# Patient Record
Sex: MALE | Race: WHITE | NOT HISPANIC OR LATINO | Employment: OTHER | URBAN - METROPOLITAN AREA
[De-identification: names, ages, dates, MRNs, and addresses within clinical notes are randomized per-mention and may not be internally consistent; named-entity substitution may affect disease eponyms.]

---

## 2017-02-07 ENCOUNTER — TELEPHONE (OUTPATIENT)
Dept: ELECTROPHYSIOLOGY | Facility: CLINIC | Age: 72
End: 2017-02-07

## 2017-02-07 DIAGNOSIS — I48.0 PAROXYSMAL ATRIAL FIBRILLATION: Primary | ICD-10-CM

## 2017-02-07 NOTE — TELEPHONE ENCOUNTER
Spoke with Dr. Snog regarding his paroxysmal recurrence.  I recommended repeat PVI, he is in agreement.  RFA.  CTA of chest to delineate PV anatomy.  Resume Eliquis now, hold evening prior to procedure.  PEPE 1-3 days prior.  Hold

## 2017-02-08 ENCOUNTER — LAB VISIT (OUTPATIENT)
Dept: LAB | Facility: HOSPITAL | Age: 72
End: 2017-02-08
Attending: INTERNAL MEDICINE
Payer: MEDICARE

## 2017-02-08 DIAGNOSIS — E03.4 HYPOTHYROIDISM DUE TO ACQUIRED ATROPHY OF THYROID: ICD-10-CM

## 2017-02-08 LAB
T4 FREE SERPL-MCNC: 0.9 NG/DL
TSH SERPL DL<=0.005 MIU/L-ACNC: 3.22 UIU/ML

## 2017-02-08 PROCEDURE — 84443 ASSAY THYROID STIM HORMONE: CPT

## 2017-02-08 PROCEDURE — 84439 ASSAY OF FREE THYROXINE: CPT

## 2017-02-08 PROCEDURE — 36415 COLL VENOUS BLD VENIPUNCTURE: CPT | Mod: PO

## 2017-02-09 ENCOUNTER — TELEPHONE (OUTPATIENT)
Dept: ELECTROPHYSIOLOGY | Facility: CLINIC | Age: 72
End: 2017-02-09

## 2017-02-10 ENCOUNTER — TELEPHONE (OUTPATIENT)
Dept: ELECTROPHYSIOLOGY | Facility: CLINIC | Age: 72
End: 2017-02-10

## 2017-02-10 DIAGNOSIS — I48.0 PAROXYSMAL ATRIAL FIBRILLATION: Primary | ICD-10-CM

## 2017-02-15 ENCOUNTER — OFFICE VISIT (OUTPATIENT)
Dept: INTERNAL MEDICINE | Facility: CLINIC | Age: 72
End: 2017-02-15
Payer: MEDICARE

## 2017-02-15 VITALS
HEART RATE: 48 BPM | HEIGHT: 74 IN | BODY MASS INDEX: 29.73 KG/M2 | DIASTOLIC BLOOD PRESSURE: 84 MMHG | WEIGHT: 231.69 LBS | SYSTOLIC BLOOD PRESSURE: 152 MMHG

## 2017-02-15 DIAGNOSIS — I10 ESSENTIAL HYPERTENSION: ICD-10-CM

## 2017-02-15 DIAGNOSIS — E03.4 HYPOTHYROIDISM DUE TO ACQUIRED ATROPHY OF THYROID: ICD-10-CM

## 2017-02-15 DIAGNOSIS — I48.0 PAROXYSMAL ATRIAL FIBRILLATION: ICD-10-CM

## 2017-02-15 DIAGNOSIS — I77.1 TORTUOUS AORTA: Primary | ICD-10-CM

## 2017-02-15 PROCEDURE — 3077F SYST BP >= 140 MM HG: CPT | Mod: S$GLB,,, | Performed by: INTERNAL MEDICINE

## 2017-02-15 PROCEDURE — 3079F DIAST BP 80-89 MM HG: CPT | Mod: S$GLB,,, | Performed by: INTERNAL MEDICINE

## 2017-02-15 PROCEDURE — 1157F ADVNC CARE PLAN IN RCRD: CPT | Mod: S$GLB,,, | Performed by: INTERNAL MEDICINE

## 2017-02-15 PROCEDURE — 1125F AMNT PAIN NOTED PAIN PRSNT: CPT | Mod: S$GLB,,, | Performed by: INTERNAL MEDICINE

## 2017-02-15 PROCEDURE — 1160F RVW MEDS BY RX/DR IN RCRD: CPT | Mod: S$GLB,,, | Performed by: INTERNAL MEDICINE

## 2017-02-15 PROCEDURE — 99499 UNLISTED E&M SERVICE: CPT | Mod: S$GLB,,, | Performed by: INTERNAL MEDICINE

## 2017-02-15 PROCEDURE — 99213 OFFICE O/P EST LOW 20 MIN: CPT | Mod: S$GLB,,, | Performed by: INTERNAL MEDICINE

## 2017-02-15 PROCEDURE — 1159F MED LIST DOCD IN RCRD: CPT | Mod: S$GLB,,, | Performed by: INTERNAL MEDICINE

## 2017-02-15 PROCEDURE — 99999 PR PBB SHADOW E&M-EST. PATIENT-LVL III: CPT | Mod: PBBFAC,,, | Performed by: INTERNAL MEDICINE

## 2017-02-16 NOTE — PROGRESS NOTES
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    Chief Complaint: Follow-up    HPI the patient is a 71-year-old male who comes in for follow-up of his hypertension and hypothyroidism.  He has noticed slight improvement in his level of fatigue since beginning Synthroid.  His weight has remained relatively stable.  The patient has noticed episodes of atrial fibrillation.  He will now be having a repeat ablation performed to prevent further attacks.  The patient has been off of his lisinopril for about one week at this point.    Review of Systems   Constitutional: Negative.  Negative for activity change and unexpected weight change.   Respiratory: Negative for chest tightness and shortness of breath.    Cardiovascular: Positive for palpitations. Negative for chest pain.       Objective:      Physical Exam   Constitutional: He appears well-developed and well-nourished. No distress.   Cardiovascular: Normal rate and regular rhythm.  Exam reveals no gallop and no friction rub.    Murmur heard.  1/6 systolic murmur   Pulmonary/Chest: Effort normal and breath sounds normal. No respiratory distress. He has no wheezes. He has no rales.   Skin: He is not diaphoretic.   Vitals reviewed.      Assessment:       1. Tortuous aorta    2. Paroxysmal atrial fibrillation    3. Hypothyroidism due to acquired atrophy of thyroid    4. Essential hypertension        Plan:       1.  Resume lisinopril 20 mg by mouth daily  2.  Return to clinic in 4 months for follow-up of hypertension

## 2017-03-06 ENCOUNTER — PATIENT MESSAGE (OUTPATIENT)
Dept: PSYCHIATRY | Facility: CLINIC | Age: 72
End: 2017-03-06

## 2017-03-27 ENCOUNTER — PATIENT MESSAGE (OUTPATIENT)
Dept: ELECTROPHYSIOLOGY | Facility: CLINIC | Age: 72
End: 2017-03-27

## 2017-03-27 DIAGNOSIS — I48.91 ATRIAL FIBRILLATION, UNSPECIFIED TYPE: Primary | ICD-10-CM

## 2017-03-27 RX ORDER — ATENOLOL 50 MG/1
50 TABLET ORAL DAILY
Qty: 90 TABLET | Refills: 3 | Status: SHIPPED | OUTPATIENT
Start: 2017-03-27 | End: 2017-10-16 | Stop reason: RX

## 2017-03-28 ENCOUNTER — TELEPHONE (OUTPATIENT)
Dept: ELECTROPHYSIOLOGY | Facility: CLINIC | Age: 72
End: 2017-03-28

## 2017-03-28 ENCOUNTER — PATIENT MESSAGE (OUTPATIENT)
Dept: ELECTROPHYSIOLOGY | Facility: CLINIC | Age: 72
End: 2017-03-28

## 2017-03-28 RX ORDER — ESCITALOPRAM OXALATE 10 MG/1
10 TABLET ORAL DAILY
Qty: 90 TABLET | Refills: 3 | Status: SHIPPED | OUTPATIENT
Start: 2017-03-28 | End: 2017-04-18 | Stop reason: SDUPTHER

## 2017-03-29 ENCOUNTER — ANESTHESIA EVENT (OUTPATIENT)
Dept: MEDSURG UNIT | Facility: HOSPITAL | Age: 72
End: 2017-03-29
Payer: MEDICARE

## 2017-04-03 ENCOUNTER — TELEPHONE (OUTPATIENT)
Dept: CARDIOLOGY | Facility: CLINIC | Age: 72
End: 2017-04-03

## 2017-04-03 NOTE — TELEPHONE ENCOUNTER
Patient called about PEPE scheduled on 4/26/17. Pre-procedural questions asked.  Denies swallowing issues and esophageal issues. States had a gastric bypass. Denies previous sedation/anesthesia problems. States does not snore or have sleep apnea.  Denies recent trauma/surgery/radiation therapy to head/neck/airway. States is able to move neck without difficulty. PEPE described to patient. Instructed NPO past midnight and to have a designated  to drive patient home after the procedures due to sedation being given. Instructed to report to  sscu at 0800 am.   Verbalizes understanding. Questions answered.

## 2017-04-04 ENCOUNTER — PATIENT MESSAGE (OUTPATIENT)
Dept: ELECTROPHYSIOLOGY | Facility: CLINIC | Age: 72
End: 2017-04-04

## 2017-04-04 NOTE — PROGRESS NOTES
CARDIOVERSION EDUCATION CHECK LIST       4/5/17 @ 9 AM  REPORT TO CARDIOLOGY WAITING ROOM ON 3RD FLOOR OF HOSPITAL (DO NOT REPORT TO CLINIC)  Directions for Reporting to Cardiology Waiting Area in the Hospital  If you park in the Parking Garage:  Take elevators to the 2nd floor  Walk up ramp and turn right by Gold Elevators  Take elevator to the 3rd floor  Upon exiting the elevator, turn away from the clinic areas  Walk long flannery around to front of hospital to area with windows overlooking [address]  Check in at Reception Desk  OR  If family is dropping you off:  Have them drop you off at the front of the Hospital  (Near the ER, where all the flags are hung).  Take the E elevators to the 3rd floor.  Check in at the Reception Desk in the waiting room.    DO NOT EAT OR DRINK ANYTHING AFTER: 12 MN ON THE NIGHT BEFORE YOUR PROCEDURE    MEDICATIONS:  YOU MAY TAKE YOUR USUAL MORNING MEDICATIONS WITH A SIP OF WATER    YOU WILL BE GOING HOME THE SAME DAY AS YOUR PROCEDURE  YOU WILL NEED SOMEONE TO DRIVE YOU HOME AFTER YOUR PROCEDURE     YOUR PAIN DURING YOUR PROCEDURE WILL BE MANAGED BY THE ANESTHESIA TEAM      THE ABOVE INSTRUCTIONS WERE GIVEN TO THE PATIENT VERBALLY AND THEY VERBALIZED UNDERSTANDING. THEY DO NOT REQUIRE ANY SPECIAL NEEDS AND DO NOT HAVE ANY LEARNING BARRIERS.     Any need to reschedule or cancel procedures, or any questions regarding your procedure should be addressed directly with the Arrhythmia Department Nurses at the following phone number: [phone]

## 2017-04-11 ENCOUNTER — HOSPITAL ENCOUNTER (OUTPATIENT)
Dept: RADIOLOGY | Facility: HOSPITAL | Age: 72
Discharge: HOME | End: 2017-04-11
Attending: INTERNAL MEDICINE
Payer: MEDICARE

## 2017-04-11 DIAGNOSIS — I48.0 PAROXYSMAL ATRIAL FIBRILLATION: ICD-10-CM

## 2017-04-11 LAB
CREAT SERPL-MCNC: 1.6 MG/DL (ref 0.5–1.4)
SAMPLE: ABNORMAL

## 2017-04-11 PROCEDURE — 25500020 PHARM REV CODE 255: Performed by: INTERNAL MEDICINE

## 2017-04-11 PROCEDURE — 75572 CT HRT W/3D IMAGE: CPT | Mod: 26,,, | Performed by: RADIOLOGY

## 2017-04-11 PROCEDURE — 75572 CT HRT W/3D IMAGE: CPT | Mod: TC

## 2017-04-11 RX ADMIN — IOHEXOL 100 ML: 350 INJECTION, SOLUTION INTRAVENOUS at 09:04

## 2017-04-18 ENCOUNTER — TELEPHONE (OUTPATIENT)
Dept: CARDIOLOGY | Facility: CLINIC | Age: 72
End: 2017-04-18

## 2017-04-18 RX ORDER — ESCITALOPRAM OXALATE 10 MG/1
10 TABLET ORAL DAILY
Qty: 90 TABLET | Refills: 1 | Status: SHIPPED | OUTPATIENT
Start: 2017-04-18 | End: 2017-10-10 | Stop reason: SDUPTHER

## 2017-04-25 ENCOUNTER — HOSPITAL ENCOUNTER (OUTPATIENT)
Facility: HOSPITAL | Age: 72
Discharge: HOME | End: 2017-04-25
Attending: INTERNAL MEDICINE | Admitting: INTERNAL MEDICINE
Payer: MEDICARE

## 2017-04-25 VITALS
DIASTOLIC BLOOD PRESSURE: 59 MMHG | SYSTOLIC BLOOD PRESSURE: 108 MMHG | BODY MASS INDEX: 28.23 KG/M2 | RESPIRATION RATE: 18 BRPM | HEART RATE: 54 BPM | OXYGEN SATURATION: 95 % | HEIGHT: 74 IN | WEIGHT: 220 LBS | TEMPERATURE: 98 F

## 2017-04-25 DIAGNOSIS — I48.91 ATRIAL FIBRILLATION: Primary | ICD-10-CM

## 2017-04-25 LAB
ANION GAP SERPL CALC-SCNC: 6 MMOL/L
AORTIC VALVE REGURGITATION: NORMAL
BUN SERPL-MCNC: 27 MG/DL
CALCIUM SERPL-MCNC: 8.6 MG/DL
CHLORIDE SERPL-SCNC: 118 MMOL/L
CO2 SERPL-SCNC: 18 MMOL/L
CREAT SERPL-MCNC: 2.2 MG/DL
DIASTOLIC DYSFUNCTION: NO
ERYTHROCYTE [DISTWIDTH] IN BLOOD BY AUTOMATED COUNT: 13.8 %
EST. GFR  (AFRICAN AMERICAN): 33.8 ML/MIN/1.73 M^2
EST. GFR  (NON AFRICAN AMERICAN): 29.3 ML/MIN/1.73 M^2
GLUCOSE SERPL-MCNC: 98 MG/DL
HCT VFR BLD AUTO: 38.5 %
HGB BLD-MCNC: 13 G/DL
INR PPP: 1
MCH RBC QN AUTO: 29.7 PG
MCHC RBC AUTO-ENTMCNC: 33.8 %
MCV RBC AUTO: 88 FL
MITRAL VALVE MOBILITY: NORMAL
MITRAL VALVE REGURGITATION: NORMAL
PLATELET # BLD AUTO: 354 K/UL
PMV BLD AUTO: 10.6 FL
POTASSIUM SERPL-SCNC: 5.3 MMOL/L
PROTHROMBIN TIME: 10.9 SEC
RBC # BLD AUTO: 4.37 M/UL
RETIRED EF AND QEF - SEE NOTES: 60 (ref 55–65)
SODIUM SERPL-SCNC: 142 MMOL/L
TRICUSPID VALVE REGURGITATION: NORMAL
WBC # BLD AUTO: 8.47 K/UL

## 2017-04-25 PROCEDURE — 80048 BASIC METABOLIC PNL TOTAL CA: CPT

## 2017-04-25 PROCEDURE — 93320 DOPPLER ECHO COMPLETE: CPT | Mod: 26,,, | Performed by: INTERNAL MEDICINE

## 2017-04-25 PROCEDURE — 93010 ELECTROCARDIOGRAM REPORT: CPT | Mod: ,,, | Performed by: INTERNAL MEDICINE

## 2017-04-25 PROCEDURE — 99152 MOD SED SAME PHYS/QHP 5/>YRS: CPT | Mod: ,,, | Performed by: INTERNAL MEDICINE

## 2017-04-25 PROCEDURE — 85027 COMPLETE CBC AUTOMATED: CPT

## 2017-04-25 PROCEDURE — 25000003 PHARM REV CODE 250: Performed by: INTERNAL MEDICINE

## 2017-04-25 PROCEDURE — 93005 ELECTROCARDIOGRAM TRACING: CPT | Mod: 59

## 2017-04-25 PROCEDURE — 99152 MOD SED SAME PHYS/QHP 5/>YRS: CPT

## 2017-04-25 PROCEDURE — 85610 PROTHROMBIN TIME: CPT

## 2017-04-25 PROCEDURE — 93312 ECHO TRANSESOPHAGEAL: CPT | Mod: 26,,, | Performed by: INTERNAL MEDICINE

## 2017-04-25 RX ORDER — SODIUM CHLORIDE 9 MG/ML
INJECTION, SOLUTION INTRAVENOUS ONCE
Status: COMPLETED | OUTPATIENT
Start: 2017-04-25 | End: 2017-04-25

## 2017-04-25 RX ADMIN — SODIUM CHLORIDE: 0.9 INJECTION, SOLUTION INTRAVENOUS at 08:04

## 2017-04-25 NOTE — PLAN OF CARE
Problem: Patient Care Overview  Goal: Plan of Care Review  Outcome: Ongoing (interventions implemented as appropriate)  Report received from molly Mejias s/p PEPE. Vss. Call light in reach. Patient AAOx3. Will monitor.

## 2017-04-25 NOTE — DISCHARGE SUMMARY
Cardiology Discharge Summary      Admit Date: 4/25/2017    Discharge Date:  4/25/2017    Attending Physician:  Dr Hale    Discharge Physician:   Dr. Stepan Villalobos    Principal Diagnoses:   Atrial fibrillation    Indication for Admission:   PEPE    Discharged Condition:   Good    Hospital Course:   Mr Song presented for PEPE to evaluate for LA/ALMA thrombus prior to upcoming PVI ablation. Procedure was performed with moderate sedation. There was no evidence of LA/ALMA thrombus. He has normal biventricular function.    Outpatient Plan:  Follow up with Dr Beatty for PVI ablation on 4/27/17    Notable Studies:  PEPE 4/25/17    Disposition:   Home or Self Care    Discharge Medications:      Medication List      ASK your doctor about these medications          acyclovir 800 MG Tab   Commonly known as:  ZOVIRAX       apixaban 5 mg Tab   Commonly known as:  ELIQUIS   Take 1 tablet (5 mg total) by mouth 2 (two) times daily.       atenolol 50 MG tablet   Commonly known as:  TENORMIN   Take 1 tablet (50 mg total) by mouth once daily.       buPROPion 300 MG 24 hr tablet   Commonly known as:  WELLBUTRIN XL   Take 1 tablet (300 mg total) by mouth once daily.       CALCIUM ORAL       dutasteride 0.5 mg capsule   Commonly known as:  AVODART   Take 1 capsule (0.5 mg total) by mouth once daily.       escitalopram oxalate 10 MG tablet   Commonly known as:  LEXAPRO   Take 1 tablet (10 mg total) by mouth once daily.       FISH OIL CONCENTRATE ORAL       furosemide 20 MG tablet   Commonly known as:  LASIX       ibuprofen 600 MG tablet   Commonly known as:  ADVIL,MOTRIN       levothyroxine 25 MCG tablet   Commonly known as:  SYNTHROID   Take 1 tablet (25 mcg total) by mouth once daily.       lisinopril 20 MG tablet   Commonly known as:  PRINIVIL,ZESTRIL   Take 1 tablet (20 mg total) by mouth once daily.       MULTIVITAMIN ORAL       omeprazole 40 MG capsule   Commonly known as:  PRILOSEC   Take 1 capsule (40 mg total) by mouth every  morning.             Follow Up:  Dr Beatty for PVI ablation on 4/27/17  Take medications (including eliquis), as instructed by Dr Beatty

## 2017-04-25 NOTE — H&P
TRANSESOPHAGEAL ECHOCARDIOGRAPHY PRE-PROCEDURE NOTE    04/25/2017    HPI:   This is a 71 y.o. male with HTN, hypothyroidism, paroxysmal atrial fibrillation, s/p gastric bypass surgery, who presents for PEPE prior to upcoming PVI ablation. He remains on eliquis for anticoagulation. He had a previous PEPE using moderate sedation on 7/2014 that revealed LVEF 60%, trivial AI and PI, and mild MR/TR.    Dysphagia or odynophagia: no  Liver disease, esophageal disease, or known varices: no  Snoring or sleep apnea: no  Prior neck surgery or radiation: no  Able to move neck in all directions: yes  History of anesthetic difficulties: no  Family history of anesthetic difficulties: no  Last oral intake: >8h    Past Medical History:   Diagnosis Date    Anxiety     Atrial fibrillation     Cataract     Colon polyp     benign    Depression     Elevated PSA     Erectile dysfunction     Gastric ulcer with hemorrhage     Hep B w/o coma 1977    History of bleeding peptic ulcer     History of colonoscopy     normal in 2010.  It was recommended he return in 2015    History of prostatitis     Hypertension     PAF (paroxysmal atrial fibrillation)     S/P arthroscopic surgery of right knee     3 times    Therapy        Past Surgical History:   Procedure Laterality Date    ABDOMINAL HERNIA REPAIR      CATARACT EXTRACTION  11/25/13    left eye    CHOLECYSTECTOMY      COLONOSCOPY N/A 11/25/2015    Procedure: COLONOSCOPY;  Surgeon: Toby Hernandez MD;  Location: [facility];  Service: Endoscopy;  Laterality: N/A;    ELBOW SURGERY      Tennis elbow repair    GASTRIC BYPASS      KNEE ARTHROSCOPY      RT    LASIK  2001    both eyes (Dr. Rabago)    left humerus fx      times 2    ORIF HUMERUS FRACTURE      LT    RADIOFREQUENCY ABLATION      ROTATOR CUFF REPAIR      right       Family History   Problem Relation Age of Onset    COPD Father     Diabetes Father     Aortic stenosis Mother     Heart disease Mother       aortic stenosis    Heart attack Brother     No Known Problems Son     No Known Problems Daughter     No Known Problems Daughter     No Known Problems Daughter        Social History     Social History    Marital status:      Spouse name: N/A    Number of children: N/A    Years of education: N/A     Occupational History     [employer] [employer] ([facility]     Social History Main Topics    Smoking status: Never Smoker    Smokeless tobacco: Never Used      Comment: 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 He is fairly active about his home. However his knee problems prevent vigorous physical activity.    Alcohol use No    Drug use: No    Sexual activity: Yes     Partners: Female     Other Topics Concern    Not on file     Social History Narrative       No current facility-administered medications for this encounter.        Review of patient's allergies indicates:   Allergen Reactions    No known drug allergies          PHYSICAL EXAM:  VS:  Reviewed.    Gen: alert, oriented  HEENT: uvula midline  CV: regular rate and rhythm  Pulm: CTA  Neuro: nonfocal    DIAGNOSTICS:  Telemetry/ECG: afib    Labs: 17   Hb.5   Plts: 295   INR: 1.0   LFTs: n/a   K+: 5.0    Mallampati Class: II  ASA Score: II    ASSESSMENT:  Mr Song is a 71 y.o. male with HTN, hypothyroidism, paroxysmal atrial fibrillation, s/p gastric bypass surgery, who presents for PEPE prior to upcoming PVI ablation.    PLAN:  1. PEPE for evaluation of LA/ALMA thrombus prior to PVI.  2. The risk, benefits, and alternatives to the procedure have been discussed with the patient in detail.The patient wishes to proceed.    -The risks, benefits & alternatives of the procedure were explained to the patient.   -The risks of transesophageal echo include but are not limited to: Dental trauma, esophageal trauma/perforation, bleeding, laryngospasm/brochospasm, aspiration, sore throat/hoarseness, & dislodgement of the  endotracheal tube/nasogastric tube (where applicable).    -The risks of moderate sedation include hypotension, respiratory depression, arrhythmias, bronchospasm, & death.    -Informed consent was obtained & the patient is agreeable to proceed with the procedure.

## 2017-04-25 NOTE — IP AVS SNAPSHOT
[facility]  [address]  [facility] [address]  Phone: [phone]           Patient Discharge Instructions   Our goal is to set you up for success. This packet includes information on your condition, medications, and your home care.  It will help you care for yourself to prevent having to return to the hospital.     Please ask your nurse if you have any questions.      There are many details to remember when preparing to leave the hospital. Here is what you will need to do:    1. Take your medicine. If you are prescribed medications, review your Medication List on the following pages. You may have new medications to  at the pharmacy and others that you'll need to stop taking. Review the instructions for how and when to take your medications. Talk with your doctor or nurses if you are unsure of what to do.     2. Go to your follow-up appointments. Specific follow-up information is listed in the following pages. Your may be contacted by a nurse or clinical provider about future appointments. Be sure we have all of the phone numbers to reach you. Please contact your provider's office if you are unable to make an appointment.     3. Watch for warning signs. Your doctor or nurse will give you detailed warning signs to watch for and when to call for assistance. These instructions may also include educational information about your condition. If you experience any of warning signs to your health, call your doctor.           [facility]  Unless otherwise directed by your provider, please   contact [facility], our nurse care line   that is available for 24/7 assistance.     [phone] (toll-free)     Registered nurses in the [facility]   provide: appointment scheduling, clinical advisement, health education, and other advisory services.                  ** Verify the list of medication(s) below is accurate and up to date. Carry this with you in case of  emergency. If your medications have changed, please notify your healthcare provider.             Medication List      ASK your doctor about these medications        Additional Info                      acyclovir 800 MG Tab   Commonly known as:  ZOVIRAX   Refills:  1    Instructions:  TK ONE T PO FIVE TIMES D     Begin Date    AM    Noon    PM    Bedtime       apixaban 5 mg Tab   Commonly known as:  ELIQUIS   Quantity:  180 tablet   Refills:  3   Dose:  5 mg    Instructions:  Take 1 tablet (5 mg total) by mouth 2 (two) times daily.     Begin Date    AM    Noon    PM    Bedtime       atenolol 50 MG tablet   Commonly known as:  TENORMIN   Quantity:  90 tablet   Refills:  3   Dose:  50 mg    Instructions:  Take 1 tablet (50 mg total) by mouth once daily.     Begin Date    AM    Noon    PM    Bedtime       buPROPion 300 MG 24 hr tablet   Commonly known as:  WELLBUTRIN XL   Quantity:  90 tablet   Refills:  3   Dose:  300 mg    Instructions:  Take 1 tablet (300 mg total) by mouth once daily.     Begin Date    AM    Noon    PM    Bedtime       CALCIUM ORAL   Refills:  0    Instructions:  Take by mouth Daily.     Begin Date    AM    Noon    PM    Bedtime       dutasteride 0.5 mg capsule   Commonly known as:  AVODART   Quantity:  90 capsule   Refills:  3   Dose:  0.5 mg    Instructions:  Take 1 capsule (0.5 mg total) by mouth once daily.     Begin Date    AM    Noon    PM    Bedtime       escitalopram oxalate 10 MG tablet   Commonly known as:  LEXAPRO   Quantity:  90 tablet   Refills:  1   Dose:  10 mg    Instructions:  Take 1 tablet (10 mg total) by mouth once daily.     Begin Date    AM    Noon    PM    Bedtime       FISH OIL CONCENTRATE ORAL   Refills:  0    Instructions:  Take by mouth Daily.     Begin Date    AM    Noon    PM    Bedtime       furosemide 20 MG tablet   Commonly known as:  LASIX   Refills:  3      Begin Date    AM    Noon    PM    Bedtime       ibuprofen 600 MG tablet   Commonly known as:  ADVIL,MOTRIN    Refills:  0      Begin Date    AM    Noon    PM    Bedtime       levothyroxine 25 MCG tablet   Commonly known as:  SYNTHROID   Quantity:  30 tablet   Refills:  11   Dose:  25 mcg    Instructions:  Take 1 tablet (25 mcg total) by mouth once daily.     Begin Date    AM    Noon    PM    Bedtime       lisinopril 20 MG tablet   Commonly known as:  PRINIVIL,ZESTRIL   Quantity:  90 tablet   Refills:  3   Dose:  20 mg    Instructions:  Take 1 tablet (20 mg total) by mouth once daily.     Begin Date    AM    Noon    PM    Bedtime       MULTIVITAMIN ORAL   Refills:  0    Instructions:  Take by mouth Daily.     Begin Date    AM    Noon    PM    Bedtime       omeprazole 40 MG capsule   Commonly known as:  PRILOSEC   Quantity:  90 capsule   Refills:  3   Dose:  40 mg    Instructions:  Take 1 capsule (40 mg total) by mouth every morning.     Begin Date    AM    Noon    PM    Bedtime                  Please bring to all follow up appointments:    1. A copy of your discharge instructions.  2. All medicines you are currently taking in their original bottles.  3. Identification and insurance card.    Please arrive 15 minutes ahead of scheduled appointment time.    Please call 24 hours in advance if you must reschedule your appointment and/or time.        Your Scheduled Appointments     Apr 26, 2017  8:00 AM CDT   SSCU with SOTO CARRERO   [facility] ([facility])    [address] [facility] [address]   [phone]            Apr 26, 2017 10:00 AM CDT   Hospital Appointment with INPATIENTPEPE - Echo/Stress Lab ([facility] )    [address]  [place] [address]   [phone]            Apr 27, 2017  6:00 AM CDT   SSCU with SOTO CARRERO   [facility] ([facility])    [address] [facility] [address]   [phone]            Jun 28, 2017  8:40 AM CDT   Established Patient Visit with Billy EATON  "Arun Colvin MD   [facility] - Internal Medicine ([facility])    [address]  [place] [address] [phone]              Your Future Surgeries/Procedures     Apr 26, 2017   Surgery with Dosc Diagnostic Provider   [facility] ([facility])    [address] [facility] [address] [phone]            Apr 27, 2017   Surgery with Wyatt Beatty MD   [facility] ([facility])    [address] [facility] [address] [phone]                  Admission Information     Date & Time Provider Department CSN    4/25/2017  8:03 AM Wyatt Beatty MD [facility] [id number]      Care Providers     Provider Role Specialty Primary office phone    Wyatt Beatty MD Attending Provider Electrophysiology [phone]      Your Vitals Were     BP Pulse Temp Resp Height Weight    108/59 (BP Location: Left arm, Patient Position: Lying, BP Method: Automatic) 54 97.9 °F (36.6 °C) (Oral) 18 6' 2" (1.88 m) 99.8 kg (220 lb)    SpO2 BMI             95% 28.25 kg/m2         Recent Lab Values        4/19/2011 6/23/2015                        9:10 AM  9:18 AM          A1C 5.7 6.1                     Allergies as of 4/25/2017        Reactions    No Known Drug Allergies       Advance Directives     An advance directive is a document which, in the event you are no longer able to make decisions for yourself, tells your healthcare team what kind of treatment you do or do not want to receive, or who you would like to make those decisions for you.  If you do not currently have an advance directive, [facility] encourages you to create one.  For more information call:  [phone] ([phone]),  or log on to [email or web address].        Language Assistance Services     ATTENTION: Language assistance services are available, free of charge. Please call " [phone].      ATENCIÓN: Si habla español, tiene a dyson disposición servicios gratuitos de asistencia lingüística. Llame al [phone].     CHÚ Ý: N?u b?n nói Ti?ng Vi?t, có các d?ch v? h? tr? ngôn ng? mi?n phí dành cho b?n. G?i s? [phone].        [employer] [facility] complies with applicable Federal civil rights laws and does not discriminate on the basis of race, color, national origin, age, disability, or sex.

## 2017-04-25 NOTE — PROGRESS NOTES
Patient discharged per MD orders. Instructions given on medications, wound care, activity, signs of infection, when to call MD, and follow up appointments. Pt verbalized understanding. PIV removed. Patient and family refused transport, ambulated off unit.

## 2017-04-26 ENCOUNTER — TELEPHONE (OUTPATIENT)
Dept: ELECTROPHYSIOLOGY | Facility: CLINIC | Age: 72
End: 2017-04-26

## 2017-04-26 NOTE — TELEPHONE ENCOUNTER
Reviewed pre op labs with Dr. Beatty for EP lab procedure in AM. Cr 2.2. Called pt with instructions to hold lisinopril now, and lasix AM of procedure. Pt verbalized understanding. States he was dehydrated recently but feeling better now. Will recheck BMP on admit.

## 2017-04-27 ENCOUNTER — HOSPITAL ENCOUNTER (OUTPATIENT)
Facility: HOSPITAL | Age: 72
Discharge: HOME | End: 2017-04-28
Attending: INTERNAL MEDICINE | Admitting: INTERNAL MEDICINE
Payer: MEDICARE

## 2017-04-27 ENCOUNTER — SURGERY (OUTPATIENT)
Age: 72
End: 2017-04-27

## 2017-04-27 ENCOUNTER — ANESTHESIA (OUTPATIENT)
Dept: MEDSURG UNIT | Facility: HOSPITAL | Age: 72
End: 2017-04-27
Payer: MEDICARE

## 2017-04-27 DIAGNOSIS — I48.91 ATRIAL FIBRILLATION: ICD-10-CM

## 2017-04-27 DIAGNOSIS — I48.91 ATRIAL FIBRILLATION, UNSPECIFIED TYPE: ICD-10-CM

## 2017-04-27 DIAGNOSIS — I48.0 PAROXYSMAL ATRIAL FIBRILLATION: ICD-10-CM

## 2017-04-27 LAB
ABO + RH BLD: NORMAL
ANION GAP SERPL CALC-SCNC: 6 MMOL/L
BLD GP AB SCN CELLS X3 SERPL QL: NORMAL
BUN SERPL-MCNC: 19 MG/DL
CALCIUM SERPL-MCNC: 8.7 MG/DL
CHLORIDE SERPL-SCNC: 118 MMOL/L
CO2 SERPL-SCNC: 16 MMOL/L
CREAT SERPL-MCNC: 2.2 MG/DL
EST. GFR  (AFRICAN AMERICAN): 33.8 ML/MIN/1.73 M^2
EST. GFR  (NON AFRICAN AMERICAN): 29.3 ML/MIN/1.73 M^2
GLUCOSE SERPL-MCNC: 97 MG/DL
POC ACTIVATED CLOTTING TIME K: 178 SEC (ref 74–137)
POC ACTIVATED CLOTTING TIME K: 214 SEC (ref 74–137)
POTASSIUM SERPL-SCNC: 5.2 MMOL/L
SAMPLE: ABNORMAL
SAMPLE: ABNORMAL
SODIUM SERPL-SCNC: 140 MMOL/L

## 2017-04-27 PROCEDURE — 27100025 HC TUBING, SET FLUID WARMER: Performed by: NURSE ANESTHETIST, CERTIFIED REGISTERED

## 2017-04-27 PROCEDURE — 93010 ELECTROCARDIOGRAM REPORT: CPT | Mod: ,,, | Performed by: INTERNAL MEDICINE

## 2017-04-27 PROCEDURE — 86901 BLOOD TYPING SEROLOGIC RH(D): CPT

## 2017-04-27 PROCEDURE — 37000008 HC ANESTHESIA 1ST 15 MINUTES: Performed by: INTERNAL MEDICINE

## 2017-04-27 PROCEDURE — 63600175 PHARM REV CODE 636 W HCPCS

## 2017-04-27 PROCEDURE — 25000003 PHARM REV CODE 250: Performed by: INTERNAL MEDICINE

## 2017-04-27 PROCEDURE — C1751 CATH, INF, PER/CENT/MIDLINE: HCPCS | Performed by: NURSE ANESTHETIST, CERTIFIED REGISTERED

## 2017-04-27 PROCEDURE — 27200677 HC TRANSDUCER MONITOR KIT SINGLE: Performed by: NURSE ANESTHETIST, CERTIFIED REGISTERED

## 2017-04-27 PROCEDURE — 86900 BLOOD TYPING SEROLOGIC ABO: CPT

## 2017-04-27 PROCEDURE — 36620 INSERTION CATHETER ARTERY: CPT | Mod: 59,,, | Performed by: ANESTHESIOLOGY

## 2017-04-27 PROCEDURE — 93005 ELECTROCARDIOGRAM TRACING: CPT | Mod: 59

## 2017-04-27 PROCEDURE — D9220A PRA ANESTHESIA: Mod: CRNA,,, | Performed by: NURSE ANESTHETIST, CERTIFIED REGISTERED

## 2017-04-27 PROCEDURE — 93010 ELECTROCARDIOGRAM REPORT: CPT | Mod: 76,,, | Performed by: INTERNAL MEDICINE

## 2017-04-27 PROCEDURE — 37000009 HC ANESTHESIA EA ADD 15 MINS: Performed by: INTERNAL MEDICINE

## 2017-04-27 PROCEDURE — 63600175 PHARM REV CODE 636 W HCPCS: Performed by: ANESTHESIOLOGY

## 2017-04-27 PROCEDURE — 63600175 PHARM REV CODE 636 W HCPCS: Performed by: NURSE ANESTHETIST, CERTIFIED REGISTERED

## 2017-04-27 PROCEDURE — D9220A PRA ANESTHESIA: Mod: ANES,,, | Performed by: ANESTHESIOLOGY

## 2017-04-27 PROCEDURE — 27201037 HC PRESSURE MONITORING SET UP

## 2017-04-27 PROCEDURE — 80048 BASIC METABOLIC PNL TOTAL CA: CPT

## 2017-04-27 PROCEDURE — 25000003 PHARM REV CODE 250: Performed by: NURSE ANESTHETIST, CERTIFIED REGISTERED

## 2017-04-27 PROCEDURE — 25000003 PHARM REV CODE 250: Performed by: ANESTHESIOLOGY

## 2017-04-27 RX ORDER — FENTANYL CITRATE 50 UG/ML
INJECTION, SOLUTION INTRAMUSCULAR; INTRAVENOUS
Status: DISCONTINUED | OUTPATIENT
Start: 2017-04-27 | End: 2017-04-27

## 2017-04-27 RX ORDER — ONDANSETRON 2 MG/ML
INJECTION INTRAMUSCULAR; INTRAVENOUS
Status: DISCONTINUED | OUTPATIENT
Start: 2017-04-27 | End: 2017-04-27

## 2017-04-27 RX ORDER — PROPOFOL 10 MG/ML
VIAL (ML) INTRAVENOUS
Status: DISCONTINUED | OUTPATIENT
Start: 2017-04-27 | End: 2017-04-27

## 2017-04-27 RX ORDER — SODIUM CHLORIDE 0.9 % (FLUSH) 0.9 %
3 SYRINGE (ML) INJECTION
Status: DISCONTINUED | OUTPATIENT
Start: 2017-04-27 | End: 2017-04-28 | Stop reason: HOSPADM

## 2017-04-27 RX ORDER — SODIUM CHLORIDE 9 MG/ML
INJECTION, SOLUTION INTRAVENOUS CONTINUOUS
Status: DISCONTINUED | OUTPATIENT
Start: 2017-04-27 | End: 2017-04-28 | Stop reason: HOSPADM

## 2017-04-27 RX ORDER — ESCITALOPRAM OXALATE 10 MG/1
10 TABLET ORAL DAILY
Status: DISCONTINUED | OUTPATIENT
Start: 2017-04-28 | End: 2017-04-28 | Stop reason: HOSPADM

## 2017-04-27 RX ORDER — LIDOCAINE HCL/PF 100 MG/5ML
SYRINGE (ML) INTRAVENOUS
Status: DISCONTINUED | OUTPATIENT
Start: 2017-04-27 | End: 2017-04-27

## 2017-04-27 RX ORDER — SODIUM CHLORIDE 0.9 % (FLUSH) 0.9 %
3 SYRINGE (ML) INJECTION EVERY 8 HOURS
Status: DISCONTINUED | OUTPATIENT
Start: 2017-04-27 | End: 2017-04-28 | Stop reason: HOSPADM

## 2017-04-27 RX ORDER — DUTASTERIDE 0.5 MG/1
0.5 CAPSULE, LIQUID FILLED ORAL DAILY
Status: DISCONTINUED | OUTPATIENT
Start: 2017-04-28 | End: 2017-04-28 | Stop reason: HOSPADM

## 2017-04-27 RX ORDER — PROTAMINE SULFATE 10 MG/ML
20 INJECTION, SOLUTION INTRAVENOUS ONCE
Status: COMPLETED | OUTPATIENT
Start: 2017-04-27 | End: 2017-04-27

## 2017-04-27 RX ORDER — DOPAMINE HYDROCHLORIDE 160 MG/100ML
INJECTION, SOLUTION INTRAVENOUS CONTINUOUS PRN
Status: DISCONTINUED | OUTPATIENT
Start: 2017-04-27 | End: 2017-04-27

## 2017-04-27 RX ORDER — BUPROPION HYDROCHLORIDE 150 MG/1
300 TABLET ORAL DAILY
Status: DISCONTINUED | OUTPATIENT
Start: 2017-04-27 | End: 2017-04-28 | Stop reason: HOSPADM

## 2017-04-27 RX ORDER — PROTAMINE SULFATE 10 MG/ML
INJECTION, SOLUTION INTRAVENOUS
Status: DISCONTINUED | OUTPATIENT
Start: 2017-04-27 | End: 2017-04-27

## 2017-04-27 RX ORDER — OXYCODONE AND ACETAMINOPHEN 10; 325 MG/1; MG/1
1 TABLET ORAL ONCE
Status: COMPLETED | OUTPATIENT
Start: 2017-04-27 | End: 2017-04-27

## 2017-04-27 RX ORDER — PANTOPRAZOLE SODIUM 40 MG/1
40 TABLET, DELAYED RELEASE ORAL DAILY
Status: DISCONTINUED | OUTPATIENT
Start: 2017-04-27 | End: 2017-04-28 | Stop reason: HOSPADM

## 2017-04-27 RX ORDER — ROCURONIUM BROMIDE 10 MG/ML
INJECTION, SOLUTION INTRAVENOUS
Status: DISCONTINUED | OUTPATIENT
Start: 2017-04-27 | End: 2017-04-27

## 2017-04-27 RX ORDER — MIDAZOLAM HYDROCHLORIDE 1 MG/ML
INJECTION, SOLUTION INTRAMUSCULAR; INTRAVENOUS
Status: DISCONTINUED | OUTPATIENT
Start: 2017-04-27 | End: 2017-04-27

## 2017-04-27 RX ORDER — HEPARIN SODIUM 1000 [USP'U]/ML
INJECTION, SOLUTION INTRAVENOUS; SUBCUTANEOUS
Status: DISCONTINUED | OUTPATIENT
Start: 2017-04-27 | End: 2017-04-27

## 2017-04-27 RX ORDER — SODIUM CHLORIDE 9 MG/ML
INJECTION, SOLUTION INTRAVENOUS CONTINUOUS PRN
Status: DISCONTINUED | OUTPATIENT
Start: 2017-04-27 | End: 2017-04-27

## 2017-04-27 RX ORDER — HEPARIN SODIUM 10000 [USP'U]/100ML
INJECTION, SOLUTION INTRAVENOUS CONTINUOUS PRN
Status: DISCONTINUED | OUTPATIENT
Start: 2017-04-27 | End: 2017-04-27

## 2017-04-27 RX ORDER — SUCCINYLCHOLINE CHLORIDE 20 MG/ML
INJECTION INTRAMUSCULAR; INTRAVENOUS
Status: DISCONTINUED | OUTPATIENT
Start: 2017-04-27 | End: 2017-04-27

## 2017-04-27 RX ORDER — LEVOTHYROXINE SODIUM 25 UG/1
25 TABLET ORAL DAILY
Status: DISCONTINUED | OUTPATIENT
Start: 2017-04-27 | End: 2017-04-28 | Stop reason: HOSPADM

## 2017-04-27 RX ORDER — FENTANYL CITRATE 50 UG/ML
25 INJECTION, SOLUTION INTRAMUSCULAR; INTRAVENOUS EVERY 5 MIN PRN
Status: COMPLETED | OUTPATIENT
Start: 2017-04-27 | End: 2017-04-27

## 2017-04-27 RX ORDER — OXYCODONE AND ACETAMINOPHEN 5; 325 MG/1; MG/1
1 TABLET ORAL ONCE
Status: COMPLETED | OUTPATIENT
Start: 2017-04-27 | End: 2017-04-27

## 2017-04-27 RX ORDER — ONDANSETRON 2 MG/ML
4 INJECTION INTRAMUSCULAR; INTRAVENOUS DAILY PRN
Status: DISCONTINUED | OUTPATIENT
Start: 2017-04-27 | End: 2017-04-28 | Stop reason: HOSPADM

## 2017-04-27 RX ADMIN — FENTANYL CITRATE 50 MCG: 50 INJECTION, SOLUTION INTRAMUSCULAR; INTRAVENOUS at 08:04

## 2017-04-27 RX ADMIN — CALCIUM CHLORIDE 500 MG: 100 INJECTION, SOLUTION INTRAVENOUS at 09:04

## 2017-04-27 RX ADMIN — APIXABAN 5 MG: 5 TABLET, FILM COATED ORAL at 07:04

## 2017-04-27 RX ADMIN — PROTAMINE SULFATE 10 MG: 10 INJECTION, SOLUTION INTRAVENOUS at 11:04

## 2017-04-27 RX ADMIN — OXYCODONE AND ACETAMINOPHEN 1 TABLET: 10; 325 TABLET ORAL at 02:04

## 2017-04-27 RX ADMIN — PROPOFOL 30 MG: 10 INJECTION, EMULSION INTRAVENOUS at 09:04

## 2017-04-27 RX ADMIN — SUCCINYLCHOLINE CHLORIDE 120 MG: 20 INJECTION, SOLUTION INTRAMUSCULAR; INTRAVENOUS at 08:04

## 2017-04-27 RX ADMIN — ONDANSETRON 4 MG: 2 INJECTION INTRAMUSCULAR; INTRAVENOUS at 11:04

## 2017-04-27 RX ADMIN — SODIUM CHLORIDE, SODIUM GLUCONATE, SODIUM ACETATE, POTASSIUM CHLORIDE, MAGNESIUM CHLORIDE, SODIUM PHOSPHATE, DIBASIC, AND POTASSIUM PHOSPHATE: .53; .5; .37; .037; .03; .012; .00082 INJECTION, SOLUTION INTRAVENOUS at 07:04

## 2017-04-27 RX ADMIN — EPHEDRINE SULFATE 10 MG: 50 INJECTION, SOLUTION INTRAMUSCULAR; INTRAVENOUS; SUBCUTANEOUS at 08:04

## 2017-04-27 RX ADMIN — HEPARIN SODIUM 3000 UNITS: 1000 INJECTION INTRAVENOUS; SUBCUTANEOUS at 10:04

## 2017-04-27 RX ADMIN — ROCURONIUM BROMIDE 5 MG: 10 INJECTION, SOLUTION INTRAVENOUS at 08:04

## 2017-04-27 RX ADMIN — HEPARIN SODIUM 10000 UNITS: 1000 INJECTION INTRAVENOUS; SUBCUTANEOUS at 08:04

## 2017-04-27 RX ADMIN — MIDAZOLAM HYDROCHLORIDE 2 MG: 1 INJECTION INTRAMUSCULAR; INTRAVENOUS at 07:04

## 2017-04-27 RX ADMIN — FENTANYL CITRATE 25 MCG: 50 INJECTION INTRAMUSCULAR; INTRAVENOUS at 12:04

## 2017-04-27 RX ADMIN — MIDAZOLAM HYDROCHLORIDE 1 MG: 1 INJECTION INTRAMUSCULAR; INTRAVENOUS at 08:04

## 2017-04-27 RX ADMIN — FENTANYL CITRATE 25 MCG: 50 INJECTION INTRAMUSCULAR; INTRAVENOUS at 01:04

## 2017-04-27 RX ADMIN — EPHEDRINE SULFATE 10 MG: 50 INJECTION, SOLUTION INTRAMUSCULAR; INTRAVENOUS; SUBCUTANEOUS at 09:04

## 2017-04-27 RX ADMIN — HEPARIN SODIUM 2000 UNITS: 1000 INJECTION INTRAVENOUS; SUBCUTANEOUS at 11:04

## 2017-04-27 RX ADMIN — FENTANYL CITRATE 50 MCG: 50 INJECTION, SOLUTION INTRAMUSCULAR; INTRAVENOUS at 09:04

## 2017-04-27 RX ADMIN — PROTAMINE SULFATE 20 MG: 10 INJECTION, SOLUTION INTRAVENOUS at 12:04

## 2017-04-27 RX ADMIN — Medication 3 ML: at 10:04

## 2017-04-27 RX ADMIN — SODIUM CHLORIDE: 0.9 INJECTION, SOLUTION INTRAVENOUS at 07:04

## 2017-04-27 RX ADMIN — DOPAMINE HYDROCHLORIDE IN DEXTROSE 5 MCG/KG/MIN: 1.6 INJECTION, SOLUTION INTRAVENOUS at 09:04

## 2017-04-27 RX ADMIN — EPHEDRINE SULFATE 15 MG: 50 INJECTION, SOLUTION INTRAMUSCULAR; INTRAVENOUS; SUBCUTANEOUS at 09:04

## 2017-04-27 RX ADMIN — PROPOFOL 50 MG: 10 INJECTION, EMULSION INTRAVENOUS at 08:04

## 2017-04-27 RX ADMIN — PROPOFOL 200 MG: 10 INJECTION, EMULSION INTRAVENOUS at 08:04

## 2017-04-27 RX ADMIN — PROTAMINE SULFATE 5 MG: 10 INJECTION, SOLUTION INTRAVENOUS at 11:04

## 2017-04-27 RX ADMIN — LIDOCAINE HYDROCHLORIDE 100 MG: 20 INJECTION, SOLUTION INTRAVENOUS at 08:04

## 2017-04-27 RX ADMIN — HEPARIN SODIUM 7000 UNITS: 1000 INJECTION INTRAVENOUS; SUBCUTANEOUS at 09:04

## 2017-04-27 RX ADMIN — HEPARIN SODIUM AND DEXTROSE 5000 UNITS/HR: 10000; 5 INJECTION INTRAVENOUS at 08:04

## 2017-04-27 RX ADMIN — HEPARIN SODIUM 8000 UNITS: 1000 INJECTION INTRAVENOUS; SUBCUTANEOUS at 09:04

## 2017-04-27 RX ADMIN — OXYCODONE HYDROCHLORIDE AND ACETAMINOPHEN 1 TABLET: 5; 325 TABLET ORAL at 10:04

## 2017-04-27 NOTE — IP AVS SNAPSHOT
[facility]  [address]  [facility] [address]  Phone: [phone]           Patient Discharge Instructions   Our goal is to set you up for success. This packet includes information on your condition, medications, and your home care.  It will help you care for yourself to prevent having to return to the hospital.     Please ask your nurse if you have any questions.      There are many details to remember when preparing to leave the hospital. Here is what you will need to do:    1. Take your medicine. If you are prescribed medications, review your Medication List on the following pages. You may have new medications to  at the pharmacy and others that you'll need to stop taking. Review the instructions for how and when to take your medications. Talk with your doctor or nurses if you are unsure of what to do.     2. Go to your follow-up appointments. Specific follow-up information is listed in the following pages. Your may be contacted by a nurse or clinical provider about future appointments. Be sure we have all of the phone numbers to reach you. Please contact your provider's office if you are unable to make an appointment.     3. Watch for warning signs. Your doctor or nurse will give you detailed warning signs to watch for and when to call for assistance. These instructions may also include educational information about your condition. If you experience any of warning signs to your health, call your doctor.           [facility]  Unless otherwise directed by your provider, please   contact [facility], our nurse care line   that is available for 24/7 assistance.     [phone] (toll-free)     Registered nurses in the [facility]   provide: appointment scheduling, clinical advisement, health education, and other advisory services.                  ** Verify the list of medication(s) below is accurate and up to date. Carry this with you in case of  emergency. If your medications have changed, please notify your healthcare provider.             Medication List      START taking these medications        Additional Info                      propafenone 425 MG Cp12   Commonly known as:  RYTHMOL SR   Quantity:  180 capsule   Refills:  3   Dose:  425 mg    Instructions:  Take 1 capsule (425 mg total) by mouth every 12 (twelve) hours.     Begin Date    AM    Noon    PM    Bedtime         CONTINUE taking these medications        Additional Info                      acyclovir 800 MG Tab   Commonly known as:  ZOVIRAX   Refills:  1    Instructions:  TK ONE T PO FIVE TIMES D     Begin Date    AM    Noon    PM    Bedtime       apixaban 5 mg Tab   Commonly known as:  ELIQUIS   Quantity:  180 tablet   Refills:  3   Dose:  5 mg    Last time this was given:  5 mg on 4/28/2017  8:21 AM   Instructions:  Take 1 tablet (5 mg total) by mouth 2 (two) times daily.     Begin Date    AM    Noon    PM    Bedtime       atenolol 50 MG tablet   Commonly known as:  TENORMIN   Quantity:  90 tablet   Refills:  3   Dose:  50 mg    Instructions:  Take 1 tablet (50 mg total) by mouth once daily.     Begin Date    AM    Noon    PM    Bedtime       buPROPion 300 MG 24 hr tablet   Commonly known as:  WELLBUTRIN XL   Quantity:  90 tablet   Refills:  3   Dose:  300 mg    Last time this was given:  300 mg on 4/28/2017  8:23 AM   Instructions:  Take 1 tablet (300 mg total) by mouth once daily.     Begin Date    AM    Noon    PM    Bedtime       CALCIUM ORAL   Refills:  0    Instructions:  Take by mouth Daily.     Begin Date    AM    Noon    PM    Bedtime       dutasteride 0.5 mg capsule   Commonly known as:  AVODART   Quantity:  90 capsule   Refills:  3   Dose:  0.5 mg    Last time this was given:  0.5 mg on 4/28/2017  8:25 AM   Instructions:  Take 1 capsule (0.5 mg total) by mouth once daily.     Begin Date    AM    Noon    PM    Bedtime       escitalopram oxalate 10 MG tablet   Commonly known as:   LEXAPRO   Quantity:  90 tablet   Refills:  1   Dose:  10 mg    Last time this was given:  10 mg on 4/28/2017  8:21 AM   Instructions:  Take 1 tablet (10 mg total) by mouth once daily.     Begin Date    AM    Noon    PM    Bedtime       FISH OIL CONCENTRATE ORAL   Refills:  0    Instructions:  Take by mouth Daily.     Begin Date    AM    Noon    PM    Bedtime       levothyroxine 25 MCG tablet   Commonly known as:  SYNTHROID   Quantity:  30 tablet   Refills:  11   Dose:  25 mcg    Last time this was given:  25 mcg on 4/28/2017  5:31 AM   Instructions:  Take 1 tablet (25 mcg total) by mouth once daily.     Begin Date    AM    Noon    PM    Bedtime       MULTIVITAMIN ORAL   Refills:  0    Instructions:  Take by mouth Daily.     Begin Date    AM    Noon    PM    Bedtime       omeprazole 40 MG capsule   Commonly known as:  PRILOSEC   Quantity:  90 capsule   Refills:  3   Dose:  40 mg    Instructions:  Take 1 capsule (40 mg total) by mouth every morning.     Begin Date    AM    Noon    PM    Bedtime         STOP taking these medications     furosemide 20 MG tablet   Commonly known as:  LASIX       ibuprofen 600 MG tablet   Commonly known as:  ADVIL,MOTRIN       lisinopril 20 MG tablet   Commonly known as:  PRINIVIL,ZESTRIL            Where to Get Your Medications      These medications were sent to [facility] [address] [place] [address] & [address] [address]    Hours:  24-hours Phone:  [phone]     propafenone 425 MG Cp12                  Please bring to all follow up appointments:    1. A copy of your discharge instructions.  2. All medicines you are currently taking in their original bottles.  3. Identification and insurance card.    Please arrive 15 minutes ahead of scheduled appointment time.    Please call 24 hours in advance if you must reschedule your appointment and/or time.        Your Scheduled Appointments     Jun 28, 2017  8:40 AM  CDT   Established Patient Visit with MD Shawn Llanes Jr. Roderick - Internal Medicine ([facility])    [address]  [facility] [address] [phone]              Follow-up Information     Follow up with Wyatt Beatty MD In 4 weeks.    Specialties:  Electrophysiology, Cardiology    Why:  with EKG before    Contact information:    [address]  [facility] [id number] [phone]          Discharge Instructions     Future Orders    Diet general     Questions:    Total calories:      Fat restriction, if any:      Protein restriction, if any:      Na restriction, if any:      Fluid restriction:      Additional restrictions:          Discharge Instructions       1. Do not strain or lift anything greater than 5 lb for 1 week.   2. Do not drive or operate any dangerous machinery for 24 hours.   3. Keep the dressing on, clean, and dry for 24 hours.   4. After 24 hours, the dressing may be removed and a shower is allowed.   5. Clean the area with mild soap and water and then cover it with a bandage.   6. Once the skin has healed, bathing in a tub or swimming is allowed.   7. Inspect the groin site daily and report to the physician any swelling at the site that   cannot be controlled with manual pressure for 10 minutes, unusual pain at the   access site or affected extremity, unusual swelling at the access site, or signs or   symptoms of infection such as redness, pain, or fever.   Call 911 if you have:   Bleeding from the puncture site that you cannot stop by doing the following:   Relax and lie down right away. Keep your leg flat and apply firm pressure to the site using your fingers and a gauze pad. Keep the pressure on for 20 minutes. Continue this until the bleeding stops. This may take awhile. When bleeding stops, cover the site with a sterile bandage and keep your leg still as much as possible.      Admission Information     Date & Time Provider  "Department CSN    4/27/2017  6:11 AM Wyatt Beatty MD [facility] [id number]      Care Providers     Provider Role Specialty Primary office phone    Wyatt Beatty MD Attending Provider Electrophysiology [phone]    Wyatt Beatty MD Surgeon  Electrophysiology [phone]      Your Vitals Were     BP Pulse Temp Resp Height Weight    132/77 (BP Location: Right arm, Patient Position: Lying, BP Method: Automatic) 58 98.2 °F (36.8 °C) (Oral) 16 6' 2" (1.88 m) 99.8 kg (220 lb)    SpO2 BMI             95% 28.25 kg/m2         Recent Lab Values        4/19/2011 6/23/2015                        9:10 AM  9:18 AM          A1C 5.7 6.1                     Allergies as of 4/28/2017        Reactions    No Known Drug Allergies       Advance Directives     An advance directive is a document which, in the event you are no longer able to make decisions for yourself, tells your healthcare team what kind of treatment you do or do not want to receive, or who you would like to make those decisions for you.  If you do not currently have an advance directive, [facility] encourages you to create one.  For more information call:  [phone]), [phone] ([phone]),  or log on to [email or web address].        Language Assistance Services     ATTENTION: Language assistance services are available, free of charge. Please call [phone].      ATENCIÓN: Si habla español, tiene a dyson disposición servicios gratuitos de asistencia lingüística. Llame al [phone].     CHÚ Ý: N?u b?n nói Ti?ng Vi?t, có các d?ch v? h? tr? ngôn ng? mi?n phí dành cho b?n. G?i s? [phone].        [employer] [facility] complies with applicable Federal civil rights laws and does not discriminate on the basis of race, color, national origin, age, disability, or sex.        "

## 2017-04-27 NOTE — H&P
[facility]  EP H&P    Attending Physician: Wyatt Beatty MD  Reason for Consult: RFA PVI    HPI:   Mr. Song is a 71 y.o. man with AF s/p multiple rounds of antiarrhythmics and PVI in 2014 presents for repeat PVI for recurrent AF.  Of note no history of CKD however he reports several weeks of upper respiratory infection which has been treated with antibiotic, also some diarrhea and dehydration.      Last PO last night, last dose of eliquis yesterday morning, compliant with medications but has held furosomide and lisinopril for two days 2/2 CORBIN.  CT recently completed to define LA PV anatomy.    Review of Systems   Review of Systems   Constitution: Positive for weakness and malaise/fatigue.   HENT: Positive for congestion.    Eyes: Negative.    Cardiovascular: Positive for palpitations.   Respiratory: Positive for cough.    Endocrine: Negative.    Hematologic/Lymphatic: Negative.    Skin: Negative.    Musculoskeletal: Negative.    Gastrointestinal: Negative.    Genitourinary: Negative.          PMH:     Past Medical History:   Diagnosis Date    Anxiety     Atrial fibrillation     Cataract     Colon polyp     benign    Depression     Elevated PSA     Erectile dysfunction     Gastric ulcer with hemorrhage     Hep B w/o coma 1977    History of bleeding peptic ulcer     History of colonoscopy     normal in 2010.  It was recommended he return in 2015    History of prostatitis     Hypertension     PAF (paroxysmal atrial fibrillation)     S/P arthroscopic surgery of right knee     3 times    Therapy      Past Surgical History:   Procedure Laterality Date    ABDOMINAL HERNIA REPAIR      CATARACT EXTRACTION  11/25/13    left eye    CHOLECYSTECTOMY      COLONOSCOPY N/A 11/25/2015    Procedure: COLONOSCOPY;  Surgeon: Toby Hernandez MD;  Location[facility];  Service: Endoscopy;  Laterality: N/A;    ELBOW SURGERY      Tennis elbow repair    GASTRIC BYPASS      KNEE ARTHROSCOPY      RT     LASIK  2001    both eyes (Dr. Rabago)    left humerus fx      times 2    ORIF HUMERUS FRACTURE      LT    RADIOFREQUENCY ABLATION      ROTATOR CUFF REPAIR      right        Allergies:     Review of patient's allergies indicates:   Allergen Reactions    No known drug allergies         Medications:     No current facility-administered medications on file prior to encounter.      Current Outpatient Prescriptions on File Prior to Encounter   Medication Sig Dispense Refill    acyclovir (ZOVIRAX) 800 MG Tab TK ONE T PO FIVE TIMES D  1    apixaban (ELIQUIS) 5 mg Tab Take 1 tablet (5 mg total) by mouth 2 (two) times daily. 180 tablet 3    atenolol (TENORMIN) 50 MG tablet Take 1 tablet (50 mg total) by mouth once daily. 90 tablet 3    buPROPion (WELLBUTRIN XL) 300 MG 24 hr tablet Take 1 tablet (300 mg total) by mouth once daily. 90 tablet 3    CALCIUM ORAL Take by mouth Daily.      dutasteride (AVODART) 0.5 mg capsule Take 1 capsule (0.5 mg total) by mouth once daily. 90 capsule 3    furosemide (LASIX) 20 MG tablet   3    ibuprofen (ADVIL,MOTRIN) 600 MG tablet       levothyroxine (SYNTHROID) 25 MCG tablet Take 1 tablet (25 mcg total) by mouth once daily. 30 tablet 11    lisinopril (PRINIVIL,ZESTRIL) 20 MG tablet Take 1 tablet (20 mg total) by mouth once daily. 90 tablet 3    MULTIVITAMIN ORAL Take by mouth Daily.      OMEGA-3 FATTY ACIDS (FISH OIL CONCENTRATE ORAL) Take by mouth Daily.      omeprazole (PRILOSEC) 40 MG capsule Take 1 capsule (40 mg total) by mouth every morning. 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 He is fairly active about his home. However his knee problems prevent vigorous physical activity.    Alcohol use No        Family History:     Family History   Problem Relation Age of Onset    COPD Father      Diabetes Father     Aortic stenosis Mother     Heart disease Mother      aortic stenosis    Heart attack Brother     No Known Problems Son     No Known Problems Daughter     No Known Problems Daughter     No Known Problems Daughter         Physical Exam:     Vitals:     I/O's:  No intake or output data in the 24 hours ending 04/27/17 0642     Physical Exam   Constitutional: He is oriented to person, place, and time. He appears well-developed and well-nourished. No distress.   HENT:   Head: Normocephalic and atraumatic.   Mouth/Throat: No oropharyngeal exudate.   Eyes: EOM are normal. No scleral icterus.   Neck: Normal range of motion. Neck supple. No JVD present. No tracheal deviation present. No thyromegaly present.   Cardiovascular: Normal rate and regular rhythm.  Exam reveals no gallop and no friction rub.    No murmur heard.  Pulmonary/Chest: Effort normal and breath sounds normal. No respiratory distress. He has no wheezes. He has no rales. He exhibits no tenderness.   Abdominal: Soft. He exhibits no distension. There is no tenderness. There is no rebound and no guarding.   Musculoskeletal: Normal range of motion. He exhibits no edema.   Neurological: He is alert and oriented to person, place, and time. No cranial nerve deficit.   Skin: Skin is warm and dry. He is not diaphoretic. No erythema.   Psychiatric: He has a normal mood and affect.         Labs:     No results found for this or any previous visit (from the past 336 hour(s)).    Recent Results (from the past 336 hour(s))   Basic metabolic panel    Collection Time: 04/25/17  8:13 AM   Result Value Ref Range    Sodium 142 136 - 145 mmol/L    Potassium 5.3 (H) 3.5 - 5.1 mmol/L    Chloride 118 (H) 95 - 110 mmol/L    CO2 18 (L) 23 - 29 mmol/L    BUN, Bld 27 (H) 8 - 23 mg/dL    Creatinine 2.2 (H) 0.5 - 1.4 mg/dL    Calcium 8.6 (L) 8.7 - 10.5 mg/dL    Anion Gap 6 (L) 8 - 16 mmol/L       Lab Results   Component Value Date    CHOL 157 06/23/2015    HDL  50 06/23/2015    LDLCALC 80.8 06/23/2015    TRIG 131 06/23/2015       No results for input(s): TROPONINI, CPKMB, CPK, MB in the last 168 hours.    Lab Results   Component Value Date    HGBA1C 6.1 06/23/2015       CrCl cannot be calculated (Unknown ideal weight.).    Imaging:    Echo:  4/25/17: PEPE:  General:  This is a technically adequate study.      Left Atrium:  The left atrium is enlarged.  There is no visualized thrombus.  Spontaneous echo contrast was not present.      Left Atrial Appendage:  ALMA is bilobed.  Spontaneous echo contrast was not present.  There is no visualized thrombus.  ALMA emptying velocity is 0.5m/sec.      Right Atrium:  The right atrium is enlarged    Right Ventricle:  The right ventricle is normal in size. Global right ventricular systolic function appears normal.     Left Ventricle:  The left ventricle is normal in size.      Left ventricular systolic function appears normal. Visually estimated ejection fraction is 60-65%.     Diastolic indices: Diastolic function is normal.     Aortic Valve:  The aortic valve is normal in structure with normal leaflet mobility. The aortic valve is tri-leaflet in structure. Additionally, there is trivial aortic regurgitation.     Mitral Valve:  The mitral valve is normal in structure with normal leaflet mobility. There is trivial to mild mitral regurgitation.     Tricuspid Valve:  The tricuspid valve is normal in structure with normal leaflet mobility. There is trivial tricuspid regurgitation.     Pulmonic Valve:  The pulmonic valve is normal in structure with normal leaflet mobility.     Mass:  No mass visualized.     Vegetation:  No vegetation visualized.     Aorta:  The aortic root is normal in size, measuring 3.6 cm at sinotubular junction.  The aortic root at Sinus of Valsalva is 3.5 cm.  The proximal ascending aorta measures 3.9 cm across.  No atheromatous disease was detected in the aorta.    Pericardium: Visualized pericardium is normal. There is  no evidence of pericardial effusion.    Due to history of gastric bypass surgery, no transgastric images were obtained.  CONCLUSIONS     1 - Left atrial appendage is bilobed with no visualized thrombus.     2 - Normal left ventricular systolic function (EF 60-65%).     3 - Biatrial enlargement.     4 - Normal left ventricular diastolic function.     5 - Trivial aortic regurgitation.     6 - Trivial to mild mitral regurgitation.     7 - Trivial tricuspid regurgitation.     EKG:   Sinus with PACs      Assessment & Recommendations:     Mr. Song is a 71 y.o. man with AF s/p multiple rounds of antiarrhythmics and PVI in 2014 presents for repeat PVI for recurrent AF.    #Paroxysmal atrial fibrillation - risk and benefits of procedure discussed with patient including esophageal damage, bronchial damage, phrenic nerve damage, stroke, pericardial effusion, bleeding, hematoma, and death.  He understood these risks and wishes to proceed.      Thank you for this consult.     Signed:  Keyshawn Hsieh M.D.  Cardiology Fellow  Pager: [phone]  4/27/2017 6:42 AM    Attending Addendum:

## 2017-04-27 NOTE — ANESTHESIA POSTPROCEDURE EVALUATION
"Anesthesia Post Evaluation    Patient: Dominick Song MD    Procedure(s) Performed: Procedure(s) (LRB):  ABLATION (N/A)    Final Anesthesia Type: general  Patient location during evaluation: PACU  Patient participation: Yes- Able to Participate  Level of consciousness: awake and alert  Post-procedure vital signs: reviewed and stable  Pain management: adequate  Airway patency: patent  PONV status at discharge: No PONV  Anesthetic complications: no      Cardiovascular status: blood pressure returned to baseline  Respiratory status: unassisted  Hydration status: euvolemic  Follow-up not needed.        Visit Vitals    /76 (BP Location: Right arm, Patient Position: Lying, BP Method: Automatic)    Pulse 63    Temp 36.2 °C (97.2 °F) (Axillary)    Resp 16    Ht 6' 2" (1.88 m)    Wt 99.8 kg (220 lb)    SpO2 95%    BMI 28.25 kg/m2       Pain/Pari Score: Pain Assessment Performed: Yes (4/27/2017  1:45 PM)  Presence of Pain: complains of pain/discomfort (4/27/2017  1:45 PM)  Pain Rating Prior to Med Admin: 4 (4/27/2017  1:45 PM)  Pari Score: 9 (4/27/2017  1:45 PM)      "

## 2017-04-27 NOTE — PROGRESS NOTES
Pt is AAOx4 and denies pain.  VSS.  Family at bedside.  Admit questions completed.  See full assessment for details.  Will continue to monitor.

## 2017-04-27 NOTE — ANESTHESIA PREPROCEDURE EVALUATION
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 Elevated BUN/creatinine with recent lab work. Will recheck prior to procedure.     Pre-operative evaluation for Procedure(s) (LRB):  ABLATION (N/A)         Patient Active Problem List   Diagnosis    BPH (benign prostatic hyperplasia)    Elevated PSA    Palpitations    S/P gastric bypass    Peptic ulcer disease    Heart abnormality    HTN (hypertension)    Nuclear sclerosis    Atrial fibrillation    Melena    Anemia due to chronic blood loss    Gastric ulcer    Erectile dysfunction    Posterior vitreous detachment    Metatarsalgia    Keratoma    Foot pain, right    Onychomycosis due to dermatophyte    ED (erectile dysfunction)    Hx of colonic polyps    Bradycardia    Mild single current episode of major depressive disorder    Tortuous aorta    Dyspnea    Hypothyroidism due to acquired atrophy of thyroid    Essential hypertension       Review of patient's allergies indicates:   Allergen Reactions    No known drug allergies        No current facility-administered medications on file prior to encounter.      Current Outpatient Prescriptions on File Prior to Encounter   Medication Sig Dispense Refill    apixaban (ELIQUIS) 5 mg Tab Take 1 tablet (5 mg total) by mouth 2 (two) times daily. 180 tablet 3    atenolol (TENORMIN) 50 MG tablet Take 1 tablet (50 mg total) by mouth once daily. 90 tablet 3    buPROPion (WELLBUTRIN XL) 300 MG 24 hr tablet Take 1 tablet (300 mg total) by mouth once daily. 90 tablet 3    CALCIUM ORAL Take by mouth Daily.      dutasteride (AVODART) 0.5 mg capsule Take 1 capsule (0.5 mg total) by mouth once daily. 90 capsule 3    ibuprofen (ADVIL,MOTRIN) 600 MG tablet       levothyroxine (SYNTHROID) 25 MCG tablet Take 1 tablet (25 mcg total)  by mouth once daily. 30 tablet 11    lisinopril (PRINIVIL,ZESTRIL) 20 MG tablet Take 1 tablet (20 mg total) by mouth once daily. 90 tablet 3    MULTIVITAMIN ORAL Take by mouth Daily.      OMEGA-3 FATTY ACIDS (FISH OIL CONCENTRATE ORAL) Take by mouth Daily.      omeprazole (PRILOSEC) 40 MG capsule Take 1 capsule (40 mg total) by mouth every morning. 90 capsule 3    acyclovir (ZOVIRAX) 800 MG Tab TK ONE T PO FIVE TIMES D  1    furosemide (LASIX) 20 MG tablet   3       Past Surgical History:   Procedure Laterality Date    ABDOMINAL HERNIA REPAIR      CARDIAC SURGERY      CATARACT EXTRACTION  11/25/13    left eye    CHOLECYSTECTOMY      COLONOSCOPY N/A 11/25/2015    Procedure: COLONOSCOPY;  Surgeon: Toby Hernandez MD;  Location: [facility];  Service: Endoscopy;  Laterality: N/A;    ELBOW SURGERY      Tennis elbow repair    EYE SURGERY      FRACTURE SURGERY      GASTRIC BYPASS      KNEE ARTHROSCOPY      RT    LASIK  2001    both eyes (Dr. 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 He is fairly active about his home. However his knee problems prevent vigorous physical activity.    Alcohol use No    Drug use: No    Sexual activity: Yes     Partners: Female     Other Topics Concern    Not on file     Social History Narrative         Vital Signs Range (Last 24H):  Temp:  [37.2 °C (99 °F)]   Pulse:  [43]   Resp:  [18]   BP: (128-130)/(66-72)   SpO2:  [98 %]       CBC:   Recent Labs      04/25/17   0813   WBC  8.47   RBC  4.37*    HGB  13.0*   HCT  38.5*   PLT  354*   MCV  88   MCH  29.7   MCHC  33.8       CMP:   Recent Labs      17   0813   NA  142   K  5.3*   CL  118*   CO2  18*   BUN  27*   CREATININE  2.2*   GLU  98   CALCIUM  8.6*       INR  Recent Labs      17   0813   INR  1.0           Diagnostic Studies:      EKD Echo:        Anesthesia Evaluation    I have reviewed the Patient Summary Reports.    I have reviewed the Nursing Notes.   I have reviewed the Medications.     Review of Systems  Anesthesia Hx:  No problems with previous Anesthesia    Hematology/Oncology:  Hematology Normal   Oncology Normal     EENT/Dental:EENT/Dental Normal   Cardiovascular:   Hypertension Dysrhythmias atrial fibrillation ECG has been reviewed.    Pulmonary:   Shortness of breath    Renal/:  Renal/ Normal     Hepatic/GI:   PUD, Liver Disease, Hepatitis, B    Musculoskeletal:  Musculoskeletal Normal    Neurological:  Neurology Normal    Endocrine:   Hypothyroidism    Dermatological:  Skin Normal    Psych:   Psychiatric History          Physical Exam  General:  Well nourished    Airway/Jaw/Neck:  Airway Findings: Mouth Opening: Normal Tongue: Normal  General Airway Assessment: Adult  Mallampati: I  TM Distance: Normal, at least 6 cm  Jaw/Neck Findings:  Neck ROM: Normal ROM     Eyes/Ears/Nose:  Eyes/Ears/Nose Findings:    Dental:  Dental Findings: In tact    Chest/Lungs:  Chest/Lungs Findings: Clear to auscultation, Normal Respiratory Rate     Heart/Vascular:  Heart Findings: Rate: Bradycardia  Rhythm: Regular Rhythm  Sounds: Normal  Heart Murmur  Vascular Findings:        Mental Status:  Mental Status Findings:  Cooperative, Alert and Oriented         Anesthesia Plan  Type of Anesthesia, risks & benefits discussed:  Anesthesia Type:  general  Patient's Preference: General  Intra-op Monitoring Plan: standard ASA monitors and arterial line  Intra-op Monitoring Plan Comments:   Post Op Pain Control Plan:   Post Op Pain Control Plan  Comments: Per primary team  Induction:   IV  Beta Blocker:  Patient is on a Beta-Blocker and has received one dose within the past 24 hours (No further documentation required).       Informed Consent: Patient understands risks and agrees with Anesthesia plan.  Questions answered. Anesthesia consent signed with patient.  ASA Score: 3     Day of Surgery Review of History & Physical:    H&P update referred to the surgeon.     Anesthesia Plan Notes: Discussed plan for general endotracheal anesthesia, pt understands and agrees with plan        Ready For Surgery From Anesthesia Perspective.

## 2017-04-27 NOTE — NURSING TRANSFER
Nursing Transfer Note      4/27/2017     Transfer To: 315    Transfer via stretcher    Transfer with cardiac monitoring    Transported by rn    Medicines sent: silvadene    Chart send with patient: Yes    Notified: wife    Patient reassessed at: see epic (date, time)

## 2017-04-27 NOTE — PROGRESS NOTES
Patient admitted to recovery see [facility] for complete assessment pacu bcg's maintained safety measures verified patient instructed on pain scale and patient verbalized understanding. Also nikole act on patient and it is 214 paged dr. Hsieh awaiting call back and ekg being done by ekg tech and will be placed in patient's chart.

## 2017-04-27 NOTE — TRANSFER OF CARE
"Anesthesia Transfer of Care Note    Patient: Dominick Song MD    Procedure(s) Performed: Procedure(s) (LRB):  ABLATION (N/A)    Patient location: PACU    Anesthesia Type: general    Transport from OR: Transported from OR on 6-10 L/min O2 by face mask with adequate spontaneous ventilation    Post pain: adequate analgesia    Post assessment: no apparent anesthetic complications and tolerated procedure well    Post vital signs: stable    Level of consciousness: awake    Nausea/Vomiting: no nausea/vomiting    Complications: none          Last vitals:   Visit Vitals    /72 (BP Location: Left arm, Patient Position: Lying, BP Method: Automatic)    Pulse (!) 43    Temp 37.2 °C (99 °F) (Oral)    Resp 18    Ht 6' 2" (1.88 m)    Wt 99.8 kg (220 lb)    SpO2 98%    BMI 28.25 kg/m2     "

## 2017-04-27 NOTE — ANESTHESIA RELEASE NOTE
"Anesthesia Release from PACU Note    Patient: Dominick Song MD    Procedure(s) Performed: Procedure(s) (LRB):  ABLATION (N/A)    Anesthesia type: general    Post pain: Adequate analgesia    Post assessment: no apparent anesthetic complications    Last Vitals:   Visit Vitals    /76 (BP Location: Right arm, Patient Position: Lying, BP Method: Automatic)    Pulse 63    Temp 36.2 °C (97.2 °F) (Axillary)    Resp 16    Ht 6' 2" (1.88 m)    Wt 99.8 kg (220 lb)    SpO2 95%    BMI 28.25 kg/m2       Post vital signs: stable    Level of consciousness: awake    Nausea/Vomiting: no nausea/no vomiting    Complications: none    Airway Patency: patent    Respiratory: unassisted    Cardiovascular: stable and blood pressure at baseline    Hydration: euvolemic  "

## 2017-04-27 NOTE — PLAN OF CARE
Problem: Patient Care Overview  Goal: Plan of Care Review  Outcome: Ongoing (interventions implemented as appropriate)  Received report from LASHAY Flores. Patient s/p RFA, AAOx3. VSS, no c/o pain or discomfort at this time, resp even and unlabored. Gauze/tegaderm dressing to B groin is CDI. No active bleeding. No hematoma noted. Post procedure protocol reviewed with patient and patient's family. Understanding verbalized. Family members at bedside. Nurse call bell within reach. Will continue to monitor per post procedure protocol.

## 2017-04-28 VITALS
OXYGEN SATURATION: 95 % | BODY MASS INDEX: 28.23 KG/M2 | HEART RATE: 58 BPM | WEIGHT: 220 LBS | DIASTOLIC BLOOD PRESSURE: 77 MMHG | TEMPERATURE: 98 F | SYSTOLIC BLOOD PRESSURE: 132 MMHG | HEIGHT: 74 IN | RESPIRATION RATE: 16 BRPM

## 2017-04-28 PROCEDURE — 25000003 PHARM REV CODE 250: Performed by: ANESTHESIOLOGY

## 2017-04-28 PROCEDURE — 25000003 PHARM REV CODE 250: Performed by: INTERNAL MEDICINE

## 2017-04-28 RX ORDER — PROPAFENONE HYDROCHLORIDE 425 MG/1
425 CAPSULE, EXTENDED RELEASE ORAL EVERY 12 HOURS
Qty: 180 CAPSULE | Refills: 3 | Status: SHIPPED | OUTPATIENT
Start: 2017-04-28 | End: 2018-01-15 | Stop reason: SDUPTHER

## 2017-04-28 RX ADMIN — BUPROPION HYDROCHLORIDE 300 MG: 150 TABLET, FILM COATED, EXTENDED RELEASE ORAL at 08:04

## 2017-04-28 RX ADMIN — PANTOPRAZOLE SODIUM 40 MG: 40 TABLET, DELAYED RELEASE ORAL at 08:04

## 2017-04-28 RX ADMIN — DUTASTERIDE 0.5 MG: 0.5 CAPSULE, LIQUID FILLED ORAL at 08:04

## 2017-04-28 RX ADMIN — APIXABAN 5 MG: 5 TABLET, FILM COATED ORAL at 08:04

## 2017-04-28 RX ADMIN — ESCITALOPRAM OXALATE 10 MG: 10 TABLET ORAL at 08:04

## 2017-04-28 RX ADMIN — Medication 3 ML: at 05:04

## 2017-04-28 RX ADMIN — LEVOTHYROXINE SODIUM 25 MCG: 25 TABLET ORAL at 05:04

## 2017-04-28 NOTE — DISCHARGE SUMMARY
[facility]  Short Stay  Discharge Summary    Admit Date: 4/27/2017    Discharge Date and Time:  04/28/2017 7:31 AM      Discharge Attending Physician: Wyatt Beatty MD     Hospital Course Dr. Song is a 71 y.o. man with AF s/p multiple rounds of antiarrhythmics and PVI in 2014 presents for repeat PVI for recurrent AF. He was taken for PVI - found to have isolated pulmonary veins, left atrium was voltage mapped and posterior wall reinforced.  He did have elevated creatinine before the procedure which was shown to be stable at the time of procedure.  He reports several weeks of an upper respiratory infection as well as diarrhea, then on further discussion this morning he reports taking lots of ibuprofen for headache, was not taking lasix that often.  For these reasons we asked him to hold lisinopril on discharge, as well as avoid ibuprofen and follow closely with PCP.      Final Diagnoses:    Principal Problem: Atrial fibrillation   Secondary Diagnoses:   Active Hospital Problems    Diagnosis  POA    Atrial fibrillation [I48.91]  Yes      Resolved Hospital Problems    Diagnosis Date Resolved POA   No resolved problems to display.       Discharged Condition: good    Disposition: Home or Self Care    Follow up/Patient Instructions:    Medications:  Reconciled Home Medications:   Current Discharge Medication List      START taking these medications    Details   propafenone (RYTHMOL SR) 425 MG Cp12 Take 1 capsule (425 mg total) by mouth every 12 (twelve) hours.  Qty: 180 capsule, Refills: 3         CONTINUE these medications which have NOT CHANGED    Details   apixaban (ELIQUIS) 5 mg Tab Take 1 tablet (5 mg total) by mouth 2 (two) times daily.  Qty: 180 tablet, Refills: 3      atenolol (TENORMIN) 50 MG tablet Take 1 tablet (50 mg total) by mouth once daily.  Qty: 90 tablet, Refills: 3    Associated Diagnoses: Atrial fibrillation, unspecified type      buPROPion (WELLBUTRIN XL) 300 MG 24 hr tablet  Take 1 tablet (300 mg total) by mouth once daily.  Qty: 90 tablet, Refills: 3      CALCIUM ORAL Take by mouth Daily.      dutasteride (AVODART) 0.5 mg capsule Take 1 capsule (0.5 mg total) by mouth once daily.  Qty: 90 capsule, Refills: 3      escitalopram oxalate (LEXAPRO) 10 MG tablet Take 1 tablet (10 mg total) by mouth once daily.  Qty: 90 tablet, Refills: 1      levothyroxine (SYNTHROID) 25 MCG tablet Take 1 tablet (25 mcg total) by mouth once daily.  Qty: 30 tablet, Refills: 11      MULTIVITAMIN ORAL Take by mouth Daily.      OMEGA-3 FATTY ACIDS (FISH OIL CONCENTRATE ORAL) Take by mouth Daily.      omeprazole (PRILOSEC) 40 MG capsule Take 1 capsule (40 mg total) by mouth every morning.  Qty: 90 capsule, Refills: 3      acyclovir (ZOVIRAX) 800 MG Tab TK ONE T PO FIVE TIMES D  Refills: 1         STOP taking these medications       ibuprofen (ADVIL,MOTRIN) 600 MG tablet Comments:   Reason for Stopping:         lisinopril (PRINIVIL,ZESTRIL) 20 MG tablet Comments:   Reason for Stopping:         furosemide (LASIX) 20 MG tablet Comments:   Reason for Stopping:               Discharge Procedure Orders  Diet general       Follow-up Information     Follow up with Wyatt Beatty MD In 4 weeks.    Specialties:  Electrophysiology, Cardiology    Why:  with EKG before    Contact information:    [address]  [facility] [address]  [phone]

## 2017-04-28 NOTE — DISCHARGE INSTRUCTIONS
1. Do not strain or lift anything greater than 5 lb for 1 week.   2. Do not drive or operate any dangerous machinery for 24 hours.   3. Keep the dressing on, clean, and dry for 24 hours.   4. After 24 hours, the dressing may be removed and a shower is allowed.   5. Clean the area with mild soap and water and then cover it with a bandage.   6. Once the skin has healed, bathing in a tub or swimming is allowed.   7. Inspect the groin site daily and report to the physician any swelling at the site that   cannot be controlled with manual pressure for 10 minutes, unusual pain at the   access site or affected extremity, unusual swelling at the access site, or signs or   symptoms of infection such as redness, pain, or fever.   Call 911 if you have:   Bleeding from the puncture site that you cannot stop by doing the following:   Relax and lie down right away. Keep your leg flat and apply firm pressure to the site using your fingers and a gauze pad. Keep the pressure on for 20 minutes. Continue this until the bleeding stops. This may take awhile. When bleeding stops, cover the site with a sterile bandage and keep your leg still as much as possible.

## 2017-04-28 NOTE — PROGRESS NOTES
Pt is AAOx3 and in no apparent distress.  Provided a copy of discharge instructions.  Teaching performed.  Pt verbalized understanding and denied any questions.  Provided pt with prescriptions.  PIV d/c catheter tip intact.  2x2 applied and no active bleeding noted.  Pt refused wheelchair and is going to walk to front of hospital to meet daughter for transport home.

## 2017-05-08 DIAGNOSIS — N40.0 BENIGN PROSTATIC HYPERPLASIA, PRESENCE OF LOWER URINARY TRACT SYMPTOMS UNSPECIFIED, UNSPECIFIED MORPHOLOGY: Primary | ICD-10-CM

## 2017-05-08 RX ORDER — LISINOPRIL 20 MG/1
20 TABLET ORAL DAILY
Qty: 90 TABLET | Refills: 3 | Status: SHIPPED | OUTPATIENT
Start: 2017-05-08 | End: 2018-01-15 | Stop reason: SDUPTHER

## 2017-05-08 NOTE — TELEPHONE ENCOUNTER
----- Message from Katiana Watson sent at 5/8/2017  9:12 AM CDT -----  Contact: pt [phone]  Prescription Refill Request  Name of medication and dose   Finasteride 90 days supply    Pharmacy   [facility] [address] [place] [address] AT [address]   [phone] (Phone)  [phone] (Fax)      Are you completely out of your medication Yes    Additional Information:  Pt states he usually get a 90 days supply of medication.

## 2017-05-09 RX ORDER — DUTASTERIDE 0.5 MG/1
0.5 CAPSULE, LIQUID FILLED ORAL DAILY
Qty: 90 CAPSULE | Refills: 3 | Status: SHIPPED | OUTPATIENT
Start: 2017-05-09 | End: 2017-10-26 | Stop reason: SDUPTHER

## 2017-05-12 DIAGNOSIS — I48.91 ATRIAL FIBRILLATION, UNSPECIFIED TYPE: Primary | ICD-10-CM

## 2017-05-24 ENCOUNTER — TELEPHONE (OUTPATIENT)
Dept: ORTHOPEDICS | Facility: CLINIC | Age: 72
End: 2017-05-24

## 2017-05-24 NOTE — TELEPHONE ENCOUNTER
----- Message from Julisa Abebe sent at 5/23/2017  4:55 PM CDT -----  Contact: self  Patient ask if can be seen on 5/30 at 9a.

## 2017-05-30 ENCOUNTER — LAB VISIT (OUTPATIENT)
Dept: LAB | Facility: HOSPITAL | Age: 72
End: 2017-05-30
Attending: INTERNAL MEDICINE
Payer: MEDICARE

## 2017-05-30 DIAGNOSIS — I48.91 ATRIAL FIBRILLATION, UNSPECIFIED TYPE: ICD-10-CM

## 2017-05-30 LAB
ANION GAP SERPL CALC-SCNC: 7 MMOL/L
BUN SERPL-MCNC: 26 MG/DL
CALCIUM SERPL-MCNC: 8.5 MG/DL
CHLORIDE SERPL-SCNC: 109 MMOL/L
CO2 SERPL-SCNC: 24 MMOL/L
CREAT SERPL-MCNC: 1.6 MG/DL
EST. GFR  (AFRICAN AMERICAN): 49.7 ML/MIN/1.73 M^2
EST. GFR  (NON AFRICAN AMERICAN): 43 ML/MIN/1.73 M^2
GLUCOSE SERPL-MCNC: 104 MG/DL
POTASSIUM SERPL-SCNC: 5.1 MMOL/L
SODIUM SERPL-SCNC: 140 MMOL/L

## 2017-05-30 PROCEDURE — 36415 COLL VENOUS BLD VENIPUNCTURE: CPT | Mod: PO

## 2017-05-30 PROCEDURE — 80048 BASIC METABOLIC PNL TOTAL CA: CPT

## 2017-05-31 ENCOUNTER — OFFICE VISIT (OUTPATIENT)
Dept: ELECTROPHYSIOLOGY | Facility: CLINIC | Age: 72
End: 2017-05-31
Payer: MEDICARE

## 2017-05-31 VITALS
WEIGHT: 223.13 LBS | HEIGHT: 74 IN | DIASTOLIC BLOOD PRESSURE: 88 MMHG | HEART RATE: 59 BPM | BODY MASS INDEX: 28.64 KG/M2 | SYSTOLIC BLOOD PRESSURE: 126 MMHG

## 2017-05-31 DIAGNOSIS — I10 ESSENTIAL HYPERTENSION: ICD-10-CM

## 2017-05-31 DIAGNOSIS — I48.91 ATRIAL FIBRILLATION, UNSPECIFIED TYPE: ICD-10-CM

## 2017-05-31 DIAGNOSIS — I48.19 PERSISTENT ATRIAL FIBRILLATION: Primary | ICD-10-CM

## 2017-05-31 PROCEDURE — 1157F ADVNC CARE PLAN IN RCRD: CPT | Mod: S$GLB,,, | Performed by: INTERNAL MEDICINE

## 2017-05-31 PROCEDURE — 99999 PR PBB SHADOW E&M-EST. PATIENT-LVL III: CPT | Mod: PBBFAC,,, | Performed by: INTERNAL MEDICINE

## 2017-05-31 PROCEDURE — 1126F AMNT PAIN NOTED NONE PRSNT: CPT | Mod: S$GLB,,, | Performed by: INTERNAL MEDICINE

## 2017-05-31 PROCEDURE — 1159F MED LIST DOCD IN RCRD: CPT | Mod: S$GLB,,, | Performed by: INTERNAL MEDICINE

## 2017-05-31 PROCEDURE — 99214 OFFICE O/P EST MOD 30 MIN: CPT | Mod: S$GLB,,, | Performed by: INTERNAL MEDICINE

## 2017-05-31 PROCEDURE — 99499 UNLISTED E&M SERVICE: CPT | Mod: S$GLB,,, | Performed by: INTERNAL MEDICINE

## 2017-05-31 PROCEDURE — 93000 ELECTROCARDIOGRAM COMPLETE: CPT | Mod: S$GLB,,, | Performed by: INTERNAL MEDICINE

## 2017-05-31 RX ORDER — OMEGA-3-ACID ETHYL ESTERS 1 G/1
2 CAPSULE, LIQUID FILLED ORAL
COMMUNITY
End: 2017-06-14 | Stop reason: SDUPTHER

## 2017-05-31 NOTE — PROGRESS NOTES
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  First diagnosed with atrial fibrillation 2/12 (noted palpitations). Placed on beta blocker, coumadin. Underwent PEPE/DCCV. Had recurrence, Propafenone  mg twice daily added.  Had recurrence 12/24/13, underwent DCCV, Propafenone increased to 425 mg twice daily.  PVI ([place]) 7/28/14.   Had done well, was off Propafenone. Developed recurrence, underwent DCCV multiple times.  Repeat PVI 4/27/17 All 4 veins remained isolated.  Antralized left sided lesion set posteriorly, and performed SVC isolation.  Feeling well.  Notes occasional skipped beat.  Recently returned from trip to [place], was very active, dehydrated.        Review of Systems   Constitution: Negative. Negative for weakness and malaise/fatigue.   Eyes: Negative for blurred vision and double vision.   Cardiovascular: Negative for chest pain, dyspnea on exertion, irregular heartbeat, leg swelling, near-syncope, orthopnea, palpitations, paroxysmal nocturnal dyspnea and syncope.   Respiratory: Positive for cough. Negative for shortness of breath.    Neurological: Negative for dizziness and light-headedness.   All other systems reviewed and are negative.       Objective:    Physical Exam   Constitutional: He is oriented to person, place, and time. He appears well-developed and well-nourished.   Eyes: Conjunctivae are normal. No scleral icterus.   Neck: No JVD present. No tracheal deviation present.   Cardiovascular: Normal rate, regular rhythm and normal heart sounds.  PMI is not displaced.    Pulmonary/Chest: Effort normal and breath sounds normal. No respiratory distress.   Abdominal: Soft. There is no hepatosplenomegaly. There is no tenderness.   Musculoskeletal: He exhibits no edema (lower extremity) or tenderness.   Neurological: He is alert and oriented to person,  place, and time.   Skin: Skin is warm and dry. No rash noted.   Psychiatric: He has a normal mood and affect. His behavior is normal.         Assessment:       1. Persistent atrial fibrillation    2. Essential hypertension         Plan:       Doing well.  Given the fact that the veins remained isolated at the redo, I would continue Propafenone indefinitely.  Refer for evaluation of sleep apnea.  F/u in 6 months.

## 2017-06-01 ENCOUNTER — OFFICE VISIT (OUTPATIENT)
Dept: ORTHOPEDICS | Facility: CLINIC | Age: 72
End: 2017-06-01
Payer: MEDICARE

## 2017-06-01 ENCOUNTER — HOSPITAL ENCOUNTER (OUTPATIENT)
Dept: RADIOLOGY | Facility: HOSPITAL | Age: 72
Discharge: HOME | End: 2017-06-01
Attending: NURSE PRACTITIONER
Payer: MEDICARE

## 2017-06-01 VITALS — WEIGHT: 223 LBS | HEIGHT: 74 IN | BODY MASS INDEX: 28.62 KG/M2

## 2017-06-01 DIAGNOSIS — M25.561 ACUTE PAIN OF BOTH KNEES: ICD-10-CM

## 2017-06-01 DIAGNOSIS — M25.562 ACUTE PAIN OF BOTH KNEES: ICD-10-CM

## 2017-06-01 DIAGNOSIS — M17.0 PRIMARY OSTEOARTHRITIS OF BOTH KNEES: ICD-10-CM

## 2017-06-01 DIAGNOSIS — M25.562 ACUTE PAIN OF BOTH KNEES: Primary | ICD-10-CM

## 2017-06-01 DIAGNOSIS — M25.561 ACUTE PAIN OF BOTH KNEES: Primary | ICD-10-CM

## 2017-06-01 PROCEDURE — 20610 DRAIN/INJ JOINT/BURSA W/O US: CPT | Mod: 50,S$GLB,, | Performed by: NURSE PRACTITIONER

## 2017-06-01 PROCEDURE — 1159F MED LIST DOCD IN RCRD: CPT | Mod: S$GLB,,, | Performed by: NURSE PRACTITIONER

## 2017-06-01 PROCEDURE — 73562 X-RAY EXAM OF KNEE 3: CPT | Mod: TC,50

## 2017-06-01 PROCEDURE — 99999 PR PBB SHADOW E&M-EST. PATIENT-LVL III: CPT | Mod: PBBFAC,,, | Performed by: NURSE PRACTITIONER

## 2017-06-01 PROCEDURE — 99213 OFFICE O/P EST LOW 20 MIN: CPT | Mod: 25,S$GLB,, | Performed by: NURSE PRACTITIONER

## 2017-06-01 PROCEDURE — 73562 X-RAY EXAM OF KNEE 3: CPT | Mod: 26,50,, | Performed by: RADIOLOGY

## 2017-06-01 PROCEDURE — 1157F ADVNC CARE PLAN IN RCRD: CPT | Mod: S$GLB,,, | Performed by: NURSE PRACTITIONER

## 2017-06-01 RX ORDER — TRIAMCINOLONE ACETONIDE 40 MG/ML
80 INJECTION, SUSPENSION INTRA-ARTICULAR; INTRAMUSCULAR
Status: COMPLETED | OUTPATIENT
Start: 2017-06-01 | End: 2017-06-01

## 2017-06-01 RX ADMIN — TRIAMCINOLONE ACETONIDE 80 MG: 40 INJECTION, SUSPENSION INTRA-ARTICULAR; INTRAMUSCULAR at 01:06

## 2017-06-01 NOTE — PROGRESS NOTES
CC: Pain of the Right Knee      HPI: Pt with bilateral knee pain for the past several weeks. The pain is worse in the right knee. The pain is aching and global and worse with activity. He recently took a trip to [place] and noticed increased pain medially after walking for long periods. He has had cortisone injections in the past with improvement and comes in today for repeat injections if possible. He's not ready for knee replacement yet. He is ambulating without assistive device. There is not a limp.    ROS  General: denies fever and chills  Resp: no c/o sob  CVS: no c/o cp  MSK: c/o bilateral knee pain right>left worse with activity medial and aching    PE  General: AAOx3, pleasant and cooperative  Resp: respirations even and unlabored  MSK: bilateral knee exam  0 degrees extension  120 degrees flexion  No warmth or erythema   - effusion    Xray:  Ordered and reviewed by me: Right knee: Severe joint space narrowing of the medial and lateral compartments with associated subluxation.  Mild spurring of the patella.  No evidence of fracture or dislocation.  No soft tissue abnormality.    Left knee: Mild joint space narrowing of the medial and lateral compartments.  Mild subluxation noted.  No evidence of acute fracture or dislocation.  No soft tissue abnormality.    Assessment:  Bilateral knee djd    Plan:  Cortisone injection to bilateral knees today  RICE  nsaids prn  F/u as needed    Knee Injection Procedure Note    Pre-operative Diagnosis: bilateral knee degenerative arthritis    Post-operative Diagnosis: same    Indications: bilateral knee pain    Anesthesia: none    Procedure Details     Verbal consent was obtained for the procedure. The injection site was identified and the skin was prepared with alcohol. The bilateral knee was injected from an anterolateral approach with 1 ml of Kenalog and 5 ml Lidocaine under sterile technique using a 22 gauge needle. The needle was removed and the area cleansed and  dressed.    Complications:  None; patient tolerated the procedure well.    he was advised to rest the knee today, using ice and elevation as needed for comfort and swelling. he did receive immediate relief of the knee pain. he was told this would be short lived and is secondary to the lidocaine. he may have an increase in discomfort tonight followed by steady improvement over the next several days. It may take 1-3 weeks following the injection to get the full benefit of the medication.

## 2017-06-02 ENCOUNTER — TELEPHONE (OUTPATIENT)
Dept: ORTHOPEDICS | Facility: CLINIC | Age: 72
End: 2017-06-02

## 2017-06-02 DIAGNOSIS — I48.91 ATRIAL FIBRILLATION, UNSPECIFIED TYPE: Primary | ICD-10-CM

## 2017-06-02 NOTE — TELEPHONE ENCOUNTER
----- Message from Ricardo Stoddard sent at 6/2/2017  1:51 PM CDT -----  Contact: self       ----- Message -----  From: Julisa Abebe  Sent: 6/2/2017   1:37 PM  To: [facility] Staff    Patient ask for a call in regards to having a MRI,therapy and knee replacement

## 2017-06-07 ENCOUNTER — TELEPHONE (OUTPATIENT)
Dept: ORTHOPEDICS | Facility: CLINIC | Age: 72
End: 2017-06-07

## 2017-06-07 DIAGNOSIS — M17.11 PRIMARY OSTEOARTHRITIS OF RIGHT KNEE: Primary | ICD-10-CM

## 2017-06-07 NOTE — TELEPHONE ENCOUNTER
----- Message from Cristin Bearden LPN sent at 6/7/2017 11:07 AM CDT -----  Contact: self/home      ----- Message -----  From: Abbey Alberto  Sent: 6/7/2017  11:02 AM  To: [facility] Staff    Dr. Song would like to speak with Jaz. This is all the info that was provided.

## 2017-06-07 NOTE — TELEPHONE ENCOUNTER
Returned call to pt. He has not heard from the physical therapist. The orders were not placed. Orders placed today for [facility].

## 2017-06-13 ENCOUNTER — CLINICAL SUPPORT (OUTPATIENT)
Dept: REHABILITATION | Facility: HOSPITAL | Age: 72
End: 2017-06-13
Attending: NURSE PRACTITIONER
Payer: MEDICARE

## 2017-06-13 DIAGNOSIS — R26.9 GAIT ABNORMALITY: ICD-10-CM

## 2017-06-13 PROCEDURE — 97162 PT EVAL MOD COMPLEX 30 MIN: CPT | Mod: PN | Performed by: PHYSICAL THERAPIST

## 2017-06-13 PROCEDURE — G8979 MOBILITY GOAL STATUS: HCPCS | Mod: CK,PN | Performed by: PHYSICAL THERAPIST

## 2017-06-13 PROCEDURE — G8978 MOBILITY CURRENT STATUS: HCPCS | Mod: CK,PN | Performed by: PHYSICAL THERAPIST

## 2017-06-13 NOTE — PLAN OF CARE
OUTPATIENT PHYSICAL THERAPY  PHYSICAL THERAPY EVALUATION    Name: Dominick Song MD  Clinic Number: [id number]    Diagnosis:   Encounter Diagnosis   Name Primary?    Gait abnormality      Physician: Jaz Sibley NP  Treatment Orders: PT Eval and Treat  PT treatment diagnosis: impaired functional mobility with R knee pain   Past Medical History:   Diagnosis Date    Anticoagulant long-term use     Anxiety     Arthritis     Atrial fibrillation     Cataract     Colon polyp     benign    Depression     Elevated PSA     Erectile dysfunction     Gastric ulcer with hemorrhage     Hep B w/o coma 1977    History of bleeding peptic ulcer     History of colonoscopy     normal in 2010.  It was recommended he return in 2015    History of prostatitis     Hypertension     PAF (paroxysmal atrial fibrillation)     S/P arthroscopic surgery of right knee     3 times    Therapy     Thyroid disease      Current Outpatient Prescriptions   Medication Sig    acyclovir (ZOVIRAX) 800 MG Tab TK ONE T PO FIVE TIMES D    apixaban (ELIQUIS) 5 mg Tab Take 1 tablet (5 mg total) by mouth 2 (two) times daily.    atenolol (TENORMIN) 50 MG tablet Take 1 tablet (50 mg total) by mouth once daily.    buPROPion (WELLBUTRIN XL) 300 MG 24 hr tablet Take 1 tablet (300 mg total) by mouth once daily.    CALCIUM ORAL Take by mouth Daily.    dutasteride (AVODART) 0.5 mg capsule Take 1 capsule (0.5 mg total) by mouth once daily.    escitalopram oxalate (LEXAPRO) 10 MG tablet Take 1 tablet (10 mg total) by mouth once daily.    levothyroxine (SYNTHROID) 25 MCG tablet Take 1 tablet (25 mcg total) by mouth once daily.    lisinopril (PRINIVIL,ZESTRIL) 20 MG tablet Take 1 tablet (20 mg total) by mouth once daily.    MULTIVITAMIN ORAL Take by mouth Daily.    omega-3 acid ethyl esters (LOVAZA) 1 gram capsule Take 2 g by mouth.    OMEGA-3 FATTY ACIDS (FISH OIL CONCENTRATE ORAL) Take by mouth Daily.    omeprazole (PRILOSEC) 40 MG capsule  Take 1 capsule (40 mg total) by mouth every morning.    propafenone (RYTHMOL SR) 425 MG Cp12 Take 1 capsule (425 mg total) by mouth every 12 (twelve) hours.     No current facility-administered medications for this visit.      Review of patient's allergies indicates:   Allergen Reactions    No known drug allergies        History   Precautions: Standard.   Prior Therapy: None noted.   Nutrition:  Normal  History of Present Illness: Pt reported right knee pain as well as pain in R peroneal muscles. Pt reported having decreased strength in BLE. Pt reported a history of multiple R knee surgeries starting in high school. Pt reported difficulty walking long distances and ascending/descending stairs. Pt has previous left shoulder surgery and reported currently having a broken rib. Pt reported having decreased strength in back musculature.  Chief complaint: R knee pain; decreased strength in BLE  Imaging:  Right knee: Severe joint space narrowing of the medial and lateral compartments with associated subluxation.  Mild spurring of the patella.  No evidence of fracture or dislocation.  No soft tissue abnormality.  Left knee: Mild joint space narrowing of the medial and lateral compartments.  Mild subluxation noted.  No evidence of acute fracture or dislocation.  No soft tissue abnormality.  Place of Residence (Steps/Adaptations): Lives with wife; has stairs, but bedroom/bathroom on first floor   Functional Deficits Leading to Referral/Nature of Injury: Right knee pain, decreased functional mobility, gait abnormality, BLE weakness   Previous functional status includes: Independent.   Exercise routine prior to onset : None noted.   DME owned: None noted.   Work:  Retired, anesthesiologist                      Subjective   Pts goals:  To increase strength, increase ambulation speed, decrease pain   Pain:  3/10 currently   Onset of pain /Mechanism of Onset:  With walking, ascending/descending stairs   Chief complaint:  Left  knee pain  Radicular symptoms:  None noted.   Aggravating factors:   Walking, ascending/descending stairs  Easing factors: Injection      Objective     Mental status :alert  Posture Alignment :forward head, slouched posture   Sensation: Light Touch: Intact           ROM:  UPPER EXTREMITY--AROM/PROM  (R) UE: WFLs  (L) UE: WFLs         Knee Right  Left  Pain/Dysfunction with movement    AROM MMT AROM MMT    Flexion 120 deg 4/5 121 deg 4+/5 NP   Extension Lacking 3 deg 4/5 0 deg 4+/5 NP     Hip flexion: Right- 3/5; Left- 4/5  Hip abduction: Right- 4/5, Left- 4/5  Ankle DF/PF: 5/5 bilaterally     Joint Mobility: (hypomobile, normal, hypermobile)  Patella: R hypomobile     Girth: mid patella  R: 43.2 cm   L: 43.5 cm    GAIT: Dominick ambulates 75 feet with no assistive device with modified independently with increased time.      GAIT DEVIATIONS: Dominick displays decreased step length, decreased manav.     Transfers:   Sit to stand: Modified Independent with increased time required.     OTHER TESTS:     Evaluation    Timed Up and Go 10.97 sec No use of AD   5 sit to stand 12.85 sec No use of UE     Pt/family was provided educational information, including: role of PT, goals for PT, scheduling - pt verbalized understanding. Discussed insurance limitations with pt.     Exercises were reviewed and pt was able to demonstrate them prior to the end of the session. Pt received a written copy of exercises to perform at home.  Pt has no cultural, educational or language barriers to learning provided.    Treatment today also included:  HEP included: seated hamstring stretch, clam shells with red TB, SLR, quad sets      Assessment   This is a 71 y.o. male referred to outpatient physical therapy and presents with a medical diagnosis of   Encounter Diagnosis   Name Primary?    Gait abnormality     and demonstrates limitations as described in the problem list. Pt will benefit from physical therapy services in order to maximize pain  free and/or functional use of bilateral LE. The following goals were discussed with the patient and patient is in agreement with them as to be addressed in the treatment plan.     Problem List: pain, decreased ROM, decreased flexibility, decreased strength, decreased balance and stability, decreased motor control and decreased ability to fully participate in recreational/sports related activities.    Short Term Goals:  3 weeks  1. Pt will present with increased  BLE MMT by one half grade for increased ease with functional ADLs.  2. Pt will perform 5 sit to  </=10 seconds to improve functional mobility  3. Pt will report decreased pain to </= 1/10 for ambulatory purposes.   4. Pt will initiate HEP to increase strength and decrease pain to improve functional mobility.     Long Term Goals: 6 weeks  1.Pt will present with increased  BLE MMT to >/= 4+/5  for increased ease with functional ADLs.  2. Pt will perform squat with minimal difficulty to improve functional mobility.   3. Pt will perform TUG in </= 8.5 seconds for ambulatory purposes.   4. Pt will be independent with HEP and self management of symptoms.     History  Co-morbidities and personal factors that may impact the plan of care Examination  Body Structures and Functions, activity limitations and participation restrictions that may impact the plan of care    Clinical Presentation   Co-morbidities:   advanced age and difficulty sleeping        Personal Factors:   no deficits Body Regions:   back  lower extremities  trunk    Body Systems:   ROM  strength  gross coordinated movement  balance  gait  transfers  motor control        Participation Restrictions:   - community ambulation   - household management      Activity limitations:   Learning and applying knowledge  no deficits    General Tasks and Commands  no deficits    Communication  no deficits    Mobility  lifting and carrying objects  walking    Self care  no deficits    Domestic Life  no  deficits    Interactions/Relationships  no deficits    Life Areas  no deficits    Community and Social Life  no deficits         evolving clinical presentation with changing clinical characteristics                      moderate   moderate  high Decision Making/ Complexity Score:  moderate     CMS Impairment/Limitation/Restriction for FOTO Knee Survey  Status Limitation G-Code CMS Severity Modifier  Intake 51% 49% Current Status CK - At least 40 percent but less than 60 percent  Predicted 59% 41% Goal Status+ CK - At least 40 percent but less than 60 percent    Plan     Certification Period:  6/13/17 to 7/25/17    Outpatient physical therapy 2 times week for 6 weeks to include: Electrical Stimulation IFC/TENS, Gait Training, Group Therapy, Manual Therapy, Moist Heat/ Ice, Neuromuscular Re-ed, Patient Education, Therapeutic Activites and Therapeutic Exercise Cont PT for  6 weeks. Pt may be seen by PTA as part of the rehabilitation team.     Wilfredo Gayle, PT, DPT      I certify the need for these services furnished under this plan of treatment and while under my care.  ____________________________________ Physician/Referring Practitioner   Date of Signature

## 2017-06-14 ENCOUNTER — OFFICE VISIT (OUTPATIENT)
Dept: PODIATRY | Facility: CLINIC | Age: 72
End: 2017-06-14
Payer: MEDICARE

## 2017-06-14 VITALS — BODY MASS INDEX: 28.6 KG/M2 | WEIGHT: 222.88 LBS | HEIGHT: 74 IN

## 2017-06-14 DIAGNOSIS — B35.1 ONYCHOMYCOSIS DUE TO DERMATOPHYTE: Primary | ICD-10-CM

## 2017-06-14 PROCEDURE — 1157F ADVNC CARE PLAN IN RCRD: CPT | Mod: S$GLB,,,

## 2017-06-14 PROCEDURE — 17999 UNLISTD PX SKN MUC MEMB SUBQ: CPT | Mod: CSM,,,

## 2017-06-14 PROCEDURE — 1126F AMNT PAIN NOTED NONE PRSNT: CPT | Mod: S$GLB,,,

## 2017-06-14 PROCEDURE — 99213 OFFICE O/P EST LOW 20 MIN: CPT | Mod: S$GLB,,,

## 2017-06-14 PROCEDURE — 1159F MED LIST DOCD IN RCRD: CPT | Mod: S$GLB,,,

## 2017-06-14 PROCEDURE — 99999 PR PBB SHADOW E&M-EST. PATIENT-LVL III: CPT | Mod: PBBFAC,,,

## 2017-06-14 NOTE — PROGRESS NOTES
Subjective:       Patient ID: Dominick KENNEDY MD Duncan is a 71 y.o. male.    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  He had two treatments in the past by Dr. West which worked several years ago but they have come back.  .  Review of Systems  ROS:  Constitution: Negative for chills, fever, weakness and malaise/fatigue.   HEENT: Negative for headaches.   Cardiovascular: Negative for chest pain and claudication.   Respiratory: Negative for cough and shortness of breath.   Musculoskeletal: Positive for foot pain.  Negative for muscle cramps and muscle weakness.   Gastrointestinal: Negative for nausea and vomiting.   Neurological: Negative for numbness and paresthesias.   Dermatological: Negative for wound.        Objective:      Physical Exam  Constitutional:  Patient is oriented to person, place, and time. Vital signs are normal.  Appears well-developed and well-nourished.     Vascular:  Dorsalis pedis pulses are 2+ on the right side, and 2+ on the left side.   Posterior tibial pulses are 2+ on the right side, and 2+ on the left side.   + digital hair growth, capillary fill time to all toes <3 seconds, no swelling    Skin/Dermatological:  Skin is warm and intact.  No cyanosis or clubbing.  No rashes noted.  No open wounds.b/l great and 2nd toenails dystrophic 80% and thick yellow colored    Musculoskeletal:      Cavys foot type, tight heel chords, hammertoes rigid       Neurological:  No deficits to sharp/dull, light touch or vibratory sensation.   Muscle strength to tibialis anterior, extensor hallucis longus, extensor digitorum longus, peroneal muscles, flexor hallucis/digotorum longus, posterior tibial and gastrosoleal complex is 5/5, normal tone without assymmetry   Patellar reflexes are 2+ on  the right side and 2+ on the left side.  Achilles reflexes are 2+ on the right side and 2+ on the left side.        Assessment:       1. Onychomycosis due to dermatophyte        Plan:       Onychomycosis due to dermatophyte        Onychomycosis: Discussion of the etiology of the problem. Keep feet clean and dry.  Patient declined topicals.    A timeout was preformed. Informed consent was obtained and witnessed.   The patient was placed in the procedure room.   The b/l great, right 2nd toenails were lasered with  shots per digit.   The settings were at 15 J / cm, 0.3 ms, 3 hz with a spot size 5 ml.   The patient tollerated the procedure well.   Return to clinic P3 months Call with concerns or questions.

## 2017-06-20 ENCOUNTER — CLINICAL SUPPORT (OUTPATIENT)
Dept: REHABILITATION | Facility: HOSPITAL | Age: 72
End: 2017-06-20
Attending: NURSE PRACTITIONER
Payer: MEDICARE

## 2017-06-20 DIAGNOSIS — R26.9 GAIT ABNORMALITY: ICD-10-CM

## 2017-06-20 PROCEDURE — 97110 THERAPEUTIC EXERCISES: CPT | Mod: PN | Performed by: PHYSICAL THERAPIST

## 2017-06-20 NOTE — PROGRESS NOTES
"Name: Dominick MARCIA Song MD  Clinic Number: [id number]  Date of Treatment: 06/20/2017   Diagnosis:   Encounter Diagnosis   Name Primary?    Gait abnormality    PT treatment diagnosis: impaired functional mobility with R knee pain     Visit# 1    Time in: 1000  Time Out: 1100  Total Treatment Time: 55  Group Time: 0      Subjective:    Dominick reports improvement of symptoms.  Patient reports their pain to be 0/10 on a 0-10 scale with 0 being no pain and 10 being the worst pain imaginable.    Objective    Dominick received therapeutic exercises to develop strength, endurance, ROM, flexibility, posture and core stabilization for 55 minutes including: (30 minutes one on one)    -bike x 10 minutes, Level 3.0  -standing: gastroc stretch 3/30"  -seated: right knee quad sets 3/10  -supine: SLR 3/10 (R)  -S/L: clam GTB 3/10 each  -supine: TA/bridge with cueing on breathing 3/10      -shuttle: bilateral 4B 3/10, single: (R) x 50, (L) x 50    -TKE/step up on 6" stair 2/10 (R)    Written Home Exercises Provided: Continuation  Pt demo good understanding of the education provided. Dominick demonstrated good return demonstration of activities.     Assessment:   -cueing on posture  -tactile input for TA  -No increase in s/s    Pt will continue to benefit from skilled PT intervention. Medical Necessity is demonstrated by:  Continued inability to participate in vocational pursuits and Weakness.    Patient is making good progress towards established goals.  Plan:  Continue with established Plan of Care towards PT goals.   "

## 2017-06-22 ENCOUNTER — CLINICAL SUPPORT (OUTPATIENT)
Dept: REHABILITATION | Facility: HOSPITAL | Age: 72
End: 2017-06-22
Attending: NURSE PRACTITIONER
Payer: MEDICARE

## 2017-06-22 DIAGNOSIS — R26.9 GAIT ABNORMALITY: ICD-10-CM

## 2017-06-22 PROCEDURE — 97110 THERAPEUTIC EXERCISES: CPT | Mod: PN | Performed by: PHYSICAL THERAPIST

## 2017-06-22 NOTE — PROGRESS NOTES
"Name: Dominick Song MD  Clinic Number: [id number]  Date of Treatment: 06/22/2017   Diagnosis:   Encounter Diagnosis   Name Primary?    Gait abnormality    PT treatment diagnosis: impaired functional mobility with R knee pain     Visit# 2    Time in: 1000  Time Out: 1100  Total Treatment Time: 55  Group Time: 0      Subjective:    Dominick reports having muscle soreness in both legs after previous treatment session.  Patient reports their pain to be 0/10 on a 0-10 scale with 0 being no pain and 10 being the worst pain imaginable.    Objective    Dominick received therapeutic exercises to develop strength, endurance, ROM, flexibility, posture and core stabilization for 55 minutes including: (30 minutes one on one)    -bike x 10 minutes, Level 3.0  -standing: gastroc stretch 3/30"  -seated: right knee quad sets 3/10  -supine: SLR 3/10 (R)  -S/L: clam GTB 4/10 each  -supine: TA/bridge with cueing on breathing 3/10  -shuttle: bilateral 4B 3/10, single: (R) x 30, (L) x 30    -sit to stand without UE use from 28.5 inch table 2/10     -TKE/step up on 6" stair 2/10 (R) (previous)    Written Home Exercises Provided: Continuation  Pt demo good understanding of the education provided. Dominick demonstrated good return demonstration of activities.     Assessment:   -verbal cueing for breath control with bridge   -tactile input for TA  - education on sit-to-stand technique  -No increase in s/s    Pt will continue to benefit from skilled PT intervention. Medical Necessity is demonstrated by:  Continued inability to participate in vocational pursuits and Weakness.    Patient is making good progress towards established goals.    Plan:  I certify that I was present in the room directing the student in service delivery and guiding them using my skilled judgment. As the co-signing therapist I have reviewed the students documentation and am responsible for the treatment, assessment, and plan.    Continue with established Plan of Care towards " PT goals.

## 2017-06-23 ENCOUNTER — OFFICE VISIT (OUTPATIENT)
Dept: SLEEP MEDICINE | Facility: CLINIC | Age: 72
End: 2017-06-23
Payer: MEDICARE

## 2017-06-23 VITALS
SYSTOLIC BLOOD PRESSURE: 100 MMHG | WEIGHT: 219.81 LBS | BODY MASS INDEX: 28.21 KG/M2 | DIASTOLIC BLOOD PRESSURE: 70 MMHG | HEIGHT: 74 IN | OXYGEN SATURATION: 95 % | HEART RATE: 68 BPM

## 2017-06-23 DIAGNOSIS — G47.30 SLEEP APNEA, UNSPECIFIED: Primary | ICD-10-CM

## 2017-06-23 PROCEDURE — 1126F AMNT PAIN NOTED NONE PRSNT: CPT | Mod: S$GLB,,, | Performed by: NURSE PRACTITIONER

## 2017-06-23 PROCEDURE — 1159F MED LIST DOCD IN RCRD: CPT | Mod: S$GLB,,, | Performed by: NURSE PRACTITIONER

## 2017-06-23 PROCEDURE — 1157F ADVNC CARE PLAN IN RCRD: CPT | Mod: S$GLB,,, | Performed by: NURSE PRACTITIONER

## 2017-06-23 PROCEDURE — 99204 OFFICE O/P NEW MOD 45 MIN: CPT | Mod: S$GLB,,, | Performed by: NURSE PRACTITIONER

## 2017-06-23 PROCEDURE — 99499 UNLISTED E&M SERVICE: CPT | Mod: S$GLB,,, | Performed by: NURSE PRACTITIONER

## 2017-06-23 PROCEDURE — 99999 PR PBB SHADOW E&M-EST. PATIENT-LVL IV: CPT | Mod: PBBFAC,,, | Performed by: NURSE PRACTITIONER

## 2017-06-23 NOTE — PATIENT INSTRUCTIONS
Karen or Miguel will contact you to schedule your sleep study. Their number is [phone] (ext 2). The [facility] is located on 7th floor of the [facility].    We will call you when the sleep study results are ready - if you have not heard from us by 2 weeks from the date of the study, please call [phone] (ext 1).    You are advised to abstain from driving should you feel sleepy or drowsy.

## 2017-06-23 NOTE — PROGRESS NOTES
"Dominick Song  was seen as a new patient, self-referred,  for the evaluation of obstructive sleep apnea. Recommended by Dr. 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       Patient complaints include: snoring ("reported by others") and excessive daytime sleepiness. Nocturia x3. + dry mouth in AM. Morning headaches.     Hx of bruxism, stress-induced. Was severe, required extensive dental work. Now resolved.     Pt with known diagnosis of atrial fibrillation. Diagnosed 2012. Has had 6 cardioversions then ablation x2. Now managed on propafenone (indefinitely per Dr. Beatty) and Eliquis.    Concerned regarding enlarged right and left atrium on echo 04/25/2017. Never had sleep study prior.      Denies symptoms of restless legs or kicking during sleep.    Occupation: Retired anesthesiologist     Sar[place] Sleepiness Scale score during initial sleep evaluation was 5.    SLEEP ROUTINE:      Bed partner:  wife  Time to bed:  11 pm  Sleep onset latency:  < 1 minute        Disruptions or awakenings:   3   Wakeup time:     8 am  Perceived sleep quality:  3/5             PAST MEDICAL HISTORY:    Active Ambulatory Problems     Diagnosis Date Noted    BPH (benign prostatic hyperplasia) 04/09/2013    Elevated PSA 04/09/2013    Palpitations 06/25/2013    S/P gastric bypass 06/25/2013    Peptic ulcer disease 08/07/2013    Heart abnormality 09/23/2013    HTN (hypertension) 09/26/2013    Nuclear sclerosis 11/04/2013    Atrial fibrillation 01/23/2014    Melena 03/06/2014    Anemia due to chronic blood loss 03/24/2014    Gastric ulcer 05/01/2014    Erectile dysfunction 05/12/2014    Posterior vitreous detachment 06/23/2014    Metatarsalgia 02/25/2015    Keratoma 02/25/2015    Foot pain, right 06/23/2015    Onychomycosis due to dermatophyte 06/23/2015    " ED (erectile dysfunction) 10/27/2015    Hx of colonic polyps 11/25/2015    Bradycardia 02/25/2016    Mild single current episode of major depressive disorder 11/28/2016    Tortuous aorta 11/28/2016    Dyspnea     Hypothyroidism due to acquired atrophy of thyroid 02/15/2017    Essential hypertension 02/15/2017    Gait abnormality 06/13/2017     Resolved Ambulatory Problems     Diagnosis Date Noted    *Atrial fibrillation 02/03/2012    Pyogenic arthritis, forearm 04/09/2013    Fracture, humerus closed 05/08/2013    Atrial fibrillation 06/25/2013    Long term (current) use of anticoagulants 07/02/2013    Post-operative state 11/25/2013     Past Medical History:   Diagnosis Date    Anticoagulant long-term use     Anxiety     Arthritis     Atrial fibrillation     Cataract     Colon polyp     Depression     Elevated PSA     Erectile dysfunction     Gastric ulcer with hemorrhage     Hep B w/o coma 1977    History of bleeding peptic ulcer     History of colonoscopy     History of prostatitis     Hypertension     PAF (paroxysmal atrial fibrillation)     S/P arthroscopic surgery of right knee     Therapy     Thyroid disease                 PAST SURGICAL HISTORY:    Past Surgical History:   Procedure Laterality Date    ABDOMINAL HERNIA REPAIR      CARDIAC SURGERY      CATARACT EXTRACTION  11/25/13    left eye    CHOLECYSTECTOMY      COLONOSCOPY N/A 11/25/2015    Procedure: COLONOSCOPY;  Surgeon: Toby Hernandez MD;  Location: [facility] END[facility];  Service: Endoscopy;  Laterality: N/A;    ELBOW SURGERY      Tennis elbow repair    EYE SURGERY      FRACTURE SURGERY      GASTRIC BYPASS      KNEE ARTHROSCOPY      RT    LASIK  2001    both eyes (Dr. Rabago)    left humerus fx      times 2    ORIF HUMERUS FRACTURE      LT    RADIOFREQUENCY ABLATION      ROTATOR CUFF REPAIR      right         FAMILY HISTORY:                Family History   Problem Relation Age of Onset    COPD Father      Diabetes Father     Aortic stenosis Mother     Heart disease Mother      aortic stenosis    Heart attack Brother     No Known Problems Son     No Known Problems Daughter     No Known Problems Daughter     No Known Problems Daughter        SOCIAL HISTORY:          Tobacco:   History   Smoking Status    Never Smoker   Smokeless Tobacco    Never Used     Comment: [employer] anesthesiologist        Alcohol use:    History   Alcohol Use No                 ALLERGIES:    Review of patient's allergies indicates:   Allergen Reactions    No known drug allergies        CURRENT MEDICATIONS:    Current Outpatient Prescriptions   Medication Sig Dispense Refill    acyclovir (ZOVIRAX) 800 MG Tab TK ONE T PO FIVE TIMES D  1    apixaban (ELIQUIS) 5 mg Tab Take 1 tablet (5 mg total) by mouth 2 (two) times daily. 180 tablet 3    atenolol (TENORMIN) 50 MG tablet Take 1 tablet (50 mg total) by mouth once daily. 90 tablet 3    buPROPion (WELLBUTRIN XL) 300 MG 24 hr tablet Take 1 tablet (300 mg total) by mouth once daily. 90 tablet 3    CALCIUM ORAL Take by mouth Daily.      dutasteride (AVODART) 0.5 mg capsule Take 1 capsule (0.5 mg total) by mouth once daily. 90 capsule 3    escitalopram oxalate (LEXAPRO) 10 MG tablet Take 1 tablet (10 mg total) by mouth once daily. 90 tablet 1    levothyroxine (SYNTHROID) 25 MCG tablet Take 1 tablet (25 mcg total) by mouth once daily. 30 tablet 11    lisinopril (PRINIVIL,ZESTRIL) 20 MG tablet Take 1 tablet (20 mg total) by mouth once daily. 90 tablet 3    MULTIVITAMIN ORAL Take by mouth Daily.      OMEGA-3 FATTY ACIDS (FISH OIL CONCENTRATE ORAL) Take by mouth Daily.      omeprazole (PRILOSEC) 40 MG capsule Take 1 capsule (40 mg total) by mouth every morning. 90 capsule 3    propafenone (RYTHMOL SR) 425 MG Cp12 Take 1 capsule (425 mg total) by mouth every 12 (twelve) hours. 180 capsule 3     No current facility-administered medications for this visit.                   REVIEW  "OF SYSTEMS:     Sleep related symptoms as per HPI.  CONST:Denies weight gain    HEENT: Denies sinus congestion  PULM: Denies dyspnea  CARD:  Denies palpitations   GI:  Denies acid reflux  : Denies polyuria  NEURO: Denies headaches  PSYCH: Denies mood disturbance  HEME: Denies anemia    Otherwise, a balance of systems reviewed is negative.          PHYSICAL EXAM:  Vitals:    06/23/17 1114   BP: 100/70   Pulse: 68   SpO2: 95%   Weight: 99.7 kg (219 lb 12.8 oz)   Height: 6' 2" (1.88 m)   PainSc: 0-No pain     Body mass index is 28.22 kg/m².     GENERAL: Overweight development, well groomed  HEENT:  Conjunctivae are non-erythematous; Pupils equal, round, and reactive to light; Nose is symmetrical; Nasal mucosa is pink and moist; Septum is midline; Inferior turbinates are normal; Nasal airflow is normal; Posterior pharynx is pink; Modified Mallampati: IV; Posterior palate is normal; Tonsils +1; Uvula is normal and pink;Tongue is normal; Dentition is fair, +dental attrition; No TMJ tenderness; Jaw opening and protrusion without click and without discomfort.  NECK: Supple. Neck circumference is 14 inches. No thyromegaly. No palpable nodes.    SKIN: On face and neck: No abrasions, no rashes, no lesions.  No subcutaneous nodules are palpable.  RESPIRATORY: Chest is clear to auscultation.  Normal chest expansion and non-labored breathing at rest.  CARDIOVASCULAR: Normal S1, S2.  No murmurs, gallops or rubs. No carotid bruits bilaterally.  EXTREMITIES: No edema. No clubbing. No cyanosis. Station normal. Gait normal.        NEURO/PSYCH: Oriented to time, place and person. Normal attention span and concentration. Affect is full. Mood is normal.                      04/25/2017 Transesophageal echo Biatrium enlargement; EF 60 - 65%                            ASSESSMENT:    Sleep apnea, unspecified. The patient symptomatically has snoring and excessive daytime sleepiness with findings of crowded oral airway and elevated body mas " index. Medical co-morbidities: depression, HTN, atrial fibrillation, peptic ulcer disease, overweight s/p gastric bypass.  This warrants further investigation for possible obstructive sleep apnea.      Hx of bruxism, resolved, subjectively stress-induced     PLAN:    - Diagnostic: Polysomnogram in-lab only hx of recurrent atrial fibrillation with bi-atrium enlargement seen on recent echocardiogram. The nature of this procedure and its indication was discussed with the patient. Patient will be contacted after sleep study is done.  Call with results.     - Education: During our discussion today, we talked about the etiology of obstructive sleep apnea as well as the potential ramifications of untreated sleep apnea, which could include daytime sleepiness, hypertension, heart disease and/or stroke. We discussed potential treatment options, which could include weight loss, body positioning, continuous positive airway pressure (CPAP), or referral for surgical consideration.     - Precautions: The patient was advised to abstain from driving should they feel sleepy  or drowsy.     Thank you for allowing me the opportunity to participate in the care of your patient.

## 2017-06-24 RX ORDER — BUPROPION HYDROCHLORIDE 300 MG/1
TABLET ORAL
Qty: 90 TABLET | Refills: 0 | Status: SHIPPED | OUTPATIENT
Start: 2017-06-24 | End: 2017-10-16 | Stop reason: SDUPTHER

## 2017-06-27 ENCOUNTER — CLINICAL SUPPORT (OUTPATIENT)
Dept: REHABILITATION | Facility: HOSPITAL | Age: 72
End: 2017-06-27
Attending: NURSE PRACTITIONER
Payer: MEDICARE

## 2017-06-27 DIAGNOSIS — R26.9 GAIT ABNORMALITY: ICD-10-CM

## 2017-06-27 PROCEDURE — 97110 THERAPEUTIC EXERCISES: CPT | Mod: PN | Performed by: PHYSICAL THERAPIST

## 2017-06-27 NOTE — PROGRESS NOTES
Name: Dominick Song MD  Clinic Number: [id number]  Date of Treatment: 2017   Diagnosis:   Encounter Diagnosis   Name Primary?    Gait abnormality    PT treatment diagnosis: impaired functional mobility with R knee pain     Visit# 4    Time in: 930  Time Out: 1030  Total Treatment Time: 55  Group Time: 0    Subjective:    Dominick reports improvement of symptoms with less soreness. Patient reports their pain to be 0/10 on a 0-10 scale with 0 being no pain and 10 being the worst pain imaginable.    Objective    Dominick received therapeutic exercises to develop strength, endurance, ROM, flexibility, posture and core stabilization for 50 minutes includin min one on one     -bike x 10 minutes, Level 3.0 (active warm-up)  -standing: gastroc stretch 2/1.5'  -seated: right knee quad sets with towel underneath knee with 5 second hold 3/10  -supine: SLR 3/10 (R)  -S/L: clam Blue TB 30 reps each   -supine: TA/ double leg bridge with controlled breathing 3/10  - Standing: step-ups onto 6 inch stair 210 on each leg  - Seated: hamstring curls on resistance machine 40 lbs 10  - Seated: knee extension on resistance machine 40 lbs 10   -shuttle: bilateral 4B 3/10, single 3B: (R) x 30, (L) x 30    -sit to stand without UE use from 22.5 inch table 2/10     Written Home Exercises Provided: Continuation  Pt demo good understanding of the education provided. Dominick demonstrated good return demonstration of activities.     Assessment:   -Patient independent with breath control.  - Patient with improvement in quad strength as shown by improvement in sit-to-stand from lower surface and increased reps.   -No increase in s/s.    Pt will continue to benefit from skilled PT intervention. Medical Necessity is demonstrated by:  Continued inability to participate in vocational pursuits and Weakness.    Patient is making good progress towards established goals.    Short Term Goals:  3 weeks  1. Pt will present with increased  BLE MMT  by one half grade for increased ease with functional ADLs.  2. Pt will perform 5 sit to  </=10 seconds to improve functional mobility  3. Pt will report decreased pain to </= 1/10 for ambulatory purposes.   4. Pt will initiate HEP to increase strength and decrease pain to improve functional mobility.      Long Term Goals: 6 weeks  1.Pt will present with increased  BLE MMT to >/= 4+/5  for increased ease with functional ADLs.  2. Pt will perform squat with minimal difficulty to improve functional mobility.   3. Pt will perform TUG in </= 8.5 seconds for ambulatory purposes.   4. Pt will be independent with HEP and self management of symptoms.     Plan:  I certify that I was present in the room directing the student in service delivery and guiding them using my skilled judgment. As the co-signing therapist I have reviewed the students documentation and am responsible for the treatment, assessment, and plan.    Continue with established Plan of Care towards PT goals.

## 2017-06-28 ENCOUNTER — LAB VISIT (OUTPATIENT)
Dept: LAB | Facility: HOSPITAL | Age: 72
End: 2017-06-28
Attending: INTERNAL MEDICINE
Payer: MEDICARE

## 2017-06-28 ENCOUNTER — PATIENT MESSAGE (OUTPATIENT)
Dept: INTERNAL MEDICINE | Facility: CLINIC | Age: 72
End: 2017-06-28

## 2017-06-28 ENCOUNTER — OFFICE VISIT (OUTPATIENT)
Dept: INTERNAL MEDICINE | Facility: CLINIC | Age: 72
End: 2017-06-28
Payer: MEDICARE

## 2017-06-28 VITALS
DIASTOLIC BLOOD PRESSURE: 80 MMHG | HEART RATE: 55 BPM | WEIGHT: 218.5 LBS | BODY MASS INDEX: 28.04 KG/M2 | HEIGHT: 74 IN | SYSTOLIC BLOOD PRESSURE: 125 MMHG

## 2017-06-28 DIAGNOSIS — I15.9 SECONDARY HYPERTENSION: Primary | ICD-10-CM

## 2017-06-28 DIAGNOSIS — I10 ESSENTIAL HYPERTENSION: ICD-10-CM

## 2017-06-28 DIAGNOSIS — I10 ESSENTIAL HYPERTENSION: Primary | ICD-10-CM

## 2017-06-28 LAB
ANION GAP SERPL CALC-SCNC: 6 MMOL/L
BUN SERPL-MCNC: 33 MG/DL
CALCIUM SERPL-MCNC: 8.8 MG/DL
CHLORIDE SERPL-SCNC: 110 MMOL/L
CO2 SERPL-SCNC: 24 MMOL/L
CREAT SERPL-MCNC: 2.1 MG/DL
EST. GFR  (AFRICAN AMERICAN): 36 ML/MIN/1.73 M^2
EST. GFR  (NON AFRICAN AMERICAN): 31 ML/MIN/1.73 M^2
GLUCOSE SERPL-MCNC: 98 MG/DL
POTASSIUM SERPL-SCNC: 6.1 MMOL/L
SODIUM SERPL-SCNC: 140 MMOL/L

## 2017-06-28 PROCEDURE — 99213 OFFICE O/P EST LOW 20 MIN: CPT | Mod: S$GLB,,, | Performed by: INTERNAL MEDICINE

## 2017-06-28 PROCEDURE — 99999 PR PBB SHADOW E&M-EST. PATIENT-LVL III: CPT | Mod: PBBFAC,,, | Performed by: INTERNAL MEDICINE

## 2017-06-28 PROCEDURE — 80048 BASIC METABOLIC PNL TOTAL CA: CPT

## 2017-06-28 PROCEDURE — 1126F AMNT PAIN NOTED NONE PRSNT: CPT | Mod: S$GLB,,, | Performed by: INTERNAL MEDICINE

## 2017-06-28 PROCEDURE — 36415 COLL VENOUS BLD VENIPUNCTURE: CPT

## 2017-06-28 PROCEDURE — 1157F ADVNC CARE PLAN IN RCRD: CPT | Mod: S$GLB,,, | Performed by: INTERNAL MEDICINE

## 2017-06-28 PROCEDURE — 99499 UNLISTED E&M SERVICE: CPT | Mod: S$GLB,,, | Performed by: INTERNAL MEDICINE

## 2017-06-28 PROCEDURE — 1159F MED LIST DOCD IN RCRD: CPT | Mod: S$GLB,,, | Performed by: INTERNAL MEDICINE

## 2017-06-28 RX ORDER — OXYCODONE AND ACETAMINOPHEN 5; 325 MG/1; MG/1
TABLET ORAL
COMMUNITY
Start: 2017-06-20 | End: 2017-10-16

## 2017-06-28 RX ORDER — OMEPRAZOLE 40 MG/1
40 CAPSULE, DELAYED RELEASE ORAL EVERY MORNING
Qty: 90 CAPSULE | Refills: 3 | Status: SHIPPED | OUTPATIENT
Start: 2017-06-28 | End: 2017-12-20

## 2017-06-28 NOTE — PROGRESS NOTES
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    Chief Complaint: Follow-up    HPI the patient is a 71-year-old male comes in for follow-up of his hypertension.  He reports blood pressures at home in the range of 117/75.  He is tolerating the increased dose of lisinopril well.  The patient is noticing continued difficulty with right knee instability.  He has been undergoing physical therapy to strengthen his quadriceps on the right side.  He is considering a total knee replacement in January.  He had been taking a fair amount of Advil which may have accounted for his previous elevation of his BUN and creatinine.  He has not discontinued Advil completely and is not on any anti-inflammatory medications  Review of Systems   Constitutional: Negative.  Negative for activity change and unexpected weight change.   Respiratory: Negative for chest tightness and shortness of breath.    Cardiovascular: Negative for chest pain and palpitations.   Musculoskeletal: Positive for arthralgias.       Objective:      Physical Exam   Constitutional: He appears well-developed and well-nourished. No distress.   Cardiovascular: Normal rate, regular rhythm and normal heart sounds.  Exam reveals no gallop and no friction rub.    No murmur heard.  Pulmonary/Chest: Effort normal and breath sounds normal. No respiratory distress. He has no wheezes. He has no rales.   Skin: He is not diaphoretic.   Vitals reviewed.      Assessment:       1. Essential hypertension       2.  Renal insufficiency  3.  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  Plan:       1.  Repeat BMP to follow up renal function  2.  Return to clinic in 6 months

## 2017-06-29 ENCOUNTER — HOSPITAL ENCOUNTER (OUTPATIENT)
Dept: RADIOLOGY | Facility: HOSPITAL | Age: 72
Discharge: HOME | End: 2017-06-29
Attending: INTERNAL MEDICINE
Payer: MEDICARE

## 2017-06-29 DIAGNOSIS — I10 ESSENTIAL HYPERTENSION: ICD-10-CM

## 2017-06-29 PROCEDURE — 76770 US EXAM ABDO BACK WALL COMP: CPT | Mod: 26,,, | Performed by: RADIOLOGY

## 2017-06-29 PROCEDURE — 76770 US EXAM ABDO BACK WALL COMP: CPT | Mod: TC,PO

## 2017-06-30 ENCOUNTER — LAB VISIT (OUTPATIENT)
Dept: LAB | Facility: HOSPITAL | Age: 72
End: 2017-06-30
Attending: INTERNAL MEDICINE
Payer: MEDICARE

## 2017-06-30 DIAGNOSIS — I10 ESSENTIAL HYPERTENSION: ICD-10-CM

## 2017-06-30 LAB
ANION GAP SERPL CALC-SCNC: 5 MMOL/L
BUN SERPL-MCNC: 23 MG/DL
CALCIUM SERPL-MCNC: 8.8 MG/DL
CHLORIDE SERPL-SCNC: 111 MMOL/L
CO2 SERPL-SCNC: 21 MMOL/L
CREAT SERPL-MCNC: 1.7 MG/DL
EST. GFR  (AFRICAN AMERICAN): 46.2 ML/MIN/1.73 M^2
EST. GFR  (NON AFRICAN AMERICAN): 40 ML/MIN/1.73 M^2
GLUCOSE SERPL-MCNC: 128 MG/DL
POTASSIUM SERPL-SCNC: 5.2 MMOL/L
SODIUM SERPL-SCNC: 137 MMOL/L

## 2017-06-30 PROCEDURE — 80048 BASIC METABOLIC PNL TOTAL CA: CPT

## 2017-06-30 PROCEDURE — 36415 COLL VENOUS BLD VENIPUNCTURE: CPT | Mod: PO

## 2017-07-03 ENCOUNTER — CLINICAL SUPPORT (OUTPATIENT)
Dept: REHABILITATION | Facility: HOSPITAL | Age: 72
End: 2017-07-03
Attending: NURSE PRACTITIONER
Payer: MEDICARE

## 2017-07-03 DIAGNOSIS — R26.9 GAIT ABNORMALITY: ICD-10-CM

## 2017-07-03 PROCEDURE — 97110 THERAPEUTIC EXERCISES: CPT | Mod: PN | Performed by: PHYSICAL THERAPIST

## 2017-07-03 NOTE — PROGRESS NOTES
Name: Dominick Song MD  Clinic Number: [id number]  Date of Treatment: 2017   Diagnosis:   Encounter Diagnosis   Name Primary?    Gait abnormality    PT treatment diagnosis: impaired functional mobility with R knee pain     Visit# 5    Time in: 1000  Time Out: 1100  Total Treatment Time: 50  Group Time: 0    Subjective:    Dominick reports no new s/s. Patient reports their pain to be 0/10 on a 0-10 scale with 0 being no pain and 10 being the worst pain imaginable.    Objective    Dominick received therapeutic exercises to develop strength, endurance, ROM, flexibility, posture and core stabilization for 50 minutes includin min one on one     -bike x 10 minutes, Level 3.0 (active warm-up)  -EFX: 5 minutes fwd/bwd each (NP)  -standing: gastroc stretch 2/1.5'  -seated: right knee quad sets with towel underneath knee with 5 second hold 3/10  -supine: SLR with hip abduction 3/10 (R)  -S/L: clam Blue TB 30 reps each   -supine: TA/ double leg bridge with controlled breathing 3/10  - Standing: step-ups onto 6 inch stair 10 on each leg  - Seated: hamstring curls on resistance machine 40 lbs 10  - Seated: knee extension on resistance machine 40 lbs 10   -shuttle: bilateral 4B 3/10, single 3B: (R) x 30, (L) x 30    -sit to stand without UE use from 22.5 inch table 10 (previous)    Written Home Exercises Provided: Continuation  Pt demo good understanding of the education provided. Dominick demonstrated good return demonstration of activities.     Assessment:   -Patient tolerated EFX up to 10 minutes for endurance/strength purposes.  - Patient with improvement in quad strength continues...  -No increase in s/s.    Pt will continue to benefit from skilled PT intervention. Medical Necessity is demonstrated by:  Continued inability to participate in vocational pursuits and Weakness.    Patient is making good progress towards established goals.    Short Term Goals:  3 weeks  1. Pt will present with increased  BLE MMT by  one half grade for increased ease with functional ADLs.  2. Pt will perform 5 sit to  </=10 seconds to improve functional mobility  3. Pt will report decreased pain to </= 1/10 for ambulatory purposes.   4. Pt will initiate HEP to increase strength and decrease pain to improve functional mobility.      Long Term Goals: 6 weeks  1.Pt will present with increased  BLE MMT to >/= 4+/5  for increased ease with functional ADLs.  2. Pt will perform squat with minimal difficulty to improve functional mobility.   3. Pt will perform TUG in </= 8.5 seconds for ambulatory purposes.   4. Pt will be independent with HEP and self management of symptoms.     Plan:  I certify that I was present in the room directing the student in service delivery and guiding them using my skilled judgment. As the co-signing therapist I have reviewed the students documentation and am responsible for the treatment, assessment, and plan.    Continue with established Plan of Care towards PT goals.

## 2017-07-04 ENCOUNTER — PATIENT MESSAGE (OUTPATIENT)
Dept: INTERNAL MEDICINE | Facility: CLINIC | Age: 72
End: 2017-07-04

## 2017-07-05 ENCOUNTER — PATIENT MESSAGE (OUTPATIENT)
Dept: ELECTROPHYSIOLOGY | Facility: CLINIC | Age: 72
End: 2017-07-05

## 2017-07-05 NOTE — TELEPHONE ENCOUNTER
Please call patient. BMP shows K is improved but still mildly elevated. Kidney function also slightly improved off Lisinopril. He should stay off for now and discuss further management of HTN with Dr. koehler on Monday when he returns.

## 2017-07-10 ENCOUNTER — TELEPHONE (OUTPATIENT)
Dept: SLEEP MEDICINE | Facility: OTHER | Age: 72
End: 2017-07-10

## 2017-07-10 ENCOUNTER — PATIENT MESSAGE (OUTPATIENT)
Dept: INTERNAL MEDICINE | Facility: CLINIC | Age: 72
End: 2017-07-10

## 2017-07-10 ENCOUNTER — LAB VISIT (OUTPATIENT)
Dept: LAB | Facility: HOSPITAL | Age: 72
End: 2017-07-10
Attending: INTERNAL MEDICINE
Payer: MEDICARE

## 2017-07-10 ENCOUNTER — OFFICE VISIT (OUTPATIENT)
Dept: NEPHROLOGY | Facility: CLINIC | Age: 72
End: 2017-07-10
Payer: MEDICARE

## 2017-07-10 VITALS
BODY MASS INDEX: 28.58 KG/M2 | HEART RATE: 77 BPM | SYSTOLIC BLOOD PRESSURE: 118 MMHG | HEIGHT: 74 IN | WEIGHT: 222.69 LBS | DIASTOLIC BLOOD PRESSURE: 62 MMHG

## 2017-07-10 DIAGNOSIS — R35.0 URINARY FREQUENCY: ICD-10-CM

## 2017-07-10 DIAGNOSIS — N17.9 ACUTE RENAL FAILURE, UNSPECIFIED ACUTE RENAL FAILURE TYPE: ICD-10-CM

## 2017-07-10 DIAGNOSIS — N18.30 CKD (CHRONIC KIDNEY DISEASE) STAGE 3, GFR 30-59 ML/MIN: Primary | ICD-10-CM

## 2017-07-10 DIAGNOSIS — N17.9 ACUTE RENAL FAILURE, UNSPECIFIED ACUTE RENAL FAILURE TYPE: Primary | ICD-10-CM

## 2017-07-10 LAB
BACTERIA #/AREA URNS HPF: NORMAL /HPF
BILIRUB UR QL STRIP: NEGATIVE
CLARITY UR: CLEAR
COLOR UR: YELLOW
GLUCOSE UR QL STRIP: NEGATIVE
HGB UR QL STRIP: NEGATIVE
HYALINE CASTS #/AREA URNS LPF: 0 /LPF
KETONES UR QL STRIP: NEGATIVE
LEUKOCYTE ESTERASE UR QL STRIP: NEGATIVE
MICROSCOPIC COMMENT: NORMAL
NITRITE UR QL STRIP: NEGATIVE
PH UR STRIP: 6 [PH] (ref 5–8)
PROT UR QL STRIP: ABNORMAL
RBC #/AREA URNS HPF: 1 /HPF (ref 0–4)
SP GR UR STRIP: >=1.03 (ref 1–1.03)
SQUAMOUS #/AREA URNS HPF: 1 /HPF
URN SPEC COLLECT METH UR: ABNORMAL
WBC #/AREA URNS HPF: 2 /HPF (ref 0–5)

## 2017-07-10 PROCEDURE — 87205 SMEAR GRAM STAIN: CPT

## 2017-07-10 PROCEDURE — 1126F AMNT PAIN NOTED NONE PRSNT: CPT | Mod: S$GLB,,, | Performed by: INTERNAL MEDICINE

## 2017-07-10 PROCEDURE — 99204 OFFICE O/P NEW MOD 45 MIN: CPT | Mod: S$GLB,,, | Performed by: INTERNAL MEDICINE

## 2017-07-10 PROCEDURE — 1157F ADVNC CARE PLAN IN RCRD: CPT | Mod: S$GLB,,, | Performed by: INTERNAL MEDICINE

## 2017-07-10 PROCEDURE — 81000 URINALYSIS NONAUTO W/SCOPE: CPT | Mod: PO

## 2017-07-10 PROCEDURE — 1159F MED LIST DOCD IN RCRD: CPT | Mod: S$GLB,,, | Performed by: INTERNAL MEDICINE

## 2017-07-10 PROCEDURE — 99999 PR PBB SHADOW E&M-EST. PATIENT-LVL III: CPT | Mod: PBBFAC,,, | Performed by: INTERNAL MEDICINE

## 2017-07-10 NOTE — PROGRESS NOTES
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  He reports that he has been told that he had an elevated creatinine in the past.  However after he got dental implants earlier this year he begun to have additional problems with diarrhea, weight loss.  In 2004, his baseline was a creatinine of 1.0-1.2.  He does have frequency, urgency and 3-4 episodes of nocturia each night.  This causes him to drink little at night.  He also routinely gets hoarse in the afternoons as he limits his fluid intake.  He has been on a PPI for years but is not sure why and he was taking advil regularly up until earlier this year.  There have been no recent illnesses, hospitalizations or procedures.  He recently was taken off of his lisinopril when he had a potassium of 6.1.      Review of Systems   Constitutional: Negative for appetite change, chills and fever.   HENT: Positive for sore throat (hoarseness in evenings).    Eyes: Negative for visual disturbance.   Respiratory: Negative for cough and shortness of breath.    Cardiovascular: Negative for chest pain and leg swelling.   Gastrointestinal: Negative for diarrhea, nausea and vomiting.   Genitourinary: Positive for frequency (3-4 episodes of nocturia) and urgency. Negative for difficulty urinating, dysuria and hematuria.   Musculoskeletal: Positive for arthralgias (knee). Negative for myalgias.   Skin: Negative for rash.   Neurological: Negative for headaches.   Psychiatric/Behavioral: Negative for sleep disturbance.         Past Medical History:   Diagnosis Date    Anticoagulant long-term use     Anxiety     Arthritis     Atrial fibrillation     Cataract     CKD (chronic kidney disease) stage 3, GFR 30-59 ml/min 7/10/2017    Colon polyp     benign    Depression     Elevated PSA      Erectile dysfunction     Gastric ulcer with hemorrhage     Hep B w/o coma 1977    History of bleeding peptic ulcer     History of colonoscopy     normal in 2010.  It was recommended he return in 2015    History of prostatitis     Hypertension     PAF (paroxysmal atrial fibrillation)     S/P arthroscopic surgery of right knee     3 times    Therapy     Thyroid disease      Past Surgical History:   Procedure Laterality Date    ABDOMINAL HERNIA REPAIR      CARDIAC SURGERY      CATARACT EXTRACTION  11/25/13    left eye    CHOLECYSTECTOMY      COLONOSCOPY N/A 11/25/2015    Procedure: COLONOSCOPY;  Surgeon: Toby Hernandez MD;  [facility];  Service: Endoscopy;  Laterality: N/A;    ELBOW SURGERY      Tennis elbow repair    EYE SURGERY      FRACTURE SURGERY      GASTRIC BYPASS      KNEE ARTHROSCOPY      RT    LASIK  2001    both eyes (Dr. 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    atenolol (TENORMIN) 50 MG tablet Take 1 tablet (50 mg total) by mouth once daily.    buPROPion (WELLBUTRIN XL) 300 MG 24 hr tablet TAKE 1 TABLET(300 MG) BY MOUTH EVERY DAY    CALCIUM ORAL  "Take by mouth Daily.    dutasteride (AVODART) 0.5 mg capsule Take 1 capsule (0.5 mg total) by mouth once daily.    escitalopram oxalate (LEXAPRO) 10 MG tablet Take 1 tablet (10 mg total) by mouth once daily.    levothyroxine (SYNTHROID) 25 MCG tablet Take 1 tablet (25 mcg total) by mouth once daily.    MULTIVITAMIN ORAL Take by mouth Daily.    OMEGA-3 FATTY ACIDS (FISH OIL CONCENTRATE ORAL) Take by mouth Daily.    omeprazole (PRILOSEC) 40 MG capsule Take 1 capsule (40 mg total) by mouth every morning.    propafenone (RYTHMOL SR) 425 MG Cp12 Take 1 capsule (425 mg total) by mouth every 12 (twelve) hours.    lisinopril (PRINIVIL,ZESTRIL) 20 MG tablet Take 1 tablet (20 mg total) by mouth once daily.    oxycodone-acetaminophen (PERCOCET) 5-325 mg per tablet      No current facility-administered medications for this visit.        /62 (BP Location: Left arm)   Pulse 77   Ht 6' 2" (1.88 m)   Wt 101 kg (222 lb 10.6 oz)   BMI 28.59 kg/m²     Objective:      Physical Exam   Constitutional: He is oriented to person, place, and time. He appears well-developed and well-nourished. No distress.   HENT:   Head: Normocephalic and atraumatic.   Eyes: Conjunctivae are normal.   Neck: Neck supple. No JVD present.   Cardiovascular: Normal rate, regular rhythm and normal heart sounds.  Exam reveals no gallop and no friction rub.    No murmur heard.  Pulmonary/Chest: Effort normal and breath sounds normal. No respiratory distress. He has no wheezes. He has no rales.   Abdominal: Soft. Bowel sounds are normal. He exhibits no distension. There is no tenderness.   Musculoskeletal: He exhibits no edema.   Neurological: He is alert and oriented to person, place, and time.   Skin: Skin is warm and dry. No rash noted.   Psychiatric: He has a normal mood and affect.   Vitals reviewed.      Assessment:       1. Acute renal failure, unspecified acute renal failure type    2. Urinary frequency        Plan:   Return to clinic in 3 " months.  Labs for next visit include .  Baseline creatinine is 1.0-1.3 since 2004.  Stop PPI.  Stay off of the lisinopril.  Blood pressure is controlled on the current regimen.  I will refer him back to Dr. Shelton to discuss with him his urinary symptoms which do seem to be affecting his quality of life.  UA and urine for eosinophils today.  Renal panel in late July.  We discussed watching his potassium intake for now.  The most likely scenario is that he has AIN from his PPI.  He could also have a contribution from his urinary symptoms that he has as well.  He has been off of the NSAIDs and I do not suspect that the lisinopril is causing him a problem.

## 2017-07-10 NOTE — LETTER
July 10, 2017      Billy Dias Jr., MD  [address]  [facility] [address]           [facility] [address] [facility] [address] [place] [address]  Phone: [phone]          Patient: Dominick Song MD   MR Number: [id number]   YOB: 1945   Date of Visit: 7/10/2017       Dear Dr. Billy Dias Jr.:    Thank you for referring Dominick Song to me for evaluation. Attached you will find relevant portions of my assessment and plan of care.    If you have questions, please do not hesitate to call me. I look forward to following Dominick Song along with you.    Sincerely,    Jeevna Pond MD    Enclosure  CC:  No Recipients    If you would like to receive this communication electronically, please contact [email or web address] or [phone] to request more information on [facility] [employer] access.    For providers and/or their staff who would like to refer a patient to [facility], please contact us through our one-stop-shop provider referral line, [facility], at [phone].    If you feel you have received this communication in error or would no longer like to receive these types of communications, please e-mail [email or web address]

## 2017-07-11 ENCOUNTER — TELEPHONE (OUTPATIENT)
Dept: NEPHROLOGY | Facility: CLINIC | Age: 72
End: 2017-07-11

## 2017-07-11 DIAGNOSIS — R35.0 URINARY FREQUENCY: ICD-10-CM

## 2017-07-11 DIAGNOSIS — N17.9 ACUTE RENAL FAILURE, UNSPECIFIED ACUTE RENAL FAILURE TYPE: Primary | ICD-10-CM

## 2017-07-11 LAB — EOSINOPHIL URNS QL WRIGHT STN: NORMAL

## 2017-07-11 NOTE — TELEPHONE ENCOUNTER
Spoke to mode in the lab and they still had the urine sample and I put the order in for the lab to be able to get the orders. She said they could still do the test.

## 2017-07-12 ENCOUNTER — CLINICAL SUPPORT (OUTPATIENT)
Dept: REHABILITATION | Facility: HOSPITAL | Age: 72
End: 2017-07-12
Attending: NURSE PRACTITIONER
Payer: MEDICARE

## 2017-07-12 DIAGNOSIS — R26.9 GAIT ABNORMALITY: ICD-10-CM

## 2017-07-12 PROCEDURE — 97110 THERAPEUTIC EXERCISES: CPT | Mod: PN | Performed by: PHYSICAL THERAPIST

## 2017-07-12 NOTE — PROGRESS NOTES
"Name: Dominick Song MD  Clinic Number: [id number]  Date of Treatment: 2017   Diagnosis:   Encounter Diagnosis   Name Primary?    Gait abnormality    PT treatment diagnosis: impaired functional mobility with R knee pain     Visit# 6    Time in: 0900  Time Out: 1000  Total Treatment Time: 55  Group Time: 0    Subjective:    Dominick reports improvement in s/s. Patient reports their pain to be 0/10 on a 0-10 scale with 0 being no pain and 10 being the worst pain imaginable.    Objective    Dominick received therapeutic exercises to develop strength, endurance, ROM, flexibility, posture and core stabilization for 50 minutes includin min one on one     -bike x 10 minutes, Level 3.0 (active warm-up)  -standing: gastroc stretch 3/30"  -seated: right knee quad sets with towel underneath knee with 5 second hold 3/10  -supine: SLR with hip abduction 3/10 (R)  -S/L: clam Blue TB 30 reps each   -supine: TA/ double leg bridge with controlled breathing 3/10  - seated: hip flexion marches with 4# weight on L, 3# on R 2/10  - Standing: step-ups onto 6 inch stair 10 on each leg  - Seated: hamstring curls on resistance machine 40 lbs 2/10  - Seated: knee extension on resistance machine 40 lbs 2/10   -shuttle: bilateral 4B 3/10, single 3B: (R) x 30, (L) x 30  - parallel bars: walking marches (4 x 30 ft)  - parallel bars: step down from 6 inch step (eccentric control) 1/10 on  Each     -sit to stand without UE use from 21 inch table 10 (slight left lean)    Written Home Exercises Provided: Continuation  Pt demo good understanding of the education provided. Dominick demonstrated good return demonstration of activities.     Assessment:   - improvement in BLE strength continues   - slight left lean compensation with sit to stands  - no new s/s    Pt will continue to benefit from skilled PT intervention. Medical Necessity is demonstrated by:  Continued inability to participate in vocational pursuits and Weakness.    Patient is " making good progress towards established goals.    Short Term Goals:  3 weeks  1. Pt will present with increased  BLE MMT by one half grade for increased ease with functional ADLs.  2. Pt will perform 5 sit to  </=10 seconds to improve functional mobility  3. Pt will report decreased pain to </= 1/10 for ambulatory purposes.   4. Pt will initiate HEP to increase strength and decrease pain to improve functional mobility.      Long Term Goals: 6 weeks  1.Pt will present with increased  BLE MMT to >/= 4+/5  for increased ease with functional ADLs.  2. Pt will perform squat with minimal difficulty to improve functional mobility.   3. Pt will perform TUG in </= 8.5 seconds for ambulatory purposes.   4. Pt will be independent with HEP and self management of symptoms.     Plan:  I certify that I was present in the room directing the student in service delivery and guiding them using my skilled judgment. As the co-signing therapist I have reviewed the students documentation and am responsible for the treatment, assessment, and plan.    Continue with established Plan of Care towards PT goals.

## 2017-07-14 ENCOUNTER — CLINICAL SUPPORT (OUTPATIENT)
Dept: REHABILITATION | Facility: HOSPITAL | Age: 72
End: 2017-07-14
Attending: NURSE PRACTITIONER
Payer: MEDICARE

## 2017-07-14 DIAGNOSIS — R26.9 GAIT ABNORMALITY: ICD-10-CM

## 2017-07-14 PROCEDURE — 97110 THERAPEUTIC EXERCISES: CPT | Mod: PN | Performed by: PHYSICAL THERAPIST

## 2017-07-14 NOTE — PROGRESS NOTES
"Name: Dominick Song MD  Clinic Number: [id number]  Date of Treatment: 07/14/2017   Diagnosis:   Encounter Diagnosis   Name Primary?    Gait abnormality    PT treatment diagnosis: impaired functional mobility with R knee pain     Visit# 7    Time in: 1100  Time Out: 1200  Total Treatment Time: 55  Group Time: 0    Subjective:    Dominick reports improvement in strength/balance. Patient reports their pain coming into therapy to be 0/10 on a 0-10 scale with 0 being no pain and 10 being the worst pain imaginable.    Objective    Dominick received therapeutic exercises to develop strength, endurance, ROM, flexibility, posture and core stabilization for 55 minutes including:    -EFX  5 min fwd/bwd  (active warm-up)   -standing: gastroc stretch 3/30"  -supine: right knee quad sets with towel underneath knee with 5 second hold 3/10  -supine: SLR with hip abduction with 1# ankle weight  3/10 (R)  -S/L: clam Blue TB 30 reps each   -supine: TA/ double leg bridge with controlled breathing 3/10  - seated: hip flexion marches with 4# weight on L, 3# on R 2/10 (previous)  - Standing: step-ups onto 8 inch step 1/10 on each leg  - Seated: hamstring curls on resistance machine 40 lbs 3/10  - Seated: knee extension on resistance machine 40 lbs 3/10   -shuttle: bilateral 4B 3/10, single 3.5 B: (R) x 20, (L) x 30 (pain noted 1 inch distal to fibular head)  - parallel bars: walking marches (4 x 30 ft) (previous)  - parallel bars: lateral walks with blue TB 3x 25 ft each way     -sit to stand without UE use from 21 inch table 2/10 (previous)    Written Home Exercises Provided: Continuation  Pt demo good understanding of the education provided. Dominick demonstrated good return demonstration of activities.     Assessment:   - verbal cues for breath control with exercises   - pt with pain 1 inch distal to fibular head with shuttle exercises on right LE  - BLE strength improving     Pt will continue to benefit from skilled PT intervention. Medical " Necessity is demonstrated by:  Continued inability to participate in vocational pursuits and Weakness.    Patient is making good progress towards established goals.    Short Term Goals:  3 weeks  1. Pt will present with increased  BLE MMT by one half grade for increased ease with functional ADLs.  2. Pt will perform 5 sit to  </=10 seconds to improve functional mobility  3. Pt will report decreased pain to </= 1/10 for ambulatory purposes.   4. Pt will initiate HEP to increase strength and decrease pain to improve functional mobility.      Long Term Goals: 6 weeks  1.Pt will present with increased  BLE MMT to >/= 4+/5  for increased ease with functional ADLs.  2. Pt will perform squat with minimal difficulty to improve functional mobility.   3. Pt will perform TUG in </= 8.5 seconds for ambulatory purposes.   4. Pt will be independent with HEP and self management of symptoms.     Plan:  I certify that I was present in the room directing the student in service delivery and guiding them using my skilled judgment. As the co-signing therapist I have reviewed the students documentation and am responsible for the treatment, assessment, and plan.    Continue with established Plan of Care towards PT goals.

## 2017-07-18 ENCOUNTER — HOSPITAL ENCOUNTER (OUTPATIENT)
Dept: SLEEP MEDICINE | Facility: OTHER | Age: 72
Discharge: HOME | End: 2017-07-18
Attending: NURSE PRACTITIONER
Payer: MEDICARE

## 2017-07-18 ENCOUNTER — CLINICAL SUPPORT (OUTPATIENT)
Dept: REHABILITATION | Facility: HOSPITAL | Age: 72
End: 2017-07-18
Attending: NURSE PRACTITIONER
Payer: MEDICARE

## 2017-07-18 DIAGNOSIS — G47.30 SLEEP APNEA, UNSPECIFIED: ICD-10-CM

## 2017-07-18 DIAGNOSIS — R26.9 GAIT ABNORMALITY: ICD-10-CM

## 2017-07-18 DIAGNOSIS — G47.31 COMPLEX SLEEP APNEA SYNDROME: ICD-10-CM

## 2017-07-18 PROCEDURE — 95811 POLYSOM 6/>YRS CPAP 4/> PARM: CPT

## 2017-07-18 PROCEDURE — 95811 POLYSOM 6/>YRS CPAP 4/> PARM: CPT | Mod: 26,,, | Performed by: PSYCHIATRY & NEUROLOGY

## 2017-07-18 PROCEDURE — 97110 THERAPEUTIC EXERCISES: CPT | Mod: PN | Performed by: PHYSICAL THERAPIST

## 2017-07-18 NOTE — PROGRESS NOTES
"Name: Dominick Song MD  Clinic Number: [id number]  Date of Treatment: 07/18/2017   Diagnosis:   Encounter Diagnosis   Name Primary?    Gait abnormality    PT treatment diagnosis: impaired functional mobility with R knee pain     Visit# 8    Time in: 0900  Time Out: 1000  Total Treatment Time: 55  Group Time: 0    Subjective:    Dominick reported no new s/s. Patient reports their pain coming into therapy to be 0/10 on a 0-10 scale with 0 being no pain and 10 being the worst pain imaginable.    Objective    Dominick received therapeutic exercises to develop strength, endurance, ROM, flexibility, posture and core stabilization for 55 minutes including:(30 minutes one on one)    -EFX  5 min fwd/bwd each (active warm-up)   -standing: gastroc stretch 3/30"  -supine: right knee quad sets with towel underneath knee with 5 second hold 3/10  -supine: SLR with hip abduction with 1# ankle weight  3/10 (R)  -S/L: clam Blue TB 30 reps each   -supine: TA/ double leg bridge with controlled breathing 3/10  - seated: hip flexion marches with 4# weight on L, 3# on R 2/10 (previous)  - Standing: step-ups onto 8 inch step 1/10 on each leg    - Seated: hamstring curls on resistance machine 40 lbs 3/10  - Seated: knee extension on resistance machine 40 lbs 3/10     -shuttle: bilateral 4B 3/10, single 3.5 B: (R) x 20, (L) x 30 (pain noted 1 inch distal to fibular head)    - parallel bars: walking marches (4 x 30 ft) (previous)  - parallel bars: lateral walks with blue TB 3x 25 ft each way(previous)     -sit to stand without UE use from 21 inch table 2/10 using 1# bilateral dowel stick    Written Home Exercises Provided: Continuation  Pt demo good understanding of the education provided. Dominick demonstrated good return demonstration of activities.     Assessment:   - Patient tolerated sit to stands with bilateral 1# dowel stick above 90 degrees with emphasis on APT.  - pt with pain 1 inch distal to fibular head with shuttle exercises on right " LE continues intermittent.  - BLE strength improving     Pt will continue to benefit from skilled PT intervention. Medical Necessity is demonstrated by:  Continued inability to participate in vocational pursuits and Weakness.    Patient is making good progress towards established goals.    Short Term Goals:  3 weeks  1. Pt will present with increased  BLE MMT by one half grade for increased ease with functional ADLs.  2. Pt will perform 5 sit to  </=10 seconds to improve functional mobility  3. Pt will report decreased pain to </= 1/10 for ambulatory purposes.   4. Pt will initiate HEP to increase strength and decrease pain to improve functional mobility.      Long Term Goals: 6 weeks  1.Pt will present with increased  BLE MMT to >/= 4+/5  for increased ease with functional ADLs.  2. Pt will perform squat with minimal difficulty to improve functional mobility.   3. Pt will perform TUG in </= 8.5 seconds for ambulatory purposes.   4. Pt will be independent with HEP and self management of symptoms.     Plan:  I certify that I was present in the room directing the student in service delivery and guiding them using my skilled judgment. As the co-signing therapist I have reviewed the students documentation and am responsible for the treatment, assessment, and plan.    Continue with established Plan of Care towards PT goals.

## 2017-07-19 NOTE — PROGRESS NOTES
A Split night study was preformed on Dominick Song. The procedure was explained to the patient and questions were answered prior to the start of the study. The patietn did meet split night criteria.    EKG- Appeared to have shown NSR    Snoring- was noted to be moderate to loud    Cpap mask- F&P Eson nasal mdium with chin strap and F&P Simplus full face medium. The patient preferred the nasal mask, but needs the full face mask due to excessive mouth leaks.    Cpap ranged- from [id number], A Bipap trial was done on 16/12 for 20 mins during the night    Optimal pressure- believed to be [id number] as supine REM was noted    The patient stated at the start of the night that while he will try cpap in the lab, he may not use it at home. The patient's response to cpap at the end of the night was that it wasn't fun at all and that he doesn't know if he could use it at home.

## 2017-07-20 ENCOUNTER — CLINICAL SUPPORT (OUTPATIENT)
Dept: REHABILITATION | Facility: HOSPITAL | Age: 72
End: 2017-07-20
Attending: NURSE PRACTITIONER
Payer: MEDICARE

## 2017-07-20 DIAGNOSIS — R26.9 GAIT ABNORMALITY: ICD-10-CM

## 2017-07-20 PROCEDURE — 97110 THERAPEUTIC EXERCISES: CPT | Mod: PN | Performed by: PHYSICAL THERAPIST

## 2017-07-20 NOTE — PROGRESS NOTES
"Name: Dominick Song MD  Clinic Number: [id number]  Date of Treatment: 07/20/2017   Diagnosis:   Encounter Diagnosis   Name Primary?    Gait abnormality    PT treatment diagnosis: impaired functional mobility with R knee pain     Visit# 9    Time in: 1000  Time Out: 1100  Total Treatment Time: 55  Group Time: 0    Subjective:    Dominick reports no new signs or symptoms. Patient reports their pain coming into therapy to be 0/10 on a 0-10 scale with 0 being no pain and 10 being the worst pain imaginable.    Objective    Dominick received therapeutic exercises to develop strength, endurance, ROM, flexibility, posture and core stabilization for 55 minutes including:    -EFX  5 min fwd/bwd each (active warm-up)   -standing: gastroc stretch 3/30"  -supine: right knee quad sets with towel underneath knee with 5 second hold 3/10  -supine: SLR with hip abduction with 1# ankle weight  3/10 (R)  -S/L: clam Blue TB x 30 reps each   -supine: TA/ double leg bridge with controlled breathing 3/10 (previous)  - seated: hip flexion marches with 4# weight on L, 3# on R 2/10 (previous)  - Standing: step-ups onto 6 inch step 1/15 on each leg    - Seated: hamstring curls on resistance machine 40 lbs 3/10  - Seated: knee extension on resistance machine 40 lbs 3/10     -shuttle: bilateral 4B 3/10, single 3B: (R) x 30 reps, 3.5B (L) x 30 reps     - parallel bars: lateral walks with blue TB 3x 25 ft each way(previous)     -sit to stand without UE use from standard chair (16 inch)  1/5    Written Home Exercises Provided: Continuation  Pt demo good understanding of the education provided. Dominick demonstrated good return demonstration of activities.     Assessment:   -no new s/s   - pt with moderate difficulty performing sit-to-stand from 16 inch chair   - BLE strength continues to improve     Pt will continue to benefit from skilled PT intervention. Medical Necessity is demonstrated by:  Continued inability to participate in vocational pursuits " and Weakness.    Patient is making good progress towards established goals.    Short Term Goals:  3 weeks  1. Pt will present with increased  BLE MMT by one half grade for increased ease with functional ADLs.  2. Pt will perform 5 sit to  </=10 seconds to improve functional mobility  3. Pt will report decreased pain to </= 1/10 for ambulatory purposes.   4. Pt will initiate HEP to increase strength and decrease pain to improve functional mobility.      Long Term Goals: 6 weeks  1.Pt will present with increased  BLE MMT to >/= 4+/5  for increased ease with functional ADLs.  2. Pt will perform squat with minimal difficulty to improve functional mobility.   3. Pt will perform TUG in </= 8.5 seconds for ambulatory purposes.   4. Pt will be independent with HEP and self management of symptoms.     Plan:  I certify that I was present in the room directing the student in service delivery and guiding them using my skilled judgment. As the co-signing therapist I have reviewed the students documentation and am responsible for the treatment, assessment, and plan.    Continue with established Plan of Care towards PT goals.

## 2017-07-25 ENCOUNTER — CLINICAL SUPPORT (OUTPATIENT)
Dept: REHABILITATION | Facility: HOSPITAL | Age: 72
End: 2017-07-25
Attending: NURSE PRACTITIONER
Payer: MEDICARE

## 2017-07-25 DIAGNOSIS — R26.9 GAIT ABNORMALITY: ICD-10-CM

## 2017-07-25 PROCEDURE — 97110 THERAPEUTIC EXERCISES: CPT | Mod: PN | Performed by: PHYSICAL THERAPIST

## 2017-07-25 NOTE — PROGRESS NOTES
"Name: Dominick Song MD  Clinic Number: [id number]  Date of Treatment: 07/25/2017   Diagnosis:   Encounter Diagnosis   Name Primary?    Gait abnormality    PT treatment diagnosis: impaired functional mobility with R knee pain     Visit# 10    Time in: 1100  Time Out: 1200  Total Treatment Time: 55  Group Time: 0    Subjective:    Dominick reports no new s/s.  Patient reports their pain coming into therapy to be 0/10 on a 0-10 scale with 0 being no pain and 10 being the worst pain imaginable.    Objective    Dominick received therapeutic exercises to develop strength, endurance, ROM, flexibility, posture and core stabilization for 55 minutes including: (30 minutes one on one)    -EFX  5 min fwd/bwd each (active warm-up)   -standing: gastroc stretch 3/30"  - seated: hamstring stretch 3/30" each  -supine: right knee quad sets with towel underneath knee with 5 second hold 3/10  -supine: SLR with hip abduction with 1# ankle weight  3/10 (R)  -S/L: clam Blue TB x 30 reps each   -supine: TA/ double leg bridge with controlled breathing 3/10   - seated: hip flexion marches with 4# weight on L, 3# on R 2/10 (previous)  - Standing: step-ups onto 6 inch step 1/15 on right    - Seated: hamstring curls on resistance machine 40 lbs 3/10  - Seated: knee extension on resistance machine 40 lbs 3/10     -shuttle: bilateral 4B 3/10, single 3B: (R) x 30 reps, 3.5B (L) x 30 reps     - parallel bars: lateral walks with blue TB 3x 25 ft each way(previous)     -sit to stand without UE use from standard chair (16 inch)  1/5 (previous)    Written Home Exercises Provided: Continuation  Pt demo good understanding of the education provided. Dominick demonstrated good return demonstration of activities.     Assessment:   -no new s/s   - less muscle fatigue noted with resistance machine exercises (hamstring curls/knee extension)  - patient unable to do lateral step downs on right side due to fibular head pain     Pt will continue to benefit from skilled " PT intervention. Medical Necessity is demonstrated by:  Continued inability to participate in vocational pursuits and Weakness.    Patient is making good progress towards established goals.    Short Term Goals:  3 weeks  1. Pt will present with increased  BLE MMT by one half grade for increased ease with functional ADLs.  2. Pt will perform 5 sit to  </=10 seconds to improve functional mobility  3. Pt will report decreased pain to </= 1/10 for ambulatory purposes.   4. Pt will initiate HEP to increase strength and decrease pain to improve functional mobility.      Long Term Goals: 6 weeks  1.Pt will present with increased  BLE MMT to >/= 4+/5  for increased ease with functional ADLs.  2. Pt will perform squat with minimal difficulty to improve functional mobility.   3. Pt will perform TUG in </= 8.5 seconds for ambulatory purposes.   4. Pt will be independent with HEP and self management of symptoms.     Plan:  I certify that I was present in the room directing the student in service delivery and guiding them using my skilled judgment. As the co-signing therapist I have reviewed the students documentation and am responsible for the treatment, assessment, and plan.    Continue with established Plan of Care towards PT goals.

## 2017-07-27 ENCOUNTER — LAB VISIT (OUTPATIENT)
Dept: LAB | Facility: HOSPITAL | Age: 72
End: 2017-07-27
Attending: INTERNAL MEDICINE
Payer: MEDICARE

## 2017-07-27 DIAGNOSIS — N17.9 ACUTE RENAL FAILURE, UNSPECIFIED ACUTE RENAL FAILURE TYPE: ICD-10-CM

## 2017-07-27 LAB
ALBUMIN SERPL BCP-MCNC: 3.2 G/DL
ANION GAP SERPL CALC-SCNC: 8 MMOL/L
BUN SERPL-MCNC: 22 MG/DL
CALCIUM SERPL-MCNC: 8.5 MG/DL
CHLORIDE SERPL-SCNC: 111 MMOL/L
CO2 SERPL-SCNC: 20 MMOL/L
CREAT SERPL-MCNC: 1.5 MG/DL
EST. GFR  (AFRICAN AMERICAN): 53.8 ML/MIN/1.73 M^2
EST. GFR  (NON AFRICAN AMERICAN): 46.5 ML/MIN/1.73 M^2
GLUCOSE SERPL-MCNC: 97 MG/DL
PHOSPHATE SERPL-MCNC: 2.8 MG/DL
POTASSIUM SERPL-SCNC: 4.6 MMOL/L
SODIUM SERPL-SCNC: 139 MMOL/L

## 2017-07-27 PROCEDURE — 80069 RENAL FUNCTION PANEL: CPT

## 2017-07-27 PROCEDURE — 36415 COLL VENOUS BLD VENIPUNCTURE: CPT | Mod: PO

## 2017-07-28 ENCOUNTER — TELEPHONE (OUTPATIENT)
Dept: SLEEP MEDICINE | Facility: CLINIC | Age: 72
End: 2017-07-28

## 2017-07-28 DIAGNOSIS — G47.31 COMPLEX SLEEP APNEA SYNDROME: Primary | ICD-10-CM

## 2017-07-28 NOTE — TELEPHONE ENCOUNTER
Discussed in detail 07/18/2017 in-lab sleep study results. Pt would like to proceed with insurance approval for dedicated ASV titration study while he consults with wife how to move forward. Pt likely to proceed with PAP therapy as recommended, but prefers to loop wife in first.     Will also send copy of sleep study via email with Sleep Clinic contact info per pt request, so that pt may call with definitive decision next week.     Will call pt with dedicated ASV titration results and order ASV machine then. Pt understands that as per her ins requirement, he must be seen in clinic 31 - 90 days after set up of his ASV machine.     Of note, 04/27/2017 EF 60-65%

## 2017-08-09 ENCOUNTER — TELEPHONE (OUTPATIENT)
Dept: SLEEP MEDICINE | Facility: OTHER | Age: 72
End: 2017-08-09

## 2017-08-15 ENCOUNTER — TELEPHONE (OUTPATIENT)
Dept: SLEEP MEDICINE | Facility: OTHER | Age: 72
End: 2017-08-15

## 2017-08-21 ENCOUNTER — HOSPITAL ENCOUNTER (OUTPATIENT)
Dept: SLEEP MEDICINE | Facility: OTHER | Age: 72
Discharge: HOME | End: 2017-08-21
Attending: NURSE PRACTITIONER
Payer: MEDICARE

## 2017-08-21 DIAGNOSIS — G47.31 COMPLEX SLEEP APNEA SYNDROME: ICD-10-CM

## 2017-08-21 PROCEDURE — 95811 POLYSOM 6/>YRS CPAP 4/> PARM: CPT

## 2017-08-21 PROCEDURE — 95811 POLYSOM 6/>YRS CPAP 4/> PARM: CPT | Mod: 26,,, | Performed by: PSYCHIATRY & NEUROLOGY

## 2017-08-22 NOTE — PROGRESS NOTES
End of The night summary    Type of Study Performed on (BEATRICE) CPAP titration    Patient education/cpap information prior to Study/Setup                                    EKG:  Appears to be-    NSR w pvc              Low Spo2  87%                                 Any Difficulties recording: NONE    Optimal pressure# ASV 4/10/0/15/25 auto    MASK: Simplus MED    Pt reaction to CPAP: pt said was okay, sometimes pressure felt tomuch it was jumping or leaking, pt got comfortable with pressure throughout the night.    Tech summary comments:    pt titrated on ASV , pt tolerated pressures well throughout the night, some legs &  arousals observed, pt slept well most of the night no reports of discomfort, optimal pressures observed all sleep positions

## 2017-09-05 ENCOUNTER — TELEPHONE (OUTPATIENT)
Dept: SLEEP MEDICINE | Facility: CLINIC | Age: 72
End: 2017-09-05

## 2017-09-05 DIAGNOSIS — G47.31 COMPLEX SLEEP APNEA SYNDROME: Primary | ICD-10-CM

## 2017-09-05 NOTE — TELEPHONE ENCOUNTER
Discussed both 07/18/2017 split night study and 08/21/2017 ASV titration study results.     ASV machine ordered. RTC 31 - 90 days after ASV .

## 2017-09-07 ENCOUNTER — PATIENT MESSAGE (OUTPATIENT)
Dept: INTERNAL MEDICINE | Facility: CLINIC | Age: 72
End: 2017-09-07

## 2017-09-25 ENCOUNTER — OFFICE VISIT (OUTPATIENT)
Dept: PODIATRY | Facility: CLINIC | Age: 72
End: 2017-09-25
Payer: MEDICARE

## 2017-09-25 VITALS — HEIGHT: 74 IN | BODY MASS INDEX: 28.49 KG/M2 | WEIGHT: 222 LBS

## 2017-09-25 DIAGNOSIS — B35.1 ONYCHOMYCOSIS DUE TO DERMATOPHYTE: Primary | ICD-10-CM

## 2017-09-25 DIAGNOSIS — L84 CORN OR CALLUS: ICD-10-CM

## 2017-09-25 PROCEDURE — 99213 OFFICE O/P EST LOW 20 MIN: CPT | Mod: S$GLB,,,

## 2017-09-25 PROCEDURE — 3008F BODY MASS INDEX DOCD: CPT | Mod: S$GLB,,,

## 2017-09-25 PROCEDURE — 1125F AMNT PAIN NOTED PAIN PRSNT: CPT | Mod: S$GLB,,,

## 2017-09-25 PROCEDURE — 1157F ADVNC CARE PLAN IN RCRD: CPT | Mod: S$GLB,,,

## 2017-09-25 PROCEDURE — 1159F MED LIST DOCD IN RCRD: CPT | Mod: S$GLB,,,

## 2017-09-25 PROCEDURE — 99999 PR PBB SHADOW E&M-EST. PATIENT-LVL III: CPT | Mod: PBBFAC,,,

## 2017-09-25 RX ORDER — ASPIRIN 325 MG
325 TABLET, DELAYED RELEASE (ENTERIC COATED) ORAL
COMMUNITY
End: 2018-05-16 | Stop reason: CLARIF

## 2017-09-25 NOTE — PROGRESS NOTES
Subjective:       Patient ID: Dominick KENNEDY MD Duncan is a 71 y.o. male.    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  He had two treatments in the past by Dr. West which worked several years ago but they have come back. He has a treatment 3 months by me and calluses pared and nails are better but stil severely deformed great toenails, does not want topical or oral therapy. .  Review of Systems  ROS:  Constitution: Negative for chills, fever, weakness and malaise/fatigue.   HEENT: Negative for headaches.   Cardiovascular: Negative for chest pain and claudication.   Respiratory: Negative for cough and shortness of breath.   Musculoskeletal: Positive for foot pain.  Negative for muscle cramps and muscle weakness.   Gastrointestinal: Negative for nausea and vomiting.   Neurological: Negative for numbness and paresthesias.   Dermatological: Negative for wound.        Objective:      Physical Exam  Constitutional:  Patient is oriented to person, place, and time. Vital signs are normal.  Appears well-developed and well-nourished.     Vascular:  Dorsalis pedis pulses are 2+ on the right side, and 2+ on the left side.   Posterior tibial pulses are 2+ on the right side, and 2+ on the left side.   + digital hair growth, capillary fill time to all toes <3 seconds, no swelling    Skin/Dermatological:  Skin is warm and intact.  No cyanosis or clubbing.  No rashes noted.  No open wounds. b/l great severe dystrophy but b/l 2nd toenails dystrophic 80% resolved  Callus right sub 4 MTH    Musculoskeletal:      Cavus foot type, tight heel chords, hammertoes rigid       Neurological:  No deficits to sharp/dull, light touch or vibratory sensation.   Muscle strength to tibialis anterior, extensor hallucis longus, extensor digitorum  longus, peroneal muscles, flexor hallucis/digotorum longus, posterior tibial and gastrosoleal complex is 5/5, normal tone without assymmetry   Patellar reflexes are 2+ on the right side and 2+ on the left side.  Achilles reflexes are 2+ on the right side and 2+ on the left side.        Assessment:       1. Onychomycosis due to dermatophyte    2. Corn or callus        Plan:       Onychomycosis due to dermatophyte    Corn or callus        Onychomycosis: Discussion of the etiology of the problem. Keep feet clean and dry.  Patient declined topicals.    A timeout was preformed. Informed consent was obtained and witnessed.   The patient was placed in the procedure room.   The b/l great, right 2nd toenails were lasered with  shots per digit.   The settings were at 15 J / cm, 0.3 ms, 3 hz with a spot size 5 ml.   The patient tollerated the procedure well.   Return to clinic P3 months Call with concerns or questions.     Calluses pared to tolerance.    Discussed Spenco orthotics.

## 2017-09-27 ENCOUNTER — PATIENT MESSAGE (OUTPATIENT)
Dept: INTERNAL MEDICINE | Facility: CLINIC | Age: 72
End: 2017-09-27

## 2017-10-06 ENCOUNTER — TELEPHONE (OUTPATIENT)
Dept: ORTHOPEDICS | Facility: CLINIC | Age: 72
End: 2017-10-06

## 2017-10-06 NOTE — TELEPHONE ENCOUNTER
----- Message from Lux Nicolas sent at 10/6/2017  2:12 PM CDT -----  Contact: Self/ Home: [phone]  Pt called in requesting a morning time appt for next week either Wednesday, Thursday, or Friday for bilat knee pain injections. I was able to schedule pt for 10/18, but pt would like to be seen next week. Please call pt back at [phone].

## 2017-10-06 NOTE — TELEPHONE ENCOUNTER
Spoke to pt and he was advised to keep his 10/18 appt, as [facility] has nothing sooner. Pt verbalized understanding.

## 2017-10-10 ENCOUNTER — PATIENT MESSAGE (OUTPATIENT)
Dept: PSYCHIATRY | Facility: CLINIC | Age: 72
End: 2017-10-10

## 2017-10-10 RX ORDER — ESCITALOPRAM OXALATE 10 MG/1
TABLET ORAL
Qty: 90 TABLET | Refills: 0 | Status: CANCELLED | OUTPATIENT
Start: 2017-10-10

## 2017-10-10 RX ORDER — ESCITALOPRAM OXALATE 10 MG/1
10 TABLET ORAL DAILY
Qty: 90 TABLET | Refills: 1 | Status: SHIPPED | OUTPATIENT
Start: 2017-10-10 | End: 2017-12-20 | Stop reason: SDUPTHER

## 2017-10-11 ENCOUNTER — LAB VISIT (OUTPATIENT)
Dept: LAB | Facility: HOSPITAL | Age: 72
End: 2017-10-11
Attending: INTERNAL MEDICINE
Payer: MEDICARE

## 2017-10-11 DIAGNOSIS — N17.9 ACUTE RENAL FAILURE, UNSPECIFIED ACUTE RENAL FAILURE TYPE: ICD-10-CM

## 2017-10-11 LAB
BILIRUB UR QL STRIP: NEGATIVE
CLARITY UR: CLEAR
COLOR UR: YELLOW
GLUCOSE UR QL STRIP: NEGATIVE
HGB UR QL STRIP: NEGATIVE
KETONES UR QL STRIP: NEGATIVE
LEUKOCYTE ESTERASE UR QL STRIP: NEGATIVE
NITRITE UR QL STRIP: NEGATIVE
PH UR STRIP: 6 [PH] (ref 5–8)
PROT UR QL STRIP: NEGATIVE
SP GR UR STRIP: 1.02 (ref 1–1.03)
URN SPEC COLLECT METH UR: NORMAL

## 2017-10-11 PROCEDURE — 81003 URINALYSIS AUTO W/O SCOPE: CPT | Mod: PO

## 2017-10-16 ENCOUNTER — PATIENT MESSAGE (OUTPATIENT)
Dept: PSYCHIATRY | Facility: CLINIC | Age: 72
End: 2017-10-16

## 2017-10-16 ENCOUNTER — OFFICE VISIT (OUTPATIENT)
Dept: NEPHROLOGY | Facility: CLINIC | Age: 72
End: 2017-10-16
Payer: MEDICARE

## 2017-10-16 VITALS
HEIGHT: 74 IN | DIASTOLIC BLOOD PRESSURE: 70 MMHG | SYSTOLIC BLOOD PRESSURE: 130 MMHG | BODY MASS INDEX: 29.48 KG/M2 | WEIGHT: 229.75 LBS

## 2017-10-16 DIAGNOSIS — R35.0 URINARY FREQUENCY: ICD-10-CM

## 2017-10-16 DIAGNOSIS — N17.9 ACUTE RENAL FAILURE, UNSPECIFIED ACUTE RENAL FAILURE TYPE: Primary | ICD-10-CM

## 2017-10-16 PROCEDURE — 99214 OFFICE O/P EST MOD 30 MIN: CPT | Mod: S$GLB,,, | Performed by: INTERNAL MEDICINE

## 2017-10-16 PROCEDURE — 99999 PR PBB SHADOW E&M-EST. PATIENT-LVL II: CPT | Mod: PBBFAC,,, | Performed by: INTERNAL MEDICINE

## 2017-10-16 RX ORDER — CLARITHROMYCIN 500 MG/1
TABLET, FILM COATED ORAL
Refills: 0 | COMMUNITY
Start: 2017-08-14 | End: 2017-10-16

## 2017-10-16 RX ORDER — CEFTRIAXONE 1 G/1
INJECTION, POWDER, FOR SOLUTION INTRAMUSCULAR; INTRAVENOUS
Refills: 1 | COMMUNITY
Start: 2017-08-15 | End: 2017-10-16

## 2017-10-16 RX ORDER — BUPROPION HYDROCHLORIDE 300 MG/1
TABLET ORAL
Qty: 90 TABLET | Refills: 0 | Status: SHIPPED | OUTPATIENT
Start: 2017-10-16 | End: 2017-11-29 | Stop reason: DRUGHIGH

## 2017-10-16 RX ORDER — LIDOCAINE HYDROCHLORIDE 20 MG/ML
INJECTION, SOLUTION INFILTRATION; PERINEURAL
Refills: 0 | COMMUNITY
Start: 2017-08-13 | End: 2017-10-16

## 2017-10-16 RX ORDER — AMOXICILLIN 875 MG/1
TABLET, FILM COATED ORAL
Refills: 0 | COMMUNITY
Start: 2017-08-28 | End: 2017-10-16

## 2017-10-16 RX ORDER — METOPROLOL SUCCINATE 50 MG/1
50 TABLET, EXTENDED RELEASE ORAL DAILY
Qty: 90 TABLET | Refills: 3 | Status: SHIPPED | OUTPATIENT
Start: 2017-10-16 | End: 2018-04-16

## 2017-10-16 RX ORDER — OXYCODONE AND ACETAMINOPHEN 10; 325 MG/1; MG/1
TABLET ORAL
Refills: 0 | COMMUNITY
Start: 2017-08-28 | End: 2017-10-16

## 2017-10-16 RX ORDER — CLINDAMYCIN HYDROCHLORIDE 300 MG/1
CAPSULE ORAL
Refills: 0 | COMMUNITY
Start: 2017-08-10 | End: 2017-10-16

## 2017-10-16 NOTE — PROGRESS NOTES
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    He has no uremic or urinary symptoms and is in his usual state of health.  There have been no recent illnesses, hospitalizations or procedures.  He has been out of atenolol due to a nationwide shortage.  His blood pressure at [facility] has been 130s/70s.      Review of Systems   Constitutional: Negative for appetite change, chills and fever.   HENT: Positive for sore throat (hoarseness in evenings).    Eyes: Negative for visual disturbance.   Respiratory: Negative for cough and shortness of breath.    Cardiovascular: Negative for chest pain and leg swelling.   Gastrointestinal: Negative for diarrhea, nausea and vomiting.   Genitourinary: Positive for frequency (3-4 episodes of nocturia) and urgency. Negative for difficulty urinating, dysuria and hematuria.   Musculoskeletal: Positive for arthralgias (knee). Negative for myalgias.   Skin: Negative for rash.   Neurological: Negative for headaches.   Psychiatric/Behavioral: Negative for sleep disturbance.       The past medical, family and social histories were reviewed for this encounter.     /70   Ht 6' 2" (1.88 m)   Wt 104.2 kg (229 lb 11.5 oz)   BMI 29.49 kg/m²     Objective:      Physical Exam   Constitutional: He is oriented to person, place, and time. He appears well-developed and well-nourished. No distress.   HENT:   Head: Normocephalic and atraumatic.   Eyes: Conjunctivae are normal.   Neck: Neck supple. No JVD present.   Cardiovascular: Normal rate, regular rhythm and normal heart sounds.  Exam reveals no gallop and no friction rub.    No murmur heard.  Pulmonary/Chest: Effort normal and breath sounds normal. No respiratory distress. He has no wheezes. He has no rales.   Abdominal: Soft. Bowel sounds are normal. He exhibits no distension. There is no tenderness.   Musculoskeletal: He exhibits no edema. "   Neurological: He is alert and oriented to person, place, and time.   Skin: Skin is warm and dry. No rash noted.   Psychiatric: He has a normal mood and affect.   Vitals reviewed.      Assessment:       1. Acute renal failure, unspecified acute renal failure type    2. Urinary frequency        Plan:   Return to clinic in 6 months.  Labs for next visit include rp.  Renal panel in 3 months.  Baseline creatinine is 1.1-1.3 since 2004.  His urine sediment is negative and his ultrasound is remarkable only for somewhat small symmetric kidneys.  Blood pressure is controlled on the current regimen.  Continue current medications as prescribed and reviewed.   I have substituted metoprolol 50 mg for atenolol due to a shortage of atenolol.

## 2017-10-23 ENCOUNTER — OFFICE VISIT (OUTPATIENT)
Dept: PODIATRY | Facility: CLINIC | Age: 72
End: 2017-10-23
Payer: MEDICARE

## 2017-10-23 VITALS — HEIGHT: 74 IN | WEIGHT: 227.5 LBS | BODY MASS INDEX: 29.2 KG/M2

## 2017-10-23 DIAGNOSIS — M25.80 SESAMOIDITIS: Primary | ICD-10-CM

## 2017-10-23 DIAGNOSIS — M77.42 METATARSALGIA, LEFT FOOT: ICD-10-CM

## 2017-10-23 PROCEDURE — 99499 UNLISTED E&M SERVICE: CPT | Mod: S$GLB,,, | Performed by: PODIATRIST

## 2017-10-23 PROCEDURE — 29540 STRAPPING ANKLE &/FOOT: CPT | Mod: LT,S$GLB,, | Performed by: PODIATRIST

## 2017-10-23 PROCEDURE — 99214 OFFICE O/P EST MOD 30 MIN: CPT | Mod: 25,S$GLB,, | Performed by: PODIATRIST

## 2017-10-23 PROCEDURE — 99999 PR PBB SHADOW E&M-EST. PATIENT-LVL II: CPT | Mod: PBBFAC,,, | Performed by: PODIATRIST

## 2017-10-23 RX ORDER — METHYLPREDNISOLONE 4 MG/1
TABLET ORAL
Qty: 1 PACKAGE | Refills: 0 | Status: SHIPPED | OUTPATIENT
Start: 2017-10-23 | End: 2017-11-13

## 2017-10-23 NOTE — PROGRESS NOTES
Subjective:      Patient ID: Dominick KENNEDY MD Duncan is a 72 y.o. male.    Chief Complaint: Foot Pain (left foot ) and Other Misc (PCP Dr. Dias)    Dominick is a 72 y.o. male who presents to the podiatry clinic  with complaint of  left foot pain sub first metatarsal head. Onset of the symptoms was several days ago. Precipitating event: increased activity and going up and down a ladder a lot. Current symptoms include: ability to bear weight, but with some pain. Aggravating factors: any weight bearing. Symptoms have gradually improved. Much better today. Patient has had prior foot problems with metatarsalgia to the lesser metatarsals, calluses, and treatment for nail fungus. Evaluation to date: none. Treatment to date: rest and wearing better supportive shoes. Patients rates pain 4/10 on pain scale.    Review of Systems   Constitution: Negative for chills and fever.   Cardiovascular: Negative for claudication and leg swelling.   Respiratory: Negative for shortness of breath.    Skin: Positive for nail changes. Negative for itching and rash.   Musculoskeletal: Positive for arthritis and joint swelling. Negative for muscle cramps, muscle weakness and myalgias.   Gastrointestinal: Negative for nausea and vomiting.   Neurological: Negative for focal weakness, loss of balance, numbness and paresthesias.           Objective:      Physical Exam   Constitutional: He is oriented to person, place, and time. He appears well-developed and well-nourished. No distress.   Cardiovascular:   Pulses:       Dorsalis pedis pulses are 2+ on the right side, and 2+ on the left side.        Posterior tibial pulses are 2+ on the right side, and 2+ on the left side.   < 3 sec capillary refill time to toes 1-5 bilateral. Toes and feet are warm to touch proximally with normal distal cooling b/l. There is some hair growth on the feet and toes b/l. There is no edema b/l. No spider veins or varicosities present b/l.      Musculoskeletal:   Left  first MTPJ pain with ROM, pain with palpation to the plantar first metatarsal head and sesamoids.     Equinus noted b/l ankles with < 10 deg DF noted. MMT 5/5 in DF/PF/Inv/Ev resistance with no reproduction of pain in any direction. Passive range of motion of ankle and pedal joints is painless b/l.     Neurological: He is alert and oriented to person, place, and time. He has normal strength. He displays no atrophy and no tremor. No sensory deficit. He exhibits normal muscle tone.   Negative tinel sign bilateral.   Skin: Skin is warm, dry and intact. No abrasion, no bruising, no burn, no ecchymosis, no laceration, no lesion, no petechiae and no rash noted. He is not diaphoretic. No cyanosis or erythema. No pallor. Nails show no clubbing.   Skin temperature, texture and turgor within normal limits.    Nails 1-5 bilateral are discolored slightly         Psychiatric: He has a normal mood and affect. His behavior is normal.             Assessment:       Encounter Diagnoses   Name Primary?    Sesamoiditis Yes    Metatarsalgia, left foot          Plan:       Dominick was seen today for foot pain and other misc.    Diagnoses and all orders for this visit:    Sesamoiditis    Metatarsalgia, left foot    Other orders  -     methylPREDNISolone (MEDROL DOSEPACK) 4 mg tablet; use as directed      I counseled the patient on his conditions, their implications and medical management.    I applied a plantar rest strapping to the patient's left foot to offload symptomatic area, support the arch, and relieve pain.    Recommended over the counter arch supports and wear them in shoes whenever possible.  Athletic shoes intended for walking or running are usually best.    If the pain persists recommend x-ray, possible uric acid, ESR and CRP rule out gout.      Return at previously made appointment in January to follow up nail treatment. Sooner MAHOGANY Linares DPM

## 2017-10-26 ENCOUNTER — OFFICE VISIT (OUTPATIENT)
Dept: UROLOGY | Facility: CLINIC | Age: 72
End: 2017-10-26
Payer: MEDICARE

## 2017-10-26 VITALS
DIASTOLIC BLOOD PRESSURE: 78 MMHG | HEART RATE: 76 BPM | WEIGHT: 224.88 LBS | SYSTOLIC BLOOD PRESSURE: 161 MMHG | HEIGHT: 74 IN | RESPIRATION RATE: 16 BRPM | BODY MASS INDEX: 28.86 KG/M2

## 2017-10-26 DIAGNOSIS — N13.8 BENIGN PROSTATIC HYPERPLASIA WITH URINARY OBSTRUCTION: Primary | ICD-10-CM

## 2017-10-26 DIAGNOSIS — R97.20 ELEVATED PSA: ICD-10-CM

## 2017-10-26 DIAGNOSIS — N40.1 BENIGN PROSTATIC HYPERPLASIA WITH URINARY OBSTRUCTION: Primary | ICD-10-CM

## 2017-10-26 PROCEDURE — 99999 PR PBB SHADOW E&M-EST. PATIENT-LVL III: CPT | Mod: PBBFAC,,, | Performed by: UROLOGY

## 2017-10-26 PROCEDURE — 99499 UNLISTED E&M SERVICE: CPT | Mod: S$GLB,,, | Performed by: UROLOGY

## 2017-10-26 PROCEDURE — 99214 OFFICE O/P EST MOD 30 MIN: CPT | Mod: S$GLB,,, | Performed by: UROLOGY

## 2017-10-26 RX ORDER — DUTASTERIDE 0.5 MG/1
0.5 CAPSULE, LIQUID FILLED ORAL DAILY
Qty: 90 CAPSULE | Refills: 3 | Status: SHIPPED | OUTPATIENT
Start: 2017-10-26 | End: 2018-05-22 | Stop reason: SDUPTHER

## 2017-10-26 RX ORDER — TRIAMCINOLONE ACETONIDE 40 MG/ML
INJECTION, SUSPENSION INTRA-ARTICULAR; INTRAMUSCULAR
COMMUNITY
Start: 2017-10-16 | End: 2021-03-10 | Stop reason: ALTCHOICE

## 2017-10-26 NOTE — PROGRESS NOTES
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 Using Avodart.  Viagra did not help. Does not like PEP. Went on [place] river cruise [employer] to [facility].  PSA 4.8 corrected, stable over time. Enjoying [facility], staying busy around the house. Just confirmed at [facility], going to [facility] in January.  Stable voiding pattern. Seeing Salty for CKD.      Lab Results   Component Value Date    PSA 2.62 03/21/2013    PSA 4.41 (H) 02/06/2012    PSA 4.82 (H) 10/31/2011    PSA 4.0 08/31/2011    PSA 4.5 (H) 04/20/2011    PSA 5.8 (H) 06/08/2010    PSA 3.9 12/15/2009    PSA 4.6 (H) 06/08/2009    PSA 3.8 05/28/2008    PSA 4.7 (H) 09/04/2007    PSADIAG 2.4 10/11/2017    PSADIAG 1.8 10/20/2016    PSADIAG 2.9 10/12/2015    PSATOTAL 7.5 (H) 09/01/2004    PSAFREE 1.20 09/01/2004    PSAFREEPCT 16.00 09/01/2004      Past Medical History:   Diagnosis Date    Anticoagulant long-term use     Anxiety     Arthritis     Atrial fibrillation     Cataract     CKD (chronic kidney disease) stage 3, GFR 30-59 ml/min 7/10/2017    Followed by Dr. Jeevan Pond    Colon polyp     benign    Depression     Elevated PSA     Erectile dysfunction     Gastric ulcer with hemorrhage     Hep B w/o coma 1977    History of bleeding peptic ulcer     History of colonoscopy     normal in 2010.  It was recommended he return in 2015    History of prostatitis     Hypertension     PAF (paroxysmal atrial fibrillation)     S/P arthroscopic surgery of right knee     3 times    Therapy     Thyroid disease      Family History   Problem Relation Age of Onset    COPD Father     Diabetes Father     Aortic stenosis Mother     Heart disease Mother      aortic stenosis    Heart attack Brother     No Known Problems Son     No Known Problems Daughter     No Known Problems Daughter     No Known Problems Daughter      Social History     Social History    Marital  status:      Spouse name: N/A    Number of children: N/A    Years of education: N/A     Occupational History     [employer] (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     Alcohol use No    Drug use: No    Sexual activity: Yes     Partners: Female     Other Topics Concern    Not on file     Social History Narrative    No narrative on file     Past Surgical History:   Procedure Laterality Date    ABDOMINAL HERNIA REPAIR      CARDIAC SURGERY      CATARACT EXTRACTION  11/25/13    left eye    CHOLECYSTECTOMY      COLONOSCOPY N/A 11/25/2015    Procedure: COLONOSCOPY;  Surgeon: Toby Hernandez MD;  Location[facility];  Service: Endoscopy;  Laterality: N/A;    ELBOW SURGERY      Tennis elbow repair    EYE SURGERY      FRACTURE SURGERY      GASTRIC BYPASS      KNEE ARTHROSCOPY      RT    LASIK  2001    both eyes (Dr. Rabago)    left humerus fx      times 2    ORIF HUMERUS FRACTURE      LT    RADIOFREQUENCY ABLATION      ROTATOR CUFF REPAIR      right     Patient Active Problem List   Diagnosis    BPH (benign prostatic hyperplasia)    Elevated PSA    S/P gastric bypass    Peptic ulcer disease    Heart abnormality    Nuclear sclerosis    Atrial fibrillation    Anemia due to chronic blood loss    Gastric ulcer    Posterior vitreous detachment    Metatarsalgia    Keratoma    Foot pain, right    Onychomycosis due to dermatophyte    ED (erectile dysfunction)    Hx of colonic polyps    Bradycardia    Mild single current episode of major depressive disorder    Tortuous aorta    Dyspnea    Hypothyroidism due to acquired atrophy of thyroid    Essential hypertension    Gait abnormality    CKD (chronic kidney disease) stage 3, GFR 30-59 ml/min    Complex sleep apnea syndrome     Review of Systems   Constitutional: Negative for appetite change, chills,  "fatigue, fever and unexpected weight change.   HENT: Negative for nosebleeds.    Respiratory: Negative for shortness of breath and wheezing.    Cardiovascular: Negative for chest pain, palpitations and leg swelling.   Gastrointestinal: Negative for abdominal distention, abdominal pain, constipation, diarrhea, nausea and vomiting.   Genitourinary: Negative for dysuria and hematuria.   Musculoskeletal: Negative for arthralgias and back pain.   Skin: Negative for pallor.   Neurological: Negative for dizziness, seizures and syncope.   Hematological: Negative for adenopathy.   Psychiatric/Behavioral: Negative for dysphoric mood.         Objective:      There were no vitals taken for this visit.  Estimated body mass index is 29.21 kg/m² as calculated from the following:    Height as of 10/23/17: 6' 2" (1.88 m).    Weight as of 10/23/17: 103.2 kg (227 lb 8.2 oz).  Physical Exam   Constitutional: He is oriented to person, place, and time. He appears well-developed and well-nourished. No distress.   HENT:   Head: Atraumatic.   Neck: No tracheal deviation present.   Cardiovascular: Normal rate.    Pulmonary/Chest: Effort normal. No respiratory distress. He has no wheezes.   Abdominal: Soft. Bowel sounds are normal. He exhibits no distension and no mass. There is no tenderness. There is no rebound and no guarding.   Genitourinary: Rectum normal. Rectal exam shows no external hemorrhoid, no internal hemorrhoid, no mass and no tenderness. Prostate is enlarged.   Genitourinary Comments: 50+ ccs   Neurological: He is alert and oriented to person, place, and time.   Skin: Skin is warm and dry. He is not diaphoretic.     Psychiatric: He has a normal mood and affect. His behavior is normal. Judgment and thought content normal.         Assessment and Plan:           Problem List Items Addressed This Visit     BPH (benign prostatic hyperplasia) - Primary    Elevated PSA          Follow up:     1 year with PSA.  Presley Shelton        "

## 2017-11-16 ENCOUNTER — OFFICE VISIT (OUTPATIENT)
Dept: UROLOGY | Facility: CLINIC | Age: 72
End: 2017-11-16
Payer: MEDICARE

## 2017-11-16 ENCOUNTER — OFFICE VISIT (OUTPATIENT)
Dept: ORTHOPEDICS | Facility: CLINIC | Age: 72
End: 2017-11-16
Payer: MEDICARE

## 2017-11-16 VITALS
HEART RATE: 70 BPM | BODY MASS INDEX: 28.88 KG/M2 | WEIGHT: 225 LBS | DIASTOLIC BLOOD PRESSURE: 97 MMHG | HEIGHT: 74 IN | SYSTOLIC BLOOD PRESSURE: 183 MMHG

## 2017-11-16 DIAGNOSIS — N13.8 BPH WITH URINARY OBSTRUCTION: Primary | ICD-10-CM

## 2017-11-16 DIAGNOSIS — R33.9 URINARY RETENTION: ICD-10-CM

## 2017-11-16 DIAGNOSIS — M17.11 PRIMARY OSTEOARTHRITIS OF RIGHT KNEE: Primary | ICD-10-CM

## 2017-11-16 DIAGNOSIS — N52.01 ERECTILE DYSFUNCTION DUE TO ARTERIAL INSUFFICIENCY: ICD-10-CM

## 2017-11-16 DIAGNOSIS — N40.1 BPH WITH URINARY OBSTRUCTION: Primary | ICD-10-CM

## 2017-11-16 PROCEDURE — 99214 OFFICE O/P EST MOD 30 MIN: CPT | Mod: S$GLB,,, | Performed by: UROLOGY

## 2017-11-16 PROCEDURE — 99999 PR PBB SHADOW E&M-EST. PATIENT-LVL III: CPT | Mod: PBBFAC,,, | Performed by: UROLOGY

## 2017-11-16 PROCEDURE — 99499 UNLISTED E&M SERVICE: CPT | Mod: S$GLB,,, | Performed by: NURSE PRACTITIONER

## 2017-11-16 PROCEDURE — 20610 DRAIN/INJ JOINT/BURSA W/O US: CPT | Mod: RT,S$GLB,, | Performed by: NURSE PRACTITIONER

## 2017-11-16 PROCEDURE — 99999 PR PBB SHADOW E&M-EST. PATIENT-LVL III: CPT | Mod: PBBFAC,,, | Performed by: NURSE PRACTITIONER

## 2017-11-16 RX ORDER — PAPAVERINE HYDROCHLORIDE 30 MG/ML
INJECTION INTRAMUSCULAR; INTRAVENOUS
Qty: 10 ML | Refills: 11 | Status: SHIPPED | OUTPATIENT
Start: 2017-11-16 | End: 2018-02-08 | Stop reason: CLARIF

## 2017-11-16 RX ADMIN — TRIAMCINOLONE ACETONIDE 40 MG: 40 INJECTION, SUSPENSION INTRA-ARTICULAR; INTRAMUSCULAR at 03:11

## 2017-11-16 NOTE — PROGRESS NOTES
CHIEF COMPLAINT:    Mr. Song is a 72 y.o. male presenting for a consultation at the request of Dr. Shelton. Patient presents with LUTS.    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  He has a decreased FOS as well.  + urgency.  He's on avodart.  His main complaint is the nocturia.  He'd like to consider TURP.  He cannot tolerate flomax due to dizziness.    He does have sleep apnea and is on a c pap.  This hasn't helped the nocturia.  It does sound like he has a component of nocturnal polyuria as he voids 250 cc each time he voids at night.  He also has mild medical renal disease.    He also c/o ED.  He's tried and failed oral meds.  He's been on ICI in the past and would like to try it again.  He's not interested in an IPP.   This has been present for > 1 year.  T is normal.    REVIEW OF SYSTEMS:    Dominick Song MD denies any history of headache, blurred vision, fever, nausea, vomiting, chills, abdominal pain, bleeding per rectum, cough, SOB, recent loss of consciousness, recent mental status changes, seizures, dizziness, or upper or lower extremity weakness.    TERESA  1. 1  2. 0  3. 0  4. 0  5. 0      PATIENT HISTORY:    Past Medical History:   Diagnosis Date    Anticoagulant long-term use     Anxiety     Arthritis     Atrial fibrillation     Cataract     CKD (chronic kidney disease) stage 3, GFR 30-59 ml/min 7/10/2017    Followed by Dr. Jeevan Pond    Colon polyp     benign    Depression     Elevated PSA     Erectile dysfunction     Gastric ulcer with hemorrhage     Hep B w/o coma 1977    History of bleeding peptic ulcer     History of colonoscopy     normal in 2010.  It was recommended he return in 2015    History of prostatitis     Hypertension     PAF (paroxysmal atrial fibrillation)     S/P arthroscopic surgery of right knee     3 times    Thyroid disease        Past Surgical History:   Procedure Laterality Date    ABDOMINAL HERNIA REPAIR       CARDIAC SURGERY      CATARACT EXTRACTION  11/25/13    left eye    CHOLECYSTECTOMY      COLONOSCOPY N/A 11/25/2015    Procedure: COLONOSCOPY;  Surgeon: Toby Hernandez MD;  Location: [facility];  Service: Endoscopy;  Laterality: N/A;    ELBOW SURGERY      Tennis elbow repair    EYE SURGERY      FRACTURE SURGERY      GASTRIC BYPASS      KNEE ARTHROSCOPY      RT    LASIK  2001    both eyes (Dr. Rabago)    left humerus fx      times 2    ORIF HUMERUS FRACTURE      LT    RADIOFREQUENCY ABLATION      ROTATOR CUFF REPAIR      right       Family History   Problem Relation Age of Onset    COPD Father     Diabetes Father     Aortic stenosis Mother     Heart disease Mother      aortic stenosis    Heart attack Brother     No Known Problems Son     No Known Problems Daughter     No Known Problems Daughter     No Known Problems Daughter        Social History     Social History    Marital status:      Spouse name: N/A    Number of children: N/A    Years of education: N/A     Occupational History     [employer] ([employer] [facility]     Social History Main Topics    Smoking status: Never Smoker    Smokeless tobacco: Never Used      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     Alcohol use No    Drug use: No    Sexual activity: Yes     Partners: Female     Other Topics Concern    Not on file     Social History Narrative    No narrative on file       Allergies:  No known drug allergies    Medications:    Current Outpatient Prescriptions:     acyclovir (ZOVIRAX) 800 MG Tab, TK ONE T PO FIVE TIMES D only for fever blisters, Disp: , Rfl: 1    apixaban (ELIQUIS) 5 mg Tab, Take 1 tablet (5 mg total) by mouth 2 (two) times daily., Disp: 180 tablet, Rfl: 3    aspirin (ECOTRIN) 325 MG EC tablet, Take 325 mg by mouth as needed for Pain., Disp: , Rfl:     buPROPion (WELLBUTRIN XL) 300 MG 24 hr tablet, TAKE 1 TABLET BY MOUTH EVERY DAY.(PLEASE SCHEDULE AN APPOINTMENT WITH DOCTOR), Disp: 90 tablet,  Rfl: 0    CALCIUM ORAL, Take by mouth Daily., Disp: , Rfl:     dutasteride (AVODART) 0.5 mg capsule, Take 1 capsule (0.5 mg total) by mouth once daily., Disp: 90 capsule, Rfl: 3    escitalopram oxalate (LEXAPRO) 10 MG tablet, Take 1 tablet (10 mg total) by mouth once daily., Disp: 90 tablet, Rfl: 1    FLUZONE HIGH-DOSE 2017-18, PF, 180 mcg/0.5 mL vaccine, , Disp: , Rfl:     KENALOG 40 mg/mL injection, , Disp: , Rfl:     levothyroxine (SYNTHROID) 25 MCG tablet, Take 1 tablet (25 mcg total) by mouth once daily., Disp: 30 tablet, Rfl: 11    lisinopril (PRINIVIL,ZESTRIL) 20 MG tablet, Take 1 tablet (20 mg total) by mouth once daily. (Patient taking differently: Take 20 mg by mouth once daily. ON HOLD), Disp: 90 tablet, Rfl: 3    metoprolol succinate (TOPROL-XL) 50 MG 24 hr tablet, Take 1 tablet (50 mg total) by mouth once daily., Disp: 90 tablet, Rfl: 3    MULTIVITAMIN ORAL, Take by mouth Daily., Disp: , Rfl:     OMEGA-3 FATTY ACIDS (FISH OIL CONCENTRATE ORAL), Take by mouth Daily., Disp: , Rfl:     omeprazole (PRILOSEC) 40 MG capsule, Take 1 capsule (40 mg total) by mouth every morning. (Patient taking differently: Take 40 mg by mouth every morning. ON HOLD), Disp: 90 capsule, Rfl: 3    propafenone (RYTHMOL SR) 425 MG Cp12, Take 1 capsule (425 mg total) by mouth every 12 (twelve) hours., Disp: 180 capsule, Rfl: 3    papaverine 30 mg/mL injection, Add Phentolamine 1 mg/cc  Add PGE1 10 mcg/cc  SIG: Use as directed, Disp: 10 mL, Rfl: 11    PHYSICAL EXAMINATION:    The patient generally appears in good health, is appropriately interactive, and is in no apparent distress.     Eyes: anicteric sclerae, moist conjunctivae; no lid-lag; PERRLA     HENT: Atraumatic; oropharynx clear with moist mucous membranes and no mucosal ulcerations;normal hard and soft palate.  No evidence of lymphadenopathy.    Neck: Trachea midline.  No thyromegaly.    Musculoskeletal: No abnormal gait.    Skin: No lesions.    Mental:  Cooperative with normal affect.  Is oriented to time, place, and person.    Neuro: Grossly intact.    Chest: Normal inspiratory effort.   No accessory muscles.  No audible wheezes.  Respirations symmetric on inspiration and expiration.    Heart: Regular rhythm.      Abdomen:  Soft, non-tender. No masses or organomegaly. Bladder is not palpable. No evidence of flank discomfort. No evidence of inguinal hernia.    Genitourinary: Just done by Dr. Shelton.  Deferred.    Extremities: No clubbing, cyanosis, or edema      LABS:      Lab Results   Component Value Date    PSA 2.62 03/21/2013    PSA 4.41 (H) 02/06/2012    PSA 4.82 (H) 10/31/2011    PSADIAG 2.4 10/11/2017    PSADIAG 1.8 10/20/2016    PSADIAG 2.9 10/12/2015    PSATOTAL 7.5 (H) 09/01/2004    PSAFREE 1.20 09/01/2004    PSAFREEPCT 16.00 09/01/2004       IMPRESSION:    Encounter Diagnoses   Name Primary?    BPH with urinary obstruction Yes    Erectile dysfunction due to arterial insufficiency     Urinary retention          PLAN:    1. Discussed the causes for nocturia.  Discussed that a TURP may not be of benefit.  Will do suds/cysto to help [employer].  2. Discussed options for his ED.  He'd like to try ICI again. Side effects discussed.  A new Rx was given      Copy to:

## 2017-11-16 NOTE — LETTER
November 16, 2017      Presley Shelton MD  [address]  [place] [address]           [facility]  [address]  [place] [address]  Phone: [phone]          Patient: Dominick Song MD   MR Number: [id number]   YOB: 1945   Date of Visit: 11/16/2017       Dear Dr. Presley Shelton:    Thank you for referring Dominick Song to me for evaluation. Attached you will find relevant portions of my assessment and plan of care.    If you have questions, please do not hesitate to call me. I look forward to following Dominick Song along with you.    Sincerely,    Yakov Gaston MD    Enclosure  CC:  No Recipients    If you would like to receive this communication electronically, please contact [email or web address] or [phone] to request more information on [employer] Link access.    For providers and/or their staff who would like to refer a patient to [facility], please contact us through our one-stop-shop provider referral line, [facility], at [phone].    If you feel you have received this communication in error or would no longer like to receive these types of communications, please e-mail [email or web address]

## 2017-11-19 RX ORDER — TRIAMCINOLONE ACETONIDE 40 MG/ML
40 INJECTION, SUSPENSION INTRA-ARTICULAR; INTRAMUSCULAR
Status: COMPLETED | OUTPATIENT
Start: 2017-11-16 | End: 2017-11-16

## 2017-11-19 NOTE — PROGRESS NOTES
Pt returns for a right knee cortisone injection. He has an upcoming trip planned to [place] and will return in January for a cortisone injection immediately prior to his departure.     Knee Injection Procedure Note    Pre-operative Diagnosis: right knee degenerative arthritis    Post-operative Diagnosis: same    Indications: right knee pain    Anesthesia: none    Procedure Details     Verbal consent was obtained for the procedure. The injection site was identified and the skin was prepared with alcohol. The right knee was injected from an anterolateral approach with 1 ml of Kenalog and 5 ml Lidocaine under sterile technique using a 22 gauge needle. The needle was removed and the area cleansed and dressed.    Complications:  None; patient tolerated the procedure well.    he was advised to rest the knee today, using ice and elevation as needed for comfort and swelling. he did receive immediate relief of the knee pain. he was told this would be short lived and is secondary to the lidocaine. he may have an increase in discomfort tonight followed by steady improvement over the next several days. It may take 1-3 weeks following the injection to get the full benefit of the medication.

## 2017-11-21 ENCOUNTER — OFFICE VISIT (OUTPATIENT)
Dept: SLEEP MEDICINE | Facility: CLINIC | Age: 72
End: 2017-11-21
Payer: MEDICARE

## 2017-11-21 VITALS
BODY MASS INDEX: 28.88 KG/M2 | SYSTOLIC BLOOD PRESSURE: 166 MMHG | HEART RATE: 58 BPM | OXYGEN SATURATION: 97 % | WEIGHT: 225 LBS | HEIGHT: 74 IN | DIASTOLIC BLOOD PRESSURE: 92 MMHG

## 2017-11-21 DIAGNOSIS — G47.31 COMPLEX SLEEP APNEA SYNDROME: Primary | ICD-10-CM

## 2017-11-21 PROCEDURE — 99213 OFFICE O/P EST LOW 20 MIN: CPT | Mod: S$GLB,,, | Performed by: NURSE PRACTITIONER

## 2017-11-21 PROCEDURE — 99499 UNLISTED E&M SERVICE: CPT | Mod: S$GLB,,, | Performed by: NURSE PRACTITIONER

## 2017-11-21 PROCEDURE — 99999 PR PBB SHADOW E&M-EST. PATIENT-LVL IV: CPT | Mod: PBBFAC,,, | Performed by: NURSE PRACTITIONER

## 2017-11-21 NOTE — PROGRESS NOTES
CPAP set up Date:17  Overall CPAP use :nightly  Is CPAP helping? Still getting adjusted to asv  Mask Style, Comfort, fit, chin strap: hector view size small ffm  Pressure Tolerance: ok  Humidification: patient  is having mouth drying  CPAP Device interrogation last 30 days:   Machine type: dream station  Machine condition: good, new  Pressure setting and range: asv   Max 25  epap 10  epap   6  Ps 15  avg ps 2.5  Total usage: 308.2 hours  Average daily usage 30 days: 4.3  Days > 4 Hours: 20 days  Predicted AHI: 1.4  Large leak 96 %  90% tile pressure: 7.0  Periodic breathin.0%  Monometer reading

## 2017-11-21 NOTE — PROGRESS NOTES
"Dominick Song  was seen in follow-up for complex sleep apnea management and ASV machine check after set up.   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       Patient complaints include: snoring ("reported by others") and excessive daytime sleepiness. Nocturia x3. + dry mouth in AM. Morning headaches.     Hx of bruxism, stress-induced. Was severe, required extensive dental work. Now resolved.     Pt with known diagnosis of atrial fibrillation. Diagnosed 2012. Has had 6 cardioversions then ablation x2. Now managed on propafenone (indefinitely per Dr. Beatty) and Eliquis.    Concerned regarding enlarged right and left atrium on echo 04/25/2017. Never had sleep study prior.      Denies symptoms of restless legs or kicking during sleep.    Occupation: Retired anesthesiologist     [place] Sleepiness Scale score during initial sleep evaluation was 5.    SLEEP ROUTINE:      Bed partner:  wife  Time to bed:  11 pm  Sleep onset latency:  < 1 minute        Disruptions or awakenings:   3   Wakeup time:     8 am  Perceived sleep quality:  3/5         INTERVAL HISTORY:    11/21/2017 SAMUEL Maher NP: Pt returns after set up of ASV machine on 09/19/2017. Reports that still getting used to machine. Snoring and daytime sleepiness resolved. Continues with nocturia which he is seeing urology for. Denies pressure intolerance. Denies nasal drying. Has mild oral drying with AmaraView FFM but tolerable, improves when humidity increased.  ESS 4.     Travelling to [facility] for almost 2 weeks in January and does not want to take PAP machine. Discussed temporary use of Provent Patches but not interested.     ASV Titration:   ASV Default Settings  Compliance Summary Days with Device Usage: 28 days Percentage of Days >=4 Hours: 90.0% Average Usage (Days Used): 6 hrs. 30 mins. 59 secs. Average " Usage (All Days): 6 hrs. 4 mins. 55 secs.   Apnea Indices Average AHI: 1.4 Average OA Index: 0.0 Average CA Index: 0.1   Large Leak Average Time in Large Leak: 7 mins. 36 secs. Average % of Night in Large Leak: 1.9%  Periodic Breathing Average % of Night in PB: 0.1%       Baseline Sleep Study: 07/18/2017 Split night study 222 lb. The overall AHI was 39.2 with an oxygen celina of 83.0%. Prior diagnosis of obstructive sleep apnea. With application of PAP, central apneas emerged and were not controlled at any setting, consistent with complex sleep apnea. An effective pressure was not determined with standard PAP modality.     ASV Titration Study: 08/21/2017 222 lb. Adequate control of sleep disordered breathing was achieved with ASV at default settings: max pressure 25, EPAP min 6 (given residual obstructive events in supine sleep), EPAP max 10, max PS 15, min PS 0          PAST MEDICAL HISTORY:    Active Ambulatory Problems     Diagnosis Date Noted    BPH with urinary obstruction 04/09/2013    Elevated PSA 04/09/2013    Peptic ulcer disease 08/07/2013    Heart abnormality 09/23/2013    Nuclear sclerosis 11/04/2013    Atrial fibrillation 01/23/2014    Anemia due to chronic blood loss 03/24/2014    Gastric ulcer 05/01/2014    Posterior vitreous detachment 06/23/2014    Metatarsalgia 02/25/2015    Keratoma 02/25/2015    Foot pain, right 06/23/2015    Onychomycosis due to dermatophyte 06/23/2015    ED (erectile dysfunction) 10/27/2015    Bradycardia 02/25/2016    Mild single current episode of major depressive disorder 11/28/2016    Tortuous aorta 11/28/2016    Dyspnea     Hypothyroidism due to acquired atrophy of thyroid 02/15/2017    Essential hypertension 02/15/2017    Gait abnormality 06/13/2017    CKD (chronic kidney disease) stage 3, GFR 30-59 ml/min 07/10/2017    Complex sleep apnea syndrome     Erectile dysfunction due to arterial insufficiency 11/16/2017     Resolved Ambulatory Problems      Diagnosis Date Noted    *Atrial fibrillation 02/03/2012    Pyogenic arthritis, forearm 04/09/2013    Fracture, humerus closed 05/08/2013    Atrial fibrillation 06/25/2013    Palpitations 06/25/2013    S/P gastric bypass 06/25/2013    Long term (current) use of anticoagulants 07/02/2013    HTN (hypertension) 09/26/2013    Post-operative state 11/25/2013    Melena 03/06/2014    Erectile dysfunction 05/12/2014    Hx of colonic polyps 11/25/2015     Past Medical History:   Diagnosis Date    Anticoagulant long-term use     Anxiety     Arthritis     Atrial fibrillation     Cataract     CKD (chronic kidney disease) stage 3, GFR 30-59 ml/min 7/10/2017    Colon polyp     Depression     Elevated PSA     Erectile dysfunction     Gastric ulcer with hemorrhage     Hep B w/o coma 1977    History of bleeding peptic ulcer     History of colonoscopy     History of prostatitis     Hypertension     PAF (paroxysmal atrial fibrillation)     S/P arthroscopic surgery of right knee     Thyroid disease                 PAST SURGICAL HISTORY:    Past Surgical History:   Procedure Laterality Date    ABDOMINAL HERNIA REPAIR      CARDIAC SURGERY      CATARACT EXTRACTION  11/25/13    left eye    CHOLECYSTECTOMY      COLONOSCOPY N/A 11/25/2015    Procedure: COLONOSCOPY;  Surgeon: Toby Hernandez MD;  Location: [facility];  Service: Endoscopy;  Laterality: N/A;    ELBOW SURGERY      Tennis elbow repair    EYE SURGERY      FRACTURE SURGERY      GASTRIC BYPASS      KNEE ARTHROSCOPY      RT    LASIK  2001    both eyes (Dr. Rabago)    left humerus fx      times 2    ORIF HUMERUS FRACTURE      LT    RADIOFREQUENCY ABLATION      ROTATOR CUFF REPAIR      right         FAMILY HISTORY:                Family History   Problem Relation Age of Onset    COPD Father     Diabetes Father     Aortic stenosis Mother     Heart disease Mother      aortic stenosis    Heart attack Brother     No Known Problems Son      No Known Problems Daughter     No Known Problems Daughter     No Known Problems Daughter        SOCIAL HISTORY:          Tobacco:   History   Smoking Status    Never Smoker   Smokeless Tobacco    Never Used     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        Alcohol use:    History   Alcohol Use No                 ALLERGIES:    Review of patient's allergies indicates:   Allergen Reactions    No known drug allergies        CURRENT MEDICATIONS:    Current Outpatient Prescriptions   Medication Sig Dispense Refill    acyclovir (ZOVIRAX) 800 MG Tab TK ONE T PO FIVE TIMES D only for fever blisters  1    apixaban (ELIQUIS) 5 mg Tab Take 1 tablet (5 mg total) by mouth 2 (two) times daily. 180 tablet 3    aspirin (ECOTRIN) 325 MG EC tablet Take 325 mg by mouth as needed for Pain.      buPROPion (WELLBUTRIN XL) 300 MG 24 hr tablet TAKE 1 TABLET BY MOUTH EVERY DAY.(PLEASE SCHEDULE AN APPOINTMENT WITH DOCTOR) 90 tablet 0    CALCIUM ORAL Take by mouth Daily.      dutasteride (AVODART) 0.5 mg capsule Take 1 capsule (0.5 mg total) by mouth once daily. 90 capsule 3    escitalopram oxalate (LEXAPRO) 10 MG tablet Take 1 tablet (10 mg total) by mouth once daily. 90 tablet 1    FLUZONE HIGH-DOSE 2017-18, PF, 180 mcg/0.5 mL vaccine       KENALOG 40 mg/mL injection       levothyroxine (SYNTHROID) 25 MCG tablet Take 1 tablet (25 mcg total) by mouth once daily. 30 tablet 11    lisinopril (PRINIVIL,ZESTRIL) 20 MG tablet Take 1 tablet (20 mg total) by mouth once daily. (Patient taking differently: Take 20 mg by mouth once daily. ON HOLD) 90 tablet 3    metoprolol succinate (TOPROL-XL) 50 MG 24 hr tablet Take 1 tablet (50 mg total) by mouth once daily. 90 tablet 3    MULTIVITAMIN ORAL Take by mouth Daily.      OMEGA-3 FATTY ACIDS (FISH OIL CONCENTRATE ORAL) Take by mouth Daily.      omeprazole (PRILOSEC) 40 MG capsule Take 1 capsule (40 mg total) by mouth every morning. (Patient taking differently: Take 40 mg by mouth  "every morning. ON HOLD) 90 capsule 3    papaverine 30 mg/mL injection Add Phentolamine 1 mg/cc   Add PGE1 10 mcg/cc   SIG: Use as directed 10 mL 11    propafenone (RYTHMOL SR) 425 MG Cp12 Take 1 capsule (425 mg total) by mouth every 12 (twelve) hours. 180 capsule 3     No current facility-administered medications for this visit.                   REVIEW OF SYSTEMS:     Sleep related symptoms as per HPI.  CONST:Denies weight gain    HEENT: Denies sinus congestion  PULM: Denies dyspnea  CARD:  Denies palpitations   GI:  Denies acid reflux  : Denies polyuria  NEURO: Denies headaches  PSYCH: Denies mood disturbance  HEME: Denies anemia    Otherwise, a balance of systems reviewed is negative.          PHYSICAL EXAM:  Vitals:    11/21/17 0938   BP: (!) 166/92   Pulse: (!) 58   SpO2: 97%   Weight: 102.1 kg (225 lb)   Height: 6' 2" (1.88 m)   PainSc:   3   PainLoc: Knee     Body mass index is 28.89 kg/m².     GENERAL: Overweight development, well groomed  HEENT:  Conjunctivae are non-erythematous; Pupils equal, round, and reactive to light; Nose is symmetrical; Nasal mucosa is pink and moist; Septum is midline; Inferior turbinates are normal; Nasal airflow is normal; Posterior pharynx is pink; Modified Mallampati: IV; Posterior palate is normal; Tonsils +1; Uvula is normal and pink;Tongue is normal; Dentition is fair, +dental attrition; No TMJ tenderness; Jaw opening and protrusion without click and without discomfort.  NECK: Supple. Neck circumference is 14 inches. No thyromegaly. No palpable nodes.    SKIN: On face and neck: No abrasions, no rashes, no lesions.  No subcutaneous nodules are palpable.  RESPIRATORY: Chest is clear to auscultation.  Normal chest expansion and non-labored breathing at rest.  CARDIOVASCULAR: Normal S1, S2.  No murmurs, gallops or rubs. No carotid bruits bilaterally.  EXTREMITIES: No edema. No clubbing. No cyanosis. Station normal. Gait normal.        NEURO/PSYCH: Oriented to time, place and " person. Normal attention span and concentration. Affect is full. Mood is normal.                      04/25/2017 Transesophageal echo Biatrium enlargement; EF 60 - 65%                            ASSESSMENT:    Complex sleep apnea, baseline AHI 39.2. The patient symptomatically has snoring and excessive daytime sleepiness now resolved with ASV use. The patient is adherent on CPAP and experiencing symptomatic benefit. Medical co-morbidities: depression, HTN, atrial fibrillation, peptic ulcer disease, overweight s/p gastric bypass.  This warrants treatment for complex sleep apnea.      Hx of bruxism, resolved, subjectively stress-induced     PLAN:    -Treatment: continue ASV at default settings. RTC 6 months, then annually thereafter.     - Education: During our discussion today, we talked about the etiology of obstructive sleep apnea as well as the potential ramifications of untreated sleep apnea, which could include daytime sleepiness, hypertension, heart disease and/or stroke. We discussed potential treatment options, which could include weight loss, body positioning, continuous positive airway pressure (CPAP), or referral for surgical consideration.     - Precautions: The patient was advised to abstain from driving should they feel sleepy  or drowsy.     Thank you for allowing me the opportunity to participate in the care of your patient.

## 2017-11-29 ENCOUNTER — OFFICE VISIT (OUTPATIENT)
Dept: PSYCHIATRY | Facility: CLINIC | Age: 72
End: 2017-11-29
Payer: MEDICARE

## 2017-11-29 DIAGNOSIS — F11.21 OPIOID USE DISORDER, SEVERE, IN SUSTAINED REMISSION, DEPENDENCE: Primary | ICD-10-CM

## 2017-11-29 DIAGNOSIS — F10.21 ALCOHOL USE DISORDER, MODERATE, IN SUSTAINED REMISSION, DEPENDENCE: ICD-10-CM

## 2017-11-29 DIAGNOSIS — F32.A DEPRESSIVE DISORDER: ICD-10-CM

## 2017-11-29 DIAGNOSIS — F41.9 ANXIETY: ICD-10-CM

## 2017-11-29 PROCEDURE — 99213 OFFICE O/P EST LOW 20 MIN: CPT | Mod: S$GLB,,, | Performed by: PSYCHIATRY & NEUROLOGY

## 2017-11-29 PROCEDURE — 90833 PSYTX W PT W E/M 30 MIN: CPT | Mod: S$GLB,,, | Performed by: PSYCHIATRY & NEUROLOGY

## 2017-11-29 PROCEDURE — 99999 PR PBB SHADOW E&M-EST. PATIENT-LVL I: CPT | Mod: PBBFAC,,, | Performed by: PSYCHIATRY & NEUROLOGY

## 2017-11-29 PROCEDURE — 99499 UNLISTED E&M SERVICE: CPT | Mod: S$GLB,,, | Performed by: PSYCHIATRY & NEUROLOGY

## 2017-11-29 NOTE — PROGRESS NOTES
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  Met with patient.      Interval History and Content of Current Session:  Interim Events/Subjective Report/Content of Current Session: see below     Past hx :  Pt discussed specific anxieties that were begun in childhood . They have worsened over the years in a tolerance fashion . THEY  HAVE NOW COMPLICATED RELATIONSHIPS WITH WIFE .  He was seen by Hemanth Lovelace who placed him on wellbutrin xl 300 mg in early 2016 ago with benefit . The anxiety in question has hurt his own self esteem . "    Pt has since attended residential treatment at [facility] ( 12-1-15 -- 1-30-16) . Chas Hernández PhD ( [facility] sexual addiction therapist ) started the [facility] program before developing his own program in [place] .He is s/p  [facility] Discharge  with Dr Angelica Garay  psychiatrist recommendations . He  attended the [facility] program in March 2016 in [facility].      He also voluntarily  follows up with [facility] for 3 days every 3 months  ( days of hope)  .That seems to satisfy CME per pt .  He attended [facility] and addressed professional boundary violations .     He has now converted to Catholicism as is wife and family . He attended intense Bible study course with Loraine.  They will visit [facility] next year with 200 people in Jan 2018 .     He attended the [employer] convention in [place] . There he met an addicition psychiatrist  who  works in [place] but episodically at [facility] . They will speak or meet when he goes to [facility].     Past [facility] Tx recs ; outside of pt care or otherwise would need more focused care on professionalism .That was only  known to him one month post  discharge at [facility].     He is in [facility] while licensed  with which he is enrolled and monitored . " Fatoumata Valentin PhD is his former  , now Hector Ovalles.      He is mandated to see a  treating professional monthly accomplished by  Brian whom he sees twice monthly now and weekly group therapy there . He and wife Loraine are doing well . She is working part time .      Opiate sobriety > 12 years ( 1999)   Last drink November , 2015 .    He is s/p gastric bypass . He is very pleased with lost weight at [facility] with dietician assistnt. No real exercise but plans to go to [facility] more.     He enjoys his full prison as he fully occupies his time .   He has a letter that seemingly would allow him to work once boundaries were established  by  Brian , Dr Regi Hernandez who is pleased withhis recovery progress. . I have a copy .  He is very active with [employer] and has Billy PATE as his sponsor.     He/ Loraine  will celebrate holidays with each group of kids .     Coping with a Fib and  ablation ; new dx of sleep apnea with CPAP     MEDS:   Wellbutrin xl 300 mg q other day ; Lexapro 10 mg q day     Compliance: yes    Side effects: None    Psychiatric Review Of Systems - Is patient experiencing or having changes in:  Mood : episodic dysphoria   sleep: no  appetite: no  weight: no, 225 per pt( nov 2017)   energy/anergy: fatigued some  Days   interest/pleasure/anhedonia: no  somatic symptoms: no  libido: no  anxiety/panic: no  guilty/hopelessness: no  concentration: no  S.I.B.s/risky behavior: no  Irritability: no  Racing thoughts: no  Impulsive behaviors: no  Paranoia: no  AVH: no    Psychotherapy:  · Target symptoms: compulsive  behavior   · Why chosen therapy is appropriate versus another modality: relevant to diagnosis, patient responds to this modality, evidence based practice  · Outcome monitoring methods: self-report, observation  · Therapeutic intervention type: supportive psychotherapy  · Topics discussed/themes: relationships difficulties, work stress  · The patient's response to the  intervention is accepting. The patient's progress toward treatment goals is excellent.   · Duration of intervention: 30   minutes.    Review of Systems   · Prob Pert. 1 sys, Ext. psych +2 add., Comp. 10-14 sys  · PSYCHIATRIC: Pertinant items are noted in the narrative.  · CONSTITUTIONAL: No weight gain or loss.   · MUSCULOSKELETAL: No pain or stiffness of the joints.  · NEUROLOGIC: No weakness, sensory changes, seizures, confusion, memory loss, tremor or other abnormal movements.  · ENDOCRINE: No polydipsia or polyuria.  · INTEGUMENTARY: No rashes or lacerations.  · EYES: No exophthalmos, jaundice or blindness.  · ENT: No dizziness, tinnitus or hearing loss.  · RESPIRATORY: No shortness of breath.  · CARDIOVASCULAR: No tachycardia or chest pain.  · GASTROINTESTINAL: No nausea, vomiting, pain, constipation or diarrhea.  · GENITOURINARY: No frequency, dysuria or sexual dysfunction.  · HEMATOLOGIC/LYMPHATIC: No excessive bleeding, prolonged or excessive bleeding after dental extraction/injury.  · ALLERGIC/IMMUNOLOGIC: No allergic response to materials, foods or animals at this time.    Past Medical, Family and Social History: The patient's past medical, family and social history have been reviewed and updated as appropriate within the electronic medical record - see encounter notes.    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     Alivia , dtr at [employer] nursing school for CRNA later .     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   There were no vitals filed for this visit.     General:  unremarkable, age appropriate, casually dressed, neatly groomed     Musculoskeletal  Muscle Strength/Tone:  no spasicity, no rigidity   Gait & Station:  non-ataxic     Psychiatric  Speech:  no latency; no press   Mood & Affect:  euthymic  congruent and appropriate   Thought Process:  normal and logical   Associations:  intact   Thought Content:  normal, no suicidality, no homicidality, delusions, or paranoia   Insight:  has awareness of illness   Judgement: behavior is adequate to circumstances   Orientation:  grossly intact   Memory: intact for content of interview   Language: grossly intact   Attention Span & Concentration:  able to focus   Fund of Knowledge:  intact and appropriate to age and level of education     Assessment and Diagnosis   Status/Progress: Based on the examination today, the patient's problem(s) is/are adequately but not ideally controlled.  New problems have been presented today.   Lack of compliance are  not complicating management of the primary condition.  There are no active rule-out diagnoses for this patient at this time.     General Impression: doing fairly well but not ideal ; fatigued and mildly  dysphoric at time         ICD-10-CM ICD-9-CM   1. Opioid use disorder, severe, in sustained remission, dependence F11.21 304.03   2. Anxiety F41.9 300.00   3. Alcohol use disorder, moderate, in sustained remission, dependence F10.21 303.93   4. Depressive disorder F32.9 311       Intervention/Counseling/Treatment Plan   · Medication Management: Increase wellbutrin Xl 450   To q day . Continue Lexapro 10 mg q day . The risks and benefits of medication were discussed with the patient.  · Counseling provided with patient as follows: importance of compliance with chosen treatment  options was emphasized, risks and benefits of treatment options, including medications, were discussed with the patient  · Coordinated care with  His insurer to approve [facility] as approved facility       Return to Clinic: 6 months

## 2017-11-30 RX ORDER — BUPROPION HYDROCHLORIDE 150 MG/1
450 TABLET ORAL DAILY
Qty: 270 TABLET | Refills: 3 | Status: SHIPPED | OUTPATIENT
Start: 2017-11-30 | End: 2018-11-27 | Stop reason: SDUPTHER

## 2017-11-30 RX ORDER — ESCITALOPRAM OXALATE 10 MG/1
10 TABLET ORAL DAILY
Qty: 90 TABLET | Refills: 3 | Status: SHIPPED | OUTPATIENT
Start: 2017-11-30 | End: 2018-11-27 | Stop reason: SDUPTHER

## 2017-12-04 ENCOUNTER — OFFICE VISIT (OUTPATIENT)
Dept: CARDIOLOGY | Facility: CLINIC | Age: 72
End: 2017-12-04
Payer: MEDICARE

## 2017-12-04 ENCOUNTER — PATIENT MESSAGE (OUTPATIENT)
Dept: INTERNAL MEDICINE | Facility: CLINIC | Age: 72
End: 2017-12-04

## 2017-12-04 VITALS
DIASTOLIC BLOOD PRESSURE: 79 MMHG | BODY MASS INDEX: 28.75 KG/M2 | WEIGHT: 224 LBS | HEART RATE: 48 BPM | SYSTOLIC BLOOD PRESSURE: 140 MMHG | HEIGHT: 74 IN

## 2017-12-04 DIAGNOSIS — G47.31 COMPLEX SLEEP APNEA SYNDROME: ICD-10-CM

## 2017-12-04 DIAGNOSIS — R00.1 BRADYCARDIA: ICD-10-CM

## 2017-12-04 DIAGNOSIS — I10 ESSENTIAL HYPERTENSION: ICD-10-CM

## 2017-12-04 DIAGNOSIS — K27.9 PEPTIC ULCER DISEASE: ICD-10-CM

## 2017-12-04 DIAGNOSIS — I48.19 PERSISTENT ATRIAL FIBRILLATION: Primary | ICD-10-CM

## 2017-12-04 DIAGNOSIS — I48.91 ATRIAL FIBRILLATION, UNSPECIFIED TYPE: ICD-10-CM

## 2017-12-04 DIAGNOSIS — E03.4 HYPOTHYROIDISM DUE TO ACQUIRED ATROPHY OF THYROID: ICD-10-CM

## 2017-12-04 PROCEDURE — 93000 ELECTROCARDIOGRAM COMPLETE: CPT | Mod: S$GLB,,, | Performed by: INTERNAL MEDICINE

## 2017-12-04 PROCEDURE — 99499 UNLISTED E&M SERVICE: CPT | Mod: S$GLB,,, | Performed by: INTERNAL MEDICINE

## 2017-12-04 PROCEDURE — 99999 PR PBB SHADOW E&M-EST. PATIENT-LVL III: CPT | Mod: PBBFAC,,, | Performed by: INTERNAL MEDICINE

## 2017-12-04 PROCEDURE — 99214 OFFICE O/P EST MOD 30 MIN: CPT | Mod: S$GLB,,, | Performed by: INTERNAL MEDICINE

## 2017-12-04 NOTE — PROGRESS NOTES
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  First diagnosed with atrial fibrillation 2/12 (noted palpitations). Placed on beta blocker, coumadin. Underwent PEPE/DCCV. Had recurrence, Propafenone  mg twice daily added.  Had recurrence 12/24/13, underwent DCCV, Propafenone increased to 425 mg twice daily.  PVI ([place]) 7/28/14.   Had done well, was off Propafenone. Developed recurrence, underwent DCCV multiple times.  Repeat PVI 4/27/17 All 4 veins remained isolated.  Antralized left sided lesion set posteriorly, and performed SVC isolation.  Has been compliant with CPAP.  Has had 4-5 episodes since PVI, lasting up to a couple of hours for the most part.  Had one episode lasting a couple of days.  Lisinopril discontinued secondary to CKD.  Monitors bp at home.  SBP generally 120's to 130's.      Review of Systems   Constitution: Negative. Negative for weakness and malaise/fatigue.   Cardiovascular: Positive for palpitations. Negative for chest pain, dyspnea on exertion, irregular heartbeat, leg swelling, near-syncope, orthopnea, paroxysmal nocturnal dyspnea and syncope.   Respiratory: Negative for cough and shortness of breath.    Neurological: Negative for dizziness and light-headedness.   All other systems reviewed and are negative.       Objective:    Physical Exam   Constitutional: He is oriented to person, place, and time. He appears well-developed and well-nourished.   Eyes: Conjunctivae are normal. No scleral icterus.   Neck: No JVD present. No tracheal deviation present.   Cardiovascular: Normal rate, regular rhythm and normal heart sounds.  PMI is not displaced.    Pulmonary/Chest: Effort normal and breath sounds normal. No respiratory distress.   Abdominal: Soft. There is no hepatosplenomegaly. There is no tenderness.   Musculoskeletal: He exhibits no edema  (lower extremity) or tenderness.   Neurological: He is alert and oriented to person, place, and time.   Skin: Skin is warm and dry. No rash noted.   Psychiatric: He has a normal mood and affect. His behavior is normal.         Assessment:       1. Persistent atrial fibrillation    2. Bradycardia    3. Essential hypertension    4. Hypothyroidism due to acquired atrophy of thyroid    5. Peptic ulcer disease    6. Complex sleep apnea syndrome         Plan:       Doing reasonably well at this time.  Continue current therapy.  F/u in 6 months.

## 2017-12-05 RX ORDER — LEVOTHYROXINE SODIUM 25 UG/1
25 TABLET ORAL DAILY
Qty: 30 TABLET | Refills: 0 | Status: SHIPPED | OUTPATIENT
Start: 2017-12-05 | End: 2017-12-10 | Stop reason: SDUPTHER

## 2017-12-11 RX ORDER — LEVOTHYROXINE SODIUM 25 UG/1
TABLET ORAL
Qty: 90 TABLET | Refills: 0 | Status: SHIPPED | OUTPATIENT
Start: 2017-12-11 | End: 2017-12-11 | Stop reason: SDUPTHER

## 2017-12-11 RX ORDER — LEVOTHYROXINE SODIUM 25 UG/1
TABLET ORAL
Qty: 90 TABLET | Refills: 0 | Status: SHIPPED | OUTPATIENT
Start: 2017-12-11 | End: 2018-04-16 | Stop reason: SDUPTHER

## 2017-12-15 ENCOUNTER — PROCEDURE VISIT (OUTPATIENT)
Dept: UROLOGY | Facility: CLINIC | Age: 72
End: 2017-12-15
Payer: MEDICARE

## 2017-12-15 VITALS — DIASTOLIC BLOOD PRESSURE: 82 MMHG | HEART RATE: 62 BPM | SYSTOLIC BLOOD PRESSURE: 133 MMHG

## 2017-12-15 DIAGNOSIS — R33.9 URINARY RETENTION: ICD-10-CM

## 2017-12-15 PROCEDURE — 51728 CYSTOMETROGRAM W/VP: CPT | Mod: 26,,, | Performed by: UROLOGY

## 2017-12-15 PROCEDURE — 51797 INTRAABDOMINAL PRESSURE TEST: CPT | Mod: 26,,, | Performed by: UROLOGY

## 2017-12-15 PROCEDURE — 51741 ELECTRO-UROFLOWMETRY FIRST: CPT | Mod: 26,51,, | Performed by: UROLOGY

## 2017-12-15 PROCEDURE — 52000 CYSTOURETHROSCOPY: CPT | Mod: 59,,, | Performed by: UROLOGY

## 2017-12-15 PROCEDURE — 51784 ANAL/URINARY MUSCLE STUDY: CPT | Mod: 26,51,, | Performed by: UROLOGY

## 2017-12-15 RX ORDER — OXYBUTYNIN CHLORIDE 10 MG/1
10 TABLET, EXTENDED RELEASE ORAL DAILY
Qty: 30 TABLET | Refills: 11 | Status: SHIPPED | OUTPATIENT
Start: 2017-12-15 | End: 2018-04-23

## 2017-12-15 RX ORDER — SULFAMETHOXAZOLE AND TRIMETHOPRIM 800; 160 MG/1; MG/1
1 TABLET ORAL 2 TIMES DAILY
Qty: 14 TABLET | Refills: 0 | Status: SHIPPED | OUTPATIENT
Start: 2017-12-15 | End: 2017-12-22

## 2017-12-15 NOTE — PATIENT INSTRUCTIONS
SIMPLE URODYNAMIC STUDY (SUDS) & CYSTOSCOPY  UROLOGY CLINIC DISCHARGE INSTRUCTIONS    You have had a procedure that will require time to properly heal. Follow the instructions you have been given on how to care for yourself once you are home. Below is additional information to help in your recovery.    ACTIVITY  · There are no restrictions in activity. Start doing again the things you did before the procedure.  · You may experience a slight burning sensation. You may notice a small amount of blood in your urine. This will clear up within a day. Call the clinic if this continues beyond 48 hours.    DIET  · Continue your normal diet. You may eat the same foods you ate before your procedure.  · Drink plenty of fluids during the first 24-48 hours following your procedure.    MEDICATIONS  · Resume all other previous medications from your prescribing physician.  · Continue any pre=procedure antibiotics until they are all gone.    SIGNS AND SYMPTOMS TO REPORT TO THE DOCTOR  · Chills or fever greater than 101° F within 24 hours of procedure.  · Changes in urination, such as increased bleeding, foul smell, cloudy urine, or painful urination.  · Call your doctor with any questions or concerns.    For any emergency situation, call 101 immediately or go to your nearest emergency room.    [facility]  [phone]                                                                                                                                                                                             If any problems after hours or weekends, you may call [phone] and ask for the urology resident on call.

## 2017-12-15 NOTE — PROCEDURES
Procedures   Surgeon: Alek    Assistant: None    Procedure performed: 1. Simple Urodynamic Study (Complex uroflometry, complex CMG, Intraabdominal voiding pressure study)                                          2. cystoscopy  Blood loss: None    Specimen: None    Procedure in detail:     Urodynamic Report    Indication:    Patient was taken to the Urodynamic Suite with a comfortably full bladder and asked to perform a free uroflow.  Next, the patient was prepped and the urinary residual was drained with a 14 Fr catheter.  A 7 Fr dual lumen catheter was placed to measure intravesical pressures.  A 10 Fr balloon manometer was placed into the rectum for abdominal pressure measurements.  Patch EMG electrodes were placed on the perineum.  The patient was connected to the urodynamics machine, using a multichannel technique.  The bladder was filled with sterile water at room temperature at a rate of 30 ml/min.   Filling is performed with the patient in the seated position.  The patient was then asked to void for a pressure flow study.    The following are the results of the study:  1.  Uroflow        Q max: 41.6 cc/s       Voided volume: 140 cc       Pattern of the curve: bell shaped    2.  PVR: 10 cc    3.  CMG       Sensation:         First Desire: 95 cc         Normal Desire: 189 cc         Strong Desire: 299 cc         Urgency: 344 cc       Capacity: 345 cc       Abnormal Contractions: none       Compliance: normal    4.  Abdominal Leak Point Pressure:       Valsalva: n/a       Cough: n/a    5.  EMG: normal    6.  Voiding phase       Q max: 24.8 cc/s       P det at Q max: 57 cm H2O       Pattern of the curve: prolonged bell shaped        Voided volume: 279 cc       PVR: 150 cc    7.  Analysis: sensory urgency    8.  Impression:       After performing the SUDS, the flexible cystoscope was assembled and passed into the bladder using standard sterile technique.  There was no trabeculation.  The prostatic length was  approximately  6 cm.

## 2017-12-20 ENCOUNTER — OFFICE VISIT (OUTPATIENT)
Dept: PODIATRY | Facility: CLINIC | Age: 72
End: 2017-12-20
Payer: MEDICARE

## 2017-12-20 VITALS — WEIGHT: 225 LBS | HEIGHT: 73 IN | BODY MASS INDEX: 29.82 KG/M2

## 2017-12-20 DIAGNOSIS — L97.922 INFECTED TRAUMATIC LEG ULCER, LEFT, WITH FAT LAYER EXPOSED: Primary | ICD-10-CM

## 2017-12-20 DIAGNOSIS — S90.811A ABRASION OF RIGHT FOOT, INITIAL ENCOUNTER: ICD-10-CM

## 2017-12-20 DIAGNOSIS — L08.9 INFECTED TRAUMATIC LEG ULCER, LEFT, WITH FAT LAYER EXPOSED: Primary | ICD-10-CM

## 2017-12-20 PROCEDURE — 87075 CULTR BACTERIA EXCEPT BLOOD: CPT

## 2017-12-20 PROCEDURE — 87077 CULTURE AEROBIC IDENTIFY: CPT

## 2017-12-20 PROCEDURE — 99499 UNLISTED E&M SERVICE: CPT | Mod: S$GLB,,, | Performed by: PODIATRIST

## 2017-12-20 PROCEDURE — 87116 MYCOBACTERIA CULTURE: CPT

## 2017-12-20 PROCEDURE — 87070 CULTURE OTHR SPECIMN AEROBIC: CPT

## 2017-12-20 PROCEDURE — 87186 SC STD MICRODIL/AGAR DIL: CPT

## 2017-12-20 PROCEDURE — 99999 PR PBB SHADOW E&M-EST. PATIENT-LVL III: CPT | Mod: PBBFAC,,, | Performed by: PODIATRIST

## 2017-12-20 PROCEDURE — 99213 OFFICE O/P EST LOW 20 MIN: CPT | Mod: 25,S$GLB,, | Performed by: PODIATRIST

## 2017-12-21 PROCEDURE — 11042 DBRDMT SUBQ TIS 1ST 20SQCM/<: CPT | Mod: LT,S$GLB,, | Performed by: PODIATRIST

## 2017-12-21 NOTE — PROCEDURES
"Wound Debridement  Date/Time: 12/21/2017 3:52 PM  Performed by: SHAHAB JOE  Authorized by: SHAHAB JOE     Time out: Immediately prior to procedure a "time out" was called to verify the correct patient, procedure, equipment, support staff and site/side marked as required.    Consent Done?:  Yes (Verbal)  Local anesthesia used?: No      Wound Details:    Location:  Left leg    Type of Debridement:  Excisional       Length (cm):  0.4       Area (sq cm):  0.12       Width (cm):  0.3       Percent Debrided (%):  100       Depth (cm):  0.3       Total Area Debrided (sq cm):  0.12    Depth of debridement:  Subcutaneous tissue    Devitalized tissue debrided:  Sough, Fibrin and Necrotic/Eschar    Instruments:  Blade and Curette    Bleeding:  Minimal  Hemostasis Achieved: Yes    Method Used:  Pressure  Patient tolerance:  Patient tolerated the procedure well with no immediate complications      "

## 2017-12-21 NOTE — PROGRESS NOTES
Subjective:      Patient ID: Dominick KENNEDY MD Duncan is a 72 y.o. male.    Chief Complaint: Foot Problem (feels like something is in the bottom of the right foot) and Other Misc (PCP:  Dr Dias  6/28/17)    Dominick is a 72 y.o. male who presents to the podiatry clinic  with complaint of  left foot pain sub first metatarsal head. Onset of the symptoms was several days ago. Precipitating event: increased activity and going up and down a ladder a lot. Current symptoms include: ability to bear weight, but with some pain. Aggravating factors: any weight bearing. Symptoms have gradually improved. Much better today. Patient has had prior foot problems with metatarsalgia to the lesser metatarsals, calluses, and treatment for nail fungus. Evaluation to date: none. Treatment to date: rest and wearing better supportive shoes. Patients rates pain 4/10 on pain scale.    12/20/17: Patient returns for follow up. Left foot pain from previous encounter is much better, now his concern is with a new wound to the left leg. He was working in the yard a few days ago and got a bamboo shoot that went into the leg. There was some erythema and drainage noted, he took some bactrim which helped. He relates to wearing a band-aid over the wound with triple antibiotic.     Also has some pain to the plantar right foot, thinks he may have stepped on something Sharp pain noted with weight bearing, better with rest. No drainage noted from the area.    Review of Systems   Constitution: Negative for chills and fever.   Cardiovascular: Negative for claudication and leg swelling.   Respiratory: Negative for shortness of breath.    Skin: Positive for nail changes. Negative for itching and rash.   Musculoskeletal: Positive for arthritis and joint swelling. Negative for muscle cramps, muscle weakness and myalgias.   Gastrointestinal: Negative for nausea and vomiting.   Neurological: Negative for focal weakness, loss of balance, numbness and paresthesias.            Objective:      Physical Exam   Constitutional: He is oriented to person, place, and time. He appears well-developed and well-nourished. No distress.   Cardiovascular:   Pulses:       Dorsalis pedis pulses are 2+ on the right side, and 2+ on the left side.        Posterior tibial pulses are 2+ on the right side, and 2+ on the left side.   < 3 sec capillary refill time to toes 1-5 bilateral. Toes and feet are warm to touch proximally with normal distal cooling b/l. There is some hair growth on the feet and toes b/l. There is no edema b/l. No spider veins or varicosities present b/l.      Musculoskeletal:   Equinus noted b/l ankles with < 10 deg DF noted. MMT 5/5 in DF/PF/Inv/Ev resistance with no reproduction of pain in any direction. Passive range of motion of ankle and pedal joints is painless b/l.     Neurological: He is alert and oriented to person, place, and time. He has normal strength. He displays no atrophy and no tremor. No sensory deficit. He exhibits normal muscle tone.   Negative tinel sign bilateral.   Skin: Skin is warm and dry. Lesion noted. No abrasion, no bruising, no burn, no ecchymosis, no laceration, no petechiae and no rash noted. He is not diaphoretic. No cyanosis or erythema. No pallor. Nails show no clubbing.   Skin temperature, texture and turgor within normal limits.    Nails 1-5 bilateral are discolored slightly    Right plantar fourth met head area there is  A partial thickness tear in the skin, no erythema or drainage. There is slight surrounding hyperkeratotic skin.     Ulcer Location: Left anterior lateral shin  Measurements: Pre: 0.2x0.3x0.1, post: 0.4x0.3x0.3  Periwound: Intact  Drainage: serosanguinous.  Pus: None.  Malodor: None.  Base:  20% granular. 80% fibrin.  Signs of infection: Erythema, slight serous drainage.           Psychiatric: He has a normal mood and affect. His behavior is normal.             Assessment:       Encounter Diagnosis   Name Primary?     Infected traumatic leg ulcer, left, with fat layer exposed Yes         Plan:       Dominick was seen today for foot problem and other misc.    Diagnoses and all orders for this visit:    Infected traumatic leg ulcer, left, with fat layer exposed  -     Aerobic culture  -     Culture, Anaerobic  -     AFB CULTURE & SMEAR      I counseled the patient on his conditions, their implications and medical management.    Wound debrided to left leg see attached procedure note, cultures taken    Dressed with edmond and foam, covered with coban with slight compression.    Finish bactrim prescription    Right foot the area of the abrasion was cleared of hyperkeratotic skin, there was no foreign body noted and the wound was superficial. This was covered with a band-aid.    Return in 2 weeks for follow up left leg ulcer, will change dressings at home with application of mupirocin daily. If there is not significant improvement next time will consider Unna boot to control swelling.     Roverto Linares DPM

## 2017-12-23 LAB — BACTERIA SPEC AEROBE CULT: NORMAL

## 2017-12-26 LAB — BACTERIA SPEC ANAEROBE CULT: NORMAL

## 2018-01-16 RX ORDER — PROPAFENONE HYDROCHLORIDE 425 MG/1
425 CAPSULE, EXTENDED RELEASE ORAL EVERY 12 HOURS
Qty: 180 CAPSULE | Refills: 3 | Status: SHIPPED | OUTPATIENT
Start: 2018-01-16 | End: 2019-01-04 | Stop reason: SDUPTHER

## 2018-01-16 RX ORDER — LISINOPRIL 20 MG/1
20 TABLET ORAL DAILY
Qty: 90 TABLET | Refills: 3 | Status: SHIPPED | OUTPATIENT
Start: 2018-01-16 | End: 2019-02-25 | Stop reason: SDUPTHER

## 2018-01-23 ENCOUNTER — PES CALL (OUTPATIENT)
Dept: ADMINISTRATIVE | Facility: CLINIC | Age: 73
End: 2018-01-23

## 2018-01-25 ENCOUNTER — PATIENT MESSAGE (OUTPATIENT)
Dept: NEPHROLOGY | Facility: CLINIC | Age: 73
End: 2018-01-25

## 2018-02-08 ENCOUNTER — TELEPHONE (OUTPATIENT)
Dept: CARDIOLOGY | Facility: CLINIC | Age: 73
End: 2018-02-08

## 2018-02-08 DIAGNOSIS — I48.19 PERSISTENT ATRIAL FIBRILLATION: Primary | ICD-10-CM

## 2018-02-08 NOTE — TELEPHONE ENCOUNTER
Spoke to patient, informed him of DCCV that has been schedule as per Dr. Zayas. Educated patient on date and time 02- at 1:00 pm. Check in at [facility] one hour before procedure, 12 noon. NPO after midnight. Patient verbalized understanding.

## 2018-02-15 ENCOUNTER — PATIENT MESSAGE (OUTPATIENT)
Dept: INTERNAL MEDICINE | Facility: CLINIC | Age: 73
End: 2018-02-15

## 2018-02-15 ENCOUNTER — OFFICE VISIT (OUTPATIENT)
Dept: PODIATRY | Facility: CLINIC | Age: 73
End: 2018-02-15
Payer: MEDICARE

## 2018-02-15 VITALS — BODY MASS INDEX: 26.56 KG/M2 | HEIGHT: 74 IN | WEIGHT: 207 LBS

## 2018-02-15 DIAGNOSIS — M77.41 METATARSALGIA OF RIGHT FOOT: Primary | ICD-10-CM

## 2018-02-15 DIAGNOSIS — M21.6X1 ACQUIRED EQUINUS DEFORMITY OF BOTH FEET: ICD-10-CM

## 2018-02-15 DIAGNOSIS — B35.1 ONYCHOMYCOSIS DUE TO DERMATOPHYTE: ICD-10-CM

## 2018-02-15 DIAGNOSIS — M21.6X2 ACQUIRED EQUINUS DEFORMITY OF BOTH FEET: ICD-10-CM

## 2018-02-15 PROCEDURE — 1126F AMNT PAIN NOTED NONE PRSNT: CPT | Mod: S$GLB,,, | Performed by: PODIATRIST

## 2018-02-15 PROCEDURE — 99213 OFFICE O/P EST LOW 20 MIN: CPT | Mod: S$GLB,,, | Performed by: PODIATRIST

## 2018-02-15 PROCEDURE — 99999 PR PBB SHADOW E&M-EST. PATIENT-LVL III: CPT | Mod: PBBFAC,,, | Performed by: PODIATRIST

## 2018-02-15 PROCEDURE — 1159F MED LIST DOCD IN RCRD: CPT | Mod: S$GLB,,, | Performed by: PODIATRIST

## 2018-02-15 PROCEDURE — 3008F BODY MASS INDEX DOCD: CPT | Mod: S$GLB,,, | Performed by: PODIATRIST

## 2018-02-19 NOTE — PROGRESS NOTES
Subjective:      Patient ID: Dominick KENNEDY MD Duncan is a 72 y.o. male.    Chief Complaint: Foot Pain (patient c/o foot pain right foot ) and Other Misc (PCP Dr. Dias 06/28/2017)    Dominick is a 72 y.o. male who presents to the podiatry clinic  with complaint of  left foot pain sub first metatarsal head. Onset of the symptoms was several days ago. Precipitating event: increased activity and going up and down a ladder a lot. Current symptoms include: ability to bear weight, but with some pain. Aggravating factors: any weight bearing. Symptoms have gradually improved. Much better today. Patient has had prior foot problems with metatarsalgia to the lesser metatarsals, calluses, and treatment for nail fungus. Evaluation to date: none. Treatment to date: rest and wearing better supportive shoes. Patients rates pain 4/10 on pain scale.    12/20/17: Patient returns for follow up. Left foot pain from previous encounter is much better, now his concern is with a new wound to the left leg. He was working in the yard a few days ago and got a bamboo shoot that went into the leg. There was some erythema and drainage noted, he took some bactrim which helped. He relates to wearing a band-aid over the wound with triple antibiotic.     Also has some pain to the plantar right foot, thinks he may have stepped on something Sharp pain noted with weight bearing, better with rest. No drainage noted from the area.    2/15/18: Patient returns for follow up with continued pain right plantar metatarsal heads, mostly when walking barefoot, better in supportive shoes. Relates the wound to the left leg has healed well. No other pedal complaints at this time. He is still having issues with the onychomycosis and would like to discuss another treatment with the laser.     Review of Systems   Constitution: Negative for chills and fever.   Cardiovascular: Negative for claudication and leg swelling.   Respiratory: Negative for shortness of breath.     Skin: Positive for nail changes. Negative for itching and rash.   Musculoskeletal: Positive for arthritis and joint swelling. Negative for muscle cramps, muscle weakness and myalgias.   Gastrointestinal: Negative for nausea and vomiting.   Neurological: Negative for focal weakness, loss of balance, numbness and paresthesias.           Objective:      Physical Exam   Constitutional: He is oriented to person, place, and time. He appears well-developed and well-nourished. No distress.   Cardiovascular:   Pulses:       Dorsalis pedis pulses are 2+ on the right side, and 2+ on the left side.        Posterior tibial pulses are 2+ on the right side, and 2+ on the left side.   < 3 sec capillary refill time to toes 1-5 bilateral. Toes and feet are warm to touch proximally with normal distal cooling b/l. There is some hair growth on the feet and toes b/l. There is no edema b/l. No spider veins or varicosities present b/l.      Musculoskeletal:   Right foot pain to palpation at the plantar fourth metatarsal head, there is an associated hyperkeratotic lesion, also some discomfort to first/second metatarsal heads. No pain with ROM of the MTPJ's    Equinus noted b/l ankles with < 10 deg DF noted. MMT 5/5 in DF/PF/Inv/Ev resistance with no reproduction of pain in any direction. Passive range of motion of ankle and pedal joints is painless b/l.     Neurological: He is alert and oriented to person, place, and time. He has normal strength. He displays no atrophy and no tremor. No sensory deficit. He exhibits normal muscle tone.   Negative tinel sign bilateral.   Skin: Skin is warm and dry. No abrasion, no bruising, no burn, no ecchymosis, no laceration, no lesion, no petechiae and no rash noted. He is not diaphoretic. No cyanosis or erythema. No pallor. Nails show no clubbing.   Skin temperature, texture and turgor within normal limits.    Nails 1-5 bilateral are discolored and slightly thickened, improved from prior to  treatments.    No open ulcerations noted bilateral         Psychiatric: He has a normal mood and affect. His behavior is normal.             Assessment:       Encounter Diagnoses   Name Primary?    Metatarsalgia of right foot Yes    Acquired equinus deformity of both feet     Onychomycosis due to dermatophyte          Plan:       Dominick was seen today for foot pain and other misc.    Diagnoses and all orders for this visit:    Metatarsalgia of right foot    Acquired equinus deformity of both feet    Onychomycosis due to dermatophyte    Other orders  -     efinaconazole 10 % Prakash; Apply 1 application topically once daily.      I counseled the patient on his conditions, their implications and medical management.    Will return in 1 week for one more laser nail treatment, can also begin jublia application daily    Discussed that the pain to the right foot is likely to some fat pad atrophy and capsulitis, he will wear supportive shoes at all times to offload the area, recommended supportive slippers around the house. No walking barefoot or in flats until pain resolves.    Roverto Linares DPM

## 2018-02-21 ENCOUNTER — TELEPHONE (OUTPATIENT)
Dept: FAMILY MEDICINE | Facility: CLINIC | Age: 73
End: 2018-02-21

## 2018-02-21 NOTE — TELEPHONE ENCOUNTER
Phoned pt in regards to appt request for Dr Gaines as a new pt. Instructed pt that Dr Gaines was not taking new pt right now. Pt states he was an [facility] doctor and needs to establish care and was recommended by Dr Dias. Please review and advise. Thank you. CLC

## 2018-02-21 NOTE — TELEPHONE ENCOUNTER
Phoned pt in regards to request for appt to establish care with Dr Gaines. Dr Song states he is an [facility] doctor and Dr Moran referred Dr Gaines to him. Please review and advise. Thank you. CLC

## 2018-02-21 NOTE — TELEPHONE ENCOUNTER
----- Message from Breann WILL Hull sent at 2/21/2018 11:09 AM CST -----  Contact: PT Portal Request  Appointment Request From: Dominick Song    With Provider: Other - (see comments)    Would Accept With:Only the person I've selected    Preferred Date Range: From 6/1/2018 To 6/29/2018    Preferred Times: Any    Reason for visit: Request an Appt    Comments:  Dr. Maxi Gaines: new primary care physician      -[facility] isn't allowing me to see his schedule.

## 2018-02-21 NOTE — TELEPHONE ENCOUNTER
Please disregard the previous message from Irma Saunders. The message was sent to Dr Dias in error and the correct message was sent to Dr Maxi Gaines. Thank you. CLC

## 2018-02-22 LAB
ACID FAST MOD KINY STN SPEC: NORMAL
MYCOBACTERIUM SPEC QL CULT: NORMAL

## 2018-02-28 DIAGNOSIS — M25.511 PAIN, JOINT, SHOULDER REGION, RIGHT: ICD-10-CM

## 2018-02-28 DIAGNOSIS — S42.221A: Primary | ICD-10-CM

## 2018-03-16 ENCOUNTER — TELEPHONE (OUTPATIENT)
Dept: ORTHOPEDICS | Facility: CLINIC | Age: 73
End: 2018-03-16

## 2018-03-16 NOTE — TELEPHONE ENCOUNTER
Spoke with pt, scheduled an appt for 3/19/18 @ 675. Pt was compliant and verbalized understanding. Pt was pleased.

## 2018-03-19 ENCOUNTER — OFFICE VISIT (OUTPATIENT)
Dept: ORTHOPEDICS | Facility: CLINIC | Age: 73
End: 2018-03-19
Payer: MEDICARE

## 2018-03-19 ENCOUNTER — OFFICE VISIT (OUTPATIENT)
Dept: PODIATRY | Facility: CLINIC | Age: 73
End: 2018-03-19
Payer: MEDICARE

## 2018-03-19 ENCOUNTER — OFFICE VISIT (OUTPATIENT)
Dept: PSYCHIATRY | Facility: CLINIC | Age: 73
End: 2018-03-19
Payer: MEDICARE

## 2018-03-19 ENCOUNTER — HOSPITAL ENCOUNTER (OUTPATIENT)
Dept: RADIOLOGY | Facility: HOSPITAL | Age: 73
Discharge: HOME | End: 2018-03-19
Attending: ORTHOPAEDIC SURGERY
Payer: MEDICARE

## 2018-03-19 VITALS — BODY MASS INDEX: 26.56 KG/M2 | WEIGHT: 207 LBS | HEIGHT: 74 IN

## 2018-03-19 VITALS — WEIGHT: 215.63 LBS | BODY MASS INDEX: 27.67 KG/M2 | HEIGHT: 74 IN

## 2018-03-19 DIAGNOSIS — B35.1 ONYCHOMYCOSIS DUE TO DERMATOPHYTE: Primary | ICD-10-CM

## 2018-03-19 DIAGNOSIS — M25.561 RIGHT KNEE PAIN, UNSPECIFIED CHRONICITY: Primary | ICD-10-CM

## 2018-03-19 DIAGNOSIS — M25.561 RIGHT KNEE PAIN, UNSPECIFIED CHRONICITY: ICD-10-CM

## 2018-03-19 DIAGNOSIS — F11.21 OPIOID USE DISORDER, SEVERE, IN SUSTAINED REMISSION, DEPENDENCE: ICD-10-CM

## 2018-03-19 DIAGNOSIS — F10.21 ALCOHOL USE DISORDER, MODERATE, IN SUSTAINED REMISSION, DEPENDENCE: ICD-10-CM

## 2018-03-19 DIAGNOSIS — M17.11 PRIMARY OSTEOARTHRITIS OF RIGHT KNEE: Primary | ICD-10-CM

## 2018-03-19 DIAGNOSIS — F41.9 ANXIETY: Primary | ICD-10-CM

## 2018-03-19 PROCEDURE — 99213 OFFICE O/P EST LOW 20 MIN: CPT | Mod: S$GLB,,, | Performed by: ORTHOPAEDIC SURGERY

## 2018-03-19 PROCEDURE — 73560 X-RAY EXAM OF KNEE 1 OR 2: CPT | Mod: 26,59,LT, | Performed by: RADIOLOGY

## 2018-03-19 PROCEDURE — 73560 X-RAY EXAM OF KNEE 1 OR 2: CPT | Mod: TC,LT,59

## 2018-03-19 PROCEDURE — 99499 UNLISTED E&M SERVICE: CPT | Mod: S$GLB,,, | Performed by: PSYCHIATRY & NEUROLOGY

## 2018-03-19 PROCEDURE — 99999 PR PBB SHADOW E&M-EST. PATIENT-LVL I: CPT | Mod: PBBFAC,,, | Performed by: PSYCHIATRY & NEUROLOGY

## 2018-03-19 PROCEDURE — 73562 X-RAY EXAM OF KNEE 3: CPT | Mod: 26,RT,, | Performed by: RADIOLOGY

## 2018-03-19 PROCEDURE — 99213 OFFICE O/P EST LOW 20 MIN: CPT | Mod: S$GLB,,, | Performed by: PSYCHIATRY & NEUROLOGY

## 2018-03-19 PROCEDURE — 99999 PR PBB SHADOW E&M-EST. PATIENT-LVL III: CPT | Mod: PBBFAC,,, | Performed by: PODIATRIST

## 2018-03-19 PROCEDURE — 99212 OFFICE O/P EST SF 10 MIN: CPT | Mod: S$GLB,,, | Performed by: PODIATRIST

## 2018-03-19 PROCEDURE — 90833 PSYTX W PT W E/M 30 MIN: CPT | Mod: S$GLB,,, | Performed by: PSYCHIATRY & NEUROLOGY

## 2018-03-19 PROCEDURE — 99999 PR PBB SHADOW E&M-EST. PATIENT-LVL II: CPT | Mod: PBBFAC,,, | Performed by: ORTHOPAEDIC SURGERY

## 2018-03-19 NOTE — PROGRESS NOTES
Subjective:      Patient ID: Dominick Song MD is a 72 y.o. male.    Chief Complaint: Pain of the Right Knee    HPI  Dominick Song MD is a 72 year old male here with a several year history of right knee pain. It has been getting worse recently and he has fallen a couple of times.  The patient is a  Retired anesthesioligist. There was not a history of trauma.  The pain is moderate The pain is located in the lateral aspect of the knee. There is not radiation. No stiffness   There is not catching or locking.   The pain is described as achy. The patient has had prior surgery. Several arthroscopies. It is aggravated by standing and walking.  It is alleviated by rest. There is not numbness or tingling of the lower extremity.  There is not back pain.  He  has tried medications and injections. They have helped.  He does have difficulty getting in or out of a car, getting dressed, or going up or down stairs.  The patient does not use an assistive device.      Past Medical History:   Diagnosis Date    Anticoagulant long-term use     Anxiety     Arthritis     Atrial fibrillation     Cataract     CKD (chronic kidney disease) stage 3, GFR 30-59 ml/min 7/10/2017    Followed by Dr. Jeevan Pond    Colon polyp     benign    Depression     Elevated PSA     Erectile dysfunction     Gastric ulcer with hemorrhage     Hep B w/o coma 1977    History of bleeding peptic ulcer     History of colonoscopy     normal in 2010.  It was recommended he return in 2015    History of prostatitis     Hypertension     PAF (paroxysmal atrial fibrillation)     S/P arthroscopic surgery of right knee     3 times    Thyroid disease      Past Surgical History:   Procedure Laterality Date    ABDOMINAL HERNIA REPAIR      CARDIAC SURGERY      CATARACT EXTRACTION  11/25/13    left eye    CHOLECYSTECTOMY      COLONOSCOPY N/A 11/25/2015    Procedure: COLONOSCOPY;  Surgeon: Toby Hernandez MD;  Location: [facility];  Service:  Endoscopy;  Laterality: N/A;    ELBOW SURGERY      Tennis elbow repair    EYE SURGERY      FRACTURE SURGERY      GASTRIC BYPASS      KNEE ARTHROSCOPY      RT    LASIK  2001    both eyes (Dr. Rabago)    left humerus fx      times 2    ORIF HUMERUS FRACTURE      LT    RADIOFREQUENCY ABLATION      ROTATOR CUFF REPAIR      right     Family History   Problem Relation Age of Onset    COPD Father     Diabetes Father     Aortic stenosis Mother     Heart disease Mother      aortic stenosis    Heart attack Brother     No Known Problems Son     No Known Problems Daughter     No Known Problems Daughter     No Known Problems Daughter      Social History     Social History    Marital status:      Spouse name: N/A    Number of children: N/A    Years of education: N/A     Occupational History     [facility] (Clinics)     Social History Main Topics    Smoking status: Never Smoker    Smokeless tobacco: Never Used      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 180 tablet 3    aspirin (ECOTRIN) 325 MG EC tablet Take 325 mg by mouth as needed for Pain.      buPROPion (WELLBUTRIN XL) 150 MG TB24 tablet Take 3 tablets (450 mg total) by mouth once daily. 270 tablet 3    CALCIUM ORAL Take by mouth Daily.      dutasteride (AVODART) 0.5 mg capsule Take 1 capsule (0.5 mg total) by mouth once daily. 90 capsule 3    efinaconazole 10 % Prakash Apply 1 application topically once daily. 8 mL 6    escitalopram oxalate (LEXAPRO) 10 MG tablet Take 1 tablet (10 mg  total) by mouth once daily. 90 tablet 3    FLUZONE HIGH-DOSE 2017-18, PF, 180 mcg/0.5 mL vaccine       KENALOG 40 mg/mL injection       levothyroxine (SYNTHROID) 25 MCG tablet TAKE 1 TABLET(25 MCG) BY MOUTH EVERY DAY 90 tablet 0    lisinopril (PRINIVIL,ZESTRIL) 20 MG tablet Take 1 tablet (20 mg total) by mouth once daily. 90 tablet 3    metoprolol succinate (TOPROL-XL) 50 MG 24 hr tablet Take 1 tablet (50 mg total) by mouth once daily. 90 tablet 3    MULTIVITAMIN ORAL Take by mouth Daily.      OMEGA-3 FATTY ACIDS (FISH OIL CONCENTRATE ORAL) Take by mouth Daily.      oxybutynin (DITROPAN-XL) 10 MG 24 hr tablet Take 1 tablet (10 mg total) by mouth once daily. 30 tablet 11    propafenone (RYTHMOL SR) 425 MG Cp12 Take 1 capsule (425 mg total) by mouth every 12 (twelve) hours. 180 capsule 3     No current facility-administered medications on file prior to visit.      Review of patient's allergies indicates:   Allergen Reactions    No known drug allergies        Review of Systems   Constitution: Negative for chills, fever and night sweats.   HENT: Negative for hearing loss.    Eyes: Negative for blurred vision and double vision.   Cardiovascular: Negative for chest pain, claudication and leg swelling.   Respiratory: Negative for shortness of breath.    Endocrine: Negative for polydipsia, polyphagia and polyuria.   Hematologic/Lymphatic: Negative for adenopathy and bleeding problem. Does not bruise/bleed easily.   Skin: Negative for poor wound healing.   Musculoskeletal: Positive for joint pain.   Gastrointestinal: Negative for diarrhea and heartburn.   Genitourinary: Negative for bladder incontinence.   Neurological: Negative for focal weakness, headaches, numbness, paresthesias and sensory change.   Psychiatric/Behavioral: The patient is not nervous/anxious.    Allergic/Immunologic: Negative for persistent infections.         Objective:      Body mass index is 27.68 kg/m².  Vitals:    03/19/18 0935   Weight:  "97.8 kg (215 lb 9.8 oz)   Height: 6' 2" (1.88 m)         General    Constitutional: He is oriented to person, place, and time. He appears well-developed and well-nourished.   HENT:   Head: Normocephalic and atraumatic.   Eyes: EOM are normal.   Cardiovascular: Normal rate.    Pulmonary/Chest: Effort normal.   Neurological: He is alert and oriented to person, place, and time.   Psychiatric: He has a normal mood and affect. His behavior is normal.     General Musculoskeletal Exam   Gait: normal       Right Knee Exam     Inspection   Erythema: absent  Scars: absent  Swelling: present  Effusion: effusion  Deformity: deformity  Bruising: absent    Tenderness   The patient is tender to palpation of the lateral joint line.    Crepitus   The patient has crepitus of the patella.    Range of Motion   Extension: 0   Flexion: 110     Tests   Ligament Examination   Lachman: abnormal - grade I  MCL - Valgus: normal (0 to 2mm)  LCL - Varus: normal  Patella   Passive Patellar Tilt: neutral    Other   Sensation: normal    Left Knee Exam     Inspection   Erythema: absent  Scars: absent  Swelling: absent  Effusion: absent  Deformity: deformity  Bruising: absent    Tenderness   The patient is experiencing no tenderness.         Range of Motion   Extension: 0   Flexion: 130     Tests   Stability Lachman: normal (-1 to 2mm)   MCL - Valgus: normal (0 to 2mm)  LCL - Varus: normal (0 to 2mm)  Patella   Passive Patellar Tilt: neutral    Other   Sensation: normal    Muscle Strength   Right Lower Extremity   Hip Abduction: 5/5   Quadriceps:  5/5   Hamstrin/5   Left Lower Extremity   Hip Abduction: 5/5   Quadriceps:  5/5   Hamstrin/5     Reflexes     Left Side  Quadriceps:  2+    Right Side   Quadriceps:  2+    Vascular Exam     Right Pulses  Dorsalis Pedis:      2+          Left Pulses  Dorsalis Pedis:      2+          Edema  Right Lower Leg: absent  Left Lower Leg: absent      Radiographs taken today and reviewed by me demonstrate " moderate arthritic change of the right KNEE(s).There  is not bone destruction.  There is not a fracture. The lateral compartment is most involved.  There is a valgus deformity.  The changes are tricompartmental.            Assessment:       Encounter Diagnosis   Name Primary?    Primary osteoarthritis of right knee Yes          Plan:       Dominick was seen today for pain.    Diagnoses and all orders for this visit:    Primary osteoarthritis of right knee        He is considering TKA in May.  Discussed options/plan today.  He will return in 6 weeks to formalize plans.

## 2018-03-19 NOTE — PROGRESS NOTES
Subjective:      Patient ID: Dominick KENNEDY MD Duncan is a 72 y.o. male.    Chief Complaint: Laser Treatment (2nd tx - Joon great and right 2nd) and Other Misc (PCP:  Dr Gaines - not yet seen)    Dominick is a 72 y.o. male who presents to the podiatry clinic  with complaint of  left foot pain sub first metatarsal head. Onset of the symptoms was several days ago. Precipitating event: increased activity and going up and down a ladder a lot. Current symptoms include: ability to bear weight, but with some pain. Aggravating factors: any weight bearing. Symptoms have gradually improved. Much better today. Patient has had prior foot problems with metatarsalgia to the lesser metatarsals, calluses, and treatment for nail fungus. Evaluation to date: none. Treatment to date: rest and wearing better supportive shoes. Patients rates pain 4/10 on pain scale.    12/20/17: Patient returns for follow up. Left foot pain from previous encounter is much better, now his concern is with a new wound to the left leg. He was working in the yard a few days ago and got a bamboo shoot that went into the leg. There was some erythema and drainage noted, he took some bactrim which helped. He relates to wearing a band-aid over the wound with triple antibiotic.     Also has some pain to the plantar right foot, thinks he may have stepped on something Sharp pain noted with weight bearing, better with rest. No drainage noted from the area.    2/15/18: Patient returns for follow up with continued pain right plantar metatarsal heads, mostly when walking barefoot, better in supportive shoes. Relates the wound to the left leg has healed well. No other pedal complaints at this time. He is still having issues with the onychomycosis and would like to discuss another treatment with the laser.     3/19/18: Patient returns for second nail fungus follow up/treatment with laser. Relates he feels they are looking better but there is still some discoloration and  subungual debris bilateral hallux and right second nail.    Review of Systems   Constitution: Negative for chills and fever.   Cardiovascular: Negative for claudication and leg swelling.   Respiratory: Negative for shortness of breath.    Skin: Positive for nail changes. Negative for itching and rash.   Musculoskeletal: Positive for arthritis and joint swelling. Negative for muscle cramps, muscle weakness and myalgias.   Gastrointestinal: Negative for nausea and vomiting.   Neurological: Negative for focal weakness, loss of balance, numbness and paresthesias.           Objective:      Physical Exam   Constitutional: He is oriented to person, place, and time. He appears well-developed and well-nourished. No distress.   Cardiovascular:   Pulses:       Dorsalis pedis pulses are 2+ on the right side, and 2+ on the left side.        Posterior tibial pulses are 2+ on the right side, and 2+ on the left side.   < 3 sec capillary refill time to toes 1-5 bilateral. Toes and feet are warm to touch proximally with normal distal cooling b/l. There is some hair growth on the feet and toes b/l. There is no edema b/l. No spider veins or varicosities present b/l.      Musculoskeletal:   Right foot pain to palpation at the plantar fourth metatarsal head, there is an associated hyperkeratotic lesion, also some discomfort to first/second metatarsal heads. No pain with ROM of the MTPJ's    Equinus noted b/l ankles with < 10 deg DF noted. MMT 5/5 in DF/PF/Inv/Ev resistance with no reproduction of pain in any direction. Passive range of motion of ankle and pedal joints is painless b/l.     Neurological: He is alert and oriented to person, place, and time. He has normal strength. He displays no atrophy and no tremor. No sensory deficit. He exhibits normal muscle tone.   Negative tinel sign bilateral.   Skin: Skin is warm and dry. No abrasion, no bruising, no burn, no ecchymosis, no laceration, no lesion, no petechiae and no rash noted. He  is not diaphoretic. No cyanosis or erythema. No pallor. Nails show no clubbing.   Skin temperature, texture and turgor within normal limits.    Bilateral hallux nails and right second nail discolored with slight thickening, left hallux nail has subungual debris. Improved since first treatment.    No open ulcerations noted bilateral         Psychiatric: He has a normal mood and affect. His behavior is normal.             Assessment:       Encounter Diagnosis   Name Primary?    Onychomycosis due to dermatophyte Yes         Plan:       Dominick was seen today for laser treatment and other misc.    Diagnoses and all orders for this visit:    Onychomycosis due to dermatophyte      I counseled the patient on his conditions, their implications and medical management.     A timeout was performed. Verbal consent was obtained and witnessed by medical assistant.       The patient was placed in the procedure room.    The affected digits x 3 bilateral hallux and right second nails were lasered with 100 shots to the hallux and the second nail with 80.    The settings were at 15 J / cm, 0.3 ms, 3 hz with a spot size 5 ml.       The patient tollerated the procedure well.       Instructed to call with concerns or questions.     Will return in 3 months for nail treatment follow up possible 3rd and last laser treatment.    Roverto Linares DPM

## 2018-03-19 NOTE — PROGRESS NOTES
"Outpatient Psychiatry Follow-Up Visit (MD/NP)    3/19/2018     Late seen nov 2017   Clinical Status of Patient:  Outpatient (Ambulatory) last seen march 2016     Chief Complaint:  Dominick Song MD is a 72 y.o. male who presents today for follow-up of anxiety.  Met with patient.      Interval History and Content of Current Session:  Interim Events/Subjective Report/Content of Current Session: see below     Past hx :  Pt discussed specific anxieties that were begun in childhood . They have worsened over the years in a tolerance fashion . THEY  HAVE NOW COMPLICATED RELATIONSHIPS WITH WIFE .  He was seen by Hemanth Lovelace who placed him on wellbutrin xl 300 mg in early 2016 ago with benefit . The anxiety in question has hurt his own self esteem . "    Pt has since attended residential treatment at [facility] ( 12-1-15 -- 1-30-16) . Chas Hernández PhD ( [facility] sexual addiction therapist ) started the [facility] program before developing his own program in [place] .He is s/p  [facility] Discharge  with Dr Angelica Garay  psychiatrist recommendations . He  attended the [facility] program in March 2016 in [facility].       He also voluntarily  follows up with [facility] for 3 days every 3 months  ( days of hope)  .That seems to satisfy CME per pt .  He attended [facility] and addressed professional boundary violations .  His Jan visit  Was cancelled bc of weather /ice.     He has now converted to Catholicism as is wife and family . He attended intense Bible study course with Loraine.       He attended the [employer] convention in [place] . There he met an addicition psychiatrist  who  works in [place] but episodically at [facility] . They will speak or meet when he goes to [facility].     Past [facility] Tx recs ; for direct  pt care or otherwise would need more focused care on professionalism .That was only  known to him one month post  discharge at [facility]. He has 2.5 yrs left on contract .     He is in [facility] while licensed  with which he " is enrolled and monitored . Fatoumata Valentin PhD is his former  , now Hector Ovalles.      He is mandated to see a  treating professional monthly accomplished by Mr Alfonso Torres whom he sees twice monthly now and weekly group therapy there . He and wife Loraine are doing well . She is working part time .  Mr Torres is rec'inng  That his able ot return to practice . This would need approval of Dr RIO Hernandez .    Opiate sobriety > 17  years ( 1999)   Last drink November , 2015 .    He is s/p gastric bypass . He is very pleased with lost weight at [facility] with dietician assistnt. No real exercise but plans to go to [employer] more.     He enjoys his full shelter as he fully occupies his time .   He has a letter that seemingly would allow him to work once boundaries were established  by  Brian , Dr Regi Hernandez who is pleased withhis recovery progress. . I have a copy .  He is very active with [employer] and has Billy PATE as his sponsor.     He/ Loraine  will celebrate holidays with each group of kids .     Coping with a Fib and  ablation ; new dx of sleep apnea with CPAP       On 1-2-18 he slipped and broke right shoulder, nonoperative . He was rx'd norco q hs  under wife's control.  He received a nerve block  And 3 q balls for local anesthesia . Under ortho care on [facility] 3-2-18 he tripped on terrazo floor bc of feet not being able to lift with gait . He then took more Norco under wife 's monitoring and has now weaned off.     He and wife plan to travel to [facility] in May 2018 . After trip , he will have total r knee replaced by Dr Dallas  from past ootball and past ladder fall .     MEDS:   Wellbutrin xl 300 mg q other day ; Lexapro 10 mg q day     Compliance: yes    Side effects: None    Psychiatric Review Of Systems - Is patient experiencing or having changes in:  Mood : episodic dysphoria   sleep: no  appetite: no  weight: no, 225 per pt( nov 2017)   energy/anergy: fatigued some  Days    interest/pleasure/anhedonia: no  somatic symptoms: no  libido: no  anxiety/panic: no  guilty/hopelessness: no  concentration: no  S.I.B.s/risky behavior: no  Irritability: no  Racing thoughts: no  Impulsive behaviors: no  Paranoia: no  AVH: no    Psychotherapy:  · Target symptoms: compulsive  behavior   · Why chosen therapy is appropriate versus another modality: relevant to diagnosis, patient responds to this modality, evidence based practice discussed potential [facility] with letting license    · Outcome monitoring methods: self-report, observation  · Therapeutic intervention type: supportive psychotherapy  · Topics discussed/themes: relationships difficulties, work stress  · The patient's response to the intervention is accepting. The patient's progress toward treatment goals is excellent.   · Duration of intervention: 30   minutes.    Review of Systems   · Prob Pert. 1 sys, Ext. psych +2 add., Comp. 10-14 sys  · PSYCHIATRIC: Pertinant items are noted in the narrative.  · CONSTITUTIONAL: No weight gain or loss.   · MUSCULOSKELETAL: No pain or stiffness of the joints.  · NEUROLOGIC: No weakness, sensory changes, seizures, confusion, memory loss, tremor or other abnormal movements.  · ENDOCRINE: No polydipsia or polyuria.  · INTEGUMENTARY: No rashes or lacerations.  · EYES: No exophthalmos, jaundice or blindness.  · ENT: No dizziness, tinnitus or hearing loss.  · RESPIRATORY: No shortness of breath.  · CARDIOVASCULAR: No tachycardia or chest pain.  · GASTROINTESTINAL: No nausea, vomiting, pain, constipation or diarrhea.  · GENITOURINARY: No frequency, dysuria or sexual dysfunction.  · HEMATOLOGIC/LYMPHATIC: No excessive bleeding, prolonged or excessive bleeding after dental extraction/injury.  · ALLERGIC/IMMUNOLOGIC: No allergic response to materials, foods or animals at this time.    Past Medical, Family and Social History: The patient's past medical, family and social history have been reviewed and  updated as appropriate within the electronic medical record - see encounter notes.    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   There were no vitals filed for this visit.     General:  unremarkable, age appropriate, casually dressed, neatly groomed     Musculoskeletal  Muscle Strength/Tone:  no spasicity, no rigidity   Gait & Station:  non-ataxic     Psychiatric  Speech:  no latency; no press   Mood & Affect:  euthymic  congruent and appropriate   Thought Process:  normal and logical   Associations:  intact   Thought Content:  normal, no suicidality, no homicidality, delusions, or paranoia   Insight:  has awareness of illness   Judgement: behavior is adequate to circumstances   Orientation:  grossly intact   Memory: intact for content of interview   Language: grossly intact   Attention Span & Concentration:  able to focus   Fund of Knowledge:  intact and appropriate to age and level of education     Assessment and Diagnosis   Status/Progress: Based on the examination today, the patient's problem(s) is/are adequately but not ideally controlled.  New problems have been presented today.   Lack of compliance are  not complicating management of the primary condition.   There are no active rule-out diagnoses for this patient at this time.     General Impression: doing fairly well but not ideal ; fatigued and mildly  dysphoric at time         ICD-10-CM ICD-9-CM   1. Anxiety F41.9 300.00   2. Opioid use disorder, severe, in sustained remission, dependence F11.21 304.03   3. Alcohol use disorder, moderate, in sustained remission, dependence F10.21 303.93       Intervention/Counseling/Treatment Plan   · Medication Management: Increase wellbutrin Xl 450   To q day . Continue Lexapro 10 mg q day . The risks and benefits of medication were discussed with the patient.  · Counseling provided with patient as follows: importance of compliance with chosen treatment options was emphasized, risks and benefits of treatment options, including medications, were discussed with the patient  · Coordinated care with  His insurer to approve first [facility] as approved facility       Return to Clinic: 6 months

## 2018-03-26 ENCOUNTER — OFFICE VISIT (OUTPATIENT)
Dept: PODIATRY | Facility: CLINIC | Age: 73
End: 2018-03-26
Payer: MEDICARE

## 2018-03-26 ENCOUNTER — HOSPITAL ENCOUNTER (OUTPATIENT)
Dept: RADIOLOGY | Facility: HOSPITAL | Age: 73
Discharge: HOME | End: 2018-03-26
Attending: PODIATRIST
Payer: MEDICARE

## 2018-03-26 ENCOUNTER — OFFICE VISIT (OUTPATIENT)
Dept: CARDIOLOGY | Facility: CLINIC | Age: 73
End: 2018-03-26
Payer: MEDICARE

## 2018-03-26 VITALS
BODY MASS INDEX: 27.1 KG/M2 | HEIGHT: 74 IN | SYSTOLIC BLOOD PRESSURE: 146 MMHG | DIASTOLIC BLOOD PRESSURE: 89 MMHG | HEART RATE: 59 BPM | WEIGHT: 211.19 LBS

## 2018-03-26 VITALS — HEIGHT: 74 IN | BODY MASS INDEX: 26.56 KG/M2 | WEIGHT: 207 LBS

## 2018-03-26 DIAGNOSIS — I48.19 PERSISTENT ATRIAL FIBRILLATION: Primary | ICD-10-CM

## 2018-03-26 DIAGNOSIS — I48.91 ATRIAL FIBRILLATION, UNSPECIFIED TYPE: ICD-10-CM

## 2018-03-26 DIAGNOSIS — I10 ESSENTIAL HYPERTENSION: ICD-10-CM

## 2018-03-26 DIAGNOSIS — N18.30 CKD (CHRONIC KIDNEY DISEASE) STAGE 3, GFR 30-59 ML/MIN: ICD-10-CM

## 2018-03-26 DIAGNOSIS — G47.31 COMPLEX SLEEP APNEA SYNDROME: ICD-10-CM

## 2018-03-26 DIAGNOSIS — M19.072 PRIMARY OSTEOARTHRITIS OF LEFT FOOT: ICD-10-CM

## 2018-03-26 DIAGNOSIS — M77.42 METATARSALGIA OF LEFT FOOT: ICD-10-CM

## 2018-03-26 DIAGNOSIS — M77.42 METATARSALGIA OF LEFT FOOT: Primary | ICD-10-CM

## 2018-03-26 DIAGNOSIS — R00.1 BRADYCARDIA: ICD-10-CM

## 2018-03-26 DIAGNOSIS — I77.1 TORTUOUS AORTA: ICD-10-CM

## 2018-03-26 DIAGNOSIS — E03.4 HYPOTHYROIDISM DUE TO ACQUIRED ATROPHY OF THYROID: ICD-10-CM

## 2018-03-26 PROCEDURE — 99215 OFFICE O/P EST HI 40 MIN: CPT | Mod: S$GLB,,, | Performed by: INTERNAL MEDICINE

## 2018-03-26 PROCEDURE — 3077F SYST BP >= 140 MM HG: CPT | Mod: CPTII,S$GLB,, | Performed by: PODIATRIST

## 2018-03-26 PROCEDURE — 99999 PR PBB SHADOW E&M-EST. PATIENT-LVL III: CPT | Mod: PBBFAC,,, | Performed by: PODIATRIST

## 2018-03-26 PROCEDURE — 73630 X-RAY EXAM OF FOOT: CPT | Mod: 26,LT,, | Performed by: RADIOLOGY

## 2018-03-26 PROCEDURE — 20600 DRAIN/INJ JOINT/BURSA W/O US: CPT | Mod: LT,S$GLB,, | Performed by: PODIATRIST

## 2018-03-26 PROCEDURE — 93000 ELECTROCARDIOGRAM COMPLETE: CPT | Mod: S$GLB,,, | Performed by: INTERNAL MEDICINE

## 2018-03-26 PROCEDURE — 99213 OFFICE O/P EST LOW 20 MIN: CPT | Mod: 25,S$GLB,, | Performed by: PODIATRIST

## 2018-03-26 PROCEDURE — 99499 UNLISTED E&M SERVICE: CPT | Mod: S$GLB,,, | Performed by: INTERNAL MEDICINE

## 2018-03-26 PROCEDURE — 99999 PR PBB SHADOW E&M-EST. PATIENT-LVL III: CPT | Mod: PBBFAC,,, | Performed by: INTERNAL MEDICINE

## 2018-03-26 PROCEDURE — 3079F DIAST BP 80-89 MM HG: CPT | Mod: CPTII,S$GLB,, | Performed by: INTERNAL MEDICINE

## 2018-03-26 PROCEDURE — 3078F DIAST BP <80 MM HG: CPT | Mod: CPTII,S$GLB,, | Performed by: PODIATRIST

## 2018-03-26 PROCEDURE — 73630 X-RAY EXAM OF FOOT: CPT | Mod: TC,PO,LT

## 2018-03-26 PROCEDURE — 3077F SYST BP >= 140 MM HG: CPT | Mod: CPTII,S$GLB,, | Performed by: INTERNAL MEDICINE

## 2018-03-26 RX ORDER — METHYLPREDNISOLONE ACETATE 40 MG/ML
20 INJECTION, SUSPENSION INTRA-ARTICULAR; INTRALESIONAL; INTRAMUSCULAR; SOFT TISSUE
Status: COMPLETED | OUTPATIENT
Start: 2018-03-26 | End: 2018-03-26

## 2018-03-26 RX ORDER — DEXAMETHASONE SODIUM PHOSPHATE 4 MG/ML
2 INJECTION, SOLUTION INTRA-ARTICULAR; INTRALESIONAL; INTRAMUSCULAR; INTRAVENOUS; SOFT TISSUE
Status: COMPLETED | OUTPATIENT
Start: 2018-03-26 | End: 2018-03-26

## 2018-03-26 RX ADMIN — DEXAMETHASONE SODIUM PHOSPHATE 2 MG: 4 INJECTION, SOLUTION INTRA-ARTICULAR; INTRALESIONAL; INTRAMUSCULAR; INTRAVENOUS; SOFT TISSUE at 09:03

## 2018-03-26 RX ADMIN — METHYLPREDNISOLONE ACETATE 20 MG: 40 INJECTION, SUSPENSION INTRA-ARTICULAR; INTRALESIONAL; INTRAMUSCULAR; SOFT TISSUE at 09:03

## 2018-03-26 NOTE — PROGRESS NOTES
Subjective:    Patient ID:  Dominick Song MD is a 72 y.o. male who presents for follow-up of Atrial Fibrillation      HPI 73 yo male with h/o atrial fibrillation, GI bleed, Htn, Sleep Apnea.  First diagnosed with atrial fibrillation 2/12 (noted palpitations). Placed on beta blocker, coumadin. Underwent PEPE/DCCV. Had recurrence, Propafenone  mg twice daily added.  Had recurrence 12/24/13, underwent DCCV, Propafenone increased to 425 mg twice daily.  PVI ([place]) 7/28/14.   Had done well, was off Propafenone. Developed recurrence, underwent DCCV multiple times.  Repeat PVI 4/27/17 All 4 veins remained isolated.  Antralized left sided lesion set posteriorly, and performed SVC isolation.  Has been compliant with CPAP.  Has had paroxysmal AF since PVI, lasting up to a couple of hours for the most part.  Had one episode lasting a couple of days.  DCCV 2/9/18.  ECG reveals sinus bradycardia at 59 bpm.  Has been doing well since DCCV.      Review of Systems   Constitution: Negative. Negative for weakness and malaise/fatigue.   Cardiovascular: Positive for palpitations. Negative for chest pain, dyspnea on exertion, irregular heartbeat, leg swelling, near-syncope, orthopnea, paroxysmal nocturnal dyspnea and syncope.   Respiratory: Negative for cough.    Neurological: Negative for dizziness and light-headedness.        Objective:    Physical Exam   Constitutional: He is oriented to person, place, and time. He appears well-developed and well-nourished.   Eyes: Conjunctivae are normal. No scleral icterus.   Neck: No JVD present. No tracheal deviation present.   Cardiovascular: Normal rate, regular rhythm and normal heart sounds.  PMI is not displaced.    Pulmonary/Chest: Effort normal and breath sounds normal. No respiratory distress.   Abdominal: Soft. There is no hepatosplenomegaly. There is no tenderness.   Musculoskeletal: He exhibits no edema (lower extremity) or tenderness.   Neurological: He is alert and  oriented to person, place, and time.   Skin: Skin is warm and dry. No rash noted.   Psychiatric: He has a normal mood and affect. His behavior is normal.         Assessment:       1. Persistent atrial fibrillation    2. Bradycardia    3. Essential hypertension    4. Tortuous aorta    5. CKD (chronic kidney disease) stage 3, GFR 30-59 ml/min    6. Hypothyroidism due to acquired atrophy of thyroid    7. Complex sleep apnea syndrome         Plan:             Continue current therapy.  If has increasing recurrence, options to consider would be amiodarone, tikosyn, or surgical approach.  F/u in 6 months.

## 2018-03-29 ENCOUNTER — PATIENT MESSAGE (OUTPATIENT)
Dept: PODIATRY | Facility: CLINIC | Age: 73
End: 2018-03-29

## 2018-03-29 NOTE — PROGRESS NOTES
Subjective:      Patient ID: Dominick KENNEDY MD Duncan is a 72 y.o. male.    Chief Complaint: Foot Pain (left foot - worse since Saturday) and Other Misc (PCP:  Dr Gaines (not yet seen))    Dominick is a 72 y.o. male who presents to the podiatry clinic  with complaint of  left foot pain sub first metatarsal head. Onset of the symptoms was several days ago. Precipitating event: increased activity and going up and down a ladder a lot. Current symptoms include: ability to bear weight, but with some pain. Aggravating factors: any weight bearing. Symptoms have gradually improved. Much better today. Patient has had prior foot problems with metatarsalgia to the lesser metatarsals, calluses, and treatment for nail fungus. Evaluation to date: none. Treatment to date: rest and wearing better supportive shoes. Patients rates pain 4/10 on pain scale.    12/20/17: Patient returns for follow up. Left foot pain from previous encounter is much better, now his concern is with a new wound to the left leg. He was working in the yard a few days ago and got a bamboo shoot that went into the leg. There was some erythema and drainage noted, he took some bactrim which helped. He relates to wearing a band-aid over the wound with triple antibiotic.     Also has some pain to the plantar right foot, thinks he may have stepped on something Sharp pain noted with weight bearing, better with rest. No drainage noted from the area.    2/15/18: Patient returns for follow up with continued pain right plantar metatarsal heads, mostly when walking barefoot, better in supportive shoes. Relates the wound to the left leg has healed well. No other pedal complaints at this time. He is still having issues with the onychomycosis and would like to discuss another treatment with the laser.     3/19/18: Patient returns for second nail fungus follow up/treatment with laser. Relates he feels they are looking better but there is still some discoloration and subungual  debris bilateral hallux and right second nail.    3/26/18: Patient returns to clinic with left foot pain along the lateral foot. He was on his feet a lot over the weekend and started having pain along the lateral foot, worse with weight bearing better with rest. No self treatment. Pain is 6/10.    Review of Systems   Constitution: Negative for chills and fever.   Cardiovascular: Negative for claudication and leg swelling.   Respiratory: Negative for shortness of breath.    Skin: Positive for nail changes. Negative for itching and rash.   Musculoskeletal: Positive for arthritis and joint swelling. Negative for muscle cramps, muscle weakness and myalgias.   Gastrointestinal: Negative for nausea and vomiting.   Neurological: Negative for focal weakness, loss of balance, numbness and paresthesias.           Objective:      Physical Exam   Constitutional: He is oriented to person, place, and time. He appears well-developed and well-nourished. No distress.   Cardiovascular:   Pulses:       Dorsalis pedis pulses are 2+ on the right side, and 2+ on the left side.        Posterior tibial pulses are 2+ on the right side, and 2+ on the left side.   < 3 sec capillary refill time to toes 1-5 bilateral. Toes and feet are warm to touch proximally with normal distal cooling b/l. There is some hair growth on the feet and toes b/l. There is no edema b/l. No spider veins or varicosities present b/l.      Musculoskeletal:   Left foot pain with palpation to the base of the dorsal aspect of the fifth and fourth metatarsals and the intermetatarsal space/ metatarsocuneiform joints.     Equinus noted b/l ankles with < 10 deg DF noted. MMT 5/5 in DF/PF/Inv/Ev resistance with no reproduction of pain in any direction. Passive range of motion of ankle and pedal joints is painless b/l.     Neurological: He is alert and oriented to person, place, and time. He has normal strength. He displays no atrophy and no tremor. No sensory deficit. He  exhibits normal muscle tone.   Negative tinel sign bilateral.   Skin: Skin is warm and dry. No abrasion, no bruising, no burn, no ecchymosis, no laceration, no lesion, no petechiae and no rash noted. He is not diaphoretic. No cyanosis or erythema. No pallor. Nails show no clubbing.   Skin temperature, texture and turgor within normal limits.    Bilateral hallux nails and right second nail discolored with slight thickening, left hallux nail has subungual debris. Improved since first treatment.    No open ulcerations noted bilateral         Psychiatric: He has a normal mood and affect. His behavior is normal.             Assessment:       Encounter Diagnoses   Name Primary?    Metatarsalgia of left foot Yes    Primary osteoarthritis of left foot          Plan:       Dominick was seen today for foot pain and other misc.    Diagnoses and all orders for this visit:    Metatarsalgia of left foot  -     X-Ray Foot Complete Left; Future    Primary osteoarthritis of left foot    Other orders  -     dexamethasone injection 2 mg; Inject 0.5 mLs (2 mg total) into the muscle one time.  -     methylPREDNISolone acetate injection 20 mg; Inject 0.5 mLs (20 mg total) into the muscle one time.      I counseled the patient on his conditions, their implications and medical management.     A timeout was performed. Verbal consent was obtained and witnessed by medical assistant.       Skin prepped with alcohol and skin anesthesia achieved with ethyl chloride. Injected 2 mg dexamethasone and 20 mg depo medrol with 0.5 mL 1% plain lidocaine into the 4/5 tarsometatarsal joint areas. Patient reported relief with the injection. Patient tolerated the procedure and anesthesia well without any complications.      X-ray to rule out stress fracture.    Dispensed, fitted and gait trained with orthopedic boot left foot, remain in boot until pain dissipates, transition back into a supported shoe slowly.      Instructed to call with concerns or  questions.     Will return in 1 month for follow up left foot pain    Roverto Linares DPM

## 2018-04-09 ENCOUNTER — LAB VISIT (OUTPATIENT)
Dept: LAB | Facility: HOSPITAL | Age: 73
End: 2018-04-09
Attending: INTERNAL MEDICINE
Payer: MEDICARE

## 2018-04-09 ENCOUNTER — TELEPHONE (OUTPATIENT)
Dept: NEPHROLOGY | Facility: CLINIC | Age: 73
End: 2018-04-09

## 2018-04-09 DIAGNOSIS — D50.0 ANEMIA DUE TO CHRONIC BLOOD LOSS: ICD-10-CM

## 2018-04-09 DIAGNOSIS — N17.9 ACUTE RENAL FAILURE, UNSPECIFIED ACUTE RENAL FAILURE TYPE: ICD-10-CM

## 2018-04-09 DIAGNOSIS — N18.30 CKD (CHRONIC KIDNEY DISEASE), STAGE III: Primary | ICD-10-CM

## 2018-04-09 DIAGNOSIS — N18.30 CKD (CHRONIC KIDNEY DISEASE), STAGE III: ICD-10-CM

## 2018-04-09 LAB
ALBUMIN SERPL BCP-MCNC: 3.1 G/DL
ANION GAP SERPL CALC-SCNC: 3 MMOL/L
BASOPHILS # BLD AUTO: 0.06 K/UL
BASOPHILS NFR BLD: 0.5 %
BUN SERPL-MCNC: 12 MG/DL
CALCIUM SERPL-MCNC: 8.4 MG/DL
CHLORIDE SERPL-SCNC: 110 MMOL/L
CO2 SERPL-SCNC: 26 MMOL/L
CREAT SERPL-MCNC: 1.3 MG/DL
DIFFERENTIAL METHOD: ABNORMAL
EOSINOPHIL # BLD AUTO: 0.5 K/UL
EOSINOPHIL NFR BLD: 4.3 %
ERYTHROCYTE [DISTWIDTH] IN BLOOD BY AUTOMATED COUNT: 16.6 %
EST. GFR  (AFRICAN AMERICAN): >60 ML/MIN/1.73 M^2
EST. GFR  (NON AFRICAN AMERICAN): 54.9 ML/MIN/1.73 M^2
GLUCOSE SERPL-MCNC: 107 MG/DL
HCT VFR BLD AUTO: 36.2 %
HGB BLD-MCNC: 11.2 G/DL
IMM GRANULOCYTES # BLD AUTO: 0.11 K/UL
IMM GRANULOCYTES NFR BLD AUTO: 1 %
LYMPHOCYTES # BLD AUTO: 1.7 K/UL
LYMPHOCYTES NFR BLD: 15.7 %
MCH RBC QN AUTO: 26.8 PG
MCHC RBC AUTO-ENTMCNC: 30.9 G/DL
MCV RBC AUTO: 87 FL
MONOCYTES # BLD AUTO: 1 K/UL
MONOCYTES NFR BLD: 9 %
NEUTROPHILS # BLD AUTO: 7.7 K/UL
NEUTROPHILS NFR BLD: 69.5 %
NRBC BLD-RTO: 0 /100 WBC
PHOSPHATE SERPL-MCNC: 2.2 MG/DL
PLATELET # BLD AUTO: 327 K/UL
PMV BLD AUTO: 11.7 FL
POTASSIUM SERPL-SCNC: 4.5 MMOL/L
RBC # BLD AUTO: 4.18 M/UL
SODIUM SERPL-SCNC: 139 MMOL/L
WBC # BLD AUTO: 11.02 K/UL

## 2018-04-09 PROCEDURE — 85025 COMPLETE CBC W/AUTO DIFF WBC: CPT

## 2018-04-09 PROCEDURE — 36415 COLL VENOUS BLD VENIPUNCTURE: CPT | Mod: PO

## 2018-04-09 PROCEDURE — 80069 RENAL FUNCTION PANEL: CPT

## 2018-04-09 RX ORDER — LEVOTHYROXINE SODIUM 25 UG/1
TABLET ORAL
Qty: 90 TABLET | Refills: 0 | Status: SHIPPED | OUTPATIENT
Start: 2018-04-09 | End: 2018-07-03 | Stop reason: SDUPTHER

## 2018-04-09 NOTE — TELEPHONE ENCOUNTER
Simeon Zarate 10:24 AM      gm, I have pt mrn [id number] at  just had labs drawn, requesting CBC be added to panel due to anemia in past     Arabella Painting 10:24 AM      one moment please.      Simeon Zarate 10:24 AM      states  nikole the tube but told him to contact nurse to have it added      ok     Arabella Painting 10:24 AM      got it.      Simeon Zarate 10:24 AM      thanks so much

## 2018-04-16 ENCOUNTER — OFFICE VISIT (OUTPATIENT)
Dept: NEPHROLOGY | Facility: CLINIC | Age: 73
End: 2018-04-16
Payer: MEDICARE

## 2018-04-16 VITALS
WEIGHT: 207.25 LBS | HEIGHT: 74 IN | OXYGEN SATURATION: 97 % | SYSTOLIC BLOOD PRESSURE: 114 MMHG | HEART RATE: 64 BPM | BODY MASS INDEX: 26.6 KG/M2 | DIASTOLIC BLOOD PRESSURE: 68 MMHG

## 2018-04-16 DIAGNOSIS — N18.30 CKD (CHRONIC KIDNEY DISEASE), STAGE III: Primary | ICD-10-CM

## 2018-04-16 DIAGNOSIS — D50.0 ANEMIA DUE TO CHRONIC BLOOD LOSS: ICD-10-CM

## 2018-04-16 PROCEDURE — 99214 OFFICE O/P EST MOD 30 MIN: CPT | Mod: S$GLB,,, | Performed by: INTERNAL MEDICINE

## 2018-04-16 PROCEDURE — 99999 PR PBB SHADOW E&M-EST. PATIENT-LVL III: CPT | Mod: PBBFAC,,, | Performed by: INTERNAL MEDICINE

## 2018-04-16 PROCEDURE — 3074F SYST BP LT 130 MM HG: CPT | Mod: CPTII,S$GLB,, | Performed by: INTERNAL MEDICINE

## 2018-04-16 PROCEDURE — 99499 UNLISTED E&M SERVICE: CPT | Mod: S$PBB,,, | Performed by: INTERNAL MEDICINE

## 2018-04-16 PROCEDURE — 3078F DIAST BP <80 MM HG: CPT | Mod: CPTII,S$GLB,, | Performed by: INTERNAL MEDICINE

## 2018-04-16 RX ORDER — ATENOLOL 50 MG/1
50 TABLET ORAL DAILY
Qty: 30 TABLET | Refills: 11 | Status: SHIPPED | OUTPATIENT
Start: 2018-04-16 | End: 2018-07-30

## 2018-04-16 RX ORDER — BETAMETHASONE SODIUM PHOSPHATE AND BETAMETHASONE ACETATE 3; 3 MG/ML; MG/ML
INJECTION, SUSPENSION INTRA-ARTICULAR; INTRALESIONAL; INTRAMUSCULAR; SOFT TISSUE
COMMUNITY
Start: 2018-04-09 | End: 2021-03-10

## 2018-04-16 RX ORDER — CYCLOBENZAPRINE HCL 10 MG
TABLET ORAL
Refills: 0 | COMMUNITY
Start: 2018-03-28 | End: 2018-06-13 | Stop reason: SDUPTHER

## 2018-04-16 NOTE — PROGRESS NOTES
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    He suffered a fall three months ago and injured his right shoulder.  There have been no recent illnesses, hospitalizations or procedures.   He has not been to [facility] due to his right arm.  He is scheduled to get his TKR next month.  He has lost 22 pounds since his last visit.      Review of Systems   Constitutional: Positive for fatigue. Negative for appetite change, chills and fever.   HENT: Positive for sore throat (hoarseness in evenings).    Eyes: Negative for visual disturbance.   Respiratory: Negative for cough and shortness of breath.    Cardiovascular: Negative for chest pain and leg swelling.   Gastrointestinal: Negative for diarrhea, nausea and vomiting.   Genitourinary: Positive for frequency (3-4 episodes of nocturia) and urgency. Negative for difficulty urinating, dysuria and hematuria.   Musculoskeletal: Positive for arthralgias (knee and right shoulder). Negative for myalgias.   Skin: Negative for rash.   Neurological: Positive for dizziness and light-headedness. Negative for headaches.   Psychiatric/Behavioral: Negative for sleep disturbance.       The past medical, family and social histories were reviewed for this encounter.     /68 (BP Location: Left arm, Patient Position: Sitting)   Pulse 64   Ht 6' 2" (1.88 m)   Wt 94 kg (207 lb 3.7 oz)   SpO2 97%   BMI 26.61 kg/m²     Objective:      Physical Exam   Constitutional: He is oriented to person, place, and time. He appears well-developed and well-nourished. No distress.   HENT:   Head: Normocephalic and atraumatic.   Eyes: Conjunctivae are normal.   Neck: Neck supple. No JVD present.   Cardiovascular: Normal rate, regular rhythm and normal heart sounds.  Exam reveals no gallop and no friction rub.    No murmur heard.  Pulmonary/Chest: Effort normal. No respiratory distress. He has no wheezes. " He has no rales.   Few end-expiratory dry crackles   Abdominal: Soft. Bowel sounds are normal. He exhibits no distension. There is no tenderness.   Musculoskeletal: He exhibits no edema.   Neurological: He is alert and oriented to person, place, and time.   Skin: Skin is warm and dry. No rash noted.   Psychiatric: He has a normal mood and affect.   Vitals reviewed.      Assessment:       1. CKD (chronic kidney disease), stage III    2. Anemia due to chronic blood loss        Plan:   Return to clinic in 6 months.  Labs for next visit include rp.  Baseline creatinine is 1.1-1.3 since 2004.  His urine sediment is negative and his ultrasound is remarkable only for somewhat small symmetric kidneys.  Blood pressure is controlled on the current regimen.  Continue current medications as prescribed and reviewed.   Consider dropping lisinopril to 10 mg daily.

## 2018-04-23 ENCOUNTER — TELEPHONE (OUTPATIENT)
Dept: ORTHOPEDICS | Facility: CLINIC | Age: 73
End: 2018-04-23

## 2018-04-23 ENCOUNTER — OFFICE VISIT (OUTPATIENT)
Dept: FAMILY MEDICINE | Facility: CLINIC | Age: 73
End: 2018-04-23
Payer: MEDICARE

## 2018-04-23 VITALS
WEIGHT: 206.81 LBS | BODY MASS INDEX: 26.54 KG/M2 | HEART RATE: 76 BPM | HEIGHT: 74 IN | DIASTOLIC BLOOD PRESSURE: 80 MMHG | SYSTOLIC BLOOD PRESSURE: 114 MMHG

## 2018-04-23 DIAGNOSIS — R26.9 GAIT ABNORMALITY: ICD-10-CM

## 2018-04-23 DIAGNOSIS — E53.8 B12 DEFICIENCY: ICD-10-CM

## 2018-04-23 DIAGNOSIS — I48.20 CHRONIC ATRIAL FIBRILLATION: ICD-10-CM

## 2018-04-23 DIAGNOSIS — I10 HYPERTENSION, UNSPECIFIED TYPE: ICD-10-CM

## 2018-04-23 DIAGNOSIS — E61.1 IRON DEFICIENCY: ICD-10-CM

## 2018-04-23 DIAGNOSIS — E03.9 HYPOTHYROIDISM, UNSPECIFIED TYPE: ICD-10-CM

## 2018-04-23 DIAGNOSIS — Z00.00 PREVENTATIVE HEALTH CARE: Primary | ICD-10-CM

## 2018-04-23 DIAGNOSIS — M17.11 PRIMARY OSTEOARTHRITIS OF RIGHT KNEE: ICD-10-CM

## 2018-04-23 PROCEDURE — G0009 ADMIN PNEUMOCOCCAL VACCINE: HCPCS | Mod: S$GLB,,, | Performed by: FAMILY MEDICINE

## 2018-04-23 PROCEDURE — 90732 PPSV23 VACC 2 YRS+ SUBQ/IM: CPT | Mod: S$GLB,,, | Performed by: FAMILY MEDICINE

## 2018-04-23 PROCEDURE — 3079F DIAST BP 80-89 MM HG: CPT | Mod: CPTII,S$GLB,, | Performed by: FAMILY MEDICINE

## 2018-04-23 PROCEDURE — 99999 PR PBB SHADOW E&M-EST. PATIENT-LVL IV: CPT | Mod: PBBFAC,,, | Performed by: FAMILY MEDICINE

## 2018-04-23 PROCEDURE — 3074F SYST BP LT 130 MM HG: CPT | Mod: CPTII,S$GLB,, | Performed by: FAMILY MEDICINE

## 2018-04-23 PROCEDURE — 99397 PER PM REEVAL EST PAT 65+ YR: CPT | Mod: S$GLB,,, | Performed by: FAMILY MEDICINE

## 2018-04-23 PROCEDURE — 99499 UNLISTED E&M SERVICE: CPT | Mod: S$PBB,,, | Performed by: FAMILY MEDICINE

## 2018-04-23 RX ORDER — FERROUS SULFATE 325(65) MG
325 TABLET ORAL 2 TIMES DAILY
Qty: 60 TABLET | Refills: 11 | Status: SHIPPED | OUTPATIENT
Start: 2018-04-23 | End: 2019-09-16

## 2018-04-23 RX ORDER — LANOLIN ALCOHOL/MO/W.PET/CERES
1000 CREAM (GRAM) TOPICAL DAILY
Qty: 30 TABLET | Refills: 11 | Status: SHIPPED | OUTPATIENT
Start: 2018-04-23 | End: 2020-02-17

## 2018-04-23 NOTE — PROGRESS NOTES
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    Chief Complaint: Annual Exam    Here to establish care and for a general exam.    Afib - s/p 2 ablation; sees Dr. Beatty; gets episodes twice a year requiring cardioversion. He has cardioversion earlier this year. Taking Eliquis 5mg BID, atenolol 50mg daily, Rhythmol BID.  HTN - lisinopril 20mg daily; c/o some intermittent postural hpotension  CKD - following with Dr. Pond  S/p right humerus fracture in 1/2018 after a fall; doing some PT at [facility] presently.  Knee DJD - planning for right knee TKA in May.  Hypothyroidism - tolerating levothyroxine 25mcg;    C/o some general unsteadiness with gait; He would to consider therapy for this as well.  C/o SOB when walking up stairs. This does resolve with rest. Nuclear stress test in 2015 normal. Cardiac CT score was 0 in the past.    C/o general lethargy.    Lost 23 pounds in the last 6 months    Bruises easily    Enjoys bridge              Past Medical History:   Diagnosis Date    Anticoagulant long-term use     Anxiety     Arthritis     Atrial fibrillation     BPH (benign prostatic hyperplasia)     Cataract     CKD (chronic kidney disease) stage 3, GFR 30-59 ml/min 7/10/2017    Followed by Dr. Jeevan Pond    Colon polyp     benign    Depression     Elevated PSA     Erectile dysfunction     Gastric ulcer with hemorrhage     Hep B w/o coma 1977    History of bleeding peptic ulcer     History of prostatitis     Hypertension     Hypothyroidism     PAF (paroxysmal atrial fibrillation)     Sleep apnea     on CPAP       Past Surgical History:   Procedure Laterality Date    ABDOMINAL HERNIA REPAIR      CARDIAC SURGERY      CATARACT EXTRACTION  11/25/2013    bilateral    CHOLECYSTECTOMY      COLONOSCOPY N/A 11/25/2015    Procedure: COLONOSCOPY;  Surgeon: Toby Hernandez MD;  [facility];  Service: Endoscopy;  Laterality: N/A;    EYE SURGERY      GASTRIC BYPASS   1992    KNEE ARTHROSCOPY      RT    LASIK  2001    both eyes (Dr. Rabago)    ORIF HUMERUS FRACTURE      LT    RADIOFREQUENCY ABLATION      ROTATOR CUFF REPAIR      right       Review of patient's allergies indicates:   Allergen Reactions    No known drug allergies        Social History     Social History    Marital status:      Spouse name: N/A    Number of children: 7    Years of education: N/A     Occupational History    retired anesthesiology [employer]    [employer] grad      previous football player     Social History Main Topics    Smoking status: Never Smoker    Smokeless tobacco: Never Used      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 180 tablet 3    aspirin (ECOTRIN) 325 MG EC tablet Take 325 mg by mouth as needed for Pain.      atenolol (TENORMIN) 50 MG tablet Take 1 tablet (50 mg total) by mouth once daily. 30 tablet 11    betamethasone acetate-betamethasone sodium phosphate (CELESTONE) 6 mg/mL injection as needed.       buPROPion (WELLBUTRIN XL) 150 MG TB24 tablet Take 3 tablets (450 mg total) by mouth once daily. 270 tablet 3    CALCIUM ORAL Take by mouth Daily.      cyclobenzaprine (FLEXERIL) 10 MG tablet TK 1 T PO Q 8 H PRF MUSCLE SPASM  0    dutasteride (AVODART) 0.5 mg capsule Take 1 capsule (0.5 mg total) by mouth once daily. 90 capsule 3    efinaconazole 10 % Prakash Apply 1 application topically once daily. 8 mL 6    escitalopram oxalate (LEXAPRO) 10 MG tablet Take 1 tablet (10 mg total) by mouth once daily. 90  "tablet 3    KENALOG 40 mg/mL injection as needed.       levothyroxine (SYNTHROID) 25 MCG tablet TAKE 1 TABLET(25 MCG) BY MOUTH EVERY DAY 90 tablet 0    lisinopril (PRINIVIL,ZESTRIL) 20 MG tablet Take 1 tablet (20 mg total) by mouth once daily. 90 tablet 3    MULTIVITAMIN ORAL Take by mouth Daily.      OMEGA-3 FATTY ACIDS (FISH OIL CONCENTRATE ORAL) Take by mouth Daily.      propafenone (RYTHMOL SR) 425 MG Cp12 Take 1 capsule (425 mg total) by mouth every 12 (twelve) hours. 180 capsule 3     No current facility-administered medications on file prior to visit.        Family History   Problem Relation Age of Onset    COPD Father     Diabetes Father     Aortic stenosis Mother     Heart disease Mother      aortic stenosis    Heart attack Brother     No Known Problems Son     No Known Problems Daughter     No Known Problems Daughter     No Known Problems Daughter        Review of Systems   Constitutional: Positive for fatigue. Negative for appetite change, chills, fever and unexpected weight change.   HENT: Negative for sore throat and trouble swallowing.    Eyes: Negative for pain and visual disturbance.   Respiratory: Negative for cough, chest tightness, shortness of breath and wheezing.    Cardiovascular: Negative for chest pain, palpitations and leg swelling.   Gastrointestinal: Negative for abdominal pain, blood in stool, constipation, diarrhea and nausea.   Genitourinary: Negative for difficulty urinating, dysuria and hematuria.   Musculoskeletal: Negative for arthralgias, gait problem and neck pain.   Skin: Negative for rash and wound.   Neurological: Negative for dizziness, weakness, light-headedness and headaches.   Hematological: Negative for adenopathy.   Psychiatric/Behavioral: Negative for dysphoric mood.       Objective:      /80   Pulse 76   Ht 6' 2" (1.88 m)   Wt 93.8 kg (206 lb 12.7 oz)   BMI 26.55 kg/m²   Physical Exam   Constitutional: He is oriented to person, place, and time. " He appears well-developed and well-nourished. He is active. No distress.   HENT:   Head: Normocephalic and atraumatic.   Right Ear: External ear normal.   Left Ear: External ear normal.   Mouth/Throat: Uvula is midline, oropharynx is clear and moist and mucous membranes are normal. No oropharyngeal exudate.   Eyes: Conjunctivae, EOM and lids are normal. Pupils are equal, round, and reactive to light.   Neck: Normal range of motion, full passive range of motion without pain and phonation normal. Neck supple. No thyroid mass and no thyromegaly present.   Cardiovascular: Normal rate, regular rhythm, normal heart sounds and intact distal pulses.  Exam reveals no gallop and no friction rub.    No murmur heard.  Pulmonary/Chest: Effort normal and breath sounds normal. No respiratory distress. He has no wheezes. He has no rales.   Musculoskeletal: Normal range of motion.   Lymphadenopathy:     He has no cervical adenopathy.   Neurological: He is alert and oriented to person, place, and time. No cranial nerve deficit. Coordination normal.   Skin: Skin is warm and dry.   Psychiatric: He has a normal mood and affect. His speech is normal and behavior is normal. Judgment and thought content normal.         Results for orders placed or performed in visit on 04/09/18   Renal function panel   Result Value Ref Range    Glucose 107 70 - 110 mg/dL    Sodium 139 136 - 145 mmol/L    Potassium 4.5 3.5 - 5.1 mmol/L    Chloride 110 95 - 110 mmol/L    CO2 26 23 - 29 mmol/L    BUN, Bld 12 8 - 23 mg/dL    Calcium 8.4 (L) 8.7 - 10.5 mg/dL    Creatinine 1.3 0.5 - 1.4 mg/dL    Albumin 3.1 (L) 3.5 - 5.2 g/dL    Phosphorus 2.2 (L) 2.7 - 4.5 mg/dL    eGFR if African American >60.0 >60 mL/min/1.73 m^2    eGFR if non African American 54.9 (A) >60 mL/min/1.73 m^2    Anion Gap 3 (L) 8 - 16 mmol/L   CBC auto differential   Result Value Ref Range    WBC 11.02 3.90 - 12.70 K/uL    RBC 4.18 (L) 4.60 - 6.20 M/uL    Hemoglobin 11.2 (L) 14.0 - 18.0 g/dL     Hematocrit 36.2 (L) 40.0 - 54.0 %    MCV 87 82 - 98 fL    MCH 26.8 (L) 27.0 - 31.0 pg    MCHC 30.9 (L) 32.0 - 36.0 g/dL    RDW 16.6 (H) 11.5 - 14.5 %    Platelets 327 150 - 350 K/uL    MPV 11.7 9.2 - 12.9 fL    Immature Granulocytes 1.0 (H) 0.0 - 0.5 %    Gran # (ANC) 7.7 1.8 - 7.7 K/uL    Immature Grans (Abs) 0.11 (H) 0.00 - 0.04 K/uL    Lymph # 1.7 1.0 - 4.8 K/uL    Mono # 1.0 0.3 - 1.0 K/uL    Eos # 0.5 0.0 - 0.5 K/uL    Baso # 0.06 0.00 - 0.20 K/uL    nRBC 0 0 /100 WBC    Gran% 69.5 38.0 - 73.0 %    Lymph% 15.7 (L) 18.0 - 48.0 %    Mono% 9.0 4.0 - 15.0 %    Eosinophil% 4.3 0.0 - 8.0 %    Basophil% 0.5 0.0 - 1.9 %    Differential Method Automated        Assessment:       1. Preventative health care    2. Primary osteoarthritis of right knee    3. Gait abnormality    4. B12 deficiency    5. Iron deficiency    6. Hypothyroidism, unspecified type    7. Hypertension, unspecified type    8. Chronic atrial fibrillation        Plan:       Preventative health care  -     (In Office Administered) Pneumococcal Polysaccharide Vaccine (23 Valent) (SQ/IM)    Primary osteoarthritis of right knee  -     Ambulatory Referral to Physical/Occupational Therapy    Gait abnormality  -     Ambulatory Referral to Physical/Occupational Therapy    B12 deficiency  -     cyanocobalamin (VITAMIN B-12) 1000 MCG tablet; Take 1 tablet (1,000 mcg total) by mouth once daily.  Dispense: 30 tablet; Refill: 11  -     Vitamin B12; Future; Expected date: 07/22/2018    Iron deficiency  -     ferrous sulfate 325 mg (65 mg iron) Tab tablet; Take 1 tablet (325 mg total) by mouth 2 (two) times daily.  Dispense: 60 tablet; Refill: 11  -     CBC auto differential; Future; Expected date: 07/22/2018  -     Iron and TIBC; Future; Expected date: 07/22/2018    Hypothyroidism, unspecified type  -     TSH; Future; Expected date: 07/22/2018    Hypertension, unspecified type  -     Comprehensive metabolic panel; Future; Expected date: 07/22/2018    Chronic atrial  fibrillation        Suspect anemia is likely combination of B12 and iron deficiency from previous gastric bypass  Begin oral B12 and iron  Add PT for legs  Continue other present meds  Stop Oxybutynin  Increase D to 2,000 to 5,000 IU daily.  Counseled on regular exercise, maintenance of a healthy weight, balanced diet rich in fruits/vegetables and lean protein, and avoidance of unhealthy habits like smoking and excessive alcohol intake.  F/u 3 months with labs

## 2018-04-23 NOTE — TELEPHONE ENCOUNTER
Pt returned call. Rescheduled sx date from 5/15 to 5/29 due to Dr. Dallas being out on 5/15/18. Pt will see Dr. Dallas on 5/2 to discuss and confirm. Pt pleased and verbalized understanding.

## 2018-05-02 ENCOUNTER — OFFICE VISIT (OUTPATIENT)
Dept: ORTHOPEDICS | Facility: CLINIC | Age: 73
End: 2018-05-02
Payer: MEDICARE

## 2018-05-02 ENCOUNTER — TELEPHONE (OUTPATIENT)
Dept: FAMILY MEDICINE | Facility: CLINIC | Age: 73
End: 2018-05-02

## 2018-05-02 VITALS — WEIGHT: 218.38 LBS | HEIGHT: 74 IN | BODY MASS INDEX: 28.03 KG/M2

## 2018-05-02 DIAGNOSIS — M17.11 PRIMARY OSTEOARTHRITIS OF RIGHT KNEE: Primary | ICD-10-CM

## 2018-05-02 PROCEDURE — 99213 OFFICE O/P EST LOW 20 MIN: CPT | Mod: S$GLB,,, | Performed by: ORTHOPAEDIC SURGERY

## 2018-05-02 PROCEDURE — 99999 PR PBB SHADOW E&M-EST. PATIENT-LVL II: CPT | Mod: PBBFAC,,, | Performed by: ORTHOPAEDIC SURGERY

## 2018-05-02 NOTE — PROGRESS NOTES
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    Chief Complaint: Pain of the Right Knee    HPI  Dominick Song MD has right knee pain.  The pain is unchanged. The pain is located in the global aspect of the knee.  There  is not radiation.  There is associated stiffness.   There is not catching and locking. The pain is described as achy. The pain is aggravated by activity.  It is alleviated by rest on occasion.  He is in PT.  Here to finalize plans for surgery.    His history, medications and problem list were reviewed.    Review of Systems   Constitution: Negative for chills, fever and night sweats.   HENT: Negative for hearing loss.    Eyes: Negative for blurred vision and double vision.   Cardiovascular: Negative for chest pain, claudication and leg swelling.   Respiratory: Negative for shortness of breath.    Endocrine: Negative for polydipsia, polyphagia and polyuria.   Hematologic/Lymphatic: Negative for adenopathy and bleeding problem. Does not bruise/bleed easily.   Skin: Negative for poor wound healing.   Gastrointestinal: Negative for diarrhea and heartburn.   Genitourinary: Negative for bladder incontinence.   Neurological: Negative for focal weakness, headaches, numbness, paresthesias and sensory change.   Psychiatric/Behavioral: The patient is not nervous/anxious.    Allergic/Immunologic: Negative for persistent infections.         Objective:      Body mass index is 28.04 kg/m².  Vitals:    05/02/18 0931   Weight: 99 kg (218 lb 5.9 oz)   Height: 6' 2" (1.88 m)           General    Constitutional: He is oriented to person, place, and time. He appears well-developed and well-nourished.   HENT:   Head: Normocephalic and atraumatic.   Eyes: EOM are normal.   Cardiovascular: Normal rate.    Pulmonary/Chest: Effort normal.   Neurological: He is alert and oriented to person, place, and time.   Psychiatric: He has a normal mood and affect. His behavior is normal.     General " Musculoskeletal Exam   Gait: antalgic       Right Knee Exam     Inspection   Erythema: absent  Scars: absent  Swelling: absent  Effusion: effusion  Deformity: deformity  Bruising: absent    Tenderness   The patient is tender to palpation of the medial joint line and lateral joint line.    Crepitus   The patient has crepitus of the patella.    Range of Motion   Extension: 0   Flexion: 120     Tests   Ligament Examination Lachman: normal (-1 to 2mm)   MCL - Valgus: normal (0 to 2mm)  LCL - Varus: normal  Patella   Passive Patellar Tilt: neutral    Other   Sensation: normal    Left Knee Exam     Inspection   Erythema: absent  Scars: absent  Swelling: absent  Effusion: absent  Deformity: deformity  Bruising: absent    Tenderness   The patient is experiencing no tenderness.         Range of Motion   Extension: 0   Flexion: 130     Tests   Stability Lachman: normal (-1 to 2mm)   MCL - Valgus: normal (0 to 2mm)  LCL - Varus: normal (0 to 2mm)  Patella   Passive Patellar Tilt: neutral    Other   Sensation: normal    Muscle Strength   Right Lower Extremity   Hip Abduction: 5/5   Quadriceps:  5/5   Hamstrin/5   Left Lower Extremity   Hip Abduction: 5/5   Quadriceps:  5/5   Hamstrin/5     Reflexes     Left Side  Quadriceps:  2+    Right Side   Quadriceps:  2+    Vascular Exam     Right Pulses  Dorsalis Pedis:      2+          Left Pulses  Dorsalis Pedis:      2+          Edema  Right Lower Leg: absent  Left Lower Leg: absent              Assessment:       Encounter Diagnosis   Name Primary?    Primary osteoarthritis of right knee Yes          Plan:       Dominick was seen today for pain.    Diagnoses and all orders for this visit:    Primary osteoarthritis of right knee        Treatment options were discussed. The surgical process of right knee replacement was discussed in detail with the patient including a detailed discussion of the procedure itself (including visual model, x-ray review, and literature review). The  typical perioperative and post-operative course was discussed and perioperative risks were discussed to the patient's satisfaction.  Risks and complications discussed included but were not limited to the risks of anesthetic complications, infection, bleeding, wound healing complications, stiffness, aseptic loosening, instability, limb length inequality, neurologic dysfunction including numbness and weakness, additional surgery,  DVT, pulmonary embolism, perioperative medical risks (cardiac, pulmonary, renal, neurologic), and death and the patient elects to proceed.  The patient should get medically cleared and attend the joint seminar.    Warfarin with ASA bridge.  Will convert to Eliquis at 10-14 days.    In pre hab now

## 2018-05-02 NOTE — TELEPHONE ENCOUNTER
----- Message from Lisa Guerrero sent at 5/2/2018 10:20 AM CDT -----  Contact: self [phone]  He is calling to request a clearance for knee surgery.  Please call him.  Thank you!

## 2018-05-02 NOTE — TELEPHONE ENCOUNTER
Patient was just seen and appears medically stable to undergo knee surgery.  He recently had labs and EKG.

## 2018-05-02 NOTE — TELEPHONE ENCOUNTER
Can patient be cleared for knee surgery via chart?  LOV 4/23/18  Surgery scheduled 5/29/18 (Dr. Dallas [facility]).

## 2018-05-03 ENCOUNTER — PATIENT MESSAGE (OUTPATIENT)
Dept: FAMILY MEDICINE | Facility: CLINIC | Age: 73
End: 2018-05-03

## 2018-05-03 DIAGNOSIS — M17.11 PRIMARY OSTEOARTHRITIS OF RIGHT KNEE: Primary | ICD-10-CM

## 2018-05-04 ENCOUNTER — ANESTHESIA EVENT (OUTPATIENT)
Dept: SURGERY | Facility: HOSPITAL | Age: 73
DRG: 470 | End: 2018-05-04
Payer: MEDICARE

## 2018-05-04 DIAGNOSIS — M79.606 PAIN OF LOWER EXTREMITY, UNSPECIFIED LATERALITY: Primary | ICD-10-CM

## 2018-05-04 NOTE — ANESTHESIA PREPROCEDURE EVALUATION
Anesthesia Assessment: Preoperative EQUATION    Planned Procedure: Procedure(s) (LRB):  REPLACEMENT-KNEE-TOTAL-axel (Right)  Requested Anesthesia Type:Regional  Surgeon: Denzel Dallas MD  Service: Orthopedics  Known or anticipated Date of Surgery:5/29/2018    Plan:    Testing:  BMP and PT/INR   Pre-anesthesia  visit       Visit focus: Phone ROS-discussed case with Dr. 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    Anesthesia Evaluation    I have reviewed the Patient Summary Reports.    I have reviewed the Nursing Notes.   I have reviewed the Medications.     Review of Systems  Anesthesia Hx:  No problems with previous Anesthesia    Social:  Non-Smoker, No Alcohol Use    Hematology/Oncology:     Oncology Normal    -- Anemia: Blood Loss Anemia  Chronic    Cardiovascular:   Hypertension Housework,yard work, cooks, grocery shopping.  Does report sob after climbing a flight of stairs-patient states due to the pain  Disorder of Cardiac Rhythm, Atrial Fibrillation, Paroxysmal Atrial Fibrillation (s/p multiple DCCV-last time 2/2018. Last EKG SB. Recent evaluation 3/2018 with Dr. Beatty)    Pulmonary:   Denies Shortness of breath. Sleep Apnea, CPAP    Renal/:  Kidney Function/Disease, Chronic Kidney Disease (CKD) , CKD Stage III (GFR 30-59) Recent evaluation with Dr. Pond 4/2018. Baseline creatinine 1.1-1.3    Hepatic/GI:   PUD, Liver Disease, Hepatitis, B    Musculoskeletal:  Musculoskeletal General/Symptoms: Functional capacity is ambulatory without assistance.  Joint Disease:  Arthritis, Osteoarthritis Fall 1/2018 s/p broken right arm. Treatment with arm sling. Patient reports he is doing PT and has good ROM   Neurological:   Denies CVA. Denies Seizures.  Osteoarthritis     Endocrine:   Hypothyroidism    Dermatological:  Skin Normal        Physical Exam  General:  Well nourished    Airway/Jaw/Neck:  Airway Findings: Mouth Opening: Normal Tongue: Normal  General Airway Assessment: Adult  Mallampati: I  TM Distance: Normal, at least 6 cm  Jaw/Neck Findings:  Neck ROM: Normal ROM     Eyes/Ears/Nose:  Eyes/Ears/Nose Findings:    Dental:  Dental Findings: In tact    Chest/Lungs:  Chest/Lungs Findings: Clear to auscultation, Normal Respiratory Rate     Heart/Vascular:  Heart Findings: Rate: Bradycardia  Rhythm: Regular Rhythm  Sounds: Normal  Heart Murmur  Vascular Findings:  Vascular Exam Findings: HARD STICK        Mental Status:  Mental Status Findings:  Cooperative, Alert and Oriented           Labs pending 5/16    Discharge disposition: home with wife Amy [phone]    Apixaban last dose -pending approval by Dr. Beatty    Medical evaluation by Dr. Gaines completed    Thao Miller RN 05/04/2018-Phone ROS            Anesthesia Plan  Type of Anesthesia, risks & benefits discussed:  Anesthesia Type:  general, regional, spinal, epidural, CSE  Patient's Preference:   Intra-op Monitoring Plan: standard ASA monitors  Intra-op Monitoring Plan Comments:   Post Op Pain Control Plan:   Post Op Pain Control Plan Comments:   Induction:   IV  Beta Blocker:  Patient is on a Beta-Blocker and has received one dose within the past 24 hours (No further documentation required).       Informed Consent: Patient understands risks and agrees with Anesthesia plan.  Questions answered. Anesthesia consent signed with patient.  ASA Score: 2     Day of Surgery Review of History & Physical:    H&P update referred to the surgeon.         Ready For Surgery From Anesthesia Perspective.

## 2018-05-04 NOTE — PRE ADMISSION SCREENING
Anesthesia Assessment: Preoperative EQUATION    Planned Procedure: Procedure(s) (LRB):  REPLACEMENT-KNEE-TOTAL-axel (Right)  Requested Anesthesia Type:Regional  Surgeon: Denzel Dallas MD  Service: Orthopedics  Known or anticipated Date of Surgery:5/29/2018    Plan:    Testing:  BMP and PT/INR   Pre-anesthesia  visit       Visit focus: Phone ROS-discussed case with Dr. Dover     Consultation:Patient's PCP for a statement of optimization      Thao Miller RN 05/04/2018

## 2018-05-11 ENCOUNTER — PATIENT MESSAGE (OUTPATIENT)
Dept: SPORTS MEDICINE | Facility: CLINIC | Age: 73
End: 2018-05-11

## 2018-05-16 ENCOUNTER — OFFICE VISIT (OUTPATIENT)
Dept: ORTHOPEDICS | Facility: CLINIC | Age: 73
End: 2018-05-16
Payer: MEDICARE

## 2018-05-16 ENCOUNTER — HOSPITAL ENCOUNTER (OUTPATIENT)
Dept: RADIOLOGY | Facility: HOSPITAL | Age: 73
Discharge: HOME | End: 2018-05-16
Attending: NURSE PRACTITIONER
Payer: MEDICARE

## 2018-05-16 VITALS — WEIGHT: 210.75 LBS | HEIGHT: 74 IN | BODY MASS INDEX: 27.05 KG/M2

## 2018-05-16 DIAGNOSIS — Z01.818 PREOP EXAMINATION: ICD-10-CM

## 2018-05-16 DIAGNOSIS — M25.561 RIGHT KNEE PAIN, UNSPECIFIED CHRONICITY: Primary | ICD-10-CM

## 2018-05-16 DIAGNOSIS — M25.561 RIGHT KNEE PAIN, UNSPECIFIED CHRONICITY: ICD-10-CM

## 2018-05-16 DIAGNOSIS — M17.11 PRIMARY OSTEOARTHRITIS OF RIGHT KNEE: ICD-10-CM

## 2018-05-16 PROCEDURE — 99999 PR PBB SHADOW E&M-EST. PATIENT-LVL IV: CPT | Mod: PBBFAC,,, | Performed by: NURSE PRACTITIONER

## 2018-05-16 PROCEDURE — 73560 X-RAY EXAM OF KNEE 1 OR 2: CPT | Mod: 26,RT,, | Performed by: RADIOLOGY

## 2018-05-16 PROCEDURE — 99499 UNLISTED E&M SERVICE: CPT | Mod: S$GLB,,, | Performed by: NURSE PRACTITIONER

## 2018-05-16 PROCEDURE — 73560 X-RAY EXAM OF KNEE 1 OR 2: CPT | Mod: TC,RT

## 2018-05-17 ENCOUNTER — TELEPHONE (OUTPATIENT)
Dept: SPORTS MEDICINE | Facility: CLINIC | Age: 73
End: 2018-05-17

## 2018-05-17 ENCOUNTER — TELEPHONE (OUTPATIENT)
Dept: ELECTROPHYSIOLOGY | Facility: CLINIC | Age: 73
End: 2018-05-17

## 2018-05-17 RX ORDER — AMOXICILLIN 250 MG
1 CAPSULE ORAL 2 TIMES DAILY
Status: CANCELLED | OUTPATIENT
Start: 2018-05-17

## 2018-05-17 RX ORDER — OXYCODONE HYDROCHLORIDE 5 MG/1
10 TABLET ORAL
Status: CANCELLED | OUTPATIENT
Start: 2018-05-17

## 2018-05-17 RX ORDER — PROMETHAZINE HYDROCHLORIDE 25 MG/1
12.5 TABLET ORAL EVERY 6 HOURS PRN
Status: CANCELLED | OUTPATIENT
Start: 2018-05-17

## 2018-05-17 RX ORDER — FAMOTIDINE 20 MG/1
20 TABLET, FILM COATED ORAL 2 TIMES DAILY
Status: CANCELLED | OUTPATIENT
Start: 2018-05-17

## 2018-05-17 RX ORDER — MORPHINE SULFATE 10 MG/ML
2 INJECTION, SOLUTION INTRAMUSCULAR; INTRAVENOUS
Status: CANCELLED | OUTPATIENT
Start: 2018-05-17

## 2018-05-17 RX ORDER — CELECOXIB 100 MG/1
400 CAPSULE ORAL
Status: CANCELLED | OUTPATIENT
Start: 2018-05-17

## 2018-05-17 RX ORDER — MUPIROCIN 20 MG/G
1 OINTMENT TOPICAL
Status: CANCELLED | OUTPATIENT
Start: 2018-05-17

## 2018-05-17 RX ORDER — ASPIRIN 325 MG
325 TABLET, DELAYED RELEASE (ENTERIC COATED) ORAL 2 TIMES DAILY
Status: CANCELLED | OUTPATIENT
Start: 2018-05-17

## 2018-05-17 RX ORDER — MIDAZOLAM HYDROCHLORIDE 5 MG/ML
1 INJECTION INTRAMUSCULAR; INTRAVENOUS EVERY 5 MIN PRN
Status: CANCELLED | OUTPATIENT
Start: 2018-05-17

## 2018-05-17 RX ORDER — PREGABALIN 25 MG/1
75 CAPSULE ORAL
Status: CANCELLED | OUTPATIENT
Start: 2018-05-17

## 2018-05-17 RX ORDER — FENTANYL CITRATE 50 UG/ML
25 INJECTION, SOLUTION INTRAMUSCULAR; INTRAVENOUS EVERY 5 MIN PRN
Status: CANCELLED | OUTPATIENT
Start: 2018-05-17

## 2018-05-17 RX ORDER — BISACODYL 10 MG
10 SUPPOSITORY, RECTAL RECTAL EVERY 12 HOURS PRN
Status: CANCELLED | OUTPATIENT
Start: 2018-05-17

## 2018-05-17 RX ORDER — POLYETHYLENE GLYCOL 3350 17 G/17G
17 POWDER, FOR SOLUTION ORAL DAILY
Status: CANCELLED | OUTPATIENT
Start: 2018-05-18

## 2018-05-17 RX ORDER — ACETAMINOPHEN 500 MG
1000 TABLET ORAL EVERY 6 HOURS
Status: CANCELLED | OUTPATIENT
Start: 2018-05-17 | End: 2018-05-19

## 2018-05-17 RX ORDER — OXYCODONE HYDROCHLORIDE 5 MG/1
5 TABLET ORAL
Status: CANCELLED | OUTPATIENT
Start: 2018-05-17

## 2018-05-17 RX ORDER — RAMELTEON 8 MG/1
8 TABLET ORAL NIGHTLY PRN
Status: CANCELLED | OUTPATIENT
Start: 2018-05-17

## 2018-05-17 RX ORDER — SODIUM CHLORIDE 9 MG/ML
INJECTION, SOLUTION INTRAVENOUS CONTINUOUS
Status: CANCELLED | OUTPATIENT
Start: 2018-05-17 | End: 2018-05-18

## 2018-05-17 RX ORDER — SODIUM CHLORIDE 9 MG/ML
INJECTION, SOLUTION INTRAVENOUS
Status: CANCELLED | OUTPATIENT
Start: 2018-05-17

## 2018-05-17 RX ORDER — PREGABALIN 25 MG/1
75 CAPSULE ORAL NIGHTLY
Status: CANCELLED | OUTPATIENT
Start: 2018-05-17

## 2018-05-17 RX ORDER — ACETAMINOPHEN 10 MG/ML
1000 INJECTION, SOLUTION INTRAVENOUS ONCE
Status: CANCELLED | OUTPATIENT
Start: 2018-05-17 | End: 2018-05-17

## 2018-05-17 RX ORDER — CELECOXIB 100 MG/1
200 CAPSULE ORAL DAILY
Status: CANCELLED | OUTPATIENT
Start: 2018-05-18

## 2018-05-17 RX ORDER — ONDANSETRON 2 MG/ML
4 INJECTION INTRAMUSCULAR; INTRAVENOUS EVERY 8 HOURS PRN
Status: CANCELLED | OUTPATIENT
Start: 2018-05-17

## 2018-05-17 RX ORDER — OXYCODONE HYDROCHLORIDE 5 MG/1
15 TABLET ORAL
Status: CANCELLED | OUTPATIENT
Start: 2018-05-17

## 2018-05-17 RX ORDER — LIDOCAINE HYDROCHLORIDE 10 MG/ML
1 INJECTION, SOLUTION EPIDURAL; INFILTRATION; INTRACAUDAL; PERINEURAL
Status: CANCELLED | OUTPATIENT
Start: 2018-05-17

## 2018-05-17 RX ORDER — SODIUM CHLORIDE 0.9 % (FLUSH) 0.9 %
3 SYRINGE (ML) INJECTION EVERY 8 HOURS PRN
Status: CANCELLED | OUTPATIENT
Start: 2018-05-17

## 2018-05-17 RX ORDER — NALOXONE HCL 0.4 MG/ML
0.02 VIAL (ML) INJECTION
Status: CANCELLED | OUTPATIENT
Start: 2018-05-17

## 2018-05-17 RX ORDER — ROPIVACAINE HYDROCHLORIDE 2 MG/ML
8 INJECTION, SOLUTION EPIDURAL; INFILTRATION; PERINEURAL CONTINUOUS
Status: CANCELLED | OUTPATIENT
Start: 2018-05-17

## 2018-05-17 NOTE — PROGRESS NOTES
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 Dallas on 5/29/18.  he was last seen and treated in the clinic on 5/2/2018. he will be medically optimized by the pre op center. There has been no significant change in medical status since last visit. No fever, chills, malaise, or unexplained weight change.      Allergies, Medications, past medical and surgical history reviewed.    Focused examination performed.    Patient declined to see Dr. Dallas today in clinic. All questions answered. Patient encouraged to call with questions. Contact information given.     Pre, marcela, and post operative procedures and expectations discussed. Questions were answered. Dominick Song MD has been educated and is ready to proceed with surgery. Approximately 30 minutes was spent discussing surgical outcomes, plans, procedures pre, marcela, and post operative expections and care.  Surgical consent signed.    Dominick Song MD will contact us if there are any questions, concerns, or changes in medical status prior to surgery.

## 2018-05-17 NOTE — TELEPHONE ENCOUNTER
----- Message from Wyatt Beatty MD sent at 5/17/2018  5:32 AM CDT -----  3 days  ----- Message -----  From: Melissa Tijerina, RN  Sent: 5/16/2018   9:33 AM  To: Wyatt Beatty MD     Please see below. Can patient hold Eliquis 4 days prior to TKR (history of CKD III)?  ----- Message -----  From: Kaz Samaniego MA  Sent: 5/16/2018   7:47 AM  To: Melissa Tijerina RN        ----- Message -----  From: Thao Miller RN  Sent: 5/15/2018   8:55 PM  To: Wyatt Beatty MD, Jaci Schneider Staff    Patient is scheduled for a TKR under spinal anesthesia with Dr. Dallas 5/29.  Anesthesia is requesting management of Eliquis.  Based on his age, the recommendation per anesthesia's guideline is to hold the Eliquis 4 days before surgery(last day to take would be 5/24).    Thao Miller RN  [facility]  [id number]

## 2018-05-17 NOTE — H&P
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 Pain is worse with activity and weight bearing.  Patient has experienced interference of activities of daily living due to decreased range of motion and an increase in joint pain and swelling.  Patient has failed non-operative treatment including NSAIDs, corticosteroid injections, viscosupplement injections, and activity modification.  Dominick Song MD currently ambulates independently.     Past Medical History:   Diagnosis Date    Anticoagulant long-term use     Anxiety     Arthritis     Atrial fibrillation     BPH (benign prostatic hyperplasia)     Cataract     CKD (chronic kidney disease) stage 3, GFR 30-59 ml/min 7/10/2017    Followed by Dr. Jeevan Pond    Colon polyp     benign    Depression     Elevated PSA     Erectile dysfunction     Gastric ulcer with hemorrhage     Hep B w/o coma 1977    History of bleeding peptic ulcer     History of prostatitis     Hypertension     Hypothyroidism     PAF (paroxysmal atrial fibrillation)     Sleep apnea     on CPAP       Past Surgical History:   Procedure Laterality Date    ABDOMINAL HERNIA REPAIR      CARDIAC SURGERY      CATARACT EXTRACTION  11/25/2013    bilateral    CHOLECYSTECTOMY      COLONOSCOPY N/A 11/25/2015    Procedure: COLONOSCOPY;  Surgeon: Toby Hernandez MD;  Location: [facility];  Service: Endoscopy;  Laterality: N/A;    EYE SURGERY      GASTRIC BYPASS  1992    KNEE ARTHROSCOPY      RT    LASIK  2001    both eyes (Dr. Rabago)    ORIF HUMERUS FRACTURE      LT    RADIOFREQUENCY ABLATION      ROTATOR CUFF REPAIR      right       Family History   Problem Relation Age of Onset    COPD Father     Diabetes Father     Aortic stenosis Mother     Heart disease Mother         aortic stenosis    Heart attack Brother     No Known Problems Son     No Known Problems Daughter     No Known Problems Daughter     No Known  Problems Daughter        Review of patient's allergies indicates:   Allergen Reactions    No known drug allergies          Current Outpatient Prescriptions:     acyclovir (ZOVIRAX) 800 MG Tab, TK ONE T PO FIVE TIMES D only for fever blisters, Disp: , Rfl: 1    apixaban (ELIQUIS) 5 mg Tab, Take 1 tablet (5 mg total) by mouth 2 (two) times daily., Disp: 180 tablet, Rfl: 3    atenolol (TENORMIN) 50 MG tablet, Take 1 tablet (50 mg total) by mouth once daily., Disp: 30 tablet, Rfl: 11    betamethasone acetate-betamethasone sodium phosphate (CELESTONE) 6 mg/mL injection, as needed. , Disp: , Rfl:     buPROPion (WELLBUTRIN XL) 150 MG TB24 tablet, Take 3 tablets (450 mg total) by mouth once daily., Disp: 270 tablet, Rfl: 3    CALCIUM ORAL, Take by mouth Daily., Disp: , Rfl:     cyanocobalamin (VITAMIN B-12) 1000 MCG tablet, Take 1 tablet (1,000 mcg total) by mouth once daily. (Patient taking differently: Take 1,000 mcg by mouth every evening. ), Disp: 30 tablet, Rfl: 11    cyclobenzaprine (FLEXERIL) 10 MG tablet, TK 1 T PO Q 8 H PRF MUSCLE SPASM, Disp: , Rfl: 0    dutasteride (AVODART) 0.5 mg capsule, Take 1 capsule (0.5 mg total) by mouth once daily., Disp: 90 capsule, Rfl: 3    efinaconazole 10 % Prakash, Apply 1 application topically once daily., Disp: 8 mL, Rfl: 6    escitalopram oxalate (LEXAPRO) 10 MG tablet, Take 1 tablet (10 mg total) by mouth once daily., Disp: 90 tablet, Rfl: 3    ferrous sulfate 325 mg (65 mg iron) Tab tablet, Take 1 tablet (325 mg total) by mouth 2 (two) times daily., Disp: 60 tablet, Rfl: 11    KENALOG 40 mg/mL injection, as needed. , Disp: , Rfl:     levothyroxine (SYNTHROID) 25 MCG tablet, TAKE 1 TABLET(25 MCG) BY MOUTH EVERY DAY, Disp: 90 tablet, Rfl: 0    lisinopril (PRINIVIL,ZESTRIL) 20 MG tablet, Take 1 tablet (20 mg total) by mouth once daily., Disp: 90 tablet, Rfl: 3    MULTIVITAMIN ORAL, Take by mouth Daily., Disp: , Rfl:     OMEGA-3 FATTY ACIDS (FISH OIL CONCENTRATE  "ORAL), Take by mouth Daily., Disp: , Rfl:     propafenone (RYTHMOL SR) 425 MG Cp12, Take 1 capsule (425 mg total) by mouth every 12 (twelve) hours., Disp: 180 capsule, Rfl: 3    Review of Systems:  Constitutional: no fever or chills  Eyes: no visual changes  ENT: no nasal congestion or sore throat  Respiratory: no cough or shortness of breath  Cardiovascular: no chest pain or palpitations  Gastrointestinal: no nausea or vomiting, tolerating diet  Genitourinary: no hematuria or dysuria  Integument/Breast: no rash or pruritis  Hematologic/Lymphatic: no easy bruising or lymphadenopathy  Musculoskeletal: positive for c/o right knee pain worse with activity  Neurological: no seizures or tremors  Behavioral/Psych: no auditory or visual hallucinations  Endocrine: no heat or cold intolerance    PE:  Ht 6' 2" (1.88 m)   Wt 95.6 kg (210 lb 12.2 oz)   BMI 27.06 kg/m²   General: Pleasant, cooperative, NAD   Gait: antalgic  HEENT: NCAT, sclera nonicteric   Lungs: Respirations clear bilaterally; equal and unlabored.   CV: S1S2; 2+ bilateral upper and lower extremity pulses.   Skin: Intact throughout with no rashes, erythema, or lesions  Extremities: No LE edema,  no erythema or warmth of the skin in either lower extremity.    Right knee exam:  Knee Range of Motion:normal, pain with passive range of motion  Effusion:none  Condition of skin:intact  Location of tenderness:Medial joint line   Strength:normal  Stability: stable to testing    Hip Examination:normal    Radiographs: Radiographs reveal DJD and significant joint space narrowing.  No fracture or bone destruction identified      Knee Alignment: normal    Diagnosis: osteoarthritis Right knee    Plan: Right total knee arthroplasty    Due to the serious nature of total joint infection and the high prevalence of community acquired MRSA, vancomycin will be used perioperatively.            "

## 2018-05-19 DIAGNOSIS — N40.0 BENIGN PROSTATIC HYPERPLASIA: ICD-10-CM

## 2018-05-21 ENCOUNTER — TELEPHONE (OUTPATIENT)
Dept: UROLOGY | Facility: CLINIC | Age: 73
End: 2018-05-21

## 2018-05-21 DIAGNOSIS — N40.1 BPH WITH URINARY OBSTRUCTION: Primary | ICD-10-CM

## 2018-05-21 DIAGNOSIS — N13.8 BPH WITH URINARY OBSTRUCTION: Primary | ICD-10-CM

## 2018-05-21 RX ORDER — DUTASTERIDE 0.5 MG/1
CAPSULE, LIQUID FILLED ORAL
Qty: 90 CAPSULE | Refills: 3 | Status: SHIPPED | OUTPATIENT
Start: 2018-05-21 | End: 2018-11-01 | Stop reason: SDUPTHER

## 2018-05-22 RX ORDER — DUTASTERIDE 0.5 MG/1
0.5 CAPSULE, LIQUID FILLED ORAL DAILY
Qty: 90 CAPSULE | Refills: 3 | Status: SHIPPED | OUTPATIENT
Start: 2018-05-22 | End: 2018-07-30

## 2018-05-25 ENCOUNTER — TELEPHONE (OUTPATIENT)
Dept: ORTHOPEDICS | Facility: CLINIC | Age: 73
End: 2018-05-25

## 2018-05-25 NOTE — TELEPHONE ENCOUNTER
----- Message from Denzel Dallas MD sent at 5/25/2018  1:28 PM CDT -----  Please call him and remind him to stop his Eliquis after today so we can proceed with neuraxial. Thanks.

## 2018-05-28 ENCOUNTER — TELEPHONE (OUTPATIENT)
Dept: ORTHOPEDICS | Facility: CLINIC | Age: 73
End: 2018-05-28

## 2018-05-28 NOTE — TELEPHONE ENCOUNTER
Spoke to pt, informed arrival time for surgery tomorrow is 0630 am, 2nd floor surgery center, no food or drink after midnight. Pt verbalized understanding.

## 2018-05-29 ENCOUNTER — ANESTHESIA (OUTPATIENT)
Dept: SURGERY | Facility: HOSPITAL | Age: 73
DRG: 470 | End: 2018-05-29
Payer: MEDICARE

## 2018-05-29 ENCOUNTER — HOSPITAL ENCOUNTER (INPATIENT)
Facility: HOSPITAL | Age: 73
LOS: 1 days | Discharge: HOME HEALTH | DRG: 470 | End: 2018-05-30
Attending: ORTHOPAEDIC SURGERY | Admitting: ORTHOPAEDIC SURGERY
Payer: MEDICARE

## 2018-05-29 ENCOUNTER — SURGERY (OUTPATIENT)
Age: 73
End: 2018-05-29

## 2018-05-29 DIAGNOSIS — M17.11 PRIMARY OSTEOARTHRITIS OF RIGHT KNEE: ICD-10-CM

## 2018-05-29 DIAGNOSIS — Z01.818 PREOP EXAMINATION: ICD-10-CM

## 2018-05-29 LAB
ANION GAP SERPL CALC-SCNC: 6 MMOL/L
BUN SERPL-MCNC: 18 MG/DL
CALCIUM SERPL-MCNC: 7.7 MG/DL
CHLORIDE SERPL-SCNC: 114 MMOL/L
CO2 SERPL-SCNC: 21 MMOL/L
CREAT SERPL-MCNC: 1.1 MG/DL
EST. GFR  (AFRICAN AMERICAN): >60 ML/MIN/1.73 M^2
EST. GFR  (NON AFRICAN AMERICAN): >60 ML/MIN/1.73 M^2
GLUCOSE SERPL-MCNC: 109 MG/DL
INR PPP: 0.9
POTASSIUM SERPL-SCNC: 4.5 MMOL/L
PROTHROMBIN TIME: 10.1 SEC
SODIUM SERPL-SCNC: 141 MMOL/L

## 2018-05-29 PROCEDURE — 25000003 PHARM REV CODE 250: Performed by: NURSE ANESTHETIST, CERTIFIED REGISTERED

## 2018-05-29 PROCEDURE — 64448 NJX AA&/STRD FEM NRV NFS IMG: CPT | Mod: 59,RT,, | Performed by: ANESTHESIOLOGY

## 2018-05-29 PROCEDURE — 25000003 PHARM REV CODE 250: Performed by: NURSE PRACTITIONER

## 2018-05-29 PROCEDURE — 63600175 PHARM REV CODE 636 W HCPCS: Performed by: NURSE PRACTITIONER

## 2018-05-29 PROCEDURE — 37000008 HC ANESTHESIA 1ST 15 MINUTES: Performed by: ORTHOPAEDIC SURGERY

## 2018-05-29 PROCEDURE — 88305 TISSUE EXAM BY PATHOLOGIST: CPT | Mod: 26,,, | Performed by: PATHOLOGY

## 2018-05-29 PROCEDURE — 88311 DECALCIFY TISSUE: CPT | Mod: 26,,, | Performed by: PATHOLOGY

## 2018-05-29 PROCEDURE — 71000033 HC RECOVERY, INTIAL HOUR: Performed by: ORTHOPAEDIC SURGERY

## 2018-05-29 PROCEDURE — 27000221 HC OXYGEN, UP TO 24 HOURS

## 2018-05-29 PROCEDURE — 80048 BASIC METABOLIC PNL TOTAL CA: CPT

## 2018-05-29 PROCEDURE — 97161 PT EVAL LOW COMPLEX 20 MIN: CPT

## 2018-05-29 PROCEDURE — D9220A PRA ANESTHESIA: Mod: CRNA,,, | Performed by: NURSE ANESTHETIST, CERTIFIED REGISTERED

## 2018-05-29 PROCEDURE — 11000001 HC ACUTE MED/SURG PRIVATE ROOM

## 2018-05-29 PROCEDURE — 36000711: Performed by: ORTHOPAEDIC SURGERY

## 2018-05-29 PROCEDURE — 97535 SELF CARE MNGMENT TRAINING: CPT

## 2018-05-29 PROCEDURE — 36415 COLL VENOUS BLD VENIPUNCTURE: CPT

## 2018-05-29 PROCEDURE — 36000710: Performed by: ORTHOPAEDIC SURGERY

## 2018-05-29 PROCEDURE — 27100025 HC TUBING, SET FLUID WARMER: Performed by: NURSE ANESTHETIST, CERTIFIED REGISTERED

## 2018-05-29 PROCEDURE — 25000003 PHARM REV CODE 250: Performed by: ANESTHESIOLOGY

## 2018-05-29 PROCEDURE — 63600175 PHARM REV CODE 636 W HCPCS: Performed by: NURSE ANESTHETIST, CERTIFIED REGISTERED

## 2018-05-29 PROCEDURE — 25000003 PHARM REV CODE 250: Performed by: ORTHOPAEDIC SURGERY

## 2018-05-29 PROCEDURE — 27800517 HC TRAY,EPIDURAL-CONTINUOUS: Performed by: ANESTHESIOLOGY

## 2018-05-29 PROCEDURE — 0SRC0J9 REPLACEMENT OF RIGHT KNEE JOINT WITH SYNTHETIC SUBSTITUTE, CEMENTED, OPEN APPROACH: ICD-10-PCS | Performed by: ORTHOPAEDIC SURGERY

## 2018-05-29 PROCEDURE — C1713 ANCHOR/SCREW BN/BN,TIS/BN: HCPCS | Performed by: ORTHOPAEDIC SURGERY

## 2018-05-29 PROCEDURE — 63600175 PHARM REV CODE 636 W HCPCS: Performed by: ANESTHESIOLOGY

## 2018-05-29 PROCEDURE — C1776 JOINT DEVICE (IMPLANTABLE): HCPCS | Performed by: ORTHOPAEDIC SURGERY

## 2018-05-29 PROCEDURE — 76942 ECHO GUIDE FOR BIOPSY: CPT | Mod: 26,,, | Performed by: ANESTHESIOLOGY

## 2018-05-29 PROCEDURE — S0028 INJECTION, FAMOTIDINE, 20 MG: HCPCS | Performed by: NURSE ANESTHETIST, CERTIFIED REGISTERED

## 2018-05-29 PROCEDURE — D9220A PRA ANESTHESIA: Mod: ANES,,, | Performed by: ANESTHESIOLOGY

## 2018-05-29 PROCEDURE — 27447 TOTAL KNEE ARTHROPLASTY: CPT | Mod: RT,,, | Performed by: ORTHOPAEDIC SURGERY

## 2018-05-29 PROCEDURE — 94761 N-INVAS EAR/PLS OXIMETRY MLT: CPT

## 2018-05-29 PROCEDURE — 94799 UNLISTED PULMONARY SVC/PX: CPT

## 2018-05-29 PROCEDURE — 88305 TISSUE EXAM BY PATHOLOGIST: CPT | Performed by: PATHOLOGY

## 2018-05-29 PROCEDURE — 97530 THERAPEUTIC ACTIVITIES: CPT

## 2018-05-29 PROCEDURE — 27201423 OPTIME MED/SURG SUP & DEVICES STERILE SUPPLY: Performed by: ORTHOPAEDIC SURGERY

## 2018-05-29 PROCEDURE — 71000039 HC RECOVERY, EACH ADD'L HOUR: Performed by: ORTHOPAEDIC SURGERY

## 2018-05-29 PROCEDURE — 64450 NJX AA&/STRD OTHER PN/BRANCH: CPT | Performed by: ANESTHESIOLOGY

## 2018-05-29 PROCEDURE — 37000009 HC ANESTHESIA EA ADD 15 MINS: Performed by: ORTHOPAEDIC SURGERY

## 2018-05-29 PROCEDURE — 85610 PROTHROMBIN TIME: CPT

## 2018-05-29 PROCEDURE — 27200750 HC INSULATED NEEDLE/ STIMUPLEX: Performed by: ANESTHESIOLOGY

## 2018-05-29 PROCEDURE — 97165 OT EVAL LOW COMPLEX 30 MIN: CPT

## 2018-05-29 DEVICE — CEMENT BONE SIMPLE P INDIVID: Type: IMPLANTABLE DEVICE | Site: KNEE | Status: FUNCTIONAL

## 2018-05-29 DEVICE — COMP FEM POST STAB CEM SZ 5 RT: Type: IMPLANTABLE DEVICE | Site: KNEE | Status: FUNCTIONAL

## 2018-05-29 DEVICE — INSERT TIBIAL POST SZ 6 11MM
Type: IMPLANTABLE DEVICE | Site: KNEE | Status: NON-FUNCTIONAL
Removed: 2023-12-09

## 2018-05-29 DEVICE — PATELLA TRIATHLON 31X9 SYMTRC: Type: IMPLANTABLE DEVICE | Site: KNEE | Status: FUNCTIONAL

## 2018-05-29 DEVICE — BASEPLATE TIB CEM PRIM SZ 6: Type: IMPLANTABLE DEVICE | Site: KNEE | Status: FUNCTIONAL

## 2018-05-29 RX ORDER — LISINOPRIL 20 MG/1
20 TABLET ORAL DAILY
Status: DISCONTINUED | OUTPATIENT
Start: 2018-05-30 | End: 2018-05-30 | Stop reason: HOSPADM

## 2018-05-29 RX ORDER — MIDAZOLAM HYDROCHLORIDE 1 MG/ML
1 INJECTION INTRAMUSCULAR; INTRAVENOUS EVERY 5 MIN PRN
Status: DISCONTINUED | OUTPATIENT
Start: 2018-05-29 | End: 2018-05-29 | Stop reason: HOSPADM

## 2018-05-29 RX ORDER — ACETAMINOPHEN 500 MG
1000 TABLET ORAL EVERY 6 HOURS
Status: DISCONTINUED | OUTPATIENT
Start: 2018-05-29 | End: 2018-05-30 | Stop reason: HOSPADM

## 2018-05-29 RX ORDER — LISINOPRIL 10 MG/1
20 TABLET ORAL ONCE
Status: COMPLETED | OUTPATIENT
Start: 2018-05-29 | End: 2018-05-29

## 2018-05-29 RX ORDER — PROPOFOL 10 MG/ML
VIAL (ML) INTRAVENOUS CONTINUOUS PRN
Status: DISCONTINUED | OUTPATIENT
Start: 2018-05-29 | End: 2018-05-29

## 2018-05-29 RX ORDER — OXYCODONE HYDROCHLORIDE 5 MG/1
10 TABLET ORAL
Status: DISCONTINUED | OUTPATIENT
Start: 2018-05-29 | End: 2018-05-30 | Stop reason: HOSPADM

## 2018-05-29 RX ORDER — ASPIRIN 81 MG/1
81 TABLET ORAL 2 TIMES DAILY
Status: DISCONTINUED | OUTPATIENT
Start: 2018-05-29 | End: 2018-05-30 | Stop reason: HOSPADM

## 2018-05-29 RX ORDER — LABETALOL HYDROCHLORIDE 5 MG/ML
INJECTION, SOLUTION INTRAVENOUS
Status: DISPENSED
Start: 2018-05-29 | End: 2018-05-30

## 2018-05-29 RX ORDER — LABETALOL HYDROCHLORIDE 5 MG/ML
10 INJECTION, SOLUTION INTRAVENOUS ONCE
Status: DISCONTINUED | OUTPATIENT
Start: 2018-05-29 | End: 2018-05-29 | Stop reason: HOSPADM

## 2018-05-29 RX ORDER — ROPIVACAINE HYDROCHLORIDE 2 MG/ML
8 INJECTION, SOLUTION EPIDURAL; INFILTRATION; PERINEURAL CONTINUOUS
Status: DISCONTINUED | OUTPATIENT
Start: 2018-05-29 | End: 2018-05-30 | Stop reason: HOSPADM

## 2018-05-29 RX ORDER — POLYETHYLENE GLYCOL 3350 17 G/17G
17 POWDER, FOR SOLUTION ORAL DAILY
Status: DISCONTINUED | OUTPATIENT
Start: 2018-05-30 | End: 2018-05-30 | Stop reason: HOSPADM

## 2018-05-29 RX ORDER — BUPROPION HYDROCHLORIDE 150 MG/1
450 TABLET ORAL DAILY
Status: DISCONTINUED | OUTPATIENT
Start: 2018-05-30 | End: 2018-05-30 | Stop reason: HOSPADM

## 2018-05-29 RX ORDER — SODIUM CHLORIDE 9 MG/ML
INJECTION, SOLUTION INTRAVENOUS
Status: COMPLETED | OUTPATIENT
Start: 2018-05-29 | End: 2018-05-29

## 2018-05-29 RX ORDER — ACETAMINOPHEN 10 MG/ML
1000 INJECTION, SOLUTION INTRAVENOUS ONCE
Status: COMPLETED | OUTPATIENT
Start: 2018-05-29 | End: 2018-05-29

## 2018-05-29 RX ORDER — SODIUM CHLORIDE 9 MG/ML
INJECTION, SOLUTION INTRAVENOUS CONTINUOUS PRN
Status: DISCONTINUED | OUTPATIENT
Start: 2018-05-29 | End: 2018-05-29

## 2018-05-29 RX ORDER — LEVOTHYROXINE SODIUM 25 UG/1
25 TABLET ORAL
Status: DISCONTINUED | OUTPATIENT
Start: 2018-05-30 | End: 2018-05-30 | Stop reason: HOSPADM

## 2018-05-29 RX ORDER — LIDOCAINE HCL/PF 100 MG/5ML
SYRINGE (ML) INTRAVENOUS
Status: DISCONTINUED | OUTPATIENT
Start: 2018-05-29 | End: 2018-05-29

## 2018-05-29 RX ORDER — WARFARIN 2.5 MG/1
2.5 TABLET ORAL DAILY
Qty: 90 TABLET | Refills: 1 | Status: SHIPPED | OUTPATIENT
Start: 2018-05-29 | End: 2018-05-30

## 2018-05-29 RX ORDER — PROMETHAZINE HYDROCHLORIDE 12.5 MG/1
12.5 TABLET ORAL EVERY 6 HOURS PRN
Status: DISCONTINUED | OUTPATIENT
Start: 2018-05-29 | End: 2018-05-29 | Stop reason: HOSPADM

## 2018-05-29 RX ORDER — CEFAZOLIN SODIUM 1 G/3ML
2 INJECTION, POWDER, FOR SOLUTION INTRAMUSCULAR; INTRAVENOUS
Status: COMPLETED | OUTPATIENT
Start: 2018-05-29 | End: 2018-05-30

## 2018-05-29 RX ORDER — LIDOCAINE HYDROCHLORIDE 10 MG/ML
1 INJECTION, SOLUTION EPIDURAL; INFILTRATION; INTRACAUDAL; PERINEURAL
Status: DISCONTINUED | OUTPATIENT
Start: 2018-05-29 | End: 2018-05-29 | Stop reason: HOSPADM

## 2018-05-29 RX ORDER — PREGABALIN 75 MG/1
75 CAPSULE ORAL NIGHTLY
Status: DISCONTINUED | OUTPATIENT
Start: 2018-05-29 | End: 2018-05-30 | Stop reason: HOSPADM

## 2018-05-29 RX ORDER — BISACODYL 10 MG
10 SUPPOSITORY, RECTAL RECTAL EVERY 12 HOURS PRN
Status: DISCONTINUED | OUTPATIENT
Start: 2018-05-29 | End: 2018-05-30 | Stop reason: HOSPADM

## 2018-05-29 RX ORDER — AMOXICILLIN 250 MG
1 CAPSULE ORAL 2 TIMES DAILY
Status: DISCONTINUED | OUTPATIENT
Start: 2018-05-29 | End: 2018-05-30 | Stop reason: HOSPADM

## 2018-05-29 RX ORDER — MIDAZOLAM HYDROCHLORIDE 1 MG/ML
INJECTION, SOLUTION INTRAMUSCULAR; INTRAVENOUS
Status: DISCONTINUED | OUTPATIENT
Start: 2018-05-29 | End: 2018-05-29

## 2018-05-29 RX ORDER — CELECOXIB 200 MG/1
400 CAPSULE ORAL
Status: DISCONTINUED | OUTPATIENT
Start: 2018-05-29 | End: 2018-05-29

## 2018-05-29 RX ORDER — ONDANSETRON 8 MG/1
8 TABLET, ORALLY DISINTEGRATING ORAL EVERY 8 HOURS PRN
Qty: 12 TABLET | Refills: 2 | Status: SHIPPED | OUTPATIENT
Start: 2018-05-29 | End: 2018-05-30

## 2018-05-29 RX ORDER — RAMELTEON 8 MG/1
8 TABLET ORAL NIGHTLY PRN
Status: DISCONTINUED | OUTPATIENT
Start: 2018-05-29 | End: 2018-05-30 | Stop reason: HOSPADM

## 2018-05-29 RX ORDER — ASPIRIN 81 MG/1
81 TABLET ORAL 2 TIMES DAILY
Qty: 20 TABLET | Refills: 0 | Status: SHIPPED | OUTPATIENT
Start: 2018-05-29 | End: 2018-07-30

## 2018-05-29 RX ORDER — OXYCODONE HYDROCHLORIDE 5 MG/1
5 TABLET ORAL
Status: DISCONTINUED | OUTPATIENT
Start: 2018-05-29 | End: 2018-05-30 | Stop reason: HOSPADM

## 2018-05-29 RX ORDER — CALCIUM CARBONATE 200(500)MG
TABLET,CHEWABLE ORAL
Status: DISPENSED
Start: 2018-05-29 | End: 2018-05-30

## 2018-05-29 RX ORDER — DUTASTERIDE 0.5 MG/1
0.5 CAPSULE, LIQUID FILLED ORAL DAILY
Status: DISCONTINUED | OUTPATIENT
Start: 2018-05-30 | End: 2018-05-30 | Stop reason: HOSPADM

## 2018-05-29 RX ORDER — MUPIROCIN 20 MG/G
1 OINTMENT TOPICAL
Status: COMPLETED | OUTPATIENT
Start: 2018-05-29 | End: 2018-05-29

## 2018-05-29 RX ORDER — NALOXONE HCL 0.4 MG/ML
0.02 VIAL (ML) INJECTION
Status: DISCONTINUED | OUTPATIENT
Start: 2018-05-29 | End: 2018-05-29

## 2018-05-29 RX ORDER — FENTANYL CITRATE 50 UG/ML
25 INJECTION, SOLUTION INTRAMUSCULAR; INTRAVENOUS EVERY 5 MIN PRN
Status: COMPLETED | OUTPATIENT
Start: 2018-05-29 | End: 2018-05-29

## 2018-05-29 RX ORDER — PROPAFENONE HYDROCHLORIDE 425 MG/1
425 CAPSULE, EXTENDED RELEASE ORAL EVERY 12 HOURS
Status: DISCONTINUED | OUTPATIENT
Start: 2018-05-29 | End: 2018-05-30 | Stop reason: HOSPADM

## 2018-05-29 RX ORDER — ATENOLOL 50 MG/1
50 TABLET ORAL DAILY
Status: DISCONTINUED | OUTPATIENT
Start: 2018-05-30 | End: 2018-05-30 | Stop reason: HOSPADM

## 2018-05-29 RX ORDER — MORPHINE SULFATE 2 MG/ML
2 INJECTION, SOLUTION INTRAMUSCULAR; INTRAVENOUS
Status: DISCONTINUED | OUTPATIENT
Start: 2018-05-29 | End: 2018-05-30 | Stop reason: HOSPADM

## 2018-05-29 RX ORDER — KETAMINE HCL IN 0.9 % NACL 50 MG/5 ML
SYRINGE (ML) INTRAVENOUS
Status: DISCONTINUED | OUTPATIENT
Start: 2018-05-29 | End: 2018-05-29

## 2018-05-29 RX ORDER — SODIUM CHLORIDE 0.9 % (FLUSH) 0.9 %
3 SYRINGE (ML) INJECTION EVERY 8 HOURS PRN
Status: DISCONTINUED | OUTPATIENT
Start: 2018-05-29 | End: 2018-05-30 | Stop reason: HOSPADM

## 2018-05-29 RX ORDER — FAMOTIDINE 10 MG/ML
INJECTION INTRAVENOUS
Status: DISCONTINUED | OUTPATIENT
Start: 2018-05-29 | End: 2018-05-29

## 2018-05-29 RX ORDER — FAMOTIDINE 20 MG/1
20 TABLET, FILM COATED ORAL 2 TIMES DAILY
Status: DISCONTINUED | OUTPATIENT
Start: 2018-05-29 | End: 2018-05-30 | Stop reason: HOSPADM

## 2018-05-29 RX ORDER — GLYCOPYRROLATE 0.2 MG/ML
INJECTION INTRAMUSCULAR; INTRAVENOUS
Status: DISCONTINUED | OUTPATIENT
Start: 2018-05-29 | End: 2018-05-29

## 2018-05-29 RX ORDER — CEFAZOLIN SODIUM 1 G/3ML
2 INJECTION, POWDER, FOR SOLUTION INTRAMUSCULAR; INTRAVENOUS
Status: COMPLETED | OUTPATIENT
Start: 2018-05-29 | End: 2018-05-29

## 2018-05-29 RX ORDER — DEXAMETHASONE SODIUM PHOSPHATE 4 MG/ML
INJECTION, SOLUTION INTRA-ARTICULAR; INTRALESIONAL; INTRAMUSCULAR; INTRAVENOUS; SOFT TISSUE
Status: DISCONTINUED | OUTPATIENT
Start: 2018-05-29 | End: 2018-05-29

## 2018-05-29 RX ORDER — ONDANSETRON 4 MG/1
TABLET, ORALLY DISINTEGRATING ORAL
Status: DISCONTINUED | OUTPATIENT
Start: 2018-05-29 | End: 2018-05-29

## 2018-05-29 RX ORDER — SODIUM CHLORIDE 9 MG/ML
INJECTION, SOLUTION INTRAVENOUS CONTINUOUS
Status: ACTIVE | OUTPATIENT
Start: 2018-05-29 | End: 2018-05-30

## 2018-05-29 RX ORDER — OXYCODONE AND ACETAMINOPHEN 5; 325 MG/1; MG/1
1 TABLET ORAL EVERY 4 HOURS PRN
Qty: 50 TABLET | Refills: 0 | Status: SHIPPED | OUTPATIENT
Start: 2018-05-29 | End: 2018-05-30 | Stop reason: HOSPADM

## 2018-05-29 RX ORDER — PROMETHAZINE HYDROCHLORIDE 12.5 MG/1
12.5 TABLET ORAL EVERY 8 HOURS PRN
Qty: 30 TABLET | Refills: 3 | Status: SHIPPED | OUTPATIENT
Start: 2018-05-29 | End: 2018-05-30

## 2018-05-29 RX ORDER — VANCOMYCIN HYDROCHLORIDE
1500
Status: COMPLETED | OUTPATIENT
Start: 2018-05-29 | End: 2018-05-29

## 2018-05-29 RX ORDER — METOPROLOL TARTRATE 1 MG/ML
INJECTION, SOLUTION INTRAVENOUS
Status: DISPENSED
Start: 2018-05-29 | End: 2018-05-30

## 2018-05-29 RX ORDER — HYDRALAZINE HYDROCHLORIDE 20 MG/ML
5 INJECTION INTRAMUSCULAR; INTRAVENOUS ONCE
Status: COMPLETED | OUTPATIENT
Start: 2018-05-29 | End: 2018-05-29

## 2018-05-29 RX ORDER — PREGABALIN 75 MG/1
75 CAPSULE ORAL
Status: COMPLETED | OUTPATIENT
Start: 2018-05-29 | End: 2018-05-29

## 2018-05-29 RX ORDER — CALCIUM CARBONATE 200(500)MG
500 TABLET,CHEWABLE ORAL ONCE
Status: COMPLETED | OUTPATIENT
Start: 2018-05-29 | End: 2018-05-29

## 2018-05-29 RX ORDER — ESCITALOPRAM OXALATE 10 MG/1
10 TABLET ORAL DAILY
Status: DISCONTINUED | OUTPATIENT
Start: 2018-05-30 | End: 2018-05-30 | Stop reason: HOSPADM

## 2018-05-29 RX ORDER — NALOXONE HCL 0.4 MG/ML
0.02 VIAL (ML) INJECTION
Status: DISCONTINUED | OUTPATIENT
Start: 2018-05-29 | End: 2018-05-30 | Stop reason: HOSPADM

## 2018-05-29 RX ADMIN — SODIUM CHLORIDE, SODIUM GLUCONATE, SODIUM ACETATE, POTASSIUM CHLORIDE, MAGNESIUM CHLORIDE, SODIUM PHOSPHATE, DIBASIC, AND POTASSIUM PHOSPHATE: .53; .5; .37; .037; .03; .012; .00082 INJECTION, SOLUTION INTRAVENOUS at 09:05

## 2018-05-29 RX ADMIN — SODIUM CHLORIDE: 0.9 INJECTION, SOLUTION INTRAVENOUS at 11:05

## 2018-05-29 RX ADMIN — ASPIRIN 81 MG: 81 TABLET, COATED ORAL at 06:05

## 2018-05-29 RX ADMIN — OXYCODONE HYDROCHLORIDE 10 MG: 5 TABLET ORAL at 06:05

## 2018-05-29 RX ADMIN — CALCIUM CARBONATE (ANTACID) CHEW TAB 500 MG 500 MG: 500 CHEW TAB at 12:05

## 2018-05-29 RX ADMIN — OXYCODONE HYDROCHLORIDE 10 MG: 5 TABLET ORAL at 02:05

## 2018-05-29 RX ADMIN — FENTANYL CITRATE 25 MCG: 50 INJECTION, SOLUTION INTRAMUSCULAR; INTRAVENOUS at 01:05

## 2018-05-29 RX ADMIN — ACETAMINOPHEN 1000 MG: 500 TABLET, FILM COATED ORAL at 06:05

## 2018-05-29 RX ADMIN — ROPIVACAINE HYDROCHLORIDE 8 ML/HR: 2 INJECTION, SOLUTION EPIDURAL; INFILTRATION at 11:05

## 2018-05-29 RX ADMIN — FENTANYL CITRATE 25 MCG: 50 INJECTION, SOLUTION INTRAMUSCULAR; INTRAVENOUS at 03:05

## 2018-05-29 RX ADMIN — OXYCODONE HYDROCHLORIDE 10 MG: 5 TABLET ORAL at 11:05

## 2018-05-29 RX ADMIN — Medication 30 MG: at 09:05

## 2018-05-29 RX ADMIN — VANCOMYCIN HYDROCHLORIDE 1000 MG: 10 INJECTION, POWDER, LYOPHILIZED, FOR SOLUTION INTRAVENOUS at 07:05

## 2018-05-29 RX ADMIN — VANCOMYCIN HYDROCHLORIDE 1500 MG: 10 INJECTION, POWDER, LYOPHILIZED, FOR SOLUTION INTRAVENOUS at 07:05

## 2018-05-29 RX ADMIN — CEFAZOLIN 2 G: 330 INJECTION, POWDER, FOR SOLUTION INTRAMUSCULAR; INTRAVENOUS at 09:05

## 2018-05-29 RX ADMIN — PROPOFOL 50 MCG/KG/MIN: 10 INJECTION, EMULSION INTRAVENOUS at 09:05

## 2018-05-29 RX ADMIN — ACETAMINOPHEN 1000 MG: 10 INJECTION, SOLUTION INTRAVENOUS at 11:05

## 2018-05-29 RX ADMIN — SODIUM CHLORIDE: 0.9 INJECTION, SOLUTION INTRAVENOUS at 07:05

## 2018-05-29 RX ADMIN — HYDRALAZINE HYDROCHLORIDE 5 MG: 20 INJECTION INTRAMUSCULAR; INTRAVENOUS at 02:05

## 2018-05-29 RX ADMIN — GLYCOPYRROLATE 0.1 MCG: 0.2 INJECTION, SOLUTION INTRAMUSCULAR; INTRAVENOUS at 09:05

## 2018-05-29 RX ADMIN — MUPIROCIN 1 G: 20 OINTMENT TOPICAL at 06:05

## 2018-05-29 RX ADMIN — MORPHINE SULFATE 2 MG: 2 INJECTION, SOLUTION INTRAMUSCULAR; INTRAVENOUS at 10:05

## 2018-05-29 RX ADMIN — MIDAZOLAM HYDROCHLORIDE 2 MG: 1 INJECTION, SOLUTION INTRAMUSCULAR; INTRAVENOUS at 07:05

## 2018-05-29 RX ADMIN — PREGABALIN 75 MG: 75 CAPSULE ORAL at 06:05

## 2018-05-29 RX ADMIN — TRANEXAMIC ACID 3000 MG: 100 INJECTION, SOLUTION INTRAVENOUS at 09:05

## 2018-05-29 RX ADMIN — PREGABALIN 75 MG: 75 CAPSULE ORAL at 08:05

## 2018-05-29 RX ADMIN — WARFARIN SODIUM 7.5 MG: 2.5 TABLET ORAL at 10:05

## 2018-05-29 RX ADMIN — HYDRALAZINE HYDROCHLORIDE 5 MG: 20 INJECTION INTRAMUSCULAR; INTRAVENOUS at 03:05

## 2018-05-29 RX ADMIN — DEXAMETHASONE SODIUM PHOSPHATE 8 MG: 4 INJECTION, SOLUTION INTRAMUSCULAR; INTRAVENOUS at 09:05

## 2018-05-29 RX ADMIN — PROPOFOL: 10 INJECTION, EMULSION INTRAVENOUS at 10:05

## 2018-05-29 RX ADMIN — ROPIVACAINE HYDROCHLORIDE 8 ML/HR: 2 INJECTION, SOLUTION EPIDURAL; INFILTRATION at 08:05

## 2018-05-29 RX ADMIN — ONDANSETRON HYDROCHLORIDE 8 MG: 4 TABLET, ORALLY DISINTEGRATING ORAL at 08:05

## 2018-05-29 RX ADMIN — SODIUM CHLORIDE: 0.9 INJECTION, SOLUTION INTRAVENOUS at 08:05

## 2018-05-29 RX ADMIN — LISINOPRIL 20 MG: 10 TABLET ORAL at 01:05

## 2018-05-29 RX ADMIN — MIDAZOLAM HYDROCHLORIDE 2 MG: 1 INJECTION, SOLUTION INTRAMUSCULAR; INTRAVENOUS at 08:05

## 2018-05-29 RX ADMIN — FAMOTIDINE 20 MG: 20 TABLET ORAL at 11:05

## 2018-05-29 RX ADMIN — SODIUM CHLORIDE, SODIUM GLUCONATE, SODIUM ACETATE, POTASSIUM CHLORIDE, MAGNESIUM CHLORIDE, SODIUM PHOSPHATE, DIBASIC, AND POTASSIUM PHOSPHATE: .53; .5; .37; .037; .03; .012; .00082 INJECTION, SOLUTION INTRAVENOUS at 10:05

## 2018-05-29 RX ADMIN — FAMOTIDINE 20 MG: 10 INJECTION, SOLUTION INTRAVENOUS at 09:05

## 2018-05-29 RX ADMIN — CEFAZOLIN 2 G: 330 INJECTION, POWDER, FOR SOLUTION INTRAMUSCULAR; INTRAVENOUS at 06:05

## 2018-05-29 RX ADMIN — OXYCODONE HYDROCHLORIDE 10 MG: 5 TABLET ORAL at 09:05

## 2018-05-29 RX ADMIN — FAMOTIDINE 20 MG: 20 TABLET ORAL at 08:05

## 2018-05-29 RX ADMIN — LIDOCAINE HYDROCHLORIDE 50 MG: 20 INJECTION, SOLUTION INTRAVENOUS at 09:05

## 2018-05-29 NOTE — PT/OT/SLP EVAL
Occupational Therapy   Evaluation    Name: Dominick Song MD  MRN: [id number]  Admitting Diagnosis:  Primary osteoarthritis of right knee Day of Surgery    Recommendations:     Discharge recommendations: Home w/ HH     Barriers to discharge: None    Equipment recommendations: rolling walker, bedside commode and shower chair    History:     Occupational Profile:  Living Environment: Pt lives with his wife and daughter in 2SH w/ no MARIANN. Pt uses a walk-in shower. Pt reports bed/bath are on first floor.  Previous level of function: PTA, pt reports being independent w/ ADLs and functional mobility.  Equipment Owned: cane   Assistance Upon Discharge: Pt reports he will have assistance from his family upon d/c home from hospital.     Past Medical History:   Diagnosis Date    Anticoagulant long-term use     Anxiety     Arthritis     Atrial fibrillation     BPH (benign prostatic hyperplasia)     Cataract     CKD (chronic kidney disease) stage 3, GFR 30-59 ml/min 7/10/2017    Followed by Dr. Jeevan Pond    Colon polyp     benign    Depression     Elevated PSA     Erectile dysfunction     Gastric ulcer with hemorrhage     Hep B w/o coma 1977    History of bleeding peptic ulcer     History of prostatitis     Hypertension     Hypothyroidism     PAF (paroxysmal atrial fibrillation)     Sleep apnea     on CPAP       Past Surgical History:   Procedure Laterality Date    ABDOMINAL HERNIA REPAIR      CARDIAC SURGERY      CATARACT EXTRACTION  11/25/2013    bilateral    CHOLECYSTECTOMY      COLONOSCOPY N/A 11/25/2015    Procedure: COLONOSCOPY;  Surgeon: Toby Hernandez MD;  Location: [facility];  Service: Endoscopy;  Laterality: N/A;    EYE SURGERY      GASTRIC BYPASS  1992    KNEE ARTHROSCOPY      RT    LASIK  2001    both eyes (Dr. Rabago)    ORIF HUMERUS FRACTURE      LT    RADIOFREQUENCY ABLATION      ROTATOR CUFF REPAIR      right       Subjective     Communicated with: RN prior to session.    Pt  agreeable to Evaluation.    Pain/Comfort:  Pain level: 3/10 in R knee    Objective:     Pt found supine in bed with blood pressure cuff, galvan catheter, telemetry, PNC, PCEA, pulse ox, Peripheral IV and FCD.    Orthopedic Precautions: RLE weight bearing as tolerated    Occupational Performance:    Bed Mobility:    Supine to sit: Min A  Sit to supine: CGA    Transfers:    Sit<>Stand: CGA, SW    Ambulation:    Pt ambulated 3 steps forward, backward, and side stepped to HOB w/ CGA and SW - no signs of LOB or SOB noted.     Activities of Daily Living:  UE dressing: Maximum Assistance to kristian gown around back  LE dressing: Total Assistance to kristian socks  Pt educated on LE dressing techniques and need for AD with LE dressing      Patient left supine in bed with all lines intact and RN notified.    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  He presents with decreased function of R LE impeding his ability to perform ADLs and functional mobility and would benefit from OT services to maximize functional (I) and safety.    Performance deficits affecting function are weakness, impaired endurance, impaired self care skills, impaired functional mobilty, gait instability, impaired balance, decreased lower extremity function, decreased safety awareness, pain, impaired skin, decreased ROM, edema, orthopedic precautions.    Rehab Prognosis:  Good    Plan:     Patient to be seen QD to address the above listed problems via self-care/home management, therapeutic activities, therapeutic exercises    Plan of Care Reviewed with: patient    This Plan of care has been discussed with the patient who was involved in its development and understands and is in agreement with the identified goals and treatment plan    GOALS:    Occupational Therapy Goals        Problem: Occupational Therapy Goal    Goal  Priority Disciplines Outcome Interventions   Occupational Therapy Goal     OT, PT/OT Ongoing (interventions implemented as appropriate)    Description:  Goals to be met by: 7 days    Patient will increase functional independence with ADLs by performing:    UE Dressing with Supervision.  LE Dressing with Supervision with AD as needed.  Grooming while standing with Supervision.  Toileting from bedside commode with Supervision for hygiene and clothing management.   Stand pivot transfers with Supervision.  Toilet transfer to bedside commode with Supervision.                         Time Tracking:     OT Received On: 5/29/2018  OT Start Time: 1255  OT Stop Time: 1315   OT Total Time: 20 minutes     Billable Minutes:  Evaluation 12 minutes  Self Care/Home Management 8 minutes    Esther Gustafson OT  5/29/2018

## 2018-05-29 NOTE — BRIEF OP NOTE
Seen earlier. Comfortable  Vitals Stable  Dressing Dry/Intact  Bilateral lower extremities: Palpable DP, good capillary refill  Motor sensation intact  xrays taken today demonstrate a well fixed and positioned TKA.  Labs reviewed  S/P TKA  Continue current treatment.

## 2018-05-29 NOTE — ANESTHESIA RELEASE NOTE
"Anesthesia Release from PACU Note    Patient: Dominick Song MD    Procedure(s) Performed: Procedure(s) (LRB):  REPLACEMENT-KNEE-TOTAL-axel (Right)    Anesthesia type: general    Post pain: Adequate analgesia    Post assessment: no apparent anesthetic complications, tolerated procedure well and no evidence of recall    Last Vitals:   Visit Vitals  /61   Pulse 77   Temp 36.9 °C (98.4 °F) (Temporal)   Resp 12   Ht 6' 2" (1.88 m)   Wt 95.3 kg (210 lb)   SpO2 (!) 93%   BMI 26.96 kg/m²       Post vital signs: stable    Level of consciousness: awake, alert  and oriented    Nausea/Vomiting: no nausea/no vomiting    Complications: none    Airway Patency: patent    Respiratory: unassisted    Cardiovascular: stable and blood pressure at baseline    Hydration: euvolemic  "

## 2018-05-29 NOTE — ANESTHESIA PROCEDURE NOTES
Adductor Canal Catheter    Patient location during procedure: pre-op   Block not for primary anesthetic.  Reason for block: at surgeon's request and post-op pain management   Post-op Pain Location: Right knee Pain  Start time: 5/29/2018 7:38 AM  Timeout: 5/29/2018 7:35 AM   End time: 5/29/2018 7:50 AM  Staffing  Anesthesiologist: HAMZAH ALEX  Performed: anesthesiologist   Preanesthetic Checklist  Completed: patient identified, site marked, surgical consent, pre-op evaluation, timeout performed, IV checked, risks and benefits discussed and monitors and equipment checked  Peripheral Block  Patient position: supine  Prep: ChloraPrep and site prepped and draped  Patient monitoring: heart rate, cardiac monitor, continuous pulse ox, continuous capnometry and frequent blood pressure checks  Block type: adductor canal  Laterality: right  Injection technique: continuous  Needle  Needle type: Tuohy   Needle gauge: 17 G  Needle length: 3.5 in  Needle localization: anatomical landmarks and ultrasound guidance  Catheter type: spring wound  Catheter size: 19 G  Test dose: lidocaine 1.5% with Epi 1-to-200,000 and negative   -ultrasound image captured on disc.  Assessment  Injection assessment: negative aspiration, negative parasthesia and local visualized surrounding nerve  Paresthesia pain: none  Heart rate change: no  Slow fractionated injection: yes  Medications:  Bolus administered: 20 mL of 0.25 ropivacaine  Epinephrine added: 3.75 mcg/mL (1/300,000)  Additional Notes  VSS.  [facility] RN monitoring vitals throughout procedure.  Patient tolerated procedure well.

## 2018-05-29 NOTE — PLAN OF CARE
Problem: Patient Care Overview  Goal: Plan of Care Review  Outcome: Ongoing (interventions implemented as appropriate)  Plan of care reviewed and updated. Pt AA+O. Pt's pain is managed with the medication ordered at this time. Pt's VS are as charted.  No falls this shift. Pt is oriented to room and call system. Will continue to [facility].

## 2018-05-29 NOTE — CARE UPDATE
"Post op check.    Has some pain in right knee after PT  No nausea/vomiting.    BP (!) 137/57   Pulse 79   Temp 98.4 °F (36.9 °C) (Temporal)   Resp 20   Ht 6' 2" (1.88 m)   Wt 95.3 kg (210 lb)   SpO2 96%   BMI 26.96 kg/m²   RLE:  Dressings c/d/i.  Intact ankle dorsiflexion, intact great toe extension    xrays of right TKA - components in good position    A/P:  POD-0 s/p right total knee arthroplasty    VTE prophylaxis - foot compression devices, aspirin 81mg bid, coumadin (goal INR 1.8-2.2).  Stay on coumadin for 10 days then resume gloria Underwood postop    WBAT    Home likely tomorrow    Rosendo Reynolds MD  Orthopedic Surgery PGY-5  [phone] pager    "

## 2018-05-29 NOTE — ANESTHESIA PROCEDURE NOTES
Spinal    Diagnosis: right knee pain  Patient location during procedure: OR  Start time: 5/29/2018 9:04 AM  Timeout: 5/29/2018 9:03 AM  End time: 5/29/2018 9:07 AM  Staffing  Anesthesiologist: BRENDA JAMES  Performed: anesthesiologist   Preanesthetic Checklist  Completed: patient identified, site marked, surgical consent, pre-op evaluation, timeout performed, IV checked, risks and benefits discussed and monitors and equipment checked  Spinal Block  Patient position: sitting  Prep: ChloraPrep  Patient monitoring: continuous pulse ox, cardiac monitor and heart rate  Approach: midline  Location: L3-4  Injection technique: single shot  CSF Fluid: clear free-flowing CSF  Needle  Needle type: pencil-tip   Needle gauge: 22 G  Needle length: 3.5 in  Additional Documentation: incremental injection, negative aspiration for heme and no paresthesia on injection  Needle localization: anatomical landmarks  Assessment  Sensory level: T6   Dermatomal levels determined by alcohol wipe  Ease of block: easy  Patient's tolerance of the procedure: comfortable throughout block and no complaints  Medications:  Bolus administered: 2.3 mL of 2.0 mepivacaine

## 2018-05-29 NOTE — H&P (VIEW-ONLY)
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 Pain is worse with activity and weight bearing.  Patient has experienced interference of activities of daily living due to decreased range of motion and an increase in joint pain and swelling.  Patient has failed non-operative treatment including NSAIDs, corticosteroid injections, viscosupplement injections, and activity modification.  Dominick Song MD currently ambulates independently.     Past Medical History:   Diagnosis Date    Anticoagulant long-term use     Anxiety     Arthritis     Atrial fibrillation     BPH (benign prostatic hyperplasia)     Cataract     CKD (chronic kidney disease) stage 3, GFR 30-59 ml/min 7/10/2017    Followed by Dr. Jeevan Pond    Colon polyp     benign    Depression     Elevated PSA     Erectile dysfunction     Gastric ulcer with hemorrhage     Hep B w/o coma 1977    History of bleeding peptic ulcer     History of prostatitis     Hypertension     Hypothyroidism     PAF (paroxysmal atrial fibrillation)     Sleep apnea     on CPAP       Past Surgical History:   Procedure Laterality Date    ABDOMINAL HERNIA REPAIR      CARDIAC SURGERY      CATARACT EXTRACTION  11/25/2013    bilateral    CHOLECYSTECTOMY      COLONOSCOPY N/A 11/25/2015    Procedure: COLONOSCOPY;  Surgeon: Toby Hernandez MD;  Location: [facility];  Service: Endoscopy;  Laterality: N/A;    EYE SURGERY      GASTRIC BYPASS  1992    KNEE ARTHROSCOPY      RT    LASIK  2001    both eyes (Dr. Rabago)    ORIF HUMERUS FRACTURE      LT    RADIOFREQUENCY ABLATION      ROTATOR CUFF REPAIR      right       Family History   Problem Relation Age of Onset    COPD Father     Diabetes Father     Aortic stenosis Mother     Heart disease Mother         aortic stenosis    Heart attack Brother     No Known Problems Son     No Known Problems Daughter     No Known Problems Daughter     No Known  Problems Daughter        Review of patient's allergies indicates:   Allergen Reactions    No known drug allergies          Current Outpatient Prescriptions:     acyclovir (ZOVIRAX) 800 MG Tab, TK ONE T PO FIVE TIMES D only for fever blisters, Disp: , Rfl: 1    apixaban (ELIQUIS) 5 mg Tab, Take 1 tablet (5 mg total) by mouth 2 (two) times daily., Disp: 180 tablet, Rfl: 3    atenolol (TENORMIN) 50 MG tablet, Take 1 tablet (50 mg total) by mouth once daily., Disp: 30 tablet, Rfl: 11    betamethasone acetate-betamethasone sodium phosphate (CELESTONE) 6 mg/mL injection, as needed. , Disp: , Rfl:     buPROPion (WELLBUTRIN XL) 150 MG TB24 tablet, Take 3 tablets (450 mg total) by mouth once daily., Disp: 270 tablet, Rfl: 3    CALCIUM ORAL, Take by mouth Daily., Disp: , Rfl:     cyanocobalamin (VITAMIN B-12) 1000 MCG tablet, Take 1 tablet (1,000 mcg total) by mouth once daily. (Patient taking differently: Take 1,000 mcg by mouth every evening. ), Disp: 30 tablet, Rfl: 11    cyclobenzaprine (FLEXERIL) 10 MG tablet, TK 1 T PO Q 8 H PRF MUSCLE SPASM, Disp: , Rfl: 0    dutasteride (AVODART) 0.5 mg capsule, Take 1 capsule (0.5 mg total) by mouth once daily., Disp: 90 capsule, Rfl: 3    efinaconazole 10 % Prakash, Apply 1 application topically once daily., Disp: 8 mL, Rfl: 6    escitalopram oxalate (LEXAPRO) 10 MG tablet, Take 1 tablet (10 mg total) by mouth once daily., Disp: 90 tablet, Rfl: 3    ferrous sulfate 325 mg (65 mg iron) Tab tablet, Take 1 tablet (325 mg total) by mouth 2 (two) times daily., Disp: 60 tablet, Rfl: 11    KENALOG 40 mg/mL injection, as needed. , Disp: , Rfl:     levothyroxine (SYNTHROID) 25 MCG tablet, TAKE 1 TABLET(25 MCG) BY MOUTH EVERY DAY, Disp: 90 tablet, Rfl: 0    lisinopril (PRINIVIL,ZESTRIL) 20 MG tablet, Take 1 tablet (20 mg total) by mouth once daily., Disp: 90 tablet, Rfl: 3    MULTIVITAMIN ORAL, Take by mouth Daily., Disp: , Rfl:     OMEGA-3 FATTY ACIDS (FISH OIL CONCENTRATE  "ORAL), Take by mouth Daily., Disp: , Rfl:     propafenone (RYTHMOL SR) 425 MG Cp12, Take 1 capsule (425 mg total) by mouth every 12 (twelve) hours., Disp: 180 capsule, Rfl: 3    Review of Systems:  Constitutional: no fever or chills  Eyes: no visual changes  ENT: no nasal congestion or sore throat  Respiratory: no cough or shortness of breath  Cardiovascular: no chest pain or palpitations  Gastrointestinal: no nausea or vomiting, tolerating diet  Genitourinary: no hematuria or dysuria  Integument/Breast: no rash or pruritis  Hematologic/Lymphatic: no easy bruising or lymphadenopathy  Musculoskeletal: positive for c/o right knee pain worse with activity  Neurological: no seizures or tremors  Behavioral/Psych: no auditory or visual hallucinations  Endocrine: no heat or cold intolerance    PE:  Ht 6' 2" (1.88 m)   Wt 95.6 kg (210 lb 12.2 oz)   BMI 27.06 kg/m²   General: Pleasant, cooperative, NAD   Gait: antalgic  HEENT: NCAT, sclera nonicteric   Lungs: Respirations clear bilaterally; equal and unlabored.   CV: S1S2; 2+ bilateral upper and lower extremity pulses.   Skin: Intact throughout with no rashes, erythema, or lesions  Extremities: No LE edema,  no erythema or warmth of the skin in either lower extremity.    Right knee exam:  Knee Range of Motion:normal, pain with passive range of motion  Effusion:none  Condition of skin:intact  Location of tenderness:Medial joint line   Strength:normal  Stability: stable to testing    Hip Examination:normal    Radiographs: Radiographs reveal DJD and significant joint space narrowing.  No fracture or bone destruction identified      Knee Alignment: normal    Diagnosis: osteoarthritis Right knee    Plan: Right total knee arthroplasty    Due to the serious nature of total joint infection and the high prevalence of community acquired MRSA, vancomycin will be used perioperatively.            "

## 2018-05-29 NOTE — PLAN OF CARE
[facility]    HOME HEALTH ORDERS  FACE TO FACE ENCOUNTER      Patient Name: Dominick Song MD  YOB: 1945    PCP: Maxi Gaines MD   PCP Address: [address] / [place] [address]  PCP Phone Number: [phone]  PCP Fax: [phone]    Encounter Date: 05/29/2018    Admit to Home Health    Diagnoses:  Active Hospital Problems    Diagnosis  POA    *Primary osteoarthritis of right knee [M17.11]  Yes      Resolved Hospital Problems    Diagnosis Date Resolved POA   No resolved problems to display.       Future Appointments  Date Time Provider Department Center   6/12/2018 3:15 PM Jaz Sibley NP [facility] ORTHO [place] Count includes the [facility]   6/25/2018 11:20 AM Roverto Linares DPM [facility] POD [place]   7/3/2018 1:15 PM Yuli Hawkins MD [facility] DERM [place]   7/23/2018 10:15 AM LAB, [facility] LAB [place]   7/30/2018 9:20 AM Maxi Gaines MD [facility] FAM MED [place]   8/1/2018 9:40 AM Maxwell Maher NP Located within [facility] SLEEP [facility]   10/8/2018 10:15 AM LAB, [facility] LAB [place]   10/15/2018 10:00 AM Jeevan Pond MD [facility] NEPHRO [place]   10/30/2018 9:45 AM Presley Shelton MD [facility] UROLOG [place] Count includes the [facility]     Follow-up Information     Jaz Sibley NP On 6/12/2018.    Specialty:  Orthopedic Surgery  Contact information:  [address]  [facility] [address]  [phone]                     I have seen and examined this patient face to face today. My clinical findings that support the need for the home health skilled services and home bound status are the following:  Weakness/numbness causing balance and gait disturbance due to Joint Replacement making it taxing to leave home.    Allergies:  Review of patient's allergies indicates:   Allergen Reactions    No known drug allergies        Diet: regular diet    Activities: activity as tolerated    Nursing:   SN to complete comprehensive assessment including routine vital signs. Instruct on disease process and  s/s of complications to report to MD. Follow specific home health arthoplasty protocol. Review/verify medication list sent home with the patient at time of discharge  and instruct patient/caregiver as needed.    Notify MD if SBP > 160 or < 90; DBP > 90 or < 50; HR > 120 or < 50; Temp > 101    LABS:  Coumadin management - SN to obtain PT/INR via venipuncture or fingerstick on admit to home health unless on weekend, then Q Monday/Thursday. Fax results to [facility] [phone].    Coumadin goal INR is 1.8-2.2.  After 10 days, discontinue coumadin and resume previous apixaban (Eliquis)) regimen.    Patient is to take aspirin 81mg twice daily for 10 days.        Home Medical Equipment:  Walker, 3-1 bedside commode, transfer tub bench    CONSULTS:    Physical Therapy to evaluate and treat. Evaluate for home safety and equipment needs; Establish/upgrade home exercise program. Perform / instruct on therapeutic exercises, gait training, transfer training, and Range of Motion.    WOUND CARE ORDERS  Do not remove surgical dressing for 2 weeks post-op. This will be done only by MD at initial post-op visit. If dressing is completely saturated, replace with identical dressing - silver-impregnated hydrocolloid dressing.     Do not get dressings wet. Do not shower.     If dressing continues to be saturated or there are signs of infection, please call Ortho Clinic [phone] for further instructions and to make appt to be seen.     ANTICOAGULATION:      Medications: Review discharge medications with patient and family and provide education.      Current Discharge Medication List      START taking these medications    Details   aspirin (ECOTRIN) 81 MG EC tablet Take 1 tablet (81 mg total) by mouth 2 (two) times daily. Take aspirin 81 mg twice daily to prevent blood clots after joint replacement.  After 10 days, resume apixaban (eliquis) and discontinue aspirin  Qty: 20 tablet, Refills: 0      ondansetron (ZOFRAN-ODT) 8 MG  TbDL Take 1 tablet (8 mg total) by mouth every 8 (eight) hours as needed.  Qty: 12 tablet, Refills: 2      oxyCODONE-acetaminophen (PERCOCET) 5-325 mg per tablet Take 1 tablet by mouth every 4 (four) hours as needed (Post operative pain).  Qty: 50 tablet, Refills: 0      promethazine (PHENERGAN) 12.5 MG Tab Take 1 tablet (12.5 mg total) by mouth every 8 (eight) hours as needed (nausea).  Qty: 30 tablet, Refills: 3      warfarin (COUMADIN) 2.5 MG tablet Take 1 tablet (2.5 mg total) by mouth Daily. Take 2 tablets (5mg) on day of discharge.  Take as directed by coumadin clinic thereafter.    Coumadin is to be taken for 10 days with goal INR 1.8-2.2.  After 10 days, discontinue coumadin and resume previously precribed apixaban.  Qty: 90 tablet, Refills: 1         CONTINUE these medications which have NOT CHANGED    Details   atenolol (TENORMIN) 50 MG tablet Take 1 tablet (50 mg total) by mouth once daily.  Qty: 30 tablet, Refills: 11      buPROPion (WELLBUTRIN XL) 150 MG TB24 tablet Take 3 tablets (450 mg total) by mouth once daily.  Qty: 270 tablet, Refills: 3      CALCIUM ORAL Take by mouth Daily.      cyanocobalamin (VITAMIN B-12) 1000 MCG tablet Take 1 tablet (1,000 mcg total) by mouth once daily.  Qty: 30 tablet, Refills: 11    Associated Diagnoses: B12 deficiency      cyclobenzaprine (FLEXERIL) 10 MG tablet TK 1 T PO Q 8 H PRF MUSCLE SPASM  Refills: 0      !! dutasteride (AVODART) 0.5 mg capsule TAKE 1 CAPSULE(0.5 MG) BY MOUTH ONCE DAILY  Qty: 90 capsule, Refills: 3    Associated Diagnoses: Benign prostatic hyperplasia      !! dutasteride (AVODART) 0.5 mg capsule Take 1 capsule (0.5 mg total) by mouth once daily.  Qty: 90 capsule, Refills: 3      efinaconazole 10 % Prakash Apply 1 application topically once daily.  Qty: 8 mL, Refills: 6      escitalopram oxalate (LEXAPRO) 10 MG tablet Take 1 tablet (10 mg total) by mouth once daily.  Qty: 90 tablet, Refills: 3      ferrous sulfate 325 mg (65 mg iron) Tab tablet Take  1 tablet (325 mg total) by mouth 2 (two) times daily.  Qty: 60 tablet, Refills: 11    Associated Diagnoses: Iron deficiency      levothyroxine (SYNTHROID) 25 MCG tablet TAKE 1 TABLET(25 MCG) BY MOUTH EVERY DAY  Qty: 90 tablet, Refills: 0      lisinopril (PRINIVIL,ZESTRIL) 20 MG tablet Take 1 tablet (20 mg total) by mouth once daily.  Qty: 90 tablet, Refills: 3      MULTIVITAMIN ORAL Take by mouth Daily.      OMEGA-3 FATTY ACIDS (FISH OIL CONCENTRATE ORAL) Take by mouth Daily.      propafenone (RYTHMOL SR) 425 MG Cp12 Take 1 capsule (425 mg total) by mouth every 12 (twelve) hours.  Qty: 180 capsule, Refills: 3      acyclovir (ZOVIRAX) 800 MG Tab TK ONE T PO FIVE TIMES D only for fever blisters  Refills: 1      betamethasone acetate-betamethasone sodium phosphate (CELESTONE) 6 mg/mL injection as needed.       KENALOG 40 mg/mL injection as needed.        !! - Potential duplicate medications found. Please discuss with provider.      STOP taking these medications       apixaban (ELIQUIS) 5 mg Tab Comments:   Reason for Stopping:               I certify that this patient is confined to his home and needs intermittent skilled nursing care and physical therapy.

## 2018-05-29 NOTE — ANESTHESIA PROCEDURE NOTES
IPACK Single Injection Block    Patient location during procedure: pre-op   Block not for primary anesthetic.  Reason for block: at surgeon's request and post-op pain management   Post-op Pain Location: Right Knee Pain  Start time: 5/29/2018 7:38 AM  Timeout: 5/29/2018 7:35 AM   End time: 5/29/2018 7:50 AM  Staffing  Anesthesiologist: HAMZAH ALEX  Performed: anesthesiologist   Preanesthetic Checklist  Completed: patient identified, site marked, surgical consent, pre-op evaluation, timeout performed, IV checked, risks and benefits discussed and monitors and equipment checked  Peripheral Block  Patient position: supine  Prep: ChloraPrep  Patient monitoring: heart rate, cardiac monitor, continuous pulse ox, continuous capnometry and frequent blood pressure checks  Block type: I PACK  Laterality: right  Injection technique: single shot  Needle  Needle type: Stimuplex   Needle gauge: 21 G  Needle length: 4 in  Needle localization: anatomical landmarks and ultrasound guidance   -ultrasound image captured on disc.  Assessment  Injection assessment: negative aspiration, negative parasthesia and local visualized surrounding nerve  Paresthesia pain: none  Heart rate change: no  Slow fractionated injection: yes  Medications:  Bolus administered: 20 mL of 0.25 ropivacaine  Epinephrine added: 3.75 mcg/mL (1/300,000)  Additional Notes  VSS.  DOS[facility] RN monitoring vitals throughout procedure.  Patient tolerated procedure well.

## 2018-05-29 NOTE — TRANSFER OF CARE
"Anesthesia Transfer of Care Note    Patient: Dominick Song MD    Procedure(s) Performed: Procedure(s) (LRB):  REPLACEMENT-KNEE-TOTAL-axel (Right)    Patient location: PACU    Anesthesia Type: regional and general    Transport from OR: Transported from OR on 6-10 L/min O2 by face mask with adequate spontaneous ventilation    Post pain: adequate analgesia    Post assessment: no apparent anesthetic complications and tolerated procedure well    Post vital signs: stable    Level of consciousness: sedated and responds to stimulation    Nausea/Vomiting: no nausea/vomiting    Complications: none    Transfer of care protocol was followed      Last vitals:   Visit Vitals  BP (!) 106/55 (BP Location: Right arm, Patient Position: Lying)   Pulse 72   Temp 36.7 °C (98.1 °F)   Resp 18   Ht 6' 2" (1.88 m)   Wt 95.3 kg (210 lb)   SpO2 99%   BMI 26.96 kg/m²     "

## 2018-05-29 NOTE — PLAN OF CARE
VSS. Patient c/o pain with movement. No N&V. Teds/SCD's in place throughout duration in PACU. Transferred to the next Phase of Care.

## 2018-05-29 NOTE — ANESTHESIA POSTPROCEDURE EVALUATION
"Anesthesia Post Evaluation    Patient: Dominick Song MD    Procedure(s) Performed: Procedure(s) (LRB):  REPLACEMENT-KNEE-TOTAL-axel (Right)    Final Anesthesia Type: spinal  Patient location during evaluation: PACU  Patient participation: Yes- Able to Participate  Level of consciousness: awake and alert and oriented  Post-procedure vital signs: reviewed and stable  Pain management: adequate  Airway patency: patent  PONV status at discharge: No PONV  Anesthetic complications: no      Cardiovascular status: stable  Respiratory status: unassisted  Hydration status: euvolemic  Follow-up not needed.        Visit Vitals  BP (!) 164/91   Pulse 60   Temp 36.9 °C (98.4 °F) (Temporal)   Resp (!) 23   Ht 6' 2" (1.88 m)   Wt 95.3 kg (210 lb)   SpO2 96%   BMI 26.96 kg/m²       Pain/Pari Score: Pain Assessment Performed: Yes (5/29/2018 12:30 PM)  Presence of Pain: non-verbal indicators absent (5/29/2018 12:30 PM)  Pain Rating Prior to Med Admin: 8 (5/29/2018  1:18 PM)  Pari Score: 10 (5/29/2018 12:30 PM)      "

## 2018-05-29 NOTE — INTERVAL H&P NOTE
Dominick Song MD was interviewed, examined and the H and P reviewed.  There has been no interval change in his History and Physical.

## 2018-05-29 NOTE — PT/OT/SLP EVAL
Physical Therapy Evaluation    Patient Name:  Dominick Song MD   MRN:  [id number]    Recommendations:     Discharge recommendations: Home Health PT  Barriers to discharge: None  Equipment recommendations: rolling walker and bedside commode    Assessment:     Dominick Song MD is a 72 y.o. male admitted with a medical diagnosis of R TKA.  He presents with the below impairments/functional limitations.  Pt tolerated evaluation well today and is motivated to do well with recent joint replacement.  Pt is a good candidate for skilled PT services to address the below deficits and to increase functional independence.      Rehab Prognosis: good; patient would benefit from acute skilled PT services to address these deficits and reach maximum level of function.      Rehab Identified impairments/functional limitations:   weakness, impaired endurance, impaired sensation, impaired self care skills, impaired functional mobilty, gait instability, impaired balance, decreased coordination, decreased lower extremity function, decreased safety awareness, pain, decreased ROM, impaired skin, edema, orthopedic precautions    Recent Surgery: Procedure(s) (LRB):  REPLACEMENT-KNEE-TOTAL-axel (Right) Day of Surgery    Plan:     During this hospitalization, patient to be seen BID to address the above listed problems via gait training, therapeutic activity, therapeutic exercise, and neuromuscular re-education   Plan of Care Reviewed with: patient    Subjective     Communicated with RN prior to session.  Patient found supine upon PT entry, agreeable to evaluation.      Chief Complaint: none  Patient comments/goals: to return home safely    Pain/Comfort:  Pain level prior: 3/10 in R knee  Pain level post: 3/10 in R knee    Patients cultural, spiritual, Mormonism conflicts given the current situation: none stated    Living Environment:  Pt lives with his wife and daughter in 2SH w/ no MARIANN. Pt uses a walk-in shower. Pt reports bed/bath are on  first floor.  Previous level of function: PTA, pt reports being independent w/ ADLs and functional mobility.  Equipment Owned: cane   Assistance Upon Discharge: Pt reports he will have assistance from his family upon d/c home from hospital.     Objective:     Patient found supine in bed with blood pressure cuff, telemetry, perineural catheter, peripheral IV, pulse ox, and FCD.     General Precautions: Standard, fall  Orthopedic Precautions:  RLE weight bearing as tolerated  Braces: none     Physical Exam:  Postural examination/scapula alignment: WFL    Skin integrity: Visible skin intact  Edema: Mild RLE    Sensation:   Intact    Lower Extremity Range of Motion:  Right Lower Extremity: WFL except knee 2/2 recent surgery  Left Lower Extremity: WFL     Lower Extremity Strength:  Right Lower Extremity: WFL except knee 2/2 recent surgery  Left Lower Extremity: WFL     Functional Mobility:    Bed Mobility:    Supine to sit: Min A   Sit to supine: Min A     Transfers:    Sit<>Stand: CGA with SW    Ambulation:    Pt ambulated 3 steps forward and backward and 1-2 sidesteps with SW and CGA.  Pt educated on 3 point gait pattern.  Pt able to verbalize and demonstrate understanding.       AM-PAC 6 CLICK MOBILITY  Total Score: 18     Therapeutic Activities and Exercises:  · Pt educated on:   · Role of PT, safety with functional mobility.   · DME and discharge needs  · Importance of OOB activity  · Weightbearing precautions  · Gait sequencing    Patient left supine in bed with all lines intact and RN notified.    GOALS:    Physical Therapy Goals        Problem: Physical Therapy Goal    Goal Priority Disciplines Outcome Goal Variances Interventions   Physical Therapy Goal     PT/OT, PT Ongoing (interventions implemented as appropriate)     Description:  Goals to be met by: 18     Patient will increase functional independence with mobility by performin. Supine to sit with Supervision  2. Sit to supine with  Supervision  3. Sit to stand transfer with Stand-by Assistance  4. Gait  x 150 feet with Stand-by Assistance using Rolling Walker.   5. Lower extremity exercise program x 30 reps per handout, with independence                      History:     Past Medical History:   Diagnosis Date    Anticoagulant long-term use     Anxiety     Arthritis     Atrial fibrillation     BPH (benign prostatic hyperplasia)     Cataract     CKD (chronic kidney disease) stage 3, GFR 30-59 ml/min 7/10/2017    Followed by Dr. Jeevan Pond    Colon polyp     benign    Depression     Elevated PSA     Erectile dysfunction     Gastric ulcer with hemorrhage     Hep B w/o coma 1977    History of bleeding peptic ulcer     History of prostatitis     Hypertension     Hypothyroidism     PAF (paroxysmal atrial fibrillation)     Sleep apnea     on CPAP       Past Surgical History:   Procedure Laterality Date    ABDOMINAL HERNIA REPAIR      CARDIAC SURGERY      CATARACT EXTRACTION  11/25/2013    bilateral    CHOLECYSTECTOMY      COLONOSCOPY N/A 11/25/2015    Procedure: COLONOSCOPY;  Surgeon: Toby Hernandez MD;  Location: [facility];  Service: Endoscopy;  Laterality: N/A;    EYE SURGERY      GASTRIC BYPASS  1992    KNEE ARTHROSCOPY      RT    LASIK  2001    both eyes (Dr. Rabaog)    ORIF HUMERUS FRACTURE      LT    RADIOFREQUENCY ABLATION      ROTATOR CUFF REPAIR      right       Time Tracking:     PT Received On: 05/29/18  PT Start Time: 1255     PT Stop Time: 1315  PT Total Time (min): 20 min     Billable Minutes: Evaluation 12; Therapeutic Activity 8      Isai Vaughn, PT, DPT  5/29/2018   [phone]

## 2018-05-29 NOTE — OP NOTE
DATE OF PROCEDURE: 5/29/18  PREOPERATIVE DIAGNOSIS: Arthritis, Right knee.   POSTOPERATIVE DIAGNOSIS: Arthritis, Right knee.   PROCEDURES PERFORMED: Right total knee arthroplasty.   SURGEON: Denzel Dallas M.D.   ASSISTANT: MARV Mojica MD  ANESTHESIA: Regional.   COMPLICATIONS: None.   COUNTS: Correct.   DISPOSITION: Recovery Room, stable.   SPECIMENS: Bone and cartilage.   FINDINGS: Tricompartmental degenerative change.   FLUIDS: 2000 mL.   BLOOD LOSS: Less than 50 mL.   IMPLANTS: Pro Triathlon, size 5 Right posterior stabilized   cemented femoral component with a 6 cemented tibial tray, a 31 mm 3-peg   patella and a size 6, 11 mm posterior stabilized polyethylene insert.   INDICATIONS FOR PROCEDURE: Dominick Song MD is a 72-year-old male who has   severe arthritis in the Right knee . He is having continued pain in the   Right knee and did not respond to nonoperative measures, decided to undergo   Right knee replacement. He is aware of reasonable treatment options as   well as risks and benefits. {He did not respond to NSAIDS or injections. He received a course or pre-operative physical therapy.  PROCEDURE IN DETAIL: After appropriate consent was obtained, the patient   Was brought in the Operating Room, anesthesia was administered. He received   antibiotic prophylaxis.  Cast   padding and tourniquet was applied to the proximal Right thigh. Right  lower extremity was prepped and draped in usual sterile fashion. Limb was   elevated, tourniquet was inflated. The knee was flexed. An incision was   made from the tibial tubercle just proximal to the superior pole of the   patella. It was taken down through the skin and retinacular. A medial   parapatellar arthrotomy was performed followed by a standard medial   release. Patellar fat pad was partially excised. ACL was resected.   Femoral punch was used to make the guide hole for the femoral drill. The   distal cutting guide was set at 6 degrees valgus right. A  standard femoral distal femoral cut was made.We were pleased with the alignment and quality of the cut.  The PCL retractor was used to bring   the tibia into the field. Meniscal remnants were resected. The   extramedullary tibial guide was pinned in place using the medial side, as most   defective, 2 mm bone was taken off of this. We checked the alignment in   both planes both before and after making the cut. We were satisfied with the   alignment of the cut and the amount of bone removed.The femur was then  sized, found to be a size 5. A size 5 cutting guide was pinned in 3   degrees of external rotation. This was based off the posterior condyle,   checked the transepicondylar axis. Anterior, posterior and chamfer cuts   were made. The box guide was pinned in position. Femoral box cut was   made. Bone fragments were removed. Lamina spreaders were   then used to open up posteriorly. The PCL was resected and some soft   tissue debris was removed.  An 11 mm spacer block gave excellent flexion-extension gap balance.   I was also satisfied with the medial and lateral stability. At this   point, the femoral trial was placed. The tibia was sized, found to be a   Size 6. A size 6 tibial trial was pinned in appropriate alignment and   rotation. This was based on the medial one third of the tibial tubercle.  A stem extension hole was drilled. A keel hole was punched and a   trial reduction was performed with a size 6, 11 mm insert. He  achieved full   extension. There was no recurvatum. He easily had 130 degrees of flexion.   The femoral component ranged symmetrically in the tibial tray. There was   no lift off. There was no instability of full extension, 30 degrees of   flexion, mid flexion and full flexion. Patella was measured and found to   be 24 mm thick, 9 mm of bone removed. The 3-peg holes were drilled 31 mm   3-peg trial was placed. The knee was brought through range of motion. The   patella tracked extremely well.  Therefore, at this point, we were   satisfied with knee range of motion and stability, component position,   sizing and alignment as well as patella tracking. All trial components   were removed. Bone was prepared for cementing by pulsatile lavage and   drying. Components were cemented into place, femur followed by tibia.   Meticulous care was taken to remove excess cement. Tibial insert was   firmly seated in the tibial tray. The knee was inspected. There was no   loose body, foreign body or soft tissue interposition. Knee was then   reduced, brought into extension. Patella button was cemented in place.   Excess cement was removed. The wound was then copiously irrigated with   antibiotic-impregnated solution. Once the cement was dried, tranexamic   acid was applied. The arthrotomy was closed with #1 Vicryl. Once the   arthrotomy was closed, the knee was brought through range of motion, stable   as previously described. Patella tracked very well. Retinacular was   closed with 0 Vicryl, subcutaneous layer with 2-0 Vicryl, skin with   monocryl and dermabond. Sterile dressing was applied. Tourniquet was let down.  He was   transferred to a stretcher, brought to Recovery Room in stable condition,   tolerated the procedure well, no known complications.

## 2018-05-29 NOTE — NURSING TRANSFER
Nursing Transfer Note      5/29/2018     Transfer To: 506    Transfer via stretcher    Transfer with cardiac monitoring, continuous pulse ox, & end tidal Co2    Transported by PCT    Medicines sent: MIVF & PNC infusing    Chart send with patient: Yes    Notified: spouse    Patient reassessed at: 5/29/18 @ 6928

## 2018-05-30 VITALS
TEMPERATURE: 97 F | HEIGHT: 74 IN | BODY MASS INDEX: 26.95 KG/M2 | DIASTOLIC BLOOD PRESSURE: 51 MMHG | OXYGEN SATURATION: 95 % | WEIGHT: 210 LBS | HEART RATE: 65 BPM | RESPIRATION RATE: 18 BRPM | SYSTOLIC BLOOD PRESSURE: 87 MMHG

## 2018-05-30 LAB
INR PPP: 1
PROTHROMBIN TIME: 10.1 SEC

## 2018-05-30 PROCEDURE — 99231 SBSQ HOSP IP/OBS SF/LOW 25: CPT | Mod: ,,, | Performed by: ANESTHESIOLOGY

## 2018-05-30 PROCEDURE — 36415 COLL VENOUS BLD VENIPUNCTURE: CPT

## 2018-05-30 PROCEDURE — 63600175 PHARM REV CODE 636 W HCPCS: Performed by: ANESTHESIOLOGY

## 2018-05-30 PROCEDURE — 97116 GAIT TRAINING THERAPY: CPT

## 2018-05-30 PROCEDURE — 25000003 PHARM REV CODE 250: Performed by: NURSE PRACTITIONER

## 2018-05-30 PROCEDURE — 25000003 PHARM REV CODE 250: Performed by: ANESTHESIOLOGY

## 2018-05-30 PROCEDURE — 63600175 PHARM REV CODE 636 W HCPCS: Performed by: NURSE PRACTITIONER

## 2018-05-30 PROCEDURE — 97110 THERAPEUTIC EXERCISES: CPT

## 2018-05-30 PROCEDURE — 97530 THERAPEUTIC ACTIVITIES: CPT

## 2018-05-30 PROCEDURE — 85610 PROTHROMBIN TIME: CPT

## 2018-05-30 PROCEDURE — 97535 SELF CARE MNGMENT TRAINING: CPT

## 2018-05-30 RX ORDER — WARFARIN SODIUM 5 MG/1
5 TABLET ORAL DAILY
Status: DISCONTINUED | OUTPATIENT
Start: 2018-05-30 | End: 2018-05-30 | Stop reason: HOSPADM

## 2018-05-30 RX ORDER — ONDANSETRON 8 MG/1
8 TABLET, ORALLY DISINTEGRATING ORAL EVERY 8 HOURS PRN
Qty: 12 TABLET | Refills: 2 | Status: SHIPPED | OUTPATIENT
Start: 2018-05-30 | End: 2018-06-25 | Stop reason: ALTCHOICE

## 2018-05-30 RX ORDER — PROMETHAZINE HYDROCHLORIDE 12.5 MG/1
12.5 TABLET ORAL EVERY 8 HOURS PRN
Qty: 30 TABLET | Refills: 3 | Status: SHIPPED | OUTPATIENT
Start: 2018-05-30 | End: 2018-10-03

## 2018-05-30 RX ORDER — LEVOTHYROXINE SODIUM 25 UG/1
TABLET ORAL
Qty: 90 TABLET | Refills: 0 | OUTPATIENT
Start: 2018-05-30

## 2018-05-30 RX ORDER — OXYCODONE HYDROCHLORIDE 15 MG/1
15 TABLET ORAL EVERY 6 HOURS PRN
Qty: 50 TABLET | Refills: 0 | Status: SHIPPED | OUTPATIENT
Start: 2018-05-30 | End: 2018-06-12 | Stop reason: SDUPTHER

## 2018-05-30 RX ORDER — WARFARIN 2.5 MG/1
2.5 TABLET ORAL DAILY
Qty: 90 TABLET | Refills: 1 | Status: SHIPPED | OUTPATIENT
Start: 2018-05-30 | End: 2018-07-30

## 2018-05-30 RX ORDER — CYCLOBENZAPRINE HYDROCHLORIDE 7.5 MG/1
7.5 TABLET, FILM COATED ORAL 3 TIMES DAILY PRN
Qty: 45 TABLET | Refills: 1 | Status: SHIPPED | OUTPATIENT
Start: 2018-05-30 | End: 2018-06-09

## 2018-05-30 RX ADMIN — Medication: at 12:05

## 2018-05-30 RX ADMIN — ACETAMINOPHEN 1000 MG: 500 TABLET, FILM COATED ORAL at 05:05

## 2018-05-30 RX ADMIN — ESCITALOPRAM OXALATE 10 MG: 10 TABLET ORAL at 09:05

## 2018-05-30 RX ADMIN — OXYCODONE HYDROCHLORIDE 10 MG: 5 TABLET ORAL at 12:05

## 2018-05-30 RX ADMIN — BUPROPION HYDROCHLORIDE 450 MG: 150 TABLET, EXTENDED RELEASE ORAL at 09:05

## 2018-05-30 RX ADMIN — ASPIRIN 81 MG: 81 TABLET, COATED ORAL at 09:05

## 2018-05-30 RX ADMIN — FAMOTIDINE 20 MG: 20 TABLET ORAL at 09:05

## 2018-05-30 RX ADMIN — DUTASTERIDE 0.5 MG: 0.5 CAPSULE, LIQUID FILLED ORAL at 09:05

## 2018-05-30 RX ADMIN — ACETAMINOPHEN 1000 MG: 500 TABLET, FILM COATED ORAL at 12:05

## 2018-05-30 RX ADMIN — ROPIVACAINE HYDROCHLORIDE 8 ML/HR: 2 INJECTION, SOLUTION EPIDURAL; INFILTRATION at 07:05

## 2018-05-30 RX ADMIN — ATENOLOL 50 MG: 50 TABLET ORAL at 09:05

## 2018-05-30 RX ADMIN — LISINOPRIL 20 MG: 20 TABLET ORAL at 09:05

## 2018-05-30 RX ADMIN — LEVOTHYROXINE SODIUM 25 MCG: 25 TABLET ORAL at 05:05

## 2018-05-30 RX ADMIN — OXYCODONE HYDROCHLORIDE 10 MG: 5 TABLET ORAL at 07:05

## 2018-05-30 RX ADMIN — OXYCODONE HYDROCHLORIDE 10 MG: 5 TABLET ORAL at 01:05

## 2018-05-30 RX ADMIN — CEFAZOLIN 2 G: 330 INJECTION, POWDER, FOR SOLUTION INTRAMUSCULAR; INTRAVENOUS at 12:05

## 2018-05-30 RX ADMIN — OXYCODONE HYDROCHLORIDE 10 MG: 5 TABLET ORAL at 10:05

## 2018-05-30 RX ADMIN — OXYCODONE HYDROCHLORIDE 10 MG: 5 TABLET ORAL at 04:05

## 2018-05-30 RX ADMIN — PROPAFENONE HYDROCHLORIDE 425 MG: 425 CAPSULE, EXTENDED RELEASE ORAL at 09:05

## 2018-05-30 RX ADMIN — MORPHINE SULFATE 2 MG: 2 INJECTION, SOLUTION INTRAMUSCULAR; INTRAVENOUS at 08:05

## 2018-05-30 NOTE — HOSPITAL COURSE
5/29/18 underwent right TKA without complication  5/30/18 worked with PT.  Discharged home in good condition

## 2018-05-30 NOTE — ASSESSMENT & PLAN NOTE
POD-1 s/p right total knee arthroplasty.  Doing well    Pain control:  Per anesthesia  Diet: regular  WBAT  PT/OT  DVT prophylaxis - coumadin.  Aspirin 81 bid.  Restart eliquis in 10 days and d/c coumadin/aspirin then  Antibiotics: ancef post op complete  Dispo: home today if does well with therapy

## 2018-05-30 NOTE — PLAN OF CARE
POD 1 s/p right TKA. PT/OT recommended HH and DME. Plans to discharge patient home today with [facility] and DME. Patient verbalized understanding. All questions and concerns addressed. SW and CM will continue to follow for any additional needs. Discharge and follow-up instructions to be completed by the bedside nurse.    Future Appointments  Date Time Provider Department Center   6/12/2018 3:15 PM Jaz Sibley NP [facility] ORTHO [place] [facility]   6/25/2018 11:20 AM Roverto Linares DPM [facility] POD [place]   7/3/2018 1:15 PM Yuli Hawkins MD [facility] DERM [place]   7/23/2018 10:15 AM LAB, [facility] LAB [place]   7/30/2018 9:20 AM Maxi Gaines MD [facility] FAM MED [place]   8/1/2018 9:40 AM Maxwell Maher NP [facility] SLEEP [facility]   10/8/2018 10:15 AM LAB, [facility] LAB [place]   10/15/2018 10:00 AM Jeevan Pond MD [facility] NEPHRO [place]   10/30/2018 9:45 AM Presley Shelton MD [facility] UROLOG [place] [facility]        05/30/18 1503   Final Note   Assessment Type Final Discharge Note   Discharge Disposition Home-Health  ([facility])   Hospital Follow Up  Appt(s) scheduled? Yes   Discharge plans and expectations educations in teach back method with documentation complete? Yes

## 2018-05-30 NOTE — SUBJECTIVE & OBJECTIVE
"Principal Problem:Primary osteoarthritis of right knee    Principal Orthopedic Problem: same    Interval History: pain with knee motion.      toelrating diet    No chest pain, dyspnea, nausea/vomiting, dysuria, headaches, syncope.      Review of patient's allergies indicates:   Allergen Reactions    No known drug allergies        Current Facility-Administered Medications   Medication    0.9%  NaCl infusion    acetaminophen tablet 1,000 mg    aspirin EC tablet 81 mg    atenolol tablet 50 mg    bisacodyl suppository 10 mg    buPROPion TB24 tablet 450 mg    dutasteride capsule 0.5 mg    escitalopram oxalate tablet 10 mg    famotidine tablet 20 mg    levothyroxine tablet 25 mcg    lisinopril tablet 20 mg    morphine injection 2 mg    morphine injection 2 mg    naloxone 0.4 mg/mL injection 0.02 mg    oxyCODONE immediate release tablet 10 mg    oxyCODONE immediate release tablet 5 mg    polyethylene glycol packet 17 g    pregabalin capsule 75 mg    propafenone 12 hr capsule 425 mg    ramelteon tablet 8 mg    ropivacaine (PF) 2 mg/ml (0.2%) infusion    ropivacaine 0.2% ON-Q C-BLOC 400 ML (SELECT A FLOW)    senna-docusate 8.6-50 mg per tablet 1 tablet    sodium chloride 0.9% flush 3 mL    warfarin (COUMADIN) tablet 5 mg     Objective:     Vital Signs (Most Recent):  Temp: 97 °F (36.1 °C) (05/30/18 0432)  Pulse: (!) 57 (05/30/18 0700)  Resp: 18 (05/30/18 0432)  BP: 139/71 (05/30/18 0432)  SpO2: (!) 93 % (05/30/18 0700) Vital Signs (24h Range):  Temp:  [97 °F (36.1 °C)-98.4 °F (36.9 °C)] 97 °F (36.1 °C)  Pulse:  [57-86] 57  Resp:  [10-23] 18  SpO2:  [92 %-100 %] 93 %  BP: (106-201)/() 139/71     Weight: 95.3 kg (210 lb)  Height: 6' 2" (188 cm)  Body mass index is 26.96 kg/m².      Intake/Output Summary (Last 24 hours) at 05/30/18 0744  Last data filed at 05/30/18 0707   Gross per 24 hour   Intake           3610.7 ml   Output             2025 ml   Net           1585.7 ml       Ortho/SPM " Exam  Gen:  No acute distress  Head:  Normocephalic.  Atraumatic.  Neuro:  Intact  CV:  Peripherally well-perfused.  Pulses 2+ bilaterally.  Lungs:  Normal respiratory effort.  Abdomen:  Soft, non-tender, non-distended  Extremities:  No cyanosis, clubbing, or edema.  RLE:  Dressing c/d/i.  Intact ankle dorsiflexion and EHL.  2+ DP pulse  Extensor mechanism intact with assistance    Significant Labs:   INR   Date Value Ref Range Status   05/30/2018 1.0 0.8 - 1.2 Final     Comment:     Coumadin Therapy:  2.0 - 3.0 for INR for all indicators except mechanical heart valves  and antiphospholipid syndromes which should use 2.5 - 3.5.     07/10/2014 5.1  Final         Significant Imaging:

## 2018-05-30 NOTE — PLAN OF CARE
9:09 AM  SW received notification that pt will need home health. Met with pt at bedside who stated his preference is [facility]. Faxed orders to [facility]. Will f/u as needed.    Nguyen Medellin LMSW   [facility]  Ext [phone]

## 2018-05-30 NOTE — PLAN OF CARE
Problem: Physical Therapy Goal  Goal: Physical Therapy Goal  Goals to be met by: 18     Patient will increase functional independence with mobility by performin. Supine to sit with Supervision  2. Sit to supine with Supervision  3. Sit to stand transfer with Stand-by Assistance  4. Gait  x 150 feet with Stand-by Assistance using Rolling Walker.   5. Lower extremity exercise program x 30 reps per handout, with independence     Outcome: Outcome(s) achieved Date Met: 18  No further needs.

## 2018-05-30 NOTE — NURSING
Pt in bed resting with family at bedside. Pt denies c/o pain or n/v. D/c orders and prescriptions given to pt. Pt states understanding of orders. D/c PIV and tolerated well by pt. Pt waiting for transport to vehicle via w/c.

## 2018-05-30 NOTE — ADDENDUM NOTE
Addendum  created 05/30/18 1318 by Antonio Roth MD    Charge Capture section accepted, Cosign clinical note with attestation

## 2018-05-30 NOTE — PLAN OF CARE
POD 1 s/p right TKA. PT/OT ordered to eval and treat. PT/OT recommended HH and DME. DME ordered for bedside delivery. Patient requested [facility]. Patient currently lives with his spouse. Patient has good family support at home. CM completed discharge assessment and planning with patient. Patient verbalized understanding. All questions and concerns addressed. SW and CM will continue to follow for any additional needs. Plan A to discharge home with home health as soon as medically stable. Plan B to discharge home with family support and plans for outpatient rehab.    PCP: Maxi Gaines MD    Pharmacy:   [facility] [id number] [id number] AT [facility] [address] [address]  Phone: [phone] Fax: [phone]    Payor: [employer] MEDICARE / Plan: [employer] MEDICARE HMO / Product Type: Capitation /      05/30/18 1000   Discharge Assessment   Assessment Type Discharge Planning Assessment   Confirmed/corrected address and phone number on facesheet? Yes   Assessment information obtained from? Patient;Medical Record   Expected Length of Stay (days) 2   Communicated expected length of stay with patient/caregiver yes   Prior to hospitilization cognitive status: Alert/Oriented   Prior to hospitalization functional status: Independent   Current cognitive status: Alert/Oriented   Current Functional Status: Assistive Equipment   Lives With spouse   Able to Return to Prior Arrangements yes   Is patient able to care for self after discharge? Yes   Who are your caregiver(s) and their phone number(s)? Amy Song (Spouse) [phone], [phone]    Patient's perception of discharge disposition home health   Readmission Within The Last 30 Days no previous admission in last 30 days   Patient currently being followed by outpatient case management? No   Patient currently receives home health services? No   Patient currently receives any other outside agency  services? No   Equipment Currently Used at Home none   Do you have any problems affording any of your prescribed medications? No   Is the patient taking medications as prescribed? yes   Does the patient have transportation home? Yes   Transportation Available family or friend will provide   Does the patient receive services at the [facility]? No   Discharge Plan A Home Health;Home with family   Discharge Plan B Home with family;Other  (outpatient rehab)   Patient/Family In Agreement With Plan yes   Readmission Questionnaire   Have you felt down, depressed, or hopeless? 0

## 2018-05-30 NOTE — ANESTHESIA POST-OP PAIN MANAGEMENT
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., male,     Interval history  Did well overnight; BP issues while in PACU but eventually controlled and now SBPs 120-140s (poorly controlled outpatient; patient reportedly stopped taking lisinopril)  Post op labs stable  Pain 6/10 requiring Oxy 10s intermittently; requests US analysis of PNC position or pausing as patient feels it "isnt helping that much"  Seen by PT yesterday; home with HH likely today    Surgery:  Procedure(s) (LRB):  REPLACEMENT-KNEE-TOTAL-axel (Right)    Post Op Day #: 1    Catheter type: Perineural Adductor Canal    Infusion type: Ropivacaine 0.2%  8 ml/hr basal    Problem List:    Active Hospital Problems    Diagnosis  POA    *Primary osteoarthritis of right knee [M17.11]  Yes      Resolved Hospital Problems    Diagnosis Date Resolved POA   No resolved problems to display.       Subjective:       General appearance of alert, oriented, no complaints   Pain with rest: 6    Numbers   Pain with movement: 8    Numbers   Side Effects    1. Pruritis No    2. Nausea No    3. Motor Blockade No, 0=Ability to raise lower extremities off bed    4. Sedation No, 1=awake and alert    Schedule Medications:    acetaminophen  1,000 mg Oral Q6H    aspirin  81 mg Oral BID    atenolol  50 mg Oral Daily    buPROPion  450 mg Oral Daily    dutasteride  0.5 mg Oral Daily    escitalopram oxalate  10 mg Oral Daily    famotidine  20 mg Oral BID    levothyroxine  25 mcg Oral Before breakfast    lisinopril  20 mg Oral Daily    polyethylene glycol  17 g Oral Daily    pregabalin  75 mg Oral QHS    propafenone  425 mg Oral Q12H    senna-docusate 8.6-50 mg  1 tablet Oral BID        Continuous Infusions:   sodium chloride 0.9% 150 mL/hr at 05/29/18 1118    ropivacaine (PF) 2 mg/ml (0.2%) 8 mL/hr (05/29/18 2005)    ropivacaine          PRN Medications:  bisacodyl, morphine, morphine, naloxone, oxyCODONE, " oxyCODONE, ramelteon, ropivacaine, sodium chloride 0.9%       Antibiotics:  Antibiotics     None             Objective:     Catheter site clean, dry, intact          Vital Signs (Most Recent):  Temp: 36.1 °C (97 °F) (05/30/18 0432)  Pulse: (!) 57 (05/30/18 0700)  Resp: 18 (05/30/18 0432)  BP: 139/71 (05/30/18 0432)  SpO2: (!) 93 % (05/30/18 0700) Vital Signs Range (Last 24H):  Temp:  [36.1 °C (97 °F)-36.9 °C (98.4 °F)]   Pulse:  [54-86]   Resp:  [10-23]   BP: (106-201)/()   SpO2:  [92 %-100 %]          I & O (Last 24H):  Intake/Output Summary (Last 24 hours) at 05/30/18 0729  Last data filed at 05/30/18 0707   Gross per 24 hour   Intake           3610.7 ml   Output             2025 ml   Net           1585.7 ml       Physical Exam:    GA: Alert, comfortable, no acute distress.   Pulmonary: Clear to auscultation A/P/L. No wheezing, crackles, or rhonchi.  Cardiac: RRR S1 & S2 w/o rubs/murmurs/gallops.   Abdominal:Bowel sounds present. No tenderness to palpation or distension. No appreciable hepatosplenomegaly.   Skin: No jaundice, rashes, or visible lesions.         Laboratory:  CBC: No results for input(s): WBC, RBC, HGB, HCT, PLT, MCV, MCH, MCHC in the last 72 hours.    BMP: Recent Labs      05/29/18   1120   NA  141   K  4.5   CO2  21*   CL  114*   BUN  18   CREATININE  1.1   GLU  109   CALCIUM  7.7*       Recent Labs      05/29/18 2007   INR  0.9         Anticoagulant dose: ASA 81 BID    Assessment:         Pain control adequate    Plan:     1) Pain:    Adductor canal perineural catheter in place and infusing. Good level. Multimodal pain regimen with acetaminophen, Lyrica, and prn oxycodone given  Will continue to monitor. Plan to D/C perineural catheter or home with On-Q if patient discharged today.   2) Home meds resumed   3) Post Op Labs stable    4) FEN/GI: Tolerating liquids, advance diet as tolerated. + flatus. - BM.   5) Dispo: Pt working well with PT/OT. Case management and SW for placement. Plan  for D/C possibly today if patient   works well with PT and meets goals.          Evaluator Brittney Flores MD

## 2018-05-30 NOTE — PLAN OF CARE
Problem: Patient Care Overview  Goal: Plan of Care Review  Outcome: Ongoing (interventions implemented as appropriate)  Pt awake, alert and oriented x4. Vitals stable. IVF's and PNC infusing without diff. Prn pain med. Schedule reviewed with pt and updated on white board. q2h rounding protocol followed. No fall or trauma this shift.

## 2018-05-30 NOTE — PROGRESS NOTES
Pt and family present, consent to converting adductor PNC to On Q.  Site CDI.  Educated regarding continued pain management, fall risk, signs of complications, continued monitoring, as well as discontinuing catheter on 6/1.  Two numbers obtained for APS to follow up.  Understanding verbalized.

## 2018-05-30 NOTE — PT/OT/SLP PROGRESS
Occupational Therapy   Treatment    Name: Dominick Song MD  MRN: [id number]  Admitting Diagnosis:  Primary osteoarthritis of right knee  1 Day Post-Op    Recommendations:     Discharge Recommendations: home health OT  Discharge Equipment Recommendations:  bedside commode, walker, rolling  Barriers to discharge:  None    Subjective     Communicated with: RN prior to session.  Pain/Comfort:  · Pain Rating 1: 0/10    Patients cultural, spiritual, Worship conflicts given the current situation: None    Objective:     Patient found with: cryotherapy, perineural catheter    General Precautions: Standard, fall   Orthopedic Precautions:RLE weight bearing as tolerated   Braces: N/A     Occupational Performance:    Bed Mobility:    · Patient completed Supine to Sit with stand by assistance     Functional Mobility/Transfers:  · Patient completed Sit <> Stand Transfer with stand by assistance  with  rolling walker   · Patient completed Bed <> Chair Transfer using Stand Pivot technique with stand by assistance with rolling walker  · Patient completed Toilet Transfer Stand Pivot technique with stand by assistance with  rolling walker  · Functional Mobility: Pt was able to walk to bathroom c SBA and RW.    Activities of Daily Living:  · Grooming: modified independence to brush teeth while standing at sink.  · UB Dressing: modified independence to don shirt.  · LB Dressing: modified independence to don underwear, shorts, and shoes.    Patient left up in chair with all lines intact, call button in reach and RN notified    [facility] 6 Click:  AMPA Total Score: 24    Treatment & Education:  Educated pt on bathing and car T/F's.  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  He was able to perform supine/sit, sit/stand, and walk to bathroom c mod I.  Able to perform UB/LB dressing and grooming c mod I.  Pt is progressing well.  Performance deficits  affecting function are impaired self care skills, impaired functional mobilty, orthopedic precautions.      Rehab Prognosis:  Good; patient would benefit from acute skilled OT services to address these deficits and reach maximum level of function.       Plan:     Patient to be seen daily to address the above listed problems via self-care/home management, therapeutic activities, therapeutic exercises  · Plan of Care Expires:    · Plan of Care Reviewed with: patient    This Plan of care has been discussed with the patient who was involved in its development and understands and is in agreement with the identified goals and treatment plan    GOALS:    Occupational Therapy Goals        Problem: Occupational Therapy Goal    Goal Priority Disciplines Outcome Interventions   Occupational Therapy Goal     OT, PT/OT Ongoing (interventions implemented as appropriate)    Description:  Goals to be met by: 7 days    Patient will increase functional independence with ADLs by performing:    UE Dressing with Supervision.  LE Dressing with Supervision with AD as needed.  Grooming while standing with Supervision.  Toileting from bedside commode with Supervision for hygiene and clothing management.   Stand pivot transfers with Supervision.  Toilet transfer to bedside commode with Supervision.                         Time Tracking:     OT Date of Treatment: 05/30/18  OT Start Time: 0826  OT Stop Time: 0915  OT Total Time (min): 49 min    Billable Minutes:Self Care/Home Management 49    MARINA Amezquita  5/30/2018

## 2018-05-30 NOTE — PT/OT/SLP PROGRESS
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  Pt tolerated treatment well.  Pt is able to perform all functional mobility without incident and is safe to discharge home from a mobility standpoint.  Pt will continue to benefit from skilled PT services to address the below deficits and to increase functional independence.      Rehab Prognosis:  good; patient would benefit from acute skilled PT services to address these deficits and reach maximum level of function.      Recent Surgery: Procedure(s) (LRB):  REPLACEMENT-KNEE-TOTAL-axel (Right) 1 Day Post-Op    Plan:     · D/C acute PT   Plan of Care Reviewed with: patient    Subjective     Communicated with RN prior to session.  Patient found seated in chair upon PT entry to room, agreeable to treatment.      Chief Complaint: none stated    Pain/Comfort:  · Pain Rating 1: 5/10  · Location - Side 1: Right  · Location - Orientation 1: generalized  · Location 1: knee  · Pain Addressed 1: Reposition, Distraction, Cessation of Activity  · Pain Rating Post-Intervention 1: 5/10    Patients cultural, spiritual, Jew conflicts given the current situation: none noted    Objective:     Patient found with: perineural catheter, cryotherapy, peripheral IV     General Precautions: Standard, fall   Orthopedic Precautions:RLE weight bearing as tolerated   Braces: none    Functional Mobility:    Bed Mobility:    · Supine to Sit: (S)  · Sit to Supine: (S)    Transfers:  · Sit to Stand: SBA with RW    Gait:  Pt ambulated 180 feet x 2 trials with RW and SBA.  No LOB or SOB noted.  Good gait quality noted.  Able to perform  reciprocal gait pattern.      AM-PAC 6 CLICK MOBILITY  Turning over in bed (including adjusting bedclothes, sheets and blankets)?: 4  Sitting down on and standing up from a chair with arms (e.g., wheelchair, bedside commode, etc.): 3  Moving from lying on back to sitting on the side of the bed?: 4  Moving to and from a bed to a chair (including a wheelchair)?: 3  Need to walk in hospital room?: 3  Climbing 3-5 steps with a railing?: 3  Total Score: 20     Therapeutic Activities and Exercises:    Supine therex per handout (Knee protocol), 30x each  · Ankle pumps  · Quad sets  · Glute squeezes  · SAQ  · Heel slides  · Supine hip abduction  · SLR  · LAQ      Pt educated on:  · Car transfers  · DME Management   · Common signs and symptoms associated with TKA  · Importance of protecting surgical incision during functional tasks to reduce the risk of bleeding/infection  · HEP      Knee flexion: 96 degrees   Knee extension: -2 degrees     Patient left up in chair with all lines intact, call button in reach and RN notified..    GOALS:    Physical Therapy Goals     Not on file          Multidisciplinary Problems (Resolved)        Problem: Physical Therapy Goal    Goal Priority Disciplines Outcome Goal Variances Interventions   Physical Therapy Goal   (Resolved)     PT/OT, PT Outcome(s) achieved     Description:  Goals to be met by: 18     Patient will increase functional independence with mobility by performin. Supine to sit with Supervision  2. Sit to supine with Supervision  3. Sit to stand transfer with Stand-by Assistance  4. Gait  x 150 feet with Stand-by Assistance using Rolling Walker.   5. Lower extremity exercise program x 30 reps per handout, with independence                      Time Tracking:     PT Received On: 18  PT Start Time: 1027     PT Stop Time: 1121  PT Total Time (min): 54 min     Billable Minutes: Gait Training 15, Therapeutic Activity 15 and Therapeutic Exercise 24    Isai Vaughn  PT, DPT  5/30/2018   [phone]

## 2018-05-30 NOTE — PROGRESS NOTES
[facility]  Orthopedics  Progress Note    Patient Name: Dominick Song MD  MRN: [id number]  Admission Date: 5/29/2018  Hospital Length of Stay: 1 days  Attending Provider: Denzel Dallas MD  Primary Care Provider: Maxi Gaines MD  Follow-up For: Procedure(s) (LRB):  REPLACEMENT-KNEE-TOTAL-axel (Right)    Post-Operative Day: 1 Day Post-Op  Subjective:     Principal Problem:Primary osteoarthritis of right knee    Principal Orthopedic Problem: same    Interval History: pain with knee motion.      toelrating diet    No chest pain, dyspnea, nausea/vomiting, dysuria, headaches, syncope.      Review of patient's allergies indicates:   Allergen Reactions    No known drug allergies        Current Facility-Administered Medications   Medication    0.9%  NaCl infusion    acetaminophen tablet 1,000 mg    aspirin EC tablet 81 mg    atenolol tablet 50 mg    bisacodyl suppository 10 mg    buPROPion TB24 tablet 450 mg    dutasteride capsule 0.5 mg    escitalopram oxalate tablet 10 mg    famotidine tablet 20 mg    levothyroxine tablet 25 mcg    lisinopril tablet 20 mg    morphine injection 2 mg    morphine injection 2 mg    naloxone 0.4 mg/mL injection 0.02 mg    oxyCODONE immediate release tablet 10 mg    oxyCODONE immediate release tablet 5 mg    polyethylene glycol packet 17 g    pregabalin capsule 75 mg    propafenone 12 hr capsule 425 mg    ramelteon tablet 8 mg    ropivacaine (PF) 2 mg/ml (0.2%) infusion    ropivacaine 0.2% ON-Q C-BLOC 400 ML (SELECT A FLOW)    senna-docusate 8.6-50 mg per tablet 1 tablet    sodium chloride 0.9% flush 3 mL    warfarin (COUMADIN) tablet 5 mg     Objective:     Vital Signs (Most Recent):  Temp: 97 °F (36.1 °C) (05/30/18 0432)  Pulse: (!) 57 (05/30/18 0700)  Resp: 18 (05/30/18 0432)  BP: 139/71 (05/30/18 0432)  SpO2: (!) 93 % (05/30/18 0700) Vital Signs (24h Range):  Temp:  [97 °F (36.1 °C)-98.4 °F (36.9 °C)] 97 °F (36.1 °C)  Pulse:   "[57-86] 57  Resp:  [10-23] 18  SpO2:  [92 %-100 %] 93 %  BP: (106-201)/() 139/71     Weight: 95.3 kg (210 lb)  Height: 6' 2" (188 cm)  Body mass index is 26.96 kg/m².      Intake/Output Summary (Last 24 hours) at 05/30/18 0744  Last data filed at 05/30/18 0707   Gross per 24 hour   Intake           3610.7 ml   Output             2025 ml   Net           1585.7 ml       Ortho/SPM Exam  Gen:  No acute distress  Head:  Normocephalic.  Atraumatic.  Neuro:  Intact  CV:  Peripherally well-perfused.  Pulses 2+ bilaterally.  Lungs:  Normal respiratory effort.  Abdomen:  Soft, non-tender, non-distended  Extremities:  No cyanosis, clubbing, or edema.  RLE:  Dressing c/d/i.  Intact ankle dorsiflexion and EHL.  2+ DP pulse  Extensor mechanism intact with assistance    Significant Labs:   INR   Date Value Ref Range Status   05/30/2018 1.0 0.8 - 1.2 Final     Comment:     Coumadin Therapy:  2.0 - 3.0 for INR for all indicators except mechanical heart valves  and antiphospholipid syndromes which should use 2.5 - 3.5.     07/10/2014 5.1  Final         Significant Imaging:     Assessment/Plan:     * Primary osteoarthritis of right knee    POD-1 s/p right total knee arthroplasty.  Doing well    Pain control:  Per anesthesia  Diet: regular  WBAT  PT/OT  DVT prophylaxis - coumadin.  Aspirin 81 bid.  Restart eliquis in 10 days and d/c coumadin/aspirin then  Antibiotics: ancef post op complete  Dispo: home today if does well with therapy                    Rosendo Reynolds MD  Orthopedics  [facility]  "

## 2018-05-30 NOTE — DISCHARGE SUMMARY
"[facility]  Orthopedics  Discharge Summary      Patient Name: Dominick Song MD  MRN: [id number]  Admission Date: 5/29/2018  Hospital Length of Stay: 1 days  Discharge Date and Time:  05/30/2018 2:10 PM  Attending Physician: Denzel Dallas MD   Discharging Provider: Rosendo Reynolds MD  Primary Care Provider: Maxi Gaines MD    HPI:   72 year old male with severe right knee osteoarthritis.  Admitted for right TKA    Procedure(s) (LRB):  REPLACEMENT-KNEE-TOTAL-axel (Right)      Hospital Course:  5/29/18 underwent right TKA without complication  5/30/18 worked with PT.  Discharged home in good condition    Consults         Status Ordering Provider     Inpatient consult to Pain Management  Once     Provider:  NURSE, PAIN MANAGEMENT    Acknowledged JZA SIBLEY     Inpatient consult to Respiratory Care  Once     Provider:  (Not yet assigned)    JAZ Mcconnell     Inpatient consult to Social Work  Once     Provider:  (Not yet assigned)    JAZ Mcconnell          Significant Diagnostic Studies:     Pending Diagnostic Studies:     None        Final Active Diagnoses:    Diagnosis Date Noted POA    PRINCIPAL PROBLEM:  Primary osteoarthritis of right knee [M17.11] 05/29/2018 Yes      Problems Resolved During this Admission:    Diagnosis Date Noted Date Resolved POA      Discharged Condition: good    Disposition: Home or Self Care    Follow Up:  Follow-up Information     Jaz Sibley NP On 6/12/2018.    Specialty:  Orthopedic Surgery  Contact information:  [address] [place] [id number]  [phone]                 Patient Instructions:     COMMODE FOR HOME USE   Order Specific Question Answer Comments   Type: Standard    Height: 6' 2" (1.88 m)    Weight: 95.3 kg (210 lb)    Does patient have medical equipment at home? none    Length of need (1-99 months): 99    Vendor: [facility]    Expected Date of Delivery: 5/30/2018      WALKER FOR HOME " "USE   Order Specific Question Answer Comments   Type of Walker: Marcus (4'4"-5'7")    With wheels? Yes    Height: 6' 2" (1.88 m)    Weight: 95.3 kg (210 lb)    Length of need (1-99 months): 12    Does patient have medical equipment at home? none    Please check all that apply: Patient is unable to safely ambulate without equipment.    Vendor: Other (use comments)    Expected Date of Delivery: 5/30/2018      TRANSFER TUB BENCH FOR HOME USE   Order Specific Question Answer Comments   Type of Transfer Tub Bench: Unpadded    Height: 6' 2" (1.88 m)    Weight: 95.3 kg (210 lb)    Does patient have medical equipment at home? none    Length of need (1-99 months): 99    Vendor: Other (use comments)    Expected Date of Delivery: 5/30/2018      WALKER FOR HOME USE   Order Specific Question Answer Comments   Type of Walker: Adult (5'4"-6'6")    With wheels? Yes    Height: 6' 2" (1.88 m)    Weight: 95.3 kg (210 lb)    Length of need (1-99 months): 99    Does patient have medical equipment at home? none    Please check all that apply: Patient is unable to safely ambulate without equipment.    Please check all that apply: Walker will be used for gait training.    Vendor: [facility]    Expected Date of Delivery: 5/30/2018      COMMODE FOR HOME USE   Order Specific Question Answer Comments   Type: Standard    Height: 6' 2" (1.88 m)    Weight: 95.3 kg (210 lb)    Does patient have medical equipment at home? none    Length of need (1-99 months): 99    Vendor: Other (use comments)    Expected Date of Delivery: 5/30/2018      Ambulatory Referral to Anticoagulation Monitoring   Referral Priority: Routine Referral Type: Consultation   Referral Reason: Specialty Services Required    Requested Specialty: Cardiology    Number of Visits Requested: 1      Ambulatory Referral to Physical/Occupational Therapy   Referral Priority: Routine Referral Type: Physical Medicine   Referral Reason: Specialty Services Required    Requested Specialty: " Physical Therapy    Number of Visits Requested: 1      Activity as tolerated     Activity as tolerated     Call MD for:  difficulty breathing, headache or visual disturbances     Call MD for:  extreme fatigue     Call MD for:  hives     Call MD for:  persistent dizziness or light-headedness     Call MD for:  persistent nausea and vomiting     Call MD for:  redness, tenderness, or signs of infection (pain, swelling, redness, odor or green/yellow discharge around incision site)     Call MD for:  severe uncontrolled pain     Call MD for:  temperature >100.4     Keep surgical extremity elevated     Leave dressing on - Keep it clean, dry, and intact until clinic visit   Order Comments: Do not remove surgical dressing for 2 weeks post-op. This will be done only by MD at initial post-op visit. If dressing is completely saturated, replace with identical dressing - silver-impregnated hydrocolloid dressing.     Do not get dressings wet. Do not shower.     If dressing continues to be saturated or there are signs of infection, please call [facility] [phone] for further instructions and to make appt to be seen.     Lifting restrictions   Order Comments: No strenuous exercise or lifting of > 10 lbs     No driving, operating heavy equipment or signing legal documents while taking pain medication     Sponge bath only until clinic visit     Weight bearing as tolerated     Call MD for:  temperature >100.4     Call MD for:  persistent nausea and vomiting or diarrhea     Call MD for:  severe uncontrolled pain     Call MD for:  redness, tenderness, or signs of infection (pain, swelling, redness, odor or green/yellow discharge around incision site)     Call MD for:  difficulty breathing or increased cough     Leave dressing on - Keep it clean, dry, and intact until clinic visit   Order Comments: No showers or baths.  Keep dressing clean and dry.  Home health with change dressing only if completely saturated.     Activity as  tolerated       Medications:  Reconciled Home Medications:      Medication List      START taking these medications    aspirin 81 MG EC tablet  Commonly known as:  ECOTRIN  Take 1 tablet (81 mg total) by mouth 2 (two) times daily. Take aspirin 81 mg twice daily to prevent blood clots after joint replacement.  After 10 days, resume apixaban (eliquis) and discontinue aspirin     ondansetron 8 MG Tbdl  Commonly known as:  ZOFRAN-ODT  Take 1 tablet (8 mg total) by mouth every 8 (eight) hours as needed.     oxyCODONE 15 MG Tab  Commonly known as:  ROXICODONE  Take 1 tablet (15 mg total) by mouth every 6 (six) hours as needed for Pain (post op pain).     promethazine 12.5 MG Tab  Commonly known as:  PHENERGAN  Take 1 tablet (12.5 mg total) by mouth every 8 (eight) hours as needed (nausea).     warfarin 2.5 MG tablet  Commonly known as:  COUMADIN  Take 1 tablet (2.5 mg total) by mouth Daily. Take 2 tablets (5mg) on day of discharge.  Take as directed by coumadin clinic thereafter.  Coumadin is to be taken for 10 days with goal INR 1.8-2.2.  After 10 days, discontinue coumadin and resume previously precribed apixaban.        CHANGE how you take these medications    cyanocobalamin 1000 MCG tablet  Commonly known as:  VITAMIN B-12  Take 1 tablet (1,000 mcg total) by mouth once daily.  What changed:  when to take this     * cyclobenzaprine 10 MG tablet  Commonly known as:  FLEXERIL  TK 1 T PO Q 8 H PRF MUSCLE SPASM  What changed:  Another medication with the same name was added. Make sure you understand how and when to take each.     * cyclobenzaprine 7.5 MG Tab  Commonly known as:  FEXMID  Take 1 tablet (7.5 mg total) by mouth 3 (three) times daily as needed (muscle spasms).  What changed:  You were already taking a medication with the same name, and this prescription was added. Make sure you understand how and when to take each.        * This list has 2 medication(s) that are the same as other medications prescribed for you.  Read the directions carefully, and ask your doctor or other care provider to review them with you.            CONTINUE taking these medications    acyclovir 800 MG Tab  Commonly known as:  ZOVIRAX  TK ONE T PO FIVE TIMES D only for fever blisters     atenolol 50 MG tablet  Commonly known as:  TENORMIN  Take 1 tablet (50 mg total) by mouth once daily.     betamethasone acetate-betamethasone sodium phosphate 6 mg/mL injection  Commonly known as:  CELESTONE  as needed.     buPROPion 150 MG TB24 tablet  Commonly known as:  WELLBUTRIN XL  Take 3 tablets (450 mg total) by mouth once daily.     CALCIUM ORAL  Take by mouth Daily.     * dutasteride 0.5 mg capsule  Commonly known as:  AVODART  TAKE 1 CAPSULE(0.5 MG) BY MOUTH ONCE DAILY     * dutasteride 0.5 mg capsule  Commonly known as:  AVODART  Take 1 capsule (0.5 mg total) by mouth once daily.     efinaconazole 10 % Aleksandra  Apply 1 application topically once daily.     escitalopram oxalate 10 MG tablet  Commonly known as:  LEXAPRO  Take 1 tablet (10 mg total) by mouth once daily.     ferrous sulfate 325 mg (65 mg iron) Tab tablet  Take 1 tablet (325 mg total) by mouth 2 (two) times daily.     FISH OIL CONCENTRATE ORAL  Take by mouth Daily.     KENALOG 40 mg/mL injection  Generic drug:  triamcinolone acetonide  as needed.     levothyroxine 25 MCG tablet  Commonly known as:  SYNTHROID  TAKE 1 TABLET(25 MCG) BY MOUTH EVERY DAY     lisinopril 20 MG tablet  Commonly known as:  PRINIVIL,ZESTRIL  Take 1 tablet (20 mg total) by mouth once daily.     MULTIVITAMIN ORAL  Take by mouth Daily.     propafenone 425 MG Cp12  Commonly known as:  RYTHMOL SR  Take 1 capsule (425 mg total) by mouth every 12 (twelve) hours.        * This list has 2 medication(s) that are the same as other medications prescribed for you. Read the directions carefully, and ask your doctor or other care provider to review them with you.            STOP taking these medications    apixaban 5 mg Tab  Commonly known  as:  JEANNA Reynolds MD  Orthopedics  [facility]

## 2018-05-31 ENCOUNTER — PATIENT MESSAGE (OUTPATIENT)
Dept: ORTHOPEDICS | Facility: CLINIC | Age: 73
End: 2018-05-31

## 2018-05-31 ENCOUNTER — ANTI-COAG VISIT (OUTPATIENT)
Dept: CARDIOLOGY | Facility: CLINIC | Age: 73
End: 2018-05-31

## 2018-05-31 DIAGNOSIS — M17.11 PRIMARY OSTEOARTHRITIS OF RIGHT KNEE: ICD-10-CM

## 2018-05-31 NOTE — PROGRESS NOTES
Called patient to check in on OnQ infusion. No answer x2. Left message with patient that we would call again tomorrow to ensure successful catheter removal. Instructed patient to call anesthesia team with any questions or concerns.    Brittney Flores MD  05/31/2018  9:35 AM

## 2018-05-31 NOTE — PROGRESS NOTES
Patient s/p Right total knee arthroplasty  by  Dr. Dallas on 5/29/18. 7.5mg 5/29. PMH HTN, AF. Per medcard: Take 1 tablet (2.5 mg total) by mouth Daily. Take 2 tablets (5mg) on day of discharge.  Take as directed by coumadin clinic thereafter.  Coumadin is to be taken for 10 days with goal INR 1.8-2.2.  After 10 days, discontinue coumadin and resume previously precribed apixaban.  Patient confirms this. Will lower dose due to fast INR movement. INR 6/4.

## 2018-06-01 NOTE — ADDENDUM NOTE
Addendum  created 06/01/18 1337 by Rubén Gandara MD    Anesthesia Event edited, Sign clinical note

## 2018-06-01 NOTE — PROGRESS NOTES
Called and spoke with Dr. Song who stated that he removed his catheter, blue tip intact, without any issues. All concerns and questions answered.     GABRIEL Gandara  APS

## 2018-06-04 ENCOUNTER — ANTI-COAG VISIT (OUTPATIENT)
Dept: CARDIOLOGY | Facility: CLINIC | Age: 73
End: 2018-06-04

## 2018-06-04 DIAGNOSIS — M17.11 PRIMARY OSTEOARTHRITIS OF RIGHT KNEE: ICD-10-CM

## 2018-06-07 ENCOUNTER — ANTI-COAG VISIT (OUTPATIENT)
Dept: CARDIOLOGY | Facility: CLINIC | Age: 73
End: 2018-06-07

## 2018-06-07 DIAGNOSIS — M17.11 PRIMARY OSTEOARTHRITIS OF RIGHT KNEE: ICD-10-CM

## 2018-06-07 LAB — INR PPP: 3.4

## 2018-06-07 NOTE — PROGRESS NOTES
SInce INR is supratherapeutic, patient can stop coumadin today and be discharged from monitoring program

## 2018-06-08 ENCOUNTER — TELEPHONE (OUTPATIENT)
Dept: ORTHOPEDICS | Facility: CLINIC | Age: 73
End: 2018-06-08

## 2018-06-08 NOTE — TELEPHONE ENCOUNTER
Pt notified of Dr. Dallas orders. Pt verbalized understanding.        ----- Message from Denzel Dallas MD sent at 6/8/2018 12:07 PM CDT -----  Please tell pt not to  restart Eliquis until tomorrow at the earliest.

## 2018-06-11 ENCOUNTER — ANTI-COAG VISIT (OUTPATIENT)
Dept: CARDIOLOGY | Facility: CLINIC | Age: 73
End: 2018-06-11

## 2018-06-11 DIAGNOSIS — M17.11 PRIMARY OSTEOARTHRITIS OF RIGHT KNEE: ICD-10-CM

## 2018-06-12 ENCOUNTER — OFFICE VISIT (OUTPATIENT)
Dept: ORTHOPEDICS | Facility: CLINIC | Age: 73
End: 2018-06-12
Payer: MEDICARE

## 2018-06-12 ENCOUNTER — PATIENT MESSAGE (OUTPATIENT)
Dept: ORTHOPEDICS | Facility: CLINIC | Age: 73
End: 2018-06-12

## 2018-06-12 VITALS — SYSTOLIC BLOOD PRESSURE: 154 MMHG | TEMPERATURE: 97 F | HEART RATE: 76 BPM | DIASTOLIC BLOOD PRESSURE: 95 MMHG

## 2018-06-12 DIAGNOSIS — G89.18 POST-OP PAIN: Primary | ICD-10-CM

## 2018-06-12 DIAGNOSIS — Z96.651 S/P TKR (TOTAL KNEE REPLACEMENT) USING CEMENT, RIGHT: ICD-10-CM

## 2018-06-12 PROCEDURE — 99024 POSTOP FOLLOW-UP VISIT: CPT | Mod: S$GLB,,, | Performed by: NURSE PRACTITIONER

## 2018-06-12 PROCEDURE — 99999 PR PBB SHADOW E&M-EST. PATIENT-LVL IV: CPT | Mod: PBBFAC,,, | Performed by: NURSE PRACTITIONER

## 2018-06-12 RX ORDER — OXYCODONE HYDROCHLORIDE 15 MG/1
15 TABLET ORAL EVERY 6 HOURS PRN
Qty: 50 TABLET | Refills: 0 | Status: SHIPPED | OUTPATIENT
Start: 2018-06-12 | End: 2018-06-25 | Stop reason: SDUPTHER

## 2018-06-12 NOTE — PROGRESS NOTES
Dominick Song MD presents for initial post-operative visit following a right total knee arthroplasty performed by Dr. Dallas on 5/29/2018. Tolerating pain medication well.      Exam:   Blood pressure (!) 154/95, pulse 76, temperature 97.1 °F (36.2 °C).   Ambulating well without assistive device.  Incision is clean and dry without drainage or erythema. ROM:0-110 (passive) 90 (active)    Initial post-operative radiographs reviewed today revealing a well fixed and aligned prosthesis.    A/P:  2 weeks s/p right total knee replacement  - The patient was advised to keep the incision clean and dry for the next 24 hours after which he may wash the area with antibacterial soap in the shower. Will not submerge until the incision is completely healed.   - Outpatient PT: orders sent to [facility] the [facility] resumed yesterday  - Pain medication refilled  - Follow up in 4 weeks with me. Pt will call clinic with problems/concerns.

## 2018-06-13 DIAGNOSIS — M62.838 MUSCLE SPASMS OF BOTH LOWER EXTREMITIES: Primary | ICD-10-CM

## 2018-06-13 RX ORDER — CYCLOBENZAPRINE HCL 10 MG
TABLET ORAL
Qty: 40 TABLET | Refills: 1 | Status: SHIPPED | OUTPATIENT
Start: 2018-06-13 | End: 2018-08-25 | Stop reason: SDUPTHER

## 2018-06-19 ENCOUNTER — TELEPHONE (OUTPATIENT)
Dept: ADMINISTRATIVE | Facility: CLINIC | Age: 73
End: 2018-06-19

## 2018-06-19 NOTE — TELEPHONE ENCOUNTER
Home Health SOC 05/31/2018 to 07/29/2018 [facility]

## 2018-06-24 ENCOUNTER — PATIENT MESSAGE (OUTPATIENT)
Dept: ORTHOPEDICS | Facility: CLINIC | Age: 73
End: 2018-06-24

## 2018-06-25 ENCOUNTER — OFFICE VISIT (OUTPATIENT)
Dept: PODIATRY | Facility: CLINIC | Age: 73
End: 2018-06-25
Payer: MEDICARE

## 2018-06-25 VITALS — HEIGHT: 74 IN | WEIGHT: 210.13 LBS | BODY MASS INDEX: 26.97 KG/M2

## 2018-06-25 DIAGNOSIS — B35.1 ONYCHOMYCOSIS DUE TO DERMATOPHYTE: Primary | ICD-10-CM

## 2018-06-25 DIAGNOSIS — G89.18 POST-OP PAIN: ICD-10-CM

## 2018-06-25 PROCEDURE — 99999 PR PBB SHADOW E&M-EST. PATIENT-LVL II: CPT | Mod: PBBFAC,,, | Performed by: PODIATRIST

## 2018-06-25 PROCEDURE — 99024 POSTOP FOLLOW-UP VISIT: CPT | Mod: S$GLB,,, | Performed by: PODIATRIST

## 2018-06-25 RX ORDER — OXYCODONE HYDROCHLORIDE 15 MG/1
15 TABLET ORAL EVERY 6 HOURS PRN
Qty: 50 TABLET | Refills: 0 | Status: SHIPPED | OUTPATIENT
Start: 2018-06-25 | End: 2018-07-09 | Stop reason: SDUPTHER

## 2018-06-27 NOTE — PROGRESS NOTES
Subjective:      Patient ID: Dominick KENNEDY MD Duncan is a 72 y.o. male.    Chief Complaint: Nail Problem (post laser-possible 3rd treatment, bilateral great toe and 2nd right toe, PCP--04/23/18)    Dominick is a 72 y.o. male who presents to the podiatry clinic  with complaint of  left foot pain sub first metatarsal head. Onset of the symptoms was several days ago. Precipitating event: increased activity and going up and down a ladder a lot. Current symptoms include: ability to bear weight, but with some pain. Aggravating factors: any weight bearing. Symptoms have gradually improved. Much better today. Patient has had prior foot problems with metatarsalgia to the lesser metatarsals, calluses, and treatment for nail fungus. Evaluation to date: none. Treatment to date: rest and wearing better supportive shoes. Patients rates pain 4/10 on pain scale.    12/20/17: Patient returns for follow up. Left foot pain from previous encounter is much better, now his concern is with a new wound to the left leg. He was working in the yard a few days ago and got a bamboo shoot that went into the leg. There was some erythema and drainage noted, he took some bactrim which helped. He relates to wearing a band-aid over the wound with triple antibiotic.     Also has some pain to the plantar right foot, thinks he may have stepped on something Sharp pain noted with weight bearing, better with rest. No drainage noted from the area.    2/15/18: Patient returns for follow up with continued pain right plantar metatarsal heads, mostly when walking barefoot, better in supportive shoes. Relates the wound to the left leg has healed well. No other pedal complaints at this time. He is still having issues with the onychomycosis and would like to discuss another treatment with the laser.     3/19/18: Patient returns for second nail fungus follow up/treatment with laser. Relates he feels they are looking better but there is still some  discoloration and subungual debris bilateral hallux and right second nail.    6/25/18: Patient returns for third and last fungal nail treatment with laser, no new pedal complaints.    Review of Systems   Constitution: Negative for chills and fever.   Cardiovascular: Negative for claudication and leg swelling.   Respiratory: Negative for shortness of breath.    Skin: Positive for nail changes. Negative for itching and rash.   Musculoskeletal: Positive for arthritis and joint swelling. Negative for muscle cramps, muscle weakness and myalgias.   Gastrointestinal: Negative for nausea and vomiting.   Neurological: Negative for focal weakness, loss of balance, numbness and paresthesias.           Objective:      Physical Exam   Constitutional: He is oriented to person, place, and time. He appears well-developed and well-nourished. No distress.   Cardiovascular:   Pulses:       Dorsalis pedis pulses are 2+ on the right side, and 2+ on the left side.        Posterior tibial pulses are 2+ on the right side, and 2+ on the left side.   < 3 sec capillary refill time to toes 1-5 bilateral. Toes and feet are warm to touch proximally with normal distal cooling b/l. There is some hair growth on the feet and toes b/l. There is no edema b/l. No spider veins or varicosities present b/l.      Musculoskeletal:   Right foot pain to palpation at the plantar fourth metatarsal head, there is an associated hyperkeratotic lesion, also some discomfort to first/second metatarsal heads. No pain with ROM of the MTPJ's    Equinus noted b/l ankles with < 10 deg DF noted. MMT 5/5 in DF/PF/Inv/Ev resistance with no reproduction of pain in any direction. Passive range of motion of ankle and pedal joints is painless b/l.     Neurological: He is alert and oriented to person, place, and time. He has normal strength. He displays no atrophy and no tremor. No sensory deficit. He exhibits normal muscle tone.   Negative tinel sign bilateral.   Skin: Skin is  warm and dry. No abrasion, no bruising, no burn, no ecchymosis, no laceration, no lesion, no petechiae and no rash noted. He is not diaphoretic. No cyanosis or erythema. No pallor. Nails show no clubbing.   Skin temperature, texture and turgor within normal limits.    Bilateral hallux nails and right second nail discolored with slight thickening, left hallux nail has subungual debris. Minimal improvement.    No open ulcerations noted bilateral         Psychiatric: He has a normal mood and affect. His behavior is normal.             Assessment:       Encounter Diagnosis   Name Primary?    Onychomycosis due to dermatophyte Yes         Plan:       Dominick was seen today for nail problem.    Diagnoses and all orders for this visit:    Onychomycosis due to dermatophyte    Other orders  -     efinaconazole 10 % Prakash; Apply 1 application topically once daily.      I counseled the patient on his conditions, their implications and medical management.     A timeout was performed. Verbal consent was obtained and witnessed by medical assistant.       The patient was placed in the procedure room.    The affected digits x 3 bilateral hallux and right second nails were lasered with 100 shots to the hallux and the second nail with 80.    The settings were at 15 J / cm, 0.3 ms, 3 hz with a spot size 5 ml.       The patient tollerated the procedure well.      Use Jublia as well, prescription given     Instructed to call with concerns or questions.     Will return PRCECE Linares DPM

## 2018-07-03 ENCOUNTER — PATIENT MESSAGE (OUTPATIENT)
Dept: FAMILY MEDICINE | Facility: CLINIC | Age: 73
End: 2018-07-03

## 2018-07-03 RX ORDER — LEVOTHYROXINE SODIUM 25 UG/1
TABLET ORAL
Qty: 90 TABLET | Refills: 0 | Status: SHIPPED | OUTPATIENT
Start: 2018-07-03 | End: 2018-09-26 | Stop reason: SDUPTHER

## 2018-07-08 ENCOUNTER — PATIENT MESSAGE (OUTPATIENT)
Dept: FAMILY MEDICINE | Facility: CLINIC | Age: 73
End: 2018-07-08

## 2018-07-09 DIAGNOSIS — G89.18 POST-OP PAIN: ICD-10-CM

## 2018-07-09 RX ORDER — OXYCODONE HYDROCHLORIDE 15 MG/1
15 TABLET ORAL EVERY 6 HOURS PRN
Qty: 50 TABLET | Refills: 0 | Status: SHIPPED | OUTPATIENT
Start: 2018-07-09 | End: 2018-08-02 | Stop reason: SDUPTHER

## 2018-07-10 ENCOUNTER — PATIENT MESSAGE (OUTPATIENT)
Dept: ORTHOPEDICS | Facility: CLINIC | Age: 73
End: 2018-07-10

## 2018-07-10 ENCOUNTER — HOSPITAL ENCOUNTER (OUTPATIENT)
Dept: RADIOLOGY | Facility: HOSPITAL | Age: 73
Discharge: HOME | End: 2018-07-10
Attending: NURSE PRACTITIONER
Payer: MEDICARE

## 2018-07-10 ENCOUNTER — OFFICE VISIT (OUTPATIENT)
Dept: ORTHOPEDICS | Facility: CLINIC | Age: 73
End: 2018-07-10
Payer: MEDICARE

## 2018-07-10 VITALS — HEIGHT: 74 IN | BODY MASS INDEX: 27.5 KG/M2 | WEIGHT: 214.31 LBS

## 2018-07-10 DIAGNOSIS — Z98.890 POST-OPERATIVE STATE: ICD-10-CM

## 2018-07-10 DIAGNOSIS — Z98.890 POST-OPERATIVE STATE: Primary | ICD-10-CM

## 2018-07-10 PROCEDURE — 73560 X-RAY EXAM OF KNEE 1 OR 2: CPT | Mod: 59,TC,LT

## 2018-07-10 PROCEDURE — 73562 X-RAY EXAM OF KNEE 3: CPT | Mod: TC,RT

## 2018-07-10 PROCEDURE — 73562 X-RAY EXAM OF KNEE 3: CPT | Mod: 26,RT,, | Performed by: RADIOLOGY

## 2018-07-10 PROCEDURE — 99024 POSTOP FOLLOW-UP VISIT: CPT | Mod: S$GLB,,, | Performed by: NURSE PRACTITIONER

## 2018-07-10 PROCEDURE — 99999 PR PBB SHADOW E&M-EST. PATIENT-LVL IV: CPT | Mod: PBBFAC,,, | Performed by: NURSE PRACTITIONER

## 2018-07-10 PROCEDURE — 73560 X-RAY EXAM OF KNEE 1 OR 2: CPT | Mod: 26,XS,LT, | Performed by: RADIOLOGY

## 2018-07-10 RX ORDER — CELECOXIB 200 MG/1
200 CAPSULE ORAL 2 TIMES DAILY
Qty: 60 CAPSULE | Refills: 1 | Status: SHIPPED | OUTPATIENT
Start: 2018-07-10 | End: 2018-07-10 | Stop reason: SDUPTHER

## 2018-07-11 RX ORDER — CELECOXIB 200 MG/1
CAPSULE ORAL
Qty: 180 CAPSULE | Refills: 1 | Status: SHIPPED | OUTPATIENT
Start: 2018-07-11 | End: 2018-12-29 | Stop reason: SDUPTHER

## 2018-07-11 NOTE — PROGRESS NOTES
"Dominick Song MD presents for 6 weeks post-operative visit following a right total knee arthroplasty performed by Dr. Dallas on 5/29/2018. Tolerating pain medication well.      Exam:   Height 6' 2" (1.88 m), weight 97.2 kg (214 lb 4.6 oz).   Ambulating well without assistive device, but he is having pain to the left lateral knee over the LCL insertion site. He has tried using a TENS unit, but that hasn't helped. There is a small scab at the site due to numbness over the area.  Incision is completely healed. ROM:0-110    Initial post-operative radiographs reviewed today revealing a well fixed and aligned prosthesis.    A/P:  6 weeks s/p right total knee replacement  - Outpatient PT: ongoing  - Pain medication refilled last night  - will try celebrex for the left lateral knee pain which is likely related to LCL strain  - Follow up in 6 weeks with Dr. Dallas. Pt will call clinic with problems/concerns.     "

## 2018-07-12 ENCOUNTER — PATIENT MESSAGE (OUTPATIENT)
Dept: ORTHOPEDICS | Facility: CLINIC | Age: 73
End: 2018-07-12

## 2018-07-19 ENCOUNTER — PATIENT MESSAGE (OUTPATIENT)
Dept: ORTHOPEDICS | Facility: CLINIC | Age: 73
End: 2018-07-19

## 2018-07-23 ENCOUNTER — LAB VISIT (OUTPATIENT)
Dept: LAB | Facility: HOSPITAL | Age: 73
End: 2018-07-23
Attending: FAMILY MEDICINE
Payer: MEDICARE

## 2018-07-23 DIAGNOSIS — I10 HYPERTENSION, UNSPECIFIED TYPE: ICD-10-CM

## 2018-07-23 DIAGNOSIS — E61.1 IRON DEFICIENCY: ICD-10-CM

## 2018-07-23 DIAGNOSIS — E03.9 HYPOTHYROIDISM, UNSPECIFIED TYPE: ICD-10-CM

## 2018-07-23 DIAGNOSIS — E53.8 B12 DEFICIENCY: ICD-10-CM

## 2018-07-23 LAB
ALBUMIN SERPL BCP-MCNC: 2.8 G/DL
ALP SERPL-CCNC: 112 U/L
ALT SERPL W/O P-5'-P-CCNC: 14 U/L
ANION GAP SERPL CALC-SCNC: 6 MMOL/L
AST SERPL-CCNC: 19 U/L
BASOPHILS # BLD AUTO: 0.15 K/UL
BASOPHILS NFR BLD: 1.6 %
BILIRUB SERPL-MCNC: 0.7 MG/DL
BUN SERPL-MCNC: 28 MG/DL
CALCIUM SERPL-MCNC: 8.6 MG/DL
CHLORIDE SERPL-SCNC: 111 MMOL/L
CO2 SERPL-SCNC: 22 MMOL/L
CREAT SERPL-MCNC: 1.4 MG/DL
DIFFERENTIAL METHOD: ABNORMAL
EOSINOPHIL # BLD AUTO: 0.9 K/UL
EOSINOPHIL NFR BLD: 9.4 %
ERYTHROCYTE [DISTWIDTH] IN BLOOD BY AUTOMATED COUNT: 15.4 %
EST. GFR  (AFRICAN AMERICAN): 58 ML/MIN/1.73 M^2
EST. GFR  (NON AFRICAN AMERICAN): 50.2 ML/MIN/1.73 M^2
GLUCOSE SERPL-MCNC: 103 MG/DL
HCT VFR BLD AUTO: 40.2 %
HGB BLD-MCNC: 12.4 G/DL
IMM GRANULOCYTES # BLD AUTO: 0.04 K/UL
IMM GRANULOCYTES NFR BLD AUTO: 0.4 %
IRON SERPL-MCNC: 141 UG/DL
LYMPHOCYTES # BLD AUTO: 2.1 K/UL
LYMPHOCYTES NFR BLD: 21.4 %
MCH RBC QN AUTO: 27.6 PG
MCHC RBC AUTO-ENTMCNC: 30.8 G/DL
MCV RBC AUTO: 90 FL
MONOCYTES # BLD AUTO: 0.7 K/UL
MONOCYTES NFR BLD: 7.5 %
NEUTROPHILS # BLD AUTO: 5.7 K/UL
NEUTROPHILS NFR BLD: 59.7 %
NRBC BLD-RTO: 0 /100 WBC
PLATELET # BLD AUTO: 351 K/UL
PMV BLD AUTO: 12.4 FL
POTASSIUM SERPL-SCNC: 4.6 MMOL/L
PROT SERPL-MCNC: 5.8 G/DL
RBC # BLD AUTO: 4.49 M/UL
SATURATED IRON: 40 %
SODIUM SERPL-SCNC: 139 MMOL/L
TOTAL IRON BINDING CAPACITY: 355 UG/DL
TRANSFERRIN SERPL-MCNC: 240 MG/DL
TSH SERPL DL<=0.005 MIU/L-ACNC: 3.03 UIU/ML
VIT B12 SERPL-MCNC: 1014 PG/ML
WBC # BLD AUTO: 9.56 K/UL

## 2018-07-23 PROCEDURE — 80053 COMPREHEN METABOLIC PANEL: CPT

## 2018-07-23 PROCEDURE — 36415 COLL VENOUS BLD VENIPUNCTURE: CPT | Mod: PO

## 2018-07-23 PROCEDURE — 85025 COMPLETE CBC W/AUTO DIFF WBC: CPT

## 2018-07-23 PROCEDURE — 82607 VITAMIN B-12: CPT

## 2018-07-23 PROCEDURE — 84443 ASSAY THYROID STIM HORMONE: CPT

## 2018-07-23 PROCEDURE — 83540 ASSAY OF IRON: CPT

## 2018-07-30 ENCOUNTER — OFFICE VISIT (OUTPATIENT)
Dept: FAMILY MEDICINE | Facility: CLINIC | Age: 73
End: 2018-07-30
Payer: MEDICARE

## 2018-07-30 VITALS
WEIGHT: 201.5 LBS | TEMPERATURE: 99 F | HEART RATE: 64 BPM | SYSTOLIC BLOOD PRESSURE: 128 MMHG | DIASTOLIC BLOOD PRESSURE: 82 MMHG | RESPIRATION RATE: 18 BRPM | HEIGHT: 74 IN | BODY MASS INDEX: 25.86 KG/M2 | OXYGEN SATURATION: 95 %

## 2018-07-30 DIAGNOSIS — I10 HYPERTENSION, UNSPECIFIED TYPE: ICD-10-CM

## 2018-07-30 DIAGNOSIS — E03.9 HYPOTHYROIDISM, UNSPECIFIED TYPE: ICD-10-CM

## 2018-07-30 DIAGNOSIS — I48.20 CHRONIC ATRIAL FIBRILLATION: ICD-10-CM

## 2018-07-30 DIAGNOSIS — E53.8 B12 DEFICIENCY: Primary | ICD-10-CM

## 2018-07-30 DIAGNOSIS — E61.1 IRON DEFICIENCY: ICD-10-CM

## 2018-07-30 PROCEDURE — 3074F SYST BP LT 130 MM HG: CPT | Mod: CPTII,S$GLB,, | Performed by: FAMILY MEDICINE

## 2018-07-30 PROCEDURE — 3079F DIAST BP 80-89 MM HG: CPT | Mod: CPTII,S$GLB,, | Performed by: FAMILY MEDICINE

## 2018-07-30 PROCEDURE — 99214 OFFICE O/P EST MOD 30 MIN: CPT | Mod: S$GLB,,, | Performed by: FAMILY MEDICINE

## 2018-07-30 PROCEDURE — 99999 PR PBB SHADOW E&M-EST. PATIENT-LVL IV: CPT | Mod: PBBFAC,,, | Performed by: FAMILY MEDICINE

## 2018-07-30 RX ORDER — METOPROLOL SUCCINATE 50 MG/1
TABLET, EXTENDED RELEASE ORAL
Refills: 3 | COMMUNITY
Start: 2018-05-19 | End: 2019-01-02 | Stop reason: SDUPTHER

## 2018-07-30 RX ORDER — APIXABAN 5 MG/1
5 TABLET, FILM COATED ORAL 2 TIMES DAILY
Refills: 3 | COMMUNITY
Start: 2018-07-02 | End: 2019-04-15 | Stop reason: SDUPTHER

## 2018-07-30 NOTE — PROGRESS NOTES
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    Chief Complaint: Follow-up (3 month)    Here to f/u on chronic health issues    Afib - s/p 2 ablation; sees Dr. Beatty; gets episodes twice a year requiring cardioversion. Taking Eliquis 5mg BID, metoprolol 50mg daily, Rhythmol BID.  HTN - lisinopril 20mg daily;   CKD - following with Dr. Pond  Knee DJD - s/p right knee TKA in May. Going to PT. Using oxycodone 1/2 tab QHS  Hypothyroidism - tolerating levothyroxine 25mcg;  S/ gastric bypass - taking ferrous sulfate and B12 for the last 3 months; lost 5 pounds in last 90 days and 20 pounds in the last year.                  Past Medical History:   Diagnosis Date    Anticoagulant long-term use     Anxiety     Arthritis     Atrial fibrillation     BPH (benign prostatic hyperplasia)     Cataract     CKD (chronic kidney disease) stage 3, GFR 30-59 ml/min 7/10/2017    Followed by Dr. Jeevan Pond    Colon polyp     benign    Depression     Elevated PSA     Erectile dysfunction     Gastric ulcer with hemorrhage     Hep B w/o coma 1977    History of bleeding peptic ulcer     History of prostatitis     Hypertension     Hypothyroidism     PAF (paroxysmal atrial fibrillation)     Sleep apnea     on CPAP       Past Surgical History:   Procedure Laterality Date    ABDOMINAL HERNIA REPAIR      CARDIAC SURGERY      CATARACT EXTRACTION  11/25/2013    bilateral    CHOLECYSTECTOMY      COLONOSCOPY N/A 11/25/2015    Procedure: COLONOSCOPY;  Surgeon: Toby Hernandez MD;  Location: [facility];  Service: Endoscopy;  Laterality: N/A;    EYE SURGERY      GASTRIC BYPASS  1992    KNEE ARTHROSCOPY      RT    LASIK  2001    both eyes (Dr. Rabago)    ORIF HUMERUS FRACTURE      LT    RADIOFREQUENCY ABLATION      ROTATOR CUFF REPAIR      right    TOTAL KNEE ARTHROPLASTY Right 5/29/2018    Procedure: REPLACEMENT-KNEE-TOTAL-axel;  Surgeon: Denzel Dallas MD;  Location: [address]  [facility];  Service: Orthopedics;  Laterality: Right;  [place]       Review of patient's allergies indicates:   Allergen Reactions    No known drug allergies        Social History     Social History    Marital status:      Spouse name: N/A    Number of children: 7    Years of education: N/A     Occupational History    retired anesthesiology [employer]    [employer] grad      previous football player     Social History Main Topics    Smoking status: Never Smoker    Smokeless tobacco: Never Used      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       buPROPion (WELLBUTRIN XL) 150 MG TB24 tablet Take 3 tablets (450 mg total) by mouth once daily. 270 tablet 3    CALCIUM ORAL Take by mouth Daily.      celecoxib (CELEBREX) 200 MG capsule TAKE 1 CAPSULE(200 MG) BY MOUTH TWICE DAILY 180 capsule 1    cyanocobalamin (VITAMIN B-12) 1000 MCG tablet Take 1 tablet (1,000 mcg total) by mouth once daily. (Patient taking differently: Take 1,000 mcg by mouth every evening. ) 30 tablet 11    cyclobenzaprine (FLEXERIL) 10 MG tablet TK 1 T PO Q 8 H PRF MUSCLE SPASM 40 tablet 1    dutasteride (AVODART) 0.5 mg capsule TAKE 1 CAPSULE(0.5 MG) BY MOUTH ONCE DAILY 90 capsule 3    efinaconazole 10 % Prakash Apply 1 application topically once daily. 8 mL 6    escitalopram oxalate (LEXAPRO) 10 MG tablet Take 1 tablet (10 mg total) by mouth once daily. 90 tablet 3    ferrous sulfate 325 mg (65 mg iron) Tab tablet Take 1 tablet (325 mg total) by mouth 2 (two) times daily.  60 tablet 11    KENALOG 40 mg/mL injection as needed.       levothyroxine (SYNTHROID) 25 MCG tablet TAKE 1 TABLET(25 MCG) BY MOUTH EVERY DAY 90 tablet 0    lisinopril (PRINIVIL,ZESTRIL) 20 MG tablet Take 1 tablet (20 mg total) by mouth once daily. 90 tablet 3    MULTIVITAMIN ORAL Take by mouth Daily.      OMEGA-3 FATTY ACIDS (FISH OIL CONCENTRATE ORAL) Take by mouth Daily.      oxyCODONE (ROXICODONE) 15 MG Tab Take 1 tablet (15 mg total) by mouth every 6 (six) hours as needed for Pain (post op pain). 50 tablet 0    promethazine (PHENERGAN) 12.5 MG Tab Take 1 tablet (12.5 mg total) by mouth every 8 (eight) hours as needed (nausea). 30 tablet 3    propafenone (RYTHMOL SR) 425 MG Cp12 Take 1 capsule (425 mg total) by mouth every 12 (twelve) hours. 180 capsule 3    [DISCONTINUED] aspirin (ECOTRIN) 81 MG EC tablet Take 1 tablet (81 mg total) by mouth 2 (two) times daily. Take aspirin 81 mg twice daily to prevent blood clots after joint replacement.  After 10 days, resume apixaban (eliquis) and discontinue aspirin 20 tablet 0    [DISCONTINUED] atenolol (TENORMIN) 50 MG tablet Take 1 tablet (50 mg total) by mouth once daily. 30 tablet 11    [DISCONTINUED] dutasteride (AVODART) 0.5 mg capsule Take 1 capsule (0.5 mg total) by mouth once daily. 90 capsule 3    [DISCONTINUED] warfarin (COUMADIN) 2.5 MG tablet Take 1 tablet (2.5 mg total) by mouth Daily. Take 2 tablets (5mg) on day of discharge.  Take as directed by coumadin clinic thereafter. 90 tablet 1     No current facility-administered medications on file prior to visit.        Family History   Problem Relation Age of Onset    COPD Father     Diabetes Father     Aortic stenosis Mother     Heart disease Mother         aortic stenosis    Heart attack Brother     No Known Problems Son     No Known Problems Daughter     No Known Problems Daughter     No Known Problems Daughter        Review of Systems   Constitutional: Positive for fatigue. Negative for  "appetite change, chills, fever and unexpected weight change.   HENT: Negative for sore throat and trouble swallowing.    Eyes: Negative for pain and visual disturbance.   Respiratory: Negative for cough, chest tightness, shortness of breath and wheezing.    Cardiovascular: Negative for chest pain, palpitations and leg swelling.   Gastrointestinal: Negative for abdominal pain, blood in stool, constipation, diarrhea and nausea.   Genitourinary: Negative for difficulty urinating, dysuria and hematuria.   Musculoskeletal: Positive for arthralgias. Negative for gait problem and neck pain.   Skin: Negative for rash and wound.   Neurological: Negative for dizziness, weakness, light-headedness and headaches.   Hematological: Negative for adenopathy.   Psychiatric/Behavioral: Negative for dysphoric mood.       Objective:      /82 (BP Location: Left arm, Patient Position: Sitting)   Pulse 64   Temp 98.5 °F (36.9 °C)   Resp 18   Ht 6' 2" (1.88 m)   Wt 91.4 kg (201 lb 8 oz)   SpO2 95%   BMI 25.87 kg/m²   Physical Exam   Constitutional: He is oriented to person, place, and time. He appears well-developed and well-nourished. He is active. No distress.   HENT:   Head: Normocephalic and atraumatic.   Right Ear: External ear normal.   Left Ear: External ear normal.   Mouth/Throat: Uvula is midline, oropharynx is clear and moist and mucous membranes are normal. No oropharyngeal exudate.   Eyes: Conjunctivae, EOM and lids are normal. Pupils are equal, round, and reactive to light.   Neck: Normal range of motion, full passive range of motion without pain and phonation normal. Neck supple. No thyroid mass and no thyromegaly present.   Cardiovascular: Normal rate, regular rhythm, normal heart sounds and intact distal pulses.  Exam reveals no gallop and no friction rub.    No murmur heard.  Pulmonary/Chest: Effort normal and breath sounds normal. No respiratory distress. He has no wheezes. He has no rales.   Musculoskeletal: " Normal range of motion.   Lymphadenopathy:     He has no cervical adenopathy.   Neurological: He is alert and oriented to person, place, and time. No cranial nerve deficit. Coordination normal.   Skin: Skin is warm and dry.   Psychiatric: He has a normal mood and affect. His speech is normal and behavior is normal. Judgment and thought content normal.         Results for orders placed or performed in visit on 07/23/18   CBC auto differential   Result Value Ref Range    WBC 9.56 3.90 - 12.70 K/uL    RBC 4.49 (L) 4.60 - 6.20 M/uL    Hemoglobin 12.4 (L) 14.0 - 18.0 g/dL    Hematocrit 40.2 40.0 - 54.0 %    MCV 90 82 - 98 fL    MCH 27.6 27.0 - 31.0 pg    MCHC 30.8 (L) 32.0 - 36.0 g/dL    RDW 15.4 (H) 11.5 - 14.5 %    Platelets 351 (H) 150 - 350 K/uL    MPV 12.4 9.2 - 12.9 fL    Immature Granulocytes 0.4 0.0 - 0.5 %    Gran # (ANC) 5.7 1.8 - 7.7 K/uL    Immature Grans (Abs) 0.04 0.00 - 0.04 K/uL    Lymph # 2.1 1.0 - 4.8 K/uL    Mono # 0.7 0.3 - 1.0 K/uL    Eos # 0.9 (H) 0.0 - 0.5 K/uL    Baso # 0.15 0.00 - 0.20 K/uL    nRBC 0 0 /100 WBC    Gran% 59.7 38.0 - 73.0 %    Lymph% 21.4 18.0 - 48.0 %    Mono% 7.5 4.0 - 15.0 %    Eosinophil% 9.4 (H) 0.0 - 8.0 %    Basophil% 1.6 0.0 - 1.9 %    Differential Method Automated    TSH   Result Value Ref Range    TSH 3.031 0.400 - 4.000 uIU/mL   Comprehensive metabolic panel   Result Value Ref Range    Sodium 139 136 - 145 mmol/L    Potassium 4.6 3.5 - 5.1 mmol/L    Chloride 111 (H) 95 - 110 mmol/L    CO2 22 (L) 23 - 29 mmol/L    Glucose 103 70 - 110 mg/dL    BUN, Bld 28 (H) 8 - 23 mg/dL    Creatinine 1.4 0.5 - 1.4 mg/dL    Calcium 8.6 (L) 8.7 - 10.5 mg/dL    Total Protein 5.8 (L) 6.0 - 8.4 g/dL    Albumin 2.8 (L) 3.5 - 5.2 g/dL    Total Bilirubin 0.7 0.1 - 1.0 mg/dL    Alkaline Phosphatase 112 55 - 135 U/L    AST 19 10 - 40 U/L    ALT 14 10 - 44 U/L    Anion Gap 6 (L) 8 - 16 mmol/L    eGFR if African American 58.0 (A) >60 mL/min/1.73 m^2    eGFR if non African American 50.2 (A) >60  mL/min/1.73 m^2   Iron and TIBC   Result Value Ref Range    Iron 141 45 - 160 ug/dL    Transferrin 240 200 - 375 mg/dL    TIBC 355 250 - 450 ug/dL    Saturated Iron 40 20 - 50 %   Vitamin B12   Result Value Ref Range    Vitamin B-12 1014 (H) 210 - 950 pg/mL       Assessment:       1. B12 deficiency    2. Iron deficiency    3. Hypothyroidism, unspecified type    4. Hypertension, unspecified type    5. Chronic atrial fibrillation        Plan:       B12 deficiency    Iron deficiency    Hypothyroidism, unspecified type    Hypertension, unspecified type    Chronic atrial fibrillation        Continue oral B12 and iron  Continue other present meds  conitnue D 5,000 IU daily.  Counseled on regular exercise, maintenance of a healthy weight, balanced diet rich in fruits/vegetables and lean protein, and avoidance of unhealthy habits like smoking and excessive alcohol intake.  F/u annually or PRN

## 2018-07-31 DIAGNOSIS — G89.18 POST-OP PAIN: Primary | ICD-10-CM

## 2018-07-31 DIAGNOSIS — Z96.659 STATUS POST KNEE REPLACEMENT, UNSPECIFIED LATERALITY: ICD-10-CM

## 2018-07-31 RX ORDER — HYDROCODONE BITARTRATE AND ACETAMINOPHEN 10; 325 MG/1; MG/1
1 TABLET ORAL EVERY 6 HOURS PRN
Qty: 60 TABLET | Refills: 0 | Status: SHIPPED | OUTPATIENT
Start: 2018-07-31 | End: 2018-08-10

## 2018-07-31 NOTE — PROGRESS NOTES
Pt requests refill of pain medication. Changed from roxicodone to hydrocodone and sent to [facility] as requested.

## 2018-08-02 DIAGNOSIS — G89.18 POST-OP PAIN: ICD-10-CM

## 2018-08-02 RX ORDER — OXYCODONE HYDROCHLORIDE 15 MG/1
15 TABLET ORAL EVERY 6 HOURS PRN
Qty: 30 TABLET | Refills: 0 | Status: SHIPPED | OUTPATIENT
Start: 2018-08-02 | End: 2018-10-03

## 2018-08-20 ENCOUNTER — OFFICE VISIT (OUTPATIENT)
Dept: ORTHOPEDICS | Facility: CLINIC | Age: 73
End: 2018-08-20
Payer: MEDICARE

## 2018-08-20 ENCOUNTER — TELEPHONE (OUTPATIENT)
Dept: PAIN MEDICINE | Facility: CLINIC | Age: 73
End: 2018-08-20

## 2018-08-20 VITALS — WEIGHT: 199.88 LBS | HEIGHT: 74 IN | BODY MASS INDEX: 25.65 KG/M2

## 2018-08-20 DIAGNOSIS — Z96.659 STATUS POST KNEE REPLACEMENT, UNSPECIFIED LATERALITY: Primary | ICD-10-CM

## 2018-08-20 PROCEDURE — 99024 POSTOP FOLLOW-UP VISIT: CPT | Mod: S$GLB,,, | Performed by: ORTHOPAEDIC SURGERY

## 2018-08-20 PROCEDURE — 99999 PR PBB SHADOW E&M-EST. PATIENT-LVL III: CPT | Mod: PBBFAC,,, | Performed by: ORTHOPAEDIC SURGERY

## 2018-08-20 NOTE — PROGRESS NOTES
Dominick Song MD is in for 3 month follow up for a  Left TKA.  He is doing fairly well.  Pain in the knee at night.  He has resumed activities of daily living. He has been quite  Exam demonstrates  A well developed male in no distress.  Alert and oriented.  Mood and affect are appropriate.    Knee incision is well healed.  ROM is 0-125.  The patella tracks well and there is no instability. The extremity is neurovascularly intact.    Xrays demonstrate a well fixed and positioned prosthesis.      Imp: Doing fairly well.  I believe an RFA will help with the residual night pain.    F/u in 6-8 weeks

## 2018-08-20 NOTE — TELEPHONE ENCOUNTER
The patient has an appointment with me in October, please moved up somewhere in the next couple weeks thanks.

## 2018-08-25 DIAGNOSIS — M62.838 MUSCLE SPASMS OF BOTH LOWER EXTREMITIES: ICD-10-CM

## 2018-08-25 RX ORDER — CYCLOBENZAPRINE HCL 10 MG
TABLET ORAL
Qty: 40 TABLET | Refills: 0 | Status: SHIPPED | OUTPATIENT
Start: 2018-08-25 | End: 2018-10-05 | Stop reason: SDUPTHER

## 2018-08-27 ENCOUNTER — OFFICE VISIT (OUTPATIENT)
Dept: PAIN MEDICINE | Facility: CLINIC | Age: 73
End: 2018-08-27
Payer: MEDICARE

## 2018-08-27 VITALS
SYSTOLIC BLOOD PRESSURE: 134 MMHG | WEIGHT: 210.44 LBS | BODY MASS INDEX: 27.01 KG/M2 | RESPIRATION RATE: 20 BRPM | TEMPERATURE: 98 F | HEIGHT: 74 IN | DIASTOLIC BLOOD PRESSURE: 72 MMHG | OXYGEN SATURATION: 98 % | HEART RATE: 76 BPM

## 2018-08-27 DIAGNOSIS — M25.561 RIGHT KNEE PAIN, UNSPECIFIED CHRONICITY: Primary | ICD-10-CM

## 2018-08-27 PROCEDURE — 99204 OFFICE O/P NEW MOD 45 MIN: CPT | Mod: S$GLB,,, | Performed by: ANESTHESIOLOGY

## 2018-08-27 PROCEDURE — 99999 PR PBB SHADOW E&M-EST. PATIENT-LVL IV: CPT | Mod: PBBFAC,,, | Performed by: ANESTHESIOLOGY

## 2018-08-27 PROCEDURE — 3078F DIAST BP <80 MM HG: CPT | Mod: CPTII,S$GLB,, | Performed by: ANESTHESIOLOGY

## 2018-08-27 PROCEDURE — 3075F SYST BP GE 130 - 139MM HG: CPT | Mod: CPTII,S$GLB,, | Performed by: ANESTHESIOLOGY

## 2018-08-27 RX ORDER — GABAPENTIN 300 MG/1
300 CAPSULE ORAL NIGHTLY
Qty: 30 CAPSULE | Refills: 1 | Status: SHIPPED | OUTPATIENT
Start: 2018-08-27 | End: 2018-10-24 | Stop reason: SDUPTHER

## 2018-08-27 NOTE — PROGRESS NOTES
This note was completed with dictation software and grammatical errors may exist.    CC:  Right knee pain    HPI:  The patient is a 72-year-old man with a history of gastric ulcer, chronic renal insufficiency, presents in referral from Dr. Dallas for continued right knee pain, consideration of geniculate RFA.  He is 3 months out from right TKA, states that prior to surgery he did not have pain, it was more of an issue of instability.  He has done well postoperatively in terms of use of his right knee but has had some postoperative pain that came up which is somewhat unusual.  His pain has been located in 2 areas, the 1st at the lateral infrapatellar region and the other spot at the medial suprapatellar region.  He does fine with walking and throughout the day with no pain whatsoever, he lays down at night and it begins hurting and causes him difficulty with sleeping.  He states that it feels like restless legs and he has difficult time getting comfortable.  He describes it as throbbing, grabbing, sharp.  In the last week his lateral infrapatellar pain resolved.  He has been taking pain medication nightly, had been given a prescription for 15 mg oxycodone and has been breaking this in 1/4s and this helps him go to sleep without pain.      ROS:  He reports joint stiffness, difficulty sleeping.  Balance of review of systems is negative.    Past Medical History:   Diagnosis Date    Anticoagulant long-term use     Anxiety     Arthritis     Atrial fibrillation     BPH (benign prostatic hyperplasia)     Cataract     CKD (chronic kidney disease) stage 3, GFR 30-59 ml/min 7/10/2017    Followed by Dr. Jeevan Pond    Colon polyp     benign    Depression     Elevated PSA     Erectile dysfunction     Gastric ulcer with hemorrhage     Hep B w/o coma 1977    History of bleeding peptic ulcer     History of prostatitis     Hypertension     Hypothyroidism     PAF (paroxysmal atrial fibrillation)     Sleep apnea   "   on CPAP       Past Surgical History:   Procedure Laterality Date    ABDOMINAL HERNIA REPAIR      CARDIAC SURGERY      CATARACT EXTRACTION  11/25/2013    bilateral    CHOLECYSTECTOMY      EYE SURGERY      GASTRIC BYPASS  1992    KNEE ARTHROSCOPY      RT    LASIK  2001    both eyes (Dr. Rabago)    ORIF HUMERUS FRACTURE      LT    RADIOFREQUENCY ABLATION      ROTATOR CUFF REPAIR      right       Social History     Socioeconomic History    Marital status:      Spouse name: None    Number of children: 7    Years of education: None    Highest education level: None   Social Needs    Financial resource strain: None    Food insecurity - worry: None    Food insecurity - inability: None    Transportation needs - medical: None    Transportation needs - non-medical: None   Occupational History    Occupation: retired anesthesiology     Employer: [employer]    Occupation: [employer] grad     Comment: previous football player   Tobacco Use    Smoking status: Never Smoker    Smokeless tobacco: Never Used    Tobacco 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    Substance and Sexual Activity    Alcohol use: No    Drug use: No    Sexual activity: Yes     Partners: Female   Other Topics Concern    Patient feels they ought to cut down on drinking/drug use Not Asked    Patient annoyed by others criticizing their drinking/drug use Not Asked    Patient has felt bad or guilty about drinking/drug use Not Asked    Patient has had a drink/used drugs as an eye opener in the AM Not Asked   Social History Narrative    None         Medications/Allergies: See med card    Vitals:    08/27/18 1129   BP: 134/72   Pulse: 76   Resp: 20   Temp: 98 °F (36.7 °C)   TempSrc: Oral   SpO2: 98%   Weight: 95.5 kg (210 lb 6.9 oz)   Height: 6' 2" (1.88 m)   PainSc: 0-No pain         Physical exam:    Gen: A and O x3, pleasant, well-groomed  Skin: No rashes or obvious lesions  HEENT: PERRLA, no obvious " deformities on ears or in canals. Trachea midline.  CVS: Regular rate and rhythm, normal palpable pulses.  Resp:No increased work of breathing, symmetrical chest rise.  Abdomen: Soft, NT/ND.  Musculoskeletal:  Mild waddling gait  Right knee, no swelling or erythema, nontender to palpation.  Surgical incision well healed    Neuro:  Lower extremities: 5/5 strength bilaterally  Reflexes: Patellar 0+, Achilles 0+ bilaterally.  Sensory:  Intact and symmetrical to light touch and pinprick in L2-S1 dermatomes bilaterally.    Lumbar spine:  Lumbar spine: ROM is full with flexion extension and oblique extension with no increased pain.    Chas's test causes no increased pain on either side.    Supine straight leg raise is negative bilaterally.    Internal and external rotation of the hip causes no increased pain on either side.  Myofascial exam: No tenderness to palpation across lumbar paraspinous muscles.    Imagin/10/18 Xray right knee: There is a right TKA in place good alignment no complication.    Assessment:  The patient is a 72-year-old man with a history of gastric ulcer, chronic renal insufficiency, presents in referral from Dr. Dallas for continued right knee pain, consideration of geniculate RFA.     1. Right knee pain, unspecified chronicity         Plan:  1.  His pain sounds to be neuropathic in nature only occurring at night, non mechanical.  Since it does seem to be improving even just in the last week, we will hold off on any intervention but I will have him start taking gabapentin 300 mg nightly instead of the oxycodone as I would rather have him discontinue opioids.  He agrees with this.  I have asked him to contact me if having any side effects with this, if he is not having improvement over the next month or if it worsens at a time, we can always consider the geniculate nerve RFA.    Thank you for referring this interesting patient, and I look forward to continuing to collaborate in his  care.

## 2018-08-27 NOTE — LETTER
August 27, 2018      Denzel Dallas MD  [address]  [facility] [address]           [place] [facility]  [address] [facility] [address]  Phone: [phone]  Fax: [phone]          Patient: Dominick Song MD   MR Number: [id number]   YOB: 1945   Date of Visit: 8/27/2018       Dear Dr. Denzel Dallas:    Thank you for referring Dominick Song to me for evaluation. Attached you will find relevant portions of my assessment and plan of care.    If you have questions, please do not hesitate to call me. I look forward to following Dominick Song along with you.    Sincerely,    Ja Gilbert MD    Enclosure  CC:  No Recipients    If you would like to receive this communication electronically, please contact [email or web address] or [phone] to request more information on [employer] access.    For providers and/or their staff who would like to refer a patient to [facility], please contact us through our one-stop-shop provider referral line, [facility], at [phone].    If you feel you have received this communication in error or would no longer like to receive these types of communications, please e-mail [email or web address]

## 2018-09-08 ENCOUNTER — PATIENT MESSAGE (OUTPATIENT)
Dept: FAMILY MEDICINE | Facility: CLINIC | Age: 73
End: 2018-09-08

## 2018-09-26 RX ORDER — LEVOTHYROXINE SODIUM 25 UG/1
TABLET ORAL
Qty: 90 TABLET | Refills: 3 | Status: SHIPPED | OUTPATIENT
Start: 2018-09-26 | End: 2019-09-07 | Stop reason: SDUPTHER

## 2018-10-02 ENCOUNTER — PATIENT MESSAGE (OUTPATIENT)
Dept: PODIATRY | Facility: CLINIC | Age: 73
End: 2018-10-02

## 2018-10-03 ENCOUNTER — IMMUNIZATION (OUTPATIENT)
Dept: FAMILY MEDICINE | Facility: CLINIC | Age: 73
End: 2018-10-03
Payer: MEDICARE

## 2018-10-03 ENCOUNTER — OFFICE VISIT (OUTPATIENT)
Dept: PODIATRY | Facility: CLINIC | Age: 73
End: 2018-10-03
Payer: MEDICARE

## 2018-10-03 VITALS
HEART RATE: 68 BPM | HEIGHT: 74 IN | DIASTOLIC BLOOD PRESSURE: 86 MMHG | WEIGHT: 210.56 LBS | BODY MASS INDEX: 27.02 KG/M2 | SYSTOLIC BLOOD PRESSURE: 158 MMHG

## 2018-10-03 DIAGNOSIS — L03.031 CELLULITIS OF GREAT TOE, RIGHT: Primary | ICD-10-CM

## 2018-10-03 DIAGNOSIS — L97.511 SKIN ULCER OF RIGHT GREAT TOE, LIMITED TO BREAKDOWN OF SKIN: ICD-10-CM

## 2018-10-03 PROCEDURE — 99213 OFFICE O/P EST LOW 20 MIN: CPT | Mod: PBBFAC,25,PN | Performed by: PODIATRIST

## 2018-10-03 PROCEDURE — 99214 OFFICE O/P EST MOD 30 MIN: CPT | Mod: S$PBB,,, | Performed by: PODIATRIST

## 2018-10-03 PROCEDURE — 99499 UNLISTED E&M SERVICE: CPT | Mod: HCNC,S$GLB,, | Performed by: PODIATRIST

## 2018-10-03 PROCEDURE — 99999 PR PBB SHADOW E&M-EST. PATIENT-LVL III: CPT | Mod: PBBFAC,,, | Performed by: PODIATRIST

## 2018-10-03 PROCEDURE — 3077F SYST BP >= 140 MM HG: CPT | Mod: CPTII,,, | Performed by: PODIATRIST

## 2018-10-03 PROCEDURE — 3079F DIAST BP 80-89 MM HG: CPT | Mod: CPTII,,, | Performed by: PODIATRIST

## 2018-10-03 PROCEDURE — 90662 IIV NO PRSV INCREASED AG IM: CPT | Mod: PBBFAC,PO

## 2018-10-03 PROCEDURE — 1101F PT FALLS ASSESS-DOCD LE1/YR: CPT | Mod: CPTII,,, | Performed by: PODIATRIST

## 2018-10-03 RX ORDER — CLINDAMYCIN HYDROCHLORIDE 300 MG/1
300 CAPSULE ORAL 3 TIMES DAILY
Qty: 21 CAPSULE | Refills: 0 | Status: SHIPPED | OUTPATIENT
Start: 2018-10-03 | End: 2018-10-10

## 2018-10-03 NOTE — PROGRESS NOTES
"Subjective:      Patient ID: Dominick KENNEDY MD Duncan is a 73 y.o. male.    Chief Complaint: Foot Problem (right great - redness) and Other Misc (PCP:  Dr Gaines 7/30/18)    Dominick is a 73 y.o. male who presents to the podiatry clinic  with complaint of a sore on the right great toe, this sore started when he subbed his toe. There is drainage and redness to the area. He reports "cleaning it up" yesterday and there has been some improvement since then. No other acute pedal complaints. No f/c/n/v reported    Review of Systems   Constitution: Negative for chills and fever.   Cardiovascular: Negative for claudication and leg swelling.   Respiratory: Negative for shortness of breath.    Skin: Positive for nail changes. Negative for itching and rash.   Musculoskeletal: Positive for arthritis and joint swelling. Negative for muscle cramps, muscle weakness and myalgias.   Gastrointestinal: Negative for nausea and vomiting.   Neurological: Negative for focal weakness, loss of balance, numbness and paresthesias.           Objective:      Physical Exam   Constitutional: He is oriented to person, place, and time. He appears well-developed and well-nourished. No distress.   Cardiovascular:   Pulses:       Dorsalis pedis pulses are 2+ on the right side, and 2+ on the left side.        Posterior tibial pulses are 2+ on the right side, and 2+ on the left side.   < 3 sec capillary refill time to toes 1-5 bilateral. Toes and feet are warm to touch proximally with normal distal cooling b/l. There is some hair growth on the feet and toes b/l. There is no edema b/l. No spider veins or varicosities present b/l.      Musculoskeletal:   Right foot pain to palpation at the plantar fourth metatarsal head, there is an associated hyperkeratotic lesion, also some discomfort to first/second metatarsal heads. No pain with ROM of the MTPJ's    Equinus noted b/l ankles with < 10 deg DF noted. MMT 5/5 in DF/PF/Inv/Ev resistance with no reproduction of " pain in any direction. Passive range of motion of ankle and pedal joints is painless b/l.     Neurological: He is alert and oriented to person, place, and time. He has normal strength. He displays no atrophy and no tremor. No sensory deficit. He exhibits normal muscle tone.   Negative tinel sign bilateral.   Skin: Skin is warm and dry. No abrasion, no bruising, no burn, no ecchymosis, no laceration, no lesion, no petechiae and no rash noted. He is not diaphoretic. There is erythema. No cyanosis. No pallor. Nails show no clubbing.   Skin temperature, texture and turgor within normal limits.    Bilateral hallux nails and right second nail discolored with slight thickening, left hallux nail has subungual debris. Minimal improvement.    Right great toe there is an open ulceration to the medial nail bed and surrounding tissue, after part of the nail was debrided back with nippers an underlying partial thickness ulceration was present with serous drainage and surrounding erythema.         Psychiatric: He has a normal mood and affect. His behavior is normal.             Assessment:       Encounter Diagnoses   Name Primary?    Cellulitis of great toe, right Yes    Skin ulcer of right great toe, limited to breakdown of skin          Plan:       Dominick was seen today for foot problem and other misc.    Diagnoses and all orders for this visit:    Cellulitis of great toe, right    Skin ulcer of right great toe, limited to breakdown of skin    Other orders  -     clindamycin (CLEOCIN) 300 MG capsule; Take 1 capsule (300 mg total) by mouth 3 (three) times daily. for 7 days      I counseled the patient on his conditions, their implications and medical management.     Cleansed the ulcer with saline and gauze, removed the lytic portion of the medial hallux nail over the nail bed. Covered with triple antibiotic and a band aid, he will do the same daily.    Started on clindamycin for 1 week    Return in 2 weeks to ensure full  closure of wound and resolution of infection    Roverto Linares, DPM

## 2018-10-05 ENCOUNTER — PATIENT MESSAGE (OUTPATIENT)
Dept: ORTHOPEDICS | Facility: CLINIC | Age: 73
End: 2018-10-05

## 2018-10-05 DIAGNOSIS — M62.838 MUSCLE SPASMS OF BOTH LOWER EXTREMITIES: ICD-10-CM

## 2018-10-05 DIAGNOSIS — M25.569 KNEE PAIN, UNSPECIFIED CHRONICITY, UNSPECIFIED LATERALITY: Primary | ICD-10-CM

## 2018-10-05 RX ORDER — HYDROCODONE BITARTRATE AND ACETAMINOPHEN 5; 325 MG/1; MG/1
1 TABLET ORAL EVERY 6 HOURS PRN
Qty: 30 TABLET | Refills: 0 | Status: SHIPPED | OUTPATIENT
Start: 2018-10-05 | End: 2018-10-15

## 2018-10-05 RX ORDER — CYCLOBENZAPRINE HCL 10 MG
TABLET ORAL
Qty: 40 TABLET | Refills: 0 | Status: SHIPPED | OUTPATIENT
Start: 2018-10-05 | End: 2018-10-25 | Stop reason: SDUPTHER

## 2018-10-05 NOTE — PROGRESS NOTES
Norco and flexeril refilled as requested. Will discuss with Dr. Dallas as patient is more than 3 months post op.

## 2018-10-05 NOTE — PROGRESS NOTES
Pt still having right knee pain following right knee replacement. Dr. Dallas would like him to see Dr. Gilbert again for further evaluation. Appointment made for 11/30.

## 2018-10-07 ENCOUNTER — PATIENT MESSAGE (OUTPATIENT)
Dept: ORTHOPEDICS | Facility: CLINIC | Age: 73
End: 2018-10-07

## 2018-10-08 ENCOUNTER — LAB VISIT (OUTPATIENT)
Dept: LAB | Facility: HOSPITAL | Age: 73
End: 2018-10-08
Attending: INTERNAL MEDICINE
Payer: MEDICARE

## 2018-10-08 DIAGNOSIS — N18.30 CKD (CHRONIC KIDNEY DISEASE), STAGE III: ICD-10-CM

## 2018-10-08 DIAGNOSIS — D50.0 ANEMIA DUE TO CHRONIC BLOOD LOSS: ICD-10-CM

## 2018-10-08 DIAGNOSIS — N13.8 BENIGN PROSTATIC HYPERPLASIA WITH URINARY OBSTRUCTION: ICD-10-CM

## 2018-10-08 DIAGNOSIS — N40.1 BENIGN PROSTATIC HYPERPLASIA WITH URINARY OBSTRUCTION: ICD-10-CM

## 2018-10-08 DIAGNOSIS — R97.20 ELEVATED PSA: ICD-10-CM

## 2018-10-08 LAB
ALBUMIN SERPL BCP-MCNC: 3.4 G/DL
ANION GAP SERPL CALC-SCNC: 5 MMOL/L
BUN SERPL-MCNC: 22 MG/DL
CALCIUM SERPL-MCNC: 9 MG/DL
CHLORIDE SERPL-SCNC: 113 MMOL/L
CO2 SERPL-SCNC: 22 MMOL/L
COMPLEXED PSA SERPL-MCNC: 2.9 NG/ML
CREAT SERPL-MCNC: 1.3 MG/DL
EST. GFR  (AFRICAN AMERICAN): >60 ML/MIN/1.73 M^2
EST. GFR  (NON AFRICAN AMERICAN): 54.5 ML/MIN/1.73 M^2
GLUCOSE SERPL-MCNC: 113 MG/DL
PHOSPHATE SERPL-MCNC: 2.9 MG/DL
POTASSIUM SERPL-SCNC: 5.3 MMOL/L
SODIUM SERPL-SCNC: 140 MMOL/L

## 2018-10-08 PROCEDURE — 36415 COLL VENOUS BLD VENIPUNCTURE: CPT | Mod: PO

## 2018-10-08 PROCEDURE — 80069 RENAL FUNCTION PANEL: CPT

## 2018-10-08 PROCEDURE — 84153 ASSAY OF PSA TOTAL: CPT

## 2018-10-09 ENCOUNTER — PATIENT MESSAGE (OUTPATIENT)
Dept: ORTHOPEDICS | Facility: CLINIC | Age: 73
End: 2018-10-09

## 2018-10-09 ENCOUNTER — TELEPHONE (OUTPATIENT)
Dept: PAIN MEDICINE | Facility: CLINIC | Age: 73
End: 2018-10-09

## 2018-10-09 DIAGNOSIS — M25.561 ACUTE PAIN OF RIGHT KNEE: Primary | ICD-10-CM

## 2018-10-09 DIAGNOSIS — Z96.651 STATUS POST RIGHT KNEE REPLACEMENT: Primary | ICD-10-CM

## 2018-10-09 NOTE — TELEPHONE ENCOUNTER
Called patient to schedule consult with Pain Management referred by Jaz Sibley, patient stated he already sees Dr. Gilbert for his   Pain management.

## 2018-10-09 NOTE — PROGRESS NOTES
Pt had right knee replacement 5/29 by Dr. Dallas. He has continued pain and Dr. Dallas would like him to have an RFA.

## 2018-10-15 ENCOUNTER — OFFICE VISIT (OUTPATIENT)
Dept: NEPHROLOGY | Facility: CLINIC | Age: 73
End: 2018-10-15
Payer: MEDICARE

## 2018-10-15 VITALS
SYSTOLIC BLOOD PRESSURE: 142 MMHG | HEART RATE: 78 BPM | OXYGEN SATURATION: 97 % | BODY MASS INDEX: 26.85 KG/M2 | WEIGHT: 209.19 LBS | DIASTOLIC BLOOD PRESSURE: 82 MMHG | HEIGHT: 74 IN

## 2018-10-15 DIAGNOSIS — N18.30 CKD (CHRONIC KIDNEY DISEASE), STAGE III: Primary | ICD-10-CM

## 2018-10-15 DIAGNOSIS — D50.0 ANEMIA DUE TO CHRONIC BLOOD LOSS: ICD-10-CM

## 2018-10-15 PROCEDURE — 99213 OFFICE O/P EST LOW 20 MIN: CPT | Mod: PBBFAC,PO | Performed by: INTERNAL MEDICINE

## 2018-10-15 PROCEDURE — 99214 OFFICE O/P EST MOD 30 MIN: CPT | Mod: S$PBB,,, | Performed by: INTERNAL MEDICINE

## 2018-10-15 PROCEDURE — 99999 PR PBB SHADOW E&M-EST. PATIENT-LVL III: CPT | Mod: PBBFAC,,, | Performed by: INTERNAL MEDICINE

## 2018-10-15 PROCEDURE — 3079F DIAST BP 80-89 MM HG: CPT | Mod: CPTII,,, | Performed by: INTERNAL MEDICINE

## 2018-10-15 PROCEDURE — 1101F PT FALLS ASSESS-DOCD LE1/YR: CPT | Mod: CPTII,,, | Performed by: INTERNAL MEDICINE

## 2018-10-15 PROCEDURE — 3077F SYST BP >= 140 MM HG: CPT | Mod: CPTII,,, | Performed by: INTERNAL MEDICINE

## 2018-10-15 NOTE — PROGRESS NOTES
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    There have been no recent illnesses.  He has no uremic or urinary symptoms and is in his usual state of health.   He had his TKR and is recovering.       Review of Systems   Constitutional: Positive for fatigue. Negative for appetite change, chills and fever.   HENT: Positive for sore throat (hoarseness in evenings).    Eyes: Negative for visual disturbance.   Respiratory: Negative for cough and shortness of breath.    Cardiovascular: Negative for chest pain and leg swelling.   Gastrointestinal: Negative for diarrhea, nausea and vomiting.   Genitourinary: Positive for frequency (3-4 episodes of nocturia). Negative for difficulty urinating, dysuria, hematuria and urgency.   Musculoskeletal: Positive for arthralgias (knee and right shoulder). Negative for myalgias.   Skin: Negative for rash.   Neurological: Positive for headaches. Negative for dizziness and light-headedness.   Psychiatric/Behavioral: Negative for sleep disturbance.       The past medical, family and social histories were reviewed for this encounter.     BP (!) 142/82 (BP Location: Left arm, Patient Position: Sitting)   Pulse 78   Ht 6' 2" (1.88 m)   Wt 94.9 kg (209 lb 3.5 oz)   SpO2 97%   BMI 26.86 kg/m²     Objective:      Physical Exam   Constitutional: He is oriented to person, place, and time. He appears well-developed and well-nourished. No distress.   HENT:   Head: Normocephalic and atraumatic.   Eyes: Conjunctivae are normal.   Neck: Neck supple. No JVD present.   Cardiovascular: Normal rate, regular rhythm and normal heart sounds. Exam reveals no gallop and no friction rub.   No murmur heard.  Pulmonary/Chest: Effort normal. No respiratory distress. He has no wheezes. He has no rales.   Few end-expiratory dry crackles   Abdominal: Soft. Bowel sounds are normal. He exhibits no distension. There " is no tenderness.   Musculoskeletal: He exhibits no edema.   Neurological: He is alert and oriented to person, place, and time.   Skin: Skin is warm and dry. No rash noted.   Psychiatric: He has a normal mood and affect.   Vitals reviewed.      Assessment:       1. CKD (chronic kidney disease), stage III    2. Anemia due to chronic blood loss        Plan:   Return to clinic in 6 months.  Labs for next visit include rp.  Baseline creatinine is 1.1-1.3 since 2004.  His urine sediment is negative and his ultrasound is remarkable only for somewhat small symmetric kidneys.  Blood pressure is controlled on the current regimen.  Continue current medications as prescribed and reviewed.   We discussed his potassium.

## 2018-10-22 ENCOUNTER — OFFICE VISIT (OUTPATIENT)
Dept: CARDIOLOGY | Facility: CLINIC | Age: 73
End: 2018-10-22
Payer: MEDICARE

## 2018-10-22 VITALS
HEIGHT: 74 IN | SYSTOLIC BLOOD PRESSURE: 149 MMHG | BODY MASS INDEX: 26.97 KG/M2 | HEART RATE: 62 BPM | WEIGHT: 210.13 LBS | DIASTOLIC BLOOD PRESSURE: 90 MMHG

## 2018-10-22 DIAGNOSIS — I48.91 ATRIAL FIBRILLATION, UNSPECIFIED TYPE: Primary | ICD-10-CM

## 2018-10-22 DIAGNOSIS — G47.31 COMPLEX SLEEP APNEA SYNDROME: ICD-10-CM

## 2018-10-22 DIAGNOSIS — I48.91 ATRIAL FIBRILLATION, UNSPECIFIED TYPE: ICD-10-CM

## 2018-10-22 DIAGNOSIS — R00.1 BRADYCARDIA: ICD-10-CM

## 2018-10-22 DIAGNOSIS — I10 ESSENTIAL HYPERTENSION: ICD-10-CM

## 2018-10-22 DIAGNOSIS — Z87.11 HISTORY OF BLEEDING PEPTIC ULCER: ICD-10-CM

## 2018-10-22 DIAGNOSIS — I48.19 PERSISTENT ATRIAL FIBRILLATION: Primary | ICD-10-CM

## 2018-10-22 DIAGNOSIS — E03.4 HYPOTHYROIDISM DUE TO ACQUIRED ATROPHY OF THYROID: ICD-10-CM

## 2018-10-22 PROCEDURE — 99999 PR PBB SHADOW E&M-EST. PATIENT-LVL III: CPT | Mod: PBBFAC,,, | Performed by: INTERNAL MEDICINE

## 2018-10-22 PROCEDURE — 1101F PT FALLS ASSESS-DOCD LE1/YR: CPT | Mod: CPTII,,, | Performed by: INTERNAL MEDICINE

## 2018-10-22 PROCEDURE — 93005 ELECTROCARDIOGRAM TRACING: CPT | Mod: PBBFAC,HCNC,PO | Performed by: INTERNAL MEDICINE

## 2018-10-22 PROCEDURE — 93010 ELECTROCARDIOGRAM REPORT: CPT | Mod: S$PBB,HCNC,, | Performed by: INTERNAL MEDICINE

## 2018-10-22 PROCEDURE — 3080F DIAST BP >= 90 MM HG: CPT | Mod: CPTII,,, | Performed by: INTERNAL MEDICINE

## 2018-10-22 PROCEDURE — 99213 OFFICE O/P EST LOW 20 MIN: CPT | Mod: PBBFAC,PO | Performed by: INTERNAL MEDICINE

## 2018-10-22 PROCEDURE — 3077F SYST BP >= 140 MM HG: CPT | Mod: CPTII,,, | Performed by: INTERNAL MEDICINE

## 2018-10-22 PROCEDURE — 99214 OFFICE O/P EST MOD 30 MIN: CPT | Mod: S$PBB,,, | Performed by: INTERNAL MEDICINE

## 2018-10-22 NOTE — PROGRESS NOTES
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  First diagnosed with atrial fibrillation 2/12 (noted palpitations). Placed on beta blocker, coumadin. Underwent PEPE/DCCV. Had recurrence, Propafenone  mg twice daily added.  Had recurrence 12/24/13, underwent DCCV, Propafenone increased to 425 mg twice daily.  PVI ([place]) 7/28/14.   Had done well, was off Propafenone. Developed recurrence, underwent DCCV multiple times.  Repeat PVI 4/27/17 All 4 veins remained isolated.  Antralized left sided lesion set posteriorly, and performed SVC isolation.  Has been compliant with CPAP.  Had persistent recurrence >> DCCV 2/9/18.  Has had 2 episodes of AF since then (april and may).  Otherwise has been well  ECG reveals nsr MS interval 174 msec narrow QRS.        Review of Systems   Constitution: Negative. Negative for weakness and malaise/fatigue.   Cardiovascular: Negative for chest pain, dyspnea on exertion, irregular heartbeat, leg swelling, near-syncope, orthopnea, palpitations, paroxysmal nocturnal dyspnea and syncope.   Respiratory: Negative for cough and shortness of breath.    Neurological: Negative for dizziness and light-headedness.   All other systems reviewed and are negative.       Objective:    Physical Exam   Constitutional: He is oriented to person, place, and time. He appears well-developed and well-nourished.   Eyes: Conjunctivae are normal. No scleral icterus.   Neck: No JVD present. No tracheal deviation present.   Cardiovascular: Normal rate, regular rhythm and normal heart sounds. PMI is not displaced.   Pulmonary/Chest: Effort normal and breath sounds normal. No respiratory distress.   Abdominal: Soft. There is no hepatosplenomegaly. There is no tenderness.   Musculoskeletal: He exhibits no edema (lower extremity) or tenderness.    Neurological: He is alert and oriented to person, place, and time.   Skin: Skin is warm and dry. No rash noted.   Psychiatric: He has a normal mood and affect. His behavior is normal.         Assessment:       1. Persistent atrial fibrillation    2. Bradycardia    3. Essential hypertension    4. Hypothyroidism due to acquired atrophy of thyroid    5. History of bleeding peptic ulcer    6. Complex sleep apnea syndrome         Plan:

## 2018-10-24 RX ORDER — GABAPENTIN 300 MG/1
CAPSULE ORAL
Qty: 30 CAPSULE | Refills: 3 | Status: SHIPPED | OUTPATIENT
Start: 2018-10-24 | End: 2019-01-02

## 2018-10-25 DIAGNOSIS — M62.838 MUSCLE SPASMS OF BOTH LOWER EXTREMITIES: ICD-10-CM

## 2018-10-25 RX ORDER — CYCLOBENZAPRINE HCL 10 MG
TABLET ORAL
Qty: 40 TABLET | Refills: 0 | Status: SHIPPED | OUTPATIENT
Start: 2018-10-25 | End: 2018-12-31 | Stop reason: SDUPTHER

## 2018-10-26 ENCOUNTER — PATIENT MESSAGE (OUTPATIENT)
Dept: ORTHOPEDICS | Facility: CLINIC | Age: 73
End: 2018-10-26

## 2018-11-01 ENCOUNTER — OFFICE VISIT (OUTPATIENT)
Dept: UROLOGY | Facility: CLINIC | Age: 73
End: 2018-11-01
Payer: MEDICARE

## 2018-11-01 VITALS
BODY MASS INDEX: 26.45 KG/M2 | HEART RATE: 69 BPM | SYSTOLIC BLOOD PRESSURE: 167 MMHG | DIASTOLIC BLOOD PRESSURE: 81 MMHG | WEIGHT: 206 LBS

## 2018-11-01 DIAGNOSIS — N40.0 BENIGN PROSTATIC HYPERPLASIA: ICD-10-CM

## 2018-11-01 DIAGNOSIS — N40.1 BPH WITH URINARY OBSTRUCTION: Primary | ICD-10-CM

## 2018-11-01 DIAGNOSIS — N13.8 BPH WITH URINARY OBSTRUCTION: Primary | ICD-10-CM

## 2018-11-01 DIAGNOSIS — N52.1 ERECTILE DYSFUNCTION DUE TO DISEASES CLASSIFIED ELSEWHERE: ICD-10-CM

## 2018-11-01 PROCEDURE — 99213 OFFICE O/P EST LOW 20 MIN: CPT | Mod: PBBFAC,HCNC | Performed by: UROLOGY

## 2018-11-01 PROCEDURE — 3077F SYST BP >= 140 MM HG: CPT | Mod: CPTII,HCNC,S$GLB, | Performed by: UROLOGY

## 2018-11-01 PROCEDURE — 1101F PT FALLS ASSESS-DOCD LE1/YR: CPT | Mod: CPTII,HCNC,S$GLB, | Performed by: UROLOGY

## 2018-11-01 PROCEDURE — 3079F DIAST BP 80-89 MM HG: CPT | Mod: CPTII,HCNC,S$GLB, | Performed by: UROLOGY

## 2018-11-01 PROCEDURE — 99999 PR PBB SHADOW E&M-EST. PATIENT-LVL III: CPT | Mod: PBBFAC,HCNC,, | Performed by: UROLOGY

## 2018-11-01 PROCEDURE — 99214 OFFICE O/P EST MOD 30 MIN: CPT | Mod: HCNC,S$GLB,, | Performed by: UROLOGY

## 2018-11-01 RX ORDER — DUTASTERIDE 0.5 MG/1
CAPSULE, LIQUID FILLED ORAL
Qty: 90 CAPSULE | Refills: 3 | Status: SHIPPED | OUTPATIENT
Start: 2018-11-01 | End: 2019-12-05 | Stop reason: SDUPTHER

## 2018-11-01 NOTE — PROGRESS NOTES
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 Using Avodart.  Viagra did not help. Does not like PEP.   PSA 4.8 corrected, stable over time. Enjoying halfway, staying busy around the house. Just confirmed at [facility], going to [facility] in January.  Stable voiding pattern. Seeing Salty for CKD.  PSA corrected 5.8.      Lab Results   Component Value Date    PSA 2.62 03/21/2013    PSA 4.41 (H) 02/06/2012    PSA 4.82 (H) 10/31/2011    PSA 4.0 08/31/2011    PSA 4.5 (H) 04/20/2011    PSA 5.8 (H) 06/08/2010    PSA 3.9 12/15/2009    PSA 4.6 (H) 06/08/2009    PSA 3.8 05/28/2008    PSA 4.7 (H) 09/04/2007    PSADIAG 2.9 10/08/2018    PSADIAG 2.4 10/11/2017    PSADIAG 1.8 10/20/2016    PSADIAG 2.9 10/12/2015    PSATOTAL 4.0 03/26/2018    PSATOTAL 7.5 (H) 09/01/2004    PSAFREE 0.76 03/26/2018    PSAFREE 1.20 09/01/2004    PSAFREEPCT 19.00 03/26/2018    PSAFREEPCT 16.00 09/01/2004      Past Medical History:   Diagnosis Date    Anticoagulant long-term use     Anxiety     Arthritis     Atrial fibrillation     BPH (benign prostatic hyperplasia)     Cataract     CKD (chronic kidney disease) stage 3, GFR 30-59 ml/min 7/10/2017    Followed by Dr. Jeevan Pond    Colon polyp     benign    Depression     Elevated PSA     Erectile dysfunction     Gastric ulcer with hemorrhage     Hep B w/o coma 1977    History of bleeding peptic ulcer     History of prostatitis     Hypertension     Hypothyroidism     PAF (paroxysmal atrial fibrillation)     Sleep apnea     on CPAP     Family History   Problem Relation Age of Onset    COPD Father     Diabetes Father     Aortic stenosis Mother     Heart disease Mother         aortic stenosis    Heart attack Brother     No Known Problems Son     No Known Problems Daughter     No Known Problems Daughter     No Known Problems Daughter      Social History     Socioeconomic History     Marital status:      Spouse name: Not on file    Number of children: 7    Years of education: Not on file    Highest education level: Not on file   Social Needs    Financial resource strain: Not on file    Food insecurity - worry: Not on file    Food insecurity - inability: Not on file    Transportation needs - medical: Not on file    Transportation needs - non-medical: Not on file   Occupational History    Occupation: retired anesthesiology     Employer: [employer] ([employer])    Occupation: [employer] grad     Comment: previous football player   Tobacco Use    Smoking status: Never Smoker    Smokeless tobacco: Never Used    Tobacco 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    Substance and Sexual Activity    Alcohol use: No    Drug use: No    Sexual activity: Yes     Partners: Female   Other Topics Concern    Patient feels they ought to cut down on drinking/drug use Not Asked    Patient annoyed by others criticizing their drinking/drug use Not Asked    Patient has felt bad or guilty about drinking/drug use Not Asked    Patient has had a drink/used drugs as an eye opener in the AM Not Asked   Social History Narrative    Not on file     Past Surgical History:   Procedure Laterality Date    ABDOMINAL HERNIA REPAIR      ABLATION N/A 4/27/2017    Performed by [facility] at [facility] CATH LAB    ABLATION Additional Codes: [id number] N/A 7/28/2014    Performed by [facility] at [facility] CATH LAB    CARDIAC SURGERY      Cardioversion N/A 2/9/2018    Performed by [facility]zel [facility]as [facility] at [facility] GAYLA    CARDIOVERSION N/A 11/1/2016    Performed by [facility] at [facility] CATH LAB    CARDIOVERSION N/A 12/24/2013    Performed by [facility] at [facility] CATH LAB    CARDIOVERSION N/A 8/14/2013    Performed by Gayla Surgeon at [facility] GAYLA    CARDIOVERSION N/A 8/14/2013    Performed by [facility] at [facility] CATH LAB    CARDIOVERSION N/A 7/9/2013    Performed by [facility] at [facility] CATH  LAB    CATARACT EXTRACTION  11/25/2013    bilateral    CHOLECYSTECTOMY      COLONOSCOPY N/A 11/25/2015    Procedure: COLONOSCOPY;  Surgeon: Toby Hernandez MD;  Location: [facility];  Service: Endoscopy;  Laterality: N/A;    COLONOSCOPY N/A 11/25/2015    [facility] at [facility]    EGD (ESOPHAGOGASTRODUODENOSCOPY) N/A 5/1/2014    Per[facility] by [facility] at [facility] ENDO    EGD (ESOPHAGOGASTRODUODENOSCOPY) N/A 3/6/2014    [facility] by [facility] at [facility]    EGD (ESOPHAGOGASTRODUODENOSCOPY) N/A 3/9/2012    [facility] at [facility]    EYE SURGERY      GASTRIC BYPASS  1992    INSERTION, IOL PROSTHESIS Left 11/25/2013    [facility] at [facility] OR    INSERTION, IOL PROSTHESIS Right 11/4/2013    [facility] at [facility] OR    KNEE ARTHROSCOPY      RT    LASIK  2001    both eyes (Dr. Rabago)    ORIF HUMERUS FRACTURE      LT    PHACOEMULSIFICATION, CATARACT Left 11/25/2013    [facility] at [facility] OR    PHACOEMULSIFICATION, CATARACT Right 11/4/2013    Performed by In[facility] at [facility]    RADIOFREQUENCY ABLATION      REPLACEMENT-KNEE-TOTAL-axel Right 5/29/2018    [facility]    ROTATOR CUFF REPAIR      right    TOTAL KNEE ARTHROPLASTY Right 5/29/2018    Procedure: REPLACEMENT-KNEE-TOTAL-axel;  Surgeon: Denzel Dallas MD;  [facility];  Service: Orthopedics;  Laterality: Right;  [place]    TRANSESOPHAGEAL ECHOCARDIOGRAM (PEPE) N/A 7/9/2013    Performed by [facility] at [facility] CATH LAB     Patient Active Problem List   Diagnosis    BPH with urinary obstruction    Elevated PSA    Heart abnormality    Nuclear sclerosis    Atrial fibrillation    Anemia due to chronic blood loss    Posterior vitreous detachment    Metatarsalgia    Keratoma    Foot pain, right    Onychomycosis due to dermatophyte    ED  "(erectile dysfunction)    Bradycardia    Mild single current episode of major depressive disorder    Tortuous aorta    Dyspnea    Hypothyroidism due to acquired atrophy of thyroid    Essential hypertension    Gait abnormality    CKD (chronic kidney disease) stage 3, GFR 30-59 ml/min    Complex sleep apnea syndrome    Erectile dysfunction due to arterial insufficiency    Primary osteoarthritis of right knee    History of bleeding peptic ulcer     Review of Systems   Constitutional: Negative for appetite change, chills, fatigue, fever and unexpected weight change.   HENT: Negative for nosebleeds.    Respiratory: Negative for shortness of breath and wheezing.    Cardiovascular: Negative for chest pain, palpitations and leg swelling.   Gastrointestinal: Negative for abdominal distention, abdominal pain, constipation, diarrhea, nausea and vomiting.   Genitourinary: Positive for nocturia. Negative for dysuria and hematuria.   Musculoskeletal: Negative for arthralgias and back pain.   Skin: Negative for pallor.   Neurological: Negative for dizziness, seizures and syncope.   Hematological: Negative for adenopathy.   Psychiatric/Behavioral: Negative for dysphoric mood.         Objective:      BP (!) 167/81 (BP Location: Left arm, Patient Position: Sitting, BP Method: X-Large (Automatic))   Pulse 69   Wt 93.4 kg (206 lb) Comment: pt reported  BMI 26.45 kg/m²   Estimated body mass index is 26.45 kg/m² as calculated from the following:    Height as of 10/22/18: 6' 2" (1.88 m).    Weight as of this encounter: 93.4 kg (206 lb).  Physical Exam   Constitutional: He is oriented to person, place, and time. He appears well-developed and well-nourished. No distress.   HENT:   Head: Atraumatic.   Neck: No tracheal deviation present.   Cardiovascular: Normal rate.    Pulmonary/Chest: Effort normal. No respiratory distress. He has no wheezes.   Abdominal: Soft. Bowel sounds are normal. He exhibits no distension and no mass. " There is no tenderness. There is no rebound and no guarding.   Genitourinary: Rectum normal. Rectal exam shows no external hemorrhoid, no internal hemorrhoid, no mass and no tenderness. Prostate is enlarged.   Genitourinary Comments: 35-40 ccs   Neurological: He is alert and oriented to person, place, and time.   Skin: Skin is warm and dry. He is not diaphoretic.     Psychiatric: He has a normal mood and affect. His behavior is normal. Judgment and thought content normal.         Assessment and Plan:           Problem List Items Addressed This Visit     BPH with urinary obstruction - Primary    ED (erectile dysfunction)          Follow up:   Refill avodart  1 year with PSA.    Presley Shelton

## 2018-11-26 RX ORDER — METOPROLOL SUCCINATE 50 MG/1
TABLET, EXTENDED RELEASE ORAL
Qty: 90 TABLET | Refills: 1 | Status: SHIPPED | OUTPATIENT
Start: 2018-11-26 | End: 2019-05-27 | Stop reason: SDUPTHER

## 2018-11-27 ENCOUNTER — PATIENT MESSAGE (OUTPATIENT)
Dept: FAMILY MEDICINE | Facility: CLINIC | Age: 73
End: 2018-11-27

## 2018-11-28 RX ORDER — BUPROPION HYDROCHLORIDE 150 MG/1
450 TABLET ORAL DAILY
Qty: 270 TABLET | Refills: 0 | Status: SHIPPED | OUTPATIENT
Start: 2018-11-28 | End: 2019-02-24 | Stop reason: SDUPTHER

## 2018-11-28 RX ORDER — ESCITALOPRAM OXALATE 10 MG/1
10 TABLET ORAL DAILY
Qty: 90 TABLET | Refills: 0 | Status: SHIPPED | OUTPATIENT
Start: 2018-11-28 | End: 2019-02-26 | Stop reason: SDUPTHER

## 2018-12-06 DIAGNOSIS — M25.561 RIGHT KNEE PAIN, UNSPECIFIED CHRONICITY: Primary | ICD-10-CM

## 2018-12-10 ENCOUNTER — HOSPITAL ENCOUNTER (OUTPATIENT)
Dept: RADIOLOGY | Facility: HOSPITAL | Age: 73
Discharge: HOME | End: 2018-12-10
Attending: ORTHOPAEDIC SURGERY
Payer: MEDICARE

## 2018-12-10 ENCOUNTER — OFFICE VISIT (OUTPATIENT)
Dept: ORTHOPEDICS | Facility: CLINIC | Age: 73
End: 2018-12-10
Payer: MEDICARE

## 2018-12-10 VITALS — BODY MASS INDEX: 26.86 KG/M2 | HEIGHT: 74 IN | WEIGHT: 209.31 LBS

## 2018-12-10 DIAGNOSIS — M25.561 RIGHT KNEE PAIN, UNSPECIFIED CHRONICITY: ICD-10-CM

## 2018-12-10 DIAGNOSIS — Z96.651 STATUS POST RIGHT KNEE REPLACEMENT: Primary | ICD-10-CM

## 2018-12-10 PROCEDURE — 1101F PT FALLS ASSESS-DOCD LE1/YR: CPT | Mod: CPTII,HCNC,S$GLB, | Performed by: ORTHOPAEDIC SURGERY

## 2018-12-10 PROCEDURE — 73562 X-RAY EXAM OF KNEE 3: CPT | Mod: 26,HCNC,RT, | Performed by: RADIOLOGY

## 2018-12-10 PROCEDURE — 73560 X-RAY EXAM OF KNEE 1 OR 2: CPT | Mod: 26,HCNC,XS,LT | Performed by: RADIOLOGY

## 2018-12-10 PROCEDURE — 73560 X-RAY EXAM OF KNEE 1 OR 2: CPT | Mod: TC,HCNC,LT

## 2018-12-10 PROCEDURE — 99213 OFFICE O/P EST LOW 20 MIN: CPT | Mod: HCNC,S$GLB,, | Performed by: ORTHOPAEDIC SURGERY

## 2018-12-10 PROCEDURE — 99999 PR PBB SHADOW E&M-EST. PATIENT-LVL III: CPT | Mod: PBBFAC,HCNC,, | Performed by: ORTHOPAEDIC SURGERY

## 2018-12-10 NOTE — PROGRESS NOTES
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    Chief Complaint: Pain of the Right Knee    HPI  Dominick Song MD has right knee pain.  The pain is a sharp nerve type pain in the medial aspect worse at [facility]. There  is not radiation. There is not associated stiffness.   There is not catching and locking. The pain is described as sharp. The pain is aggravated by nothing in particular.  It is alleviated by nothing in particular.  There is not associated back pain.  His history, medications and problem list were reviewed. He has an RFA scheduled    Review of Systems   Constitution: Negative for chills, fever and night sweats.   HENT: Negative for hearing loss.    Eyes: Negative for blurred vision and double vision.   Cardiovascular: Negative for chest pain, claudication and leg swelling.   Respiratory: Negative for shortness of breath.    Endocrine: Negative for polydipsia, polyphagia and polyuria.   Hematologic/Lymphatic: Negative for adenopathy and bleeding problem. Does not bruise/bleed easily.   Skin: Negative for poor wound healing.   Musculoskeletal: Positive for joint pain.   Gastrointestinal: Negative for diarrhea and heartburn.   Genitourinary: Negative for bladder incontinence.   Neurological: Negative for focal weakness, headaches, numbness, paresthesias and sensory change.   Psychiatric/Behavioral: The patient is not nervous/anxious.    Allergic/Immunologic: Negative for persistent infections.         Objective:      Body mass index is 26.88 kg/m².  Vitals:    12/10/18 0914   Weight: 95 kg (209 lb 5.2 oz)   Height: 6' 2" (1.88 m)           General    Constitutional: He is oriented to person, place, and time. He appears well-developed and well-nourished.   HENT:   Head: Normocephalic and atraumatic.   Eyes: EOM are normal.   Cardiovascular: Normal rate.    Pulmonary/Chest: Effort normal.   Neurological: He is alert and oriented to person, place, and time.   Psychiatric: He has a " normal mood and affect. His behavior is normal.     General Musculoskeletal Exam   Gait: normal       Right Knee Exam     Inspection   Erythema: absent  Scars: absent  Swelling: absent  Effusion: absent  Deformity: absent  Bruising: absent    Tenderness   The patient is tender to palpation of the medial retinaculum.    Range of Motion   Extension: 0   Flexion: 130     Tests   Ligament Examination Lachman: normal (-1 to 2mm)   MCL - Valgus: normal (0 to 2mm)  LCL - Varus: normal  Patella   Passive Patellar Tilt: neutral    Other   Sensation: normal    Left Knee Exam     Inspection   Erythema: absent  Scars: absent  Swelling: absent  Effusion: absent  Deformity: absent  Bruising: absent    Tenderness   The patient is experiencing no tenderness.     Range of Motion   Extension: 0   Flexion: 130     Tests   Stability Lachman: normal (-1 to 2mm)   MCL - Valgus: normal (0 to 2mm)  LCL - Varus: normal (0 to 2mm)  Patella   Passive Patellar Tilt: neutral    Other   Sensation: normal    Muscle Strength   Right Lower Extremity   Hip Abduction: 5/5   Quadriceps:  5/5   Hamstrin/5   Left Lower Extremity   Hip Abduction: 5/5   Quadriceps:  5/5   Hamstrin/5     Reflexes     Left Side  Quadriceps:  2+    Right Side   Quadriceps:  2+    Vascular Exam     Right Pulses  Dorsalis Pedis:      2+          Left Pulses  Dorsalis Pedis:      2+          Edema  Right Lower Leg: absent  Left Lower Leg: absent              Assessment:       Encounter Diagnosis   Name Primary?    Status post right knee replacement Yes          Plan:       Dominick was seen today for pain.    Diagnoses and all orders for this visit:    Status post right knee replacement      He will get the RFA and return inf needed.    F/U 5 months with srays and PROMS.  Sooner if needed.

## 2018-12-29 ENCOUNTER — PATIENT MESSAGE (OUTPATIENT)
Dept: FAMILY MEDICINE | Facility: CLINIC | Age: 73
End: 2018-12-29

## 2018-12-29 DIAGNOSIS — Z98.890 POST-OPERATIVE STATE: ICD-10-CM

## 2018-12-29 DIAGNOSIS — K14.8 TONGUE LESION: Primary | ICD-10-CM

## 2018-12-30 ENCOUNTER — PATIENT MESSAGE (OUTPATIENT)
Dept: FAMILY MEDICINE | Facility: CLINIC | Age: 73
End: 2018-12-30

## 2018-12-31 ENCOUNTER — TELEPHONE (OUTPATIENT)
Dept: FAMILY MEDICINE | Facility: CLINIC | Age: 73
End: 2018-12-31

## 2018-12-31 ENCOUNTER — PATIENT MESSAGE (OUTPATIENT)
Dept: FAMILY MEDICINE | Facility: CLINIC | Age: 73
End: 2018-12-31

## 2018-12-31 DIAGNOSIS — Z98.890 POST-OPERATIVE STATE: ICD-10-CM

## 2018-12-31 DIAGNOSIS — M62.838 MUSCLE SPASMS OF BOTH LOWER EXTREMITIES: ICD-10-CM

## 2018-12-31 RX ORDER — PROPAFENONE HYDROCHLORIDE 425 MG/1
425 CAPSULE, EXTENDED RELEASE ORAL EVERY 12 HOURS
Qty: 180 CAPSULE | Refills: 3 | Status: CANCELLED | OUTPATIENT
Start: 2018-12-31 | End: 2019-12-31

## 2018-12-31 RX ORDER — CELECOXIB 200 MG/1
CAPSULE ORAL
Qty: 180 CAPSULE | Refills: 1 | Status: CANCELLED | OUTPATIENT
Start: 2018-12-31

## 2018-12-31 RX ORDER — CYCLOBENZAPRINE HCL 10 MG
TABLET ORAL
Qty: 40 TABLET | Refills: 0 | Status: SHIPPED | OUTPATIENT
Start: 2018-12-31 | End: 2019-01-02 | Stop reason: SDUPTHER

## 2018-12-31 NOTE — TELEPHONE ENCOUNTER
Appt sched w/ ENT on the 30 th . Message sent to ENT to be seen sooner as [place] leaves [place] go out of [place] country next [place] .--lp

## 2018-12-31 NOTE — TELEPHONE ENCOUNTER
DR Gaines is referring this pt for evaluation of tongue lesion. Pt is leaving the country on the 8 th and would like to be seen before then . Pt is scheduled 1-10-18 . Can he be squeezed in sooner. --lp

## 2019-01-01 RX ORDER — CELECOXIB 200 MG/1
CAPSULE ORAL
Qty: 180 CAPSULE | Refills: 0 | Status: SHIPPED | OUTPATIENT
Start: 2019-01-01 | End: 2019-03-26 | Stop reason: SDUPTHER

## 2019-01-02 ENCOUNTER — OFFICE VISIT (OUTPATIENT)
Dept: FAMILY MEDICINE | Facility: CLINIC | Age: 74
End: 2019-01-02
Payer: MEDICARE

## 2019-01-02 ENCOUNTER — LAB VISIT (OUTPATIENT)
Dept: LAB | Facility: HOSPITAL | Age: 74
End: 2019-01-02
Attending: FAMILY MEDICINE
Payer: MEDICARE

## 2019-01-02 VITALS
HEART RATE: 91 BPM | OXYGEN SATURATION: 96 % | TEMPERATURE: 98 F | WEIGHT: 203.5 LBS | BODY MASS INDEX: 26.12 KG/M2 | HEIGHT: 74 IN | DIASTOLIC BLOOD PRESSURE: 78 MMHG | SYSTOLIC BLOOD PRESSURE: 126 MMHG

## 2019-01-02 DIAGNOSIS — M62.838 MUSCLE SPASMS OF BOTH LOWER EXTREMITIES: ICD-10-CM

## 2019-01-02 DIAGNOSIS — E03.4 HYPOTHYROIDISM DUE TO ACQUIRED ATROPHY OF THYROID: ICD-10-CM

## 2019-01-02 DIAGNOSIS — E53.8 B12 DEFICIENCY: ICD-10-CM

## 2019-01-02 DIAGNOSIS — Z98.890 POST-OPERATIVE STATE: ICD-10-CM

## 2019-01-02 DIAGNOSIS — K91.1 DUMPING SYNDROME: ICD-10-CM

## 2019-01-02 DIAGNOSIS — I10 ESSENTIAL HYPERTENSION: ICD-10-CM

## 2019-01-02 DIAGNOSIS — R53.83 FATIGUE, UNSPECIFIED TYPE: ICD-10-CM

## 2019-01-02 DIAGNOSIS — E55.9 VITAMIN D DEFICIENCY: ICD-10-CM

## 2019-01-02 DIAGNOSIS — K14.0 ULCERATION (TRAUMATIC) OF TONGUE: Primary | ICD-10-CM

## 2019-01-02 LAB
25(OH)D3+25(OH)D2 SERPL-MCNC: 31 NG/ML
ALBUMIN SERPL BCP-MCNC: 2.9 G/DL
ALP SERPL-CCNC: 69 U/L
ALT SERPL W/O P-5'-P-CCNC: 16 U/L
ANION GAP SERPL CALC-SCNC: 7 MMOL/L
AST SERPL-CCNC: 16 U/L
BASOPHILS # BLD AUTO: 0.07 K/UL
BASOPHILS NFR BLD: 1.2 %
BILIRUB SERPL-MCNC: 0.8 MG/DL
BUN SERPL-MCNC: 23 MG/DL
CALCIUM SERPL-MCNC: 8.9 MG/DL
CHLORIDE SERPL-SCNC: 112 MMOL/L
CO2 SERPL-SCNC: 20 MMOL/L
CREAT SERPL-MCNC: 1.6 MG/DL
DIFFERENTIAL METHOD: ABNORMAL
EOSINOPHIL # BLD AUTO: 0.1 K/UL
EOSINOPHIL NFR BLD: 1.2 %
ERYTHROCYTE [DISTWIDTH] IN BLOOD BY AUTOMATED COUNT: 14.1 %
EST. GFR  (AFRICAN AMERICAN): 49 ML/MIN/1.73 M^2
EST. GFR  (NON AFRICAN AMERICAN): 42.4 ML/MIN/1.73 M^2
GLUCOSE SERPL-MCNC: 128 MG/DL
HCT VFR BLD AUTO: 44.2 %
HGB BLD-MCNC: 13.7 G/DL
IMM GRANULOCYTES # BLD AUTO: 0.02 K/UL
IMM GRANULOCYTES NFR BLD AUTO: 0.4 %
LYMPHOCYTES # BLD AUTO: 0.9 K/UL
LYMPHOCYTES NFR BLD: 15.5 %
MCH RBC QN AUTO: 29.9 PG
MCHC RBC AUTO-ENTMCNC: 31 G/DL
MCV RBC AUTO: 97 FL
MONOCYTES # BLD AUTO: 1 K/UL
MONOCYTES NFR BLD: 17.1 %
NEUTROPHILS # BLD AUTO: 3.7 K/UL
NEUTROPHILS NFR BLD: 64.6 %
NRBC BLD-RTO: 0 /100 WBC
PLATELET # BLD AUTO: 256 K/UL
PMV BLD AUTO: 12.5 FL
POTASSIUM SERPL-SCNC: 4.6 MMOL/L
PROT SERPL-MCNC: 6.3 G/DL
RBC # BLD AUTO: 4.58 M/UL
SODIUM SERPL-SCNC: 139 MMOL/L
T4 FREE SERPL-MCNC: 0.95 NG/DL
TSH SERPL DL<=0.005 MIU/L-ACNC: 5.93 UIU/ML
VIT B12 SERPL-MCNC: >2000 PG/ML
WBC # BLD AUTO: 5.67 K/UL

## 2019-01-02 PROCEDURE — 3074F SYST BP LT 130 MM HG: CPT | Mod: CPTII,HCNC,S$GLB, | Performed by: FAMILY MEDICINE

## 2019-01-02 PROCEDURE — 1101F PT FALLS ASSESS-DOCD LE1/YR: CPT | Mod: CPTII,HCNC,S$GLB, | Performed by: FAMILY MEDICINE

## 2019-01-02 PROCEDURE — 3078F PR MOST RECENT DIASTOLIC BLOOD PRESSURE < 80 MM HG: ICD-10-PCS | Mod: CPTII,HCNC,S$GLB, | Performed by: FAMILY MEDICINE

## 2019-01-02 PROCEDURE — 99214 OFFICE O/P EST MOD 30 MIN: CPT | Mod: HCNC,S$GLB,, | Performed by: FAMILY MEDICINE

## 2019-01-02 PROCEDURE — 99999 PR PBB SHADOW E&M-EST. PATIENT-LVL IV: CPT | Mod: PBBFAC,HCNC,, | Performed by: FAMILY MEDICINE

## 2019-01-02 PROCEDURE — 3074F PR MOST RECENT SYSTOLIC BLOOD PRESSURE < 130 MM HG: ICD-10-PCS | Mod: CPTII,HCNC,S$GLB, | Performed by: FAMILY MEDICINE

## 2019-01-02 PROCEDURE — 80053 COMPREHEN METABOLIC PANEL: CPT | Mod: HCNC

## 2019-01-02 PROCEDURE — 99499 UNLISTED E&M SERVICE: CPT | Mod: HCNC,S$GLB,, | Performed by: FAMILY MEDICINE

## 2019-01-02 PROCEDURE — 99499 RISK ADDL DX/OHS AUDIT: ICD-10-PCS | Mod: HCNC,S$GLB,, | Performed by: FAMILY MEDICINE

## 2019-01-02 PROCEDURE — 36415 COLL VENOUS BLD VENIPUNCTURE: CPT | Mod: HCNC,PO

## 2019-01-02 PROCEDURE — 99214 PR OFFICE/OUTPT VISIT, EST, LEVL IV, 30-39 MIN: ICD-10-PCS | Mod: HCNC,S$GLB,, | Performed by: FAMILY MEDICINE

## 2019-01-02 PROCEDURE — 3078F DIAST BP <80 MM HG: CPT | Mod: CPTII,HCNC,S$GLB, | Performed by: FAMILY MEDICINE

## 2019-01-02 PROCEDURE — 1101F PR PT FALLS ASSESS DOC 0-1 FALLS W/OUT INJ PAST YR: ICD-10-PCS | Mod: CPTII,HCNC,S$GLB, | Performed by: FAMILY MEDICINE

## 2019-01-02 PROCEDURE — 99999 PR PBB SHADOW E&M-EST. PATIENT-LVL IV: ICD-10-PCS | Mod: PBBFAC,HCNC,, | Performed by: FAMILY MEDICINE

## 2019-01-02 PROCEDURE — 82306 VITAMIN D 25 HYDROXY: CPT | Mod: HCNC

## 2019-01-02 PROCEDURE — 85025 COMPLETE CBC W/AUTO DIFF WBC: CPT | Mod: HCNC

## 2019-01-02 PROCEDURE — 84439 ASSAY OF FREE THYROXINE: CPT | Mod: HCNC

## 2019-01-02 PROCEDURE — 84443 ASSAY THYROID STIM HORMONE: CPT | Mod: HCNC

## 2019-01-02 PROCEDURE — 82607 VITAMIN B-12: CPT | Mod: HCNC

## 2019-01-02 RX ORDER — CYCLOBENZAPRINE HCL 10 MG
TABLET ORAL
Qty: 40 TABLET | Refills: 5 | Status: SHIPPED | OUTPATIENT
Start: 2019-01-02 | End: 2019-05-07 | Stop reason: SDUPTHER

## 2019-01-02 RX ORDER — CHOLESTYRAMINE 4 G/9G
4 POWDER, FOR SUSPENSION ORAL DAILY
Qty: 30 PACKET | Refills: 3 | Status: SHIPPED | OUTPATIENT
Start: 2019-01-02 | End: 2020-08-17

## 2019-01-02 NOTE — PROGRESS NOTES
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    Chief Complaint: Fatigue (2 weeks)    C/o fatigue and lack of endurance for the last 3 weeks.  Napping more.  He does have interupted sleep with 3-4 episodes of nocturia nightly.    Wife noticing increased paleness  Lost 6 pounds in the last year.    Getting about 6 loose BMs daily. This has been more often since his gastric bypass surgeryrevision 3 years ago.  He has known dumping syndrome.    He is concerned since he will be leaving for [place] soon with a [employer] group.    C/o 2 month h/o ulceration on the left lateral tongue.  He tried cautery at home with silver nitrate without success.    Depression - mood stable on Wellbutrin and Lexapro, but is feeling more down lately          Past Medical History:   Diagnosis Date    Anticoagulant long-term use     Anxiety     Arthritis     Atrial fibrillation     BPH (benign prostatic hyperplasia)     Cataract     CKD (chronic kidney disease) stage 3, GFR 30-59 ml/min 7/10/2017    Followed by Dr. Jeevan Pond    Colon polyp     benign    Depression     Elevated PSA     Erectile dysfunction     Gastric ulcer with hemorrhage     Hep B w/o coma 1977    History of bleeding peptic ulcer     History of prostatitis     Hypertension     Hypothyroidism     PAF (paroxysmal atrial fibrillation)     Sleep apnea     on CPAP       Past Surgical History:   Procedure Laterality Date    ABDOMINAL HERNIA REPAIR      ABLATION N/A 4/27/2017    Performed by [facility] at [facility] CATH LAB    ABLATION Additional Codes: [id number] N/A 7/28/2014    Performed by [facility]att Beatty MD at [facility] CATH LAB    CARDIAC SURGERY      Cardioversion N/A 2/9/2018    [facility] at [facility]    CARDIOVERSION N/A 11/1/2016    Performed by [facility]att [facility] MD at [facility] CATH LAB    CARDIOVERSION N/A 12/24/2013    Performed by Wyatt Beatty MD at [facility] CATH LAB    CARDIOVERSION N/A 8/14/2013    Performed by  Gayla Surgeon at [facility] GAYLA    CARDIOVERSION N/A 8/14/2013    Performed by [facility]att [facility] at [facility] CATH LAB    CARDIOVERSION N/A 7/9/2013    Performed by [facility]att [facility] MD at [facility] CATH LAB    CATARACT EXTRACTION  11/25/2013    bilateral    CHOLECYSTECTOMY      COLONOSCOPY N/A 11/25/2015    Performed by [facility] at [facility] ENDO    EGD (ESOPHAGOGASTRODUODENOSCOPY) N/A 5/1/2014    Performed by Toby Hernandez MD at [facility] ENDO    EGD (ESOPHAGOGASTRODUODENOSCOPY) N/A 3/6/2014    Performed by Toby Hernandez MD at [facility] ENDO    EGD (ESOPHAGOGASTRODUODENOSCOPY) N/A 3/9/2012    Performed by Toby Hernandez MD at [facility] ENDO    EYE SURGERY      GASTRIC BYPASS  1992    INSERTION, IOL PROSTHESIS Left 11/25/2013    [facility] at [facility] OR    INSERTION, IOL PROSTHESIS Right 11/4/2013    [facility] by [facility] at [facility] OR    KNEE ARTHROSCOPY      RT    LASIK  2001    both eyes (Dr. Rabago)    ORIF HUMERUS FRACTURE      LT    PHACOEMULSIFICATION, CATARACT Left 11/25/2013    Performed by [facility] at [facility] OR    PHACOEMULSIFICATION, CATARACT Right 11/4/2013    Performed by Inessa Ho MD at [facility]    RADIOFREQUENCY ABLATION      REPLACEMENT-KNEE-TOTAL-axel Right 5/29/2018    Performed by Denzel Dallas MD at [facility] OR 2ND FLR    ROTATOR CUFF REPAIR      right    TRANSESOPHAGEAL ECHOCARDIOGRAM (PEPE) N/A 7/9/2013    Performed by Wyatt Beatty MD at [facility] CATH LAB       Review of patient's allergies indicates:   Allergen Reactions    No known drug allergies        Social History     Socioeconomic History    Marital status:      Spouse name: Not on file    Number of children: 7    Years of education: Not on file    Highest education level: Not on file   Social Needs    Financial resource strain: Not on file    Food insecurity - worry: Not on file    Food insecurity - inability: Not on file    Transportation needs - medical: Not on file     Transportation needs - non-medical: Not on file   Occupational History    Occupation: retired anesthesiology     Employer: [employer]    Occupation: [employer] grad     Comment: previous football player   Tobacco Use    Smoking status: Never Smoker    Smokeless tobacco: Never Used    Tobacco 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    Substance and Sexual Activity    Alcohol use: No    Drug use: No    Sexual activity: Yes     Partners: Female   Other Topics Concern    Patient feels they ought to cut down on drinking/drug use Not Asked    Patient annoyed by others criticizing their drinking/drug use Not Asked    Patient has felt bad or guilty about drinking/drug use Not Asked    Patient has had a drink/used drugs as an eye opener in the AM Not Asked   Social History Narrative    Not on file       Current Outpatient Medications on File Prior to Visit   Medication Sig Dispense Refill    acyclovir (ZOVIRAX) 800 MG Tab TK ONE T PO FIVE TIMES D only for fever blisters  1    betamethasone acetate-betamethasone sodium phosphate (CELESTONE) 6 mg/mL injection as needed.       buPROPion (WELLBUTRIN XL) 150 MG TB24 tablet Take 3 tablets (450 mg total) by mouth once daily. 270 tablet 0    CALCIUM ORAL Take by mouth Daily.      celecoxib (CELEBREX) 200 MG capsule TAKE 1 CAPSULE(200 MG) BY MOUTH TWICE DAILY 180 capsule 0    cyanocobalamin (VITAMIN B-12) 1000 MCG tablet Take 1 tablet (1,000 mcg total) by mouth once daily. (Patient taking differently: Take 1,000 mcg by mouth every evening. ) 30 tablet 11    dutasteride (AVODART) 0.5 mg capsule TAKE 1 CAPSULE(0.5 MG) BY MOUTH ONCE DAILY 90 capsule 3    efinaconazole 10 % Prakash Apply 1 application topically once daily. 8 mL 6    ELIQUIS 5 mg Tab 5 mg 2 (two) times daily.   3    escitalopram oxalate (LEXAPRO) 10 MG tablet Take 1 tablet (10 mg total) by mouth once daily. 90 tablet 0    ferrous sulfate 325 mg (65 mg iron) Tab tablet Take 1 tablet  (325 mg total) by mouth 2 (two) times daily. 60 tablet 11    KENALOG 40 mg/mL injection as needed.       levothyroxine (SYNTHROID) 25 MCG tablet TAKE 1 TABLET(25 MCG) BY MOUTH EVERY DAY 90 tablet 3    lisinopril (PRINIVIL,ZESTRIL) 20 MG tablet Take 1 tablet (20 mg total) by mouth once daily. 90 tablet 3    metoprolol succinate (TOPROL-XL) 50 MG 24 hr tablet TAKE 1 TABLET(50 MG) BY MOUTH EVERY DAY 90 tablet 1    MULTIVITAMIN ORAL Take by mouth Daily.      OMEGA-3 FATTY ACIDS (FISH OIL CONCENTRATE ORAL) Take by mouth Daily.      propafenone (RYTHMOL SR) 425 MG Cp12 Take 1 capsule (425 mg total) by mouth every 12 (twelve) hours. 180 capsule 3    [DISCONTINUED] cyclobenzaprine (FLEXERIL) 10 MG tablet TAKE 1 TABLET BY MOUTH EVERY 8 HOURS AS NEEDED FOR MUSCLE SPASM 40 tablet 0    [DISCONTINUED] gabapentin (NEURONTIN) 300 MG capsule TAKE 1 CAPSULE(300 MG) BY MOUTH EVERY EVENING 30 capsule 3    [DISCONTINUED] metoprolol succinate (TOPROL-XL) 50 MG 24 hr tablet   3     No current facility-administered medications on file prior to visit.        Family History   Problem Relation Age of Onset    COPD Father     Diabetes Father     Aortic stenosis Mother     Heart disease Mother         aortic stenosis    Heart attack Brother     No Known Problems Son     No Known Problems Daughter     No Known Problems Daughter     No Known Problems Daughter        Review of Systems   Constitutional: Positive for fatigue. Negative for appetite change, chills, fever and unexpected weight change.   HENT: Negative for sore throat and trouble swallowing.    Eyes: Negative for pain and visual disturbance.   Respiratory: Negative for cough, chest tightness, shortness of breath and wheezing.    Cardiovascular: Negative for chest pain, palpitations and leg swelling.   Gastrointestinal: Positive for diarrhea. Negative for abdominal pain, blood in stool, constipation and nausea.   Genitourinary: Negative for difficulty urinating,  "dysuria and hematuria.   Musculoskeletal: Negative for arthralgias, gait problem and neck pain.   Skin: Negative for rash and wound.   Neurological: Negative for dizziness, weakness, light-headedness and headaches.   Hematological: Negative for adenopathy.   Psychiatric/Behavioral: Positive for dysphoric mood.       Objective:      /78 (BP Location: Left arm, Patient Position: Sitting, BP Method: Medium (Manual))   Pulse 91   Temp 98.2 °F (36.8 °C) (Oral)   Ht 6' 2" (1.88 m)   Wt 92.3 kg (203 lb 7.8 oz)   SpO2 96%   BMI 26.13 kg/m²   Physical Exam   Constitutional: He is oriented to person, place, and time. He appears well-developed and well-nourished. He is active. No distress.   HENT:   Head: Normocephalic and atraumatic.   Right Ear: External ear normal.   Left Ear: External ear normal.   Mouth/Throat: Uvula is midline, oropharynx is clear and moist and mucous membranes are normal. No oropharyngeal exudate.       Eyes: Conjunctivae, EOM and lids are normal. Pupils are equal, round, and reactive to light.   Neck: Normal range of motion, full passive range of motion without pain and phonation normal. Neck supple. No thyroid mass and no thyromegaly present.   Cardiovascular: Normal rate, regular rhythm, normal heart sounds and intact distal pulses. Exam reveals no gallop and no friction rub.   No murmur heard.  Pulmonary/Chest: Effort normal and breath sounds normal. No respiratory distress. He has no wheezes. He has no rales.   Abdominal: Soft. Bowel sounds are normal. He exhibits no mass. There is no tenderness.   Musculoskeletal: Normal range of motion.   Lymphadenopathy:     He has no cervical adenopathy.   Neurological: He is alert and oriented to person, place, and time. No cranial nerve deficit. Coordination normal.   Skin: Skin is warm and dry.   Psychiatric: He has a normal mood and affect. His speech is normal and behavior is normal. Judgment and thought content normal.       Assessment:     "   1. Ulceration (traumatic) of tongue    2. Fatigue, unspecified type    3. Dumping syndrome    4. Essential hypertension    5. Hypothyroidism due to acquired atrophy of thyroid    6. Post-operative state    7. Muscle spasms of both lower extremities    8. Vitamin D deficiency    9. B12 deficiency        Plan:       Ulceration (traumatic) of tongue    Fatigue, unspecified type  -     Comprehensive metabolic panel; Future; Expected date: 01/02/2019  -     CBC auto differential; Future; Expected date: 01/02/2019    Dumping syndrome  -     cholestyramine (QUESTRAN) 4 gram packet; Take 1 packet (4 g total) by mouth once daily.  Dispense: 30 packet; Refill: 3    Essential hypertension    Hypothyroidism due to acquired atrophy of thyroid  -     TSH; Future; Expected date: 01/02/2019    Post-operative state    Muscle spasms of both lower extremities  -     cyclobenzaprine (FLEXERIL) 10 MG tablet; TAKE 1 TABLET BY MOUTH EVERY 8 HOURS AS NEEDED FOR MUSCLE SPASM  Dispense: 40 tablet; Refill: 5    Vitamin D deficiency  -     Vitamin D; Future; Expected date: 01/02/2019    B12 deficiency  -     Vitamin B12; Future; Expected date: 01/02/2019        Labs today  Suspect majority of fatigue is related to dumping syndrome and possible impact on mood  Trial of Questran  Continue other present meds  F/u with psychiatry  ENT appointment to eval tongue ulceration  Counseled on regular exercise, maintenance of a healthy weight, balanced diet rich in fruits/vegetables and lean protein, and avoidance of unhealthy habits like smoking and excessive alcohol intake.

## 2019-01-04 ENCOUNTER — PATIENT MESSAGE (OUTPATIENT)
Dept: FAMILY MEDICINE | Facility: CLINIC | Age: 74
End: 2019-01-04

## 2019-01-04 ENCOUNTER — OFFICE VISIT (OUTPATIENT)
Dept: OTOLARYNGOLOGY | Facility: CLINIC | Age: 74
End: 2019-01-04
Payer: MEDICARE

## 2019-01-04 VITALS — HEIGHT: 74 IN | BODY MASS INDEX: 26.37 KG/M2 | WEIGHT: 205.5 LBS

## 2019-01-04 DIAGNOSIS — K14.8 TONGUE LESION: Primary | ICD-10-CM

## 2019-01-04 PROCEDURE — 41100 PR BIOPSY TONGUE,ANTER 2/3: ICD-10-PCS | Mod: HCNC,S$GLB,, | Performed by: OTOLARYNGOLOGY

## 2019-01-04 PROCEDURE — 99999 PR PBB SHADOW E&M-EST. PATIENT-LVL III: ICD-10-PCS | Mod: PBBFAC,HCNC,, | Performed by: OTOLARYNGOLOGY

## 2019-01-04 PROCEDURE — 1101F PR PT FALLS ASSESS DOC 0-1 FALLS W/OUT INJ PAST YR: ICD-10-PCS | Mod: CPTII,HCNC,S$GLB, | Performed by: OTOLARYNGOLOGY

## 2019-01-04 PROCEDURE — 88305 TISSUE EXAM BY PATHOLOGIST: CPT | Mod: HCNC | Performed by: PATHOLOGY

## 2019-01-04 PROCEDURE — 99999 PR PBB SHADOW E&M-EST. PATIENT-LVL III: CPT | Mod: PBBFAC,HCNC,, | Performed by: OTOLARYNGOLOGY

## 2019-01-04 PROCEDURE — 41100 BIOPSY OF TONGUE: CPT | Mod: HCNC,S$GLB,, | Performed by: OTOLARYNGOLOGY

## 2019-01-04 PROCEDURE — 88305 TISSUE EXAM BY PATHOLOGIST: CPT | Mod: 26,HCNC,, | Performed by: PATHOLOGY

## 2019-01-04 PROCEDURE — 1101F PT FALLS ASSESS-DOCD LE1/YR: CPT | Mod: CPTII,HCNC,S$GLB, | Performed by: OTOLARYNGOLOGY

## 2019-01-04 PROCEDURE — 88305 TISSUE SPECIMEN TO PATHOLOGY, ENT: ICD-10-PCS | Mod: 26,HCNC,, | Performed by: PATHOLOGY

## 2019-01-04 PROCEDURE — 99203 OFFICE O/P NEW LOW 30 MIN: CPT | Mod: 25,HCNC,S$GLB, | Performed by: OTOLARYNGOLOGY

## 2019-01-04 PROCEDURE — 99203 PR OFFICE/OUTPT VISIT, NEW, LEVL III, 30-44 MIN: ICD-10-PCS | Mod: 25,HCNC,S$GLB, | Performed by: OTOLARYNGOLOGY

## 2019-01-04 RX ORDER — PROPAFENONE HYDROCHLORIDE 425 MG/1
425 CAPSULE, EXTENDED RELEASE ORAL EVERY 12 HOURS
Qty: 180 CAPSULE | Refills: 0 | Status: SHIPPED | OUTPATIENT
Start: 2019-01-04 | End: 2019-01-04 | Stop reason: SDUPTHER

## 2019-01-04 RX ORDER — OXYCODONE AND ACETAMINOPHEN 5; 325 MG/1; MG/1
1 TABLET ORAL EVERY 4 HOURS PRN
Qty: 8 TABLET | Refills: 0 | Status: SHIPPED | OUTPATIENT
Start: 2019-01-04 | End: 2020-02-17

## 2019-01-04 NOTE — LETTER
January 4, 2019      Maxi Gaines MD  [address] [facility] [address] [place] [address]           [facility]  [address] [facility] [address] [place] [address]  Phone: [phone]  Fax: [phone]          Patient: Dominick MARCIA MD Duncan   MR Number: [id number]   YOB: 1945   Date of Visit: 1/4/2019       Dear Dr. Maxi Gaines:    Thank you for referring Dominick Song to me for evaluation. Attached you will find relevant portions of my assessment and plan of care.    If you have questions, please do not hesitate to call me. I look forward to following Dominick Song along with you.    Sincerely,    Ruben Pena MD    Enclosure  CC:  No Recipients    If you would like to receive this communication electronically, please contact [email or web address] or [phone] to request more information on [employer] Link access.    For providers and/or their staff who would like to refer a patient to [facility], please contact us through our one-stop-shop provider referral line, [facility], at [phone].    If you feel you have received this communication in error or would no longer like to receive these types of communications, please e-mail [email or web address]

## 2019-01-06 RX ORDER — PROPAFENONE HYDROCHLORIDE 425 MG/1
425 CAPSULE, EXTENDED RELEASE ORAL EVERY 12 HOURS
Qty: 180 CAPSULE | Refills: 0 | Status: SHIPPED | OUTPATIENT
Start: 2019-01-06 | End: 2019-02-25 | Stop reason: SDUPTHER

## 2019-01-17 ENCOUNTER — TELEPHONE (OUTPATIENT)
Dept: OTOLARYNGOLOGY | Facility: CLINIC | Age: 74
End: 2019-01-17

## 2019-01-17 NOTE — TELEPHONE ENCOUNTER
----- Message from Ruben Pena MD sent at 1/15/2019  1:32 PM CST -----  Called patient with result, .  No answer.  Benign.  Left a voicemail

## 2019-01-23 ENCOUNTER — PATIENT MESSAGE (OUTPATIENT)
Dept: FAMILY MEDICINE | Facility: CLINIC | Age: 74
End: 2019-01-23

## 2019-01-24 ENCOUNTER — PATIENT MESSAGE (OUTPATIENT)
Dept: FAMILY MEDICINE | Facility: CLINIC | Age: 74
End: 2019-01-24

## 2019-01-24 DIAGNOSIS — R06.09 DOE (DYSPNEA ON EXERTION): Primary | ICD-10-CM

## 2019-01-28 ENCOUNTER — PATIENT MESSAGE (OUTPATIENT)
Dept: FAMILY MEDICINE | Facility: CLINIC | Age: 74
End: 2019-01-28

## 2019-01-28 RX ORDER — CODEINE PHOSPHATE AND GUAIFENESIN 10; 100 MG/5ML; MG/5ML
5 SOLUTION ORAL 3 TIMES DAILY PRN
Qty: 236 ML | Refills: 0 | Status: SHIPPED | OUTPATIENT
Start: 2019-01-28 | End: 2019-02-07

## 2019-01-28 RX ORDER — AZITHROMYCIN 250 MG/1
TABLET, FILM COATED ORAL
Qty: 6 TABLET | Refills: 0 | Status: SHIPPED | OUTPATIENT
Start: 2019-01-28 | End: 2019-02-02

## 2019-02-04 ENCOUNTER — CLINICAL SUPPORT (OUTPATIENT)
Dept: CARDIOLOGY | Facility: CLINIC | Age: 74
End: 2019-02-04
Attending: FAMILY MEDICINE
Payer: MEDICARE

## 2019-02-04 VITALS — HEART RATE: 58 BPM | BODY MASS INDEX: 26.37 KG/M2 | HEIGHT: 74 IN | WEIGHT: 205.5 LBS

## 2019-02-04 DIAGNOSIS — R06.09 DOE (DYSPNEA ON EXERTION): ICD-10-CM

## 2019-02-04 PROCEDURE — 93351 ECHOCARDIOGRAM STRESS TEST (CUPID ONLY): ICD-10-PCS | Mod: HCNC,S$GLB,, | Performed by: INTERNAL MEDICINE

## 2019-02-04 PROCEDURE — 99999 PR PBB SHADOW E&M-EST. PATIENT-LVL I: ICD-10-PCS | Mod: PBBFAC,HCNC,,

## 2019-02-04 PROCEDURE — 93351 STRESS TTE COMPLETE: CPT | Mod: HCNC,S$GLB,, | Performed by: INTERNAL MEDICINE

## 2019-02-04 PROCEDURE — 99999 PR PBB SHADOW E&M-EST. PATIENT-LVL I: CPT | Mod: PBBFAC,HCNC,,

## 2019-02-05 LAB
ASCENDING AORTA: 3.12 CM
BSA FOR ECHO PROCEDURE: 2.21 M2
CV ECHO LV RWT: 0.5 CM
CV STRESS BASE HR: 56 BPM
DIASTOLIC BLOOD PRESSURE: 88 MMHG
DOP CALC LVOT AREA: 4.48 CM2
DOP CALC LVOT DIAMETER: 2.39 CM
DOP CALC LVOT PEAK VEL: 1.14 M/S
DOP CALC LVOT STROKE VOLUME: 129.95 CM3
DOP CALCLVOT PEAK VEL VTI: 28.98 CM
E WAVE DECELERATION TIME: 242.22 MSEC
E/A RATIO: 1.72
E/E' RATIO: 10.12
ECHO LV POSTERIOR WALL: 1.07 CM (ref 0.6–1.1)
FRACTIONAL SHORTENING: 28 % (ref 28–44)
INTERVENTRICULAR SEPTUM: 1.08 CM (ref 0.6–1.1)
IVRT: 0.1 MSEC
LA MAJOR: 6.28 CM
LA MINOR: 6.94 CM
LA WIDTH: 4.75 CM
LEFT ATRIUM SIZE: 4.85 CM
LEFT ATRIUM VOLUME INDEX: 58.7 ML/M2
LEFT ATRIUM VOLUME: 129.11 CM3
LEFT INTERNAL DIMENSION IN SYSTOLE: 3.06 CM (ref 2.1–4)
LEFT VENTRICLE DIASTOLIC VOLUME INDEX: 36.62 ML/M2
LEFT VENTRICLE DIASTOLIC VOLUME: 80.5 ML
LEFT VENTRICLE MASS INDEX: 70.2 G/M2
LEFT VENTRICLE SYSTOLIC VOLUME INDEX: 16.7 ML/M2
LEFT VENTRICLE SYSTOLIC VOLUME: 36.81 ML
LEFT VENTRICULAR INTERNAL DIMENSION IN DIASTOLE: 4.24 CM (ref 3.5–6)
LEFT VENTRICULAR MASS: 154.26 G
LV LATERAL E/E' RATIO: 8.6
LV SEPTAL E/E' RATIO: 12.29
MV PEAK A VEL: 0.5 M/S
MV PEAK E VEL: 0.86 M/S
OHS CV CPX 1 MINUTE RECOVERY HEART RATE: 91 BPM
OHS CV CPX 85 PERCENT MAX PREDICTED HEART RATE MALE: 126
OHS CV CPX ESTIMATED METS: 10
OHS CV CPX MAX PREDICTED HEART RATE: 148
OHS CV CPX PATIENT IS FEMALE: 0
OHS CV CPX PATIENT IS MALE: 1
OHS CV CPX PEAK DIASTOLIC BLOOD PRESSURE: 71 MMHG
OHS CV CPX PEAK HEAR RATE: 110 BPM
OHS CV CPX PEAK RATE PRESSURE PRODUCT: NORMAL
OHS CV CPX PEAK SYSTOLIC BLOOD PRESSURE: 170 MMHG
OHS CV CPX PERCENT MAX PREDICTED HEART RATE ACHIEVED: 74
OHS CV CPX PERCENT TARGET HEART RATE ACHIEVED: 87.3
OHS CV CPX RATE PRESSURE PRODUCT PRESENTING: 8792
OHS CV CPX TARGET HEART RATE: 126
PISA TR MAX VEL: 2.02 M/S
PULM VEIN S/D RATIO: 0.78
PV PEAK D VEL: 0.54 M/S
PV PEAK S VEL: 0.42 M/S
RA MAJOR: 5.58 CM
RA WIDTH: 3.22 CM
SINUS: 3.43 CM
STJ: 3.05 CM
STRESS ECHO POST EXERCISE DUR MIN: 5 MIN
STRESS ECHO POST EXERCISE DUR SEC: 42
SYSTOLIC BLOOD PRESSURE: 157 MMHG
TDI LATERAL: 0.1
TDI SEPTAL: 0.07
TDI: 0.09
TR MAX PG: 16.32 MMHG

## 2019-02-07 ENCOUNTER — OFFICE VISIT (OUTPATIENT)
Dept: PSYCHIATRY | Facility: CLINIC | Age: 74
End: 2019-02-07
Payer: MEDICARE

## 2019-02-07 DIAGNOSIS — F32.A DEPRESSIVE DISORDER: Primary | ICD-10-CM

## 2019-02-07 DIAGNOSIS — F10.21 ALCOHOL USE DISORDER, MODERATE, IN SUSTAINED REMISSION, DEPENDENCE: ICD-10-CM

## 2019-02-07 DIAGNOSIS — F11.21 OPIOID USE DISORDER, SEVERE, IN SUSTAINED REMISSION, DEPENDENCE: ICD-10-CM

## 2019-02-07 PROCEDURE — 90833 PR PSYCHOTHERAPY W/PATIENT W/E&M, 30 MIN (ADD ON): ICD-10-PCS | Mod: ,,, | Performed by: PSYCHIATRY & NEUROLOGY

## 2019-02-07 PROCEDURE — 90833 PSYTX W PT W E/M 30 MIN: CPT | Mod: ,,, | Performed by: PSYCHIATRY & NEUROLOGY

## 2019-02-07 PROCEDURE — 99499 RISK ADDL DX/OHS AUDIT: ICD-10-PCS | Mod: HCNC,S$GLB,, | Performed by: PSYCHIATRY & NEUROLOGY

## 2019-02-07 PROCEDURE — 99499 UNLISTED E&M SERVICE: CPT | Mod: HCNC,S$GLB,, | Performed by: PSYCHIATRY & NEUROLOGY

## 2019-02-07 PROCEDURE — 99214 OFFICE O/P EST MOD 30 MIN: CPT | Mod: ,,, | Performed by: PSYCHIATRY & NEUROLOGY

## 2019-02-07 PROCEDURE — 99214 PR OFFICE/OUTPT VISIT, EST, LEVL IV, 30-39 MIN: ICD-10-PCS | Mod: ,,, | Performed by: PSYCHIATRY & NEUROLOGY

## 2019-02-07 NOTE — PROGRESS NOTES
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  Met with patient.      Interval History and Content of Current Session:  Interim Events/Subjective Report/Content of Current Session: see below     Past hx :  Pt discussed specific anxieties that were begun in childhood . They have worsened over the years in a tolerance fashion . THEY  HAVE NOW COMPLICATED RELATIONSHIPS WITH WIFE .  He was seen by Hemanth Lovelace who placed him on wellbutrin xl 300 mg in early 2016 ago with benefit . The anxiety in question has hurt his own self esteem . "    Pt has since attended residential treatment at [facility] ( 12-1-15 -- 1-30-16) . Chas Hernández PhD ( [facility] sexual addiction therapist ) started the [facility] program before developing his own program in [place] .He is s/p  [facility] Discharge  with Dr Angelica Garay  psychiatrist recommendations . He  attended the [facility] program in March 2016 in [facility].       He also no longer voluntarily  follows up with [facility] for 3 days every 3 months  ( days of hope)  .      He remains  converted to Catholicism as is wife and family . He attended intense Bible study course with Loraine.       Past [facility] Tx recs ; for direct  pt care would need more focused care on professionalism .That was only  known to him one month post  discharge at [facility].  He later felt this could be satisfied by  PEP . He has 2.5 yrs left on contract .     He is in [facility] while licensed  with which he is enrolled and monitored . Fatoumata Valentin PhD is his former  , now Hector Ovalles.      He is mandated to see a  treating professional monthly accomplished by Mr Alfonso Torres whom he sees twice monthly now and weekly group therapy there . He and wife Loraine are doing well . She is working part " time .  Mr Torres is rec'ing  that his able ot return to practice . This would need approval of Dr Orosco ( has Dr Hernandez job) . Alfonso Torres wrote letter as to if he wanted to go back to work , what he would need to do .     Opiate sobriety > 17  years ( 1999)   Last drink November , 2015 .    He is s/p gastric bypass . No real exercise but plans to go to [employer] more.     He enjoys his full [facility] as he fully occupies his time .   He has a letter that seemingly would allow him to work once boundaries were established  by Mr Torres , Dr Regi Hernandez who is pleased withhis recovery progress. . I have a copy .  He is very active with [employer] and has Billy PATE as his sponsor.     He/ Loraine  will celebrate holidays with each group of kids .     Coping with a Fib and  ablation ; new dx of sleep apnea with CPAP     On 1-2-18 he slipped and broke right shoulder, nonoperative . He was rx'd norco q hs  under wife's control.  He received a nerve block  and 3 q balls for local anesthesia . Under ortho care on [facility] 3-2-18 he tripped on terrazo floor bc of feet not being able to lift with gait . He then took more Norco under wife 's monitoring and has now weaned off.     He and wife  Travelled to [facility] in Jan 2019  . S/p  total r knee replaced by Dr Dallas  from past football and past ladder fall .   Loraine remains partially employed .     MEDS:   Wellbutrin xl 450  mg q other day ; Lexapro 10 mg q day     Compliance: yes    Side effects: None    Psychiatric Review Of Systems - Is patient experiencing or having changes in:  Mood : episodic dysphoria   sleep: no  appetite: no  weight: no, 225 per pt( nov 2017)   energy/anergy: better   interest/pleasure/anhedonia: no  somatic symptoms: no  libido: no  anxiety/panic: no  guilty/hopelessness: no  concentration: no  S.I.B.s/risky behavior: no  Irritability: no  Racing thoughts: no  Impulsive behaviors: no  Paranoia: no  AVH: no    Psychotherapy:  · Target  symptoms: compulsive  behavior   · Why chosen therapy is appropriate versus another modality: relevant to diagnosis, patient responds to this modality, evidence based practice discussed potential correction with letting license    · Outcome monitoring methods: self-report, observation  · Therapeutic intervention type: supportive psychotherapy  · Topics discussed/themes: relationships difficulties, work stress  · The patient's response to the intervention is accepting. The patient's progress toward treatment goals is excellent.   · Duration of intervention: 30   minutes.    Review of Systems   · Prob Pert. 1 sys, Ext. psych +2 add., Comp. 10-14 sys  · PSYCHIATRIC: Pertinant items are noted in the narrative.  · CONSTITUTIONAL: No weight gain or loss.   · MUSCULOSKELETAL: No pain or stiffness of the joints.  · NEUROLOGIC: No weakness, sensory changes, seizures, confusion, memory loss, tremor or other abnormal movements.  · ENDOCRINE: No polydipsia or polyuria.  · INTEGUMENTARY: No rashes or lacerations.  · EYES: No exophthalmos, jaundice or blindness.  · ENT: No dizziness, tinnitus or hearing loss.  · RESPIRATORY: No shortness of breath.  · CARDIOVASCULAR: No tachycardia or chest pain.  · GASTROINTESTINAL: No nausea, vomiting, pain, constipation or diarrhea.  · GENITOURINARY: No frequency, dysuria or sexual dysfunction.  · HEMATOLOGIC/LYMPHATIC: No excessive bleeding, prolonged or excessive bleeding after dental extraction/injury.  · ALLERGIC/IMMUNOLOGIC: No allergic response to materials, foods or animals at this time.    Past Medical, Family and Social History: The patient's past medical, family and social history have been reviewed and updated as appropriate within the electronic medical record - see encounter notes.    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     eleazar Bunch at [employer] nursing  school for CRNA later .     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   There were no vitals filed for this visit.     General:  unremarkable, age appropriate, casually dressed, neatly groomed     Musculoskeletal  Muscle Strength/Tone:  no spasicity, no rigidity   Gait & Station:  non-ataxic     Psychiatric  Speech:  no latency; no press   Mood & Affect:  euthymic  congruent and appropriate   Thought Process:  normal and logical   Associations:  intact   Thought Content:  normal, no suicidality, no homicidality, delusions, or paranoia   Insight:  has awareness of illness   Judgement: behavior is adequate to circumstances   Orientation:  grossly intact   Memory: intact for content of interview   Language: grossly intact   Attention Span & Concentration:  able to focus   Fund of Knowledge:  intact and appropriate to age and level of education     Assessment and Diagnosis   Status/Progress: Based on the examination today, the patient's problem(s) is/are well controlled.  New problems have not been presented today.   Co-morbidities are  not complicating management of the primary condition.  There are no active rule-out diagnoses for this patient at this time.     General Impression: doing well , happy with detention         ICD-10-CM ICD-9-CM   1. Depressive disorder F32.9 311   2. Opioid use disorder, severe, in sustained remission, dependence F11.21 304.03   3. Alcohol use  disorder, moderate, in sustained remission, dependence F10.21 303.93       Intervention/Counseling/Treatment Plan   · Medication Management: decrease  Wellbutrin xl to 300 mg ; decrease lexapro to half tab q day   ·  The risks and benefits of medication were discussed with the patient.  · Counseling provided with patient as follows: importance of compliance with chosen treatment options was emphasized, risks and benefits of treatment options, including medications, were discussed with the patient       Return to Clinic: 6 months

## 2019-02-11 ENCOUNTER — PATIENT MESSAGE (OUTPATIENT)
Dept: FAMILY MEDICINE | Facility: CLINIC | Age: 74
End: 2019-02-11

## 2019-02-18 ENCOUNTER — PATIENT MESSAGE (OUTPATIENT)
Dept: PSYCHIATRY | Facility: CLINIC | Age: 74
End: 2019-02-18

## 2019-02-25 RX ORDER — ESCITALOPRAM OXALATE 10 MG/1
TABLET ORAL
Qty: 90 TABLET | Refills: 0 | Status: SHIPPED | OUTPATIENT
Start: 2019-02-25 | End: 2020-04-01

## 2019-02-25 RX ORDER — PROPAFENONE HYDROCHLORIDE 425 MG/1
425 CAPSULE, EXTENDED RELEASE ORAL EVERY 12 HOURS
Qty: 180 CAPSULE | Refills: 0 | Status: SHIPPED | OUTPATIENT
Start: 2019-02-25 | End: 2019-03-26 | Stop reason: SDUPTHER

## 2019-02-25 RX ORDER — BUPROPION HYDROCHLORIDE 150 MG/1
TABLET ORAL
Qty: 270 TABLET | Refills: 0 | Status: SHIPPED | OUTPATIENT
Start: 2019-02-25 | End: 2019-02-26 | Stop reason: SDUPTHER

## 2019-02-25 RX ORDER — LISINOPRIL 20 MG/1
20 TABLET ORAL DAILY
Qty: 90 TABLET | Refills: 3 | Status: SHIPPED | OUTPATIENT
Start: 2019-02-25 | End: 2019-04-17 | Stop reason: ALTCHOICE

## 2019-03-26 DIAGNOSIS — Z98.890 POST-OPERATIVE STATE: ICD-10-CM

## 2019-03-26 RX ORDER — CELECOXIB 200 MG/1
CAPSULE ORAL
Qty: 180 CAPSULE | Refills: 0 | Status: SHIPPED | OUTPATIENT
Start: 2019-03-26 | End: 2019-06-30 | Stop reason: SDUPTHER

## 2019-03-26 RX ORDER — PROPAFENONE HYDROCHLORIDE 425 MG/1
425 CAPSULE, EXTENDED RELEASE ORAL EVERY 12 HOURS
Qty: 180 CAPSULE | Refills: 0 | Status: SHIPPED | OUTPATIENT
Start: 2019-03-26 | End: 2019-07-25 | Stop reason: SDUPTHER

## 2019-04-07 ENCOUNTER — PATIENT MESSAGE (OUTPATIENT)
Dept: NEPHROLOGY | Facility: CLINIC | Age: 74
End: 2019-04-07

## 2019-04-10 ENCOUNTER — PATIENT MESSAGE (OUTPATIENT)
Dept: NEPHROLOGY | Facility: CLINIC | Age: 74
End: 2019-04-10

## 2019-04-10 ENCOUNTER — LAB VISIT (OUTPATIENT)
Dept: LAB | Facility: HOSPITAL | Age: 74
End: 2019-04-10
Attending: INTERNAL MEDICINE
Payer: MEDICARE

## 2019-04-10 DIAGNOSIS — N18.30 CKD (CHRONIC KIDNEY DISEASE), STAGE III: ICD-10-CM

## 2019-04-10 DIAGNOSIS — D50.0 ANEMIA DUE TO CHRONIC BLOOD LOSS: ICD-10-CM

## 2019-04-10 LAB
ALBUMIN SERPL BCP-MCNC: 3.5 G/DL (ref 3.5–5.2)
ANION GAP SERPL CALC-SCNC: 7 MMOL/L (ref 8–16)
BASOPHILS # BLD AUTO: 0.12 K/UL (ref 0–0.2)
BASOPHILS NFR BLD: 1.3 % (ref 0–1.9)
BUN SERPL-MCNC: 22 MG/DL (ref 8–23)
CALCIUM SERPL-MCNC: 8.7 MG/DL (ref 8.7–10.5)
CHLORIDE SERPL-SCNC: 111 MMOL/L (ref 95–110)
CO2 SERPL-SCNC: 20 MMOL/L (ref 23–29)
CREAT SERPL-MCNC: 1.4 MG/DL (ref 0.5–1.4)
DIFFERENTIAL METHOD: ABNORMAL
EOSINOPHIL # BLD AUTO: 0.7 K/UL (ref 0–0.5)
EOSINOPHIL NFR BLD: 7.3 % (ref 0–8)
ERYTHROCYTE [DISTWIDTH] IN BLOOD BY AUTOMATED COUNT: 14.9 % (ref 11.5–14.5)
EST. GFR  (AFRICAN AMERICAN): 57.6 ML/MIN/1.73 M^2
EST. GFR  (NON AFRICAN AMERICAN): 49.8 ML/MIN/1.73 M^2
GLUCOSE SERPL-MCNC: 119 MG/DL (ref 70–110)
HCT VFR BLD AUTO: 41.6 % (ref 40–54)
HGB BLD-MCNC: 13.3 G/DL (ref 14–18)
IMM GRANULOCYTES # BLD AUTO: 0.04 K/UL (ref 0–0.04)
IMM GRANULOCYTES NFR BLD AUTO: 0.4 % (ref 0–0.5)
LYMPHOCYTES # BLD AUTO: 1.9 K/UL (ref 1–4.8)
LYMPHOCYTES NFR BLD: 21.3 % (ref 18–48)
MCH RBC QN AUTO: 30 PG (ref 27–31)
MCHC RBC AUTO-ENTMCNC: 32 G/DL (ref 32–36)
MCV RBC AUTO: 94 FL (ref 82–98)
MONOCYTES # BLD AUTO: 0.6 K/UL (ref 0.3–1)
MONOCYTES NFR BLD: 7 % (ref 4–15)
NEUTROPHILS # BLD AUTO: 5.7 K/UL (ref 1.8–7.7)
NEUTROPHILS NFR BLD: 62.7 % (ref 38–73)
NRBC BLD-RTO: 0 /100 WBC
PHOSPHATE SERPL-MCNC: 3.3 MG/DL (ref 2.7–4.5)
PLATELET # BLD AUTO: 280 K/UL (ref 150–350)
PMV BLD AUTO: 11.9 FL (ref 9.2–12.9)
POTASSIUM SERPL-SCNC: 3.9 MMOL/L (ref 3.5–5.1)
RBC # BLD AUTO: 4.44 M/UL (ref 4.6–6.2)
SODIUM SERPL-SCNC: 138 MMOL/L (ref 136–145)
WBC # BLD AUTO: 9.04 K/UL (ref 3.9–12.7)

## 2019-04-10 PROCEDURE — 85025 COMPLETE CBC W/AUTO DIFF WBC: CPT | Mod: HCNC

## 2019-04-10 PROCEDURE — 80069 RENAL FUNCTION PANEL: CPT | Mod: HCNC

## 2019-04-10 PROCEDURE — 36415 COLL VENOUS BLD VENIPUNCTURE: CPT | Mod: HCNC,PO

## 2019-04-15 RX ORDER — APIXABAN 5 MG/1
5 TABLET, FILM COATED ORAL 2 TIMES DAILY
Qty: 60 TABLET | Refills: 11 | Status: SHIPPED | OUTPATIENT
Start: 2019-04-15 | End: 2020-04-27 | Stop reason: SDUPTHER

## 2019-04-17 ENCOUNTER — OFFICE VISIT (OUTPATIENT)
Dept: NEPHROLOGY | Facility: CLINIC | Age: 74
End: 2019-04-17
Payer: MEDICARE

## 2019-04-17 VITALS
SYSTOLIC BLOOD PRESSURE: 152 MMHG | HEART RATE: 56 BPM | OXYGEN SATURATION: 99 % | BODY MASS INDEX: 26.71 KG/M2 | WEIGHT: 208.13 LBS | DIASTOLIC BLOOD PRESSURE: 80 MMHG | HEIGHT: 74 IN

## 2019-04-17 DIAGNOSIS — N18.30 CKD (CHRONIC KIDNEY DISEASE), STAGE III: Primary | ICD-10-CM

## 2019-04-17 DIAGNOSIS — R35.0 URINARY FREQUENCY: ICD-10-CM

## 2019-04-17 PROCEDURE — 3079F PR MOST RECENT DIASTOLIC BLOOD PRESSURE 80-89 MM HG: ICD-10-PCS | Mod: HCNC,CPTII,S$GLB, | Performed by: INTERNAL MEDICINE

## 2019-04-17 PROCEDURE — 3077F SYST BP >= 140 MM HG: CPT | Mod: HCNC,CPTII,S$GLB, | Performed by: INTERNAL MEDICINE

## 2019-04-17 PROCEDURE — 3077F PR MOST RECENT SYSTOLIC BLOOD PRESSURE >= 140 MM HG: ICD-10-PCS | Mod: HCNC,CPTII,S$GLB, | Performed by: INTERNAL MEDICINE

## 2019-04-17 PROCEDURE — 3079F DIAST BP 80-89 MM HG: CPT | Mod: HCNC,CPTII,S$GLB, | Performed by: INTERNAL MEDICINE

## 2019-04-17 PROCEDURE — 1101F PT FALLS ASSESS-DOCD LE1/YR: CPT | Mod: HCNC,CPTII,S$GLB, | Performed by: INTERNAL MEDICINE

## 2019-04-17 PROCEDURE — 99999 PR PBB SHADOW E&M-EST. PATIENT-LVL III: CPT | Mod: PBBFAC,HCNC,, | Performed by: INTERNAL MEDICINE

## 2019-04-17 PROCEDURE — 99214 PR OFFICE/OUTPT VISIT, EST, LEVL IV, 30-39 MIN: ICD-10-PCS | Mod: HCNC,S$GLB,, | Performed by: INTERNAL MEDICINE

## 2019-04-17 PROCEDURE — 1101F PR PT FALLS ASSESS DOC 0-1 FALLS W/OUT INJ PAST YR: ICD-10-PCS | Mod: HCNC,CPTII,S$GLB, | Performed by: INTERNAL MEDICINE

## 2019-04-17 PROCEDURE — 99999 PR PBB SHADOW E&M-EST. PATIENT-LVL III: ICD-10-PCS | Mod: PBBFAC,HCNC,, | Performed by: INTERNAL MEDICINE

## 2019-04-17 PROCEDURE — 99214 OFFICE O/P EST MOD 30 MIN: CPT | Mod: HCNC,S$GLB,, | Performed by: INTERNAL MEDICINE

## 2019-04-17 RX ORDER — TELMISARTAN 20 MG/1
20 TABLET ORAL DAILY
Qty: 90 TABLET | Refills: 3 | Status: SHIPPED | OUTPATIENT
Start: 2019-04-17 | End: 2019-09-16 | Stop reason: DRUGHIGH

## 2019-04-17 NOTE — PROGRESS NOTES
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    There have been no recent illnesses.  He has no uremic or urinary symptoms and is in his usual state of health.   He went to [place] in January.      Review of Systems   Constitutional: Positive for fatigue. Negative for appetite change, chills and fever.   HENT: Positive for sore throat (hoarseness in evenings).    Eyes: Negative for visual disturbance.   Respiratory: Negative for cough and shortness of breath.    Cardiovascular: Negative for chest pain and leg swelling.   Gastrointestinal: Negative for diarrhea, nausea and vomiting.   Genitourinary: Positive for frequency (3-4 episodes of nocturia). Negative for difficulty urinating, dysuria, hematuria and urgency.   Musculoskeletal: Positive for arthralgias (knee and right shoulder). Negative for myalgias.   Skin: Negative for rash.   Neurological: Positive for headaches. Negative for dizziness and light-headedness.   Psychiatric/Behavioral: Negative for sleep disturbance.       The past medical, family and social histories were reviewed for this encounter.     BP (!) 152/80 (BP Location: Right arm, Patient Position: Sitting)   Pulse (!) 56   Ht 6' 2" (1.88 m)   Wt 94.4 kg (208 lb 1.8 oz)   SpO2 99%   BMI 26.72 kg/m²     Objective:      Physical Exam   Constitutional: He is oriented to person, place, and time. He appears well-developed and well-nourished. No distress.   HENT:   Head: Normocephalic and atraumatic.   Eyes: Conjunctivae are normal.   Neck: Neck supple. No JVD present.   Cardiovascular: Normal rate, regular rhythm and normal heart sounds. Exam reveals no gallop and no friction rub.   No murmur heard.  Pulmonary/Chest: Effort normal. No respiratory distress. He has no wheezes. He has no rales.   Abdominal: Soft. Bowel sounds are normal. He exhibits no distension. There is no tenderness. "   Musculoskeletal: He exhibits edema (1+ BLE).   Neurological: He is alert and oriented to person, place, and time.   Skin: Skin is warm and dry. No rash noted.   Psychiatric: He has a normal mood and affect.   Vitals reviewed.      Assessment:       1. CKD (chronic kidney disease), stage III    2. Urinary frequency        Plan:   Return to clinic in 6 months.  Labs for next visit include rp.  Baseline creatinine is 1.1-1.3 since 2004.  His urine sediment is negative.  Ultrasound shows R 9.3 cm, L 8.9 cm.  Blood pressure is controlled on the current regimen.  Continue current medications as prescribed and reviewed.   Stop lisinopril.  Telmisartan 20 mg daily.  We discussed side effects.

## 2019-04-19 ENCOUNTER — PATIENT MESSAGE (OUTPATIENT)
Dept: NEPHROLOGY | Facility: CLINIC | Age: 74
End: 2019-04-19

## 2019-04-30 ENCOUNTER — PATIENT MESSAGE (OUTPATIENT)
Dept: FAMILY MEDICINE | Facility: CLINIC | Age: 74
End: 2019-04-30

## 2019-04-30 ENCOUNTER — PATIENT MESSAGE (OUTPATIENT)
Dept: NEPHROLOGY | Facility: CLINIC | Age: 74
End: 2019-04-30

## 2019-04-30 DIAGNOSIS — E03.4 HYPOTHYROIDISM DUE TO ACQUIRED ATROPHY OF THYROID: Primary | ICD-10-CM

## 2019-05-02 ENCOUNTER — TELEPHONE (OUTPATIENT)
Dept: SLEEP MEDICINE | Facility: CLINIC | Age: 74
End: 2019-05-02

## 2019-05-02 NOTE — TELEPHONE ENCOUNTER
Thyroid test (TSH) was borderline last time with normal T4, so I did not recommend increasing his dose at that time. If he is still feeling fatigued, I would like him to come in for ordered TSH before I recommend changing his levothyroxine dose.

## 2019-05-06 ENCOUNTER — PATIENT MESSAGE (OUTPATIENT)
Dept: FAMILY MEDICINE | Facility: CLINIC | Age: 74
End: 2019-05-06

## 2019-05-06 ENCOUNTER — PATIENT MESSAGE (OUTPATIENT)
Dept: ORTHOPEDICS | Facility: CLINIC | Age: 74
End: 2019-05-06

## 2019-05-06 DIAGNOSIS — H53.9 VISUAL DISTURBANCE: Primary | ICD-10-CM

## 2019-05-07 DIAGNOSIS — M62.838 MUSCLE SPASMS OF BOTH LOWER EXTREMITIES: ICD-10-CM

## 2019-05-07 RX ORDER — CYCLOBENZAPRINE HCL 10 MG
TABLET ORAL
Qty: 40 TABLET | Refills: 5 | Status: SHIPPED | OUTPATIENT
Start: 2019-05-07 | End: 2020-01-02

## 2019-05-09 ENCOUNTER — TELEPHONE (OUTPATIENT)
Dept: ELECTROPHYSIOLOGY | Facility: CLINIC | Age: 74
End: 2019-05-09

## 2019-05-14 ENCOUNTER — OFFICE VISIT (OUTPATIENT)
Dept: OPTOMETRY | Facility: CLINIC | Age: 74
End: 2019-05-14
Payer: MEDICARE

## 2019-05-14 DIAGNOSIS — Z13.5 GLAUCOMA SCREENING: ICD-10-CM

## 2019-05-14 DIAGNOSIS — Z96.1 BILATERAL PSEUDOPHAKIA: ICD-10-CM

## 2019-05-14 DIAGNOSIS — H43.813 POSTERIOR VITREOUS DETACHMENT, BILATERAL: Primary | ICD-10-CM

## 2019-05-14 PROCEDURE — 99999 PR PBB SHADOW E&M-EST. PATIENT-LVL II: ICD-10-PCS | Mod: PBBFAC,HCNC,, | Performed by: OPTOMETRIST

## 2019-05-14 PROCEDURE — 99999 PR PBB SHADOW E&M-EST. PATIENT-LVL II: CPT | Mod: PBBFAC,HCNC,, | Performed by: OPTOMETRIST

## 2019-05-14 PROCEDURE — 92004 COMPRE OPH EXAM NEW PT 1/>: CPT | Mod: HCNC,S$GLB,, | Performed by: OPTOMETRIST

## 2019-05-14 PROCEDURE — 92004 PR EYE EXAM, NEW PATIENT,COMPREHESV: ICD-10-PCS | Mod: HCNC,S$GLB,, | Performed by: OPTOMETRIST

## 2019-05-14 NOTE — PROGRESS NOTES
HPI     Routine eye exam--dle-2014 pvd Katerin    Pt denies any blurred vision. Wears otc reading glasses. Denies any eye   pain. No flashes, some floaters.    Last edited by Shanti Flood on 5/14/2019 10:42 AM. (History)        ROS     Positive for: Eyes    Negative for: Constitutional, Gastrointestinal, Neurological, Skin,   Genitourinary, Musculoskeletal, HENT, Endocrine, Cardiovascular,   Respiratory, Psychiatric, Allergic/Imm, Heme/Lymph    Last edited by FADUMO Nava, OD on 5/14/2019 10:53 AM. (History)        Assessment /Plan     For exam results, see Encounter Report.    Posterior vitreous detachment, bilateral    Glaucoma screening    Bilateral pseudophakia      1. RD precautions given  2. Not suspect  3. Stable OU    Good macular health for age  Rx for distance only ---NC refraction    Discussed and educated patient on current findings /plan.  RTC 1 year, prn if any changes / issues

## 2019-05-14 NOTE — LETTER
May 14, 2019      Maxi Gaines MD  [address] [facility] [address] [place] [address]           [facility]  [address] [facility] [address] [place] [address]  Phone: [phone]  Fax: [phone]          Patient: Dominick MARCIA MD Duncan   MR Number: [id number]   YOB: 1945   Date of Visit: 5/14/2019       Dear Dr. Maxi Gaines:    Thank you for referring Dominick Song to me for evaluation. Attached you will find relevant portions of my assessment and plan of care.    If you have questions, please do not hesitate to call me. I look forward to following Dominick Song along with you.    Sincerely,    FADUMO Nava, OD    Enclosure  CC:  No Recipients    If you would like to receive this communication electronically, please contact [email or web address] or [phone] to request more information on [employer] Link access.    For providers and/or their staff who would like to refer a patient to [facility], please contact us through our one-stop-shop provider referral line, [facility], at [phone].    If you feel you have received this communication in error or would no longer like to receive these types of communications, please e-mail [email or web address]

## 2019-05-17 ENCOUNTER — PES CALL (OUTPATIENT)
Dept: ADMINISTRATIVE | Facility: CLINIC | Age: 74
End: 2019-05-17

## 2019-05-20 ENCOUNTER — OFFICE VISIT (OUTPATIENT)
Dept: CARDIOLOGY | Facility: CLINIC | Age: 74
End: 2019-05-20
Payer: MEDICARE

## 2019-05-20 VITALS
WEIGHT: 210 LBS | DIASTOLIC BLOOD PRESSURE: 84 MMHG | SYSTOLIC BLOOD PRESSURE: 141 MMHG | BODY MASS INDEX: 26.95 KG/M2 | HEIGHT: 74 IN | HEART RATE: 59 BPM

## 2019-05-20 DIAGNOSIS — E03.4 HYPOTHYROIDISM DUE TO ACQUIRED ATROPHY OF THYROID: ICD-10-CM

## 2019-05-20 DIAGNOSIS — R00.1 BRADYCARDIA: ICD-10-CM

## 2019-05-20 DIAGNOSIS — I10 ESSENTIAL HYPERTENSION: ICD-10-CM

## 2019-05-20 DIAGNOSIS — I48.19 PERSISTENT ATRIAL FIBRILLATION: Primary | ICD-10-CM

## 2019-05-20 DIAGNOSIS — G47.31 COMPLEX SLEEP APNEA SYNDROME: ICD-10-CM

## 2019-05-20 DIAGNOSIS — Z87.11 HISTORY OF BLEEDING PEPTIC ULCER: ICD-10-CM

## 2019-05-20 DIAGNOSIS — N18.30 CKD (CHRONIC KIDNEY DISEASE) STAGE 3, GFR 30-59 ML/MIN: ICD-10-CM

## 2019-05-20 PROCEDURE — 93005 ELECTROCARDIOGRAM TRACING: CPT | Mod: HCNC,S$GLB,, | Performed by: INTERNAL MEDICINE

## 2019-05-20 PROCEDURE — 1101F PR PT FALLS ASSESS DOC 0-1 FALLS W/OUT INJ PAST YR: ICD-10-PCS | Mod: HCNC,CPTII,S$GLB, | Performed by: INTERNAL MEDICINE

## 2019-05-20 PROCEDURE — 99214 OFFICE O/P EST MOD 30 MIN: CPT | Mod: HCNC,S$GLB,, | Performed by: INTERNAL MEDICINE

## 2019-05-20 PROCEDURE — 93005 EKG 12-LEAD: ICD-10-PCS | Mod: HCNC,S$GLB,, | Performed by: INTERNAL MEDICINE

## 2019-05-20 PROCEDURE — 93010 ELECTROCARDIOGRAM REPORT: CPT | Mod: HCNC,S$GLB,, | Performed by: INTERNAL MEDICINE

## 2019-05-20 PROCEDURE — 99999 PR PBB SHADOW E&M-EST. PATIENT-LVL III: ICD-10-PCS | Mod: PBBFAC,HCNC,, | Performed by: INTERNAL MEDICINE

## 2019-05-20 PROCEDURE — 3079F PR MOST RECENT DIASTOLIC BLOOD PRESSURE 80-89 MM HG: ICD-10-PCS | Mod: HCNC,CPTII,S$GLB, | Performed by: INTERNAL MEDICINE

## 2019-05-20 PROCEDURE — 3077F PR MOST RECENT SYSTOLIC BLOOD PRESSURE >= 140 MM HG: ICD-10-PCS | Mod: HCNC,CPTII,S$GLB, | Performed by: INTERNAL MEDICINE

## 2019-05-20 PROCEDURE — 3077F SYST BP >= 140 MM HG: CPT | Mod: HCNC,CPTII,S$GLB, | Performed by: INTERNAL MEDICINE

## 2019-05-20 PROCEDURE — 1101F PT FALLS ASSESS-DOCD LE1/YR: CPT | Mod: HCNC,CPTII,S$GLB, | Performed by: INTERNAL MEDICINE

## 2019-05-20 PROCEDURE — 99214 PR OFFICE/OUTPT VISIT, EST, LEVL IV, 30-39 MIN: ICD-10-PCS | Mod: HCNC,S$GLB,, | Performed by: INTERNAL MEDICINE

## 2019-05-20 PROCEDURE — 99999 PR PBB SHADOW E&M-EST. PATIENT-LVL III: CPT | Mod: PBBFAC,HCNC,, | Performed by: INTERNAL MEDICINE

## 2019-05-20 PROCEDURE — 99499 UNLISTED E&M SERVICE: CPT | Mod: HCNC,S$GLB,, | Performed by: INTERNAL MEDICINE

## 2019-05-20 PROCEDURE — 99499 RISK ADDL DX/OHS AUDIT: ICD-10-PCS | Mod: HCNC,S$GLB,, | Performed by: INTERNAL MEDICINE

## 2019-05-20 PROCEDURE — 3079F DIAST BP 80-89 MM HG: CPT | Mod: HCNC,CPTII,S$GLB, | Performed by: INTERNAL MEDICINE

## 2019-05-20 PROCEDURE — 93010 EKG 12-LEAD: ICD-10-PCS | Mod: HCNC,S$GLB,, | Performed by: INTERNAL MEDICINE

## 2019-05-20 NOTE — PROGRESS NOTES
Subjective:    Patient ID:  Dominick Song MD is a 73 y.o. male who presents for follow-up of Atrial Fibrillation      HPI 74 yo male with h/o atrial fibrillation, GI bleed, Htn, Sleep Apnea.  First diagnosed with atrial fibrillation 2/12 (noted palpitations). Placed on beta blocker, coumadin. Underwent PEPE/DCCV. Had recurrence, Propafenone  mg twice daily added.  Had recurrence 12/24/13, underwent DCCV, Propafenone increased to 425 mg twice daily.  PVI ([place]) 7/28/14.   Had done well, was off Propafenone. Developed recurrence, underwent DCCV multiple times.  Repeat PVI 4/27/17 All 4 veins remained isolated.  Antralized left sided lesion set posteriorly, and performed SVC isolation.  Has been compliant with CPAP.  Had persistent recurrence >> DCCV 2/9/18.  He has had paroxysmal AF, with 4 episodes since 11/18.    Exercise echo 2/19 normal biventricular structure and function BISI 57 negative for ischemia.  Has had challenges with CPAP.    Review of Systems   Constitution: Negative. Negative for malaise/fatigue.   Cardiovascular: Positive for palpitations. Negative for chest pain, dyspnea on exertion, irregular heartbeat, leg swelling, near-syncope, orthopnea, paroxysmal nocturnal dyspnea and syncope.   Respiratory: Negative for cough and shortness of breath.    Neurological: Negative for dizziness, light-headedness and weakness.   All other systems reviewed and are negative.       Objective:    Physical Exam   Constitutional: He is oriented to person, place, and time. He appears well-developed and well-nourished.   Eyes: Conjunctivae are normal. No scleral icterus.   Neck: No JVD present. No tracheal deviation present.   Cardiovascular: Normal rate, regular rhythm and normal heart sounds. PMI is not displaced.   Pulmonary/Chest: Effort normal and breath sounds normal. No respiratory distress.   Abdominal: Soft. There is no hepatosplenomegaly. There is no tenderness.   Musculoskeletal: He exhibits no  edema (lower extremity) or tenderness.   Neurological: He is alert and oriented to person, place, and time.   Skin: Skin is warm and dry. No rash noted.   Psychiatric: He has a normal mood and affect. His behavior is normal.         Assessment:       1. Persistent atrial fibrillation    2. Bradycardia    3. Essential hypertension    4. CKD (chronic kidney disease) stage 3, GFR 30-59 ml/min    5. Hypothyroidism due to acquired atrophy of thyroid    6. History of bleeding peptic ulcer    7. Complex sleep apnea syndrome         Plan:             Remains stable overall.  If has increasing episodes, consider posterior wall isolation (he does not want to consider a surgical approach).  24 hr holter  Encouraged utilization of CPAP.  F/u in 6 months.

## 2019-05-22 ENCOUNTER — PATIENT MESSAGE (OUTPATIENT)
Dept: FAMILY MEDICINE | Facility: CLINIC | Age: 74
End: 2019-05-22

## 2019-05-27 DIAGNOSIS — N18.30 CKD (CHRONIC KIDNEY DISEASE) STAGE 3, GFR 30-59 ML/MIN: Primary | ICD-10-CM

## 2019-05-27 DIAGNOSIS — R35.0 URINARY FREQUENCY: ICD-10-CM

## 2019-05-27 RX ORDER — METOPROLOL SUCCINATE 50 MG/1
50 TABLET, EXTENDED RELEASE ORAL DAILY
Qty: 90 TABLET | Refills: 1 | Status: SHIPPED | OUTPATIENT
Start: 2019-05-27 | End: 2019-07-30 | Stop reason: SDUPTHER

## 2019-06-06 ENCOUNTER — CLINICAL SUPPORT (OUTPATIENT)
Dept: CARDIOLOGY | Facility: CLINIC | Age: 74
End: 2019-06-06
Attending: INTERNAL MEDICINE
Payer: MEDICARE

## 2019-06-06 ENCOUNTER — TELEPHONE (OUTPATIENT)
Dept: CARDIOLOGY | Facility: CLINIC | Age: 74
End: 2019-06-06

## 2019-06-06 DIAGNOSIS — I48.91 A-FIB: ICD-10-CM

## 2019-06-06 DIAGNOSIS — I48.19 PERSISTENT ATRIAL FIBRILLATION: Primary | ICD-10-CM

## 2019-06-06 PROCEDURE — 93224 HOLTER MONITOR - 24 HOUR (CUPID ONLY): ICD-10-PCS | Mod: HCNC,S$GLB,, | Performed by: INTERNAL MEDICINE

## 2019-06-06 PROCEDURE — 93224 XTRNL ECG REC UP TO 48 HRS: CPT | Mod: HCNC,S$GLB,, | Performed by: INTERNAL MEDICINE

## 2019-06-07 ENCOUNTER — TELEPHONE (OUTPATIENT)
Dept: CARDIOLOGY | Facility: CLINIC | Age: 74
End: 2019-06-07

## 2019-06-07 NOTE — TELEPHONE ENCOUNTER
----- Message from Clay Lazaro sent at 6/7/2019 10:26 AM CDT -----  Contact: Dr. Duncan Song returning a call from Flakita in the office.     [phone]

## 2019-06-18 ENCOUNTER — PATIENT MESSAGE (OUTPATIENT)
Dept: FAMILY MEDICINE | Facility: CLINIC | Age: 74
End: 2019-06-18

## 2019-06-18 LAB
OHS CV EVENT MONITOR DAY: 0
OHS CV HOLTER LENGTH DECIMAL HOURS: 24
OHS CV HOLTER LENGTH HOURS: 24
OHS CV HOLTER LENGTH MINUTES: 0

## 2019-06-24 NOTE — ADDENDUM NOTE
Addendum  created 05/30/18 1301 by Izabel Matamoros RN    Sign clinical note, Visit Navigator SmartForm Flowsheet section accepted       Patient presents with: Well Child      HPI:   Earle Figueroa is a 7year old female who presents for a well child physical exam.     Concerns: seasonal allergy- not testing. Using flonase pediatric and cetirizine- otc with fall and spring.  Parents with Medications:  Fluticasone Propionate (FLONASE) 50 MCG/ACT Nasal Suspension by Each Nare route daily. Kids Flonase Disp:  Rfl:    Cetirizine HCl 1 MG/ML Oral Syrup Take by mouth daily. Disp:  Rfl:       No past medical history on file.    No past surgical hi no wheeze, rhonchi, or rales, no retractions. ABDOMEN: is soft, NT/ND with no organomegaly  No rebound or guarding, NABS. EXTREMIEIS: are symmetric with no cyanosis, clubbing, or edema. SKIN: is unremarkable without rashes.     BACK: has normal curv

## 2019-06-29 ENCOUNTER — PATIENT MESSAGE (OUTPATIENT)
Dept: ORTHOPEDICS | Facility: CLINIC | Age: 74
End: 2019-06-29

## 2019-06-30 DIAGNOSIS — Z98.890 POST-OPERATIVE STATE: ICD-10-CM

## 2019-06-30 RX ORDER — CELECOXIB 200 MG/1
CAPSULE ORAL
Qty: 180 CAPSULE | Refills: 0 | Status: SHIPPED | OUTPATIENT
Start: 2019-06-30 | End: 2019-09-25 | Stop reason: SDUPTHER

## 2019-07-01 ENCOUNTER — PATIENT MESSAGE (OUTPATIENT)
Dept: ORTHOPEDICS | Facility: CLINIC | Age: 74
End: 2019-07-01

## 2019-07-05 ENCOUNTER — TELEPHONE (OUTPATIENT)
Dept: UROLOGY | Facility: CLINIC | Age: 74
End: 2019-07-05

## 2019-07-10 ENCOUNTER — PATIENT MESSAGE (OUTPATIENT)
Dept: ORTHOPEDICS | Facility: CLINIC | Age: 74
End: 2019-07-10

## 2019-07-22 ENCOUNTER — PATIENT MESSAGE (OUTPATIENT)
Dept: ORTHOPEDICS | Facility: CLINIC | Age: 74
End: 2019-07-22

## 2019-07-22 ENCOUNTER — OFFICE VISIT (OUTPATIENT)
Dept: ORTHOPEDICS | Facility: CLINIC | Age: 74
End: 2019-07-22
Payer: MEDICARE

## 2019-07-22 DIAGNOSIS — L02.91 ABSCESS: Primary | ICD-10-CM

## 2019-07-22 DIAGNOSIS — M25.569 KNEE PAIN, UNSPECIFIED CHRONICITY, UNSPECIFIED LATERALITY: Primary | ICD-10-CM

## 2019-07-22 PROCEDURE — 99999 PR PBB SHADOW E&M-EST. PATIENT-LVL II: ICD-10-PCS | Mod: PBBFAC,HCNC,, | Performed by: NURSE PRACTITIONER

## 2019-07-22 PROCEDURE — 99213 PR OFFICE/OUTPT VISIT, EST, LEVL III, 20-29 MIN: ICD-10-PCS | Mod: HCNC,S$GLB,, | Performed by: NURSE PRACTITIONER

## 2019-07-22 PROCEDURE — 87070 CULTURE OTHR SPECIMN AEROBIC: CPT | Mod: HCNC

## 2019-07-22 PROCEDURE — 99213 OFFICE O/P EST LOW 20 MIN: CPT | Mod: HCNC,S$GLB,, | Performed by: NURSE PRACTITIONER

## 2019-07-22 PROCEDURE — 1101F PR PT FALLS ASSESS DOC 0-1 FALLS W/OUT INJ PAST YR: ICD-10-PCS | Mod: HCNC,CPTII,S$GLB, | Performed by: NURSE PRACTITIONER

## 2019-07-22 PROCEDURE — 99999 PR PBB SHADOW E&M-EST. PATIENT-LVL II: CPT | Mod: PBBFAC,HCNC,, | Performed by: NURSE PRACTITIONER

## 2019-07-22 PROCEDURE — 1101F PT FALLS ASSESS-DOCD LE1/YR: CPT | Mod: HCNC,CPTII,S$GLB, | Performed by: NURSE PRACTITIONER

## 2019-07-22 RX ORDER — SULFAMETHOXAZOLE AND TRIMETHOPRIM 800; 160 MG/1; MG/1
1 TABLET ORAL 2 TIMES DAILY
Qty: 20 TABLET | Refills: 0 | Status: SHIPPED | OUTPATIENT
Start: 2019-07-22 | End: 2019-09-16

## 2019-07-22 NOTE — TELEPHONE ENCOUNTER
Good morning Dr Dallas and staff.  Can you please work in Mr Song today for evaluation?  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  He is on my schedule to be seen tomorrow.  I am in [facility] today and he declined an appointment here today.  I most likely would refer him back to you anyway if it were a case of an infection.  Thank you,   Fe Guevara NP

## 2019-07-22 NOTE — PROGRESS NOTES
"CC: right knee "lump"    HPI: Pt with c/o a "lump" that appeared 2 weeks ago following genicular nerve block for  for medial knee pain. There was no erythema initially, but the "lump" hasn't changed size and over this past weekend erythema appeared around the site and it became very painful to the touch. The erythema does not extend to the center of the knee and there is no pain in the knee joint, only to touch. He had a crp and esr before coming to this appt and they were both negative for infection. He reports good relief of the previous pain following the genicular nerve block. He is ambulating without assistive device. There is not a limp.    ROS  General: denies fever and chills  Resp: no c/o sob  CVS: no c/o cp  MSK: c/o right area to the medial knee with erythema and pain over a fluctuant area that appeared 2 weeks ago without extension to the knee joint    PE  General: AAOx3, pleasant and cooperative  Resp: respirations even and unlabored  MSK: right knee exam  0 degrees extension  120 degrees flexion  No warmth or erythema over the knee joint  There is erythema and fluctuance to the outside of the knee that does not extend to the joint  - effusion    Assessment:  Abscess of outside area of the medial knee without extension to the joint    Plan:  CRP and ESR are negative which would indicate that the fluctuance and erythema are not related to the knee joint/prosthesis.   Aspiration of the fluctuant area reveals 4 cc of sanguinous fluid which was sent to the lab for culture  Bactrim bid for 10 days only. Last creatinine was 1.4, so will not exceed 10 days of treatment. If better after 7 days then ok to stop. Will stop the antibiotic if final culture is negative.  F/u results with patient    Abscess aspiration procedure note:    Pre-operative Diagnosis: right knee soft tissue abscess    Post-operative Diagnosis: same    Indications: right knee fluctuant area    Anesthesia: none    Procedure Details     Verbal " consent was obtained for the procedure. An 18 gauge needle was inserted into the center of the fluctuant area to the medial knee, outside of the joint and 4 ml of grossly bloody fluid was removed from the joint and sent to the lab for analysis. The needle was removed and the area cleansed and dressed.    Complications:  None; patient tolerated the procedure well.

## 2019-07-22 NOTE — TELEPHONE ENCOUNTER
Called pt. Offered appt with MsNANCY Zamora in [place]. Pt declined. He was upset that there were no appointments with a provider in [place] today. Advised pt that Dr. Flores is out of the office, Dr. Montes is in Surgery and the other providers that are in clinic are the hand specialist and the foot specialist. Pt will keep appt for tomorrow with Ms. NANCY Guevara. Thanks, Marilee

## 2019-07-25 DIAGNOSIS — I48.91 ATRIAL FIBRILLATION, UNSPECIFIED TYPE: Primary | ICD-10-CM

## 2019-07-25 LAB — BACTERIA SPEC AEROBE CULT: NO GROWTH

## 2019-07-25 RX ORDER — PROPAFENONE HYDROCHLORIDE 425 MG/1
425 CAPSULE, EXTENDED RELEASE ORAL EVERY 12 HOURS
Qty: 180 CAPSULE | Refills: 0 | Status: SHIPPED | OUTPATIENT
Start: 2019-07-25 | End: 2019-10-14 | Stop reason: SDUPTHER

## 2019-07-30 DIAGNOSIS — R35.0 URINARY FREQUENCY: ICD-10-CM

## 2019-07-30 DIAGNOSIS — N18.30 CKD (CHRONIC KIDNEY DISEASE) STAGE 3, GFR 30-59 ML/MIN: ICD-10-CM

## 2019-07-30 RX ORDER — METOPROLOL SUCCINATE 50 MG/1
50 TABLET, EXTENDED RELEASE ORAL DAILY
Qty: 90 TABLET | Refills: 1 | Status: SHIPPED | OUTPATIENT
Start: 2019-07-30 | End: 2019-10-14 | Stop reason: SDUPTHER

## 2019-07-30 NOTE — TELEPHONE ENCOUNTER
The pt is requesting a refill of his Metoprolol Succinate 50 mg 1 tablet by mouth daily, # 90  Sent to [facility] in [place].

## 2019-08-07 DIAGNOSIS — I48.19 PERSISTENT ATRIAL FIBRILLATION: Primary | ICD-10-CM

## 2019-08-07 DIAGNOSIS — I48.91 A-FIB: ICD-10-CM

## 2019-08-08 ENCOUNTER — TELEPHONE (OUTPATIENT)
Dept: CARDIOLOGY | Facility: CLINIC | Age: 74
End: 2019-08-08

## 2019-08-08 NOTE — TELEPHONE ENCOUNTER
----- Message from Fe Enriquez RN sent at 8/8/2019  8:24 AM CDT -----  Regarding: patient not on schedule   This patient is not on the surgery schedule for tomorrow for cardioversion, case shows up on snapboard. Is this case a cancel?  Please advise .    Fe Enriquez RN   Pre- admit testing [facility]

## 2019-09-07 DIAGNOSIS — E03.4 HYPOTHYROIDISM DUE TO ACQUIRED ATROPHY OF THYROID: Primary | ICD-10-CM

## 2019-09-09 RX ORDER — LEVOTHYROXINE SODIUM 25 UG/1
TABLET ORAL
Qty: 90 TABLET | Refills: 0 | Status: SHIPPED | OUTPATIENT
Start: 2019-09-09 | End: 2019-09-10 | Stop reason: SDUPTHER

## 2019-09-09 NOTE — PROGRESS NOTES
Refill Authorization Note     is requesting a refill authorization.    Brief assessment and rationale for refill: APPROVE: prr  Name and strength of medication: LEVOTHYROXINE 0.025MG (25MCG) TAB       Medication Therapy Plan: TSH elevated, T4 WNL: approve 3 more    Medication reconciliation completed: No              How patient will take medication: t1t qd          Comments:   Last Thyroid (12 months or within 3 months of dosage adjustment)  Lab Results   Component Value Date    TSH 5.932 (H) 01/02/2019    TSH 3.031 07/23/2018    TSH 3.219 02/08/2017    Lab Results   Component Value Date    FREET4 0.95 01/02/2019    FREET4 0.90 02/08/2017    FREET4 0.84 11/28/2016      APPOINTMENTS (past 12m or future 3m authorizing provider)   Date Provider   LAST VISIT  1/2/2019 Maxi Gaines MD   NEXT VISIT  Visit date not found Maxi Gaines MD

## 2019-09-10 DIAGNOSIS — E03.4 HYPOTHYROIDISM DUE TO ACQUIRED ATROPHY OF THYROID: ICD-10-CM

## 2019-09-10 RX ORDER — LEVOTHYROXINE SODIUM 25 UG/1
25 TABLET ORAL
Qty: 90 TABLET | Refills: 3 | Status: SHIPPED | OUTPATIENT
Start: 2019-09-10 | End: 2019-12-06

## 2019-09-15 NOTE — PROGRESS NOTES
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    Chief Complaint: F/u blood pressure    Here today to follow up on Blood pressure   Recently elevated BP on last MD appt - he has been seen by Nephrology and cardiology    Episodes of afib June and august   He was to have cardioversions in June and august but were cancelled due to him being out of afib. He has had ablation in the past   He was recently taken off lisinopril and changed to micardis 20 - he has not been checking his BP at home very much but he can if discussed     Hypertension   Pertinent negatives include no chest pain or shortness of breath.     Vitals:    09/16/19 1304   BP: (!) 176/96   Pulse: (!) 58   Temp: 97.8 °F (36.6 °C)     Review of Systems   Constitutional: Negative for activity change, appetite change, chills, diaphoresis, fatigue and fever.   HENT: Negative.  Negative for congestion, drooling, postnasal drip, rhinorrhea, sinus pressure, sore throat, tinnitus and trouble swallowing.    Eyes: Negative.    Respiratory: Negative.  Negative for apnea, cough, choking, chest tightness and shortness of breath.    Cardiovascular: Negative.  Negative for chest pain.   Gastrointestinal: Negative.  Negative for abdominal pain, diarrhea and nausea.   Endocrine: Negative.    Genitourinary: Negative.  Negative for decreased urine volume, difficulty urinating, dysuria, flank pain, genital sores, hematuria, penile pain, scrotal swelling, testicular pain and urgency.   Musculoskeletal: Negative.  Negative for arthralgias, back pain, gait problem and joint swelling.   Skin: Negative.  Negative for color change, pallor and rash.   Allergic/Immunologic: Negative.    Neurological: Negative.  Negative for numbness.   Hematological: Negative.  Negative for adenopathy.   Psychiatric/Behavioral: Negative.  Negative for behavioral problems, confusion, hallucinations and sleep disturbance. The patient is not hyperactive.        Past Medical  History:   Diagnosis Date    Anticoagulant long-term use     Anxiety     Arthritis     Atrial fibrillation     BPH (benign prostatic hyperplasia)     Cataract     CKD (chronic kidney disease) stage 3, GFR 30-59 ml/min 7/10/2017    Followed by Dr. Jeevan Pond    Colon polyp     benign    Depression     Elevated PSA     Erectile dysfunction     Gastric ulcer with hemorrhage     Hep B w/o coma 1977    History of bleeding peptic ulcer     History of prostatitis     Hypertension     PAF (paroxysmal atrial fibrillation)     Sleep apnea     on CPAP       Objective:      Physical Exam   Constitutional: He is oriented to person, place, and time. He appears well-developed and well-nourished.   HENT:   Head: Normocephalic and atraumatic.   Right Ear: Hearing, tympanic membrane, external ear and ear canal normal.   Left Ear: Hearing, tympanic membrane, external ear and ear canal normal.   Nose: Nose normal. No mucosal edema or rhinorrhea. Right sinus exhibits no maxillary sinus tenderness and no frontal sinus tenderness. Left sinus exhibits no maxillary sinus tenderness and no frontal sinus tenderness.   Mouth/Throat: Uvula is midline, oropharynx is clear and moist and mucous membranes are normal.   Eyes: Pupils are equal, round, and reactive to light. Conjunctivae and EOM are normal.   Neck: Normal range of motion. Neck supple.   Cardiovascular: Normal rate, regular rhythm, normal heart sounds and intact distal pulses. Exam reveals no friction rub.   No murmur heard.  +1-2 edema to lower legs    Pulmonary/Chest: Effort normal and breath sounds normal. No stridor. No respiratory distress. He has no wheezes.   Abdominal: Soft. Bowel sounds are normal.   Musculoskeletal: He exhibits edema.   Neurological: He is alert and oriented to person, place, and time. No cranial nerve deficit.   Skin: Skin is warm and dry.   Psychiatric: He has a normal mood and affect. His behavior is normal. Judgment and thought content  normal.   Nursing note and vitals reviewed.      Assessment:       1. Hypertension, unspecified type    2. Mild single current episode of major depressive disorder    3. Tortuous aorta    4. Hypothyroidism due to acquired atrophy of thyroid    5. Atrial fibrillation, transient    6. Elevated PSA    7. Localized edema        Plan:       Hypertension, unspecified type  -     telmisartan (MICARDIS) 40 MG Tab; Take 1 tablet (40 mg total) by mouth once daily.  Dispense: 90 tablet; Refill: 3    Mild single current episode of major depressive disorder  Stable on meds     Tortuous aorta    Hypothyroidism due to acquired atrophy of thyroid  Stable on meds   Followed with PCP     Atrial fibrillation, transient  Followed by Dr Rouse-  On anti coagulant and rhytmol      Elevated PSA  avodart - seeing urology   Localized edema  Related to PVD - compression           He will monitor BP at home and send report   He sees Nephr in 2 months to recheck BP there   FU if not improved

## 2019-09-16 ENCOUNTER — OFFICE VISIT (OUTPATIENT)
Dept: FAMILY MEDICINE | Facility: CLINIC | Age: 74
End: 2019-09-16
Payer: MEDICARE

## 2019-09-16 VITALS
HEART RATE: 58 BPM | HEIGHT: 74 IN | SYSTOLIC BLOOD PRESSURE: 176 MMHG | OXYGEN SATURATION: 96 % | TEMPERATURE: 98 F | DIASTOLIC BLOOD PRESSURE: 96 MMHG | WEIGHT: 220.69 LBS | BODY MASS INDEX: 28.32 KG/M2

## 2019-09-16 DIAGNOSIS — R97.20 ELEVATED PSA: ICD-10-CM

## 2019-09-16 DIAGNOSIS — R60.0 LOCALIZED EDEMA: ICD-10-CM

## 2019-09-16 DIAGNOSIS — I48.91 ATRIAL FIBRILLATION, TRANSIENT: ICD-10-CM

## 2019-09-16 DIAGNOSIS — E03.4 HYPOTHYROIDISM DUE TO ACQUIRED ATROPHY OF THYROID: ICD-10-CM

## 2019-09-16 DIAGNOSIS — I77.1 TORTUOUS AORTA: ICD-10-CM

## 2019-09-16 DIAGNOSIS — I10 HYPERTENSION, UNSPECIFIED TYPE: Primary | ICD-10-CM

## 2019-09-16 DIAGNOSIS — F32.0 MILD SINGLE CURRENT EPISODE OF MAJOR DEPRESSIVE DISORDER: ICD-10-CM

## 2019-09-16 PROCEDURE — 1101F PR PT FALLS ASSESS DOC 0-1 FALLS W/OUT INJ PAST YR: ICD-10-PCS | Mod: HCNC,CPTII,S$GLB, | Performed by: NURSE PRACTITIONER

## 2019-09-16 PROCEDURE — 99499 RISK ADDL DX/OHS AUDIT: ICD-10-PCS | Mod: HCNC,S$GLB,, | Performed by: NURSE PRACTITIONER

## 2019-09-16 PROCEDURE — 3077F PR MOST RECENT SYSTOLIC BLOOD PRESSURE >= 140 MM HG: ICD-10-PCS | Mod: HCNC,CPTII,S$GLB, | Performed by: NURSE PRACTITIONER

## 2019-09-16 PROCEDURE — 99499 UNLISTED E&M SERVICE: CPT | Mod: HCNC,S$GLB,, | Performed by: NURSE PRACTITIONER

## 2019-09-16 PROCEDURE — 99214 OFFICE O/P EST MOD 30 MIN: CPT | Mod: HCNC,S$GLB,, | Performed by: NURSE PRACTITIONER

## 2019-09-16 PROCEDURE — 99999 PR PBB SHADOW E&M-EST. PATIENT-LVL IV: ICD-10-PCS | Mod: PBBFAC,HCNC,, | Performed by: NURSE PRACTITIONER

## 2019-09-16 PROCEDURE — 3080F PR MOST RECENT DIASTOLIC BLOOD PRESSURE >= 90 MM HG: ICD-10-PCS | Mod: HCNC,CPTII,S$GLB, | Performed by: NURSE PRACTITIONER

## 2019-09-16 PROCEDURE — 3077F SYST BP >= 140 MM HG: CPT | Mod: HCNC,CPTII,S$GLB, | Performed by: NURSE PRACTITIONER

## 2019-09-16 PROCEDURE — 99214 PR OFFICE/OUTPT VISIT, EST, LEVL IV, 30-39 MIN: ICD-10-PCS | Mod: HCNC,S$GLB,, | Performed by: NURSE PRACTITIONER

## 2019-09-16 PROCEDURE — 1101F PT FALLS ASSESS-DOCD LE1/YR: CPT | Mod: HCNC,CPTII,S$GLB, | Performed by: NURSE PRACTITIONER

## 2019-09-16 PROCEDURE — 99999 PR PBB SHADOW E&M-EST. PATIENT-LVL IV: CPT | Mod: PBBFAC,HCNC,, | Performed by: NURSE PRACTITIONER

## 2019-09-16 PROCEDURE — 3080F DIAST BP >= 90 MM HG: CPT | Mod: HCNC,CPTII,S$GLB, | Performed by: NURSE PRACTITIONER

## 2019-09-16 RX ORDER — TELMISARTAN 40 MG/1
40 TABLET ORAL DAILY
Qty: 90 TABLET | Refills: 3 | Status: SHIPPED | OUTPATIENT
Start: 2019-09-16 | End: 2020-09-08 | Stop reason: SDUPTHER

## 2019-09-25 DIAGNOSIS — Z98.890 POST-OPERATIVE STATE: ICD-10-CM

## 2019-09-26 RX ORDER — CELECOXIB 200 MG/1
CAPSULE ORAL
Qty: 180 CAPSULE | Refills: 0 | Status: SHIPPED | OUTPATIENT
Start: 2019-09-26 | End: 2020-01-02 | Stop reason: SDUPTHER

## 2019-09-30 ENCOUNTER — PATIENT MESSAGE (OUTPATIENT)
Dept: FAMILY MEDICINE | Facility: CLINIC | Age: 74
End: 2019-09-30

## 2019-10-01 ENCOUNTER — TELEPHONE (OUTPATIENT)
Dept: CARDIOLOGY | Facility: CLINIC | Age: 74
End: 2019-10-01

## 2019-10-01 NOTE — TELEPHONE ENCOUNTER
Returned pt's call.  Informed that he had already been scheduled with Dr. Beatty for a 6 month f/u in December.  Pt verbalized understanding.

## 2019-10-01 NOTE — TELEPHONE ENCOUNTER
----- Message from Deisy Watt sent at 10/1/2019  7:44 AM CDT -----  Contact: Self  Pt made contact via [facility] as follows:    Appointment Request From: Dominick Song MD    With Provider: Wyatt Beatty MD [[facility] Arrhythmia]    Preferred Date Range: 10/1/2019 - 12/30/2019    Preferred Times: Any time    Reason for visit: Existing Patient    Comments:  atrial fib    Thank you.

## 2019-10-05 ENCOUNTER — PATIENT MESSAGE (OUTPATIENT)
Dept: FAMILY MEDICINE | Facility: CLINIC | Age: 74
End: 2019-10-05

## 2019-10-07 ENCOUNTER — HOSPITAL ENCOUNTER (OUTPATIENT)
Dept: RADIOLOGY | Facility: HOSPITAL | Age: 74
Discharge: HOME | End: 2019-10-07
Attending: NURSE PRACTITIONER
Payer: MEDICARE

## 2019-10-07 ENCOUNTER — OFFICE VISIT (OUTPATIENT)
Dept: FAMILY MEDICINE | Facility: CLINIC | Age: 74
End: 2019-10-07
Payer: MEDICARE

## 2019-10-07 ENCOUNTER — PATIENT MESSAGE (OUTPATIENT)
Dept: FAMILY MEDICINE | Facility: CLINIC | Age: 74
End: 2019-10-07

## 2019-10-07 VITALS
DIASTOLIC BLOOD PRESSURE: 88 MMHG | SYSTOLIC BLOOD PRESSURE: 138 MMHG | BODY MASS INDEX: 28.18 KG/M2 | OXYGEN SATURATION: 98 % | TEMPERATURE: 98 F | HEIGHT: 74 IN | WEIGHT: 219.56 LBS | HEART RATE: 50 BPM
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
## 2019-10-07 DIAGNOSIS — M25.551 PAIN OF RIGHT HIP JOINT: ICD-10-CM

## 2019-10-07 DIAGNOSIS — M25.551 PAIN OF RIGHT HIP JOINT: Primary | ICD-10-CM

## 2019-10-07 DIAGNOSIS — S70.01XA CONTUSION OF RIGHT HIP, INITIAL ENCOUNTER: ICD-10-CM

## 2019-10-07 DIAGNOSIS — R26.2 IMPAIRED AMBULATION: ICD-10-CM

## 2019-10-07 PROCEDURE — 90662 IIV NO PRSV INCREASED AG IM: CPT | Mod: HCNC,S$GLB,, | Performed by: NURSE PRACTITIONER

## 2019-10-07 PROCEDURE — 1101F PR PT FALLS ASSESS DOC 0-1 FALLS W/OUT INJ PAST YR: ICD-10-PCS | Mod: HCNC,CPTII,S$GLB, | Performed by: NURSE PRACTITIONER

## 2019-10-07 PROCEDURE — 3075F PR MOST RECENT SYSTOLIC BLOOD PRESS GE 130-139MM HG: ICD-10-PCS | Mod: HCNC,CPTII,S$GLB, | Performed by: NURSE PRACTITIONER

## 2019-10-07 PROCEDURE — 3079F PR MOST RECENT DIASTOLIC BLOOD PRESSURE 80-89 MM HG: ICD-10-PCS | Mod: HCNC,CPTII,S$GLB, | Performed by: NURSE PRACTITIONER

## 2019-10-07 PROCEDURE — 99214 OFFICE O/P EST MOD 30 MIN: CPT | Mod: 25,HCNC,S$GLB, | Performed by: NURSE PRACTITIONER

## 2019-10-07 PROCEDURE — 73502 X-RAY EXAM HIP UNI 2-3 VIEWS: CPT | Mod: 26,HCNC,RT, | Performed by: RADIOLOGY

## 2019-10-07 PROCEDURE — G0008 ADMIN INFLUENZA VIRUS VAC: HCPCS | Mod: HCNC,S$GLB,, | Performed by: NURSE PRACTITIONER

## 2019-10-07 PROCEDURE — 99214 PR OFFICE/OUTPT VISIT, EST, LEVL IV, 30-39 MIN: ICD-10-PCS | Mod: 25,HCNC,S$GLB, | Performed by: NURSE PRACTITIONER

## 2019-10-07 PROCEDURE — 73502 X-RAY EXAM HIP UNI 2-3 VIEWS: CPT | Mod: TC,HCNC,FY,PO,RT

## 2019-10-07 PROCEDURE — 1101F PT FALLS ASSESS-DOCD LE1/YR: CPT | Mod: HCNC,CPTII,S$GLB, | Performed by: NURSE PRACTITIONER

## 2019-10-07 PROCEDURE — 3075F SYST BP GE 130 - 139MM HG: CPT | Mod: HCNC,CPTII,S$GLB, | Performed by: NURSE PRACTITIONER

## 2019-10-07 PROCEDURE — 3079F DIAST BP 80-89 MM HG: CPT | Mod: HCNC,CPTII,S$GLB, | Performed by: NURSE PRACTITIONER

## 2019-10-07 PROCEDURE — 73552 X-RAY EXAM OF FEMUR 2/>: CPT | Mod: 26,HCNC,RT, | Performed by: RADIOLOGY

## 2019-10-07 PROCEDURE — 73502 XR HIP 2 VIEW RIGHT: ICD-10-PCS | Mod: 26,HCNC,RT, | Performed by: RADIOLOGY

## 2019-10-07 PROCEDURE — 99999 PR PBB SHADOW E&M-EST. PATIENT-LVL V: ICD-10-PCS | Mod: PBBFAC,HCNC,, | Performed by: NURSE PRACTITIONER

## 2019-10-07 PROCEDURE — 73552 X-RAY EXAM OF FEMUR 2/>: CPT | Mod: TC,HCNC,FY,PO,RT

## 2019-10-07 PROCEDURE — 99999 PR PBB SHADOW E&M-EST. PATIENT-LVL V: CPT | Mod: PBBFAC,HCNC,, | Performed by: NURSE PRACTITIONER

## 2019-10-07 PROCEDURE — 73552 XR FEMUR 2 VIEW RIGHT: ICD-10-PCS | Mod: 26,HCNC,RT, | Performed by: RADIOLOGY

## 2019-10-07 PROCEDURE — G0008 FLU VACCINE - HIGH DOSE (65+) PRESERVATIVE FREE IM: ICD-10-PCS | Mod: HCNC,S$GLB,, | Performed by: NURSE PRACTITIONER

## 2019-10-07 PROCEDURE — 90662 FLU VACCINE - HIGH DOSE (65+) PRESERVATIVE FREE IM: ICD-10-PCS | Mod: HCNC,S$GLB,, | Performed by: NURSE PRACTITIONER

## 2019-10-07 RX ORDER — FERROUS SULFATE 325(65) MG
TABLET ORAL
COMMUNITY
End: 2022-10-25

## 2019-10-07 RX ORDER — OXYCODONE HYDROCHLORIDE 5 MG/1
5 TABLET ORAL EVERY 8 HOURS PRN
Qty: 10 TABLET | Refills: 0 | Status: SHIPPED | OUTPATIENT
Start: 2019-10-07 | End: 2019-10-11 | Stop reason: SDUPTHER

## 2019-10-07 RX ORDER — SOY PROTEIN
POWDER (GRAM) ORAL
Status: ON HOLD | COMMUNITY
End: 2020-07-19 | Stop reason: HOSPADM

## 2019-10-07 RX ORDER — CALCIUM CARBONATE 500(1250)
TABLET ORAL
COMMUNITY
End: 2020-12-19

## 2019-10-07 NOTE — PROGRESS NOTES
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    Chief Complaint: Hip Pain (fell about 5 days ago)    Fall   The accident occurred 5 to 7 days ago. The fall occurred while walking. He landed on concrete. The point of impact was the right hip. The pain is present in the right hip and right knee. The symptoms are aggravated by movement, extension and flexion. Pertinent negatives include no abdominal pain, bowel incontinence, fever, headaches, hearing loss, hematuria, loss of consciousness, nausea, numbness, tingling, visual change or vomiting. He has tried rest and ice for the symptoms.     Vitals:    10/07/19 1244   BP: 138/88   Pulse: (!) 50   Temp: 98.1 °F (36.7 °C)     Review of Systems   Constitutional: Negative for activity change, appetite change, chills and fever.   HENT: Negative.  Negative for congestion, drooling, postnasal drip, rhinorrhea, sinus pressure, sore throat, tinnitus and trouble swallowing.    Eyes: Negative.    Respiratory: Negative.  Negative for apnea, cough, choking, chest tightness and shortness of breath.    Cardiovascular: Negative.  Negative for chest pain.   Gastrointestinal: Negative.  Negative for abdominal pain, bowel incontinence, diarrhea, nausea and vomiting.   Endocrine: Negative.    Genitourinary: Negative.  Negative for decreased urine volume, difficulty urinating, dysuria, flank pain, genital sores, hematuria, penile pain, scrotal swelling, testicular pain and urgency.   Musculoskeletal: Negative.  Negative for arthralgias, back pain, gait problem and joint swelling.   Skin: Negative.  Negative for color change, pallor and rash.   Allergic/Immunologic: Negative.    Neurological: Negative.  Negative for tingling, loss of consciousness, numbness and headaches.   Hematological: Negative.  Negative for adenopathy.   Psychiatric/Behavioral: Negative.  Negative for behavioral problems, confusion, hallucinations and sleep disturbance. The patient is not  hyperactive.        Past Medical History:   Diagnosis Date    Anticoagulant long-term use     Anxiety     Arthritis     Atrial fibrillation     BPH (benign prostatic hyperplasia)     Cataract     CKD (chronic kidney disease) stage 3, GFR 30-59 ml/min 7/10/2017    Followed by Dr. Jeevan Pond    Colon polyp     benign    Depression     Elevated PSA     Erectile dysfunction     Gastric ulcer with hemorrhage     Hep B w/o coma 1977    History of bleeding peptic ulcer     History of prostatitis     Hypertension     PAF (paroxysmal atrial fibrillation)     Sleep apnea     on CPAP       Objective:      Physical Exam   Constitutional: He is oriented to person, place, and time. He appears well-developed and well-nourished.   HENT:   Head: Normocephalic and atraumatic.   Right Ear: Hearing, tympanic membrane, external ear and ear canal normal.   Left Ear: Hearing, tympanic membrane, external ear and ear canal normal.   Nose: Nose normal. No mucosal edema or rhinorrhea. Right sinus exhibits no maxillary sinus tenderness and no frontal sinus tenderness. Left sinus exhibits no maxillary sinus tenderness and no frontal sinus tenderness.   Mouth/Throat: Uvula is midline, oropharynx is clear and moist and mucous membranes are normal.   Eyes: Pupils are equal, round, and reactive to light. Conjunctivae and EOM are normal.   Neck: Normal range of motion. Neck supple.   Cardiovascular: Normal rate, regular rhythm, normal heart sounds and intact distal pulses. Exam reveals no friction rub.   No murmur heard.  Pulmonary/Chest: Effort normal and breath sounds normal. No stridor. No respiratory distress. He has no wheezes.   Abdominal: Soft. Bowel sounds are normal.   Musculoskeletal: He exhibits no edema.        Right hip: He exhibits decreased range of motion and decreased strength.        Legs:  Pain with abduction and external rotation  No radiating  pain    Neurological: He is alert and oriented to person, place,  and time. No cranial nerve deficit.   Skin: Skin is warm and dry.   Psychiatric: He has a normal mood and affect. His behavior is normal. Judgment and thought content normal.   Nursing note and vitals reviewed.      Assessment:       1. Pain of right hip joint    2. Fall, initial encounter    3. Contusion of right hip, initial encounter    4. Impaired ambulation        Plan:       Pain of right hip joint  -     Cancel: X-Ray Hip 1 View Right; Future; Expected date: 10/07/2019  -     X-Ray Femur 2 View Right; Future; Expected date: 10/07/2019  -     X-Ray Hip 2 or 3 views Right; Future; Expected date: 10/07/2019  -     oxyCODONE (ROXICODONE) 5 MG immediate release tablet; Take 1 tablet (5 mg total) by mouth every 8 (eight) hours as needed for Pain.  Dispense: 10 tablet; Refill: 0    Fall, initial encounter  -     Cancel: X-Ray Hip 1 View Right; Future; Expected date: 10/07/2019  -     X-Ray Femur 2 View Right; Future; Expected date: 10/07/2019  -     X-Ray Hip 2 or 3 views Right; Future; Expected date: 10/07/2019  -     oxyCODONE (ROXICODONE) 5 MG immediate release tablet; Take 1 tablet (5 mg total) by mouth every 8 (eight) hours as needed for Pain.  Dispense: 10 tablet; Refill: 0    Contusion of right hip, initial encounter  -     oxyCODONE (ROXICODONE) 5 MG immediate release tablet; Take 1 tablet (5 mg total) by mouth every 8 (eight) hours as needed for Pain.  Dispense: 10 tablet; Refill: 0    Impaired ambulation  -     oxyCODONE (ROXICODONE) 5 MG immediate release tablet; Take 1 tablet (5 mg total) by mouth every 8 (eight) hours as needed for Pain.  Dispense: 10 tablet; Refill: 0  For acute pain 2/2 to hip pain     Other orders  -     Influenza - High Dose (65+) (PF) (IM)            Fu if not better     Reviewed xray with patient

## 2019-10-11 ENCOUNTER — PATIENT MESSAGE (OUTPATIENT)
Dept: FAMILY MEDICINE | Facility: CLINIC | Age: 74
End: 2019-10-11

## 2019-10-11 DIAGNOSIS — S70.01XA CONTUSION OF RIGHT HIP, INITIAL ENCOUNTER: ICD-10-CM

## 2019-10-11 DIAGNOSIS — M25.551 PAIN OF RIGHT HIP JOINT: ICD-10-CM

## 2019-10-11 DIAGNOSIS — M25.551 PAIN OF RIGHT HIP JOINT: Primary | ICD-10-CM

## 2019-10-11 DIAGNOSIS — R26.2 IMPAIRED AMBULATION: ICD-10-CM
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
## 2019-10-11 RX ORDER — OXYCODONE AND ACETAMINOPHEN 5; 325 MG/1; MG/1
1 TABLET ORAL EVERY 8 HOURS PRN
Qty: 10 TABLET | Refills: 0 | OUTPATIENT
Start: 2019-10-11

## 2019-10-11 RX ORDER — OXYCODONE HYDROCHLORIDE 5 MG/1
5 TABLET ORAL EVERY 8 HOURS PRN
Qty: 10 TABLET | Refills: 0 | Status: SHIPPED | OUTPATIENT
Start: 2019-10-11 | End: 2020-02-17

## 2019-10-11 NOTE — TELEPHONE ENCOUNTER
Dr Gaines will you please refill this - I gave him 1 prescription for acute injury of 10 tablets - no fractures but would like a refill   If you would refill

## 2019-10-11 NOTE — TELEPHONE ENCOUNTER
LOV PCP 1/2/2019    LOV Np 10/7    Patient requesting refill of Percocet.     Please see [email or web address] message and advise?

## 2019-10-14 DIAGNOSIS — I48.91 ATRIAL FIBRILLATION, UNSPECIFIED TYPE: ICD-10-CM

## 2019-10-14 DIAGNOSIS — N18.30 CKD (CHRONIC KIDNEY DISEASE) STAGE 3, GFR 30-59 ML/MIN: ICD-10-CM

## 2019-10-14 DIAGNOSIS — R35.0 URINARY FREQUENCY: ICD-10-CM

## 2019-10-14 RX ORDER — METOPROLOL SUCCINATE 50 MG/1
50 TABLET, EXTENDED RELEASE ORAL DAILY
Qty: 90 TABLET | Refills: 1 | Status: SHIPPED | OUTPATIENT
Start: 2019-10-14 | End: 2019-11-21 | Stop reason: SDUPTHER

## 2019-10-14 RX ORDER — PROPAFENONE HYDROCHLORIDE 425 MG/1
425 CAPSULE, EXTENDED RELEASE ORAL EVERY 12 HOURS
Qty: 180 CAPSULE | Refills: 3 | Status: SHIPPED | OUTPATIENT
Start: 2019-10-14 | End: 2019-10-14 | Stop reason: SDUPTHER

## 2019-10-15 RX ORDER — PROPAFENONE HYDROCHLORIDE 425 MG/1
425 CAPSULE, EXTENDED RELEASE ORAL EVERY 12 HOURS
Qty: 180 CAPSULE | Refills: 3 | Status: SHIPPED | OUTPATIENT
Start: 2019-10-15 | End: 2020-10-21 | Stop reason: SDUPTHER

## 2019-10-16 ENCOUNTER — PATIENT MESSAGE (OUTPATIENT)
Dept: FAMILY MEDICINE | Facility: CLINIC | Age: 74
End: 2019-10-16

## 2019-10-28 ENCOUNTER — PATIENT MESSAGE (OUTPATIENT)
Dept: FAMILY MEDICINE | Facility: CLINIC | Age: 74
End: 2019-10-28

## 2019-10-29 ENCOUNTER — TELEPHONE (OUTPATIENT)
Dept: FAMILY MEDICINE | Facility: CLINIC | Age: 74
End: 2019-10-29

## 2019-10-29 ENCOUNTER — PATIENT MESSAGE (OUTPATIENT)
Dept: FAMILY MEDICINE | Facility: CLINIC | Age: 74
End: 2019-10-29

## 2019-10-29 NOTE — TELEPHONE ENCOUNTER
Hard to tell from the pictures.  See if he would be willing to come in and see me tomorrow for this.

## 2019-10-29 NOTE — TELEPHONE ENCOUNTER
Pt called and stated that he wants to speak with you directly. Pt was informed that he would need an appointment for [facility], he declined stated that he needed to speak with the NP and only her concerning his issue. Pt requesting callback as soon as possible, would not give any additional info

## 2019-10-30 ENCOUNTER — LAB VISIT (OUTPATIENT)
Dept: LAB | Facility: HOSPITAL | Age: 74
End: 2019-10-30
Attending: UROLOGY
Payer: MEDICARE

## 2019-10-30 ENCOUNTER — OFFICE VISIT (OUTPATIENT)
Dept: FAMILY MEDICINE | Facility: CLINIC | Age: 74
End: 2019-10-30
Payer: MEDICARE

## 2019-10-30 VITALS
DIASTOLIC BLOOD PRESSURE: 80 MMHG | RESPIRATION RATE: 18 BRPM | OXYGEN SATURATION: 98 % | HEART RATE: 61 BPM | TEMPERATURE: 98 F | WEIGHT: 215.63 LBS | HEIGHT: 74 IN | BODY MASS INDEX: 27.67 KG/M2 | SYSTOLIC BLOOD PRESSURE: 142 MMHG

## 2019-10-30 DIAGNOSIS — M85.80 OSTEOPENIA, UNSPECIFIED LOCATION: ICD-10-CM

## 2019-10-30 DIAGNOSIS — M85.9 DISORDER OF BONE DENSITY AND STRUCTURE, UNSPECIFIED: ICD-10-CM

## 2019-10-30 DIAGNOSIS — Z87.81 HISTORY OF HUMERUS FRACTURE: ICD-10-CM

## 2019-10-30 DIAGNOSIS — N13.8 BPH WITH URINARY OBSTRUCTION: ICD-10-CM

## 2019-10-30 DIAGNOSIS — N40.1 BPH WITH URINARY OBSTRUCTION: ICD-10-CM

## 2019-10-30 DIAGNOSIS — R35.0 URINARY FREQUENCY: ICD-10-CM

## 2019-10-30 DIAGNOSIS — M89.9 DISORDER OF BONE, UNSPECIFIED: ICD-10-CM

## 2019-10-30 DIAGNOSIS — L03.116 CELLULITIS OF LEFT LOWER EXTREMITY: Primary | ICD-10-CM

## 2019-10-30 DIAGNOSIS — N18.30 CKD (CHRONIC KIDNEY DISEASE), STAGE III: ICD-10-CM

## 2019-10-30 LAB
ALBUMIN SERPL BCP-MCNC: 3.3 G/DL (ref 3.5–5.2)
ANION GAP SERPL CALC-SCNC: 7 MMOL/L (ref 8–16)
BUN SERPL-MCNC: 12 MG/DL (ref 8–23)
CALCIUM SERPL-MCNC: 8.8 MG/DL (ref 8.7–10.5)
CHLORIDE SERPL-SCNC: 112 MMOL/L (ref 95–110)
CO2 SERPL-SCNC: 20 MMOL/L (ref 23–29)
COMPLEXED PSA SERPL-MCNC: 3.6 NG/ML (ref 0–4)
CREAT SERPL-MCNC: 1.4 MG/DL (ref 0.5–1.4)
EST. GFR  (AFRICAN AMERICAN): 57.2 ML/MIN/1.73 M^2
EST. GFR  (NON AFRICAN AMERICAN): 49.5 ML/MIN/1.73 M^2
GLUCOSE SERPL-MCNC: 80 MG/DL (ref 70–110)
PHOSPHATE SERPL-MCNC: 2.9 MG/DL (ref 2.7–4.5)
POTASSIUM SERPL-SCNC: 4.9 MMOL/L (ref 3.5–5.1)
SODIUM SERPL-SCNC: 139 MMOL/L (ref 136–145)

## 2019-10-30 PROCEDURE — 36415 COLL VENOUS BLD VENIPUNCTURE: CPT | Mod: HCNC,PO

## 2019-10-30 PROCEDURE — 3079F DIAST BP 80-89 MM HG: CPT | Mod: HCNC,CPTII,S$GLB, | Performed by: FAMILY MEDICINE

## 2019-10-30 PROCEDURE — 3079F PR MOST RECENT DIASTOLIC BLOOD PRESSURE 80-89 MM HG: ICD-10-PCS | Mod: HCNC,CPTII,S$GLB, | Performed by: FAMILY MEDICINE

## 2019-10-30 PROCEDURE — 99213 PR OFFICE/OUTPT VISIT, EST, LEVL III, 20-29 MIN: ICD-10-PCS | Mod: HCNC,S$GLB,, | Performed by: FAMILY MEDICINE

## 2019-10-30 PROCEDURE — 99999 PR PBB SHADOW E&M-EST. PATIENT-LVL V: ICD-10-PCS | Mod: PBBFAC,HCNC,, | Performed by: FAMILY MEDICINE

## 2019-10-30 PROCEDURE — 1101F PR PT FALLS ASSESS DOC 0-1 FALLS W/OUT INJ PAST YR: ICD-10-PCS | Mod: HCNC,CPTII,S$GLB, | Performed by: FAMILY MEDICINE

## 2019-10-30 PROCEDURE — 99999 PR PBB SHADOW E&M-EST. PATIENT-LVL V: CPT | Mod: PBBFAC,HCNC,, | Performed by: FAMILY MEDICINE

## 2019-10-30 PROCEDURE — 1101F PT FALLS ASSESS-DOCD LE1/YR: CPT | Mod: HCNC,CPTII,S$GLB, | Performed by: FAMILY MEDICINE

## 2019-10-30 PROCEDURE — 3077F PR MOST RECENT SYSTOLIC BLOOD PRESSURE >= 140 MM HG: ICD-10-PCS | Mod: HCNC,CPTII,S$GLB, | Performed by: FAMILY MEDICINE

## 2019-10-30 PROCEDURE — 80069 RENAL FUNCTION PANEL: CPT | Mod: HCNC

## 2019-10-30 PROCEDURE — 99213 OFFICE O/P EST LOW 20 MIN: CPT | Mod: HCNC,S$GLB,, | Performed by: FAMILY MEDICINE

## 2019-10-30 PROCEDURE — 3077F SYST BP >= 140 MM HG: CPT | Mod: HCNC,CPTII,S$GLB, | Performed by: FAMILY MEDICINE

## 2019-10-30 PROCEDURE — 84153 ASSAY OF PSA TOTAL: CPT | Mod: HCNC

## 2019-10-30 RX ORDER — SULFAMETHOXAZOLE AND TRIMETHOPRIM 800; 160 MG/1; MG/1
1 TABLET ORAL 2 TIMES DAILY
Qty: 14 TABLET | Refills: 0 | Status: SHIPPED | OUTPATIENT
Start: 2019-10-30 | End: 2019-11-06

## 2019-10-30 NOTE — PROGRESS NOTES
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    Chief Complaint: Rash (possible celluitis, left leg, noticed saturday)    5 days ago was clearing bamboo.  He developed some chills the next night, followed by some swelling in the left foot with some redness.  He started Bactrim BID at that time and has taken this for 4 days.  He reports leg swelling and redness significantly better. No further chills or fever.    He had cellulitis in the past which looked similarly.      Past Medical History:   Diagnosis Date    Anticoagulant long-term use     Anxiety     Arthritis     Atrial fibrillation     BPH (benign prostatic hyperplasia)     Cataract     CKD (chronic kidney disease) stage 3, GFR 30-59 ml/min 7/10/2017    Followed by Dr. Jeevan Pond    Colon polyp     benign    Depression     Elevated PSA     Erectile dysfunction     Gastric ulcer with hemorrhage     Hep B w/o coma 1977    History of bleeding peptic ulcer     History of prostatitis     Hypertension     PAF (paroxysmal atrial fibrillation)     Sleep apnea     on CPAP       Past Surgical History:   Procedure Laterality Date    ABDOMINAL HERNIA REPAIR      CARDIAC SURGERY      CATARACT EXTRACTION  11/25/2013    bilateral    CHOLECYSTECTOMY      COLONOSCOPY N/A 11/25/2015    Procedure: COLONOSCOPY;  Surgeon: Toby Hernandez MD;  [facility];  Service: Endoscopy;  Laterality: N/A;    EYE SURGERY      GASTRIC BYPASS  1992    KNEE ARTHROSCOPY      RT    LASIK  2001    both eyes (Dr. Rabago)    ORIF HUMERUS FRACTURE      LT    ORIF HUMERUS FRACTURE Right     non surgical repair    RADIOFREQUENCY ABLATION      ROTATOR CUFF REPAIR      right    TOTAL KNEE ARTHROPLASTY Right 5/29/2018    Procedure: REPLACEMENT-KNEE-TOTAL-axel;  Surgeon: Denzel Dallas MD;  Location: [address];  Service: Orthopedics;  Laterality: Right;  [place]       Review of patient's allergies indicates:   Allergen  Reactions    No known drug allergies        Social History     Socioeconomic History    Marital status:      Spouse name: Not on file    Number of children: 7    Years of education: Not on file    Highest education level: Not on file   Occupational History    Occupation: retired anesthesiology     Employer: [employer]    Occupation: [employer] grad     Comment: previous football player   Social Needs    Financial resource strain: Not on file    Food insecurity:     Worry: Not on file     Inability: Not on file    Transportation needs:     Medical: Not on file     Non-medical: Not on file   Tobacco Use    Smoking status: Never Smoker    Smokeless tobacco: Never Used    Tobacco 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    Substance and Sexual Activity    Alcohol use: No    Drug use: No    Sexual activity: Yes     Partners: Female   Lifestyle    Physical activity:     Days per week: Not on file     Minutes per session: Not on file    Stress: Not on file   Relationships    Social connections:     Talks on phone: Not on file     Gets together: Not on file     Attends Jain service: Not on file     Active member of club or organization: Not on file     Attends meetings of clubs or organizations: Not on file     Relationship status: Not on file   Other Topics Concern    Patient feels they ought to cut down on drinking/drug use Not Asked    Patient annoyed by others criticizing their drinking/drug use Not Asked    Patient has felt bad or guilty about drinking/drug use Not Asked    Patient has had a drink/used drugs as an eye opener in the AM Not Asked   Social History Narrative    Not on file       Current Outpatient Medications on File Prior to Visit   Medication Sig Dispense Refill    acyclovir (ZOVIRAX) 800 MG Tab TK ONE T PO FIVE TIMES D only for fever blisters  1    betamethasone acetate-betamethasone sodium phosphate (CELESTONE) 6 mg/mL injection as needed.        buPROPion (WELLBUTRIN XL) 300 MG 24 hr tablet Take 1 tablet (300 mg total) by mouth once daily. 90 tablet 3    calcium carbonate (OS-KISHAN) 500 mg calcium (1,250 mg) tablet       CALCIUM ORAL Take by mouth Daily.      celecoxib (CELEBREX) 200 MG capsule TAKE 1 CAPSULE(200 MG) BY MOUTH TWICE DAILY 180 capsule 0    cholestyramine (QUESTRAN) 4 gram packet Take 1 packet (4 g total) by mouth once daily. 30 packet 3    cyclobenzaprine (FLEXERIL) 10 MG tablet TAKE 1 TABLET BY MOUTH EVERY 8 HOURS AS NEEDED FOR MUSCLE SPASM 40 tablet 5    dutasteride (AVODART) 0.5 mg capsule TAKE 1 CAPSULE(0.5 MG) BY MOUTH ONCE DAILY 90 capsule 3    ELIQUIS 5 mg Tab Take 1 tablet (5 mg total) by mouth 2 (two) times daily. 60 tablet 11    escitalopram oxalate (LEXAPRO) 10 MG tablet TAKE 1 TABLET(10 MG) BY MOUTH EVERY DAY 90 tablet 0    KENALOG 40 mg/mL injection as needed.       levothyroxine (SYNTHROID) 25 MCG tablet Take 1 tablet (25 mcg total) by mouth before breakfast. 90 tablet 3    metoprolol succinate (TOPROL-XL) 50 MG 24 hr tablet Take 1 tablet (50 mg total) by mouth once daily. 90 tablet 1    MULTIVITAMIN ORAL Take by mouth Daily.      omega 3-dha-epa-fish oil (OMEGA-3) 350 mg-235 mg- 90 mg-597 mg CpDR       oxyCODONE (ROXICODONE) 5 MG immediate release tablet Take 1 tablet (5 mg total) by mouth every 8 (eight) hours as needed for Pain. 10 tablet 0    propafenone (RYTHMOL SR) 425 MG Cp12 Take 1 capsule (425 mg total) by mouth every 12 (twelve) hours. 180 capsule 3    telmisartan (MICARDIS) 40 MG Tab Take 1 tablet (40 mg total) by mouth once daily. 90 tablet 3    cyanocobalamin (VITAMIN B-12) 1000 MCG tablet Take 1 tablet (1,000 mcg total) by mouth once daily. (Patient not taking: Reported on 10/30/2019) 30 tablet 11    cyanocobalamin/folic acid (VITAMIN B12-FOLIC ACID) 500-400 mcg Tab Take by mouth.      efinaconazole (JUBLIA) 10 % Prakash Apply topically.      ferrous sulfate (FEOSOL) 325 mg (65 mg iron) Tab tablet  "Take by mouth.      OMEGA-3 FATTY ACIDS (FISH OIL CONCENTRATE ORAL) Take by mouth Daily.      oxyCODONE-acetaminophen (PERCOCET) 5-325 mg per tablet Take 1 tablet by mouth every 4 (four) hours as needed for Pain. (Patient not taking: Reported on 10/30/2019) 8 tablet 0     No current facility-administered medications on file prior to visit.        Family History   Problem Relation Age of Onset    COPD Father     Diabetes Father     Aortic stenosis Mother     Heart disease Mother         aortic stenosis    Heart attack Brother     No Known Problems Son     No Known Problems Daughter     No Known Problems Daughter     No Known Problems Daughter        Review of Systems   Constitutional: Negative for appetite change, chills, fever and unexpected weight change.   HENT: Negative for sore throat and trouble swallowing.    Eyes: Negative for pain and visual disturbance.   Respiratory: Negative for cough, chest tightness, shortness of breath and wheezing.    Cardiovascular: Positive for leg swelling. Negative for chest pain and palpitations.   Gastrointestinal: Negative for abdominal pain, blood in stool, constipation, diarrhea and nausea.   Genitourinary: Negative for difficulty urinating, dysuria and hematuria.   Musculoskeletal: Negative for arthralgias, gait problem and neck pain.   Skin: Positive for rash. Negative for wound.   Neurological: Negative for dizziness, weakness, light-headedness and headaches.   Hematological: Negative for adenopathy.   Psychiatric/Behavioral: Negative for dysphoric mood.       Objective:      BP (!) 142/80 (BP Location: Left arm, Patient Position: Sitting)   Pulse 61   Temp 97.7 °F (36.5 °C)   Resp 18   Ht 6' 2" (1.88 m)   Wt 97.8 kg (215 lb 9.8 oz)   SpO2 98%   BMI 27.68 kg/m²   Physical Exam   Constitutional: He is oriented to person, place, and time. He appears well-developed and well-nourished. He is active. No distress.   HENT:   Head: Normocephalic and atraumatic. "   Right Ear: External ear normal.   Left Ear: External ear normal.   Mouth/Throat: Uvula is midline, oropharynx is clear and moist and mucous membranes are normal. No oropharyngeal exudate.   Eyes: Pupils are equal, round, and reactive to light. Conjunctivae, EOM and lids are normal.   Neck: Normal range of motion, full passive range of motion without pain and phonation normal. Neck supple. No thyroid mass and no thyromegaly present.   Cardiovascular: Normal rate, regular rhythm, normal heart sounds and intact distal pulses. Exam reveals no gallop and no friction rub.   No murmur heard.  Pulmonary/Chest: Effort normal and breath sounds normal. No respiratory distress. He has no wheezes. He has no rales.   Musculoskeletal: Normal range of motion.        Legs:  Lymphadenopathy:     He has no cervical adenopathy.   Neurological: He is alert and oriented to person, place, and time. No cranial nerve deficit. Coordination normal.   Skin: Skin is warm and dry.   Psychiatric: He has a normal mood and affect. His speech is normal and behavior is normal. Judgment and thought content normal.       Assessment:       1. Cellulitis of left lower extremity    2. Osteopenia, unspecified location    3. History of humerus fracture    4. Disorder of bone density and structure, unspecified     5. Disorder of bone, unspecified         Plan:       Cellulitis of left lower extremity  -     sulfamethoxazole-trimethoprim 800-160mg (BACTRIM DS) 800-160 mg Tab; Take 1 tablet by mouth 2 (two) times daily. for 7 days  Dispense: 14 tablet; Refill: 0    Osteopenia, unspecified location    History of humerus fracture    Disorder of bone density and structure, unspecified   -     DXA Bone Density Spine And Hip; Future; Expected date: 10/30/2019    Disorder of bone, unspecified   -     DXA Bone Density Spine And Hip; Future; Expected date: 10/30/2019      Bactrim to complete 10 day course  Leg elevation PRN  Will get BMD due to osteopenia seen on  recent xrays  Continue calcium + D  F/u PRN with any worsening redness, fever, swelling  Counseled on regular exercise, maintenance of a healthy weight, balanced diet rich in fruits/vegetables and lean protein, and avoidance of unhealthy habits like smoking and excessive alcohol intake.

## 2019-11-04 ENCOUNTER — HOSPITAL ENCOUNTER (OUTPATIENT)
Dept: RADIOLOGY | Facility: HOSPITAL | Age: 74
Discharge: HOME | End: 2019-11-04
Attending: FAMILY MEDICINE
Payer: MEDICARE

## 2019-11-04 DIAGNOSIS — M85.9 DISORDER OF BONE DENSITY AND STRUCTURE, UNSPECIFIED: ICD-10-CM

## 2019-11-04 DIAGNOSIS — M89.9 DISORDER OF BONE, UNSPECIFIED: ICD-10-CM

## 2019-11-04 PROCEDURE — 77080 DXA BONE DENSITY AXIAL: CPT | Mod: TC,HCNC,PO

## 2019-11-04 PROCEDURE — 77080 DEXA BONE DENSITY SPINE HIP: ICD-10-PCS | Mod: 26,HCNC,, | Performed by: RADIOLOGY

## 2019-11-04 PROCEDURE — 77080 DXA BONE DENSITY AXIAL: CPT | Mod: 26,HCNC,, | Performed by: RADIOLOGY

## 2019-11-10 ENCOUNTER — TELEPHONE (OUTPATIENT)
Dept: FAMILY MEDICINE | Facility: CLINIC | Age: 74
End: 2019-11-10

## 2019-11-10 DIAGNOSIS — M81.0 OSTEOPOROSIS, UNSPECIFIED OSTEOPOROSIS TYPE, UNSPECIFIED PATHOLOGICAL FRACTURE PRESENCE: Primary | ICD-10-CM

## 2019-11-11 NOTE — TELEPHONE ENCOUNTER
Bone density did confirm osteoporosis, and treatment is warranted.  Arrange appointment with endocrinology for further evaluation and treatment advice.

## 2019-11-12 NOTE — PROGRESS NOTES
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 He self medicated with pain medication. After a few days, he called his PCP Dr. Gaines and was sent to get an xray of the left hip which showed osteopenia. Dexa was then ordered which showed osteoporosis. In the past he played football at [employer] and remembers injuring his R knee. A few years ago, he re-injured the same knee after falling off a ladder while trimming bushes. During this time, he was in a leg immobilizer and slipped on a wet floor and broke his L arm in 3 place which required surgical repaired. During rehab, the plate broke and further surgery was required. He also fractured his R arm in the past which did not require surgery. Notes both parents had osteorporosis. No history of hypercalcemia or renal stones. Had gastric bypass over 20 years ago. Takes calcium carbonate 1200 mg daily and a daily multivitamin with D3 1600 IU since the surgery. No recent issues with malabsorption.  Does not do weight bearing exercises. But often does a lot of shrubbery/bamboo clean up around his yard. Had a GIB several years ago. No issues with bleeding since then. Is on Eliquis for afib. Does not have GERD sx's and is does not take PPIs. Is intolerant to milk.    A few years ago, he noticed decreased strength and libido. Was placed on testo. Only took testo for a few months. Can not recall why testo was discontinued. Did have elevated PSAs in the past. Notes a history of chronic prostatitis. Had several prostate biospies and follows up with urology. Has significant BPH sx's with nocturia 3-4 times a night. Was told he might need to have prostate surgery to reduce the size. Energy level "could be better." Has interest in sex but is limited by ED. Does not have morning erections. Over the last 2-3 years he notes loss of strength " and muscle mass. Also feels like his height is decreasing. Was 6''2' in the past.     Due to complaint of fatigue, PCP checked TSH, and he was found to have hypothyroidism. Has been on levothyroxine 25 mcg since then. Denies problems with swallowing or voice changes. Does not take Levothyroxine on an empty stomach. No symptoms of overactive thyroid.       Review of Systems   Constitution: Negative for decreased appetite, diaphoresis, malaise/fatigue, night sweats and weight loss.   HENT: Positive for hearing loss. Negative for hoarse voice, odynophagia and sore throat.         Wearing hearing aids   Eyes: Negative for blurred vision and double vision.   Cardiovascular: Positive for dyspnea on exertion, leg swelling and palpitations. Negative for chest pain, irregular heartbeat, near-syncope and syncope.   Respiratory: Positive for snoring.         Dx with BR[place]- does not use CPAP   Endocrine: Negative for cold intolerance and heat intolerance.   Hematologic/Lymphatic: Negative for adenopathy and bleeding problem. Bruises/bleeds easily.   Skin: Negative for dry skin, nail changes and unusual hair distribution.   Musculoskeletal: Positive for falls. Negative for muscle cramps and myalgias.        Due to weakness in knees. Sometimes looses strength and falls.   Gastrointestinal: Negative for abdominal pain, diarrhea, dysphagia, heartburn, nausea and vomiting.   Genitourinary: Positive for decreased libido, frequency, hesitancy, incomplete emptying, nocturia and urgency.   Neurological: Positive for dizziness and light-headedness. Negative for difficulty with concentration, headaches, numbness and tremors.        Only when in afib   Psychiatric/Behavioral: Negative for depression. The patient does not have insomnia and is not nervous/anxious.           Social History     Socioeconomic History    Marital status:      Spouse name: Not on file    Number of children: 5   Occupational History    Occupation: retired  anesthesiology     Employer: [employer]   Tobacco Use    Smoking status: Never Smoker    Smokeless tobacco: Never Used       Substance and Sexual Activity    Alcohol use: No    Drug use: No      Family History   Problem Relation Age of Onset    COPD Father     Diabetes Father     Osteoporosis Father     Aortic stenosis Mother     Heart disease Mother         aortic stenosis    Osteoporosis Mother     Heart attack Brother     No Known Problems Son     No Known Problems Daughter     No Known Problems Daughter     No Known Problems Daughter       Past Surgical History:   Procedure Laterality Date    ABDOMINAL HERNIA REPAIR      CATARACT EXTRACTION  11/25/2013    bilateral    CHOLECYSTECTOMY      COLONOSCOPY N/A 11/25/2015    Procedure: COLONOSCOPY;  Surgeon: Toby Hernandez MD;  [facility];  Service: Endoscopy;  Laterality: N/A;    EYE SURGERY      GASTRIC BYPASS  1992    KNEE ARTHROSCOPY      RT    LASIK  2001    both eyes (Dr. aRbago)    ORIF HUMERUS FRACTURE      LT    ORIF HUMERUS FRACTURE Right     non surgical repair    RADIOFREQUENCY ABLATION      x2    Right ankle tendon repair      ROTATOR CUFF REPAIR      right    TOTAL KNEE ARTHROPLASTY Right 5/29/2018    Procedure: REPLACEMENT-KNEE-TOTAL-pro;  Surgeon: Denzel Dallas MD;  Location: [address];  Service: Orthopedics;  Laterality: Right;  Pro        Current Outpatient Medications:     betamethasone acetate-betamethasone sodium phosphate (CELESTONE) 6 mg/mL injection, as needed. , Disp: , Rfl:     buPROPion (WELLBUTRIN XL) 300 MG 24 hr tablet, Take 1 tablet (300 mg total) by mouth once daily., Disp: 90 tablet, Rfl: 3    calcium carbonate (OS-KISHAN) 500 mg calcium (1,250 mg) tablet, , Disp: , Rfl:     CALCIUM ORAL, Take by mouth Daily., Disp: , Rfl:     celecoxib (CELEBREX) 200 MG capsule, TAKE 1 CAPSULE(200 MG) BY MOUTH TWICE DAILY, Disp: 180 capsule, Rfl: 0    cyclobenzaprine  (FLEXERIL) 10 MG tablet, TAKE 1 TABLET BY MOUTH EVERY 8 HOURS AS NEEDED FOR MUSCLE SPASM, Disp: 40 tablet, Rfl: 5    dutasteride (AVODART) 0.5 mg capsule, TAKE 1 CAPSULE(0.5 MG) BY MOUTH ONCE DAILY, Disp: 90 capsule, Rfl: 3    ELIQUIS 5 mg Tab, Take 1 tablet (5 mg total) by mouth 2 (two) times daily., Disp: 60 tablet, Rfl: 11    escitalopram oxalate (LEXAPRO) 10 MG tablet, TAKE 1 TABLET(10 MG) BY MOUTH EVERY DAY, Disp: 90 tablet, Rfl: 0    KENALOG 40 mg/mL injection, as needed. , Disp: , Rfl:     levothyroxine (SYNTHROID) 25 MCG tablet, Take 1 tablet (25 mcg total) by mouth before breakfast., Disp: 90 tablet, Rfl: 3    metoprolol succinate (TOPROL-XL) 50 MG 24 hr tablet, Take 1 tablet (50 mg total) by mouth once daily., Disp: 90 tablet, Rfl: 1    MULTIVITAMIN ORAL, Take by mouth Daily., Disp: , Rfl:     omega 3-dha-epa-fish oil (OMEGA-3) 350 mg-235 mg- 90 mg-597 mg CpDR, , Disp: , Rfl:     OMEGA-3 FATTY ACIDS (FISH OIL CONCENTRATE ORAL), Take by mouth Daily., Disp: , Rfl:     propafenone (RYTHMOL SR) 425 MG Cp12, Take 1 capsule (425 mg total) by mouth every 12 (twelve) hours., Disp: 180 capsule, Rfl: 3    telmisartan (MICARDIS) 40 MG Tab, Take 1 tablet (40 mg total) by mouth once daily., Disp: 90 tablet, Rfl: 3    acyclovir (ZOVIRAX) 800 MG Tab, TK ONE T PO FIVE TIMES D only for fever blisters, Disp: , Rfl: 1    alendronate (FOSAMAX) 70 MG tablet, Take 1 tablet (70 mg total) by mouth every 7 days. Take with a full glass of water & remain upright for at least 30 minutes, Disp: 12 tablet, Rfl: 3    cholestyramine (QUESTRAN) 4 gram packet, Take 1 packet (4 g total) by mouth once daily. (Patient not taking: Reported on 11/13/2019), Disp: 30 packet, Rfl: 3    cyanocobalamin (VITAMIN B-12) 1000 MCG tablet, Take 1 tablet (1,000 mcg total) by mouth once daily. (Patient not taking: Reported on 10/30/2019), Disp: 30 tablet, Rfl: 11    cyanocobalamin/folic acid (VITAMIN B12-FOLIC ACID) 500-400 mcg Tab, Take by  mouth., Disp: , Rfl:     efinaconazole (JUBLIA) 10 % Prakash, Apply topically., Disp: , Rfl:     ferrous sulfate (FEOSOL) 325 mg (65 mg iron) Tab tablet, Take by mouth., Disp: , Rfl:     oxyCODONE (ROXICODONE) 5 MG immediate release tablet, Take 1 tablet (5 mg total) by mouth every 8 (eight) hours as needed for Pain. (Patient not taking: Reported on 11/13/2019), Disp: 10 tablet, Rfl: 0    oxyCODONE-acetaminophen (PERCOCET) 5-325 mg per tablet, Take 1 tablet by mouth every 4 (four) hours as needed for Pain. (Patient not taking: Reported on 10/30/2019), Disp: 8 tablet, Rfl: 0     Review of patient's allergies indicates:   Allergen Reactions    No known drug allergies          Objective:     Vitals:    11/13/19 0805   BP: 124/84   Pulse: 60   Resp: 18        Physical Exam   Constitutional: He is oriented to person, place, and time. He appears well-developed and well-nourished. No distress.   Very pleasant, WM, NAD, sitting in the chair   HENT:   Head: Normocephalic and atraumatic.   Eyes: Conjunctivae are normal. No scleral icterus.   Neck: Neck supple. No thyromegaly present.   No palpable thyroid nodules. No buffalo hump. No acanthosis.   Cardiovascular: Normal rate and intact distal pulses. Exam reveals no gallop and no friction rub.   Murmur heard.  Normal rhythm with occasional premature beats   Pulmonary/Chest: Effort normal and breath sounds normal. No respiratory distress.   Abdominal: Soft. Bowel sounds are normal. There is no tenderness.   Musculoskeletal: He exhibits no edema.   +1 LE edema > on L than R   Lymphadenopathy:     He has no cervical adenopathy.   Neurological: He is alert and oriented to person, place, and time. He displays normal reflexes.   Skin: Skin is warm and dry. No rash noted. He is not diaphoretic.   Hyperpigmentation and old healed scar over L lower anterior shin.   Psychiatric: He has a normal mood and affect. Thought content normal.       BMP  Lab Results   Component Value Date      10/30/2019    K 4.9 10/30/2019     (H) 10/30/2019    CO2 20 (L) 10/30/2019    BUN 12 10/30/2019    CREATININE 1.4 10/30/2019    CALCIUM 8.8 10/30/2019    ANIONGAP 7 (L) 10/30/2019    ESTGFRAFRICA 57.2 (A) 10/30/2019    EGFRNONAA 49.5 (A) 10/30/2019      Lab Results   Component Value Date     10/30/2019    K 4.9 10/30/2019     (H) 10/30/2019    CO2 20 (L) 10/30/2019    BUN 12 10/30/2019    CREATININE 1.4 10/30/2019    GLU 80 10/30/2019    HGBA1C 6.1 06/23/2015    MG 2.7 (H) 08/03/2015    AST 16 01/02/2019    ALT 16 01/02/2019    ALBUMIN 3.3 (L) 10/30/2019    PROT 6.3 01/02/2019    BILITOT 0.8 01/02/2019    WBC 9.04 04/10/2019    HGB 13.3 (L) 04/10/2019    HCT 41.6 04/10/2019    HCT 39 07/28/2014    MCV 94 04/10/2019     04/10/2019    INR 3.4 06/07/2018    INR 3.4 06/07/2018    PSA 2.62 03/21/2013    TSH 5.932 (H) 01/02/2019    CHOL 157 06/23/2015    HDL 50 06/23/2015    LDLCALC 80.8 06/23/2015    TRIG 131 06/23/2015         Lab Results   Component Value Date    TSH 5.932 (H) 01/02/2019    FREET4 0.95 01/02/2019        Thyroid Labs Latest Ref Rng & Units 1/2/2019 4/10/2019 10/30/2019   TSH 0.400 - 4.000 uIU/mL 5.932(H) - -   Free T4 0.71 - 1.51 ng/dL 0.95 - -   Sodium 136 - 145 mmol/L 139 138 139   Potassium 3.5 - 5.1 mmol/L 4.6 3.9 4.9   Chloride 95 - 110 mmol/L 112(H) 111(H) 112(H)   Carbon Dioxide 23 - 29 mmol/L 20(L) 20(L) 20(L)   Glucose 70 - 110 mg/dL 128(H) 119(H) 80   Blood Urea Nitrogen 8 - 23 mg/dL 23 22 12   Creatinine 0.5 - 1.4 mg/dL 1.6(H) 1.4 1.4   Calcium 8.7 - 10.5 mg/dL 8.9 8.7 8.8   Total Protein 6.0 - 8.4 g/dL 6.3 - -   Albumin 3.5 - 5.2 g/dL 2.9(L) 3.5 3.3(L)   Total Bilirubin 0.1 - 1.0 mg/dL 0.8 - -   AST 10 - 40 U/L 16 - -   ALT 10 - 44 U/L 16 - -   Anion Gap 8 - 16 mmol/L 7(L) 7(L) 7(L)   eGFR (African American) >60 mL/min/1.73 m:2 49.0(A) 57.6(A) 57.2(A)   eGFR (Non-African American) >60 mL/min/1.73 m:2 42.4(A) 49.8(A) 49.5(A)   WBC 3.90 - 12.70 K/uL 5.67 9.04 -    RBC 4.60 - 6.20 M/uL 4.58(L) 4.44(L) -   Hemoglobin 14.0 - 18.0 g/dL 13.7(L) 13.3(L) -   Hematocrit 40.0 - 54.0 % 44.2 41.6 -   MCV 82 - 98 fL 97 94 -   MCH 27.0 - 31.0 pg 29.9 30.0 -   MCHC 32.0 - 36.0 g/dL 31.0(L) 32.0 -   RDW 11.5 - 14.5 % 14.1 14.9(H) -   Platelets 150 - 350 K/uL 256 280 -   MPV 9.2 - 12.9 fL 12.5 11.9 -   Gran # 1.8 - 7.7 K/uL 3.7 5.7 -   Lymph # 1.0 - 4.8 K/uL 0.9(L) 1.9 -   Mono # 0.3 - 1.0 K/uL 1.0 0.6 -   Eos # 0.0 - 0.5 K/uL 0.1 0.7(H) -   Baso # 0.00 - 0.20 K/uL 0.07 0.12 -   Gran % 38.0 - 73.0 % 64.6 62.7 -   Lymph % 18.0 - 48.0 % 15.5(L) 21.3 -   Mono% 4.0 - 15.0 % 17.1(H) 7.0 -   Eos % 0.0 - 8.0 % 1.2 7.3 -   Baso % 0.0 - 1.9 % 1.2 1.3 -   Prothrombin Time 9.0 - 12.5 sec - - -   INR - - - -   aPTT 21.0 - 32.0 sec - - -          Lab Results   Component Value Date    WBC 9.04 04/10/2019    RBC 4.44 (L) 04/10/2019    HGB 13.3 (L) 04/10/2019    HCT 41.6 04/10/2019    MCV 94 04/10/2019    MCH 30.0 04/10/2019    MCHC 32.0 04/10/2019    RDW 14.9 (H) 04/10/2019     04/10/2019    MPV 11.9 04/10/2019    GRAN 5.7 04/10/2019    GRAN 62.7 04/10/2019    LYMPH 1.9 04/10/2019    LYMPH 21.3 04/10/2019    MONO 0.6 04/10/2019    MONO 7.0 04/10/2019    EOS 0.7 (H) 04/10/2019    BASO 0.12 04/10/2019    EOSINOPHIL 7.3 04/10/2019    BASOPHIL 1.3 04/10/2019         EXAMINATION:  DEXA BONE DENSITY SPINE HIP    CLINICAL HISTORY:  osteoporosis screening; Disorder of bone, unspecified    TECHNIQUE:  DXA scanning was performed over the left hip and lumbar spine.  Review of the images confirms satisfactory positioning and technique.    COMPARISON:  None    FINDINGS:  The L1 to L4 vertebral bone mineral density is equal to 0.932 g/cm squared with a T score of -1.4.    The left femoral neck bone mineral density is equal to 0.542 g/cm squared with a T score of -2.9.    There is a 18% risk of a major osteoporotic fracture and a 7.4% risk of hip fracture in the next 10 years (FRAX).      Impression        Osteoporosis  Hypothyroidism         Assessment:       1. Osteoporosis, chronic, uncontrolled  - Dexa with L femoral neck BMD 0.542, T-score of -2.9.  - FRAX with 18% risk of a major osteoporotic fracture and a 7.4% risk of hip fracture  - Will need to do work up to determine underlying etiology. However has ? History of hypogonadism and definite FH of idiopathic osteoporosis.  - Will plan to recheck CMP. check PTH, 24 hour urine calcium, 8 am testosterone, LH, FSH, and baseline CTX.  - egfr 49.5 stable and >35. Ca wnls.  - Counseled pt on weight bearing exercises.  - Discussed risk/benefits of PO and IV bisphosphonate therapy including but limited to esophagitis/ulcers, osteonecrosis, bone pain, and atypical femur fractures. He would prefer to try PO first. Counseled pt to take medication, sit up for 30-60 minutes then take other meds or food. Start Fosamax 70 mg weekly.  - Continue calcium carbonate 1200 mg daily and vitamin D3 1600 mg daily.    2. Hypothyroidism, chronic, stable  TSH previously  > 4.  Clinically euthyroid  Recheck TSH     Plan:             Follow up:  Lab test before next visit. Need to do at 8 am however.  RTC: 6 months with CTX, CMP

## 2019-11-13 ENCOUNTER — OFFICE VISIT (OUTPATIENT)
Dept: NEPHROLOGY | Facility: CLINIC | Age: 74
End: 2019-11-13
Payer: MEDICARE

## 2019-11-13 ENCOUNTER — OFFICE VISIT (OUTPATIENT)
Dept: ENDOCRINOLOGY | Facility: CLINIC | Age: 74
End: 2019-11-13
Payer: MEDICARE

## 2019-11-13 VITALS
SYSTOLIC BLOOD PRESSURE: 124 MMHG | DIASTOLIC BLOOD PRESSURE: 84 MMHG | RESPIRATION RATE: 18 BRPM | HEIGHT: 70 IN | WEIGHT: 216.06 LBS | BODY MASS INDEX: 30.93 KG/M2 | HEART RATE: 60 BPM

## 2019-11-13 VITALS
DIASTOLIC BLOOD PRESSURE: 84 MMHG | SYSTOLIC BLOOD PRESSURE: 124 MMHG | OXYGEN SATURATION: 99 % | WEIGHT: 216.06 LBS | HEIGHT: 70 IN | HEART RATE: 58 BPM | BODY MASS INDEX: 30.93 KG/M2

## 2019-11-13 DIAGNOSIS — N18.30 CKD (CHRONIC KIDNEY DISEASE), STAGE III: Primary | ICD-10-CM

## 2019-11-13 DIAGNOSIS — M81.0 OSTEOPOROSIS WITHOUT PATHOLOGICAL FRACTURE: Primary | ICD-10-CM

## 2019-11-13 DIAGNOSIS — E03.9 HYPOTHYROIDISM, UNSPECIFIED TYPE: ICD-10-CM

## 2019-11-13 PROCEDURE — 3079F PR MOST RECENT DIASTOLIC BLOOD PRESSURE 80-89 MM HG: ICD-10-PCS | Mod: HCNC,CPTII,S$GLB, | Performed by: INTERNAL MEDICINE

## 2019-11-13 PROCEDURE — 1100F PR PT FALLS ASSESS DOC 2+ FALLS/FALL W/INJURY/YR: ICD-10-PCS | Mod: HCNC,CPTII,S$GLB, | Performed by: INTERNAL MEDICINE

## 2019-11-13 PROCEDURE — 3074F SYST BP LT 130 MM HG: CPT | Mod: HCNC,CPTII,S$GLB, | Performed by: INTERNAL MEDICINE

## 2019-11-13 PROCEDURE — 99999 PR PBB SHADOW E&M-EST. PATIENT-LVL V: CPT | Mod: PBBFAC,HCNC,, | Performed by: INTERNAL MEDICINE

## 2019-11-13 PROCEDURE — 3288F FALL RISK ASSESSMENT DOCD: CPT | Mod: HCNC,CPTII,S$GLB, | Performed by: INTERNAL MEDICINE

## 2019-11-13 PROCEDURE — 3288F PR FALLS RISK ASSESSMENT DOCUMENTED: ICD-10-PCS | Mod: HCNC,CPTII,S$GLB, | Performed by: INTERNAL MEDICINE

## 2019-11-13 PROCEDURE — 99999 PR PBB SHADOW E&M-EST. PATIENT-LVL III: ICD-10-PCS | Mod: PBBFAC,HCNC,, | Performed by: INTERNAL MEDICINE

## 2019-11-13 PROCEDURE — 3079F DIAST BP 80-89 MM HG: CPT | Mod: HCNC,CPTII,S$GLB, | Performed by: INTERNAL MEDICINE

## 2019-11-13 PROCEDURE — 99999 PR PBB SHADOW E&M-EST. PATIENT-LVL V: ICD-10-PCS | Mod: PBBFAC,HCNC,, | Performed by: INTERNAL MEDICINE

## 2019-11-13 PROCEDURE — 99499 RISK ADDL DX/OHS AUDIT: ICD-10-PCS | Mod: HCNC,S$GLB,, | Performed by: INTERNAL MEDICINE

## 2019-11-13 PROCEDURE — 99499 UNLISTED E&M SERVICE: CPT | Mod: HCNC,S$GLB,, | Performed by: INTERNAL MEDICINE

## 2019-11-13 PROCEDURE — 1100F PTFALLS ASSESS-DOCD GE2>/YR: CPT | Mod: HCNC,CPTII,S$GLB, | Performed by: INTERNAL MEDICINE

## 2019-11-13 PROCEDURE — 99204 PR OFFICE/OUTPT VISIT, NEW, LEVL IV, 45-59 MIN: ICD-10-PCS | Mod: HCNC,S$GLB,, | Performed by: INTERNAL MEDICINE

## 2019-11-13 PROCEDURE — 99214 PR OFFICE/OUTPT VISIT, EST, LEVL IV, 30-39 MIN: ICD-10-PCS | Mod: HCNC,S$GLB,, | Performed by: INTERNAL MEDICINE

## 2019-11-13 PROCEDURE — 99204 OFFICE O/P NEW MOD 45 MIN: CPT | Mod: HCNC,S$GLB,, | Performed by: INTERNAL MEDICINE

## 2019-11-13 PROCEDURE — 3074F PR MOST RECENT SYSTOLIC BLOOD PRESSURE < 130 MM HG: ICD-10-PCS | Mod: HCNC,CPTII,S$GLB, | Performed by: INTERNAL MEDICINE

## 2019-11-13 PROCEDURE — 99214 OFFICE O/P EST MOD 30 MIN: CPT | Mod: HCNC,S$GLB,, | Performed by: INTERNAL MEDICINE

## 2019-11-13 PROCEDURE — 99999 PR PBB SHADOW E&M-EST. PATIENT-LVL III: CPT | Mod: PBBFAC,HCNC,, | Performed by: INTERNAL MEDICINE

## 2019-11-13 RX ORDER — ALENDRONATE SODIUM 70 MG/1
70 TABLET ORAL
Qty: 12 TABLET | Refills: 3 | Status: ON HOLD | OUTPATIENT
Start: 2019-11-13 | End: 2020-07-19 | Stop reason: SDUPTHER

## 2019-11-13 NOTE — LETTER
November 13, 2019      Maxi Gaines MD  [address] [facility] [address] [place] [address]           [facility] Endocrin[facility]  [address] [place] [address]  Phone: [phone]  Fax: [phone]          Patient: Dominick MARCIA MD Duncan   MR Number: [id number]   YOB: 1945   Date of Visit: 11/13/2019       Dear Dr. Maxi Gaines:    Thank you for referring Dominick oSng to me for evaluation. Attached you will find relevant portions of my assessment and plan of care.    If you have questions, please do not hesitate to call me. I look forward to following Dominick Song along with you.    Sincerely,    Juliette L. Sandifer Kum-Nji, MD    Enclosure  CC:  No Recipients    If you would like to receive this communication electronically, please contact [email or web address] or [phone] to request more information on [employer] Link access.    For providers and/or their staff who would like to refer a patient to [facility], please contact us through our one-stop-shop provider referral line, [facility], at [phone].    If you feel you have received this communication in error or would no longer like to receive these types of communications, please e-mail [email or web address]

## 2019-11-13 NOTE — PROGRESS NOTES
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    There have been no recent illnesses.  He has no uremic or urinary symptoms and is in his usual state of health.   He was diagnosed with osteopenia recently.      Review of Systems   Constitutional: Positive for fatigue. Negative for appetite change, chills and fever.   HENT: Positive for sore throat (hoarseness in evenings).    Eyes: Negative for visual disturbance.   Respiratory: Negative for cough and shortness of breath.    Cardiovascular: Negative for chest pain and leg swelling.   Gastrointestinal: Negative for diarrhea, nausea and vomiting.   Genitourinary: Positive for frequency (3-4 episodes of nocturia) and urgency (at times). Negative for difficulty urinating, dysuria and hematuria.   Musculoskeletal: Positive for arthralgias (knee and right shoulder). Negative for myalgias.   Skin: Negative for rash.   Neurological: Negative for dizziness, light-headedness and headaches.   Psychiatric/Behavioral: Negative for sleep disturbance.       The past medical, family and social histories were reviewed for this encounter.     /84   Pulse (!) 58   Ht 5' 10" (1.778 m)   Wt 98 kg (216 lb 0.8 oz)   SpO2 99%   BMI 31.00 kg/m²     Objective:      Physical Exam   Constitutional: He is oriented to person, place, and time. He appears well-developed and well-nourished. No distress.   HENT:   Head: Normocephalic and atraumatic.   Eyes: Conjunctivae are normal.   Neck: Neck supple. No JVD present.   Cardiovascular: Normal rate, regular rhythm and normal heart sounds. Exam reveals no gallop and no friction rub.   No murmur heard.  Pulmonary/Chest: Effort normal. No respiratory distress. He has no wheezes. He has no rales.   Abdominal: Soft. Bowel sounds are normal. He exhibits no distension. There is no tenderness.   Musculoskeletal: He exhibits edema (trace BLE). "   Neurological: He is alert and oriented to person, place, and time.   Skin: Skin is warm and dry. No rash noted.   Psychiatric: He has a normal mood and affect.   Vitals reviewed.      Assessment:       1. CKD (chronic kidney disease), stage III        Plan:   Return to clinic in 6 months.  Labs for next visit include rp, pth.  Baseline creatinine is 1.1-1.3 since 2004.  His urine sediment is negative.  Ultrasound shows R 9.3 cm, L 8.9 cm.  Blood pressure is controlled on the current regimen.  Continue current medications as prescribed and reviewed.

## 2019-11-15 ENCOUNTER — LAB VISIT (OUTPATIENT)
Dept: LAB | Facility: HOSPITAL | Age: 74
End: 2019-11-15
Attending: INTERNAL MEDICINE
Payer: MEDICARE

## 2019-11-15 DIAGNOSIS — E03.9 HYPOTHYROIDISM, UNSPECIFIED TYPE: ICD-10-CM

## 2019-11-15 DIAGNOSIS — M81.0 OSTEOPOROSIS WITHOUT PATHOLOGICAL FRACTURE: ICD-10-CM

## 2019-11-15 LAB
ALBUMIN SERPL BCP-MCNC: 3.4 G/DL (ref 3.5–5.2)
ALP SERPL-CCNC: 108 U/L (ref 55–135)
ALT SERPL W/O P-5'-P-CCNC: 15 U/L (ref 10–44)
ANION GAP SERPL CALC-SCNC: 6 MMOL/L (ref 8–16)
AST SERPL-CCNC: 23 U/L (ref 10–40)
BILIRUB SERPL-MCNC: 0.9 MG/DL (ref 0.1–1)
BUN SERPL-MCNC: 19 MG/DL (ref 8–23)
CALCIUM 24H UR-MRATE: 3 MG/HR (ref 4–12)
CALCIUM SERPL-MCNC: 8.4 MG/DL (ref 8.7–10.5)
CALCIUM UR-MCNC: 2.2 MG/DL (ref 0–15)
CALCIUM URINE (MG/SPEC): 64 MG/SPEC
CHLORIDE SERPL-SCNC: 113 MMOL/L (ref 95–110)
CO2 SERPL-SCNC: 21 MMOL/L (ref 23–29)
CREAT 24H UR-MRATE: 48.3 MG/HR (ref 40–75)
CREAT SERPL-MCNC: 1.5 MG/DL (ref 0.5–1.4)
CREAT UR-MCNC: 40 MG/DL (ref 23–375)
CREATININE, URINE (MG/SPEC): 1160 MG/SPEC
EST. GFR  (AFRICAN AMERICAN): 52.6 ML/MIN/1.73 M^2
EST. GFR  (NON AFRICAN AMERICAN): 45.5 ML/MIN/1.73 M^2
GLUCOSE SERPL-MCNC: 95 MG/DL (ref 70–110)
PHOSPHATE SERPL-MCNC: 2.8 MG/DL (ref 2.7–4.5)
POTASSIUM SERPL-SCNC: 4.3 MMOL/L (ref 3.5–5.1)
PROT SERPL-MCNC: 6 G/DL (ref 6–8.4)
PTH-INTACT SERPL-MCNC: 96 PG/ML (ref 9–77)
SODIUM SERPL-SCNC: 140 MMOL/L (ref 136–145)
T4 FREE SERPL-MCNC: 0.82 NG/DL (ref 0.71–1.51)
TESTOST SERPL-MCNC: 650 NG/DL (ref 304–1227)
TSH SERPL DL<=0.005 MIU/L-ACNC: 4.71 UIU/ML (ref 0.4–4)
URINE COLLECTION DURATION: 24 HR
URINE COLLECTION DURATION: 24 HR
URINE VOLUME: 2900 ML
URINE VOLUME: 2900 ML

## 2019-11-15 PROCEDURE — 84443 ASSAY THYROID STIM HORMONE: CPT | Mod: HCNC

## 2019-11-15 PROCEDURE — 80053 COMPREHEN METABOLIC PANEL: CPT | Mod: HCNC

## 2019-11-15 PROCEDURE — 84439 ASSAY OF FREE THYROXINE: CPT | Mod: HCNC

## 2019-11-15 PROCEDURE — 36415 COLL VENOUS BLD VENIPUNCTURE: CPT | Mod: HCNC,PO

## 2019-11-15 PROCEDURE — 84100 ASSAY OF PHOSPHORUS: CPT | Mod: HCNC

## 2019-11-15 PROCEDURE — 82523 COLLAGEN CROSSLINKS: CPT | Mod: HCNC

## 2019-11-15 PROCEDURE — 84403 ASSAY OF TOTAL TESTOSTERONE: CPT | Mod: HCNC

## 2019-11-15 PROCEDURE — 82340 ASSAY OF CALCIUM IN URINE: CPT | Mod: HCNC

## 2019-11-15 PROCEDURE — 82570 ASSAY OF URINE CREATININE: CPT | Mod: HCNC

## 2019-11-15 PROCEDURE — 83970 ASSAY OF PARATHORMONE: CPT | Mod: HCNC

## 2019-11-18 LAB — COLLAGEN CTX SERPL-MCNC: 921 PG/ML

## 2019-11-21 ENCOUNTER — PATIENT MESSAGE (OUTPATIENT)
Dept: FAMILY MEDICINE | Facility: CLINIC | Age: 74
End: 2019-11-21

## 2019-11-21 ENCOUNTER — PATIENT MESSAGE (OUTPATIENT)
Dept: ENDOCRINOLOGY | Facility: CLINIC | Age: 74
End: 2019-11-21

## 2019-11-21 DIAGNOSIS — N18.30 CKD (CHRONIC KIDNEY DISEASE) STAGE 3, GFR 30-59 ML/MIN: ICD-10-CM

## 2019-11-21 DIAGNOSIS — R35.0 URINARY FREQUENCY: ICD-10-CM

## 2019-11-21 RX ORDER — METOPROLOL SUCCINATE 50 MG/1
50 TABLET, EXTENDED RELEASE ORAL DAILY
Qty: 90 TABLET | Refills: 1 | Status: SHIPPED | OUTPATIENT
Start: 2019-11-21 | End: 2020-01-02 | Stop reason: SDUPTHER

## 2019-11-21 NOTE — TELEPHONE ENCOUNTER
Please see the following assessment. This refill request still requires some action on your part. Metoprolol succinate 50 mg not previously prescribed by you. Pended 12 month supply. Defer to you. Thank you.

## 2019-11-21 NOTE — TELEPHONE ENCOUNTER
Refill Authorization Note     is requesting a refill authorization.    Brief assessment and rationale for refill: DEFER: not previously prescribed by you   Name and strength of medication: metoprolol succinate (TOPROL-XL) 50 MG 24 hr tablet       Medication Therapy Plan: med previously prescribed by Salty; defer to you     Medication reconciliation completed: No              How patient will take medication: t1t po qd           Comments:   Requested Prescriptions   Pending Prescriptions Disp Refills    metoprolol succinate (TOPROL-XL) 50 MG 24 hr tablet 90 tablet 3     Sig: Take 1 tablet (50 mg total) by mouth once daily.       Cardiovascular:  Beta Blockers Passed - 11/21/2019  8:22 AM        Passed - Patient is at least 18 years old        Passed - Last BP in normal range within 360 days     BP Readings from Last 3 Encounters:   11/13/19 124/84   11/13/19 124/84   10/30/19 (!) 142/80              Passed - Last Heart Rate in normal range within 360 days     Pulse Readings from Last 3 Encounters:   11/13/19 (!) 58   11/13/19 60   10/30/19 61              Passed - Office visit in past 12 months or future 90 days     Recent Outpatient Visits            1 week ago Osteoporosis without pathological fracture    [facility] - Endocrinology Juliette L. Sandifer Kum-Nji, MD    1 week ago CKD (chronic kidney disease), stage III    [facility] Nephrology Jeevan Pond MD    3 weeks ago Cellulitis of left lower extremity    [facility] Medicine Maxi Gaines MD    1 month ago Pain of right hip joint    [facility] Melanie Larson NP    2 months ago Hypertension, unspecified type    [facility] Melanie Larson NP          Future Appointments              In 2 weeks MD Shawn Izaguirre - Urology Shawn Nevarez    In 3 weeks MD Doroteo Viera - Arrhythmia, [place]    In 5 months LAB, [place] [facility]     In 5 months Juliette L. Sandifer Kum-Nji, MD [place] - Endocrinology, [place]    In 5 months MD Doroteo Shaw - Nephrology, [place]

## 2019-11-22 RX ORDER — METOPROLOL SUCCINATE 50 MG/1
50 TABLET, EXTENDED RELEASE ORAL DAILY
Qty: 90 TABLET | Refills: 3 | Status: SHIPPED | OUTPATIENT
Start: 2019-11-22 | End: 2019-12-18 | Stop reason: SDUPTHER

## 2019-12-05 ENCOUNTER — TELEPHONE (OUTPATIENT)
Dept: ENDOCRINOLOGY | Facility: CLINIC | Age: 74
End: 2019-12-05

## 2019-12-05 ENCOUNTER — OFFICE VISIT (OUTPATIENT)
Dept: UROLOGY | Facility: CLINIC | Age: 74
End: 2019-12-05
Payer: MEDICARE

## 2019-12-05 VITALS
HEIGHT: 70 IN | SYSTOLIC BLOOD PRESSURE: 135 MMHG | HEART RATE: 62 BPM | DIASTOLIC BLOOD PRESSURE: 70 MMHG | BODY MASS INDEX: 30.92 KG/M2 | RESPIRATION RATE: 15 BRPM | WEIGHT: 216 LBS

## 2019-12-05 DIAGNOSIS — N40.1 BPH WITH URINARY OBSTRUCTION: Primary | ICD-10-CM

## 2019-12-05 DIAGNOSIS — N40.0 BENIGN PROSTATIC HYPERPLASIA: ICD-10-CM

## 2019-12-05 DIAGNOSIS — E03.9 ACQUIRED HYPOTHYROIDISM: Primary | ICD-10-CM

## 2019-12-05 DIAGNOSIS — N13.8 BPH WITH URINARY OBSTRUCTION: Primary | ICD-10-CM

## 2019-12-05 PROCEDURE — 1126F AMNT PAIN NOTED NONE PRSNT: CPT | Mod: HCNC,S$GLB,, | Performed by: UROLOGY

## 2019-12-05 PROCEDURE — 1159F PR MEDICATION LIST DOCUMENTED IN MEDICAL RECORD: ICD-10-PCS | Mod: HCNC,S$GLB,, | Performed by: UROLOGY

## 2019-12-05 PROCEDURE — 99999 PR PBB SHADOW E&M-EST. PATIENT-LVL III: CPT | Mod: PBBFAC,HCNC,, | Performed by: UROLOGY

## 2019-12-05 PROCEDURE — 3078F PR MOST RECENT DIASTOLIC BLOOD PRESSURE < 80 MM HG: ICD-10-PCS | Mod: HCNC,CPTII,S$GLB, | Performed by: UROLOGY

## 2019-12-05 PROCEDURE — 99999 PR PBB SHADOW E&M-EST. PATIENT-LVL III: ICD-10-PCS | Mod: PBBFAC,HCNC,, | Performed by: UROLOGY

## 2019-12-05 PROCEDURE — 99214 OFFICE O/P EST MOD 30 MIN: CPT | Mod: HCNC,S$GLB,, | Performed by: UROLOGY

## 2019-12-05 PROCEDURE — 99214 PR OFFICE/OUTPT VISIT, EST, LEVL IV, 30-39 MIN: ICD-10-PCS | Mod: HCNC,S$GLB,, | Performed by: UROLOGY

## 2019-12-05 PROCEDURE — 3078F DIAST BP <80 MM HG: CPT | Mod: HCNC,CPTII,S$GLB, | Performed by: UROLOGY

## 2019-12-05 PROCEDURE — 3075F PR MOST RECENT SYSTOLIC BLOOD PRESS GE 130-139MM HG: ICD-10-PCS | Mod: HCNC,CPTII,S$GLB, | Performed by: UROLOGY

## 2019-12-05 PROCEDURE — 1101F PR PT FALLS ASSESS DOC 0-1 FALLS W/OUT INJ PAST YR: ICD-10-PCS | Mod: HCNC,CPTII,S$GLB, | Performed by: UROLOGY

## 2019-12-05 PROCEDURE — 1159F MED LIST DOCD IN RCRD: CPT | Mod: HCNC,S$GLB,, | Performed by: UROLOGY

## 2019-12-05 PROCEDURE — 3075F SYST BP GE 130 - 139MM HG: CPT | Mod: HCNC,CPTII,S$GLB, | Performed by: UROLOGY

## 2019-12-05 PROCEDURE — 1101F PT FALLS ASSESS-DOCD LE1/YR: CPT | Mod: HCNC,CPTII,S$GLB, | Performed by: UROLOGY

## 2019-12-05 PROCEDURE — 1126F PR PAIN SEVERITY QUANTIFIED, NO PAIN PRESENT: ICD-10-PCS | Mod: HCNC,S$GLB,, | Performed by: UROLOGY

## 2019-12-05 RX ORDER — DUTASTERIDE 0.5 MG/1
CAPSULE, LIQUID FILLED ORAL
Qty: 90 CAPSULE | Refills: 3 | Status: SHIPPED | OUTPATIENT
Start: 2019-12-05 | End: 2020-01-13

## 2019-12-06 RX ORDER — LEVOTHYROXINE SODIUM 50 UG/1
50 TABLET ORAL DAILY
Qty: 30 TABLET | Refills: 11 | Status: SHIPPED | OUTPATIENT
Start: 2019-12-06 | End: 2019-12-18 | Stop reason: SDUPTHER

## 2019-12-07 NOTE — TELEPHONE ENCOUNTER
Called pt to discuss lab work. Spoke to him and his wife.  TSH slightly above nl at 4.7 on levothyroxine 25 mcg. FT4 0.71 Increase to 50 mcg daily. Pt has known afib. Discussed monitoring for increased palpations with higher dose.    Testo wnls    PTH 96, ? 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 Nursing please make pt an appointment

## 2019-12-16 ENCOUNTER — PATIENT MESSAGE (OUTPATIENT)
Dept: FAMILY MEDICINE | Facility: CLINIC | Age: 74
End: 2019-12-16

## 2019-12-17 ENCOUNTER — PATIENT MESSAGE (OUTPATIENT)
Dept: FAMILY MEDICINE | Facility: CLINIC | Age: 74
End: 2019-12-17

## 2019-12-17 ENCOUNTER — PATIENT MESSAGE (OUTPATIENT)
Dept: CARDIOLOGY | Facility: CLINIC | Age: 74
End: 2019-12-17

## 2019-12-18 ENCOUNTER — OFFICE VISIT (OUTPATIENT)
Dept: FAMILY MEDICINE | Facility: CLINIC | Age: 74
End: 2019-12-18
Payer: MEDICARE

## 2019-12-18 ENCOUNTER — TELEPHONE (OUTPATIENT)
Dept: ELECTROPHYSIOLOGY | Facility: CLINIC | Age: 74
End: 2019-12-18

## 2019-12-18 VITALS
HEIGHT: 70 IN | WEIGHT: 215.38 LBS | TEMPERATURE: 98 F | DIASTOLIC BLOOD PRESSURE: 84 MMHG | SYSTOLIC BLOOD PRESSURE: 150 MMHG | HEART RATE: 42 BPM | BODY MASS INDEX: 30.83 KG/M2 | OXYGEN SATURATION: 98 %

## 2019-12-18 DIAGNOSIS — G45.9 TIA (TRANSIENT ISCHEMIC ATTACK): Primary | ICD-10-CM

## 2019-12-18 DIAGNOSIS — I48.91 ATRIAL FIBRILLATION, TRANSIENT: ICD-10-CM

## 2019-12-18 DIAGNOSIS — I10 ESSENTIAL HYPERTENSION: ICD-10-CM

## 2019-12-18 PROCEDURE — 99999 PR PBB SHADOW E&M-EST. PATIENT-LVL III: CPT | Mod: PBBFAC,HCNC,, | Performed by: FAMILY MEDICINE

## 2019-12-18 PROCEDURE — 99214 PR OFFICE/OUTPT VISIT, EST, LEVL IV, 30-39 MIN: ICD-10-PCS | Mod: HCNC,S$GLB,, | Performed by: FAMILY MEDICINE

## 2019-12-18 PROCEDURE — 99999 PR PBB SHADOW E&M-EST. PATIENT-LVL III: ICD-10-PCS | Mod: PBBFAC,HCNC,, | Performed by: FAMILY MEDICINE

## 2019-12-18 PROCEDURE — 99214 OFFICE O/P EST MOD 30 MIN: CPT | Mod: HCNC,S$GLB,, | Performed by: FAMILY MEDICINE

## 2019-12-18 RX ORDER — MULTIVITAMIN
600 TABLET ORAL
COMMUNITY
End: 2020-12-19

## 2019-12-18 RX ORDER — ATORVASTATIN CALCIUM 10 MG/1
10 TABLET, FILM COATED ORAL
COMMUNITY
Start: 2019-12-15 | End: 2020-01-02 | Stop reason: SDUPTHER

## 2019-12-18 RX ORDER — NAPROXEN SODIUM 220 MG/1
81 TABLET, FILM COATED ORAL
COMMUNITY
Start: 2019-12-15 | End: 2021-03-10

## 2019-12-18 RX ORDER — LEVOTHYROXINE SODIUM 50 UG/1
0.05 TABLET ORAL
COMMUNITY
End: 2020-02-06

## 2019-12-18 NOTE — PROGRESS NOTES
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    Chief Complaint: Hospital Follow Up (TIA)    Here for hospital follow-up.    He suddenly developed right facial drooping and slurred speech and was transported to [facility] ER. Symptoms resolved over 60 minutes. CT head ruled out cerebral hemmorage.    tPA was considered until they realized it was contraindicated due to Eliquis.  He also had lab work and an echocardiogram. Stayed overnight and was released.  He has no residual neurological symptoms.  He also had carotid US and echocardiogram.  Neurologist did see him.  They advised addition of baby aspirin and a statin    He does admit that he was taking 1/2 dose eliquis on his own a few days before trying to stretch the med until the end of the year.  He also admits that he went into afib for a few days before the afib as well.              Past Medical History:   Diagnosis Date    Anticoagulant long-term use     Anxiety     Arthritis     Atrial fibrillation     BPH (benign prostatic hyperplasia)     Cataract     CKD (chronic kidney disease) stage 3, GFR 30-59 ml/min 7/10/2017    Followed by Dr. Jeevan Pond    Colon polyp     benign    Depression     Elevated PSA     Erectile dysfunction     Gastric ulcer with hemorrhage     Hep B w/o coma 1977    History of bleeding peptic ulcer     History of prostatitis     Hypertension     PAF (paroxysmal atrial fibrillation)     Sleep apnea     on CPAP       Past Surgical History:   Procedure Laterality Date    ABDOMINAL HERNIA REPAIR      CATARACT EXTRACTION  11/25/2013    bilateral    CHOLECYSTECTOMY      COLONOSCOPY N/A 11/25/2015    Procedure: COLONOSCOPY;  Surgeon: Toby Hernandez MD;  Location: [facility];  Service: Endoscopy;  Laterality: N/A;    EYE SURGERY      GASTRIC BYPASS  1992    KNEE ARTHROSCOPY      RT    LASIK  2001    both eyes (Dr. Rabago)    ORIF HUMERUS FRACTURE      LT    ORIF HUMERUS FRACTURE  Right     non surgical repair    RADIOFREQUENCY ABLATION      x2    Right ankle tendon repair      ROTATOR CUFF REPAIR      right    TOTAL KNEE ARTHROPLASTY Right 5/29/2018    Procedure: REPLACEMENT-KNEE-TOTAL-pro;  Surgeon: Denzel Dallas MD;  [facility] OR [address] [place] [address];  Service: Orthopedics;  Laterality: Right;  Pro       Review of patient's allergies indicates:   Allergen Reactions    No known drug allergies        Social History     Socioeconomic History    Marital status:      Spouse name: Not on file    Number of children: 5    Years of education: Not on file    Highest education level: Not on file   Occupational History    Occupation: retired anesthesiology     Employer: [employer]    Occupation: [employer] grad     Comment: previous football player   Social Needs    Financial resource strain: Not on file    Food insecurity:     Worry: Not on file     Inability: Not on file    Transportation needs:     Medical: Not on file     Non-medical: Not on file   Tobacco Use    Smoking status: Never Smoker    Smokeless tobacco: Never Used    Tobacco 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    Substance and Sexual Activity    Alcohol use: No    Drug use: No    Sexual activity: Yes     Partners: Female   Lifestyle    Physical activity:     Days per week: Not on file     Minutes per session: Not on file    Stress: Not on file   Relationships    Social connections:     Talks on phone: Not on file     Gets together: Not on file     Attends [employer] service: Not on file     Active member of club or organization: Not on file     Attends meetings of clubs or organizations: Not on file     Relationship status: Not on file   Other Topics Concern    Patient feels they ought to cut down on drinking/drug use Not Asked    Patient annoyed by others criticizing their drinking/drug use Not Asked    Patient has felt bad or guilty about drinking/drug use Not Asked    Patient  has had a drink/used drugs as an eye opener in the AM Not Asked   Social History Narrative    Not on file       Current Outpatient Medications on File Prior to Visit   Medication Sig Dispense Refill    acyclovir (ZOVIRAX) 800 MG Tab TK ONE T PO FIVE TIMES D only for fever blisters  1    alendronate (FOSAMAX) 70 MG tablet Take 1 tablet (70 mg total) by mouth every 7 days. Take with a full glass of water & remain upright for at least 30 minutes 12 tablet 3    aspirin 81 MG Chew 81 mg.      atorvastatin (LIPITOR) 10 MG tablet 10 mg.      betamethasone acetate-betamethasone sodium phosphate (CELESTONE) 6 mg/mL injection as needed.       buPROPion (WELLBUTRIN XL) 300 MG 24 hr tablet Take 1 tablet (300 mg total) by mouth once daily. 90 tablet 3    calcium carbonate (OS-KISHAN) 500 mg calcium (1,250 mg) tablet       CALCIUM ORAL Take by mouth Daily.      celecoxib (CELEBREX) 200 MG capsule TAKE 1 CAPSULE(200 MG) BY MOUTH TWICE DAILY 180 capsule 0    cholestyramine (QUESTRAN) 4 gram packet Take 1 packet (4 g total) by mouth once daily. 30 packet 3    cyanocobalamin (VITAMIN B-12) 1000 MCG tablet Take 1 tablet (1,000 mcg total) by mouth once daily. 30 tablet 11    cyanocobalamin/folic acid (VITAMIN B12-FOLIC ACID) 500-400 mcg Tab Take by mouth.      cyclobenzaprine (FLEXERIL) 10 MG tablet TAKE 1 TABLET BY MOUTH EVERY 8 HOURS AS NEEDED FOR MUSCLE SPASM 40 tablet 5    dutasteride (AVODART) 0.5 mg capsule TAKE 1 CAPSULE(0.5 MG) BY MOUTH ONCE DAILY 90 capsule 3    efinaconazole (JUBLIA) 10 % Prakash Apply topically.      ELIQUIS 5 mg Tab Take 1 tablet (5 mg total) by mouth 2 (two) times daily. 60 tablet 11    escitalopram oxalate (LEXAPRO) 10 MG tablet TAKE 1 TABLET(10 MG) BY MOUTH EVERY DAY 90 tablet 0    ferrous sulfate (FEOSOL) 325 mg (65 mg iron) Tab tablet Take by mouth.      KENALOG 40 mg/mL injection as needed.       levothyroxine (SYNTHROID) 50 MCG tablet 0.05 mg.      metoprolol succinate (TOPROL-XL) 50  MG 24 hr tablet Take 1 tablet (50 mg total) by mouth once daily. 90 tablet 1    MULTIVITAMIN ORAL Take by mouth Daily.      omega 3-dha-epa-fish oil (OMEGA-3) 350 mg-235 mg- 90 mg-597 mg CpDR       OMEGA-3 FATTY ACIDS (FISH OIL CONCENTRATE ORAL) Take by mouth Daily.      oxyCODONE (ROXICODONE) 5 MG immediate release tablet Take 1 tablet (5 mg total) by mouth every 8 (eight) hours as needed for Pain. 10 tablet 0    oxyCODONE-acetaminophen (PERCOCET) 5-325 mg per tablet Take 1 tablet by mouth every 4 (four) hours as needed for Pain. 8 tablet 0    propafenone (RYTHMOL SR) 425 MG Cp12 Take 1 capsule (425 mg total) by mouth every 12 (twelve) hours. 180 capsule 3    telmisartan (MICARDIS) 40 MG Tab Take 1 tablet (40 mg total) by mouth once daily. 90 tablet 3    calcium-vitamin D 600 mg(1,500mg) -400 unit Tab 600 mg.       No current facility-administered medications on file prior to visit.        Family History   Problem Relation Age of Onset    COPD Father     Diabetes Father     Osteoporosis Father     Aortic stenosis Mother     Heart disease Mother         aortic stenosis    Osteoporosis Mother     Heart attack Brother     No Known Problems Son     No Known Problems Daughter     No Known Problems Daughter     No Known Problems Daughter        Review of Systems   Constitutional: Negative for appetite change, chills, fever and unexpected weight change.   HENT: Negative for sore throat and trouble swallowing.    Eyes: Negative for pain and visual disturbance.   Respiratory: Negative for cough, chest tightness, shortness of breath and wheezing.    Cardiovascular: Negative for chest pain, palpitations and leg swelling.   Gastrointestinal: Negative for abdominal pain, blood in stool, constipation, diarrhea and nausea.   Genitourinary: Negative for difficulty urinating, dysuria and hematuria.   Musculoskeletal: Negative for arthralgias, gait problem and neck pain.   Skin: Negative for rash and wound.  "  Neurological: Negative for dizziness, weakness, light-headedness and headaches.   Hematological: Negative for adenopathy.   Psychiatric/Behavioral: Negative for dysphoric mood.       Objective:      BP (!) 150/84 (BP Location: Left arm, Patient Position: Sitting)   Pulse (!) 42   Temp 97.6 °F (36.4 °C) (Oral)   Ht 5' 10" (1.778 m)   Wt 97.7 kg (215 lb 6.2 oz)   SpO2 98%   BMI 30.91 kg/m²   Physical Exam   Constitutional: He is oriented to person, place, and time. He appears well-developed and well-nourished. He is active. No distress.   HENT:   Head: Normocephalic and atraumatic.   Right Ear: External ear normal.   Left Ear: External ear normal.   Mouth/Throat: Uvula is midline, oropharynx is clear and moist and mucous membranes are normal. No oropharyngeal exudate.   Eyes: Pupils are equal, round, and reactive to light. Conjunctivae, EOM and lids are normal.   Neck: Normal range of motion, full passive range of motion without pain and phonation normal. Neck supple. No thyroid mass and no thyromegaly present.   Cardiovascular: Normal rate, regular rhythm, normal heart sounds and intact distal pulses. Exam reveals no gallop and no friction rub.   No murmur heard.  Pulmonary/Chest: Effort normal and breath sounds normal. No respiratory distress. He has no wheezes. He has no rales.   Musculoskeletal: Normal range of motion.   Lymphadenopathy:     He has no cervical adenopathy.   Neurological: He is alert and oriented to person, place, and time. No cranial nerve deficit. Coordination normal.   Skin: Skin is warm and dry.   Psychiatric: He has a normal mood and affect. His speech is normal and behavior is normal. Judgment and thought content normal.       hospital records reviewed     Assessment:       1. TIA (transient ischemic attack)    2. Atrial fibrillation, transient    3. Essential hypertension        Plan:       TIA (transient ischemic attack)  -     Comprehensive metabolic panel; Future; Expected date: " 12/18/2019  -     Lipid panel; Future; Expected date: 12/18/2019  -     CBC auto differential; Future; Expected date: 12/18/2019    Atrial fibrillation, transient    Essential hypertension        Suspect likely embolic TIA related to pause in Eliquis in combination with afib event  Continue Eliquis 5mg BID with ASA  Agree with Lipitor 10mg  Continue other present meds  F/u with cardiology as planned  Counseled on regular exercise, maintenance of a healthy weight, balanced diet rich in fruits/vegetables and lean protein, and avoidance of unhealthy habits like smoking and excessive alcohol intake.  F/u 2 months with labs

## 2019-12-18 NOTE — TELEPHONE ENCOUNTER
Noted----- Message from Maxi Gaines MD sent at 12/18/2019  1:55 PM CST -----  Patient will be taking Lipitor 10mg in addition to adding ASA 81mg for stroke risk reduction.

## 2019-12-26 ENCOUNTER — TELEPHONE (OUTPATIENT)
Dept: ELECTROPHYSIOLOGY | Facility: CLINIC | Age: 74
End: 2019-12-26

## 2019-12-26 NOTE — TELEPHONE ENCOUNTER
Patient has an appointment with Dr Beatty in [place] in March. He would like to move the patient up to January (in [place]) with him. He said it would be ok to overbook. Can you please call and get him rescheduled?  Thanks!!!

## 2019-12-30 NOTE — TELEPHONE ENCOUNTER
Spoke with Dr. Song and r/s March appt to 02/10/20 @ 10:20am.  Pt was offered appts in January.  Pt  declined due to planned vacation.

## 2020-01-02 ENCOUNTER — PATIENT MESSAGE (OUTPATIENT)
Dept: ORTHOPEDICS | Facility: CLINIC | Age: 75
End: 2020-01-02

## 2020-01-02 ENCOUNTER — PATIENT MESSAGE (OUTPATIENT)
Dept: CARDIOLOGY | Facility: CLINIC | Age: 75
End: 2020-01-02

## 2020-01-02 DIAGNOSIS — M62.838 MUSCLE SPASMS OF BOTH LOWER EXTREMITIES: ICD-10-CM

## 2020-01-02 DIAGNOSIS — R35.0 URINARY FREQUENCY: ICD-10-CM

## 2020-01-02 DIAGNOSIS — N18.30 CKD (CHRONIC KIDNEY DISEASE) STAGE 3, GFR 30-59 ML/MIN: ICD-10-CM

## 2020-01-02 DIAGNOSIS — Z98.890 POST-OPERATIVE STATE: ICD-10-CM

## 2020-01-02 RX ORDER — METOPROLOL SUCCINATE 50 MG/1
50 TABLET, EXTENDED RELEASE ORAL DAILY
Qty: 90 TABLET | Refills: 1 | Status: SHIPPED | OUTPATIENT
Start: 2020-01-02 | End: 2020-02-10

## 2020-01-02 RX ORDER — CELECOXIB 200 MG/1
200 CAPSULE ORAL 2 TIMES DAILY
Qty: 180 CAPSULE | Refills: 0 | Status: SHIPPED | OUTPATIENT
Start: 2020-01-02 | End: 2020-04-05 | Stop reason: SDUPTHER

## 2020-01-02 RX ORDER — CYCLOBENZAPRINE HCL 10 MG
TABLET ORAL
Qty: 40 TABLET | Refills: 5 | Status: SHIPPED | OUTPATIENT
Start: 2020-01-02 | End: 2020-07-30 | Stop reason: SDUPTHER

## 2020-01-02 RX ORDER — ATORVASTATIN CALCIUM 10 MG/1
10 TABLET, FILM COATED ORAL DAILY
Qty: 90 TABLET | Refills: 3 | Status: SHIPPED | OUTPATIENT
Start: 2020-01-02 | End: 2020-01-06 | Stop reason: SDUPTHER

## 2020-01-02 RX ORDER — CYCLOBENZAPRINE HCL 10 MG
TABLET ORAL
Qty: 40 TABLET | Refills: 5 | Status: SHIPPED | OUTPATIENT
Start: 2020-01-02 | End: 2020-01-02

## 2020-01-05 ENCOUNTER — PATIENT MESSAGE (OUTPATIENT)
Dept: FAMILY MEDICINE | Facility: CLINIC | Age: 75
End: 2020-01-05

## 2020-01-06 RX ORDER — ATORVASTATIN CALCIUM 10 MG/1
10 TABLET, FILM COATED ORAL DAILY
Qty: 90 TABLET | Refills: 3 | Status: SHIPPED | OUTPATIENT
Start: 2020-01-06 | End: 2021-03-10 | Stop reason: ALTCHOICE

## 2020-01-13 DIAGNOSIS — N40.0 BENIGN PROSTATIC HYPERPLASIA: ICD-10-CM

## 2020-01-13 RX ORDER — DUTASTERIDE 0.5 MG/1
CAPSULE, LIQUID FILLED ORAL
Qty: 90 CAPSULE | Refills: 3 | Status: SHIPPED | OUTPATIENT
Start: 2020-01-13 | End: 2020-12-16 | Stop reason: SDUPTHER

## 2020-01-31 ENCOUNTER — PATIENT MESSAGE (OUTPATIENT)
Dept: FAMILY MEDICINE | Facility: CLINIC | Age: 75
End: 2020-01-31

## 2020-02-01 ENCOUNTER — PATIENT MESSAGE (OUTPATIENT)
Dept: FAMILY MEDICINE | Facility: CLINIC | Age: 75
End: 2020-02-01

## 2020-02-03 ENCOUNTER — LAB VISIT (OUTPATIENT)
Dept: LAB | Facility: HOSPITAL | Age: 75
End: 2020-02-03
Attending: INTERNAL MEDICINE
Payer: MEDICARE

## 2020-02-03 DIAGNOSIS — E03.9 ACQUIRED HYPOTHYROIDISM: ICD-10-CM

## 2020-02-03 DIAGNOSIS — G45.9 TIA (TRANSIENT ISCHEMIC ATTACK): ICD-10-CM

## 2020-02-03 LAB
ALBUMIN SERPL BCP-MCNC: 3.2 G/DL (ref 3.5–5.2)
ALP SERPL-CCNC: 73 U/L (ref 55–135)
ALT SERPL W/O P-5'-P-CCNC: 32 U/L (ref 10–44)
ANION GAP SERPL CALC-SCNC: 5 MMOL/L (ref 8–16)
AST SERPL-CCNC: 34 U/L (ref 10–40)
BASOPHILS # BLD AUTO: 0.12 K/UL (ref 0–0.2)
BASOPHILS NFR BLD: 1.5 % (ref 0–1.9)
BILIRUB SERPL-MCNC: 0.9 MG/DL (ref 0.1–1)
BUN SERPL-MCNC: 20 MG/DL (ref 8–23)
CALCIUM SERPL-MCNC: 8.2 MG/DL (ref 8.7–10.5)
CHLORIDE SERPL-SCNC: 114 MMOL/L (ref 95–110)
CHOLEST SERPL-MCNC: 93 MG/DL (ref 120–199)
CHOLEST/HDLC SERPL: 2.1 {RATIO} (ref 2–5)
CO2 SERPL-SCNC: 23 MMOL/L (ref 23–29)
CREAT SERPL-MCNC: 1.2 MG/DL (ref 0.5–1.4)
DIFFERENTIAL METHOD: ABNORMAL
EOSINOPHIL # BLD AUTO: 0.9 K/UL (ref 0–0.5)
EOSINOPHIL NFR BLD: 11.5 % (ref 0–8)
ERYTHROCYTE [DISTWIDTH] IN BLOOD BY AUTOMATED COUNT: 14.5 % (ref 11.5–14.5)
EST. GFR  (AFRICAN AMERICAN): >60 ML/MIN/1.73 M^2
EST. GFR  (NON AFRICAN AMERICAN): 59.6 ML/MIN/1.73 M^2
GLUCOSE SERPL-MCNC: 100 MG/DL (ref 70–110)
HCT VFR BLD AUTO: 40.5 % (ref 40–54)
HDLC SERPL-MCNC: 45 MG/DL (ref 40–75)
HDLC SERPL: 48.4 % (ref 20–50)
HGB BLD-MCNC: 12.1 G/DL (ref 14–18)
IMM GRANULOCYTES # BLD AUTO: 0.03 K/UL (ref 0–0.04)
IMM GRANULOCYTES NFR BLD AUTO: 0.4 % (ref 0–0.5)
LDLC SERPL CALC-MCNC: 38.2 MG/DL (ref 63–159)
LYMPHOCYTES # BLD AUTO: 1.8 K/UL (ref 1–4.8)
LYMPHOCYTES NFR BLD: 21.9 % (ref 18–48)
MCH RBC QN AUTO: 28.5 PG (ref 27–31)
MCHC RBC AUTO-ENTMCNC: 29.9 G/DL (ref 32–36)
MCV RBC AUTO: 95 FL (ref 82–98)
MONOCYTES # BLD AUTO: 0.7 K/UL (ref 0.3–1)
MONOCYTES NFR BLD: 8.1 % (ref 4–15)
NEUTROPHILS # BLD AUTO: 4.6 K/UL (ref 1.8–7.7)
NEUTROPHILS NFR BLD: 56.6 % (ref 38–73)
NONHDLC SERPL-MCNC: 48 MG/DL
NRBC BLD-RTO: 0 /100 WBC
PLATELET # BLD AUTO: 257 K/UL (ref 150–350)
PMV BLD AUTO: 12.2 FL (ref 9.2–12.9)
POTASSIUM SERPL-SCNC: 4.4 MMOL/L (ref 3.5–5.1)
PROT SERPL-MCNC: 6 G/DL (ref 6–8.4)
RBC # BLD AUTO: 4.25 M/UL (ref 4.6–6.2)
SODIUM SERPL-SCNC: 142 MMOL/L (ref 136–145)
TRIGL SERPL-MCNC: 49 MG/DL (ref 30–150)
TSH SERPL DL<=0.005 MIU/L-ACNC: 3.19 UIU/ML (ref 0.4–4)
WBC # BLD AUTO: 8.07 K/UL (ref 3.9–12.7)

## 2020-02-03 PROCEDURE — 85025 COMPLETE CBC W/AUTO DIFF WBC: CPT | Mod: HCNC

## 2020-02-03 PROCEDURE — 84443 ASSAY THYROID STIM HORMONE: CPT | Mod: HCNC

## 2020-02-03 PROCEDURE — 36415 COLL VENOUS BLD VENIPUNCTURE: CPT | Mod: HCNC,PO

## 2020-02-03 PROCEDURE — 80061 LIPID PANEL: CPT | Mod: HCNC

## 2020-02-03 PROCEDURE — 80053 COMPREHEN METABOLIC PANEL: CPT | Mod: HCNC

## 2020-02-06 ENCOUNTER — PATIENT MESSAGE (OUTPATIENT)
Dept: ENDOCRINOLOGY | Facility: CLINIC | Age: 75
End: 2020-02-06

## 2020-02-06 ENCOUNTER — TELEPHONE (OUTPATIENT)
Dept: ENDOCRINOLOGY | Facility: CLINIC | Age: 75
End: 2020-02-06

## 2020-02-06 DIAGNOSIS — E03.9 ACQUIRED HYPOTHYROIDISM: Primary | ICD-10-CM

## 2020-02-06 DIAGNOSIS — E03.9 HYPOTHYROIDISM, UNSPECIFIED TYPE: ICD-10-CM

## 2020-02-06 RX ORDER — LEVOTHYROXINE SODIUM 50 UG/1
50 TABLET ORAL
Qty: 30 TABLET | Refills: 11 | Status: SHIPPED | OUTPATIENT
Start: 2020-02-06 | End: 2020-08-17

## 2020-02-09 NOTE — PROGRESS NOTES
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 )      HPI 73 yo male with h/o atrial fibrillation, GI bleed, Htn, Sleep Apnea.    Background:  First diagnosed with atrial fibrillation 2/12 (noted palpitations). Placed on beta blocker, coumadin. Underwent PEPE/DCCV. Had recurrence, Propafenone  mg twice daily added.  Had recurrence 12/24/13, underwent DCCV, Propafenone increased to 425 mg twice daily.  PVI ([place]) 7/28/14.   Had done well, was off Propafenone. Developed recurrence, underwent DCCV multiple times.  Repeat PVI 4/27/17 All 4 veins remained isolated.  Antralized left sided lesion set posteriorly, and performed SVC isolation.  Has been compliant with CPAP.  Had persistent recurrence >> DCCV 2/9/18.  He has had paroxysmal AF, with 4 episodes since 11/18.    Exercise echo 2/19 normal biventricular structure and function BISI 57 negative for ischemia.    Update:  Has continued to have paroxysmal events, self-terminating.  Last episode was last month.  Generally, the episodes are lasting 3 days.  Since last visit, he was admitted with a TIA.  This was due to non-compliance with Eliquis.  Has gained 10 lbs.  BP slightly elevated.      Review of Systems   Constitution: Negative. Negative for malaise/fatigue.   Cardiovascular: Positive for palpitations. Negative for chest pain, dyspnea on exertion, irregular heartbeat, leg swelling, near-syncope, orthopnea, paroxysmal nocturnal dyspnea and syncope.   Respiratory: Negative for cough and shortness of breath.    Neurological: Negative for dizziness, light-headedness and weakness.   All other systems reviewed and are negative.       Objective:    Physical Exam   Constitutional: He is oriented to person, place, and time. He appears well-developed and well-nourished.   Eyes: Conjunctivae are normal. No scleral icterus.   Neck: No JVD present.  No tracheal deviation present.   Cardiovascular: Normal rate, regular rhythm and normal heart sounds. PMI is not displaced.   Pulmonary/Chest: Effort normal and breath sounds normal. No respiratory distress.   Abdominal: Soft. There is no hepatosplenomegaly. There is no tenderness.   Musculoskeletal: He exhibits no edema (lower extremity) or tenderness.   Neurological: He is alert and oriented to person, place, and time.   Skin: Skin is warm and dry. No rash noted.   Psychiatric: He has a normal mood and affect. His behavior is normal.         Assessment:       1. Paroxysmal atrial fibrillation    2. Bradycardia    3. Essential hypertension    4. CKD (chronic kidney disease) stage 3, GFR 30-59 ml/min    5. Hypothyroidism due to acquired atrophy of thyroid    6. History of bleeding peptic ulcer    7. Complex sleep apnea syndrome         Plan:           Paroxysmal AF, with increasing recurrence.  Options are repeat RFA vs surgical approach.  Discussed both options at length.  He prefers ablation, and I agree.  RFA of cavo-tricuspid isthmus and posterior wall isolation at that time.  PEPE day prior.  Hold Eliquis morning of procedure.  Hold Propafenone 3 days prior.    Also:  Discontinue Toprol.  Add Coreg 12.5 mg BID.  Counseled on weight reduction.

## 2020-02-10 ENCOUNTER — OFFICE VISIT (OUTPATIENT)
Dept: CARDIOLOGY | Facility: CLINIC | Age: 75
End: 2020-02-10
Payer: MEDICARE

## 2020-02-10 VITALS
WEIGHT: 216.69 LBS | HEART RATE: 60 BPM | HEIGHT: 74 IN | SYSTOLIC BLOOD PRESSURE: 150 MMHG | DIASTOLIC BLOOD PRESSURE: 86 MMHG | BODY MASS INDEX: 27.81 KG/M2

## 2020-02-10 DIAGNOSIS — R00.1 BRADYCARDIA: ICD-10-CM

## 2020-02-10 DIAGNOSIS — I48.0 PAROXYSMAL ATRIAL FIBRILLATION: Primary | ICD-10-CM

## 2020-02-10 DIAGNOSIS — G47.31 COMPLEX SLEEP APNEA SYNDROME: ICD-10-CM

## 2020-02-10 DIAGNOSIS — Z87.11 HISTORY OF BLEEDING PEPTIC ULCER: ICD-10-CM

## 2020-02-10 DIAGNOSIS — I48.91 ATRIAL FIBRILLATION, UNSPECIFIED TYPE: Primary | ICD-10-CM

## 2020-02-10 DIAGNOSIS — I10 ESSENTIAL HYPERTENSION: ICD-10-CM

## 2020-02-10 DIAGNOSIS — I48.91 ATRIAL FIBRILLATION, UNSPECIFIED TYPE: ICD-10-CM

## 2020-02-10 DIAGNOSIS — E03.4 HYPOTHYROIDISM DUE TO ACQUIRED ATROPHY OF THYROID: ICD-10-CM

## 2020-02-10 DIAGNOSIS — N18.30 CKD (CHRONIC KIDNEY DISEASE) STAGE 3, GFR 30-59 ML/MIN: ICD-10-CM

## 2020-02-10 PROCEDURE — 1159F PR MEDICATION LIST DOCUMENTED IN MEDICAL RECORD: ICD-10-PCS | Mod: HCNC,S$GLB,, | Performed by: INTERNAL MEDICINE

## 2020-02-10 PROCEDURE — 93005 RHYTHM STRIP: ICD-10-PCS | Mod: HCNC,S$GLB,, | Performed by: INTERNAL MEDICINE

## 2020-02-10 PROCEDURE — 99214 OFFICE O/P EST MOD 30 MIN: CPT | Mod: HCNC,S$GLB,, | Performed by: INTERNAL MEDICINE

## 2020-02-10 PROCEDURE — 3077F SYST BP >= 140 MM HG: CPT | Mod: HCNC,CPTII,S$GLB, | Performed by: INTERNAL MEDICINE

## 2020-02-10 PROCEDURE — 99999 PR PBB SHADOW E&M-EST. PATIENT-LVL III: CPT | Mod: PBBFAC,HCNC,, | Performed by: INTERNAL MEDICINE

## 2020-02-10 PROCEDURE — 99499 UNLISTED E&M SERVICE: CPT | Mod: HCNC,S$GLB,, | Performed by: INTERNAL MEDICINE

## 2020-02-10 PROCEDURE — 1100F PTFALLS ASSESS-DOCD GE2>/YR: CPT | Mod: HCNC,CPTII,S$GLB, | Performed by: INTERNAL MEDICINE

## 2020-02-10 PROCEDURE — 93005 ELECTROCARDIOGRAM TRACING: CPT | Mod: HCNC,S$GLB,, | Performed by: INTERNAL MEDICINE

## 2020-02-10 PROCEDURE — 93010 RHYTHM STRIP: ICD-10-PCS | Mod: HCNC,S$GLB,, | Performed by: INTERNAL MEDICINE

## 2020-02-10 PROCEDURE — 3079F PR MOST RECENT DIASTOLIC BLOOD PRESSURE 80-89 MM HG: ICD-10-PCS | Mod: HCNC,CPTII,S$GLB, | Performed by: INTERNAL MEDICINE

## 2020-02-10 PROCEDURE — 1126F AMNT PAIN NOTED NONE PRSNT: CPT | Mod: HCNC,S$GLB,, | Performed by: INTERNAL MEDICINE

## 2020-02-10 PROCEDURE — 3288F PR FALLS RISK ASSESSMENT DOCUMENTED: ICD-10-PCS | Mod: HCNC,CPTII,S$GLB, | Performed by: INTERNAL MEDICINE

## 2020-02-10 PROCEDURE — 3079F DIAST BP 80-89 MM HG: CPT | Mod: HCNC,CPTII,S$GLB, | Performed by: INTERNAL MEDICINE

## 2020-02-10 PROCEDURE — 99999 PR PBB SHADOW E&M-EST. PATIENT-LVL III: ICD-10-PCS | Mod: PBBFAC,HCNC,, | Performed by: INTERNAL MEDICINE

## 2020-02-10 PROCEDURE — 99214 PR OFFICE/OUTPT VISIT, EST, LEVL IV, 30-39 MIN: ICD-10-PCS | Mod: HCNC,S$GLB,, | Performed by: INTERNAL MEDICINE

## 2020-02-10 PROCEDURE — 1126F PR PAIN SEVERITY QUANTIFIED, NO PAIN PRESENT: ICD-10-PCS | Mod: HCNC,S$GLB,, | Performed by: INTERNAL MEDICINE

## 2020-02-10 PROCEDURE — 1159F MED LIST DOCD IN RCRD: CPT | Mod: HCNC,S$GLB,, | Performed by: INTERNAL MEDICINE

## 2020-02-10 PROCEDURE — 3288F FALL RISK ASSESSMENT DOCD: CPT | Mod: HCNC,CPTII,S$GLB, | Performed by: INTERNAL MEDICINE

## 2020-02-10 PROCEDURE — 99499 RISK ADDL DX/OHS AUDIT: ICD-10-PCS | Mod: HCNC,S$GLB,, | Performed by: INTERNAL MEDICINE

## 2020-02-10 PROCEDURE — 93010 ELECTROCARDIOGRAM REPORT: CPT | Mod: HCNC,S$GLB,, | Performed by: INTERNAL MEDICINE

## 2020-02-10 PROCEDURE — 3077F PR MOST RECENT SYSTOLIC BLOOD PRESSURE >= 140 MM HG: ICD-10-PCS | Mod: HCNC,CPTII,S$GLB, | Performed by: INTERNAL MEDICINE

## 2020-02-10 PROCEDURE — 1100F PR PT FALLS ASSESS DOC 2+ FALLS/FALL W/INJURY/YR: ICD-10-PCS | Mod: HCNC,CPTII,S$GLB, | Performed by: INTERNAL MEDICINE

## 2020-02-10 RX ORDER — CARVEDILOL 12.5 MG/1
12.5 TABLET ORAL 2 TIMES DAILY WITH MEALS
Qty: 180 TABLET | Refills: 3 | Status: SHIPPED | OUTPATIENT
Start: 2020-02-10 | End: 2021-04-12 | Stop reason: SDUPTHER

## 2020-02-17 ENCOUNTER — OFFICE VISIT (OUTPATIENT)
Dept: FAMILY MEDICINE | Facility: CLINIC | Age: 75
End: 2020-02-17
Payer: MEDICARE

## 2020-02-17 VITALS
OXYGEN SATURATION: 99 % | WEIGHT: 216.69 LBS | HEART RATE: 60 BPM | BODY MASS INDEX: 27.81 KG/M2 | DIASTOLIC BLOOD PRESSURE: 76 MMHG | HEIGHT: 74 IN | TEMPERATURE: 98 F | SYSTOLIC BLOOD PRESSURE: 122 MMHG

## 2020-02-17 DIAGNOSIS — G45.9 TIA (TRANSIENT ISCHEMIC ATTACK): ICD-10-CM

## 2020-02-17 DIAGNOSIS — I10 ESSENTIAL HYPERTENSION: Primary | ICD-10-CM

## 2020-02-17 DIAGNOSIS — I48.91 ATRIAL FIBRILLATION, TRANSIENT: ICD-10-CM

## 2020-02-17 DIAGNOSIS — E03.4 HYPOTHYROIDISM DUE TO ACQUIRED ATROPHY OF THYROID: ICD-10-CM

## 2020-02-17 PROCEDURE — 1101F PT FALLS ASSESS-DOCD LE1/YR: CPT | Mod: HCNC,CPTII,S$GLB, | Performed by: FAMILY MEDICINE

## 2020-02-17 PROCEDURE — 99214 PR OFFICE/OUTPT VISIT, EST, LEVL IV, 30-39 MIN: ICD-10-PCS | Mod: HCNC,S$GLB,, | Performed by: FAMILY MEDICINE

## 2020-02-17 PROCEDURE — 1101F PR PT FALLS ASSESS DOC 0-1 FALLS W/OUT INJ PAST YR: ICD-10-PCS | Mod: HCNC,CPTII,S$GLB, | Performed by: FAMILY MEDICINE

## 2020-02-17 PROCEDURE — 1126F PR PAIN SEVERITY QUANTIFIED, NO PAIN PRESENT: ICD-10-PCS | Mod: HCNC,S$GLB,, | Performed by: FAMILY MEDICINE

## 2020-02-17 PROCEDURE — 99214 OFFICE O/P EST MOD 30 MIN: CPT | Mod: HCNC,S$GLB,, | Performed by: FAMILY MEDICINE

## 2020-02-17 PROCEDURE — 99999 PR PBB SHADOW E&M-EST. PATIENT-LVL V: ICD-10-PCS | Mod: PBBFAC,HCNC,, | Performed by: FAMILY MEDICINE

## 2020-02-17 PROCEDURE — 1159F PR MEDICATION LIST DOCUMENTED IN MEDICAL RECORD: ICD-10-PCS | Mod: HCNC,S$GLB,, | Performed by: FAMILY MEDICINE

## 2020-02-17 PROCEDURE — 3078F DIAST BP <80 MM HG: CPT | Mod: HCNC,CPTII,S$GLB, | Performed by: FAMILY MEDICINE

## 2020-02-17 PROCEDURE — 99999 PR PBB SHADOW E&M-EST. PATIENT-LVL V: CPT | Mod: PBBFAC,HCNC,, | Performed by: FAMILY MEDICINE

## 2020-02-17 PROCEDURE — 3074F PR MOST RECENT SYSTOLIC BLOOD PRESSURE < 130 MM HG: ICD-10-PCS | Mod: HCNC,CPTII,S$GLB, | Performed by: FAMILY MEDICINE

## 2020-02-17 PROCEDURE — 1126F AMNT PAIN NOTED NONE PRSNT: CPT | Mod: HCNC,S$GLB,, | Performed by: FAMILY MEDICINE

## 2020-02-17 PROCEDURE — 3074F SYST BP LT 130 MM HG: CPT | Mod: HCNC,CPTII,S$GLB, | Performed by: FAMILY MEDICINE

## 2020-02-17 PROCEDURE — 1159F MED LIST DOCD IN RCRD: CPT | Mod: HCNC,S$GLB,, | Performed by: FAMILY MEDICINE

## 2020-02-17 PROCEDURE — 3078F PR MOST RECENT DIASTOLIC BLOOD PRESSURE < 80 MM HG: ICD-10-PCS | Mod: HCNC,CPTII,S$GLB, | Performed by: FAMILY MEDICINE

## 2020-02-17 NOTE — PROGRESS NOTES
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    Chief Complaint: Follow-up (2 month)    Here for 2 month follow-up.    H/o TIA - taking aspirin and Lipitor for the last 2 months  Afib - s/p 2 ablation; sees Dr. Beatty; gets episodes twice a year requiring cardioversion. Taking Eliquis 5mg BID, metoprolol 50mg daily, Rhythmol BID.  HTN - telmisartan 40mg   CKD - following with Dr. Pond  Knee DJD - s/p right knee TKA in May. Going to PT. Using oxycodone 1/2 tab QHS  Hypothyroidism - tolerating levothyroxine 25mcg;  S/ gastric bypass - taking ferrous sulfate and B12 for the last 3 months; lost 5 pounds in last 90 days and 20 pounds in the last year.            Follow-up   Pertinent negatives include no abdominal pain, arthralgias, chest pain, chills, coughing, fever, headaches, nausea, neck pain, rash, sore throat or weakness.       Past Medical History:   Diagnosis Date    Anticoagulant long-term use     Anxiety     Arthritis     Atrial fibrillation     BPH (benign prostatic hyperplasia)     Cataract     CKD (chronic kidney disease) stage 3, GFR 30-59 ml/min 7/10/2017    Followed by Dr. Jeevan Pond    Colon polyp     benign    Depression     Elevated PSA     Erectile dysfunction     Gastric ulcer with hemorrhage     Hep B w/o coma 1977    History of bleeding peptic ulcer     History of prostatitis     Hypertension     PAF (paroxysmal atrial fibrillation)     Sleep apnea     on CPAP       Past Surgical History:   Procedure Laterality Date    ABDOMINAL HERNIA REPAIR      CATARACT EXTRACTION  11/25/2013    bilateral    CHOLECYSTECTOMY      COLONOSCOPY N/A 11/25/2015    Procedure: COLONOSCOPY;  Surgeon: Toby Hernandez MD;  Location: [facility];  Service: Endoscopy;  Laterality: N/A;    EYE SURGERY      GASTRIC BYPASS  1992    KNEE ARTHROSCOPY      RT    LASIK  2001    both eyes (Dr. Rabago)    ORIF HUMERUS FRACTURE      LT    ORIF HUMERUS FRACTURE Right     non  surgical repair    RADIOFREQUENCY ABLATION      x2    Right ankle tendon repair      ROTATOR CUFF REPAIR      right    TOTAL KNEE ARTHROPLASTY Right 5/29/2018    Procedure: REPLACEMENT-KNEE-TOTAL-pro;  Surgeon: Denzel Dallas MD;  Location: [facility] OR [address] [place] [address];  Service: Orthopedics;  Laterality: Right;  Pro       Review of patient's allergies indicates:   Allergen Reactions    No known drug allergies        Social History     Socioeconomic History    Marital status:      Spouse name: Not on file    Number of children: 5    Years of education: Not on file    Highest education level: Not on file   Occupational History    Occupation: retired anesthesiology     Employer: [employer]    Occupation: [employer] grad     Comment: previous football player   Social Needs    Financial resource strain: Not on file    Food insecurity:     Worry: Not on file     Inability: Not on file    Transportation needs:     Medical: Not on file     Non-medical: Not on file   Tobacco Use    Smoking status: Never Smoker    Smokeless tobacco: Never Used    Tobacco 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    Substance and Sexual Activity    Alcohol use: No    Drug use: No    Sexual activity: Yes     Partners: Female   Lifestyle    Physical activity:     Days per week: Not on file     Minutes per session: Not on file    Stress: Not on file   Relationships    Social connections:     Talks on phone: Not on file     Gets together: Not on file     Attends Mormonism service: Not on file     Active member of club or organization: Not on file     Attends meetings of clubs or organizations: Not on file     Relationship status: Not on file   Other Topics Concern    Patient feels they ought to cut down on drinking/drug use Not Asked    Patient annoyed by others criticizing their drinking/drug use Not Asked    Patient has felt bad or guilty about drinking/drug use Not Asked    Patient has had a  drink/used drugs as an eye opener in the AM Not Asked   Social History Narrative    Not on file       Current Outpatient Medications on File Prior to Visit   Medication Sig Dispense Refill    acyclovir (ZOVIRAX) 800 MG Tab TK ONE T PO FIVE TIMES D only for fever blisters  1    alendronate (FOSAMAX) 70 MG tablet Take 1 tablet (70 mg total) by mouth every 7 days. Take with a full glass of water & remain upright for at least 30 minutes 12 tablet 3    aspirin 81 MG Chew 81 mg.      atorvastatin (LIPITOR) 10 MG tablet Take 1 tablet (10 mg total) by mouth once daily. 90 tablet 3    betamethasone acetate-betamethasone sodium phosphate (CELESTONE) 6 mg/mL injection as needed.       buPROPion (WELLBUTRIN XL) 300 MG 24 hr tablet Take 1 tablet (300 mg total) by mouth once daily. 90 tablet 3    calcium carbonate (OS-KISHAN) 500 mg calcium (1,250 mg) tablet       CALCIUM ORAL Take by mouth Daily.      calcium-vitamin D 600 mg(1,500mg) -400 unit Tab 600 mg.      carvediloL (COREG) 12.5 MG tablet Take 1 tablet (12.5 mg total) by mouth 2 (two) times daily with meals. 180 tablet 3    celecoxib (CELEBREX) 200 MG capsule Take 1 capsule (200 mg total) by mouth 2 (two) times daily. 180 capsule 0    cyanocobalamin/folic acid (VITAMIN B12-FOLIC ACID) 500-400 mcg Tab Take by mouth.      cyclobenzaprine (FLEXERIL) 10 MG tablet TAKE 1 TABLET BY MOUTH EVERY 8 HOURS AS NEEDED FOR MUSCLE SPASM 40 tablet 5    dutasteride (AVODART) 0.5 mg capsule TAKE 1 CAPSULE(0.5 MG) BY MOUTH EVERY DAY 90 capsule 3    efinaconazole (JUBLIA) 10 % Prakash Apply topically.      ELIQUIS 5 mg Tab Take 1 tablet (5 mg total) by mouth 2 (two) times daily. 60 tablet 11    escitalopram oxalate (LEXAPRO) 10 MG tablet TAKE 1 TABLET(10 MG) BY MOUTH EVERY DAY 90 tablet 0    ferrous sulfate (FEOSOL) 325 mg (65 mg iron) Tab tablet Take by mouth.      KENALOG 40 mg/mL injection as needed.       levothyroxine (SYNTHROID) 50 MCG tablet Take 1 tablet (50 mcg total)  by mouth before breakfast. 30 tablet 11    MULTIVITAMIN ORAL Take by mouth Daily.      omega 3-dha-epa-fish oil (OMEGA-3) 350 mg-235 mg- 90 mg-597 mg CpDR       OMEGA-3 FATTY ACIDS (FISH OIL CONCENTRATE ORAL) Take by mouth Daily.      propafenone (RYTHMOL SR) 425 MG Cp12 Take 1 capsule (425 mg total) by mouth every 12 (twelve) hours. 180 capsule 3    telmisartan (MICARDIS) 40 MG Tab Take 1 tablet (40 mg total) by mouth once daily. 90 tablet 3    cholestyramine (QUESTRAN) 4 gram packet Take 1 packet (4 g total) by mouth once daily. 30 packet 3     No current facility-administered medications on file prior to visit.        Family History   Problem Relation Age of Onset    COPD Father     Diabetes Father     Osteoporosis Father     Aortic stenosis Mother     Heart disease Mother         aortic stenosis    Osteoporosis Mother     Heart attack Brother     No Known Problems Son     No Known Problems Daughter     No Known Problems Daughter     No Known Problems Daughter        Review of Systems   Constitutional: Negative for appetite change, chills, fever and unexpected weight change.   HENT: Negative for sore throat and trouble swallowing.    Eyes: Negative for pain and visual disturbance.   Respiratory: Negative for cough, chest tightness, shortness of breath and wheezing.    Cardiovascular: Negative for chest pain, palpitations and leg swelling.   Gastrointestinal: Negative for abdominal pain, blood in stool, constipation, diarrhea and nausea.   Genitourinary: Negative for difficulty urinating, dysuria and hematuria.   Musculoskeletal: Negative for arthralgias, gait problem and neck pain.   Skin: Negative for rash and wound.   Neurological: Negative for dizziness, weakness, light-headedness and headaches.   Hematological: Negative for adenopathy.   Psychiatric/Behavioral: Negative for dysphoric mood.       Objective:      /76 (BP Location: Right arm, Patient Position: Sitting)   Pulse 60   Temp  "98.2 °F (36.8 °C) (Oral)   Ht 6' 2" (1.88 m)   Wt 98.3 kg (216 lb 11.4 oz)   SpO2 99%   BMI 27.82 kg/m²   Physical Exam   Constitutional: He is oriented to person, place, and time. He appears well-developed and well-nourished. He is active. No distress.   HENT:   Head: Normocephalic and atraumatic.   Right Ear: External ear normal.   Left Ear: External ear normal.   Mouth/Throat: Uvula is midline, oropharynx is clear and moist and mucous membranes are normal. No oropharyngeal exudate.   Eyes: Pupils are equal, round, and reactive to light. Conjunctivae, EOM and lids are normal.   Neck: Normal range of motion, full passive range of motion without pain and phonation normal. Neck supple. No thyroid mass and no thyromegaly present.   Cardiovascular: Normal rate, regular rhythm, normal heart sounds and intact distal pulses. Exam reveals no gallop and no friction rub.   No murmur heard.  Pulmonary/Chest: Effort normal and breath sounds normal. No respiratory distress. He has no wheezes. He has no rales.   Musculoskeletal: Normal range of motion.   Lymphadenopathy:     He has no cervical adenopathy.   Neurological: He is alert and oriented to person, place, and time. No cranial nerve deficit. Coordination normal.   Skin: Skin is warm and dry.   Psychiatric: He has a normal mood and affect. His speech is normal and behavior is normal. Judgment and thought content normal.       Results for orders placed or performed in visit on 02/03/20   TSH   Result Value Ref Range    TSH 3.186 0.400 - 4.000 uIU/mL   Comprehensive metabolic panel   Result Value Ref Range    Sodium 142 136 - 145 mmol/L    Potassium 4.4 3.5 - 5.1 mmol/L    Chloride 114 (H) 95 - 110 mmol/L    CO2 23 23 - 29 mmol/L    Glucose 100 70 - 110 mg/dL    BUN, Bld 20 8 - 23 mg/dL    Creatinine 1.2 0.5 - 1.4 mg/dL    Calcium 8.2 (L) 8.7 - 10.5 mg/dL    Total Protein 6.0 6.0 - 8.4 g/dL    Albumin 3.2 (L) 3.5 - 5.2 g/dL    Total Bilirubin 0.9 0.1 - 1.0 mg/dL    " Alkaline Phosphatase 73 55 - 135 U/L    AST 34 10 - 40 U/L    ALT 32 10 - 44 U/L    Anion Gap 5 (L) 8 - 16 mmol/L    eGFR if African American >60.0 >60 mL/min/1.73 m^2    eGFR if non  59.6 (A) >60 mL/min/1.73 m^2   Lipid panel   Result Value Ref Range    Cholesterol 93 (L) 120 - 199 mg/dL    Triglycerides 49 30 - 150 mg/dL    HDL 45 40 - 75 mg/dL    LDL Cholesterol 38.2 (L) 63.0 - 159.0 mg/dL    Hdl/Cholesterol Ratio 48.4 20.0 - 50.0 %    Total Cholesterol/HDL Ratio 2.1 2.0 - 5.0    Non-HDL Cholesterol 48 mg/dL   CBC auto differential   Result Value Ref Range    WBC 8.07 3.90 - 12.70 K/uL    RBC 4.25 (L) 4.60 - 6.20 M/uL    Hemoglobin 12.1 (L) 14.0 - 18.0 g/dL    Hematocrit 40.5 40.0 - 54.0 %    Mean Corpuscular Volume 95 82 - 98 fL    Mean Corpuscular Hemoglobin 28.5 27.0 - 31.0 pg    Mean Corpuscular Hemoglobin Conc 29.9 (L) 32.0 - 36.0 g/dL    RDW 14.5 11.5 - 14.5 %    Platelets 257 150 - 350 K/uL    MPV 12.2 9.2 - 12.9 fL    Immature Granulocytes 0.4 0.0 - 0.5 %    Gran # (ANC) 4.6 1.8 - 7.7 K/uL    Immature Grans (Abs) 0.03 0.00 - 0.04 K/uL    Lymph # 1.8 1.0 - 4.8 K/uL    Mono # 0.7 0.3 - 1.0 K/uL    Eos # 0.9 (H) 0.0 - 0.5 K/uL    Baso # 0.12 0.00 - 0.20 K/uL    nRBC 0 0 /100 WBC    Gran% 56.6 38.0 - 73.0 %    Lymph% 21.9 18.0 - 48.0 %    Mono% 8.1 4.0 - 15.0 %    Eosinophil% 11.5 (H) 0.0 - 8.0 %    Basophil% 1.5 0.0 - 1.9 %    Differential Method Automated      *Note: Due to a large number of results and/or encounters for the requested time period, some results have not been displayed. A complete set of results can be found in Results Review.          Assessment:       1. Essential hypertension    2. TIA (transient ischemic attack)    3. Atrial fibrillation, transient    4. Hypothyroidism due to acquired atrophy of thyroid        Plan:       Essential hypertension  -     CBC auto differential; Future; Expected date: 08/15/2020  -     Lipid panel; Future; Expected date: 08/15/2020  -      Comprehensive metabolic panel; Future; Expected date: 08/15/2020    TIA (transient ischemic attack)    Atrial fibrillation, transient    Hypothyroidism due to acquired atrophy of thyroid  -     TSH; Future; Expected date: 08/15/2020          Continue Eliquis 5mg BID with ASA  Continue telmisartan and begin carvedilol as advised by cardiology  Continue Lipitor 10mg  Continue other present meds  F/u with cardiology as planned  Counseled on regular exercise, maintenance of a healthy weight, balanced diet rich in fruits/vegetables and lean protein, and avoidance of unhealthy habits like smoking and excessive alcohol intake.  F/u 6 months with labs

## 2020-02-18 ENCOUNTER — TELEPHONE (OUTPATIENT)
Dept: ELECTROPHYSIOLOGY | Facility: CLINIC | Age: 75
End: 2020-02-18

## 2020-02-18 DIAGNOSIS — I48.0 PAROXYSMAL ATRIAL FIBRILLATION: Primary | ICD-10-CM

## 2020-02-18 DIAGNOSIS — I48.3 TYPICAL ATRIAL FLUTTER: ICD-10-CM

## 2020-02-19 DIAGNOSIS — I48.0 PAROXYSMAL ATRIAL FIBRILLATION: Primary | ICD-10-CM

## 2020-02-19 DIAGNOSIS — I48.3 TYPICAL ATRIAL FLUTTER: ICD-10-CM

## 2020-03-04 ENCOUNTER — PATIENT MESSAGE (OUTPATIENT)
Dept: ELECTROPHYSIOLOGY | Facility: CLINIC | Age: 75
End: 2020-03-04

## 2020-03-05 ENCOUNTER — TELEPHONE (OUTPATIENT)
Dept: PODIATRY | Facility: CLINIC | Age: 75
End: 2020-03-05

## 2020-03-05 ENCOUNTER — PATIENT MESSAGE (OUTPATIENT)
Dept: PODIATRY | Facility: CLINIC | Age: 75
End: 2020-03-05

## 2020-03-05 NOTE — TELEPHONE ENCOUNTER
Spoke with patient.  Informed him that I am waiting for a response from Dr Linares and will let him know as soon as I can about seeing his daughter.

## 2020-03-05 NOTE — TELEPHONE ENCOUNTER
----- Message from Audie Hess sent at 3/5/2020 12:42 PM CST -----  Type: Needs Medical Advice    Who Called:  Patient    Best Call Back Number: [phone]  Additional Information:  Caller states that he would like a callback regarding a referral.  Caller wouldn't provide any more information.

## 2020-03-22 ENCOUNTER — PATIENT MESSAGE (OUTPATIENT)
Dept: FAMILY MEDICINE | Facility: CLINIC | Age: 75
End: 2020-03-22

## 2020-03-23 ENCOUNTER — TELEPHONE (OUTPATIENT)
Dept: ELECTROPHYSIOLOGY | Facility: CLINIC | Age: 75
End: 2020-03-23

## 2020-03-23 NOTE — TELEPHONE ENCOUNTER
"Left message for patient advising of [facility] to not perform any non-emergent procedures for the next 30 days due to Covid 19 concerns. Will need to cancel his PVI planned for 4/7/2020. Will call to schedule as soon as we get the "all clear". Advised to remain on Eliquis and Propafenone. Call back # left.   "

## 2020-03-31 DIAGNOSIS — Z98.890 POST-OPERATIVE STATE: ICD-10-CM

## 2020-04-01 ENCOUNTER — PATIENT MESSAGE (OUTPATIENT)
Dept: PSYCHIATRY | Facility: CLINIC | Age: 75
End: 2020-04-01

## 2020-04-05 DIAGNOSIS — Z98.890 POST-OPERATIVE STATE: ICD-10-CM

## 2020-04-05 RX ORDER — CELECOXIB 200 MG/1
CAPSULE ORAL
Qty: 180 CAPSULE | Refills: 0 | OUTPATIENT
Start: 2020-04-05

## 2020-04-05 RX ORDER — CELECOXIB 200 MG/1
200 CAPSULE ORAL 2 TIMES DAILY
Qty: 180 CAPSULE | Refills: 0 | Status: SHIPPED | OUTPATIENT
Start: 2020-04-05 | End: 2020-07-12

## 2020-04-28 RX ORDER — APIXABAN 5 MG/1
5 TABLET, FILM COATED ORAL 2 TIMES DAILY
Qty: 60 TABLET | Refills: 11 | Status: SHIPPED | OUTPATIENT
Start: 2020-04-28 | End: 2021-05-24

## 2020-04-29 ENCOUNTER — PATIENT MESSAGE (OUTPATIENT)
Dept: PSYCHIATRY | Facility: CLINIC | Age: 75
End: 2020-04-29

## 2020-04-30 ENCOUNTER — PATIENT MESSAGE (OUTPATIENT)
Dept: FAMILY MEDICINE | Facility: CLINIC | Age: 75
End: 2020-04-30

## 2020-04-30 RX ORDER — BUPROPION HYDROCHLORIDE 150 MG/1
450 TABLET ORAL DAILY
Qty: 90 TABLET | Refills: 1 | Status: SHIPPED | OUTPATIENT
Start: 2020-04-30 | End: 2020-07-01 | Stop reason: SDUPTHER

## 2020-04-30 NOTE — TELEPHONE ENCOUNTER
Please see [employer] message. Patient requesting refill for bupropion 150mg three times daily. I do not see that in his med list to be pended. Please advise.

## 2020-04-30 NOTE — TELEPHONE ENCOUNTER
Will forward this to Dr. Davidsno. The dose he is requesting is different than what is in his med list.

## 2020-05-04 ENCOUNTER — LAB VISIT (OUTPATIENT)
Dept: LAB | Facility: HOSPITAL | Age: 75
End: 2020-05-04
Attending: INTERNAL MEDICINE
Payer: MEDICARE

## 2020-05-04 DIAGNOSIS — N18.30 CKD (CHRONIC KIDNEY DISEASE), STAGE III: ICD-10-CM

## 2020-05-04 LAB
ALBUMIN SERPL BCP-MCNC: 3.4 G/DL (ref 3.5–5.2)
ANION GAP SERPL CALC-SCNC: 7 MMOL/L (ref 8–16)
BUN SERPL-MCNC: 25 MG/DL (ref 8–23)
CALCIUM SERPL-MCNC: 8.4 MG/DL (ref 8.7–10.5)
CHLORIDE SERPL-SCNC: 109 MMOL/L (ref 95–110)
CO2 SERPL-SCNC: 21 MMOL/L (ref 23–29)
CREAT SERPL-MCNC: 1.6 MG/DL (ref 0.5–1.4)
EST. GFR  (AFRICAN AMERICAN): 48.7 ML/MIN/1.73 M^2
EST. GFR  (NON AFRICAN AMERICAN): 42.1 ML/MIN/1.73 M^2
GLUCOSE SERPL-MCNC: 124 MG/DL (ref 70–110)
PHOSPHATE SERPL-MCNC: 4.4 MG/DL (ref 2.7–4.5)
POTASSIUM SERPL-SCNC: 5.4 MMOL/L (ref 3.5–5.1)
SODIUM SERPL-SCNC: 137 MMOL/L (ref 136–145)

## 2020-05-04 PROCEDURE — 80069 RENAL FUNCTION PANEL: CPT | Mod: HCNC

## 2020-05-04 PROCEDURE — 36415 COLL VENOUS BLD VENIPUNCTURE: CPT | Mod: HCNC,PO

## 2020-05-05 ENCOUNTER — PATIENT MESSAGE (OUTPATIENT)
Dept: ADMINISTRATIVE | Facility: HOSPITAL | Age: 75
End: 2020-05-05

## 2020-05-06 ENCOUNTER — PATIENT MESSAGE (OUTPATIENT)
Dept: PSYCHIATRY | Facility: CLINIC | Age: 75
End: 2020-05-06

## 2020-05-25 ENCOUNTER — PATIENT MESSAGE (OUTPATIENT)
Dept: ELECTROPHYSIOLOGY | Facility: CLINIC | Age: 75
End: 2020-05-25

## 2020-05-25 DIAGNOSIS — I48.0 PAROXYSMAL ATRIAL FIBRILLATION: Primary | ICD-10-CM

## 2020-05-25 DIAGNOSIS — Z01.818 PRE-OP EVALUATION: ICD-10-CM

## 2020-06-08 ENCOUNTER — LAB VISIT (OUTPATIENT)
Dept: LAB | Facility: HOSPITAL | Age: 75
End: 2020-06-08
Attending: INTERNAL MEDICINE
Payer: MEDICARE

## 2020-06-08 DIAGNOSIS — I48.0 PAROXYSMAL ATRIAL FIBRILLATION: ICD-10-CM

## 2020-06-08 DIAGNOSIS — Z01.818 PRE-OP EVALUATION: ICD-10-CM

## 2020-06-08 LAB
ANION GAP SERPL CALC-SCNC: 5 MMOL/L (ref 8–16)
APTT BLDCRRT: 29.3 SEC (ref 21–32)
BASOPHILS # BLD AUTO: 0.14 K/UL (ref 0–0.2)
BASOPHILS NFR BLD: 1.9 % (ref 0–1.9)
BUN SERPL-MCNC: 16 MG/DL (ref 8–23)
CALCIUM SERPL-MCNC: 8.1 MG/DL (ref 8.7–10.5)
CHLORIDE SERPL-SCNC: 111 MMOL/L (ref 95–110)
CO2 SERPL-SCNC: 21 MMOL/L (ref 23–29)
CREAT SERPL-MCNC: 1.4 MG/DL (ref 0.5–1.4)
DIFFERENTIAL METHOD: ABNORMAL
EOSINOPHIL # BLD AUTO: 1.1 K/UL (ref 0–0.5)
EOSINOPHIL NFR BLD: 15.2 % (ref 0–8)
ERYTHROCYTE [DISTWIDTH] IN BLOOD BY AUTOMATED COUNT: 15.9 % (ref 11.5–14.5)
EST. GFR  (AFRICAN AMERICAN): 57.2 ML/MIN/1.73 M^2
EST. GFR  (NON AFRICAN AMERICAN): 49.5 ML/MIN/1.73 M^2
GLUCOSE SERPL-MCNC: 124 MG/DL (ref 70–110)
HCT VFR BLD AUTO: 40 % (ref 40–54)
HGB BLD-MCNC: 12 G/DL (ref 14–18)
IMM GRANULOCYTES # BLD AUTO: 0.01 K/UL (ref 0–0.04)
IMM GRANULOCYTES NFR BLD AUTO: 0.1 % (ref 0–0.5)
INR PPP: 1 (ref 0.8–1.2)
LYMPHOCYTES # BLD AUTO: 1.6 K/UL (ref 1–4.8)
LYMPHOCYTES NFR BLD: 21.5 % (ref 18–48)
MCH RBC QN AUTO: 27.6 PG (ref 27–31)
MCHC RBC AUTO-ENTMCNC: 30 G/DL (ref 32–36)
MCV RBC AUTO: 92 FL (ref 82–98)
MONOCYTES # BLD AUTO: 0.6 K/UL (ref 0.3–1)
MONOCYTES NFR BLD: 7.5 % (ref 4–15)
NEUTROPHILS # BLD AUTO: 4 K/UL (ref 1.8–7.7)
NEUTROPHILS NFR BLD: 53.8 % (ref 38–73)
NRBC BLD-RTO: 0 /100 WBC
PLATELET # BLD AUTO: 269 K/UL (ref 150–350)
PMV BLD AUTO: 12.1 FL (ref 9.2–12.9)
POTASSIUM SERPL-SCNC: 5.9 MMOL/L (ref 3.5–5.1)
PROTHROMBIN TIME: 10.9 SEC (ref 9–12.5)
RBC # BLD AUTO: 4.34 M/UL (ref 4.6–6.2)
SODIUM SERPL-SCNC: 137 MMOL/L (ref 136–145)
WBC # BLD AUTO: 7.44 K/UL (ref 3.9–12.7)

## 2020-06-08 PROCEDURE — 85730 THROMBOPLASTIN TIME PARTIAL: CPT | Mod: HCNC,PO

## 2020-06-08 PROCEDURE — 36415 COLL VENOUS BLD VENIPUNCTURE: CPT | Mod: HCNC,PO

## 2020-06-08 PROCEDURE — 85025 COMPLETE CBC W/AUTO DIFF WBC: CPT | Mod: HCNC

## 2020-06-08 PROCEDURE — 85610 PROTHROMBIN TIME: CPT | Mod: HCNC,PO

## 2020-06-08 PROCEDURE — 80048 BASIC METABOLIC PNL TOTAL CA: CPT | Mod: HCNC

## 2020-06-12 ENCOUNTER — TELEPHONE (OUTPATIENT)
Dept: ELECTROPHYSIOLOGY | Facility: CLINIC | Age: 75
End: 2020-06-12

## 2020-06-12 ENCOUNTER — ANESTHESIA EVENT (OUTPATIENT)
Dept: MEDSURG UNIT | Facility: HOSPITAL | Age: 75
End: 2020-06-12
Payer: MEDICARE

## 2020-06-12 NOTE — ANESTHESIA PREPROCEDURE EVALUATION
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    Pre-operative evaluation for Procedure(s) (LRB):  Ablation atrial fibrillation (N/A)  Ablation, Atrial Flutter, Typical (N/A)  ECHOCARDIOGRAM,TRANSESOPHAGEAL (N/A)        Patient Active Problem List   Diagnosis    BPH with urinary obstruction    Elevated PSA    Heart abnormality    Nuclear sclerosis    Atrial fibrillation    Anemia due to chronic blood loss    Posterior vitreous detachment    Metatarsalgia    Keratoma    Foot pain, right    Onychomycosis due to dermatophyte    ED (erectile dysfunction)    Bradycardia    Mild single current episode of major depressive disorder    Tortuous aorta    Dyspnea    Hypothyroidism due to acquired atrophy of thyroid    Essential hypertension    Gait abnormality    CKD (chronic kidney disease) stage 3, GFR 30-59 ml/min    Complex sleep apnea syndrome    Erectile dysfunction due to arterial insufficiency    Primary osteoarthritis of right knee    History of bleeding peptic ulcer       Review of patient's allergies indicates:   Allergen Reactions    No known drug allergies        No current facility-administered medications on file prior to encounter.      Current Outpatient Medications on File Prior to Encounter   Medication Sig Dispense Refill    acyclovir (ZOVIRAX) 800 MG Tab TK ONE T PO FIVE TIMES D only for fever blisters  1    alendronate (FOSAMAX) 70 MG tablet Take 1 tablet (70 mg total) by mouth every 7 days. Take with a full glass of water & remain upright for at least 30 minutes 12 tablet 3    aspirin 81 MG Chew 81 mg.      atorvastatin (LIPITOR) 10 MG tablet Take 1 tablet (10 mg total) by mouth once daily. 90 tablet 3    betamethasone acetate-betamethasone sodium phosphate (CELESTONE) 6 mg/mL injection as needed.        calcium carbonate (OS-KISHAN) 500 mg calcium (1,250 mg) tablet       CALCIUM ORAL Take by mouth Daily.      calcium-vitamin D 600 mg(1,500mg) -400 unit Tab 600 mg.      carvediloL (COREG) 12.5 MG tablet Take 1 tablet (12.5 mg total) by mouth 2 (two) times daily with meals. 180 tablet 3    cholestyramine (QUESTRAN) 4 gram packet Take 1 packet (4 g total) by mouth once daily. 30 packet 3    cyanocobalamin/folic acid (VITAMIN B12-FOLIC ACID) 500-400 mcg Tab Take by mouth.      cyclobenzaprine (FLEXERIL) 10 MG tablet TAKE 1 TABLET BY MOUTH EVERY 8 HOURS AS NEEDED FOR MUSCLE SPASM 40 tablet 5    dutasteride (AVODART) 0.5 mg capsule TAKE 1 CAPSULE(0.5 MG) BY MOUTH EVERY DAY 90 capsule 3    efinaconazole (JUBLIA) 10 % Prakash Apply topically.      ferrous sulfate (FEOSOL) 325 mg (65 mg iron) Tab tablet Take by mouth.      KENALOG 40 mg/mL injection as needed.       levothyroxine (SYNTHROID) 50 MCG tablet Take 1 tablet (50 mcg total) by mouth before breakfast. 30 tablet 11    MULTIVITAMIN ORAL Take by mouth Daily.      omega 3-dha-epa-fish oil (OMEGA-3) 350 mg-235 mg- 90 mg-597 mg CpDR       OMEGA-3 FATTY ACIDS (FISH OIL CONCENTRATE ORAL) Take by mouth Daily.      propafenone (RYTHMOL SR) 425 MG Cp12 Take 1 capsule (425 mg total) by mouth every 12 (twelve) hours. 180 capsule 3    telmisartan (MICARDIS) 40 MG Tab Take 1 tablet (40 mg total) by mouth once daily. 90 tablet 3       Past Surgical History:   Procedure Laterality Date    ABDOMINAL HERNIA REPAIR      CATARACT EXTRACTION  11/25/2013    bilateral    CHOLECYSTECTOMY      COLONOSCOPY N/A 11/25/2015    Procedure: COLONOSCOPY;  Surgeon: Toby Hernandez MD;  Location: [facility];  Service: Endoscopy;  Laterality: N/A;    EYE SURGERY      GASTRIC BYPASS  1992    KNEE ARTHROSCOPY      RT    LASIK  2001    both eyes (Dr. Rabago)    ORIF HUMERUS FRACTURE      LT    ORIF HUMERUS FRACTURE Right     non surgical repair    RADIOFREQUENCY ABLATION       x2    Right ankle tendon repair      ROTATOR CUFF REPAIR      right    TOTAL KNEE ARTHROPLASTY Right 5/29/2018    Procedure: REPLACEMENT-KNEE-TOTAL-axel;  Surgeon: Denzel Dallas MD;  Location: [address];  Service: Orthopedics;  Laterality: Right;  [place]       Social History     Socioeconomic History    Marital status:      Spouse name: Not on file    Number of children: 5    Years of education: Not on file    Highest education level: Not on file   Occupational History    Occupation: retired anesthesiology     Employer: [employer]    Occupation: [employer] grad     Comment: previous football player   Social Needs    Financial resource strain: Not on file    Food insecurity:     Worry: Not on file     Inability: Not on file    Transportation needs:     Medical: Not on file     Non-medical: Not on file   Tobacco Use    Smoking status: Never Smoker    Smokeless tobacco: Never Used    Tobacco 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    Substance and Sexual Activity    Alcohol use: No    Drug use: No    Sexual activity: Yes     Partners: Female   Lifestyle    Physical activity:     Days per week: Not on file     Minutes per session: Not on file    Stress: Not on file   Relationships    Social connections:     Talks on phone: Not on file     Gets together: Not on file     Attends Pentecostal service: Not on file     Active member of club or organization: Not on file     Attends meetings of clubs or organizations: Not on file     Relationship status: Not on file   Other Topics Concern    Patient feels they ought to cut down on drinking/drug use Not Asked    Patient annoyed by others criticizing their drinking/drug use Not Asked    Patient has felt bad or guilty about drinking/drug use Not Asked    Patient has had a drink/used drugs as an eye opener in the AM Not Asked   Social History Narrative    Not on file         CBC: No results for input(s): WBC, RBC, HGB,  HCT, PLT, MCV, MCH, MCHC in the last 72 hours.    CMP: No results for input(s): NA, K, CL, CO2, BUN, CREATININE, GLU, MG, PHOS, CALCIUM, ALBUMIN, PROT, ALKPHOS, ALT, AST, BILITOT in the last 72 hours.    INR  No results for input(s): PT, INR, PROTIME, APTT in the last 72 hours.            2D Echo:  No results found. However, due to the size of the patient record, not all encounters were searched. Please check Results Review for a complete set of results.      Anesthesia Evaluation    I have reviewed the Patient Summary Reports.    I have reviewed the Nursing Notes.    I have reviewed the Medications.     Review of Systems  Anesthesia Hx:  No problems with previous Anesthesia    Hematology/Oncology:  Hematology Normal   Oncology Normal     EENT/Dental:EENT/Dental Normal   Cardiovascular:   Hypertension Dysrhythmias atrial fibrillation    Pulmonary:   Denies Shortness of breath. Sleep Apnea    Renal/:   Chronic Renal Disease, CRI    Hepatic/GI:   PUD, Liver Disease, Hepatitis, B    Musculoskeletal:   Arthritis     Neurological:   CVA    Endocrine:  Endocrine Normal    Dermatological:  Skin Normal    Psych:   Psychiatric History          Physical Exam  General:  Well nourished    Airway/Jaw/Neck:  Airway Findings: Mouth Opening: Normal Tongue: Normal  General Airway Assessment: Adult  Mallampati: II  Jaw/Neck Findings:  Neck ROM: Normal ROM     Eyes/Ears/Nose:  Eyes/Ears/Nose Findings:    Dental:  Dental Findings: In tact   Chest/Lungs:  Chest/Lungs Findings: Clear to auscultation, Normal Respiratory Rate     Heart/Vascular:  Heart Findings: Rate: Normal  Rhythm: Regular Rhythm  Sounds: Normal  Heart Murmur  Vascular Findings:        Mental Status:  Mental Status Findings:  Cooperative, Alert and Oriented         Anesthesia Plan  Type of Anesthesia, risks & benefits discussed:  Anesthesia Type:  general  Patient's Preference: General  Intra-op Monitoring Plan: standard ASA monitors and arterial line  Intra-op  Monitoring Plan Comments:   Post Op Pain Control Plan: multimodal analgesia, IV/PO Opioids PRN and per primary service following discharge from PACU  Post Op Pain Control Plan Comments:   Induction:   IV  Beta Blocker:  Patient is not currently on a Beta-Blocker (No further documentation required).       Informed Consent: Patient understands risks and agrees with Anesthesia plan.  Questions answered. Anesthesia consent signed with patient.  ASA Score: 3     Day of Surgery Review of History & Physical:    H&P update referred to the provider.     Anesthesia Plan Notes: Discussed plan for general endotracheal anesthesia, pt understands and agrees with plan        Ready For Surgery From Anesthesia Perspective.

## 2020-06-12 NOTE — TELEPHONE ENCOUNTER
Spoke to patient    CONFIRMED procedure arrival time of 5:30am on 6/15 for PVI    Reiterated instructions including:  -Directions to check in desk  -NPO after midnight night prior to procedure  -High importance of HOLDING Propafenone for 3 days prior to procedure. Confirms that his last dose was 6/11/2020   -Confirmed compliance of Eliquis, will only hold am dose on day of procedure.  -Pre-procedure LABS reviewed. K+=5.9. Patient on Micardis and will hold for two days. 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 Will repeat K+ on admit.    -Covid test to be done tomorrow (6/13)  -Confirmed no recent fever, bleeding, infection or skin rash in the past 30 days     Pt verbalizes understanding of above and appreciates call.

## 2020-06-13 ENCOUNTER — LAB VISIT (OUTPATIENT)
Dept: FAMILY MEDICINE | Facility: CLINIC | Age: 75
End: 2020-06-13
Payer: MEDICARE

## 2020-06-13 DIAGNOSIS — I48.0 PAROXYSMAL ATRIAL FIBRILLATION: ICD-10-CM

## 2020-06-13 DIAGNOSIS — Z01.818 PRE-OP EVALUATION: ICD-10-CM

## 2020-06-13 PROCEDURE — U0003 INFECTIOUS AGENT DETECTION BY NUCLEIC ACID (DNA OR RNA); SEVERE ACUTE RESPIRATORY SYNDROME CORONAVIRUS 2 (SARS-COV-2) (CORONAVIRUS DISEASE [COVID-19]), AMPLIFIED PROBE TECHNIQUE, MAKING USE OF HIGH THROUGHPUT TECHNOLOGIES AS DESCRIBED BY CMS-2020-01-R: HCPCS | Mod: HCNC

## 2020-06-15 ENCOUNTER — HOSPITAL ENCOUNTER (OUTPATIENT)
Dept: CARDIOLOGY | Facility: HOSPITAL | Age: 75
Discharge: HOME | End: 2020-06-15
Attending: INTERNAL MEDICINE
Payer: MEDICARE

## 2020-06-15 ENCOUNTER — ANESTHESIA (OUTPATIENT)
Dept: MEDSURG UNIT | Facility: HOSPITAL | Age: 75
End: 2020-06-15
Payer: MEDICARE

## 2020-06-15 ENCOUNTER — HOSPITAL ENCOUNTER (OUTPATIENT)
Facility: HOSPITAL | Age: 75
Discharge: HOME | End: 2020-06-15
Attending: INTERNAL MEDICINE | Admitting: INTERNAL MEDICINE
Payer: MEDICARE

## 2020-06-15 VITALS
DIASTOLIC BLOOD PRESSURE: 67 MMHG | TEMPERATURE: 99 F | BODY MASS INDEX: 27.34 KG/M2 | SYSTOLIC BLOOD PRESSURE: 127 MMHG | RESPIRATION RATE: 20 BRPM | HEART RATE: 59 BPM | OXYGEN SATURATION: 97 % | HEIGHT: 74 IN | WEIGHT: 213 LBS

## 2020-06-15 VITALS
WEIGHT: 213 LBS | BODY MASS INDEX: 27.34 KG/M2 | DIASTOLIC BLOOD PRESSURE: 73 MMHG | SYSTOLIC BLOOD PRESSURE: 132 MMHG | HEIGHT: 74 IN

## 2020-06-15 DIAGNOSIS — I48.91 A-FIB: ICD-10-CM

## 2020-06-15 DIAGNOSIS — I48.0 PAROXYSMAL ATRIAL FIBRILLATION: ICD-10-CM

## 2020-06-15 DIAGNOSIS — I48.91 ATRIAL FIBRILLATION: ICD-10-CM

## 2020-06-15 DIAGNOSIS — I48.3 TYPICAL ATRIAL FLUTTER: ICD-10-CM

## 2020-06-15 LAB
ABO + RH BLD: NORMAL
BLD GP AB SCN CELLS X3 SERPL QL: NORMAL
BSA FOR ECHO PROCEDURE: 2.25 M2
POC ACTIVATED CLOTTING TIME K: 274 SEC (ref 74–137)
POC ACTIVATED CLOTTING TIME K: 279 SEC (ref 74–137)
POC ACTIVATED CLOTTING TIME K: 290 SEC (ref 74–137)
POC ACTIVATED CLOTTING TIME K: 334 SEC (ref 74–137)
POC ACTIVATED CLOTTING TIME K: 351 SEC (ref 74–137)
POTASSIUM SERPL-SCNC: 4.9 MMOL/L (ref 3.5–5.1)
PROX AORTA: 3.5 CM
SAMPLE: ABNORMAL
SARS-COV-2 RDRP RESP QL NAA+PROBE: NEGATIVE
SARS-COV-2 RNA RESP QL NAA+PROBE: NOT DETECTED
SINUS: 3.6 CM
STJ: 3.2 CM

## 2020-06-15 PROCEDURE — 93662 INTRACARDIAC ECG (ICE): CPT | Mod: HCNC | Performed by: INTERNAL MEDICINE

## 2020-06-15 PROCEDURE — C2630 CATH, EP, COOL-TIP: HCPCS | Mod: HCNC | Performed by: INTERNAL MEDICINE

## 2020-06-15 PROCEDURE — D9220A PRA ANESTHESIA: ICD-10-PCS | Mod: HCNC,ANES,, | Performed by: ANESTHESIOLOGY

## 2020-06-15 PROCEDURE — 25000003 PHARM REV CODE 250: Mod: HCNC | Performed by: STUDENT IN AN ORGANIZED HEALTH CARE EDUCATION/TRAINING PROGRAM

## 2020-06-15 PROCEDURE — 93320 DOPPLER ECHO COMPLETE: CPT | Mod: 26,HCNC,, | Performed by: INTERNAL MEDICINE

## 2020-06-15 PROCEDURE — 93613 INTRACARDIAC EPHYS 3D MAPG: CPT | Mod: HCNC | Performed by: INTERNAL MEDICINE

## 2020-06-15 PROCEDURE — 93325 DOPPLER ECHO COLOR FLOW MAPG: CPT | Mod: HCNC

## 2020-06-15 PROCEDURE — 63600175 PHARM REV CODE 636 W HCPCS: Mod: HCNC | Performed by: NURSE ANESTHETIST, CERTIFIED REGISTERED

## 2020-06-15 PROCEDURE — 93005 ELECTROCARDIOGRAM TRACING: CPT | Mod: HCNC,59

## 2020-06-15 PROCEDURE — 93662 PR INTRACARD ECHO, THER/DX INTERVENT: ICD-10-PCS | Mod: 26,HCNC,, | Performed by: INTERNAL MEDICINE

## 2020-06-15 PROCEDURE — 93656 COMPRE EP EVAL ABLTJ ATR FIB: CPT | Mod: HCNC | Performed by: INTERNAL MEDICINE

## 2020-06-15 PROCEDURE — D9220A PRA ANESTHESIA: ICD-10-PCS | Mod: HCNC,CRNA,, | Performed by: NURSE ANESTHETIST, CERTIFIED REGISTERED

## 2020-06-15 PROCEDURE — 37000008 HC ANESTHESIA 1ST 15 MINUTES: Mod: HCNC | Performed by: INTERNAL MEDICINE

## 2020-06-15 PROCEDURE — 93656 COMPRE EP EVAL ABLTJ ATR FIB: CPT | Mod: HCNC,,, | Performed by: INTERNAL MEDICINE

## 2020-06-15 PROCEDURE — 27201037 HC PRESSURE MONITORING SET UP: Mod: HCNC

## 2020-06-15 PROCEDURE — 63600175 PHARM REV CODE 636 W HCPCS: Mod: HCNC | Performed by: INTERNAL MEDICINE

## 2020-06-15 PROCEDURE — 93655 ICAR CATH ABLTJ DSCRT ARRHYT: CPT | Mod: HCNC,,, | Performed by: INTERNAL MEDICINE

## 2020-06-15 PROCEDURE — 25000003 PHARM REV CODE 250: Mod: HCNC | Performed by: NURSE ANESTHETIST, CERTIFIED REGISTERED

## 2020-06-15 PROCEDURE — 25000003 PHARM REV CODE 250: Mod: HCNC | Performed by: INTERNAL MEDICINE

## 2020-06-15 PROCEDURE — 37000009 HC ANESTHESIA EA ADD 15 MINS: Mod: HCNC | Performed by: INTERNAL MEDICINE

## 2020-06-15 PROCEDURE — 93325 DOPPLER ECHO COLOR FLOW MAPG: CPT | Mod: 26,HCNC,, | Performed by: INTERNAL MEDICINE

## 2020-06-15 PROCEDURE — 93010 EKG 12-LEAD: ICD-10-PCS | Mod: HCNC,,, | Performed by: INTERNAL MEDICINE

## 2020-06-15 PROCEDURE — C1894 INTRO/SHEATH, NON-LASER: HCPCS | Mod: HCNC | Performed by: INTERNAL MEDICINE

## 2020-06-15 PROCEDURE — 93010 ELECTROCARDIOGRAM REPORT: CPT | Mod: HCNC,76,, | Performed by: INTERNAL MEDICINE

## 2020-06-15 PROCEDURE — 93320 TRANSESOPHAGEAL ECHO (TEE) (CUPID ONLY): ICD-10-PCS | Mod: 26,HCNC,, | Performed by: INTERNAL MEDICINE

## 2020-06-15 PROCEDURE — 93656 PR ELECTROPHYS EVAL, COMPREHEN, W/ABLATION OF ATRIAL FIBR: ICD-10-PCS | Mod: HCNC,,, | Performed by: INTERNAL MEDICINE

## 2020-06-15 PROCEDURE — 93325 TRANSESOPHAGEAL ECHO (TEE) (CUPID ONLY): ICD-10-PCS | Mod: 26,HCNC,, | Performed by: INTERNAL MEDICINE

## 2020-06-15 PROCEDURE — 93657 TX L/R ATRIAL FIB ADDL: CPT | Mod: ,,, | Performed by: INTERNAL MEDICINE

## 2020-06-15 PROCEDURE — 93657 TX L/R ATRIAL FIB ADDL: CPT | Mod: HCNC | Performed by: INTERNAL MEDICINE

## 2020-06-15 PROCEDURE — 93655 PR ABLATE ARRHYTHMIA ADD ON: ICD-10-PCS | Mod: HCNC,,, | Performed by: INTERNAL MEDICINE

## 2020-06-15 PROCEDURE — 93662 INTRACARDIAC ECG (ICE): CPT | Mod: 26,HCNC,, | Performed by: INTERNAL MEDICINE

## 2020-06-15 PROCEDURE — 93613 INTRACARDIAC EPHYS 3D MAPG: CPT | Mod: HCNC,,, | Performed by: INTERNAL MEDICINE

## 2020-06-15 PROCEDURE — 93613 PR INTRACARD ELECTROPHYS 3-DIMENS MAPPING: ICD-10-PCS | Mod: HCNC,,, | Performed by: INTERNAL MEDICINE

## 2020-06-15 PROCEDURE — C1766 INTRO/SHEATH,STRBLE,NON-PEEL: HCPCS | Mod: HCNC | Performed by: INTERNAL MEDICINE

## 2020-06-15 PROCEDURE — 93655 ICAR CATH ABLTJ DSCRT ARRHYT: CPT | Mod: HCNC | Performed by: INTERNAL MEDICINE

## 2020-06-15 PROCEDURE — D9220A PRA ANESTHESIA: Mod: HCNC,CRNA,, | Performed by: NURSE ANESTHETIST, CERTIFIED REGISTERED

## 2020-06-15 PROCEDURE — 27201423 OPTIME MED/SURG SUP & DEVICES STERILE SUPPLY: Mod: HCNC | Performed by: INTERNAL MEDICINE

## 2020-06-15 PROCEDURE — C1730 CATH, EP, 19 OR FEW ELECT: HCPCS | Mod: HCNC | Performed by: INTERNAL MEDICINE

## 2020-06-15 PROCEDURE — U0002 COVID-19 LAB TEST NON-CDC: HCPCS | Mod: HCNC

## 2020-06-15 PROCEDURE — 93010 ELECTROCARDIOGRAM REPORT: CPT | Mod: HCNC,,, | Performed by: INTERNAL MEDICINE

## 2020-06-15 PROCEDURE — 93005 ELECTROCARDIOGRAM TRACING: CPT | Mod: HCNC

## 2020-06-15 PROCEDURE — 93312 ECHO TRANSESOPHAGEAL: CPT | Mod: 26,HCNC,, | Performed by: INTERNAL MEDICINE

## 2020-06-15 PROCEDURE — 86901 BLOOD TYPING SEROLOGIC RH(D): CPT | Mod: HCNC

## 2020-06-15 PROCEDURE — C1753 CATH, INTRAVAS ULTRASOUND: HCPCS | Mod: HCNC | Performed by: INTERNAL MEDICINE

## 2020-06-15 PROCEDURE — 84132 ASSAY OF SERUM POTASSIUM: CPT | Mod: HCNC

## 2020-06-15 PROCEDURE — D9220A PRA ANESTHESIA: Mod: HCNC,ANES,, | Performed by: ANESTHESIOLOGY

## 2020-06-15 PROCEDURE — 93312 TRANSESOPHAGEAL ECHO (TEE) (CUPID ONLY): ICD-10-PCS | Mod: 26,HCNC,, | Performed by: INTERNAL MEDICINE

## 2020-06-15 PROCEDURE — 93657 PR TX L/R ATRIAL FIB ADDL: ICD-10-PCS | Mod: ,,, | Performed by: INTERNAL MEDICINE

## 2020-06-15 RX ORDER — TELMISARTAN 40 MG/1
40 TABLET ORAL DAILY
Status: DISCONTINUED | OUTPATIENT
Start: 2020-06-16 | End: 2020-06-15 | Stop reason: HOSPADM

## 2020-06-15 RX ORDER — ROCURONIUM BROMIDE 10 MG/ML
INJECTION, SOLUTION INTRAVENOUS
Status: DISCONTINUED | OUTPATIENT
Start: 2020-06-15 | End: 2020-06-15

## 2020-06-15 RX ORDER — ONDANSETRON 2 MG/ML
4 INJECTION INTRAMUSCULAR; INTRAVENOUS DAILY PRN
Status: DISCONTINUED | OUTPATIENT
Start: 2020-06-15 | End: 2020-06-15 | Stop reason: HOSPADM

## 2020-06-15 RX ORDER — FENTANYL CITRATE 50 UG/ML
25 INJECTION, SOLUTION INTRAMUSCULAR; INTRAVENOUS EVERY 5 MIN PRN
Status: DISCONTINUED | OUTPATIENT
Start: 2020-06-15 | End: 2020-06-15 | Stop reason: HOSPADM

## 2020-06-15 RX ORDER — CELECOXIB 200 MG/1
200 CAPSULE ORAL 2 TIMES DAILY
Status: DISCONTINUED | OUTPATIENT
Start: 2020-06-15 | End: 2020-06-15 | Stop reason: HOSPADM

## 2020-06-15 RX ORDER — AMOXICILLIN 500 MG/1
500 TABLET, FILM COATED ORAL EVERY 12 HOURS
COMMUNITY
End: 2021-03-10

## 2020-06-15 RX ORDER — ESCITALOPRAM OXALATE 10 MG/1
10 TABLET ORAL DAILY
Status: DISCONTINUED | OUTPATIENT
Start: 2020-06-16 | End: 2020-06-15 | Stop reason: HOSPADM

## 2020-06-15 RX ORDER — OXYCODONE AND ACETAMINOPHEN 10; 325 MG/1; MG/1
1 TABLET ORAL EVERY 4 HOURS PRN
Status: ON HOLD | COMMUNITY
End: 2020-07-19 | Stop reason: HOSPADM

## 2020-06-15 RX ORDER — BUPROPION HYDROCHLORIDE 150 MG/1
450 TABLET ORAL DAILY
Status: DISCONTINUED | OUTPATIENT
Start: 2020-06-16 | End: 2020-06-15 | Stop reason: HOSPADM

## 2020-06-15 RX ORDER — OXYCODONE AND ACETAMINOPHEN 10; 325 MG/1; MG/1
1 TABLET ORAL EVERY 6 HOURS PRN
Status: DISCONTINUED | OUTPATIENT
Start: 2020-06-15 | End: 2020-06-15 | Stop reason: HOSPADM

## 2020-06-15 RX ORDER — FUROSEMIDE 10 MG/ML
20 INJECTION INTRAMUSCULAR; INTRAVENOUS ONCE
Status: DISCONTINUED | OUTPATIENT
Start: 2020-06-15 | End: 2020-06-15 | Stop reason: HOSPADM

## 2020-06-15 RX ORDER — ATORVASTATIN CALCIUM 10 MG/1
10 TABLET, FILM COATED ORAL NIGHTLY
Status: DISCONTINUED | OUTPATIENT
Start: 2020-06-15 | End: 2020-06-15 | Stop reason: HOSPADM

## 2020-06-15 RX ORDER — PROPOFOL 10 MG/ML
VIAL (ML) INTRAVENOUS
Status: DISCONTINUED | OUTPATIENT
Start: 2020-06-15 | End: 2020-06-15

## 2020-06-15 RX ORDER — ONDANSETRON 2 MG/ML
INJECTION INTRAMUSCULAR; INTRAVENOUS
Status: DISCONTINUED | OUTPATIENT
Start: 2020-06-15 | End: 2020-06-15

## 2020-06-15 RX ORDER — ACETAMINOPHEN 325 MG/1
650 TABLET ORAL EVERY 4 HOURS PRN
Status: DISCONTINUED | OUTPATIENT
Start: 2020-06-15 | End: 2020-06-15 | Stop reason: HOSPADM

## 2020-06-15 RX ORDER — EPHEDRINE SULFATE 50 MG/ML
INJECTION, SOLUTION INTRAVENOUS
Status: DISCONTINUED | OUTPATIENT
Start: 2020-06-15 | End: 2020-06-15

## 2020-06-15 RX ORDER — CARVEDILOL 6.25 MG/1
12.5 TABLET ORAL 2 TIMES DAILY WITH MEALS
Status: DISCONTINUED | OUTPATIENT
Start: 2020-06-16 | End: 2020-06-15 | Stop reason: HOSPADM

## 2020-06-15 RX ORDER — PANTOPRAZOLE SODIUM 40 MG/1
40 TABLET, DELAYED RELEASE ORAL DAILY
Status: DISCONTINUED | OUTPATIENT
Start: 2020-06-15 | End: 2020-06-15 | Stop reason: HOSPADM

## 2020-06-15 RX ORDER — PHENYLEPHRINE HYDROCHLORIDE 10 MG/ML
INJECTION INTRAVENOUS
Status: DISCONTINUED | OUTPATIENT
Start: 2020-06-15 | End: 2020-06-15

## 2020-06-15 RX ORDER — LIDOCAINE HCL/PF 100 MG/5ML
SYRINGE (ML) INTRAVENOUS
Status: DISCONTINUED | OUTPATIENT
Start: 2020-06-15 | End: 2020-06-15

## 2020-06-15 RX ORDER — LEVOTHYROXINE SODIUM 50 UG/1
50 TABLET ORAL
Status: DISCONTINUED | OUTPATIENT
Start: 2020-06-16 | End: 2020-06-15 | Stop reason: HOSPADM

## 2020-06-15 RX ORDER — PANTOPRAZOLE SODIUM 40 MG/1
40 TABLET, DELAYED RELEASE ORAL DAILY
Qty: 30 TABLET | Refills: 0 | Status: SHIPPED | OUTPATIENT
Start: 2020-06-15 | End: 2021-03-10

## 2020-06-15 RX ORDER — HEPARIN SODIUM 200 [USP'U]/100ML
INJECTION, SOLUTION INTRAVENOUS
Status: DISCONTINUED | OUTPATIENT
Start: 2020-06-15 | End: 2020-06-15

## 2020-06-15 RX ORDER — FUROSEMIDE 20 MG/1
20 TABLET ORAL DAILY PRN
Qty: 5 TABLET | Refills: 0 | Status: SHIPPED | OUTPATIENT
Start: 2020-06-15 | End: 2021-09-08

## 2020-06-15 RX ORDER — DOPAMINE HYDROCHLORIDE 160 MG/100ML
INJECTION, SOLUTION INTRAVENOUS CONTINUOUS PRN
Status: DISCONTINUED | OUTPATIENT
Start: 2020-06-15 | End: 2020-06-15

## 2020-06-15 RX ORDER — SODIUM CHLORIDE 9 MG/ML
INJECTION, SOLUTION INTRAVENOUS CONTINUOUS PRN
Status: DISCONTINUED | OUTPATIENT
Start: 2020-06-15 | End: 2020-06-15

## 2020-06-15 RX ORDER — FENTANYL CITRATE 50 UG/ML
INJECTION, SOLUTION INTRAMUSCULAR; INTRAVENOUS
Status: DISCONTINUED | OUTPATIENT
Start: 2020-06-15 | End: 2020-06-15

## 2020-06-15 RX ORDER — NAPROXEN SODIUM 220 MG/1
81 TABLET, FILM COATED ORAL DAILY
Status: DISCONTINUED | OUTPATIENT
Start: 2020-06-16 | End: 2020-06-15 | Stop reason: HOSPADM

## 2020-06-15 RX ORDER — HEPARIN SODIUM 1000 [USP'U]/ML
INJECTION, SOLUTION INTRAVENOUS; SUBCUTANEOUS
Status: DISCONTINUED | OUTPATIENT
Start: 2020-06-15 | End: 2020-06-15

## 2020-06-15 RX ORDER — PROTAMINE SULFATE 10 MG/ML
INJECTION, SOLUTION INTRAVENOUS
Status: DISCONTINUED | OUTPATIENT
Start: 2020-06-15 | End: 2020-06-15

## 2020-06-15 RX ORDER — HEPARIN SODIUM 10000 [USP'U]/100ML
INJECTION, SOLUTION INTRAVENOUS CONTINUOUS PRN
Status: DISCONTINUED | OUTPATIENT
Start: 2020-06-15 | End: 2020-06-15

## 2020-06-15 RX ORDER — MIDAZOLAM HYDROCHLORIDE 1 MG/ML
INJECTION INTRAMUSCULAR; INTRAVENOUS
Status: DISCONTINUED | OUTPATIENT
Start: 2020-06-15 | End: 2020-06-15

## 2020-06-15 RX ORDER — LIDOCAINE HYDROCHLORIDE 20 MG/ML
INJECTION, SOLUTION EPIDURAL; INFILTRATION; INTRACAUDAL; PERINEURAL
Status: DISCONTINUED | OUTPATIENT
Start: 2020-06-15 | End: 2020-06-15

## 2020-06-15 RX ORDER — CYCLOBENZAPRINE HCL 10 MG
10 TABLET ORAL EVERY 8 HOURS PRN
Status: DISCONTINUED | OUTPATIENT
Start: 2020-06-15 | End: 2020-06-15 | Stop reason: HOSPADM

## 2020-06-15 RX ORDER — OXYCODONE HYDROCHLORIDE 5 MG/1
10 TABLET ORAL EVERY 8 HOURS PRN
Status: DISCONTINUED | OUTPATIENT
Start: 2020-06-15 | End: 2020-06-15

## 2020-06-15 RX ADMIN — HEPARIN SODIUM AND DEXTROSE 5000 UNITS/HR: 10000; 5 INJECTION INTRAVENOUS at 09:06

## 2020-06-15 RX ADMIN — PROPOFOL 40 MG: 10 INJECTION, EMULSION INTRAVENOUS at 10:06

## 2020-06-15 RX ADMIN — SODIUM CHLORIDE: 0.9 INJECTION, SOLUTION INTRAVENOUS at 08:06

## 2020-06-15 RX ADMIN — EPHEDRINE SULFATE 5 MG: 50 INJECTION, SOLUTION INTRAMUSCULAR; INTRAVENOUS; SUBCUTANEOUS at 09:06

## 2020-06-15 RX ADMIN — PROTAMINE SULFATE 90 MG: 10 INJECTION, SOLUTION INTRAVENOUS at 11:06

## 2020-06-15 RX ADMIN — HEPARIN SODIUM 7000 UNITS: 1000 INJECTION INTRAVENOUS; SUBCUTANEOUS at 11:06

## 2020-06-15 RX ADMIN — OXYCODONE HYDROCHLORIDE AND ACETAMINOPHEN 1 TABLET: 10; 325 TABLET ORAL at 01:06

## 2020-06-15 RX ADMIN — HEPARIN SODIUM 5000 UNITS: 1000 INJECTION INTRAVENOUS; SUBCUTANEOUS at 10:06

## 2020-06-15 RX ADMIN — EPHEDRINE SULFATE 5 MG: 50 INJECTION, SOLUTION INTRAMUSCULAR; INTRAVENOUS; SUBCUTANEOUS at 10:06

## 2020-06-15 RX ADMIN — PROPOFOL 120 MG: 10 INJECTION, EMULSION INTRAVENOUS at 08:06

## 2020-06-15 RX ADMIN — EPHEDRINE SULFATE 5 MG: 50 INJECTION, SOLUTION INTRAMUSCULAR; INTRAVENOUS; SUBCUTANEOUS at 08:06

## 2020-06-15 RX ADMIN — PROPOFOL 20 MG: 10 INJECTION, EMULSION INTRAVENOUS at 11:06

## 2020-06-15 RX ADMIN — FENTANYL CITRATE 25 MCG: 50 INJECTION, SOLUTION INTRAMUSCULAR; INTRAVENOUS at 09:06

## 2020-06-15 RX ADMIN — ROCURONIUM BROMIDE 40 MG: 10 INJECTION, SOLUTION INTRAVENOUS at 08:06

## 2020-06-15 RX ADMIN — PROPOFOL 50 MG: 10 INJECTION, EMULSION INTRAVENOUS at 09:06

## 2020-06-15 RX ADMIN — EPHEDRINE SULFATE 10 MG: 50 INJECTION, SOLUTION INTRAMUSCULAR; INTRAVENOUS; SUBCUTANEOUS at 11:06

## 2020-06-15 RX ADMIN — PHENYLEPHRINE HYDROCHLORIDE 200 MCG: 10 INJECTION INTRAVENOUS at 11:06

## 2020-06-15 RX ADMIN — MIDAZOLAM HYDROCHLORIDE 2 MG: 1 INJECTION, SOLUTION INTRAMUSCULAR; INTRAVENOUS at 08:06

## 2020-06-15 RX ADMIN — PHENYLEPHRINE HYDROCHLORIDE 100 MCG: 10 INJECTION INTRAVENOUS at 11:06

## 2020-06-15 RX ADMIN — EPHEDRINE SULFATE 5 MG: 50 INJECTION, SOLUTION INTRAMUSCULAR; INTRAVENOUS; SUBCUTANEOUS at 11:06

## 2020-06-15 RX ADMIN — SUGAMMADEX 200 MG: 100 INJECTION, SOLUTION INTRAVENOUS at 11:06

## 2020-06-15 RX ADMIN — FENTANYL CITRATE 50 MCG: 50 INJECTION, SOLUTION INTRAMUSCULAR; INTRAVENOUS at 12:06

## 2020-06-15 RX ADMIN — DOPAMINE HYDROCHLORIDE 3 MCG/KG/MIN: 160 INJECTION, SOLUTION INTRAVENOUS at 09:06

## 2020-06-15 RX ADMIN — ROCURONIUM BROMIDE 5 MG: 10 INJECTION, SOLUTION INTRAVENOUS at 09:06

## 2020-06-15 RX ADMIN — ONDANSETRON 4 MG: 2 INJECTION INTRAMUSCULAR; INTRAVENOUS at 11:06

## 2020-06-15 RX ADMIN — PROPOFOL 30 MG: 10 INJECTION, EMULSION INTRAVENOUS at 08:06

## 2020-06-15 RX ADMIN — HEPARIN SODIUM 12000 UNITS: 1000 INJECTION INTRAVENOUS; SUBCUTANEOUS at 09:06

## 2020-06-15 RX ADMIN — HEPARIN SODIUM 3000 UNITS: 1000 INJECTION INTRAVENOUS; SUBCUTANEOUS at 09:06

## 2020-06-15 RX ADMIN — FENTANYL CITRATE 50 MCG: 50 INJECTION, SOLUTION INTRAMUSCULAR; INTRAVENOUS at 10:06

## 2020-06-15 RX ADMIN — LIDOCAINE HYDROCHLORIDE 100 MG: 20 INJECTION, SOLUTION INTRAVENOUS at 08:06

## 2020-06-15 RX ADMIN — FENTANYL CITRATE 100 MCG: 50 INJECTION, SOLUTION INTRAMUSCULAR; INTRAVENOUS at 08:06

## 2020-06-15 NOTE — ANESTHESIA POSTPROCEDURE EVALUATION
Anesthesia Post Evaluation    Patient: Dominick Song MD    Procedure(s) Performed: Procedure(s) (LRB):  Ablation atrial fibrillation (N/A)  Ablation, Atrial Flutter, Typical (N/A)  ECHOCARDIOGRAM,TRANSESOPHAGEAL (N/A)  Cardioversion or Defibrillation    Final Anesthesia Type: general    Patient location during evaluation: PACU  Patient participation: Yes- Able to Participate  Level of consciousness: awake and alert  Post-procedure vital signs: reviewed and stable  Pain management: adequate  Airway patency: patent    PONV status at discharge: No PONV  Anesthetic complications: no      Cardiovascular status: blood pressure returned to baseline  Respiratory status: unassisted  Hydration status: euvolemic  Follow-up not needed.          Vitals Value Taken Time   /61 06/15/20 1330   Temp 37 °C (98.6 °F) 06/15/20 1300   Pulse 60 06/15/20 1330   Resp 19 06/15/20 1321   SpO2 98 % 06/15/20 1323   Vitals shown include unvalidated device data.      No case tracking events are documented in the log.      Pain/Pari Score: Pain Rating Prior to Med Admin: 6 (6/15/2020  1:04 PM)  Pain Rating Post Med Admin: 5 (6/15/2020  1:15 PM)  Pari Score: 9 (6/15/2020  1:15 PM)

## 2020-06-15 NOTE — PLAN OF CARE
Received report from Aaliyah COLLINS Pacu RN. Patient s/p PVI-redo, AAOx4. VSS, no c/o pain or discomfort at this time, resp even and unlabored. sutures to GABE Groins is CDI. No active bleeding. No hematoma noted. Post procedure protocol reviewed with patient and patient's family. Understanding verbalized. Family members at bedside. Nurse call bell within reach. Will continue to monitor per post procedure protocol.

## 2020-06-15 NOTE — NURSING TRANSFER
Nursing Transfer Note      6/15/2020     Transfer To: ep pacu 3 to sscu 02    Transfer via stretcher    Transfer with cardiac monitoring, tele box on sscu 02  Transported by krystyna guzmán pacu rn    Medicines sent: silvadene    Chart send with patient: Yes    Notified: spouse    Patient reassessed at: 6/15/2020 1315. Next due 1345    Upon arrival to floor: cardiac monitor applied, patient oriented to room, call bell in reach and bed in lowest position

## 2020-06-15 NOTE — SUBJECTIVE & OBJECTIVE
Past Medical History:   Diagnosis Date    Anticoagulant long-term use     Anxiety     Arthritis     Atrial fibrillation     BPH (benign prostatic hyperplasia)     Cataract     CKD (chronic kidney disease) stage 3, GFR 30-59 ml/min 7/10/2017    Followed by Dr. Jeevan Pond    Colon polyp     benign    Depression     Elevated PSA     Erectile dysfunction     Gastric ulcer with hemorrhage     Hep B w/o coma 1977    History of bleeding peptic ulcer     History of prostatitis     Hypertension     PAF (paroxysmal atrial fibrillation)     Sleep apnea     on CPAP       Past Surgical History:   Procedure Laterality Date    ABDOMINAL HERNIA REPAIR      CATARACT EXTRACTION  11/25/2013    bilateral    CHOLECYSTECTOMY      COLONOSCOPY N/A 11/25/2015    Procedure: COLONOSCOPY;  Surgeon: Toby Hernandez MD;  Location: [facility];  Service: Endoscopy;  Laterality: N/A;    EYE SURGERY      GASTRIC BYPASS  1992    KNEE ARTHROSCOPY      RT    LASIK  2001    both eyes (Dr. Rabago)    ORIF HUMERUS FRACTURE      LT    ORIF HUMERUS FRACTURE Right     non surgical repair    RADIOFREQUENCY ABLATION      x2    Right ankle tendon repair      ROTATOR CUFF REPAIR      right    TOTAL KNEE ARTHROPLASTY Right 5/29/2018    Procedure: REPLACEMENT-KNEE-TOTAL-pro;  Surgeon: Denzel Dallas MD;  Location: [address];  Service: Orthopedics;  Laterality: Right;  Pro       Review of patient's allergies indicates:   Allergen Reactions    No known drug allergies        No current facility-administered medications on file prior to encounter.      Current Outpatient Medications on File Prior to Encounter   Medication Sig    alendronate (FOSAMAX) 70 MG tablet Take 1 tablet (70 mg total) by mouth every 7 days. Take with a full glass of water & remain upright for at least 30 minutes    aspirin 81 MG Chew 81 mg.    atorvastatin (LIPITOR) 10 MG tablet Take 1 tablet (10 mg total) by mouth once daily.     acyclovir (ZOVIRAX) 800 MG Tab TK ONE T PO FIVE TIMES D only for fever blisters    betamethasone acetate-betamethasone sodium phosphate (CELESTONE) 6 mg/mL injection as needed.     calcium carbonate (OS-KISHAN) 500 mg calcium (1,250 mg) tablet     CALCIUM ORAL Take by mouth Daily.    calcium-vitamin D 600 mg(1,500mg) -400 unit Tab 600 mg.    carvediloL (COREG) 12.5 MG tablet Take 1 tablet (12.5 mg total) by mouth 2 (two) times daily with meals.    cholestyramine (QUESTRAN) 4 gram packet Take 1 packet (4 g total) by mouth once daily.    cyanocobalamin/folic acid (VITAMIN B12-FOLIC ACID) 500-400 mcg Tab Take by mouth.    cyclobenzaprine (FLEXERIL) 10 MG tablet TAKE 1 TABLET BY MOUTH EVERY 8 HOURS AS NEEDED FOR MUSCLE SPASM    dutasteride (AVODART) 0.5 mg capsule TAKE 1 CAPSULE(0.5 MG) BY MOUTH EVERY DAY    efinaconazole (JUBLIA) 10 % Prakash Apply topically.    ferrous sulfate (FEOSOL) 325 mg (65 mg iron) Tab tablet Take by mouth.    KENALOG 40 mg/mL injection as needed.     levothyroxine (SYNTHROID) 50 MCG tablet Take 1 tablet (50 mcg total) by mouth before breakfast.    MULTIVITAMIN ORAL Take by mouth Daily.    omega 3-dha-epa-fish oil (OMEGA-3) 350 mg-235 mg- 90 mg-597 mg CpDR     OMEGA-3 FATTY ACIDS (FISH OIL CONCENTRATE ORAL) Take by mouth Daily.    propafenone (RYTHMOL SR) 425 MG Cp12 Take 1 capsule (425 mg total) by mouth every 12 (twelve) hours.    telmisartan (MICARDIS) 40 MG Tab Take 1 tablet (40 mg total) by mouth once daily.     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   HENT: Negative for congestion and ear discharge.    Eyes: Negative for blurred vision and discharge.   Cardiovascular: Negative for chest pain, dyspnea on exertion, irregular heartbeat, leg swelling and paroxysmal nocturnal dyspnea.   Respiratory: Negative for cough, hemoptysis and shortness of breath.    Gastrointestinal: Negative for abdominal pain.     Objective:     Vital Signs (Most Recent):  Temp: 96.7 °F (35.9 °C) (06/15/20 0649)  Pulse: (!) 54 (06/15/20 0649)  Resp: 16 (06/15/20 0649)  BP: 132/73 (06/15/20 0649)  SpO2: 97 % (06/15/20 0649) Vital Signs (24h Range):  Temp:  [96.7 °F (35.9 °C)] 96.7 °F (35.9 °C)  Pulse:  [54] 54  Resp:  [16] 16  SpO2:  [97 %] 97 %  BP: (132)/(73) 132/73        Body mass index is 27.82 kg/m².    SpO2: 97 %       No intake or output data in the 24 hours ending 06/15/20 0657    Lines/Drains/Airways     Epidural Line                 Perineural Analgesia/Anesthesia Assessment (using dermatomes) Epidural 05/29/18 0738 747 days          Peripheral Intravenous Line                 Peripheral IV - Single Lumen 06/15/20 0653 20 G Left Forearm less than 1 day         Peripheral IV - Single Lumen 06/15/20 0654 18 G Right Antecubital less than 1 day                Physical Exam   Constitutional: He is oriented to person, place, and time. He appears well-developed and well-nourished. No distress.   Eyes: Pupils are equal, round, and reactive to light. Conjunctivae are normal.   Neck: No tracheal deviation present. No thyromegaly present.   Cardiovascular: Regular rhythm, normal heart sounds and intact distal pulses. Bradycardia present. Exam reveals no gallop and no friction rub.   No murmur heard.  Pulses:       Radial pulses are 2+ on the right side and 2+ on the left side.        Femoral pulses are 2+ on the right side and 2+ on the left  side.  Pulmonary/Chest: Effort normal and breath sounds normal. No respiratory distress. He has no wheezes. He has no rales.   Abdominal: Soft. Bowel sounds are normal. He exhibits no distension. There is no abdominal tenderness.   Musculoskeletal:         General: No deformity or edema.   Neurological: He is alert and oriented to person, place, and time. No cranial nerve deficit. Coordination normal.   Skin: Skin is warm and dry. He is not diaphoretic.   Psychiatric: He has a normal mood and affect. His behavior is normal.       Significant Labs: BMP: No results for input(s): GLU, NA, K, CL, CO2, BUN, CREATININE, CALCIUM, MG in the last 48 hours.    Significant Imaging: Echocardiogram: Transthoracic echo (TTE) complete (Cupid Only): No results found. However, due to the size of the patient record, not all encounters were searched. Please check Results Review for a complete set of results.

## 2020-06-15 NOTE — PLAN OF CARE
"Vss. sats 97% on room air. Pt's wife updated over phone by ep pacu rn. Verbalizes understanding. sr on cm.  Tele box on sscu 02. 12 lead ekg done per md order. Pt states "I had a broken tooth on my right lower side., awaiting dental appointment to fix."  Complaints of "pain".  Prn po pain meds given per md order. Pt states "I have been taking percocet prescribed by the dentist until I can get it fixed, wanted to get my heart fixed first."  Dr lee updated pt in ep pacu.  Pt aaox4 follows commands. Sutures tatyana groins x1 each groin, placed at 1200, removal time 1600, bedrest complete at 1800. No hematoma or drainage noted. Palpable pulses noted.  Condom cath in place. No urine noted or bladder discomfort. Pt states "pain to tooth tolerable". See flowsheet for full assessment.   "

## 2020-06-15 NOTE — ANESTHESIA PROCEDURE NOTES
Intubation  Performed by: Vero Block CRNA  Authorized by: Darci Munson MD     Intubation:     Induction:  Intravenous    Intubated:  Postinduction    Mask Ventilation:  Easy mask    Attempts:  1    Attempted By:  CRNA    Method of Intubation:  Direct    Blade:  Conrad 2    Laryngeal View Grade: Grade I - full view of chords      Difficult Airway Encountered?: No      Complications:  None    Airway Device:  Oral endotracheal tube    Airway Device Size:  7.5    Style/Cuff Inflation:  Cuffed    Inflation Amount (mL):  5    Tube secured:  22    Secured at:  The lips    Placement Verified By:  Capnometry    Complicating Factors:  Large prominent central incisors    Findings Post-Intubation:  BS equal bilateral and atraumatic/condition of teeth unchanged

## 2020-06-15 NOTE — CONSULTS
[facility]  Cardiology  Consult Note    Patient Name: Dominick KENNEDY MD Duncan  MRN: [id number]  Admission Date: 6/15/2020  Hospital Length of Stay: 0 days  Code Status: Prior   Attending Provider: Wyatt Beatty MD   Consulting Provider: Reyes Villanueva MD  Primary Care Physician: Maxi Gaines MD  Principal Problem:<principal problem not specified>    Patient information was obtained from patient and ER records.     Consults  Subjective:     Chief Complaint:  AF     HPI:   73 yo male with h/o atrial fibrillation, GI bleed, Htn, Sleep Apnea. PVI ([place]) 7/28/14.   Had done well, was off Propafenone. Developed recurrence, underwent DCCV multiple times.  Repeat PVI 4/27/17 All 4 veins remained isolated.  Antralized left sided lesion set posteriorly, and performed SVC isolation. Presents for re-do PVI.    TTE 2/19  · Normal left ventricular systolic function. The estimated ejection fraction is 60%  · Normal LV diastolic function.  · Severe left atrial enlargement.  · Normal right ventricular systolic function.  · The stress echo portion of this study is negative for myocardial ischemia.  · Suboptimal study due to target heart rate not achieved and off axis views       PEPE 2/18  Denzel Zayas MD  IMPRESSION   Successful cardioversion with no complications.     EGD 2014  The examined esophagus was normal.        Evidence of a gastric bypass was found. Widely patent fistula into        stomach, with residual/retained food debris. A gastric pouch was        found. Inflamed gastro-jejunal anastomosis with residual erosion        (no bleeding). Biopsies taken, as well as Jammie test..        The examined jejunum was normal.   Impression: - Normal esophagus.        - Gastric bypass. Inflammed anastamosis with erosion. Biopsied.     Past Medical History:   Diagnosis Date    Anticoagulant long-term use     Anxiety     Arthritis     Atrial fibrillation     BPH (benign prostatic  hyperplasia)     Cataract     CKD (chronic kidney disease) stage 3, GFR 30-59 ml/min 7/10/2017    Followed by Dr. Jeevan Pond    Colon polyp     benign    Depression     Elevated PSA     Erectile dysfunction     Gastric ulcer with hemorrhage     Hep B w/o coma 1977    History of bleeding peptic ulcer     History of prostatitis     Hypertension     PAF (paroxysmal atrial fibrillation)     Sleep apnea     on CPAP       Past Surgical History:   Procedure Laterality Date    ABDOMINAL HERNIA REPAIR      CATARACT EXTRACTION  11/25/2013    bilateral    CHOLECYSTECTOMY      COLONOSCOPY N/A 11/25/2015    Procedure: COLONOSCOPY;  Surgeon: Toby Hernandez MD;  Location[facility];  Service: Endoscopy;  Laterality: N/A;    EYE SURGERY      GASTRIC BYPASS  1992    KNEE ARTHROSCOPY      RT    LASIK  2001    both eyes (Dr. Rabago)    ORIF HUMERUS FRACTURE      LT    ORIF HUMERUS FRACTURE Right     non surgical repair    RADIOFREQUENCY ABLATION      x2    Right ankle tendon repair      ROTATOR CUFF REPAIR      right    TOTAL KNEE ARTHROPLASTY Right 5/29/2018    Procedure: REPLACEMENT-KNEE-TOTAL-pro;  Surgeon: Denzel Dallas MD;  Location: [address];  Service: Orthopedics;  Laterality: Right;  Pro       Review of patient's allergies indicates:   Allergen Reactions    No known drug allergies        No current facility-administered medications on file prior to encounter.      Current Outpatient Medications on File Prior to Encounter   Medication Sig    alendronate (FOSAMAX) 70 MG tablet Take 1 tablet (70 mg total) by mouth every 7 days. Take with a full glass of water & remain upright for at least 30 minutes    aspirin 81 MG Chew 81 mg.    atorvastatin (LIPITOR) 10 MG tablet Take 1 tablet (10 mg total) by mouth once daily.    acyclovir (ZOVIRAX) 800 MG Tab TK ONE T PO FIVE TIMES D only for fever blisters    betamethasone acetate-betamethasone sodium phosphate (CELESTONE) 6 mg/mL  injection as needed.     calcium carbonate (OS-KISHAN) 500 mg calcium (1,250 mg) tablet     CALCIUM ORAL Take by mouth Daily.    calcium-vitamin D 600 mg(1,500mg) -400 unit Tab 600 mg.    carvediloL (COREG) 12.5 MG tablet Take 1 tablet (12.5 mg total) by mouth 2 (two) times daily with meals.    cholestyramine (QUESTRAN) 4 gram packet Take 1 packet (4 g total) by mouth once daily.    cyanocobalamin/folic acid (VITAMIN B12-FOLIC ACID) 500-400 mcg Tab Take by mouth.    cyclobenzaprine (FLEXERIL) 10 MG tablet TAKE 1 TABLET BY MOUTH EVERY 8 HOURS AS NEEDED FOR MUSCLE SPASM    dutasteride (AVODART) 0.5 mg capsule TAKE 1 CAPSULE(0.5 MG) BY MOUTH EVERY DAY    efinaconazole (JUBLIA) 10 % Prakash Apply topically.    ferrous sulfate (FEOSOL) 325 mg (65 mg iron) Tab tablet Take by mouth.    KENALOG 40 mg/mL injection as needed.     levothyroxine (SYNTHROID) 50 MCG tablet Take 1 tablet (50 mcg total) by mouth before breakfast.    MULTIVITAMIN ORAL Take by mouth Daily.    omega 3-dha-epa-fish oil (OMEGA-3) 350 mg-235 mg- 90 mg-597 mg CpDR     OMEGA-3 FATTY ACIDS (FISH OIL CONCENTRATE ORAL) Take by mouth Daily.    propafenone (RYTHMOL SR) 425 MG Cp12 Take 1 capsule (425 mg total) by mouth every 12 (twelve) hours.    telmisartan (MICARDIS) 40 MG Tab Take 1 tablet (40 mg total) by mouth once daily.     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   HENT: Negative for congestion and  ear discharge.    Eyes: Negative for blurred vision and discharge.   Cardiovascular: Negative for chest pain, dyspnea on exertion, irregular heartbeat, leg swelling and paroxysmal nocturnal dyspnea.   Respiratory: Negative for cough, hemoptysis and shortness of breath.    Gastrointestinal: Negative for abdominal pain.     Objective:     Vital Signs (Most Recent):  Temp: 96.7 °F (35.9 °C) (06/15/20 0649)  Pulse: (!) 54 (06/15/20 0649)  Resp: 16 (06/15/20 0649)  BP: 132/73 (06/15/20 0649)  SpO2: 97 % (06/15/20 0649) Vital Signs (24h Range):  Temp:  [96.7 °F (35.9 °C)] 96.7 °F (35.9 °C)  Pulse:  [54] 54  Resp:  [16] 16  SpO2:  [97 %] 97 %  BP: (132)/(73) 132/73        Body mass index is 27.82 kg/m².    SpO2: 97 %       No intake or output data in the 24 hours ending 06/15/20 0657    Lines/Drains/Airways     Epidural Line                 Perineural Analgesia/Anesthesia Assessment (using dermatomes) Epidural 05/29/18 0738 747 days          Peripheral Intravenous Line                 Peripheral IV - Single Lumen 06/15/20 0653 20 G Left Forearm less than 1 day         Peripheral IV - Single Lumen 06/15/20 0654 18 G Right Antecubital less than 1 day                Physical Exam   Constitutional: He is oriented to person, place, and time. He appears well-developed and well-nourished. No distress.   Eyes: Pupils are equal, round, and reactive to light. Conjunctivae are normal.   Neck: No tracheal deviation present. No thyromegaly present.   Cardiovascular: Regular rhythm, normal heart sounds and intact distal pulses. Bradycardia present. Exam reveals no gallop and no friction rub.   No murmur heard.  Pulses:       Radial pulses are 2+ on the right side and 2+ on the left side.        Femoral pulses are 2+ on the right side and 2+ on the left side.  Pulmonary/Chest: Effort normal and breath sounds normal. No respiratory distress. He has no wheezes. He has no rales.   Abdominal: Soft. Bowel sounds are normal. He exhibits no  distension. There is no abdominal tenderness.   Musculoskeletal:         General: No deformity or edema.   Neurological: He is alert and oriented to person, place, and time. No cranial nerve deficit. Coordination normal.   Skin: Skin is warm and dry. He is not diaphoretic.   Psychiatric: He has a normal mood and affect. His behavior is normal.       Significant Labs: BMP: No results for input(s): GLU, NA, K, CL, CO2, BUN, CREATININE, CALCIUM, MG in the last 48 hours.    Significant Imaging: Echocardiogram: Transthoracic echo (TTE) complete (Cupid Only): No results found. However, due to the size of the patient record, not all encounters were searched. Please check Results Review for a complete set of results.    Assessment and Plan:     Atrial fibrillation  1. PEPE for evaluation of ALMA for PVI  -No absolute contraindications of esophageal stricture, tumor, perforation, laceration,or diverticulum and/or active GI bleed  -The risks, benefits & alternatives of the procedure were explained to the patient.   -The risks of transesophageal echo include but are not limited to:  Dental trauma, esophageal trauma/perforation, bleeding, laryngospasm/brochospasm, aspiration, sore throat/hoarseness, & dislodgement of the endotracheal tube/nasogastric tube (where applicable).    -The risks of moderate sedation include hypotension, respiratory depression, arrhythmias, bronchospasm, & death.    -Informed consent was obtained. The patient is agreeable to proceed with the procedure and all questions and concerns addressed.    Case discussed with an attending in echocardiography lab.     Further recommendations per attending addendum        VTE Risk Mitigation (From admission, onward)    None          Thank you for your consult. I will follow-up with patient. Please contact us if you have any additional questions.    Reyes Villanueva MD  Cardiology   [facility]

## 2020-06-15 NOTE — H&P
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 He has no fever or chills. No chest pain or pressure. No orthopnea or PND. No shortness of breath. Last apixaban dose was yesterday.     From Dr Beatty's clinic note 2/10/20:  "Background:  First diagnosed with atrial fibrillation 2/12 (noted palpitations). Placed on beta blocker, coumadin. Underwent PEPE/DCCV. Had recurrence, Propafenone  mg twice daily added.  Had recurrence 12/24/13, underwent DCCV, Propafenone increased to 425 mg twice daily.  PVI (Cryoballoon) 7/28/14.   Had done well, was off Propafenone. Developed recurrence, underwent DCCV multiple times.  Repeat PVI 4/27/17 All 4 veins remained isolated.  Antralized left sided lesion set posteriorly, and performed SVC isolation.  Has been compliant with CPAP.  Had persistent recurrence >> DCCV 2/9/18.  He has had paroxysmal AF, with 4 episodes since 11/18.    Exercise echo 2/19 normal biventricular structure and function BISI 57 negative for ischemia.  Update:  Has continued to have paroxysmal events, self-terminating.  Last episode was last month.  Generally, the episodes are lasting 3 days.  Since last visit, he was admitted with a TIA.  This was due to non-compliance with Eliquis.  Has gained 10 lbs.  BP slightly elevated."  Past Medical History:   Diagnosis Date    Anticoagulant long-term use     Anxiety     Arthritis     Atrial fibrillation     BPH (benign prostatic hyperplasia)     Cataract     CKD (chronic kidney disease) stage 3, GFR 30-59 ml/min 7/10/2017    Followed by Dr. Jeevan Pond    Colon polyp     benign    Depression     Elevated PSA     Erectile dysfunction     Gastric ulcer with hemorrhage     Hep B w/o coma 1977    History of bleeding peptic ulcer     History of prostatitis     Hypertension     PAF " (paroxysmal atrial fibrillation)     Sleep apnea     on CPAP        Social History     Socioeconomic History    Marital status:      Spouse name: Not on file    Number of children: 5    Years of education: Not on file    Highest education level: Not on file   Occupational History    Occupation: retired anesthesiology     Employer: [employer] ([employer])    Occupation: [employer] grad     Comment: previous football player   Social Needs    Financial resource strain: Not on file    Food insecurity     Worry: Not on file     Inability: Not on file    Transportation needs     Medical: Not on file     Non-medical: Not on file   Tobacco Use    Smoking status: Never Smoker    Smokeless tobacco: Never Used    Tobacco 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    Substance and Sexual Activity    Alcohol use: No    Drug use: No    Sexual activity: Yes     Partners: Female   Lifestyle    Physical activity     Days per week: Not on file     Minutes per session: Not on file    Stress: Not on file   Relationships    Social connections     Talks on phone: Not on file     Gets together: Not on file     Attends Orthodoxy service: Not on file     Active member of club or organization: Not on file     Attends meetings of clubs or organizations: Not on file     Relationship status: Not on file   Other Topics Concern    Patient feels they ought to cut down on drinking/drug use Not Asked    Patient annoyed by others criticizing their drinking/drug use Not Asked    Patient has felt bad or guilty about drinking/drug use Not Asked    Patient has had a drink/used drugs as an eye opener in the AM Not Asked   Social History Narrative    Not on file        Family History   Problem Relation Age of Onset    COPD Father     Diabetes Father     Osteoporosis Father     Aortic stenosis Mother     Heart disease Mother         aortic stenosis    Osteoporosis Mother     Heart attack Brother     No Known Problems  Son     No Known Problems Daughter     No Known Problems Daughter     No Known Problems Daughter         No current facility-administered medications for this encounter.         Constitutional: (-)fevers, (-)chills, (-)night sweats  HEENT: (-) headaches (-) lightheadedness (-) blurry vision  Cardiovascular: (-)chest pain (-)paroxysmal nocturnal dyspnea (-)orthopnea  Respiratory: (-)shortness of breath, (-)dyspnea on exertion (-)hemoptysis  Gastrointestinal: (-)abdominal pain (-)nausea (-)vomiting (-)tarry stools  Musculoskeletal: (-)arthralgias (-)limited range of motion  Neurologic: (-)parasthesias (-)mood disorder (-)anxiety  Endo: (-)polyuria (-)polydipsia (-)heat/cold intolerance  Skin: (-)rash     There were no vitals filed for this visit.  Physical Exam:   Gen: no acute distress, pleasant patient answering questions appropriately  HEENT: extra-ocular muscles intact, normocephalic-atraumatic  Neck: no jugular venous distension  CVS: regular rate and rhythm  CHEST: non labored respirations  ABD: Soft  EXT: trace edema. Femoral pulses 2+ bilaterally  NEURO: awake, alert, and oriented   Skin: No rash     Review of Labs:     Lab Results   Component Value Date    INR 1.0 06/08/2020    INR 3.4 06/07/2018    INR 3.4 06/07/2018     Lab Results   Component Value Date    WBC 7.44 06/08/2020    HGB 12.0 (L) 06/08/2020    HCT 40.0 06/08/2020    MCV 92 06/08/2020     06/08/2020       CMP  Sodium   Date Value Ref Range Status   06/08/2020 137 136 - 145 mmol/L Final     Potassium   Date Value Ref Range Status   06/08/2020 5.9 (H) 3.5 - 5.1 mmol/L Final     Comment:     *No Visible Hemolysis     Chloride   Date Value Ref Range Status   06/08/2020 111 (H) 95 - 110 mmol/L Final     CO2   Date Value Ref Range Status   06/08/2020 21 (L) 23 - 29 mmol/L Final     Glucose   Date Value Ref Range Status   06/08/2020 124 (H) 70 - 110 mg/dL Final     BUN, Bld   Date Value Ref Range Status   06/08/2020 16 8 - 23 mg/dL Final      Creatinine   Date Value Ref Range Status   06/08/2020 1.4 0.5 - 1.4 mg/dL Final     Calcium   Date Value Ref Range Status   06/08/2020 8.1 (L) 8.7 - 10.5 mg/dL Final     Total Protein   Date Value Ref Range Status   02/03/2020 6.0 6.0 - 8.4 g/dL Final     Albumin   Date Value Ref Range Status   05/04/2020 3.4 (L) 3.5 - 5.2 g/dL Final     Total Bilirubin   Date Value Ref Range Status   02/03/2020 0.9 0.1 - 1.0 mg/dL Final     Comment:     For infants and newborns, interpretation of results should be based  on gestational age, weight and in agreement with clinical  observations.  Premature Infant recommended reference ranges:  Up to 24 hours.............<8.0 mg/dL  Up to 48 hours............<12.0 mg/dL  3-5 days..................<15.0 mg/dL  6-29 days.................<15.0 mg/dL       Alkaline Phosphatase   Date Value Ref Range Status   02/03/2020 73 55 - 135 U/L Final     AST   Date Value Ref Range Status   02/03/2020 34 10 - 40 U/L Final     ALT   Date Value Ref Range Status   02/03/2020 32 10 - 44 U/L Final     Anion Gap   Date Value Ref Range Status   06/08/2020 5 (L) 8 - 16 mmol/L Final     eGFR if    Date Value Ref Range Status   06/08/2020 57.2 (A) >60 mL/min/1.73 m^2 Final     eGFR if non    Date Value Ref Range Status   06/08/2020 49.5 (A) >60 mL/min/1.73 m^2 Final     Comment:     Calculation used to obtain the estimated glomerular filtration  rate (eGFR) is the CKD-EPI equation.        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   We discussed the alternatives, benefits and risks of the procedure including pain, infection, bleeding, injury to veins and arteries including aneurysms and fistulas, injury to heart valves, puncture of the heart leading to pericardial effusion or tamponade requiring tube drainage,  atrial-esophageal fistula, heart attack, stroke and death.  Consent signed and placed in chart.

## 2020-06-15 NOTE — PLAN OF CARE
Patient arrived to room. EKG done and in chart. Rapid Covid screen sent. PIV placed, labs sent. Admit assessment completed. Plan of care discussed with patient. VSS. A&Ox4. Pt denies any complaints or pain at this time. Nurse call bell within reach. Will continue to monitor.

## 2020-06-15 NOTE — PLAN OF CARE
Patient discharged per MD orders. Instructions given on medications, wound care, activity, signs of infection, when to call MD, and follow up appointments. Pt verbalized understanding.  Patient AAOx4, VSS, no c/o pain or discomfort at this time. Telemetry and PIV removed. Patient left unit ambulatory with family.

## 2020-06-15 NOTE — PLAN OF CARE
Sutures removed per protocol at 1630. Pt ambulated to bathroom and voided at 1800. Pt denies any complaints or pain at this time. No bleeding or hematoma. Will continue to monitor.

## 2020-06-15 NOTE — ASSESSMENT & PLAN NOTE
1. PEPE for evaluation of ALMA for PVI  -No absolute contraindications of esophageal stricture, tumor, perforation, laceration,or diverticulum and/or active GI bleed  -The risks, benefits & alternatives of the procedure were explained to the patient.   -The risks of transesophageal echo include but are not limited to:  Dental trauma, esophageal trauma/perforation, bleeding, laryngospasm/brochospasm, aspiration, sore throat/hoarseness, & dislodgement of the endotracheal tube/nasogastric tube (where applicable).    -The risks of moderate sedation include hypotension, respiratory depression, arrhythmias, bronchospasm, & death.    -Informed consent was obtained. The patient is agreeable to proceed with the procedure and all questions and concerns addressed.    Case discussed with an attending in echocardiography lab.     Further recommendations per attending addendum

## 2020-06-15 NOTE — HPI
75 yo male with h/o atrial fibrillation, GI bleed, Htn, Sleep Apnea. PVI (Cryoballoon) 7/28/14.   Had done well, was off Propafenone. Developed recurrence, underwent DCCV multiple times.  Repeat PVI 4/27/17 All 4 veins remained isolated.  Antralized left sided lesion set posteriorly, and performed SVC isolation. Presents for re-do PVI.    TTE 2/19  · Normal left ventricular systolic function. The estimated ejection fraction is 60%  · Normal LV diastolic function.  · Severe left atrial enlargement.  · Normal right ventricular systolic function.  · The stress echo portion of this study is negative for myocardial ischemia.  · Suboptimal study due to target heart rate not achieved and off axis views       PEPE 2/18  Denzel Zayas MD  IMPRESSION   Successful cardioversion with no complications.     EGD 2014  The examined esophagus was normal.        Evidence of a gastric bypass was found. Widely patent fistula into        stomach, with residual/retained food debris. A gastric pouch was        found. Inflamed gastro-jejunal anastomosis with residual erosion        (no bleeding). Biopsies taken, as well as Jammie test..        The examined jejunum was normal.   Impression: - Normal esophagus.        - Gastric bypass. Inflammed anastamosis with erosion. Biopsied.

## 2020-06-15 NOTE — Clinical Note
Attempted to call pt's wife to update and confirm text messaging system, no answer at number provided

## 2020-06-15 NOTE — DISCHARGE SUMMARY
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   Procedure(s) (LRB):  Ablation atrial fibrillation (N/A)  Ablation, Atrial Flutter, Typical (N/A)  ECHOCARDIOGRAM,TRANSESOPHAGEAL (N/A)  Cardioversion or Defibrillation     Indwelling Lines/Drains at time of discharge:  Lines/Drains/Airways     Drain            Male External Urinary Catheter 06/15/20 0842 less than 1 day          Epidural Line                 Perineural Analgesia/Anesthesia Assessment (using dermatomes) Epidural 05/29/18 0738 748 days                Hospital Course:  Underwent posterior wall isolation, cti rfa. Confirmed enduring isolation of all pulmonary veins from prior ablations. Tolerated well. No immediate complications.     Significant Diagnostic Studies: Labs:   BMP:   Recent Labs   Lab 06/15/20  0607   K 4.9   , CMP   Recent Labs   Lab 06/15/20  0607   K 4.9   , CBC No results for input(s): WBC, HGB, HCT, PLT in the last 48 hours. and INR   Lab Results   Component Value Date    INR 1.0 06/08/2020    INR 3.4 06/07/2018    INR 3.4 06/07/2018       Pending Diagnostic Studies:     None          Final Active Diagnoses:    Diagnosis Date Noted POA    Atrial fibrillation [I48.91] 01/23/2014 Yes      Problems Resolved During this Admission:     No new Assessment & Plan notes have been filed under this hospital service since the last note was generated.  Service: Arrhythmia      Discharged Condition: good    Disposition: Home or Self Care    Follow  Up:  Follow-up Information     Wyatt Beatty MD In 8 weeks.    Specialties: Electrophysiology, Cardiology  Contact information:  [address]  [facility] [address] [phone]                 Patient Instructions:   No discharge procedures on file.  Medications:  Reconciled Home Medications:      Medication List      START taking these medications    furosemide 20 MG tablet  Commonly known as: LASIX  Take 1 tablet (20 mg total) by mouth daily as needed (shortness of breath, leg swelling).     pantoprazole 40 MG tablet  Commonly known as: PROTONIX  Take 1 tablet (40 mg total) by mouth once daily.        CHANGE how you take these medications    buPROPion 150 MG TB24 tablet  Commonly known as: WELLBUTRIN XL  Take 3 tablets (450 mg total) by mouth once daily.  What changed: how much to take     cyclobenzaprine 10 MG tablet  Commonly known as: FLEXERIL  TAKE 1 TABLET BY MOUTH EVERY 8 HOURS AS NEEDED FOR MUSCLE SPASM  What changed:   · how much to take  · how to take this  · when to take this  · additional instructions        CONTINUE taking these medications    acyclovir 800 MG Tab  Commonly known as: ZOVIRAX  TK ONE T PO FIVE TIMES D only for fever blisters     alendronate 70 MG tablet  Commonly known as: FOSAMAX  Take 1 tablet (70 mg total) by mouth every 7 days. Take with a full glass of water & remain upright for at least 30 minutes     amoxicillin 500 MG Tab  Commonly known as: AMOXIL  Take 500 mg by mouth every 12 (twelve) hours.     aspirin 81 MG Chew  81 mg.     atorvastatin 10 MG tablet  Commonly known as: LIPITOR  Take 1 tablet (10 mg total) by mouth once daily.     betamethasone acetate-betamethasone sodium phosphate 6 mg/mL injection  Commonly known as: CELESTONE  as needed.     calcium carbonate 500 mg calcium (1,250 mg) tablet  Commonly known as: OS-KISHAN     CALCIUM ORAL  Take by mouth Daily.     calcium-vitamin D 600 mg(1,500mg) -400 unit Tab  600 mg.     carvediloL 12.5 MG tablet  Commonly known  as: COREG  Take 1 tablet (12.5 mg total) by mouth 2 (two) times daily with meals.     celecoxib 200 MG capsule  Commonly known as: CeleBREX  Take 1 capsule (200 mg total) by mouth 2 (two) times daily.     cholestyramine 4 gram packet  Commonly known as: QUESTRAN  Take 1 packet (4 g total) by mouth once daily.     dutasteride 0.5 mg capsule  Commonly known as: AVODART  TAKE 1 CAPSULE(0.5 MG) BY MOUTH EVERY DAY     ELIQUIS 5 mg Tab  Generic drug: apixaban  Take 1 tablet (5 mg total) by mouth 2 (two) times daily.     escitalopram oxalate 10 MG tablet  Commonly known as: LEXAPRO  TAKE 1 TABLET(10 MG) BY MOUTH EVERY DAY     ferrous sulfate 325 mg (65 mg iron) Tab tablet  Commonly known as: FEOSOL  Take by mouth.     FISH OIL CONCENTRATE ORAL  Take by mouth Daily.     JUBLIA 10 % Aleksandra  Generic drug: efinaconazole  Apply topically.     KENALOG 40 mg/mL injection  Generic drug: triamcinolone acetonide  as needed.     levothyroxine 50 MCG tablet  Commonly known as: SYNTHROID  Take 1 tablet (50 mcg total) by mouth before breakfast.     MULTIVITAMIN ORAL  Take by mouth Daily.     OMEGA-3 350 mg-235 mg- 90 mg-597 mg Cpdr  Generic drug: omega 3-dha-epa-fish oil     oxyCODONE-acetaminophen  mg per tablet  Commonly known as: PERCOCET  Take 1 tablet by mouth every 4 (four) hours as needed for Pain.     propafenone 425 MG Cp12  Commonly known as: RYTHMOL SR  Take 1 capsule (425 mg total) by mouth every 12 (twelve) hours.     telmisartan 40 MG Tab  Commonly known as: MICARDIS  Take 1 tablet (40 mg total) by mouth once daily.     vitamin B12-folic acid 500-400 mcg Tab  Take by mouth.            Time spent on the discharge of patient: 30 minutes    Ricky Matamoros MD  Cardiac Electrophysiology  [facility] Cardiac [facility]

## 2020-06-15 NOTE — TRANSFER OF CARE
"Anesthesia Transfer of Care Note    Patient: Dominick Song MD    Procedure(s) Performed: Procedure(s) (LRB):  Ablation atrial fibrillation (N/A)  Ablation, Atrial Flutter, Typical (N/A)  ECHOCARDIOGRAM,TRANSESOPHAGEAL (N/A)  Cardioversion or Defibrillation    Patient location: PACU    Anesthesia Type: general    Transport from OR: Transported from OR on 6-10 L/min O2 by face mask with adequate spontaneous ventilation    Post pain: adequate analgesia    Post assessment: no apparent anesthetic complications and tolerated procedure well    Post vital signs: stable    Level of consciousness: awake and alert    Nausea/Vomiting: no nausea/vomiting    Complications: none    Transfer of care protocol was followed      Last vitals:   Visit Vitals  /73 (BP Location: Left arm, Patient Position: Lying)   Pulse (!) 54   Temp 35.9 °C (96.7 °F) (Oral)   Resp 16   Ht 6' 2" (1.88 m)   Wt 96.6 kg (213 lb)   SpO2 97%   BMI 27.35 kg/m²     "

## 2020-06-15 NOTE — BRIEF OP NOTE
: Wyatt Beatty MD    Post-operative Diagnosis: AF    Procedure Performed: Confirmed isolation of all PVs. CTI RFA. Posterior wall isolation.     Description of Procedure: The patient was brought to the EP lab in the fasting state. Prepped and draped in sterile fashion. Safety timeout was performed. Sedation administered by anesthesia staff. Ultrasound guided venous access of the bilateral femoral veins was performed. HALO, HIS, and CS catheters placed via left femoral vein access. Long sheaths via right femoral venous access. Heparin bolus and continuous infusion per protocol. RFA to the CTI. HALO exchanged for ICE. Two transseptal punctures performed using combination of fluoroscopic and ICE guidance. Map created. RFA to isolate posterior wall. Confirmed isolation of all PVs. ICE confirmed no significant pericardial effusion.     EBL: <10 mL    Specimens Removed: None  Complications: no immediate

## 2020-07-01 ENCOUNTER — TELEPHONE (OUTPATIENT)
Dept: ELECTROPHYSIOLOGY | Facility: CLINIC | Age: 75
End: 2020-07-01

## 2020-07-01 ENCOUNTER — PATIENT MESSAGE (OUTPATIENT)
Dept: FAMILY MEDICINE | Facility: CLINIC | Age: 75
End: 2020-07-01

## 2020-07-01 NOTE — PROGRESS NOTES
Refill Routing Note   Medication(s) are not appropriate for processing by [facility]:       - Medication not previously prescribed by PCP  - Medication is a new start (<3 months)        Medication Therapy Plan: Escitalopram last prescribed(4/1/20) by Andriy Davidson MD; Bupropion -new start(4/30/20) by Andriy Davidson MD; Both not previously prescribed by you; Defer to you  Medication reconciliation completed: No      Automatic Epic Generated Protocol Data:    Requested Prescriptions   Pending Prescriptions Disp Refills    buPROPion (WELLBUTRIN XL) 150 MG TB24 tablet 270 tablet 0     Sig: Take 3 tablets (450 mg total) by mouth once daily.       Psychiatry: Antidepressants - bupropion Passed - 7/1/2020 12:14 PM        Passed - Patient is at least 18 years old        Passed - Last BP in normal range within 360 days.     BP Readings from Last 3 Encounters:   06/15/20 132/73   06/15/20 127/67   02/17/20 122/76              Passed - Office visit in past 6 months or future 90 days.     Recent Outpatient Visits            4 months ago Essential hypertension    [facility] Medicine Maxi Gaines MD    4 months ago Paroxysmal atrial fibrillation    [place] - Arrhythmia Wyatt Beatty MD    6 months ago TIA (transient ischemic attack)    [facility] Maxi Gaines MD    6 months ago BPH with urinary obstruction    [facility] - Urology Roque Shelton MD    7 months ago Osteoporosis without pathological fracture    [place] - Endocrinology Juliette L. Sandifer Kum-Nji, MD          Future Appointments              In 2 weeks [facility] L. [facility]ji[facility]    In 1 month [facility] [place] [facility]    In 1 month [facility]    In 2 months [facility] Pond [facility] [place] [facility]                  escitalopram oxalate (LEXAPRO) 10 MG tablet 90  tablet 0     Sig: TAKE 1 TABLET(10 MG) BY MOUTH EVERY DAY       Psychiatry:  Antidepressants - SSRI Passed - 7/1/2020 12:14 PM        Passed - Patient is at least 18 years old        Passed - Office visit in past 6 months or future 90 days.     Recent Outpatient Visits            4 months ago Essential hypertension    [facility] Medicine Maxi Gaines MD    4 months ago Paroxysmal atrial fibrillation    [place]  Arrhythmia Wyatt Beatty MD    6 months ago TIA (transient ischemic attack)    [facility] Maxi Gaines MD    6 months ago BPH with urinary obstruction    [facility] - Urology Roque Shelton MD    7 months ago Osteoporosis without pathological fracture    [place] - Endocrinology Juliette L. Sandifer Kum-Nji, MD          Future Appointments              In 2 weeks Juliet[facility] L. Sandifer Kum[facility]Nji, MD [place] [facility]    In 1 month LAB, [place] [facility] [place]    In 1 month [facility] Gaines MD [place] [facility]    In 2 months Jeevan Pond MD [place] - Nephrology, [place]                      Appointments  past 12m or future 3m with PCP    Date Provider   Last Visit   2/17/2020 Maxi Gaines MD   Next Visit   8/17/2020 Maxi Gaines MD   ED visits in past 90 days: 0     Note composed:2:31 PM 07/01/2020

## 2020-07-02 RX ORDER — ESCITALOPRAM OXALATE 10 MG/1
TABLET ORAL
Qty: 90 TABLET | Refills: 3 | Status: SHIPPED | OUTPATIENT
Start: 2020-07-02 | End: 2021-11-03 | Stop reason: SDUPTHER

## 2020-07-02 RX ORDER — BUPROPION HYDROCHLORIDE 150 MG/1
450 TABLET ORAL DAILY
Qty: 270 TABLET | Refills: 3 | Status: SHIPPED | OUTPATIENT
Start: 2020-07-02 | End: 2020-07-06

## 2020-07-06 ENCOUNTER — PATIENT MESSAGE (OUTPATIENT)
Dept: FAMILY MEDICINE | Facility: CLINIC | Age: 75
End: 2020-07-06

## 2020-07-06 RX ORDER — BUPROPION HYDROCHLORIDE 300 MG/1
300 TABLET ORAL DAILY
Qty: 90 TABLET | Refills: 3 | Status: SHIPPED | OUTPATIENT
Start: 2020-07-06 | End: 2021-06-23 | Stop reason: SDUPTHER

## 2020-07-06 NOTE — TELEPHONE ENCOUNTER
Pt requesting a refill (Bupropion 300mg). Pt states that he doesn't need the 90mg. He didn't  the previous rx refill that was sent to pharmacy. Please advise.

## 2020-07-15 ENCOUNTER — TELEPHONE (OUTPATIENT)
Dept: NEUROLOGY | Facility: CLINIC | Age: 75
End: 2020-07-15

## 2020-07-16 ENCOUNTER — OFFICE VISIT (OUTPATIENT)
Dept: NEUROLOGY | Facility: CLINIC | Age: 75
End: 2020-07-16
Payer: MEDICARE

## 2020-07-16 VITALS
HEART RATE: 67 BPM | DIASTOLIC BLOOD PRESSURE: 69 MMHG | WEIGHT: 212.31 LBS | BODY MASS INDEX: 27.25 KG/M2 | HEIGHT: 74 IN | SYSTOLIC BLOOD PRESSURE: 105 MMHG

## 2020-07-16 DIAGNOSIS — I10 ESSENTIAL HYPERTENSION: ICD-10-CM

## 2020-07-16 DIAGNOSIS — I48.11 LONGSTANDING PERSISTENT ATRIAL FIBRILLATION: ICD-10-CM

## 2020-07-16 DIAGNOSIS — Z86.73 HISTORY OF TIA (TRANSIENT ISCHEMIC ATTACK): Primary | ICD-10-CM

## 2020-07-16 PROCEDURE — 99999 PR PBB SHADOW E&M-EST. PATIENT-LVL V: CPT | Mod: PBBFAC,HCNC,, | Performed by: PSYCHIATRY & NEUROLOGY

## 2020-07-16 PROCEDURE — 1101F PR PT FALLS ASSESS DOC 0-1 FALLS W/OUT INJ PAST YR: ICD-10-PCS | Mod: HCNC,CPTII,S$GLB, | Performed by: PSYCHIATRY & NEUROLOGY

## 2020-07-16 PROCEDURE — 3078F DIAST BP <80 MM HG: CPT | Mod: HCNC,CPTII,S$GLB, | Performed by: PSYCHIATRY & NEUROLOGY

## 2020-07-16 PROCEDURE — 3074F PR MOST RECENT SYSTOLIC BLOOD PRESSURE < 130 MM HG: ICD-10-PCS | Mod: HCNC,CPTII,S$GLB, | Performed by: PSYCHIATRY & NEUROLOGY

## 2020-07-16 PROCEDURE — 99204 OFFICE O/P NEW MOD 45 MIN: CPT | Mod: HCNC,S$GLB,, | Performed by: PSYCHIATRY & NEUROLOGY

## 2020-07-16 PROCEDURE — 1126F PR PAIN SEVERITY QUANTIFIED, NO PAIN PRESENT: ICD-10-PCS | Mod: HCNC,S$GLB,, | Performed by: PSYCHIATRY & NEUROLOGY

## 2020-07-16 PROCEDURE — 3078F PR MOST RECENT DIASTOLIC BLOOD PRESSURE < 80 MM HG: ICD-10-PCS | Mod: HCNC,CPTII,S$GLB, | Performed by: PSYCHIATRY & NEUROLOGY

## 2020-07-16 PROCEDURE — 99204 PR OFFICE/OUTPT VISIT, NEW, LEVL IV, 45-59 MIN: ICD-10-PCS | Mod: HCNC,S$GLB,, | Performed by: PSYCHIATRY & NEUROLOGY

## 2020-07-16 PROCEDURE — 3008F PR BODY MASS INDEX (BMI) DOCUMENTED: ICD-10-PCS | Mod: HCNC,CPTII,S$GLB, | Performed by: PSYCHIATRY & NEUROLOGY

## 2020-07-16 PROCEDURE — 1159F PR MEDICATION LIST DOCUMENTED IN MEDICAL RECORD: ICD-10-PCS | Mod: HCNC,S$GLB,, | Performed by: PSYCHIATRY & NEUROLOGY

## 2020-07-16 PROCEDURE — 1159F MED LIST DOCD IN RCRD: CPT | Mod: HCNC,S$GLB,, | Performed by: PSYCHIATRY & NEUROLOGY

## 2020-07-16 PROCEDURE — 1126F AMNT PAIN NOTED NONE PRSNT: CPT | Mod: HCNC,S$GLB,, | Performed by: PSYCHIATRY & NEUROLOGY

## 2020-07-16 PROCEDURE — 1101F PT FALLS ASSESS-DOCD LE1/YR: CPT | Mod: HCNC,CPTII,S$GLB, | Performed by: PSYCHIATRY & NEUROLOGY

## 2020-07-16 PROCEDURE — 3008F BODY MASS INDEX DOCD: CPT | Mod: HCNC,CPTII,S$GLB, | Performed by: PSYCHIATRY & NEUROLOGY

## 2020-07-16 PROCEDURE — 99999 PR PBB SHADOW E&M-EST. PATIENT-LVL V: ICD-10-PCS | Mod: PBBFAC,HCNC,, | Performed by: PSYCHIATRY & NEUROLOGY

## 2020-07-16 PROCEDURE — 3074F SYST BP LT 130 MM HG: CPT | Mod: HCNC,CPTII,S$GLB, | Performed by: PSYCHIATRY & NEUROLOGY

## 2020-07-16 NOTE — ASSESSMENT & PLAN NOTE
"Etiology: Small Vessel Occlusion Evident  ALMA Incomplete -- CE Major:  atrial fibrillation --   Incomplete -- Other Absent   event most consistent with TIA, limited workup at that time (no MRI d/t knee / shoulder hardware ?) and no CTA - he had only been taking his apixaban once per day, even though this was considered an "apixaban failure". Aspirin was added to his regimen for this reason.   20 event was quite atypical for TIA (amnesia, combativeness, no clear focal deficits) and though a TIA cannot be ruled out, this would be lower probability, however no other explanation for these transient neurological symptoms. CT demonstrates a small area of remote vs. Subacute infarct in R medial occipital lobe, so transient hippocampal ischemia may be at the origin, although again this is atypical  · Diagnostic Orders: MRI brain w/o contrast (should be OK with his orthopedic hardware) and CTA Head and Neck; it is ultimately unlikely to alter management given his need for life-long DOAC, unless significant carotid lesion found (low liklihood d/t normal recent US), however diffuse atherosclerosis or other lesions may tip the balance to continue statin or other antiplatelet lesions  · Secondary stroke prevention: Continue apixaban full dose;  would discontinue aspirin as this was added, per patient, for apixaban failure in December even though he admits for not taking full dose apixaban at that time; re: stroke prevention, the addition of an antiplatelet agent is not warranted at this time unless findings on ordered imaging studies suggest otherwise; contniue current plan until imaging obtained  · Continue current statin therapy , will likely discontinue as no indication for secondary stroke prevention in non-atherosclerotic mediated infarcts  · Blood pressure goal < 130/80 mmHg long term   · Stroke Risk Factors Addressed: Francine  · Stroke education administered    "

## 2020-07-16 NOTE — PROGRESS NOTES
"Vascular Neurology  Clinic Note    ___________________  ASSESSMENT & PLAN    Problem List Items Addressed This Visit        1 - High    History of TIA (transient ischemic attack) - Primary    Current Assessment & Plan     Etiology: Small Vessel Occlusion Evident  ALMA Incomplete -- CE Major:  atrial fibrillation --   Incomplete -- Other Absent   event most consistent with TIA, limited workup at that time (no MRI d/t knee / shoulder hardware ?) and no CTA - he had only been taking his apixaban once per day, even though this was considered an "apixaban failure". Aspirin was added to his regimen for this reason.   20 event was quite atypical for TIA (amnesia, combativeness, no clear focal deficits) and though a TIA cannot be ruled out, this would be lower probability, however no other explanation for these transient neurological symptoms. CT demonstrates a small area of remote vs. Subacute infarct in R medial occipital lobe, so transient hippocampal ischemia may be at the origin, although again this is atypical  · Diagnostic Orders: MRI brain w/o contrast (should be OK with his orthopedic hardware) and CTA Head and Neck; it is ultimately unlikely to alter management given his need for life-long DOAC, unless significant carotid lesion found (low liklihood d/t normal recent US), however diffuse atherosclerosis or other lesions may tip the balance to continue statin or other antiplatelet lesions  · Secondary stroke prevention: Continue apixaban full dose;  would discontinue aspirin as this was added, per patient, for apixaban failure in December even though he admits for not taking full dose apixaban at that time; re: stroke prevention, the addition of an antiplatelet agent is not warranted at this time unless findings on ordered imaging studies suggest otherwise; contniue current plan until imaging obtained  · Continue current statin therapy , will likely discontinue as no indication for secondary stroke " prevention in non-atherosclerotic mediated infarcts  · Blood pressure goal < 130/80 mmHg long term   · Stroke Risk Factors Addressed: A.ayana  · Stroke education administered           Relevant Orders    MRI Brain Without Contrast    CTA Head and Neck (xpd)       Unprioritized    Atrial fibrillation    Essential hypertension          Reason For Visit (Chief Complaint): prior admit to [place] for stroke    HPI: 74 y.o. right handed male with hx of symptomatic atrial fibrillation s/p 3 ablations, last ablation 6/15/20, recently seen a few days ago at [facility].    July 13, Stood up from dinner and felt dizzy and as if he was going to pass out. Wife was trying to get him into bed, and she noticed that he was slurring his speech and he seemed quite confused. He was unable to understand the commands she was trying to have him do. She called 911, and upon arrival they find him restless and mildly combative. He does not recall any of the house, EMS ride or the ED. The last thing he remembers is getting out of the chair initially. He says he lost about 8 hours of time, from about midnight until 8 am.    There were not any other focal deficits, no problems with vision or weakness and now back to baseline. Workup consisted of 2 head CTs and a carotid ultrasound.    Prior event was 12/14/20. They were at a party and he started slurring his speech and he had drooping of his right face and weakness on his right side. Wife drove him to [place] and were planning on tPA but he was on eliquis at that time so that didn't happen. The whole event lasted about 90 minutes and was also improving by the time he got to the ED, and was completely back to baseline. He had two head CTs. He was taking eliquis only once per day during that time and the thought was that the event was secondary to medication non-adherence.    Brain Imaging:  CT 7/13 - no clear evidence of infarct; one area in R occipital lobe but hard to determine based on  artifact  Vessel Imaging:  Carotid US   - Normal bilaterally  Cardiac Evaluation:  PEPE 6/15  · Normal appearing left atrial appendage. No thrombus is present in the appendage. Normal appendage velocities.  · No thrombus is present in the left atrium.  · Normal left ventricular systolic function. The estimated ejection fraction is 60%.  · Normal right ventricular systolic function.  Other:     Relevant Labwork:  Recent Labs   Lab 02/03/20  1048   LDL Cholesterol 38.2 L   HDL 45   Triglycerides 49   Cholesterol 93 L       I independently viewed the above imaging studies in addition to reviewing the report.  I reviewed the above labwork.    Review of Systems  Msk: negative for muscle pain  Skin: negative for pruritis  Neuro: negative for headache  All others negative    Past Medical History  Past Medical History:   Diagnosis Date    Anticoagulant long-term use     Anxiety     Arthritis     Atrial fibrillation     BPH (benign prostatic hyperplasia)     Cataract     CKD (chronic kidney disease) stage 3, GFR 30-59 ml/min 7/10/2017    Followed by Dr. Jeevan Pond    Colon polyp     benign    Depression     Elevated PSA     Erectile dysfunction     Gastric ulcer with hemorrhage     Hep B w/o coma 1977    History of bleeding peptic ulcer     History of prostatitis     Hypertension     PAF (paroxysmal atrial fibrillation)     Sleep apnea     on CPAP    Stroke     TIA December 2019    Thyroid disease      Family History  Relevant history: paternal grandmother , maternal grandmother  Social History  Never Smoker     Medication List with Changes/Refills   Current Medications    ACYCLOVIR (ZOVIRAX) 800 MG TAB    TK ONE T PO FIVE TIMES D only for fever blisters    ALENDRONATE (FOSAMAX) 70 MG TABLET    Take 1 tablet (70 mg total) by mouth every 7 days. Take with a full glass of water & remain upright for at least 30 minutes    AMOXICILLIN (AMOXIL) 500 MG TAB    Take 500 mg by mouth every 12 (twelve) hours.     ASPIRIN 81 MG CHEW    81 mg.    ATORVASTATIN (LIPITOR) 10 MG TABLET    Take 1 tablet (10 mg total) by mouth once daily.    BETAMETHASONE ACETATE-BETAMETHASONE SODIUM PHOSPHATE (CELESTONE) 6 MG/ML INJECTION    as needed.     BUPROPION (WELLBUTRIN XL) 300 MG 24 HR TABLET    Take 1 tablet (300 mg total) by mouth once daily.    CALCIUM CARBONATE (OS-KISHAN) 500 MG CALCIUM (1,250 MG) TABLET        CALCIUM ORAL    Take by mouth Daily.    CALCIUM-VITAMIN D 600 MG(1,500MG) -400 UNIT TAB    600 mg.    CARVEDILOL (COREG) 12.5 MG TABLET    Take 1 tablet (12.5 mg total) by mouth 2 (two) times daily with meals.    CELECOXIB (CELEBREX) 200 MG CAPSULE    TAKE 1 CAPSULE(200 MG) BY MOUTH TWICE DAILY    CHOLESTYRAMINE (QUESTRAN) 4 GRAM PACKET    Take 1 packet (4 g total) by mouth once daily.    CYANOCOBALAMIN/FOLIC ACID (VITAMIN B12-FOLIC ACID) 500-400 MCG TAB    Take by mouth.    CYCLOBENZAPRINE (FLEXERIL) 10 MG TABLET    TAKE 1 TABLET BY MOUTH EVERY 8 HOURS AS NEEDED FOR MUSCLE SPASM    DUTASTERIDE (AVODART) 0.5 MG CAPSULE    TAKE 1 CAPSULE(0.5 MG) BY MOUTH EVERY DAY    EFINACONAZOLE (JUBLIA) 10 % ROSINA    Apply topically.    ELIQUIS 5 MG TAB    Take 1 tablet (5 mg total) by mouth 2 (two) times daily.    ESCITALOPRAM OXALATE (LEXAPRO) 10 MG TABLET    TAKE 1 TABLET(10 MG) BY MOUTH EVERY DAY    FERROUS SULFATE (FEOSOL) 325 MG (65 MG IRON) TAB TABLET    Take by mouth.    FUROSEMIDE (LASIX) 20 MG TABLET    Take 1 tablet (20 mg total) by mouth daily as needed (shortness of breath, leg swelling).    KENALOG 40 MG/ML INJECTION    as needed.     LEVOTHYROXINE (SYNTHROID) 50 MCG TABLET    Take 1 tablet (50 mcg total) by mouth before breakfast.    MULTIVITAMIN ORAL    Take by mouth Daily.    OMEGA 3-DHA-EPA-FISH OIL (OMEGA-3) 350 MG-235 MG- 90 MG-597 MG CPDR        OMEGA-3 FATTY ACIDS (FISH OIL CONCENTRATE ORAL)    Take by mouth Daily.    OXYCODONE-ACETAMINOPHEN (PERCOCET)  MG PER TABLET    Take 1 tablet by mouth every 4 (four) hours as  "needed for Pain.    PANTOPRAZOLE (PROTONIX) 40 MG TABLET    Take 1 tablet (40 mg total) by mouth once daily.    PROPAFENONE (RYTHMOL SR) 425 MG CP12    Take 1 capsule (425 mg total) by mouth every 12 (twelve) hours.    TELMISARTAN (MICARDIS) 40 MG TAB    Take 1 tablet (40 mg total) by mouth once daily.       EXAM  Vital Signs:  Vitals - 1 value per visit 2019 2019 2/10/2020 2020 6/15/2020 6/15/2020 2020   SYSTOLIC 135 150 150 122 127 132 105   DIASTOLIC 70 84 86 76 67 73 69   PULSE 62 42 60 60 59 - 67   TEMPERATURE - 97.6 - 98.2 98.6 - -   RESPIRATIONS 15 - - - 20 - -   SPO2 - 98 - 99 97 - -   Weight (lb) 216 215.39 216.71 216.71 213 213 212.3   Weight (kg) 97.977 97.7 98.3 98.3 96.616 96.616 96.3   HEIGHT 5' 10" 5' 10" 6' 2" 6' 2" 6' 2" 6' 2" 6' 2"   BODY MASS INDEX 30.99 30.91 27.82 27.82 27.35 27.35 27.26   VISIT REPORT - - - - - - -   Pain Score  0 0 0 0 - - 0   Some recent data might be hidden       General: well appearing without discomfort   Mental Status:alert, oriented to person - place - age - month   Language: able to name, repeat, comprehend commands   Cranial Nerves: EOMI, PERRL, no facial asymmetry, tongue to midline, palate midline  Motor: 5/5 power in all extremities, normal tone  Gait & Stance: normal    NIH Stroke Scale:    Level of Consciousness: 0 - alert  LOC Questions: 0 - answers both correctly  LOC Commands: 0 - performs both correctly  Best Gaze: 0 - normal  Visual: 0 - no visual loss  Facial Palsy: 0 - normal  Motor Left Arm: 0 - no drift  Motor Right Arm: 0 - no drift  Motor Left Le - no drift  Motor Right Le - no drift  Limb Ataxia: 0 - absent  Sensory: 0 - normal  Best Language: 0 - no aphasia  Dysarthria: 0 - normal articulation  Extinction and Inattention: 0 - no neglect  NIH Stroke Scale Total: 0          MD Paige  Vascular Neurology  Office [phone]  Fax [phone]    "

## 2020-07-17 ENCOUNTER — HOSPITAL ENCOUNTER (INPATIENT)
Facility: HOSPITAL | Age: 75
LOS: 2 days | Discharge: HOME HEALTH | DRG: 481 | End: 2020-07-19
Attending: EMERGENCY MEDICINE | Admitting: ORTHOPAEDIC SURGERY
Payer: MEDICARE

## 2020-07-17 ENCOUNTER — ANESTHESIA EVENT (OUTPATIENT)
Dept: SURGERY | Facility: HOSPITAL | Age: 75
DRG: 481 | End: 2020-07-17
Payer: MEDICARE

## 2020-07-17 DIAGNOSIS — Z01.810 PREOP CARDIOVASCULAR EXAM: Primary | ICD-10-CM
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
## 2020-07-17 DIAGNOSIS — M81.0 OSTEOPOROSIS WITHOUT PATHOLOGICAL FRACTURE: ICD-10-CM

## 2020-07-17 DIAGNOSIS — S72.145A CLOSED NONDISPLACED INTERTROCHANTERIC FRACTURE OF LEFT FEMUR, INITIAL ENCOUNTER: ICD-10-CM

## 2020-07-17 DIAGNOSIS — Z01.818 PRE-OP EXAMINATION: ICD-10-CM

## 2020-07-17 DIAGNOSIS — S72.002A CLOSED FRACTURE OF LEFT HIP, INITIAL ENCOUNTER: ICD-10-CM

## 2020-07-17 PROBLEM — M25.552 ACUTE HIP PAIN, LEFT: Status: ACTIVE | Noted: 2020-07-17

## 2020-07-17 PROBLEM — S72.042A: Status: ACTIVE | Noted: 2020-07-17

## 2020-07-17 LAB
ABO + RH BLD: NORMAL
ALBUMIN SERPL BCP-MCNC: 3.5 G/DL (ref 3.5–5.2)
ALP SERPL-CCNC: 67 U/L (ref 55–135)
ALT SERPL W/O P-5'-P-CCNC: 30 U/L (ref 10–44)
ANION GAP SERPL CALC-SCNC: 5 MMOL/L (ref 8–16)
AST SERPL-CCNC: 28 U/L (ref 10–40)
BACTERIA #/AREA URNS AUTO: NORMAL /HPF
BASOPHILS # BLD AUTO: 0.1 K/UL (ref 0–0.2)
BASOPHILS NFR BLD: 0.8 % (ref 0–1.9)
BILIRUB SERPL-MCNC: 0.8 MG/DL (ref 0.1–1)
BILIRUB UR QL STRIP: NEGATIVE
BLD GP AB SCN CELLS X3 SERPL QL: NORMAL
BUN SERPL-MCNC: 26 MG/DL (ref 8–23)
CALCIUM SERPL-MCNC: 8.1 MG/DL (ref 8.7–10.5)
CHLORIDE SERPL-SCNC: 111 MMOL/L (ref 95–110)
CLARITY UR REFRACT.AUTO: CLEAR
CO2 SERPL-SCNC: 21 MMOL/L (ref 23–29)
COLOR UR AUTO: YELLOW
CREAT SERPL-MCNC: 1.8 MG/DL (ref 0.5–1.4)
DIFFERENTIAL METHOD: ABNORMAL
EOSINOPHIL # BLD AUTO: 0.6 K/UL (ref 0–0.5)
EOSINOPHIL NFR BLD: 4.9 % (ref 0–8)
ERYTHROCYTE [DISTWIDTH] IN BLOOD BY AUTOMATED COUNT: 14.7 % (ref 11.5–14.5)
EST. GFR  (AFRICAN AMERICAN): 42.2 ML/MIN/1.73 M^2
EST. GFR  (NON AFRICAN AMERICAN): 36.5 ML/MIN/1.73 M^2
ESTIMATED AVG GLUCOSE: 103 MG/DL (ref 68–131)
GLUCOSE SERPL-MCNC: 107 MG/DL (ref 70–110)
GLUCOSE UR QL STRIP: NEGATIVE
HBA1C MFR BLD HPLC: 5.2 % (ref 4–5.6)
HCT VFR BLD AUTO: 34.6 % (ref 40–54)
HGB BLD-MCNC: 11 G/DL (ref 14–18)
HGB UR QL STRIP: NEGATIVE
IMM GRANULOCYTES # BLD AUTO: 0.07 K/UL (ref 0–0.04)
IMM GRANULOCYTES NFR BLD AUTO: 0.5 % (ref 0–0.5)
INR PPP: 1.1 (ref 0.8–1.2)
KETONES UR QL STRIP: ABNORMAL
LEUKOCYTE ESTERASE UR QL STRIP: NEGATIVE
LYMPHOCYTES # BLD AUTO: 1.2 K/UL (ref 1–4.8)
LYMPHOCYTES NFR BLD: 9.2 % (ref 18–48)
MAGNESIUM SERPL-MCNC: 2 MG/DL (ref 1.6–2.6)
MCH RBC QN AUTO: 27.9 PG (ref 27–31)
MCHC RBC AUTO-ENTMCNC: 31.8 G/DL (ref 32–36)
MCV RBC AUTO: 88 FL (ref 82–98)
MICROSCOPIC COMMENT: NORMAL
MONOCYTES # BLD AUTO: 0.9 K/UL (ref 0.3–1)
MONOCYTES NFR BLD: 7 % (ref 4–15)
NEUTROPHILS # BLD AUTO: 9.9 K/UL (ref 1.8–7.7)
NEUTROPHILS NFR BLD: 77.6 % (ref 38–73)
NITRITE UR QL STRIP: NEGATIVE
NRBC BLD-RTO: 0 /100 WBC
PH UR STRIP: 5 [PH] (ref 5–8)
PHOSPHATE SERPL-MCNC: 3.5 MG/DL (ref 2.7–4.5)
PLATELET # BLD AUTO: 218 K/UL (ref 150–350)
PMV BLD AUTO: 11.3 FL (ref 9.2–12.9)
POTASSIUM SERPL-SCNC: 5 MMOL/L (ref 3.5–5.1)
PREALB SERPL-MCNC: 21 MG/DL (ref 20–43)
PROT SERPL-MCNC: 6.3 G/DL (ref 6–8.4)
PROT UR QL STRIP: NEGATIVE
PROTHROMBIN TIME: 10.8 SEC (ref 9–12.5)
RBC # BLD AUTO: 3.94 M/UL (ref 4.6–6.2)
SARS-COV-2 RDRP RESP QL NAA+PROBE: NEGATIVE
SODIUM SERPL-SCNC: 137 MMOL/L (ref 136–145)
SP GR UR STRIP: 1.01 (ref 1–1.03)
SQUAMOUS #/AREA URNS AUTO: 1 /HPF
TRANSFERRIN SERPL-MCNC: 309 MG/DL (ref 200–375)
URN SPEC COLLECT METH UR: ABNORMAL
WBC # BLD AUTO: 12.79 K/UL (ref 3.9–12.7)
WBC #/AREA URNS AUTO: 0 /HPF (ref 0–5)

## 2020-07-17 PROCEDURE — 84100 ASSAY OF PHOSPHORUS: CPT | Mod: HCNC

## 2020-07-17 PROCEDURE — 25000003 PHARM REV CODE 250: Mod: HCNC | Performed by: STUDENT IN AN ORGANIZED HEALTH CARE EDUCATION/TRAINING PROGRAM

## 2020-07-17 PROCEDURE — 85025 COMPLETE CBC W/AUTO DIFF WBC: CPT | Mod: HCNC

## 2020-07-17 PROCEDURE — 96374 THER/PROPH/DIAG INJ IV PUSH: CPT | Mod: HCNC

## 2020-07-17 PROCEDURE — 86850 RBC ANTIBODY SCREEN: CPT | Mod: HCNC

## 2020-07-17 PROCEDURE — 80053 COMPREHEN METABOLIC PANEL: CPT | Mod: HCNC

## 2020-07-17 PROCEDURE — 63600175 PHARM REV CODE 636 W HCPCS: Mod: HCNC | Performed by: STUDENT IN AN ORGANIZED HEALTH CARE EDUCATION/TRAINING PROGRAM

## 2020-07-17 PROCEDURE — 99285 EMERGENCY DEPT VISIT HI MDM: CPT | Mod: 25,HCNC

## 2020-07-17 PROCEDURE — 99285 EMERGENCY DEPT VISIT HI MDM: CPT | Mod: HCNC,,, | Performed by: EMERGENCY MEDICINE

## 2020-07-17 PROCEDURE — 93010 EKG 12-LEAD: ICD-10-PCS | Mod: HCNC,,, | Performed by: INTERNAL MEDICINE

## 2020-07-17 PROCEDURE — U0002 COVID-19 LAB TEST NON-CDC: HCPCS | Mod: HCNC

## 2020-07-17 PROCEDURE — 83735 ASSAY OF MAGNESIUM: CPT | Mod: HCNC

## 2020-07-17 PROCEDURE — 93010 ELECTROCARDIOGRAM REPORT: CPT | Mod: HCNC,,, | Performed by: INTERNAL MEDICINE

## 2020-07-17 PROCEDURE — 81001 URINALYSIS AUTO W/SCOPE: CPT | Mod: HCNC

## 2020-07-17 PROCEDURE — 96376 TX/PRO/DX INJ SAME DRUG ADON: CPT | Mod: HCNC

## 2020-07-17 PROCEDURE — 99285 PR EMERGENCY DEPT VISIT,LEVEL V: ICD-10-PCS | Mod: HCNC,,, | Performed by: EMERGENCY MEDICINE

## 2020-07-17 PROCEDURE — 84466 ASSAY OF TRANSFERRIN: CPT | Mod: HCNC

## 2020-07-17 PROCEDURE — 85610 PROTHROMBIN TIME: CPT | Mod: HCNC

## 2020-07-17 PROCEDURE — 93005 ELECTROCARDIOGRAM TRACING: CPT | Mod: HCNC

## 2020-07-17 PROCEDURE — 83036 HEMOGLOBIN GLYCOSYLATED A1C: CPT | Mod: HCNC

## 2020-07-17 PROCEDURE — 84134 ASSAY OF PREALBUMIN: CPT | Mod: HCNC

## 2020-07-17 PROCEDURE — 96375 TX/PRO/DX INJ NEW DRUG ADDON: CPT | Mod: HCNC

## 2020-07-17 PROCEDURE — 63600175 PHARM REV CODE 636 W HCPCS: Mod: HCNC | Performed by: EMERGENCY MEDICINE

## 2020-07-17 PROCEDURE — 11000001 HC ACUTE MED/SURG PRIVATE ROOM: Mod: HCNC

## 2020-07-17 RX ORDER — DUTASTERIDE 0.5 MG/1
0.5 CAPSULE, LIQUID FILLED ORAL DAILY
Status: DISCONTINUED | OUTPATIENT
Start: 2020-07-18 | End: 2020-07-19 | Stop reason: HOSPADM

## 2020-07-17 RX ORDER — FUROSEMIDE 20 MG/1
20 TABLET ORAL DAILY PRN
Status: DISCONTINUED | OUTPATIENT
Start: 2020-07-17 | End: 2020-07-19 | Stop reason: HOSPADM

## 2020-07-17 RX ORDER — BUPROPION HYDROCHLORIDE 150 MG/1
300 TABLET ORAL DAILY
Status: DISCONTINUED | OUTPATIENT
Start: 2020-07-18 | End: 2020-07-19 | Stop reason: HOSPADM

## 2020-07-17 RX ORDER — MORPHINE SULFATE 4 MG/ML
4 INJECTION, SOLUTION INTRAMUSCULAR; INTRAVENOUS
Status: COMPLETED | OUTPATIENT
Start: 2020-07-17 | End: 2020-07-17

## 2020-07-17 RX ORDER — LEVOTHYROXINE SODIUM 50 UG/1
50 TABLET ORAL
Status: DISCONTINUED | OUTPATIENT
Start: 2020-07-18 | End: 2020-07-19 | Stop reason: HOSPADM

## 2020-07-17 RX ORDER — LIDOCAINE HYDROCHLORIDE 10 MG/ML
1 INJECTION, SOLUTION EPIDURAL; INFILTRATION; INTRACAUDAL; PERINEURAL ONCE
Status: DISCONTINUED | OUTPATIENT
Start: 2020-07-17 | End: 2020-07-19 | Stop reason: HOSPADM

## 2020-07-17 RX ORDER — TELMISARTAN 20 MG/1
40 TABLET ORAL DAILY
Status: DISCONTINUED | OUTPATIENT
Start: 2020-07-18 | End: 2020-07-19 | Stop reason: HOSPADM

## 2020-07-17 RX ORDER — ONDANSETRON 2 MG/ML
4 INJECTION INTRAMUSCULAR; INTRAVENOUS
Status: COMPLETED | OUTPATIENT
Start: 2020-07-17 | End: 2020-07-17

## 2020-07-17 RX ORDER — CARVEDILOL 12.5 MG/1
12.5 TABLET ORAL 2 TIMES DAILY WITH MEALS
Status: DISCONTINUED | OUTPATIENT
Start: 2020-07-18 | End: 2020-07-19 | Stop reason: HOSPADM

## 2020-07-17 RX ORDER — ESCITALOPRAM OXALATE 10 MG/1
10 TABLET ORAL DAILY
Status: DISCONTINUED | OUTPATIENT
Start: 2020-07-18 | End: 2020-07-19 | Stop reason: HOSPADM

## 2020-07-17 RX ORDER — PANTOPRAZOLE SODIUM 40 MG/1
40 TABLET, DELAYED RELEASE ORAL DAILY
Status: DISCONTINUED | OUTPATIENT
Start: 2020-07-18 | End: 2020-07-19 | Stop reason: HOSPADM

## 2020-07-17 RX ORDER — ONDANSETRON 4 MG/1
4 TABLET, ORALLY DISINTEGRATING ORAL
Status: DISPENSED | OUTPATIENT
Start: 2020-07-17 | End: 2020-07-18

## 2020-07-17 RX ORDER — GABAPENTIN 300 MG/1
300 CAPSULE ORAL 3 TIMES DAILY
Status: DISCONTINUED | OUTPATIENT
Start: 2020-07-17 | End: 2020-07-18

## 2020-07-17 RX ORDER — SODIUM CHLORIDE 9 MG/ML
INJECTION, SOLUTION INTRAVENOUS CONTINUOUS
Status: DISCONTINUED | OUTPATIENT
Start: 2020-07-17 | End: 2020-07-19 | Stop reason: HOSPADM

## 2020-07-17 RX ORDER — SODIUM CHLORIDE 0.9 % (FLUSH) 0.9 %
10 SYRINGE (ML) INJECTION
Status: DISCONTINUED | OUTPATIENT
Start: 2020-07-17 | End: 2020-07-19 | Stop reason: HOSPADM

## 2020-07-17 RX ORDER — OXYCODONE HYDROCHLORIDE 5 MG/1
5 TABLET ORAL EVERY 4 HOURS PRN
Status: DISCONTINUED | OUTPATIENT
Start: 2020-07-17 | End: 2020-07-19 | Stop reason: HOSPADM

## 2020-07-17 RX ORDER — ATORVASTATIN CALCIUM 10 MG/1
10 TABLET, FILM COATED ORAL DAILY
Status: DISCONTINUED | OUTPATIENT
Start: 2020-07-18 | End: 2020-07-19 | Stop reason: HOSPADM

## 2020-07-17 RX ORDER — ACETAMINOPHEN 325 MG/1
650 TABLET ORAL EVERY 6 HOURS
Status: DISCONTINUED | OUTPATIENT
Start: 2020-07-17 | End: 2020-07-18

## 2020-07-17 RX ORDER — ONDANSETRON 8 MG/1
8 TABLET, ORALLY DISINTEGRATING ORAL EVERY 8 HOURS PRN
Status: DISCONTINUED | OUTPATIENT
Start: 2020-07-17 | End: 2020-07-19 | Stop reason: HOSPADM

## 2020-07-17 RX ORDER — ACETAMINOPHEN 325 MG/1
650 TABLET ORAL EVERY 8 HOURS PRN
Status: DISCONTINUED | OUTPATIENT
Start: 2020-07-17 | End: 2020-07-18

## 2020-07-17 RX ORDER — HYDROMORPHONE HYDROCHLORIDE 1 MG/ML
1 INJECTION, SOLUTION INTRAMUSCULAR; INTRAVENOUS; SUBCUTANEOUS EVERY 4 HOURS PRN
Status: DISCONTINUED | OUTPATIENT
Start: 2020-07-17 | End: 2020-07-18

## 2020-07-17 RX ORDER — TALC
6 POWDER (GRAM) TOPICAL NIGHTLY PRN
Status: DISCONTINUED | OUTPATIENT
Start: 2020-07-17 | End: 2020-07-18

## 2020-07-17 RX ORDER — PROPAFENONE HYDROCHLORIDE 425 MG/1
425 CAPSULE, EXTENDED RELEASE ORAL EVERY 12 HOURS
Status: DISCONTINUED | OUTPATIENT
Start: 2020-07-17 | End: 2020-07-17

## 2020-07-17 RX ADMIN — GABAPENTIN 300 MG: 300 CAPSULE ORAL at 09:07

## 2020-07-17 RX ADMIN — SODIUM CHLORIDE: 0.9 INJECTION, SOLUTION INTRAVENOUS at 10:07

## 2020-07-17 RX ADMIN — ONDANSETRON 4 MG: 2 INJECTION INTRAMUSCULAR; INTRAVENOUS at 06:07

## 2020-07-17 RX ADMIN — MORPHINE SULFATE 4 MG: 4 INJECTION INTRAVENOUS at 08:07

## 2020-07-17 RX ADMIN — MORPHINE SULFATE 4 MG: 4 INJECTION INTRAVENOUS at 07:07

## 2020-07-17 RX ADMIN — ACETAMINOPHEN 650 MG: 325 TABLET ORAL at 09:07

## 2020-07-17 RX ADMIN — MORPHINE SULFATE 4 MG: 4 INJECTION INTRAVENOUS at 06:07

## 2020-07-17 RX ADMIN — OXYCODONE HYDROCHLORIDE 5 MG: 5 TABLET ORAL at 10:07

## 2020-07-17 RX ADMIN — HYDROMORPHONE HYDROCHLORIDE 1 MG: 1 INJECTION, SOLUTION INTRAMUSCULAR; INTRAVENOUS; SUBCUTANEOUS at 09:07

## 2020-07-17 NOTE — ED NOTES
Patient identifiers verified and correct for Dominick Song MD.    LOC: The patient is awake, alert and aware of environment with an appropriate affect, the patient is oriented x 3 and speaking appropriately.  APPEARANCE: Patient resting comfortably and in no acute distress, patient is clean and well groomed, patient's clothing is properly fastened.  SKIN: The skin is warm and dry, color consistent with ethnicity, patient has normal skin turgor and moist mucus membranes, skin intact, no breakdown or bruising noted.  MUSCULOSKELETAL: Patient moving all extremities spontaneously, no obvious swelling or deformities noted. + left sided hip pain w/ decreased active ROM to affected extremity, + 2 pulses, sensation intact.   RESPIRATORY: Airway is open and patent, respirations are spontaneous, patient has a normal effort and rate, no accessory muscle use noted, bilateral breath sounds clear.  CARDIAC: Patient has a normal rate and regular rhythm, no periphreal edema noted, capillary refill < 3 seconds.  ABDOMEN: Soft and non tender to palpation, no distention noted.  NEUROLOGIC: PERRL, 3 mm bilaterally, eyes open spontaneously, behavior appropriate to situation, follows commands, facial expression symmetrical, bilateral hand grasp equal and even, purposeful motor response noted, normal sensation in all extremities when touched with a finger.    Fall risk band applied to patient.

## 2020-07-17 NOTE — ED PROVIDER NOTES
"Encounter Date: 7/17/2020       History     Chief Complaint   Patient presents with    Fall     +trip and fall " missed the last step and fell on my left hip". + left sided hip pains, denies numbness/tingling to extremity, + sensation intact. + 2 pusles. 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 Patient states he tripped while going down stairs with new carpeting. He reports landing on his left hip and elbow. Did not hit head. Denies LOC. No loss of sensation. Reports decreased strength and range of motion, especially abduction and adduction, of left hip secondary to pain. Recently seen by vascular neurology for suspected TIA, no evidence of residual infarct. No recent fevers, chills, or night sweats. He denies headaches, dizziness, vision changes, chest pain, palpitations, shortness of breath, abdominal pain, nausea, or vomiting.         Review of patient's allergies indicates:   Allergen Reactions    No known drug allergies      Past Medical History:   Diagnosis Date    Anticoagulant long-term use     Anxiety     Arthritis     Atrial fibrillation     BPH (benign prostatic hyperplasia)     Cataract     CKD (chronic kidney disease) stage 3, GFR 30-59 ml/min 7/10/2017    Followed by Dr. Jeevan Pond    Colon polyp     benign    Depression     Elevated PSA     Erectile dysfunction     Gastric ulcer with hemorrhage     Hep B w/o coma 1977    History of bleeding peptic ulcer     History of prostatitis     Hypertension     PAF (paroxysmal atrial fibrillation)     Sleep apnea     on CPAP    Stroke     TIA December 2019    Thyroid disease      Past Surgical History:   Procedure Laterality Date    ABDOMINAL HERNIA REPAIR      ABLATION OF ARRHYTHMOGENIC FOCUS FOR ATRIAL FIBRILLATION N/A 6/15/2020    Procedure: Ablation atrial fibrillation;  Surgeon: Wyatt Beatty MD;  " Location: [facility] EP LAB;  Service: Cardiology;  Laterality: N/A;  PAF, AFl, PEPE, PVI re-do, CTI, RFA, JUSTIN, Gen, SK, 3 Prep    CATARACT EXTRACTION  11/25/2013    bilateral    CHOLECYSTECTOMY      COLONOSCOPY N/A 11/25/2015    Procedure: COLONOSCOPY;  Surgeon: Toby Hernandez MD;  Location: [facility] ENDO;  Service: Endoscopy;  Laterality: N/A;    EYE SURGERY      GASTRIC BYPASS  1992    KNEE ARTHROSCOPY      RT    LASIK  2001    both eyes (Dr. Rabago)    ORIF HUMERUS FRACTURE      LT    ORIF HUMERUS FRACTURE Right     non surgical repair    RADIOFREQUENCY ABLATION      x2    Right ankle tendon repair      ROTATOR CUFF REPAIR      right    TOTAL KNEE ARTHROPLASTY Right 5/29/2018    Procedure: REPLACEMENT-KNEE-TOTAL-axel;  Surgeon: Denzel Dallas MD;  Location: [facility] OR [address] [place] [address];  Service: Orthopedics;  Laterality: Right;  [place]    TREATMENT OF CARDIAC ARRHYTHMIA  6/15/2020    Procedure: Cardioversion or Defibrillation;  Surgeon: Wyatt Beatty MD;  Location: 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   HENT: Negative for sinus pressure, sore throat and trouble swallowing.    Eyes: Negative for pain and visual disturbance.   Respiratory: Negative for cough and shortness of breath.    Cardiovascular: Negative for chest pain and palpitations.    Gastrointestinal: Negative for abdominal pain, diarrhea, nausea and vomiting.   Genitourinary: Negative for dysuria and flank pain.   Musculoskeletal: Negative for back pain and myalgias.   Skin: Negative for color change and pallor.   Neurological: Negative for dizziness, weakness, light-headedness and headaches.   Psychiatric/Behavioral: Negative for agitation and confusion.       Physical Exam     Initial Vitals [07/17/20 1735]   BP Pulse Resp Temp SpO2   (!) 150/73 68 14 98.4 °F (36.9 °C) 98 %      MAP       --         Physical Exam    Nursing note and vitals reviewed.  Constitutional: He appears well-developed and well-nourished. He is not diaphoretic. No distress.   HENT:   Head: Normocephalic and atraumatic.   Eyes: Conjunctivae and EOM are normal.   Neck: Normal range of motion. Neck supple.   Cardiovascular: Normal rate, regular rhythm, normal heart sounds and intact distal pulses.   Pulmonary/Chest: Breath sounds normal. No respiratory distress. He has no wheezes.   Abdominal: Soft. Bowel sounds are normal. He exhibits no distension. There is no abdominal tenderness.   Musculoskeletal: No edema.      Left elbow: He exhibits normal range of motion.      Right hip: Normal.      Left hip: He exhibits decreased range of motion, decreased strength and tenderness. He exhibits no swelling and no deformity.        Arms:       Comments: Left elbow with 2x3 cm abrasion. No deformity noted.   Neurological: He is alert and oriented to person, place, and time. He has normal strength. No cranial nerve deficit or sensory deficit.   Skin: Skin is warm and dry. No erythema.   Psychiatric: He has a normal mood and affect. Thought content normal.         ED Course   Procedures  Labs Reviewed   CBC W/ AUTO DIFFERENTIAL - Abnormal; Notable for the following components:       Result Value    WBC 12.79 (*)     RBC 3.94 (*)     Hemoglobin 11.0 (*)     Hematocrit 34.6 (*)     Mean Corpuscular Hemoglobin Conc 31.8 (*)     RDW  14.7 (*)     Gran # (ANC) 9.9 (*)     Immature Grans (Abs) 0.07 (*)     Eos # 0.6 (*)     Gran% 77.6 (*)     Lymph% 9.2 (*)     All other components within normal limits   COMPREHENSIVE METABOLIC PANEL - Abnormal; Notable for the following components:    Chloride 111 (*)     CO2 21 (*)     BUN, Bld 26 (*)     Creatinine 1.8 (*)     Calcium 8.1 (*)     Anion Gap 5 (*)     eGFR if  42.2 (*)     eGFR if non  36.5 (*)     All other components within normal limits   URINALYSIS, REFLEX TO URINE CULTURE - Abnormal; Notable for the following components:    Ketones, UA Trace (*)     All other components within normal limits    Narrative:     Specimen Source->Urine   PROTIME-INR   SARS-COV-2 RNA AMPLIFICATION, QUAL   HEMOGLOBIN A1C   MAGNESIUM   PHOSPHORUS   PREALBUMIN   PROTIME-INR   TRANSFERRIN   TRANSFERRIN    Narrative:     add on tests hemoglobin a1c,transferrin,phosphorus, magnesium and   prealbumin per dr leroy addison order# [id number], [id number],   [id number], [id number], [id number]  07/17/2020  21:03    HEMOGLOBIN A1C    Narrative:     add on tests hemoglobin a1c,transferrin,phosphorus, magnesium and   prealbumin per dr leroy addison order# [id number], [id number],   [id number], [id number], [id number]  07/17/2020  21:03    MAGNESIUM    Narrative:     add on tests hemoglobin a1c,transferrin,phosphorus, magnesium and   prealbumin per dr leroy addison order# [id number], [id number],   [id number], [id number], [id number]  07/17/2020  21:03    PREALBUMIN    Narrative:     add on tests hemoglobin a1c,transferrin,phosphorus, magnesium and   prealbumin per dr leroy addison order# [id number], [id number],   [id number], [id number], [id number]  07/17/2020  21:03    PHOSPHORUS    Narrative:     add on tests hemoglobin a1c,transferrin,phosphorus, magnesium and   prealbumin per dr leroy addison order# [id number], [id number],   [id number], [id number], [id number]  07/17/2020  21:03    URINALYSIS MICROSCOPIC     Narrative:     Specimen Source->Urine   TYPE & SCREEN     EKG Readings: (Independently Interpreted)   Initial Reading: No STEMI. Rhythm: Normal Sinus Rhythm. Heart Rate: 66. Conduction: 1st Degree AV Block. ST Segments: Normal ST Segments. Clinical Impression: Normal Sinus Rhythm       Imaging Results          X-Ray Chest AP Portable (Final result)  Result time 07/17/20 21:41:02    Final result by Waqas Arango MD (07/17/20 21:41:02)                 Impression:      Mild perihilar edema.    Decreased lung volumes with hypoventilatory change.      Electronically signed by: Waqas Arango MD  Date:    07/17/2020  Time:    21:41             Narrative:    EXAMINATION:  XR CHEST AP PORTABLE    TECHNIQUE:  Single frontal view of the chest was performed.    COMPARISON:  March 7, 2011.    FINDINGS:  Mild perihilar edema.  Decreased lung volumes with hypoventilatory change.    No consolidation, pleural effusion or pneumothorax.    Cardiomediastinal silhouette is unchanged.                                MRI Pelvis Without Contrast (Final result)  Result time 07/17/20 20:49:45    Final result by Addy Martinez MD (07/17/20 20:49:45)                 Impression:      Nondisplaced left intertrochanteric fracture as further detailed above.    Soft tissue edema around the fracture site without evidence of gluteal tendon rupture.    Probable small soft tissue hematoma along the lateral aspect of the greater trochanter.    This report was flagged in Epic as abnormal.    The critical information above was relayed directly by Bill Tolliver MD by telephone to Sabina Herring MD on 7/17/2020 at 20:41.    Electronically signed by resident: Bill Tolliver  Date:    07/17/2020  Time:    20:22    Electronically signed by: Addy Martinez MD  Date:    07/17/2020  Time:    20:49             Narrative:    EXAMINATION:  MRI PELVIS WITHOUT CONTRAST    CLINICAL HISTORY:  Pelvic trauma, nondiagnostic xray;    TECHNIQUE:  Multiplanar,  multisequence imaging of the pelvis and bilateral hips without the use of contrast.    COMPARISON:  Pelvic radiograph 07/17/2020.    FINDINGS:  Osseous structures: There is a nondisplaced left intertrochanteric fracture evidence by T1 hypointense fracture line and T2 hyperintense edema.  No acute fracture elsewhere.  The pubic symphysis is intact without acute fracture.  The sacral alae is unremarkable without evidence of edema.  The right femur demonstrates no evidence of acute fracture.    Hip and Sacroiliac joints: Small amount of intra-articular fluid within the left hip joint.    Soft tissues: There is significant soft tissue and muscular edema around the fracture site involving the left adductor muscles without evidence of gluteal tendon rupture.    Miscellaneous: Probable small hematoma along the left lateral aspect of the greater trochanter.                               X-Ray Pelvis Routine AP (Final result)  Result time 07/17/20 19:27:46    Final result by Addy Martinez MD (07/17/20 19:27:46)                 Impression:      No acute displaced fracture-dislocation identified.      Electronically signed by: Addy Martinez MD  Date:    07/17/2020  Time:    19:27             Narrative:    EXAMINATION:  XR PELVIS ROUTINE AP; XR FEMUR 2 VIEW LEFT    CLINICAL HISTORY:  Unspecified fall, initial encounter    TECHNIQUE:  AP view of the pelvis; AP and lateral views left femur.    COMPARISON:  Right femur and hip series 10/07/2019 and CT abdomen and pelvis 03/21/2014    FINDINGS:  Generalized osteopenia.  Overall alignment is within normal limits.  No displaced fracture, dislocation or destructive osseous process.  Mild degenerative change noted at the pubic symphysis, bilateral sacroiliac and femoroacetabular joints and left knee.  Pelvic phleboliths noted.  No subcutaneous emphysema or radiodense retained foreign body.                               X-Ray Femur AP/LAT Left (Final result)  Result time 07/17/20  19:27:46    Final result by Addy Martinez MD (07/17/20 19:27:46)                 Impression:      No acute displaced fracture-dislocation identified.      Electronically signed by: Addy Martinez MD  Date:    07/17/2020  Time:    19:27             Narrative:    EXAMINATION:  XR PELVIS ROUTINE AP; XR FEMUR 2 VIEW LEFT    CLINICAL HISTORY:  Unspecified fall, initial encounter    TECHNIQUE:  AP view of the pelvis; AP and lateral views left femur.    COMPARISON:  Right femur and hip series 10/07/2019 and CT abdomen and pelvis 03/21/2014    FINDINGS:  Generalized osteopenia.  Overall alignment is within normal limits.  No displaced fracture, dislocation or destructive osseous process.  Mild degenerative change noted at the pubic symphysis, bilateral sacroiliac and femoroacetabular joints and left knee.  Pelvic phleboliths noted.  No subcutaneous emphysema or radiodense retained foreign body.                              X-Rays:   Independently Interpreted Readings:   Other Readings:  Pelvis/Hip/Femur XR - no acute fracture or abnormality per my read      Medical Decision Making:   History:   Old Medical Records: I decided to obtain old medical records.  Old Records Summarized: records from previous admission(s) and records from clinic visits.  Initial Assessment:   Parker Song is a 74Y M who presents with acute onset of lateral right hip pain following a mechanical fall from standing while going down carpeted stairs in his home. On arrival he is alert, afebrile, and hemodynamically stable. He is ambulatory without assistance but in significant pain. No focal neurologic deficit.   Differential Diagnosis:   Hip fracture, pelvis fracture, hip displacement, intraabdominal pathology   Independently Interpreted Test(s):   I have ordered and independently interpreted X-rays - see prior notes.  I have ordered and independently interpreted EKG Reading(s) - see prior notes  Clinical Tests:   Lab Tests: Ordered and  Reviewed  Radiological Study: Ordered and Reviewed  Medical Tests: Ordered and Reviewed  ED Management:  Obtained labs and XR of right hip, femur, and knee. Patient given morphine for pain control. Physical exam not consistent with dislocation, leg in normal anatomic position. Abdominal exam benign and patient otherwise asymptomatic, so low suspicion for acute intraabdominal process.    XRs without evidence of acute fracture or dislocation, so per Orthopedic Surgery recommendations MRI obtained. MRI demonstrated non-displaced intertrochanteric fracture of the left femur. Will admit to Orthopedic Surgery for management.   Other:   I have discussed this case with another health care provider.       <> Summary of the Discussion: Orthopedic Surgery              Attending Attestation:   Physician Attestation Statement for Resident:  As the supervising MD   Physician Attestation Statement: I have personally seen and examined this patient.   I agree with the above history. -:   As the supervising MD I agree with the above PE.    As the supervising MD I agree with the above treatment, course, plan, and disposition.                    ED Course as of Jul 18 0011 Fri Jul 17, 2020   1800 EKG by my independent interpretation is sinus rhythm at a rate of 66, there is first-degree AV block, that is slightly worse in comparison to prior.  There are no obvious ST segment changes there is a mild interventricular conduction delay that is unchanged from prior.    [EB]      ED Course User Index  [EB] Sabina Herring MD                Clinical Impression:       ICD-10-CM ICD-9-CM   1. Preop cardiovascular exam  Z01.810 V72.81   2. Fall  W19.XXXA E888.9   3. Pre-op examination  Z01.818 V72.84   4. Closed nondisplaced intertrochanteric fracture of left femur, initial encounter  S72.145A 820.21   5. Closed fracture of left hip, initial encounter  S72.002A 820.8         Disposition:   Disposition: Admitted  Condition: Stable  Reason  for referral: nondisplaced hip fracture     ED Disposition Condition    Admit                           Daniel Bah MD  Resident  07/17/20 2149       Sabina Herring MD  07/18/20 0011

## 2020-07-18 ENCOUNTER — ANESTHESIA (OUTPATIENT)
Dept: SURGERY | Facility: HOSPITAL | Age: 75
DRG: 481 | End: 2020-07-18
Payer: MEDICARE

## 2020-07-18 LAB
ALBUMIN SERPL BCP-MCNC: 3 G/DL (ref 3.5–5.2)
ALP SERPL-CCNC: 91 U/L (ref 55–135)
ALT SERPL W/O P-5'-P-CCNC: 72 U/L (ref 10–44)
ANION GAP SERPL CALC-SCNC: 4 MMOL/L (ref 8–16)
AST SERPL-CCNC: 136 U/L (ref 10–40)
BASOPHILS # BLD AUTO: 0.05 K/UL (ref 0–0.2)
BASOPHILS NFR BLD: 0.5 % (ref 0–1.9)
BILIRUB SERPL-MCNC: 0.8 MG/DL (ref 0.1–1)
BUN SERPL-MCNC: 23 MG/DL (ref 8–23)
CALCIUM SERPL-MCNC: 7.7 MG/DL (ref 8.7–10.5)
CHLORIDE SERPL-SCNC: 112 MMOL/L (ref 95–110)
CO2 SERPL-SCNC: 22 MMOL/L (ref 23–29)
CREAT SERPL-MCNC: 1.8 MG/DL (ref 0.5–1.4)
DIFFERENTIAL METHOD: ABNORMAL
EOSINOPHIL # BLD AUTO: 0.5 K/UL (ref 0–0.5)
EOSINOPHIL NFR BLD: 4.2 % (ref 0–8)
ERYTHROCYTE [DISTWIDTH] IN BLOOD BY AUTOMATED COUNT: 14.6 % (ref 11.5–14.5)
EST. GFR  (AFRICAN AMERICAN): 42.2 ML/MIN/1.73 M^2
EST. GFR  (NON AFRICAN AMERICAN): 36.5 ML/MIN/1.73 M^2
GLUCOSE SERPL-MCNC: 145 MG/DL (ref 70–110)
HCT VFR BLD AUTO: 34.6 % (ref 40–54)
HGB BLD-MCNC: 10.6 G/DL (ref 14–18)
IMM GRANULOCYTES # BLD AUTO: 0.04 K/UL (ref 0–0.04)
IMM GRANULOCYTES NFR BLD AUTO: 0.4 % (ref 0–0.5)
LYMPHOCYTES # BLD AUTO: 0.8 K/UL (ref 1–4.8)
LYMPHOCYTES NFR BLD: 7.8 % (ref 18–48)
MAGNESIUM SERPL-MCNC: 2 MG/DL (ref 1.6–2.6)
MCH RBC QN AUTO: 27.7 PG (ref 27–31)
MCHC RBC AUTO-ENTMCNC: 30.6 G/DL (ref 32–36)
MCV RBC AUTO: 90 FL (ref 82–98)
MONOCYTES # BLD AUTO: 0.7 K/UL (ref 0.3–1)
MONOCYTES NFR BLD: 6.6 % (ref 4–15)
NEUTROPHILS # BLD AUTO: 8.6 K/UL (ref 1.8–7.7)
NEUTROPHILS NFR BLD: 80.5 % (ref 38–73)
NRBC BLD-RTO: 0 /100 WBC
PHOSPHATE SERPL-MCNC: 3.6 MG/DL (ref 2.7–4.5)
PLATELET # BLD AUTO: 193 K/UL (ref 150–350)
PMV BLD AUTO: 11.7 FL (ref 9.2–12.9)
POTASSIUM SERPL-SCNC: 5.1 MMOL/L (ref 3.5–5.1)
PROT SERPL-MCNC: 5.7 G/DL (ref 6–8.4)
RBC # BLD AUTO: 3.83 M/UL (ref 4.6–6.2)
SODIUM SERPL-SCNC: 138 MMOL/L (ref 136–145)
WBC # BLD AUTO: 10.69 K/UL (ref 3.9–12.7)

## 2020-07-18 PROCEDURE — 27201423 OPTIME MED/SURG SUP & DEVICES STERILE SUPPLY: Mod: HCNC | Performed by: ORTHOPAEDIC SURGERY

## 2020-07-18 PROCEDURE — 97530 THERAPEUTIC ACTIVITIES: CPT | Mod: HCNC

## 2020-07-18 PROCEDURE — 25000003 PHARM REV CODE 250: Mod: HCNC | Performed by: ANESTHESIOLOGY

## 2020-07-18 PROCEDURE — 63600175 PHARM REV CODE 636 W HCPCS: Mod: HCNC | Performed by: STUDENT IN AN ORGANIZED HEALTH CARE EDUCATION/TRAINING PROGRAM

## 2020-07-18 PROCEDURE — C1769 GUIDE WIRE: HCPCS | Mod: HCNC | Performed by: ORTHOPAEDIC SURGERY

## 2020-07-18 PROCEDURE — 36000711: Mod: HCNC | Performed by: ORTHOPAEDIC SURGERY

## 2020-07-18 PROCEDURE — 97162 PT EVAL MOD COMPLEX 30 MIN: CPT | Mod: HCNC

## 2020-07-18 PROCEDURE — 27245 TREAT THIGH FRACTURE: CPT | Mod: HCNC,LT,, | Performed by: ORTHOPAEDIC SURGERY

## 2020-07-18 PROCEDURE — C1713 ANCHOR/SCREW BN/BN,TIS/BN: HCPCS | Mod: HCNC | Performed by: ORTHOPAEDIC SURGERY

## 2020-07-18 PROCEDURE — 36000710: Mod: HCNC | Performed by: ORTHOPAEDIC SURGERY

## 2020-07-18 PROCEDURE — 11000001 HC ACUTE MED/SURG PRIVATE ROOM: Mod: HCNC

## 2020-07-18 PROCEDURE — D9220A PRA ANESTHESIA: ICD-10-PCS | Mod: HCNC,ANES,, | Performed by: ANESTHESIOLOGY

## 2020-07-18 PROCEDURE — 64999 SUPRAINGUINAL FASCIA ILIACA CATHETER: ICD-10-PCS | Mod: HCNC,,, | Performed by: ANESTHESIOLOGY

## 2020-07-18 PROCEDURE — 97116 GAIT TRAINING THERAPY: CPT | Mod: HCNC

## 2020-07-18 PROCEDURE — 63600175 PHARM REV CODE 636 W HCPCS: Mod: HCNC | Performed by: NURSE ANESTHETIST, CERTIFIED REGISTERED

## 2020-07-18 PROCEDURE — 63600175 PHARM REV CODE 636 W HCPCS: Mod: HCNC | Performed by: ANESTHESIOLOGY

## 2020-07-18 PROCEDURE — 25000003 PHARM REV CODE 250: Mod: HCNC | Performed by: STUDENT IN AN ORGANIZED HEALTH CARE EDUCATION/TRAINING PROGRAM

## 2020-07-18 PROCEDURE — D9220A PRA ANESTHESIA: Mod: HCNC,ANES,, | Performed by: ANESTHESIOLOGY

## 2020-07-18 PROCEDURE — 36415 COLL VENOUS BLD VENIPUNCTURE: CPT | Mod: HCNC

## 2020-07-18 PROCEDURE — 83735 ASSAY OF MAGNESIUM: CPT | Mod: HCNC

## 2020-07-18 PROCEDURE — 64999 UNLISTED PX NERVOUS SYSTEM: CPT | Mod: HCNC,,, | Performed by: ANESTHESIOLOGY

## 2020-07-18 PROCEDURE — 37000008 HC ANESTHESIA 1ST 15 MINUTES: Mod: HCNC | Performed by: ORTHOPAEDIC SURGERY

## 2020-07-18 PROCEDURE — D9220A PRA ANESTHESIA: Mod: HCNC,CRNA,, | Performed by: NURSE ANESTHETIST, CERTIFIED REGISTERED

## 2020-07-18 PROCEDURE — 85025 COMPLETE CBC W/AUTO DIFF WBC: CPT | Mod: HCNC

## 2020-07-18 PROCEDURE — 84100 ASSAY OF PHOSPHORUS: CPT | Mod: HCNC

## 2020-07-18 PROCEDURE — S0020 INJECTION, BUPIVICAINE HYDRO: HCPCS | Mod: HCNC | Performed by: ANESTHESIOLOGY

## 2020-07-18 PROCEDURE — 71000033 HC RECOVERY, INTIAL HOUR: Mod: HCNC | Performed by: ORTHOPAEDIC SURGERY

## 2020-07-18 PROCEDURE — 80053 COMPREHEN METABOLIC PANEL: CPT | Mod: HCNC

## 2020-07-18 PROCEDURE — 25000003 PHARM REV CODE 250: Mod: HCNC | Performed by: NURSE ANESTHETIST, CERTIFIED REGISTERED

## 2020-07-18 PROCEDURE — 76942 PR U/S GUIDANCE FOR NEEDLE GUIDANCE: ICD-10-PCS | Mod: 26,HCNC,, | Performed by: ANESTHESIOLOGY

## 2020-07-18 PROCEDURE — 71000015 HC POSTOP RECOV 1ST HR: Mod: HCNC | Performed by: ORTHOPAEDIC SURGERY

## 2020-07-18 PROCEDURE — 27245 PR OPEN FIX INTER/SUBTROCH FX,IMPLNT: ICD-10-PCS | Mod: HCNC,LT,, | Performed by: ORTHOPAEDIC SURGERY

## 2020-07-18 PROCEDURE — 37000009 HC ANESTHESIA EA ADD 15 MINS: Mod: HCNC | Performed by: ORTHOPAEDIC SURGERY

## 2020-07-18 PROCEDURE — C1751 CATH, INF, PER/CENT/MIDLINE: HCPCS | Mod: HCNC | Performed by: ANESTHESIOLOGY

## 2020-07-18 PROCEDURE — 97165 OT EVAL LOW COMPLEX 30 MIN: CPT | Mod: HCNC

## 2020-07-18 PROCEDURE — 76942 ECHO GUIDE FOR BIOPSY: CPT | Mod: 26,HCNC,, | Performed by: ANESTHESIOLOGY

## 2020-07-18 PROCEDURE — D9220A PRA ANESTHESIA: ICD-10-PCS | Mod: HCNC,CRNA,, | Performed by: NURSE ANESTHETIST, CERTIFIED REGISTERED

## 2020-07-18 DEVICE — SCREW TFN ADVANCE 95MM: Type: IMPLANTABLE DEVICE | Site: FEMUR | Status: FUNCTIONAL

## 2020-07-18 DEVICE — SCREW LOCKING STARDRV 5X56MM: Type: IMPLANTABLE DEVICE | Site: FEMUR | Status: FUNCTIONAL

## 2020-07-18 DEVICE — IMPLANTABLE DEVICE: Type: IMPLANTABLE DEVICE | Site: FEMUR | Status: FUNCTIONAL

## 2020-07-18 RX ORDER — LIDOCAINE HCL/PF 100 MG/5ML
SYRINGE (ML) INTRAVENOUS
Status: DISCONTINUED | OUTPATIENT
Start: 2020-07-18 | End: 2020-07-18

## 2020-07-18 RX ORDER — ACETAMINOPHEN 10 MG/ML
INJECTION, SOLUTION INTRAVENOUS
Status: DISCONTINUED | OUTPATIENT
Start: 2020-07-18 | End: 2020-07-18

## 2020-07-18 RX ORDER — MIDAZOLAM HYDROCHLORIDE 1 MG/ML
1 INJECTION INTRAMUSCULAR; INTRAVENOUS EVERY 5 MIN PRN
Status: DISCONTINUED | OUTPATIENT
Start: 2020-07-18 | End: 2020-07-18 | Stop reason: HOSPADM

## 2020-07-18 RX ORDER — CEFAZOLIN SODIUM 1 G/3ML
2 INJECTION, POWDER, FOR SOLUTION INTRAMUSCULAR; INTRAVENOUS
Status: COMPLETED | OUTPATIENT
Start: 2020-07-18 | End: 2020-07-19

## 2020-07-18 RX ORDER — METHOCARBAMOL 750 MG/1
750 TABLET, FILM COATED ORAL 3 TIMES DAILY
Status: DISCONTINUED | OUTPATIENT
Start: 2020-07-18 | End: 2020-07-19 | Stop reason: HOSPADM

## 2020-07-18 RX ORDER — MUPIROCIN 20 MG/G
1 OINTMENT TOPICAL
Status: DISCONTINUED | OUTPATIENT
Start: 2020-07-18 | End: 2020-07-18 | Stop reason: HOSPADM

## 2020-07-18 RX ORDER — TALC
6 POWDER (GRAM) TOPICAL NIGHTLY PRN
Status: DISCONTINUED | OUTPATIENT
Start: 2020-07-18 | End: 2020-07-18 | Stop reason: HOSPADM

## 2020-07-18 RX ORDER — MUPIROCIN 20 MG/G
OINTMENT TOPICAL
Status: DISCONTINUED | OUTPATIENT
Start: 2020-07-18 | End: 2020-07-18 | Stop reason: HOSPADM

## 2020-07-18 RX ORDER — CELECOXIB 200 MG/1
400 CAPSULE ORAL
Status: COMPLETED | OUTPATIENT
Start: 2020-07-18 | End: 2020-07-18

## 2020-07-18 RX ORDER — CEFAZOLIN SODIUM 1 G/3ML
2 INJECTION, POWDER, FOR SOLUTION INTRAMUSCULAR; INTRAVENOUS
Status: COMPLETED | OUTPATIENT
Start: 2020-07-18 | End: 2020-07-18

## 2020-07-18 RX ORDER — ROPIVACAINE HYDROCHLORIDE 2 MG/ML
2 INJECTION, SOLUTION EPIDURAL; INFILTRATION; PERINEURAL CONTINUOUS
Status: DISCONTINUED | OUTPATIENT
Start: 2020-07-18 | End: 2020-07-19 | Stop reason: HOSPADM

## 2020-07-18 RX ORDER — METHOCARBAMOL 500 MG/1
1000 TABLET, FILM COATED ORAL EVERY 6 HOURS PRN
Status: CANCELLED | OUTPATIENT
Start: 2020-07-18

## 2020-07-18 RX ORDER — EPHEDRINE SULFATE 50 MG/ML
INJECTION, SOLUTION INTRAVENOUS
Status: DISCONTINUED | OUTPATIENT
Start: 2020-07-18 | End: 2020-07-18

## 2020-07-18 RX ORDER — EPINEPHRINE 1 MG/ML
INJECTION, SOLUTION INTRACARDIAC; INTRAMUSCULAR; INTRAVENOUS; SUBCUTANEOUS
Status: DISPENSED
Start: 2020-07-18 | End: 2020-07-18

## 2020-07-18 RX ORDER — POLYETHYLENE GLYCOL 3350 17 G/17G
17 POWDER, FOR SOLUTION ORAL DAILY
Status: DISCONTINUED | OUTPATIENT
Start: 2020-07-18 | End: 2020-07-19 | Stop reason: HOSPADM

## 2020-07-18 RX ORDER — BUPIVACAINE HYDROCHLORIDE 2.5 MG/ML
INJECTION, SOLUTION INFILTRATION; PERINEURAL
Status: DISCONTINUED | OUTPATIENT
Start: 2020-07-18 | End: 2020-07-18

## 2020-07-18 RX ORDER — BISACODYL 10 MG
10 SUPPOSITORY, RECTAL RECTAL DAILY PRN
Status: DISCONTINUED | OUTPATIENT
Start: 2020-07-18 | End: 2020-07-19 | Stop reason: HOSPADM

## 2020-07-18 RX ORDER — ACETAMINOPHEN 500 MG
1000 TABLET ORAL EVERY 8 HOURS
Status: DISCONTINUED | OUTPATIENT
Start: 2020-07-18 | End: 2020-07-19 | Stop reason: HOSPADM

## 2020-07-18 RX ORDER — HYDROMORPHONE HYDROCHLORIDE 1 MG/ML
0.5 INJECTION, SOLUTION INTRAMUSCULAR; INTRAVENOUS; SUBCUTANEOUS EVERY 6 HOURS PRN
Status: DISCONTINUED | OUTPATIENT
Start: 2020-07-18 | End: 2020-07-19 | Stop reason: HOSPADM

## 2020-07-18 RX ORDER — DEXAMETHASONE SODIUM PHOSPHATE 4 MG/ML
INJECTION, SOLUTION INTRA-ARTICULAR; INTRALESIONAL; INTRAMUSCULAR; INTRAVENOUS; SOFT TISSUE
Status: DISCONTINUED | OUTPATIENT
Start: 2020-07-18 | End: 2020-07-18

## 2020-07-18 RX ORDER — CLINDAMYCIN PHOSPHATE 900 MG/50ML
INJECTION, SOLUTION INTRAVENOUS
Status: DISCONTINUED | OUTPATIENT
Start: 2020-07-18 | End: 2020-07-18

## 2020-07-18 RX ORDER — FENTANYL CITRATE 50 UG/ML
INJECTION, SOLUTION INTRAMUSCULAR; INTRAVENOUS
Status: DISCONTINUED | OUTPATIENT
Start: 2020-07-18 | End: 2020-07-18

## 2020-07-18 RX ORDER — SODIUM CHLORIDE 0.9 % (FLUSH) 0.9 %
10 SYRINGE (ML) INJECTION
Status: DISCONTINUED | OUTPATIENT
Start: 2020-07-18 | End: 2020-07-19 | Stop reason: HOSPADM

## 2020-07-18 RX ORDER — METHOCARBAMOL 500 MG/1
500 TABLET, FILM COATED ORAL 4 TIMES DAILY
Status: DISCONTINUED | OUTPATIENT
Start: 2020-07-18 | End: 2020-07-18

## 2020-07-18 RX ORDER — DEXMEDETOMIDINE HYDROCHLORIDE 100 UG/ML
INJECTION, SOLUTION INTRAVENOUS
Status: DISCONTINUED | OUTPATIENT
Start: 2020-07-18 | End: 2020-07-18

## 2020-07-18 RX ORDER — MIDAZOLAM HYDROCHLORIDE 1 MG/ML
INJECTION, SOLUTION INTRAMUSCULAR; INTRAVENOUS
Status: DISCONTINUED | OUTPATIENT
Start: 2020-07-18 | End: 2020-07-18

## 2020-07-18 RX ORDER — FENTANYL CITRATE 50 UG/ML
25 INJECTION, SOLUTION INTRAMUSCULAR; INTRAVENOUS EVERY 5 MIN PRN
Status: DISCONTINUED | OUTPATIENT
Start: 2020-07-18 | End: 2020-07-18 | Stop reason: HOSPADM

## 2020-07-18 RX ORDER — AMOXICILLIN 250 MG
1 CAPSULE ORAL 2 TIMES DAILY
Status: DISCONTINUED | OUTPATIENT
Start: 2020-07-18 | End: 2020-07-19 | Stop reason: HOSPADM

## 2020-07-18 RX ORDER — PREGABALIN 75 MG/1
75 CAPSULE ORAL
Status: COMPLETED | OUTPATIENT
Start: 2020-07-18 | End: 2020-07-18

## 2020-07-18 RX ORDER — ROCURONIUM BROMIDE 10 MG/ML
INJECTION, SOLUTION INTRAVENOUS
Status: DISCONTINUED | OUTPATIENT
Start: 2020-07-18 | End: 2020-07-18

## 2020-07-18 RX ORDER — SODIUM CHLORIDE 9 MG/ML
INJECTION, SOLUTION INTRAVENOUS
Status: DISCONTINUED | OUTPATIENT
Start: 2020-07-18 | End: 2020-07-18 | Stop reason: HOSPADM

## 2020-07-18 RX ORDER — PROPOFOL 10 MG/ML
VIAL (ML) INTRAVENOUS
Status: DISCONTINUED | OUTPATIENT
Start: 2020-07-18 | End: 2020-07-18

## 2020-07-18 RX ORDER — LIDOCAINE HYDROCHLORIDE 10 MG/ML
1 INJECTION, SOLUTION EPIDURAL; INFILTRATION; INTRACAUDAL; PERINEURAL
Status: DISCONTINUED | OUTPATIENT
Start: 2020-07-18 | End: 2020-07-18 | Stop reason: HOSPADM

## 2020-07-18 RX ORDER — ACETAMINOPHEN 500 MG
1000 TABLET ORAL EVERY 6 HOURS
Status: CANCELLED | OUTPATIENT
Start: 2020-07-18 | End: 2020-07-20

## 2020-07-18 RX ORDER — BUPIVACAINE HYDROCHLORIDE 2.5 MG/ML
INJECTION, SOLUTION EPIDURAL; INFILTRATION; INTRACAUDAL
Status: DISPENSED
Start: 2020-07-18 | End: 2020-07-18

## 2020-07-18 RX ORDER — PHENYLEPHRINE HYDROCHLORIDE 10 MG/ML
INJECTION INTRAVENOUS
Status: DISCONTINUED | OUTPATIENT
Start: 2020-07-18 | End: 2020-07-18

## 2020-07-18 RX ORDER — SODIUM CHLORIDE 0.9 % (FLUSH) 0.9 %
10 SYRINGE (ML) INJECTION
Status: DISCONTINUED | OUTPATIENT
Start: 2020-07-18 | End: 2020-07-18 | Stop reason: HOSPADM

## 2020-07-18 RX ORDER — ONDANSETRON 2 MG/ML
4 INJECTION INTRAMUSCULAR; INTRAVENOUS EVERY 12 HOURS PRN
Status: DISCONTINUED | OUTPATIENT
Start: 2020-07-18 | End: 2020-07-19 | Stop reason: HOSPADM

## 2020-07-18 RX ORDER — ONDANSETRON 2 MG/ML
INJECTION INTRAMUSCULAR; INTRAVENOUS
Status: DISCONTINUED | OUTPATIENT
Start: 2020-07-18 | End: 2020-07-18

## 2020-07-18 RX ORDER — SODIUM CHLORIDE 0.9 % (FLUSH) 0.9 %
3 SYRINGE (ML) INJECTION
Status: DISCONTINUED | OUTPATIENT
Start: 2020-07-18 | End: 2020-07-19 | Stop reason: HOSPADM

## 2020-07-18 RX ORDER — KETAMINE HCL IN 0.9 % NACL 50 MG/5 ML
SYRINGE (ML) INTRAVENOUS
Status: DISCONTINUED | OUTPATIENT
Start: 2020-07-18 | End: 2020-07-18

## 2020-07-18 RX ORDER — MUPIROCIN 20 MG/G
1 OINTMENT TOPICAL 2 TIMES DAILY
Status: DISCONTINUED | OUTPATIENT
Start: 2020-07-18 | End: 2020-07-19 | Stop reason: HOSPADM

## 2020-07-18 RX ADMIN — FENTANYL CITRATE 25 MCG: 50 INJECTION INTRAMUSCULAR; INTRAVENOUS at 11:07

## 2020-07-18 RX ADMIN — METHOCARBAMOL TABLETS 750 MG: 750 TABLET, COATED ORAL at 03:07

## 2020-07-18 RX ADMIN — SODIUM CHLORIDE, SODIUM GLUCONATE, SODIUM ACETATE, POTASSIUM CHLORIDE, MAGNESIUM CHLORIDE, SODIUM PHOSPHATE, DIBASIC, AND POTASSIUM PHOSPHATE: .53; .5; .37; .037; .03; .012; .00082 INJECTION, SOLUTION INTRAVENOUS at 09:07

## 2020-07-18 RX ADMIN — PHENYLEPHRINE HYDROCHLORIDE 100 MCG: 10 INJECTION INTRAVENOUS at 08:07

## 2020-07-18 RX ADMIN — CLINDAMYCIN PHOSPHATE 900 MG: 18 INJECTION, SOLUTION INTRAVENOUS at 09:07

## 2020-07-18 RX ADMIN — DEXMEDETOMIDINE HYDROCHLORIDE 8 MCG: 100 INJECTION, SOLUTION, CONCENTRATE INTRAVENOUS at 10:07

## 2020-07-18 RX ADMIN — FENTANYL CITRATE 50 MCG: 50 INJECTION, SOLUTION INTRAMUSCULAR; INTRAVENOUS at 08:07

## 2020-07-18 RX ADMIN — OXYCODONE HYDROCHLORIDE 5 MG: 5 TABLET ORAL at 05:07

## 2020-07-18 RX ADMIN — PREGABALIN 75 MG: 75 CAPSULE ORAL at 07:07

## 2020-07-18 RX ADMIN — MUPIROCIN: 20 OINTMENT TOPICAL at 07:07

## 2020-07-18 RX ADMIN — APIXABAN 5 MG: 5 TABLET, FILM COATED ORAL at 01:07

## 2020-07-18 RX ADMIN — OXYCODONE HYDROCHLORIDE 5 MG: 5 TABLET ORAL at 03:07

## 2020-07-18 RX ADMIN — ACETAMINOPHEN 1000 MG: 500 TABLET ORAL at 01:07

## 2020-07-18 RX ADMIN — SODIUM CHLORIDE, SODIUM GLUCONATE, SODIUM ACETATE, POTASSIUM CHLORIDE, MAGNESIUM CHLORIDE, SODIUM PHOSPHATE, DIBASIC, AND POTASSIUM PHOSPHATE: .53; .5; .37; .037; .03; .012; .00082 INJECTION, SOLUTION INTRAVENOUS at 08:07

## 2020-07-18 RX ADMIN — CARVEDILOL 12.5 MG: 12.5 TABLET, FILM COATED ORAL at 04:07

## 2020-07-18 RX ADMIN — PROPOFOL 150 MG: 10 INJECTION, EMULSION INTRAVENOUS at 08:07

## 2020-07-18 RX ADMIN — MUPIROCIN 1 G: 20 OINTMENT TOPICAL at 09:07

## 2020-07-18 RX ADMIN — SENNOSIDES AND DOCUSATE SODIUM 1 TABLET: 8.6; 5 TABLET ORAL at 09:07

## 2020-07-18 RX ADMIN — EPHEDRINE SULFATE 5 MG: 50 INJECTION INTRAVENOUS at 08:07

## 2020-07-18 RX ADMIN — SODIUM CHLORIDE: 0.9 INJECTION, SOLUTION INTRAVENOUS at 09:07

## 2020-07-18 RX ADMIN — POLYETHYLENE GLYCOL 3350 17 G: 17 POWDER, FOR SOLUTION ORAL at 11:07

## 2020-07-18 RX ADMIN — METHOCARBAMOL TABLETS 750 MG: 750 TABLET, COATED ORAL at 09:07

## 2020-07-18 RX ADMIN — ACETAMINOPHEN 650 MG: 325 TABLET ORAL at 05:07

## 2020-07-18 RX ADMIN — FENTANYL CITRATE 50 MCG: 50 INJECTION, SOLUTION INTRAMUSCULAR; INTRAVENOUS at 10:07

## 2020-07-18 RX ADMIN — DEXAMETHASONE SODIUM PHOSPHATE 8 MG: 4 INJECTION, SOLUTION INTRAMUSCULAR; INTRAVENOUS at 08:07

## 2020-07-18 RX ADMIN — HYDROMORPHONE HYDROCHLORIDE 1 MG: 1 INJECTION, SOLUTION INTRAMUSCULAR; INTRAVENOUS; SUBCUTANEOUS at 06:07

## 2020-07-18 RX ADMIN — OXYCODONE HYDROCHLORIDE 5 MG: 5 TABLET ORAL at 11:07

## 2020-07-18 RX ADMIN — DEXMEDETOMIDINE HYDROCHLORIDE 8 MCG: 100 INJECTION, SOLUTION, CONCENTRATE INTRAVENOUS at 09:07

## 2020-07-18 RX ADMIN — Medication 30 MG: at 09:07

## 2020-07-18 RX ADMIN — SENNOSIDES AND DOCUSATE SODIUM 1 TABLET: 8.6; 5 TABLET ORAL at 11:07

## 2020-07-18 RX ADMIN — ACETAMINOPHEN 1000 MG: 10 INJECTION, SOLUTION INTRAVENOUS at 09:07

## 2020-07-18 RX ADMIN — OXYCODONE HYDROCHLORIDE 5 MG: 5 TABLET ORAL at 07:07

## 2020-07-18 RX ADMIN — METHOCARBAMOL TABLETS 750 MG: 750 TABLET, COATED ORAL at 11:07

## 2020-07-18 RX ADMIN — ROCURONIUM BROMIDE 40 MG: 10 INJECTION, SOLUTION INTRAVENOUS at 08:07

## 2020-07-18 RX ADMIN — DEXMEDETOMIDINE HYDROCHLORIDE 4 MCG: 100 INJECTION, SOLUTION, CONCENTRATE INTRAVENOUS at 09:07

## 2020-07-18 RX ADMIN — PROPOFOL 30 MG: 10 INJECTION, EMULSION INTRAVENOUS at 10:07

## 2020-07-18 RX ADMIN — CEFAZOLIN 2 G: 330 INJECTION, POWDER, FOR SOLUTION INTRAMUSCULAR; INTRAVENOUS at 09:07

## 2020-07-18 RX ADMIN — LEVOTHYROXINE SODIUM 50 MCG: 50 TABLET ORAL at 05:07

## 2020-07-18 RX ADMIN — PROPAFENONE HYDROCHLORIDE 225 MG: 150 TABLET, FILM COATED ORAL at 10:07

## 2020-07-18 RX ADMIN — HYDROMORPHONE HYDROCHLORIDE 1 MG: 1 INJECTION, SOLUTION INTRAMUSCULAR; INTRAVENOUS; SUBCUTANEOUS at 03:07

## 2020-07-18 RX ADMIN — MIDAZOLAM HYDROCHLORIDE 2 MG: 1 INJECTION, SOLUTION INTRAMUSCULAR; INTRAVENOUS at 08:07

## 2020-07-18 RX ADMIN — LIDOCAINE HYDROCHLORIDE 75 MG: 20 INJECTION, SOLUTION INTRAVENOUS at 08:07

## 2020-07-18 RX ADMIN — DEXMEDETOMIDINE HYDROCHLORIDE 4 MCG: 100 INJECTION, SOLUTION, CONCENTRATE INTRAVENOUS at 10:07

## 2020-07-18 RX ADMIN — EPHEDRINE SULFATE 5 MG: 50 INJECTION INTRAVENOUS at 09:07

## 2020-07-18 RX ADMIN — APIXABAN 5 MG: 5 TABLET, FILM COATED ORAL at 09:07

## 2020-07-18 RX ADMIN — ROPIVACAINE HYDROCHLORIDE 2 ML/HR: 2 INJECTION, SOLUTION EPIDURAL; INFILTRATION at 10:07

## 2020-07-18 RX ADMIN — CEFAZOLIN 2 G: 1 INJECTION, POWDER, FOR SOLUTION INTRAMUSCULAR; INTRAVENOUS at 04:07

## 2020-07-18 RX ADMIN — CEFAZOLIN 2 G: 330 INJECTION, POWDER, FOR SOLUTION INTRAMUSCULAR; INTRAVENOUS at 08:07

## 2020-07-18 RX ADMIN — BUPIVACAINE HYDROCHLORIDE 30 ML: 2.5 INJECTION, SOLUTION INFILTRATION; PERINEURAL at 08:07

## 2020-07-18 RX ADMIN — ONDANSETRON 4 MG: 2 INJECTION, SOLUTION INTRAMUSCULAR; INTRAVENOUS at 09:07

## 2020-07-18 RX ADMIN — CELECOXIB 400 MG: 200 CAPSULE ORAL at 07:07

## 2020-07-18 RX ADMIN — ACETAMINOPHEN 1000 MG: 500 TABLET ORAL at 09:07

## 2020-07-18 NOTE — SUBJECTIVE & OBJECTIVE
Past Medical History:   Diagnosis Date    Anticoagulant long-term use     Anxiety     Arthritis     Atrial fibrillation     BPH (benign prostatic hyperplasia)     Cataract     CKD (chronic kidney disease) stage 3, GFR 30-59 ml/min 7/10/2017    Followed by Dr. Jeevan Pond    Colon polyp     benign    Depression     Elevated PSA     Erectile dysfunction     Gastric ulcer with hemorrhage     Hep B w/o coma 1977    History of bleeding peptic ulcer     History of prostatitis     Hypertension     PAF (paroxysmal atrial fibrillation)     Sleep apnea     on CPAP    Stroke     TIA December 2019    Thyroid disease        Past Surgical History:   Procedure Laterality Date    ABDOMINAL HERNIA REPAIR      ABLATION OF ARRHYTHMOGENIC FOCUS FOR ATRIAL FIBRILLATION N/A 6/15/2020    Procedure: Ablation atrial fibrillation;  Surgeon: Wyatt Beatty MD;  Location: [facility] EP LAB;  Service: Cardiology;  Laterality: N/A;  PAF, AFl, PEPE, PVI re-do, CTI, RFA, JUSTIN, Gen, SK, 3 Prep    CATARACT EXTRACTION  11/25/2013    bilateral    CHOLECYSTECTOMY      COLONOSCOPY N/A 11/25/2015    Procedure: COLONOSCOPY;  Surgeon: Toby Hernandez MD;  Location: [facility] ENDO;  Service: Endoscopy;  Laterality: N/A;    EYE SURGERY      GASTRIC BYPASS  1992    KNEE ARTHROSCOPY      RT    LASIK  2001    both eyes (Dr. Rabago)    ORIF HUMERUS FRACTURE      LT    ORIF HUMERUS FRACTURE Right     non surgical repair    RADIOFREQUENCY ABLATION      x2    Right ankle tendon repair      ROTATOR CUFF REPAIR      right    TOTAL KNEE ARTHROPLASTY Right 5/29/2018    Procedure: REPLACEMENT-KNEE-TOTAL-pro;  Surgeon: Denzel Dallas MD;  Location: [facility] OR [address] [place] [address];  Service: Orthopedics;  Laterality: Right;  Pro    TREATMENT OF CARDIAC ARRHYTHMIA  6/15/2020    Procedure: Cardioversion or Defibrillation;  Surgeon: Wyatt Beatty MD;  Location: [facility] EP LAB;  Service: Cardiology;;       Review of patient's allergies indicates:    Allergen Reactions    No known drug allergies        Current Facility-Administered Medications   Medication    ondansetron disintegrating tablet 4 mg     Current Outpatient Medications   Medication Sig    acyclovir (ZOVIRAX) 800 MG Tab TK ONE T PO FIVE TIMES D only for fever blisters    alendronate (FOSAMAX) 70 MG tablet Take 1 tablet (70 mg total) by mouth every 7 days. Take with a full glass of water & remain upright for at least 30 minutes    amoxicillin (AMOXIL) 500 MG Tab Take 500 mg by mouth every 12 (twelve) hours.    aspirin 81 MG Chew 81 mg.    atorvastatin (LIPITOR) 10 MG tablet Take 1 tablet (10 mg total) by mouth once daily.    betamethasone acetate-betamethasone sodium phosphate (CELESTONE) 6 mg/mL injection as needed.     buPROPion (WELLBUTRIN XL) 300 MG 24 hr tablet Take 1 tablet (300 mg total) by mouth once daily.    calcium carbonate (OS-KISHAN) 500 mg calcium (1,250 mg) tablet     CALCIUM ORAL Take by mouth Daily.    calcium-vitamin D 600 mg(1,500mg) -400 unit Tab 600 mg.    carvediloL (COREG) 12.5 MG tablet Take 1 tablet (12.5 mg total) by mouth 2 (two) times daily with meals.    celecoxib (CELEBREX) 200 MG capsule TAKE 1 CAPSULE(200 MG) BY MOUTH TWICE DAILY    cholestyramine (QUESTRAN) 4 gram packet Take 1 packet (4 g total) by mouth once daily.    cyanocobalamin/folic acid (VITAMIN B12-FOLIC ACID) 500-400 mcg Tab Take by mouth.    cyclobenzaprine (FLEXERIL) 10 MG tablet TAKE 1 TABLET BY MOUTH EVERY 8 HOURS AS NEEDED FOR MUSCLE SPASM (Patient taking differently: AS NEEDED FOR MUSCLE SPASM)    dutasteride (AVODART) 0.5 mg capsule TAKE 1 CAPSULE(0.5 MG) BY MOUTH EVERY DAY    efinaconazole (JUBLIA) 10 % Prakash Apply topically.    ELIQUIS 5 mg Tab Take 1 tablet (5 mg total) by mouth 2 (two) times daily.    escitalopram oxalate (LEXAPRO) 10 MG tablet TAKE 1 TABLET(10 MG) BY MOUTH EVERY DAY    ferrous sulfate (FEOSOL) 325 mg (65 mg iron) Tab tablet Take by mouth.    furosemide  (LASIX) 20 MG tablet Take 1 tablet (20 mg total) by mouth daily as needed (shortness of breath, leg swelling).    KENALOG 40 mg/mL injection as needed.     levothyroxine (SYNTHROID) 50 MCG tablet Take 1 tablet (50 mcg total) by mouth before breakfast.    MULTIVITAMIN ORAL Take by mouth Daily.    omega 3-dha-epa-fish oil (OMEGA-3) 350 mg-235 mg- 90 mg-597 mg CpDR     OMEGA-3 FATTY ACIDS (FISH OIL CONCENTRATE ORAL) Take by mouth Daily.    oxyCODONE-acetaminophen (PERCOCET)  mg per tablet Take 1 tablet by mouth every 4 (four) hours as needed for Pain.    pantoprazole (PROTONIX) 40 MG tablet Take 1 tablet (40 mg total) by mouth once daily.    propafenone (RYTHMOL SR) 425 MG Cp12 Take 1 capsule (425 mg total) by mouth every 12 (twelve) hours.    telmisartan (MICARDIS) 40 MG Tab Take 1 tablet (40 mg total) by mouth once daily.     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    Substance and Sexual Activity    Alcohol use: No    Drug use: No    Sexual activity: Yes     Partners: Female     ROS   Constitutional: negative for fevers  Eyes: negative visual changes  ENT: negative for hearing loss  Respiratory: negative for dyspnea  Cardiovascular: negative for chest pain  Gastrointestinal: negative for abdominal pain      Objective:     Vital Signs (Most Recent):  Temp: 98.4 °F (36.9 °C) (07/17/20 1900)  Pulse: 66 (07/17/20 1900)  Resp: 16 (07/17/20 1909)  BP: (!) 149/75 (07/17/20 1900)  SpO2: 96 % (07/17/20 1900) Vital Signs (24h Range):  Temp:  [98.4 °F (36.9 °C)] 98.4 °F (36.9 °C)  Pulse:  [66-68] 66  Resp:  [14-20] 16  SpO2:  [96 %-98 %] 96 %  BP: (138-150)/(65-75)  "149/75     Weight: 96.2 kg (212 lb)  Height: 6' 2" (188 cm)  Body mass index is 27.22 kg/m².    Ortho/SPM Exam     LLE  Shortened, skin intact with minimal swelling over lateral hip  Pain with Log roll of leg  No bony TTP throughout  Compartments soft  Painless ROM ankle   SILT Sa/Mark/DP/SP/T  Motor intact TA/SP/DP  2+ DP and PT     RLE:  Skin intact, no deformity  No TTP  Compartments soft  Full painless ROM throughout lower extremity  SILT Sa/Mark/DP/SP/T  Motor intact TA/SP/DP  2+ DP/PT     BUE:  Skin intact, no deformity noted  No open wounds/abrasions/crepitus  No bony TTP  FROM shoulder, elbow and wrist  SILT M/U/R  Motor intact AIN/PIN/M/U/R   Cap refill < 2s  2+ RP      Spine: No TTP along spine, no step offs palpated. No sacral decubitus ulcers      Significant Labs:   CBC:   Recent Labs   Lab 07/17/20  1759   WBC 12.79*   HGB 11.0*   HCT 34.6*        All pertinent labs within the past 24 hours have been reviewed.    Significant Imaging: Xray left hip: no fx noted  MRI left hip: Nondisplaced intertrochanteric femur fracture  "

## 2020-07-18 NOTE — PT/OT/SLP EVAL
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      Problem List: impaired endurance, impaired functional mobilty, pain  Rehab Prognosis: Good; patient would benefit from acute skilled PT services to address these deficits and reach maximum level of function.      Plan:     During this hospitalization, patient to be seen 4 x/week to address the identified rehab impairments via gait training, therapeutic activities, therapeutic exercises, neuromuscular re-education and progress towards the established goals.    · Plan of Care Expires:  08/18/20    Subjective   Communicated with RN prior to session.  Patient found HOB elevated upon PT entry to room, agreeable to evaluation. Dominick Song MD's wife present during session.    Chief Complaint: Fall (+trip and fall " missed the last step and fell on my left hip". + left sided hip pains, denies numbness/tingling to extremity, + sensation intact. + 2 pusles. Denies hitting head or LOC)    Patient/Family Comments/goals: to get better and return home   Pain/Comfort:  · Pain Rating 1: 4/10  · Location 1: (L LE)  · Pain Addressed 1: Distraction, Pre-medicate for activity, Reposition  · Pain Rating Post-Intervention 1: 4/10    Living Environment:  Pt resides with spouse and one daughter. Pt has 2 story home but bed/bath on first floor. Home has no steps to " "enter.  Pt has BSC and shower chair and old RW.  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     Objective:   Patient found with: peripheral IV, oxygen(Epi Line)     General Precautions: Standard, Cardiac fall   Orthopedic Precautions:LLE weight bearing as tolerated   Braces: N/A   Oxygen Device: Nasal Cannula   Vitals: /62   Pulse 70   Temp 98 °F (36.7 °C)   Resp 18   Ht 6' 2" (1.88 m)   Wt 96.2 kg (212 lb)   SpO2 (!) 94%   BMI 27.22 kg/m²     Exams:  · Cognition:   · Alert and Cooperative  · Ox4  · Command following: Follows multistep  commands  · Fluency: clear/fluent    · RLE ROM: WFL  · RLE Strength: WFL  · LLE ROM: WFL  · LLE Strength: WFL (pain)    Outcome Measures:  AM-PAC 6 CLICK MOBILITY  Turning over in bed (including adjusting bedclothes, sheets and blankets)?: 3  Sitting down on and standing up from a chair with arms (e.g., wheelchair, bedside commode, etc.): 3  Moving from lying on back to sitting on the side of the bed?: 3  Moving to and from a bed to a chair (including a wheelchair)?: 3  Need to walk in hospital room?: 3  Climbing 3-5 steps with a railing?: 2  Basic Mobility Total Score: 17     Functional Mobility:  Additional staff present: OT  Bed Mobility:  · Supine to Sit: stand by assistance; HOB elevated   · Increased time  · Scooting anteriorly to EOB to have both feet planted on floor: stand by assistance    Sitting Balance at Edge of Bed:   Assistance Level Required: Stand-by Assistance   Time: 10 minutes     Transfers:   · Sit <> Stand Transfer: stand by assistance with no assistive device x 2 trials from EOB  · Educated pt to kick surgical LE out when standing/sitting to decreased pain with transfer       Gait:   · Patient ambulated: 20ft   · Patient required: SBA progressing to CGA with fatigue   · Patient used: rolling walker  · Gait Pattern observed: reciprocal gait  · Gait Deviation(s): antalgic gait and increased time in stance phase on unaffected leg  · Impairments due to: " pain    Therapeutic Activities, Exercises, & Education:   Educated pt on PT role/POC  Educated pt on importance of OOB activity and daily ambulation   Educated pt on how to use RW  Pt verbalized understanding    T/f to chair to increase tolerance to OOB activity and to create optimal positioning for lung expansion     Patient left up in chair with all lines intact, call button in reach and RN notified.    GOALS:   Multidisciplinary Problems     Physical Therapy Goals        Problem: Physical Therapy Goal    Goal Priority Disciplines Outcome Goal Variances Interventions   Physical Therapy Goal     PT, PT/OT Ongoing, Progressing     Description: Goals to be met by:8/3/2020    Patient will increase functional independence with mobility by performin. Supine to sit with Supervision - not met  2. Sit to stand transfer with Supervision - not met   3. Gait  x 100 feet with Supervision using Rolling Walker - not met                     History:     Past Medical History:   Diagnosis Date    Anticoagulant long-term use     Anxiety     Arthritis     Atrial fibrillation     BPH (benign prostatic hyperplasia)     Cataract     CKD (chronic kidney disease) stage 3, GFR 30-59 ml/min 7/10/2017    Followed by Dr. Jeevan Pond    Colon polyp     benign    Depression     Elevated PSA     Erectile dysfunction     Gastric ulcer with hemorrhage     Hep B w/o coma     History of bleeding peptic ulcer     History of prostatitis     Hypertension     PAF (paroxysmal atrial fibrillation)     Sleep apnea     on CPAP    Stroke     TIA 2019    Thyroid disease        Past Surgical History:   Procedure Laterality Date    ABDOMINAL HERNIA REPAIR      ABLATION OF ARRHYTHMOGENIC FOCUS FOR ATRIAL FIBRILLATION N/A 6/15/2020    Procedure: Ablation atrial fibrillation;  Surgeon: Wyatt Beatty MD;  Location: [facility] EP LAB;  Service: Cardiology;  Laterality: N/A;  PAF, AFl, PEPE, PVI re-do, CTI, RFA, JUSTIN, Gen, SK, 3  Prep    CATARACT EXTRACTION  11/25/2013    bilateral    CHOLECYSTECTOMY      COLONOSCOPY N/A 11/25/2015    Procedure: COLONOSCOPY;  Surgeon: Toby Hernandez MD;  Location: [facility] ENDO;  Service: Endoscopy;  Laterality: N/A;    EYE SURGERY      GASTRIC BYPASS  1992    KNEE ARTHROSCOPY      RT    LASIK  2001    both eyes (Dr. Rabago)    ORIF HUMERUS FRACTURE      LT    ORIF HUMERUS FRACTURE Right     non surgical repair    RADIOFREQUENCY ABLATION      x2    Right ankle tendon repair      ROTATOR CUFF REPAIR      right    TOTAL KNEE ARTHROPLASTY Right 5/29/2018    Procedure: REPLACEMENT-KNEE-TOTAL-axel;  Surgeon: Denzel Dallas MD;  [address]  Service: Orthopedics;  Laterality: Right;  [place]    TREATMENT OF CARDIAC ARRHYTHMIA  6/15/2020    Procedure: Cardioversion or Defibrillation;  Surgeon: Wyatt Beatty MD;  Location: [facility] EP LAB;  Service: Cardiology;;       Time Tracking:     PT Received On: 07/18/20  PT Start Time: 1330     PT Stop Time: 1400  PT Total Time (min): 30 min     Billable Minutes: Evaluation 5 and Gait Training 23    Pamela Garibay, PT, DPT  7/18/2020  [phone]

## 2020-07-18 NOTE — PLAN OF CARE
Pt vital signs wnl.  Pt verbalizes pain is tolerable.  Pt verbalizes no nausea.  Left hip dressing intact.  Perineural infusing without problems.

## 2020-07-18 NOTE — ANESTHESIA PREPROCEDURE EVALUATION
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 male w/ a significant PMHx of a-fib s/p multiple ablations on eliquis, CKD III, HTN, [place], TIA admitted for left femur fracture after a fall off of one stair.     Patient now presents for the above procedure(s).      LDA: None documented.       Peripheral IV - Single Lumen 07/17/20 1746 20 G Left Hand (Active)   Site Assessment Clean;Dry;Intact;No redness;No swelling 07/17/20 1746   Line Status Blood return noted;Flushed 07/17/20 1746   Dressing Status Dry;Intact;Clean 07/17/20 1746   Dressing Intervention First dressing 07/17/20 1746   Number of days: 0       Prev airway: Easy mask; Conrad 2 grade I view.     Drips: None documented.   sodium chloride 0.9%         Patient Active Problem List   Diagnosis    BPH with urinary obstruction    Elevated PSA    Heart abnormality    Nuclear sclerosis    Atrial fibrillation    Anemia due to chronic blood loss    Posterior vitreous detachment    Metatarsalgia    Keratoma    Foot pain, right    Onychomycosis due to dermatophyte    ED (erectile dysfunction)    Bradycardia    Mild single current episode of major depressive disorder    Tortuous aorta    Dyspnea    Hypothyroidism due to acquired atrophy of thyroid    Essential hypertension    Gait abnormality    CKD (chronic kidney disease) stage 3, GFR 30-59 ml/min    Complex sleep apnea syndrome    Erectile dysfunction due to arterial insufficiency    Primary osteoarthritis of right knee    History of bleeding peptic ulcer    History of  TIA (transient ischemic attack)    Closed nondisplaced intertrochanteric fracture of left femur    Preop cardiovascular exam       Review of patient's allergies indicates:   Allergen Reactions    No known drug allergies        Current Inpatient Medications:   acetaminophen  650 mg Oral Q6H    [START ON 7/18/2020] atorvastatin  10 mg Oral Daily    [START ON 7/18/2020] buPROPion  300 mg Oral Daily    [START ON 7/18/2020] carvediloL  12.5 mg Oral BID WM    [START ON 7/18/2020] dutasteride  0.5 mg Oral Daily    [START ON 7/18/2020] escitalopram oxalate  10 mg Oral Daily    gabapentin  300 mg Oral TID    [START ON 7/18/2020] levothyroxine  50 mcg Oral Before breakfast    lidocaine (PF) 10 mg/ml (1%)  1 mL Other Once    ondansetron  4 mg Oral ED 1 Time    [START ON 7/18/2020] pantoprazole  40 mg Oral Daily    propafenone  225 mg Oral Q8H    [START ON 7/18/2020] telmisartan  40 mg Oral Daily       No current facility-administered medications on file prior to encounter.      Current Outpatient Medications on File Prior to Encounter   Medication Sig Dispense Refill    acyclovir (ZOVIRAX) 800 MG Tab TK ONE T PO FIVE TIMES D only for fever blisters  1    alendronate (FOSAMAX) 70 MG tablet Take 1 tablet (70 mg total) by mouth every 7 days. Take with a full glass of water & remain upright for at least 30 minutes 12 tablet 3    amoxicillin (AMOXIL) 500 MG Tab Take 500 mg by mouth every 12 (twelve) hours.      aspirin 81 MG Chew 81 mg.      atorvastatin (LIPITOR) 10 MG tablet Take 1 tablet (10 mg total) by mouth once daily. 90 tablet 3    betamethasone acetate-betamethasone sodium phosphate (CELESTONE) 6 mg/mL injection as needed.       buPROPion (WELLBUTRIN XL) 300 MG 24 hr tablet Take 1 tablet (300 mg total) by mouth once daily. 90 tablet 3    calcium carbonate (OS-KISHAN) 500 mg calcium (1,250 mg) tablet       CALCIUM ORAL Take by mouth Daily.      calcium-vitamin D 600 mg(1,500mg) -400 unit Tab 600 mg.       carvediloL (COREG) 12.5 MG tablet Take 1 tablet (12.5 mg total) by mouth 2 (two) times daily with meals. 180 tablet 3    celecoxib (CELEBREX) 200 MG capsule TAKE 1 CAPSULE(200 MG) BY MOUTH TWICE DAILY 180 capsule 0    cholestyramine (QUESTRAN) 4 gram packet Take 1 packet (4 g total) by mouth once daily. 30 packet 3    cyanocobalamin/folic acid (VITAMIN B12-FOLIC ACID) 500-400 mcg Tab Take by mouth.      cyclobenzaprine (FLEXERIL) 10 MG tablet TAKE 1 TABLET BY MOUTH EVERY 8 HOURS AS NEEDED FOR MUSCLE SPASM (Patient taking differently: AS NEEDED FOR MUSCLE SPASM) 40 tablet 5    dutasteride (AVODART) 0.5 mg capsule TAKE 1 CAPSULE(0.5 MG) BY MOUTH EVERY DAY 90 capsule 3    efinaconazole (JUBLIA) 10 % Prakash Apply topically.      ELIQUIS 5 mg Tab Take 1 tablet (5 mg total) by mouth 2 (two) times daily. 60 tablet 11    escitalopram oxalate (LEXAPRO) 10 MG tablet TAKE 1 TABLET(10 MG) BY MOUTH EVERY DAY 90 tablet 3    ferrous sulfate (FEOSOL) 325 mg (65 mg iron) Tab tablet Take by mouth.      furosemide (LASIX) 20 MG tablet Take 1 tablet (20 mg total) by mouth daily as needed (shortness of breath, leg swelling). 5 tablet 0    KENALOG 40 mg/mL injection as needed.       levothyroxine (SYNTHROID) 50 MCG tablet Take 1 tablet (50 mcg total) by mouth before breakfast. 30 tablet 11    MULTIVITAMIN ORAL Take by mouth Daily.      omega 3-dha-epa-fish oil (OMEGA-3) 350 mg-235 mg- 90 mg-597 mg CpDR       OMEGA-3 FATTY ACIDS (FISH OIL CONCENTRATE ORAL) Take by mouth Daily.      oxyCODONE-acetaminophen (PERCOCET)  mg per tablet Take 1 tablet by mouth every 4 (four) hours as needed for Pain.      pantoprazole (PROTONIX) 40 MG tablet Take 1 tablet (40 mg total) by mouth once daily. 30 tablet 0    propafenone (RYTHMOL SR) 425 MG Cp12 Take 1 capsule (425 mg total) by mouth every 12 (twelve) hours. 180 capsule 3    telmisartan (MICARDIS) 40 MG Tab Take 1 tablet (40 mg total) by mouth once daily. 90 tablet 3        Past Surgical History:   Procedure Laterality Date    ABDOMINAL HERNIA REPAIR      ABLATION OF ARRHYTHMOGENIC FOCUS FOR ATRIAL FIBRILLATION N/A 6/15/2020    Procedure: Ablation atrial fibrillation;  Surgeon: Wyatt Beatty MD;  Location: [facility] EP LAB;  Service: Cardiology;  Laterality: N/A;  PAF, AFl, PEPE, PVI re-do, CTI, RFA, JUSTIN, Gen, SK, 3 Prep    CATARACT EXTRACTION  11/25/2013    bilateral    CHOLECYSTECTOMY      COLONOSCOPY N/A 11/25/2015    Procedure: COLONOSCOPY;  Surgeon: Toby Hernandez MD;  Location: [facility] ENDO;  Service: Endoscopy;  Laterality: N/A;    EYE SURGERY      GASTRIC BYPASS  1992    KNEE ARTHROSCOPY      RT    LASIK  2001    both eyes (Dr. Rabago)    ORIF HUMERUS FRACTURE      LT    ORIF HUMERUS FRACTURE Right     non surgical repair    RADIOFREQUENCY ABLATION      x2    Right ankle tendon repair      ROTATOR CUFF REPAIR      right    TOTAL KNEE ARTHROPLASTY Right 5/29/2018    Procedure: REPLACEMENT-KNEE-TOTAL-axel;  Surgeon: Denzel Dallas MD;  Location: [address];  Service: Orthopedics;  Laterality: Right;  [place]    TREATMENT OF CARDIAC ARRHYTHMIA  6/15/2020    Procedure: Cardioversion or Defibrillation;  Surgeon: Wyatt Beatty MD;  Location: [facility] EP LAB;  Service: Cardiology;;       Social History     Socioeconomic History    Marital status:      Spouse name: Not on file    Number of children: 5    Years of education: Not on file    Highest education level: Not on file   Occupational History    Occupation: retired anesthesiology     Employer: [employer]    Occupation: [employer] grad     Comment: previous football player   Social Needs    Financial resource strain: Not on file    Food insecurity     Worry: Not on file     Inability: Not on file    Transportation needs     Medical: Not on file     Non-medical: Not on file   Tobacco Use    Smoking status: Never Smoker    Smokeless tobacco: Never Used    Tobacco comment:  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    Substance and Sexual Activity    Alcohol use: No    Drug use: No    Sexual activity: Yes     Partners: Female   Lifestyle    Physical activity     Days per week: Not on file     Minutes per session: Not on file    Stress: Not on file   Relationships    Social connections     Talks on phone: Not on file     Gets together: Not on file     Attends [employer] service: Not on file     Active member of club or organization: Not on file     Attends meetings of clubs or organizations: Not on file     Relationship status: Not on file   Other Topics Concern    Patient feels they ought to cut down on drinking/drug use Not Asked    Patient annoyed by others criticizing their drinking/drug use Not Asked    Patient has felt bad or guilty about drinking/drug use Not Asked    Patient has had a drink/used drugs as an eye opener in the AM Not Asked   Social History Narrative    Not on file       OBJECTIVE:     Vital Signs Range (Last 24H):  Temp:  [36.9 °C (98.4 °F)]   Pulse:  [66-72]   Resp:  [14-20]   BP: (138-153)/(65-81)   SpO2:  [96 %-100 %]       Significant Labs:  Lab Results   Component Value Date    WBC 12.79 (H) 07/17/2020    HGB 11.0 (L) 07/17/2020    HCT 34.6 (L) 07/17/2020     07/17/2020    CHOL 93 (L) 02/03/2020    TRIG 49 02/03/2020    HDL 45 02/03/2020    ALT 30 07/17/2020    AST 28 07/17/2020     07/17/2020    K 5.0 07/17/2020     (H) 07/17/2020    CREATININE 1.8 (H) 07/17/2020    BUN 26 (H) 07/17/2020    CO2 21 (L) 07/17/2020    TSH 3.186 02/03/2020    PSA 2.62 03/21/2013    INR 1.1 07/17/2020    GLUF 109 03/26/2018    HGBA1C 5.2 07/17/2020       Diagnostic Studies: No relevant studies.    EKG:   Results for orders placed or performed during the hospital encounter of 06/15/20   EKG 12-lead    Collection Time: 06/15/20 12:19 PM    Narrative    Test Reason : I48.91,    Vent. Rate : 073 BPM     Atrial Rate : 073 BPM     P-R Int : 194 ms          QRS Dur :  104 ms      QT Int : 418 ms       P-R-T Axes : 095 035 065 degrees     QTc Int : 460 ms    Normal sinus rhythm  Normal ECG  When compared with ECG of 15-JIMMY-2020 05:48,  No significant change was found  Confirmed by DEMARIO CHILD MD ([id number]) on 6/15/2020 5:57:14 PM    Referred By: ANDRES MCCLURE           Confirmed By:DEMARIO CHILD MD       2D ECHO:  TTE:  No results found. However, due to the size of the patient record, not all encounters were searched. Please check Results Review for a complete set of results.    PEPE:  Results for orders placed or performed during the hospital encounter of 06/15/20   Transesophageal echo (PEPE)   Result Value Ref Range    BSA 2.25 m2    Sinus 3.60 cm    STJ 3.20 cm    Proximal aorta 3.50 cm    Narrative    · Normal appearing left atrial appendage. No thrombus is present in the   appendage. Normal appendage velocities.  · No thrombus is present in the left atrium.  · Normal left ventricular systolic function. The estimated ejection   fraction is 60%.  · Normal right ventricular systolic function.          ASSESSMENT/PLAN:       Anesthesia Evaluation    I have reviewed the Patient Summary Reports.    I have reviewed the Nursing Notes. I have reviewed the NPO Status.   I have reviewed the Medications.     Review of Systems  Anesthesia Hx:  No problems with previous Anesthesia  History of prior surgery of interest to airway management or planning: gastric bypass. Previous anesthesia: General  Denies Personal Hx of Anesthesia complications.   Social:  Non-Smoker, No Alcohol Use    Hematology/Oncology:  Hematology Normal   Oncology Normal     EENT/Dental:EENT/Dental Normal   Cardiovascular:   Hypertension Denies MI.  Denies CAD.    Denies CABG/stent. Dysrhythmias atrial fibrillation  Denies Angina.                Pulmonary:   Shortness of breath Sleep Apnea    Renal/:   Chronic Renal Disease (CORBIN on CKD), CRI    Hepatic/GI:   Denies GERD. Hepatitis, B    Musculoskeletal:   Arthritis      Neurological:   TIA,    Endocrine:   Hypothyroidism    Psych:   Psychiatric History          Physical Exam  General:  Well nourished    Airway/Jaw/Neck:  Airway Findings: Mouth Opening: Normal Tongue: Normal  General Airway Assessment: Adult  Mallampati: II  TM Distance: Normal, at least 6 cm      Dental:  DENTAL FINDINGS: Normal   Chest/Lungs:  Chest/Lungs Clear    Heart/Vascular:  Heart Findings: Rate: Normal  Rhythm: Regular Rhythm  Sounds: Normal  Heart murmur: negative       Mental Status:  Mental Status Findings:  Cooperative, Alert and Oriented         Anesthesia Plan  Type of Anesthesia, risks & benefits discussed:  Anesthesia Type:  general  Patient's Preference: GA  Intra-op Monitoring Plan: standard ASA monitors  Intra-op Monitoring Plan Comments:   Post Op Pain Control Plan: per primary service following discharge from PACU, IV/PO Opioids PRN, multimodal analgesia and peripheral nerve block  Post Op Pain Control Plan Comments:   Induction:   IV  Beta Blocker:  Patient is on a Beta-Blocker and has received one dose within the past 24 hours (No further documentation required).       Informed Consent: Patient understands risks and agrees with Anesthesia plan.  Questions answered. Anesthesia consent signed with patient.  ASA Score: 3     Day of Surgery Review of History & Physical: I have interviewed and examined the patient. I have reviewed the patient's H&P dated:  There are no significant changes.  H&P update referred to the surgeon.         Ready For Surgery From Anesthesia Perspective.

## 2020-07-18 NOTE — TRANSFER OF CARE
"Anesthesia Transfer of Care Note    Patient: Dominick Song MD    Procedure(s) Performed: Procedure(s) (LRB):  INSERTION, INTRAMEDULLARY ERIC, FEMUR, left, Synthes, [place] table, Large C arm clock side, (Left)    Patient location: PACU    Anesthesia Type: general    Transport from OR: Transported from OR on 6-10 L/min O2 by face mask with adequate spontaneous ventilation    Post pain: adequate analgesia    Post assessment: no apparent anesthetic complications and tolerated procedure well    Post vital signs: stable    Level of consciousness: sedated and responds to stimulation    Nausea/Vomiting: no nausea/vomiting    Complications: none    Transfer of care protocol was followed      Last vitals:   Visit Vitals  BP (!) 95/53 (BP Location: Right arm, Patient Position: Lying)   Pulse 77   Temp 37.2 °C (99 °F) (Temporal)   Resp 12   Ht 6' 2" (1.88 m)   Wt 96.2 kg (212 lb)   SpO2 97%   BMI 27.22 kg/m²     "

## 2020-07-18 NOTE — ANESTHESIA PROCEDURE NOTES
Suprainguinal Fascia Iliaca Catheter    Patient location during procedure: OR   Block not for primary anesthetic.  Reason for block: at surgeon's request and post-op pain management   Post-op Pain Location: Left hip pain  Start time: 7/18/2020 8:20 AM  Timeout: 7/18/2020 8:17 AM   End time: 7/18/2020 8:25 AM    Staffing  Authorizing Provider: Fady Dover MD  Performing Provider: Fady Dover MD    Preanesthetic Checklist  Completed: patient identified, site marked, surgical consent, pre-op evaluation, timeout performed, IV checked, risks and benefits discussed and monitors and equipment checked  Peripheral Block  Patient position: supine  Prep: ChloraPrep and site prepped and draped  Patient monitoring: heart rate, cardiac monitor, continuous pulse ox, continuous capnometry and frequent blood pressure checks  Block type: fascia iliaca (Suprainguinal fascia iliaca)  Laterality: left  Injection technique: continuous  Needle  Needle type: Tuohy   Needle gauge: 17 G  Needle length: 3.5 in  Needle localization: anatomical landmarks and ultrasound guidance  Catheter type: spring wound  Catheter size: 19 G  Catheter at skin depth: 18 cm  Test dose: lidocaine 1.5% with Epi 1-to-200,000 and negative   -ultrasound image captured on disc.  Assessment  Injection assessment: negative aspiration, negative parasthesia and local visualized surrounding nerve  Paresthesia pain: none  Heart rate change: no  Slow fractionated injection: yes  Additional Notes  VSS.  Patient tolerated procedure well.    Bupivacaine 0.25% with Epi x 30 ml

## 2020-07-18 NOTE — PROGRESS NOTES
[facility]  Orthopedics  Progress Note    Patient Name: Dominick Song MD  MRN: [id number]  Admission Date: 7/17/2020  Hospital Length of Stay: 1 days  Attending Provider: Tony Rodriguez MD  Primary Care Provider: Maxi Gaines MD  Follow-up For: Procedure(s) (LRB):  INSERTION, INTRAMEDULLARY ERIC, FEMUR, left, Synthes, [place] table, Large C arm clock side, (Left)    Post-Operative Day: Day of Surgery  Subjective:     Principal Problem:Closed nondisplaced intertrochanteric fracture of left femur    Principal Orthopedic Problem: nondisplaced intertrochanteric fracture of left femur    Interval History: Patient seen and examined at bedside. No acute events overnight. Reports sleeping great between being transferred to floor and blood dram this am. Pain very well controlled. Has remained NPO since midnight. Currently NWB to LLE.     Review of patient's allergies indicates:   Allergen Reactions    No known drug allergies        Current Facility-Administered Medications   Medication    0.9%  NaCl infusion    acetaminophen tablet 650 mg    acetaminophen tablet 650 mg    atorvastatin tablet 10 mg    buPROPion TB24 tablet 300 mg    carvediloL tablet 12.5 mg    ceFAZolin injection 2 g    dutasteride capsule 0.5 mg    escitalopram oxalate tablet 10 mg    furosemide tablet 20 mg    HYDROmorphone injection 1 mg    levothyroxine tablet 50 mcg    lidocaine (PF) 10 mg/ml (1%) injection 10 mg    methocarbamoL tablet 500 mg    mupirocin 2 % ointment    ondansetron disintegrating tablet 8 mg    oxyCODONE immediate release tablet 5 mg    pantoprazole EC tablet 40 mg    propafenone tablet 225 mg    sodium chloride 0.9% flush 10 mL    sodium chloride 0.9% flush 10 mL    sodium chloride 0.9% flush 3 mL    telmisartan tablet 40 mg     Objective:     Vital Signs (Most Recent):  Temp: 98.1 °F (36.7 °C) (07/18/20 0723)  Pulse: 72 (07/18/20 0723)  Resp: 17 (07/18/20 0723)  BP: (!) 106/58  "(07/18/20 0723)  SpO2: 98 % (07/18/20 0723) Vital Signs (24h Range):  Temp:  [97.9 °F (36.6 °C)-98.4 °F (36.9 °C)] 98.1 °F (36.7 °C)  Pulse:  [66-77] 72  Resp:  [14-20] 17  SpO2:  [92 %-100 %] 98 %  BP: (106-153)/(58-92) 106/58     Weight: 96.2 kg (212 lb)  Height: 6' 2" (188 cm)  Body mass index is 27.22 kg/m².      Intake/Output Summary (Last 24 hours) at 7/18/2020 0724  Last data filed at 7/18/2020 0500  Gross per 24 hour   Intake 1115 ml   Output 850 ml   Net 265 ml       Ortho/SPM Exam     Vitals: Afebrile.  Vital signs stable.  General: No acute distress. Resting comfortably.  Cardio: Regular rate.  Chest: No increased work of breathing.    LLE  Minimally shortened, skin intact with min swelling over lateral hip  Pain with Log roll of leg  No bony TTP throughout  Compartments soft  Painless ROM ankle   SILT Sa/Mark/DP/SP/T  Motor intact TA/SP/DP  2+ DP and PT     RLE:  Skin intact, no deformity  No TTP  Compartments soft  Full painless ROM throughout lower extremity  SILT Sa/Mark/DP/SP/T  Motor intact TA/SP/DP  2+ DP/PT     BUE:  Skin intact, no deformity noted  No open wounds/abrasions/crepitus  No bony TTP  FROM shoulder, elbow and wrist  SILT M/U/R  Motor intact AIN/PIN/M/U/R   Cap refill < 2s  2+ RP      Spine: No TTP along spine, no step offs palpated. No sacral decubitus ulcers        Significant Labs:   CBC:   Recent Labs   Lab 07/17/20  1759 07/18/20  0228   WBC 12.79* 10.69   HGB 11.0* 10.6*   HCT 34.6* 34.6*    193     All pertinent labs within the past 24 hours have been reviewed.    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  Patient was explained in detail the severity of the injury that was suffered. Patient was explained the risks/benefits/and alternatives to operative management in detail including " infection, bleeding, pain, nerve and vascular damage, heterotopic ossification, leg length discrepancies, rotational deformities and they express full understanding.  Also, the patient was explained the high mortality, over 30 percent,  associated with these fractures. 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 Will have assistance from the anesthesia team for preoperative optimization. Will plan for OR tomorrow. No Guarantees were made, informed consent was obtained. All questions were answered to patient's and family's satisfaction.     -Admitted to ortho  -NPO since midnight  -Pain controlled   -Marked, booked, and consented for surgery  -PT/OT: Bed rest  -DVT PPx: Hold anticoagulation  -Abx: Preop abx ordered  -Labs: pending  -Galvan: has requested condom cath in place of in-dwelling galvan. Will discuss with staff.   -Iv: ordered for contralateral arm              CKD (chronic kidney disease) stage 3, GFR 30-59 ml/min  Monitor labs    Essential hypertension  Home meds    Hypothyroidism due to acquired atrophy of thyroid  Home meds      Atrial fibrillation  Home meds          Zeina Moser MD  Orthopedics  [facility]

## 2020-07-18 NOTE — OP NOTE
OPERATIVE NOTE    DOS:  07/18/2020    Preop Dx: Left intertrochanteric femur fracture    Postop Dx: Left intertrochanteric femur fracture    Procedure: Cephalomedullary nail fixation of left intertrochanteric femur fracture - 61277    Surgeon: Tony Rodriguez M.D.    Asst:  Maninder Moser M.D    Anesthesia: GETA    EBL:  150cc    IVF:  1500cc crystalloid    Implants: Synthes TFNa 440x12 with 95mm hip screw and single distal IL screw    Specimens: None    Findings: Stable pattern.  WBAT    Dispo:  To PACU extubated/stable      Indications for Procedure:    The patient is a 74-year-old male who sustained a ground level fall resulting in a left intertrochanteric femur fracture which was nondisplaced.  The risks, benefits and alternatives to surgery were discussed with the patient and family at length prior to going to the operating room.  Informed consent was obtained for fixation.  He takes Eliquis for AFib and has had 1 TIA in the past.  His last dose of Eliquis was yesterday morning.    Procedure in Detail:    The patient was identified in the preoperative holding area and the site was marked.  Regional analgesia was performed in the form of super inguinal fascia iliaca catheter.  Patient was wheeled into the operating room and general endotracheal anesthesia was induced on the patient's hospital bed.  Preoperative antibiotics were administered.  The patient was placed onto the [place] fracture table with all bony prominences well padded and both lower extremities in traction boots.  A time-out was undertaken to confirm patient, side, site, surgery, surgeon and the administration of preoperative antibiotics.  All agreed and we proceeded.    The left hip and lower extremity were prepped and draped in sterile fashion.  A starting incision was made proximal to the greater trochanter and the guidewire for the entry reamer was placed in the appropriate position.  Entry reamer was then used.  A guide neema was passed distally and  measurement undertaken.  The canal was then reamed with a 13.5 mm reamer through the isthmus.  A 12 x 440 mm was placed in direct distal lateral visualization to ensure good central position in the canal.    Attention was then turned proximally to the hip and the nail seated at its final position.  A guidewire for the hip screw was then placed in a center center position in the femoral head under fluoroscopic guidance.  This was then reamed and measured and a 95 mm hip screw was placed gaining good compression through the insertion handle.  Final position was confirmed under fluoroscopy and insertion handle was removed.    Attention was then turned distally and a single distal interlocking screw was placed without difficulty.  The wounds were then copiously irrigated with normal saline solution and the proximal wound closed with 0 Vicryl suture the fascia, and all wounds closed with 3-0 Vicryl suture in the subcutaneous tissue, followed by 3-0 Monocryl suture and Dermabond in the skin.  Sterile dressings were applied.    All instrument and sponge counts were reported correct at the end of the case.  There were no complications.  The patient was returned to the hospital bed, extubated, awakened and taken to the recovery room in stable condition.    Plan for the Patient:    Immediate physical and occupational therapy with WBAT.  I suspect he will be able to go directly home from the hospital.    Tony Rodriguez MD

## 2020-07-18 NOTE — ANESTHESIA POSTPROCEDURE EVALUATION
Anesthesia Post Evaluation    Patient: Dominick Song MD    Procedure(s) Performed: Procedure(s) (LRB):  INSERTION, INTRAMEDULLARY ERIC, FEMUR, left, Synthes, [place] table, Large C arm clock side, (Left)    Final Anesthesia Type: general    Patient location during evaluation: PACU  Patient participation: Yes- Able to Participate  Level of consciousness: awake and alert and oriented  Post-procedure vital signs: reviewed and stable  Pain management: adequate  Airway patency: patent    PONV status at discharge: No PONV  Anesthetic complications: no      Cardiovascular status: blood pressure returned to baseline and hemodynamically stable  Respiratory status: unassisted, spontaneous ventilation and room air  Hydration status: euvolemic  Follow-up not needed.          Vitals Value Taken Time   /62 07/18/20 1300   Temp 36.7 °C (98 °F) 07/18/20 1300   Pulse 63 07/18/20 1300   Resp 14 07/18/20 1300   SpO2 94 % 07/18/20 1300         Event Time   Out of Recovery 07/18/2020 11:32:00         Pain/Pari Score: Pain Rating Prior to Med Admin: 3 (7/18/2020  1:08 PM)  Pain Rating Post Med Admin: 3 (7/18/2020 11:56 AM)  Pari Score: 9 (7/18/2020 11:56 AM)

## 2020-07-18 NOTE — ANESTHESIA PROCEDURE NOTES
Intubation  Performed by: Alivia King CRNA  Authorized by: Carlton Rahman MD     Intubation:     Induction:  Intravenous    Intubated:  Postinduction    Mask Ventilation:  Easy with oral airway    Attempts:  1    Attempted By:  CRNA    Blade:  Conrad 2    Laryngeal View Grade: Grade I - full view of chords      Difficult Airway Encountered?: No      Complications:  None    Airway Device Size:  7.5    Style/Cuff Inflation:  Cuffed    Tube secured:  23    Secured at:  The lips    Placement Verified By:  Capnometry    Complicating Factors:  None    Findings Post-Intubation:  BS equal bilateral

## 2020-07-18 NOTE — PLAN OF CARE
Problem: Physical Therapy Goal  Goal: Physical Therapy Goal  Description: Goals to be met by:8/3/2020    Patient will increase functional independence with mobility by performin. Supine to sit with Supervision - not met  2. Sit to stand transfer with Supervision - not met   3. Gait  x 100 feet with Supervision using Rolling Walker - not met    Outcome: Ongoing, Progressing     Eval completed and goals appropriate

## 2020-07-18 NOTE — NURSING
Pt arrived to floor via bed. Pt is AAO*4. oriented pt to room.vitals stable, bed locked in lowest position. Call button in reach

## 2020-07-18 NOTE — PLAN OF CARE
Problem: Occupational Therapy Goal  Goal: Occupational Therapy Goal  Description: Goals to be met by: 5 days 7/25/2020     Patient will increase functional independence with ADLs by performing:    Pt to complete standing g/h skills with supervision,.  Pt to complete LE dressing with ABILIO    Pt to complete toileting with supervision.  Pt to complete t/f to commode with SBA      Outcome: Ongoing, Progressing

## 2020-07-18 NOTE — PT/OT/SLP EVAL
Occupational Therapy   Evaluation    Name: Dominick Song MD  MRN: [id number]  Admitting Diagnosis:  Closed nondisplaced intertrochanteric fracture of left femur Day of Surgery  Pt s/p fall at home and now s/p  Cephalomedullary nail fixation of left intertrochanteric femur fracture 7/18/2020    Pt of hx of 2 TIA with symptomatic A-fib s/p 3 ablations.     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  Pt with impaired functional mobility/ADL skills. OT anticipates pt will be ready for d/c home with family support when medically stable.     Rehab Prognosis: Good; patient would benefit from acute skilled OT services to address these deficits and reach maximum level of function.       Plan:     Patient to be seen daily to address the above listed problems via self-care/home management, therapeutic activities, therapeutic exercises  · Plan of Care Expires:    · Plan of Care Reviewed with: patient    Subjective     Pt agreeable to therapy.     Occupational Profile:  Pt resides with spouse and one daughter. Pt has 2 story home but bed/bath on first floor. Home has no steps to enter.  Pt has BSC and shower chair and old RW.  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     Pain/Comfort:  · Pain Rating 1: 4/10  · Location - Side 1: Left  · Location 1: leg  · Pain Addressed 1: Reposition, Distraction    Patients cultural, spiritual, Uatsdin conflicts given the current situation: no    Objective:     Communicated with: nsg prior to session.  Pt found supine in bed with 2 LPM oxygen in place and spouse present in room.     General Precautions: Standard, hearing impaired, fall     Occupational Performance:    Bed Mobility:    Supine>sit with SBA    Functional Mobility/Transfers:  · Pt completed sit>stand with SBA with RW  · Pt completed stand>sit with CGA with cues for left LE  placement to allow for improved ease with sitting transition.     Activities of Daily Living:  · Feeding: independent  · G/H: simulated with set-up seated      Cognitive/Visual Perceptual:  Pt awake, alert and following commands. Pt with pleasant mood/affect.     Physical Exam:  Pt is right hand dominant and demo WFL UE strength/ROM, coordination and sensation.     AMPAC 6 Click ADL:  AMPAC Total Score: 17    Treatment & Education:  Pt completed functional mobility with SBA with RW but needing CGA with increased fatigue. Minimal cues for safe  RW use.   Education provided re: safety with functional mobility/ADL skills.   Education:    Patient left up in chair with all lines intact, call button in reach, nsg notified and spouse\ present    GOALS:   Multidisciplinary Problems     Occupational Therapy Goals        Problem: Occupational Therapy Goal    Goal Priority Disciplines Outcome Interventions   Occupational Therapy Goal     OT, PT/OT Ongoing, Progressing    Description: Goals to be met by: 5 days 7/25/2020     Patient will increase functional independence with ADLs by performing:    Pt to complete standing g/h skills with supervision,.  Pt to complete LE dressing with ABILIO    Pt to complete toileting with supervision.  Pt to complete t/f to commode with SBA                     History:     Past Medical History:   Diagnosis Date    Anticoagulant long-term use     Anxiety     Arthritis     Atrial fibrillation     BPH (benign prostatic hyperplasia)     Cataract     CKD (chronic kidney disease) stage 3, GFR 30-59 ml/min 7/10/2017    Followed by Dr. Jeevan Pond    Colon polyp     benign    Depression     Elevated PSA     Erectile dysfunction     Gastric ulcer with hemorrhage     Hep B w/o coma 1977    History of bleeding peptic ulcer     History of prostatitis     Hypertension     PAF (paroxysmal atrial fibrillation)     Sleep apnea     on CPAP    Stroke     TIA December 2019    Thyroid disease         Past Surgical History:   Procedure Laterality Date    ABDOMINAL HERNIA REPAIR      ABLATION OF ARRHYTHMOGENIC FOCUS FOR ATRIAL FIBRILLATION N/A 6/15/2020    Procedure: Ablation atrial fibrillation;  Surgeon: Wyatt Beatty MD;  Location: [facility] EP LAB;  Service: Cardiology;  Laterality: N/A;  PAF, AFl, PEPE, PVI re-do, CTI, RFA, JUSTIN, Gen, SK, 3 Prep    CATARACT EXTRACTION  11/25/2013    bilateral    CHOLECYSTECTOMY      COLONOSCOPY N/A 11/25/2015    Procedure: COLONOSCOPY;  Surgeon: Toby Hernandez MD;  Location: [facility] ENDO;  Service: Endoscopy;  Laterality: N/A;    EYE SURGERY      GASTRIC BYPASS  1992    KNEE ARTHROSCOPY      RT    LASIK  2001    both eyes (Dr. Rabago)    ORIF HUMERUS FRACTURE      LT    ORIF HUMERUS FRACTURE Right     non surgical repair    RADIOFREQUENCY ABLATION      x2    Right ankle tendon repair      ROTATOR CUFF REPAIR      right    TOTAL KNEE ARTHROPLASTY Right 5/29/2018    Procedure: REPLACEMENT-KNEE-TOTAL-axel;  Surgeon: Denzel Dallas MD;  Location: [address];  Service: Orthopedics;  Laterality: Right;  [place]    TREATMENT OF CARDIAC ARRHYTHMIA  6/15/2020    Procedure: Cardioversion or Defibrillation;  Surgeon: Wyatt Beatty MD;  Location: [facility] EP LAB;  Service: Cardiology;;       Time Tracking:     OT Date of Treatment: 07/18/20  OT Start Time: 1330  OT Stop Time: 1400  OT Total Time (min): 30 min    Billable Minutes:Evaluation 15  Therapeutic Activity 15    MARINA Fontana  7/18/2020

## 2020-07-18 NOTE — PLAN OF CARE
Problem: Fall Injury Risk  Goal: Absence of Fall and Fall-Related Injury  Outcome: Ongoing, Progressing     Problem: Infection  Goal: Infection Symptom Resolution  Outcome: Ongoing, Progressing     Problem: Adult Inpatient Plan of Care  Goal: Plan of Care Review  Outcome: Ongoing, Progressing     Problem: Adult Inpatient Plan of Care  Goal: Optimal Comfort and Wellbeing  Outcome: Ongoing, Progressing     Problem: Skin Injury Risk Increased  Goal: Skin Health and Integrity  Outcome: Ongoing, Progressing    Pt arrive to floor via stretcher from ED.  Pt has minimal c/o pain until moved from stretcher to bed.  Able to assist with turning from side to side.  IV Left hand patent with NS infusing @ 100cc/hr.  Patient currently refuses placement of galvan.  Using urinal without difficulty.  Pulses, color and temp to Joon LE wnl.  Pt refuses afib medication and states takes it differently at home.  Discussed with Dr Atkinson and he gave the verbal ok for the pt to take his home medication that is being brought by the pts wife this am.  No current needs voiced at this time.  Oriented pt to room.  Call light within reach.  Bed locked and lowered for safety

## 2020-07-18 NOTE — NURSING TRANSFER
Nursing Transfer Note      7/18/2020     Transfer To: [id number]    Transfer via bed    Transfer with cardiac monitoring, 02, perineural    Transported by rn and pct    Medicines sent: o2    Chart send with patient: Yes    Notified: spouse    Patient reassessed at: 7/18/20

## 2020-07-18 NOTE — SUBJECTIVE & OBJECTIVE
"Principal Problem:Closed nondisplaced intertrochanteric fracture of left femur    Principal Orthopedic Problem: nondisplaced intertrochanteric fracture of left femur    Interval History: Patient seen and examined at bedside. No acute events overnight. Reports sleeping great between being transferred to floor and blood dram this am. Pain very well controlled. Has remained NPO since midnight. Currently NWB to LLE.     Review of patient's allergies indicates:   Allergen Reactions    No known drug allergies        Current Facility-Administered Medications   Medication    0.9%  NaCl infusion    acetaminophen tablet 650 mg    acetaminophen tablet 650 mg    atorvastatin tablet 10 mg    buPROPion TB24 tablet 300 mg    carvediloL tablet 12.5 mg    ceFAZolin injection 2 g    dutasteride capsule 0.5 mg    escitalopram oxalate tablet 10 mg    furosemide tablet 20 mg    HYDROmorphone injection 1 mg    levothyroxine tablet 50 mcg    lidocaine (PF) 10 mg/ml (1%) injection 10 mg    methocarbamoL tablet 500 mg    mupirocin 2 % ointment    ondansetron disintegrating tablet 8 mg    oxyCODONE immediate release tablet 5 mg    pantoprazole EC tablet 40 mg    propafenone tablet 225 mg    sodium chloride 0.9% flush 10 mL    sodium chloride 0.9% flush 10 mL    sodium chloride 0.9% flush 3 mL    telmisartan tablet 40 mg     Objective:     Vital Signs (Most Recent):  Temp: 98.1 °F (36.7 °C) (07/18/20 0723)  Pulse: 72 (07/18/20 0723)  Resp: 17 (07/18/20 0723)  BP: (!) 106/58 (07/18/20 0723)  SpO2: 98 % (07/18/20 0723) Vital Signs (24h Range):  Temp:  [97.9 °F (36.6 °C)-98.4 °F (36.9 °C)] 98.1 °F (36.7 °C)  Pulse:  [66-77] 72  Resp:  [14-20] 17  SpO2:  [92 %-100 %] 98 %  BP: (106-153)/(58-92) 106/58     Weight: 96.2 kg (212 lb)  Height: 6' 2" (188 cm)  Body mass index is 27.22 kg/m².      Intake/Output Summary (Last 24 hours) at 7/18/2020 0724  Last data filed at 7/18/2020 0500  Gross per 24 hour   Intake 1115 ml "   Output 850 ml   Net 265 ml       Ortho/SPM Exam     Vitals: Afebrile.  Vital signs stable.  General: No acute distress. Resting comfortably.  Cardio: Regular rate.  Chest: No increased work of breathing.    LLE  Minimally shortened, skin intact with min swelling over lateral hip  Pain with Log roll of leg  No bony TTP throughout  Compartments soft  Painless ROM ankle   SILT Sa/Mark/DP/SP/T  Motor intact TA/SP/DP  2+ DP and PT     RLE:  Skin intact, no deformity  No TTP  Compartments soft  Full painless ROM throughout lower extremity  SILT Sa/Mark/DP/SP/T  Motor intact TA/SP/DP  2+ DP/PT     BUE:  Skin intact, no deformity noted  No open wounds/abrasions/crepitus  No bony TTP  FROM shoulder, elbow and wrist  SILT M/U/R  Motor intact AIN/PIN/M/U/R   Cap refill < 2s  2+ RP      Spine: No TTP along spine, no step offs palpated. No sacral decubitus ulcers        Significant Labs:   CBC:   Recent Labs   Lab 07/17/20  1759 07/18/20  0228   WBC 12.79* 10.69   HGB 11.0* 10.6*   HCT 34.6* 34.6*    193     All pertinent labs within the past 24 hours have been reviewed.    Significant Imaging: No new imaging today

## 2020-07-18 NOTE — ASSESSMENT & PLAN NOTE
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  Patient was explained in detail the severity of the injury that was suffered. Patient was explained the risks/benefits/and alternatives to operative management in detail including infection, bleeding, pain, nerve and vascular damage, heterotopic ossification, leg length discrepancies, rotational deformities and they express full understanding.  Also, the patient was explained the high mortality, over 30 percent,  associated with these fractures. 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 Will have assistance from the anesthesia team for preoperative optimization. Will plan for OR tomorrow. No Guarantees were made, informed consent was obtained. All questions were answered to patient's and family's satisfaction.     -Admitted to ortho  -NPO midnight  -Pain control per primary  -Marked, booked, and consented for surgery  -PT/OT: Bed rest  -DVT PPx: Hold anticoagulation  -Abx: Preop abx ordered  -Labs: pending  -Galvan: In place, galvan care  -Iv: ordered for contralateral arm

## 2020-07-18 NOTE — H&P
"[facility]  Orthopedics  H&P    Patient Name: Dominick Song MD  MRN: [id number]  Admission Date: 7/17/2020  Primary Care Provider: Maxi Gaines MD      Subjective:     Principal Problem:Closed nondisplaced intertrochanteric fracture of left femur    Chief Complaint:   Chief Complaint   Patient presents with    Fall     +trip and fall " missed the last step and fell on my left hip". + left sided hip pains, denies numbness/tingling to extremity, + sensation intact. + 2 pusles. 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 He reports that they've been redoing the tyler in their house, upstairs and downstairs. As he was going down the steps, he missed the last stair and fell. He was gingerly able to ambualte after the fall but it was very painful. He usually can ambulate without assistnace. He had Right TKA by dr roach in 2015 and has no pain in that knee. He also had previous left humerus fracture treated by Dr Rodriguez. Of note,  on July 13, he stood up from dinner and felt dizzy and as if he was going to pass out. Wife was trying to get him into bed, and she noticed that he was slurring his speech and he seemed quite confused. He was unable to understand the commands she was trying to have him do.    Prior event was 12/14/20. They were at a party and he started slurring his speech and he had drooping of his right face and weakness on his right side. Wife drove him to [place] and were planning on tPA but he was on eliquis at that time so that didn't happen. He was seen by vascular neuro in June of 2020 and they recommended continued eliquis. Currently denies pain anywhere else.  Denies any head trauma. Patient lives at home with his wife who drove him to the hospital, makes his own medical decisions.  Last " dose of Eliquis was this morning.         Past Medical History:   Diagnosis Date    Anticoagulant long-term use     Anxiety     Arthritis     Atrial fibrillation     BPH (benign prostatic hyperplasia)     Cataract     CKD (chronic kidney disease) stage 3, GFR 30-59 ml/min 7/10/2017    Followed by Dr. Jeevan Pond    Colon polyp     benign    Depression     Elevated PSA     Erectile dysfunction     Gastric ulcer with hemorrhage     Hep B w/o coma 1977    History of bleeding peptic ulcer     History of prostatitis     Hypertension     PAF (paroxysmal atrial fibrillation)     Sleep apnea     on CPAP    Stroke     TIA December 2019    Thyroid disease        Past Surgical History:   Procedure Laterality Date    ABDOMINAL HERNIA REPAIR      ABLATION OF ARRHYTHMOGENIC FOCUS FOR ATRIAL FIBRILLATION N/A 6/15/2020    Procedure: Ablation atrial fibrillation;  Surgeon: Wyatt Beatty MD;  Location: [facility] EP LAB;  Service: Cardiology;  Laterality: N/A;  PAF, AFl, PEPE, PVI re-do, CTI, RFA, JUSTIN, Gen, SK, 3 Prep    CATARACT EXTRACTION  11/25/2013    bilateral    CHOLECYSTECTOMY      COLONOSCOPY N/A 11/25/2015    Procedure: COLONOSCOPY;  Surgeon: Toby Hernandez MD;  Location: [facility] ENDO;  Service: Endoscopy;  Laterality: N/A;    EYE SURGERY      GASTRIC BYPASS  1992    KNEE ARTHROSCOPY      RT    LASIK  2001    both eyes (Dr. Rabago)    ORIF HUMERUS FRACTURE      LT    ORIF HUMERUS FRACTURE Right     non surgical repair    RADIOFREQUENCY ABLATION      x2    Right ankle tendon repair      ROTATOR CUFF REPAIR      right    TOTAL KNEE ARTHROPLASTY Right 5/29/2018    Procedure: REPLACEMENT-KNEE-TOTAL-axel;  Surgeon: Denzel Dallas MD;  Location: [facility] OR [address] [place] [address];  Service: Orthopedics;  Laterality: Right;  Adger    TREATMENT OF CARDIAC ARRHYTHMIA  6/15/2020    Procedure: Cardioversion or Defibrillation;  Surgeon: Wyatt Beatty MD;  Location: [facility] EP LAB;  Service: Cardiology;;       Review  of patient's allergies indicates:   Allergen Reactions    No known drug allergies        Current Facility-Administered Medications   Medication    ondansetron disintegrating tablet 4 mg     Current Outpatient Medications   Medication Sig    acyclovir (ZOVIRAX) 800 MG Tab TK ONE T PO FIVE TIMES D only for fever blisters    alendronate (FOSAMAX) 70 MG tablet Take 1 tablet (70 mg total) by mouth every 7 days. Take with a full glass of water & remain upright for at least 30 minutes    amoxicillin (AMOXIL) 500 MG Tab Take 500 mg by mouth every 12 (twelve) hours.    aspirin 81 MG Chew 81 mg.    atorvastatin (LIPITOR) 10 MG tablet Take 1 tablet (10 mg total) by mouth once daily.    betamethasone acetate-betamethasone sodium phosphate (CELESTONE) 6 mg/mL injection as needed.     buPROPion (WELLBUTRIN XL) 300 MG 24 hr tablet Take 1 tablet (300 mg total) by mouth once daily.    calcium carbonate (OS-KISHAN) 500 mg calcium (1,250 mg) tablet     CALCIUM ORAL Take by mouth Daily.    calcium-vitamin D 600 mg(1,500mg) -400 unit Tab 600 mg.    carvediloL (COREG) 12.5 MG tablet Take 1 tablet (12.5 mg total) by mouth 2 (two) times daily with meals.    celecoxib (CELEBREX) 200 MG capsule TAKE 1 CAPSULE(200 MG) BY MOUTH TWICE DAILY    cholestyramine (QUESTRAN) 4 gram packet Take 1 packet (4 g total) by mouth once daily.    cyanocobalamin/folic acid (VITAMIN B12-FOLIC ACID) 500-400 mcg Tab Take by mouth.    cyclobenzaprine (FLEXERIL) 10 MG tablet TAKE 1 TABLET BY MOUTH EVERY 8 HOURS AS NEEDED FOR MUSCLE SPASM (Patient taking differently: AS NEEDED FOR MUSCLE SPASM)    dutasteride (AVODART) 0.5 mg capsule TAKE 1 CAPSULE(0.5 MG) BY MOUTH EVERY DAY    efinaconazole (JUBLIA) 10 % Prakash Apply topically.    ELIQUIS 5 mg Tab Take 1 tablet (5 mg total) by mouth 2 (two) times daily.    escitalopram oxalate (LEXAPRO) 10 MG tablet TAKE 1 TABLET(10 MG) BY MOUTH EVERY DAY    ferrous sulfate (FEOSOL) 325 mg (65 mg iron) Tab tablet  Take by mouth.    furosemide (LASIX) 20 MG tablet Take 1 tablet (20 mg total) by mouth daily as needed (shortness of breath, leg swelling).    KENALOG 40 mg/mL injection as needed.     levothyroxine (SYNTHROID) 50 MCG tablet Take 1 tablet (50 mcg total) by mouth before breakfast.    MULTIVITAMIN ORAL Take by mouth Daily.    omega 3-dha-epa-fish oil (OMEGA-3) 350 mg-235 mg- 90 mg-597 mg CpDR     OMEGA-3 FATTY ACIDS (FISH OIL CONCENTRATE ORAL) Take by mouth Daily.    oxyCODONE-acetaminophen (PERCOCET)  mg per tablet Take 1 tablet by mouth every 4 (four) hours as needed for Pain.    pantoprazole (PROTONIX) 40 MG tablet Take 1 tablet (40 mg total) by mouth once daily.    propafenone (RYTHMOL SR) 425 MG Cp12 Take 1 capsule (425 mg total) by mouth every 12 (twelve) hours.    telmisartan (MICARDIS) 40 MG Tab Take 1 tablet (40 mg total) by mouth once daily.     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    Substance and Sexual Activity    Alcohol use: No    Drug use: No    Sexual activity: Yes     Partners: Female     ROS   Constitutional: negative for fevers  Eyes: negative visual changes  ENT: negative for hearing loss  Respiratory: negative for dyspnea  Cardiovascular: negative for chest pain  Gastrointestinal: negative for abdominal pain      Objective:     Vital Signs (Most Recent):  Temp: 98.4 °F (36.9 °C) (07/17/20 1900)  Pulse: 66 (07/17/20 1900)  Resp: 16 (07/17/20 1909)  BP: (!) 149/75 (07/17/20 1900)  SpO2: 96 % (07/17/20 1900) Vital Signs (24h Range):  Temp:  [98.4 °F (36.9 °C)] 98.4 °F (36.9 °C)  Pulse:  [66-68] 66  Resp:  [14-20] 16  SpO2:  [96 %-98 %] 96  "%  BP: (138-150)/(65-75) 149/75     Weight: 96.2 kg (212 lb)  Height: 6' 2" (188 cm)  Body mass index is 27.22 kg/m².    Ortho/SPM Exam     LLE  skin intact with minimal swelling over lateral hip  Pain with Log roll of leg  No bony TTP throughout  Compartments soft  Painless ROM ankle   SILT Sa/Mark/DP/SP/T  Motor intact TA/SP/DP  2+ DP and PT     RLE:  Skin intact, no deformity  No TTP  Compartments soft  Full painless ROM throughout lower extremity  SILT Sa/Mark/DP/SP/T  Motor intact TA/SP/DP  2+ DP/PT     LUE:  Superficial abrasion noted  no deformity noted  No open wounds/abrasions/crepitus  No bony TTP  FROM shoulder, elbow and wrist  SILT M/U/R  Motor intact AIN/PIN/M/U/R   Cap refill < 2s  2+ RP    RUE:  Skin intact, no deformity noted  No open wounds/abrasions/crepitus  No bony TTP  FROM shoulder, elbow and wrist  SILT M/U/R  Motor intact AIN/PIN/M/U/R   Cap refill < 2s  2+ RP      Spine: No TTP along spine, no step offs palpated. No sacral decubitus ulcers      Significant Labs:   CBC:   Recent Labs   Lab 07/17/20  1759   WBC 12.79*   HGB 11.0*   HCT 34.6*        All pertinent labs within the past 24 hours have been reviewed.    Significant Imaging: Xray left hip: no fx noted  Xray left femur, Chest WNL except as above  MRI left hip: Nondisplaced intertrochanteric femur fracture      Assessment/Plan:     * Closed nondisplaced intertrochanteric fracture of left femur  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  Patient was explained in detail the severity of the injury that was suffered. Patient was explained the risks/benefits/and alternatives to operative management in detail including infection, bleeding, pain, nerve and vascular damage, heterotopic ossification, leg length discrepancies, rotational deformities and he expresses full understanding.  Also, the patient was explained the high mortality, over 30 percent,  associated with " these fractures. 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 He had a PEPE 6/15 that showed no thrombus and EF was 60 percent. Will have assistance from the anesthesia team for preoperative optimization. Will plan for OR tomorrow. No Guarantees were made, informed consent was obtained. All questions were answered to patient's and family's satisfaction.     -Admitted to ortho  -NPO midnight  -Pain control  -Marked, booked, and consented for surgery  -PT/OT: Bed rest  -DVT PPx: Hold anticoagulation  -Abx: Preop abx ordered  -Labs:  White blood cell 12, hemoglobin 11, hematocrit 34, creatinine 1.8, A1c pending, it UA pending  -Galvan: In place, galvan care  -Iv: ordered for contralateral arm  -Cardiac risk index score of 6 percent      CKD (chronic kidney disease) stage 3, GFR 30-59 ml/min  Monitor labs, cr 1.8    Essential hypertension  Home meds    Hypothyroidism due to acquired atrophy of thyroid  Home meds    Atrial fibrillation  Home meds for rate and rhythym, holding eliquis pre op        Yves Atkinson MD  Orthopedics  [facility]

## 2020-07-18 NOTE — HPI
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 He reports that they've been redoing the tyler in their house, upstairs and downstairs. As he was going down the steps, he missed the last stair and fell. He was gingerly able to ambualte after the fall but it was very painful. He usually can ambulate without assistnace. He had Right TKA by dr roach in 2015 and has no pain in that knee. He also had previous left humerus fracture treated by Dr Rodriguez. Of note,  On July 13, he stood up from dinner and felt dizzy and as if he was going to pass out. Wife was trying to get him into bed, and she noticed that he was slurring his speech and he seemed quite confused. He was unable to understand the commands she was trying to have him do.    Prior event was 12/14/20. They were at a party and he started slurring his speech and he had drooping of his right face and weakness on his right side. Wife drove him to [place] and were planning on tPA but he was on eliquis at that time so that didn't happen. The whole event lasted about 90 minutes and was also improving by the time he got to the ED, and was completely back to baseline. He had two head CTs. He was taking eliquis only once per day during that time and the thought was that the event was secondary to medication non-adherence.  Currently denies pain anywhere else.  Denies any head trauma. Patient lives at home with his wife who drove him to the hospital, makes his own medical decisions.  Last dose of Eliquis was this morning.

## 2020-07-19 VITALS
TEMPERATURE: 97 F | WEIGHT: 212 LBS | RESPIRATION RATE: 14 BRPM | OXYGEN SATURATION: 96 % | BODY MASS INDEX: 27.21 KG/M2 | HEIGHT: 74 IN | SYSTOLIC BLOOD PRESSURE: 122 MMHG | HEART RATE: 55 BPM | DIASTOLIC BLOOD PRESSURE: 78 MMHG

## 2020-07-19 PROCEDURE — 25000003 PHARM REV CODE 250: Mod: HCNC | Performed by: STUDENT IN AN ORGANIZED HEALTH CARE EDUCATION/TRAINING PROGRAM

## 2020-07-19 PROCEDURE — 99231 SBSQ HOSP IP/OBS SF/LOW 25: CPT | Mod: HCNC,,, | Performed by: ANESTHESIOLOGY

## 2020-07-19 PROCEDURE — 97530 THERAPEUTIC ACTIVITIES: CPT | Mod: HCNC

## 2020-07-19 PROCEDURE — 25000003 PHARM REV CODE 250: Mod: HCNC | Performed by: ANESTHESIOLOGY

## 2020-07-19 PROCEDURE — 63600175 PHARM REV CODE 636 W HCPCS: Mod: HCNC | Performed by: STUDENT IN AN ORGANIZED HEALTH CARE EDUCATION/TRAINING PROGRAM

## 2020-07-19 PROCEDURE — 97112 NEUROMUSCULAR REEDUCATION: CPT | Mod: HCNC,CQ

## 2020-07-19 PROCEDURE — 99231 PR SUBSEQUENT HOSPITAL CARE,LEVL I: ICD-10-PCS | Mod: HCNC,,, | Performed by: ANESTHESIOLOGY

## 2020-07-19 PROCEDURE — 97116 GAIT TRAINING THERAPY: CPT | Mod: HCNC,CQ

## 2020-07-19 RX ORDER — CELECOXIB 200 MG/1
200 CAPSULE ORAL DAILY
Qty: 14 CAPSULE | Refills: 0 | Status: SHIPPED | OUTPATIENT
Start: 2020-07-19 | End: 2023-05-02 | Stop reason: SDUPTHER

## 2020-07-19 RX ORDER — ALENDRONATE SODIUM 70 MG/1
70 TABLET ORAL
Qty: 12 TABLET | Refills: 3 | Status: SHIPPED | OUTPATIENT
Start: 2020-07-19 | End: 2020-12-19

## 2020-07-19 RX ORDER — METHOCARBAMOL 500 MG/1
500 TABLET, FILM COATED ORAL 4 TIMES DAILY
Qty: 40 TABLET | Refills: 0 | Status: SHIPPED | OUTPATIENT
Start: 2020-07-19 | End: 2020-07-29

## 2020-07-19 RX ORDER — GABAPENTIN 100 MG/1
100 CAPSULE ORAL 3 TIMES DAILY
Qty: 42 CAPSULE | Refills: 0 | Status: SHIPPED | OUTPATIENT
Start: 2020-07-19 | End: 2021-03-10

## 2020-07-19 RX ORDER — ONDANSETRON 8 MG/1
8 TABLET, ORALLY DISINTEGRATING ORAL EVERY 8 HOURS PRN
Qty: 30 TABLET | Refills: 0 | Status: SHIPPED | OUTPATIENT
Start: 2020-07-19 | End: 2021-03-10

## 2020-07-19 RX ORDER — OXYCODONE AND ACETAMINOPHEN 10; 325 MG/1; MG/1
1 TABLET ORAL EVERY 4 HOURS PRN
Qty: 54 TABLET | Refills: 0 | Status: SHIPPED | OUTPATIENT
Start: 2020-07-19 | End: 2020-07-30 | Stop reason: SDUPTHER

## 2020-07-19 RX ADMIN — APIXABAN 5 MG: 5 TABLET, FILM COATED ORAL at 09:07

## 2020-07-19 RX ADMIN — LEVOTHYROXINE SODIUM 50 MCG: 50 TABLET ORAL at 05:07

## 2020-07-19 RX ADMIN — TELMISARTAN 40 MG: 20 TABLET ORAL at 10:07

## 2020-07-19 RX ADMIN — PANTOPRAZOLE SODIUM 40 MG: 40 TABLET, DELAYED RELEASE ORAL at 10:07

## 2020-07-19 RX ADMIN — DUTASTERIDE 0.5 MG: 0.5 CAPSULE, LIQUID FILLED ORAL at 10:07

## 2020-07-19 RX ADMIN — ACETAMINOPHEN 1000 MG: 500 TABLET ORAL at 05:07

## 2020-07-19 RX ADMIN — CARVEDILOL 12.5 MG: 12.5 TABLET, FILM COATED ORAL at 10:07

## 2020-07-19 RX ADMIN — METHOCARBAMOL TABLETS 750 MG: 750 TABLET, COATED ORAL at 10:07

## 2020-07-19 RX ADMIN — ESCITALOPRAM OXALATE 10 MG: 10 TABLET ORAL at 10:07

## 2020-07-19 RX ADMIN — CEFAZOLIN 2 G: 1 INJECTION, POWDER, FOR SOLUTION INTRAMUSCULAR; INTRAVENOUS at 01:07

## 2020-07-19 RX ADMIN — OXYCODONE HYDROCHLORIDE 5 MG: 5 TABLET ORAL at 10:07

## 2020-07-19 RX ADMIN — OXYCODONE HYDROCHLORIDE 5 MG: 5 TABLET ORAL at 05:07

## 2020-07-19 RX ADMIN — BUPROPION HYDROCHLORIDE 300 MG: 150 TABLET, FILM COATED, EXTENDED RELEASE ORAL at 10:07

## 2020-07-19 RX ADMIN — MUPIROCIN 1 G: 20 OINTMENT TOPICAL at 10:07

## 2020-07-19 NOTE — DISCHARGE SUMMARY
[facility]  Orthopedics  Discharge Summary      Patient Name: Dominick Song MD  MRN: [id number]  Admission Date: 7/17/2020  Hospital Length of Stay: 2 days  Discharge Date and Time: No discharge date for patient encounter.  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 male  with history of Afib sp ablation, TIA December 2018 on eliquis last dose this am presents with left hip pain after fall from one stair this afternoon. He reports that they've been redoing the tyler in their house, upstairs and downstairs. As he was going down the steps, he missed the last stair and fell. He was gingerly able to ambualte after the fall but it was very painful. He usually can ambulate without assistnace. He had Right TKA by dr roach in 2015 and has no pain in that knee. He also had previous left humerus fracture treated by Dr Rodriguez. Of note,  on July 13, he stood up from dinner and felt dizzy and as if he was going to pass out. Wife was trying to get him into bed, and she noticed that he was slurring his speech and he seemed quite confused. He was unable to understand the commands she was trying to have him do.    Prior event was 12/14/20. They were at a party and he started slurring his speech and he had drooping of his right face and weakness on his right side. Wife drove him to [place] and were planning on tPA but he was on eliquis at that time so that didn't happen. He was seen by vascular neuro in June of 2020 and they recommended continued eliquis. Currently denies pain anywhere else.  Denies any head trauma. Patient lives at home with his wife who drove him to the hospital, makes his own medical decisions.  Last dose of Eliquis was this morning.             Procedure(s) (LRB):  INSERTION, INTRAMEDULLARY ERIC, FEMUR, left, Synthes, Chloride table, Large C arm clock side, (Left)       Hospital Course: the patient arrived to pre-op area for proper pre-operative management.  Upon completion of pre-operative preparation, the patient was taken back to the operative theatre.  A CMN was inserted left femur. The procedure was performed without complication and the patient was transported to the post anesthesia care unit in stable condition. After appropriate recovery from the anaesthetic agents used during the surgery the patient was transferred to the floor where they received pain medication and physical therapy. On POD0, the patient was cruising with therapy. The patient remained until POD2, was evaluated by PT again and deemed safe for DC, they were discharged home.          Consults (From admission, onward)        Status Ordering Provider     Inpatient consult to Orthopedic Surgery  Once     Provider:  (Not yet assigned)    Completed ANTHONY OLIVER     Inpatient consult to Social Work/Case Management  Once     Provider:  (Not yet assigned)    Acknowledged SOPHIE MONTESNIOS          Significant Diagnostic Studies: Labs:   CMP   Recent Labs   Lab 07/17/20 1759 07/18/20 0228    138   K 5.0 5.1   * 112*   CO2 21* 22*    145*   BUN 26* 23   CREATININE 1.8* 1.8*   CALCIUM 8.1* 7.7*   PROT 6.3 5.7*   ALBUMIN 3.5 3.0*   BILITOT 0.8 0.8   ALKPHOS 67 91   AST 28 136*   ALT 30 72*   ANIONGAP 5* 4*   ESTGFRAFRICA 42.2* 42.2*   EGFRNONAA 36.5* 36.5*    and CBC   Recent Labs   Lab 07/17/20 1759 07/18/20 0228   WBC 12.79* 10.69   HGB 11.0* 10.6*   HCT 34.6* 34.6*    193       Pending Diagnostic Studies:     None        Final Active Diagnoses:    Diagnosis Date Noted POA    PRINCIPAL PROBLEM:  Closed nondisplaced intertrochanteric fracture of left femur [S72.145A] 07/17/2020 Yes    Preop cardiovascular exam [Z01.810] 07/17/2020 Not Applicable    CKD (chronic kidney disease) stage 3, GFR 30-59 ml/min [N18.3] 07/10/2017 Yes    Hypothyroidism due to acquired atrophy of  "thyroid [E03.4] 02/15/2017 Yes    Essential hypertension [I10] 02/15/2017 Yes    Atrial fibrillation [I48.91] 01/23/2014 Yes      Problems Resolved During this Admission:      Discharged Condition: stable    Disposition: Home or Self Care    Follow Up:    Patient Instructions:      WALKER FOR HOME USE     Order Specific Question Answer Comments   Type of Walker: Adult (5'4"-6'6")    With wheels? Yes    Height: 6' 2" (1.88 m)    Weight: 96.2 kg (212 lb)    Length of need (1-99 months): 12    Does patient have medical equipment at home? none    Please check all that apply: Walker will be used for gait training.      Medications:  Reconciled Home Medications:      Medication List      START taking these medications    gabapentin 100 MG capsule  Commonly known as: NEURONTIN  Take 1 capsule (100 mg total) by mouth 3 (three) times daily. for 14 days     methocarbamoL 500 MG Tab  Commonly known as: ROBAXIN  Take 1 tablet (500 mg total) by mouth 4 (four) times daily. for 10 days     ondansetron 8 MG Tbdl  Commonly known as: ZOFRAN-ODT  Take 1 tablet (8 mg total) by mouth every 8 (eight) hours as needed.        CHANGE how you take these medications    alendronate 70 MG tablet  Commonly known as: FOSAMAX  Take 1 tablet (70 mg total) by mouth every 7 days. Take with a full glass of water & remain upright for at least 30 minutes  Hold fosfomax until 2 week fu  What changed: additional instructions     * celecoxib 200 MG capsule  Commonly known as: CeleBREX  TAKE 1 CAPSULE(200 MG) BY MOUTH TWICE DAILY  What changed: Another medication with the same name was added. Make sure you understand how and when to take each.     * celecoxib 200 MG capsule  Commonly known as: CeleBREX  Take 1 capsule (200 mg total) by mouth once daily. for 14 days  What changed: You were already taking a medication with the same name, and this prescription was added. Make sure you understand how and when to take each.     cyclobenzaprine 10 MG " tablet  Commonly known as: FLEXERIL  TAKE 1 TABLET BY MOUTH EVERY 8 HOURS AS NEEDED FOR MUSCLE SPASM  What changed:   · how much to take  · how to take this  · when to take this  · additional instructions         * This list has 2 medication(s) that are the same as other medications prescribed for you. Read the directions carefully, and ask your doctor or other care provider to review them with you.            CONTINUE taking these medications    acyclovir 800 MG Tab  Commonly known as: ZOVIRAX  TK ONE T PO FIVE TIMES D only for fever blisters     amoxicillin 500 MG Tab  Commonly known as: AMOXIL  Take 500 mg by mouth every 12 (twelve) hours.     aspirin 81 MG Chew  81 mg.     atorvastatin 10 MG tablet  Commonly known as: LIPITOR  Take 1 tablet (10 mg total) by mouth once daily.     betamethasone acetate-betamethasone sodium phosphate 6 mg/mL injection  Commonly known as: CELESTONE  as needed.     buPROPion 300 MG 24 hr tablet  Commonly known as: WELLBUTRIN XL  Take 1 tablet (300 mg total) by mouth once daily.     calcium carbonate 500 mg calcium (1,250 mg) tablet  Commonly known as: OS-KISHAN     CALCIUM ORAL  Take by mouth Daily.     calcium-vitamin D 600 mg(1,500mg) -400 unit Tab  600 mg.     carvediloL 12.5 MG tablet  Commonly known as: COREG  Take 1 tablet (12.5 mg total) by mouth 2 (two) times daily with meals.     cholestyramine 4 gram packet  Commonly known as: QUESTRAN  Take 1 packet (4 g total) by mouth once daily.     dutasteride 0.5 mg capsule  Commonly known as: AVODART  TAKE 1 CAPSULE(0.5 MG) BY MOUTH EVERY DAY     ELIQUIS 5 mg Tab  Generic drug: apixaban  Take 1 tablet (5 mg total) by mouth 2 (two) times daily.     escitalopram oxalate 10 MG tablet  Commonly known as: LEXAPRO  TAKE 1 TABLET(10 MG) BY MOUTH EVERY DAY     ferrous sulfate 325 mg (65 mg iron) Tab tablet  Commonly known as: FEOSOL  Take by mouth.     FISH OIL CONCENTRATE ORAL  Take by mouth Daily.     furosemide 20 MG tablet  Commonly known  as: LASIX  Take 1 tablet (20 mg total) by mouth daily as needed (shortness of breath, leg swelling).     JUBLIA 10 % Aleksandra  Generic drug: efinaconazole  Apply topically.     KENALOG 40 mg/mL injection  Generic drug: triamcinolone acetonide  as needed.     levothyroxine 50 MCG tablet  Commonly known as: SYNTHROID  Take 1 tablet (50 mcg total) by mouth before breakfast.     oxyCODONE-acetaminophen  mg per tablet  Commonly known as: PERCOCET  Take 1 tablet by mouth every 4 (four) hours as needed for Pain.     pantoprazole 40 MG tablet  Commonly known as: PROTONIX  Take 1 tablet (40 mg total) by mouth once daily.     propafenone 425 MG Cp12  Commonly known as: RYTHMOL SR  Take 1 capsule (425 mg total) by mouth every 12 (twelve) hours.     telmisartan 40 MG Tab  Commonly known as: MICARDIS  Take 1 tablet (40 mg total) by mouth once daily.        STOP taking these medications    MULTIVITAMIN ORAL     OMEGA-3 350 mg-235 mg- 90 mg-597 mg Cpdr  Generic drug: omega 3-dha-epa-fish oil     vitamin B12-folic acid 500-400 mcg Tab            Yves Atkinson MD  Orthopedics  [facility]

## 2020-07-19 NOTE — PT/OT/SLP PROGRESS
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  He presents with good effort this day and tolerates session well. Performance deficits affecting function are impaired self care skills, impaired functional mobilty, weakness, impaired endurance, decreased lower extremity function, impaired skin, orthopedic precautions, gait instability, impaired balance.     Rehab Prognosis:  Good; patient would benefit from acute skilled OT services to address these deficits and reach maximum level of function.       Plan:     Patient to be seen daily to address the above listed problems via self-care/home management, therapeutic activities, therapeutic exercises  · Plan of Care Expires: 08/18/20  · Plan of Care Reviewed with: patient    Subjective     Pain/Comfort:  · Pain Rating 1: 0/10    Objective:     Communicated with: RN prior to session.  Patient found standing in the hallway with the PCT with perineural catheter upon OT entry to room.    General Precautions: Standard, fall   Orthopedic Precautions:LLE weight bearing as tolerated   Braces: N/A     Occupational Performance:     Bed Mobility:    · Did not observe    Functional Mobility/Transfers:  · Patient completed Sit <> Stand Transfer with stand by assistance  with  rolling walker   · Functional Mobility: ~260' with SBA-CGA and RW, chair follow for safety. Patient ascended/descended 9 stairs with CGA-min A and utilizing bilateral  handrails    Activities of Daily Living:  · Denied need at this time - patient stated that he has no clothes to change into as he requested them to be cut off when he initially was brought to the hospital. Patient reported that his wife would assist with ADLs and that he was able to get on/off the toilet prior to OT arrival.    [facility] 6 Click ADL: 17    Treatment & Education:  Role of OT/POC  Call button for assistance  Discussed any questions/concerns upon d/c home within OT scope of practice with patient reporting none    Patient left up in chair with all lines intact, call button in reach and RN notifiedEducation:      GOALS:   Multidisciplinary Problems     Occupational Therapy Goals        Problem: Occupational Therapy Goal    Goal Priority Disciplines Outcome Interventions   Occupational Therapy Goal     OT, PT/OT Ongoing, Progressing    Description: Goals to be met by: 5 days 7/25/2020     Patient will increase functional independence with ADLs by performing:    Pt to complete standing g/h skills with supervision,.  Pt to complete LE dressing with ABILIO    Pt to complete toileting with supervision.  Pt to complete t/f to commode with SBA                     Time Tracking:     OT Date of Treatment: 07/19/20  OT Start Time: 1007  OT Stop Time: 1030  OT Total Time (min): 23 min    Billable Minutes:Therapeutic Activity 23 minutes    Erin Comer OT  7/19/2020

## 2020-07-19 NOTE — ANESTHESIA POST-OP PAIN MANAGEMENT
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       Subjective:     General appearance of alert, oriented, no complaints   Pain with rest: 1    Numbers   Pain with movement: 1    Numbers   Side Effects    1. Pruritis No    2. Nausea No    3. Motor Blockade No, 0=Ability to raise lower extremities off bed    4. Sedation No, 1=awake and alert    Objective:     Catheter site clean, dry, intact     Vitals   Vitals:    07/19/20 1047   BP:    Pulse:    Resp: 14   Temp:         Labs    Admission on 07/17/2020   Component Date Value Ref Range Status    WBC 07/17/2020 12.79* 3.90 - 12.70 K/uL Final    RBC 07/17/2020 3.94* 4.60 - 6.20 M/uL Final    Hemoglobin 07/17/2020 11.0* 14.0 - 18.0 g/dL Final    Hematocrit 07/17/2020 34.6* 40.0 - 54.0 % Final    Mean Corpuscular Volume 07/17/2020 88  82 - 98 fL Final    Mean Corpuscular Hemoglobin 07/17/2020 27.9  27.0 - 31.0 pg Final    Mean Corpuscular Hemoglobin Conc 07/17/2020 31.8* 32.0 - 36.0 g/dL Final    RDW 07/17/2020 14.7* 11.5 - 14.5 % Final    Platelets 07/17/2020 218  150 - 350 K/uL Final    MPV 07/17/2020 11.3  9.2 - 12.9 fL Final    Immature Granulocytes 07/17/2020 0.5  0.0 - 0.5 % Final    Gran # (ANC) 07/17/2020 9.9* 1.8 -  7.7 K/uL Final    Immature Grans (Abs) 07/17/2020 0.07* 0.00 - 0.04 K/uL Final    Lymph # 07/17/2020 1.2  1.0 - 4.8 K/uL Final    Mono # 07/17/2020 0.9  0.3 - 1.0 K/uL Final    Eos # 07/17/2020 0.6* 0.0 - 0.5 K/uL Final    Baso # 07/17/2020 0.10  0.00 - 0.20 K/uL Final    nRBC 07/17/2020 0  0 /100 WBC Final    Gran% 07/17/2020 77.6* 38.0 - 73.0 % Final    Lymph% 07/17/2020 9.2* 18.0 - 48.0 % Final    Mono% 07/17/2020 7.0  4.0 - 15.0 % Final    Eosinophil% 07/17/2020 4.9  0.0 - 8.0 % Final    Basophil% 07/17/2020 0.8  0.0 - 1.9 % Final    Differential Method 07/17/2020 Automated   Final    Sodium 07/17/2020 137  136 - 145 mmol/L Final    Potassium 07/17/2020 5.0  3.5 - 5.1 mmol/L Final    Chloride 07/17/2020 111* 95 - 110 mmol/L Final    CO2 07/17/2020 21* 23 - 29 mmol/L Final    Glucose 07/17/2020 107  70 - 110 mg/dL Final    BUN, Bld 07/17/2020 26* 8 - 23 mg/dL Final    Creatinine 07/17/2020 1.8* 0.5 - 1.4 mg/dL Final    Calcium 07/17/2020 8.1* 8.7 - 10.5 mg/dL Final    Total Protein 07/17/2020 6.3  6.0 - 8.4 g/dL Final    Albumin 07/17/2020 3.5  3.5 - 5.2 g/dL Final    Total Bilirubin 07/17/2020 0.8  0.1 - 1.0 mg/dL Final    Alkaline Phosphatase 07/17/2020 67  55 - 135 U/L Final    AST 07/17/2020 28  10 - 40 U/L Final    ALT 07/17/2020 30  10 - 44 U/L Final    Anion Gap 07/17/2020 5* 8 - 16 mmol/L Final    eGFR if  07/17/2020 42.2* >60 mL/min/1.73 m^2 Final    eGFR if non African American 07/17/2020 36.5* >60 mL/min/1.73 m^2 Final    Prothrombin Time 07/17/2020 10.8  9.0 - 12.5 sec Final    INR 07/17/2020 1.1  0.8 - 1.2 Final    Group & Rh 07/17/2020 A POS   Final    Indirect Dieter 07/17/2020 NEG   Final    Specimen UA 07/17/2020 Urine, Clean Catch   Final    Color, UA 07/17/2020 Yellow  Yellow, Straw, [place]enzie Final    Appearance, UA 07/17/2020 Clear  Clear Final    pH, UA 07/17/2020 5.0  5.0 - 8.0 Final    Specific Gravity, UA 07/17/2020 1.015  1.005 - 1.030  Final    Protein, UA 07/17/2020 Negative  Negative Final    Glucose, UA 07/17/2020 Negative  Negative Final    Ketones, UA 07/17/2020 Trace* Negative Final    Bilirubin (UA) 07/17/2020 Negative  Negative Final    Occult Blood UA 07/17/2020 Negative  Negative Final    Nitrite, UA 07/17/2020 Negative  Negative Final    Leukocytes, UA 07/17/2020 Negative  Negative Final    SARS-CoV-2 RNA, Amplification, Qual 07/17/2020 Negative  Negative Final    Transferrin 07/17/2020 309  200 - 375 mg/dL Final    Hemoglobin A1C 07/17/2020 5.2  4.0 - 5.6 % Final    Estimated Avg Glucose 07/17/2020 103  68 - 131 mg/dL Final    Magnesium 07/17/2020 2.0  1.6 - 2.6 mg/dL Final    Prealbumin 07/17/2020 21  20 - 43 mg/dL Final    Phosphorus 07/17/2020 3.5  2.7 - 4.5 mg/dL Final    WBC, UA 07/17/2020 0  0 - 5 /hpf Final    Bacteria 07/17/2020 Rare  None-Occ /hpf Final    Squam Epithel, UA 07/17/2020 1  /hpf Final    Microscopic Comment 07/17/2020 SEE COMMENT   Final    Sodium 07/18/2020 138  136 - 145 mmol/L Final    Potassium 07/18/2020 5.1  3.5 - 5.1 mmol/L Final    Chloride 07/18/2020 112* 95 - 110 mmol/L Final    CO2 07/18/2020 22* 23 - 29 mmol/L Final    Glucose 07/18/2020 145* 70 - 110 mg/dL Final    BUN, Bld 07/18/2020 23  8 - 23 mg/dL Final    Creatinine 07/18/2020 1.8* 0.5 - 1.4 mg/dL Final    Calcium 07/18/2020 7.7* 8.7 - 10.5 mg/dL Final    Total Protein 07/18/2020 5.7* 6.0 - 8.4 g/dL Final    Albumin 07/18/2020 3.0* 3.5 - 5.2 g/dL Final    Total Bilirubin 07/18/2020 0.8  0.1 - 1.0 mg/dL Final    Alkaline Phosphatase 07/18/2020 91  55 - 135 U/L Final    AST 07/18/2020 136* 10 - 40 U/L Final    ALT 07/18/2020 72* 10 - 44 U/L Final    Anion Gap 07/18/2020 4* 8 - 16 mmol/L Final    eGFR if  07/18/2020 42.2* >60 mL/min/1.73 m^2 Final    eGFR if non African American 07/18/2020 36.5* >60 mL/min/1.73 m^2 Final    Magnesium 07/18/2020 2.0  1.6 - 2.6 mg/dL Final    Phosphorus 07/18/2020  3.6  2.7 - 4.5 mg/dL Final    WBC 07/18/2020 10.69  3.90 - 12.70 K/uL Final    RBC 07/18/2020 3.83* 4.60 - 6.20 M/uL Final    Hemoglobin 07/18/2020 10.6* 14.0 - 18.0 g/dL Final    Hematocrit 07/18/2020 34.6* 40.0 - 54.0 % Final    Mean Corpuscular Volume 07/18/2020 90  82 - 98 fL Final    Mean Corpuscular Hemoglobin 07/18/2020 27.7  27.0 - 31.0 pg Final    Mean Corpuscular Hemoglobin Conc 07/18/2020 30.6* 32.0 - 36.0 g/dL Final    RDW 07/18/2020 14.6* 11.5 - 14.5 % Final    Platelets 07/18/2020 193  150 - 350 K/uL Final    MPV 07/18/2020 11.7  9.2 - 12.9 fL Final    Immature Granulocytes 07/18/2020 0.4  0.0 - 0.5 % Final    Gran # (ANC) 07/18/2020 8.6* 1.8 - 7.7 K/uL Final    Immature Grans (Abs) 07/18/2020 0.04  0.00 - 0.04 K/uL Final    Lymph # 07/18/2020 0.8* 1.0 - 4.8 K/uL Final    Mono # 07/18/2020 0.7  0.3 - 1.0 K/uL Final    Eos # 07/18/2020 0.5  0.0 - 0.5 K/uL Final    Baso # 07/18/2020 0.05  0.00 - 0.20 K/uL Final    nRBC 07/18/2020 0  0 /100 WBC Final    Gran% 07/18/2020 80.5* 38.0 - 73.0 % Final    Lymph% 07/18/2020 7.8* 18.0 - 48.0 % Final    Mono% 07/18/2020 6.6  4.0 - 15.0 % Final    Eosinophil% 07/18/2020 4.2  0.0 - 8.0 % Final    Basophil% 07/18/2020 0.5  0.0 - 1.9 % Final    Differential Method 07/18/2020 Automated   Final        Meds   Current Facility-Administered Medications   Medication Dose Route Frequency Provider Last Rate Last Dose    0.9%  NaCl infusion   Intravenous Continuous Yves Atkinson  mL/hr at 07/18/20 2109      acetaminophen tablet 1,000 mg  1,000 mg Oral Q8H Fady Dover MD   1,000 mg at 07/19/20 0518    apixaban tablet 5 mg  5 mg Oral BID Maninder Kenneth Moser MD   5 mg at 07/19/20 0900    atorvastatin tablet 10 mg  10 mg Oral Daily Yves Atkinson MD        bisacodyL suppository 10 mg  10 mg Rectal Daily PRN Yves Atkinson MD        buPROPion TB24 tablet 300 mg  300 mg Oral Daily Yves Atkinson MD   300 mg at  07/19/20 1048    carvediloL tablet 12.5 mg  12.5 mg Oral BID  Yves Atkinson MD   12.5 mg at 07/19/20 1048    dutasteride capsule 0.5 mg  0.5 mg Oral Daily Yves Atkinson MD   0.5 mg at 07/19/20 1048    escitalopram oxalate tablet 10 mg  10 mg Oral Daily Yves Atkinson MD   10 mg at 07/19/20 1048    furosemide tablet 20 mg  20 mg Oral Daily PRN Yves Atkinson MD        HYDROmorphone injection 0.5 mg  0.5 mg Intravenous Q6H PRN Fady Dover MD        levothyroxine tablet 50 mcg  50 mcg Oral Before breakfast Yves Atkinson MD   50 mcg at 07/19/20 0518    lidocaine (PF) 10 mg/ml (1%) injection 10 mg  1 mL Other Once Yves Atkinson MD        methocarbamoL tablet 750 mg  750 mg Oral TID Fady Dover MD   750 mg at 07/19/20 1047    mupirocin 2 % ointment 1 g  1 g Nasal BID Yves Atkinson MD   1 g at 07/19/20 1054    ondansetron disintegrating tablet 8 mg  8 mg Oral Q8H PRN Yves Atkinson MD        ondansetron injection 4 mg  4 mg Intravenous Q12H PRN Yves Atkinson MD        oxyCODONE immediate release tablet 5 mg  5 mg Oral Q4H PRN Yves Atkinson MD   5 mg at 07/19/20 1047    pantoprazole EC tablet 40 mg  40 mg Oral Daily Yves Atkinson MD   40 mg at 07/19/20 1048    polyethylene glycol packet 17 g  17 g Oral Daily Yves Atkinson MD   17 g at 07/18/20 1104    promethazine (PHENERGAN) 6.25 mg in dextrose 5 % 50 mL IVPB  6.25 mg Intravenous Q6H PRN Yves Atkinson MD        propafenone tablet 225 mg  225 mg Oral Q8H Yves Atkinson MD   225 mg at 07/18/20 2200    ropivacaine (PF) 2 mg/ml (0.2%) infusion  2 mL/hr Perineural Continuous Fady Dover MD 2 mL/hr at 07/18/20 1052 2 mL/hr at 07/18/20 1052    ropivacaine 0.2% ON-Q C-BLOC 400 ML (SELECT A FLOW)   Perineural Continuous Gordon Yuen MD        senna-docusate 8.6-50 mg per tablet 1 tablet  1 tablet Oral BID Yves Cheek  MD Demetrio   1 tablet at 07/18/20 2110    sodium chloride 0.9% flush 10 mL  10 mL Intravenous PRN Yves Atkinson MD        sodium chloride 0.9% flush 10 mL  10 mL Intravenous PRN Yves Atkinson MD        sodium chloride 0.9% flush 3 mL  3 mL Intravenous PRN Jb Busby MD        telmisartan tablet 40 mg  40 mg Oral Daily Yves Atkinson MD   40 mg at 07/19/20 1048       Assessment:     Pain control adequate. Pt reports that PNC controlled pain well.    Plan:     Patient doing well, continue present treatment.    -plan for d/c today with ON-Q ball.    -can continue multimodals of tylenol, methocarbamol, oxy 5mg.       Evaluator Gordon Yuen

## 2020-07-19 NOTE — ASSESSMENT & PLAN NOTE
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 Underwent left cephalomedullary nail on 7/18. He tolerated the procedure well. He has had no complications or issues in the post operative period. He was resting comfortably today. Has ambulated with PT using a rolling walker. He has a safe environment at home with assistance from his wife and daughter, both of whom are nurses. His co-morbidities were managed in-hospital with home meds and the plan is to continue home meds after discharge. He is interested in going home with an On-Q pain pump. Will discuss with anesthesia this am. He has been cleared by PT/OT to go home with home health PT. We will plan to discharge him today on a multimodal pain regimen consisting of gabapentin, celebrex, robaxin, and percocet. He is recommended to use the rolling walker with WBAT to the RLE until his mobility improves.      -Admitted to ortho  -Pain controlled; multimodal  -POD1 after IM nail L femur  -PT/OT: ambulated 20 ft with PT using rolling walker  -DVT PPx: continue home Elliquis  -Labs: no new labs today to review  -Pasquale: Pasquale out    Dispo: ok to discharge from ortho perspective after nerve block removed and On-Q pump placement

## 2020-07-19 NOTE — CARE UPDATE
Pt and family present, consent to converting adductor PNC to On Q.  Site CDI.  Educated regarding continued pain management, fall risk, signs of complications, continued monitoring, as well as discontinuing catheter after 48 hours.  Two numbers obtained for APS to follow up.  Understanding verbalized.    Gordon Yuen MD  [facility]

## 2020-07-19 NOTE — SUBJECTIVE & OBJECTIVE
"Principal Problem:Closed nondisplaced intertrochanteric fracture of left femur    Principal Orthopedic Problem: nondisplaced intertrochanteric fracture of left femur    Interval History: Patient seen and examined at bedside. No acute events overnight. Ambulating independently with little assistance using rolling walker. Pain very well controlled.     Review of patient's allergies indicates:   Allergen Reactions    No known drug allergies        Current Facility-Administered Medications   Medication    0.9%  NaCl infusion    acetaminophen tablet 1,000 mg    apixaban tablet 5 mg    atorvastatin tablet 10 mg    bisacodyL suppository 10 mg    buPROPion TB24 tablet 300 mg    carvediloL tablet 12.5 mg    dutasteride capsule 0.5 mg    escitalopram oxalate tablet 10 mg    furosemide tablet 20 mg    HYDROmorphone injection 0.5 mg    levothyroxine tablet 50 mcg    lidocaine (PF) 10 mg/ml (1%) injection 10 mg    methocarbamoL tablet 750 mg    mupirocin 2 % ointment 1 g    ondansetron disintegrating tablet 8 mg    ondansetron injection 4 mg    oxyCODONE immediate release tablet 5 mg    pantoprazole EC tablet 40 mg    polyethylene glycol packet 17 g    promethazine (PHENERGAN) 6.25 mg in dextrose 5 % 50 mL IVPB    propafenone tablet 225 mg    ropivacaine (PF) 2 mg/ml (0.2%) infusion    senna-docusate 8.6-50 mg per tablet 1 tablet    sodium chloride 0.9% flush 10 mL    sodium chloride 0.9% flush 10 mL    sodium chloride 0.9% flush 3 mL    telmisartan tablet 40 mg     Objective:     Vital Signs (Most Recent):  Temp: 96.2 °F (35.7 °C) (07/19/20 0546)  Pulse: (!) 57 (07/19/20 0546)  Resp: 18 (07/19/20 0546)  BP: 121/74 (07/19/20 0546)  SpO2: 97 % (07/19/20 0546) Vital Signs (24h Range):  Temp:  [96.2 °F (35.7 °C)-99 °F (37.2 °C)] 96.2 °F (35.7 °C)  Pulse:  [] 57  Resp:  [12-18] 18  SpO2:  [87 %-100 %] 97 %  BP: ()/(53-74) 121/74     Weight: 96.2 kg (212 lb)  Height: 6' 2" (188 cm)  Body mass " index is 27.22 kg/m².      Intake/Output Summary (Last 24 hours) at 7/19/2020 0743  Last data filed at 7/18/2020 1107  Gross per 24 hour   Intake 1600 ml   Output --   Net 1600 ml       Ortho/SPM Exam       Vitals: Afebrile.  Vital signs stable.  General: No acute distress. Resting comfortably.  Cardio: Regular rate.  Chest: No increased work of breathing.    LLE  Lateral hip incision: c/d/i   Min swelling over lateral hip  Minimal bony TTP throughout  Compartments soft  Painless ROM ankle   SILT Sa/Mark/DP/SP/T  Motor intact TA/SP/DP  2+ DP and PT     RLE:  Skin intact, no deformity  No TTP  Compartments soft  Full painless ROM throughout lower extremity  SILT Sa/Mark/DP/SP/T  Motor intact TA/SP/DP  2+ DP/PT     BUE:  Skin intact, no deformity noted  No open wounds/abrasions/crepitus  No bony TTP  FROM shoulder, elbow and wrist  SILT M/U/R  Motor intact AIN/PIN/M/U/R   Cap refill < 2s  2+ RP    Spine: No sacral decubitus ulcers        Significant Labs:   CBC:   Recent Labs   Lab 07/17/20  1759 07/18/20  0228   WBC 12.79* 10.69   HGB 11.0* 10.6*   HCT 34.6* 34.6*    193       Significant Imaging: No new imaging today

## 2020-07-19 NOTE — PLAN OF CARE
07/19/20 1223   Post-Acute Status   Post-Acute Authorization Home Health   Home Health Status Referrals Sent     SW sent referral to [facility]. SW spoke to [facility], walker will be delivered to the bedside.

## 2020-07-19 NOTE — PLAN OF CARE
Problem: Physical Therapy Goal  Goal: Physical Therapy Goal  Description: Goals to be met by:8/3/2020    Patient will increase functional independence with mobility by performin. Supine to sit with Supervision - not met  2. Sit to stand transfer with Supervision - not met   3. Gait  x 100 feet with Supervision using Rolling Walker - not met    Outcome: Ongoing, Progressing.  Patient progressing well towards his goals, as evidence of walking range and overall transfer ability.

## 2020-07-19 NOTE — PLAN OF CARE
[facility]    HOME HEALTH ORDERS  FACE TO FACE ENCOUNTER    Patient Name: Dominick Song MD  YOB: 1945    PCP: Maxi Gaines MD   PCP Address: [address] / [place] [address]  PCP Phone Number: [phone]  PCP Fax: [phone]    Encounter Date: 07/19/2020    Admit to Home Health    Diagnoses:  Active Hospital Problems    Diagnosis  POA    *Closed nondisplaced intertrochanteric fracture of left femur [S72.145A]  Yes    Preop cardiovascular exam [Z01.810]  Not Applicable    CKD (chronic kidney disease) stage 3, GFR 30-59 ml/min [N18.3]  Yes    Hypothyroidism due to acquired atrophy of thyroid [E03.4]  Yes    Essential hypertension [I10]  Yes    Atrial fibrillation [I48.91]  Yes      Resolved Hospital Problems   No resolved problems to display.       Future Appointments   Date Time Provider Department Center   7/20/2020  2:00 PM Juliette L. Sandifer Kum-Nji, MD [facility] ENDOCRN [place]   7/22/2020  1:15 PM [facility] CT1 LIMIT 500 LBS [facility] SCAN [place]   7/31/2020 11:15 AM [facility] MRI1 [facility] MRI [place]   8/10/2020  8:00 AM LAB, [facility] LAB [place]   8/17/2020 10:00 AM Maxi Gaines MD [facility] FAM MED [place]   8/31/2020  1:20 PM Wyatt Beatty MD [facility] ARRHYTH [place]   9/10/2020  9:00 AM Jeevan Pond MD [facility] NEPHRO [place]           I have seen and examined this patient face to face today. My clinical findings that support the need for the home health skilled services and home bound status are the following:  Weakness/numbness causing balance and gait disturbance due to Surgery making it taxing to leave home.    Allergies:  Review of patient's allergies indicates:   Allergen Reactions    No known drug allergies        Diet: regular diet    Activities: activity as tolerated    Nursing:   SN to complete comprehensive assessment including routine vital signs. Instruct on disease process and s/s of complications to report to MD. Follow specific  home health arthoplasty protocol. Review/verify medication list sent home with the patient at time of discharge  and instruct patient/caregiver as needed. If coumadin ordered, coumadin clinic to manage INR with INR draws 2x per week with a goal to maintain INR between 1.8 and 2.2. Frequency may be adjusted depending on start of care date.    Notify MD if SBP > 160 or < 90; DBP > 90 or < 50; HR > 120 or < 50; Temp > 101    Home Medical Equipment:  Walker, 3-1 bedside commode, transfer tub bench    CONSULTS:    Physical Therapy to evaluate and treat. Evaluate for home safety and equipment needs; Establish/upgrade home exercise program. Perform / instruct on therapeutic exercises, gait training, transfer training, and Range of Motion.    OTHER:  Occupational Therapy to evaluate and treat. Evaluate home environment for safety and equipment needs. Perform/Instruct on transfers, ADL training, ROM, and therapeutic exercises.   to evaluate for community resources/long-range planning.  Aide to provide assistance with personal care, ADLs, and vital signs.        WOUND CARE ORDERS  Keep dressing in place until 2 week postop clinic visit. Keep clean, dry, and intact      Medications: Review discharge medications with patient and family and provide education.      Current Discharge Medication List      CONTINUE these medications which have NOT CHANGED    Details   carvediloL (COREG) 12.5 MG tablet Take 1 tablet (12.5 mg total) by mouth 2 (two) times daily with meals.  Qty: 180 tablet, Refills: 3      acyclovir (ZOVIRAX) 800 MG Tab TK ONE T PO FIVE TIMES D only for fever blisters  Refills: 1      alendronate (FOSAMAX) 70 MG tablet Take 1 tablet (70 mg total) by mouth every 7 days. Take with a full glass of water & remain upright for at least 30 minutes  Qty: 12 tablet, Refills: 3    Associated Diagnoses: Osteoporosis without pathological fracture      amoxicillin (AMOXIL) 500 MG Tab Take 500 mg by mouth every 12  (twelve) hours.      aspirin 81 MG Chew 81 mg.      atorvastatin (LIPITOR) 10 MG tablet Take 1 tablet (10 mg total) by mouth once daily.  Qty: 90 tablet, Refills: 3      betamethasone acetate-betamethasone sodium phosphate (CELESTONE) 6 mg/mL injection as needed.       buPROPion (WELLBUTRIN XL) 300 MG 24 hr tablet Take 1 tablet (300 mg total) by mouth once daily.  Qty: 90 tablet, Refills: 3      calcium carbonate (OS-KISHAN) 500 mg calcium (1,250 mg) tablet       CALCIUM ORAL Take by mouth Daily.      calcium-vitamin D 600 mg(1,500mg) -400 unit Tab 600 mg.      celecoxib (CELEBREX) 200 MG capsule TAKE 1 CAPSULE(200 MG) BY MOUTH TWICE DAILY  Qty: 180 capsule, Refills: 0    Associated Diagnoses: Post-operative state      cholestyramine (QUESTRAN) 4 gram packet Take 1 packet (4 g total) by mouth once daily.  Qty: 30 packet, Refills: 3    Associated Diagnoses: Dumping syndrome      cyanocobalamin/folic acid (VITAMIN B12-FOLIC ACID) 500-400 mcg Tab Take by mouth.      cyclobenzaprine (FLEXERIL) 10 MG tablet TAKE 1 TABLET BY MOUTH EVERY 8 HOURS AS NEEDED FOR MUSCLE SPASM  Qty: 40 tablet, Refills: 5    Associated Diagnoses: Muscle spasms of both lower extremities      dutasteride (AVODART) 0.5 mg capsule TAKE 1 CAPSULE(0.5 MG) BY MOUTH EVERY DAY  Qty: 90 capsule, Refills: 3    Associated Diagnoses: Benign prostatic hyperplasia      efinaconazole (JUBLIA) 10 % Prakash Apply topically.      ELIQUIS 5 mg Tab Take 1 tablet (5 mg total) by mouth 2 (two) times daily.  Qty: 60 tablet, Refills: 11      escitalopram oxalate (LEXAPRO) 10 MG tablet TAKE 1 TABLET(10 MG) BY MOUTH EVERY DAY  Qty: 90 tablet, Refills: 3    Comments: Please inactivate all prior scripts with same name and strength including on holds.      ferrous sulfate (FEOSOL) 325 mg (65 mg iron) Tab tablet Take by mouth.      furosemide (LASIX) 20 MG tablet Take 1 tablet (20 mg total) by mouth daily as needed (shortness of breath, leg swelling).  Qty: 5 tablet, Refills: 0       KENALOG 40 mg/mL injection as needed.       levothyroxine (SYNTHROID) 50 MCG tablet Take 1 tablet (50 mcg total) by mouth before breakfast.  Qty: 30 tablet, Refills: 11    Associated Diagnoses: Hypothyroidism, unspecified type      MULTIVITAMIN ORAL Take by mouth Daily.      omega 3-dha-epa-fish oil (OMEGA-3) 350 mg-235 mg- 90 mg-597 mg CpDR       OMEGA-3 FATTY ACIDS (FISH OIL CONCENTRATE ORAL) Take by mouth Daily.      oxyCODONE-acetaminophen (PERCOCET)  mg per tablet Take 1 tablet by mouth every 4 (four) hours as needed for Pain.    Comments: Quantity prescribed more than 7 day supply? Press F2 and select one:[id number]        pantoprazole (PROTONIX) 40 MG tablet Take 1 tablet (40 mg total) by mouth once daily.  Qty: 30 tablet, Refills: 0      propafenone (RYTHMOL SR) 425 MG Cp12 Take 1 capsule (425 mg total) by mouth every 12 (twelve) hours.  Qty: 180 capsule, Refills: 3    Associated Diagnoses: Atrial fibrillation, unspecified type      telmisartan (MICARDIS) 40 MG Tab Take 1 tablet (40 mg total) by mouth once daily.  Qty: 90 tablet, Refills: 3    Associated Diagnoses: Hypertension, unspecified type             I certify that this patient is confined to his home and needs intermittent skilled nursing care, physical therapy and occupational therapy.

## 2020-07-19 NOTE — ADDENDUM NOTE
Addendum  created 07/19/20 1416 by Fady Dover MD    Charge Capture section accepted, Cosign clinical note with attestation

## 2020-07-19 NOTE — PT/OT/SLP PROGRESS
Physical Therapy Treatment    Patient Name:  Dominick Song MD   MRN:  [id number]    Recommendations:     Discharge Recommendations:  home with home health   Discharge Equipment Recommendations: walker, rolling   Barriers to discharge: None    Assessment:     Dominick Song MD is a 74 y.o. male admitted with a medical diagnosis of Closed nondisplaced intertrochanteric fracture of left femur.  He presents with the following impairments/functional limitations:  weakness, orthopedic precautions, impaired skin, decreased ROM . Patient showed excellent initiative and participation. Patient ambulates with min antalgic gait pattern, but no loss of balance or shortness of breath noted. Patient initaited gait training ambulating with 3 point gait pattern, but progressed to swing Through gait pattern.    Rehab Prognosis: Good; patient would benefit from acute skilled PT services to address these deficits and reach maximum level of function.    Recent Surgery: Procedure(s) (LRB):  INSERTION, INTRAMEDULLARY ERIC, FEMUR, left, Synthes, [place] table, Large C arm clock side, (Left) 1 Day Post-Op    Plan:     During this hospitalization, patient to be seen 4 x/week to address the identified rehab impairments via gait training, therapeutic activities, therapeutic exercises, neuromuscular re-education and progress toward the following goals:    · Plan of Care Expires:  08/18/20    Subjective     Chief Complaint: none stated  Patient/Family Comments/goals: to go home today.  Pain/Comfort:  · Pain Rating 1: 0/10  · Location - Side 1: Left  · Location - Orientation 1: generalized  · Location 1: hip  · Pain Addressed 1: Pre-medicate for activity  · Pain Rating Post-Intervention 1: 0/10      Objective:     Communicated with NSG prior to session.  Patient found up in chair with perineural catheter upon PT entry to room.     General Precautions: Standard, fall   Orthopedic Precautions:LLE weight bearing as tolerated   Braces: N/A      Functional Mobility:  · Transfers:     · Sit to Stand:  stand by assistance with rolling walker  · Gait: ~260 ft with RW and CGA to SBA, with chair follow and cues to correct posture.  · Stairs:  Pt ascended/descended 9 stair(s) with No Assistive Device with bilateral handrails with Contact Guard Assistance and Minimal Assistance.       AM-PAC 6 CLICK MOBILITY  Turning over in bed (including adjusting bedclothes, sheets and blankets)?: 4  Sitting down on and standing up from a chair with arms (e.g., wheelchair, bedside commode, etc.): 4  Moving from lying on back to sitting on the side of the bed?: 4  Moving to and from a bed to a chair (including a wheelchair)?: 3  Need to walk in hospital room?: 3  Climbing 3-5 steps with a railing?: 3  Basic Mobility Total Score: 21       Therapeutic Activities and Exercises:   Co-treatment performed with O.T. General education on posture, safety and HEP provided.    Patient left up in chair with all lines intact and call button in reach..    GOALS:   Multidisciplinary Problems     Physical Therapy Goals        Problem: Physical Therapy Goal    Goal Priority Disciplines Outcome Goal Variances Interventions   Physical Therapy Goal     PT, PT/OT Ongoing, Progressing     Description: Goals to be met by:8/3/2020    Patient will increase functional independence with mobility by performin. Supine to sit with Supervision - not met  2. Sit to stand transfer with Supervision - not met   3. Gait  x 100 feet with Supervision using Rolling Walker - not met                     Time Tracking:     PT Received On: 20  PT Start Time: 1004     PT Stop Time: 1032  PT Total Time (min): 28 min     Billable Minutes: Gait Training 18 and Neuromuscular Re-education 10    Treatment Type: Treatment  PT/PTA: PTA     PTA Visit Number: Harmeet Orozco, PTA  2020

## 2020-07-19 NOTE — PLAN OF CARE
07/19/20 1422   Post-Acute Status   Post-Acute Authorization Home Health   Home Health Status Set-up Complete     Patient has been accepted by [facility].

## 2020-07-19 NOTE — PLAN OF CARE
Problem: Occupational Therapy Goal  Goal: Occupational Therapy Goal  Description: Goals to be met by: 5 days 7/25/2020     Patient will increase functional independence with ADLs by performing:    Pt to complete standing g/h skills with supervision,.  Pt to complete LE dressing with ABILIO    Pt to complete toileting with supervision.  Pt to complete t/f to commode with SBA    Outcome: Ongoing, Progressing     Continue with POC.  Erin Comer OT  7/19/2020

## 2020-07-20 PROCEDURE — G0180 PR HOME HEALTH MD CERTIFICATION: ICD-10-PCS | Mod: ,,, | Performed by: ORTHOPAEDIC SURGERY

## 2020-07-20 PROCEDURE — G0180 MD CERTIFICATION HHA PATIENT: HCPCS | Mod: ,,, | Performed by: ORTHOPAEDIC SURGERY

## 2020-07-20 NOTE — ANESTHESIA POST-OP PAIN MANAGEMENT
OnQ Follow-up Note    7/20/2020     Called and spoke to patient about OnQ PNC. Pain well controlled. Denies tinnitus, metallic taste in mouth, weakness in extremity.  Denies erythema, warmth, tenderness, bleeding at site of catheter entry.  Dressing c/d/i. All questions answered. Encouraged patient to call the provided number if any issues arise. Will call again tomorrow.     Melva Peoples  07/20/2020  11:17 AM

## 2020-07-21 NOTE — ADDENDUM NOTE
Addendum  created 07/21/20 1443 by Izabel Matamoros RN    Clinical Note Signed, Intraprocedure Event edited

## 2020-07-21 NOTE — PROGRESS NOTES
07/21/2020    Left message on patient's voicemail to call if any concerns regarding discontinuing OnQ today.

## 2020-07-22 ENCOUNTER — HOSPITAL ENCOUNTER (OUTPATIENT)
Dept: RADIOLOGY | Facility: HOSPITAL | Age: 75
Discharge: HOME | End: 2020-07-22
Attending: PSYCHIATRY & NEUROLOGY
Payer: MEDICARE

## 2020-07-22 DIAGNOSIS — Z86.73 HISTORY OF TIA (TRANSIENT ISCHEMIC ATTACK): ICD-10-CM

## 2020-07-22 PROCEDURE — 70498 CT ANGIOGRAPHY NECK: CPT | Mod: TC,HCNC,PO

## 2020-07-22 PROCEDURE — 70496 CT ANGIOGRAPHY HEAD: CPT | Mod: 26,HCNC,, | Performed by: RADIOLOGY

## 2020-07-22 PROCEDURE — 25500020 PHARM REV CODE 255: Mod: HCNC,PO | Performed by: PSYCHIATRY & NEUROLOGY

## 2020-07-22 PROCEDURE — 70496 CT ANGIOGRAPHY HEAD: CPT | Mod: TC,HCNC,PO

## 2020-07-22 PROCEDURE — 70498 CT ANGIOGRAPHY NECK: CPT | Mod: 26,HCNC,, | Performed by: RADIOLOGY

## 2020-07-22 PROCEDURE — 70498 CTA HEAD AND NECK (XPD): ICD-10-PCS | Mod: 26,HCNC,, | Performed by: RADIOLOGY

## 2020-07-22 PROCEDURE — 70496 CTA HEAD AND NECK (XPD): ICD-10-PCS | Mod: 26,HCNC,, | Performed by: RADIOLOGY

## 2020-07-22 RX ADMIN — IOHEXOL 50 ML: 350 INJECTION, SOLUTION INTRAVENOUS at 01:07

## 2020-07-30 DIAGNOSIS — S72.009D CLOSED FRACTURE OF HIP WITH ROUTINE HEALING, UNSPECIFIED LATERALITY, SUBSEQUENT ENCOUNTER: Primary | ICD-10-CM

## 2020-07-30 DIAGNOSIS — M62.838 MUSCLE SPASMS OF BOTH LOWER EXTREMITIES: ICD-10-CM

## 2020-07-30 RX ORDER — CYCLOBENZAPRINE HCL 10 MG
10 TABLET ORAL EVERY 8 HOURS PRN
Qty: 30 TABLET | Refills: 0 | Status: SHIPPED | OUTPATIENT
Start: 2020-07-30 | End: 2020-12-06 | Stop reason: SDUPTHER

## 2020-07-30 RX ORDER — OXYCODONE AND ACETAMINOPHEN 10; 325 MG/1; MG/1
1 TABLET ORAL EVERY 4 HOURS PRN
Qty: 40 TABLET | Refills: 0 | Status: SHIPPED | OUTPATIENT
Start: 2020-07-30 | End: 2020-08-09 | Stop reason: SDUPTHER

## 2020-07-31 ENCOUNTER — EXTERNAL HOME HEALTH (OUTPATIENT)
Dept: HOME HEALTH SERVICES | Facility: HOSPITAL | Age: 75
End: 2020-07-31
Payer: MEDICARE

## 2020-07-31 ENCOUNTER — TELEPHONE (OUTPATIENT)
Dept: NEUROLOGY | Facility: CLINIC | Age: 75
End: 2020-07-31

## 2020-07-31 DIAGNOSIS — Z86.73 HISTORY OF TIA (TRANSIENT ISCHEMIC ATTACK): Primary | ICD-10-CM

## 2020-07-31 NOTE — TELEPHONE ENCOUNTER
Called and spoke to  regarding a CTA Scan. I stated that  would like for him to repeat a scan.    CT CTA August 7 at 11:30AM [facility]

## 2020-08-06 NOTE — PROGRESS NOTES
[facility]    [facility]    Name:  Elli Edwards   Age:  71 y.o.  Sex:  female  :  1948  MRN:  [id number]   Visit Number:  [id number]  Admission Date:  2020  Date Of Service:  20  Primary Care Physician:  Elli Trinh MD     LOS: 1 day :  Patient Care Team:  Elli Trinh MD as PCP - General:    Chief Complaint:      Follow-up right total hip arthroplasty    Subjective / Interval History:     Patient currently lying in the bed this morning.  She states she has had some pain in her right hip, has been taking Tylenol mostly.  Has felt some nausea today.  No vomiting.  Did tolerate some breakfast this morning.  Passing plenty of gas.  Urinating well.  No shortness of breath, abdominal pain, or other concerns.    Prior work-up:  The patient is a 71-year-old female who underwent elective right total hip replacement this morning with Dr. Peoples.  We were asked to see patient in consultation for perioperative medical management.  She does have a medical history of osteoarthritis, hypertension, GERD, hyperlipidemia, vitamin D deficiency.  She denies any recent medication changes.  Her daughter is a nurse and is at bedside.  She states she was on a statin, but this caused myalgias and was discontinued.  She otherwise takes the lisinopril in addition to vitamin D and Os-Jessee.  She is on Zoloft as well.  She admits to some mild discomfort in her right hip currently, but otherwise denies any acute complaints.  Denies any shortness of breath.  She does state she has a deviated septum, does have low oxygen at times at night when she is sleeping when she has been in the hospital before.  Denies any diagnosis of sleep apnea.    Review of Systems:     General ROS: Patient denies any fevers, chills or loss of consciousness.  Respiratory ROS: Denies cough or shortness of breath.  Cardiovascular ROS: Denies chest pain or palpitations. 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 Using Avodart.  Viagra did not help. Does not like PEP.   PSA 4.8 corrected, stable over time. Enjoying [facility], staying busy around the house. Just confirmed at [facility], going to [facility] in January.  Stable voiding pattern. Seeing Salty for CKD.  PSA corrected 5.8.      Lab Results   Component Value Date    PSA 2.62 03/21/2013    PSA 4.41 (H) 02/06/2012    PSA 4.82 (H) 10/31/2011    PSA 4.0 08/31/2011    PSA 4.5 (H) 04/20/2011    PSA 5.8 (H) 06/08/2010    PSA 3.9 12/15/2009    PSA 4.6 (H) 06/08/2009    PSA 3.8 05/28/2008    PSA 4.7 (H) 09/04/2007    PSADIAG 3.6 10/30/2019    PSADIAG 2.9 10/08/2018    PSADIAG 2.4 10/11/2017    PSADIAG 1.8 10/20/2016    PSADIAG 2.9 10/12/2015    PSATOTAL 4.0 03/26/2018    PSATOTAL 7.5 (H) 09/01/2004    PSAFREE 0.76 03/26/2018    PSAFREE 1.20 09/01/2004    PSAFREEPCT 19.00 03/26/2018    PSAFREEPCT 16.00 09/01/2004      Past Medical History:   Diagnosis Date    Anticoagulant long-term use     Anxiety     Arthritis     Atrial fibrillation     BPH (benign prostatic hyperplasia)     Cataract     CKD (chronic kidney disease) stage 3, GFR 30-59 ml/min 7/10/2017    Followed by Dr. Jeevan Pond    Colon polyp     benign    Depression     Elevated PSA     Erectile dysfunction     Gastric ulcer with hemorrhage     Hep B w/o coma 1977    History of bleeding peptic ulcer     History of prostatitis     Hypertension     PAF (paroxysmal atrial fibrillation)     Sleep apnea     on CPAP     Family History   Problem Relation Age of Onset    COPD Father     Diabetes Father     Osteoporosis Father     Aortic stenosis Mother     Heart disease Mother         aortic stenosis    Osteoporosis Mother     Heart attack Brother     No Known Problems Son     No Known Problems Daughter     No Known Problems Daughter     No Known Problems Daughter       Social History     Socioeconomic History    Marital status:      Spouse name: Not on file    Number of children: 5    Years of education: Not on file    Highest education level: Not on file   Occupational History    Occupation: retired anesthesiology     Employer: [employer] ([employer])    Occupation: [employer] grad     Comment: previous football player   Social Needs    Financial resource strain: Not on file    Food insecurity:     Worry: Not on file     Inability: Not on file    Transportation needs:     Medical: Not on file     Non-medical: Not on file   Tobacco Use    Smoking status: Never Smoker    Smokeless tobacco: Never Used    Tobacco 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    Substance and Sexual Activity    Alcohol use: No    Drug use: No    Sexual activity: Yes     Partners: Female   Lifestyle    Physical activity:     Days per week: Not on file     Minutes per session: Not on file    Stress: Not on file   Relationships    Social connections:     Talks on phone: Not on file     Gets together: Not on file     Attends Oriental orthodox service: Not on file     Active member of club or organization: Not on file     Attends meetings of clubs or organizations: Not on file     Relationship status: Not on file   Other Topics Concern    Patient feels they ought to cut down on drinking/drug use Not Asked    Patient annoyed by others criticizing their drinking/drug use Not Asked    Patient has felt bad or guilty about drinking/drug use Not Asked    Patient has had a drink/used drugs as an eye opener in the AM Not Asked   Social History Narrative    Not on file     Past Surgical History:   Procedure Laterality Date    ABDOMINAL HERNIA REPAIR      CATARACT EXTRACTION  11/25/2013    bilateral    CHOLECYSTECTOMY      COLONOSCOPY N/A 11/25/2015    Procedure: COLONOSCOPY;  Surgeon: Toby Hernandez MD;  Location: [facility];  Service: Endoscopy;  Laterality: N/A;    EYE SURGERY    pain.  Gastrointestinal ROS: Nausea  Neurological ROS: Denies any focal weakness. No loss of consciousness. Denies any numbness.  Dermatological ROS: Denies any redness or pruritis.    Vital Signs:    Temp:  [97.4 °F (36.3 °C)-99.8 °F (37.7 °C)] 97.4 °F (36.3 °C)  Heart Rate:  [84-95] 84  Resp:  [16-19] 16  BP: ()/(39-56) 121/54    Intake and output:    I/O last 3 completed shifts:  In: 3770 [P.O.:1080; I.V.:2690]  Out: 1700 [Urine:1700]  I/O this shift:  In: 240 [P.O.:240]  Out: 300 [Urine:300]    Physical Examination:    General Appearance:  Alert and cooperative, not in any acute distress.   Head:  Atraumatic and normocephalic, without obvious abnormality.   Eyes:          PERRLA, conjunctivae and sclerae normal, no Icterus. No pallor. Extraocular movements are within normal limits.   Neck: Supple, trachea midline, no thyromegaly.   Lungs:   Breath sounds heard bilaterally equally.  No crackles or wheezing. No Pleural rub or bronchial breathing.   Heart:  Normal S1 and S2, no murmur, no gallop, no rub. No JVD   Abdomen:   Normal bowel sounds, no masses, no organomegaly. Soft, non-tender, non-distended, no guarding, no rebound tenderness.   Extremities: Moves all extremities well, no edema, no cyanosis, no clubbing.  Right hip wound appears intact.  No palpable abscess or edema.   Skin: No bleeding, bruising or rash.   Neurologic: Awake, alert and oriented times 3. Moves all 4 extremities equally.     Laboratory results:    Results from last 7 days   Lab Units 08/06/20  1015 08/05/20  0547   SODIUM mmol/L 138 138   POTASSIUM mmol/L 3.3* 3.5   CHLORIDE mmol/L 97* 96*   CO2 mmol/L 33.6* 31.4*   BUN mg/dL 16 13   CREATININE mg/dL 0.85 0.65   CALCIUM mg/dL 9.2 8.9   GLUCOSE mg/dL 117* 140*     Results from last 7 days   Lab Units 08/06/20  1015 08/05/20  0547   WBC 10*3/mm3 14.08* 14.77*   HEMOGLOBIN g/dL 10.6* 12.0   HEMATOCRIT % 32.3* 36.5   PLATELETS 10*3/mm3 196 228                       I have reviewed the     GASTRIC BYPASS  1992    KNEE ARTHROSCOPY      RT    LASIK  2001    both eyes (Dr. Rabago)    ORIF HUMERUS FRACTURE      LT    ORIF HUMERUS FRACTURE Right     non surgical repair    RADIOFREQUENCY ABLATION      x2    Right ankle tendon repair      ROTATOR CUFF REPAIR      right    TOTAL KNEE ARTHROPLASTY Right 5/29/2018    Procedure: REPLACEMENT-KNEE-TOTAL-axel;  Surgeon: Denzel Dallas MD;  [facility] [address] [place] [address];  Service: Orthopedics;  Laterality: Right;  [place]     Patient Active Problem List   Diagnosis    BPH with urinary obstruction    Elevated PSA    Heart abnormality    Nuclear sclerosis    Atrial fibrillation    Anemia due to chronic blood loss    Posterior vitreous detachment    Metatarsalgia    Keratoma    Foot pain, right    Onychomycosis due to dermatophyte    ED (erectile dysfunction)    Bradycardia    Mild single current episode of major depressive disorder    Tortuous aorta    Dyspnea    Hypothyroidism due to acquired atrophy of thyroid    Essential hypertension    Gait abnormality    CKD (chronic kidney disease) stage 3, GFR 30-59 ml/min    Complex sleep apnea syndrome    Erectile dysfunction due to arterial insufficiency    Primary osteoarthritis of right knee    History of bleeding peptic ulcer     Review of Systems   Constitutional: Negative for appetite change, chills, fatigue, fever and unexpected weight change.   HENT: Negative for nosebleeds.    Respiratory: Negative for shortness of breath and wheezing.    Cardiovascular: Negative for chest pain, palpitations and leg swelling.   Gastrointestinal: Negative for abdominal distention, abdominal pain, constipation, diarrhea, nausea and vomiting.   Genitourinary: Positive for nocturia. Negative for dysuria and hematuria.   Musculoskeletal: Negative for arthralgias and back pain.   Skin: Negative for pallor.   Neurological: Negative for dizziness, seizures and syncope.   Hematological: Negative for  patient's laboratory results.    Radiology results:    Imaging Results (Last 24 Hours)     Procedure Component Value Units Date/Time    XR Abdomen 1 View [[id number]] Resulted:  08/06/20 1100     Updated:  08/06/20 1120          I have reviewed the patient's radiology reports.    Medication Review:     I have reviewed the patients active and prn medications.       Arthralgia of right hip    Primary osteoarthritis of right hip    Status post total hip replacement, right      Assessment:    1.  Primary right hip osteoarthritis, status post total hip replacement  2.  Essential hypertension  3.  Dyslipidemia  4.  GERD  5.  History of colon polyps  6.  Obesity  7.  Deviated septum    Plan:    Patient overall stable status post right total hip arthroplasty.  Did have a little nausea this morning, abdominal x-ray looks okay without any evidence of obstruction or ileus.  She is having normal flatus.  Blood pressure is within normal limits.  Labs reviewed and overall stable.    I do feel patient is stable for discharge and discussed this with orthopedics.  Would recommend patient be on antiemetics, bowel regimen, and to hold her chlorthalidone upon discharge.  Continue the lisinopril alone.-Follow-up with her PCP.    Please contact with any questions or concerns.    Kat Isaac DO  08/06/20  11:50    Dictated utilizing Dragon dictation.   "adenopathy.   Psychiatric/Behavioral: Negative for dysphoric mood.         Objective:      There were no vitals taken for this visit.  Estimated body mass index is 31 kg/m² as calculated from the following:    Height as of 11/13/19: 5' 10" (1.778 m).    Weight as of 11/13/19: 98 kg (216 lb 0.8 oz).  Physical Exam   Constitutional: He is oriented to person, place, and time. He appears well-developed and well-nourished. No distress.   HENT:   Head: Atraumatic.   Neck: No tracheal deviation present.   Cardiovascular: Normal rate.    Pulmonary/Chest: Effort normal. No respiratory distress. He has no wheezes.   Abdominal: Soft. Bowel sounds are normal. He exhibits no distension and no mass. There is no tenderness. There is no rebound and no guarding.   Genitourinary: Rectum normal. Rectal exam shows no external hemorrhoid, no internal hemorrhoid, no mass and no tenderness. Prostate is enlarged.   Neurological: He is alert and oriented to person, place, and time.   Skin: Skin is warm and dry. He is not diaphoretic.     Psychiatric: He has a normal mood and affect. His behavior is normal. Judgment and thought content normal.         Assessment and Plan:           Problem List Items Addressed This Visit     BPH with urinary obstruction - Primary          Follow up:     1 year with PSA.  Presley Shelton"

## 2020-08-07 ENCOUNTER — HOSPITAL ENCOUNTER (OUTPATIENT)
Dept: RADIOLOGY | Facility: HOSPITAL | Age: 75
Discharge: HOME | End: 2020-08-07
Attending: PSYCHIATRY & NEUROLOGY
Payer: MEDICARE

## 2020-08-07 DIAGNOSIS — Z86.73 HISTORY OF TIA (TRANSIENT ISCHEMIC ATTACK): ICD-10-CM

## 2020-08-07 LAB
CREAT SERPL-MCNC: 1.3 MG/DL (ref 0.5–1.4)
SAMPLE: NORMAL

## 2020-08-07 PROCEDURE — 25500020 PHARM REV CODE 255: Mod: HCNC | Performed by: PSYCHIATRY & NEUROLOGY

## 2020-08-07 PROCEDURE — 70498 CT ANGIOGRAPHY NECK: CPT | Mod: 26,HCNC,, | Performed by: RADIOLOGY

## 2020-08-07 PROCEDURE — 70498 CTA NECK: ICD-10-PCS | Mod: 26,HCNC,, | Performed by: RADIOLOGY

## 2020-08-07 PROCEDURE — 70498 CT ANGIOGRAPHY NECK: CPT | Mod: TC,HCNC

## 2020-08-07 RX ADMIN — IOHEXOL 100 ML: 350 INJECTION, SOLUTION INTRAVENOUS at 11:08

## 2020-08-10 ENCOUNTER — DOCUMENT SCAN (OUTPATIENT)
Dept: HOME HEALTH SERVICES | Facility: HOSPITAL | Age: 75
End: 2020-08-10
Payer: MEDICARE

## 2020-08-11 ENCOUNTER — OFFICE VISIT (OUTPATIENT)
Dept: ORTHOPEDICS | Facility: CLINIC | Age: 75
End: 2020-08-11
Payer: MEDICARE

## 2020-08-11 DIAGNOSIS — S72.009D CLOSED FRACTURE OF HIP WITH ROUTINE HEALING, UNSPECIFIED LATERALITY, SUBSEQUENT ENCOUNTER: Primary | ICD-10-CM

## 2020-08-11 DIAGNOSIS — S72.145D CLOSED NONDISPLACED INTERTROCHANTERIC FRACTURE OF LEFT FEMUR WITH ROUTINE HEALING, SUBSEQUENT ENCOUNTER: Primary | ICD-10-CM

## 2020-08-11 PROCEDURE — 99999 PR PBB SHADOW E&M-EST. PATIENT-LVL II: ICD-10-PCS | Mod: PBBFAC,HCNC,, | Performed by: ORTHOPAEDIC SURGERY

## 2020-08-11 PROCEDURE — 99024 PR POST-OP FOLLOW-UP VISIT: ICD-10-PCS | Mod: HCNC,S$GLB,, | Performed by: ORTHOPAEDIC SURGERY

## 2020-08-11 PROCEDURE — 99999 PR PBB SHADOW E&M-EST. PATIENT-LVL II: CPT | Mod: PBBFAC,HCNC,, | Performed by: ORTHOPAEDIC SURGERY

## 2020-08-11 PROCEDURE — 99024 POSTOP FOLLOW-UP VISIT: CPT | Mod: HCNC,S$GLB,, | Performed by: ORTHOPAEDIC SURGERY

## 2020-08-12 ENCOUNTER — HOSPITAL ENCOUNTER (OUTPATIENT)
Dept: RADIOLOGY | Facility: HOSPITAL | Age: 75
Discharge: HOME | End: 2020-08-12
Attending: PSYCHIATRY & NEUROLOGY
Payer: MEDICARE

## 2020-08-12 DIAGNOSIS — Z86.73 HISTORY OF TIA (TRANSIENT ISCHEMIC ATTACK): ICD-10-CM

## 2020-08-12 PROCEDURE — 70551 MRI BRAIN STEM W/O DYE: CPT | Mod: 26,HCNC,, | Performed by: RADIOLOGY

## 2020-08-12 PROCEDURE — 70551 MRI BRAIN STEM W/O DYE: CPT | Mod: TC,HCNC,PO

## 2020-08-12 PROCEDURE — 70551 MRI BRAIN WITHOUT CONTRAST: ICD-10-PCS | Mod: 26,HCNC,, | Performed by: RADIOLOGY

## 2020-08-17 ENCOUNTER — OFFICE VISIT (OUTPATIENT)
Dept: FAMILY MEDICINE | Facility: CLINIC | Age: 75
End: 2020-08-17
Payer: MEDICARE

## 2020-08-17 ENCOUNTER — IMMUNIZATION (OUTPATIENT)
Dept: PHARMACY | Facility: CLINIC | Age: 75
End: 2020-08-17
Payer: MEDICARE

## 2020-08-17 VITALS
WEIGHT: 210.56 LBS | HEART RATE: 58 BPM | HEIGHT: 74 IN | TEMPERATURE: 98 F | OXYGEN SATURATION: 98 % | SYSTOLIC BLOOD PRESSURE: 116 MMHG | DIASTOLIC BLOOD PRESSURE: 76 MMHG | BODY MASS INDEX: 27.02 KG/M2

## 2020-08-17 DIAGNOSIS — E03.4 HYPOTHYROIDISM DUE TO ACQUIRED ATROPHY OF THYROID: ICD-10-CM

## 2020-08-17 DIAGNOSIS — G45.9 TIA (TRANSIENT ISCHEMIC ATTACK): ICD-10-CM

## 2020-08-17 DIAGNOSIS — I48.0 PAROXYSMAL ATRIAL FIBRILLATION: Primary | ICD-10-CM

## 2020-08-17 DIAGNOSIS — S72.145D CLOSED NONDISPLACED INTERTROCHANTERIC FRACTURE OF LEFT FEMUR WITH ROUTINE HEALING, SUBSEQUENT ENCOUNTER: ICD-10-CM

## 2020-08-17 DIAGNOSIS — I10 ESSENTIAL HYPERTENSION: ICD-10-CM

## 2020-08-17 PROCEDURE — 1101F PR PT FALLS ASSESS DOC 0-1 FALLS W/OUT INJ PAST YR: ICD-10-PCS | Mod: HCNC,CPTII,S$GLB, | Performed by: FAMILY MEDICINE

## 2020-08-17 PROCEDURE — 3078F PR MOST RECENT DIASTOLIC BLOOD PRESSURE < 80 MM HG: ICD-10-PCS | Mod: HCNC,CPTII,S$GLB, | Performed by: FAMILY MEDICINE

## 2020-08-17 PROCEDURE — 1125F PR PAIN SEVERITY QUANTIFIED, PAIN PRESENT: ICD-10-PCS | Mod: HCNC,S$GLB,, | Performed by: FAMILY MEDICINE

## 2020-08-17 PROCEDURE — 3074F SYST BP LT 130 MM HG: CPT | Mod: HCNC,CPTII,S$GLB, | Performed by: FAMILY MEDICINE

## 2020-08-17 PROCEDURE — 99499 RISK ADDL DX/OHS AUDIT: ICD-10-PCS | Mod: HCNC,S$GLB,, | Performed by: FAMILY MEDICINE

## 2020-08-17 PROCEDURE — 3078F DIAST BP <80 MM HG: CPT | Mod: HCNC,CPTII,S$GLB, | Performed by: FAMILY MEDICINE

## 2020-08-17 PROCEDURE — 3008F PR BODY MASS INDEX (BMI) DOCUMENTED: ICD-10-PCS | Mod: HCNC,CPTII,S$GLB, | Performed by: FAMILY MEDICINE

## 2020-08-17 PROCEDURE — 99499 UNLISTED E&M SERVICE: CPT | Mod: HCNC,S$GLB,, | Performed by: FAMILY MEDICINE

## 2020-08-17 PROCEDURE — 1159F PR MEDICATION LIST DOCUMENTED IN MEDICAL RECORD: ICD-10-PCS | Mod: HCNC,S$GLB,, | Performed by: FAMILY MEDICINE

## 2020-08-17 PROCEDURE — 99999 PR PBB SHADOW E&M-EST. PATIENT-LVL V: ICD-10-PCS | Mod: PBBFAC,HCNC,, | Performed by: FAMILY MEDICINE

## 2020-08-17 PROCEDURE — 3074F PR MOST RECENT SYSTOLIC BLOOD PRESSURE < 130 MM HG: ICD-10-PCS | Mod: HCNC,CPTII,S$GLB, | Performed by: FAMILY MEDICINE

## 2020-08-17 PROCEDURE — 99214 OFFICE O/P EST MOD 30 MIN: CPT | Mod: HCNC,S$GLB,, | Performed by: FAMILY MEDICINE

## 2020-08-17 PROCEDURE — 3008F BODY MASS INDEX DOCD: CPT | Mod: HCNC,CPTII,S$GLB, | Performed by: FAMILY MEDICINE

## 2020-08-17 PROCEDURE — 99999 PR PBB SHADOW E&M-EST. PATIENT-LVL V: CPT | Mod: PBBFAC,HCNC,, | Performed by: FAMILY MEDICINE

## 2020-08-17 PROCEDURE — 99214 PR OFFICE/OUTPT VISIT, EST, LEVL IV, 30-39 MIN: ICD-10-PCS | Mod: HCNC,S$GLB,, | Performed by: FAMILY MEDICINE

## 2020-08-17 PROCEDURE — 1101F PT FALLS ASSESS-DOCD LE1/YR: CPT | Mod: HCNC,CPTII,S$GLB, | Performed by: FAMILY MEDICINE

## 2020-08-17 PROCEDURE — 1125F AMNT PAIN NOTED PAIN PRSNT: CPT | Mod: HCNC,S$GLB,, | Performed by: FAMILY MEDICINE

## 2020-08-17 PROCEDURE — 1159F MED LIST DOCD IN RCRD: CPT | Mod: HCNC,S$GLB,, | Performed by: FAMILY MEDICINE

## 2020-08-17 RX ORDER — LEVOTHYROXINE SODIUM 75 UG/1
75 TABLET ORAL
Qty: 90 TABLET | Refills: 3 | Status: SHIPPED | OUTPATIENT
Start: 2020-08-17 | End: 2021-08-06 | Stop reason: SDUPTHER

## 2020-08-17 NOTE — PROGRESS NOTES
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    Chief Complaint: 6 month f/u    Here for 6 month follow-up.    S/p ORIF left femur fracture - following with Dr. Rodriguez; using cane. Taking percocet 1-2 times daily. Holding fosmax  H/o TIA - taking just Eliquis; stopped lipitor after seeing neurlogist  Afib - s/p ablation; sees Dr. Beatty; gets episodes twice a year requiring cardioversion. Taking Eliquis 5mg BID, Coreg BID, Rhythmol BID.  HTN - telmisartan 40mg  And Coreg 12.5mg BID  CKD - following with Dr. Pond  Knee DJD - s/p right knee TKA in May. Going to PT.   Hypothyroidism - tolerating levothyroxine 50mcg;  S/ gastric bypass - taking ferrous sulfate and B12 for the last 3 months; lost 5 pounds in last 90 days and 20 pounds in the last year.  Depression - mood controlled on Wellbutrin and Lexapro            Follow-up  Pertinent negatives include no abdominal pain, arthralgias, chest pain, chills, coughing, fever, headaches, nausea, neck pain, rash, sore throat or weakness.       Past Medical History:   Diagnosis Date    Anticoagulant long-term use     Anxiety     Arthritis     Atrial fibrillation     BPH (benign prostatic hyperplasia)     Cataract     CKD (chronic kidney disease) stage 3, GFR 30-59 ml/min 7/10/2017    Followed by Dr. Jeevan Pond    Colon polyp     benign    Depression     Elevated PSA     Erectile dysfunction     Gastric ulcer with hemorrhage     Hep B w/o coma 1977    History of bleeding peptic ulcer     History of prostatitis     Hypertension     PAF (paroxysmal atrial fibrillation)     Sleep apnea     on CPAP    Stroke     TIA December 2019    Thyroid disease        Past Surgical History:   Procedure Laterality Date    ABDOMINAL HERNIA REPAIR      ABLATION OF ARRHYTHMOGENIC FOCUS FOR ATRIAL FIBRILLATION N/A 6/15/2020    Procedure: Ablation atrial fibrillation;  Surgeon: Wyatt Beatty MD;  Location: [facility] EP LAB;  Service: Cardiology;   Laterality: N/A;  PAF, AFl, PEPE, PVI re-do, CTI, RFA, JUSTIN, Gen, SK, 3 Prep    CATARACT EXTRACTION  11/25/2013    bilateral    CHOLECYSTECTOMY      COLONOSCOPY N/A 11/25/2015    Procedure: COLONOSCOPY;  Surgeon: Toby Hernandez MD;  Location: [facility] ENDO;  Service: Endoscopy;  Laterality: N/A;    EYE SURGERY      GASTRIC BYPASS  1992    INTRAMEDULLARY RODDING OF FEMUR Left 7/18/2020    Procedure: INSERTION, INTRAMEDULLARY ERIC, FEMUR, left, Synthes, Stanley table, Large C arm clock side,;  Surgeon: Tony Rodriguez MD;  Location: [facility] OR [address] [place] [address];  Service: Orthopedics;  Laterality: Left;    KNEE ARTHROSCOPY      RT    LASIK  2001    both eyes (Dr. Rabago)    ORIF HUMERUS FRACTURE      LT    ORIF HUMERUS FRACTURE Right     non surgical repair    RADIOFREQUENCY ABLATION      x2    Right ankle tendon repair      ROTATOR CUFF REPAIR      right    TOTAL KNEE ARTHROPLASTY Right 5/29/2018    Procedure: REPLACEMENT-KNEE-TOTAL-axel;  Surgeon: Denzel Dallas MD;  [facility];  Service: Orthopedics;  Laterality: Right;  [place]    TREATMENT OF CARDIAC ARRHYTHMIA  6/15/2020    Procedure: Cardioversion or Defibrillation;  Surgeon: Wyatt Beatty MD;  Location: [facility] EP LAB;  Service: Cardiology;;       Review of patient's allergies indicates:   Allergen Reactions    No known drug allergies        Social History     Socioeconomic History    Marital status:      Spouse name: Not on file    Number of children: 5    Years of education: Not on file    Highest education level: Not on file   Occupational History    Occupation: retired anesthesiology     Employer: [employer] ([employer]    Occupation: [employer] grad     Comment: previous football player   Social Needs    Financial resource strain: Not on file    Food insecurity     Worry: Not on file     Inability: Not on file    Transportation needs     Medical: Not on file     Non-medical: Not on file   Tobacco Use    Smoking  status: Never Smoker    Smokeless tobacco: Never Used    Tobacco 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    Substance and Sexual Activity    Alcohol use: No    Drug use: No    Sexual activity: Yes     Partners: Female   Lifestyle    Physical activity     Days per week: Not on file     Minutes per session: Not on file    Stress: Not on file   Relationships    Social connections     Talks on phone: Not on file     Gets together: Not on file     Attends Zoroastrianism service: Not on file     Active member of club or organization: Not on file     Attends meetings of clubs or organizations: Not on file     Relationship status: Not on file   Other Topics Concern    Patient feels they ought to cut down on drinking/drug use Not Asked    Patient annoyed by others criticizing their drinking/drug use Not Asked    Patient has felt bad or guilty about drinking/drug use Not Asked    Patient has had a drink/used drugs as an eye opener in the AM Not Asked   Social History Narrative    Not on file       Current Outpatient Medications on File Prior to Visit   Medication Sig Dispense Refill    acyclovir (ZOVIRAX) 800 MG Tab TK ONE T PO FIVE TIMES D only for fever blisters  1    alendronate (FOSAMAX) 70 MG tablet Take 1 tablet (70 mg total) by mouth every 7 days. Take with a full glass of water & remain upright for at least 30 minutes  Hold fosfomax until 2 week Followup 12 tablet 3    amoxicillin (AMOXIL) 500 MG Tab Take 500 mg by mouth every 12 (twelve) hours.      aspirin 81 MG Chew 81 mg.      atorvastatin (LIPITOR) 10 MG tablet Take 1 tablet (10 mg total) by mouth once daily. 90 tablet 3    betamethasone acetate-betamethasone sodium phosphate (CELESTONE) 6 mg/mL injection as needed.       buPROPion (WELLBUTRIN XL) 300 MG 24 hr tablet Take 1 tablet (300 mg total) by mouth once daily. 90 tablet 3    calcium carbonate (OS-KISHAN) 500 mg calcium (1,250 mg) tablet       CALCIUM ORAL Take by mouth Daily.       calcium-vitamin D 600 mg(1,500mg) -400 unit Tab 600 mg.      carvediloL (COREG) 12.5 MG tablet Take 1 tablet (12.5 mg total) by mouth 2 (two) times daily with meals. 180 tablet 3    celecoxib (CELEBREX) 200 MG capsule TAKE 1 CAPSULE(200 MG) BY MOUTH TWICE DAILY 180 capsule 0    cyclobenzaprine (FLEXERIL) 10 MG tablet Take 1 tablet (10 mg total) by mouth every 8 (eight) hours as needed. FOR MUSCLE SPASM 30 tablet 0    dutasteride (AVODART) 0.5 mg capsule TAKE 1 CAPSULE(0.5 MG) BY MOUTH EVERY DAY 90 capsule 3    efinaconazole (JUBLIA) 10 % Prakash Apply topically.      ELIQUIS 5 mg Tab Take 1 tablet (5 mg total) by mouth 2 (two) times daily. 60 tablet 11    escitalopram oxalate (LEXAPRO) 10 MG tablet TAKE 1 TABLET(10 MG) BY MOUTH EVERY DAY 90 tablet 3    ferrous sulfate (FEOSOL) 325 mg (65 mg iron) Tab tablet Take by mouth.      KENALOG 40 mg/mL injection as needed.       OMEGA-3 FATTY ACIDS (FISH OIL CONCENTRATE ORAL) Take by mouth Daily.      ondansetron (ZOFRAN-ODT) 8 MG TbDL Take 1 tablet (8 mg total) by mouth every 8 (eight) hours as needed. 30 tablet 0    oxyCODONE-acetaminophen (PERCOCET)  mg per tablet Take 1 tablet by mouth every 4 (four) hours as needed for Pain. 40 tablet 0    propafenone (RYTHMOL SR) 425 MG Cp12 Take 1 capsule (425 mg total) by mouth every 12 (twelve) hours. 180 capsule 3    telmisartan (MICARDIS) 40 MG Tab Take 1 tablet (40 mg total) by mouth once daily. 90 tablet 3    [DISCONTINUED] levothyroxine (SYNTHROID) 50 MCG tablet Take 1 tablet (50 mcg total) by mouth before breakfast. 30 tablet 11    furosemide (LASIX) 20 MG tablet Take 1 tablet (20 mg total) by mouth daily as needed (shortness of breath, leg swelling). 5 tablet 0    gabapentin (NEURONTIN) 100 MG capsule Take 1 capsule (100 mg total) by mouth 3 (three) times daily. for 14 days 42 capsule 0    pantoprazole (PROTONIX) 40 MG tablet Take 1 tablet (40 mg total) by mouth once daily. 30 tablet 0    [DISCONTINUED]  "cholestyramine (QUESTRAN) 4 gram packet Take 1 packet (4 g total) by mouth once daily. 30 packet 3     No current facility-administered medications on file prior to visit.        Family History   Problem Relation Age of Onset    COPD Father     Diabetes Father     Osteoporosis Father     Aortic stenosis Mother     Heart disease Mother         aortic stenosis    Osteoporosis Mother     Heart attack Brother     No Known Problems Son     No Known Problems Daughter     No Known Problems Daughter     No Known Problems Daughter        Review of Systems   Constitutional: Negative for appetite change, chills, fever and unexpected weight change.   HENT: Negative for sore throat and trouble swallowing.    Eyes: Negative for pain and visual disturbance.   Respiratory: Negative for cough, chest tightness, shortness of breath and wheezing.    Cardiovascular: Negative for chest pain, palpitations and leg swelling.   Gastrointestinal: Negative for abdominal pain, blood in stool, constipation, diarrhea and nausea.   Genitourinary: Negative for difficulty urinating, dysuria and hematuria.   Musculoskeletal: Negative for arthralgias, gait problem and neck pain.   Skin: Negative for rash and wound.   Neurological: Negative for dizziness, weakness, light-headedness and headaches.   Hematological: Negative for adenopathy.   Psychiatric/Behavioral: Negative for dysphoric mood.       Objective:      /76 (BP Location: Right arm, Patient Position: Sitting)   Pulse (!) 58   Temp 98.4 °F (36.9 °C) (Oral)   Ht 6' 2" (1.88 m)   Wt 95.5 kg (210 lb 8.6 oz)   SpO2 98%   BMI 27.03 kg/m²   Physical Exam  Constitutional:       General: He is not in acute distress.     Appearance: He is well-developed.   HENT:      Head: Normocephalic and atraumatic.      Right Ear: External ear normal.      Left Ear: External ear normal.      Mouth/Throat:      Pharynx: Uvula midline. No oropharyngeal exudate.   Eyes:      General: Lids are " normal.      Conjunctiva/sclera: Conjunctivae normal.      Pupils: Pupils are equal, round, and reactive to light.   Neck:      Musculoskeletal: Full passive range of motion without pain, normal range of motion and neck supple.      Thyroid: No thyroid mass or thyromegaly.      Trachea: Phonation normal.   Cardiovascular:      Rate and Rhythm: Normal rate and regular rhythm.      Heart sounds: Normal heart sounds. No murmur. No friction rub. No gallop.    Pulmonary:      Effort: Pulmonary effort is normal. No respiratory distress.      Breath sounds: Normal breath sounds. No wheezing or rales.   Musculoskeletal: Normal range of motion.   Lymphadenopathy:      Cervical: No cervical adenopathy.   Skin:     General: Skin is warm and dry.   Neurological:      Mental Status: He is alert and oriented to person, place, and time.      Cranial Nerves: No cranial nerve deficit.      Coordination: Coordination normal.   Psychiatric:         Speech: Speech normal.         Behavior: Behavior normal.         Thought Content: Thought content normal.         Judgment: Judgment normal.         Results for orders placed or performed in visit on 08/12/20   CBC auto differential   Result Value Ref Range    WBC 5.23 3.90 - 12.70 K/uL    RBC 3.62 (L) 4.60 - 6.20 M/uL    Hemoglobin 9.7 (L) 14.0 - 18.0 g/dL    Hematocrit 33.0 (L) 40.0 - 54.0 %    Mean Corpuscular Volume 91 82 - 98 fL    Mean Corpuscular Hemoglobin 26.8 (L) 27.0 - 31.0 pg    Mean Corpuscular Hemoglobin Conc 29.4 (L) 32.0 - 36.0 g/dL    RDW 15.8 (H) 11.5 - 14.5 %    Platelets 317 150 - 350 K/uL    MPV 11.9 9.2 - 12.9 fL    Immature Granulocytes 0.2 0.0 - 0.5 %    Gran # (ANC) 2.6 1.8 - 7.7 K/uL    Immature Grans (Abs) 0.01 0.00 - 0.04 K/uL    Lymph # 1.2 1.0 - 4.8 K/uL    Mono # 0.5 0.3 - 1.0 K/uL    Eos # 0.9 (H) 0.0 - 0.5 K/uL    Baso # 0.09 0.00 - 0.20 K/uL    nRBC 0 0 /100 WBC    Gran% 50.1 38.0 - 73.0 %    Lymph% 22.0 18.0 - 48.0 %    Mono% 8.8 4.0 - 15.0 %     Eosinophil% 17.2 (H) 0.0 - 8.0 %    Basophil% 1.7 0.0 - 1.9 %    Differential Method Automated    Lipid panel   Result Value Ref Range    Cholesterol 112 (L) 120 - 199 mg/dL    Triglycerides 54 30 - 150 mg/dL    HDL 42 40 - 75 mg/dL    LDL Cholesterol 59.2 (L) 63.0 - 159.0 mg/dL    Hdl/Cholesterol Ratio 37.5 20.0 - 50.0 %    Total Cholesterol/HDL Ratio 2.7 2.0 - 5.0    Non-HDL Cholesterol 70 mg/dL   TSH   Result Value Ref Range    TSH 2.513 0.400 - 4.000 uIU/mL   Comprehensive metabolic panel   Result Value Ref Range    Sodium 139 136 - 145 mmol/L    Potassium 4.4 3.5 - 5.1 mmol/L    Chloride 116 (H) 95 - 110 mmol/L    CO2 18 (L) 23 - 29 mmol/L    Glucose 88 70 - 110 mg/dL    BUN, Bld 19 8 - 23 mg/dL    Creatinine 1.3 0.5 - 1.4 mg/dL    Calcium 8.2 (L) 8.7 - 10.5 mg/dL    Total Protein 6.0 6.0 - 8.4 g/dL    Albumin 3.1 (L) 3.5 - 5.2 g/dL    Total Bilirubin 0.6 0.1 - 1.0 mg/dL    Alkaline Phosphatase 115 55 - 135 U/L    AST 16 10 - 40 U/L    ALT 11 10 - 44 U/L    Anion Gap 5 (L) 8 - 16 mmol/L    eGFR if African American >60.0 >60 mL/min/1.73 m^2    eGFR if non  54.1 (A) >60 mL/min/1.73 m^2     *Note: Due to a large number of results and/or encounters for the requested time period, some results have not been displayed. A complete set of results can be found in Results Review.          Assessment:       1. Paroxysmal atrial fibrillation    2. Essential hypertension    3. TIA (transient ischemic attack)    4. Hypothyroidism due to acquired atrophy of thyroid    5. Closed nondisplaced intertrochanteric fracture of left femur with routine healing, subsequent encounter        Plan:       Paroxysmal atrial fibrillation    Essential hypertension    TIA (transient ischemic attack)    Hypothyroidism due to acquired atrophy of thyroid  -     levothyroxine (SYNTHROID) 75 MCG tablet; Take 1 tablet (75 mcg total) by mouth before breakfast.  Dispense: 90 tablet; Refill: 3    Closed nondisplaced intertrochanteric  fracture of left femur with routine healing, subsequent encounter          Continue Eliquis 5mg BID with ASA  Continue telmisartan and carvedilol   Continue other present meds  F/u with cardiology as planned  Counseled on regular exercise, maintenance of a healthy weight, balanced diet rich in fruits/vegetables and lean protein, and avoidance of unhealthy habits like smoking and excessive alcohol intake.  F/u 6 months with labs

## 2020-08-17 NOTE — PROGRESS NOTES
HPI:  74-year-old male status post cephalomedullary nail fixation of left intertrochanteric femur fracture on 07/18/2020.  He has been doing quite well and is now ambulating with a cane.  Pain controlled with p.o. pain medication.    PE:  Mildly antalgic gait with a cane.  Incisions all well healing.  Neurovascularly intact.      A/P:  3 weeks status post cephalomedullary fixation of left intertrochanteric femur fracture.  He is doing quite well.  He will follow up in 3 weeks time with x-rays of the left femur.  Sooner if he has any issues.

## 2020-08-25 DIAGNOSIS — S72.25XD CLOSED NONDISPLACED SUBTROCHANTERIC FRACTURE OF LEFT FEMUR WITH ROUTINE HEALING, SUBSEQUENT ENCOUNTER: Primary | ICD-10-CM

## 2020-08-25 RX ORDER — HYDROCODONE BITARTRATE AND ACETAMINOPHEN 5; 325 MG/1; MG/1
1 TABLET ORAL EVERY 6 HOURS PRN
Qty: 28 TABLET | Refills: 0 | Status: SHIPPED | OUTPATIENT
Start: 2020-08-25 | End: 2020-09-04

## 2020-08-28 NOTE — PROGRESS NOTES
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     Background:  First diagnosed with atrial fibrillation 2/12 (noted palpitations). Placed on beta blocker, coumadin. Underwent PEPE/DCCV. Had recurrence, Propafenone  mg twice daily added.  Had recurrence 12/24/13, underwent DCCV, Propafenone increased to 425 mg twice daily.  PVI ([place]) 7/28/14.   Had done well, was off Propafenone. Developed recurrence, underwent DCCV multiple times.  Repeat PVI 4/27/17 All 4 veins remained isolated.  Antralized left sided lesion set posteriorly, and performed SVC isolation.  Has been compliant with CPAP.  Had persistent recurrence >> DCCV 2/9/18.  He has had paroxysmal AF, with 4 episodes since 11/18.    Exercise echo 2/19 normal biventricular structure and function BISI 57 negative for ischemia.     Continued to have paroxysmal events, self-terminating.  Last episode was last month.  Generally, the episodes are lasting 3 days.  Since last visit, he was admitted with a TIA.  This was thought to be due to non-compliance with Eliquis.    Update:  6/15/20 Repeat RFA. Posterior wall isolation + repeat RFA of cavo-tricuspid isthmus. PV's remained isolated. 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    7/13/20 presented with TIA like symptoms. Seen by Dr. Coats (Vascular Neurology). Thought to be atypical for neurologic etiology. CT demonstrates a small area of remote vs. Subacute infarct in R medial occipital lobe, so transient hippocampal ischemia may be at the origin, although again this is atypical    Feeling well/improving overall. No symptoms c/w atrial fibrillation. Neurologically improving. Primary complaint is recovery from hip surgery.  Anxious regarding CT findings. Has not followed up with Neurology.      Review of  Systems   Constitution: Negative. Negative for fever and malaise/fatigue.   HENT: Negative for congestion and sore throat.    Cardiovascular: Negative for chest pain, dyspnea on exertion, irregular heartbeat, leg swelling, near-syncope, orthopnea, palpitations, paroxysmal nocturnal dyspnea and syncope.   Respiratory: Negative for cough and shortness of breath.    Gastrointestinal: Negative for abdominal pain, constipation and diarrhea.   Neurological: Negative for dizziness, light-headedness and weakness.   Psychiatric/Behavioral: Negative for depression. The patient is not nervous/anxious.         Objective:    Physical Exam   Constitutional: He is oriented to person, place, and time. He appears well-developed and well-nourished.   Eyes: Conjunctivae are normal. No scleral icterus.   Neck: No JVD present. No tracheal deviation present.   Cardiovascular: Normal rate, regular rhythm and normal heart sounds. PMI is not displaced.   Pulmonary/Chest: Effort normal and breath sounds normal. No respiratory distress.   Abdominal: Soft. There is no hepatosplenomegaly. There is no abdominal tenderness.   Musculoskeletal:         General: No tenderness or edema (lower extremity).   Neurological: He is alert and oriented to person, place, and time.   Skin: Skin is warm and dry. No rash noted.   Psychiatric: He has a normal mood and affect. His behavior is normal.         Assessment:       1. Persistent atrial fibrillation    2. Bradycardia    3. Essential hypertension    4. CKD (chronic kidney disease) stage 3, GFR 30-59 ml/min    5. History of TIA (transient ischemic attack)    6. Cerebrovascular accident (CVA), unspecified mechanism    7. Hypothyroidism due to acquired atrophy of thyroid    8. Complex sleep apnea syndrome         Plan:           Remains in nsr s/p PVI + posterior wall isolation. Continue current medications.  Appreciate Neurology (Dr. Coats) input.  F/u in 6 months.

## 2020-08-31 ENCOUNTER — OFFICE VISIT (OUTPATIENT)
Dept: CARDIOLOGY | Facility: CLINIC | Age: 75
End: 2020-08-31
Payer: MEDICARE

## 2020-08-31 VITALS
BODY MASS INDEX: 27.19 KG/M2 | DIASTOLIC BLOOD PRESSURE: 71 MMHG | SYSTOLIC BLOOD PRESSURE: 124 MMHG | HEART RATE: 61 BPM | HEIGHT: 74 IN | WEIGHT: 211.88 LBS

## 2020-08-31 DIAGNOSIS — E03.4 HYPOTHYROIDISM DUE TO ACQUIRED ATROPHY OF THYROID: ICD-10-CM

## 2020-08-31 DIAGNOSIS — I48.19 PERSISTENT ATRIAL FIBRILLATION: Primary | ICD-10-CM

## 2020-08-31 DIAGNOSIS — I48.0 PAF (PAROXYSMAL ATRIAL FIBRILLATION): ICD-10-CM

## 2020-08-31 DIAGNOSIS — R00.1 BRADYCARDIA: ICD-10-CM

## 2020-08-31 DIAGNOSIS — G47.31 COMPLEX SLEEP APNEA SYNDROME: ICD-10-CM

## 2020-08-31 DIAGNOSIS — I48.0 PAF (PAROXYSMAL ATRIAL FIBRILLATION): Primary | ICD-10-CM

## 2020-08-31 DIAGNOSIS — I63.9 CEREBROVASCULAR ACCIDENT (CVA), UNSPECIFIED MECHANISM: ICD-10-CM

## 2020-08-31 DIAGNOSIS — N18.30 CKD (CHRONIC KIDNEY DISEASE) STAGE 3, GFR 30-59 ML/MIN: ICD-10-CM

## 2020-08-31 DIAGNOSIS — I10 ESSENTIAL HYPERTENSION: ICD-10-CM

## 2020-08-31 DIAGNOSIS — Z86.73 HISTORY OF TIA (TRANSIENT ISCHEMIC ATTACK): ICD-10-CM

## 2020-08-31 PROCEDURE — 1101F PT FALLS ASSESS-DOCD LE1/YR: CPT | Mod: HCNC,CPTII,S$GLB, | Performed by: INTERNAL MEDICINE

## 2020-08-31 PROCEDURE — 1126F AMNT PAIN NOTED NONE PRSNT: CPT | Mod: HCNC,S$GLB,, | Performed by: INTERNAL MEDICINE

## 2020-08-31 PROCEDURE — 93005 RHYTHM STRIP: ICD-10-PCS | Mod: HCNC,S$GLB,, | Performed by: INTERNAL MEDICINE

## 2020-08-31 PROCEDURE — 3078F DIAST BP <80 MM HG: CPT | Mod: HCNC,CPTII,S$GLB, | Performed by: INTERNAL MEDICINE

## 2020-08-31 PROCEDURE — 3008F BODY MASS INDEX DOCD: CPT | Mod: HCNC,CPTII,S$GLB, | Performed by: INTERNAL MEDICINE

## 2020-08-31 PROCEDURE — 3008F PR BODY MASS INDEX (BMI) DOCUMENTED: ICD-10-PCS | Mod: HCNC,CPTII,S$GLB, | Performed by: INTERNAL MEDICINE

## 2020-08-31 PROCEDURE — 99999 PR PBB SHADOW E&M-EST. PATIENT-LVL III: ICD-10-PCS | Mod: PBBFAC,HCNC,, | Performed by: INTERNAL MEDICINE

## 2020-08-31 PROCEDURE — 99214 PR OFFICE/OUTPT VISIT, EST, LEVL IV, 30-39 MIN: ICD-10-PCS | Mod: HCNC,S$GLB,, | Performed by: INTERNAL MEDICINE

## 2020-08-31 PROCEDURE — 1159F PR MEDICATION LIST DOCUMENTED IN MEDICAL RECORD: ICD-10-PCS | Mod: HCNC,S$GLB,, | Performed by: INTERNAL MEDICINE

## 2020-08-31 PROCEDURE — 93010 ELECTROCARDIOGRAM REPORT: CPT | Mod: HCNC,S$GLB,, | Performed by: INTERNAL MEDICINE

## 2020-08-31 PROCEDURE — 3078F PR MOST RECENT DIASTOLIC BLOOD PRESSURE < 80 MM HG: ICD-10-PCS | Mod: HCNC,CPTII,S$GLB, | Performed by: INTERNAL MEDICINE

## 2020-08-31 PROCEDURE — 1126F PR PAIN SEVERITY QUANTIFIED, NO PAIN PRESENT: ICD-10-PCS | Mod: HCNC,S$GLB,, | Performed by: INTERNAL MEDICINE

## 2020-08-31 PROCEDURE — 1159F MED LIST DOCD IN RCRD: CPT | Mod: HCNC,S$GLB,, | Performed by: INTERNAL MEDICINE

## 2020-08-31 PROCEDURE — 93010 RHYTHM STRIP: ICD-10-PCS | Mod: HCNC,S$GLB,, | Performed by: INTERNAL MEDICINE

## 2020-08-31 PROCEDURE — 99999 PR PBB SHADOW E&M-EST. PATIENT-LVL III: CPT | Mod: PBBFAC,HCNC,, | Performed by: INTERNAL MEDICINE

## 2020-08-31 PROCEDURE — 99214 OFFICE O/P EST MOD 30 MIN: CPT | Mod: HCNC,S$GLB,, | Performed by: INTERNAL MEDICINE

## 2020-08-31 PROCEDURE — 93005 ELECTROCARDIOGRAM TRACING: CPT | Mod: HCNC,S$GLB,, | Performed by: INTERNAL MEDICINE

## 2020-08-31 PROCEDURE — 3074F PR MOST RECENT SYSTOLIC BLOOD PRESSURE < 130 MM HG: ICD-10-PCS | Mod: HCNC,CPTII,S$GLB, | Performed by: INTERNAL MEDICINE

## 2020-08-31 PROCEDURE — 1101F PR PT FALLS ASSESS DOC 0-1 FALLS W/OUT INJ PAST YR: ICD-10-PCS | Mod: HCNC,CPTII,S$GLB, | Performed by: INTERNAL MEDICINE

## 2020-08-31 PROCEDURE — 3074F SYST BP LT 130 MM HG: CPT | Mod: HCNC,CPTII,S$GLB, | Performed by: INTERNAL MEDICINE

## 2020-09-02 ENCOUNTER — TELEPHONE (OUTPATIENT)
Dept: NEUROLOGY | Facility: HOSPITAL | Age: 75
End: 2020-09-02

## 2020-09-02 DIAGNOSIS — Z86.73 HISTORY OF TIA (TRANSIENT ISCHEMIC ATTACK): Primary | ICD-10-CM

## 2020-09-02 NOTE — TELEPHONE ENCOUNTER
Called to discuss the mild small vessel disease on his MRI. At this time there is no significant concern and they are within age appropriate norm and mild. No signs of cerebral amyloid angiopathy either.  Plan on MRI @ 1 year to monitor for progression.

## 2020-09-03 DIAGNOSIS — S72.145D CLOSED NONDISPLACED INTERTROCHANTERIC FRACTURE OF LEFT FEMUR WITH ROUTINE HEALING, SUBSEQUENT ENCOUNTER: Primary | ICD-10-CM

## 2020-09-03 RX ORDER — OXYCODONE AND ACETAMINOPHEN 5; 325 MG/1; MG/1
1 TABLET ORAL EVERY 12 HOURS PRN
Qty: 30 TABLET | Refills: 0 | Status: SHIPPED | OUTPATIENT
Start: 2020-09-03 | End: 2020-09-03 | Stop reason: SDUPTHER

## 2020-09-03 RX ORDER — OXYCODONE AND ACETAMINOPHEN 5; 325 MG/1; MG/1
1 TABLET ORAL EVERY 12 HOURS PRN
Qty: 30 TABLET | Refills: 0 | Status: SHIPPED | OUTPATIENT
Start: 2020-09-03 | End: 2020-09-17 | Stop reason: SDUPTHER

## 2020-09-05 DIAGNOSIS — I10 HYPERTENSION, UNSPECIFIED TYPE: ICD-10-CM

## 2020-09-05 RX ORDER — TELMISARTAN 40 MG/1
40 TABLET ORAL DAILY
Qty: 90 TABLET | Refills: 3 | Status: CANCELLED | OUTPATIENT
Start: 2020-09-05 | End: 2021-09-05

## 2020-09-07 NOTE — TELEPHONE ENCOUNTER
No new care gaps identified.  Powered by [employer] [place]. Reference number: [id number]. 9/07/2020 7:33:44 AM LEOT

## 2020-09-08 ENCOUNTER — OFFICE VISIT (OUTPATIENT)
Dept: ORTHOPEDICS | Facility: CLINIC | Age: 75
End: 2020-09-08
Payer: MEDICARE

## 2020-09-08 ENCOUNTER — HOSPITAL ENCOUNTER (OUTPATIENT)
Dept: RADIOLOGY | Facility: HOSPITAL | Age: 75
Discharge: HOME | End: 2020-09-08
Attending: ORTHOPAEDIC SURGERY
Payer: MEDICARE

## 2020-09-08 ENCOUNTER — PATIENT MESSAGE (OUTPATIENT)
Dept: FAMILY MEDICINE | Facility: CLINIC | Age: 75
End: 2020-09-08

## 2020-09-08 DIAGNOSIS — S76.012A STRAIN OF LEFT HIP ADDUCTOR MUSCLE, INITIAL ENCOUNTER: ICD-10-CM

## 2020-09-08 DIAGNOSIS — S72.009D CLOSED FRACTURE OF HIP WITH ROUTINE HEALING, UNSPECIFIED LATERALITY, SUBSEQUENT ENCOUNTER: ICD-10-CM

## 2020-09-08 DIAGNOSIS — S72.145D CLOSED NONDISPLACED INTERTROCHANTERIC FRACTURE OF LEFT FEMUR WITH ROUTINE HEALING, SUBSEQUENT ENCOUNTER: Primary | ICD-10-CM

## 2020-09-08 DIAGNOSIS — I10 HYPERTENSION, UNSPECIFIED TYPE: ICD-10-CM

## 2020-09-08 PROCEDURE — 99999 PR PBB SHADOW E&M-EST. PATIENT-LVL II: ICD-10-PCS | Mod: PBBFAC,HCNC,, | Performed by: ORTHOPAEDIC SURGERY

## 2020-09-08 PROCEDURE — 99999 PR PBB SHADOW E&M-EST. PATIENT-LVL II: CPT | Mod: PBBFAC,HCNC,, | Performed by: ORTHOPAEDIC SURGERY

## 2020-09-08 PROCEDURE — 99024 PR POST-OP FOLLOW-UP VISIT: ICD-10-PCS | Mod: HCNC,S$GLB,, | Performed by: ORTHOPAEDIC SURGERY

## 2020-09-08 PROCEDURE — 99024 POSTOP FOLLOW-UP VISIT: CPT | Mod: HCNC,S$GLB,, | Performed by: ORTHOPAEDIC SURGERY

## 2020-09-08 PROCEDURE — 73552 X-RAY EXAM OF FEMUR 2/>: CPT | Mod: TC,HCNC,LT

## 2020-09-08 PROCEDURE — 73552 XR FEMUR 2 VIEW LEFT: ICD-10-PCS | Mod: 26,HCNC,LT, | Performed by: RADIOLOGY

## 2020-09-08 PROCEDURE — 73552 X-RAY EXAM OF FEMUR 2/>: CPT | Mod: 26,HCNC,LT, | Performed by: RADIOLOGY

## 2020-09-08 RX ORDER — TELMISARTAN 40 MG/1
40 TABLET ORAL DAILY
Qty: 90 TABLET | Refills: 3 | OUTPATIENT
Start: 2020-09-08 | End: 2021-09-08

## 2020-09-08 RX ORDER — TELMISARTAN 40 MG/1
40 TABLET ORAL DAILY
Qty: 90 TABLET | Refills: 3 | Status: SHIPPED | OUTPATIENT
Start: 2020-09-08 | End: 2021-06-03

## 2020-09-08 NOTE — TELEPHONE ENCOUNTER
Refill Authorization Note    is requesting a refill authorization.    Brief assessment and rationale for refill: APPROVE: prr          Medication Therapy Plan: CDMR. Telmisartan commented on as continue LOV with PCP     Medication reconciliation completed: No                    Comments:      Orders Placed This Encounter    telmisartan (MICARDIS) 40 MG Tab      Requested Prescriptions   Signed Prescriptions Disp Refills    telmisartan (MICARDIS) 40 MG Tab 90 tablet 3     Sig: Take 1 tablet (40 mg total) by mouth once daily.       Cardiovascular:  Angiotensin Receptor Blockers Passed - 9/8/2020 12:56 PM        Passed - Patient is at least 18 years old        Passed - Last BP in normal range within 360 days.     BP Readings from Last 3 Encounters:   08/31/20 124/71   08/17/20 116/76   07/19/20 122/78              Passed - Office visit in past 12 months or future 90 days.     Recent Outpatient Visits            1 week ago Persistent atrial fibrillation    [place] - Arrhythmia Wyatt Beatty MD    3 weeks ago Paroxysmal atrial fibrillation    [facility] Maxi Gaines MD    4 weeks ago Closed nondisplaced intertrochanteric fracture of left femur with routine healing, subsequent encounter    [facility] - Orthopedics 5th Fl Tony Rodriguez MD    1 month ago History of TIA (transient ischemic attack)    [facility] - Neurology 7th Fl Stepan Coats MD    6 months ago Essential hypertension    [facility] Maxi Gaines MD          Future Appointments              In 1 month [id number] [id number]    In 1 month [facility] - Orthopedics 5th Fl, [facility]    In 5 months Maxi Gaines MD [facility]    In 10 months [facility] MRI1 [facility]                Passed - Cr is 1.3 or below and within 360 days     Creatinine   Date Value Ref Range Status    08/12/2020 1.3 0.5 - 1.4 mg/dL Final   07/18/2020 1.8 (H) 0.5 - 1.4 mg/dL Final   07/17/2020 1.8 (H) 0.5 - 1.4 mg/dL Final     POC Creatinine   Date Value Ref Range Status   08/07/2020 1.3 0.5 - 1.4 mg/dL Final   04/11/2017 1.6 (H) 0.5 - 1.4 mg/dL Final              Passed - K in normal range and within 360 days     POC Potassium   Date Value Ref Range Status   07/28/2014 4.1 3.5 - 5.1 mmol/L Final     Potassium   Date Value Ref Range Status   08/12/2020 4.4 3.5 - 5.1 mmol/L Final   07/18/2020 5.1 3.5 - 5.1 mmol/L Final   07/17/2020 5.0 3.5 - 5.1 mmol/L Final              Passed - eGFR within 360 days     eGFR if non    Date Value Ref Range Status   08/12/2020 54.1 (A) >60 mL/min/1.73 m^2 Final     Comment:     Calculation used to obtain the estimated glomerular filtration  rate (eGFR) is the CKD-EPI equation.      07/18/2020 36.5 (A) >60 mL/min/1.73 m^2 Final     Comment:     Calculation used to obtain the estimated glomerular filtration  rate (eGFR) is the CKD-EPI equation.      07/17/2020 36.5 (A) >60 mL/min/1.73 m^2 Final     Comment:     Calculation used to obtain the estimated glomerular filtration  rate (eGFR) is the CKD-EPI equation.        eGFR if    Date Value Ref Range Status   08/12/2020 >60.0 >60 mL/min/1.73 m^2 Final   07/18/2020 42.2 (A) >60 mL/min/1.73 m^2 Final   07/17/2020 42.2 (A) >60 mL/min/1.73 m^2 Final                  Appointments  past 12m or future 3m with PCP    Date Provider   Last Visit   8/17/2020 Maxi Gaines MD   Next Visit   2/17/2021 Maxi Gaines MD   ED visits in past 90 days: [unfilled]     Note composed:1:12 PM 09/08/2020

## 2020-09-08 NOTE — TELEPHONE ENCOUNTER
Quick DC. Duplicate Request   Refill Authorization Note   Dominick Song is requesting a refill authorization.    Brief assessment and rationale for refill: QUICK DC: duplicate             Medication reconciliation completed: No       Comments:   Pended Medication(s)       Requested Prescriptions     Refused Prescriptions Disp Refills    telmisartan (MICARDIS) 40 MG Tab 90 tablet 3     Sig: Take 1 tablet (40 mg total) by mouth once daily.     Refused By: ANTHONY LO     Reason for Refusal: Request already responded to by other means (e.g. phone or fax)        Duplicate Pended Encounter(s)/ Last Prescribed Details:    Pharmacy:  [id number] [place] [address] AT [place] [address] & [facility]   CHRISTIANO #:  [id number]   Pharmacy Comments: --          Fill quantity remaining: -- Fill quantity used: -- Next fill due: --       Outpatient Medication Detail     Disp Refills Start End AMINAH   telmisartan (MICARDIS) 40 MG Tab 90 tablet 3 9/8/2020 9/8/2021 No   Sig - Route: Take 1 tablet (40 mg total) by mouth once daily. - Oral   Sent to pharmacy as: telmisartan (MICARDIS) 40 MG Tab   Class: Normal   Order: [id number]   Date/Time Signed: 9/8/2020 13:12       E-Prescribing Status: Receipt confirmed by pharmacy (9/8/2020  1:20 PM CDT)   Ordering Encounter Report    Associated Reports   View Encounter                Note composed:1:28 PM 09/08/2020

## 2020-09-13 NOTE — PROGRESS NOTES
HPI:  74-year-old male status post cephalomedullary nail fixation of left intertrochanteric femur fracture on 07/18/2020.  He continues to improve and is still ambulating sometimes with a cane.  His pain is largely resolved in the lateral area of the hip but he does have some pain in the abductor region and he does feel little bit weak on the left side.  Inquires about starting outpatient physical therapy.    PE:  Mildly antalgic gait with a cane.  Incisions well healed.  Neurovascularly intact.  Mild quad atrophy.  Mild tenderness to palpation in the left buttock.    Rads:  Intact nail with no evidence of control collapse.  Fracture not visible.    A/P:  7 weeks status post cephalomedullary fixation of left intertrochanteric femur fracture.  He is doing quite well.  I wrote a prescription for physical therapy at [facility] physical therapy on the [place].  He will follow up in clinic in about 6 weeks time with x-rays of the left femur.

## 2020-09-29 ENCOUNTER — PATIENT MESSAGE (OUTPATIENT)
Dept: OTHER | Facility: OTHER | Age: 75
End: 2020-09-29

## 2020-10-20 ENCOUNTER — HOSPITAL ENCOUNTER (OUTPATIENT)
Dept: RADIOLOGY | Facility: HOSPITAL | Age: 75
Discharge: HOME | End: 2020-10-20
Attending: ORTHOPAEDIC SURGERY
Payer: MEDICARE

## 2020-10-20 ENCOUNTER — OFFICE VISIT (OUTPATIENT)
Dept: ORTHOPEDICS | Facility: CLINIC | Age: 75
End: 2020-10-20
Payer: MEDICARE

## 2020-10-20 ENCOUNTER — PATIENT MESSAGE (OUTPATIENT)
Dept: ADMINISTRATIVE | Facility: OTHER | Age: 75
End: 2020-10-20

## 2020-10-20 VITALS — BODY MASS INDEX: 27.21 KG/M2 | WEIGHT: 212 LBS | HEIGHT: 74 IN

## 2020-10-20 DIAGNOSIS — S72.145D CLOSED NONDISPLACED INTERTROCHANTERIC FRACTURE OF LEFT FEMUR WITH ROUTINE HEALING, SUBSEQUENT ENCOUNTER: ICD-10-CM

## 2020-10-20 DIAGNOSIS — M77.11 RIGHT LATERAL EPICONDYLITIS: ICD-10-CM

## 2020-10-20 DIAGNOSIS — S72.145D CLOSED NONDISPLACED INTERTROCHANTERIC FRACTURE OF LEFT FEMUR WITH ROUTINE HEALING, SUBSEQUENT ENCOUNTER: Primary | ICD-10-CM

## 2020-10-20 PROCEDURE — 99214 OFFICE O/P EST MOD 30 MIN: CPT | Mod: HCNC,S$GLB,, | Performed by: ORTHOPAEDIC SURGERY

## 2020-10-20 PROCEDURE — 99999 PR PBB SHADOW E&M-EST. PATIENT-LVL II: CPT | Mod: PBBFAC,HCNC,, | Performed by: ORTHOPAEDIC SURGERY

## 2020-10-20 PROCEDURE — 1101F PR PT FALLS ASSESS DOC 0-1 FALLS W/OUT INJ PAST YR: ICD-10-PCS | Mod: HCNC,CPTII,S$GLB, | Performed by: ORTHOPAEDIC SURGERY

## 2020-10-20 PROCEDURE — 73552 X-RAY EXAM OF FEMUR 2/>: CPT | Mod: 26,HCNC,LT, | Performed by: RADIOLOGY

## 2020-10-20 PROCEDURE — 1125F AMNT PAIN NOTED PAIN PRSNT: CPT | Mod: HCNC,S$GLB,, | Performed by: ORTHOPAEDIC SURGERY

## 2020-10-20 PROCEDURE — 99214 PR OFFICE/OUTPT VISIT, EST, LEVL IV, 30-39 MIN: ICD-10-PCS | Mod: HCNC,S$GLB,, | Performed by: ORTHOPAEDIC SURGERY

## 2020-10-20 PROCEDURE — 1159F PR MEDICATION LIST DOCUMENTED IN MEDICAL RECORD: ICD-10-PCS | Mod: HCNC,S$GLB,, | Performed by: ORTHOPAEDIC SURGERY

## 2020-10-20 PROCEDURE — 73552 XR FEMUR 2 VIEW LEFT: ICD-10-PCS | Mod: 26,HCNC,LT, | Performed by: RADIOLOGY

## 2020-10-20 PROCEDURE — 1101F PT FALLS ASSESS-DOCD LE1/YR: CPT | Mod: HCNC,CPTII,S$GLB, | Performed by: ORTHOPAEDIC SURGERY

## 2020-10-20 PROCEDURE — 99999 PR PBB SHADOW E&M-EST. PATIENT-LVL II: ICD-10-PCS | Mod: PBBFAC,HCNC,, | Performed by: ORTHOPAEDIC SURGERY

## 2020-10-20 PROCEDURE — 1159F MED LIST DOCD IN RCRD: CPT | Mod: HCNC,S$GLB,, | Performed by: ORTHOPAEDIC SURGERY

## 2020-10-20 PROCEDURE — 73552 X-RAY EXAM OF FEMUR 2/>: CPT | Mod: TC,HCNC,LT

## 2020-10-20 PROCEDURE — 3008F BODY MASS INDEX DOCD: CPT | Mod: HCNC,CPTII,S$GLB, | Performed by: ORTHOPAEDIC SURGERY

## 2020-10-20 PROCEDURE — 1125F PR PAIN SEVERITY QUANTIFIED, PAIN PRESENT: ICD-10-PCS | Mod: HCNC,S$GLB,, | Performed by: ORTHOPAEDIC SURGERY

## 2020-10-20 PROCEDURE — 3008F PR BODY MASS INDEX (BMI) DOCUMENTED: ICD-10-PCS | Mod: HCNC,CPTII,S$GLB, | Performed by: ORTHOPAEDIC SURGERY

## 2020-10-20 RX ORDER — INFLUENZA A VIRUS A/MICHIGAN/45/2015 X-275 (H1N1) ANTIGEN (FORMALDEHYDE INACTIVATED), INFLUENZA A VIRUS A/SINGAPORE/INFIMH-16-0019/2016 IVR-186 (H3N2) ANTIGEN (FORMALDEHYDE INACTIVATED), INFLUENZA B VIRUS B/PHUKET/3073/2013 ANTIGEN (FORMALDEHYDE INACTIVATED), AND INFLUENZA B VIRUS B/MARYLAND/15/2016 BX-69A ANTIGEN (FORMALDEHYDE INACTIVATED) 60; 60; 60; 60 UG/.7ML; UG/.7ML; UG/.7ML; UG/.7ML
INJECTION, SUSPENSION INTRAMUSCULAR
COMMUNITY
Start: 2020-09-10 | End: 2021-01-26

## 2020-10-20 NOTE — PROGRESS NOTES
HPI:  74-year-old male status post cephalomedullary nail fixation of left intertrochanteric femur fracture on 07/18/2020.  He has undergone 2 rounds of physical therapy at this point time.  He had some medial abductor strain at last visit which has subsided with another round of physical therapy.  He is doing well at this point in time and is off of narcotic pain medication and ambulating without assistive devices.  He does complain today of some mild right tennis elbow.  He has had surgery for the left side by Dr. Bjorn Perry in the past with good relief.  This has been going on for a month or 2 and is quite mild compared to the other side in the past.    ROS:  Patient denies constitutional symptoms, cardiac symptoms, respiratory symptoms, GI symptoms.  The remainder of the musculoskeletal ROS is included in the HPI.      PE:    AA&O x 4.  NAD  HEENT:  NCAT, sclera nonicteric  Lungs:  Respirations are equal and unlabored.  CV:  2+ bilateral upper and lower extremity pulses.  Skin:  Intact throughout.      MS:  Mildly antalgic gait with no cane.  Incisions well healed.  Neurovascularly intact.  No tenderness to palpation.  Right upper extremity with mild tenderness to palpation about the extensor origin.  Pain with forced flexion of the long finger.    Rads:  Intact nail with no evidence of control collapse.  Fracture not visible.    A/P:  13 weeks status post cephalomedullary fixation of left intertrochanteric femur fracture.  Healed left intertrochanteric femur fracture.  Continue weight-bearing as tolerated.  Activity as tolerated.  I have given him information to get a Ortega twist bar to work on the right lateral epicondylitis.  If this does not get better he will give me a call and we will either send him to the hand clinic or give him an injection in that area.

## 2020-10-21 ENCOUNTER — PATIENT MESSAGE (OUTPATIENT)
Dept: ENDOCRINOLOGY | Facility: CLINIC | Age: 75
End: 2020-10-21

## 2020-10-21 DIAGNOSIS — I48.91 ATRIAL FIBRILLATION, UNSPECIFIED TYPE: ICD-10-CM

## 2020-10-21 RX ORDER — PROPAFENONE HYDROCHLORIDE 425 MG/1
425 CAPSULE, EXTENDED RELEASE ORAL EVERY 12 HOURS
Qty: 180 CAPSULE | Refills: 3 | Status: SHIPPED | OUTPATIENT
Start: 2020-10-21 | End: 2021-09-28

## 2020-10-26 ENCOUNTER — IMMUNIZATION (OUTPATIENT)
Dept: PHARMACY | Facility: CLINIC | Age: 75
End: 2020-10-26
Payer: MEDICARE

## 2020-11-09 ENCOUNTER — TELEPHONE (OUTPATIENT)
Dept: GASTROENTEROLOGY | Facility: CLINIC | Age: 75
End: 2020-11-09

## 2020-11-09 DIAGNOSIS — Z01.812 PRE-PROCEDURE LAB EXAM: ICD-10-CM

## 2020-11-10 ENCOUNTER — LAB VISIT (OUTPATIENT)
Dept: FAMILY MEDICINE | Facility: CLINIC | Age: 75
End: 2020-11-10
Payer: MEDICARE

## 2020-11-10 DIAGNOSIS — Z01.812 PRE-PROCEDURE LAB EXAM: ICD-10-CM

## 2020-11-10 PROCEDURE — U0003 INFECTIOUS AGENT DETECTION BY NUCLEIC ACID (DNA OR RNA); SEVERE ACUTE RESPIRATORY SYNDROME CORONAVIRUS 2 (SARS-COV-2) (CORONAVIRUS DISEASE [COVID-19]), AMPLIFIED PROBE TECHNIQUE, MAKING USE OF HIGH THROUGHPUT TECHNOLOGIES AS DESCRIBED BY CMS-2020-01-R: HCPCS | Mod: HCNC

## 2020-11-11 LAB — SARS-COV-2 RNA RESP QL NAA+PROBE: NOT DETECTED

## 2020-11-13 ENCOUNTER — HOSPITAL ENCOUNTER (OUTPATIENT)
Facility: HOSPITAL | Age: 75
Discharge: HOME | End: 2020-11-13
Attending: INTERNAL MEDICINE | Admitting: INTERNAL MEDICINE
Payer: MEDICARE

## 2020-11-13 ENCOUNTER — ANESTHESIA (OUTPATIENT)
Dept: ENDOSCOPY | Facility: HOSPITAL | Age: 75
End: 2020-11-13
Payer: MEDICARE

## 2020-11-13 ENCOUNTER — ANESTHESIA EVENT (OUTPATIENT)
Dept: ENDOSCOPY | Facility: HOSPITAL | Age: 75
End: 2020-11-13
Payer: MEDICARE

## 2020-11-13 VITALS
SYSTOLIC BLOOD PRESSURE: 130 MMHG | RESPIRATION RATE: 16 BRPM | DIASTOLIC BLOOD PRESSURE: 70 MMHG | WEIGHT: 212 LBS | TEMPERATURE: 98 F | BODY MASS INDEX: 27.21 KG/M2 | HEIGHT: 74 IN | OXYGEN SATURATION: 98 % | HEART RATE: 48 BPM

## 2020-11-13 DIAGNOSIS — Z86.010 HX OF COLONIC POLYPS: ICD-10-CM

## 2020-11-13 PROBLEM — Z86.0100 HX OF COLONIC POLYPS: Status: ACTIVE | Noted: 2020-11-13

## 2020-11-13 PROCEDURE — G0105 COLORECTAL SCRN; HI RISK IND: HCPCS | Mod: HCNC,PO | Performed by: INTERNAL MEDICINE

## 2020-11-13 PROCEDURE — G0105 COLORECTAL SCRN; HI RISK IND: ICD-10-PCS | Mod: 53,HCNC,, | Performed by: INTERNAL MEDICINE

## 2020-11-13 PROCEDURE — 37000008 HC ANESTHESIA 1ST 15 MINUTES: Mod: HCNC,PO | Performed by: INTERNAL MEDICINE

## 2020-11-13 PROCEDURE — G0105 COLORECTAL SCRN; HI RISK IND: HCPCS | Mod: 53,HCNC,, | Performed by: INTERNAL MEDICINE

## 2020-11-13 PROCEDURE — D9220A PRA ANESTHESIA: Mod: PT,HCNC,CRNA, | Performed by: NURSE ANESTHETIST, CERTIFIED REGISTERED

## 2020-11-13 PROCEDURE — D9220A PRA ANESTHESIA: ICD-10-PCS | Mod: PT,HCNC,ANES, | Performed by: ANESTHESIOLOGY

## 2020-11-13 PROCEDURE — D9220A PRA ANESTHESIA: Mod: PT,HCNC,ANES, | Performed by: ANESTHESIOLOGY

## 2020-11-13 PROCEDURE — 63600175 PHARM REV CODE 636 W HCPCS: Mod: HCNC,PO | Performed by: NURSE ANESTHETIST, CERTIFIED REGISTERED

## 2020-11-13 PROCEDURE — D9220A PRA ANESTHESIA: ICD-10-PCS | Mod: PT,HCNC,CRNA, | Performed by: NURSE ANESTHETIST, CERTIFIED REGISTERED

## 2020-11-13 PROCEDURE — 37000009 HC ANESTHESIA EA ADD 15 MINS: Mod: HCNC,PO | Performed by: INTERNAL MEDICINE

## 2020-11-13 PROCEDURE — 25000003 PHARM REV CODE 250: Mod: HCNC,PO | Performed by: NURSE ANESTHETIST, CERTIFIED REGISTERED

## 2020-11-13 PROCEDURE — 63600175 PHARM REV CODE 636 W HCPCS: Mod: HCNC,PO | Performed by: INTERNAL MEDICINE

## 2020-11-13 RX ORDER — PROPOFOL 10 MG/ML
VIAL (ML) INTRAVENOUS
Status: DISCONTINUED | OUTPATIENT
Start: 2020-11-13 | End: 2020-11-13

## 2020-11-13 RX ORDER — LIDOCAINE HCL/PF 100 MG/5ML
SYRINGE (ML) INTRAVENOUS
Status: DISCONTINUED | OUTPATIENT
Start: 2020-11-13 | End: 2020-11-13

## 2020-11-13 RX ORDER — SODIUM CHLORIDE 0.9 % (FLUSH) 0.9 %
10 SYRINGE (ML) INJECTION
Status: DISCONTINUED | OUTPATIENT
Start: 2020-11-13 | End: 2020-11-13 | Stop reason: HOSPADM

## 2020-11-13 RX ORDER — SODIUM CHLORIDE, SODIUM LACTATE, POTASSIUM CHLORIDE, CALCIUM CHLORIDE 600; 310; 30; 20 MG/100ML; MG/100ML; MG/100ML; MG/100ML
INJECTION, SOLUTION INTRAVENOUS CONTINUOUS
Status: DISCONTINUED | OUTPATIENT
Start: 2020-11-13 | End: 2020-11-13 | Stop reason: HOSPADM

## 2020-11-13 RX ADMIN — PROPOFOL 30 MG: 10 INJECTION, EMULSION INTRAVENOUS at 10:11

## 2020-11-13 RX ADMIN — PROPOFOL 30 MG: 10 INJECTION, EMULSION INTRAVENOUS at 09:11

## 2020-11-13 RX ADMIN — LIDOCAINE HYDROCHLORIDE 100 MG: 20 INJECTION, SOLUTION INTRAVENOUS at 09:11

## 2020-11-13 RX ADMIN — SODIUM CHLORIDE, SODIUM LACTATE, POTASSIUM CHLORIDE, AND CALCIUM CHLORIDE: .6; .31; .03; .02 INJECTION, SOLUTION INTRAVENOUS at 09:11

## 2020-11-13 NOTE — ANESTHESIA POSTPROCEDURE EVALUATION
Anesthesia Post Evaluation    Patient: Dominick Song MD    Procedure(s) Performed: Procedure(s) (LRB):  COLONOSCOPY (N/A)    Final Anesthesia Type: general    Patient location during evaluation: PACU  Patient participation: Yes- Able to Participate  Level of consciousness: awake and alert and oriented  Post-procedure vital signs: reviewed and stable  Pain management: adequate  Airway patency: patent    PONV status at discharge: No PONV  Anesthetic complications: no      Cardiovascular status: blood pressure returned to baseline  Respiratory status: unassisted, spontaneous ventilation and room air  Hydration status: euvolemic  Follow-up not needed.          Vitals Value Taken Time   /68 11/13/20 1038   Temp 36.6 °C (97.8 °F) 11/13/20 1024   Pulse 45 11/13/20 1038   Resp 16 11/13/20 1038   SpO2 98 % 11/13/20 1038         No case tracking events are documented in the log.      Pain/Pari Score: Pari Score: 9 (11/13/2020 10:24 AM)

## 2020-11-13 NOTE — DISCHARGE SUMMARY
Discharge Note  Short Stay      SUMMARY     Admit Date: 11/13/2020    Attending Physician: Toby Hernandez MD     Discharge Physician: Toby Hernandez MD    Discharge Date: 11/13/2020 10:26 AM    Final Diagnosis: Colon cancer screening [Z12.11]    Disposition: HOME OR SELF CARE    Patient Instructions:   Current Discharge Medication List      CONTINUE these medications which have NOT CHANGED    Details   buPROPion (WELLBUTRIN XL) 300 MG 24 hr tablet Take 1 tablet (300 mg total) by mouth once daily.  Qty: 90 tablet, Refills: 3      calcium carbonate (OS-KISHAN) 500 mg calcium (1,250 mg) tablet       calcium-vitamin D 600 mg(1,500mg) -400 unit Tab 600 mg.      carvediloL (COREG) 12.5 MG tablet Take 1 tablet (12.5 mg total) by mouth 2 (two) times daily with meals.  Qty: 180 tablet, Refills: 3      celecoxib (CELEBREX) 200 MG capsule TAKE 1 CAPSULE(200 MG) BY MOUTH TWICE DAILY  Qty: 180 capsule, Refills: 0    Associated Diagnoses: Post-operative state      dutasteride (AVODART) 0.5 mg capsule TAKE 1 CAPSULE(0.5 MG) BY MOUTH EVERY DAY  Qty: 90 capsule, Refills: 3    Associated Diagnoses: Benign prostatic hyperplasia      efinaconazole (JUBLIA) 10 % Prakash Apply topically.      escitalopram oxalate (LEXAPRO) 10 MG tablet TAKE 1 TABLET(10 MG) BY MOUTH EVERY DAY  Qty: 90 tablet, Refills: 3    Comments: Please inactivate all prior scripts with same name and strength including on holds.      ferrous sulfate (FEOSOL) 325 mg (65 mg iron) Tab tablet Take by mouth.      levothyroxine (SYNTHROID) 75 MCG tablet Take 1 tablet (75 mcg total) by mouth before breakfast.  Qty: 90 tablet, Refills: 3    Associated Diagnoses: Hypothyroidism due to acquired atrophy of thyroid      OMEGA-3 FATTY ACIDS (FISH OIL CONCENTRATE ORAL) Take by mouth Daily.      propafenone (RYTHMOL SR) 425 MG Cp12 Take 1 capsule (425 mg total) by mouth every 12 (twelve) hours.  Qty: 180 capsule, Refills: 3    Associated Diagnoses: Atrial fibrillation,  unspecified type      telmisartan (MICARDIS) 40 MG Tab Take 1 tablet (40 mg total) by mouth once daily.  Qty: 90 tablet, Refills: 3    Associated Diagnoses: Hypertension, unspecified type      acyclovir (ZOVIRAX) 800 MG Tab TK ONE T PO FIVE TIMES D only for fever blisters  Refills: 1      alendronate (FOSAMAX) 70 MG tablet Take 1 tablet (70 mg total) by mouth every 7 days. Take with a full glass of water & remain upright for at least 30 minutes  Hold fosfomax until 2 week Followup  Qty: 12 tablet, Refills: 3    Associated Diagnoses: Osteoporosis without pathological fracture      amoxicillin (AMOXIL) 500 MG Tab Take 500 mg by mouth every 12 (twelve) hours.      aspirin 81 MG Chew 81 mg.      atorvastatin (LIPITOR) 10 MG tablet Take 1 tablet (10 mg total) by mouth once daily.  Qty: 90 tablet, Refills: 3      betamethasone acetate-betamethasone sodium phosphate (CELESTONE) 6 mg/mL injection as needed.       CALCIUM ORAL Take by mouth Daily.      cyclobenzaprine (FLEXERIL) 10 MG tablet Take 1 tablet (10 mg total) by mouth every 8 (eight) hours as needed. FOR MUSCLE SPASM  Qty: 30 tablet, Refills: 0    Associated Diagnoses: Muscle spasms of both lower extremities      ELIQUIS 5 mg Tab Take 1 tablet (5 mg total) by mouth 2 (two) times daily.  Qty: 60 tablet, Refills: 11      FLUZONE HIGHDOSE QUAD 20-21  mcg/0.7 mL Syrg ADM 0.7ML IM UTD      furosemide (LASIX) 20 MG tablet Take 1 tablet (20 mg total) by mouth daily as needed (shortness of breath, leg swelling).  Qty: 5 tablet, Refills: 0      gabapentin (NEURONTIN) 100 MG capsule Take 1 capsule (100 mg total) by mouth 3 (three) times daily. for 14 days  Qty: 42 capsule, Refills: 0      KENALOG 40 mg/mL injection as needed.       ondansetron (ZOFRAN-ODT) 8 MG TbDL Take 1 tablet (8 mg total) by mouth every 8 (eight) hours as needed.  Qty: 30 tablet, Refills: 0      oxyCODONE-acetaminophen (PERCOCET) 5-325 mg per tablet Take 1 tablet by mouth every 12 (twelve) hours as  needed for Pain.  Qty: 15 tablet, Refills: 0    Comments: Quantity prescribed more than 7 day supply? Yes, quantity medically necessary  Associated Diagnoses: Closed nondisplaced intertrochanteric fracture of left femur with routine healing, subsequent encounter      pantoprazole (PROTONIX) 40 MG tablet Take 1 tablet (40 mg total) by mouth once daily.  Qty: 30 tablet, Refills: 0             Discharge Procedure Orders (must include Diet, Follow-up, Activity)    Follow Up:  Follow up with PCP as previously scheduled  Resume routine diet.  Activity as tolerated.    No driving day of procedure.

## 2020-11-13 NOTE — TRANSFER OF CARE
"Anesthesia Transfer of Care Note    Patient: Dominick Song MD    Procedure(s) Performed: Procedure(s) (LRB):  COLONOSCOPY (N/A)    Patient location: PACU    Anesthesia Type: general    Transport from OR: Transported from OR on room air with adequate spontaneous ventilation    Post pain: adequate analgesia    Post assessment: no apparent anesthetic complications and tolerated procedure well    Post vital signs: stable    Level of consciousness: awake and alert    Nausea/Vomiting: no nausea/vomiting    Complications: none    Transfer of care protocol was followed      Last vitals:   Visit Vitals  /62 (BP Location: Right arm, Patient Position: Lying)   Pulse 69   Temp 36.3 °C (97.3 °F) (Skin)   Resp 16   Ht 6' 2" (1.88 m)   Wt 96.2 kg (212 lb)   SpO2 (!) 93%   BMI 27.22 kg/m²     "

## 2020-11-13 NOTE — [employer] PATIENT INSTRUCTIONS
Discharge Summary/Instructions after an Endoscopic Procedure  Patient Name: Dominick Song  Patient MRN: [id number]  Patient YOB: 1945  Friday, November 13, 2020  Toby Hernandez MD  RESTRICTIONS:  During your procedure today, you received medications for sedation.  These   medications may affect your judgment, balance and coordination.  Therefore,   for 24 hours, you have the following restrictions:   - DO NOT drive a car, operate machinery, make legal/financial decisions,   sign important papers or drink alcohol.    ACTIVITY:  Today: no heavy lifting, straining or running due to procedural   sedation/anesthesia.  The following day: return to full activity including work.  DIET:  Eat and drink normally unless instructed otherwise.     TREATMENT FOR COMMON SIDE EFFECTS:  - Mild abdominal pain, nausea, belching, bloating or excessive gas:  rest,   eat lightly and use a heating pad.  - Sore Throat: treat with throat lozenges and/or gargle with warm salt   water.  - Because air was used during the procedure, expelling large amounts of air   from your rectum or belching is normal.  - If a bowel prep was taken, you may not have a bowel movement for 1-3 days.    This is normal.  SYMPTOMS TO WATCH FOR AND REPORT TO YOUR PHYSICIAN:  1. Abdominal pain or bloating, other than gas cramps.  2. Chest pain.  3. Back pain.  4. Signs of infection such as: chills or fever occurring within 24 hours   after the procedure.  5. Rectal bleeding, which would show as bright red, maroon, or black stools.   (A tablespoon of blood from the rectum is not serious, especially if   hemorrhoids are present.)  6. Vomiting.  7. Weakness or dizziness.  GO DIRECTLY TO THE NEAREST EMERGENCY ROOM IF YOU HAVE ANY OF THE FOLLOWING:      Difficulty breathing              Chills and/or fever over 101 F   Persistent vomiting and/or vomiting blood   Severe abdominal pain   Severe chest pain   Black, tarry stools   Bleeding- more than one  tablespoon   Any other symptom or condition that you feel may need urgent attention  Your doctor recommends these additional instructions:  If any biopsies were taken, your doctors clinic will contact you in 1 to 2   weeks with any results.  Your physician has recommended an air contrast barium enema at appointment   to be scheduled.   Your physician has recommended a repeat colonoscopy in five years for   surveillance.   You are being discharged to home.  For questions, problems or results please call your physician - Toby Hernandez MD at Work:  [phone].  EMERGENCY PHONE NUMBER: [phone], LAB RESULTS: [phone]  IF A COMPLICATION OR EMERGENCY SITUATION ARISES AND YOU ARE UNABLE TO REACH   YOUR PHYSICIAN - GO DIRECTLY TO THE EMERGENCY ROOM.  ___________________________________________  Nurse Signature  ___________________________________________  Patient/Designated Responsible Party Signature  Toby Hernandez MD  11/13/2020 10:25:23 AM  This report has been verified and signed electronically.  [employer]

## 2020-11-13 NOTE — ANESTHESIA PREPROCEDURE EVALUATION
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    Anesthesia Evaluation    I have reviewed the Patient Summary Reports.    I have reviewed the Nursing Notes. I have reviewed the NPO Status.   I have reviewed the Medications.     Review of Systems  Cardiovascular:   Hypertension  6/2020  Normal appearing left atrial appendage. No thrombus is present in the appendage. Normal appendage velocities. No thrombus is present in the left atrium.  ·Normal left ventricular systolic function. The estimated ejection fraction is 60%.  Normal right ventricular systolic function.      Pulmonary:   Shortness of breath Sleep Apnea    Renal/:   Chronic Renal Disease    Hepatic/GI:   PUD, Liver Disease, Hepatitis, C    Musculoskeletal:   Arthritis     Neurological:   CVA    Psych:   Psychiatric History          Physical Exam  General:  Well nourished    Airway/Jaw/Neck:  Airway Findings: Mouth Opening: Normal Tongue: Normal  General Airway Assessment: Adult, Good  Mallampati: II  Improves to II with phonation.  TM Distance: 4-6 cm      Dental:  Dental Findings: In tact   Chest/Lungs:  Chest/Lungs Findings: Clear to auscultation, Normal Respiratory Rate     Heart/Vascular:  Heart Findings: Rate: Normal  Rhythm: Regular Rhythm  Sounds: Normal  Heart murmur: negative       Mental Status:  Mental Status Findings:  Cooperative, Alert and Oriented         Anesthesia Plan  Type of Anesthesia, risks & benefits discussed:  Anesthesia Type:  general  Patient's Preference:   Intra-op Monitoring Plan: standard ASA monitors  Intra-op Monitoring Plan Comments:   Post Op Pain Control Plan:   Post Op Pain Control Plan Comments:   Induction:   IV  Beta Blocker:  Patient is on a Beta-Blocker and has received one dose within the past 24 hours (No further documentation required).       Informed Consent: Patient understands risks and agrees  with Anesthesia plan.  Questions answered. Anesthesia consent signed with patient.  ASA Score: 3     Day of Surgery Review of History & Physical:    H&P update referred to the surgeon.         Ready For Surgery From Anesthesia Perspective.

## 2020-11-13 NOTE — H&P
History & Physical - Short Stay  Gastroenterology      SUBJECTIVE:     Procedure: Colonoscopy    Chief Complaint/Indication for Procedure: Previous Polyps    PTA Medications   Medication Sig    acyclovir (ZOVIRAX) 800 MG Tab TK ONE T PO FIVE TIMES D only for fever blisters    alendronate (FOSAMAX) 70 MG tablet Take 1 tablet (70 mg total) by mouth every 7 days. Take with a full glass of water & remain upright for at least 30 minutes  Hold fosfomax until 2 week Followup (Patient not taking: Reported on 10/20/2020)    amoxicillin (AMOXIL) 500 MG Tab Take 500 mg by mouth every 12 (twelve) hours.    aspirin 81 MG Chew 81 mg.    atorvastatin (LIPITOR) 10 MG tablet Take 1 tablet (10 mg total) by mouth once daily.    betamethasone acetate-betamethasone sodium phosphate (CELESTONE) 6 mg/mL injection as needed.     buPROPion (WELLBUTRIN XL) 300 MG 24 hr tablet Take 1 tablet (300 mg total) by mouth once daily.    calcium carbonate (OS-KISHAN) 500 mg calcium (1,250 mg) tablet     CALCIUM ORAL Take by mouth Daily.    calcium-vitamin D 600 mg(1,500mg) -400 unit Tab 600 mg.    carvediloL (COREG) 12.5 MG tablet Take 1 tablet (12.5 mg total) by mouth 2 (two) times daily with meals.    celecoxib (CELEBREX) 200 MG capsule TAKE 1 CAPSULE(200 MG) BY MOUTH TWICE DAILY    cyclobenzaprine (FLEXERIL) 10 MG tablet Take 1 tablet (10 mg total) by mouth every 8 (eight) hours as needed. FOR MUSCLE SPASM    dutasteride (AVODART) 0.5 mg capsule TAKE 1 CAPSULE(0.5 MG) BY MOUTH EVERY DAY    efinaconazole (JUBLIA) 10 % Prakash Apply topically.    ELIQUIS 5 mg Tab Take 1 tablet (5 mg total) by mouth 2 (two) times daily.    escitalopram oxalate (LEXAPRO) 10 MG tablet TAKE 1 TABLET(10 MG) BY MOUTH EVERY DAY    ferrous sulfate (FEOSOL) 325 mg (65 mg iron) Tab tablet Take by mouth.    FLUZONE HIGHDOSE QUAD 20-21  mcg/0.7 mL Syrg ADM 0.7ML IM UTD    furosemide (LASIX) 20 MG tablet Take 1 tablet (20 mg total) by mouth daily as needed  (shortness of breath, leg swelling).    gabapentin (NEURONTIN) 100 MG capsule Take 1 capsule (100 mg total) by mouth 3 (three) times daily. for 14 days    KENALOG 40 mg/mL injection as needed.     levothyroxine (SYNTHROID) 75 MCG tablet Take 1 tablet (75 mcg total) by mouth before breakfast.    OMEGA-3 FATTY ACIDS (FISH OIL CONCENTRATE ORAL) Take by mouth Daily.    ondansetron (ZOFRAN-ODT) 8 MG TbDL Take 1 tablet (8 mg total) by mouth every 8 (eight) hours as needed. (Patient not taking: Reported on 10/20/2020)    oxyCODONE-acetaminophen (PERCOCET) 5-325 mg per tablet Take 1 tablet by mouth every 12 (twelve) hours as needed for Pain.    pantoprazole (PROTONIX) 40 MG tablet Take 1 tablet (40 mg total) by mouth once daily.    propafenone (RYTHMOL SR) 425 MG Cp12 Take 1 capsule (425 mg total) by mouth every 12 (twelve) hours.    telmisartan (MICARDIS) 40 MG Tab Take 1 tablet (40 mg total) by mouth once daily.       Review of patient's allergies indicates:   Allergen Reactions    No known drug allergies         Past Medical History:   Diagnosis Date    Anticoagulant long-term use     Anxiety     Arthritis     Atrial fibrillation     BPH (benign prostatic hyperplasia)     Cataract     CKD (chronic kidney disease) stage 3, GFR 30-59 ml/min 7/10/2017    Followed by Dr. Jeevan Pond    Colon polyp     benign    Depression     Elevated PSA     Erectile dysfunction     Gastric ulcer with hemorrhage     Hep B w/o coma 1977    History of bleeding peptic ulcer     History of prostatitis     Hypertension     PAF (paroxysmal atrial fibrillation)     Sleep apnea     on CPAP    Stroke     TIA December 2019    Thyroid disease      Past Surgical History:   Procedure Laterality Date    ABDOMINAL HERNIA REPAIR      ABLATION OF ARRHYTHMOGENIC FOCUS FOR ATRIAL FIBRILLATION N/A 6/15/2020    Procedure: Ablation atrial fibrillation;  Surgeon: Wyatt Beatty MD;  [facility]: [facility];  Service: Cardiology;   Laterality: N/A;  PAF, AFl, PEPE, PVI re-do, CTI, RFA, JUSTIN, Gen, SK, 3 Prep    CATARACT EXTRACTION  11/25/2013    bilateral    CHOLECYSTECTOMY      COLONOSCOPY N/A 11/25/2015    Procedure: COLONOSCOPY;  Surgeon: Toby Hernandez MD;  Location: [facility] ENDO;  Service: Endoscopy;  Laterality: N/A;    EYE SURGERY      GASTRIC BYPASS  1992    INTRAMEDULLARY RODDING OF FEMUR Left 7/18/2020    Procedure: INSERTION, INTRAMEDULLARY ERIC, FEMUR, left, [employer]ynthes, [place] table, Large C arm clock side,;  Surgeon: Tony Rodriguez MD;  Location: [address];  Service: Orthopedics;  Laterality: Left;    KNEE ARTHROSCOPY      RT    LASIK  2001    both eyes (Dr. Rabago)    ORIF HUMERUS FRACTURE      LT    ORIF HUMERUS FRACTURE Right     non surgical repair    RADIOFREQUENCY ABLATION      x2    Right ankle tendon repair      ROTATOR CUFF REPAIR      right    TOTAL KNEE ARTHROPLASTY Right 5/29/2018    Procedure: REPLACEMENT-KNEE-TOTAL-axel;  Surgeon: Denzel Dallas MD;  [facility];  Service: Orthopedics;  Laterality: Right;  [place]    TREATMENT OF CARDIAC ARRHYTHMIA  6/15/2020    Procedure: Cardioversion or Defibrillation;  Surgeon: Wyatt Beatty MD;  Location: [facility] EP LAB;  Service: Cardiology;;     Family History   Problem Relation Age of Onset    COPD Father     Diabetes Father     Osteoporosis Father     Aortic stenosis Mother     Heart disease Mother         aortic stenosis    Osteoporosis Mother     Heart attack Brother     No Known Problems Son     No Known Problems Daughter     No Known Problems Daughter     No Known Problems Daughter      Social History     Tobacco Use    Smoking status: Never Smoker    Smokeless tobacco: Never Used    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                                 HEENT EXAM:  Nonicteric.  No adenopathy.  Oropharynx is clear.               NECK:  Supple.                                                               LUNGS:  Clear.                                                               CARDIAC:  Regular rate and rhythm.  S1, S2.  No murmur.                      ABDOMEN:  Soft, positive bowel sounds, nontender.  No hepatosplenomegaly or masses.  No rebound or guarding.                                             EXTREMITIES:  No edema.     MENTAL STATUS:  Normal, alert and oriented.      ASSESSMENT/PLAN:     Assessment: Previous Polyps    Plan: Colonoscopy    Anesthesia Plan: General    ASA Grade: ASA 2 - Patient with mild systemic disease with no functional limitations    MALLAMPATI SCORE:  I (soft palate, uvula, fauces, and tonsillar pillars visible)

## 2020-11-19 ENCOUNTER — OFFICE VISIT (OUTPATIENT)
Dept: FAMILY MEDICINE | Facility: CLINIC | Age: 75
End: 2020-11-19
Payer: MEDICARE

## 2020-11-19 ENCOUNTER — PATIENT MESSAGE (OUTPATIENT)
Dept: FAMILY MEDICINE | Facility: CLINIC | Age: 75
End: 2020-11-19

## 2020-11-19 ENCOUNTER — OFFICE VISIT (OUTPATIENT)
Dept: OTOLARYNGOLOGY | Facility: CLINIC | Age: 75
End: 2020-11-19
Payer: MEDICARE

## 2020-11-19 VITALS
BODY MASS INDEX: 27.22 KG/M2 | TEMPERATURE: 98 F | DIASTOLIC BLOOD PRESSURE: 78 MMHG | HEART RATE: 66 BPM | WEIGHT: 212 LBS | SYSTOLIC BLOOD PRESSURE: 142 MMHG | OXYGEN SATURATION: 96 %

## 2020-11-19 DIAGNOSIS — H92.22 EAR BLEEDING, LEFT: Primary | ICD-10-CM

## 2020-11-19 DIAGNOSIS — Z97.4 WEARS HEARING AID IN BOTH EARS: ICD-10-CM

## 2020-11-19 DIAGNOSIS — S00.412A ABRASION OF EAR CANAL, LEFT, INITIAL ENCOUNTER: Primary | ICD-10-CM

## 2020-11-19 DIAGNOSIS — H92.22 EAR BLEEDING, LEFT: ICD-10-CM

## 2020-11-19 PROCEDURE — 99213 PR OFFICE/OUTPT VISIT, EST, LEVL III, 20-29 MIN: ICD-10-PCS | Mod: 25,HCNC,S$GLB, | Performed by: NURSE PRACTITIONER

## 2020-11-19 PROCEDURE — 1157F PR ADVANCE CARE PLAN OR EQUIV PRESENT IN MEDICAL RECORD: ICD-10-PCS | Mod: HCNC,S$GLB,, | Performed by: NURSE PRACTITIONER

## 2020-11-19 PROCEDURE — 1159F MED LIST DOCD IN RCRD: CPT | Mod: HCNC,S$GLB,, | Performed by: NURSE PRACTITIONER

## 2020-11-19 PROCEDURE — 69210 PR REMOVAL IMPACTED CERUMEN REQUIRING INSTRUMENTATION, UNILATERAL: ICD-10-PCS | Mod: HCNC,S$GLB,, | Performed by: NURSE PRACTITIONER

## 2020-11-19 PROCEDURE — 99213 OFFICE O/P EST LOW 20 MIN: CPT | Mod: HCNC,S$GLB,, | Performed by: NURSE PRACTITIONER

## 2020-11-19 PROCEDURE — 3077F PR MOST RECENT SYSTOLIC BLOOD PRESSURE >= 140 MM HG: ICD-10-PCS | Mod: HCNC,CPTII,S$GLB, | Performed by: NURSE PRACTITIONER

## 2020-11-19 PROCEDURE — 99999 PR PBB SHADOW E&M-EST. PATIENT-LVL III: ICD-10-PCS | Mod: PBBFAC,HCNC,, | Performed by: NURSE PRACTITIONER

## 2020-11-19 PROCEDURE — 1126F AMNT PAIN NOTED NONE PRSNT: CPT | Mod: HCNC,S$GLB,, | Performed by: NURSE PRACTITIONER

## 2020-11-19 PROCEDURE — 3077F SYST BP >= 140 MM HG: CPT | Mod: HCNC,CPTII,S$GLB, | Performed by: NURSE PRACTITIONER

## 2020-11-19 PROCEDURE — 1126F PR PAIN SEVERITY QUANTIFIED, NO PAIN PRESENT: ICD-10-PCS | Mod: HCNC,S$GLB,, | Performed by: NURSE PRACTITIONER

## 2020-11-19 PROCEDURE — 1159F PR MEDICATION LIST DOCUMENTED IN MEDICAL RECORD: ICD-10-PCS | Mod: HCNC,S$GLB,, | Performed by: NURSE PRACTITIONER

## 2020-11-19 PROCEDURE — 3078F PR MOST RECENT DIASTOLIC BLOOD PRESSURE < 80 MM HG: ICD-10-PCS | Mod: HCNC,CPTII,S$GLB, | Performed by: NURSE PRACTITIONER

## 2020-11-19 PROCEDURE — 69210 REMOVE IMPACTED EAR WAX UNI: CPT | Mod: HCNC,S$GLB,, | Performed by: NURSE PRACTITIONER

## 2020-11-19 PROCEDURE — 3078F DIAST BP <80 MM HG: CPT | Mod: HCNC,CPTII,S$GLB, | Performed by: NURSE PRACTITIONER

## 2020-11-19 PROCEDURE — 99213 PR OFFICE/OUTPT VISIT, EST, LEVL III, 20-29 MIN: ICD-10-PCS | Mod: HCNC,S$GLB,, | Performed by: NURSE PRACTITIONER

## 2020-11-19 PROCEDURE — 3008F PR BODY MASS INDEX (BMI) DOCUMENTED: ICD-10-PCS | Mod: HCNC,CPTII,S$GLB, | Performed by: NURSE PRACTITIONER

## 2020-11-19 PROCEDURE — 99999 PR PBB SHADOW E&M-EST. PATIENT-LVL III: CPT | Mod: PBBFAC,HCNC,, | Performed by: NURSE PRACTITIONER

## 2020-11-19 PROCEDURE — 3008F BODY MASS INDEX DOCD: CPT | Mod: HCNC,CPTII,S$GLB, | Performed by: NURSE PRACTITIONER

## 2020-11-19 PROCEDURE — 3288F PR FALLS RISK ASSESSMENT DOCUMENTED: ICD-10-PCS | Mod: HCNC,CPTII,S$GLB, | Performed by: NURSE PRACTITIONER

## 2020-11-19 PROCEDURE — 99213 OFFICE O/P EST LOW 20 MIN: CPT | Mod: 25,HCNC,S$GLB, | Performed by: NURSE PRACTITIONER

## 2020-11-19 PROCEDURE — 1101F PT FALLS ASSESS-DOCD LE1/YR: CPT | Mod: HCNC,CPTII,S$GLB, | Performed by: NURSE PRACTITIONER

## 2020-11-19 PROCEDURE — 1157F ADVNC CARE PLAN IN RCRD: CPT | Mod: HCNC,S$GLB,, | Performed by: NURSE PRACTITIONER

## 2020-11-19 PROCEDURE — 3288F FALL RISK ASSESSMENT DOCD: CPT | Mod: HCNC,CPTII,S$GLB, | Performed by: NURSE PRACTITIONER

## 2020-11-19 PROCEDURE — 1101F PR PT FALLS ASSESS DOC 0-1 FALLS W/OUT INJ PAST YR: ICD-10-PCS | Mod: HCNC,CPTII,S$GLB, | Performed by: NURSE PRACTITIONER

## 2020-11-19 RX ORDER — CIPROFLOXACIN HYDROCHLORIDE 3 MG/ML
SOLUTION/ DROPS OPHTHALMIC
Qty: 10 ML | Refills: 0 | Status: SHIPPED | OUTPATIENT
Start: 2020-11-19 | End: 2021-09-08 | Stop reason: ALTCHOICE

## 2020-11-19 NOTE — LETTER
November 19, 2020      Yenni Vazquez NP  [address] [facility] [address] [place] [address]           [facility]  [address] [place] [address]  Phone: [phone]  Fax: [phone]          Patient: Dominick MARCIA MD Duncan   MR Number: [id number]   YOB: 1945   Date of Visit: 11/19/2020       Dear Yenni Vazquez:    Thank you for referring Dominick Song to me for evaluation. Attached you will find relevant portions of my assessment and plan of care.    If you have questions, please do not hesitate to call me. I look forward to following Dominick Song along with you.    Sincerely,    Alyx Puente NP    Enclosure  CC:  No Recipients    If you would like to receive this communication electronically, please contact [email or web address] or [phone] to request more information on ShareThis Link access.    For providers and/or their staff who would like to refer a patient to [facility], please contact us through our one-stop-shop provider referral line, [facility] , at [phone].    If you feel you have received this communication in error or would no longer like to receive these types of communications, please e-mail [email or web address]

## 2020-11-19 NOTE — PROGRESS NOTES
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    Chief Complaint: No chief complaint on file.    HPI   Patient got up in the middle of the night to use the restroom and felt something dripping from his left ear. Went back to bed and when he woke up again, there was blood on his pillow and left ear. He irrigated his left ear. He went to see his audiologist who used a video otoscope to show patient his ear canal. There was a blood clot on inferior canal wall. He takes Eliquis. 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 They are flying to [place] on Monday.    Review of Systems   Constitutional: Negative.    HENT: Positive for ear discharge.    Eyes: Negative.    Respiratory: Negative.    Cardiovascular: Negative.    Gastrointestinal: Negative.    Musculoskeletal: Negative.    Integumentary:  Negative.   Neurological: Negative.    Hematological: Negative.    Psychiatric/Behavioral: Negative.          Objective:      Physical Exam  Vitals signs and nursing note reviewed.   Constitutional:       General: He is not in acute distress.     Appearance: He is well-developed. He is not ill-appearing or diaphoretic.   HENT:      Head: Normocephalic and atraumatic.      Right Ear: Hearing, tympanic membrane, ear canal and external ear normal. No middle ear effusion. Tympanic membrane is not erythematous.      Left Ear: Hearing, tympanic membrane, ear canal and external ear normal.  No middle ear effusion. Tympanic membrane is not erythematous.      Nose: Nose normal.   Eyes:      General: Lids are normal. No scleral icterus.        Right eye: No discharge.         Left eye: No discharge.   Neck:      Musculoskeletal: Normal range of motion and neck supple.      Trachea: Trachea normal. No tracheal deviation.   Cardiovascular:      Rate and Rhythm: Normal rate.   Pulmonary:      Effort: Pulmonary effort is normal. No respiratory distress.      Breath sounds: No  stridor. No wheezing.   Musculoskeletal: Normal range of motion.   Skin:     General: Skin is warm and dry.      Coloration: Skin is not pale.   Neurological:      Mental Status: He is alert and oriented to person, place, and time.      Coordination: Coordination normal.      Gait: Gait normal.   Psychiatric:         Speech: Speech normal.         Behavior: Behavior normal. Behavior is cooperative.         Thought Content: Thought content normal.         Judgment: Judgment normal.       SEPARATE PROCEDURE IN OFFICE:   Procedure: Removal of impacted cerumen, LEFT   Pre Procedure Diagnosis: Cerumen Impaction   Post Procedure Diagnosis: Cerumen Impaction   Verbal informed consent in regards to risk of trauma to ear canal, ear drum or hearing, discomfort during procedure and/or inability to remove cerumen impaction in one session or unforeseen events or complications.   No anesthesia.     Procedure in detail:   Ear canal visualized bilateral with appropriate size ear speculum utilizing Operating Head Binocular Otomicroscope   Utilizing the following:  suction cannula was used. The impacted cerumen of the ear canals was removed atraumatically. The TM and EAC were then inspected and found to be clear of wax. See description of TMs/EACs in PE above.   Complications: No   Condition: Improved/Good     Assessment:     Left inferior canal wall superficial abrasion    Blood clot removed from left EAC  Plan:     Ciprofloxacin gtt AS BID X 5 days    Return to clinic as needed for any further ENT concerns

## 2020-11-19 NOTE — TELEPHONE ENCOUNTER
Spoke to pt scheduling an appt to get evaluated for ear problems. Pt scheduled today w/ Yenni Vazquez NP. Pt verbalized understanding.

## 2020-11-19 NOTE — PROGRESS NOTES
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    Chief Complaint: Ear Injury (pt stated bllod clot on the ear drum, did irrigate it )    Patient says that last night he felt something wet in his left ear. Noted some blood on his pillow. No pain, he has noticed a decrease in his hearing. He has not used any q tips in the ear. He did irrigate the ear with water prior to coming to this appointment, he did notice a small amount of blood tinged drainage.     Past Medical History:  No date: Anticoagulant long-term use  No date: Anxiety  No date: Arthritis  No date: Atrial fibrillation  No date: BPH (benign prostatic hyperplasia)  No date: Cataract  7/10/2017: CKD (chronic kidney disease) stage 3, GFR 30-59 ml/min      Comment:  Followed by Dr. Jeevan Pond  No date: Colon polyp      Comment:  benign  No date: Depression  No date: Elevated PSA  No date: Erectile dysfunction  No date: Gastric ulcer with hemorrhage  1977: Hep B w/o coma  No date: History of bleeding peptic ulcer  No date: History of prostatitis  No date: Hypertension  No date: PAF (paroxysmal atrial fibrillation)  No date: Sleep apnea      Comment:  on CPAP  No date: Stroke      Comment:  TIA December 2019  No date: Thyroid disease    Past Surgical History:  No date: ABDOMINAL HERNIA REPAIR  6/15/2020: ABLATION OF ARRHYTHMOGENIC FOCUS FOR ATRIAL FIBRILLATION;   N/A      Comment:  Procedure: Ablation atrial fibrillation;  Surgeon: Wyatt Beatty MD;  Location: [facility] EP LAB;  Service: Cardiology;               Laterality: N/A;  PAF, AFl, PEPE, PVI re-do, CTI, RFA,                JUSTIN, Gen, SK, 3 Prep  11/25/2013: CATARACT EXTRACTION      Comment:  bilateral  No date: CHOLECYSTECTOMY  11/25/2015: COLONOSCOPY; N/A      Comment:  Procedure: COLONOSCOPY;  Surgeon: Toby Hernandez MD;  Location: [facility] ENDO;  Service: Endoscopy;                 Laterality: N/A;  11/13/2020: COLONOSCOPY; N/A      Comment:  Procedure:  COLONOSCOPY;  Surgeon: Toby Hernandez MD;  Location: [facility] ENDO;  Service: Endoscopy;                 Laterality: N/A;  No date: EYE SURGERY  1992: GASTRIC BYPASS  7/18/2020: INTRAMEDULLARY RODDING OF FEMUR; Left      Comment:  Procedure: INSERTION, INTRAMEDULLARY ERIC, FEMUR, left,                Synthes, [place] table, Large C arm clock side,;  Surgeon:                Tnoy Rodriguez MD;  Location: [address];                 Service: Orthopedics;  Laterality: Left;  No date: KNEE ARTHROSCOPY      Comment:  RT  2001: LASIK      Comment:  both eyes (Dr. Rabago)  No date: ORIF HUMERUS FRACTURE      Comment:  LT  No date: ORIF HUMERUS FRACTURE; Right      Comment:  non surgical repair  No date: RADIOFREQUENCY ABLATION      Comment:  x2  No date: Right ankle tendon repair  No date: ROTATOR CUFF REPAIR      Comment:  right  5/29/2018: TOTAL KNEE ARTHROPLASTY; Right      Comment:  Procedure: REPLACEMENT-KNEE-TOTAL-axel;  Surgeon:                Denzel Dallas MD;  Location: [address];                 Service: Orthopedics;  Laterality: Right;  [place]  6/15/2020: TREATMENT OF CARDIAC ARRHYTHMIA      Comment:  Procedure: Cardioversion or Defibrillation;  Surgeon:                Wyatt Beatty MD;  Location: [facility] EP LAB;  Service:                Cardiology;;    Review of patient's family history indicates:  Problem: COPD      Relation: Father          Age of Onset: (Not Specified)  Problem: Diabetes      Relation: Father          Age of Onset: (Not Specified)  Problem: Osteoporosis      Relation: Father          Age of Onset: (Not Specified)  Problem: Aortic stenosis      Relation: Mother          Age of Onset: (Not Specified)  Problem: Heart disease      Relation: Mother          Age of Onset: (Not Specified)          Comment: aortic stenosis  Problem: Osteoporosis      Relation: Mother          Age of Onset: (Not Specified)  Problem: Heart attack      Relation: Brother          Age of  Onset: (Not Specified)  Problem: No Known Problems      Relation: Son          Age of Onset: (Not Specified)  Problem: No Known Problems      Relation: Daughter          Age of Onset: (Not Specified)  Problem: No Known Problems      Relation: Daughter          Age of Onset: (Not Specified)  Problem: No Known Problems      Relation: Daughter          Age of Onset: (Not Specified)      Social History    Socioeconomic History      Marital status:       Spouse name: Not on file      Number of children: 5      Years of education: Not on file      Highest education level: Not on file    Occupational History      Occupation: retired anesthesiology        Employer: [employer]      Occupation: [employer] grad        Comment: previous football player    Social Needs      Financial resource strain: Not on file      Food insecurity        Worry: Not on file        Inability: Not on file      Transportation needs        Medical: Not on file        Non-medical: Not on file    Tobacco Use      Smoking status: Never Smoker      Smokeless tobacco: Never Used      Tobacco 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     Substance and Sexual Activity      Alcohol use: No      Drug use: No      Sexual activity: Yes        Partners: Female    Lifestyle      Physical activity        Days per week: Not on file        Minutes per session: Not on file      Stress: Not on file    Relationships      Social connections        Talks on phone: Not on file        Gets together: Not on file        Attends Taoist service: Not on file        Active member of club or organization: Not on file        Attends meetings of clubs or organizations: Not on file        Relationship status: Not on file    Other Topics      Concerns:        Patient feels they ought to cut down on drinking/drug use: Not Asked        Patient annoyed by others criticizing their drinking/drug use: Not Asked        Patient has felt bad or guilty about  drinking/drug use: Not Asked        Patient has had a drink/used drugs as an eye opener in the AM: Not Asked    Social History Narrative      Not on file      Current Outpatient Medications:  acyclovir (ZOVIRAX) 800 MG Tab, TK ONE T PO FIVE TIMES D only for fever blisters, Disp: , Rfl: 1  alendronate (FOSAMAX) 70 MG tablet, Take 1 tablet (70 mg total) by mouth every 7 days. Take with a full glass of water & remain upright for at least 30 minutesHold fosfomax until 2 week Followup, Disp: 12 tablet, Rfl: 3  amoxicillin (AMOXIL) 500 MG Tab, Take 500 mg by mouth every 12 (twelve) hours., Disp: , Rfl:   aspirin 81 MG Chew, 81 mg., Disp: , Rfl:   atorvastatin (LIPITOR) 10 MG tablet, Take 1 tablet (10 mg total) by mouth once daily., Disp: 90 tablet, Rfl: 3  betamethasone acetate-betamethasone sodium phosphate (CELESTONE) 6 mg/mL injection, as needed. , Disp: , Rfl:   buPROPion (WELLBUTRIN XL) 300 MG 24 hr tablet, Take 1 tablet (300 mg total) by mouth once daily., Disp: 90 tablet, Rfl: 3  calcium carbonate (OS-KISHAN) 500 mg calcium (1,250 mg) tablet, , Disp: , Rfl:    CALCIUM ORAL, Take by mouth Daily., Disp: , Rfl:   calcium-vitamin D 600 mg(1,500mg) -400 unit Tab, 600 mg., Disp: , Rfl:   carvediloL (COREG) 12.5 MG tablet, Take 1 tablet (12.5 mg total) by mouth 2 (two) times daily with meals., Disp: 180 tablet, Rfl: 3  celecoxib (CELEBREX) 200 MG capsule, Take 1 capsule (200 mg total) by mouth 2 (two) times daily., Disp: 180 capsule, Rfl: 0  cyclobenzaprine (FLEXERIL) 10 MG tablet, Take 1 tablet (10 mg total) by mouth every 8 (eight) hours as needed. FOR MUSCLE SPASM, Disp: 30 tablet, Rfl: 0  dutasteride (AVODART) 0.5 mg capsule, TAKE 1 CAPSULE(0.5 MG) BY MOUTH EVERY DAY, Disp: 90 capsule, Rfl: 3  efinaconazole (JUBLIA) 10 % Prakash, Apply topically., Disp: , Rfl:   ELIQUIS 5 mg Tab, Take 1 tablet (5 mg total) by mouth 2 (two) times daily., Disp: 60 tablet, Rfl: 11  escitalopram oxalate (LEXAPRO) 10 MG tablet, TAKE 1 TABLET(10  MG) BY MOUTH EVERY DAY, Disp: 90 tablet, Rfl: 3  ferrous sulfate (FEOSOL) 325 mg (65 mg iron) Tab tablet, Take by mouth., Disp: , Rfl:   FLUZONE HIGHDOSE QUAD 20-21  mcg/0.7 mL Syrg, ADM 0.7ML IM UTD, Disp: , Rfl:   KENALOG 40 mg/mL injection, as needed. , Disp: , Rfl:   levothyroxine (SYNTHROID) 75 MCG tablet, Take 1 tablet (75 mcg total) by mouth before breakfast., Disp: 90 tablet, Rfl: 3  OMEGA-3 FATTY ACIDS (FISH OIL CONCENTRATE ORAL), Take by mouth Daily., Disp: , Rfl:   ondansetron (ZOFRAN-ODT) 8 MG TbDL, Take 1 tablet (8 mg total) by mouth every 8 (eight) hours as needed., Disp: 30 tablet, Rfl: 0  oxyCODONE-acetaminophen (PERCOCET) 5-325 mg per tablet, Take 1 tablet by mouth every 12 (twelve) hours as needed for Pain., Disp: 15 tablet, Rfl: 0  propafenone (RYTHMOL SR) 425 MG Cp12, Take 1 capsule (425 mg total) by mouth every 12 (twelve) hours., Disp: 180 capsule, Rfl: 3  telmisartan (MICARDIS) 40 MG Tab, Take 1 tablet (40 mg total) by mouth once daily., Disp: 90 tablet, Rfl: 3  furosemide (LASIX) 20 MG tablet, Take 1 tablet (20 mg total) by mouth daily as needed (shortness of breath, leg swelling)., Disp: 5 tablet, Rfl: 0  gabapentin (NEURONTIN) 100 MG capsule, Take 1 capsule (100 mg total) by mouth 3 (three) times daily. for 14 days, Disp: 42 capsule, Rfl: 0  pantoprazole (PROTONIX) 40 MG tablet, Take 1 tablet (40 mg total) by mouth once daily., Disp: 30 tablet, Rfl: 0    No current facility-administered medications for this visit.       Review of patient's allergies indicates:   -- No known drug allergies     Review of Systems   Constitutional: Negative.  Negative for fever.   HENT: Positive for ear discharge and hearing loss.    Respiratory: Negative.    Neurological: Negative.          Objective:      Physical Exam  HENT:      Head: Normocephalic.      Left Ear: Drainage present.      Ears:     Cardiovascular:      Rate and Rhythm: Normal rate.   Pulmonary:      Effort: Pulmonary effort is normal.    Psychiatric:         Mood and Affect: Mood normal.         Behavior: Behavior normal.         Assessment:       1. Ear bleeding, left        Plan:       1. Ear bleeding, left  ENT will see patient today while in clinic to evaluate  - Ambulatory referral/consult to ENT; Future

## 2020-11-20 NOTE — TELEPHONE ENCOUNTER
ABLATION EDUCATION CHECKLIST    4/11/2017 @ 9 AM - CTA   This test has been ordered for you @ [facility], 2nd floor Radiology (next to lab).   Please fast four hours prior to procedure.      4/21/17 @ 1030 AM - Lab Work   Pre-procedure labs have been ordered for you @ [facility]  Be sure to arrive at your scheduled time for this lab work!  You do not have to fast for this lab work!     4/26/17 @ 8 AM - Transesophageal Echocardiogram (PEPE) (arrival time)  Report to the Cardiology Waiting Area on the 3rd floor of the hospital at (Do not report to clinic)  Directions for Reporting to Cardiology Waiting Area in the Hospital  If you park in the Parking Garage:  Take elevators to the 2nd floor  Walk up ramp and turn right by Gold Elevators  Take elevator to the 3rd floor  Upon exiting the elevator, turn away from the clinic areas  Walk long flannery around to front of hospital to area with windows overlooking [facility]  Check in at Reception Desk  OR  If family is dropping you off:  Have them drop you off at the front of the Hospital  (Near the ER, where all the flags are hung).  Take the E elevators to the 3rd floor.  Check in at the Reception Desk in the waiting room.    Do not eat or drink anything after 12 mn the night before your procedure  You may take your usual morning medications with a sip of water  A nurse from the Echo Dept will call you to ask you some questions before your procedure.    You will need someone to drive you home after your test.     4/27/17 @ 6 AM - Ablation (arrival time)  Report to Cardiology Waiting Room on 3rd floor of the Hospital    (Do not report to clinic)  Directions for Reporting to Cardiology Waiting Area in the Hospital  If you park in the Parking Garage:  Take elevators to the 2nd floor  Walk up ramp and turn right by Gold Elevators  Take elevator to the 3rd floor  Upon exiting the elevator, turn away from the clinic areas  Walk long flannery around to front of  hospital to area with windows overlooking [facility]  Check in at Reception Desk  OR  If family is dropping you off:  Have them drop you off at the front of the Hospital  (Near the ER, where all the flags are hung).  Take the E elevators to the 3rd floor.  Check in at the Reception Desk in the waiting room.    Do not eat or drink anything after: 12 mn on the night before your procedure    Medications:   HOLD apixaban (Eliquis) on the evening prior AND the morning of your procedure. YOUR LAST DOSE: ON MORNING OF Wednesday, April 26, 2017.   HOLD your fluid pill the morning of your procedure - furosemide (Lasix).   You may take your OTHER usual morning medications with a sip of water    You will be spending the night after your procedure  You will need someone to drive you home the day after your procedure.    Your pain during your procedure will be managed by the anesthesia team.     THE ABOVE INSTRUCTIONS WERE GIVEN TO THE PATIENT VERBALLY AND THEY VERBALIZED UNDERSTANDING.  THEY DO NOT REQUIRE ANY SPECIAL NEEDS AND DO NOT HAVE ANY LEARNING BARRIERS.    Any need to reschedule or cancel procedures, or any questions regarding your procedures should be addressed directly with the Arrhythmia Department Nurses at the following phone number: [phone]         daily/2x/day

## 2020-11-25 ENCOUNTER — TELEPHONE (OUTPATIENT)
Dept: GASTROENTEROLOGY | Facility: CLINIC | Age: 75
End: 2020-11-25

## 2020-11-25 DIAGNOSIS — Q43.8 TORTUOUS COLON: Primary | ICD-10-CM

## 2020-12-02 ENCOUNTER — OFFICE VISIT (OUTPATIENT)
Dept: ENDOCRINOLOGY | Facility: CLINIC | Age: 75
End: 2020-12-02
Payer: MEDICARE

## 2020-12-02 VITALS
DIASTOLIC BLOOD PRESSURE: 80 MMHG | WEIGHT: 212 LBS | OXYGEN SATURATION: 100 % | SYSTOLIC BLOOD PRESSURE: 146 MMHG | BODY MASS INDEX: 27.21 KG/M2 | HEART RATE: 55 BPM | HEIGHT: 74 IN

## 2020-12-02 DIAGNOSIS — M89.9 DISORDER OF BONE, UNSPECIFIED: ICD-10-CM

## 2020-12-02 DIAGNOSIS — I48.0 PAROXYSMAL ATRIAL FIBRILLATION: ICD-10-CM

## 2020-12-02 DIAGNOSIS — M80.00XD AGE-RELATED OSTEOPOROSIS WITH CURRENT PATHOLOGICAL FRACTURE WITH ROUTINE HEALING, SUBSEQUENT ENCOUNTER: Primary | ICD-10-CM

## 2020-12-02 DIAGNOSIS — E03.4 HYPOTHYROIDISM DUE TO ACQUIRED ATROPHY OF THYROID: ICD-10-CM

## 2020-12-02 DIAGNOSIS — N18.31 STAGE 3A CHRONIC KIDNEY DISEASE: ICD-10-CM

## 2020-12-02 DIAGNOSIS — I10 ESSENTIAL HYPERTENSION: ICD-10-CM

## 2020-12-02 PROCEDURE — 1159F PR MEDICATION LIST DOCUMENTED IN MEDICAL RECORD: ICD-10-PCS | Mod: HCNC,S$GLB,, | Performed by: INTERNAL MEDICINE

## 2020-12-02 PROCEDURE — 1157F ADVNC CARE PLAN IN RCRD: CPT | Mod: HCNC,S$GLB,, | Performed by: INTERNAL MEDICINE

## 2020-12-02 PROCEDURE — 99499 UNLISTED E&M SERVICE: CPT | Mod: S$GLB,,, | Performed by: INTERNAL MEDICINE

## 2020-12-02 PROCEDURE — 99214 PR OFFICE/OUTPT VISIT, EST, LEVL IV, 30-39 MIN: ICD-10-PCS | Mod: HCNC,S$GLB,, | Performed by: INTERNAL MEDICINE

## 2020-12-02 PROCEDURE — 3079F PR MOST RECENT DIASTOLIC BLOOD PRESSURE 80-89 MM HG: ICD-10-PCS | Mod: HCNC,CPTII,S$GLB, | Performed by: INTERNAL MEDICINE

## 2020-12-02 PROCEDURE — 99999 PR PBB SHADOW E&M-EST. PATIENT-LVL V: CPT | Mod: PBBFAC,HCNC,, | Performed by: INTERNAL MEDICINE

## 2020-12-02 PROCEDURE — 1126F PR PAIN SEVERITY QUANTIFIED, NO PAIN PRESENT: ICD-10-PCS | Mod: HCNC,S$GLB,, | Performed by: INTERNAL MEDICINE

## 2020-12-02 PROCEDURE — 3288F PR FALLS RISK ASSESSMENT DOCUMENTED: ICD-10-PCS | Mod: HCNC,CPTII,S$GLB, | Performed by: INTERNAL MEDICINE

## 2020-12-02 PROCEDURE — 99214 OFFICE O/P EST MOD 30 MIN: CPT | Mod: HCNC,S$GLB,, | Performed by: INTERNAL MEDICINE

## 2020-12-02 PROCEDURE — 3288F FALL RISK ASSESSMENT DOCD: CPT | Mod: HCNC,CPTII,S$GLB, | Performed by: INTERNAL MEDICINE

## 2020-12-02 PROCEDURE — 99499 RISK ADDL DX/OHS AUDIT: ICD-10-PCS | Mod: S$GLB,,, | Performed by: INTERNAL MEDICINE

## 2020-12-02 PROCEDURE — 3077F PR MOST RECENT SYSTOLIC BLOOD PRESSURE >= 140 MM HG: ICD-10-PCS | Mod: HCNC,CPTII,S$GLB, | Performed by: INTERNAL MEDICINE

## 2020-12-02 PROCEDURE — 3077F SYST BP >= 140 MM HG: CPT | Mod: HCNC,CPTII,S$GLB, | Performed by: INTERNAL MEDICINE

## 2020-12-02 PROCEDURE — 3079F DIAST BP 80-89 MM HG: CPT | Mod: HCNC,CPTII,S$GLB, | Performed by: INTERNAL MEDICINE

## 2020-12-02 PROCEDURE — 3008F BODY MASS INDEX DOCD: CPT | Mod: HCNC,CPTII,S$GLB, | Performed by: INTERNAL MEDICINE

## 2020-12-02 PROCEDURE — 1100F PR PT FALLS ASSESS DOC 2+ FALLS/FALL W/INJURY/YR: ICD-10-PCS | Mod: HCNC,CPTII,S$GLB, | Performed by: INTERNAL MEDICINE

## 2020-12-02 PROCEDURE — 1100F PTFALLS ASSESS-DOCD GE2>/YR: CPT | Mod: HCNC,CPTII,S$GLB, | Performed by: INTERNAL MEDICINE

## 2020-12-02 PROCEDURE — 1159F MED LIST DOCD IN RCRD: CPT | Mod: HCNC,S$GLB,, | Performed by: INTERNAL MEDICINE

## 2020-12-02 PROCEDURE — 1157F PR ADVANCE CARE PLAN OR EQUIV PRESENT IN MEDICAL RECORD: ICD-10-PCS | Mod: HCNC,S$GLB,, | Performed by: INTERNAL MEDICINE

## 2020-12-02 PROCEDURE — 99999 PR PBB SHADOW E&M-EST. PATIENT-LVL V: ICD-10-PCS | Mod: PBBFAC,HCNC,, | Performed by: INTERNAL MEDICINE

## 2020-12-02 PROCEDURE — 1126F AMNT PAIN NOTED NONE PRSNT: CPT | Mod: HCNC,S$GLB,, | Performed by: INTERNAL MEDICINE

## 2020-12-02 PROCEDURE — 3008F PR BODY MASS INDEX (BMI) DOCUMENTED: ICD-10-PCS | Mod: HCNC,CPTII,S$GLB, | Performed by: INTERNAL MEDICINE

## 2020-12-02 RX ORDER — HYDROCHLOROTHIAZIDE 12.5 MG/1
12.5 TABLET ORAL DAILY
Qty: 90 TABLET | Refills: 3 | Status: SHIPPED | OUTPATIENT
Start: 2020-12-02 | End: 2021-07-12

## 2020-12-02 NOTE — ASSESSMENT & PLAN NOTE
BP not at goal. Continue current meds for now. Pt to notify PCP if BP consistently more than 140/90.

## 2020-12-02 NOTE — ASSESSMENT & PLAN NOTE
"Pt with h/o osteoporosis on fosamax since Dec 2019 now with L hip fx July 2020.    Pt notes compliance with therapy previously.  Would not necessarily consider that pt "failed fosamax."  Repeat bone turn over markers now. If elevated, can't intrepret but if they have trended down from baseline, that would suggest medication was being absorbed.    Check for other secondary causes of osteoporosis.  Prior work up with elevated BTM  Nl 8 am testosterone    Had HPT with increased urinary calcium and borderline low calcium. Suspect this exacerbated by past h/o gastric bypass.   Plan to start HCTZ to increase Ca absorption. Advised pt to keep hydrated. EGFR reduced and somewhat labile. Monitor.    Plan to start anabolic, forteo or tymlos after re-evaluating PTH levels.  Discussed potential adverse effects.  If not an anabolic, would favor Reclast if choosing bisphosphonate therapy again.     Continue calcium (9296-8405 mg daily, calcium from food sources preferred).  Check vitamin D (800-2000 IU).  Continue weight bearing/muscle strengthening exercises.   Discussed fall precautions.   Discussed black box warning: osteosarcoma in rats where exposure was 3-60X human exposure      "

## 2020-12-02 NOTE — PROGRESS NOTES
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    Chief Complaint:  Osteoporosis    Pt presents to follow up for hypothyroidism, osteoporosis,  and review of chronic medical conditions as listed in the visit diagnosis section of this encounter.      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 He self medicated with pain medication. After a few days, he called his PCP Dr. Gaines and was sent to get an xray of the left hip which showed osteopenia. Dexa was then ordered which showed osteoporosis. In the past he played football at [employer] and remembers injuring his R knee. A few years ago, he re-injured the same knee after falling off a ladder while trimming bushes. During this time, he was in a leg immobilizer and slipped on a wet floor and broke his L arm in 3 place which required surgical repaired. During rehab, the plate broke and further surgery was required. He also fractured his R arm in the past which did not require surgery. Notes both parents had osteorporosis. No history of hypercalcemia or renal stones. Had gastric bypass over 20 years ago. Takes calcium carbonate 1200 mg daily and a daily multivitamin with D3 1600 IU since the surgery. No recent issues with malabsorption.  Does not do weight bearing exercises. But often does a lot of shrubbery/bamboo clean up around his yard. Had a GIB several years ago. No issues with bleeding since then. Is on Eliquis for afib. Does not have GERD sx's and is does not take PPIs. Is intolerant to milk.    Since last visit, pt fell and broke left hip on July 18, 2020. Had carpet removed recently from stairs. He missed the last step and went falling down to the hardwood floor beneath and landed on hip. Had surgery with no complications. Was taken off fosamax while "in the healing phase." Did not resume Fosamax after this. Prior to this, had been compliant with " "therapy since Dec 2019.  No additional falls since July.  Prior work up for osteoporosis showed:    Hypothyroidism. Chronic, stable.  Due to complaint of fatigue, PCP checked TSH, and he was found to have hypothyroidism.  Denies problems with swallowing or voice changes. Does not take Levothyroxine on an empty stomach. No symptoms of overactive thyroid. Has been on levothyroxine 75 mcg since last visit. TSH 3 Sept 2020 wnls.     H/o testosterone deficiency: A few years ago, he noticed decreased strength and libido. Was placed on testo. Only took testo for a few months. Can not recall why testo was discontinued. Did have elevated PSAs in the past. Notes a history of chronic prostatitis. Had several prostate biospies and follows up with urology. Has significant BPH sx's with nocturia 3-4 times a night. Was told he might need to have prostate surgery to reduce the size. Energy level "could be better." Has interest in sex but is limited by ED. Does not have morning erections. Over the last 2-3 years he notes loss of strength and muscle mass. Also feels like his height is decreasing. Was 6''2' in the past. Testosterone level     11/4/2019 DXA scanning was performed over the left hip and lumbar spine.  Review of the images confirms satisfactory positioning and technique.     COMPARISON:  None     FINDINGS:  The L1 to L4 vertebral bone mineral density is equal to 0.932 g/cm squared with a T score of -1.4.     The left femoral neck bone mineral density is equal to 0.542 g/cm squared with a T score of -2.9.     There is a 18% risk of a major osteoporotic fracture and a 7.4% risk of hip fracture in the next 10 years (FRAX).     Impression:     Osteoporosis          Review of Systems   Constitution: Positive for fatigue. Negative for decreased appetite, diaphoresis, malaise/fatigue, night sweats and weight loss.   HENT: Positive for hearing loss. Negative for hoarse voice, odynophagia and sore throat.         Wearing hearing " aids   Eyes: Negative for blurred vision and double vision.   Cardiovascular: Positive for dyspnea on exertion and leg swelling. Negative for chest pain, irregular heartbeat, near-syncope, palpitations and syncope.   Respiratory: Positive for snoring. Negative for shortness of breath.         Dx with BRENTON- does not use CPAP   Endocrine: Negative for cold intolerance and heat intolerance.   Hematologic/Lymphatic: Negative for adenopathy and bleeding problem. Bruises/bleeds easily.   Skin: Negative for dry skin, nail changes and unusual hair distribution.   Musculoskeletal: Positive for falls. Negative for muscle cramps and myalgias.        Due to weakness in knees. Sometimes looses strength and falls.   Gastrointestinal: Negative for abdominal pain, diarrhea, dysphagia, heartburn, nausea and vomiting.   Genitourinary: Positive for decreased libido, frequency, hesitancy, incomplete emptying, nocturia and urgency.   Neurological: Negative for difficulty with concentration, dizziness, headaches, numbness and tremors.        Only when in afib   Psychiatric/Behavioral: Negative for depression. The patient does not have insomnia and is not nervous/anxious.           Social History     Socioeconomic History    Marital status:      Spouse name: Not on file    Number of children: 5   Occupational History    Occupation: retired anesthesiology     Employer: [employer]   Tobacco Use    Smoking status: Never Smoker    Smokeless tobacco: Never Used       Substance and Sexual Activity    Alcohol use: No    Drug use: No      Family History   Problem Relation Age of Onset    COPD Father     Diabetes Father     Osteoporosis Father     Aortic stenosis Mother     Heart disease Mother         aortic stenosis    Osteoporosis Mother     Heart attack Brother     No Known Problems Son     No Known Problems Daughter     No Known Problems Daughter     No Known Problems Daughter       Past Surgical  History:   Procedure Laterality Date    ABDOMINAL HERNIA REPAIR      ABLATION OF ARRHYTHMOGENIC FOCUS FOR ATRIAL FIBRILLATION N/A 6/15/2020    Procedure: Ablation atrial fibrillation;  Surgeon: Wyatt Beatty MD;  Location: [facility] EP LAB;  Service: Cardiology;  Laterality: N/A;  PAF, AFl, PEPE, PVI re-do, CTI, RFA, JUSTIN, Gen, SK, 3 Prep    CATARACT EXTRACTION  11/25/2013    bilateral    CHOLECYSTECTOMY      COLONOSCOPY N/A 11/25/2015    Procedure: COLONOSCOPY;  Surgeon: Toby Hernandez MD;  Location: [facility] ENDO;  Service: Endoscopy;  Laterality: N/A;    COLONOSCOPY N/A 11/13/2020    Procedure: COLONOSCOPY;  Surgeon: Toby Hernandez MD;  Location: [facility] ENDO;  Service: Endoscopy;  Laterality: N/A;    EYE SURGERY      GASTRIC BYPASS  1992    INTRAMEDULLARY RODDING OF FEMUR Left 7/18/2020    Procedure: INSERTION, INTRAMEDULLARY ERIC, FEMUR, left, Synthes, [place] table, Large C arm clock side,;  Surgeon: Tony Rodriguez MD;  [facility];  Service: Orthopedics;  Laterality: Left;    KNEE ARTHROSCOPY      RT    LASIK  2001    both eyes (Dr. Rabago)    ORIF HUMERUS FRACTURE      LT    ORIF HUMERUS FRACTURE Right     non surgical repair    RADIOFREQUENCY ABLATION      x2    Right ankle tendon repair      ROTATOR CUFF REPAIR      right    TOTAL KNEE ARTHROPLASTY Right 5/29/2018    Procedure: REPLACEMENT-KNEE-TOTAL-axel;  Surgeon: Denzel Dallas MD;  [facility];  Service: Orthopedics;  Laterality: Right;  [place]    TREATMENT OF CARDIAC ARRHYTHMIA  6/15/2020    Procedure: Cardioversion or Defibrillation;  Surgeon: Wyatt Beatty MD;  Location: [facility] EP LAB;  Service: Cardiology;;        Current Outpatient Medications:     buPROPion (WELLBUTRIN XL) 300 MG 24 hr tablet, Take 1 tablet (300 mg total) by mouth once daily., Disp: 90 tablet, Rfl: 3    calcium carbonate (OS-KISHAN) 500 mg calcium (1,250 mg) tablet, , Disp: , Rfl:     CALCIUM ORAL, Take by mouth Daily., Disp: , Rfl:      calcium-vitamin D 600 mg(1,500mg) -400 unit Tab, 600 mg., Disp: , Rfl:     carvediloL (COREG) 12.5 MG tablet, Take 1 tablet (12.5 mg total) by mouth 2 (two) times daily with meals., Disp: 180 tablet, Rfl: 3    celecoxib (CELEBREX) 200 MG capsule, Take 1 capsule (200 mg total) by mouth 2 (two) times daily., Disp: 180 capsule, Rfl: 0    cyclobenzaprine (FLEXERIL) 10 MG tablet, Take 1 tablet (10 mg total) by mouth every 8 (eight) hours as needed. FOR MUSCLE SPASM, Disp: 30 tablet, Rfl: 0    dutasteride (AVODART) 0.5 mg capsule, TAKE 1 CAPSULE(0.5 MG) BY MOUTH EVERY DAY, Disp: 90 capsule, Rfl: 3    ELIQUIS 5 mg Tab, Take 1 tablet (5 mg total) by mouth 2 (two) times daily., Disp: 60 tablet, Rfl: 11    escitalopram oxalate (LEXAPRO) 10 MG tablet, TAKE 1 TABLET(10 MG) BY MOUTH EVERY DAY, Disp: 90 tablet, Rfl: 3    ferrous sulfate (FEOSOL) 325 mg (65 mg iron) Tab tablet, Take by mouth., Disp: , Rfl:     levothyroxine (SYNTHROID) 75 MCG tablet, Take 1 tablet (75 mcg total) by mouth before breakfast., Disp: 90 tablet, Rfl: 3    OMEGA-3 FATTY ACIDS (FISH OIL CONCENTRATE ORAL), Take by mouth Daily., Disp: , Rfl:     propafenone (RYTHMOL SR) 425 MG Cp12, Take 1 capsule (425 mg total) by mouth every 12 (twelve) hours., Disp: 180 capsule, Rfl: 3    telmisartan (MICARDIS) 40 MG Tab, Take 1 tablet (40 mg total) by mouth once daily., Disp: 90 tablet, Rfl: 3    acyclovir (ZOVIRAX) 800 MG Tab, TK ONE T PO FIVE TIMES D only for fever blisters, Disp: , Rfl: 1    alendronate (FOSAMAX) 70 MG tablet, Take 1 tablet (70 mg total) by mouth every 7 days. Take with a full glass of water & remain upright for at least 30 minutes Hold fosfomax until 2 week Followup (Patient not taking: Reported on 12/2/2020), Disp: 12 tablet, Rfl: 3    amoxicillin (AMOXIL) 500 MG Tab, Take 500 mg by mouth every 12 (twelve) hours., Disp: , Rfl:     aspirin 81 MG Chew, 81 mg., Disp: , Rfl:     atorvastatin (LIPITOR) 10 MG tablet, Take 1 tablet  (10 mg total) by mouth once daily. (Patient not taking: Reported on 12/2/2020), Disp: 90 tablet, Rfl: 3    betamethasone acetate-betamethasone sodium phosphate (CELESTONE) 6 mg/mL injection, as needed. , Disp: , Rfl:     ciprofloxacin HCl (CILOXAN) 0.3 % ophthalmic solution, 5 drops to left EAR twice daily X 10 days, Disp: 10 mL, Rfl: 0    efinaconazole (JUBLIA) 10 % Prakash, Apply topically., Disp: , Rfl:     FLUZONE HIGHDOSE QUAD 20-21  mcg/0.7 mL Syrg, ADM 0.7ML IM UTD, Disp: , Rfl:     furosemide (LASIX) 20 MG tablet, Take 1 tablet (20 mg total) by mouth daily as needed (shortness of breath, leg swelling). (Patient not taking: Reported on 12/2/2020), Disp: 5 tablet, Rfl: 0    gabapentin (NEURONTIN) 100 MG capsule, Take 1 capsule (100 mg total) by mouth 3 (three) times daily. for 14 days, Disp: 42 capsule, Rfl: 0    hydroCHLOROthiazide (HYDRODIURIL) 12.5 MG Tab, Take 1 tablet (12.5 mg total) by mouth once daily., Disp: 90 tablet, Rfl: 3    KENALOG 40 mg/mL injection, as needed. , Disp: , Rfl:     ondansetron (ZOFRAN-ODT) 8 MG TbDL, Take 1 tablet (8 mg total) by mouth every 8 (eight) hours as needed. (Patient not taking: Reported on 12/2/2020), Disp: 30 tablet, Rfl: 0    oxyCODONE-acetaminophen (PERCOCET) 5-325 mg per tablet, Take 1 tablet by mouth every 12 (twelve) hours as needed for Pain., Disp: 15 tablet, Rfl: 0    pantoprazole (PROTONIX) 40 MG tablet, Take 1 tablet (40 mg total) by mouth once daily., Disp: 30 tablet, Rfl: 0     Review of patient's allergies indicates:   Allergen Reactions    No known drug allergies          Objective:     Vitals:    12/02/20 0928   BP: (!) 146/80   Pulse: (!) 55        Physical Exam  Vitals signs reviewed.   Constitutional:       General: He is not in acute distress.     Appearance: He is well-developed. He is not diaphoretic.      Comments: Very pleasant, WM, NAD, sitting in the chair   HENT:      Head: Normocephalic and atraumatic.   Eyes:      General: No  scleral icterus.     Conjunctiva/sclera: Conjunctivae normal.   Neck:      Thyroid: No thyromegaly.   Cardiovascular:      Heart sounds: No murmur. No friction rub. No gallop.    Pulmonary:      Effort: Pulmonary effort is normal. No respiratory distress.   Abdominal:      Tenderness: There is no abdominal tenderness.   Lymphadenopathy:      Cervical: No cervical adenopathy.   Skin:     General: Skin is warm and dry.      Findings: No rash.      Comments: Hyperpigmentation and old healed scar over L lower anterior shin.   Neurological:      Mental Status: He is alert and oriented to person, place, and time.      Cranial Nerves: No cranial nerve deficit.   Psychiatric:         Thought Content: Thought content normal.         BMP  Lab Results   Component Value Date     08/12/2020    K 4.4 08/12/2020     (H) 08/12/2020    CO2 18 (L) 08/12/2020    BUN 19 08/12/2020    CREATININE 1.3 08/12/2020    CALCIUM 8.2 (L) 08/12/2020    ANIONGAP 5 (L) 08/12/2020    ESTGFRAFRICA >60.0 08/12/2020    EGFRNONAA 54.1 (A) 08/12/2020      Lab Results   Component Value Date     08/12/2020    K 4.4 08/12/2020     (H) 08/12/2020    CO2 18 (L) 08/12/2020    BUN 19 08/12/2020    CREATININE 1.3 08/12/2020    GLU 88 08/12/2020    HGBA1C 5.2 07/17/2020    MG 2.0 07/18/2020    AST 16 08/12/2020    ALT 11 08/12/2020    ALBUMIN 3.1 (L) 08/12/2020    PROT 6.0 08/12/2020    BILITOT 0.6 08/12/2020    WBC 5.23 08/12/2020    HGB 9.7 (L) 08/12/2020    HCT 33.0 (L) 08/12/2020    HCT 39 07/28/2014    MCV 91 08/12/2020     08/12/2020    INR 1.1 07/17/2020    INR 3.4 06/07/2018    PSA 2.62 03/21/2013    TSH 2.513 08/12/2020    CHOL 112 (L) 08/12/2020    HDL 42 08/12/2020    LDLCALC 59.2 (L) 08/12/2020    TRIG 54 08/12/2020         Lab Results   Component Value Date    TSH 2.513 08/12/2020    FREET4 0.82 11/15/2019        Thyroid Labs Latest Ref Rng & Units 7/17/2020 7/18/2020 8/12/2020   TSH 0.400 - 4.000 uIU/mL - - 2.513    Free T4 0.71 - 1.51 ng/dL - - -   Sodium 136 - 145 mmol/L 137 138 139   Potassium 3.5 - 5.1 mmol/L 5.0 5.1 4.4   Chloride 95 - 110 mmol/L 111(H) 112(H) 116(H)   Carbon Dioxide 23 - 29 mmol/L 21(L) 22(L) 18(L)   Glucose 70 - 110 mg/dL 107 145(H) 88   Blood Urea Nitrogen 8 - 23 mg/dL 26(H) 23 19   Creatinine 0.5 - 1.4 mg/dL 1.8(H) 1.8(H) 1.3   Calcium 8.7 - 10.5 mg/dL 8.1(L) 7.7(L) 8.2(L)   Total Protein 6.0 - 8.4 g/dL 6.3 5.7(L) 6.0   Albumin 3.5 - 5.2 g/dL 3.5 3.0(L) 3.1(L)   Total Bilirubin 0.1 - 1.0 mg/dL 0.8 0.8 0.6   AST 10 - 40 U/L 28 136(H) 16   ALT 10 - 44 U/L 30 72(H) 11   Anion Gap 8 - 16 mmol/L 5(L) 4(L) 5(L)   eGFR (African American) >60 mL/min/1.73 m:2 42.2(A) 42.2(A) >60.0   eGFR (Non-African American) >60 mL/min/1.73 m:2 36.5(A) 36.5(A) 54.1(A)   WBC 3.90 - 12.70 K/uL 12.79(H) 10.69 5.23   RBC 4.60 - 6.20 M/uL 3.94(L) 3.83(L) 3.62(L)   Hemoglobin 14.0 - 18.0 g/dL 11.0(L) 10.6(L) 9.7(L)   Hematocrit 40.0 - 54.0 % 34.6(L) 34.6(L) 33.0(L)   MCV 82 - 98 fL 88 90 91   MCH 27.0 - 31.0 pg 27.9 27.7 26.8(L)   MCHC 32.0 - 36.0 g/dL 31.8(L) 30.6(L) 29.4(L)   RDW 11.5 - 14.5 % 14.7(H) 14.6(H) 15.8(H)   Platelets 150 - 350 K/uL 218 193 317   MPV 9.2 - 12.9 fL 11.3 11.7 11.9   Gran # 1.8 - 7.7 K/uL 9.9(H) 8.6(H) 2.6   Lymph # 1.0 - 4.8 K/uL 1.2 0.8(L) 1.2   Mono # 0.3 - 1.0 K/uL 0.9 0.7 0.5   Eos # 0.0 - 0.5 K/uL 0.6(H) 0.5 0.9(H)   Baso # 0.00 - 0.20 K/uL 0.10 0.05 0.09   Gran % 38.0 - 73.0 % 77.6(H) 80.5(H) 50.1   Lymph % 18.0 - 48.0 % 9.2(L) 7.8(L) 22.0   Mono% 4.0 - 15.0 % 7.0 6.6 8.8   Eos % 0.0 - 8.0 % 4.9 4.2 17.2(H)   Baso % 0.0 - 1.9 % 0.8 0.5 1.7   Prothrombin Time 9.0 - 12.5 sec 10.8 - -   INR 0.8 - 1.2 1.1 - -   aPTT 21.0 - 32.0 sec - - -          Lab Results   Component Value Date    WBC 5.23 08/12/2020    RBC 3.62 (L) 08/12/2020    HGB 9.7 (L) 08/12/2020    HCT 33.0 (L) 08/12/2020    MCV 91 08/12/2020    MCH 26.8 (L) 08/12/2020    MCHC 29.4 (L) 08/12/2020    RDW 15.8 (H) 08/12/2020      "08/12/2020    MPV 11.9 08/12/2020    GRAN 2.6 08/12/2020    GRAN 50.1 08/12/2020    LYMPH 1.2 08/12/2020    LYMPH 22.0 08/12/2020    MONO 0.5 08/12/2020    MONO 8.8 08/12/2020    EOS 0.9 (H) 08/12/2020    BASO 0.09 08/12/2020    EOSINOPHIL 17.2 (H) 08/12/2020    BASOPHIL 1.7 08/12/2020         EXAMINATION:  DEXA BONE DENSITY SPINE HIP    CLINICAL HISTORY:  osteoporosis screening; Disorder of bone, unspecified    TECHNIQUE:  DXA scanning was performed over the left hip and lumbar spine.  Review of the images confirms satisfactory positioning and technique.    COMPARISON:  None    FINDINGS:  The L1 to L4 vertebral bone mineral density is equal to 0.932 g/cm squared with a T score of -1.4.    The left femoral neck bone mineral density is equal to 0.542 g/cm squared with a T score of -2.9.    There is a 18% risk of a major osteoporotic fracture and a 7.4% risk of hip fracture in the next 10 years (FRAX).        ASSESMENT/PLAN:      Age-related osteoporosis with current pathological fracture with routine healing  Pt with h/o osteoporosis on fosamax since Dec 2019 now with L hip fx July 2020.    Pt notes compliance with therapy previously.  Would not necessarily consider that pt "failed fosamax."  Repeat bone turn over markers now. If elevated, can't intrepret but if they have trended down from baseline, that would suggest medication was being absorbed.    Check for other secondary causes of osteoporosis.  Prior work up with elevated BTM  Nl 8 am testosterone    Had HPT with increased urinary calcium and borderline low calcium. Suspect this exacerbated by past h/o gastric bypass.   Plan to start HCTZ to increase Ca absorption. Advised pt to keep hydrated. EGFR reduced and somewhat labile. Monitor.    Plan to start anabolic, forteo or tymlos after re-evaluating PTH levels.  Discussed potential adverse effects.  If not an anabolic, would favor Reclast if choosing bisphosphonate therapy again.     Continue calcium (1436-0818 " mg daily, calcium from food sources preferred).  Check vitamin D (800-2000 IU).  Continue weight bearing/muscle strengthening exercises.   Discussed fall precautions.   Discussed black box warning: osteosarcoma in rats where exposure was 3-60X human exposure        Hypothyroidism due to acquired atrophy of thyroid  Clinically pt euthyroid. Most recent TSH wnls. Continue Levothyroxine 75 mcg daily.    Essential hypertension  BP not at goal. Continue current meds for now. Pt to notify PCP if BP consistently more than 140/90.        Atrial fibrillation  On medical therapy. F/u with cards.    CKD (chronic kidney disease) stage 3, GFR 30-59 ml/min  Avoid nephrotoxics. Egfr has been labile in the past.    Salivary cortisol x2  24 hr urine cortisol, SPEP, UPEP, CTX, NTX, PTH, vitamin D,  renal panel 8 am and fasting now    RTC in 6 months

## 2020-12-04 ENCOUNTER — PATIENT MESSAGE (OUTPATIENT)
Dept: ENDOCRINOLOGY | Facility: CLINIC | Age: 75
End: 2020-12-04

## 2020-12-04 ENCOUNTER — PATIENT MESSAGE (OUTPATIENT)
Dept: GASTROENTEROLOGY | Facility: CLINIC | Age: 75
End: 2020-12-04

## 2020-12-04 NOTE — TELEPHONE ENCOUNTER
Pt should not take thyroid hormone with his calcium. Should take thyroid hormone first. If possible take calcium 2 hours later.

## 2020-12-07 ENCOUNTER — PATIENT MESSAGE (OUTPATIENT)
Dept: ENDOCRINOLOGY | Facility: CLINIC | Age: 75
End: 2020-12-07

## 2020-12-07 ENCOUNTER — LAB VISIT (OUTPATIENT)
Dept: LAB | Facility: HOSPITAL | Age: 75
End: 2020-12-07
Attending: INTERNAL MEDICINE
Payer: MEDICARE

## 2020-12-07 DIAGNOSIS — M80.00XD AGE-RELATED OSTEOPOROSIS WITH CURRENT PATHOLOGICAL FRACTURE WITH ROUTINE HEALING, SUBSEQUENT ENCOUNTER: ICD-10-CM

## 2020-12-07 PROCEDURE — 84166 PROTEIN E-PHORESIS/URINE/CSF: CPT | Mod: 26,HCNC,, | Performed by: PATHOLOGY

## 2020-12-07 PROCEDURE — 84166 PROTEIN E-PHORESIS/URINE/CSF: CPT | Mod: HCNC

## 2020-12-07 PROCEDURE — 84156 ASSAY OF PROTEIN URINE: CPT | Mod: HCNC

## 2020-12-07 PROCEDURE — 84166 PATHOLOGIST INTERPRETATION UPE: ICD-10-PCS | Mod: 26,HCNC,, | Performed by: PATHOLOGY

## 2020-12-08 ENCOUNTER — PATIENT MESSAGE (OUTPATIENT)
Dept: GASTROENTEROLOGY | Facility: CLINIC | Age: 75
End: 2020-12-08

## 2020-12-08 DIAGNOSIS — R10.32 LEFT LOWER QUADRANT PAIN: ICD-10-CM

## 2020-12-08 DIAGNOSIS — R10.32 LLQ PAIN: Primary | ICD-10-CM

## 2020-12-08 LAB
PROT 24H UR-MRATE: NORMAL MG/SPEC (ref 0–100)
PROT UR-MCNC: <7 MG/DL (ref 0–15)
URINE COLLECTION DURATION: 24 HR
URINE VOLUME: 2675 ML

## 2020-12-10 LAB
PATHOLOGIST INTERPRETATION UPE: NORMAL
PROT PATTERN UR ELPH-IMP: NORMAL

## 2020-12-11 ENCOUNTER — PATIENT MESSAGE (OUTPATIENT)
Dept: OTHER | Facility: OTHER | Age: 75
End: 2020-12-11

## 2020-12-15 ENCOUNTER — HOSPITAL ENCOUNTER (OUTPATIENT)
Dept: RADIOLOGY | Facility: HOSPITAL | Age: 75
Discharge: HOME | End: 2020-12-15
Attending: INTERNAL MEDICINE
Payer: MEDICARE

## 2020-12-15 DIAGNOSIS — R10.32 LEFT LOWER QUADRANT PAIN: ICD-10-CM

## 2020-12-15 PROCEDURE — 74177 CT ABD & PELVIS W/CONTRAST: CPT | Mod: TC,HCNC,PO

## 2020-12-15 PROCEDURE — A9698 NON-RAD CONTRAST MATERIALNOC: HCPCS | Mod: HCNC,PO | Performed by: INTERNAL MEDICINE

## 2020-12-15 PROCEDURE — 74177 CT ABD & PELVIS W/CONTRAST: CPT | Mod: 26,HCNC,, | Performed by: RADIOLOGY

## 2020-12-15 PROCEDURE — 25500020 PHARM REV CODE 255: Mod: HCNC,PO | Performed by: INTERNAL MEDICINE

## 2020-12-15 PROCEDURE — 74177 CT ABDOMEN PELVIS WITH CONTRAST: ICD-10-PCS | Mod: 26,HCNC,, | Performed by: RADIOLOGY

## 2020-12-15 RX ADMIN — IOHEXOL 1000 ML: 12 SOLUTION ORAL at 11:12

## 2020-12-15 RX ADMIN — IOHEXOL 100 ML: 350 INJECTION, SOLUTION INTRAVENOUS at 11:12

## 2020-12-16 DIAGNOSIS — N40.0 BENIGN PROSTATIC HYPERPLASIA: ICD-10-CM

## 2020-12-16 RX ORDER — DUTASTERIDE 0.5 MG/1
CAPSULE, LIQUID FILLED ORAL
Qty: 90 CAPSULE | Refills: 3 | Status: SHIPPED | OUTPATIENT
Start: 2020-12-16 | End: 2021-11-26 | Stop reason: SDUPTHER

## 2020-12-16 RX ORDER — DUTASTERIDE 0.5 MG/1
CAPSULE, LIQUID FILLED ORAL
Qty: 90 CAPSULE | Refills: 3 | Status: CANCELLED | OUTPATIENT
Start: 2020-12-16

## 2020-12-19 ENCOUNTER — TELEPHONE (OUTPATIENT)
Dept: ENDOCRINOLOGY | Facility: CLINIC | Age: 75
End: 2020-12-19

## 2020-12-19 DIAGNOSIS — M80.00XD AGE-RELATED OSTEOPOROSIS WITH CURRENT PATHOLOGICAL FRACTURE WITH ROUTINE HEALING, SUBSEQUENT ENCOUNTER: Primary | ICD-10-CM

## 2020-12-19 RX ORDER — TERIPARATIDE 250 UG/ML
20 INJECTION, SOLUTION SUBCUTANEOUS DAILY
Qty: 2.4 ML | Refills: 11 | Status: SHIPPED | OUTPATIENT
Start: 2020-12-19 | End: 2021-12-27 | Stop reason: SDUPTHER

## 2020-12-19 RX ORDER — IBUPROFEN 200 MG
1 CAPSULE ORAL 2 TIMES DAILY
Qty: 180 TABLET | Refills: 3 | COMMUNITY
Start: 2020-12-19 | End: 2021-12-29 | Stop reason: SDUPTHER

## 2020-12-19 NOTE — TELEPHONE ENCOUNTER
Tell pt, labs reviewed:    One MN salivary cortisol abnl.     Ca 8.6, corrected 8.9 nl. Change calcium supplements. Stop what you are doing and start calcium citrate 1 tab BID    Vitamin d 26, not at goal. Stop what you are doing and start 5,000 IU daily (OTC)    Schedule 24 hr urine cortisol test WITH a repeat 24 hr urine calcium 1 month after starting new calcium supplements.    After reviewing the data, he wanted me to decide the next step in therapy. We previously reviewed potential therapies and potential adverse effects.    I have determined to try Forteo followed by prolia. Pt with mild increase in PTH but will monitor this.  Potential serious adverse effects again: osteosarcoma (bone cancer) and increased calcium    ____________________________    Prior to hip fx July 2020, patient was on Fosamax for about 7 months. (? Absorption in this pt. Does have a hx of gastric bypass. likely should have started with reclast, CTX nl though)    At this point my 1st choice of therapy here would be Evenity followed by prolia. ((Given bone mineral density of 2.9 at the left femur on BMD Nov 2019, recent fracture of left hip July 2020 with minimal trauma, elevated NTX)). However, pt with h/o a CVA.    Given this, next option would be Forteo followed by prolia. Pt with mild increase in PTH but will monitor this.    Other work up nl: spep, upep,     NTX increased (specimen collected at 12:44 pm though)    CTX wnls    pth 143, mildly elevated. Previously wnls. Will monitor this for now. Vit D below goal, calcium LLN (on HCTZ), Has hx of gastric bypass.     Nov 2019: urine ca/cr 0.009, low but pt was ? On HCTZ already. (vol 2.9L)

## 2020-12-21 ENCOUNTER — SPECIALTY PHARMACY (OUTPATIENT)
Dept: PHARMACY | Facility: CLINIC | Age: 75
End: 2020-12-21

## 2020-12-22 ENCOUNTER — TELEPHONE (OUTPATIENT)
Dept: GASTROENTEROLOGY | Facility: CLINIC | Age: 75
End: 2020-12-22

## 2020-12-22 NOTE — TELEPHONE ENCOUNTER
----- Message from Toby Hernandez MD sent at 12/22/2020  1:40 PM CST -----  Tell pt CT unremarkable except enlarged prostate. Recommend f/u visit and PSA with Urology (Dr Shelton), which is past due.

## 2020-12-23 NOTE — TELEPHONE ENCOUNTER
Pt verbalized understanding  Will make Ca and Vit d supplement changes and then do 24 hr urine in 1 month    Pt will contact [facility] about retrieving Forteo, and will await instructions on Prolia injections

## 2020-12-28 ENCOUNTER — PATIENT MESSAGE (OUTPATIENT)
Dept: NEUROLOGY | Facility: CLINIC | Age: 75
End: 2020-12-28

## 2021-01-10 ENCOUNTER — IMMUNIZATION (OUTPATIENT)
Dept: INTERNAL MEDICINE | Facility: CLINIC | Age: 76
End: 2021-01-10
Payer: MEDICARE

## 2021-01-10 DIAGNOSIS — Z23 NEED FOR VACCINATION: ICD-10-CM

## 2021-01-10 PROCEDURE — 91300 COVID-19, MRNA, LNP-S, PF, 30 MCG/0.3 ML DOSE VACCINE: CPT | Mod: HCNC,PBBFAC | Performed by: FAMILY MEDICINE

## 2021-01-19 ENCOUNTER — PATIENT MESSAGE (OUTPATIENT)
Dept: ORTHOPEDICS | Facility: CLINIC | Age: 76
End: 2021-01-19

## 2021-01-19 DIAGNOSIS — M62.838 MUSCLE SPASMS OF BOTH LOWER EXTREMITIES: ICD-10-CM

## 2021-01-19 DIAGNOSIS — Z98.890 POST-OPERATIVE STATE: Primary | ICD-10-CM

## 2021-01-20 ENCOUNTER — PATIENT MESSAGE (OUTPATIENT)
Dept: ORTHOPEDICS | Facility: CLINIC | Age: 76
End: 2021-01-20

## 2021-01-20 RX ORDER — CELECOXIB 200 MG/1
200 CAPSULE ORAL 2 TIMES DAILY
Qty: 180 CAPSULE | Refills: 0 | Status: SHIPPED | OUTPATIENT
Start: 2021-01-20 | End: 2021-05-31 | Stop reason: SDUPTHER

## 2021-01-20 RX ORDER — CYCLOBENZAPRINE HCL 10 MG
10 TABLET ORAL EVERY 8 HOURS PRN
Qty: 30 TABLET | Refills: 0 | Status: SHIPPED | OUTPATIENT
Start: 2021-01-20 | End: 2021-02-12 | Stop reason: SDUPTHER

## 2021-01-26 ENCOUNTER — LAB VISIT (OUTPATIENT)
Dept: LAB | Facility: HOSPITAL | Age: 76
End: 2021-01-26
Attending: UROLOGY
Payer: MEDICARE

## 2021-01-26 ENCOUNTER — OFFICE VISIT (OUTPATIENT)
Dept: UROLOGY | Facility: CLINIC | Age: 76
End: 2021-01-26
Payer: MEDICARE

## 2021-01-26 VITALS
RESPIRATION RATE: 15 BRPM | DIASTOLIC BLOOD PRESSURE: 64 MMHG | HEART RATE: 61 BPM | WEIGHT: 218.25 LBS | HEIGHT: 74 IN | SYSTOLIC BLOOD PRESSURE: 126 MMHG | BODY MASS INDEX: 28.01 KG/M2

## 2021-01-26 DIAGNOSIS — N40.1 BPH WITH URINARY OBSTRUCTION: ICD-10-CM

## 2021-01-26 DIAGNOSIS — N40.1 BPH WITH URINARY OBSTRUCTION: Primary | ICD-10-CM

## 2021-01-26 DIAGNOSIS — N13.8 BPH WITH URINARY OBSTRUCTION: Primary | ICD-10-CM

## 2021-01-26 DIAGNOSIS — N40.1 BENIGN PROSTATIC HYPERPLASIA WITH URINARY OBSTRUCTION: ICD-10-CM

## 2021-01-26 DIAGNOSIS — R97.20 ELEVATED PSA: ICD-10-CM

## 2021-01-26 DIAGNOSIS — N52.1 ERECTILE DYSFUNCTION DUE TO DISEASES CLASSIFIED ELSEWHERE: ICD-10-CM

## 2021-01-26 DIAGNOSIS — N13.8 BPH WITH URINARY OBSTRUCTION: ICD-10-CM

## 2021-01-26 DIAGNOSIS — N13.8 BENIGN PROSTATIC HYPERPLASIA WITH URINARY OBSTRUCTION: ICD-10-CM

## 2021-01-26 LAB
COMPLEXED PSA SERPL-MCNC: 1.5 NG/ML (ref 0–4)
TESTOST SERPL-MCNC: 563 NG/DL (ref 304–1227)

## 2021-01-26 PROCEDURE — 1157F PR ADVANCE CARE PLAN OR EQUIV PRESENT IN MEDICAL RECORD: ICD-10-PCS | Mod: S$GLB,,, | Performed by: UROLOGY

## 2021-01-26 PROCEDURE — 3008F PR BODY MASS INDEX (BMI) DOCUMENTED: ICD-10-PCS | Mod: CPTII,S$GLB,, | Performed by: UROLOGY

## 2021-01-26 PROCEDURE — 99213 PR OFFICE/OUTPT VISIT, EST, LEVL III, 20-29 MIN: ICD-10-PCS | Mod: S$GLB,,, | Performed by: UROLOGY

## 2021-01-26 PROCEDURE — 99999 PR PBB SHADOW E&M-EST. PATIENT-LVL IV: ICD-10-PCS | Mod: PBBFAC,,, | Performed by: UROLOGY

## 2021-01-26 PROCEDURE — 99999 PR PBB SHADOW E&M-EST. PATIENT-LVL IV: CPT | Mod: PBBFAC,,, | Performed by: UROLOGY

## 2021-01-26 PROCEDURE — 36415 COLL VENOUS BLD VENIPUNCTURE: CPT

## 2021-01-26 PROCEDURE — 3288F FALL RISK ASSESSMENT DOCD: CPT | Mod: CPTII,S$GLB,, | Performed by: UROLOGY

## 2021-01-26 PROCEDURE — 1159F MED LIST DOCD IN RCRD: CPT | Mod: S$GLB,,, | Performed by: UROLOGY

## 2021-01-26 PROCEDURE — 1126F PR PAIN SEVERITY QUANTIFIED, NO PAIN PRESENT: ICD-10-PCS | Mod: S$GLB,,, | Performed by: UROLOGY

## 2021-01-26 PROCEDURE — 84153 ASSAY OF PSA TOTAL: CPT

## 2021-01-26 PROCEDURE — 3078F PR MOST RECENT DIASTOLIC BLOOD PRESSURE < 80 MM HG: ICD-10-PCS | Mod: CPTII,S$GLB,, | Performed by: UROLOGY

## 2021-01-26 PROCEDURE — 3074F PR MOST RECENT SYSTOLIC BLOOD PRESSURE < 130 MM HG: ICD-10-PCS | Mod: CPTII,S$GLB,, | Performed by: UROLOGY

## 2021-01-26 PROCEDURE — 1159F PR MEDICATION LIST DOCUMENTED IN MEDICAL RECORD: ICD-10-PCS | Mod: S$GLB,,, | Performed by: UROLOGY

## 2021-01-26 PROCEDURE — 3008F BODY MASS INDEX DOCD: CPT | Mod: CPTII,S$GLB,, | Performed by: UROLOGY

## 2021-01-26 PROCEDURE — 1100F PR PT FALLS ASSESS DOC 2+ FALLS/FALL W/INJURY/YR: ICD-10-PCS | Mod: CPTII,S$GLB,, | Performed by: UROLOGY

## 2021-01-26 PROCEDURE — 3074F SYST BP LT 130 MM HG: CPT | Mod: CPTII,S$GLB,, | Performed by: UROLOGY

## 2021-01-26 PROCEDURE — 1100F PTFALLS ASSESS-DOCD GE2>/YR: CPT | Mod: CPTII,S$GLB,, | Performed by: UROLOGY

## 2021-01-26 PROCEDURE — 99213 OFFICE O/P EST LOW 20 MIN: CPT | Mod: S$GLB,,, | Performed by: UROLOGY

## 2021-01-26 PROCEDURE — 3288F PR FALLS RISK ASSESSMENT DOCUMENTED: ICD-10-PCS | Mod: CPTII,S$GLB,, | Performed by: UROLOGY

## 2021-01-26 PROCEDURE — 1157F ADVNC CARE PLAN IN RCRD: CPT | Mod: S$GLB,,, | Performed by: UROLOGY

## 2021-01-26 PROCEDURE — 3078F DIAST BP <80 MM HG: CPT | Mod: CPTII,S$GLB,, | Performed by: UROLOGY

## 2021-01-26 PROCEDURE — 84403 ASSAY OF TOTAL TESTOSTERONE: CPT

## 2021-01-26 PROCEDURE — 1126F AMNT PAIN NOTED NONE PRSNT: CPT | Mod: S$GLB,,, | Performed by: UROLOGY

## 2021-01-26 RX ORDER — ALFUZOSIN HYDROCHLORIDE 10 MG/1
10 TABLET, EXTENDED RELEASE ORAL DAILY
Qty: 30 TABLET | Refills: 11 | Status: SHIPPED | OUTPATIENT
Start: 2021-01-26 | End: 2021-11-04 | Stop reason: SDUPTHER

## 2021-01-31 ENCOUNTER — IMMUNIZATION (OUTPATIENT)
Dept: INTERNAL MEDICINE | Facility: CLINIC | Age: 76
End: 2021-01-31
Payer: MEDICARE

## 2021-01-31 DIAGNOSIS — Z23 NEED FOR VACCINATION: Primary | ICD-10-CM

## 2021-01-31 PROCEDURE — 91300 COVID-19, MRNA, LNP-S, PF, 30 MCG/0.3 ML DOSE VACCINE: CPT | Mod: HCNC,PBBFAC

## 2021-01-31 PROCEDURE — 0002A COVID-19, MRNA, LNP-S, PF, 30 MCG/0.3 ML DOSE VACCINE: CPT | Mod: HCNC,PBBFAC

## 2021-02-03 ENCOUNTER — TELEPHONE (OUTPATIENT)
Dept: FAMILY MEDICINE | Facility: CLINIC | Age: 76
End: 2021-02-03

## 2021-02-08 NOTE — TELEPHONE ENCOUNTER
Phoned patient to offer F/A for Forteo thru a [employer] Sean.No answer, lvm for patient to call [facility].-gr

## 2021-02-09 ENCOUNTER — SPECIALTY PHARMACY (OUTPATIENT)
Dept: PHARMACY | Facility: CLINIC | Age: 76
End: 2021-02-09

## 2021-02-11 ENCOUNTER — PATIENT MESSAGE (OUTPATIENT)
Dept: PHARMACY | Facility: CLINIC | Age: 76
End: 2021-02-11

## 2021-02-17 ENCOUNTER — PATIENT MESSAGE (OUTPATIENT)
Dept: PHARMACY | Facility: CLINIC | Age: 76
End: 2021-02-17

## 2021-02-22 ENCOUNTER — PATIENT MESSAGE (OUTPATIENT)
Dept: NEUROLOGY | Facility: CLINIC | Age: 76
End: 2021-02-22

## 2021-02-22 ENCOUNTER — OFFICE VISIT (OUTPATIENT)
Dept: CARDIOLOGY | Facility: CLINIC | Age: 76
End: 2021-02-22
Payer: MEDICARE

## 2021-02-22 VITALS
HEIGHT: 74 IN | HEART RATE: 50 BPM | WEIGHT: 224.19 LBS | BODY MASS INDEX: 28.77 KG/M2 | DIASTOLIC BLOOD PRESSURE: 85 MMHG | SYSTOLIC BLOOD PRESSURE: 132 MMHG

## 2021-02-22 DIAGNOSIS — N18.31 STAGE 3A CHRONIC KIDNEY DISEASE: ICD-10-CM

## 2021-02-22 DIAGNOSIS — I48.19 PERSISTENT ATRIAL FIBRILLATION: ICD-10-CM

## 2021-02-22 DIAGNOSIS — I48.19 PERSISTENT ATRIAL FIBRILLATION: Primary | ICD-10-CM

## 2021-02-22 DIAGNOSIS — G47.31 COMPLEX SLEEP APNEA SYNDROME: ICD-10-CM

## 2021-02-22 DIAGNOSIS — I10 ESSENTIAL HYPERTENSION: ICD-10-CM

## 2021-02-22 DIAGNOSIS — E03.4 HYPOTHYROIDISM DUE TO ACQUIRED ATROPHY OF THYROID: ICD-10-CM

## 2021-02-22 DIAGNOSIS — Z86.73 HISTORY OF TIA (TRANSIENT ISCHEMIC ATTACK): ICD-10-CM

## 2021-02-22 DIAGNOSIS — I48.0 PAROXYSMAL ATRIAL FIBRILLATION: Primary | ICD-10-CM

## 2021-02-22 DIAGNOSIS — I63.9 CEREBROVASCULAR ACCIDENT (CVA), UNSPECIFIED MECHANISM: ICD-10-CM

## 2021-02-22 PROCEDURE — 93005 ELECTROCARDIOGRAM TRACING: CPT | Mod: S$GLB,,, | Performed by: INTERNAL MEDICINE

## 2021-02-22 PROCEDURE — 93005 RHYTHM STRIP: ICD-10-PCS | Mod: S$GLB,,, | Performed by: INTERNAL MEDICINE

## 2021-02-22 PROCEDURE — 1101F PT FALLS ASSESS-DOCD LE1/YR: CPT | Mod: CPTII,S$GLB,, | Performed by: INTERNAL MEDICINE

## 2021-02-22 PROCEDURE — 93010 RHYTHM STRIP: ICD-10-PCS | Mod: S$GLB,,, | Performed by: INTERNAL MEDICINE

## 2021-02-22 PROCEDURE — 93010 ELECTROCARDIOGRAM REPORT: CPT | Mod: S$GLB,,, | Performed by: INTERNAL MEDICINE

## 2021-02-22 PROCEDURE — 3079F PR MOST RECENT DIASTOLIC BLOOD PRESSURE 80-89 MM HG: ICD-10-PCS | Mod: CPTII,S$GLB,, | Performed by: INTERNAL MEDICINE

## 2021-02-22 PROCEDURE — 3008F PR BODY MASS INDEX (BMI) DOCUMENTED: ICD-10-PCS | Mod: CPTII,S$GLB,, | Performed by: INTERNAL MEDICINE

## 2021-02-22 PROCEDURE — 1157F ADVNC CARE PLAN IN RCRD: CPT | Mod: S$GLB,,, | Performed by: INTERNAL MEDICINE

## 2021-02-22 PROCEDURE — 1126F AMNT PAIN NOTED NONE PRSNT: CPT | Mod: S$GLB,,, | Performed by: INTERNAL MEDICINE

## 2021-02-22 PROCEDURE — 3075F SYST BP GE 130 - 139MM HG: CPT | Mod: CPTII,S$GLB,, | Performed by: INTERNAL MEDICINE

## 2021-02-22 PROCEDURE — 99499 RISK ADDL DX/OHS AUDIT: ICD-10-PCS | Mod: HCNC,S$GLB,, | Performed by: INTERNAL MEDICINE

## 2021-02-22 PROCEDURE — 99999 PR PBB SHADOW E&M-EST. PATIENT-LVL III: CPT | Mod: PBBFAC,,, | Performed by: INTERNAL MEDICINE

## 2021-02-22 PROCEDURE — 1159F PR MEDICATION LIST DOCUMENTED IN MEDICAL RECORD: ICD-10-PCS | Mod: S$GLB,,, | Performed by: INTERNAL MEDICINE

## 2021-02-22 PROCEDURE — 3288F PR FALLS RISK ASSESSMENT DOCUMENTED: ICD-10-PCS | Mod: CPTII,S$GLB,, | Performed by: INTERNAL MEDICINE

## 2021-02-22 PROCEDURE — 99499 UNLISTED E&M SERVICE: CPT | Mod: HCNC,S$GLB,, | Performed by: INTERNAL MEDICINE

## 2021-02-22 PROCEDURE — 99999 PR PBB SHADOW E&M-EST. PATIENT-LVL III: ICD-10-PCS | Mod: PBBFAC,,, | Performed by: INTERNAL MEDICINE

## 2021-02-22 PROCEDURE — 99215 PR OFFICE/OUTPT VISIT, EST, LEVL V, 40-54 MIN: ICD-10-PCS | Mod: S$GLB,,, | Performed by: INTERNAL MEDICINE

## 2021-02-22 PROCEDURE — 3288F FALL RISK ASSESSMENT DOCD: CPT | Mod: CPTII,S$GLB,, | Performed by: INTERNAL MEDICINE

## 2021-02-22 PROCEDURE — 1126F PR PAIN SEVERITY QUANTIFIED, NO PAIN PRESENT: ICD-10-PCS | Mod: S$GLB,,, | Performed by: INTERNAL MEDICINE

## 2021-02-22 PROCEDURE — 3075F PR MOST RECENT SYSTOLIC BLOOD PRESS GE 130-139MM HG: ICD-10-PCS | Mod: CPTII,S$GLB,, | Performed by: INTERNAL MEDICINE

## 2021-02-22 PROCEDURE — 3079F DIAST BP 80-89 MM HG: CPT | Mod: CPTII,S$GLB,, | Performed by: INTERNAL MEDICINE

## 2021-02-22 PROCEDURE — 3008F BODY MASS INDEX DOCD: CPT | Mod: CPTII,S$GLB,, | Performed by: INTERNAL MEDICINE

## 2021-02-22 PROCEDURE — 1157F PR ADVANCE CARE PLAN OR EQUIV PRESENT IN MEDICAL RECORD: ICD-10-PCS | Mod: S$GLB,,, | Performed by: INTERNAL MEDICINE

## 2021-02-22 PROCEDURE — 1159F MED LIST DOCD IN RCRD: CPT | Mod: S$GLB,,, | Performed by: INTERNAL MEDICINE

## 2021-02-22 PROCEDURE — 99215 OFFICE O/P EST HI 40 MIN: CPT | Mod: S$GLB,,, | Performed by: INTERNAL MEDICINE

## 2021-02-22 PROCEDURE — 1101F PR PT FALLS ASSESS DOC 0-1 FALLS W/OUT INJ PAST YR: ICD-10-PCS | Mod: CPTII,S$GLB,, | Performed by: INTERNAL MEDICINE

## 2021-03-04 ENCOUNTER — PATIENT MESSAGE (OUTPATIENT)
Dept: FAMILY MEDICINE | Facility: CLINIC | Age: 76
End: 2021-03-04

## 2021-03-07 ENCOUNTER — PATIENT MESSAGE (OUTPATIENT)
Dept: ORTHOPEDICS | Facility: CLINIC | Age: 76
End: 2021-03-07

## 2021-03-09 ENCOUNTER — PATIENT MESSAGE (OUTPATIENT)
Dept: ORTHOPEDICS | Facility: CLINIC | Age: 76
End: 2021-03-09

## 2021-03-10 ENCOUNTER — OFFICE VISIT (OUTPATIENT)
Dept: FAMILY MEDICINE | Facility: CLINIC | Age: 76
End: 2021-03-10
Payer: MEDICARE

## 2021-03-10 VITALS
TEMPERATURE: 98 F | WEIGHT: 218.5 LBS | BODY MASS INDEX: 28.04 KG/M2 | SYSTOLIC BLOOD PRESSURE: 124 MMHG | DIASTOLIC BLOOD PRESSURE: 60 MMHG | RESPIRATION RATE: 18 BRPM | HEIGHT: 74 IN | HEART RATE: 55 BPM

## 2021-03-10 DIAGNOSIS — I48.0 PAROXYSMAL ATRIAL FIBRILLATION: ICD-10-CM

## 2021-03-10 DIAGNOSIS — Z86.73 HISTORY OF TIA (TRANSIENT ISCHEMIC ATTACK): ICD-10-CM

## 2021-03-10 DIAGNOSIS — M62.838 MUSCLE SPASM: Primary | ICD-10-CM

## 2021-03-10 DIAGNOSIS — I10 HYPERTENSION, UNSPECIFIED TYPE: Primary | ICD-10-CM

## 2021-03-10 DIAGNOSIS — E03.4 HYPOTHYROIDISM DUE TO ACQUIRED ATROPHY OF THYROID: ICD-10-CM

## 2021-03-10 PROCEDURE — 3008F BODY MASS INDEX DOCD: CPT | Mod: CPTII,S$GLB,, | Performed by: FAMILY MEDICINE

## 2021-03-10 PROCEDURE — 3078F DIAST BP <80 MM HG: CPT | Mod: CPTII,S$GLB,, | Performed by: FAMILY MEDICINE

## 2021-03-10 PROCEDURE — 99214 OFFICE O/P EST MOD 30 MIN: CPT | Mod: S$GLB,,, | Performed by: FAMILY MEDICINE

## 2021-03-10 PROCEDURE — 3074F SYST BP LT 130 MM HG: CPT | Mod: CPTII,S$GLB,, | Performed by: FAMILY MEDICINE

## 2021-03-10 PROCEDURE — 1159F PR MEDICATION LIST DOCUMENTED IN MEDICAL RECORD: ICD-10-PCS | Mod: S$GLB,,, | Performed by: FAMILY MEDICINE

## 2021-03-10 PROCEDURE — 99499 RISK ADDL DX/OHS AUDIT: ICD-10-PCS | Mod: HCNC,S$GLB,, | Performed by: FAMILY MEDICINE

## 2021-03-10 PROCEDURE — 99999 PR PBB SHADOW E&M-EST. PATIENT-LVL III: ICD-10-PCS | Mod: PBBFAC,,, | Performed by: FAMILY MEDICINE

## 2021-03-10 PROCEDURE — 1159F MED LIST DOCD IN RCRD: CPT | Mod: S$GLB,,, | Performed by: FAMILY MEDICINE

## 2021-03-10 PROCEDURE — 3288F FALL RISK ASSESSMENT DOCD: CPT | Mod: CPTII,S$GLB,, | Performed by: FAMILY MEDICINE

## 2021-03-10 PROCEDURE — 3288F PR FALLS RISK ASSESSMENT DOCUMENTED: ICD-10-PCS | Mod: CPTII,S$GLB,, | Performed by: FAMILY MEDICINE

## 2021-03-10 PROCEDURE — 99999 PR PBB SHADOW E&M-EST. PATIENT-LVL III: CPT | Mod: PBBFAC,,, | Performed by: FAMILY MEDICINE

## 2021-03-10 PROCEDURE — 99214 PR OFFICE/OUTPT VISIT, EST, LEVL IV, 30-39 MIN: ICD-10-PCS | Mod: S$GLB,,, | Performed by: FAMILY MEDICINE

## 2021-03-10 PROCEDURE — 99499 UNLISTED E&M SERVICE: CPT | Mod: HCNC,S$GLB,, | Performed by: FAMILY MEDICINE

## 2021-03-10 PROCEDURE — 3008F PR BODY MASS INDEX (BMI) DOCUMENTED: ICD-10-PCS | Mod: CPTII,S$GLB,, | Performed by: FAMILY MEDICINE

## 2021-03-10 PROCEDURE — 1125F PR PAIN SEVERITY QUANTIFIED, PAIN PRESENT: ICD-10-PCS | Mod: S$GLB,,, | Performed by: FAMILY MEDICINE

## 2021-03-10 PROCEDURE — 1100F PR PT FALLS ASSESS DOC 2+ FALLS/FALL W/INJURY/YR: ICD-10-PCS | Mod: CPTII,S$GLB,, | Performed by: FAMILY MEDICINE

## 2021-03-10 PROCEDURE — 1157F PR ADVANCE CARE PLAN OR EQUIV PRESENT IN MEDICAL RECORD: ICD-10-PCS | Mod: S$GLB,,, | Performed by: FAMILY MEDICINE

## 2021-03-10 PROCEDURE — 3074F PR MOST RECENT SYSTOLIC BLOOD PRESSURE < 130 MM HG: ICD-10-PCS | Mod: CPTII,S$GLB,, | Performed by: FAMILY MEDICINE

## 2021-03-10 PROCEDURE — 1100F PTFALLS ASSESS-DOCD GE2>/YR: CPT | Mod: CPTII,S$GLB,, | Performed by: FAMILY MEDICINE

## 2021-03-10 PROCEDURE — 1157F ADVNC CARE PLAN IN RCRD: CPT | Mod: S$GLB,,, | Performed by: FAMILY MEDICINE

## 2021-03-10 PROCEDURE — 3078F PR MOST RECENT DIASTOLIC BLOOD PRESSURE < 80 MM HG: ICD-10-PCS | Mod: CPTII,S$GLB,, | Performed by: FAMILY MEDICINE

## 2021-03-10 PROCEDURE — 1125F AMNT PAIN NOTED PAIN PRSNT: CPT | Mod: S$GLB,,, | Performed by: FAMILY MEDICINE

## 2021-03-10 RX ORDER — CYCLOBENZAPRINE HCL 10 MG
10 TABLET ORAL 3 TIMES DAILY PRN
Qty: 30 TABLET | Refills: 2 | Status: SHIPPED | OUTPATIENT
Start: 2021-03-10 | End: 2021-03-20

## 2021-03-12 ENCOUNTER — PATIENT MESSAGE (OUTPATIENT)
Dept: PHARMACY | Facility: CLINIC | Age: 76
End: 2021-03-12

## 2021-03-20 ENCOUNTER — SPECIALTY PHARMACY (OUTPATIENT)
Dept: PHARMACY | Facility: CLINIC | Age: 76
End: 2021-03-20

## 2021-04-12 RX ORDER — CARVEDILOL 12.5 MG/1
12.5 TABLET ORAL 2 TIMES DAILY WITH MEALS
Qty: 180 TABLET | Refills: 3 | Status: SHIPPED | OUTPATIENT
Start: 2021-04-12 | End: 2022-01-03

## 2021-04-19 ENCOUNTER — SPECIALTY PHARMACY (OUTPATIENT)
Dept: PHARMACY | Facility: CLINIC | Age: 76
End: 2021-04-19

## 2021-04-19 ENCOUNTER — PATIENT MESSAGE (OUTPATIENT)
Dept: PHARMACY | Facility: CLINIC | Age: 76
End: 2021-04-19

## 2021-04-20 ENCOUNTER — PES CALL (OUTPATIENT)
Dept: ADMINISTRATIVE | Facility: CLINIC | Age: 76
End: 2021-04-20

## 2021-05-06 ENCOUNTER — TELEPHONE (OUTPATIENT)
Dept: ORTHOPEDICS | Facility: CLINIC | Age: 76
End: 2021-05-06

## 2021-05-06 ENCOUNTER — HOSPITAL ENCOUNTER (OUTPATIENT)
Dept: RADIOLOGY | Facility: HOSPITAL | Age: 76
Discharge: HOME | End: 2021-05-06
Attending: PHYSICIAN ASSISTANT
Payer: MEDICARE

## 2021-05-06 ENCOUNTER — PATIENT MESSAGE (OUTPATIENT)
Dept: PHARMACY | Facility: CLINIC | Age: 76
End: 2021-05-06

## 2021-05-06 ENCOUNTER — PATIENT MESSAGE (OUTPATIENT)
Dept: ORTHOPEDICS | Facility: CLINIC | Age: 76
End: 2021-05-06
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
## 2021-05-06 PROCEDURE — 73502 XR HIP 2 VIEW RIGHT: ICD-10-PCS | Mod: 26,HCNC,RT, | Performed by: RADIOLOGY

## 2021-05-06 PROCEDURE — 73502 X-RAY EXAM HIP UNI 2-3 VIEWS: CPT | Mod: 26,HCNC,RT, | Performed by: RADIOLOGY

## 2021-05-06 PROCEDURE — 73502 X-RAY EXAM HIP UNI 2-3 VIEWS: CPT | Mod: TC,HCNC,FY,PO,RT

## 2021-05-06 RX ORDER — METHOCARBAMOL 500 MG/1
500 TABLET, FILM COATED ORAL 3 TIMES DAILY
Qty: 90 TABLET | Refills: 2 | Status: SHIPPED | OUTPATIENT
Start: 2021-05-06 | End: 2021-05-16

## 2021-05-06 RX ORDER — CYCLOBENZAPRINE HCL 10 MG
10 TABLET ORAL 3 TIMES DAILY PRN
Qty: 60 TABLET | Refills: 2 | Status: SHIPPED | OUTPATIENT
Start: 2021-05-06 | End: 2021-05-16

## 2021-05-24 ENCOUNTER — PATIENT MESSAGE (OUTPATIENT)
Dept: PHARMACY | Facility: CLINIC | Age: 76
End: 2021-05-24

## 2021-05-28 ENCOUNTER — CLINICAL SUPPORT (OUTPATIENT)
Dept: URGENT CARE | Facility: CLINIC | Age: 76
End: 2021-05-28
Payer: MEDICARE

## 2021-05-28 VITALS — OXYGEN SATURATION: 97 % | TEMPERATURE: 98 F | HEART RATE: 71 BPM

## 2021-05-28 DIAGNOSIS — Z20.822 ENCOUNTER FOR LABORATORY TESTING FOR COVID-19 VIRUS: ICD-10-CM

## 2021-05-28 PROCEDURE — U0005 INFEC AGEN DETEC AMPLI PROBE: HCPCS | Performed by: PHYSICIAN ASSISTANT

## 2021-05-28 PROCEDURE — U0003 INFECTIOUS AGENT DETECTION BY NUCLEIC ACID (DNA OR RNA); SEVERE ACUTE RESPIRATORY SYNDROME CORONAVIRUS 2 (SARS-COV-2) (CORONAVIRUS DISEASE [COVID-19]), AMPLIFIED PROBE TECHNIQUE, MAKING USE OF HIGH THROUGHPUT TECHNOLOGIES AS DESCRIBED BY CMS-2020-01-R: HCPCS | Mod: HCNC | Performed by: PHYSICIAN ASSISTANT

## 2021-05-28 PROCEDURE — 99211 PR OFFICE/OUTPT VISIT, EST, LEVL I: ICD-10-PCS | Mod: S$GLB,,, | Performed by: PHYSICIAN ASSISTANT

## 2021-05-28 PROCEDURE — 99211 OFF/OP EST MAY X REQ PHY/QHP: CPT | Mod: S$GLB,,, | Performed by: PHYSICIAN ASSISTANT

## 2021-05-29 ENCOUNTER — SPECIALTY PHARMACY (OUTPATIENT)
Dept: PHARMACY | Facility: CLINIC | Age: 76
End: 2021-05-29

## 2021-05-29 LAB — SARS-COV-2 RNA RESP QL NAA+PROBE: NOT DETECTED

## 2021-05-30 DIAGNOSIS — I10 HYPERTENSION, UNSPECIFIED TYPE: ICD-10-CM

## 2021-05-31 ENCOUNTER — PATIENT MESSAGE (OUTPATIENT)
Dept: ORTHOPEDICS | Facility: CLINIC | Age: 76
End: 2021-05-31

## 2021-05-31 DIAGNOSIS — Z98.890 POST-OPERATIVE STATE: ICD-10-CM

## 2021-05-31 RX ORDER — CELECOXIB 200 MG/1
200 CAPSULE ORAL 2 TIMES DAILY
Qty: 180 CAPSULE | Refills: 0 | Status: SHIPPED | OUTPATIENT
Start: 2021-05-31 | End: 2021-08-25

## 2021-06-03 RX ORDER — TELMISARTAN 40 MG/1
40 TABLET ORAL DAILY
Qty: 90 TABLET | Refills: 1 | Status: SHIPPED | OUTPATIENT
Start: 2021-06-03 | End: 2022-04-05

## 2021-06-23 RX ORDER — BUPROPION HYDROCHLORIDE 300 MG/1
300 TABLET ORAL DAILY
Qty: 90 TABLET | Refills: 2 | Status: SHIPPED | OUTPATIENT
Start: 2021-06-23 | End: 2021-07-01 | Stop reason: SDUPTHER

## 2021-07-01 ENCOUNTER — OFFICE VISIT (OUTPATIENT)
Dept: ENDOCRINOLOGY | Facility: CLINIC | Age: 76
End: 2021-07-01
Payer: MEDICARE

## 2021-07-01 ENCOUNTER — PATIENT MESSAGE (OUTPATIENT)
Dept: PHARMACY | Facility: CLINIC | Age: 76
End: 2021-07-01

## 2021-07-01 ENCOUNTER — PATIENT MESSAGE (OUTPATIENT)
Dept: FAMILY MEDICINE | Facility: CLINIC | Age: 76
End: 2021-07-01

## 2021-07-01 VITALS
BODY MASS INDEX: 28.88 KG/M2 | HEIGHT: 74 IN | WEIGHT: 225 LBS | SYSTOLIC BLOOD PRESSURE: 134 MMHG | OXYGEN SATURATION: 99 % | HEART RATE: 70 BPM | DIASTOLIC BLOOD PRESSURE: 82 MMHG

## 2021-07-01 DIAGNOSIS — I10 ESSENTIAL HYPERTENSION: ICD-10-CM

## 2021-07-01 DIAGNOSIS — R68.89 ABNORMAL ENDOCRINE LABORATORY TEST FINDING: ICD-10-CM

## 2021-07-01 DIAGNOSIS — E03.9 ACQUIRED HYPOTHYROIDISM: ICD-10-CM

## 2021-07-01 DIAGNOSIS — E21.3 HYPERPARATHYROIDISM: ICD-10-CM

## 2021-07-01 DIAGNOSIS — M80.00XD AGE-RELATED OSTEOPOROSIS WITH CURRENT PATHOLOGICAL FRACTURE WITH ROUTINE HEALING: Primary | ICD-10-CM

## 2021-07-01 DIAGNOSIS — N18.31 STAGE 3A CHRONIC KIDNEY DISEASE: ICD-10-CM

## 2021-07-01 PROCEDURE — 3008F BODY MASS INDEX DOCD: CPT | Mod: HCNC,CPTII,S$GLB, | Performed by: INTERNAL MEDICINE

## 2021-07-01 PROCEDURE — 1157F ADVNC CARE PLAN IN RCRD: CPT | Mod: HCNC,S$GLB,, | Performed by: INTERNAL MEDICINE

## 2021-07-01 PROCEDURE — 99999 PR PBB SHADOW E&M-EST. PATIENT-LVL V: CPT | Mod: PBBFAC,HCNC,, | Performed by: INTERNAL MEDICINE

## 2021-07-01 PROCEDURE — 1126F AMNT PAIN NOTED NONE PRSNT: CPT | Mod: HCNC,S$GLB,, | Performed by: INTERNAL MEDICINE

## 2021-07-01 PROCEDURE — 99999 PR PBB SHADOW E&M-EST. PATIENT-LVL V: ICD-10-PCS | Mod: PBBFAC,HCNC,, | Performed by: INTERNAL MEDICINE

## 2021-07-01 PROCEDURE — 99214 OFFICE O/P EST MOD 30 MIN: CPT | Mod: HCNC,S$GLB,, | Performed by: INTERNAL MEDICINE

## 2021-07-01 PROCEDURE — 1101F PT FALLS ASSESS-DOCD LE1/YR: CPT | Mod: HCNC,CPTII,S$GLB, | Performed by: INTERNAL MEDICINE

## 2021-07-01 PROCEDURE — 1157F PR ADVANCE CARE PLAN OR EQUIV PRESENT IN MEDICAL RECORD: ICD-10-PCS | Mod: HCNC,S$GLB,, | Performed by: INTERNAL MEDICINE

## 2021-07-01 PROCEDURE — 1101F PR PT FALLS ASSESS DOC 0-1 FALLS W/OUT INJ PAST YR: ICD-10-PCS | Mod: HCNC,CPTII,S$GLB, | Performed by: INTERNAL MEDICINE

## 2021-07-01 PROCEDURE — 3008F PR BODY MASS INDEX (BMI) DOCUMENTED: ICD-10-PCS | Mod: HCNC,CPTII,S$GLB, | Performed by: INTERNAL MEDICINE

## 2021-07-01 PROCEDURE — 99214 PR OFFICE/OUTPT VISIT, EST, LEVL IV, 30-39 MIN: ICD-10-PCS | Mod: HCNC,S$GLB,, | Performed by: INTERNAL MEDICINE

## 2021-07-01 PROCEDURE — 99499 UNLISTED E&M SERVICE: CPT | Mod: HCNC,S$GLB,, | Performed by: INTERNAL MEDICINE

## 2021-07-01 PROCEDURE — 99499 RISK ADDL DX/OHS AUDIT: ICD-10-PCS | Mod: HCNC,S$GLB,, | Performed by: INTERNAL MEDICINE

## 2021-07-01 PROCEDURE — 3288F FALL RISK ASSESSMENT DOCD: CPT | Mod: HCNC,CPTII,S$GLB, | Performed by: INTERNAL MEDICINE

## 2021-07-01 PROCEDURE — 1159F PR MEDICATION LIST DOCUMENTED IN MEDICAL RECORD: ICD-10-PCS | Mod: HCNC,S$GLB,, | Performed by: INTERNAL MEDICINE

## 2021-07-01 PROCEDURE — 1159F MED LIST DOCD IN RCRD: CPT | Mod: HCNC,S$GLB,, | Performed by: INTERNAL MEDICINE

## 2021-07-01 PROCEDURE — 1126F PR PAIN SEVERITY QUANTIFIED, NO PAIN PRESENT: ICD-10-PCS | Mod: HCNC,S$GLB,, | Performed by: INTERNAL MEDICINE

## 2021-07-01 PROCEDURE — 3288F PR FALLS RISK ASSESSMENT DOCUMENTED: ICD-10-PCS | Mod: HCNC,CPTII,S$GLB, | Performed by: INTERNAL MEDICINE

## 2021-07-01 RX ORDER — BUPROPION HYDROCHLORIDE 300 MG/1
300 TABLET ORAL DAILY
Qty: 90 TABLET | Refills: 2 | Status: SHIPPED | OUTPATIENT
Start: 2021-07-01 | End: 2022-07-01

## 2021-07-01 RX ORDER — DEXAMETHASONE 1 MG/1
TABLET ORAL
Qty: 1 TABLET | Refills: 0 | Status: SHIPPED | OUTPATIENT
Start: 2021-07-01 | End: 2021-09-08 | Stop reason: ALTCHOICE

## 2021-07-06 ENCOUNTER — SPECIALTY PHARMACY (OUTPATIENT)
Dept: PHARMACY | Facility: CLINIC | Age: 76
End: 2021-07-06

## 2021-07-06 PROCEDURE — 82533 TOTAL CORTISOL: CPT | Mod: HCNC | Performed by: INTERNAL MEDICINE

## 2021-07-07 ENCOUNTER — LAB VISIT (OUTPATIENT)
Dept: LAB | Facility: HOSPITAL | Age: 76
End: 2021-07-07
Attending: INTERNAL MEDICINE
Payer: MEDICARE

## 2021-07-07 DIAGNOSIS — R68.89 ABNORMAL ENDOCRINE LABORATORY TEST FINDING: ICD-10-CM

## 2021-07-07 DIAGNOSIS — M80.00XD AGE-RELATED OSTEOPOROSIS WITH CURRENT PATHOLOGICAL FRACTURE WITH ROUTINE HEALING: ICD-10-CM

## 2021-07-07 LAB
25(OH)D3+25(OH)D2 SERPL-MCNC: 33 NG/ML (ref 30–96)
ALBUMIN SERPL BCP-MCNC: 3.2 G/DL (ref 3.5–5.2)
ANION GAP SERPL CALC-SCNC: 5 MMOL/L (ref 8–16)
BUN SERPL-MCNC: 21 MG/DL (ref 8–23)
CALCIUM SERPL-MCNC: 8.7 MG/DL (ref 8.7–10.5)
CHLORIDE SERPL-SCNC: 110 MMOL/L (ref 95–110)
CO2 SERPL-SCNC: 23 MMOL/L (ref 23–29)
CORTIS SERPL-MCNC: 7.3 UG/DL (ref 4.3–22.4)
CREAT SERPL-MCNC: 1.3 MG/DL (ref 0.5–1.4)
EST. GFR  (AFRICAN AMERICAN): >60 ML/MIN/1.73 M^2
EST. GFR  (NON AFRICAN AMERICAN): 53.8 ML/MIN/1.73 M^2
GLUCOSE SERPL-MCNC: 106 MG/DL (ref 70–110)
PHOSPHATE SERPL-MCNC: 2.9 MG/DL (ref 2.7–4.5)
POTASSIUM SERPL-SCNC: 5.2 MMOL/L (ref 3.5–5.1)
PTH-INTACT SERPL-MCNC: 94 PG/ML (ref 9–77)
SODIUM SERPL-SCNC: 138 MMOL/L (ref 136–145)

## 2021-07-07 PROCEDURE — 82533 TOTAL CORTISOL: CPT | Mod: HCNC | Performed by: INTERNAL MEDICINE

## 2021-07-07 PROCEDURE — 83970 ASSAY OF PARATHORMONE: CPT | Mod: HCNC | Performed by: INTERNAL MEDICINE

## 2021-07-07 PROCEDURE — 82024 ASSAY OF ACTH: CPT | Mod: HCNC | Performed by: INTERNAL MEDICINE

## 2021-07-07 PROCEDURE — 80069 RENAL FUNCTION PANEL: CPT | Mod: HCNC | Performed by: INTERNAL MEDICINE

## 2021-07-07 PROCEDURE — 82533 TOTAL CORTISOL: CPT | Mod: 91,HCNC | Performed by: INTERNAL MEDICINE

## 2021-07-07 PROCEDURE — 82306 VITAMIN D 25 HYDROXY: CPT | Mod: HCNC | Performed by: INTERNAL MEDICINE

## 2021-07-08 ENCOUNTER — LAB VISIT (OUTPATIENT)
Dept: LAB | Facility: HOSPITAL | Age: 76
End: 2021-07-08
Attending: INTERNAL MEDICINE
Payer: MEDICARE

## 2021-07-08 DIAGNOSIS — R68.89 ABNORMAL ENDOCRINE LABORATORY TEST FINDING: ICD-10-CM

## 2021-07-08 LAB — CORTIS SERPL-MCNC: 1.2 UG/DL (ref 4.3–22.4)

## 2021-07-08 PROCEDURE — 82533 TOTAL CORTISOL: CPT | Mod: HCNC | Performed by: INTERNAL MEDICINE

## 2021-07-08 PROCEDURE — 36415 COLL VENOUS BLD VENIPUNCTURE: CPT | Mod: HCNC,PO | Performed by: INTERNAL MEDICINE

## 2021-07-09 LAB — ACTH PLAS-MCNC: 11 PG/ML (ref 0–46)

## 2021-07-12 ENCOUNTER — PATIENT MESSAGE (OUTPATIENT)
Dept: ENDOCRINOLOGY | Facility: CLINIC | Age: 76
End: 2021-07-12

## 2021-07-12 DIAGNOSIS — M80.00XD AGE-RELATED OSTEOPOROSIS WITH CURRENT PATHOLOGICAL FRACTURE WITH ROUTINE HEALING, SUBSEQUENT ENCOUNTER: ICD-10-CM

## 2021-07-12 DIAGNOSIS — R68.89 ABNORMAL ENDOCRINE LABORATORY TEST FINDING: Primary | ICD-10-CM

## 2021-07-12 LAB
CORTIS 12 AM SAL-MCNC: NORMAL NG/ML
CORTIS 12 AM SAL-MCNC: NORMAL NG/ML
CORTIS 8 AM SAL-MCNC: NORMAL NG/ML
CORTIS 8 AM SAL-MCNC: NORMAL NG/ML
CORTIS 8 PM SAL-MCNC: NORMAL NG/ML
CORTIS 8 PM SAL-MCNC: NORMAL NG/ML
CORTIS SAL-MCNC: 86 NG/DL
CORTIS SAL-MCNC: <50 NG/DL

## 2021-07-14 ENCOUNTER — PATIENT MESSAGE (OUTPATIENT)
Dept: ENDOCRINOLOGY | Facility: CLINIC | Age: 76
End: 2021-07-14

## 2021-08-03 ENCOUNTER — SPECIALTY PHARMACY (OUTPATIENT)
Dept: PHARMACY | Facility: CLINIC | Age: 76
End: 2021-08-03

## 2021-08-06 ENCOUNTER — TELEPHONE (OUTPATIENT)
Dept: PHARMACY | Facility: CLINIC | Age: 76
End: 2021-08-06

## 2021-08-06 DIAGNOSIS — E03.4 HYPOTHYROIDISM DUE TO ACQUIRED ATROPHY OF THYROID: ICD-10-CM

## 2021-08-08 DIAGNOSIS — E03.4 HYPOTHYROIDISM DUE TO ACQUIRED ATROPHY OF THYROID: ICD-10-CM

## 2021-08-08 RX ORDER — LEVOTHYROXINE SODIUM 75 UG/1
75 TABLET ORAL
Qty: 90 TABLET | Refills: 3 | Status: CANCELLED | OUTPATIENT
Start: 2021-08-08 | End: 2022-08-08

## 2021-08-09 ENCOUNTER — HOSPITAL ENCOUNTER (OUTPATIENT)
Dept: RADIOLOGY | Facility: HOSPITAL | Age: 76
Discharge: HOME | End: 2021-08-09
Attending: PSYCHIATRY & NEUROLOGY
Payer: MEDICARE

## 2021-08-09 DIAGNOSIS — Z86.73 HISTORY OF TIA (TRANSIENT ISCHEMIC ATTACK): ICD-10-CM

## 2021-08-09 PROCEDURE — 70551 MRI BRAIN WITHOUT CONTRAST: ICD-10-PCS | Mod: 26,HCNC,, | Performed by: RADIOLOGY

## 2021-08-09 PROCEDURE — 70551 MRI BRAIN STEM W/O DYE: CPT | Mod: TC,HCNC,PO

## 2021-08-09 PROCEDURE — 70551 MRI BRAIN STEM W/O DYE: CPT | Mod: 26,HCNC,, | Performed by: RADIOLOGY

## 2021-08-09 RX ORDER — LEVOTHYROXINE SODIUM 75 UG/1
75 TABLET ORAL
Qty: 90 TABLET | Refills: 0 | Status: SHIPPED | OUTPATIENT
Start: 2021-08-09 | End: 2021-10-26

## 2021-08-10 ENCOUNTER — TELEPHONE (OUTPATIENT)
Dept: NEUROLOGY | Facility: HOSPITAL | Age: 76
End: 2021-08-10

## 2021-09-07 ENCOUNTER — LAB VISIT (OUTPATIENT)
Dept: LAB | Facility: HOSPITAL | Age: 76
End: 2021-09-07
Attending: FAMILY MEDICINE
Payer: MEDICARE

## 2021-09-07 DIAGNOSIS — I10 HYPERTENSION, UNSPECIFIED TYPE: ICD-10-CM

## 2021-09-07 DIAGNOSIS — E03.4 HYPOTHYROIDISM DUE TO ACQUIRED ATROPHY OF THYROID: ICD-10-CM

## 2021-09-07 LAB
ALBUMIN SERPL BCP-MCNC: 3.3 G/DL (ref 3.5–5.2)
ALP SERPL-CCNC: 58 U/L (ref 55–135)
ALT SERPL W/O P-5'-P-CCNC: 20 U/L (ref 10–44)
ANION GAP SERPL CALC-SCNC: 8 MMOL/L (ref 8–16)
AST SERPL-CCNC: 19 U/L (ref 10–40)
BASOPHILS # BLD AUTO: 0.06 K/UL (ref 0–0.2)
BASOPHILS NFR BLD: 1 % (ref 0–1.9)
BILIRUB SERPL-MCNC: 0.9 MG/DL (ref 0.1–1)
BUN SERPL-MCNC: 18 MG/DL (ref 8–23)
CALCIUM SERPL-MCNC: 8.9 MG/DL (ref 8.7–10.5)
CHLORIDE SERPL-SCNC: 110 MMOL/L (ref 95–110)
CHOLEST SERPL-MCNC: 112 MG/DL (ref 120–199)
CHOLEST/HDLC SERPL: 2.7 {RATIO} (ref 2–5)
CO2 SERPL-SCNC: 21 MMOL/L (ref 23–29)
CREAT SERPL-MCNC: 1.3 MG/DL (ref 0.5–1.4)
DIFFERENTIAL METHOD: ABNORMAL
EOSINOPHIL # BLD AUTO: 0.9 K/UL (ref 0–0.5)
EOSINOPHIL NFR BLD: 14.4 % (ref 0–8)
ERYTHROCYTE [DISTWIDTH] IN BLOOD BY AUTOMATED COUNT: 14.1 % (ref 11.5–14.5)
EST. GFR  (AFRICAN AMERICAN): >60 ML/MIN/1.73 M^2
EST. GFR  (NON AFRICAN AMERICAN): 53.8 ML/MIN/1.73 M^2
GLUCOSE SERPL-MCNC: 123 MG/DL (ref 70–110)
HCT VFR BLD AUTO: 38 % (ref 40–54)
HDLC SERPL-MCNC: 42 MG/DL (ref 40–75)
HDLC SERPL: 37.5 % (ref 20–50)
HGB BLD-MCNC: 12.6 G/DL (ref 14–18)
IMM GRANULOCYTES # BLD AUTO: 0.01 K/UL (ref 0–0.04)
IMM GRANULOCYTES NFR BLD AUTO: 0.2 % (ref 0–0.5)
LDLC SERPL CALC-MCNC: 55.8 MG/DL (ref 63–159)
LYMPHOCYTES # BLD AUTO: 1.6 K/UL (ref 1–4.8)
LYMPHOCYTES NFR BLD: 26.3 % (ref 18–48)
MCH RBC QN AUTO: 30.8 PG (ref 27–31)
MCHC RBC AUTO-ENTMCNC: 33.2 G/DL (ref 32–36)
MCV RBC AUTO: 93 FL (ref 82–98)
MONOCYTES # BLD AUTO: 0.5 K/UL (ref 0.3–1)
MONOCYTES NFR BLD: 7.4 % (ref 4–15)
NEUTROPHILS # BLD AUTO: 3.2 K/UL (ref 1.8–7.7)
NEUTROPHILS NFR BLD: 50.7 % (ref 38–73)
NONHDLC SERPL-MCNC: 70 MG/DL
NRBC BLD-RTO: 0 /100 WBC
PLATELET # BLD AUTO: 235 K/UL (ref 150–450)
PMV BLD AUTO: 11.4 FL (ref 9.2–12.9)
POTASSIUM SERPL-SCNC: 4.5 MMOL/L (ref 3.5–5.1)
PROT SERPL-MCNC: 6 G/DL (ref 6–8.4)
RBC # BLD AUTO: 4.09 M/UL (ref 4.6–6.2)
SODIUM SERPL-SCNC: 139 MMOL/L (ref 136–145)
T4 FREE SERPL-MCNC: 0.76 NG/DL (ref 0.71–1.51)
TRIGL SERPL-MCNC: 71 MG/DL (ref 30–150)
TSH SERPL DL<=0.005 MIU/L-ACNC: 5.9 UIU/ML (ref 0.4–4)
WBC # BLD AUTO: 6.23 K/UL (ref 3.9–12.7)

## 2021-09-07 PROCEDURE — 80061 LIPID PANEL: CPT | Mod: HCNC | Performed by: FAMILY MEDICINE

## 2021-09-07 PROCEDURE — 85025 COMPLETE CBC W/AUTO DIFF WBC: CPT | Mod: HCNC | Performed by: FAMILY MEDICINE

## 2021-09-07 PROCEDURE — 36415 COLL VENOUS BLD VENIPUNCTURE: CPT | Mod: HCNC,PO | Performed by: FAMILY MEDICINE

## 2021-09-07 PROCEDURE — 80053 COMPREHEN METABOLIC PANEL: CPT | Mod: HCNC | Performed by: FAMILY MEDICINE

## 2021-09-07 PROCEDURE — 84443 ASSAY THYROID STIM HORMONE: CPT | Mod: HCNC | Performed by: FAMILY MEDICINE

## 2021-09-07 PROCEDURE — 84439 ASSAY OF FREE THYROXINE: CPT | Mod: HCNC | Performed by: FAMILY MEDICINE

## 2021-09-08 ENCOUNTER — SPECIALTY PHARMACY (OUTPATIENT)
Dept: PHARMACY | Facility: CLINIC | Age: 76
End: 2021-09-08

## 2021-09-08 ENCOUNTER — OFFICE VISIT (OUTPATIENT)
Dept: FAMILY MEDICINE | Facility: CLINIC | Age: 76
End: 2021-09-08
Payer: MEDICARE

## 2021-09-08 VITALS
RESPIRATION RATE: 18 BRPM | WEIGHT: 221.56 LBS | BODY MASS INDEX: 28.43 KG/M2 | OXYGEN SATURATION: 97 % | TEMPERATURE: 98 F | SYSTOLIC BLOOD PRESSURE: 110 MMHG | DIASTOLIC BLOOD PRESSURE: 70 MMHG | HEIGHT: 74 IN | HEART RATE: 66 BPM

## 2021-09-08 DIAGNOSIS — I10 HYPERTENSION, UNSPECIFIED TYPE: ICD-10-CM

## 2021-09-08 DIAGNOSIS — R73.09 ABNORMAL GLUCOSE: ICD-10-CM

## 2021-09-08 DIAGNOSIS — E03.4 HYPOTHYROIDISM DUE TO ACQUIRED ATROPHY OF THYROID: Primary | ICD-10-CM

## 2021-09-08 DIAGNOSIS — I48.0 PAROXYSMAL ATRIAL FIBRILLATION: ICD-10-CM

## 2021-09-08 PROCEDURE — 1126F PR PAIN SEVERITY QUANTIFIED, NO PAIN PRESENT: ICD-10-PCS | Mod: HCNC,CPTII,S$GLB, | Performed by: FAMILY MEDICINE

## 2021-09-08 PROCEDURE — 1159F MED LIST DOCD IN RCRD: CPT | Mod: HCNC,CPTII,S$GLB, | Performed by: FAMILY MEDICINE

## 2021-09-08 PROCEDURE — 1157F ADVNC CARE PLAN IN RCRD: CPT | Mod: HCNC,CPTII,S$GLB, | Performed by: FAMILY MEDICINE

## 2021-09-08 PROCEDURE — 1157F PR ADVANCE CARE PLAN OR EQUIV PRESENT IN MEDICAL RECORD: ICD-10-PCS | Mod: HCNC,CPTII,S$GLB, | Performed by: FAMILY MEDICINE

## 2021-09-08 PROCEDURE — 4010F ACE/ARB THERAPY RXD/TAKEN: CPT | Mod: HCNC,CPTII,S$GLB, | Performed by: FAMILY MEDICINE

## 2021-09-08 PROCEDURE — 3008F PR BODY MASS INDEX (BMI) DOCUMENTED: ICD-10-PCS | Mod: HCNC,CPTII,S$GLB, | Performed by: FAMILY MEDICINE

## 2021-09-08 PROCEDURE — 99214 OFFICE O/P EST MOD 30 MIN: CPT | Mod: HCNC,S$GLB,, | Performed by: FAMILY MEDICINE

## 2021-09-08 PROCEDURE — 1126F AMNT PAIN NOTED NONE PRSNT: CPT | Mod: HCNC,CPTII,S$GLB, | Performed by: FAMILY MEDICINE

## 2021-09-08 PROCEDURE — 99214 PR OFFICE/OUTPT VISIT, EST, LEVL IV, 30-39 MIN: ICD-10-PCS | Mod: HCNC,S$GLB,, | Performed by: FAMILY MEDICINE

## 2021-09-08 PROCEDURE — 3078F DIAST BP <80 MM HG: CPT | Mod: HCNC,CPTII,S$GLB, | Performed by: FAMILY MEDICINE

## 2021-09-08 PROCEDURE — 3288F PR FALLS RISK ASSESSMENT DOCUMENTED: ICD-10-PCS | Mod: HCNC,CPTII,S$GLB, | Performed by: FAMILY MEDICINE

## 2021-09-08 PROCEDURE — 1159F PR MEDICATION LIST DOCUMENTED IN MEDICAL RECORD: ICD-10-PCS | Mod: HCNC,CPTII,S$GLB, | Performed by: FAMILY MEDICINE

## 2021-09-08 PROCEDURE — 99499 UNLISTED E&M SERVICE: CPT | Mod: HCNC,S$GLB,, | Performed by: FAMILY MEDICINE

## 2021-09-08 PROCEDURE — 3074F SYST BP LT 130 MM HG: CPT | Mod: HCNC,CPTII,S$GLB, | Performed by: FAMILY MEDICINE

## 2021-09-08 PROCEDURE — 99999 PR PBB SHADOW E&M-EST. PATIENT-LVL III: ICD-10-PCS | Mod: PBBFAC,HCNC,, | Performed by: FAMILY MEDICINE

## 2021-09-08 PROCEDURE — 3074F PR MOST RECENT SYSTOLIC BLOOD PRESSURE < 130 MM HG: ICD-10-PCS | Mod: HCNC,CPTII,S$GLB, | Performed by: FAMILY MEDICINE

## 2021-09-08 PROCEDURE — 3288F FALL RISK ASSESSMENT DOCD: CPT | Mod: HCNC,CPTII,S$GLB, | Performed by: FAMILY MEDICINE

## 2021-09-08 PROCEDURE — 1101F PR PT FALLS ASSESS DOC 0-1 FALLS W/OUT INJ PAST YR: ICD-10-PCS | Mod: HCNC,CPTII,S$GLB, | Performed by: FAMILY MEDICINE

## 2021-09-08 PROCEDURE — 99999 PR PBB SHADOW E&M-EST. PATIENT-LVL III: CPT | Mod: PBBFAC,HCNC,, | Performed by: FAMILY MEDICINE

## 2021-09-08 PROCEDURE — 3078F PR MOST RECENT DIASTOLIC BLOOD PRESSURE < 80 MM HG: ICD-10-PCS | Mod: HCNC,CPTII,S$GLB, | Performed by: FAMILY MEDICINE

## 2021-09-08 PROCEDURE — 3008F BODY MASS INDEX DOCD: CPT | Mod: HCNC,CPTII,S$GLB, | Performed by: FAMILY MEDICINE

## 2021-09-08 PROCEDURE — 4010F PR ACE/ARB THEARPY RXD/TAKEN: ICD-10-PCS | Mod: HCNC,CPTII,S$GLB, | Performed by: FAMILY MEDICINE

## 2021-09-08 PROCEDURE — 1101F PT FALLS ASSESS-DOCD LE1/YR: CPT | Mod: HCNC,CPTII,S$GLB, | Performed by: FAMILY MEDICINE

## 2021-09-08 PROCEDURE — 99499 RISK ADDL DX/OHS AUDIT: ICD-10-PCS | Mod: HCNC,S$GLB,, | Performed by: FAMILY MEDICINE

## 2021-09-08 RX ORDER — CYCLOBENZAPRINE HCL 10 MG
TABLET ORAL
COMMUNITY
Start: 2021-08-12 | End: 2021-09-15

## 2021-09-08 RX ORDER — HYDROCHLOROTHIAZIDE 12.5 MG/1
12.5 TABLET ORAL DAILY
COMMUNITY
Start: 2021-08-25 | End: 2021-12-29

## 2021-09-08 RX ORDER — BUPROPION HYDROCHLORIDE 150 MG/1
TABLET ORAL
COMMUNITY
Start: 2021-03-31 | End: 2022-06-14 | Stop reason: SDUPTHER

## 2021-09-13 ENCOUNTER — TELEPHONE (OUTPATIENT)
Dept: ENDOCRINOLOGY | Facility: CLINIC | Age: 76
End: 2021-09-13

## 2021-09-14 ENCOUNTER — PATIENT MESSAGE (OUTPATIENT)
Dept: ORTHOPEDICS | Facility: CLINIC | Age: 76
End: 2021-09-14

## 2021-09-14 ENCOUNTER — PATIENT MESSAGE (OUTPATIENT)
Dept: FAMILY MEDICINE | Facility: CLINIC | Age: 76
End: 2021-09-14

## 2021-09-26 ENCOUNTER — PATIENT OUTREACH (OUTPATIENT)
Dept: ADMINISTRATIVE | Facility: OTHER | Age: 76
End: 2021-09-26

## 2021-09-26 NOTE — TELEPHONE ENCOUNTER
No new care gaps identified.  Powered by "[employer]. Reference number: [id number]. 9/08/2020 11:37:02 AM RITCHIE   Pt states he has a cough that has been worsening for the last week.  The cough makes him short of breath and dizzy.  Pt states he coughs until he vomits.

## 2021-09-27 ENCOUNTER — OFFICE VISIT (OUTPATIENT)
Dept: ENDOCRINOLOGY | Facility: CLINIC | Age: 76
End: 2021-09-27
Payer: MEDICARE

## 2021-09-27 ENCOUNTER — PATIENT MESSAGE (OUTPATIENT)
Dept: ENDOCRINOLOGY | Facility: CLINIC | Age: 76
End: 2021-09-27

## 2021-09-27 ENCOUNTER — IMMUNIZATION (OUTPATIENT)
Dept: PHARMACY | Facility: CLINIC | Age: 76
End: 2021-09-27
Payer: MEDICARE

## 2021-09-27 ENCOUNTER — OFFICE VISIT (OUTPATIENT)
Dept: CARDIOLOGY | Facility: CLINIC | Age: 76
End: 2021-09-27
Payer: MEDICARE

## 2021-09-27 VITALS
WEIGHT: 218.06 LBS | SYSTOLIC BLOOD PRESSURE: 111 MMHG | BODY MASS INDEX: 27.98 KG/M2 | HEIGHT: 74 IN | HEART RATE: 48 BPM | DIASTOLIC BLOOD PRESSURE: 73 MMHG

## 2021-09-27 VITALS
SYSTOLIC BLOOD PRESSURE: 112 MMHG | HEIGHT: 74 IN | DIASTOLIC BLOOD PRESSURE: 72 MMHG | BODY MASS INDEX: 27.98 KG/M2 | HEART RATE: 48 BPM | WEIGHT: 218.06 LBS

## 2021-09-27 DIAGNOSIS — G47.31 COMPLEX SLEEP APNEA SYNDROME: ICD-10-CM

## 2021-09-27 DIAGNOSIS — I10 ESSENTIAL HYPERTENSION: ICD-10-CM

## 2021-09-27 DIAGNOSIS — M80.00XD AGE-RELATED OSTEOPOROSIS WITH CURRENT PATHOLOGICAL FRACTURE WITH ROUTINE HEALING, SUBSEQUENT ENCOUNTER: ICD-10-CM

## 2021-09-27 DIAGNOSIS — M80.00XD AGE-RELATED OSTEOPOROSIS WITH CURRENT PATHOLOGICAL FRACTURE WITH ROUTINE HEALING: ICD-10-CM

## 2021-09-27 DIAGNOSIS — E03.9 ACQUIRED HYPOTHYROIDISM: ICD-10-CM

## 2021-09-27 DIAGNOSIS — I63.9 CEREBROVASCULAR ACCIDENT (CVA), UNSPECIFIED MECHANISM: ICD-10-CM

## 2021-09-27 DIAGNOSIS — I48.0 PAROXYSMAL ATRIAL FIBRILLATION: Primary | ICD-10-CM

## 2021-09-27 DIAGNOSIS — R00.1 BRADYCARDIA: ICD-10-CM

## 2021-09-27 DIAGNOSIS — E03.9 ACQUIRED HYPOTHYROIDISM: Primary | ICD-10-CM

## 2021-09-27 DIAGNOSIS — E21.3 HYPERPARATHYROIDISM: ICD-10-CM

## 2021-09-27 DIAGNOSIS — I48.91 ATRIAL FIBRILLATION, UNSPECIFIED TYPE: ICD-10-CM

## 2021-09-27 DIAGNOSIS — N18.31 STAGE 3A CHRONIC KIDNEY DISEASE: ICD-10-CM

## 2021-09-27 DIAGNOSIS — M85.89 OTHER SPECIFIED DISORDERS OF BONE DENSITY AND STRUCTURE, MULTIPLE SITES: ICD-10-CM

## 2021-09-27 DIAGNOSIS — M85.88 OTHER SPECIFIED DISORDERS OF BONE DENSITY AND STRUCTURE, OTHER SITE: ICD-10-CM

## 2021-09-27 PROCEDURE — 3008F PR BODY MASS INDEX (BMI) DOCUMENTED: ICD-10-PCS | Mod: HCNC,CPTII,S$GLB, | Performed by: INTERNAL MEDICINE

## 2021-09-27 PROCEDURE — 3078F DIAST BP <80 MM HG: CPT | Mod: HCNC,CPTII,S$GLB, | Performed by: INTERNAL MEDICINE

## 2021-09-27 PROCEDURE — 99999 PR PBB SHADOW E&M-EST. PATIENT-LVL III: CPT | Mod: PBBFAC,HCNC,, | Performed by: INTERNAL MEDICINE

## 2021-09-27 PROCEDURE — 3008F BODY MASS INDEX DOCD: CPT | Mod: HCNC,CPTII,S$GLB, | Performed by: INTERNAL MEDICINE

## 2021-09-27 PROCEDURE — 1160F RVW MEDS BY RX/DR IN RCRD: CPT | Mod: HCNC,CPTII,S$GLB, | Performed by: INTERNAL MEDICINE

## 2021-09-27 PROCEDURE — 99214 OFFICE O/P EST MOD 30 MIN: CPT | Mod: HCNC,S$GLB,, | Performed by: INTERNAL MEDICINE

## 2021-09-27 PROCEDURE — 1126F AMNT PAIN NOTED NONE PRSNT: CPT | Mod: HCNC,CPTII,S$GLB, | Performed by: INTERNAL MEDICINE

## 2021-09-27 PROCEDURE — 3288F PR FALLS RISK ASSESSMENT DOCUMENTED: ICD-10-PCS | Mod: HCNC,CPTII,S$GLB, | Performed by: INTERNAL MEDICINE

## 2021-09-27 PROCEDURE — 1157F PR ADVANCE CARE PLAN OR EQUIV PRESENT IN MEDICAL RECORD: ICD-10-PCS | Mod: HCNC,CPTII,S$GLB, | Performed by: INTERNAL MEDICINE

## 2021-09-27 PROCEDURE — 1159F MED LIST DOCD IN RCRD: CPT | Mod: HCNC,CPTII,S$GLB, | Performed by: INTERNAL MEDICINE

## 2021-09-27 PROCEDURE — 1126F PR PAIN SEVERITY QUANTIFIED, NO PAIN PRESENT: ICD-10-PCS | Mod: HCNC,CPTII,S$GLB, | Performed by: INTERNAL MEDICINE

## 2021-09-27 PROCEDURE — 1101F PR PT FALLS ASSESS DOC 0-1 FALLS W/OUT INJ PAST YR: ICD-10-PCS | Mod: HCNC,CPTII,S$GLB, | Performed by: INTERNAL MEDICINE

## 2021-09-27 PROCEDURE — 99215 OFFICE O/P EST HI 40 MIN: CPT | Mod: HCNC,S$GLB,, | Performed by: INTERNAL MEDICINE

## 2021-09-27 PROCEDURE — 3074F PR MOST RECENT SYSTOLIC BLOOD PRESSURE < 130 MM HG: ICD-10-PCS | Mod: HCNC,CPTII,S$GLB, | Performed by: INTERNAL MEDICINE

## 2021-09-27 PROCEDURE — 1159F PR MEDICATION LIST DOCUMENTED IN MEDICAL RECORD: ICD-10-PCS | Mod: HCNC,CPTII,S$GLB, | Performed by: INTERNAL MEDICINE

## 2021-09-27 PROCEDURE — 99215 PR OFFICE/OUTPT VISIT, EST, LEVL V, 40-54 MIN: ICD-10-PCS | Mod: HCNC,S$GLB,, | Performed by: INTERNAL MEDICINE

## 2021-09-27 PROCEDURE — 99999 PR PBB SHADOW E&M-EST. PATIENT-LVL IV: ICD-10-PCS | Mod: PBBFAC,HCNC,, | Performed by: INTERNAL MEDICINE

## 2021-09-27 PROCEDURE — 99999 PR PBB SHADOW E&M-EST. PATIENT-LVL IV: CPT | Mod: PBBFAC,HCNC,, | Performed by: INTERNAL MEDICINE

## 2021-09-27 PROCEDURE — 4010F ACE/ARB THERAPY RXD/TAKEN: CPT | Mod: HCNC,CPTII,S$GLB, | Performed by: INTERNAL MEDICINE

## 2021-09-27 PROCEDURE — 1157F ADVNC CARE PLAN IN RCRD: CPT | Mod: HCNC,CPTII,S$GLB, | Performed by: INTERNAL MEDICINE

## 2021-09-27 PROCEDURE — 4010F PR ACE/ARB THEARPY RXD/TAKEN: ICD-10-PCS | Mod: HCNC,CPTII,S$GLB, | Performed by: INTERNAL MEDICINE

## 2021-09-27 PROCEDURE — 99214 PR OFFICE/OUTPT VISIT, EST, LEVL IV, 30-39 MIN: ICD-10-PCS | Mod: HCNC,S$GLB,, | Performed by: INTERNAL MEDICINE

## 2021-09-27 PROCEDURE — 1160F PR REVIEW ALL MEDS BY PRESCRIBER/CLIN PHARMACIST DOCUMENTED: ICD-10-PCS | Mod: HCNC,CPTII,S$GLB, | Performed by: INTERNAL MEDICINE

## 2021-09-27 PROCEDURE — 3288F FALL RISK ASSESSMENT DOCD: CPT | Mod: HCNC,CPTII,S$GLB, | Performed by: INTERNAL MEDICINE

## 2021-09-27 PROCEDURE — 3074F SYST BP LT 130 MM HG: CPT | Mod: HCNC,CPTII,S$GLB, | Performed by: INTERNAL MEDICINE

## 2021-09-27 PROCEDURE — 1101F PT FALLS ASSESS-DOCD LE1/YR: CPT | Mod: HCNC,CPTII,S$GLB, | Performed by: INTERNAL MEDICINE

## 2021-09-27 PROCEDURE — 99999 PR PBB SHADOW E&M-EST. PATIENT-LVL III: ICD-10-PCS | Mod: PBBFAC,HCNC,, | Performed by: INTERNAL MEDICINE

## 2021-09-27 PROCEDURE — 3078F PR MOST RECENT DIASTOLIC BLOOD PRESSURE < 80 MM HG: ICD-10-PCS | Mod: HCNC,CPTII,S$GLB, | Performed by: INTERNAL MEDICINE

## 2021-09-28 ENCOUNTER — LAB VISIT (OUTPATIENT)
Dept: LAB | Facility: HOSPITAL | Age: 76
End: 2021-09-28
Attending: INTERNAL MEDICINE
Payer: MEDICARE

## 2021-09-28 DIAGNOSIS — E03.9 ACQUIRED HYPOTHYROIDISM: ICD-10-CM

## 2021-09-28 LAB
T4 FREE SERPL-MCNC: 0.8 NG/DL (ref 0.71–1.51)
TSH SERPL DL<=0.005 MIU/L-ACNC: 3.16 UIU/ML (ref 0.4–4)

## 2021-09-28 PROCEDURE — 84439 ASSAY OF FREE THYROXINE: CPT | Mod: HCNC | Performed by: INTERNAL MEDICINE

## 2021-09-28 PROCEDURE — 36415 COLL VENOUS BLD VENIPUNCTURE: CPT | Mod: HCNC,PN | Performed by: INTERNAL MEDICINE

## 2021-09-28 PROCEDURE — 84443 ASSAY THYROID STIM HORMONE: CPT | Mod: HCNC | Performed by: INTERNAL MEDICINE

## 2021-09-29 DIAGNOSIS — I48.91 ATRIAL FIBRILLATION, UNSPECIFIED TYPE: Primary | ICD-10-CM

## 2021-10-01 ENCOUNTER — PATIENT MESSAGE (OUTPATIENT)
Dept: ENDOCRINOLOGY | Facility: CLINIC | Age: 76
End: 2021-10-01

## 2021-10-01 ENCOUNTER — SPECIALTY PHARMACY (OUTPATIENT)
Dept: PHARMACY | Facility: CLINIC | Age: 76
End: 2021-10-01

## 2021-10-14 ENCOUNTER — PATIENT MESSAGE (OUTPATIENT)
Dept: ENDOCRINOLOGY | Facility: CLINIC | Age: 76
End: 2021-10-14

## 2021-10-25 ENCOUNTER — SPECIALTY PHARMACY (OUTPATIENT)
Dept: PHARMACY | Facility: CLINIC | Age: 76
End: 2021-10-25
Payer: MEDICARE

## 2021-10-25 ENCOUNTER — PATIENT MESSAGE (OUTPATIENT)
Dept: ORTHOPEDICS | Facility: CLINIC | Age: 76
End: 2021-10-25
Payer: MEDICARE

## 2021-10-25 DIAGNOSIS — E03.4 HYPOTHYROIDISM DUE TO ACQUIRED ATROPHY OF THYROID: ICD-10-CM

## 2021-10-26 ENCOUNTER — PATIENT MESSAGE (OUTPATIENT)
Dept: FAMILY MEDICINE | Facility: CLINIC | Age: 76
End: 2021-10-26
Payer: MEDICARE

## 2021-10-26 RX ORDER — LEVOTHYROXINE SODIUM 75 UG/1
TABLET ORAL
Qty: 90 TABLET | Refills: 3 | Status: SHIPPED | OUTPATIENT
Start: 2021-10-26 | End: 2022-04-20 | Stop reason: SDUPTHER

## 2021-11-03 RX ORDER — ESCITALOPRAM OXALATE 10 MG/1
TABLET ORAL
Qty: 90 TABLET | Refills: 3 | Status: SHIPPED | OUTPATIENT
Start: 2021-11-03 | End: 2022-10-02 | Stop reason: SDUPTHER

## 2021-11-04 DIAGNOSIS — R39.9 LOWER URINARY TRACT SYMPTOMS (LUTS): Primary | ICD-10-CM

## 2021-11-05 RX ORDER — ALFUZOSIN HYDROCHLORIDE 10 MG/1
10 TABLET, EXTENDED RELEASE ORAL DAILY
Qty: 90 TABLET | Refills: 3 | Status: SHIPPED | OUTPATIENT
Start: 2021-11-05 | End: 2022-10-25

## 2021-11-22 ENCOUNTER — SPECIALTY PHARMACY (OUTPATIENT)
Dept: PHARMACY | Facility: CLINIC | Age: 76
End: 2021-11-22
Payer: MEDICARE

## 2021-11-24 ENCOUNTER — TELEPHONE (OUTPATIENT)
Dept: UROLOGY | Facility: CLINIC | Age: 76
End: 2021-11-24
Payer: MEDICARE

## 2021-11-24 DIAGNOSIS — N40.0 BENIGN PROSTATIC HYPERPLASIA: ICD-10-CM

## 2021-11-24 RX ORDER — DUTASTERIDE 0.5 MG/1
CAPSULE, LIQUID FILLED ORAL
Qty: 90 CAPSULE | Refills: 3 | Status: CANCELLED | OUTPATIENT
Start: 2021-11-24

## 2021-11-26 DIAGNOSIS — N40.0 BENIGN PROSTATIC HYPERPLASIA: ICD-10-CM

## 2021-11-26 RX ORDER — DUTASTERIDE 0.5 MG/1
CAPSULE, LIQUID FILLED ORAL
Qty: 90 CAPSULE | Refills: 3 | Status: SHIPPED | OUTPATIENT
Start: 2021-11-26 | End: 2022-01-02

## 2021-11-29 ENCOUNTER — PATIENT MESSAGE (OUTPATIENT)
Dept: ORTHOPEDICS | Facility: CLINIC | Age: 76
End: 2021-11-29
Payer: MEDICARE

## 2021-11-30 ENCOUNTER — SPECIALTY PHARMACY (OUTPATIENT)
Dept: PHARMACY | Facility: CLINIC | Age: 76
End: 2021-11-30
Payer: MEDICARE

## 2021-12-02 ENCOUNTER — PATIENT MESSAGE (OUTPATIENT)
Dept: ORTHOPEDICS | Facility: CLINIC | Age: 76
End: 2021-12-02
Payer: MEDICARE

## 2021-12-15 ENCOUNTER — HOSPITAL ENCOUNTER (OUTPATIENT)
Dept: RADIOLOGY | Facility: HOSPITAL | Age: 76
Discharge: HOME | End: 2021-12-15
Attending: INTERNAL MEDICINE
Payer: MEDICARE

## 2021-12-15 DIAGNOSIS — M85.88 OTHER SPECIFIED DISORDERS OF BONE DENSITY AND STRUCTURE, OTHER SITE: ICD-10-CM

## 2021-12-15 DIAGNOSIS — M80.00XD AGE-RELATED OSTEOPOROSIS WITH CURRENT PATHOLOGICAL FRACTURE WITH ROUTINE HEALING, SUBSEQUENT ENCOUNTER: ICD-10-CM

## 2021-12-15 PROCEDURE — 77081 DXA BONE DENSITY APPENDICULR: CPT | Mod: TC,HCNC,PO

## 2021-12-15 PROCEDURE — 77081 DEXA BONE DENSITY APPENDICULAR SKELETON: ICD-10-PCS | Mod: 26,HCNC,, | Performed by: RADIOLOGY

## 2021-12-15 PROCEDURE — 77081 DXA BONE DENSITY APPENDICULR: CPT | Mod: 26,HCNC,, | Performed by: RADIOLOGY

## 2021-12-16 ENCOUNTER — TELEPHONE (OUTPATIENT)
Dept: ENDOCRINOLOGY | Facility: CLINIC | Age: 76
End: 2021-12-16
Payer: MEDICARE

## 2021-12-20 ENCOUNTER — PATIENT MESSAGE (OUTPATIENT)
Dept: FAMILY MEDICINE | Facility: CLINIC | Age: 76
End: 2021-12-20
Payer: MEDICARE

## 2021-12-20 DIAGNOSIS — H91.90 HEARING LOSS, UNSPECIFIED HEARING LOSS TYPE, UNSPECIFIED LATERALITY: Primary | ICD-10-CM

## 2021-12-22 ENCOUNTER — CLINICAL SUPPORT (OUTPATIENT)
Dept: AUDIOLOGY | Facility: CLINIC | Age: 76
End: 2021-12-22
Payer: MEDICARE

## 2021-12-22 DIAGNOSIS — H90.3 ASYMMETRIC SNHL (SENSORINEURAL HEARING LOSS): Primary | ICD-10-CM

## 2021-12-22 DIAGNOSIS — Z71.89 HEARING AID CONSULTATION: Primary | ICD-10-CM

## 2021-12-22 PROCEDURE — 99999 PR PBB SHADOW E&M-EST. PATIENT-LVL I: ICD-10-PCS | Mod: PBBFAC,HCNC,,

## 2021-12-22 PROCEDURE — 92567 PR TYMPA2METRY: ICD-10-PCS | Mod: HCNC,S$GLB,, | Performed by: AUDIOLOGIST

## 2021-12-22 PROCEDURE — 92557 COMPREHENSIVE HEARING TEST: CPT | Mod: HCNC,S$GLB,, | Performed by: AUDIOLOGIST

## 2021-12-22 PROCEDURE — 92567 TYMPANOMETRY: CPT | Mod: HCNC,S$GLB,, | Performed by: AUDIOLOGIST

## 2021-12-22 PROCEDURE — 99999 PR PBB SHADOW E&M-EST. PATIENT-LVL I: CPT | Mod: PBBFAC,HCNC,,

## 2021-12-22 PROCEDURE — 92557 PR COMPREHENSIVE HEARING TEST: ICD-10-PCS | Mod: HCNC,S$GLB,, | Performed by: AUDIOLOGIST

## 2021-12-27 ENCOUNTER — TELEPHONE (OUTPATIENT)
Dept: ENDOCRINOLOGY | Facility: CLINIC | Age: 76
End: 2021-12-27
Payer: MEDICARE

## 2021-12-27 ENCOUNTER — HOSPITAL ENCOUNTER (OUTPATIENT)
Dept: RADIOLOGY | Facility: HOSPITAL | Age: 76
Discharge: HOME | End: 2021-12-27
Attending: INTERNAL MEDICINE
Payer: MEDICARE

## 2021-12-27 DIAGNOSIS — M85.89 OTHER SPECIFIED DISORDERS OF BONE DENSITY AND STRUCTURE, MULTIPLE SITES: ICD-10-CM

## 2021-12-27 DIAGNOSIS — M80.00XD AGE-RELATED OSTEOPOROSIS WITH CURRENT PATHOLOGICAL FRACTURE WITH ROUTINE HEALING, SUBSEQUENT ENCOUNTER: ICD-10-CM

## 2021-12-27 PROCEDURE — 77080 DEXA BONE DENSITY SPINE HIP: ICD-10-PCS | Mod: 26,HCNC,, | Performed by: RADIOLOGY

## 2021-12-27 PROCEDURE — 77080 DXA BONE DENSITY AXIAL: CPT | Mod: TC,HCNC,PO

## 2021-12-27 PROCEDURE — 77080 DXA BONE DENSITY AXIAL: CPT | Mod: 26,HCNC,, | Performed by: RADIOLOGY

## 2021-12-27 RX ORDER — TERIPARATIDE 250 UG/ML
20 INJECTION, SOLUTION SUBCUTANEOUS DAILY
Qty: 2.4 ML | Refills: 11 | Status: SHIPPED | OUTPATIENT
Start: 2021-12-27 | End: 2022-03-09

## 2021-12-28 ENCOUNTER — CLINICAL SUPPORT (OUTPATIENT)
Dept: AUDIOLOGY | Facility: CLINIC | Age: 76
End: 2021-12-28
Payer: MEDICARE

## 2021-12-28 ENCOUNTER — SPECIALTY PHARMACY (OUTPATIENT)
Dept: PHARMACY | Facility: CLINIC | Age: 76
End: 2021-12-28
Payer: MEDICARE

## 2021-12-28 DIAGNOSIS — Z46.1 ENCOUNTER FOR HEARING AID FITTING OF BOTH EARS: Primary | ICD-10-CM

## 2021-12-28 PROCEDURE — COVTAX COVINGTON PRESCRIBED 4.25%: ICD-10-PCS | Mod: CSM,S$GLB,, | Performed by: AUDIOLOGIST-HEARING AID FITTER

## 2021-12-28 PROCEDURE — V5140 PR BEHIND EAR BINAUR HEARING AI: ICD-10-PCS | Mod: CSM,S$GLB,, | Performed by: AUDIOLOGIST-HEARING AID FITTER

## 2021-12-28 PROCEDURE — V5140 BEHIND EAR BINAUR HEARING AI: HCPCS | Mod: CSM,S$GLB,, | Performed by: AUDIOLOGIST-HEARING AID FITTER

## 2021-12-28 PROCEDURE — COVTAX COVINGTON PRESCRIBED 4.25%: Mod: CSM,S$GLB,, | Performed by: AUDIOLOGIST-HEARING AID FITTER

## 2021-12-29 ENCOUNTER — OFFICE VISIT (OUTPATIENT)
Dept: ENDOCRINOLOGY | Facility: CLINIC | Age: 76
End: 2021-12-29
Payer: MEDICARE

## 2021-12-29 ENCOUNTER — LAB VISIT (OUTPATIENT)
Dept: LAB | Facility: HOSPITAL | Age: 76
End: 2021-12-29
Attending: INTERNAL MEDICINE
Payer: MEDICARE

## 2021-12-29 VITALS
WEIGHT: 215.75 LBS | BODY MASS INDEX: 27.69 KG/M2 | HEIGHT: 74 IN | OXYGEN SATURATION: 97 % | SYSTOLIC BLOOD PRESSURE: 124 MMHG | DIASTOLIC BLOOD PRESSURE: 78 MMHG | HEART RATE: 68 BPM

## 2021-12-29 DIAGNOSIS — E21.3 HYPERPARATHYROIDISM: ICD-10-CM

## 2021-12-29 DIAGNOSIS — E03.9 ACQUIRED HYPOTHYROIDISM: ICD-10-CM

## 2021-12-29 DIAGNOSIS — N18.31 STAGE 3A CHRONIC KIDNEY DISEASE: ICD-10-CM

## 2021-12-29 DIAGNOSIS — I10 ESSENTIAL HYPERTENSION: ICD-10-CM

## 2021-12-29 DIAGNOSIS — M80.00XD AGE-RELATED OSTEOPOROSIS WITH CURRENT PATHOLOGICAL FRACTURE WITH ROUTINE HEALING: Primary | ICD-10-CM

## 2021-12-29 DIAGNOSIS — M80.00XD AGE-RELATED OSTEOPOROSIS WITH CURRENT PATHOLOGICAL FRACTURE WITH ROUTINE HEALING: ICD-10-CM

## 2021-12-29 DIAGNOSIS — M80.00XD AGE-RELATED OSTEOPOROSIS WITH CURRENT PATHOLOGICAL FRACTURE WITH ROUTINE HEALING, SUBSEQUENT ENCOUNTER: ICD-10-CM

## 2021-12-29 LAB
ANION GAP SERPL CALC-SCNC: 7 MMOL/L (ref 8–16)
BUN SERPL-MCNC: 17 MG/DL (ref 8–23)
CALCIUM SERPL-MCNC: 9 MG/DL (ref 8.7–10.5)
CHLORIDE SERPL-SCNC: 109 MMOL/L (ref 95–110)
CO2 SERPL-SCNC: 21 MMOL/L (ref 23–29)
CREAT SERPL-MCNC: 1.3 MG/DL (ref 0.5–1.4)
EST. GFR  (AFRICAN AMERICAN): >60 ML/MIN/1.73 M^2
EST. GFR  (NON AFRICAN AMERICAN): 53.4 ML/MIN/1.73 M^2
GLUCOSE SERPL-MCNC: 156 MG/DL (ref 70–110)
POTASSIUM SERPL-SCNC: 4.9 MMOL/L (ref 3.5–5.1)
POTASSIUM SERPL-SCNC: 4.9 MMOL/L (ref 3.5–5.1)
SODIUM SERPL-SCNC: 137 MMOL/L (ref 136–145)

## 2021-12-29 PROCEDURE — 99999 PR PBB SHADOW E&M-EST. PATIENT-LVL V: CPT | Mod: PBBFAC,HCNC,, | Performed by: INTERNAL MEDICINE

## 2021-12-29 PROCEDURE — 3078F DIAST BP <80 MM HG: CPT | Mod: HCNC,CPTII,S$GLB, | Performed by: INTERNAL MEDICINE

## 2021-12-29 PROCEDURE — 3074F SYST BP LT 130 MM HG: CPT | Mod: HCNC,CPTII,S$GLB, | Performed by: INTERNAL MEDICINE

## 2021-12-29 PROCEDURE — 4010F PR ACE/ARB THEARPY RXD/TAKEN: ICD-10-PCS | Mod: HCNC,CPTII,S$GLB, | Performed by: INTERNAL MEDICINE

## 2021-12-29 PROCEDURE — 1160F RVW MEDS BY RX/DR IN RCRD: CPT | Mod: HCNC,CPTII,S$GLB, | Performed by: INTERNAL MEDICINE

## 2021-12-29 PROCEDURE — 1157F PR ADVANCE CARE PLAN OR EQUIV PRESENT IN MEDICAL RECORD: ICD-10-PCS | Mod: HCNC,CPTII,S$GLB, | Performed by: INTERNAL MEDICINE

## 2021-12-29 PROCEDURE — 1126F PR PAIN SEVERITY QUANTIFIED, NO PAIN PRESENT: ICD-10-PCS | Mod: HCNC,CPTII,S$GLB, | Performed by: INTERNAL MEDICINE

## 2021-12-29 PROCEDURE — 99214 OFFICE O/P EST MOD 30 MIN: CPT | Mod: HCNC,S$GLB,, | Performed by: INTERNAL MEDICINE

## 2021-12-29 PROCEDURE — 1160F PR REVIEW ALL MEDS BY PRESCRIBER/CLIN PHARMACIST DOCUMENTED: ICD-10-PCS | Mod: HCNC,CPTII,S$GLB, | Performed by: INTERNAL MEDICINE

## 2021-12-29 PROCEDURE — 99499 UNLISTED E&M SERVICE: CPT | Mod: HCNC,S$GLB,, | Performed by: INTERNAL MEDICINE

## 2021-12-29 PROCEDURE — 3288F FALL RISK ASSESSMENT DOCD: CPT | Mod: HCNC,CPTII,S$GLB, | Performed by: INTERNAL MEDICINE

## 2021-12-29 PROCEDURE — 3074F PR MOST RECENT SYSTOLIC BLOOD PRESSURE < 130 MM HG: ICD-10-PCS | Mod: HCNC,CPTII,S$GLB, | Performed by: INTERNAL MEDICINE

## 2021-12-29 PROCEDURE — 99999 PR PBB SHADOW E&M-EST. PATIENT-LVL V: ICD-10-PCS | Mod: PBBFAC,HCNC,, | Performed by: INTERNAL MEDICINE

## 2021-12-29 PROCEDURE — 3288F PR FALLS RISK ASSESSMENT DOCUMENTED: ICD-10-PCS | Mod: HCNC,CPTII,S$GLB, | Performed by: INTERNAL MEDICINE

## 2021-12-29 PROCEDURE — 1159F MED LIST DOCD IN RCRD: CPT | Mod: HCNC,CPTII,S$GLB, | Performed by: INTERNAL MEDICINE

## 2021-12-29 PROCEDURE — 3078F PR MOST RECENT DIASTOLIC BLOOD PRESSURE < 80 MM HG: ICD-10-PCS | Mod: HCNC,CPTII,S$GLB, | Performed by: INTERNAL MEDICINE

## 2021-12-29 PROCEDURE — 99214 PR OFFICE/OUTPT VISIT, EST, LEVL IV, 30-39 MIN: ICD-10-PCS | Mod: HCNC,S$GLB,, | Performed by: INTERNAL MEDICINE

## 2021-12-29 PROCEDURE — 80048 BASIC METABOLIC PNL TOTAL CA: CPT | Mod: HCNC | Performed by: INTERNAL MEDICINE

## 2021-12-29 PROCEDURE — 1101F PT FALLS ASSESS-DOCD LE1/YR: CPT | Mod: HCNC,CPTII,S$GLB, | Performed by: INTERNAL MEDICINE

## 2021-12-29 PROCEDURE — 83519 RIA NONANTIBODY: CPT | Mod: HCNC | Performed by: INTERNAL MEDICINE

## 2021-12-29 PROCEDURE — 1101F PR PT FALLS ASSESS DOC 0-1 FALLS W/OUT INJ PAST YR: ICD-10-PCS | Mod: HCNC,CPTII,S$GLB, | Performed by: INTERNAL MEDICINE

## 2021-12-29 PROCEDURE — 1157F ADVNC CARE PLAN IN RCRD: CPT | Mod: HCNC,CPTII,S$GLB, | Performed by: INTERNAL MEDICINE

## 2021-12-29 PROCEDURE — 4010F ACE/ARB THERAPY RXD/TAKEN: CPT | Mod: HCNC,CPTII,S$GLB, | Performed by: INTERNAL MEDICINE

## 2021-12-29 PROCEDURE — 1159F PR MEDICATION LIST DOCUMENTED IN MEDICAL RECORD: ICD-10-PCS | Mod: HCNC,CPTII,S$GLB, | Performed by: INTERNAL MEDICINE

## 2021-12-29 PROCEDURE — 99499 RISK ADDL DX/OHS AUDIT: ICD-10-PCS | Mod: HCNC,S$GLB,, | Performed by: INTERNAL MEDICINE

## 2021-12-29 PROCEDURE — 1126F AMNT PAIN NOTED NONE PRSNT: CPT | Mod: HCNC,CPTII,S$GLB, | Performed by: INTERNAL MEDICINE

## 2021-12-29 RX ORDER — IBUPROFEN 200 MG
1 CAPSULE ORAL 2 TIMES DAILY
Qty: 180 TABLET | Refills: 3 | Status: ON HOLD | COMMUNITY
Start: 2021-12-29 | End: 2024-02-27 | Stop reason: ALTCHOICE

## 2021-12-30 ENCOUNTER — TELEPHONE (OUTPATIENT)
Dept: ENDOCRINOLOGY | Facility: CLINIC | Age: 76
End: 2021-12-30
Payer: MEDICARE

## 2022-01-04 LAB — PROCOLLAGEN TYPE 1 PROPEPTIDE: 67 MCG/L (ref 22–87)

## 2022-01-05 ENCOUNTER — PATIENT MESSAGE (OUTPATIENT)
Dept: ORTHOPEDICS | Facility: CLINIC | Age: 77
End: 2022-01-05
Payer: MEDICARE

## 2022-01-06 ENCOUNTER — PATIENT MESSAGE (OUTPATIENT)
Dept: PHARMACY | Facility: CLINIC | Age: 77
End: 2022-01-06
Payer: MEDICARE

## 2022-01-07 ENCOUNTER — PATIENT MESSAGE (OUTPATIENT)
Dept: ENDOCRINOLOGY | Facility: CLINIC | Age: 77
End: 2022-01-07
Payer: MEDICARE

## 2022-01-12 ENCOUNTER — LAB VISIT (OUTPATIENT)
Dept: LAB | Facility: HOSPITAL | Age: 77
End: 2022-01-12
Attending: UROLOGY
Payer: MEDICARE

## 2022-01-12 ENCOUNTER — LAB VISIT (OUTPATIENT)
Dept: PRIMARY CARE CLINIC | Facility: OTHER | Age: 77
End: 2022-01-12
Payer: MEDICARE

## 2022-01-12 DIAGNOSIS — R50.9 FEVER: ICD-10-CM

## 2022-01-12 DIAGNOSIS — R05.9 COUGH: ICD-10-CM

## 2022-01-12 DIAGNOSIS — N40.1 BPH WITH URINARY OBSTRUCTION: ICD-10-CM

## 2022-01-12 DIAGNOSIS — N13.8 BPH WITH URINARY OBSTRUCTION: ICD-10-CM

## 2022-01-12 LAB — COMPLEXED PSA SERPL-MCNC: 3.9 NG/ML (ref 0–4)

## 2022-01-12 PROCEDURE — U0003 INFECTIOUS AGENT DETECTION BY NUCLEIC ACID (DNA OR RNA); SEVERE ACUTE RESPIRATORY SYNDROME CORONAVIRUS 2 (SARS-COV-2) (CORONAVIRUS DISEASE [COVID-19]), AMPLIFIED PROBE TECHNIQUE, MAKING USE OF HIGH THROUGHPUT TECHNOLOGIES AS DESCRIBED BY CMS-2020-01-R: HCPCS | Performed by: FAMILY MEDICINE

## 2022-01-12 PROCEDURE — 36415 COLL VENOUS BLD VENIPUNCTURE: CPT | Mod: HCNC,PO | Performed by: UROLOGY

## 2022-01-12 PROCEDURE — 84153 ASSAY OF PSA TOTAL: CPT | Mod: HCNC | Performed by: UROLOGY

## 2022-01-13 LAB
SARS-COV-2 RNA RESP QL NAA+PROBE: NOT DETECTED
SARS-COV-2- CYCLE NUMBER: NORMAL

## 2022-01-18 ENCOUNTER — PATIENT MESSAGE (OUTPATIENT)
Dept: ENDOCRINOLOGY | Facility: CLINIC | Age: 77
End: 2022-01-18
Payer: MEDICARE

## 2022-01-18 DIAGNOSIS — M80.00XD AGE-RELATED OSTEOPOROSIS WITH CURRENT PATHOLOGICAL FRACTURE WITH ROUTINE HEALING: Primary | ICD-10-CM

## 2022-01-18 RX ORDER — ABALOPARATIDE 2000 UG/ML
80 INJECTION, SOLUTION SUBCUTANEOUS DAILY
Qty: 1 EACH | Refills: 11 | Status: SHIPPED | OUTPATIENT
Start: 2022-01-18 | End: 2022-03-09

## 2022-01-18 NOTE — TELEPHONE ENCOUNTER
If not approved, we should plan for Reclast. Pt should let me know. Don't want to wait too long especially if he's already stopped the forteo. Any doses left?

## 2022-01-19 ENCOUNTER — SPECIALTY PHARMACY (OUTPATIENT)
Dept: PHARMACY | Facility: CLINIC | Age: 77
End: 2022-01-19
Payer: MEDICARE

## 2022-01-20 ENCOUNTER — OFFICE VISIT (OUTPATIENT)
Dept: UROLOGY | Facility: CLINIC | Age: 77
End: 2022-01-20
Payer: MEDICARE

## 2022-01-20 VITALS
HEIGHT: 74 IN | HEART RATE: 53 BPM | SYSTOLIC BLOOD PRESSURE: 160 MMHG | BODY MASS INDEX: 27.45 KG/M2 | WEIGHT: 213.88 LBS | DIASTOLIC BLOOD PRESSURE: 73 MMHG

## 2022-01-20 DIAGNOSIS — N13.8 BPH WITH URINARY OBSTRUCTION: Primary | ICD-10-CM

## 2022-01-20 DIAGNOSIS — N40.1 BPH WITH URINARY OBSTRUCTION: Primary | ICD-10-CM

## 2022-01-20 DIAGNOSIS — R97.20 ELEVATED PSA: ICD-10-CM

## 2022-01-20 PROCEDURE — 1160F RVW MEDS BY RX/DR IN RCRD: CPT | Mod: HCNC,CPTII,S$GLB, | Performed by: UROLOGY

## 2022-01-20 PROCEDURE — 1159F PR MEDICATION LIST DOCUMENTED IN MEDICAL RECORD: ICD-10-PCS | Mod: HCNC,CPTII,S$GLB, | Performed by: UROLOGY

## 2022-01-20 PROCEDURE — 1101F PR PT FALLS ASSESS DOC 0-1 FALLS W/OUT INJ PAST YR: ICD-10-PCS | Mod: HCNC,CPTII,S$GLB, | Performed by: UROLOGY

## 2022-01-20 PROCEDURE — 3288F PR FALLS RISK ASSESSMENT DOCUMENTED: ICD-10-PCS | Mod: HCNC,CPTII,S$GLB, | Performed by: UROLOGY

## 2022-01-20 PROCEDURE — 3078F PR MOST RECENT DIASTOLIC BLOOD PRESSURE < 80 MM HG: ICD-10-PCS | Mod: HCNC,CPTII,S$GLB, | Performed by: UROLOGY

## 2022-01-20 PROCEDURE — 99213 OFFICE O/P EST LOW 20 MIN: CPT | Mod: HCNC,S$GLB,, | Performed by: UROLOGY

## 2022-01-20 PROCEDURE — 1125F AMNT PAIN NOTED PAIN PRSNT: CPT | Mod: HCNC,CPTII,S$GLB, | Performed by: UROLOGY

## 2022-01-20 PROCEDURE — 99999 PR PBB SHADOW E&M-EST. PATIENT-LVL III: CPT | Mod: PBBFAC,HCNC,, | Performed by: UROLOGY

## 2022-01-20 PROCEDURE — 3077F PR MOST RECENT SYSTOLIC BLOOD PRESSURE >= 140 MM HG: ICD-10-PCS | Mod: HCNC,CPTII,S$GLB, | Performed by: UROLOGY

## 2022-01-20 PROCEDURE — 1157F PR ADVANCE CARE PLAN OR EQUIV PRESENT IN MEDICAL RECORD: ICD-10-PCS | Mod: HCNC,CPTII,S$GLB, | Performed by: UROLOGY

## 2022-01-20 PROCEDURE — 99999 PR PBB SHADOW E&M-EST. PATIENT-LVL III: ICD-10-PCS | Mod: PBBFAC,HCNC,, | Performed by: UROLOGY

## 2022-01-20 PROCEDURE — 3077F SYST BP >= 140 MM HG: CPT | Mod: HCNC,CPTII,S$GLB, | Performed by: UROLOGY

## 2022-01-20 PROCEDURE — 1159F MED LIST DOCD IN RCRD: CPT | Mod: HCNC,CPTII,S$GLB, | Performed by: UROLOGY

## 2022-01-20 PROCEDURE — 1125F PR PAIN SEVERITY QUANTIFIED, PAIN PRESENT: ICD-10-PCS | Mod: HCNC,CPTII,S$GLB, | Performed by: UROLOGY

## 2022-01-20 PROCEDURE — 1160F PR REVIEW ALL MEDS BY PRESCRIBER/CLIN PHARMACIST DOCUMENTED: ICD-10-PCS | Mod: HCNC,CPTII,S$GLB, | Performed by: UROLOGY

## 2022-01-20 PROCEDURE — 99213 PR OFFICE/OUTPT VISIT, EST, LEVL III, 20-29 MIN: ICD-10-PCS | Mod: HCNC,S$GLB,, | Performed by: UROLOGY

## 2022-01-20 PROCEDURE — 3078F DIAST BP <80 MM HG: CPT | Mod: HCNC,CPTII,S$GLB, | Performed by: UROLOGY

## 2022-01-20 PROCEDURE — 1101F PT FALLS ASSESS-DOCD LE1/YR: CPT | Mod: HCNC,CPTII,S$GLB, | Performed by: UROLOGY

## 2022-01-20 PROCEDURE — 1157F ADVNC CARE PLAN IN RCRD: CPT | Mod: HCNC,CPTII,S$GLB, | Performed by: UROLOGY

## 2022-01-20 PROCEDURE — 3288F FALL RISK ASSESSMENT DOCD: CPT | Mod: HCNC,CPTII,S$GLB, | Performed by: UROLOGY

## 2022-01-20 NOTE — PROGRESS NOTES
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 On Uroxatral and Avodart.  PSA 3.9 (corrected 7.8)    LUTS symptoms frequent urination (2-3 hours), nocturia 3x per night, no urgency, no incontinence. Most bothersome symptom is the nocturia.  Continues to have erectile dysfunction. Minimal efficacy with PDE-5 inhibs and ICI.  No dysuria or hematuria     Reviewed [facility] Age Specific PSA levels:  40-49    <2.5  50-59    <3.5  60-69    <4.5  70-79    <6.5    Lab Results   Component Value Date    PSA 2.62 03/21/2013    PSA 4.41 (H) 02/06/2012    PSA 4.82 (H) 10/31/2011    PSA 4.0 08/31/2011    PSA 4.5 (H) 04/20/2011    PSA 5.8 (H) 06/08/2010    PSA 3.9 12/15/2009    PSA 4.6 (H) 06/08/2009    PSA 3.8 05/28/2008    PSA 4.7 (H) 09/04/2007    PSADIAG 3.9 01/12/2022    PSADIAG 1.5 01/26/2021    PSADIAG 3.6 10/30/2019    PSADIAG 2.9 10/08/2018    PSADIAG 2.4 10/11/2017    PSADIAG 1.8 10/20/2016    PSADIAG 2.9 10/12/2015    PSATOTAL 4.0 03/26/2018    PSATOTAL 7.5 (H) 09/01/2004    PSAFREE 0.76 03/26/2018    PSAFREE 1.20 09/01/2004    PSAFREEPCT 19.00 03/26/2018    PSAFREEPCT 16.00 09/01/2004      Past Medical History:   Diagnosis Date    Anticoagulant long-term use     Anxiety     Arthritis     Atrial fibrillation     BPH (benign prostatic hyperplasia)     Cataract     CKD (chronic kidney disease) stage 3, GFR 30-59 ml/min 7/10/2017    Followed by Dr. Jeevan Pond    Colon polyp     benign    Depression     Elevated PSA     Erectile dysfunction     Gastric ulcer with hemorrhage     Hep B w/o coma 1977    History of bleeding peptic ulcer     History of prostatitis     Hypertension     PAF (paroxysmal atrial fibrillation)     Sleep apnea     on CPAP    Stroke     TIA December 2019    Thyroid disease      Family History   Problem Relation Age of Onset    COPD Father     Diabetes Father     Osteoporosis Father      Aortic stenosis Mother     Heart disease Mother         aortic stenosis    Osteoporosis Mother     Heart attack Brother     No Known Problems Son     No Known Problems Daughter     No Known Problems Daughter     No Known Problems Daughter      Social History     Socioeconomic History    Marital status:     Number of children: 5   Occupational History    Occupation: retired anesthesiology     Employer: [employer]    Occupation: [employer] grad     Comment: previous football player   Tobacco Use    Smoking status: Never Smoker    Smokeless tobacco: Never Used    Tobacco 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    Substance and Sexual Activity    Alcohol use: No    Drug use: No    Sexual activity: Yes     Partners: Female     Past Surgical History:   Procedure Laterality Date    ABDOMINAL HERNIA REPAIR      ABLATION OF ARRHYTHMOGENIC FOCUS FOR ATRIAL FIBRILLATION N/A 6/15/2020    Procedure: Ablation atrial fibrillation;  Surgeon: Wyatt Beatty MD;  Location: [facility] EP LAB;  Service: Cardiology;  Laterality: N/A;  PAF, AFl, PEPE, PVI re-do, CTI, RFA, JUSTIN, Gen, SK, 3 Prep    CATARACT EXTRACTION  11/25/2013    bilateral    CHOLECYSTECTOMY      COLONOSCOPY N/A 11/25/2015    Procedure: COLONOSCOPY;  Surgeon: Toby Hernandez MD;  Location: [facility] ENDO;  Service: Endoscopy;  Laterality: N/A;    COLONOSCOPY N/A 11/13/2020    Procedure: COLONOSCOPY;  Surgeon: Toby Hernandez MD;  Location: [facility] ENDO;  Service: Endoscopy;  Laterality: N/A;    EYE SURGERY      GASTRIC BYPASS  1992    INTRAMEDULLARY RODDING OF FEMUR Left 7/18/2020    Procedure: INSERTION, INTRAMEDULLARY ERIC, FEMUR, left, Synthes, [place] table, Large C arm clock side,;  Surgeon: Tony Rodriguez MD;  Location: [address];  Service: Orthopedics;  Laterality: Left;    KNEE ARTHROSCOPY      RT    LASIK  2001    both eyes (Dr. Rabago)    ORIF HUMERUS FRACTURE      LT    ORIF HUMERUS FRACTURE Right     non  surgical repair    RADIOFREQUENCY ABLATION      x2    Right ankle tendon repair      ROTATOR CUFF REPAIR      right    TOTAL KNEE ARTHROPLASTY Right 5/29/2018    Procedure: REPLACEMENT-KNEE-TOTAL-axel;  Surgeon: Denzel Dallas MD;  Location: [facility] OR [address] [place] [address];  Service: Orthopedics;  Laterality: Right;  [place]    TREATMENT OF CARDIAC ARRHYTHMIA  6/15/2020    Procedure: Cardioversion or Defibrillation;  Surgeon: Wyatt Beatty MD;  Location: [facility] EP LAB;  Service: Cardiology;;     Patient Active Problem List   Diagnosis    BPH with urinary obstruction    Elevated PSA    Heart abnormality    Nuclear sclerosis    Atrial fibrillation    Anemia due to chronic blood loss    Posterior vitreous detachment    Metatarsalgia    Keratoma    Foot pain, right    Onychomycosis due to dermatophyte    ED (erectile dysfunction)    Bradycardia    Mild single current episode of major depressive disorder    Tortuous aorta    Dyspnea    Acquired hypothyroidism    Essential hypertension    Gait abnormality    CKD (chronic kidney disease) stage 3, GFR 30-59 ml/min    Complex sleep apnea syndrome    Erectile dysfunction due to arterial insufficiency    Primary osteoarthritis of right knee    History of bleeding peptic ulcer    History of TIA (transient ischemic attack)    Closed nondisplaced intertrochanteric fracture of left femur    Preop cardiovascular exam    Stroke    Hx of colonic polyps    Age-related osteoporosis with current pathological fracture with routine healing    Hyperparathyroidism     Review of Systems   Constitutional: Positive for fatigue.   HENT: Negative.    Eyes: Negative.    Respiratory: Negative.    Cardiovascular: Negative.    Gastrointestinal: Negative for abdominal pain.   Genitourinary: Positive for flank pain and nocturia. Negative for difficulty urinating and hematuria.        Nocturia x3   Musculoskeletal: Positive for back pain.   Skin: Negative.    Neurological:  "Negative.    Psychiatric/Behavioral: Negative.          Objective:      There were no vitals taken for this visit.  Estimated body mass index is 27.7 kg/m² as calculated from the following:    Height as of 12/29/21: 6' 2" (1.88 m).    Weight as of 12/29/21: 97.8 kg (215 lb 11.5 oz).  Physical Exam  Constitutional:       Appearance: Normal appearance.   HENT:      Head: Normocephalic.   Eyes:      Pupils: Pupils are equal, round, and reactive to light.   Pulmonary:      Effort: Pulmonary effort is normal.   Genitourinary:     Comments: ROMY - enlarged without nodules or tenderness  Skin:     General: Skin is warm and dry.   Neurological:      Mental Status: He is alert.   Psychiatric:         Mood and Affect: Mood normal.         Behavior: Behavior normal.           Assessment and Plan:           Problem List Items Addressed This Visit    None         Follow up:   Prostate MRI, possible Uronav biopsy  post void residual today- 0 ccs  6 months with PHI.  Presley Shelton          "

## 2022-01-21 NOTE — TELEPHONE ENCOUNTER
New Rx for Tymlos received, ePA submitted as urgent. [facility] key: [id number]    Umer Quiroga, PharmD  Clinical Pharmacist  [facility]  P: [phone]

## 2022-01-22 NOTE — TELEPHONE ENCOUNTER
PA for Tymlos APPROVED, $808.36 copay. Will forward to BI/FA.    Umer Quiroga, PharmD  Clinical Pharmacist  [facility]  P: [phone]

## 2022-01-26 NOTE — TELEPHONE ENCOUNTER
Insurance name:     Copay: $808.36  Deductible: $480 (met)  Initial stage $1980.41 left in initial   % copay   OOPmax: $4,430 ($2,449.59 accumulated in initial)  TrOOP: $7,050  Catastrophic 5% copay  OSP in network? Yes     Forward to FA.

## 2022-02-01 ENCOUNTER — CLINICAL SUPPORT (OUTPATIENT)
Dept: AUDIOLOGY | Facility: CLINIC | Age: 77
End: 2022-02-01
Payer: MEDICARE

## 2022-02-01 DIAGNOSIS — Z46.1 HEARING AID FITTING OR ADJUSTMENT: Primary | ICD-10-CM

## 2022-02-01 NOTE — TELEPHONE ENCOUNTER
Incoming call from patient to check on the status of Tymlos prescription. Informed pt that Tymlos has been approved with a high copay of $808.36. Pt states that he does NOT wish for [facility] to look into FA options as he does not believe he will qualify. Pt states that he does not wish to proceed with Tymlos at this time. Will alert assigned RPh.

## 2022-02-01 NOTE — PROGRESS NOTES
Pt reported that he has been turning the hearing aids up via the gary most/all days to about level 4.  Recalculated target gain to 100% target gain with no occlusion compensation and increased high frequencies 2dB to pt's comfort level.  Changed some settings in his phone's settings menu including Raise to Wake under Sounds and Haptics and confirmed BT connectivity for the gary and streaming features.  Pt will bring his TV connector for his 2/11 appt so the serial number can be entered into the [employer] software for successful pairing.  He reported that his new settings were clear and comfortable for our voices in the office.  He will call if anything is needed prior to his 2/11/22 f/u appt.

## 2022-02-01 NOTE — TELEPHONE ENCOUNTER
Will notify provider that patient does not wish to pursue Tymlos therapy at this time. Closing OSP enrollment.    Umer Quiroga, PharmD  Clinical Pharmacist  [facility]  P: [phone]

## 2022-02-02 ENCOUNTER — TELEPHONE (OUTPATIENT)
Dept: ENDOCRINOLOGY | Facility: CLINIC | Age: 77
End: 2022-02-02
Payer: MEDICARE

## 2022-02-02 ENCOUNTER — PATIENT MESSAGE (OUTPATIENT)
Dept: ENDOCRINOLOGY | Facility: CLINIC | Age: 77
End: 2022-02-02
Payer: MEDICARE

## 2022-02-02 NOTE — TELEPHONE ENCOUNTER
Since anabolic therapy too expensive to continue, recommend starting Reclast.     Notify pt, send not to infusion center to sen me an order to sign.

## 2022-02-25 ENCOUNTER — CLINICAL SUPPORT (OUTPATIENT)
Dept: AUDIOLOGY | Facility: CLINIC | Age: 77
End: 2022-02-25
Payer: MEDICARE

## 2022-02-25 DIAGNOSIS — Z46.1 HEARING AID FITTING OR ADJUSTMENT: Primary | ICD-10-CM

## 2022-02-25 NOTE — PROGRESS NOTES
Pt here for a hearing aid check and to have his TV connector (S/N: [id number]) paired with his hearing aids.  Cleaned both aids and performed a listening check which yielded appropriate amplification from both aids.  Increased gain to 110% target gain with an additional increase in overall gain of 4dB.  Paired with TV connector in [employer] software.  Pt reported that his new settings were clear and comfortable.  Reviewed the [employer] gary controls with the patient and answered all questions.  He was scheduled to return in one month on 3/25/22 for a hearing aid check but will call if he needs to be seen sooner.

## 2022-03-02 ENCOUNTER — LAB VISIT (OUTPATIENT)
Dept: LAB | Facility: HOSPITAL | Age: 77
End: 2022-03-02
Attending: FAMILY MEDICINE
Payer: MEDICARE

## 2022-03-02 DIAGNOSIS — I10 HYPERTENSION, UNSPECIFIED TYPE: ICD-10-CM

## 2022-03-02 DIAGNOSIS — E03.4 HYPOTHYROIDISM DUE TO ACQUIRED ATROPHY OF THYROID: ICD-10-CM

## 2022-03-02 DIAGNOSIS — R73.09 ABNORMAL GLUCOSE: ICD-10-CM

## 2022-03-02 DIAGNOSIS — M80.00XD AGE-RELATED OSTEOPOROSIS WITH CURRENT PATHOLOGICAL FRACTURE WITH ROUTINE HEALING: ICD-10-CM

## 2022-03-02 LAB
ALBUMIN SERPL BCP-MCNC: 3.3 G/DL (ref 3.5–5.2)
ALBUMIN SERPL BCP-MCNC: 3.3 G/DL (ref 3.5–5.2)
ALP SERPL-CCNC: 88 U/L (ref 55–135)
ALT SERPL W/O P-5'-P-CCNC: 13 U/L (ref 10–44)
ANION GAP SERPL CALC-SCNC: 10 MMOL/L (ref 8–16)
ANION GAP SERPL CALC-SCNC: 10 MMOL/L (ref 8–16)
AST SERPL-CCNC: 16 U/L (ref 10–40)
BASOPHILS # BLD AUTO: 0.12 K/UL (ref 0–0.2)
BASOPHILS NFR BLD: 1.4 % (ref 0–1.9)
BILIRUB SERPL-MCNC: 0.8 MG/DL (ref 0.1–1)
BUN SERPL-MCNC: 27 MG/DL (ref 8–23)
BUN SERPL-MCNC: 27 MG/DL (ref 8–23)
CALCIUM SERPL-MCNC: 8.6 MG/DL (ref 8.7–10.5)
CALCIUM SERPL-MCNC: 8.6 MG/DL (ref 8.7–10.5)
CHLORIDE SERPL-SCNC: 108 MMOL/L (ref 95–110)
CHLORIDE SERPL-SCNC: 108 MMOL/L (ref 95–110)
CHOLEST SERPL-MCNC: 107 MG/DL (ref 120–199)
CHOLEST/HDLC SERPL: 2.5 {RATIO} (ref 2–5)
CO2 SERPL-SCNC: 23 MMOL/L (ref 23–29)
CO2 SERPL-SCNC: 23 MMOL/L (ref 23–29)
CREAT SERPL-MCNC: 1.5 MG/DL (ref 0.5–1.4)
CREAT SERPL-MCNC: 1.5 MG/DL (ref 0.5–1.4)
DIFFERENTIAL METHOD: ABNORMAL
EOSINOPHIL # BLD AUTO: 1.1 K/UL (ref 0–0.5)
EOSINOPHIL NFR BLD: 12.8 % (ref 0–8)
ERYTHROCYTE [DISTWIDTH] IN BLOOD BY AUTOMATED COUNT: 13.5 % (ref 11.5–14.5)
EST. GFR  (AFRICAN AMERICAN): 51.9 ML/MIN/1.73 M^2
EST. GFR  (AFRICAN AMERICAN): 51.9 ML/MIN/1.73 M^2
EST. GFR  (NON AFRICAN AMERICAN): 44.9 ML/MIN/1.73 M^2
EST. GFR  (NON AFRICAN AMERICAN): 44.9 ML/MIN/1.73 M^2
ESTIMATED AVG GLUCOSE: 85 MG/DL (ref 68–131)
GLUCOSE SERPL-MCNC: 107 MG/DL (ref 70–110)
GLUCOSE SERPL-MCNC: 107 MG/DL (ref 70–110)
HBA1C MFR BLD: 4.6 % (ref 4–5.6)
HCT VFR BLD AUTO: 37.5 % (ref 40–54)
HDLC SERPL-MCNC: 43 MG/DL (ref 40–75)
HDLC SERPL: 40.2 % (ref 20–50)
HGB BLD-MCNC: 12.3 G/DL (ref 14–18)
IMM GRANULOCYTES # BLD AUTO: 0.03 K/UL (ref 0–0.04)
IMM GRANULOCYTES NFR BLD AUTO: 0.4 % (ref 0–0.5)
LDLC SERPL CALC-MCNC: 54.6 MG/DL (ref 63–159)
LYMPHOCYTES # BLD AUTO: 1.8 K/UL (ref 1–4.8)
LYMPHOCYTES NFR BLD: 20.6 % (ref 18–48)
MCH RBC QN AUTO: 31.4 PG (ref 27–31)
MCHC RBC AUTO-ENTMCNC: 32.8 G/DL (ref 32–36)
MCV RBC AUTO: 96 FL (ref 82–98)
MONOCYTES # BLD AUTO: 0.6 K/UL (ref 0.3–1)
MONOCYTES NFR BLD: 6.8 % (ref 4–15)
NEUTROPHILS # BLD AUTO: 5 K/UL (ref 1.8–7.7)
NEUTROPHILS NFR BLD: 58 % (ref 38–73)
NONHDLC SERPL-MCNC: 64 MG/DL
NRBC BLD-RTO: 0 /100 WBC
PHOSPHATE SERPL-MCNC: 2.8 MG/DL (ref 2.7–4.5)
PLATELET # BLD AUTO: 246 K/UL (ref 150–450)
PMV BLD AUTO: 12 FL (ref 9.2–12.9)
POTASSIUM SERPL-SCNC: 4.4 MMOL/L (ref 3.5–5.1)
POTASSIUM SERPL-SCNC: 4.4 MMOL/L (ref 3.5–5.1)
PROT SERPL-MCNC: 5.9 G/DL (ref 6–8.4)
RBC # BLD AUTO: 3.92 M/UL (ref 4.6–6.2)
SODIUM SERPL-SCNC: 141 MMOL/L (ref 136–145)
SODIUM SERPL-SCNC: 141 MMOL/L (ref 136–145)
TRIGL SERPL-MCNC: 47 MG/DL (ref 30–150)
TSH SERPL DL<=0.005 MIU/L-ACNC: 3.48 UIU/ML (ref 0.4–4)
WBC # BLD AUTO: 8.54 K/UL (ref 3.9–12.7)

## 2022-03-02 PROCEDURE — 36415 COLL VENOUS BLD VENIPUNCTURE: CPT | Mod: PO | Performed by: FAMILY MEDICINE

## 2022-03-02 PROCEDURE — 83036 HEMOGLOBIN GLYCOSYLATED A1C: CPT | Performed by: FAMILY MEDICINE

## 2022-03-02 PROCEDURE — 80053 COMPREHEN METABOLIC PANEL: CPT | Performed by: FAMILY MEDICINE

## 2022-03-02 PROCEDURE — 85025 COMPLETE CBC W/AUTO DIFF WBC: CPT | Performed by: FAMILY MEDICINE

## 2022-03-02 PROCEDURE — 80061 LIPID PANEL: CPT | Performed by: FAMILY MEDICINE

## 2022-03-02 PROCEDURE — 84100 ASSAY OF PHOSPHORUS: CPT | Performed by: INTERNAL MEDICINE

## 2022-03-02 PROCEDURE — 84443 ASSAY THYROID STIM HORMONE: CPT | Performed by: FAMILY MEDICINE

## 2022-03-03 ENCOUNTER — HOSPITAL ENCOUNTER (OUTPATIENT)
Dept: RADIOLOGY | Facility: HOSPITAL | Age: 77
Discharge: HOME | End: 2022-03-03
Attending: UROLOGY
Payer: MEDICARE

## 2022-03-03 DIAGNOSIS — R97.20 ELEVATED PSA: ICD-10-CM

## 2022-03-03 PROCEDURE — 72197 MRI PELVIS W/O & W/DYE: CPT | Mod: 26,,, | Performed by: STUDENT IN AN ORGANIZED HEALTH CARE EDUCATION/TRAINING PROGRAM

## 2022-03-03 PROCEDURE — A9585 GADOBUTROL INJECTION: HCPCS | Performed by: UROLOGY

## 2022-03-03 PROCEDURE — 25500020 PHARM REV CODE 255: Performed by: UROLOGY

## 2022-03-03 PROCEDURE — 72197 MRI PROSTATE W W/O CONTRAST: ICD-10-PCS | Mod: 26,,, | Performed by: STUDENT IN AN ORGANIZED HEALTH CARE EDUCATION/TRAINING PROGRAM

## 2022-03-03 PROCEDURE — 72197 MRI PELVIS W/O & W/DYE: CPT | Mod: TC

## 2022-03-03 RX ORDER — GADOBUTROL 604.72 MG/ML
10 INJECTION INTRAVENOUS
Status: COMPLETED | OUTPATIENT
Start: 2022-03-03 | End: 2022-03-03

## 2022-03-03 RX ADMIN — GADOBUTROL 10 ML: 604.72 INJECTION INTRAVENOUS at 08:03

## 2022-03-07 ENCOUNTER — PATIENT MESSAGE (OUTPATIENT)
Dept: ORTHOPEDICS | Facility: CLINIC | Age: 77
End: 2022-03-07
Payer: MEDICARE

## 2022-03-09 ENCOUNTER — TELEPHONE (OUTPATIENT)
Dept: ENDOCRINOLOGY | Facility: CLINIC | Age: 77
End: 2022-03-09
Payer: MEDICARE

## 2022-03-09 ENCOUNTER — OFFICE VISIT (OUTPATIENT)
Dept: FAMILY MEDICINE | Facility: CLINIC | Age: 77
End: 2022-03-09
Payer: MEDICARE

## 2022-03-09 VITALS
HEART RATE: 52 BPM | SYSTOLIC BLOOD PRESSURE: 118 MMHG | WEIGHT: 217.38 LBS | RESPIRATION RATE: 18 BRPM | HEIGHT: 74 IN | DIASTOLIC BLOOD PRESSURE: 60 MMHG | BODY MASS INDEX: 27.9 KG/M2 | TEMPERATURE: 98 F | OXYGEN SATURATION: 97 %

## 2022-03-09 DIAGNOSIS — E03.4 HYPOTHYROIDISM DUE TO ACQUIRED ATROPHY OF THYROID: Primary | ICD-10-CM

## 2022-03-09 DIAGNOSIS — E21.3 HYPERPARATHYROIDISM: ICD-10-CM

## 2022-03-09 DIAGNOSIS — F32.0 MILD SINGLE CURRENT EPISODE OF MAJOR DEPRESSIVE DISORDER: ICD-10-CM

## 2022-03-09 DIAGNOSIS — R73.09 ABNORMAL GLUCOSE: ICD-10-CM

## 2022-03-09 DIAGNOSIS — I77.1 TORTUOUS AORTA: ICD-10-CM

## 2022-03-09 DIAGNOSIS — N18.31 STAGE 3A CHRONIC KIDNEY DISEASE: ICD-10-CM

## 2022-03-09 DIAGNOSIS — I10 HYPERTENSION, UNSPECIFIED TYPE: ICD-10-CM

## 2022-03-09 DIAGNOSIS — I48.0 PAROXYSMAL ATRIAL FIBRILLATION: ICD-10-CM

## 2022-03-09 PROCEDURE — 3078F PR MOST RECENT DIASTOLIC BLOOD PRESSURE < 80 MM HG: ICD-10-PCS | Mod: CPTII,S$GLB,, | Performed by: FAMILY MEDICINE

## 2022-03-09 PROCEDURE — 3288F PR FALLS RISK ASSESSMENT DOCUMENTED: ICD-10-PCS | Mod: CPTII,S$GLB,, | Performed by: FAMILY MEDICINE

## 2022-03-09 PROCEDURE — 3044F HG A1C LEVEL LT 7.0%: CPT | Mod: CPTII,S$GLB,, | Performed by: FAMILY MEDICINE

## 2022-03-09 PROCEDURE — 1101F PR PT FALLS ASSESS DOC 0-1 FALLS W/OUT INJ PAST YR: ICD-10-PCS | Mod: CPTII,S$GLB,, | Performed by: FAMILY MEDICINE

## 2022-03-09 PROCEDURE — 1101F PT FALLS ASSESS-DOCD LE1/YR: CPT | Mod: CPTII,S$GLB,, | Performed by: FAMILY MEDICINE

## 2022-03-09 PROCEDURE — 99214 OFFICE O/P EST MOD 30 MIN: CPT | Mod: S$GLB,,, | Performed by: FAMILY MEDICINE

## 2022-03-09 PROCEDURE — 99999 PR PBB SHADOW E&M-EST. PATIENT-LVL III: CPT | Mod: PBBFAC,,, | Performed by: FAMILY MEDICINE

## 2022-03-09 PROCEDURE — 99999 PR PBB SHADOW E&M-EST. PATIENT-LVL III: ICD-10-PCS | Mod: PBBFAC,,, | Performed by: FAMILY MEDICINE

## 2022-03-09 PROCEDURE — 3288F FALL RISK ASSESSMENT DOCD: CPT | Mod: CPTII,S$GLB,, | Performed by: FAMILY MEDICINE

## 2022-03-09 PROCEDURE — 1157F PR ADVANCE CARE PLAN OR EQUIV PRESENT IN MEDICAL RECORD: ICD-10-PCS | Mod: CPTII,S$GLB,, | Performed by: FAMILY MEDICINE

## 2022-03-09 PROCEDURE — 1159F MED LIST DOCD IN RCRD: CPT | Mod: CPTII,S$GLB,, | Performed by: FAMILY MEDICINE

## 2022-03-09 PROCEDURE — 99499 RISK ADDL DX/OHS AUDIT: ICD-10-PCS | Mod: S$GLB,,, | Performed by: FAMILY MEDICINE

## 2022-03-09 PROCEDURE — 99499 UNLISTED E&M SERVICE: CPT | Mod: S$GLB,,, | Performed by: FAMILY MEDICINE

## 2022-03-09 PROCEDURE — 1159F PR MEDICATION LIST DOCUMENTED IN MEDICAL RECORD: ICD-10-PCS | Mod: CPTII,S$GLB,, | Performed by: FAMILY MEDICINE

## 2022-03-09 PROCEDURE — 1126F AMNT PAIN NOTED NONE PRSNT: CPT | Mod: CPTII,S$GLB,, | Performed by: FAMILY MEDICINE

## 2022-03-09 PROCEDURE — 3044F PR MOST RECENT HEMOGLOBIN A1C LEVEL <7.0%: ICD-10-PCS | Mod: CPTII,S$GLB,, | Performed by: FAMILY MEDICINE

## 2022-03-09 PROCEDURE — 1157F ADVNC CARE PLAN IN RCRD: CPT | Mod: CPTII,S$GLB,, | Performed by: FAMILY MEDICINE

## 2022-03-09 PROCEDURE — 3074F SYST BP LT 130 MM HG: CPT | Mod: CPTII,S$GLB,, | Performed by: FAMILY MEDICINE

## 2022-03-09 PROCEDURE — 3074F PR MOST RECENT SYSTOLIC BLOOD PRESSURE < 130 MM HG: ICD-10-PCS | Mod: CPTII,S$GLB,, | Performed by: FAMILY MEDICINE

## 2022-03-09 PROCEDURE — 1126F PR PAIN SEVERITY QUANTIFIED, NO PAIN PRESENT: ICD-10-PCS | Mod: CPTII,S$GLB,, | Performed by: FAMILY MEDICINE

## 2022-03-09 PROCEDURE — 3078F DIAST BP <80 MM HG: CPT | Mod: CPTII,S$GLB,, | Performed by: FAMILY MEDICINE

## 2022-03-09 PROCEDURE — 99214 PR OFFICE/OUTPT VISIT, EST, LEVL IV, 30-39 MIN: ICD-10-PCS | Mod: S$GLB,,, | Performed by: FAMILY MEDICINE

## 2022-03-09 RX ORDER — METHOCARBAMOL 500 MG/1
TABLET, FILM COATED ORAL
COMMUNITY
Start: 2022-02-26 | End: 2022-03-30

## 2022-03-09 NOTE — TELEPHONE ENCOUNTER
Reviewed labs. Corrected calcium 9.2. egfr 44.9, and is reduced but similar to prior levels. Recommended pt get Reclast please follow up. Send note to infusion center to send me order if pt ok.

## 2022-03-09 NOTE — PROGRESS NOTES
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    Chief Complaint: Hypertension (6 month follow up with labs)    Here for 6 month follow-up.    S/p ORIF left femur fracture - following with Dr. Rodriguez; doing well.  H/o TIA - taking just Eliquis; stopped lipitor after seeing neurologist  Afib - s/p ablation; sees Dr. Beatty; gets episodes twice a year requiring cardioversion. Taking Eliquis 5mg BID, Coreg BID, Rhythmol BID.  HTN - telmisartan 40mg  And Coreg 12.5mg BID  CKD - following with Dr. Pond  Knee DJD - s/p right knee TKA; stable  Hypothyroidism - tolerating levothyroxine 75mcg  S/ gastric bypass - taking ferrous sulfate and B12  Depression - mood controlled on Wellbutrin and Lexapro  Osteoporosis - taking calcium citrate  BPH - taking Avodart and Uroxatral    Hypertension  Pertinent negatives include no chest pain, headaches, neck pain, palpitations or shortness of breath.   Follow-up  Pertinent negatives include no abdominal pain, arthralgias, chest pain, chills, coughing, fever, headaches, nausea, neck pain, rash, sore throat or weakness.       Past Medical History:   Diagnosis Date    Anticoagulant long-term use     Anxiety     Arthritis     Atrial fibrillation     BPH (benign prostatic hyperplasia)     Cataract     CKD (chronic kidney disease) stage 3, GFR 30-59 ml/min 7/10/2017    Followed by Dr. Jeevan Pond    Colon polyp     benign    Depression     Elevated PSA     Erectile dysfunction     Gastric ulcer with hemorrhage     Hep B w/o coma 1977    History of bleeding peptic ulcer     History of prostatitis     Hypertension     PAF (paroxysmal atrial fibrillation)     Sleep apnea     on CPAP    Stroke     TIA December 2019    Thyroid disease        Past Surgical History:   Procedure Laterality Date    ABDOMINAL HERNIA REPAIR      ABLATION OF ARRHYTHMOGENIC FOCUS FOR ATRIAL FIBRILLATION N/A 6/15/2020    Procedure: Ablation atrial fibrillation;  Surgeon:  Wyatt Beatty MD;  Location: [facility] EP LAB;  Service: Cardiology;  Laterality: N/A;  PAF, AFl, PEPE, PVI re-do, CTI, RFA, JUSTIN, Gen, SK, 3 Prep    CATARACT EXTRACTION  11/25/2013    bilateral    CHOLECYSTECTOMY      COLONOSCOPY N/A 11/25/2015    Procedure: COLONOSCOPY;  Surgeon: Toby Hernandez MD;  Location: [facility] ENDO;  Service: Endoscopy;  Laterality: N/A;    COLONOSCOPY N/A 11/13/2020    Procedure: COLONOSCOPY;  Surgeon: Toby Hernandez MD;  Location: [facility] ENDO;  Service: Endoscopy;  Laterality: N/A;    EYE SURGERY      GASTRIC BYPASS  1992    INTRAMEDULLARY RODDING OF FEMUR Left 7/18/2020    Procedure: INSERTION, INTRAMEDULLARY ERIC, FEMUR, left, Synthes, [place] table, Large C arm clock side,;  Surgeon: Tony Rodriguez MD;  Location: [facility] OR [facility] FLR;  Service: Orthopedics;  Laterality: Left;    KNEE ARTHROSCOPY      RT    LASIK  2001    both eyes (Dr. Rabago)    ORIF HUMERUS FRACTURE      LT    ORIF HUMERUS FRACTURE Right     non surgical repair    RADIOFREQUENCY ABLATION      x2    Right ankle tendon repair      ROTATOR CUFF REPAIR      right    TOTAL KNEE ARTHROPLASTY Right 5/29/2018    Procedure: REPLACEMENT-KNEE-TOTAL-axel;  Surgeon: Denzel Dallas MD;  Location: [facility] OR [facility] FLR;  Service: Orthopedics;  Laterality: Right;  [place]    TREATMENT OF CARDIAC ARRHYTHMIA  6/15/2020    Procedure: Cardioversion or Defibrillation;  Surgeon: Wyatt Beatty MD;  Location: [facility] EP LAB;  Service: Cardiology;;       Review of patient's allergies indicates:   Allergen Reactions    No known drug allergies        Social History     Socioeconomic History    Marital status:     Number of children: 5   Occupational History    Occupation: retired anesthesiology     Employer: [employer]    Occupation: [employer] grad     Comment: previous football player   Tobacco Use    Smoking status: Never Smoker    Smokeless tobacco: Never Used    Tobacco comment: [employer] [facility] [employer]  anesthesiologist    Substance and Sexual Activity    Alcohol use: No    Drug use: No    Sexual activity: Yes     Partners: Female       Current Outpatient Medications on File Prior to Visit   Medication Sig Dispense Refill    acyclovir (ZOVIRAX) 800 MG Tab TK ONE T PO FIVE TIMES D only for fever blisters  1    alfuzosin (UROXATRAL) 10 mg Tb24 Take 1 tablet (10 mg total) by mouth once daily. 90 tablet 3    buPROPion (WELLBUTRIN XL) 150 MG TB24 tablet       buPROPion (WELLBUTRIN XL) 300 MG 24 hr tablet Take 1 tablet (300 mg total) by mouth once daily. 90 tablet 2    calcium citrate (CALCITRATE) 200 mg (950 mg) tablet Take 1 tablet (200 mg total) by mouth 2 (two) times daily. 180 tablet 3    carvediloL (COREG) 12.5 MG tablet TAKE 1 TABLET(12.5 MG) BY MOUTH TWICE DAILY WITH MEALS 180 tablet 3    celecoxib (CELEBREX) 200 MG capsule TAKE 1 CAPSULE(200 MG) BY MOUTH TWICE DAILY 180 capsule 0    cyclobenzaprine (FLEXERIL) 10 MG tablet Take 1 tablet (10 mg total) by mouth 3 (three) times daily as needed for Muscle spasms. 60 tablet 0    dutasteride (AVODART) 0.5 mg capsule TAKE 1 CAPSULE(0.5 MG) BY MOUTH EVERY DAY 90 capsule 3    ELIQUIS 5 mg Tab TAKE 1 TABLET(5 MG) BY MOUTH TWICE DAILY 60 tablet 11    EScitalopram oxalate (LEXAPRO) 10 MG tablet TAKE 1 TABLET(10 MG) BY MOUTH EVERY DAY 90 tablet 3    levothyroxine (SYNTHROID) 75 MCG tablet TAKE 1 TABLET(75 MCG) BY MOUTH BEFORE BREAKFAST 90 tablet 3    methocarbamoL (ROBAXIN) 500 MG Tab       OMEGA-3 FATTY ACIDS (FISH OIL CONCENTRATE ORAL) Take by mouth Daily.      propafenone (RYTHMOL SR) 425 MG Cp12 TAKE 1 CAPSULE(425 MG) BY MOUTH EVERY 12 HOURS 180 capsule 3    telmisartan (MICARDIS) 40 MG Tab Take 1 tablet (40 mg total) by mouth once daily. 90 tablet 1    ferrous sulfate (FEOSOL) 325 mg (65 mg iron) Tab tablet Take by mouth.      varicella-zoster gE-AS01B, PF, (SHINGRIX, PF,) 50 mcg/0.5 mL injection Inject into the muscle. (Patient not taking: Reported  "on 3/9/2022) 1 each 1    varicella-zoster gE-AS01B, PF, (SHINGRIX, PF,) 50 mcg/0.5 mL injection Inject into the muscle. (Patient not taking: Reported on 3/9/2022) 1 each 1     No current facility-administered medications on file prior to visit.       Family History   Problem Relation Age of Onset    COPD Father     Diabetes Father     Osteoporosis Father     Aortic stenosis Mother     Heart disease Mother         aortic stenosis    Osteoporosis Mother     Heart attack Brother     No Known Problems Son     No Known Problems Daughter     No Known Problems Daughter     No Known Problems Daughter        Review of Systems   Constitutional: Negative for appetite change, chills, fever and unexpected weight change.   HENT: Negative for sore throat and trouble swallowing.    Eyes: Negative for pain and visual disturbance.   Respiratory: Negative for cough, chest tightness, shortness of breath and wheezing.    Cardiovascular: Negative for chest pain, palpitations and leg swelling.   Gastrointestinal: Negative for abdominal pain, blood in stool, constipation, diarrhea and nausea.   Genitourinary: Negative for difficulty urinating, dysuria and hematuria.   Musculoskeletal: Negative for arthralgias, gait problem and neck pain.   Skin: Negative for rash and wound.   Neurological: Negative for dizziness, weakness, light-headedness and headaches.   Hematological: Negative for adenopathy.   Psychiatric/Behavioral: Negative for dysphoric mood.       Objective:      /60   Pulse (!) 52   Temp 97.9 °F (36.6 °C) (Oral)   Resp 18   Ht 6' 2" (1.88 m)   Wt 98.6 kg (217 lb 6 oz)   SpO2 97%   BMI 27.91 kg/m²   Physical Exam  Constitutional:       General: He is not in acute distress.     Appearance: He is well-developed.   HENT:      Head: Normocephalic and atraumatic.      Right Ear: External ear normal.      Left Ear: External ear normal.      Mouth/Throat:      Pharynx: Uvula midline. No oropharyngeal exudate. "   Eyes:      General: Lids are normal.      Conjunctiva/sclera: Conjunctivae normal.      Pupils: Pupils are equal, round, and reactive to light.   Neck:      Thyroid: No thyroid mass or thyromegaly.      Trachea: Phonation normal.   Cardiovascular:      Rate and Rhythm: Normal rate and regular rhythm.      Heart sounds: Normal heart sounds. No murmur heard.    No friction rub. No gallop.   Pulmonary:      Effort: Pulmonary effort is normal. No respiratory distress.      Breath sounds: Normal breath sounds. No wheezing or rales.   Musculoskeletal:         General: Normal range of motion.      Cervical back: Full passive range of motion without pain, normal range of motion and neck supple.   Lymphadenopathy:      Cervical: No cervical adenopathy.   Skin:     General: Skin is warm and dry.   Neurological:      Mental Status: He is alert and oriented to person, place, and time.      Cranial Nerves: No cranial nerve deficit.      Coordination: Coordination normal.   Psychiatric:         Speech: Speech normal.         Behavior: Behavior normal.         Thought Content: Thought content normal.         Judgment: Judgment normal.         Results for orders placed or performed in visit on 03/02/22   Hemoglobin A1C   Result Value Ref Range    Hemoglobin A1C 4.6 4.0 - 5.6 %    Estimated Avg Glucose 85 68 - 131 mg/dL   Comprehensive Metabolic Panel   Result Value Ref Range    Sodium 141 136 - 145 mmol/L    Potassium 4.4 3.5 - 5.1 mmol/L    Chloride 108 95 - 110 mmol/L    CO2 23 23 - 29 mmol/L    Glucose 107 70 - 110 mg/dL    BUN 27 (H) 8 - 23 mg/dL    Creatinine 1.5 (H) 0.5 - 1.4 mg/dL    Calcium 8.6 (L) 8.7 - 10.5 mg/dL    Total Protein 5.9 (L) 6.0 - 8.4 g/dL    Albumin 3.3 (L) 3.5 - 5.2 g/dL    Total Bilirubin 0.8 0.1 - 1.0 mg/dL    Alkaline Phosphatase 88 55 - 135 U/L    AST 16 10 - 40 U/L    ALT 13 10 - 44 U/L    Anion Gap 10 8 - 16 mmol/L    eGFR if African American 51.9 (A) >60 mL/min/1.73 m^2    eGFR if non African  American 44.9 (A) >60 mL/min/1.73 m^2   Lipid Panel   Result Value Ref Range    Cholesterol 107 (L) 120 - 199 mg/dL    Triglycerides 47 30 - 150 mg/dL    HDL 43 40 - 75 mg/dL    LDL Cholesterol 54.6 (L) 63.0 - 159.0 mg/dL    HDL/Cholesterol Ratio 40.2 20.0 - 50.0 %    Total Cholesterol/HDL Ratio 2.5 2.0 - 5.0    Non-HDL Cholesterol 64 mg/dL   TSH   Result Value Ref Range    TSH 3.475 0.400 - 4.000 uIU/mL   CBC Auto Differential   Result Value Ref Range    WBC 8.54 3.90 - 12.70 K/uL    RBC 3.92 (L) 4.60 - 6.20 M/uL    Hemoglobin 12.3 (L) 14.0 - 18.0 g/dL    Hematocrit 37.5 (L) 40.0 - 54.0 %    MCV 96 82 - 98 fL    MCH 31.4 (H) 27.0 - 31.0 pg    MCHC 32.8 32.0 - 36.0 g/dL    RDW 13.5 11.5 - 14.5 %    Platelets 246 150 - 450 K/uL    MPV 12.0 9.2 - 12.9 fL    Immature Granulocytes 0.4 0.0 - 0.5 %    Gran # (ANC) 5.0 1.8 - 7.7 K/uL    Immature Grans (Abs) 0.03 0.00 - 0.04 K/uL    Lymph # 1.8 1.0 - 4.8 K/uL    Mono # 0.6 0.3 - 1.0 K/uL    Eos # 1.1 (H) 0.0 - 0.5 K/uL    Baso # 0.12 0.00 - 0.20 K/uL    nRBC 0 0 /100 WBC    Gran % 58.0 38.0 - 73.0 %    Lymph % 20.6 18.0 - 48.0 %    Mono % 6.8 4.0 - 15.0 %    Eosinophil % 12.8 (H) 0.0 - 8.0 %    Basophil % 1.4 0.0 - 1.9 %    Differential Method Automated    Renal Function Panel   Result Value Ref Range    Glucose 107 70 - 110 mg/dL    Sodium 141 136 - 145 mmol/L    Potassium 4.4 3.5 - 5.1 mmol/L    Chloride 108 95 - 110 mmol/L    CO2 23 23 - 29 mmol/L    BUN 27 (H) 8 - 23 mg/dL    Calcium 8.6 (L) 8.7 - 10.5 mg/dL    Creatinine 1.5 (H) 0.5 - 1.4 mg/dL    Albumin 3.3 (L) 3.5 - 5.2 g/dL    Phosphorus 2.8 2.7 - 4.5 mg/dL    eGFR if  51.9 (A) >60 mL/min/1.73 m^2    eGFR if non African American 44.9 (A) >60 mL/min/1.73 m^2    Anion Gap 10 8 - 16 mmol/L     *Note: Due to a large number of results and/or encounters for the requested time period, some results have not been displayed. A complete set of results can be found in Results Review.          Assessment:        1. Hypothyroidism due to acquired atrophy of thyroid    2. Hypertension, unspecified type    3. Paroxysmal atrial fibrillation    4. Abnormal glucose    5. Stage 3a chronic kidney disease    6. Hyperparathyroidism    7. Mild single current episode of major depressive disorder    8. Tortuous aorta        Plan:       Hypothyroidism due to acquired atrophy of thyroid  -     TSH; Future; Expected date: 09/05/2022    Hypertension, unspecified type  -     Lipid Panel; Future; Expected date: 09/05/2022  -     CBC Auto Differential; Future; Expected date: 09/05/2022    Paroxysmal atrial fibrillation    Abnormal glucose  -     Hemoglobin A1C; Future; Expected date: 09/05/2022    Stage 3a chronic kidney disease  -     Comprehensive Metabolic Panel; Future; Expected date: 09/05/2022    Hyperparathyroidism    Mild single current episode of major depressive disorder    Tortuous aorta          Continue Eliquis 5mg BID with ASA  Continue telmisartan and carvedilol   Continue other present meds  F/u with cardiology as planned  Counseled on regular exercise, maintenance of a healthy weight, balanced diet rich in fruits/vegetables and lean protein, and avoidance of unhealthy habits like smoking and excessive alcohol intake.    F/u 6 months with labs

## 2022-03-09 NOTE — TELEPHONE ENCOUNTER
S/w pt, informed of Dr. Sandifer's message below. Verbalized understanding. Pt states he will think about getting Reclast and get back with us.

## 2022-03-10 ENCOUNTER — PATIENT MESSAGE (OUTPATIENT)
Dept: FAMILY MEDICINE | Facility: CLINIC | Age: 77
End: 2022-03-10
Payer: MEDICARE

## 2022-03-14 ENCOUNTER — PATIENT MESSAGE (OUTPATIENT)
Dept: ENDOCRINOLOGY | Facility: CLINIC | Age: 77
End: 2022-03-14
Payer: MEDICARE

## 2022-03-14 RX ORDER — ACETAMINOPHEN 500 MG
500 TABLET ORAL
Status: CANCELLED | OUTPATIENT
Start: 2022-03-14

## 2022-03-14 RX ORDER — SODIUM CHLORIDE 0.9 % (FLUSH) 0.9 %
10 SYRINGE (ML) INJECTION
Status: CANCELLED | OUTPATIENT
Start: 2022-03-14

## 2022-03-14 RX ORDER — HEPARIN 100 UNIT/ML
500 SYRINGE INTRAVENOUS
Status: CANCELLED | OUTPATIENT
Start: 2022-03-14

## 2022-03-14 RX ORDER — ZOLEDRONIC ACID 5 MG/100ML
5 INJECTION, SOLUTION INTRAVENOUS
Status: CANCELLED | OUTPATIENT
Start: 2022-03-14

## 2022-03-14 NOTE — TELEPHONE ENCOUNTER
Pt has decided to go ahead with Reclast. Dr. Sandifer requested if you could please send her the order to sign. Please call pt to assist in scheduling. Thank you!

## 2022-03-15 ENCOUNTER — TELEPHONE (OUTPATIENT)
Dept: INFUSION THERAPY | Facility: HOSPITAL | Age: 77
End: 2022-03-15
Payer: MEDICARE

## 2022-03-16 ENCOUNTER — TELEPHONE (OUTPATIENT)
Dept: ENDOCRINOLOGY | Facility: CLINIC | Age: 77
End: 2022-03-16
Payer: MEDICARE

## 2022-03-16 NOTE — TELEPHONE ENCOUNTER
Dr. Sandifer, pt is scheduled to get Reclast next week, seen by you 12/29/21, labs done 3/2/22.  OK to proceed with Reclast appt?

## 2022-03-23 ENCOUNTER — INFUSION (OUTPATIENT)
Dept: INFUSION THERAPY | Facility: HOSPITAL | Age: 77
End: 2022-03-23
Payer: MEDICARE

## 2022-03-23 VITALS
SYSTOLIC BLOOD PRESSURE: 153 MMHG | RESPIRATION RATE: 16 BRPM | DIASTOLIC BLOOD PRESSURE: 73 MMHG | WEIGHT: 217.38 LBS | HEART RATE: 64 BPM | BODY MASS INDEX: 27.9 KG/M2 | HEIGHT: 74 IN | TEMPERATURE: 99 F

## 2022-03-23 DIAGNOSIS — M80.00XD AGE-RELATED OSTEOPOROSIS WITH CURRENT PATHOLOGICAL FRACTURE WITH ROUTINE HEALING: Primary | ICD-10-CM

## 2022-03-23 PROCEDURE — 63600175 PHARM REV CODE 636 W HCPCS: Mod: PN | Performed by: INTERNAL MEDICINE

## 2022-03-23 PROCEDURE — 25000003 PHARM REV CODE 250: Mod: PN | Performed by: INTERNAL MEDICINE

## 2022-03-23 PROCEDURE — 96365 THER/PROPH/DIAG IV INF INIT: CPT | Mod: PN

## 2022-03-23 RX ORDER — SODIUM CHLORIDE 0.9 % (FLUSH) 0.9 %
10 SYRINGE (ML) INJECTION
Status: DISCONTINUED | OUTPATIENT
Start: 2022-03-23 | End: 2022-03-23 | Stop reason: HOSPADM

## 2022-03-23 RX ORDER — ACETAMINOPHEN 500 MG
500 TABLET ORAL
Status: DISCONTINUED | OUTPATIENT
Start: 2022-03-23 | End: 2022-03-23 | Stop reason: HOSPADM

## 2022-03-23 RX ORDER — ACETAMINOPHEN 500 MG
500 TABLET ORAL
Status: CANCELLED | OUTPATIENT
Start: 2022-03-23

## 2022-03-23 RX ORDER — SODIUM CHLORIDE 0.9 % (FLUSH) 0.9 %
10 SYRINGE (ML) INJECTION
Status: CANCELLED | OUTPATIENT
Start: 2022-03-23

## 2022-03-23 RX ORDER — HEPARIN 100 UNIT/ML
500 SYRINGE INTRAVENOUS
Status: DISCONTINUED | OUTPATIENT
Start: 2022-03-23 | End: 2022-03-23 | Stop reason: HOSPADM

## 2022-03-23 RX ORDER — HEPARIN 100 UNIT/ML
500 SYRINGE INTRAVENOUS
Status: CANCELLED | OUTPATIENT
Start: 2022-03-23

## 2022-03-23 RX ORDER — ZOLEDRONIC ACID 5 MG/100ML
5 INJECTION, SOLUTION INTRAVENOUS
Status: CANCELLED | OUTPATIENT
Start: 2022-03-23

## 2022-03-23 RX ORDER — ZOLEDRONIC ACID 5 MG/100ML
5 INJECTION, SOLUTION INTRAVENOUS
Status: COMPLETED | OUTPATIENT
Start: 2022-03-23 | End: 2022-03-23

## 2022-03-23 RX ADMIN — SODIUM CHLORIDE: 9 INJECTION, SOLUTION INTRAVENOUS at 01:03

## 2022-03-23 RX ADMIN — ZOLEDRONIC ACID 5 MG: 5 INJECTION, SOLUTION INTRAVENOUS at 01:03

## 2022-03-23 NOTE — PLAN OF CARE
Problem: Adult Inpatient Plan of Care  Goal: Plan of Care Review  Outcome: Ongoing, Progressing  Flowsheets (Taken 3/23/2022 1431)  Plan of Care Reviewed With: patient  Goal: Patient-Specific Goal (Individualized)  Outcome: Ongoing, Progressing  Flowsheets (Taken 3/23/2022 1431)  Anxieties, Fears or Concerns: IV start, states he is a hard stick  Individualized Care Needs: recliner, snack, drink, TV  Patient-Specific Goals (Include Timeframe): no s/s of rx during tx   Pt tolerated Reclast infusion well.  No adverse reaction noted.   IV flushed with NS and D/C per protocol.  Patient left clinic in no acute distress.

## 2022-03-25 ENCOUNTER — CLINICAL SUPPORT (OUTPATIENT)
Dept: AUDIOLOGY | Facility: CLINIC | Age: 77
End: 2022-03-25
Payer: MEDICARE

## 2022-03-25 DIAGNOSIS — Z46.1 HEARING AID FITTING OR ADJUSTMENT: Primary | ICD-10-CM

## 2022-03-25 NOTE — PROGRESS NOTES
Pt here for hearing aid check.  He requested an increase in gain since he normally turns the gary up to level 4 each day.  Increased overall gain 6dB for both aids.  Pt reported that the new settings were clear, comfortable and balanced. F/u in six months to one year for routine check or earlier if additional adjustments needed.

## 2022-03-30 ENCOUNTER — PATIENT MESSAGE (OUTPATIENT)
Dept: FAMILY MEDICINE | Facility: CLINIC | Age: 77
End: 2022-03-30
Payer: MEDICARE

## 2022-04-03 DIAGNOSIS — I10 HYPERTENSION, UNSPECIFIED TYPE: ICD-10-CM

## 2022-04-04 NOTE — TELEPHONE ENCOUNTER
No new care gaps identified.  Powered by [employer] Reference number: [id number].   4/03/2022 10:15:11 PM CDT

## 2022-04-05 RX ORDER — TELMISARTAN 40 MG/1
TABLET ORAL
Qty: 90 TABLET | Refills: 3 | Status: SHIPPED | OUTPATIENT
Start: 2022-04-05 | End: 2023-04-07 | Stop reason: SDUPTHER

## 2022-04-05 NOTE — TELEPHONE ENCOUNTER
Refill Routing Note   Medication(s) are not appropriate for processing by [facility] for the following reason(s):      - Required laboratory values are abnormal    [facility] action(s):  Defer          Medication reconciliation completed: No     Appointments  past 12m or future 3m with PCP    Date Provider   Last Visit   3/9/2022 Maxi Gaines MD   Next Visit   9/7/2022 Maxi Gaines MD   ED visits in past 90 days: 0        Note composed:2:26 PM 04/05/2022

## 2022-04-18 ENCOUNTER — PATIENT MESSAGE (OUTPATIENT)
Dept: FAMILY MEDICINE | Facility: CLINIC | Age: 77
End: 2022-04-18
Payer: MEDICARE

## 2022-04-18 DIAGNOSIS — K40.00 BILATERAL INGUINAL HERNIA WITH OBSTRUCTION AND WITHOUT GANGRENE, RECURRENCE NOT SPECIFIED: Primary | ICD-10-CM

## 2022-04-22 ENCOUNTER — PATIENT MESSAGE (OUTPATIENT)
Dept: FAMILY MEDICINE | Facility: CLINIC | Age: 77
End: 2022-04-22
Payer: MEDICARE

## 2022-05-04 ENCOUNTER — PATIENT OUTREACH (OUTPATIENT)
Dept: ADMINISTRATIVE | Facility: OTHER | Age: 77
End: 2022-05-04
Payer: MEDICARE

## 2022-05-04 NOTE — PROGRESS NOTES
Health Maintenance Due   Topic Date Due    High Dose Statin  Never done    COVID-19 Vaccine (4 - Booster for Pfizer series) 01/14/2022     Updates were requested from care everywhere.  Chart was reviewed for overdue Proactive [facility] Encounters ([facility]) topics (CRS, Breast Cancer Screening, Eye exam)  Health Maintenance has been updated.  LINKS immunization registry triggered.  Immunizations were reconciled.

## 2022-05-05 ENCOUNTER — TELEPHONE (OUTPATIENT)
Dept: SURGERY | Facility: CLINIC | Age: 77
End: 2022-05-05
Payer: MEDICARE

## 2022-05-05 ENCOUNTER — TELEPHONE (OUTPATIENT)
Dept: ELECTROPHYSIOLOGY | Facility: CLINIC | Age: 77
End: 2022-05-05
Payer: MEDICARE

## 2022-05-05 ENCOUNTER — OFFICE VISIT (OUTPATIENT)
Dept: SURGERY | Facility: CLINIC | Age: 77
End: 2022-05-05
Payer: MEDICARE

## 2022-05-05 VITALS
HEIGHT: 74 IN | HEART RATE: 62 BPM | BODY MASS INDEX: 27.66 KG/M2 | WEIGHT: 215.5 LBS | DIASTOLIC BLOOD PRESSURE: 77 MMHG | SYSTOLIC BLOOD PRESSURE: 139 MMHG

## 2022-05-05 DIAGNOSIS — Z98.84 HISTORY OF ROUX-EN-Y GASTRIC BYPASS: Primary | ICD-10-CM

## 2022-05-05 DIAGNOSIS — K43.9 SPIGELIAN HERNIA: ICD-10-CM

## 2022-05-05 DIAGNOSIS — K43.2 INCISIONAL HERNIA, WITHOUT OBSTRUCTION OR GANGRENE: Primary | ICD-10-CM

## 2022-05-05 DIAGNOSIS — Z79.899 OTHER LONG TERM (CURRENT) DRUG THERAPY: ICD-10-CM

## 2022-05-05 PROCEDURE — 99203 OFFICE O/P NEW LOW 30 MIN: CPT | Mod: S$GLB,,, | Performed by: SURGERY

## 2022-05-05 PROCEDURE — 99999 PR PBB SHADOW E&M-EST. PATIENT-LVL III: ICD-10-PCS | Mod: PBBFAC,,, | Performed by: SURGERY

## 2022-05-05 PROCEDURE — 1159F MED LIST DOCD IN RCRD: CPT | Mod: CPTII,S$GLB,, | Performed by: SURGERY

## 2022-05-05 PROCEDURE — 1125F AMNT PAIN NOTED PAIN PRSNT: CPT | Mod: CPTII,S$GLB,, | Performed by: SURGERY

## 2022-05-05 PROCEDURE — 1159F PR MEDICATION LIST DOCUMENTED IN MEDICAL RECORD: ICD-10-PCS | Mod: CPTII,S$GLB,, | Performed by: SURGERY

## 2022-05-05 PROCEDURE — 3078F PR MOST RECENT DIASTOLIC BLOOD PRESSURE < 80 MM HG: ICD-10-PCS | Mod: CPTII,S$GLB,, | Performed by: SURGERY

## 2022-05-05 PROCEDURE — 3044F HG A1C LEVEL LT 7.0%: CPT | Mod: CPTII,S$GLB,, | Performed by: SURGERY

## 2022-05-05 PROCEDURE — 1101F PR PT FALLS ASSESS DOC 0-1 FALLS W/OUT INJ PAST YR: ICD-10-PCS | Mod: CPTII,S$GLB,, | Performed by: SURGERY

## 2022-05-05 PROCEDURE — 3075F SYST BP GE 130 - 139MM HG: CPT | Mod: CPTII,S$GLB,, | Performed by: SURGERY

## 2022-05-05 PROCEDURE — 3078F DIAST BP <80 MM HG: CPT | Mod: CPTII,S$GLB,, | Performed by: SURGERY

## 2022-05-05 PROCEDURE — 3288F PR FALLS RISK ASSESSMENT DOCUMENTED: ICD-10-PCS | Mod: CPTII,S$GLB,, | Performed by: SURGERY

## 2022-05-05 PROCEDURE — 99499 RISK ADDL DX/OHS AUDIT: ICD-10-PCS | Mod: S$GLB,,, | Performed by: SURGERY

## 2022-05-05 PROCEDURE — 99499 UNLISTED E&M SERVICE: CPT | Mod: S$GLB,,, | Performed by: SURGERY

## 2022-05-05 PROCEDURE — 1101F PT FALLS ASSESS-DOCD LE1/YR: CPT | Mod: CPTII,S$GLB,, | Performed by: SURGERY

## 2022-05-05 PROCEDURE — 99999 PR PBB SHADOW E&M-EST. PATIENT-LVL III: CPT | Mod: PBBFAC,,, | Performed by: SURGERY

## 2022-05-05 PROCEDURE — 3288F FALL RISK ASSESSMENT DOCD: CPT | Mod: CPTII,S$GLB,, | Performed by: SURGERY

## 2022-05-05 PROCEDURE — 99203 PR OFFICE/OUTPT VISIT, NEW, LEVL III, 30-44 MIN: ICD-10-PCS | Mod: S$GLB,,, | Performed by: SURGERY

## 2022-05-05 PROCEDURE — 1160F RVW MEDS BY RX/DR IN RCRD: CPT | Mod: CPTII,S$GLB,, | Performed by: SURGERY

## 2022-05-05 PROCEDURE — 1125F PR PAIN SEVERITY QUANTIFIED, PAIN PRESENT: ICD-10-PCS | Mod: CPTII,S$GLB,, | Performed by: SURGERY

## 2022-05-05 PROCEDURE — 1157F ADVNC CARE PLAN IN RCRD: CPT | Mod: CPTII,S$GLB,, | Performed by: SURGERY

## 2022-05-05 PROCEDURE — 3044F PR MOST RECENT HEMOGLOBIN A1C LEVEL <7.0%: ICD-10-PCS | Mod: CPTII,S$GLB,, | Performed by: SURGERY

## 2022-05-05 PROCEDURE — 3075F PR MOST RECENT SYSTOLIC BLOOD PRESS GE 130-139MM HG: ICD-10-PCS | Mod: CPTII,S$GLB,, | Performed by: SURGERY

## 2022-05-05 PROCEDURE — 1157F PR ADVANCE CARE PLAN OR EQUIV PRESENT IN MEDICAL RECORD: ICD-10-PCS | Mod: CPTII,S$GLB,, | Performed by: SURGERY

## 2022-05-05 PROCEDURE — 1160F PR REVIEW ALL MEDS BY PRESCRIBER/CLIN PHARMACIST DOCUMENTED: ICD-10-PCS | Mod: CPTII,S$GLB,, | Performed by: SURGERY

## 2022-05-05 NOTE — PROGRESS NOTES
History & Physical    SUBJECTIVE:     History of Present Illness:  Patient is a 76 y.o. male presents with s/p bypass and revision, llq hernia.  He has noticed it for several years.  He says it causes cramping and he pushes it down to relieve it.  He thinks it has grown a bit.    No chief complaint on file.      Review of patient's allergies indicates:   Allergen Reactions    No known drug allergies        Current Outpatient Medications   Medication Sig Dispense Refill    acyclovir (ZOVIRAX) 800 MG Tab TK ONE T PO FIVE TIMES D only for fever blisters  1    alfuzosin (UROXATRAL) 10 mg Tb24 Take 1 tablet (10 mg total) by mouth once daily. 90 tablet 3    buPROPion (WELLBUTRIN XL) 150 MG TB24 tablet       buPROPion (WELLBUTRIN XL) 300 MG 24 hr tablet Take 1 tablet (300 mg total) by mouth once daily. 90 tablet 2    calcium citrate (CALCITRATE) 200 mg (950 mg) tablet Take 1 tablet (200 mg total) by mouth 2 (two) times daily. 180 tablet 3    carvediloL (COREG) 12.5 MG tablet TAKE 1 TABLET(12.5 MG) BY MOUTH TWICE DAILY WITH MEALS 180 tablet 3    celecoxib (CELEBREX) 200 MG capsule TAKE 1 CAPSULE(200 MG) BY MOUTH TWICE DAILY 180 capsule 0    cyclobenzaprine (FLEXERIL) 10 MG tablet Take 1 tablet (10 mg total) by mouth 3 (three) times daily as needed for Muscle spasms. 60 tablet 0    dutasteride (AVODART) 0.5 mg capsule TAKE 1 CAPSULE(0.5 MG) BY MOUTH EVERY DAY 90 capsule 3    ELIQUIS 5 mg Tab TAKE 1 TABLET(5 MG) BY MOUTH TWICE DAILY 60 tablet 11    EScitalopram oxalate (LEXAPRO) 10 MG tablet TAKE 1 TABLET(10 MG) BY MOUTH EVERY DAY 90 tablet 3    ferrous sulfate (FEOSOL) 325 mg (65 mg iron) Tab tablet Take by mouth.      levothyroxine (SYNTHROID) 75 MCG tablet Take 1 tablet (75 mcg total) by mouth before breakfast. 90 tablet 0    methocarbamoL (ROBAXIN) 500 MG Tab TAKE 1 TABLET(500 MG) BY MOUTH THREE TIMES DAILY FOR 10 DAYS 90 tablet 0    OMEGA-3 FATTY ACIDS (FISH OIL CONCENTRATE ORAL) Take by mouth Daily.       propafenone (RYTHMOL SR) 425 MG Cp12 TAKE 1 CAPSULE(425 MG) BY MOUTH EVERY 12 HOURS 180 capsule 3    telmisartan (MICARDIS) 40 MG Tab TAKE 1 TABLET(40 MG) BY MOUTH EVERY DAY 90 tablet 3    varicella-zoster gE-AS01B, PF, (SHINGRIX, PF,) 50 mcg/0.5 mL injection Inject into the muscle. 1 each 1    varicella-zoster gE-AS01B, PF, (SHINGRIX, PF,) 50 mcg/0.5 mL injection Inject into the muscle. 1 each 1     No current facility-administered medications for this visit.       Past Medical History:   Diagnosis Date    Anticoagulant long-term use     Anxiety     Arthritis     Atrial fibrillation     BPH (benign prostatic hyperplasia)     Cataract     CKD (chronic kidney disease) stage 3, GFR 30-59 ml/min 7/10/2017    Followed by Dr. Jeevan Pond    Colon polyp     benign    Depression     Elevated PSA     Erectile dysfunction     Gastric ulcer with hemorrhage     Hep B w/o coma 1977    History of bleeding peptic ulcer     History of prostatitis     Hypertension     PAF (paroxysmal atrial fibrillation)     Sleep apnea     on CPAP    Stroke     TIA December 2019    Thyroid disease      Past Surgical History:   Procedure Laterality Date    ABDOMINAL HERNIA REPAIR      ABLATION OF ARRHYTHMOGENIC FOCUS FOR ATRIAL FIBRILLATION N/A 6/15/2020    Procedure: Ablation atrial fibrillation;  Surgeon: Wyatt Beatty MD;  Location: [facility] EP LAB;  Service: Cardiology;  Laterality: N/A;  PAF, AFl, PEPE, PVI re-do, CTI, RFA, JUSTIN, Gen, SK, 3 Prep    CATARACT EXTRACTION  11/25/2013    bilateral    CHOLECYSTECTOMY      COLONOSCOPY N/A 11/25/2015    Procedure: COLONOSCOPY;  Surgeon: Toby Hernandez MD;  Location: [facility] ENDO;  Service: Endoscopy;  Laterality: N/A;    COLONOSCOPY N/A 11/13/2020    Procedure: COLONOSCOPY;  Surgeon: Toby Hernandez MD;  Location: [facility] ENDO;  Service: Endoscopy;  Laterality: N/A;    EYE SURGERY      GASTRIC BYPASS  1992    INTRAMEDULLARY RODDING OF FEMUR Left 7/18/2020     Procedure: INSERTION, INTRAMEDULLARY ERIC, FEMUR, left, Synthes, [place] table, Large C arm clock side,;  Surgeon: Tony Rodriguez MD;  Location: [facility] OR [address] [place] [address];  Service: Orthopedics;  Laterality: Left;    KNEE ARTHROSCOPY      RT    LASIK  2001    both eyes (Dr. Rabago)    ORIF HUMERUS FRACTURE      LT    ORIF HUMERUS FRACTURE Right     non surgical repair    RADIOFREQUENCY ABLATION      x2    Right ankle tendon repair      ROTATOR CUFF REPAIR      right    TOTAL KNEE ARTHROPLASTY Right 5/29/2018    Procedure: REPLACEMENT-KNEE-TOTAL-axel;  Surgeon: Denzel Dallas MD;  Location: [facility] OR [address] [place] [address];  Service: Orthopedics;  Laterality: Right;  [place]    TREATMENT OF CARDIAC ARRHYTHMIA  6/15/2020    Procedure: Cardioversion or Defibrillation;  Surgeon: Wyatt Beatty MD;  Location: [facility] EP LAB;  Service: Cardiology;;     Family History   Problem Relation Age of Onset    COPD Father     Diabetes Father     Osteoporosis Father     Aortic stenosis Mother     Heart disease Mother         aortic stenosis    Osteoporosis Mother     Heart attack Brother     No Known Problems Son     No Known Problems Daughter     No Known Problems Daughter     No Known Problems Daughter      Social History     Tobacco Use    Smoking status: Never Smoker    Smokeless tobacco: Never Used    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   Respiratory: Negative for cough, chest tightness and shortness of breath.    Cardiovascular: Negative for chest pain.   Gastrointestinal: Positive for diarrhea. Negative for abdominal pain, constipation, nausea and vomiting.        Has occasional diarrhea, likely related to bypass   Genitourinary: Negative for difficulty urinating.   Skin: Negative for rash.   Hematological:        Has easy bruising, on blood thinners       OBJECTIVE:  "    Vital Signs (Most Recent)  Pulse: 62 (05/05/22 0900)  BP: 139/77 (05/05/22 0900)  6' 2" (1.88 m)  97.7 kg (215 lb 8 oz)     Physical Exam:  Physical Exam  Vitals reviewed.   Constitutional:       Appearance: Normal appearance.   Abdominal:      Palpations: Abdomen is soft.      Tenderness: There is no abdominal tenderness.      Hernia: A hernia is present.       Skin:     General: Skin is warm and dry.   Neurological:      General: No focal deficit present.      Mental Status: He is alert and oriented to person, place, and time.   Psychiatric:         Mood and Affect: Mood normal.         Behavior: Behavior normal.         Thought Content: Thought content normal.         Judgment: Judgment normal.         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  S/p bypass.    PLAN:       Obtain bariatric vitamins.  For robotic repair left spigelian hernia (?incisional) with mesh.  Outpatient.  Cardiology clearance.           "

## 2022-05-05 NOTE — TELEPHONE ENCOUNTER
----- Message from Oneil Barber MA sent at 5/5/2022 10:53 AM CDT -----  Good morning Lavern  The pt is taking eliquis.  See below please advise.   Thanks    ----- Message -----  From: Swetha Hearn MA  Sent: 5/5/2022  10:40 AM CDT  To: Jaci Schneider Staff    Hi , we are planning to repair an incisional hernia for Dr. Song on 6/6/2022 ,and Dr. Marti was requesting Cards clearance and recommendations for his blood thinners prior. Thanks in advance. Aníbal

## 2022-05-05 NOTE — TELEPHONE ENCOUNTER
Per current guideline recommendations, patient can be cleared to hold Eliquis for 2 days prior to procedure and resume at MD discretion (per Dr Beatty).  General cardiac clearance will need to come from his general cardiologist or primary care MD.   Thanks

## 2022-05-06 ENCOUNTER — TELEPHONE (OUTPATIENT)
Dept: FAMILY MEDICINE | Facility: CLINIC | Age: 77
End: 2022-05-06
Payer: MEDICARE

## 2022-05-06 NOTE — TELEPHONE ENCOUNTER
----- Message from Swetha Hearn MA sent at 5/6/2022  8:30 AM CDT -----  Regarding: SURGERY CLEARANCE  Dr. Marti is planning a hernia repair on patient and is requesting surgical clearance prior to procedure. (6/13/22) . Thanks in advance. Aníbal

## 2022-05-09 ENCOUNTER — PATIENT MESSAGE (OUTPATIENT)
Dept: SMOKING CESSATION | Facility: CLINIC | Age: 77
End: 2022-05-09
Payer: MEDICARE

## 2022-05-09 RX ORDER — APIXABAN 5 MG/1
5 TABLET, FILM COATED ORAL 2 TIMES DAILY
Qty: 60 TABLET | Refills: 11 | Status: ON HOLD | OUTPATIENT
Start: 2022-05-09 | End: 2022-06-13 | Stop reason: SDUPTHER

## 2022-05-10 ENCOUNTER — PATIENT MESSAGE (OUTPATIENT)
Dept: FAMILY MEDICINE | Facility: CLINIC | Age: 77
End: 2022-05-10
Payer: MEDICARE

## 2022-05-16 ENCOUNTER — LAB VISIT (OUTPATIENT)
Dept: LAB | Facility: HOSPITAL | Age: 77
End: 2022-05-16
Attending: SURGERY
Payer: MEDICARE

## 2022-05-16 DIAGNOSIS — K43.9 SPIGELIAN HERNIA: ICD-10-CM

## 2022-05-16 DIAGNOSIS — Z98.84 HISTORY OF ROUX-EN-Y GASTRIC BYPASS: ICD-10-CM

## 2022-05-16 DIAGNOSIS — Z79.899 OTHER LONG TERM (CURRENT) DRUG THERAPY: ICD-10-CM

## 2022-05-16 LAB
25(OH)D3+25(OH)D2 SERPL-MCNC: 39 NG/ML (ref 30–96)
VIT B12 SERPL-MCNC: <148 PG/ML (ref 210–950)

## 2022-05-16 PROCEDURE — 82306 VITAMIN D 25 HYDROXY: CPT | Performed by: SURGERY

## 2022-05-16 PROCEDURE — 36415 COLL VENOUS BLD VENIPUNCTURE: CPT | Mod: PO | Performed by: SURGERY

## 2022-05-16 PROCEDURE — 82607 VITAMIN B-12: CPT | Performed by: SURGERY

## 2022-05-16 PROCEDURE — 84425 ASSAY OF VITAMIN B-1: CPT | Performed by: SURGERY

## 2022-05-20 LAB — VIT B1 BLD-MCNC: 83 UG/L (ref 38–122)

## 2022-05-23 ENCOUNTER — TELEPHONE (OUTPATIENT)
Dept: CARDIOLOGY | Facility: CLINIC | Age: 77
End: 2022-05-23
Payer: MEDICARE

## 2022-05-23 NOTE — TELEPHONE ENCOUNTER
Pt is having upcoming hernia repair.  He remains well without chest pain or shortness of breath.  Functional capacity is moderate. No changes in symptoms since last exercise echo 1/19  Pt is at low risk for cardiovascular complications from hernia surgery, ok to proceed.    Ok to hold Eliquis 2 days prior

## 2022-06-06 DIAGNOSIS — E03.4 HYPOTHYROIDISM DUE TO ACQUIRED ATROPHY OF THYROID: ICD-10-CM

## 2022-06-06 NOTE — TELEPHONE ENCOUNTER
No new care gaps identified.  [facility] Embedded Care Gaps. Reference number: [id number]. 6/06/2022   10:57:03 AM LEOT

## 2022-06-07 RX ORDER — LEVOTHYROXINE SODIUM 75 UG/1
75 TABLET ORAL
Qty: 90 TABLET | Refills: 2 | Status: SHIPPED | OUTPATIENT
Start: 2022-06-07 | End: 2022-07-19 | Stop reason: SDUPTHER

## 2022-06-07 NOTE — TELEPHONE ENCOUNTER
Refill Authorization Note   Dominick Song  is requesting a refill authorization.  Brief Assessment and Rationale for Refill:  Approve     Medication Therapy Plan:       Medication Reconciliation Completed: No   Comments:     No Care Gaps recommended.     Note composed:7:48 AM 06/07/2022

## 2022-06-09 ENCOUNTER — ANESTHESIA EVENT (OUTPATIENT)
Dept: SURGERY | Facility: HOSPITAL | Age: 77
End: 2022-06-09
Payer: MEDICARE

## 2022-06-10 ENCOUNTER — TELEPHONE (OUTPATIENT)
Dept: SURGERY | Facility: CLINIC | Age: 77
End: 2022-06-10
Payer: MEDICARE

## 2022-06-12 NOTE — ANESTHESIA PREPROCEDURE EVALUATION
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  Patient now presents for the above procedure(s).    TTE (2/18/2020):  · Normal appearing left atrial appendage. No thrombus is present in the appendage. Normal appendage velocities.  · No thrombus is present in the left atrium.  · Normal left ventricular systolic function. The estimated ejection fraction is 60%.  · Normal right ventricular systolic function.    Prev airway: easy mask, DL with mao 2, grade 1 view. Large prominent central incisors.       Patient Active Problem List   Diagnosis    BPH with urinary obstruction    Elevated PSA    Heart abnormality    Nuclear sclerosis    Atrial fibrillation    Anemia due to chronic blood loss    Posterior vitreous detachment    Metatarsalgia    Keratoma    Foot pain, right    Onychomycosis due to dermatophyte    ED (erectile dysfunction)    Bradycardia    Mild single current episode of major depressive disorder    Tortuous aorta    Dyspnea    Acquired hypothyroidism    Essential hypertension    Gait abnormality    CKD (chronic kidney disease) stage 3, GFR 30-59 ml/min    Complex sleep apnea syndrome    Erectile dysfunction due to arterial insufficiency    Primary osteoarthritis of right knee    History of bleeding peptic ulcer    History of TIA (transient ischemic attack)    Closed nondisplaced intertrochanteric fracture of left femur    Preop cardiovascular exam     Stroke    Hx of colonic polyps    Age-related osteoporosis with current pathological fracture with routine healing    Hyperparathyroidism    Spigelian hernia       Review of patient's allergies indicates:   Allergen Reactions    No known drug allergies        Current Inpatient Medications:      No current facility-administered medications on file prior to encounter.     Current Outpatient Medications on File Prior to Encounter   Medication Sig Dispense Refill    acyclovir (ZOVIRAX) 800 MG Tab TK ONE T PO FIVE TIMES D only for fever blisters  1    alfuzosin (UROXATRAL) 10 mg Tb24 Take 1 tablet (10 mg total) by mouth once daily. 90 tablet 3    buPROPion (WELLBUTRIN XL) 150 MG TB24 tablet       buPROPion (WELLBUTRIN XL) 300 MG 24 hr tablet Take 1 tablet (300 mg total) by mouth once daily. 90 tablet 2    calcium citrate (CALCITRATE) 200 mg (950 mg) tablet Take 1 tablet (200 mg total) by mouth 2 (two) times daily. 180 tablet 3    carvediloL (COREG) 12.5 MG tablet TAKE 1 TABLET(12.5 MG) BY MOUTH TWICE DAILY WITH MEALS 180 tablet 3    celecoxib (CELEBREX) 200 MG capsule TAKE 1 CAPSULE(200 MG) BY MOUTH TWICE DAILY 180 capsule 0    cyclobenzaprine (FLEXERIL) 10 MG tablet Take 1 tablet (10 mg total) by mouth 3 (three) times daily as needed for Muscle spasms. 60 tablet 0    dutasteride (AVODART) 0.5 mg capsule TAKE 1 CAPSULE(0.5 MG) BY MOUTH EVERY DAY 90 capsule 3    EScitalopram oxalate (LEXAPRO) 10 MG tablet TAKE 1 TABLET(10 MG) BY MOUTH EVERY DAY 90 tablet 3    ferrous sulfate (FEOSOL) 325 mg (65 mg iron) Tab tablet Take by mouth.      methocarbamoL (ROBAXIN) 500 MG Tab TAKE 1 TABLET(500 MG) BY MOUTH THREE TIMES DAILY FOR 10 DAYS 90 tablet 0    OMEGA-3 FATTY ACIDS (FISH OIL CONCENTRATE ORAL) Take by mouth Daily.      propafenone (RYTHMOL SR) 425 MG Cp12 TAKE 1 CAPSULE(425 MG) BY MOUTH EVERY 12 HOURS 180 capsule 3    telmisartan (MICARDIS) 40 MG Tab TAKE 1 TABLET(40 MG) BY MOUTH EVERY DAY 90 tablet 3     varicella-zoster gE-AS01B, PF, (SHINGRIX, PF,) 50 mcg/0.5 mL injection Inject into the muscle. 1 each 1    varicella-zoster gE-AS01B, PF, (SHINGRIX, PF,) 50 mcg/0.5 mL injection Inject into the muscle. 1 each 1       Past Surgical History:   Procedure Laterality Date    ABDOMINAL HERNIA REPAIR      ABLATION OF ARRHYTHMOGENIC FOCUS FOR ATRIAL FIBRILLATION N/A 6/15/2020    Procedure: Ablation atrial fibrillation;  Surgeon: Wyatt Beatty MD;  Location: [facility] EP LAB;  Service: Cardiology;  Laterality: N/A;  PAF, AFl, PEPE, PVI re-do, CTI, RFA, JUSTIN, Gen, SK, 3 Prep    CATARACT EXTRACTION  11/25/2013    bilateral    CHOLECYSTECTOMY      COLONOSCOPY N/A 11/25/2015    Procedure: COLONOSCOPY;  Surgeon: Toby Hernandez MD;  Location: [facility] ENDO;  Service: Endoscopy;  Laterality: N/A;    COLONOSCOPY N/A 11/13/2020    Procedure: COLONOSCOPY;  Surgeon: Toby Hernandez MD;  Location: [facility] ENDO;  Service: Endoscopy;  Laterality: N/A;    EYE SURGERY      GASTRIC BYPASS  1992    INTRAMEDULLARY RODDING OF FEMUR Left 7/18/2020    Procedure: INSERTION, INTRAMEDULLARY ERIC, FEMUR, left, Synthes, [place] table, Large C arm clock side,;  Surgeon: Tony Rodriguez MD;  [facility];  Service: Orthopedics;  Laterality: Left;    KNEE ARTHROSCOPY      RT    LASIK  2001    both eyes (Dr. Rabago)    ORIF HUMERUS FRACTURE      LT    ORIF HUMERUS FRACTURE Right     non surgical repair    RADIOFREQUENCY ABLATION      x2    Right ankle tendon repair      ROTATOR CUFF REPAIR      right    TOTAL KNEE ARTHROPLASTY Right 5/29/2018    Procedure: REPLACEMENT-KNEE-TOTAL-pro;  Surgeon: Denzel Dallas MD;  [facility];  Service: Orthopedics;  Laterality: Right;  Pro    TREATMENT OF CARDIAC ARRHYTHMIA  6/15/2020    Procedure: Cardioversion or Defibrillation;  Surgeon: Wyatt Beatty MD;  Location: [facility] EP LAB;  Service: Cardiology;;       OBJECTIVE:     Vital Signs Range (Last 24H):          Significant Labs:  Lab Results   Component Value Date    WBC 8.54 03/02/2022    HGB 12.3 (L) 03/02/2022    HCT 37.5 (L) 03/02/2022     03/02/2022    CHOL 107 (L) 03/02/2022    TRIG 47 03/02/2022    HDL 43 03/02/2022    ALT 13 03/02/2022    AST 16 03/02/2022     03/02/2022     03/02/2022    K 4.4 03/02/2022    K 4.4 03/02/2022     03/02/2022     03/02/2022    CREATININE 1.5 (H) 03/02/2022    CREATININE 1.5 (H) 03/02/2022    BUN 27 (H) 03/02/2022    BUN 27 (H) 03/02/2022    CO2 23 03/02/2022    CO2 23 03/02/2022    TSH 3.475 03/02/2022    PSA 2.62 03/21/2013    INR 1.1 07/17/2020    GLUF 109 03/26/2018    HGBA1C 4.6 03/02/2022       Diagnostic Studies: No relevant studies.    EKG:   Results for orders placed or performed during the hospital encounter of 07/17/20   EKG 12-lead    Collection Time: 07/17/20  5:58 PM    Narrative    Test Reason : Z01.818,    Vent. Rate : 066 BPM     Atrial Rate : 066 BPM     P-R Int : 210 ms          QRS Dur : 104 ms      QT Int : 432 ms       P-R-T Axes : 000 052 056 degrees     QTc Int : 452 ms    Sinus rhythm with 1st degree A-V block  Otherwise normal ECG  When compared with ECG of 15-JIMMY-2020 12:19,  No significant change was found  Confirmed by Yusuf Pruitt MD ([id number]) on 7/18/2020 3:24:44 PM    Referred By: [email or web address]   SELF           Confirmed By:Yusuf Pruitt MD       ASSESSMENT/PLAN:           Pre-op Assessment    I have reviewed the Patient Summary Reports.     I have reviewed the Nursing Notes. I have reviewed the NPO Status.   I have reviewed the Medications.     Review of Systems  Anesthesia Hx:  No problems with previous Anesthesia  History of prior surgery of interest to airway management or planning: Denies Family Hx of Anesthesia complications.   Denies Personal Hx of Anesthesia complications.   Hematology/Oncology:     Oncology Normal    -- Anemia:   EENT/Dental:EENT/Dental Normal   Cardiovascular:   Hypertension Dysrhythmias atrial  fibrillation Denies CHF.  6/2020  Normal appearing left atrial appendage. No thrombus is present in the appendage. Normal appendage velocities. No thrombus is present in the left atrium.  ·Normal left ventricular systolic function. The estimated ejection fraction is 60%.  Normal right ventricular systolic function.      Pulmonary:   Shortness of breath Sleep Apnea    Renal/:   Chronic Renal Disease, CRI    Hepatic/GI:   PUD, Liver Disease, Hepatitis, C    Musculoskeletal:   Arthritis     Neurological:   Denies TIA. Denies CVA.    Endocrine:   Hypothyroidism    Psych:   Psychiatric History          Physical Exam  General: Well nourished    Airway:  Mallampati: II   Mouth Opening: Normal  TM Distance: Normal  Tongue: Normal  Neck ROM: Normal ROM    Dental:  Intact    Chest/Lungs:  Clear to auscultation, Normal Respiratory Rate    Heart:  Rate: Normal  Rhythm: Regular Rhythm  Sounds: Normal        Anesthesia Plan  Type of Anesthesia, risks & benefits discussed:    Anesthesia Type: Gen ETT  Intra-op Monitoring Plan: Standard ASA Monitors  Post Op Pain Control Plan: multimodal analgesia, IV/PO Opioids PRN and peripheral nerve block  Induction:  IV  Airway Plan: Direct, Post-Induction  Informed Consent: Informed consent signed with the Patient and all parties understand the risks and agree with anesthesia plan.  All questions answered. Patient consented to blood products? Yes  ASA Score: 3  Day of Surgery Review of History & Physical: H&P Update referred to the surgeon/provider.  Anesthesia Plan Notes: Discussed plan for General endotracheal anesthesia    Ready For Surgery From Anesthesia Perspective.     .

## 2022-06-13 ENCOUNTER — ANESTHESIA (OUTPATIENT)
Dept: SURGERY | Facility: HOSPITAL | Age: 77
End: 2022-06-13
Payer: MEDICARE

## 2022-06-13 ENCOUNTER — HOSPITAL ENCOUNTER (OUTPATIENT)
Facility: HOSPITAL | Age: 77
Discharge: HOME | End: 2022-06-13
Attending: SURGERY | Admitting: SURGERY
Payer: MEDICARE

## 2022-06-13 VITALS
RESPIRATION RATE: 21 BRPM | HEIGHT: 74 IN | WEIGHT: 212 LBS | TEMPERATURE: 97 F | BODY MASS INDEX: 27.21 KG/M2 | SYSTOLIC BLOOD PRESSURE: 108 MMHG | OXYGEN SATURATION: 86 % | DIASTOLIC BLOOD PRESSURE: 63 MMHG | HEART RATE: 59 BPM

## 2022-06-13 DIAGNOSIS — K43.2 INCISIONAL HERNIA: ICD-10-CM

## 2022-06-13 DIAGNOSIS — K43.9 SPIGELIAN HERNIA: Primary | ICD-10-CM

## 2022-06-13 PROCEDURE — 25000003 PHARM REV CODE 250: Performed by: SURGERY

## 2022-06-13 PROCEDURE — 37000008 HC ANESTHESIA 1ST 15 MINUTES: Performed by: SURGERY

## 2022-06-13 PROCEDURE — 49655 PR LAP, INCISIONAL HERNIA REPAIR,INCARCERATED: ICD-10-PCS | Mod: ,,, | Performed by: SURGERY

## 2022-06-13 PROCEDURE — 63600175 PHARM REV CODE 636 W HCPCS: Performed by: STUDENT IN AN ORGANIZED HEALTH CARE EDUCATION/TRAINING PROGRAM

## 2022-06-13 PROCEDURE — 71000045 HC DOSC ROUTINE RECOVERY EA ADD'L HR: Performed by: SURGERY

## 2022-06-13 PROCEDURE — 76942 ECHO GUIDE FOR BIOPSY: CPT | Performed by: ANESTHESIOLOGY

## 2022-06-13 PROCEDURE — 64461 PVB THORACIC SINGLE INJ SITE: CPT | Mod: 59,50 | Performed by: ANESTHESIOLOGY

## 2022-06-13 PROCEDURE — 25000003 PHARM REV CODE 250: Performed by: STUDENT IN AN ORGANIZED HEALTH CARE EDUCATION/TRAINING PROGRAM

## 2022-06-13 PROCEDURE — 63600175 PHARM REV CODE 636 W HCPCS: Performed by: SURGERY

## 2022-06-13 PROCEDURE — C1781 MESH (IMPLANTABLE): HCPCS | Performed by: SURGERY

## 2022-06-13 PROCEDURE — 36000710: Performed by: SURGERY

## 2022-06-13 PROCEDURE — 37000009 HC ANESTHESIA EA ADD 15 MINS: Performed by: SURGERY

## 2022-06-13 PROCEDURE — 64999 PR BLOCK, ERECTOR SPINAE PLANE, SINGLE SHOT: ICD-10-PCS | Mod: ,,, | Performed by: ANESTHESIOLOGY

## 2022-06-13 PROCEDURE — D9220A PRA ANESTHESIA: Mod: ,,, | Performed by: ANESTHESIOLOGY

## 2022-06-13 PROCEDURE — 63600175 PHARM REV CODE 636 W HCPCS: Performed by: ANESTHESIOLOGY

## 2022-06-13 PROCEDURE — 36000711: Performed by: SURGERY

## 2022-06-13 PROCEDURE — 71000015 HC POSTOP RECOV 1ST HR: Performed by: SURGERY

## 2022-06-13 PROCEDURE — D9220A PRA ANESTHESIA: ICD-10-PCS | Mod: ,,, | Performed by: ANESTHESIOLOGY

## 2022-06-13 PROCEDURE — 71000044 HC DOSC ROUTINE RECOVERY FIRST HOUR: Performed by: SURGERY

## 2022-06-13 PROCEDURE — 64999 UNLISTED PX NERVOUS SYSTEM: CPT | Mod: ,,, | Performed by: ANESTHESIOLOGY

## 2022-06-13 PROCEDURE — 49655 PR LAP, INCISIONAL HERNIA REPAIR,INCARCERATED: CPT | Mod: ,,, | Performed by: SURGERY

## 2022-06-13 DEVICE — MESH HERNIA ST 4.5IN CIRCULAR: Type: IMPLANTABLE DEVICE | Site: ABDOMEN | Status: FUNCTIONAL

## 2022-06-13 RX ORDER — APIXABAN 5 MG/1
5 TABLET, FILM COATED ORAL 2 TIMES DAILY
Qty: 60 TABLET | Refills: 11 | Status: SHIPPED | OUTPATIENT
Start: 2022-06-13 | End: 2022-07-07

## 2022-06-13 RX ORDER — PHENYLEPHRINE HYDROCHLORIDE 10 MG/ML
INJECTION INTRAVENOUS
Status: DISCONTINUED | OUTPATIENT
Start: 2022-06-13 | End: 2022-06-13

## 2022-06-13 RX ORDER — OXYCODONE HYDROCHLORIDE 5 MG/1
TABLET ORAL
Status: DISCONTINUED
Start: 2022-06-13 | End: 2022-06-13 | Stop reason: HOSPADM

## 2022-06-13 RX ORDER — FENTANYL CITRATE 50 UG/ML
INJECTION, SOLUTION INTRAMUSCULAR; INTRAVENOUS
Status: DISCONTINUED | OUTPATIENT
Start: 2022-06-13 | End: 2022-06-13

## 2022-06-13 RX ORDER — OXYCODONE HYDROCHLORIDE 5 MG/1
5 TABLET ORAL EVERY 4 HOURS PRN
Qty: 15 TABLET | Refills: 0 | Status: SHIPPED | OUTPATIENT
Start: 2022-06-13 | End: 2022-10-25

## 2022-06-13 RX ORDER — DEXAMETHASONE SODIUM PHOSPHATE 4 MG/ML
INJECTION, SOLUTION INTRA-ARTICULAR; INTRALESIONAL; INTRAMUSCULAR; INTRAVENOUS; SOFT TISSUE
Status: DISCONTINUED | OUTPATIENT
Start: 2022-06-13 | End: 2022-06-13

## 2022-06-13 RX ORDER — EPHEDRINE SULFATE 50 MG/ML
INJECTION, SOLUTION INTRAVENOUS
Status: DISCONTINUED | OUTPATIENT
Start: 2022-06-13 | End: 2022-06-13

## 2022-06-13 RX ORDER — HALOPERIDOL 5 MG/ML
0.5 INJECTION INTRAMUSCULAR EVERY 10 MIN PRN
Status: DISCONTINUED | OUTPATIENT
Start: 2022-06-13 | End: 2022-06-13 | Stop reason: HOSPADM

## 2022-06-13 RX ORDER — HYDROMORPHONE HYDROCHLORIDE 1 MG/ML
0.2 INJECTION, SOLUTION INTRAMUSCULAR; INTRAVENOUS; SUBCUTANEOUS EVERY 5 MIN PRN
Status: DISCONTINUED | OUTPATIENT
Start: 2022-06-13 | End: 2022-06-13 | Stop reason: HOSPADM

## 2022-06-13 RX ORDER — SODIUM CHLORIDE 0.9 % (FLUSH) 0.9 %
10 SYRINGE (ML) INJECTION
Status: DISCONTINUED | OUTPATIENT
Start: 2022-06-13 | End: 2022-06-13 | Stop reason: HOSPADM

## 2022-06-13 RX ORDER — FENTANYL CITRATE 50 UG/ML
25-200 INJECTION, SOLUTION INTRAMUSCULAR; INTRAVENOUS
Status: DISCONTINUED | OUTPATIENT
Start: 2022-06-13 | End: 2022-10-25

## 2022-06-13 RX ORDER — ROPIVACAINE HYDROCHLORIDE 5 MG/ML
INJECTION, SOLUTION EPIDURAL; INFILTRATION; PERINEURAL
Status: COMPLETED | OUTPATIENT
Start: 2022-06-13 | End: 2022-06-13

## 2022-06-13 RX ORDER — ONDANSETRON 2 MG/ML
INJECTION INTRAMUSCULAR; INTRAVENOUS
Status: DISCONTINUED | OUTPATIENT
Start: 2022-06-13 | End: 2022-06-13

## 2022-06-13 RX ORDER — LIDOCAINE HYDROCHLORIDE 20 MG/ML
INJECTION INTRAVENOUS
Status: DISCONTINUED | OUTPATIENT
Start: 2022-06-13 | End: 2022-06-13

## 2022-06-13 RX ORDER — KETAMINE HCL IN 0.9 % NACL 50 MG/5 ML
SYRINGE (ML) INTRAVENOUS
Status: DISCONTINUED | OUTPATIENT
Start: 2022-06-13 | End: 2022-06-13

## 2022-06-13 RX ORDER — MIDAZOLAM HYDROCHLORIDE 1 MG/ML
.5-4 INJECTION INTRAMUSCULAR; INTRAVENOUS
Status: DISCONTINUED | OUTPATIENT
Start: 2022-06-13 | End: 2022-10-25

## 2022-06-13 RX ORDER — ACETAMINOPHEN 10 MG/ML
INJECTION, SOLUTION INTRAVENOUS
Status: DISCONTINUED | OUTPATIENT
Start: 2022-06-13 | End: 2022-06-13

## 2022-06-13 RX ORDER — SODIUM CHLORIDE 9 MG/ML
INJECTION, SOLUTION INTRAVENOUS CONTINUOUS PRN
Status: DISCONTINUED | OUTPATIENT
Start: 2022-06-13 | End: 2022-06-13

## 2022-06-13 RX ORDER — OXYCODONE HYDROCHLORIDE 5 MG/1
5 TABLET ORAL ONCE
Status: COMPLETED | OUTPATIENT
Start: 2022-06-13 | End: 2022-06-13

## 2022-06-13 RX ORDER — PROPOFOL 10 MG/ML
VIAL (ML) INTRAVENOUS
Status: DISCONTINUED | OUTPATIENT
Start: 2022-06-13 | End: 2022-06-13

## 2022-06-13 RX ORDER — ROCURONIUM BROMIDE 10 MG/ML
INJECTION, SOLUTION INTRAVENOUS
Status: DISCONTINUED | OUTPATIENT
Start: 2022-06-13 | End: 2022-06-13

## 2022-06-13 RX ADMIN — DEXTROSE 2 G: 50 INJECTION, SOLUTION INTRAVENOUS at 09:06

## 2022-06-13 RX ADMIN — HYDROMORPHONE HYDROCHLORIDE 0.2 MG: 1 INJECTION, SOLUTION INTRAMUSCULAR; INTRAVENOUS; SUBCUTANEOUS at 12:06

## 2022-06-13 RX ADMIN — ACETAMINOPHEN 1000 MG: 10 INJECTION, SOLUTION INTRAVENOUS at 10:06

## 2022-06-13 RX ADMIN — DEXAMETHASONE SODIUM PHOSPHATE 4 MG: 4 INJECTION, SOLUTION INTRAMUSCULAR; INTRAVENOUS at 09:06

## 2022-06-13 RX ADMIN — SODIUM CHLORIDE: 0.9 INJECTION, SOLUTION INTRAVENOUS at 09:06

## 2022-06-13 RX ADMIN — PROPOFOL 160 MG: 10 INJECTION, EMULSION INTRAVENOUS at 09:06

## 2022-06-13 RX ADMIN — FENTANYL CITRATE 100 MCG: 50 INJECTION INTRAMUSCULAR; INTRAVENOUS at 08:06

## 2022-06-13 RX ADMIN — ROCURONIUM BROMIDE 90 MG: 10 INJECTION, SOLUTION INTRAVENOUS at 09:06

## 2022-06-13 RX ADMIN — Medication 25 MG: at 10:06

## 2022-06-13 RX ADMIN — EPHEDRINE SULFATE 10 MG: 50 INJECTION INTRAVENOUS at 10:06

## 2022-06-13 RX ADMIN — ONDANSETRON 4 MG: 2 INJECTION INTRAMUSCULAR; INTRAVENOUS at 10:06

## 2022-06-13 RX ADMIN — LIDOCAINE HYDROCHLORIDE 80 MG: 20 INJECTION, SOLUTION INTRAVENOUS at 09:06

## 2022-06-13 RX ADMIN — HYDROMORPHONE HYDROCHLORIDE 0.2 MG: 1 INJECTION, SOLUTION INTRAMUSCULAR; INTRAVENOUS; SUBCUTANEOUS at 11:06

## 2022-06-13 RX ADMIN — SUGAMMADEX 200 MG: 100 INJECTION, SOLUTION INTRAVENOUS at 11:06

## 2022-06-13 RX ADMIN — PHENYLEPHRINE HYDROCHLORIDE 100 MCG: 10 INJECTION INTRAVENOUS at 10:06

## 2022-06-13 RX ADMIN — GLYCOPYRROLATE 0.2 MG: 0.2 INJECTION, SOLUTION INTRAMUSCULAR; INTRAVITREAL at 10:06

## 2022-06-13 RX ADMIN — ROCURONIUM BROMIDE 10 MG: 10 INJECTION, SOLUTION INTRAVENOUS at 10:06

## 2022-06-13 RX ADMIN — EPHEDRINE SULFATE 10 MG: 50 INJECTION INTRAVENOUS at 09:06

## 2022-06-13 RX ADMIN — ROPIVACAINE HYDROCHLORIDE 30 ML: 5 INJECTION, SOLUTION EPIDURAL; INFILTRATION; PERINEURAL at 08:06

## 2022-06-13 RX ADMIN — OXYCODONE 5 MG: 5 TABLET ORAL at 12:06

## 2022-06-13 RX ADMIN — MIDAZOLAM 2 MG: 1 INJECTION INTRAMUSCULAR; INTRAVENOUS at 08:06

## 2022-06-13 RX ADMIN — SODIUM CHLORIDE, SODIUM GLUCONATE, SODIUM ACETATE, POTASSIUM CHLORIDE, MAGNESIUM CHLORIDE, SODIUM PHOSPHATE, DIBASIC, AND POTASSIUM PHOSPHATE: .53; .5; .37; .037; .03; .012; .00082 INJECTION, SOLUTION INTRAVENOUS at 10:06

## 2022-06-13 NOTE — ANESTHESIA PROCEDURE NOTES
Intubation    Date/Time: 6/13/2022 9:29 AM  Performed by: Rudy Deluca MD  Authorized by: Darci Munson MD     Intubation:     Induction:  Intravenous    Intubated:  Postinduction    Mask Ventilation:  Easy mask    Attempts:  1    Attempted By:  Resident anesthesiologist    Method of Intubation:  Direct    Blade:  Conrad 2    Laryngeal View Grade: Grade I - full view of cords      Difficult Airway Encountered?: No      Complications:  None    Airway Device:  Oral endotracheal tube    Airway Device Size:  7.5    Style/Cuff Inflation:  Cuffed (inflated to minimal occlusive pressure)    Tube secured:  22    Secured at:  The lips    Placement Verified By:  Capnometry    Complicating Factors:  None    Findings Post-Intubation:  BS equal bilateral

## 2022-06-13 NOTE — PATIENT INSTRUCTIONS
Surgery Post Op Instructions:    You had a robotic left spigelian hernia repair with mesh performed today.     Wound care:  Your incision is covered with Dermabond, a type of surgical super glue. You may shower tomorrow - let soapy water run over the incision but do not scrub the area. You must avoid submerging the incision in pools, bathtubs, etc until it is fully healed in 2 weeks.     You need to limit yourself to light duty activities (no lifting/pulling/pushing >10 lbs) for a total of 6 weeks after surgery.    No dietary restrictions.    Medications:  A narcotic pain medication has been prescribed.  Please start to wean the pain medication over the following few days.  You can take tylenol instead of the pain medication.      Please take miralax 1 capful at night to prevent constipation associated with narcotic pain medications.      Follow up:  Return to clinic in 2 weeks for follow up and wound check.

## 2022-06-13 NOTE — TRANSFER OF CARE
"Anesthesia Transfer of Care Note    Patient: Dominick Song MD    Procedure(s) Performed: Procedure(s) (LRB):  XI ROBOTIC REPAIR, HERNIA, INCISIONAL (LEFT SIDE SPIGELIAN WITH MESH) (Left)    Patient location: [facility]    Anesthesia Type: general    Transport from OR: Transported from OR on 6-10 L/min O2 by face mask with adequate spontaneous ventilation    Post pain: adequate analgesia    Post assessment: no apparent anesthetic complications    Post vital signs: stable    Level of consciousness: awake and alert    Nausea/Vomiting: no nausea/vomiting    Complications: none    Transfer of care protocol was followed      Last vitals:   Visit Vitals  BP (!) 123/59 (BP Location: Left arm, Patient Position: Lying)   Pulse (!) 56   Temp 36.6 °C (97.9 °F) (Temporal)   Resp 13   Ht 6' 2" (1.88 m)   Wt 96.2 kg (212 lb)   SpO2 96%   BMI 27.22 kg/m²     "

## 2022-06-13 NOTE — OP NOTE
DATE OF PROCEDURE: 6/13/2022    PRE OP DIAGNOSIS: Incisional hernia, without obstruction or gangrene [K43.2]    POST OP DIAGNOSIS: Incisional hernia, without obstruction or gangrene [K43.2]    PROCEDURE: Procedure(s) (LRB):  XI ROBOTIC REPAIR, HERNIA, INCISIONAL (LEFT SIDE SPIGELIAN WITH MESH) (Left)    Surgeon(s) and Role:     * Rosendo Marti MD - Primary     * Mayito Kaye MD - Resident - Assisting    ANESTHESIA: General.     Procedure:    The patient was placed under general anesthesia on the operating room table and the table was flexed.  The abdomen was prepped and draped in usual manner.  Access to the peritoneum was gained through the right upper quadrant using a 5 mm Optiview trocar under direct vision.  Pneumoperitoneum to 15 mmHg with CO2 gas was obtained.  There were adhesions along the area of midline to the omentum where there was a prior ventral patch and a 2nd 5 mm trocar was placed lateral to the primary trocar and using blunt dissection we teased away the omentum from the mesh.  Robotic trocar was placed 6 cm to the left of the primary trocar and the primary trocar was changed out for a robotic trocar.  The trocar that was placed to the right side of the primary trocar was also traded out for a robotic trocar and these were all done under direct vision.  The patient was placed in some Trendelenburg and the robot was docked.  Bluntly the incarcerated colon was removed from the hernia.  Using the hook cautery the peritoneum to the left of midline was taken down and we then continued to strip the peritoneum down off the abdominal wall to the inguinal canal and to the patient's left.  We removed all of the hernia contents which included the sac and some fat, clearing off the edges of the hernia.  The hernia defect was repaired with a running 0 permanent V lock suture and a 11 cm Ventralight mesh was used to cover the defect.  The defect itself was approximately 5 cm.  Pneumoperitoneum was  decreased to 8 mmHg and the peritoneum was reapproximated with a 2-0 absorbable V lock incorporating the mesh in 1 area to stabilize it.  The abdomen was inspected for hemostasis and the trocars removed under direct vision.  Prior to moving last trocar pneumoperitoneum was allowed to escape.  The skin incisions were repaired with 4 plain catgut and reinforced with Dermabond.  The patient tolerated procedure well was brought the cover room stable condition.  Sponge and needle counts were correct at the end of the case.  Blood loss was minimal, complications none and pathology none.    This dictation was done with voice recognition software.

## 2022-06-13 NOTE — ANESTHESIA PROCEDURE NOTES
Gloria    Patient location during procedure: pre-op   Block not for primary anesthetic.  Reason for block: at surgeon's request and post-op pain management   Post-op Pain Location: abdominal pain   Start time: 6/13/2022 8:57 AM  Timeout: 6/13/2022 8:56 AM   End time: 6/13/2022 9:00 AM    Staffing  Authorizing Provider: Matt Desir MD  Performing Provider: Matt Desir MD    Preanesthetic Checklist  Completed: patient identified, IV checked, site marked, risks and benefits discussed, surgical consent, monitors and equipment checked, pre-op evaluation and timeout performed  Peripheral Block  Patient position: sitting  Prep: ChloraPrep  Patient monitoring: heart rate, cardiac monitor, continuous pulse ox, continuous capnometry and frequent blood pressure checks  Block type: erector spinae plane  Laterality: bilateral  Injection technique: single shot  Interspace: T7-8    Needle  Needle type: Stimuplex   Needle gauge: 21 G  Needle length: 4 in  Needle localization: anatomical landmarks and ultrasound guidance   -ultrasound image captured on disc.  Assessment  Injection assessment: negative aspiration, negative parasthesia and local visualized surrounding nerve  Paresthesia pain: none  Heart rate change: no  Slow fractionated injection: yes  Pain Tolerance: comfortable throughout block and no complaints  Medications:    Medications: ropivacaine (NAROPIN) injection 0.5% - Perineural   30 mL - 6/13/2022 8:58:00 AM    Additional Notes  Patient tolerated well.  [facility]. 0.375% ropiv 30 ml each side

## 2022-06-13 NOTE — BRIEF OP NOTE
Sahwn Vaughn - Surgery ([facility])  Brief Operative Note    Surgery Date: 6/13/2022     Surgeon(s) and Role:     * Rosendo Marti MD - Primary     * Mayito Kaye MD - Resident - Assisting        Pre-op Diagnosis:  Incisional hernia, without obstruction or gangrene [K43.2]    Post-op Diagnosis:  Post-Op Diagnosis Codes:     * Incisional hernia, without obstruction or gangrene [K43.2]    Procedure(s) (LRB):  XI ROBOTIC REPAIR, HERNIA, INCISIONAL (LEFT SIDE SPIGELIAN WITH MESH) (Left)    Anesthesia: General    Operative Findings: Left spigelian hernia, repaired robotically with mesh    Estimated Blood Loss: 5 cc         Specimens:   Specimen (24h ago, onward)            None            Discharge Note    OUTCOME: Patient tolerated treatment/procedure well without complication and is now ready for discharge.    DISPOSITION: Home or Self Care    FINAL DIAGNOSIS:  Spigelian hernia    FOLLOWUP: In clinic    DISCHARGE INSTRUCTIONS:    Discharge Procedure Orders   Diet Adult Regular     Notify your health care provider if you experience any of the following:  temperature >100.4     Notify your health care provider if you experience any of the following:  persistent nausea and vomiting or diarrhea     Notify your health care provider if you experience any of the following:  severe uncontrolled pain     Notify your health care provider if you experience any of the following:  redness, tenderness, or signs of infection (pain, swelling, redness, odor or green/yellow discharge around incision site)     Notify your health care provider if you experience any of the following:  difficulty breathing or increased cough     Notify your health care provider if you experience any of the following:  severe persistent headache     Notify your health care provider if you experience any of the following:  persistent dizziness, light-headedness, or visual disturbances     Notify your health care provider if you experience any of the  following:  increased confusion or weakness     Activity as tolerated

## 2022-06-13 NOTE — H&P
History & Physical     SUBJECTIVE:      History of Present Illness:  Patient is a 76 y.o. male presents with s/p bypass and revision, llq hernia.  He has noticed it for several years.  He says it causes cramping and he pushes it down to relieve it.  He thinks it has grown a bit.     No chief complaint on file.    Interval History 6/13/22: no interval changes             Review of patient's allergies indicates:   Allergen Reactions    No known drug allergies           Current Medications          Current Outpatient Medications   Medication Sig Dispense Refill    acyclovir (ZOVIRAX) 800 MG Tab TK ONE T PO FIVE TIMES D only for fever blisters   1    alfuzosin (UROXATRAL) 10 mg Tb24 Take 1 tablet (10 mg total) by mouth once daily. 90 tablet 3    buPROPion (WELLBUTRIN XL) 150 MG TB24 tablet          buPROPion (WELLBUTRIN XL) 300 MG 24 hr tablet Take 1 tablet (300 mg total) by mouth once daily. 90 tablet 2    calcium citrate (CALCITRATE) 200 mg (950 mg) tablet Take 1 tablet (200 mg total) by mouth 2 (two) times daily. 180 tablet 3    carvediloL (COREG) 12.5 MG tablet TAKE 1 TABLET(12.5 MG) BY MOUTH TWICE DAILY WITH MEALS 180 tablet 3    celecoxib (CELEBREX) 200 MG capsule TAKE 1 CAPSULE(200 MG) BY MOUTH TWICE DAILY 180 capsule 0    cyclobenzaprine (FLEXERIL) 10 MG tablet Take 1 tablet (10 mg total) by mouth 3 (three) times daily as needed for Muscle spasms. 60 tablet 0    dutasteride (AVODART) 0.5 mg capsule TAKE 1 CAPSULE(0.5 MG) BY MOUTH EVERY DAY 90 capsule 3    ELIQUIS 5 mg Tab TAKE 1 TABLET(5 MG) BY MOUTH TWICE DAILY 60 tablet 11    EScitalopram oxalate (LEXAPRO) 10 MG tablet TAKE 1 TABLET(10 MG) BY MOUTH EVERY DAY 90 tablet 3    ferrous sulfate (FEOSOL) 325 mg (65 mg iron) Tab tablet Take by mouth.        levothyroxine (SYNTHROID) 75 MCG tablet Take 1 tablet (75 mcg total) by mouth before breakfast. 90 tablet 0    methocarbamoL (ROBAXIN) 500 MG Tab TAKE 1 TABLET(500 MG) BY MOUTH THREE TIMES DAILY FOR  10 DAYS 90 tablet 0    OMEGA-3 FATTY ACIDS (FISH OIL CONCENTRATE ORAL) Take by mouth Daily.        propafenone (RYTHMOL SR) 425 MG Cp12 TAKE 1 CAPSULE(425 MG) BY MOUTH EVERY 12 HOURS 180 capsule 3    telmisartan (MICARDIS) 40 MG Tab TAKE 1 TABLET(40 MG) BY MOUTH EVERY DAY 90 tablet 3    varicella-zoster gE-AS01B, PF, (SHINGRIX, PF,) 50 mcg/0.5 mL injection Inject into the muscle. 1 each 1    varicella-zoster gE-AS01B, PF, (SHINGRIX, PF,) 50 mcg/0.5 mL injection Inject into the muscle. 1 each 1      No current facility-administered medications for this visit.                 Past Medical History:   Diagnosis Date    Anticoagulant long-term use      Anxiety      Arthritis      Atrial fibrillation      BPH (benign prostatic hyperplasia)      Cataract      CKD (chronic kidney disease) stage 3, GFR 30-59 ml/min 7/10/2017     Followed by Dr. Jeevan Pond    Colon polyp       benign    Depression      Elevated PSA      Erectile dysfunction      Gastric ulcer with hemorrhage      Hep B w/o coma 1977    History of bleeding peptic ulcer      History of prostatitis      Hypertension      PAF (paroxysmal atrial fibrillation)      Sleep apnea       on CPAP    Stroke       TIA December 2019    Thyroid disease        Past Surgical History:   Procedure Laterality Date    ABDOMINAL HERNIA REPAIR        ABLATION OF ARRHYTHMOGENIC FOCUS FOR ATRIAL FIBRILLATION N/A 6/15/2020     Procedure: Ablation atrial fibrillation;  Surgeon: Wyatt Beatty MD;  Location[facility] EP LAB;  Service: Cardiology;  Laterality: N/A;  PAF, AFl, PEPE, PVI re-do, CTI, RFA, JUSTIN, Gen, SK, 3 Prep    CATARACT EXTRACTION   11/25/2013     bilateral    CHOLECYSTECTOMY        COLONOSCOPY N/A 11/25/2015     Procedure: COLONOSCOPY;  Surgeon: Toby Hernandez MD;  Location: [facility] ENDO;  Service: Endoscopy;  Laterality: N/A;    COLONOSCOPY N/A 11/13/2020     Procedure: COLONOSCOPY;  Surgeon: Toby Hernandez MD;  Location: [facility] ENDO;   Service: Endoscopy;  Laterality: N/A;    EYE SURGERY        GASTRIC BYPASS   1992    INTRAMEDULLARY RODDING OF FEMUR Left 7/18/2020     Procedure: INSERTION, INTRAMEDULLARY ERIC, FEMUR, left, Synthes, [place] table, Large C arm clock side,;  Surgeon: Tony Rodriguez MD;  Location: [facility] OR [address] [place] [address];  Service: Orthopedics;  Laterality: Left;    KNEE ARTHROSCOPY         RT    LASIK   2001     both eyes (Dr. Rabago)    ORIF HUMERUS FRACTURE         LT    ORIF HUMERUS FRACTURE Right       non surgical repair    RADIOFREQUENCY ABLATION         x2    Right ankle tendon repair        ROTATOR CUFF REPAIR         right    TOTAL KNEE ARTHROPLASTY Right 5/29/2018     Procedure: REPLACEMENT-KNEE-TOTAL-pro;  Surgeon: Denzel Dallas MD;  [facility];  Service: Orthopedics;  Laterality: Right;  Pro    TREATMENT OF CARDIAC ARRHYTHMIA   6/15/2020     Procedure: Cardioversion or Defibrillation;  Surgeon: Wyatt Beatty MD;  Location: [facility] EP LAB;  Service: Cardiology;;            Family History   Problem Relation Age of Onset    COPD Father      Diabetes Father      Osteoporosis Father      Aortic stenosis Mother      Heart disease Mother           aortic stenosis    Osteoporosis Mother      Heart attack Brother      No Known Problems Son      No Known Problems Daughter      No Known Problems Daughter      No Known Problems Daughter        Social History           Tobacco Use    Smoking status: Never Smoker    Smokeless tobacco: Never Used    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   Respiratory: Negative for cough, chest tightness and shortness of breath.    Cardiovascular: Negative for chest pain.   Gastrointestinal: Positive for diarrhea. Negative for abdominal pain, constipation, nausea and vomiting.        Has occasional  "diarrhea, likely related to bypass   Genitourinary: Negative for difficulty urinating.   Skin: Negative for rash.   Hematological:        Has easy bruising, on blood thinners         OBJECTIVE:      Vital Signs (Most Recent)  Pulse: 62 (05/05/22 0900)  BP: 139/77 (05/05/22 0900)  6' 2" (1.88 m)  97.7 kg (215 lb 8 oz)      Physical Exam:  Physical Exam  Vitals reviewed.   Constitutional:       Appearance: Normal appearance.   Abdominal:      Palpations: Abdomen is soft.      Tenderness: There is no abdominal tenderness.      Hernia: A hernia is present.       Skin:     General: Skin is warm and dry.   Neurological:      General: No focal deficit present.      Mental Status: He is alert and oriented to person, place, and time.   Psychiatric:         Mood and Affect: Mood normal.         Behavior: Behavior normal.         Thought Content: Thought content normal.         Judgment: Judgment normal.            Laboratory  CBC: Reviewed  CMP: Reviewed  ckd 3, mild anemia     Diagnostic Results:  Echo: Result Reviewed and ok, states he goes in and out of afib  CT: Result Reviewed, Films Reviewed and likely direct [facility]     ASSESSMENT/PLAN:      Symptomatic spigelian hernia.  S/p bypass.     PLAN:     - OR today for robotic L spigelian hernia repair  - NPO  - Please call with questions    "

## 2022-06-14 ENCOUNTER — PATIENT MESSAGE (OUTPATIENT)
Dept: FAMILY MEDICINE | Facility: CLINIC | Age: 77
End: 2022-06-14
Payer: MEDICARE

## 2022-06-14 NOTE — PROGRESS NOTES
Phone call: He is ok but has pain in both his shoulders.  He says he is cruising through his recovery.  He doesn't have pain in the incisions or left groin.  He is eating ok.  He denies fevers and is urinating is frequent.  He is not passing gas yet.

## 2022-06-14 NOTE — TELEPHONE ENCOUNTER
No new care gaps identified.  [facility] Embedded Care Gaps. Reference number: [id number]. 6/14/2022   4:48:42 PM CDT

## 2022-06-14 NOTE — TELEPHONE ENCOUNTER
Refill Routing Note   Medication(s) are not appropriate for processing by [facility] for the following reason(s):      - Medication not previously prescribed by PCP    [facility] action(s):  Defer          Medication reconciliation completed: No     Appointments  past 12m or future 3m with PCP    Date Provider   Last Visit   3/9/2022 Maxi Gaines MD   Next Visit   9/7/2022 Maxi Gaines MD   ED visits in past 90 days: 0        Note composed:5:45 PM 06/14/2022

## 2022-06-14 NOTE — ANESTHESIA POSTPROCEDURE EVALUATION
Anesthesia Post Evaluation    Patient: Dominick Song MD    Procedure(s) Performed: Procedure(s) (LRB):  XI ROBOTIC REPAIR, HERNIA, INCISIONAL (LEFT SIDE SPIGELIAN WITH MESH) (Left)    Final Anesthesia Type: general      Patient location during evaluation: PACU  Patient participation: Yes- Able to Participate  Level of consciousness: awake and alert  Post-procedure vital signs: reviewed and stable  Pain management: adequate  Airway patency: patent    PONV status at discharge: No PONV  Anesthetic complications: no      Cardiovascular status: blood pressure returned to baseline  Respiratory status: unassisted  Hydration status: euvolemic  Follow-up not needed.          Vitals Value Taken Time   /64 06/13/22 1217   Temp 36.3 °C (97.3 °F) 06/13/22 1114   Pulse 59 06/13/22 1229   Resp 18 06/13/22 1230   SpO2 91 % 06/13/22 1229   Vitals shown include unvalidated device data.      No case tracking events are documented in the log.      Pain/Pari Score: Pain Rating Prior to Med Admin: 6 (6/13/2022 12:30 PM)  Pain Rating Post Med Admin: 0 (6/13/2022  9:15 AM)  Pari Score: 10 (6/13/2022 12:30 PM)

## 2022-06-15 RX ORDER — BUPROPION HYDROCHLORIDE 150 MG/1
150 TABLET ORAL DAILY
Qty: 90 TABLET | Refills: 3 | Status: SHIPPED | OUTPATIENT
Start: 2022-06-15 | End: 2023-06-21 | Stop reason: SDUPTHER

## 2022-06-16 NOTE — PROGRESS NOTES
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     Background:  First diagnosed with atrial fibrillation 2/12 (noted palpitations). Placed on beta blocker, coumadin. Underwent PEPE/DCCV. Had recurrence, Propafenone  mg twice daily added.  Had recurrence 12/24/13, underwent DCCV, Propafenone increased to 425 mg twice daily.  PVI ([place]ry[place]) 7/28/14.   Had done well, was off Propafenone. Developed recurrence, underwent DCCV multiple times.  Repeat PVI 4/27/17 All 4 veins remained isolated.  Antralized left sided lesion set posteriorly, and performed SVC isolation.  Has been compliant with CPAP.  Had persistent recurrence >> DCCV 2/9/18.  He has had paroxysmal AF, with 4 episodes since 11/18.    Exercise echo 2/19 normal biventricular structure and function BISI 57 negative for ischemia.     Continued to have paroxysmal events, self-terminating.  Last episode was last month.  Generally, the episodes are lasting 3 days.  Since last visit, he was admitted with a TIA.  This was thought to be due to non-compliance with Eliquis.     6/15/20 Repeat RFA. Posterior wall isolation + repeat RFA of cavo-tricuspid isthmus. PV's remained isolated. 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     7/13/20 presented with TIA like symptoms. Seen by Dr. Coats (Vascular Neurology). Thought to be atypical for neurologic etiology. CT demonstrates a small area of remote vs. Subacute infarct in R medial occipital lobe, so transient hippocampal ischemia may be at the origin, although again this is atypical     Update:    Underwent robotic spigelian hernia repair. Shortly thereafter developed persistent AF, lasting a couple of days. Generally occurring once or twice a month lasting a couple of hours.    ECG reveals sinus bradycardia at 54 bpm, [place]  interval 220 msec.      Review of Systems   All other systems reviewed and are negative.       Objective:    Physical Exam  Constitutional:       Appearance: He is well-developed.   Eyes:      General: No scleral icterus.     Conjunctiva/sclera: Conjunctivae normal.   Neck:      Vascular: No JVD.      Trachea: No tracheal deviation.   Cardiovascular:      Rate and Rhythm: Normal rate and regular rhythm.      Chest Wall: PMI is not displaced.      Heart sounds: Normal heart sounds.   Pulmonary:      Effort: Pulmonary effort is normal. No respiratory distress.      Breath sounds: Normal breath sounds.   Abdominal:      Palpations: Abdomen is soft.      Tenderness: There is no abdominal tenderness.   Musculoskeletal:         General: No tenderness.   Skin:     General: Skin is warm and dry.      Findings: No rash.   Neurological:      Mental Status: He is alert and oriented to person, place, and time.   Psychiatric:         Behavior: Behavior normal.           Assessment:       1. Paroxysmal atrial fibrillation    2. Bradycardia    3. Tortuous aorta    4. Essential hypertension    5. Cerebrovascular accident (CVA), unspecified mechanism    6. Stage 3a chronic kidney disease    7. Acquired hypothyroidism    8. Complex sleep apnea syndrome         Plan:             Doing well overall.  Continue Propafenone  mg BID.  F/u in 6 months.

## 2022-06-20 ENCOUNTER — OFFICE VISIT (OUTPATIENT)
Dept: CARDIOLOGY | Facility: CLINIC | Age: 77
End: 2022-06-20
Payer: MEDICARE

## 2022-06-20 VITALS
HEIGHT: 74 IN | BODY MASS INDEX: 27.98 KG/M2 | DIASTOLIC BLOOD PRESSURE: 92 MMHG | HEART RATE: 51 BPM | WEIGHT: 218.06 LBS | SYSTOLIC BLOOD PRESSURE: 132 MMHG

## 2022-06-20 DIAGNOSIS — I10 ESSENTIAL HYPERTENSION: ICD-10-CM

## 2022-06-20 DIAGNOSIS — R00.1 BRADYCARDIA: ICD-10-CM

## 2022-06-20 DIAGNOSIS — I77.1 TORTUOUS AORTA: ICD-10-CM

## 2022-06-20 DIAGNOSIS — I63.9 CEREBROVASCULAR ACCIDENT (CVA), UNSPECIFIED MECHANISM: ICD-10-CM

## 2022-06-20 DIAGNOSIS — I48.0 PAF (PAROXYSMAL ATRIAL FIBRILLATION): ICD-10-CM

## 2022-06-20 DIAGNOSIS — E03.9 ACQUIRED HYPOTHYROIDISM: ICD-10-CM

## 2022-06-20 DIAGNOSIS — G47.31 COMPLEX SLEEP APNEA SYNDROME: ICD-10-CM

## 2022-06-20 DIAGNOSIS — N18.31 STAGE 3A CHRONIC KIDNEY DISEASE: ICD-10-CM

## 2022-06-20 DIAGNOSIS — I48.0 PAROXYSMAL ATRIAL FIBRILLATION: Primary | ICD-10-CM

## 2022-06-20 PROCEDURE — 99215 PR OFFICE/OUTPT VISIT, EST, LEVL V, 40-54 MIN: ICD-10-PCS | Mod: S$GLB,,, | Performed by: INTERNAL MEDICINE

## 2022-06-20 PROCEDURE — 99215 OFFICE O/P EST HI 40 MIN: CPT | Mod: S$GLB,,, | Performed by: INTERNAL MEDICINE

## 2022-06-20 PROCEDURE — 93005 ELECTROCARDIOGRAM TRACING: CPT | Mod: PO

## 2022-06-20 PROCEDURE — 99212 OFFICE O/P EST SF 10 MIN: CPT | Mod: PBBFAC,PO | Performed by: INTERNAL MEDICINE

## 2022-06-20 PROCEDURE — 93010 RHYTHM STRIP: ICD-10-PCS | Mod: S$GLB,,, | Performed by: INTERNAL MEDICINE

## 2022-06-20 PROCEDURE — 99999 PR PBB SHADOW E&M-EST. PATIENT-LVL II: ICD-10-PCS | Mod: PBBFAC,,, | Performed by: INTERNAL MEDICINE

## 2022-06-20 PROCEDURE — 99999 PR PBB SHADOW E&M-EST. PATIENT-LVL II: CPT | Mod: PBBFAC,,, | Performed by: INTERNAL MEDICINE

## 2022-06-20 PROCEDURE — 93010 ELECTROCARDIOGRAM REPORT: CPT | Mod: S$GLB,,, | Performed by: INTERNAL MEDICINE

## 2022-06-21 ENCOUNTER — NURSE TRIAGE (OUTPATIENT)
Dept: ADMINISTRATIVE | Facility: CLINIC | Age: 77
End: 2022-06-21
Payer: MEDICARE

## 2022-06-21 DIAGNOSIS — U07.1 COVID: Primary | ICD-10-CM

## 2022-06-22 ENCOUNTER — INFUSION (OUTPATIENT)
Dept: INFECTIOUS DISEASES | Facility: HOSPITAL | Age: 77
End: 2022-06-22
Attending: FAMILY MEDICINE
Payer: MEDICARE

## 2022-06-22 VITALS
SYSTOLIC BLOOD PRESSURE: 116 MMHG | RESPIRATION RATE: 18 BRPM | DIASTOLIC BLOOD PRESSURE: 66 MMHG | TEMPERATURE: 98 F | OXYGEN SATURATION: 96 % | HEART RATE: 52 BPM

## 2022-06-22 DIAGNOSIS — U07.1 COVID: Primary | ICD-10-CM

## 2022-06-22 DIAGNOSIS — I48.0 PAF (PAROXYSMAL ATRIAL FIBRILLATION): Primary | ICD-10-CM

## 2022-06-22 DIAGNOSIS — U07.1 COVID: ICD-10-CM

## 2022-06-22 PROCEDURE — M0222 HC IV INJECTION, BEBTELOVIMAB, INCL POST ADMIN MONIT: HCPCS | Performed by: INTERNAL MEDICINE

## 2022-06-22 PROCEDURE — 63600175 PHARM REV CODE 636 W HCPCS: Mod: PN | Performed by: INTERNAL MEDICINE

## 2022-06-22 RX ORDER — ACETAMINOPHEN 325 MG/1
650 TABLET ORAL
Status: ACTIVE | OUTPATIENT
Start: 2022-06-22 | End: 2022-06-23

## 2022-06-22 RX ORDER — ONDANSETRON 4 MG/1
4 TABLET, ORALLY DISINTEGRATING ORAL
Status: ACTIVE | OUTPATIENT
Start: 2022-06-22 | End: 2022-06-23

## 2022-06-22 RX ORDER — EPINEPHRINE 0.3 MG/.3ML
0.3 INJECTION SUBCUTANEOUS
Status: ACTIVE | OUTPATIENT
Start: 2022-06-22 | End: 2022-06-25

## 2022-06-22 RX ORDER — DIPHENHYDRAMINE HYDROCHLORIDE 50 MG/ML
25 INJECTION INTRAMUSCULAR; INTRAVENOUS
Status: ACTIVE | OUTPATIENT
Start: 2022-06-22 | End: 2022-06-23

## 2022-06-22 RX ORDER — ALBUTEROL SULFATE 90 UG/1
2 AEROSOL, METERED RESPIRATORY (INHALATION)
Status: ACTIVE | OUTPATIENT
Start: 2022-06-22 | End: 2022-06-25

## 2022-06-22 RX ORDER — BEBTELOVIMAB 87.5 MG/ML
175 INJECTION, SOLUTION INTRAVENOUS
Status: COMPLETED | OUTPATIENT
Start: 2022-06-22 | End: 2022-06-22

## 2022-06-22 RX ADMIN — BEBTELOVIMAB 175 MG: 87.5 INJECTION, SOLUTION INTRAVENOUS at 02:06

## 2022-06-22 NOTE — PLAN OF CARE
Patient tolerated Covid EUA medication (per MAR) well, d/c in no acute distress after wait period with AVS in hand.  Spoke with patient and caregiver at door about quarantine guidelines, pt and caregivers voiced understanding.

## 2022-06-22 NOTE — TELEPHONE ENCOUNTER
"Female caller transferred to OCC line, caller ask to clarify if OOC nurse was the charge nurse in the ED and then stated, "they hung up on me twice" and disconnected line.     Reason for Disposition   Caller hangs up   Caller hangs up    Protocols used: NO CONTACT OR DUPLICATE CONTACT CALL-A-AH, DIFFICULT CALL-A-AH      "

## 2022-06-29 ENCOUNTER — PATIENT OUTREACH (OUTPATIENT)
Dept: ADMINISTRATIVE | Facility: HOSPITAL | Age: 77
End: 2022-06-29
Payer: MEDICARE

## 2022-06-29 ENCOUNTER — PATIENT MESSAGE (OUTPATIENT)
Dept: ADMINISTRATIVE | Facility: HOSPITAL | Age: 77
End: 2022-06-29
Payer: MEDICARE

## 2022-06-29 NOTE — PROGRESS NOTES
Uncontrolled BP REPORT  BP Readings from Last 3 Encounters:   06/22/22 116/66   06/20/22 (!) 132/92   06/13/22 108/63       Non-compliant report chart audits for HYPERTENSION MANAGEMENT     Outreach to patient in reference to hypertension management       NEED REMOTE HOME BP READING DOCUMENTED   OR  BP FOLLOW UP WITH NURSE VISIT OR CARE TEAM MEMBER    ACTIVE PORTAL MESSAGE OR LETTER SENT

## 2022-07-05 ENCOUNTER — OFFICE VISIT (OUTPATIENT)
Dept: SURGERY | Facility: CLINIC | Age: 77
End: 2022-07-05
Payer: MEDICARE

## 2022-07-05 VITALS
WEIGHT: 218 LBS | HEIGHT: 74 IN | BODY MASS INDEX: 27.98 KG/M2 | DIASTOLIC BLOOD PRESSURE: 71 MMHG | SYSTOLIC BLOOD PRESSURE: 116 MMHG | HEART RATE: 56 BPM

## 2022-07-05 DIAGNOSIS — Z09 POSTOP CHECK: Primary | ICD-10-CM

## 2022-07-05 PROCEDURE — 99024 PR POST-OP FOLLOW-UP VISIT: ICD-10-PCS | Mod: S$GLB,,, | Performed by: SURGERY

## 2022-07-05 PROCEDURE — 99999 PR PBB SHADOW E&M-EST. PATIENT-LVL II: CPT | Mod: PBBFAC,,, | Performed by: SURGERY

## 2022-07-05 PROCEDURE — 3044F PR MOST RECENT HEMOGLOBIN A1C LEVEL <7.0%: ICD-10-PCS | Mod: CPTII,S$GLB,, | Performed by: SURGERY

## 2022-07-05 PROCEDURE — 3288F FALL RISK ASSESSMENT DOCD: CPT | Mod: CPTII,S$GLB,, | Performed by: SURGERY

## 2022-07-05 PROCEDURE — 1159F MED LIST DOCD IN RCRD: CPT | Mod: CPTII,S$GLB,, | Performed by: SURGERY

## 2022-07-05 PROCEDURE — 3074F PR MOST RECENT SYSTOLIC BLOOD PRESSURE < 130 MM HG: ICD-10-PCS | Mod: CPTII,S$GLB,, | Performed by: SURGERY

## 2022-07-05 PROCEDURE — 3074F SYST BP LT 130 MM HG: CPT | Mod: CPTII,S$GLB,, | Performed by: SURGERY

## 2022-07-05 PROCEDURE — 1101F PT FALLS ASSESS-DOCD LE1/YR: CPT | Mod: CPTII,S$GLB,, | Performed by: SURGERY

## 2022-07-05 PROCEDURE — 1157F PR ADVANCE CARE PLAN OR EQUIV PRESENT IN MEDICAL RECORD: ICD-10-PCS | Mod: CPTII,S$GLB,, | Performed by: SURGERY

## 2022-07-05 PROCEDURE — 3044F HG A1C LEVEL LT 7.0%: CPT | Mod: CPTII,S$GLB,, | Performed by: SURGERY

## 2022-07-05 PROCEDURE — 1160F PR REVIEW ALL MEDS BY PRESCRIBER/CLIN PHARMACIST DOCUMENTED: ICD-10-PCS | Mod: CPTII,S$GLB,, | Performed by: SURGERY

## 2022-07-05 PROCEDURE — 99024 POSTOP FOLLOW-UP VISIT: CPT | Mod: S$GLB,,, | Performed by: SURGERY

## 2022-07-05 PROCEDURE — 1159F PR MEDICATION LIST DOCUMENTED IN MEDICAL RECORD: ICD-10-PCS | Mod: CPTII,S$GLB,, | Performed by: SURGERY

## 2022-07-05 PROCEDURE — 1101F PR PT FALLS ASSESS DOC 0-1 FALLS W/OUT INJ PAST YR: ICD-10-PCS | Mod: CPTII,S$GLB,, | Performed by: SURGERY

## 2022-07-05 PROCEDURE — 1157F ADVNC CARE PLAN IN RCRD: CPT | Mod: CPTII,S$GLB,, | Performed by: SURGERY

## 2022-07-05 PROCEDURE — 1160F RVW MEDS BY RX/DR IN RCRD: CPT | Mod: CPTII,S$GLB,, | Performed by: SURGERY

## 2022-07-05 PROCEDURE — 3078F PR MOST RECENT DIASTOLIC BLOOD PRESSURE < 80 MM HG: ICD-10-PCS | Mod: CPTII,S$GLB,, | Performed by: SURGERY

## 2022-07-05 PROCEDURE — 3078F DIAST BP <80 MM HG: CPT | Mod: CPTII,S$GLB,, | Performed by: SURGERY

## 2022-07-05 PROCEDURE — 3288F PR FALLS RISK ASSESSMENT DOCUMENTED: ICD-10-PCS | Mod: CPTII,S$GLB,, | Performed by: SURGERY

## 2022-07-05 PROCEDURE — 99999 PR PBB SHADOW E&M-EST. PATIENT-LVL II: ICD-10-PCS | Mod: PBBFAC,,, | Performed by: SURGERY

## 2022-07-05 NOTE — LETTER
[facility]  [address]  [facility] [address]  Phone: [phone]             July 5, 2022      Patient: Dominick Song MD   MR Number: [id number]   YOB: 1945   Date of Visit: 7/5/2022     Maxi Gaines MD  [address] [facility] [address] [place] [address]    Dear Dr. Gaines:    Thank you for referring Dominick Duncan to me for evaluation. Attached you will find relevant portions of my assessment and plan of care.    If you have questions, please do not hesitate to call me. I look forward to following Dominick Duncan along with you.      Sincerely,          Rosendo Marti MD          Enclosure

## 2022-07-05 NOTE — PROGRESS NOTES
I have seen the patient, reviewed the Student's history and physical, assessment and plan. I have personally interviewed and examined the patient at bedside and: agree with the findings.     S/p robotic repair left spigelian hernia with mesh 6/13/22.  He got covid afterwards and had cough.    PE wounds clear, no hernias    B12 low and treated with IM b12.    Doing well after surgery.  Light duty until July 25.  Rtc prn.

## 2022-07-10 ENCOUNTER — PATIENT MESSAGE (OUTPATIENT)
Dept: FAMILY MEDICINE | Facility: CLINIC | Age: 77
End: 2022-07-10
Payer: MEDICARE

## 2022-07-25 ENCOUNTER — PATIENT MESSAGE (OUTPATIENT)
Dept: FAMILY MEDICINE | Facility: CLINIC | Age: 77
End: 2022-07-25
Payer: MEDICARE

## 2022-07-25 ENCOUNTER — TELEPHONE (OUTPATIENT)
Dept: FAMILY MEDICINE | Facility: CLINIC | Age: 77
End: 2022-07-25

## 2022-08-04 ENCOUNTER — HOSPITAL ENCOUNTER (OUTPATIENT)
Dept: RADIOLOGY | Facility: HOSPITAL | Age: 77
Discharge: HOME | End: 2022-08-04
Attending: NURSE PRACTITIONER
Payer: MEDICARE

## 2022-08-04 ENCOUNTER — OFFICE VISIT (OUTPATIENT)
Dept: FAMILY MEDICINE | Facility: CLINIC | Age: 77
End: 2022-08-04
Payer: MEDICARE

## 2022-08-04 VITALS
SYSTOLIC BLOOD PRESSURE: 124 MMHG | WEIGHT: 209.44 LBS | BODY MASS INDEX: 26.88 KG/M2 | DIASTOLIC BLOOD PRESSURE: 70 MMHG | TEMPERATURE: 98 F | OXYGEN SATURATION: 97 % | HEIGHT: 74 IN | HEART RATE: 63 BPM

## 2022-08-04 DIAGNOSIS — M54.9 MID BACK PAIN: Primary | ICD-10-CM

## 2022-08-04 DIAGNOSIS — M54.9 MID BACK PAIN: ICD-10-CM

## 2022-08-04 PROCEDURE — 72070 X-RAY EXAM THORAC SPINE 2VWS: CPT | Mod: TC,FY,PO

## 2022-08-04 PROCEDURE — 3074F PR MOST RECENT SYSTOLIC BLOOD PRESSURE < 130 MM HG: ICD-10-PCS | Mod: CPTII,S$GLB,, | Performed by: NURSE PRACTITIONER

## 2022-08-04 PROCEDURE — 1160F PR REVIEW ALL MEDS BY PRESCRIBER/CLIN PHARMACIST DOCUMENTED: ICD-10-PCS | Mod: CPTII,S$GLB,, | Performed by: NURSE PRACTITIONER

## 2022-08-04 PROCEDURE — 3044F PR MOST RECENT HEMOGLOBIN A1C LEVEL <7.0%: ICD-10-PCS | Mod: CPTII,S$GLB,, | Performed by: NURSE PRACTITIONER

## 2022-08-04 PROCEDURE — 1160F RVW MEDS BY RX/DR IN RCRD: CPT | Mod: CPTII,S$GLB,, | Performed by: NURSE PRACTITIONER

## 2022-08-04 PROCEDURE — 72100 X-RAY EXAM L-S SPINE 2/3 VWS: CPT | Mod: TC,FY,PO

## 2022-08-04 PROCEDURE — 72100 XR LUMBAR SPINE AP AND LATERAL: ICD-10-PCS | Mod: 26,,, | Performed by: RADIOLOGY

## 2022-08-04 PROCEDURE — 99999 PR PBB SHADOW E&M-EST. PATIENT-LVL IV: CPT | Mod: PBBFAC,,, | Performed by: NURSE PRACTITIONER

## 2022-08-04 PROCEDURE — 1157F PR ADVANCE CARE PLAN OR EQUIV PRESENT IN MEDICAL RECORD: ICD-10-PCS | Mod: CPTII,S$GLB,, | Performed by: NURSE PRACTITIONER

## 2022-08-04 PROCEDURE — 99213 OFFICE O/P EST LOW 20 MIN: CPT | Mod: S$GLB,,, | Performed by: NURSE PRACTITIONER

## 2022-08-04 PROCEDURE — 3078F PR MOST RECENT DIASTOLIC BLOOD PRESSURE < 80 MM HG: ICD-10-PCS | Mod: CPTII,S$GLB,, | Performed by: NURSE PRACTITIONER

## 2022-08-04 PROCEDURE — 1157F ADVNC CARE PLAN IN RCRD: CPT | Mod: CPTII,S$GLB,, | Performed by: NURSE PRACTITIONER

## 2022-08-04 PROCEDURE — 3074F SYST BP LT 130 MM HG: CPT | Mod: CPTII,S$GLB,, | Performed by: NURSE PRACTITIONER

## 2022-08-04 PROCEDURE — 1125F PR PAIN SEVERITY QUANTIFIED, PAIN PRESENT: ICD-10-PCS | Mod: CPTII,S$GLB,, | Performed by: NURSE PRACTITIONER

## 2022-08-04 PROCEDURE — 72070 XR THORACIC SPINE AP LATERAL: ICD-10-PCS | Mod: 26,,, | Performed by: RADIOLOGY

## 2022-08-04 PROCEDURE — 1159F MED LIST DOCD IN RCRD: CPT | Mod: CPTII,S$GLB,, | Performed by: NURSE PRACTITIONER

## 2022-08-04 PROCEDURE — 72100 X-RAY EXAM L-S SPINE 2/3 VWS: CPT | Mod: 26,,, | Performed by: RADIOLOGY

## 2022-08-04 PROCEDURE — 72070 X-RAY EXAM THORAC SPINE 2VWS: CPT | Mod: 26,,, | Performed by: RADIOLOGY

## 2022-08-04 PROCEDURE — 99999 PR PBB SHADOW E&M-EST. PATIENT-LVL IV: ICD-10-PCS | Mod: PBBFAC,,, | Performed by: NURSE PRACTITIONER

## 2022-08-04 PROCEDURE — 1159F PR MEDICATION LIST DOCUMENTED IN MEDICAL RECORD: ICD-10-PCS | Mod: CPTII,S$GLB,, | Performed by: NURSE PRACTITIONER

## 2022-08-04 PROCEDURE — 3044F HG A1C LEVEL LT 7.0%: CPT | Mod: CPTII,S$GLB,, | Performed by: NURSE PRACTITIONER

## 2022-08-04 PROCEDURE — 3078F DIAST BP <80 MM HG: CPT | Mod: CPTII,S$GLB,, | Performed by: NURSE PRACTITIONER

## 2022-08-04 PROCEDURE — 1125F AMNT PAIN NOTED PAIN PRSNT: CPT | Mod: CPTII,S$GLB,, | Performed by: NURSE PRACTITIONER

## 2022-08-04 PROCEDURE — 99213 PR OFFICE/OUTPT VISIT, EST, LEVL III, 20-29 MIN: ICD-10-PCS | Mod: S$GLB,,, | Performed by: NURSE PRACTITIONER

## 2022-08-04 RX ORDER — HYDROCODONE BITARTRATE AND ACETAMINOPHEN 5; 325 MG/1; MG/1
1 TABLET ORAL EVERY 6 HOURS PRN
Qty: 20 TABLET | Refills: 0 | Status: SHIPPED | OUTPATIENT
Start: 2022-08-04 | End: 2022-08-25

## 2022-08-05 ENCOUNTER — PATIENT MESSAGE (OUTPATIENT)
Dept: FAMILY MEDICINE | Facility: CLINIC | Age: 77
End: 2022-08-05
Payer: MEDICARE

## 2022-08-05 ENCOUNTER — TELEPHONE (OUTPATIENT)
Dept: FAMILY MEDICINE | Facility: CLINIC | Age: 77
End: 2022-08-05
Payer: MEDICARE

## 2022-08-05 DIAGNOSIS — M54.9 DORSALGIA, UNSPECIFIED: ICD-10-CM

## 2022-08-05 DIAGNOSIS — S22.080A CLOSED WEDGE COMPRESSION FRACTURE OF T12 VERTEBRA, INITIAL ENCOUNTER: ICD-10-CM

## 2022-08-05 DIAGNOSIS — M51.36 DDD (DEGENERATIVE DISC DISEASE), LUMBAR: ICD-10-CM

## 2022-08-05 DIAGNOSIS — S22.060A CLOSED WEDGE COMPRESSION FRACTURE OF T8 VERTEBRA, INITIAL ENCOUNTER: Primary | ICD-10-CM

## 2022-08-05 NOTE — TELEPHONE ENCOUNTER
----- Message from Yenni Vazquez NP sent at 8/4/2022  9:03 PM CDT -----    Left message on recorder for patient to call us back regarding test results. Or he can view them via [facility].      Chronic changes in the lumbar spine, no fractures.      Yenni Vazquez, VENANCIO Hyman Staff  Mild wedge deformities at T8 and T12. We can either refer to pain management or proceed with MRI to get a better picture of the extent. Let me know which you would like to do.

## 2022-08-08 ENCOUNTER — TELEPHONE (OUTPATIENT)
Dept: FAMILY MEDICINE | Facility: CLINIC | Age: 77
End: 2022-08-08
Payer: MEDICARE

## 2022-08-08 NOTE — PROGRESS NOTES
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    Chief Complaint: Back Pain (Getting worse)    Patient complains of worsening mid to lower back pain for weeks to months, is walking bent over due to pain with straightening. No numbness or tingling or radiation to legs. He does have significant osteoporosis so has concern for vertebral fractures, no trauma.   Has taken left over pain medication with some relief, muscle relaxers not helping at all.    Past Medical History:  No date: Anticoagulant long-term use  No date: Anxiety  No date: Arthritis  No date: Atrial fibrillation  No date: BPH (benign prostatic hyperplasia)  No date: Cataract  7/10/2017: CKD (chronic kidney disease) stage 3, GFR 30-59 ml/min      Comment:  Followed by Dr. Jeevan Pond  No date: Colon polyp      Comment:  benign  No date: Depression  No date: Elevated PSA  No date: Erectile dysfunction  No date: Gastric ulcer with hemorrhage  1977: Hep B w/o coma  No date: History of bleeding peptic ulcer  No date: History of prostatitis  No date: Hypertension  No date: PAF (paroxysmal atrial fibrillation)  No date: Sleep apnea      Comment:  on CPAP  No date: Stroke      Comment:  TIA December 2019  No date: Thyroid disease    Past Surgical History:  No date: ABDOMINAL HERNIA REPAIR  6/15/2020: ABLATION OF ARRHYTHMOGENIC FOCUS FOR ATRIAL FIBRILLATION;   N/A      Comment:  Procedure: Ablation atrial fibrillation;  Surgeon: Wyatt Beatty MD;  Location: [facility] EP LAB;  Service: Cardiology;               Laterality: N/A;  PAF, AFl, PEPE, PVI re-do, CTI, RFA,                JUTSIN, Gen, SK, 3 Prep  11/25/2013: CATARACT EXTRACTION      Comment:  bilateral  No date: CHOLECYSTECTOMY  11/25/2015: COLONOSCOPY; N/A      Comment:  Procedure: COLONOSCOPY;  Surgeon: Toby Hernandez MD;  Location: [facility] ENDO;  Service: Endoscopy;                 Laterality: N/A;  11/13/2020: COLONOSCOPY; N/A      Comment:  Procedure:  COLONOSCOPY;  Surgeon: Toby Hernandez MD;  Location: [facility] ENDO;  Service: Endoscopy;                 Laterality: N/A;  No date: EYE SURGERY  1992: GASTRIC BYPASS  7/18/2020: INTRAMEDULLARY RODDING OF FEMUR; Left      Comment:  Procedure: INSERTION, INTRAMEDULLARY ERIC, FEMUR, left,                Synthes, [place] table, Large C arm clock side,;  Surgeon:                Tony Rodriguez MD;  Location: [facility] OR [address] [place] [address];                 Service: Orthopedics;  Laterality: Left;  No date: KNEE ARTHROSCOPY      Comment:  RT  2001: LASIK      Comment:  both eyes (Dr. Rabago)  No date: ORIF HUMERUS FRACTURE      Comment:  LT  No date: ORIF HUMERUS FRACTURE; Right      Comment:  non surgical repair  No date: RADIOFREQUENCY ABLATION      Comment:  x2  No date: Right ankle tendon repair  6/13/2022: ROBOT-ASSISTED REPAIR OF INCISIONAL HERNIA USING DA GARETH   XI; Left      Comment:  Procedure: XI ROBOTIC REPAIR, HERNIA, INCISIONAL (LEFT                SIDE SPIGELIAN WITH MESH);  Surgeon: Rosendo Marti MD;  Location: [address];  Service:                General;  Laterality: Left;  consent in am  No date: ROTATOR CUFF REPAIR      Comment:  right  5/29/2018: TOTAL KNEE ARTHROPLASTY; Right      Comment:  Procedure: REPLACEMENT-KNEE-TOTAL-pro;  Surgeon:                Denzel Dallas MD;  [facility];                 Service: Orthopedics;  Laterality: Right;  Pro  6/15/2020: TREATMENT OF CARDIAC ARRHYTHMIA      Comment:  Procedure: Cardioversion or Defibrillation;  Surgeon:                Wyatt Beatty MD;  Location: [facility] EP LAB;  Service:                Cardiology;;    Review of patient's family history indicates:  Problem: COPD      Relation: Father          Age of Onset: (Not Specified)  Problem: Diabetes      Relation: Father          Age of Onset: (Not Specified)  Problem: Osteoporosis      Relation: Father          Age of Onset: (Not Specified)  Problem:  Aortic stenosis      Relation: Mother          Age of Onset: (Not Specified)  Problem: Heart disease      Relation: Mother          Age of Onset: (Not Specified)          Comment: aortic stenosis  Problem: Osteoporosis      Relation: Mother          Age of Onset: (Not Specified)  Problem: Heart attack      Relation: Brother          Age of Onset: (Not Specified)  Problem: No Known Problems      Relation: Son          Age of Onset: (Not Specified)  Problem: No Known Problems      Relation: Daughter          Age of Onset: (Not Specified)  Problem: No Known Problems      Relation: Daughter          Age of Onset: (Not Specified)  Problem: No Known Problems      Relation: Daughter          Age of Onset: (Not Specified)      Social History    Socioeconomic History      Marital status:       Number of children: 5    Occupational History      Occupation: retired anesthesiology        Employer: [employer]      Occupation: [employer] grad        Comment: previous football player    Tobacco Use      Smoking status: Never Smoker      Smokeless tobacco: Never Used      Tobacco 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     Substance and Sexual Activity      Alcohol use: No      Drug use: No      Sexual activity: Yes        Partners: Female      Current Outpatient Medications:  acyclovir (ZOVIRAX) 800 MG Tab, TK ONE T PO FIVE TIMES D only for fever blisters, Disp: , Rfl: 1  alfuzosin (UROXATRAL) 10 mg Tb24, Take 1 tablet (10 mg total) by mouth once daily., Disp: 90 tablet, Rfl: 3  buPROPion (WELLBUTRIN XL) 150 MG TB24 tablet, Take 1 tablet (150 mg total) by mouth once daily., Disp: 90 tablet, Rfl: 3  calcium citrate (CALCITRATE) 200 mg (950 mg) tablet, Take 1 tablet (200 mg total) by mouth 2 (two) times daily., Disp: 180 tablet, Rfl: 3  carvediloL (COREG) 12.5 MG tablet, TAKE 1 TABLET(12.5 MG) BY MOUTH TWICE DAILY WITH MEALS, Disp: 180 tablet, Rfl: 3  celecoxib (CELEBREX) 200 MG capsule, TAKE 1 CAPSULE(200 MG)  BY MOUTH TWICE DAILY, Disp: 180 capsule, Rfl: 0  dutasteride (AVODART) 0.5 mg capsule, TAKE 1 CAPSULE(0.5 MG) BY MOUTH EVERY DAY, Disp: 90 capsule, Rfl: 3  ELIQUIS 5 mg Tab, Take 1 tablet (5 mg total) by mouth 2 (two) times a day., Disp: 60 tablet, Rfl: 11  EScitalopram oxalate (LEXAPRO) 10 MG tablet, TAKE 1 TABLET(10 MG) BY MOUTH EVERY DAY, Disp: 90 tablet, Rfl: 3  ferrous sulfate (FEOSOL) 325 mg (65 mg iron) Tab tablet, Take by mouth., Disp: , Rfl:   levothyroxine (SYNTHROID) 75 MCG tablet, Take 1 tablet (75 mcg total) by mouth before breakfast., Disp: 90 tablet, Rfl: 2  methocarbamoL (ROBAXIN) 500 MG Tab, TAKE 1 TABLET(500 MG) BY MOUTH THREE TIMES DAILY FOR 10 DAYS, Disp: 90 tablet, Rfl: 0  OMEGA-3 FATTY ACIDS (FISH OIL CONCENTRATE ORAL), Take by mouth Daily., Disp: , Rfl:   oxyCODONE (ROXICODONE) 5 MG immediate release tablet, Take 1 tablet (5 mg total) by mouth every 4 (four) hours as needed for Pain., Disp: 15 tablet, Rfl: 0  propafenone (RYTHMOL SR) 425 MG Cp12, TAKE 1 CAPSULE(425 MG) BY MOUTH EVERY 12 HOURS, Disp: 180 capsule, Rfl: 3  telmisartan (MICARDIS) 40 MG Tab, TAKE 1 TABLET(40 MG) BY MOUTH EVERY DAY, Disp: 90 tablet, Rfl: 3  varicella-zoster gE-AS01B, PF, (SHINGRIX, PF,) 50 mcg/0.5 mL injection, Inject into the muscle., Disp: 1 each, Rfl: 1  varicella-zoster gE-AS01B, PF, (SHINGRIX, PF,) 50 mcg/0.5 mL injection, Inject into the muscle., Disp: 1 each, Rfl: 1  cyclobenzaprine (FLEXERIL) 10 MG tablet, TAKE 1 TABLET(10 MG) BY MOUTH THREE TIMES DAILY AS NEEDED FOR MUSCLE SPASMS, Disp: 60 tablet, Rfl: 3  HYDROcodone-acetaminophen (NORCO) 5-325 mg per tablet, Take 1 tablet by mouth every 6 (six) hours as needed for Pain., Disp: 20 tablet, Rfl: 0    No current facility-administered medications for this visit.  Facility-Administered Medications Ordered in Other Visits:  fentaNYL 50 mcg/mL injection  mcg,  mcg, Intravenous, PRN, Rubén Villalpando MD, 100 mcg at 06/13/22 0856  midazolam (VERSED)  1 mg/mL injection 0.5-4 mg, 0.5-4 mg, Intravenous, PRN, Rubén Villalpando MD, 2 mg at 06/13/22 0856        Review of patient's allergies indicates:   -- No known drug allergies     Review of Systems   Constitutional: Negative.    Respiratory: Negative.    Cardiovascular: Negative.    Gastrointestinal: Negative.    Musculoskeletal: Positive for back pain.   Neurological: Negative for numbness.         Objective:      Physical Exam  Constitutional:       Appearance: Normal appearance.   HENT:      Head: Normocephalic and atraumatic.   Cardiovascular:      Rate and Rhythm: Normal rate.   Pulmonary:      Effort: Pulmonary effort is normal. No respiratory distress.   Musculoskeletal:        Arms:    Neurological:      Mental Status: He is alert and oriented to person, place, and time.   Psychiatric:         Mood and Affect: Mood normal.         Behavior: Behavior normal.         Thought Content: Thought content normal.         Assessment:       Problem List Items Addressed This Visit    None     Visit Diagnoses     Mid back pain    -  Primary    Relevant Orders    X-Ray Thoracic Spine AP Lateral (Completed)    X-Ray Lumbar Spine AP And Lateral (Completed)          Plan:       1. Mid back pain  Xray to start, Dr Gaines will send in norco for short term prn use. PT vs pain management depending on xray results.   - X-Ray Thoracic Spine AP Lateral; Future  - X-Ray Lumbar Spine AP And Lateral; Future

## 2022-08-08 NOTE — TELEPHONE ENCOUNTER
Left message on recorder for patient letting him know that Ms. Hyman has put in MRI orders.  He can call the clinic to schedule or we can help him.      ----- Message from Yenni Vazquez NP sent at 8/8/2022  9:10 AM CDT -----  T spine and L spine MRI orders in

## 2022-08-08 NOTE — TELEPHONE ENCOUNTER
----- Message from Beatriz Mahoney, Patient Care Assistant sent at 8/8/2022 12:57 PM CDT -----  Regarding: advice  Contact: pt  Type: Needs Medical Advice  Who Called:  pt   Best Call Back Number: [phone] (home) [phone] (work)    Additional Information: pt states he would like a callback regarding an MRI. Please call to advise. Thanks!

## 2022-08-17 ENCOUNTER — HOSPITAL ENCOUNTER (OUTPATIENT)
Dept: RADIOLOGY | Facility: HOSPITAL | Age: 77
Discharge: HOME | End: 2022-08-17
Attending: FAMILY MEDICINE
Payer: MEDICARE

## 2022-08-17 DIAGNOSIS — S22.060A CLOSED WEDGE COMPRESSION FRACTURE OF T8 VERTEBRA, INITIAL ENCOUNTER: ICD-10-CM

## 2022-08-17 DIAGNOSIS — M51.36 DDD (DEGENERATIVE DISC DISEASE), LUMBAR: ICD-10-CM

## 2022-08-17 DIAGNOSIS — M54.9 DORSALGIA, UNSPECIFIED: ICD-10-CM

## 2022-08-17 DIAGNOSIS — S22.080A CLOSED WEDGE COMPRESSION FRACTURE OF T12 VERTEBRA, INITIAL ENCOUNTER: ICD-10-CM

## 2022-08-17 PROCEDURE — 72148 MRI LUMBAR SPINE W/O DYE: CPT | Mod: 26,,, | Performed by: RADIOLOGY

## 2022-08-17 PROCEDURE — 72148 MRI LUMBAR SPINE W/O DYE: CPT | Mod: TC,PO

## 2022-08-17 PROCEDURE — 72148 MRI LUMBAR SPINE WITHOUT CONTRAST: ICD-10-PCS | Mod: 26,,, | Performed by: RADIOLOGY

## 2022-08-18 ENCOUNTER — PATIENT MESSAGE (OUTPATIENT)
Dept: FAMILY MEDICINE | Facility: CLINIC | Age: 77
End: 2022-08-18
Payer: MEDICARE

## 2022-08-18 DIAGNOSIS — M51.37 DEGENERATION OF LUMBAR OR LUMBOSACRAL INTERVERTEBRAL DISC: Primary | ICD-10-CM

## 2022-08-18 DIAGNOSIS — F32.0 MILD SINGLE CURRENT EPISODE OF MAJOR DEPRESSIVE DISORDER: Primary | ICD-10-CM

## 2022-08-19 ENCOUNTER — TELEPHONE (OUTPATIENT)
Dept: FAMILY MEDICINE | Facility: CLINIC | Age: 77
End: 2022-08-19
Payer: MEDICARE

## 2022-08-25 ENCOUNTER — PATIENT MESSAGE (OUTPATIENT)
Dept: FAMILY MEDICINE | Facility: CLINIC | Age: 77
End: 2022-08-25
Payer: MEDICARE

## 2022-08-25 RX ORDER — HYDROCODONE BITARTRATE AND ACETAMINOPHEN 5; 325 MG/1; MG/1
1 TABLET ORAL EVERY 6 HOURS PRN
Qty: 20 TABLET | Refills: 0 | Status: SHIPPED | OUTPATIENT
Start: 2022-08-25 | End: 2022-11-22 | Stop reason: ALTCHOICE

## 2022-08-25 NOTE — TELEPHONE ENCOUNTER
Please see mychart message    Consent: The patient's consent was obtained including but not limited to risks of crusting, scabbing, blistering, scarring, darker or lighter pigmentary change, recurrence, incomplete removal and infection. Detail Level: Detailed Render Post-Care Instructions In Note?: no Duration Of Freeze Thaw-Cycle (Seconds): 0 Post-Care Instructions: I reviewed with the patient in detail post-care instructions. Patient is to wear sunprotection, and avoid picking at any of the treated lesions. Pt may apply Vaseline to crusted or scabbing areas.

## 2022-08-25 NOTE — TELEPHONE ENCOUNTER
Patient has appointment with pain management on 8/31/22, requesting refill of pain medication until he can be seen, having significant pain. Would like it sent to [facility] pharmacy, order pended, forwarding to PCP

## 2022-08-30 ENCOUNTER — TELEPHONE (OUTPATIENT)
Dept: BARIATRICS | Facility: CLINIC | Age: 77
End: 2022-08-30
Payer: MEDICARE

## 2022-08-30 ENCOUNTER — PATIENT MESSAGE (OUTPATIENT)
Dept: FAMILY MEDICINE | Facility: CLINIC | Age: 77
End: 2022-08-30
Payer: MEDICARE

## 2022-08-30 ENCOUNTER — TELEPHONE (OUTPATIENT)
Dept: FAMILY MEDICINE | Facility: CLINIC | Age: 77
End: 2022-08-30
Payer: MEDICARE

## 2022-08-30 DIAGNOSIS — I10 HYPERTENSION, UNSPECIFIED TYPE: Primary | ICD-10-CM

## 2022-08-30 DIAGNOSIS — E53.8 B12 DEFICIENCY: ICD-10-CM

## 2022-08-30 NOTE — TELEPHONE ENCOUNTER
Spoke to pt informing him that his labs have been scheduled on 08/30/2022. Pt verbalized understanding.

## 2022-08-30 NOTE — TELEPHONE ENCOUNTER
----- Message from Rosendo Marti MD sent at 5/17/2022  3:01 PM CDT -----  Please make sure he gets back on his b12.  Please obtain the op note from Dr. Junaid Godinez as if he memoved some of the remnant stomach he may need injectable b12.

## 2022-08-30 NOTE — TELEPHONE ENCOUNTER
Attempt to reach patient to follow up about continuing B12 vitamins. No answer left detailed voicemail with instructions to call clinic back.

## 2022-08-30 NOTE — TELEPHONE ENCOUNTER
----- Message from Tova Sanchez sent at 8/30/2022 10:46 AM CDT -----  Type: Needs Medical Advice  Who Called:  pt  Symptoms (please be specific):  call him ASAP please--please call and advise  Best Call Back Number: [phone] (home) [phone] (work)    Additional Information: please advise--thank you

## 2022-08-31 ENCOUNTER — OFFICE VISIT (OUTPATIENT)
Dept: PAIN MEDICINE | Facility: CLINIC | Age: 77
End: 2022-08-31
Payer: MEDICARE

## 2022-08-31 ENCOUNTER — CLINICAL SUPPORT (OUTPATIENT)
Dept: REHABILITATION | Facility: HOSPITAL | Age: 77
End: 2022-08-31
Attending: ANESTHESIOLOGY
Payer: MEDICARE

## 2022-08-31 ENCOUNTER — HOSPITAL ENCOUNTER (OUTPATIENT)
Dept: RADIOLOGY | Facility: HOSPITAL | Age: 77
Discharge: HOME | End: 2022-08-31
Attending: ANESTHESIOLOGY
Payer: MEDICARE

## 2022-08-31 VITALS
WEIGHT: 214.75 LBS | BODY MASS INDEX: 27.56 KG/M2 | HEIGHT: 74 IN | OXYGEN SATURATION: 97 % | DIASTOLIC BLOOD PRESSURE: 83 MMHG | SYSTOLIC BLOOD PRESSURE: 171 MMHG | HEART RATE: 59 BPM

## 2022-08-31 DIAGNOSIS — M43.16 SPONDYLOLISTHESIS OF LUMBAR REGION: ICD-10-CM

## 2022-08-31 DIAGNOSIS — M43.16 SPONDYLOLISTHESIS OF LUMBAR REGION: Primary | ICD-10-CM

## 2022-08-31 DIAGNOSIS — M79.18 MYOFASCIAL PAIN: ICD-10-CM

## 2022-08-31 DIAGNOSIS — M54.6 ACUTE RIGHT-SIDED THORACIC BACK PAIN: ICD-10-CM

## 2022-08-31 DIAGNOSIS — M79.18 MYOFASCIAL PAIN ON RIGHT SIDE: ICD-10-CM

## 2022-08-31 DIAGNOSIS — M51.37 DEGENERATION OF LUMBAR OR LUMBOSACRAL INTERVERTEBRAL DISC: ICD-10-CM

## 2022-08-31 PROCEDURE — 1125F PR PAIN SEVERITY QUANTIFIED, PAIN PRESENT: ICD-10-PCS | Mod: CPTII,S$GLB,, | Performed by: ANESTHESIOLOGY

## 2022-08-31 PROCEDURE — 1160F PR REVIEW ALL MEDS BY PRESCRIBER/CLIN PHARMACIST DOCUMENTED: ICD-10-PCS | Mod: CPTII,S$GLB,, | Performed by: ANESTHESIOLOGY

## 2022-08-31 PROCEDURE — 3077F SYST BP >= 140 MM HG: CPT | Mod: CPTII,S$GLB,, | Performed by: ANESTHESIOLOGY

## 2022-08-31 PROCEDURE — 1101F PT FALLS ASSESS-DOCD LE1/YR: CPT | Mod: CPTII,S$GLB,, | Performed by: ANESTHESIOLOGY

## 2022-08-31 PROCEDURE — 3288F PR FALLS RISK ASSESSMENT DOCUMENTED: ICD-10-PCS | Mod: CPTII,S$GLB,, | Performed by: ANESTHESIOLOGY

## 2022-08-31 PROCEDURE — 1157F PR ADVANCE CARE PLAN OR EQUIV PRESENT IN MEDICAL RECORD: ICD-10-PCS | Mod: CPTII,S$GLB,, | Performed by: ANESTHESIOLOGY

## 2022-08-31 PROCEDURE — 1157F ADVNC CARE PLAN IN RCRD: CPT | Mod: CPTII,S$GLB,, | Performed by: ANESTHESIOLOGY

## 2022-08-31 PROCEDURE — 3044F PR MOST RECENT HEMOGLOBIN A1C LEVEL <7.0%: ICD-10-PCS | Mod: CPTII,S$GLB,, | Performed by: ANESTHESIOLOGY

## 2022-08-31 PROCEDURE — 72120 X-RAY BEND ONLY L-S SPINE: CPT | Mod: 26,,, | Performed by: RADIOLOGY

## 2022-08-31 PROCEDURE — 1159F MED LIST DOCD IN RCRD: CPT | Mod: CPTII,S$GLB,, | Performed by: ANESTHESIOLOGY

## 2022-08-31 PROCEDURE — 3079F DIAST BP 80-89 MM HG: CPT | Mod: CPTII,S$GLB,, | Performed by: ANESTHESIOLOGY

## 2022-08-31 PROCEDURE — 1125F AMNT PAIN NOTED PAIN PRSNT: CPT | Mod: CPTII,S$GLB,, | Performed by: ANESTHESIOLOGY

## 2022-08-31 PROCEDURE — 72070 XR THORACIC SPINE AP LATERAL: ICD-10-PCS | Mod: 26,,, | Performed by: RADIOLOGY

## 2022-08-31 PROCEDURE — 72120 XR LUMBAR SPINE FLEXION AND EXTENSION ONLY: ICD-10-PCS | Mod: 26,,, | Performed by: RADIOLOGY

## 2022-08-31 PROCEDURE — 3077F PR MOST RECENT SYSTOLIC BLOOD PRESSURE >= 140 MM HG: ICD-10-PCS | Mod: CPTII,S$GLB,, | Performed by: ANESTHESIOLOGY

## 2022-08-31 PROCEDURE — 72120 X-RAY BEND ONLY L-S SPINE: CPT | Mod: TC,PO

## 2022-08-31 PROCEDURE — 3079F PR MOST RECENT DIASTOLIC BLOOD PRESSURE 80-89 MM HG: ICD-10-PCS | Mod: CPTII,S$GLB,, | Performed by: ANESTHESIOLOGY

## 2022-08-31 PROCEDURE — 97140 MANUAL THERAPY 1/> REGIONS: CPT | Mod: PO

## 2022-08-31 PROCEDURE — 72070 X-RAY EXAM THORAC SPINE 2VWS: CPT | Mod: 26,,, | Performed by: RADIOLOGY

## 2022-08-31 PROCEDURE — 1160F RVW MEDS BY RX/DR IN RCRD: CPT | Mod: CPTII,S$GLB,, | Performed by: ANESTHESIOLOGY

## 2022-08-31 PROCEDURE — 3044F HG A1C LEVEL LT 7.0%: CPT | Mod: CPTII,S$GLB,, | Performed by: ANESTHESIOLOGY

## 2022-08-31 PROCEDURE — 99204 OFFICE O/P NEW MOD 45 MIN: CPT | Mod: S$GLB,,, | Performed by: ANESTHESIOLOGY

## 2022-08-31 PROCEDURE — 97161 PT EVAL LOW COMPLEX 20 MIN: CPT | Mod: PO

## 2022-08-31 PROCEDURE — 1101F PR PT FALLS ASSESS DOC 0-1 FALLS W/OUT INJ PAST YR: ICD-10-PCS | Mod: CPTII,S$GLB,, | Performed by: ANESTHESIOLOGY

## 2022-08-31 PROCEDURE — 72070 X-RAY EXAM THORAC SPINE 2VWS: CPT | Mod: TC,PO

## 2022-08-31 PROCEDURE — 97110 THERAPEUTIC EXERCISES: CPT | Mod: PO

## 2022-08-31 PROCEDURE — 99999 PR PBB SHADOW E&M-EST. PATIENT-LVL V: ICD-10-PCS | Mod: PBBFAC,,, | Performed by: ANESTHESIOLOGY

## 2022-08-31 PROCEDURE — 3288F FALL RISK ASSESSMENT DOCD: CPT | Mod: CPTII,S$GLB,, | Performed by: ANESTHESIOLOGY

## 2022-08-31 PROCEDURE — 99999 PR PBB SHADOW E&M-EST. PATIENT-LVL V: CPT | Mod: PBBFAC,,, | Performed by: ANESTHESIOLOGY

## 2022-08-31 PROCEDURE — 1159F PR MEDICATION LIST DOCUMENTED IN MEDICAL RECORD: ICD-10-PCS | Mod: CPTII,S$GLB,, | Performed by: ANESTHESIOLOGY

## 2022-08-31 PROCEDURE — 99204 PR OFFICE/OUTPT VISIT, NEW, LEVL IV, 45-59 MIN: ICD-10-PCS | Mod: S$GLB,,, | Performed by: ANESTHESIOLOGY

## 2022-08-31 RX ORDER — CYANOCOBALAMIN 1000 UG/ML
INJECTION, SOLUTION INTRAMUSCULAR; SUBCUTANEOUS
COMMUNITY
Start: 2022-05-19 | End: 2022-10-25

## 2022-08-31 RX ORDER — BNT162B2 0.23 MG/2.25ML
INJECTION, SUSPENSION INTRAMUSCULAR
COMMUNITY
Start: 2022-07-02 | End: 2022-10-25

## 2022-08-31 RX ORDER — OXYCODONE AND ACETAMINOPHEN 10; 325 MG/1; MG/1
1 TABLET ORAL 3 TIMES DAILY PRN
COMMUNITY
Start: 2022-08-24 | End: 2022-10-25

## 2022-08-31 RX ORDER — BUPROPION HYDROCHLORIDE 300 MG/1
TABLET ORAL
COMMUNITY
Start: 2022-06-22 | End: 2022-09-17

## 2022-08-31 NOTE — PLAN OF CARE
[facility]  Physical Therapy Initial Evaluation    Date: 8/31/2022   Name: Dominick Song MD  Clinic Number: [id number]    Therapy Diagnosis:   Encounter Diagnosis   Name Primary?    Myofascial pain      Physician: Medardo Perez MD    Physician Orders: PT Eval and Treat   Medical Diagnosis from Referral: M79.18 (ICD-10-CM) - Myofascial pain   Evaluation Date: 8/31/2022  Authorization Period Expiration: 8/31/23  Plan of Care Expiration: 10/14/22  Progress Note Due: 9/28/22  Visit # / Visits authorized: 1/ 1   FOTO:     Precautions: Standard    Time In: 1145  Time Out: 1220  Total Appointment Time (timed & untimed codes): 35 minutes    Subjective   Date of onset: Past month  History of current condition - Dominick reports: he has been having some pain and tightness in the area right of paraspinals in the area of the thoracolumbar junction. He doesn't know what caused it.        Past Medical History:   Diagnosis Date    Anticoagulant long-term use     Anxiety     Arthritis     Atrial fibrillation     BPH (benign prostatic hyperplasia)     Cataract     CKD (chronic kidney disease) stage 3, GFR 30-59 ml/min 7/10/2017    Followed by Dr. Jeevan Pond    Colon polyp     benign    Depression     Elevated PSA     Erectile dysfunction     Gastric ulcer with hemorrhage     Hep B w/o coma 1977    History of bleeding peptic ulcer     History of prostatitis     Hypertension     PAF (paroxysmal atrial fibrillation)     Sleep apnea     on CPAP    Stroke     TIA December 2019    Thyroid disease      Dominick Song MD  has a past surgical history that includes Rotator cuff repair; Gastric bypass (1992); Abdominal hernia repair; Cholecystectomy; LASIK (2001); Cataract extraction (11/25/2013); ORIF humerus fracture; Knee arthroscopy; Radiofrequency ablation; Colonoscopy (N/A, 11/25/2015); Eye surgery; Total knee arthroplasty (Right, 5/29/2018); ORIF humerus fracture (Right); Right ankle tendon repair;  Ablation of arrhythmogenic focus for atrial fibrillation (N/A, 6/15/2020); Treatment of cardiac arrhythmia (6/15/2020); Intramedullary rodding of femur (Left, 7/18/2020); Colonoscopy (N/A, 11/13/2020); and Robot-assisted repair of incisional hernia using da Quinn Xi (Left, 6/13/2022).    Dominick has a current medication list which includes the following prescription(s): acyclovir, alfuzosin, bupropion, bupropion, calcium citrate, carvedilol, celecoxib, cyanocobalamin, cyclobenzaprine, dutasteride, eliquis, escitalopram oxalate, ferrous sulfate, hydrocodone-acetaminophen, levothyroxine, methocarbamol, omega-3 fatty acids, oxycodone, oxycodone-acetaminophen, pfizer covid-19 edward vaccn(pf), propafenone, telmisartan, shingrix (pf), and shingrix (pf), and the following Facility-Administered Medications: fentanyl and midazolam.    Review of patient's allergies indicates:   Allergen Reactions    No known drug allergies         Imaging, MRI studies:  FINDINGS:  The bones are osteopenic.  There is no acute compression fracture.  There is mild chronic anterior wedging of several mid and lower thoracic vertebral bodies without change compared to the prior studies.  There is marked disc space narrowing at the L2-3 level with moderate disc space narrowing at the T10-11 and T11-12 levels.  There are surgical clips in the right upper abdomen from prior cholecystectomy.     Impression:     As above        Electronically signed by: Jesus Diez MD  Date:                                            08/31/2022  Time:                                           09:47  Prior Therapy: none  Social History:lives with their spouse  Occupation: retired physician  Prior Level of Function: Independent  Current Level of Function: Indpendent    Pain:  Current 6/10, worst 8/10, best 3/10   Location: right back   Description: Grabbing, Tight, Sharp, and Variable  Aggravating Factors: Bending, Walking, and Flexing  Easing Factors: pain medication  and lying down    Pts goals: Get rid of the pain and return to previous activities.       Objective   Red Flag Screening:   Cough  Sneeze  Strain: (--)  Bladder/ bowel: (--)  Falls: (--)  Night pain: (--)  Unexplained weight loss: (--)  General health: good    Posture/ Alignment: Poor    GAIT DEVIATIONS: Dominick amb with  normal gait .with increased thoracic kyphosis    ROM:Thoracolumbar ROM limited all directions      Functional Tests (* indicates w/ pain)   Gait: mild antalgic gait with decreased weightbearing on LLE   Sit to stand: antalgic    Palpation:  + TTP right thorcolumbar ESM with increased myofascial         Pt/family was provided educational information, including: role of PT, goals for PT, scheduling - pt verbalized understanding. Discussed insurance plan with pt.       TREATMENT   Treatment Time In: 1155  Treatment Time Out: 1220  Total Treatment time separate from Evaluation: 25 minutes      Dominickreceived the following manual therapy techniques: Myofacial release and Soft tissue Mobilization were applied to the: back for 25 minutes, including:  Soft tissue mobilization and myofascial release to lumbar paraspinals       Soft Tissue Palpation Assessment to determine the necessity for Functional Dry Needling  .   Patient provided written and verbal consent to FDN.   FDN performed to right ESM in area of lower thoracic spine  with electrical stimulation      Home Exercises and Patient Education Provided    Education provided:   - possible benefits  of  manual therapy/dry needling    Written Home Exercises Provided: yes.  Exercises were reviewed and Dominick was able to demonstrate them prior to the end of the session.  Dominick demonstrated good  understanding of the education provided.     See EMR under Patient Instructions for exercises provided       Assessment   Pt presents with acute onset of new back pain on top of previous chronic LBP. He had increased myofacial tone and pain with palpation of the  right ESM in the thoracolumbar region that created a guarding spasm type pain with ambulation and movement. He did well with treatment today with some decreased pain with movment. I gave him a barrel stretch to work on and he will get in contact with me next week to see how he is doing and if we need to do an additional treatment.     Pt prognosis is Good.   Pt will benefit from skilled outpatient Physical Therapy to address the deficits stated above and in the chart below, provide pt/family education, and to maximize pt's level of independence.     Plan of care discussed with patient: Yes  Pt's spiritual, cultural and educational needs considered and patient is agreeable to the plan of care and goals as stated below:     Anticipated Barriers for therapy: none    Medical Necessity is demonstrated by the following  History  Co-morbidities and personal factors that may impact the plan of care Co-morbidities:   advanced age, CKD stage 3, HTN, and osteoporsis    Personal Factors:   age     low   Examination  Body Structures and Functions, activity limitations and participation restrictions that may impact the plan of care Body Regions:   back    Body Systems:    gross symmetry  ROM  strength  gross coordinated movement  balance  gait  transfers    Participation Restrictions:   none    Activity limitations:   Learning and applying knowledge  no deficits    General Tasks and Commands  no deficits    Communication  no deficits    Mobility  lifting and carrying objects  walking    Self care  no deficits    Domestic Life  doing house work (cleaning house, washing dishes, laundry)    Interactions/Relationships  no deficits    Life Areas  no deficits    Community and Social Life  community life  recreation and leisure         low   Clinical Presentation evolving clinical presentation with changing clinical characteristics moderate   Decision Making/ Complexity Score: low     Goals:  Short Term Goals: 2 weeks   Decrease pain  with walking by 50% or more  Decrease pain with ADLs by 50% or more    Long Term Goals: 6-8 weeks   2/10 or less pain with ADLs  2/10 or less pain with walking  Indepdnent with HEP        Plan   Plan of care Certification: 8/30/22 to 10/28/22.    Outpatient Physical Therapy 1 times weekly for 8 weeks to include the following interventions: Manual Therapy, Moist Heat/ Ice, Patient Education, Self Care, Therapeutic Activities, Therapeutic Exercise, and dry needling .         I CERTIFY THE NEED FOR THESE SERVICES FURNISHED UNDER THIS PLAN OF TREATMENT AND WHILE UNDER MY CARE   Physician's comments:     Physician's Signature: ___________________________________________________         __________

## 2022-08-31 NOTE — PROGRESS NOTES
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  He is had back pain for the past month.  He does not know any specific inciting event.  Today he rates his pain as 6/10, intermittent, aching, sharp.  It is in the right paraspinal area around the lumbar region.  His pain is worse with bending and walking and relieved with rest, lying down, medications.  He denies any numbness or weakness.    History:    Current Outpatient Medications:     acyclovir (ZOVIRAX) 800 MG Tab, TK ONE T PO FIVE TIMES D only for fever blisters, Disp: , Rfl: 1    alfuzosin (UROXATRAL) 10 mg Tb24, Take 1 tablet (10 mg total) by mouth once daily., Disp: 90 tablet, Rfl: 3    buPROPion (WELLBUTRIN XL) 150 MG TB24 tablet, Take 1 tablet (150 mg total) by mouth once daily., Disp: 90 tablet, Rfl: 3    buPROPion (WELLBUTRIN XL) 300 MG 24 hr tablet, , Disp: , Rfl:     calcium citrate (CALCITRATE) 200 mg (950 mg) tablet, Take 1 tablet (200 mg total) by mouth 2 (two) times daily., Disp: 180 tablet, Rfl: 3    carvediloL (COREG) 12.5 MG tablet, TAKE 1 TABLET(12.5 MG) BY MOUTH TWICE DAILY WITH MEALS, Disp: 180 tablet, Rfl: 3    celecoxib (CELEBREX) 200 MG capsule, TAKE 1 CAPSULE(200 MG) BY MOUTH TWICE DAILY, Disp: 180 capsule, Rfl: 0    cyanocobalamin 1,000 mcg/mL injection, SMARTSI Milliliter(s) IM Once a Week PRN, Disp: , Rfl:     cyclobenzaprine (FLEXERIL) 10 MG tablet, TAKE 1 TABLET(10 MG) BY MOUTH THREE TIMES DAILY AS NEEDED FOR MUSCLE SPASMS, Disp: 60 tablet, Rfl: 3    dutasteride (AVODART) 0.5 mg capsule, TAKE 1 CAPSULE(0.5 MG) BY MOUTH EVERY DAY, Disp: 90 capsule, Rfl: 3    ELIQUIS 5 mg Tab, Take 1 tablet (5 mg total) by mouth 2 (two) times a day., Disp: 60 tablet, Rfl: 11     EScitalopram oxalate (LEXAPRO) 10 MG tablet, TAKE 1 TABLET(10 MG) BY MOUTH EVERY DAY, Disp: 90 tablet, Rfl: 3    ferrous sulfate (FEOSOL) 325 mg (65 mg iron) Tab tablet, Take by mouth., Disp: , Rfl:     HYDROcodone-acetaminophen (NORCO) 5-325 mg per tablet, Take 1 tablet by mouth every 6 (six) hours as needed for Pain., Disp: 20 tablet, Rfl: 0    levothyroxine (SYNTHROID) 75 MCG tablet, Take 1 tablet (75 mcg total) by mouth before breakfast., Disp: 90 tablet, Rfl: 2    methocarbamoL (ROBAXIN) 500 MG Tab, TAKE 1 TABLET(500 MG) BY MOUTH THREE TIMES DAILY FOR 10 DAYS, Disp: 90 tablet, Rfl: 0    OMEGA-3 FATTY ACIDS (FISH OIL CONCENTRATE ORAL), Take by mouth Daily., Disp: , Rfl:     oxyCODONE (ROXICODONE) 5 MG immediate release tablet, Take 1 tablet (5 mg total) by mouth every 4 (four) hours as needed for Pain., Disp: 15 tablet, Rfl: 0    oxyCODONE-acetaminophen (PERCOCET)  mg per tablet, Take 1 tablet by mouth 3 (three) times daily as needed., Disp: , Rfl:     PFIZER COVID-19 REJI VACCN,PF, 30 mcg/0.3 mL injection, , Disp: , Rfl:     propafenone (RYTHMOL SR) 425 MG Cp12, TAKE 1 CAPSULE(425 MG) BY MOUTH EVERY 12 HOURS, Disp: 180 capsule, Rfl: 3    telmisartan (MICARDIS) 40 MG Tab, TAKE 1 TABLET(40 MG) BY MOUTH EVERY DAY, Disp: 90 tablet, Rfl: 3    varicella-zoster gE-AS01B, PF, (SHINGRIX, PF,) 50 mcg/0.5 mL injection, Inject into the muscle., Disp: 1 each, Rfl: 1    varicella-zoster gE-AS01B, PF, (SHINGRIX, PF,) 50 mcg/0.5 mL injection, Inject into the muscle., Disp: 1 each, Rfl: 1  No current facility-administered medications for this visit.    Facility-Administered Medications Ordered in Other Visits:     fentaNYL 50 mcg/mL injection  mcg,  mcg, Intravenous, PRN, Rubén Villalpando MD, 100 mcg at 06/13/22 0856    midazolam (VERSED) 1 mg/mL injection 0.5-4 mg, 0.5-4 mg, Intravenous, PRN, Rubén Villalpando MD, 2 mg at 06/13/22 0856    Past Medical History:   Diagnosis Date    Anticoagulant  long-term use     Anxiety     Arthritis     Atrial fibrillation     BPH (benign prostatic hyperplasia)     Cataract     CKD (chronic kidney disease) stage 3, GFR 30-59 ml/min 7/10/2017    Followed by Dr. Jeevan Pond    Colon polyp     benign    Depression     Elevated PSA     Erectile dysfunction     Gastric ulcer with hemorrhage     Hep B w/o coma 1977    History of bleeding peptic ulcer     History of prostatitis     Hypertension     PAF (paroxysmal atrial fibrillation)     Sleep apnea     on CPAP    Stroke     TIA December 2019    Thyroid disease        Past Surgical History:   Procedure Laterality Date    ABDOMINAL HERNIA REPAIR      ABLATION OF ARRHYTHMOGENIC FOCUS FOR ATRIAL FIBRILLATION N/A 6/15/2020    Procedure: Ablation atrial fibrillation;  Surgeon: Wyatt Beatty MD;  Location: [facility] EP LAB;  Service: Cardiology;  Laterality: N/A;  PAF, AFl, PEPE, PVI re-do, CTI, RFA, JUSTIN, Gen, SK, 3 Prep    CATARACT EXTRACTION  11/25/2013    bilateral    CHOLECYSTECTOMY      COLONOSCOPY N/A 11/25/2015    Procedure: COLONOSCOPY;  Surgeon: Toby Hernandez MD;  Location: [facility] ENDO;  Service: Endoscopy;  Laterality: N/A;    COLONOSCOPY N/A 11/13/2020    Procedure: COLONOSCOPY;  Surgeon: Toby Hernandez MD;  Location: [facility] ENDO;  Service: Endoscopy;  Laterality: N/A;    EYE SURGERY      GASTRIC BYPASS  1992    INTRAMEDULLARY RODDING OF FEMUR Left 7/18/2020    Procedure: INSERTION, INTRAMEDULLARY ERIC, FEMUR, left, Synthes, [place] table, Large C arm clock side,;  Surgeon: Tony Rodriguez MD;  Location: [facility] OR [address] [place] [address];  Service: Orthopedics;  Laterality: Left;    KNEE ARTHROSCOPY      RT    LASIK  2001    both eyes (Dr. Rabago)    ORIF HUMERUS FRACTURE      LT    ORIF HUMERUS FRACTURE Right     non surgical repair    RADIOFREQUENCY ABLATION      x2    Right ankle tendon repair      ROBOT-ASSISTED REPAIR OF INCISIONAL HERNIA USING DA GARETH XI Left 6/13/2022    Procedure: XI ROBOTIC REPAIR, HERNIA, INCISIONAL (LEFT SIDE  SHAHAB WITH MESH);  Surgeon: Rosendo Marti MD;  Location: [facility] OR [address] [place] [address];  Service: General;  Laterality: Left;  consent in am    ROTATOR CUFF REPAIR      right    TOTAL KNEE ARTHROPLASTY Right 5/29/2018    Procedure: REPLACEMENT-KNEE-TOTAL-axel;  Surgeon: Denzel Dallas MD;  [facility];  Service: Orthopedics;  Laterality: Right;  [place]    TREATMENT OF CARDIAC ARRHYTHMIA  6/15/2020    Procedure: Cardioversion or Defibrillation;  Surgeon: Wyatt Beatty MD;  Location: [facility] EP LAB;  Service: Cardiology;;       Family History   Problem Relation Age of Onset    COPD Father     Diabetes Father     Osteoporosis Father     Aortic stenosis Mother     Heart disease Mother         aortic stenosis    Osteoporosis Mother     Heart attack Brother     No Known Problems Son     No Known Problems Daughter     No Known Problems Daughter     No Known Problems Daughter        Social History     Socioeconomic History    Marital status:     Number of children: 5   Occupational History    Occupation: retired anesthesiology     Employer: [employer]    Occupation: [employer] grad     Comment: previous football player   Tobacco Use    Smoking status: Never    Smokeless tobacco: Never    Tobacco comments:     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    Substance and Sexual Activity    Alcohol use: No    Drug use: No    Sexual activity: Yes     Partners: Female       Review of patient's allergies indicates:   Allergen Reactions    No known drug allergies        Review of Systems:  General ROS: negative for - fever  Psychological ROS: negative for - hostility  Hematological and Lymphatic ROS: negative for - bleeding problems  Endocrine ROS: negative for - unexpected weight changes  Respiratory ROS: no cough, shortness of breath, or wheezing  Cardiovascular ROS: no chest pain or dyspnea on exertion  Gastrointestinal ROS: no abdominal pain, change in bowel habits, or black or bloody  "stools  Musculoskeletal ROS: negative for - muscular weakness  Neurological ROS: negative for - numbness/tingling  Dermatological ROS: negative for rash    Physical Exam:  Vitals:    08/31/22 0834   BP: (!) 171/83   Pulse: (!) 59   SpO2: 97%   Weight: 97.4 kg (214 lb 11.7 oz)   Height: 6' 2" (1.88 m)   PainSc:   6   PainLoc: Back     Body mass index is 27.57 kg/m².    Gen: NAD  Gait: gait intact  Psych:  Mood appropriate for given condition  HEENT: eyes anicteric   GI: Abd soft  CV: RRR  Lungs: breathing unlabored   ROM: limited AROM of the L spine in all planes, full ROM at ankles, knees and hips  Sensation: intact to light touch in all dermatomes tested from L2-S1 bilaterally  Reflexes: 2+ b/l patella and 0 b/l achilles  Palpation: Diffusely tender over right upper lumbar paraspinals  -TTP over midline thoracic or lumbar spine  Tone: normal in the b/l knees and hips   Skin: intact  Extremities: No edema in b/l ankles or hands  Provacative tests:        Right Left   L2/3 Iliacus Hip flexion  5  5   L3/4 Qudratus Femoris Knee Extension  5  5   L4/5 Tib Anterior Ankle Dorsiflexion   5  5   L5/S1 Extensor Hallicus Longus Great toe extension  5  5                 S1/S2 Gastroc/Soleus Plantar Flexion  5  5       Imaging:  MRI lumbar spine 8/17/22  FINDINGS:  Alignment: Mild dextroconvex curvature of the lumbar spine.  Minimal retrolisthesis of L2 on L3 and L5 on S1.  Grade 1 anterolisthesis of L4 on L5.     Vertebral column: Vertebral body heights are maintained.  No evidence of an acute fracture or aggressive marrow replacement process. Stable probable hemangioma within the T12 vertebral body.  Moderate multilevel disc degeneration with intervertebral disc space narrowing, disc desiccation, and degenerative endplate change throughout the thoracolumbar spine, most pronounced at L2-3 and L5-S1 with severe disc space narrowing.     Cord: Normal.  Conus terminates at L1.     Degenerative findings:     T11-12: Moderate disc " space narrowing.  Right asymmetric diffuse disc bulge with osteophytic ridging.  No neural foraminal or spinal canal stenosis.  T12-L1: Moderate disc space narrowing.  Mild right asymmetric diffuse disc bulge with osteophytic ridging.  No neural foraminal or spinal canal stenosis.  L1-L2: Moderate disc space narrowing.  Mild right asymmetric diffuse disc bulge with osteophytic ridging.  Mild bilateral facet arthropathy.  There is no neural foraminal stenosis.  There is no spinal canal stenosis.  L2-L3: Retrolisthesis.  Severe left asymmetric disc space narrowing.  Diffuse disc bulge with osteophytic ridging.  Mild bilateral facet arthropathy.  Moderate left and mild right neural foraminal stenosis.  Mild spinal canal stenosis.  L3-L4: Mild disc space narrowing.  Mild right asymmetric diffuse disc bulge.  Mild bilateral facet arthropathy.  Ligamentum flavum thickening.  Mild right neural foraminal stenosis.  Mild spinal canal stenosis.  L4-L5: Anterolisthesis with uncovering the disc.  Mild disc space narrowing.  Diffuse disc bulge with osteophytic ridging.  Moderate bilateral facet arthropathy and ligamentum flavum thickening.  Moderate right and mild left neural foraminal stenosis.  Moderate spinal canal stenosis.  L5-S1: Retrolisthesis.  Right asymmetric severe disc space narrowing.  Right asymmetric disc bulge with osteophytic ridging.  Moderate bilateral facet arthropathy.  Ligamentum flavum thickening.  Moderate right and mild left neural foraminal stenosis.  No spinal canal stenosis.     Paraspinal muscles & soft tissues: Mild atrophy of the dorsal paraspinal musculature.  Exophytic left renal cyst.    Xray ribs 8/30/22  Impression:  Nondisplaced fractures of the right 5th, 6th, and 7th ribs with no pneumothorax    Xray thoracic spine 8/4/22  FINDINGS:  There is minimal midthoracic scoliosis convex to the right.  Alignment is otherwise normal.  There are mild wedging deformities of the T8 and T12 vertebral  bodies.  The costovertebral junctions are unremarkable.    Labs:  BMP  Lab Results   Component Value Date     03/02/2022     03/02/2022    K 4.4 03/02/2022    K 4.4 03/02/2022     03/02/2022     03/02/2022    CO2 23 03/02/2022    CO2 23 03/02/2022    BUN 27 (H) 03/02/2022    BUN 27 (H) 03/02/2022    CREATININE 1.5 (H) 03/02/2022    CREATININE 1.5 (H) 03/02/2022    CALCIUM 8.6 (L) 03/02/2022    CALCIUM 8.6 (L) 03/02/2022    ANIONGAP 10 03/02/2022    ANIONGAP 10 03/02/2022    ESTGFRAFRICA 51.9 (A) 03/02/2022    ESTGFRAFRICA 51.9 (A) 03/02/2022    EGFRNONAA 44.9 (A) 03/02/2022    EGFRNONAA 44.9 (A) 03/02/2022     Lab Results   Component Value Date    ALT 13 03/02/2022    AST 16 03/02/2022    ALKPHOS 88 03/02/2022    BILITOT 0.8 03/02/2022       Assessment:   Problem List Items Addressed This Visit    None  Visit Diagnoses       Spondylolisthesis of lumbar region    -  Primary    Relevant Orders    X-Ray Lumbar Spine Flexion And Extension Only    Degeneration of lumbar or lumbosacral intervertebral disc        Myofascial pain        Relevant Orders    Ambulatory referral/consult to Physical/Occupational Therapy    Acute right-sided thoracic back pain        Relevant Orders    X-Ray Thoracic Spine AP Lateral            76 y.o. year old male with PMH MRI atrial fibrillation, HTN, CKD who presents with back pain.  He is had back pain for the past month.  He does not know any specific inciting event.  Today he rates his pain as 6/10, intermittent, aching, sharp.  It is in the right paraspinal area around the lumbar region.  His pain is worse with bending and walking and relieved with rest, lying down, medications.  He denies any numbness or weakness.  Denies any history of rash or zoster in the area    - on exam he has full strength and intact sensation to light touch.  He has tenderness to palpation over the right paraspinal lumbar musculature.    - I independently reviewed his lumbar MRI and is  most significant for moderate central canal narrowing at L4-5.  He does not appear to very significant facet changes in the upper lumbar spine  - thoracic x-ray from 08/04/2022 showed mild wedging deformities of the T8 and T12 vertebral bodies  - on exam he has no tenderness to brisk palpation over the midline lumbar or thoracic spine.  The mild wedging deformities on x-ray could be chronic or they may have been acute at that time but have healed  - he had seen an outside pain physician recently and reports that he had right-sided facet injections done yesterday and has not noticed any significant relief of his pain  - he can get some relief by taking hydrocodone on as needed basis but has not had much relief with Flexeril or Robaxin  - I think today he predominantly has myofascial pain and I have placed a referral for him to start dry dry needling to the right upper lumbar paraspinal region  - I have ordered some updated x-rays today of his thoracic spine to evaluate for any interval changes in vertebral body height and also lumbar x-ray with flexion-extension to evaluate for any instability of his listhesis  - he is going to follow-up in 3-4 weeks following the dry needling.  If he fails to get relief then my next plan will be to get a thoracic MRI      : Reviewed     Medardo Perez M.D.  Interventional Pain Medicine / Anesthesiology    This note was completed with dictation software and grammatical errors may exist.

## 2022-09-06 ENCOUNTER — PATIENT MESSAGE (OUTPATIENT)
Dept: PAIN MEDICINE | Facility: CLINIC | Age: 77
End: 2022-09-06
Payer: MEDICARE

## 2022-09-06 ENCOUNTER — PATIENT MESSAGE (OUTPATIENT)
Dept: REHABILITATION | Facility: HOSPITAL | Age: 77
End: 2022-09-06
Payer: MEDICARE

## 2022-09-07 ENCOUNTER — TELEPHONE (OUTPATIENT)
Dept: FAMILY MEDICINE | Facility: CLINIC | Age: 77
End: 2022-09-07
Payer: MEDICARE

## 2022-09-07 ENCOUNTER — OFFICE VISIT (OUTPATIENT)
Dept: FAMILY MEDICINE | Facility: CLINIC | Age: 77
End: 2022-09-07
Payer: MEDICARE

## 2022-09-07 VITALS
SYSTOLIC BLOOD PRESSURE: 118 MMHG | DIASTOLIC BLOOD PRESSURE: 60 MMHG | OXYGEN SATURATION: 96 % | TEMPERATURE: 98 F | WEIGHT: 205.69 LBS | RESPIRATION RATE: 18 BRPM | HEIGHT: 74 IN | HEART RATE: 79 BPM | BODY MASS INDEX: 26.4 KG/M2

## 2022-09-07 DIAGNOSIS — E03.4 HYPOTHYROIDISM DUE TO ACQUIRED ATROPHY OF THYROID: Primary | ICD-10-CM

## 2022-09-07 DIAGNOSIS — M81.8 OTHER OSTEOPOROSIS WITHOUT CURRENT PATHOLOGICAL FRACTURE: ICD-10-CM

## 2022-09-07 DIAGNOSIS — Z98.890 POST-OPERATIVE STATE: ICD-10-CM

## 2022-09-07 DIAGNOSIS — I10 HYPERTENSION, UNSPECIFIED TYPE: ICD-10-CM

## 2022-09-07 DIAGNOSIS — F32.0 MILD SINGLE CURRENT EPISODE OF MAJOR DEPRESSIVE DISORDER: ICD-10-CM

## 2022-09-07 PROCEDURE — 1101F PR PT FALLS ASSESS DOC 0-1 FALLS W/OUT INJ PAST YR: ICD-10-PCS | Mod: CPTII,S$GLB,, | Performed by: FAMILY MEDICINE

## 2022-09-07 PROCEDURE — 3074F SYST BP LT 130 MM HG: CPT | Mod: CPTII,S$GLB,, | Performed by: FAMILY MEDICINE

## 2022-09-07 PROCEDURE — 3044F PR MOST RECENT HEMOGLOBIN A1C LEVEL <7.0%: ICD-10-PCS | Mod: CPTII,S$GLB,, | Performed by: FAMILY MEDICINE

## 2022-09-07 PROCEDURE — 1125F PR PAIN SEVERITY QUANTIFIED, PAIN PRESENT: ICD-10-PCS | Mod: CPTII,S$GLB,, | Performed by: FAMILY MEDICINE

## 2022-09-07 PROCEDURE — 3044F HG A1C LEVEL LT 7.0%: CPT | Mod: CPTII,S$GLB,, | Performed by: FAMILY MEDICINE

## 2022-09-07 PROCEDURE — 1157F ADVNC CARE PLAN IN RCRD: CPT | Mod: CPTII,S$GLB,, | Performed by: FAMILY MEDICINE

## 2022-09-07 PROCEDURE — 3078F PR MOST RECENT DIASTOLIC BLOOD PRESSURE < 80 MM HG: ICD-10-PCS | Mod: CPTII,S$GLB,, | Performed by: FAMILY MEDICINE

## 2022-09-07 PROCEDURE — 3288F PR FALLS RISK ASSESSMENT DOCUMENTED: ICD-10-PCS | Mod: CPTII,S$GLB,, | Performed by: FAMILY MEDICINE

## 2022-09-07 PROCEDURE — 3074F PR MOST RECENT SYSTOLIC BLOOD PRESSURE < 130 MM HG: ICD-10-PCS | Mod: CPTII,S$GLB,, | Performed by: FAMILY MEDICINE

## 2022-09-07 PROCEDURE — 1159F PR MEDICATION LIST DOCUMENTED IN MEDICAL RECORD: ICD-10-PCS | Mod: CPTII,S$GLB,, | Performed by: FAMILY MEDICINE

## 2022-09-07 PROCEDURE — 1159F MED LIST DOCD IN RCRD: CPT | Mod: CPTII,S$GLB,, | Performed by: FAMILY MEDICINE

## 2022-09-07 PROCEDURE — 1160F RVW MEDS BY RX/DR IN RCRD: CPT | Mod: CPTII,S$GLB,, | Performed by: FAMILY MEDICINE

## 2022-09-07 PROCEDURE — 1157F PR ADVANCE CARE PLAN OR EQUIV PRESENT IN MEDICAL RECORD: ICD-10-PCS | Mod: CPTII,S$GLB,, | Performed by: FAMILY MEDICINE

## 2022-09-07 PROCEDURE — 3288F FALL RISK ASSESSMENT DOCD: CPT | Mod: CPTII,S$GLB,, | Performed by: FAMILY MEDICINE

## 2022-09-07 PROCEDURE — 99214 PR OFFICE/OUTPT VISIT, EST, LEVL IV, 30-39 MIN: ICD-10-PCS | Mod: S$GLB,,, | Performed by: FAMILY MEDICINE

## 2022-09-07 PROCEDURE — 99499 RISK ADDL DX/OHS AUDIT: ICD-10-PCS | Mod: HCNC,S$GLB,, | Performed by: FAMILY MEDICINE

## 2022-09-07 PROCEDURE — 99999 PR PBB SHADOW E&M-EST. PATIENT-LVL III: ICD-10-PCS | Mod: PBBFAC,,, | Performed by: FAMILY MEDICINE

## 2022-09-07 PROCEDURE — 1125F AMNT PAIN NOTED PAIN PRSNT: CPT | Mod: CPTII,S$GLB,, | Performed by: FAMILY MEDICINE

## 2022-09-07 PROCEDURE — 1160F PR REVIEW ALL MEDS BY PRESCRIBER/CLIN PHARMACIST DOCUMENTED: ICD-10-PCS | Mod: CPTII,S$GLB,, | Performed by: FAMILY MEDICINE

## 2022-09-07 PROCEDURE — 3078F DIAST BP <80 MM HG: CPT | Mod: CPTII,S$GLB,, | Performed by: FAMILY MEDICINE

## 2022-09-07 PROCEDURE — 99499 UNLISTED E&M SERVICE: CPT | Mod: HCNC,S$GLB,, | Performed by: FAMILY MEDICINE

## 2022-09-07 PROCEDURE — 1101F PT FALLS ASSESS-DOCD LE1/YR: CPT | Mod: CPTII,S$GLB,, | Performed by: FAMILY MEDICINE

## 2022-09-07 PROCEDURE — 99999 PR PBB SHADOW E&M-EST. PATIENT-LVL III: CPT | Mod: PBBFAC,,, | Performed by: FAMILY MEDICINE

## 2022-09-07 PROCEDURE — 99214 OFFICE O/P EST MOD 30 MIN: CPT | Mod: S$GLB,,, | Performed by: FAMILY MEDICINE

## 2022-09-07 RX ORDER — CELECOXIB 200 MG/1
CAPSULE ORAL
Qty: 180 CAPSULE | Refills: 0 | Status: SHIPPED | OUTPATIENT
Start: 2022-09-07 | End: 2022-10-25

## 2022-09-07 NOTE — TELEPHONE ENCOUNTER
The soonest I was able to find was w/ Dr. Mckeon on 3/24 @9:30 in [place]. There is another one in [place] for 2/3 @ 9:30 miguel ángel Rosales

## 2022-09-07 NOTE — PROGRESS NOTES
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    Chief Complaint: Hypothyroidism (6 month follow up with labs)    Here for 6 month follow-up.    C/o some chronic low back pains  Currently doing dry needling and some PT    S/p ORIF left femur fracture - following with Dr. Rodriguez; doing well.  H/o TIA - taking just Eliquis; stopped lipitor after seeing neurologist; using lopressor PRN  Afib - s/p ablation; sees Dr. Beatty; gets episodes twice a year requiring cardioversion. Taking Eliquis 5mg BID, Coreg BID, Rhythmol BID.  HTN - telmisartan 40mg  And Coreg 12.5mg BID  CKD - following with Dr. 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   Follow-up  Pertinent negatives include no abdominal pain, arthralgias, chest pain, chills, coughing, fever, headaches, nausea, neck pain, rash, sore throat or weakness.     Past Medical History:   Diagnosis Date    Anticoagulant long-term use     Anxiety     Arthritis     Atrial fibrillation     BPH (benign prostatic hyperplasia)     Cataract     CKD (chronic kidney disease) stage 3, GFR 30-59 ml/min 7/10/2017    Followed by Dr. Jeevan Pond    Colon polyp     benign    Depression     Elevated PSA     Erectile dysfunction     Gastric ulcer with hemorrhage     Hep B w/o coma 1977    History of bleeding peptic ulcer     History of prostatitis     Hypertension     PAF (paroxysmal atrial fibrillation)     Sleep apnea     on CPAP    Stroke     TIA December 2019    Thyroid disease        Past Surgical History:   Procedure Laterality Date    ABDOMINAL HERNIA REPAIR      ABLATION OF  ARRHYTHMOGENIC FOCUS FOR ATRIAL FIBRILLATION N/A 6/15/2020    Procedure: Ablation atrial fibrillation;  Surgeon: Wyatt Beatty MD;  Location: [facility] EP LAB;  Service: Cardiology;  Laterality: N/A;  PAF, AFl, PEPE, PVI re-do, CTI, RFA, JUSTIN, Gen, SK, 3 Prep    CATARACT EXTRACTION  11/25/2013    bilateral    CHOLECYSTECTOMY      COLONOSCOPY N/A 11/25/2015    Procedure: COLONOSCOPY;  Surgeon: Toby Hernandez MD;  Location: [facility] ENDO;  Service: Endoscopy;  Laterality: N/A;    COLONOSCOPY N/A 11/13/2020    Procedure: COLONOSCOPY;  Surgeon: Toby Hernandez MD;  Location: [facility] ENDO;  Service: Endoscopy;  Laterality: N/A;    EYE SURGERY      GASTRIC BYPASS  1992    INTRAMEDULLARY RODDING OF FEMUR Left 7/18/2020    Procedure: INSERTION, INTRAMEDULLARY ERIC, FEMUR, left, [employer], [place] table, Large C arm clock side,;  Surgeon: Tony Rodriguez MD;  Location: [facility] OR [address] [place] [address];  Service: Orthopedics;  Laterality: Left;    KNEE ARTHROSCOPY      RT    LASIK  2001    both eyes (Dr. Rabago)    ORIF HUMERUS FRACTURE      LT    ORIF HUMERUS FRACTURE Right     non surgical repair    RADIOFREQUENCY ABLATION      x2    Right ankle tendon repair      ROBOT-ASSISTED REPAIR OF INCISIONAL HERNIA USING DA GARETH XI Left 6/13/2022    Procedure: XI ROBOTIC REPAIR, HERNIA, INCISIONAL (LEFT SIDE SPIGELIAN WITH MESH);  Surgeon: Rosendo Marti MD;  Location: [address];  Service: General;  Laterality: Left;  consent in am    ROTATOR CUFF REPAIR      right    TOTAL KNEE ARTHROPLASTY Right 5/29/2018    Procedure: REPLACEMENT-KNEE-TOTAL-pro;  Surgeon: Denzel Dallas MD;  [facility];  Service: Orthopedics;  Laterality: Right;  Pro    TREATMENT OF CARDIAC ARRHYTHMIA  6/15/2020    Procedure: Cardioversion or Defibrillation;  Surgeon: Wyatt Beatty MD;  Location: [facility] EP LAB;  Service: Cardiology;;       Review of patient's allergies indicates:   Allergen Reactions    No known drug allergies        Social History      Socioeconomic History    Marital status:     Number of children: 5   Occupational History    Occupation: retired anesthesiology     Employer: [employer]    Occupation: [employer] grad     Comment: previous football player   Tobacco Use    Smoking status: Never    Smokeless tobacco: Never    Tobacco comments:     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    Substance and Sexual Activity    Alcohol use: No    Drug use: No    Sexual activity: Yes     Partners: Female       Current Outpatient Medications on File Prior to Visit   Medication Sig Dispense Refill    alfuzosin (UROXATRAL) 10 mg Tb24 Take 1 tablet (10 mg total) by mouth once daily. 90 tablet 3    buPROPion (WELLBUTRIN XL) 150 MG TB24 tablet Take 1 tablet (150 mg total) by mouth once daily. 90 tablet 3    buPROPion (WELLBUTRIN XL) 300 MG 24 hr tablet       calcium citrate (CALCITRATE) 200 mg (950 mg) tablet Take 1 tablet (200 mg total) by mouth 2 (two) times daily. 180 tablet 3    carvediloL (COREG) 12.5 MG tablet TAKE 1 TABLET(12.5 MG) BY MOUTH TWICE DAILY WITH MEALS 180 tablet 3    cyclobenzaprine (FLEXERIL) 10 MG tablet TAKE 1 TABLET(10 MG) BY MOUTH THREE TIMES DAILY AS NEEDED FOR MUSCLE SPASMS 60 tablet 3    dutasteride (AVODART) 0.5 mg capsule TAKE 1 CAPSULE(0.5 MG) BY MOUTH EVERY DAY 90 capsule 3    ELIQUIS 5 mg Tab Take 1 tablet (5 mg total) by mouth 2 (two) times a day. 60 tablet 11    EScitalopram oxalate (LEXAPRO) 10 MG tablet TAKE 1 TABLET(10 MG) BY MOUTH EVERY DAY 90 tablet 3    HYDROcodone-acetaminophen (NORCO) 5-325 mg per tablet Take 1 tablet by mouth every 6 (six) hours as needed for Pain. 20 tablet 0    levothyroxine (SYNTHROID) 75 MCG tablet Take 1 tablet (75 mcg total) by mouth before breakfast. 90 tablet 2    methocarbamoL (ROBAXIN) 500 MG Tab TAKE 1 TABLET(500 MG) BY MOUTH THREE TIMES DAILY FOR 10 DAYS 90 tablet 0    OMEGA-3 FATTY ACIDS (FISH OIL CONCENTRATE ORAL) Take by mouth Daily.      oxyCODONE (ROXICODONE) 5 MG  immediate release tablet Take 1 tablet (5 mg total) by mouth every 4 (four) hours as needed for Pain. 15 tablet 0    oxyCODONE-acetaminophen (PERCOCET)  mg per tablet Take 1 tablet by mouth 3 (three) times daily as needed.      propafenone (RYTHMOL SR) 425 MG Cp12 TAKE 1 CAPSULE(425 MG) BY MOUTH EVERY 12 HOURS 180 capsule 3    telmisartan (MICARDIS) 40 MG Tab TAKE 1 TABLET(40 MG) BY MOUTH EVERY DAY 90 tablet 3    acyclovir (ZOVIRAX) 800 MG Tab TK ONE T PO FIVE TIMES D only for fever blisters  1    cyanocobalamin 1,000 mcg/mL injection SMARTSI Milliliter(s) IM Once a Week PRN      ferrous sulfate (FEOSOL) 325 mg (65 mg iron) Tab tablet Take by mouth.      PFIZER COVID-19 REJI VACCN,PF, 30 mcg/0.3 mL injection       varicella-zoster gE-AS01B, PF, (SHINGRIX, PF,) 50 mcg/0.5 mL injection Inject into the muscle. (Patient not taking: Reported on 2022) 1 each 1    varicella-zoster gE-AS01B, PF, (SHINGRIX, PF,) 50 mcg/0.5 mL injection Inject into the muscle. (Patient not taking: Reported on 2022) 1 each 1     Current Facility-Administered Medications on File Prior to Visit   Medication Dose Route Frequency Provider Last Rate Last Admin    fentaNYL 50 mcg/mL injection  mcg   mcg Intravenous PRN Rubén Villalpando MD   100 mcg at 22 0856    midazolam (VERSED) 1 mg/mL injection 0.5-4 mg  0.5-4 mg Intravenous PRN Rubén Villalpando MD   2 mg at 22 0856       Family History   Problem Relation Age of Onset    COPD Father     Diabetes Father     Osteoporosis Father     Aortic stenosis Mother     Heart disease Mother         aortic stenosis    Osteoporosis Mother     Heart attack Brother     No Known Problems Son     No Known Problems Daughter     No Known Problems Daughter     No Known Problems Daughter        Review of Systems   Constitutional:  Negative for appetite change, chills, fever and unexpected weight change.   HENT:  Negative for sore throat and trouble swallowing.    Eyes:   "Negative for pain and visual disturbance.   Respiratory:  Negative for cough, chest tightness, shortness of breath and wheezing.    Cardiovascular:  Negative for chest pain, palpitations and leg swelling.   Gastrointestinal:  Negative for abdominal pain, blood in stool, constipation, diarrhea and nausea.   Genitourinary:  Negative for difficulty urinating, dysuria and hematuria.   Musculoskeletal:  Negative for arthralgias, gait problem and neck pain.   Skin:  Negative for rash and wound.   Neurological:  Negative for dizziness, weakness, light-headedness and headaches.   Hematological:  Negative for adenopathy.   Psychiatric/Behavioral:  Negative for dysphoric mood.      Objective:      /60   Pulse 79   Temp 97.7 °F (36.5 °C) (Oral)   Resp 18   Ht 6' 2" (1.88 m)   Wt 93.3 kg (205 lb 11 oz)   SpO2 96%   BMI 26.41 kg/m²   Physical Exam  Constitutional:       General: He is not in acute distress.     Appearance: He is well-developed.   HENT:      Head: Normocephalic and atraumatic.      Right Ear: External ear normal.      Left Ear: External ear normal.      Mouth/Throat:      Pharynx: Uvula midline. No oropharyngeal exudate.   Eyes:      General: Lids are normal.      Conjunctiva/sclera: Conjunctivae normal.      Pupils: Pupils are equal, round, and reactive to light.   Neck:      Thyroid: No thyroid mass or thyromegaly.      Trachea: Phonation normal.   Cardiovascular:      Rate and Rhythm: Normal rate and regular rhythm.      Heart sounds: Normal heart sounds. No murmur heard.    No friction rub. No gallop.   Pulmonary:      Effort: Pulmonary effort is normal. No respiratory distress.      Breath sounds: Normal breath sounds. No wheezing or rales.   Musculoskeletal:         General: Normal range of motion.      Cervical back: Full passive range of motion without pain, normal range of motion and neck supple.   Lymphadenopathy:      Cervical: No cervical adenopathy.   Skin:     General: Skin is warm and " dry.   Neurological:      Mental Status: He is alert and oriented to person, place, and time.      Cranial Nerves: No cranial nerve deficit.      Coordination: Coordination normal.   Psychiatric:         Speech: Speech normal.         Behavior: Behavior normal.         Thought Content: Thought content normal.         Judgment: Judgment normal.       Results for orders placed or performed in visit on 08/31/22   Hemoglobin A1C   Result Value Ref Range    Hemoglobin A1C 4.9 4.0 - 5.6 %    Estimated Avg Glucose 94 68 - 131 mg/dL   Comprehensive Metabolic Panel   Result Value Ref Range    Sodium 139 136 - 145 mmol/L    Potassium 4.5 3.5 - 5.1 mmol/L    Chloride 110 95 - 110 mmol/L    CO2 21 (L) 23 - 29 mmol/L    Glucose 130 (H) 70 - 110 mg/dL    BUN 25 (H) 8 - 23 mg/dL    Creatinine 1.5 (H) 0.5 - 1.4 mg/dL    Calcium 8.5 (L) 8.7 - 10.5 mg/dL    Total Protein 6.1 6.0 - 8.4 g/dL    Albumin 3.4 (L) 3.5 - 5.2 g/dL    Total Bilirubin 0.7 0.1 - 1.0 mg/dL    Alkaline Phosphatase 57 55 - 135 U/L    AST 16 10 - 40 U/L    ALT 25 10 - 44 U/L    Anion Gap 8 8 - 16 mmol/L    eGFR 48.2 (A) >60 mL/min/1.73 m^2   Lipid Panel   Result Value Ref Range    Cholesterol 132 120 - 199 mg/dL    Triglycerides 38 30 - 150 mg/dL    HDL 56 40 - 75 mg/dL    LDL Cholesterol 68.4 63.0 - 159.0 mg/dL    HDL/Cholesterol Ratio 42.4 20.0 - 50.0 %    Total Cholesterol/HDL Ratio 2.4 2.0 - 5.0    Non-HDL Cholesterol 76 mg/dL   TSH   Result Value Ref Range    TSH 0.944 0.400 - 4.000 uIU/mL   CBC Auto Differential   Result Value Ref Range    WBC 12.30 3.90 - 12.70 K/uL    RBC 4.14 (L) 4.60 - 6.20 M/uL    Hemoglobin 12.2 (L) 14.0 - 18.0 g/dL    Hematocrit 37.6 (L) 40.0 - 54.0 %    MCV 91 82 - 98 fL    MCH 29.5 27.0 - 31.0 pg    MCHC 32.4 32.0 - 36.0 g/dL    RDW 14.5 11.5 - 14.5 %    Platelets 278 150 - 450 K/uL    MPV 11.1 9.2 - 12.9 fL    Immature Granulocytes 0.5 0.0 - 0.5 %    Gran # (ANC) 10.4 (H) 1.8 - 7.7 K/uL    Immature Grans (Abs) 0.06 (H) 0.00 - 0.04  K/uL    Lymph # 1.3 1.0 - 4.8 K/uL    Mono # 0.4 0.3 - 1.0 K/uL    Eos # 0.1 0.0 - 0.5 K/uL    Baso # 0.05 0.00 - 0.20 K/uL    nRBC 0 0 /100 WBC    Gran % 84.5 (H) 38.0 - 73.0 %    Lymph % 10.4 (L) 18.0 - 48.0 %    Mono % 3.5 (L) 4.0 - 15.0 %    Eosinophil % 0.7 0.0 - 8.0 %    Basophil % 0.4 0.0 - 1.9 %    Differential Method Automated    PROTEIN ELECTROPHORESIS, SERUM   Result Value Ref Range    Protein, Serum 5.9 (L) 6.0 - 8.4 g/dL    Albumin 3.49 3.35 - 5.55 g/dL    Alpha-1 0.25 0.17 - 0.41 g/dL    Alpha-2 0.66 0.43 - 0.99 g/dL    Beta 0.63 0.50 - 1.10 g/dL    Gamma 0.87 0.67 - 1.58 g/dL   Vitamin B12   Result Value Ref Range    Vitamin B-12 >2000 (H) 210 - 950 pg/mL   Pathologist Interpretation SPE   Result Value Ref Range    Pathologist Interpretation SPE REVIEWED      *Note: Due to a large number of results and/or encounters for the requested time period, some results have not been displayed. A complete set of results can be found in Results Review.          Assessment:       1. Hypothyroidism due to acquired atrophy of thyroid    2. Post-operative state    3. Other osteoporosis without current pathological fracture    4. Hypertension, unspecified type    5. Mild single current episode of major depressive disorder          Plan:       Hypothyroidism due to acquired atrophy of thyroid  -     Ambulatory referral/consult to Endocrinology; Future; Expected date: 09/14/2022  -     TSH; Future; Expected date: 03/06/2023    Post-operative state  -     celecoxib (CELEBREX) 200 MG capsule; TAKE 1 CAPSULE(200 MG) BY MOUTH TWICE DAILY Strength: 200 mg  Dispense: 180 capsule; Refill: 0    Other osteoporosis without current pathological fracture  -     Ambulatory referral/consult to Endocrinology; Future; Expected date: 09/14/2022    Hypertension, unspecified type  -     Comprehensive Metabolic Panel; Future; Expected date: 03/06/2023  -     CBC Auto Differential; Future; Expected date: 03/06/2023    Mild single current  episode of major depressive disorder          Continue Eliquis 5mg BID with ASA  Continue telmisartan and carvedilol   Continue other present meds  F/u with cardiology as planned  Counseled on regular exercise, maintenance of a healthy weight, balanced diet rich in fruits/vegetables and lean protein, and avoidance of unhealthy habits like smoking and excessive alcohol intake.    F/u 6 months with labs

## 2022-09-07 NOTE — TELEPHONE ENCOUNTER
Can you please call patient to schedule a appointment with Dr. Mckeon.  He was seeing Dr. Sandifer.     Hypothyroidism due to acquired atrophy of thyroid [E03.4]  Other osteoporosis without current pathological fracture [M81.8]    Thank you

## 2022-09-26 ENCOUNTER — CLINICAL SUPPORT (OUTPATIENT)
Dept: AUDIOLOGY | Facility: CLINIC | Age: 77
End: 2022-09-26
Payer: MEDICARE

## 2022-09-26 DIAGNOSIS — Z46.1 HEARING AID FITTING OR ADJUSTMENT: Primary | ICD-10-CM

## 2022-09-26 NOTE — PROGRESS NOTES
Pt here for a f/u to make adjustments to his hearing aids settings.  Switched to large vented domes, reran FB test and increased overall gain 3dB.  Pt reported that his new settings were clear, comfortable and balanced. F/u in six months to one year for routine check or earlier if additional adjustments needed.

## 2022-09-28 ENCOUNTER — TELEPHONE (OUTPATIENT)
Dept: UROLOGY | Facility: CLINIC | Age: 77
End: 2022-09-28
Payer: MEDICARE

## 2022-09-28 NOTE — TELEPHONE ENCOUNTER
Spoke to pt and booked him an apt with dr. Shetty for nocturia next week. Pt verbalized understanding of apt.

## 2022-10-01 ENCOUNTER — PATIENT MESSAGE (OUTPATIENT)
Dept: PAIN MEDICINE | Facility: CLINIC | Age: 77
End: 2022-10-01
Payer: MEDICARE

## 2022-10-04 ENCOUNTER — OFFICE VISIT (OUTPATIENT)
Dept: UROLOGY | Facility: CLINIC | Age: 77
End: 2022-10-04
Payer: MEDICARE

## 2022-10-04 VITALS — HEIGHT: 74 IN | BODY MASS INDEX: 26.59 KG/M2 | WEIGHT: 207.19 LBS

## 2022-10-04 DIAGNOSIS — N52.01 ERECTILE DYSFUNCTION DUE TO ARTERIAL INSUFFICIENCY: ICD-10-CM

## 2022-10-04 DIAGNOSIS — N40.1 BPH WITH URINARY OBSTRUCTION: Primary | ICD-10-CM

## 2022-10-04 DIAGNOSIS — R97.20 ELEVATED PSA: ICD-10-CM

## 2022-10-04 DIAGNOSIS — N13.8 BPH WITH URINARY OBSTRUCTION: Primary | ICD-10-CM

## 2022-10-04 PROCEDURE — 1126F PR PAIN SEVERITY QUANTIFIED, NO PAIN PRESENT: ICD-10-PCS | Mod: CPTII,S$GLB,, | Performed by: STUDENT IN AN ORGANIZED HEALTH CARE EDUCATION/TRAINING PROGRAM

## 2022-10-04 PROCEDURE — 1157F ADVNC CARE PLAN IN RCRD: CPT | Mod: CPTII,S$GLB,, | Performed by: STUDENT IN AN ORGANIZED HEALTH CARE EDUCATION/TRAINING PROGRAM

## 2022-10-04 PROCEDURE — 1159F PR MEDICATION LIST DOCUMENTED IN MEDICAL RECORD: ICD-10-PCS | Mod: CPTII,S$GLB,, | Performed by: STUDENT IN AN ORGANIZED HEALTH CARE EDUCATION/TRAINING PROGRAM

## 2022-10-04 PROCEDURE — 3044F PR MOST RECENT HEMOGLOBIN A1C LEVEL <7.0%: ICD-10-PCS | Mod: CPTII,S$GLB,, | Performed by: STUDENT IN AN ORGANIZED HEALTH CARE EDUCATION/TRAINING PROGRAM

## 2022-10-04 PROCEDURE — 99999 PR PBB SHADOW E&M-EST. PATIENT-LVL III: CPT | Mod: PBBFAC,,, | Performed by: STUDENT IN AN ORGANIZED HEALTH CARE EDUCATION/TRAINING PROGRAM

## 2022-10-04 PROCEDURE — 1126F AMNT PAIN NOTED NONE PRSNT: CPT | Mod: CPTII,S$GLB,, | Performed by: STUDENT IN AN ORGANIZED HEALTH CARE EDUCATION/TRAINING PROGRAM

## 2022-10-04 PROCEDURE — 1101F PT FALLS ASSESS-DOCD LE1/YR: CPT | Mod: CPTII,S$GLB,, | Performed by: STUDENT IN AN ORGANIZED HEALTH CARE EDUCATION/TRAINING PROGRAM

## 2022-10-04 PROCEDURE — 3044F HG A1C LEVEL LT 7.0%: CPT | Mod: CPTII,S$GLB,, | Performed by: STUDENT IN AN ORGANIZED HEALTH CARE EDUCATION/TRAINING PROGRAM

## 2022-10-04 PROCEDURE — 3288F FALL RISK ASSESSMENT DOCD: CPT | Mod: CPTII,S$GLB,, | Performed by: STUDENT IN AN ORGANIZED HEALTH CARE EDUCATION/TRAINING PROGRAM

## 2022-10-04 PROCEDURE — 3288F PR FALLS RISK ASSESSMENT DOCUMENTED: ICD-10-PCS | Mod: CPTII,S$GLB,, | Performed by: STUDENT IN AN ORGANIZED HEALTH CARE EDUCATION/TRAINING PROGRAM

## 2022-10-04 PROCEDURE — 1159F MED LIST DOCD IN RCRD: CPT | Mod: CPTII,S$GLB,, | Performed by: STUDENT IN AN ORGANIZED HEALTH CARE EDUCATION/TRAINING PROGRAM

## 2022-10-04 PROCEDURE — 1157F PR ADVANCE CARE PLAN OR EQUIV PRESENT IN MEDICAL RECORD: ICD-10-PCS | Mod: CPTII,S$GLB,, | Performed by: STUDENT IN AN ORGANIZED HEALTH CARE EDUCATION/TRAINING PROGRAM

## 2022-10-04 PROCEDURE — 99214 PR OFFICE/OUTPT VISIT, EST, LEVL IV, 30-39 MIN: ICD-10-PCS | Mod: S$GLB,,, | Performed by: STUDENT IN AN ORGANIZED HEALTH CARE EDUCATION/TRAINING PROGRAM

## 2022-10-04 PROCEDURE — 99999 PR PBB SHADOW E&M-EST. PATIENT-LVL III: ICD-10-PCS | Mod: PBBFAC,,, | Performed by: STUDENT IN AN ORGANIZED HEALTH CARE EDUCATION/TRAINING PROGRAM

## 2022-10-04 PROCEDURE — 1160F RVW MEDS BY RX/DR IN RCRD: CPT | Mod: CPTII,S$GLB,, | Performed by: STUDENT IN AN ORGANIZED HEALTH CARE EDUCATION/TRAINING PROGRAM

## 2022-10-04 PROCEDURE — 99214 OFFICE O/P EST MOD 30 MIN: CPT | Mod: S$GLB,,, | Performed by: STUDENT IN AN ORGANIZED HEALTH CARE EDUCATION/TRAINING PROGRAM

## 2022-10-04 PROCEDURE — 1101F PR PT FALLS ASSESS DOC 0-1 FALLS W/OUT INJ PAST YR: ICD-10-PCS | Mod: CPTII,S$GLB,, | Performed by: STUDENT IN AN ORGANIZED HEALTH CARE EDUCATION/TRAINING PROGRAM

## 2022-10-04 PROCEDURE — 1160F PR REVIEW ALL MEDS BY PRESCRIBER/CLIN PHARMACIST DOCUMENTED: ICD-10-PCS | Mod: CPTII,S$GLB,, | Performed by: STUDENT IN AN ORGANIZED HEALTH CARE EDUCATION/TRAINING PROGRAM

## 2022-10-04 NOTE — PROGRESS NOTES
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 He is established to our clinic and was last seen by Dr. Shelton for elevated PSA.    He has been on uroxatrol and avodart. He recently stopped his Uroxatral as he did not find much benefit from it.  He is still taking Avodart. He has had urodynamics in the past with Dr. Gaston - BOOI was 8, unobstructed at that time.    IPSS Questionnaire (AUA-SS):  1)  Incomplete Emptying 0 - Not at all   2)  Frequency 5 - Almost always   3)  Intermittency 3 - About half the time   4) Urgency 3 - About half the time   5) Weak Stream  2 - Less than half the time   6) Straining 2 - Less than half the time   7) Nocturia 4 - 4 times   Total score:  0-7 mild, 8-19 mod, 20-35 severe 19   Quality of Life:  5 - Unhappy        He has had an elevated PSA for many years. He's had multiple prostate biopsies in 2004, 2010 with Dr. Shelton and all have been negative.  Most recent PSA was 3.9 and is 7.8 corrected for dutasteride.  He recently underwent an MRI of the prostate which showed only a PI-RADS 2 lesion.  Volume was 68 cc.     He reports ED and tried and failed viagra, cialis, and ICI. Previously discussed a prosthesis with Dr. Gaston and he is not interested in this.      Past medical, family, surgical and social history reviewed as documented in chart with pertinent positive medical, family, surgical and social history detailed in HPI.    A review systems was conducted with pertinent positive and negative findings documented in HPI.    Anticoagulation/Antiplatelets:  Yes eliquis 5mg BID for A-fib    OBJECTIVE:     Estimated body mass index is 26.6 kg/m² as calculated from the following:    Height as of this encounter: 6' 2" (1.88 m).    Weight as of this encounter: 94 kg (207 lb 3.2 oz).    Vital Signs (Most Recent)       Physical " Exam  Vitals and nursing note reviewed.   Constitutional:       General: He is not in acute distress.     Appearance: Normal appearance. He is well-developed. He is not ill-appearing or toxic-appearing.   Pulmonary:      Effort: Pulmonary effort is normal. No accessory muscle usage or respiratory distress.   Neurological:      General: No focal deficit present.      Mental Status: He is alert and oriented to person, place, and time. Mental status is at baseline.   Psychiatric:         Mood and Affect: Mood normal.         Behavior: Behavior is cooperative.         Thought Content: Thought content normal.         Judgment: Judgment normal.       Lab Results   Component Value Date    BUN 25 (H) 08/31/2022    CREATININE 1.5 (H) 08/31/2022    WBC 12.30 08/31/2022    HGB 12.2 (L) 08/31/2022    HCT 37.6 (L) 08/31/2022     08/31/2022    AST 16 08/31/2022    ALT 25 08/31/2022    ALKPHOS 57 08/31/2022    ALBUMIN 3.4 (L) 08/31/2022    HGBA1C 4.9 08/31/2022        Lab Results   Component Value Date    PSA 2.62 03/21/2013    PSA 4.41 (H) 02/06/2012    PSA 4.82 (H) 10/31/2011    PSA 4.0 08/31/2011    PSA 4.5 (H) 04/20/2011    PSA 5.8 (H) 06/08/2010    PSA 3.9 12/15/2009    PSA 4.6 (H) 06/08/2009    PSA 3.8 05/28/2008    PSA 4.7 (H) 09/04/2007    PSADIAG 3.9 01/12/2022    PSADIAG 1.5 01/26/2021    PSADIAG 3.6 10/30/2019    PSADIAG 2.9 10/08/2018    PSADIAG 2.4 10/11/2017    PSADIAG 1.8 10/20/2016    PSADIAG 2.9 10/12/2015    PSAFREE 0.76 03/26/2018    PSAFREE 1.20 09/01/2004    PSAFREEPCT 19.00 03/26/2018    PSAFREEPCT 16.00 09/01/2004      Urine dipstick shows negative for all components.     ASSESSMENT     1. BPH with urinary obstruction    2. Elevated PSA    3. Erectile dysfunction due to arterial insufficiency      PLAN:     1. BPH with urinary obstruction  Discussed options for his LUTS, and specifically the nocturia.  Explained the mechanism of bladder outlet obstruction and urine production contributing.  Discussed  limiting caffeine and fluid into the evening. He recently stopped his Uroxatral.  Plan for a cystoscopy to evaluate for outlet procedures. He also may benefit more from a trial of OAB medications.   - Cystoscopy; Future    2. Elevated PSA  S/p multiple negative prostate biopsies, no concerning lesions on MRI. Planning for PHI test to further risk stratify for repeat biopsy.   - Cystoscopy; Future    3. Erectile dysfunction due to arterial insufficiency  Discussed that the dutasteride could be playing a role.  Recommend discontinuing this is not helping his urinary symptoms much.      Yakov Shetty MD

## 2022-10-06 ENCOUNTER — PATIENT MESSAGE (OUTPATIENT)
Dept: AUDIOLOGY | Facility: CLINIC | Age: 77
End: 2022-10-06
Payer: MEDICARE

## 2022-10-19 ENCOUNTER — PATIENT MESSAGE (OUTPATIENT)
Dept: AUDIOLOGY | Facility: CLINIC | Age: 77
End: 2022-10-19
Payer: MEDICARE

## 2022-10-24 ENCOUNTER — PATIENT MESSAGE (OUTPATIENT)
Dept: FAMILY MEDICINE | Facility: CLINIC | Age: 77
End: 2022-10-24
Payer: MEDICARE

## 2022-10-25 ENCOUNTER — HOSPITAL ENCOUNTER (OUTPATIENT)
Dept: RADIOLOGY | Facility: HOSPITAL | Age: 77
Discharge: HOME | End: 2022-10-25
Attending: STUDENT IN AN ORGANIZED HEALTH CARE EDUCATION/TRAINING PROGRAM
Payer: MEDICARE

## 2022-10-25 ENCOUNTER — OFFICE VISIT (OUTPATIENT)
Dept: FAMILY MEDICINE | Facility: CLINIC | Age: 77
End: 2022-10-25
Payer: MEDICARE

## 2022-10-25 VITALS
WEIGHT: 197.31 LBS | SYSTOLIC BLOOD PRESSURE: 124 MMHG | OXYGEN SATURATION: 96 % | HEART RATE: 50 BPM | HEIGHT: 74 IN | TEMPERATURE: 98 F | DIASTOLIC BLOOD PRESSURE: 72 MMHG | BODY MASS INDEX: 25.32 KG/M2

## 2022-10-25 DIAGNOSIS — J18.9 PNEUMONIA OF LEFT LOWER LOBE DUE TO INFECTIOUS ORGANISM: ICD-10-CM

## 2022-10-25 DIAGNOSIS — Z11.52 ENCOUNTER FOR SCREENING FOR COVID-19: ICD-10-CM

## 2022-10-25 DIAGNOSIS — I48.0 PAROXYSMAL ATRIAL FIBRILLATION: ICD-10-CM

## 2022-10-25 DIAGNOSIS — J18.9 PNEUMONIA OF LEFT LOWER LOBE DUE TO INFECTIOUS ORGANISM: Primary | ICD-10-CM

## 2022-10-25 LAB
CTP QC/QA: YES
INFLUENZA A, MOLECULAR: NEGATIVE
INFLUENZA B, MOLECULAR: NEGATIVE
SARS-COV-2 RDRP RESP QL NAA+PROBE: NEGATIVE
SPECIMEN SOURCE: NORMAL

## 2022-10-25 PROCEDURE — 1101F PR PT FALLS ASSESS DOC 0-1 FALLS W/OUT INJ PAST YR: ICD-10-PCS | Mod: CPTII,S$GLB,, | Performed by: STUDENT IN AN ORGANIZED HEALTH CARE EDUCATION/TRAINING PROGRAM

## 2022-10-25 PROCEDURE — 87635 SARS-COV-2 COVID-19 AMP PRB: CPT | Mod: QW,S$GLB,, | Performed by: STUDENT IN AN ORGANIZED HEALTH CARE EDUCATION/TRAINING PROGRAM

## 2022-10-25 PROCEDURE — 99214 PR OFFICE/OUTPT VISIT, EST, LEVL IV, 30-39 MIN: ICD-10-PCS | Mod: S$GLB,,, | Performed by: STUDENT IN AN ORGANIZED HEALTH CARE EDUCATION/TRAINING PROGRAM

## 2022-10-25 PROCEDURE — 71046 X-RAY EXAM CHEST 2 VIEWS: CPT | Mod: 26,,, | Performed by: RADIOLOGY

## 2022-10-25 PROCEDURE — 3288F FALL RISK ASSESSMENT DOCD: CPT | Mod: CPTII,S$GLB,, | Performed by: STUDENT IN AN ORGANIZED HEALTH CARE EDUCATION/TRAINING PROGRAM

## 2022-10-25 PROCEDURE — 1157F PR ADVANCE CARE PLAN OR EQUIV PRESENT IN MEDICAL RECORD: ICD-10-PCS | Mod: CPTII,S$GLB,, | Performed by: STUDENT IN AN ORGANIZED HEALTH CARE EDUCATION/TRAINING PROGRAM

## 2022-10-25 PROCEDURE — 1159F PR MEDICATION LIST DOCUMENTED IN MEDICAL RECORD: ICD-10-PCS | Mod: CPTII,S$GLB,, | Performed by: STUDENT IN AN ORGANIZED HEALTH CARE EDUCATION/TRAINING PROGRAM

## 2022-10-25 PROCEDURE — 99999 PR PBB SHADOW E&M-EST. PATIENT-LVL III: ICD-10-PCS | Mod: PBBFAC,,, | Performed by: STUDENT IN AN ORGANIZED HEALTH CARE EDUCATION/TRAINING PROGRAM

## 2022-10-25 PROCEDURE — 1101F PT FALLS ASSESS-DOCD LE1/YR: CPT | Mod: CPTII,S$GLB,, | Performed by: STUDENT IN AN ORGANIZED HEALTH CARE EDUCATION/TRAINING PROGRAM

## 2022-10-25 PROCEDURE — 1126F PR PAIN SEVERITY QUANTIFIED, NO PAIN PRESENT: ICD-10-PCS | Mod: CPTII,S$GLB,, | Performed by: STUDENT IN AN ORGANIZED HEALTH CARE EDUCATION/TRAINING PROGRAM

## 2022-10-25 PROCEDURE — 3078F DIAST BP <80 MM HG: CPT | Mod: CPTII,S$GLB,, | Performed by: STUDENT IN AN ORGANIZED HEALTH CARE EDUCATION/TRAINING PROGRAM

## 2022-10-25 PROCEDURE — 71046 XR CHEST PA AND LATERAL: ICD-10-PCS | Mod: 26,,, | Performed by: RADIOLOGY

## 2022-10-25 PROCEDURE — 3288F PR FALLS RISK ASSESSMENT DOCUMENTED: ICD-10-PCS | Mod: CPTII,S$GLB,, | Performed by: STUDENT IN AN ORGANIZED HEALTH CARE EDUCATION/TRAINING PROGRAM

## 2022-10-25 PROCEDURE — 1126F AMNT PAIN NOTED NONE PRSNT: CPT | Mod: CPTII,S$GLB,, | Performed by: STUDENT IN AN ORGANIZED HEALTH CARE EDUCATION/TRAINING PROGRAM

## 2022-10-25 PROCEDURE — 3078F PR MOST RECENT DIASTOLIC BLOOD PRESSURE < 80 MM HG: ICD-10-PCS | Mod: CPTII,S$GLB,, | Performed by: STUDENT IN AN ORGANIZED HEALTH CARE EDUCATION/TRAINING PROGRAM

## 2022-10-25 PROCEDURE — 1157F ADVNC CARE PLAN IN RCRD: CPT | Mod: CPTII,S$GLB,, | Performed by: STUDENT IN AN ORGANIZED HEALTH CARE EDUCATION/TRAINING PROGRAM

## 2022-10-25 PROCEDURE — 3074F SYST BP LT 130 MM HG: CPT | Mod: CPTII,S$GLB,, | Performed by: STUDENT IN AN ORGANIZED HEALTH CARE EDUCATION/TRAINING PROGRAM

## 2022-10-25 PROCEDURE — 3074F PR MOST RECENT SYSTOLIC BLOOD PRESSURE < 130 MM HG: ICD-10-PCS | Mod: CPTII,S$GLB,, | Performed by: STUDENT IN AN ORGANIZED HEALTH CARE EDUCATION/TRAINING PROGRAM

## 2022-10-25 PROCEDURE — 71046 X-RAY EXAM CHEST 2 VIEWS: CPT | Mod: TC,FY,PO

## 2022-10-25 PROCEDURE — 1159F MED LIST DOCD IN RCRD: CPT | Mod: CPTII,S$GLB,, | Performed by: STUDENT IN AN ORGANIZED HEALTH CARE EDUCATION/TRAINING PROGRAM

## 2022-10-25 PROCEDURE — 87635: ICD-10-PCS | Mod: QW,S$GLB,, | Performed by: STUDENT IN AN ORGANIZED HEALTH CARE EDUCATION/TRAINING PROGRAM

## 2022-10-25 PROCEDURE — 1160F PR REVIEW ALL MEDS BY PRESCRIBER/CLIN PHARMACIST DOCUMENTED: ICD-10-PCS | Mod: CPTII,S$GLB,, | Performed by: STUDENT IN AN ORGANIZED HEALTH CARE EDUCATION/TRAINING PROGRAM

## 2022-10-25 PROCEDURE — 99999 PR PBB SHADOW E&M-EST. PATIENT-LVL III: CPT | Mod: PBBFAC,,, | Performed by: STUDENT IN AN ORGANIZED HEALTH CARE EDUCATION/TRAINING PROGRAM

## 2022-10-25 PROCEDURE — 1160F RVW MEDS BY RX/DR IN RCRD: CPT | Mod: CPTII,S$GLB,, | Performed by: STUDENT IN AN ORGANIZED HEALTH CARE EDUCATION/TRAINING PROGRAM

## 2022-10-25 PROCEDURE — 87502 INFLUENZA DNA AMP PROBE: CPT | Mod: PO | Performed by: STUDENT IN AN ORGANIZED HEALTH CARE EDUCATION/TRAINING PROGRAM

## 2022-10-25 PROCEDURE — 99214 OFFICE O/P EST MOD 30 MIN: CPT | Mod: S$GLB,,, | Performed by: STUDENT IN AN ORGANIZED HEALTH CARE EDUCATION/TRAINING PROGRAM

## 2022-10-25 RX ORDER — CEFDINIR 300 MG/1
300 CAPSULE ORAL 2 TIMES DAILY
Qty: 20 CAPSULE | Refills: 0 | Status: SHIPPED | OUTPATIENT
Start: 2022-10-25 | End: 2022-11-01 | Stop reason: SDUPTHER

## 2022-10-25 NOTE — PROGRESS NOTES
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    Chief Complaint: pneumonia on antibiotics, worsening       Reports nasal and sinus congestion x 3 weeks, started having cough productive of purulent sputum, started on augmentin, developed loose stools, transitioned to amoxicillin without releif, now taking azithromycin x 1 day, reports fatigue, productive cough persist, slept 20 hours yesterday    Review of Systems   Constitutional:  Negative for fever.   HENT:  Negative for ear pain and sinus pain.    Eyes:  Negative for discharge and redness.   Respiratory:  Negative for shortness of breath and wheezing.    Cardiovascular:  Negative for chest pain and palpitations.   Gastrointestinal:  Negative for abdominal pain, diarrhea, nausea and vomiting.   Genitourinary:  Negative for dysuria.   Musculoskeletal:  Negative for myalgias.   Skin:  Negative for rash.   Neurological:  Negative for dizziness and weakness.      Objective:      Vitals:    10/25/22 0808   BP: 124/72   Pulse: (!) 50   Temp: 98 °F (36.7 °C)   SpO2: 96%   Weight: 89.5 kg (197 lb 5 oz)   Height: 6' 2" (1.88 m)      Physical Exam  Constitutional:       General: He is not in acute distress.  HENT:      Right Ear: Tympanic membrane and ear canal normal.      Left Ear: Tympanic membrane and ear canal normal.      Ears:      Comments: with good landmarks and no fluid     Nose: No congestion or rhinorrhea.      Comments: No sinus tenderness     Mouth/Throat:      Mouth: Mucous membranes are moist.      Pharynx: No oropharyngeal exudate or posterior oropharyngeal erythema.   Eyes:      General: No scleral icterus.     Conjunctiva/sclera: Conjunctivae normal.   Cardiovascular:      Rate and Rhythm: Bradycardia present. Rhythm irregular.      Pulses: Normal pulses.   Pulmonary:      Comments: Respirations symmetric and not labored, inspiratory crackles left lower lung fields, otherwise Lungs are clear to auscultation.  Musculoskeletal:      " Cervical back: Neck supple. No tenderness.      Right lower leg: No edema.      Left lower leg: No edema.   Lymphadenopathy:      Cervical: No cervical adenopathy.   Neurological:      Mental Status: He is alert.        Assessment:       1. Pneumonia of left lower lobe due to infectious organism    2. Encounter for screening for COVID-19    3. Paroxysmal atrial fibrillation          Plan:       Pneumonia of left lower lobe due to infectious organism  -     Procalcitonin; Future; Expected date: 10/25/2022  -     Cancel: X-Ray Chest PA And Lateral; Future; Expected date: 10/25/2022  -     cefdinir (OMNICEF) 300 MG capsule; Take 1 capsule (300 mg total) by mouth 2 (two) times daily. for 10 days  Dispense: 20 capsule; Refill: 0  -     X-Ray Chest PA And Lateral; Future; Expected date: 10/25/2022  Will continue azithromycin, will start omnicef, will follow up labs and imaging, will consider hospital admission pending results, ED/medication/infection precautions, counseling provided    Paroxysmal atrial fibrillation   Bradycardia in clinic today, denies symptoms, will monitor, precautions provided    Encounter for screening for COVID-19  -     Influenza A & B by Molecular  -     POCT COVID-19 Rapid Screening    Risks, benefits, alternatives discussed with patient, asked if there were any questions, patient said no  Precautions, counseling and education provided, patient verbalized understanding

## 2022-10-26 ENCOUNTER — PATIENT MESSAGE (OUTPATIENT)
Dept: FAMILY MEDICINE | Facility: CLINIC | Age: 77
End: 2022-10-26
Payer: MEDICARE

## 2022-10-26 DIAGNOSIS — J84.9 INTERSTITIAL LUNG DISEASE: Primary | ICD-10-CM

## 2022-10-27 ENCOUNTER — PATIENT MESSAGE (OUTPATIENT)
Dept: FAMILY MEDICINE | Facility: CLINIC | Age: 77
End: 2022-10-27
Payer: MEDICARE

## 2022-11-01 ENCOUNTER — OFFICE VISIT (OUTPATIENT)
Dept: FAMILY MEDICINE | Facility: CLINIC | Age: 77
End: 2022-11-01
Payer: MEDICARE

## 2022-11-01 VITALS
TEMPERATURE: 98 F | OXYGEN SATURATION: 95 % | HEIGHT: 74 IN | BODY MASS INDEX: 26.31 KG/M2 | SYSTOLIC BLOOD PRESSURE: 138 MMHG | HEART RATE: 63 BPM | DIASTOLIC BLOOD PRESSURE: 70 MMHG | WEIGHT: 205 LBS

## 2022-11-01 DIAGNOSIS — J84.9 INTERSTITIAL LUNG DISEASE: ICD-10-CM

## 2022-11-01 DIAGNOSIS — J18.9 PNEUMONIA OF LEFT LOWER LOBE DUE TO INFECTIOUS ORGANISM: Primary | ICD-10-CM

## 2022-11-01 PROCEDURE — 1157F PR ADVANCE CARE PLAN OR EQUIV PRESENT IN MEDICAL RECORD: ICD-10-PCS | Mod: CPTII,S$GLB,, | Performed by: STUDENT IN AN ORGANIZED HEALTH CARE EDUCATION/TRAINING PROGRAM

## 2022-11-01 PROCEDURE — 1157F ADVNC CARE PLAN IN RCRD: CPT | Mod: CPTII,S$GLB,, | Performed by: STUDENT IN AN ORGANIZED HEALTH CARE EDUCATION/TRAINING PROGRAM

## 2022-11-01 PROCEDURE — 1159F PR MEDICATION LIST DOCUMENTED IN MEDICAL RECORD: ICD-10-PCS | Mod: CPTII,S$GLB,, | Performed by: STUDENT IN AN ORGANIZED HEALTH CARE EDUCATION/TRAINING PROGRAM

## 2022-11-01 PROCEDURE — 1126F PR PAIN SEVERITY QUANTIFIED, NO PAIN PRESENT: ICD-10-PCS | Mod: CPTII,S$GLB,, | Performed by: STUDENT IN AN ORGANIZED HEALTH CARE EDUCATION/TRAINING PROGRAM

## 2022-11-01 PROCEDURE — 3078F PR MOST RECENT DIASTOLIC BLOOD PRESSURE < 80 MM HG: ICD-10-PCS | Mod: CPTII,S$GLB,, | Performed by: STUDENT IN AN ORGANIZED HEALTH CARE EDUCATION/TRAINING PROGRAM

## 2022-11-01 PROCEDURE — 3078F DIAST BP <80 MM HG: CPT | Mod: CPTII,S$GLB,, | Performed by: STUDENT IN AN ORGANIZED HEALTH CARE EDUCATION/TRAINING PROGRAM

## 2022-11-01 PROCEDURE — 1101F PR PT FALLS ASSESS DOC 0-1 FALLS W/OUT INJ PAST YR: ICD-10-PCS | Mod: CPTII,S$GLB,, | Performed by: STUDENT IN AN ORGANIZED HEALTH CARE EDUCATION/TRAINING PROGRAM

## 2022-11-01 PROCEDURE — 3075F PR MOST RECENT SYSTOLIC BLOOD PRESS GE 130-139MM HG: ICD-10-PCS | Mod: CPTII,S$GLB,, | Performed by: STUDENT IN AN ORGANIZED HEALTH CARE EDUCATION/TRAINING PROGRAM

## 2022-11-01 PROCEDURE — 3288F FALL RISK ASSESSMENT DOCD: CPT | Mod: CPTII,S$GLB,, | Performed by: STUDENT IN AN ORGANIZED HEALTH CARE EDUCATION/TRAINING PROGRAM

## 2022-11-01 PROCEDURE — 99213 PR OFFICE/OUTPT VISIT, EST, LEVL III, 20-29 MIN: ICD-10-PCS | Mod: S$GLB,,, | Performed by: STUDENT IN AN ORGANIZED HEALTH CARE EDUCATION/TRAINING PROGRAM

## 2022-11-01 PROCEDURE — 99999 PR PBB SHADOW E&M-EST. PATIENT-LVL V: CPT | Mod: PBBFAC,,, | Performed by: STUDENT IN AN ORGANIZED HEALTH CARE EDUCATION/TRAINING PROGRAM

## 2022-11-01 PROCEDURE — 3075F SYST BP GE 130 - 139MM HG: CPT | Mod: CPTII,S$GLB,, | Performed by: STUDENT IN AN ORGANIZED HEALTH CARE EDUCATION/TRAINING PROGRAM

## 2022-11-01 PROCEDURE — 99999 PR PBB SHADOW E&M-EST. PATIENT-LVL V: ICD-10-PCS | Mod: PBBFAC,,, | Performed by: STUDENT IN AN ORGANIZED HEALTH CARE EDUCATION/TRAINING PROGRAM

## 2022-11-01 PROCEDURE — 1160F RVW MEDS BY RX/DR IN RCRD: CPT | Mod: CPTII,S$GLB,, | Performed by: STUDENT IN AN ORGANIZED HEALTH CARE EDUCATION/TRAINING PROGRAM

## 2022-11-01 PROCEDURE — 99213 OFFICE O/P EST LOW 20 MIN: CPT | Mod: S$GLB,,, | Performed by: STUDENT IN AN ORGANIZED HEALTH CARE EDUCATION/TRAINING PROGRAM

## 2022-11-01 PROCEDURE — 1160F PR REVIEW ALL MEDS BY PRESCRIBER/CLIN PHARMACIST DOCUMENTED: ICD-10-PCS | Mod: CPTII,S$GLB,, | Performed by: STUDENT IN AN ORGANIZED HEALTH CARE EDUCATION/TRAINING PROGRAM

## 2022-11-01 PROCEDURE — 1101F PT FALLS ASSESS-DOCD LE1/YR: CPT | Mod: CPTII,S$GLB,, | Performed by: STUDENT IN AN ORGANIZED HEALTH CARE EDUCATION/TRAINING PROGRAM

## 2022-11-01 PROCEDURE — 1159F MED LIST DOCD IN RCRD: CPT | Mod: CPTII,S$GLB,, | Performed by: STUDENT IN AN ORGANIZED HEALTH CARE EDUCATION/TRAINING PROGRAM

## 2022-11-01 PROCEDURE — 1126F AMNT PAIN NOTED NONE PRSNT: CPT | Mod: CPTII,S$GLB,, | Performed by: STUDENT IN AN ORGANIZED HEALTH CARE EDUCATION/TRAINING PROGRAM

## 2022-11-01 PROCEDURE — 3288F PR FALLS RISK ASSESSMENT DOCUMENTED: ICD-10-PCS | Mod: CPTII,S$GLB,, | Performed by: STUDENT IN AN ORGANIZED HEALTH CARE EDUCATION/TRAINING PROGRAM

## 2022-11-01 RX ORDER — CEFDINIR 300 MG/1
300 CAPSULE ORAL 2 TIMES DAILY
Qty: 14 CAPSULE | Refills: 0 | Status: SHIPPED | OUTPATIENT
Start: 2022-11-01 | End: 2022-11-08

## 2022-11-01 NOTE — PROGRESS NOTES
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    Chief Complaint: Follow-up    Seen in clinic 1 week ago, cough productive of purulent sputum x 3 weeks, refractory to amox, azithromycin,  Found to have inspiratory crackles, CXR showed chronic lung changes, started on omnicef, continued azithromycin    Now reports sputum resolved, dry cough persists    Follow-up  Pertinent negatives include no abdominal pain, chest pain, chills, congestion, fever, myalgias, rash, sore throat or vomiting.     Review of Systems   Constitutional:  Negative for chills, fever and malaise/fatigue.   HENT:  Negative for congestion, ear pain, sinus pain and sore throat.    Eyes:  Negative for discharge and redness.   Respiratory:  Negative for sputum production, shortness of breath and wheezing.    Cardiovascular:  Negative for chest pain and palpitations.   Gastrointestinal:  Negative for abdominal pain, diarrhea and vomiting.   Genitourinary:  Negative for dysuria.   Musculoskeletal:  Negative for myalgias.   Skin:  Negative for rash.   Neurological:  Negative for dizziness.      Objective:      Vitals:    11/01/22 0817   BP: 138/70   Pulse: 63   Temp: 97.9 °F (36.6 °C)   SpO2: 95%   Weight: 93 kg (205 lb 0.4 oz)   Height: 6' 2" (1.88 m)      Physical Exam  Constitutional:       General: He is not in acute distress.  HENT:      Right Ear: Tympanic membrane and ear canal normal.      Left Ear: Tympanic membrane and ear canal normal.      Ears:      Comments: with good landmarks and no fluid     Nose: No congestion or rhinorrhea.      Comments: No sinus tenderness     Mouth/Throat:      Mouth: Mucous membranes are moist.      Pharynx: No oropharyngeal exudate or posterior oropharyngeal erythema.   Eyes:      General: No scleral icterus.     Conjunctiva/sclera: Conjunctivae normal.   Cardiovascular:      Rate and Rhythm: Normal rate and regular rhythm.      Pulses: Normal pulses.   Pulmonary:      Comments: Respirations " symmetric and not labored, inspiratory crackles left lower lung field, otherwise Lungs are clear to auscultation.  Musculoskeletal:      Cervical back: Neck supple. No tenderness.      Right lower leg: No edema.      Left lower leg: No edema.   Lymphadenopathy:      Cervical: No cervical adenopathy.   Neurological:      Mental Status: He is alert.        Assessment:       1. Pneumonia of left lower lobe due to infectious organism    2. Interstitial lung disease          Plan:       Pneumonia of left lower lobe due to infectious organism  -     cefdinir (OMNICEF) 300 MG capsule; Take 1 capsule (300 mg total) by mouth 2 (two) times daily. for 7 days  Dispense: 14 capsule; Refill: 0  Symptoms improved, inspiratory crackles LLL field, will continue omnicef, will appreciate pulmonology recommendations tomorrow    Interstitial lung disease  As#1    Risks, benefits, alternatives discussed with patient, asked if there were any questions, patient said no  Precautions, counseling and education provided, patient verbalized understanding

## 2022-11-02 ENCOUNTER — PATIENT MESSAGE (OUTPATIENT)
Dept: FAMILY MEDICINE | Facility: CLINIC | Age: 77
End: 2022-11-02
Payer: MEDICARE

## 2022-11-02 ENCOUNTER — OFFICE VISIT (OUTPATIENT)
Dept: PULMONOLOGY | Facility: CLINIC | Age: 77
End: 2022-11-02
Payer: MEDICARE

## 2022-11-02 VITALS
HEART RATE: 69 BPM | HEIGHT: 74 IN | WEIGHT: 205.94 LBS | SYSTOLIC BLOOD PRESSURE: 142 MMHG | BODY MASS INDEX: 26.43 KG/M2 | DIASTOLIC BLOOD PRESSURE: 82 MMHG | OXYGEN SATURATION: 96 %

## 2022-11-02 DIAGNOSIS — R05.3 CHRONIC COUGH: ICD-10-CM

## 2022-11-02 DIAGNOSIS — J84.9 INTERSTITIAL LUNG DISEASE: ICD-10-CM

## 2022-11-02 DIAGNOSIS — G47.31 COMPLEX SLEEP APNEA SYNDROME: ICD-10-CM

## 2022-11-02 DIAGNOSIS — J84.9 INTERSTITIAL PULMONARY DISEASE, UNSPECIFIED: ICD-10-CM

## 2022-11-02 DIAGNOSIS — R05.9 COUGH, UNSPECIFIED TYPE: Primary | ICD-10-CM

## 2022-11-02 DIAGNOSIS — R09.3 INCREASED SPUTUM PRODUCTION: ICD-10-CM

## 2022-11-02 DIAGNOSIS — I48.0 PAROXYSMAL ATRIAL FIBRILLATION: ICD-10-CM

## 2022-11-02 PROCEDURE — 3077F SYST BP >= 140 MM HG: CPT | Mod: CPTII,S$GLB,, | Performed by: INTERNAL MEDICINE

## 2022-11-02 PROCEDURE — 99999 PR PBB SHADOW E&M-EST. PATIENT-LVL IV: ICD-10-PCS | Mod: PBBFAC,,, | Performed by: INTERNAL MEDICINE

## 2022-11-02 PROCEDURE — 3079F PR MOST RECENT DIASTOLIC BLOOD PRESSURE 80-89 MM HG: ICD-10-PCS | Mod: CPTII,S$GLB,, | Performed by: INTERNAL MEDICINE

## 2022-11-02 PROCEDURE — 1101F PT FALLS ASSESS-DOCD LE1/YR: CPT | Mod: CPTII,S$GLB,, | Performed by: INTERNAL MEDICINE

## 2022-11-02 PROCEDURE — 1101F PR PT FALLS ASSESS DOC 0-1 FALLS W/OUT INJ PAST YR: ICD-10-PCS | Mod: CPTII,S$GLB,, | Performed by: INTERNAL MEDICINE

## 2022-11-02 PROCEDURE — 99499 RISK ADDL DX/OHS AUDIT: ICD-10-PCS | Mod: HCNC,S$GLB,, | Performed by: INTERNAL MEDICINE

## 2022-11-02 PROCEDURE — 99204 OFFICE O/P NEW MOD 45 MIN: CPT | Mod: S$GLB,,, | Performed by: INTERNAL MEDICINE

## 2022-11-02 PROCEDURE — 1157F PR ADVANCE CARE PLAN OR EQUIV PRESENT IN MEDICAL RECORD: ICD-10-PCS | Mod: CPTII,S$GLB,, | Performed by: INTERNAL MEDICINE

## 2022-11-02 PROCEDURE — 99204 PR OFFICE/OUTPT VISIT, NEW, LEVL IV, 45-59 MIN: ICD-10-PCS | Mod: S$GLB,,, | Performed by: INTERNAL MEDICINE

## 2022-11-02 PROCEDURE — 1160F PR REVIEW ALL MEDS BY PRESCRIBER/CLIN PHARMACIST DOCUMENTED: ICD-10-PCS | Mod: CPTII,S$GLB,, | Performed by: INTERNAL MEDICINE

## 2022-11-02 PROCEDURE — 3077F PR MOST RECENT SYSTOLIC BLOOD PRESSURE >= 140 MM HG: ICD-10-PCS | Mod: CPTII,S$GLB,, | Performed by: INTERNAL MEDICINE

## 2022-11-02 PROCEDURE — 3288F FALL RISK ASSESSMENT DOCD: CPT | Mod: CPTII,S$GLB,, | Performed by: INTERNAL MEDICINE

## 2022-11-02 PROCEDURE — 3288F PR FALLS RISK ASSESSMENT DOCUMENTED: ICD-10-PCS | Mod: CPTII,S$GLB,, | Performed by: INTERNAL MEDICINE

## 2022-11-02 PROCEDURE — 99499 UNLISTED E&M SERVICE: CPT | Mod: HCNC,S$GLB,, | Performed by: INTERNAL MEDICINE

## 2022-11-02 PROCEDURE — 1159F MED LIST DOCD IN RCRD: CPT | Mod: CPTII,S$GLB,, | Performed by: INTERNAL MEDICINE

## 2022-11-02 PROCEDURE — 3079F DIAST BP 80-89 MM HG: CPT | Mod: CPTII,S$GLB,, | Performed by: INTERNAL MEDICINE

## 2022-11-02 PROCEDURE — 1159F PR MEDICATION LIST DOCUMENTED IN MEDICAL RECORD: ICD-10-PCS | Mod: CPTII,S$GLB,, | Performed by: INTERNAL MEDICINE

## 2022-11-02 PROCEDURE — 99999 PR PBB SHADOW E&M-EST. PATIENT-LVL IV: CPT | Mod: PBBFAC,,, | Performed by: INTERNAL MEDICINE

## 2022-11-02 PROCEDURE — 1160F RVW MEDS BY RX/DR IN RCRD: CPT | Mod: CPTII,S$GLB,, | Performed by: INTERNAL MEDICINE

## 2022-11-02 PROCEDURE — 1157F ADVNC CARE PLAN IN RCRD: CPT | Mod: CPTII,S$GLB,, | Performed by: INTERNAL MEDICINE

## 2022-11-02 NOTE — PROGRESS NOTES
Subjective:       Patient ID: Dominick E MD Duncan is a 77 y.o. male.    Chief Complaint: Pneumonia    Dominick Song has a past medical history of TIA, depression, Dyspnea on exertion, a. Fib, HTN, BPH, CKD 3, history of COVID, anemia of chronic blood loss, Hypothyroidism, PUD, osteoarthritis, complex sleep apnea syndrome, who presents as a referral for ILD.  He has had multiple courses of antibiotics without improvements in sputum production and chest imaging revealed chronic interstitial changes on an chest x-ray.    Today he is in his usual state of health.  He is currently finishing a prescription for cefdinir and states that his sputum production seems to be improving.  He does not feel ill and is here for further workup of his interstitial thickening seen on Chest film.    Tobacco/vape: never  Occupation: retired anesthesiologist  Exertional capacity: no limitations  Prior lung disease: none  Family history: father  of lung disease, but was long term smoker.  Sputum production: daily production of tan sputum.  Allergies/sinusitis: none  GERD/Aspiration: none  Pets: corgi  Travel/TB: none  Respiratory regimen: none  Prior cancer: none  Prior hospitalization/intubation: never  Snoring/apnea: prescribed CPAP, but has issues with wearing.        Review of Systems   Constitutional:  Negative for fever, chills, activity change, fatigue, night sweats and weakness.   HENT:  Negative for postnasal drip, rhinorrhea, sinus pressure and congestion.    Eyes: Negative.    Respiratory:  Positive for snoring and sputum production. Negative for apnea, hemoptysis, shortness of breath, wheezing, orthopnea, dyspnea on extertion and use of rescue inhaler.    Genitourinary:         Nocturia   Endocrine: endocrine negative    Musculoskeletal: Negative.    Skin: Negative.    Gastrointestinal: Negative.  Negative for abdominal pain, abdominal distention and acid reflux.   Neurological: Negative.    Psychiatric/Behavioral: Negative.  "      Objective:      Vitals:    11/02/22 1059   BP: (!) 142/82   BP Location: Left arm   Patient Position: Sitting   Pulse: 69   SpO2: 96%   Weight: 93.4 kg (205 lb 14.6 oz)   Height: 6' 2" (1.88 m)       Physical Exam   Constitutional: He is oriented to person, place, and time. He appears well-developed and well-nourished. He appears not cachectic. No distress.   HENT:   Head: Normocephalic.   Neck: No JVD present.   Cardiovascular: Regular rhythm.   Pulmonary/Chest: Normal expansion and symmetric chest wall expansion. No respiratory distress. He has no wheezes.   Bibasilar inspirator crackles present   Abdominal: Soft. Bowel sounds are normal. He exhibits no distension.   Musculoskeletal:         General: Edema present. Normal range of motion.      Cervical back: Normal range of motion and neck supple.   Neurological: He is alert and oriented to person, place, and time.   Skin: Skin is warm and dry. He is not diaphoretic.   Psychiatric: He has a normal mood and affect. His behavior is normal. Judgment and thought content normal.     Personal Diagnostic Review    Chest x-ray 10/25/2022  No definite acute cardiopulmonary disease.  Chronic interstitial changes are noted bilaterally healing right lateral mid rib fractures are noted.    No flowsheet data found.      Assessment:       1. Cough, unspecified type    2. Interstitial lung disease    3. Interstitial pulmonary disease, unspecified    4. Paroxysmal atrial fibrillation    5. Complex sleep apnea syndrome    6. Chronic cough    7. Increased sputum production        Outpatient Encounter Medications as of 11/2/2022   Medication Sig Dispense Refill    acyclovir (ZOVIRAX) 800 MG Tab TK ONE T PO FIVE TIMES D only for fever blisters  1    buPROPion (WELLBUTRIN XL) 150 MG TB24 tablet Take 1 tablet (150 mg total) by mouth once daily. 90 tablet 3    buPROPion (WELLBUTRIN XL) 300 MG 24 hr tablet TAKE 1 TABLET(300 MG) BY MOUTH EVERY DAY 90 tablet 3    calcium citrate " (CALCITRATE) 200 mg (950 mg) tablet Take 1 tablet (200 mg total) by mouth 2 (two) times daily. 180 tablet 3    carvediloL (COREG) 12.5 MG tablet TAKE 1 TABLET(12.5 MG) BY MOUTH TWICE DAILY WITH MEALS 180 tablet 3    cefdinir (OMNICEF) 300 MG capsule Take 1 capsule (300 mg total) by mouth 2 (two) times daily. for 7 days 14 capsule 0    cyclobenzaprine (FLEXERIL) 10 MG tablet TAKE 1 TABLET(10 MG) BY MOUTH THREE TIMES DAILY AS NEEDED FOR MUSCLE SPASMS 60 tablet 3    ELIQUIS 5 mg Tab Take 1 tablet (5 mg total) by mouth 2 (two) times a day. 60 tablet 11    EScitalopram oxalate (LEXAPRO) 10 MG tablet TAKE 1 TABLET(10 MG) BY MOUTH EVERY DAY 90 tablet 3    HYDROcodone-acetaminophen (NORCO) 5-325 mg per tablet Take 1 tablet by mouth every 6 (six) hours as needed for Pain. 20 tablet 0    levothyroxine (SYNTHROID) 75 MCG tablet Take 1 tablet (75 mcg total) by mouth before breakfast. 90 tablet 2    methocarbamoL (ROBAXIN) 500 MG Tab TAKE 1 TABLET(500 MG) BY MOUTH THREE TIMES DAILY FOR 10 DAYS 90 tablet 0    OMEGA-3 FATTY ACIDS (FISH OIL CONCENTRATE ORAL) Take by mouth Daily.      propafenone (RYTHMOL SR) 425 MG Cp12 TAKE 1 CAPSULE(425 MG) BY MOUTH EVERY 12 HOURS 180 capsule 3    telmisartan (MICARDIS) 40 MG Tab TAKE 1 TABLET(40 MG) BY MOUTH EVERY DAY 90 tablet 3    [DISCONTINUED] cefdinir (OMNICEF) 300 MG capsule Take 1 capsule (300 mg total) by mouth 2 (two) times daily. for 10 days 20 capsule 0     No facility-administered encounter medications on file as of 11/2/2022.     Orders Placed This Encounter   Procedures    Culture, Respiratory with Gram Stain     Standing Status:   Future     Standing Expiration Date:   1/1/2024    CULTURE, FUNGUS     Standing Status:   Future     Standing Expiration Date:   1/1/2024    AFB Culture & Smear     Standing Status:   Future     Standing Expiration Date:   1/1/2024    CT Chest Without Contrast     Standing Status:   Future     Standing Expiration Date:   11/2/2023     Order Specific  Question:   May the Radiologist modify the order per protocol to meet the clinical needs of the patient?     Answer:   Yes    Complete PFT w/ bronchodilator     Standing Status:   Future     Standing Expiration Date:   11/2/2023     Order Specific Question:   Release to patient     Answer:   Immediate         Plan:       Problem List Items Addressed This Visit          Pulmonary    Interstitial lung disease    Overview     Findings consistent with this on chest x-ray.    - continue antibiotic therapy to completion.  - will order PFTs for baseline  - will order CT thorax, and if findings consistent with ILD will send autoimmune workup for further clarification.  - may be candidate for anti-fibrotic therapy based on CT results.         Chronic cough    Overview     Concern for ILD.  Awaiting studies for further clarification.  - continue therapy with abx as currently prescribed.         Increased sputum production    Overview     Will order AFB/fungal/bacterial sputum culture.            Cardiac/Vascular    Atrial fibrillation    Overview     Not taking amiodarone.  - propafenone not a classic medication to cause pulmonary toxicity.  Ok to continue.  - no other medications on MAR to suggest medication induced lung disease.            Other    Complex sleep apnea syndrome    Overview     Currently prescribed CPAP.          Other Visit Diagnoses       Cough, unspecified type    -  Primary    Interstitial pulmonary disease, unspecified              RTC 6 months or sooner PRN.    Elias Yang MD  [facility]

## 2022-11-04 ENCOUNTER — PATIENT MESSAGE (OUTPATIENT)
Dept: PULMONOLOGY | Facility: CLINIC | Age: 77
End: 2022-11-04
Payer: MEDICARE

## 2022-11-10 ENCOUNTER — HOSPITAL ENCOUNTER (OUTPATIENT)
Dept: RADIOLOGY | Facility: HOSPITAL | Age: 77
Discharge: HOME | End: 2022-11-10
Attending: INTERNAL MEDICINE
Payer: MEDICARE

## 2022-11-10 ENCOUNTER — PATIENT MESSAGE (OUTPATIENT)
Dept: FAMILY MEDICINE | Facility: CLINIC | Age: 77
End: 2022-11-10
Payer: MEDICARE

## 2022-11-10 ENCOUNTER — PATIENT MESSAGE (OUTPATIENT)
Dept: PULMONOLOGY | Facility: CLINIC | Age: 77
End: 2022-11-10
Payer: MEDICARE

## 2022-11-10 DIAGNOSIS — R05.9 COUGH, UNSPECIFIED TYPE: ICD-10-CM

## 2022-11-10 DIAGNOSIS — J84.9 INTERSTITIAL PULMONARY DISEASE, UNSPECIFIED: ICD-10-CM

## 2022-11-10 PROCEDURE — 71250 CT CHEST WITHOUT CONTRAST: ICD-10-PCS | Mod: 26,,, | Performed by: RADIOLOGY

## 2022-11-10 PROCEDURE — 71250 CT THORAX DX C-: CPT | Mod: 26,,, | Performed by: RADIOLOGY

## 2022-11-10 PROCEDURE — 71250 CT THORAX DX C-: CPT | Mod: TC,PO

## 2022-11-10 RX ORDER — ESCITALOPRAM OXALATE 10 MG/1
TABLET ORAL
Qty: 90 TABLET | Refills: 4 | Status: SHIPPED | OUTPATIENT
Start: 2022-11-10 | End: 2024-01-17 | Stop reason: SDUPTHER

## 2022-11-11 ENCOUNTER — TELEPHONE (OUTPATIENT)
Dept: PULMONOLOGY | Facility: OTHER | Age: 77
End: 2022-11-11
Payer: MEDICARE

## 2022-11-11 DIAGNOSIS — J84.112 IDIOPATHIC PULMONARY FIBROSIS: Primary | ICD-10-CM

## 2022-11-11 NOTE — TELEPHONE ENCOUNTER
I spoke with Dr Song today about his CT results that show traction bronchiectasis, honeycombing and findings that are consistent with probable UIP.  I will send off an autoimmune workup and if negative, will consider starting Ofev therapy.  He is in agreement with this plan, and also will go to the advanced lung disease clinic for further input.      Will change his follow up to 2 months.    Elias Yang MD  [facility]

## 2022-11-12 ENCOUNTER — PATIENT MESSAGE (OUTPATIENT)
Dept: FAMILY MEDICINE | Facility: CLINIC | Age: 77
End: 2022-11-12
Payer: MEDICARE

## 2022-11-14 ENCOUNTER — TELEPHONE (OUTPATIENT)
Dept: TRANSPLANT | Facility: CLINIC | Age: 77
End: 2022-11-14
Payer: MEDICARE

## 2022-11-14 DIAGNOSIS — J84.9 INTERSTITIAL LUNG DISEASE: Primary | ICD-10-CM

## 2022-11-14 NOTE — TELEPHONE ENCOUNTER
"22 - Attempted to contact patient regarding the referral to ALD Clinic.  No answer.  Left a message informing him of the referral by Dr. Yang.  Informed him that I would send a message via [facility] regarding scheduling an appointment.  Instructed him to return my call or to respond to my message in [facility].    ----- Message from Shante Ferris MD sent at 2022 12:56 PM CST -----  Regarding: RE: ALD Referral  ALD routine    Testinmwt    ----- Message -----  From: Rachell Garcia RN  Sent: 2022  12:02 PM CST  To: Shante Ferris MD  Subject: ALD Referral                                     Advanced Lung Disease (ALD) Clinic Referral Note    Referral from: Elias Yang MD (last seen on 22)     Lung diagnosis: ILD    Age: 77 y.o.    Height/Weight/BMI:  HT: 6'2" / WT: 93.4 kg /Body mass index is 26.44 kg/m².    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  Chronic interstitial changes are noted bilaterally healing right lateral mid rib fractures are noted.    Chest CT date: 11/10/2022  Impression:  1. Findings of extensive pulmonary fibrosis and bronchiectasis.  2. Small left renal cyst.    Echo (PEPE) date: 06/15/2020    Impression:  · Normal appearing left atrial appendage. No thrombus is present in the appendage. Normal appendage velocities.  · No thrombus is present in the left atrium.  · Normal left ventricular systolic function. The estimated ejection fraction is 60%.  · Normal right ventricular systolic function.       Other pertinent medical history: retired anesthesiologist; AFib, HTN, CKD 3, history of COVID    Recommendations?        "

## 2022-11-14 NOTE — TELEPHONE ENCOUNTER
Referral for ALD Clinic received from Dr. Yang.  Message sent to Dr. Ferris.  Awaiting provider recommendations.

## 2022-11-16 ENCOUNTER — PROCEDURE VISIT (OUTPATIENT)
Dept: UROLOGY | Facility: CLINIC | Age: 77
End: 2022-11-16
Payer: MEDICARE

## 2022-11-16 DIAGNOSIS — R97.20 ELEVATED PSA: ICD-10-CM

## 2022-11-16 DIAGNOSIS — N40.1 BPH WITH URINARY OBSTRUCTION: Primary | ICD-10-CM

## 2022-11-16 DIAGNOSIS — N13.8 BPH WITH URINARY OBSTRUCTION: Primary | ICD-10-CM

## 2022-11-16 DIAGNOSIS — N52.01 ERECTILE DYSFUNCTION DUE TO ARTERIAL INSUFFICIENCY: ICD-10-CM

## 2022-11-16 PROCEDURE — 52000 CYSTOURETHROSCOPY: CPT | Mod: NTX,S$GLB,, | Performed by: STUDENT IN AN ORGANIZED HEALTH CARE EDUCATION/TRAINING PROGRAM

## 2022-11-16 PROCEDURE — 52000 CYSTOSCOPY: ICD-10-PCS | Mod: NTX,S$GLB,, | Performed by: STUDENT IN AN ORGANIZED HEALTH CARE EDUCATION/TRAINING PROGRAM

## 2022-11-16 RX ORDER — MONTELUKAST SODIUM 10 MG/1
10 TABLET ORAL DAILY
COMMUNITY
Start: 2022-10-23 | End: 2022-11-22 | Stop reason: ALTCHOICE

## 2022-11-16 NOTE — PROCEDURES
Cystoscopy    Date/Time: 11/16/2022 9:00 AM  Performed by: Yakov Shetty MD  Authorized by: Yakov Shetty MD     Procedure:   Flexible cysto-uretheroscopy    Pre Procedure Diagnosis:  BPH and LUTS    Post Procedure Diagnosis:  Same    Surgeon: Yakov Shetty MD     Anesthesia: 2% uro-jet lidocaine jelly for local analgesia    Procedure note:  The risks and benefits were explained and informed consent was obtained. 2% lidocaine urojet was used for local analgesia. The genitalia was prepped and draped in the sterile fashion.    The flexible scope was advanced into the urethra and into the bladder. There were no urethral strictures noted during scope passage.     The bladder was completely surveyed in a systematic fashion. The bilateral ureteral orifices were identified in their normal orthotopic locations bilaterally. The remainder of the bladder was evaluated. There were no foreign bodies, stones, diverticula, or trabeculations. No bladder tumors or lesions suspicious for malignancy were seen.     Upon retroflexion there was no significant median lobe enlargement. As the flexible cystoscope was being withdrawn, the prostate was evaluated carefully. The lateral lobes of the prostate were moderately enlarged. The estimated prostatic urethral length was 5 cm.     The patient tolerated the procedure well without complication.     Findings in summary:  BPH          Assessment: 77 y.o. male with BPH and LUTS.    We discussed the association of lower urinary tract symptoms (LUTS) and prostate enlargement (BPH). The management of BPH varies widely depending on the degree of bother, adverse events related to BPH, and the overall size of the prostate. We discussed behavioral modifications and medical management with alpha-blockers or 5 alpha-reductase inhibitors. We reviewed the procedural options including minimally invasive procedures such as Rezum and Urolift, more involved procedures such as bipolar and laser TURPs, and the  more invasive simple or subtotal prostatectomy with both robotic and open approaches. We reviewed the indications for each as well as the risks and benefits associated with each procedure. We discussed the possible need for re-treatment based on the pre-operative clinical features as well as type of procedure performed.      Discussed this may be a staged approach treating the outlet and then treating overactivity.     Plan:  He is interested in Urolift  Needs to hold his Eliquis the 48 hours prior to the day of surgery

## 2022-11-17 ENCOUNTER — DOCUMENTATION ONLY (OUTPATIENT)
Dept: TRANSPLANT | Facility: CLINIC | Age: 77
End: 2022-11-17
Payer: MEDICARE

## 2022-11-21 ENCOUNTER — PATIENT MESSAGE (OUTPATIENT)
Dept: TRANSPLANT | Facility: CLINIC | Age: 77
End: 2022-11-21

## 2022-11-21 ENCOUNTER — HOSPITAL ENCOUNTER (OUTPATIENT)
Dept: PULMONOLOGY | Facility: CLINIC | Age: 77
Discharge: HOME | End: 2022-11-21
Payer: MEDICARE

## 2022-11-21 ENCOUNTER — OFFICE VISIT (OUTPATIENT)
Dept: TRANSPLANT | Facility: CLINIC | Age: 77
End: 2022-11-21
Payer: MEDICARE

## 2022-11-21 ENCOUNTER — TELEPHONE (OUTPATIENT)
Dept: TRANSPLANT | Facility: CLINIC | Age: 77
End: 2022-11-21
Payer: MEDICARE

## 2022-11-21 ENCOUNTER — TELEPHONE (OUTPATIENT)
Dept: UROLOGY | Facility: CLINIC | Age: 77
End: 2022-11-21
Payer: MEDICARE

## 2022-11-21 VITALS — WEIGHT: 199.5 LBS | HEIGHT: 71 IN | BODY MASS INDEX: 27.93 KG/M2

## 2022-11-21 VITALS
HEIGHT: 71 IN | RESPIRATION RATE: 20 BRPM | WEIGHT: 198.44 LBS | DIASTOLIC BLOOD PRESSURE: 86 MMHG | SYSTOLIC BLOOD PRESSURE: 164 MMHG | HEART RATE: 60 BPM | OXYGEN SATURATION: 100 % | TEMPERATURE: 97 F | BODY MASS INDEX: 27.78 KG/M2

## 2022-11-21 DIAGNOSIS — J84.9 INTERSTITIAL LUNG DISEASE: ICD-10-CM

## 2022-11-21 DIAGNOSIS — N40.1 BPH WITH URINARY OBSTRUCTION: Primary | ICD-10-CM

## 2022-11-21 DIAGNOSIS — R05.9 COUGH, UNSPECIFIED TYPE: ICD-10-CM

## 2022-11-21 DIAGNOSIS — N13.8 BPH WITH URINARY OBSTRUCTION: Primary | ICD-10-CM

## 2022-11-21 DIAGNOSIS — J84.9 INTERSTITIAL LUNG DISEASE: Primary | ICD-10-CM

## 2022-11-21 DIAGNOSIS — J84.9 ILD (INTERSTITIAL LUNG DISEASE): Primary | ICD-10-CM

## 2022-11-21 DIAGNOSIS — J84.112 IDIOPATHIC PULMONARY FIBROSIS: ICD-10-CM

## 2022-11-21 PROCEDURE — 94729 DIFFUSING CAPACITY: CPT | Mod: NTX,S$GLB,, | Performed by: INTERNAL MEDICINE

## 2022-11-21 PROCEDURE — 1160F PR REVIEW ALL MEDS BY PRESCRIBER/CLIN PHARMACIST DOCUMENTED: ICD-10-PCS | Mod: CPTII,NTX,S$GLB, | Performed by: INTERNAL MEDICINE

## 2022-11-21 PROCEDURE — 94729 PR C02/MEMBANE DIFFUSE CAPACITY: ICD-10-PCS | Mod: NTX,S$GLB,, | Performed by: INTERNAL MEDICINE

## 2022-11-21 PROCEDURE — 99999 PR PBB SHADOW E&M-EST. PATIENT-LVL IV: ICD-10-PCS | Mod: PBBFAC,TXP,, | Performed by: INTERNAL MEDICINE

## 2022-11-21 PROCEDURE — 3077F PR MOST RECENT SYSTOLIC BLOOD PRESSURE >= 140 MM HG: ICD-10-PCS | Mod: CPTII,NTX,S$GLB, | Performed by: INTERNAL MEDICINE

## 2022-11-21 PROCEDURE — 1159F PR MEDICATION LIST DOCUMENTED IN MEDICAL RECORD: ICD-10-PCS | Mod: CPTII,NTX,S$GLB, | Performed by: INTERNAL MEDICINE

## 2022-11-21 PROCEDURE — 1101F PR PT FALLS ASSESS DOC 0-1 FALLS W/OUT INJ PAST YR: ICD-10-PCS | Mod: CPTII,NTX,S$GLB, | Performed by: INTERNAL MEDICINE

## 2022-11-21 PROCEDURE — 1160F RVW MEDS BY RX/DR IN RCRD: CPT | Mod: CPTII,NTX,S$GLB, | Performed by: INTERNAL MEDICINE

## 2022-11-21 PROCEDURE — 3077F SYST BP >= 140 MM HG: CPT | Mod: CPTII,NTX,S$GLB, | Performed by: INTERNAL MEDICINE

## 2022-11-21 PROCEDURE — 1157F PR ADVANCE CARE PLAN OR EQUIV PRESENT IN MEDICAL RECORD: ICD-10-PCS | Mod: CPTII,NTX,S$GLB, | Performed by: INTERNAL MEDICINE

## 2022-11-21 PROCEDURE — 94726 PULM FUNCT TST PLETHYSMOGRAP: ICD-10-PCS | Mod: NTX,S$GLB,, | Performed by: INTERNAL MEDICINE

## 2022-11-21 PROCEDURE — 1101F PT FALLS ASSESS-DOCD LE1/YR: CPT | Mod: CPTII,NTX,S$GLB, | Performed by: INTERNAL MEDICINE

## 2022-11-21 PROCEDURE — 94060 PR EVAL OF BRONCHOSPASM: ICD-10-PCS | Mod: NTX,S$GLB,, | Performed by: INTERNAL MEDICINE

## 2022-11-21 PROCEDURE — 3288F FALL RISK ASSESSMENT DOCD: CPT | Mod: CPTII,NTX,S$GLB, | Performed by: INTERNAL MEDICINE

## 2022-11-21 PROCEDURE — 99214 PR OFFICE/OUTPT VISIT, EST, LEVL IV, 30-39 MIN: ICD-10-PCS | Mod: 25,NTX,S$GLB, | Performed by: INTERNAL MEDICINE

## 2022-11-21 PROCEDURE — 94060 EVALUATION OF WHEEZING: CPT | Mod: NTX,S$GLB,, | Performed by: INTERNAL MEDICINE

## 2022-11-21 PROCEDURE — 94618 PULMONARY STRESS TESTING: ICD-10-PCS | Mod: NTX,S$GLB,, | Performed by: INTERNAL MEDICINE

## 2022-11-21 PROCEDURE — 1159F MED LIST DOCD IN RCRD: CPT | Mod: CPTII,NTX,S$GLB, | Performed by: INTERNAL MEDICINE

## 2022-11-21 PROCEDURE — 3079F DIAST BP 80-89 MM HG: CPT | Mod: CPTII,NTX,S$GLB, | Performed by: INTERNAL MEDICINE

## 2022-11-21 PROCEDURE — 99999 PR PBB SHADOW E&M-EST. PATIENT-LVL IV: CPT | Mod: PBBFAC,TXP,, | Performed by: INTERNAL MEDICINE

## 2022-11-21 PROCEDURE — 94726 PLETHYSMOGRAPHY LUNG VOLUMES: CPT | Mod: NTX,S$GLB,, | Performed by: INTERNAL MEDICINE

## 2022-11-21 PROCEDURE — 99214 OFFICE O/P EST MOD 30 MIN: CPT | Mod: 25,NTX,S$GLB, | Performed by: INTERNAL MEDICINE

## 2022-11-21 PROCEDURE — 3288F PR FALLS RISK ASSESSMENT DOCUMENTED: ICD-10-PCS | Mod: CPTII,NTX,S$GLB, | Performed by: INTERNAL MEDICINE

## 2022-11-21 PROCEDURE — 94618 PULMONARY STRESS TESTING: CPT | Mod: NTX,S$GLB,, | Performed by: INTERNAL MEDICINE

## 2022-11-21 PROCEDURE — 3079F PR MOST RECENT DIASTOLIC BLOOD PRESSURE 80-89 MM HG: ICD-10-PCS | Mod: CPTII,NTX,S$GLB, | Performed by: INTERNAL MEDICINE

## 2022-11-21 PROCEDURE — 1157F ADVNC CARE PLAN IN RCRD: CPT | Mod: CPTII,NTX,S$GLB, | Performed by: INTERNAL MEDICINE

## 2022-11-21 NOTE — LETTER
November 21, 2022        Elias Yang  [address]  [facility] [address]  Phone: [phone]  Fax: [phone]             [facility] 1st Fl  [address]  [facility] [address]  Phone: [phone]   Patient: Dominick Song MD   MR Number: [id number]   YOB: 1945   Date of Visit: 11/21/2022       Dear Dr. Elias Yang    Thank you for referring Dominick Song to me for evaluation. Attached you will find relevant portions of my assessment and plan of care.    If you have questions, please do not hesitate to call me. I look forward to following Dominick Song along with you.    Sincerely,    Shante Ferris MD    Enclosure    If you would like to receive this communication electronically, please contact [email or web address] or [phone] to request [employer] access.    [employer] is a tool which provides read-only access to select patient information with whom you have a relationship. Its easy to use and provides real time access to review your patients record including encounter summaries, notes, results, and demographic information.    If you feel you have received this communication in error or would no longer like to receive these types of communications, please e-mail [email or web address]

## 2022-11-21 NOTE — TELEPHONE ENCOUNTER
Patient checked in for his lab appointment scheduled today but left before his blood was drawn. The lab orders linked to today's lab appointment are no longer active and cannot be linked to the rescheduled lab appointment requested by the patient. New lab orders entered with permission from Dr. Ferris.

## 2022-11-21 NOTE — PROGRESS NOTES
ADVANCED LUNG DISEASE INITIAL EVALUATION                                                                                                                                           Reason for Visit:  ILD    Referring Physician: Elias Yang MD    History of Present Illness: Dominick KENNEDY MD Duncan is a 77 y.o. male who is on 0L of oxygen.  He is on no assisted ventilation.  His New York Heart Association Class is I and a Karnofsky score of 100% - Normal, No Complaints, no evidence of disease. He is not diabetic.    Dominick Song has a past medical history of TIA, depression, A-Fib, HTN, BPH, CKD 3, history of COVID, anemia of chronic blood loss, Hypothyroidism, PUD, osteoarthritis, complex sleep apnea syndrome, who presents as a referral for ILD. He has been seen by Dr. Elias Yang.  Today he is in his usual state of health.  His most recent chest CT shows traction bronchiectasis and honeycombing, findings that are consistent with probable UIP.  Autoimmune panel is pending.  He has has no respiratory symptoms.  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 Has no history of allergies/sinusitis, GERD or aspiration.  Prescribed CPAP, but has issues wearing it.     Past Medical History:   Diagnosis Date    Anticoagulant long-term use     Anxiety     Arthritis     Atrial fibrillation     BPH (benign prostatic hyperplasia)     Cataract     CKD (chronic kidney disease) stage 3, GFR 30-59 ml/min 7/10/2017    Followed by Dr. Jeevan Pond    Colon polyp     benign    Depression     Elevated PSA     Erectile dysfunction     Gastric ulcer with hemorrhage     Hep B w/o coma 1977    History of bleeding peptic ulcer     History of prostatitis     Hypertension     PAF (paroxysmal atrial fibrillation)     Sleep apnea     on CPAP    Stroke     TIA December 2019    Thyroid disease        Past Surgical History:   Procedure Laterality Date    ABDOMINAL HERNIA REPAIR      ABLATION OF ARRHYTHMOGENIC FOCUS FOR ATRIAL  FIBRILLATION N/A 6/15/2020    Procedure: Ablation atrial fibrillation;  Surgeon: Wyatt Beatty MD;  Location: [facility] EP LAB;  Service: Cardiology;  Laterality: N/A;  PAF, AFl, PEPE, PVI re-do, CTI, RFA, JUSTIN, Gen, SK, 3 Prep    CATARACT EXTRACTION  11/25/2013    bilateral    CHOLECYSTECTOMY      COLONOSCOPY N/A 11/25/2015    Procedure: COLONOSCOPY;  Surgeon: Toby Hernandez MD;  Location: [facility] ENDO;  Service: Endoscopy;  Laterality: N/A;    COLONOSCOPY N/A 11/13/2020    Procedure: COLONOSCOPY;  Surgeon: Toby Hernandez MD;  Location: [facility] ENDO;  Service: Endoscopy;  Laterality: N/A;    EYE SURGERY      GASTRIC BYPASS  1992    INTRAMEDULLARY RODDING OF FEMUR Left 7/18/2020    Procedure: INSERTION, INTRAMEDULLARY ERIC, FEMUR, left, Synthes, [place] table, Large C arm clock side,;  Surgeon: Tony Rodriguez MD;  Location: [facility] OR [address] [place] [address];  Service: Orthopedics;  Laterality: Left;    KNEE ARTHROSCOPY      RT    LASIK  2001    both eyes (Dr. Rabago)    ORIF HUMERUS FRACTURE      LT    ORIF HUMERUS FRACTURE Right     non surgical repair    RADIOFREQUENCY ABLATION      x2    Right ankle tendon repair      ROBOT-ASSISTED REPAIR OF INCISIONAL HERNIA USING DA GARETH XI Left 6/13/2022    Procedure: XI ROBOTIC REPAIR, HERNIA, INCISIONAL (LEFT SIDE SPIGELIAN WITH MESH);  Surgeon: Rosendo Marti MD;  [address];  Service: General;  Laterality: Left;  consent in am    ROTATOR CUFF REPAIR      right    TOTAL KNEE ARTHROPLASTY Right 5/29/2018    Procedure: REPLACEMENT-KNEE-TOTAL-pro;  Surgeon: Denzel Dallas MD;  [facility];  Service: Orthopedics;  Laterality: Right;  Pro    TREATMENT OF CARDIAC ARRHYTHMIA  6/15/2020    Procedure: Cardioversion or Defibrillation;  Surgeon: Wyatt Beatty MD;  Location: [facility] EP LAB;  Service: Cardiology;;       Allergies: No known drug allergies    Current Outpatient Medications   Medication Sig    acyclovir (ZOVIRAX) 800 MG Tab TK ONE T PO FIVE TIMES  D only for fever blisters    buPROPion (WELLBUTRIN XL) 150 MG TB24 tablet Take 1 tablet (150 mg total) by mouth once daily.    buPROPion (WELLBUTRIN XL) 300 MG 24 hr tablet TAKE 1 TABLET(300 MG) BY MOUTH EVERY DAY    calcium citrate (CALCITRATE) 200 mg (950 mg) tablet Take 1 tablet (200 mg total) by mouth 2 (two) times daily.    carvediloL (COREG) 12.5 MG tablet TAKE 1 TABLET(12.5 MG) BY MOUTH TWICE DAILY WITH MEALS    cyclobenzaprine (FLEXERIL) 10 MG tablet TAKE 1 TABLET(10 MG) BY MOUTH THREE TIMES DAILY AS NEEDED FOR MUSCLE SPASMS    ELIQUIS 5 mg Tab Take 1 tablet (5 mg total) by mouth 2 (two) times a day.    EScitalopram oxalate (LEXAPRO) 10 MG tablet TAKE 1 TABLET(10 MG) BY MOUTH EVERY DAY    HYDROcodone-acetaminophen (NORCO) 5-325 mg per tablet Take 1 tablet by mouth every 6 (six) hours as needed for Pain.    levothyroxine (SYNTHROID) 75 MCG tablet Take 1 tablet (75 mcg total) by mouth before breakfast.    methocarbamoL (ROBAXIN) 500 MG Tab TAKE 1 TABLET(500 MG) BY MOUTH THREE TIMES DAILY FOR 10 DAYS    montelukast (SINGULAIR) 10 mg tablet Take 10 mg by mouth once daily.    OMEGA-3 FATTY ACIDS (FISH OIL CONCENTRATE ORAL) Take by mouth Daily.    propafenone (RYTHMOL SR) 425 MG Cp12 TAKE 1 CAPSULE(425 MG) BY MOUTH EVERY 12 HOURS    telmisartan (MICARDIS) 40 MG Tab TAKE 1 TABLET(40 MG) BY MOUTH EVERY DAY     No current facility-administered medications for this visit.       Immunization History   Administered Date(s) Administered    COVID-19, MRNA, LN-S, PF (Pfizer) (Gray Cap) 07/02/2022    COVID-19, MRNA, LN-S, PF (Pfizer) (Purple Cap) 01/10/2021, 01/31/2021, 09/14/2021    COVID-19, mRNA, LNP-S, bivalent booster, PF (PFIZER OMICRON) 09/08/2022    Influenza 10/11/2007    Influenza (FLUAD) - Quadrivalent - Adjuvanted - PF *Preferred* (65+) 09/27/2021    Influenza - High Dose - PF (65 years and older) 09/13/2012, 09/15/2016, 10/16/2017, 10/03/2018, 10/07/2019    Influenza - Quadrivalent - High Dose - PF (65 years  "and older) 09/10/2020, 09/08/2022    Influenza A (H1N1) 2009 Monovalent - IM 10/30/2009    Influenza Split 10/11/2007, 09/01/2011    Pneumococcal Conjugate - 13 Valent 11/28/2016    Pneumococcal Conjugate - 7 Valent 09/13/2012    Pneumococcal Polysaccharide - 23 Valent 04/23/2018    Tdap 03/26/2015    Zoster 08/07/2013    Zoster Recombinant 08/17/2020, 10/26/2020     Family History:    Family History   Problem Relation Age of Onset    COPD Father     Diabetes Father     Osteoporosis Father     Aortic stenosis Mother     Heart disease Mother         aortic stenosis    Osteoporosis Mother     Heart attack Brother     No Known Problems Son     No Known Problems Daughter     No Known Problems Daughter     No Known Problems Daughter      Social History     Substance and Sexual Activity   Alcohol Use No      Social History     Substance and Sexual Activity   Drug Use No      Social History     Socioeconomic History    Marital status:     Number of children: 5   Occupational History    Occupation: retired anesthesiology     Employer: [employer]    Occupation: [employer] grad     Comment: previous football player   Substance and Sexual Activity    Alcohol use: No    Drug use: No    Sexual activity: Yes     Partners: Female     Review of Systems   Constitutional:  Negative for chills, fever and malaise/fatigue.   HENT:  Negative for congestion, ear pain, sinus pain and sore throat.    Eyes:  Negative for discharge and redness.   Respiratory:  Negative for sputum production, shortness of breath and wheezing.    Cardiovascular:  Negative for chest pain and palpitations.   Gastrointestinal:  Negative for abdominal pain, diarrhea and vomiting.   Genitourinary:  Negative for dysuria.   Musculoskeletal:  Negative for myalgias.   Skin:  Negative for rash.   Neurological:  Negative for dizziness.   Vitals  BP (!) 164/86   Pulse 60   Temp 97.2 °F (36.2 °C) (Oral)   Resp 20   Ht 5' 11" (1.803 m)   Wt 90 kg " (198 lb 6.6 oz)   SpO2 100%   BMI 27.67 kg/m²   Physical Exam  Constitutional:       General: He is not in acute distress.     Appearance: Normal appearance.   HENT:      Head: Normocephalic and atraumatic.      Nose: No congestion or rhinorrhea.      Mouth/Throat:      Pharynx: No oropharyngeal exudate or posterior oropharyngeal erythema.   Eyes:      General: No scleral icterus.     Extraocular Movements: Extraocular movements intact.      Conjunctiva/sclera: Conjunctivae normal.   Cardiovascular:      Rate and Rhythm: Normal rate and regular rhythm.      Pulses: Normal pulses.   Pulmonary:      Effort: Pulmonary effort is normal. No respiratory distress.      Breath sounds: Normal breath sounds. No stridor. No wheezing, rhonchi or rales.   Chest:      Chest wall: No tenderness.   Abdominal:      General: There is no distension.      Palpations: Abdomen is soft.   Musculoskeletal:         General: No swelling. Normal range of motion.      Cervical back: Neck supple. No tenderness.      Right lower leg: No edema.      Left lower leg: No edema.   Lymphadenopathy:      Cervical: No cervical adenopathy.   Skin:     General: Skin is warm and dry.   Neurological:      Mental Status: He is alert and oriented to person, place, and time.   Psychiatric:         Mood and Affect: Mood normal.         Behavior: Behavior normal.         Thought Content: Thought content normal.         Judgment: Judgment normal.       Labs:  Hospital Outpatient Visit on 11/21/2022   Component Date Value    Post FVC 11/21/2022 3.14     Post FEV5 11/21/2022 2.33     Post FEV1 11/21/2022 2.69     Post FEV1 FVC 11/21/2022 85.50     Post FEF 25 75 11/21/2022 3.69     Post PEF 11/21/2022 9.04     Post  11/21/2022 4.29     Pre DLCO 11/21/2022 15.13 (L)     DLCOVA PRE 11/21/2022 3.33     VA PRE 11/21/2022 4.54 (L)     IVC PRE 11/21/2022 3.25     Pre TLC 11/21/2022 5.06 (L)     VC PRE 11/21/2022 3.25     PRE IC 11/21/2022 2.30     Pre FRC PL  11/21/2022 2.76 (L)     Pre ERV 11/21/2022 0.95     Pre RV 11/21/2022 1.81 (L)     RVTLC PRE 11/21/2022 35.86     Raw PRE 11/21/2022 2.63 (L)     sGaw PRE 11/21/2022 0.12 (H)     Pre VTG 11/21/2022 2.94     Pre FVC 11/21/2022 3.21     PRE FEV5 11/21/2022 2.29     Pre FEV1 11/21/2022 2.70     Pre FEV1 FVC 11/21/2022 83.98     Pre FEF 25 75 11/21/2022 3.43     Pre PEF 11/21/2022 6.68     Pre  11/21/2022 6.47     FVC Ref 11/21/2022 4.16     FVC LLN 11/21/2022 3.04     FVC Pre Ref 11/21/2022 77.1     FEV05 REF 11/21/2022 2.63     FEV05 LLN 11/21/2022 1.50     PRE FEV05 REF 11/21/2022 87.1     FEV1 Ref 11/21/2022 3.10     FEV1 LLN 11/21/2022 2.18     FEV1 Pre Ref 11/21/2022 87.0     FEV1 FVC Ref 11/21/2022 75     FEV1 FVC LLN 11/21/2022 61     FEV1 FVC Pre Ref 11/21/2022 112.0     FEF 25 75 Ref 11/21/2022 2.23     FEF 25 75 LLN 11/21/2022 0.89     FEF 25 75 Pre Ref 11/21/2022 153.9     PEF Ref 11/21/2022 7.94     PEF LLN 11/21/2022 5.56     PEF Pre Ref 11/21/2022 84.1     FVC VOL CHG 11/21/2022 -0.07     FEV1 VOL CHG 11/21/2022 -0.01     FVC Chg 11/21/2022 -2.1     FEV1 Chg 11/21/2022 -0.3     QTI833 Chg 11/21/2022 -33.6     TLC Ref 11/21/2022 7.33     TLC LLN 11/21/2022 6.18     TLC ULN 11/21/2022 8.48     TLC Pre Ref 11/21/2022 69.0     VC Ref 11/21/2022 4.16     VC LLN 11/21/2022 3.04     VC ULN 11/21/2022 5.31     VC Pre Ref 11/21/2022 77.9     REF IC 11/21/2022 3.21     LLN IC 11/21/2022 -4207830.79     ULN IC 11/21/2022 [id number]     PRE REF IC 11/21/2022 71.4     FRCpleth Ref 11/21/2022 3.81     FRCpleth LLN 11/21/2022 2.83     FRC PLETH ULN 11/21/2022 4.80     FRCpleth PreRef 11/21/2022 72.5     ERV Ref 11/21/2022 1.01     ERV LLN 11/21/2022 -87610.99     ERV ULN 11/21/2022 02714.01     ERV Pre Ref 11/21/2022 94.1     RV Ref 11/21/2022 2.80     RV LLN 11/21/2022 2.13     RV ULN 11/21/2022 3.48     RV Pre Ref 11/21/2022 64.7     RVTLC Ref 11/21/2022 44     RVTLC LLN 11/21/2022 35     RV TLC ULN 11/21/2022  53     RVTLC Pre Ref 11/21/2022 82.2     Raw Ref 11/21/2022 3.06     Raw Pre Ref 11/21/2022 85.8     sGaw Ref 11/21/2022 0.08     sGaw Pre Ref 11/21/2022 142.7     DLCO Single Breath Ref 11/21/2022 26.84     DLCO Single Breath LLN 11/21/2022 19.92     DLCO SINGLEBREATH ULN 11/21/2022 33.77     DLCO Single Breath Pre R* 11/21/2022 56.4     DLCOc Single Breath Ref 11/21/2022 26.84     DLCOc Single Breath LLN 11/21/2022 19.92     DLCOC SINGLEBREATH ULN 11/21/2022 33.77     DLCOVA Ref 11/21/2022 3.66     DLCOVA LLN 11/21/2022 2.55     DLCOVA ULN 11/21/2022 4.77     DLCOVA Pre Ref 11/21/2022 91.0     DLCOc SBVA Ref 11/21/2022 3.66     DLCOc SBVA LLN 11/21/2022 2.55     DLCOCSBVA ULN 11/21/2022 4.77     VA Single Breath Ref 11/21/2022 7.18     VA Single Breath LLN 11/21/2022 7.18     VA SINGLEBREATH ULN 11/21/2022 7.18     VA Single Breath Pre Ref 11/21/2022 63.2     IVC Single Breath Ref 11/21/2022 4.16     IVC Single Breath LLN 11/21/2022 3.04     IVC SINGLEBREATH ULN 11/21/2022 5.31     IVC Single Breath Pre Ref 11/21/2022 77.9        No flowsheet data found.  6MW 11/21/2022   6MWT Status completed without stopping   Patient Reported Leg pain   Was O2 used? No   6MW Distance walked (feet) 1200   Distance walked (meters) 365.76   Did patient stop? No   Oxygen Saturation 96   Supplemental Oxygen Room Air   Heart Rate 52   Blood Pressure 172/83   Meena Dyspnea Rating  nothing at all   Oxygen Saturation 99   Supplemental Oxygen Room Air   Heart Rate 65   Blood Pressure 195/96   Meena Dyspnea Rating  very light   Recovery Time (seconds) 136   Oxygen Saturation 97   Supplemental Oxygen Room Air   Heart Rate 54       Imaging:  Results for orders placed during the hospital encounter of 10/25/22    X-Ray Chest PA And Lateral    Narrative  EXAMINATION:  XR CHEST PA AND LATERAL    CLINICAL HISTORY:  confirm CAP; Pneumonia, unspecified organism    TECHNIQUE:  PA and lateral views of the chest were performed.    COMPARISON:  July 17,  2020    FINDINGS:  Coarse areas of interstitial scarring are seen in both oleksandr thoraces in a fairly diffuse and peripheral pattern.  The heart is not enlarged.  There is focal eventration of the right hemidiaphragm.  There is chronic relative apparent elevation left hemidiaphragm.  There is evidence of prior ORIF of the proximal left humerus.  The heart is not enlarged.  The no definite new airspace disease is evident.  The aorta is tortuous.  Healing of 5th 6 and 7th right-sided rib fractures are noted.    Impression  No definite acute cardiopulmonary disease.  Chronic interstitial changes are noted bilaterally healing right lateral mid rib fractures are noted.      Electronically signed by: Stepan Crane MD  Date:    10/25/2022  Time:    09:40    Results for orders placed during the hospital encounter of 12/27/21    DXA Bone Density Spine And Hip    Narrative  EXAMINATION:  DEXA BONE DENSITY SPINE HIP    CLINICAL HISTORY:  h/o osteoporosis. needs follow up; Age-related osteoporosis with current pathological fracture, unspecified site, subsequent encounter for fracture with routine healing    TECHNIQUE:  DXA scanning was performed over the right hip and lumbar spine.  Review of the images confirms satisfactory positioning and technique.    COMPARISON:  12/15/2021, 11/04/2019    FINDINGS:  The L1 to L4 vertebral bone mineral density is equal to 1.008 g/cm squared with a T score of -0.8.  There has been a 8.2% statistically significant change relative to the prior study.    The right femoral neck bone mineral density is equal to 0.578 g/cm squared with a T score of -2.6.    There is a 16% risk of a major osteoporotic fracture and a 6.3% risk of hip fracture in the next 10 years (FRAX).    Impression  Osteoporosis.    Consider FDA approved medical therapies in postmenopausal women and men aged 50 years and older, based on the following:    *A hip or vertebral (clinical or morphometric) fracture  *T score less than or  equal to -2.5 at the femoral neck or spine after appropriate evaluation to exclude secondary causes.  *Low bone mass -- also known as osteopenia (T score between -1.0 and -2.5 at the femoral neck or spine) and a 10 year probability of hip fracture greater than or equal to 3% or a 10 year probability of major osteoporosis-related fracture greater than or equal to 20% based on the [place]-adapted WHO algorithm.  *Clinicians judgment and/or patient preference may indicate treatment for people with 10 year fracture probabilities is above or below these levels.      Electronically signed by: Elias Bryant  Date:    12/27/2021  Time:    14:13    No valid procedures specified.  No results found for this or any previous visit.    No results found for this or any previous visit.    Results for orders placed during the hospital encounter of 12/15/20    CT Abdomen Pelvis With Contrast    Narrative  EXAMINATION:  CT ABDOMEN PELVIS WITH CONTRAST    CLINICAL HISTORY:  LLQ abdominal pain, diverticulitis suspected; Left lower quadrant pain    TECHNIQUE:  Low dose axial images, sagittal and coronal reformations were obtained from the lung bases to the pubic symphysis following the IV administration of 100 mL of Omnipaque 350 and oral administration of 1000 mL omni 12.    COMPARISON:  None.    FINDINGS:  Images of the lung bases demonstrate fibrotic changes of the lower lobes.  The liver, spleen, pancreas, adrenal glands, and kidneys are normal in size and appearance.  The gallbladder is surgically absent.  The bile ducts are not dilated.  The aorta tapers normally.  No lymph node enlargement, ascites, or inflammatory changes are evident in the abdomen or pelvis.  There is no evidence of diverticulitis.  The prostate can land is moderately enlarged.  The bladder is unremarkable.  There has been orthopedic fixation of the left femoral neck with a dynamic compression screw.    Impression  1. No evidence of diverticulitis or other inflammatory  process.  2. Bibasilar pulmonary fibrotic change.  3. Enlarged prostate gland.      Electronically signed by: Moreno Schneider MD  Date:    12/15/2020  Time:    12:27  Results for orders placed or performed during the hospital encounter of 11/10/22 (from the past 2160 hour(s))   CT Chest Without Contrast    Impression    1. Findings of extensive pulmonary fibrosis and bronchiectasis.  2. Small left renal cyst.      Electronically signed by: Moreno Schneider MD  Date:    11/10/2022  Time:    14:22     *Note: Due to a large number of results and/or encounters for the requested time period, some results have not been displayed. A complete set of results can be found in Results Review.           Assessment:  1. ILD (interstitial lung disease)    2. Idiopathic pulmonary fibrosis      Plan: Chest CT with extensive pulmonary fibrosis and noted patchy areas of bronchiectasis and honeycombing bilaterally.  Likely UIP.  No desaturations on 6MWT.  Patient with no symptoms.  Autoimmune workup is pending.  If negative, can consider starting an anti-fibrotic therapy such as OFEV.  Patient can return to clinic in 6 weeks with spirometry, at which time we can further evaluate the possibility of starting OFEV vs watchful monitoring since he is asymptomatic and doing well.       Andres Conrad NP  Lung Transplant/Advanced Lung Disease

## 2022-11-21 NOTE — TELEPHONE ENCOUNTER
Per current guideline recommendations, patient can be cleared to hold Eliquis for 2 days prior to procedure and may resume at MD discretion (per Dr Beatty).   Thanks

## 2022-11-22 ENCOUNTER — LAB VISIT (OUTPATIENT)
Dept: LAB | Facility: HOSPITAL | Age: 77
End: 2022-11-22
Attending: FAMILY MEDICINE
Payer: MEDICARE

## 2022-11-22 ENCOUNTER — TELEPHONE (OUTPATIENT)
Dept: UROLOGY | Facility: CLINIC | Age: 77
End: 2022-11-22
Payer: MEDICARE

## 2022-11-22 DIAGNOSIS — J84.9 INTERSTITIAL LUNG DISEASE: ICD-10-CM

## 2022-11-22 LAB
CCP AB SER IA-ACNC: 1.9 U/ML
CK SERPL-CCNC: 36 U/L (ref 20–200)
CRP SERPL-MCNC: <0.3 MG/L (ref 0–8.2)
DLCO SINGLE BREATH LLN: 19.92
DLCO SINGLE BREATH PRE REF: 56.4 %
DLCO SINGLE BREATH REF: 26.84
DLCOC SBVA LLN: 2.55
DLCOC SBVA REF: 3.66
DLCOC SINGLE BREATH LLN: 19.92
DLCOC SINGLE BREATH REF: 26.84
DLCOCSBVAULN: 4.77
DLCOCSINGLEBREATHULN: 33.77
DLCOSINGLEBREATHULN: 33.77
DLCOVA LLN: 2.55
DLCOVA PRE REF: 91 %
DLCOVA PRE: 3.33 ML/(MIN*MMHG*L) (ref 2.55–4.77)
DLCOVA REF: 3.66
DLCOVAULN: 4.77
ERV LLN: -16448.99
ERV PRE REF: 94.1 %
ERV REF: 1.01
ERVULN: ABNORMAL
ERYTHROCYTE [SEDIMENTATION RATE] IN BLOOD BY PHOTOMETRIC METHOD: 10 MM/HR (ref 0–23)
FEF 25 75 LLN: 0.89
FEF 25 75 PRE REF: 153.9 %
FEF 25 75 REF: 2.23
FET100 CHG: -33.6 %
FEV05 LLN: 1.5
FEV05 REF: 2.63
FEV1 CHG: -0.3 %
FEV1 FVC LLN: 61
FEV1 FVC PRE REF: 112 %
FEV1 FVC REF: 75
FEV1 LLN: 2.18
FEV1 PRE REF: 87 %
FEV1 REF: 3.1
FEV1 VOL CHG: -0.01
FRCPLETH LLN: 2.83
FRCPLETH PREREF: 72.5 %
FRCPLETH REF: 3.81
FRCPLETHULN: 4.8
FVC CHG: -2.1 %
FVC LLN: 3.04
FVC PRE REF: 77.1 %
FVC REF: 4.16
FVC VOL CHG: -0.07
IVC PRE: 3.25 L (ref 3.04–5.31)
IVC SINGLE BREATH LLN: 3.04
IVC SINGLE BREATH PRE REF: 77.9 %
IVC SINGLE BREATH REF: 4.16
IVCSINGLEBREATHULN: 5.31
LLN IC: -9999996.79
PEF LLN: 5.56
PEF PRE REF: 84.1 %
PEF REF: 7.94
PHYSICIAN COMMENT: ABNORMAL
POST FEF 25 75: 3.69 L/S (ref 0.89–4.17)
POST FET 100: 4.29 SEC
POST FEV1 FVC: 85.5 % (ref 60.77–87.7)
POST FEV1: 2.69 L (ref 2.18–3.95)
POST FEV5: 2.33 L (ref 1.5–3.77)
POST FVC: 3.14 L (ref 3.04–5.31)
POST PEF: 9.04 L/S (ref 5.56–10.33)
PRE DLCO: 15.13 ML/(MIN*MMHG) (ref 19.92–33.77)
PRE ERV: 0.95 L (ref -16448.99–16451.01)
PRE FEF 25 75: 3.43 L/S (ref 0.89–4.17)
PRE FET 100: 6.47 SEC
PRE FEV05 REF: 87.1 %
PRE FEV1 FVC: 83.98 % (ref 60.77–87.7)
PRE FEV1: 2.7 L (ref 2.18–3.95)
PRE FEV5: 2.29 L (ref 1.5–3.77)
PRE FRC PL: 2.76 L (ref 2.83–4.8)
PRE FVC: 3.21 L (ref 3.04–5.31)
PRE IC: 2.3 L (ref -9999996.79–#######.####)
PRE PEF: 6.68 L/S (ref 5.56–10.33)
PRE REF IC: 71.4 %
PRE RV: 1.81 L (ref 2.13–3.48)
PRE TLC: 5.06 L (ref 6.18–8.48)
PRE VTG: 2.94 L
RAW PRE REF: 85.8 %
RAW PRE: 2.63 CMH2O*S/L (ref 3.06–3.06)
RAW REF: 3.06
REF IC: 3.21
RHEUMATOID FACT SERPL-ACNC: <13 IU/ML (ref 0–15)
RV LLN: 2.13
RV PRE REF: 64.7 %
RV REF: 2.8
RVTLC LLN: 35
RVTLC PRE REF: 82.2 %
RVTLC PRE: 35.86 % (ref 34.62–52.58)
RVTLC REF: 44
RVTLCULN: 53
RVULN: 3.48
SGAW PRE REF: 142.7 %
SGAW PRE: 0.12 1/(CMH2O*S) (ref 0.08–0.08)
SGAW REF: 0.08
TLC LLN: 6.18
TLC PRE REF: 69 %
TLC REF: 7.33
TLC ULN: 8.48
ULN IC: ABNORMAL
VA PRE: 4.54 L (ref 7.18–7.18)
VA SINGLE BREATH LLN: 7.18
VA SINGLE BREATH PRE REF: 63.2 %
VA SINGLE BREATH REF: 7.18
VASINGLEBREATHULN: 7.18
VC LLN: 3.04
VC PRE REF: 77.9 %
VC PRE: 3.25 L (ref 3.04–5.31)
VC REF: 4.16
VC ULN: 5.31

## 2022-11-22 PROCEDURE — 83520 IMMUNOASSAY QUANT NOS NONAB: CPT | Mod: TXP | Performed by: INTERNAL MEDICINE

## 2022-11-22 PROCEDURE — 83516 IMMUNOASSAY NONANTIBODY: CPT | Mod: 59,TXP | Performed by: INTERNAL MEDICINE

## 2022-11-22 PROCEDURE — 86140 C-REACTIVE PROTEIN: CPT | Mod: NTX | Performed by: INTERNAL MEDICINE

## 2022-11-22 PROCEDURE — 86235 NUCLEAR ANTIGEN ANTIBODY: CPT | Mod: 59,TXP | Performed by: INTERNAL MEDICINE

## 2022-11-22 PROCEDURE — 85652 RBC SED RATE AUTOMATED: CPT | Mod: TXP | Performed by: INTERNAL MEDICINE

## 2022-11-22 PROCEDURE — 86036 ANCA SCREEN EACH ANTIBODY: CPT | Mod: 59,TXP | Performed by: INTERNAL MEDICINE

## 2022-11-22 PROCEDURE — 36415 COLL VENOUS BLD VENIPUNCTURE: CPT | Mod: PO,NTX | Performed by: INTERNAL MEDICINE

## 2022-11-22 PROCEDURE — 82550 ASSAY OF CK (CPK): CPT | Mod: NTX | Performed by: INTERNAL MEDICINE

## 2022-11-22 PROCEDURE — 82085 ASSAY OF ALDOLASE: CPT | Mod: TXP | Performed by: INTERNAL MEDICINE

## 2022-11-22 PROCEDURE — 86235 NUCLEAR ANTIGEN ANTIBODY: CPT | Mod: NTX | Performed by: INTERNAL MEDICINE

## 2022-11-22 PROCEDURE — 86200 CCP ANTIBODY: CPT | Mod: TXP | Performed by: INTERNAL MEDICINE

## 2022-11-22 PROCEDURE — 86038 ANTINUCLEAR ANTIBODIES: CPT | Mod: NTX | Performed by: INTERNAL MEDICINE

## 2022-11-22 PROCEDURE — 83516 IMMUNOASSAY NONANTIBODY: CPT | Mod: TXP | Performed by: INTERNAL MEDICINE

## 2022-11-22 PROCEDURE — 86431 RHEUMATOID FACTOR QUANT: CPT | Mod: TXP | Performed by: INTERNAL MEDICINE

## 2022-11-22 NOTE — TELEPHONE ENCOUNTER
Lm on patient vm to call office , will need to review trigger questions with patient prior to scheduled procedure on 11/28/2022 with Dr Shetty

## 2022-11-22 NOTE — PROCEDURES
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  The diagnosis is Interstitial Lung Disease.  The patient's BMI is 27.8 kg/m2.  Predicted distance (lower limit of normal) is 303.6 meters.      Test Results:    The test was completed without stopping.  The total time walked was 360 seconds.  During walking, the patient reported:  Leg pain.  The patient used no assistive devices during testing.     11/21/2022---------Distance: 365.76 meters (1200 feet)     O2 Sat % Supplemental Oxygen Heart Rate Blood Pressure Meena Scale   Pre-exercise  (Resting) 96 % Room Air 52 bpm 172/83 mmHg 0   During Exercise 99 % Room Air 65 bpm 195/96 mmHg 1   Post-exercise  (Recovery) 97 % Room Air  54 bpm 177/86 mmHg      Recovery Time: 136 seconds    Performing nurse/tech: Lorraine WHATLEY      PREVIOUS STUDY:   The patient has not had a previous study.      CLINICAL INTERPRETATION:  Six minute walk distance is 365.76 meters (1200 feet) with very light dyspnea.  During exercise, there was no desaturation while breathing room air.  Blood pressure increased significantly and Heart rate remained stable with walking.  Bradycardia and Hypertension were present prior to exercise.  This may represent a hypertensive response to exercise.  The patient reported non-pulmonary symptoms during exercise.  No previous study performed.  Based upon age and body mass index, exercise capacity is normal.

## 2022-11-23 LAB
ALDOLASE SERPL-CCNC: 3.5 U/L (ref 1.2–7.6)
ANA SER QL IF: NORMAL

## 2022-11-25 ENCOUNTER — ANESTHESIA EVENT (OUTPATIENT)
Dept: SURGERY | Facility: HOSPITAL | Age: 77
End: 2022-11-25
Payer: MEDICARE

## 2022-11-25 LAB — ENA SCL70 IGG SER IA-ACNC: <0.2 U

## 2022-11-26 LAB
ANTI SM/RNP ANTIBODY: 0.07 RATIO (ref 0–0.99)
ANTI-SM/RNP INTERPRETATION: NEGATIVE
ANTI-SSA ANTIBODY: 0.08 RATIO (ref 0–0.99)
ANTI-SSA INTERPRETATION: NEGATIVE
ANTI-SSB ANTIBODY: 0.08 RATIO (ref 0–0.99)
ANTI-SSB INTERPRETATION: NEGATIVE

## 2022-11-28 ENCOUNTER — ANESTHESIA (OUTPATIENT)
Dept: SURGERY | Facility: HOSPITAL | Age: 77
End: 2022-11-28
Payer: MEDICARE

## 2022-11-28 ENCOUNTER — HOSPITAL ENCOUNTER (OUTPATIENT)
Facility: HOSPITAL | Age: 77
Discharge: HOME | End: 2022-11-28
Attending: STUDENT IN AN ORGANIZED HEALTH CARE EDUCATION/TRAINING PROGRAM | Admitting: STUDENT IN AN ORGANIZED HEALTH CARE EDUCATION/TRAINING PROGRAM
Payer: MEDICARE

## 2022-11-28 VITALS
BODY MASS INDEX: 27.72 KG/M2 | WEIGHT: 198 LBS | RESPIRATION RATE: 16 BRPM | HEIGHT: 71 IN | OXYGEN SATURATION: 98 % | SYSTOLIC BLOOD PRESSURE: 154 MMHG | HEART RATE: 50 BPM | TEMPERATURE: 98 F | DIASTOLIC BLOOD PRESSURE: 75 MMHG

## 2022-11-28 DIAGNOSIS — N40.1 BPH WITH URINARY OBSTRUCTION: Primary | ICD-10-CM

## 2022-11-28 DIAGNOSIS — N13.8 BPH WITH URINARY OBSTRUCTION: Primary | ICD-10-CM

## 2022-11-28 DIAGNOSIS — J84.9 INTERSTITIAL LUNG DISEASE: Primary | ICD-10-CM

## 2022-11-28 LAB
ANCA AB TITR SER IF: NORMAL TITER
ENA SCL70 AB SER-ACNC: 3 UNITS
P-ANCA TITR SER IF: NORMAL TITER

## 2022-11-28 PROCEDURE — 36000707: Mod: PO,NTX | Performed by: STUDENT IN AN ORGANIZED HEALTH CARE EDUCATION/TRAINING PROGRAM

## 2022-11-28 PROCEDURE — 52441 PR CYSTO W/INSERT PERMANENT ADJ IMPLANT, SINGLE: ICD-10-PCS | Mod: ,,, | Performed by: STUDENT IN AN ORGANIZED HEALTH CARE EDUCATION/TRAINING PROGRAM

## 2022-11-28 PROCEDURE — 37000009 HC ANESTHESIA EA ADD 15 MINS: Mod: PO,NTX | Performed by: STUDENT IN AN ORGANIZED HEALTH CARE EDUCATION/TRAINING PROGRAM

## 2022-11-28 PROCEDURE — D9220A PRA ANESTHESIA: Mod: CRNA,NTX,, | Performed by: NURSE ANESTHETIST, CERTIFIED REGISTERED

## 2022-11-28 PROCEDURE — 37000008 HC ANESTHESIA 1ST 15 MINUTES: Mod: PO,TXP | Performed by: STUDENT IN AN ORGANIZED HEALTH CARE EDUCATION/TRAINING PROGRAM

## 2022-11-28 PROCEDURE — 71000033 HC RECOVERY, INTIAL HOUR: Mod: PO,TXP | Performed by: STUDENT IN AN ORGANIZED HEALTH CARE EDUCATION/TRAINING PROGRAM

## 2022-11-28 PROCEDURE — 52442 CYSTO INS TRNSPRSTC IMPLT EA: CPT | Mod: ,,, | Performed by: STUDENT IN AN ORGANIZED HEALTH CARE EDUCATION/TRAINING PROGRAM

## 2022-11-28 PROCEDURE — 52441 CYSTO INSJ TRNSPRSTC 1 IMPLT: CPT | Mod: ,,, | Performed by: STUDENT IN AN ORGANIZED HEALTH CARE EDUCATION/TRAINING PROGRAM

## 2022-11-28 PROCEDURE — 52442 PR CYSTO W/INSERT PERMANENT ADJ IMPLANT, EA ADDTL IMPLANT: ICD-10-PCS | Mod: ,,, | Performed by: STUDENT IN AN ORGANIZED HEALTH CARE EDUCATION/TRAINING PROGRAM

## 2022-11-28 PROCEDURE — 25000003 PHARM REV CODE 250: Mod: PO,NTX | Performed by: STUDENT IN AN ORGANIZED HEALTH CARE EDUCATION/TRAINING PROGRAM

## 2022-11-28 PROCEDURE — D9220A PRA ANESTHESIA: ICD-10-PCS | Mod: CRNA,NTX,, | Performed by: NURSE ANESTHETIST, CERTIFIED REGISTERED

## 2022-11-28 PROCEDURE — 63600175 PHARM REV CODE 636 W HCPCS: Mod: PO,TXP | Performed by: NURSE ANESTHETIST, CERTIFIED REGISTERED

## 2022-11-28 PROCEDURE — L8699 PROSTHETIC IMPLANT NOS: HCPCS | Mod: PO,TXP | Performed by: STUDENT IN AN ORGANIZED HEALTH CARE EDUCATION/TRAINING PROGRAM

## 2022-11-28 PROCEDURE — 36000706: Mod: PO,TXP | Performed by: STUDENT IN AN ORGANIZED HEALTH CARE EDUCATION/TRAINING PROGRAM

## 2022-11-28 PROCEDURE — D9220A PRA ANESTHESIA: Mod: ANES,NTX,, | Performed by: ANESTHESIOLOGY

## 2022-11-28 PROCEDURE — 25000003 PHARM REV CODE 250: Mod: PO,NTX | Performed by: NURSE ANESTHETIST, CERTIFIED REGISTERED

## 2022-11-28 PROCEDURE — 63600175 PHARM REV CODE 636 W HCPCS: Mod: PO,TXP | Performed by: STUDENT IN AN ORGANIZED HEALTH CARE EDUCATION/TRAINING PROGRAM

## 2022-11-28 PROCEDURE — D9220A PRA ANESTHESIA: ICD-10-PCS | Mod: ANES,NTX,, | Performed by: ANESTHESIOLOGY

## 2022-11-28 DEVICE — IMPLANTABLE DEVICE: Type: IMPLANTABLE DEVICE | Site: BLADDER | Status: FUNCTIONAL

## 2022-11-28 RX ORDER — KETOROLAC TROMETHAMINE 10 MG/1
10 TABLET, FILM COATED ORAL EVERY 6 HOURS
Qty: 20 TABLET | Refills: 0 | Status: SHIPPED | OUTPATIENT
Start: 2022-11-28 | End: 2022-12-03

## 2022-11-28 RX ORDER — FENTANYL CITRATE 50 UG/ML
INJECTION, SOLUTION INTRAMUSCULAR; INTRAVENOUS
Status: DISCONTINUED | OUTPATIENT
Start: 2022-11-28 | End: 2022-11-28

## 2022-11-28 RX ORDER — ACETAMINOPHEN 10 MG/ML
INJECTION, SOLUTION INTRAVENOUS
Status: DISCONTINUED | OUTPATIENT
Start: 2022-11-28 | End: 2022-11-28

## 2022-11-28 RX ORDER — SODIUM CHLORIDE, SODIUM LACTATE, POTASSIUM CHLORIDE, CALCIUM CHLORIDE 600; 310; 30; 20 MG/100ML; MG/100ML; MG/100ML; MG/100ML
INJECTION, SOLUTION INTRAVENOUS CONTINUOUS
Status: DISCONTINUED | OUTPATIENT
Start: 2022-11-28 | End: 2022-11-28 | Stop reason: HOSPADM

## 2022-11-28 RX ORDER — KETAMINE HCL IN 0.9 % NACL 50 MG/5 ML
SYRINGE (ML) INTRAVENOUS
Status: DISCONTINUED | OUTPATIENT
Start: 2022-11-28 | End: 2022-11-28

## 2022-11-28 RX ORDER — OXYBUTYNIN CHLORIDE 5 MG/1
5 TABLET ORAL 3 TIMES DAILY PRN
Qty: 30 TABLET | Refills: 1 | Status: SHIPPED | OUTPATIENT
Start: 2022-11-28 | End: 2023-01-26

## 2022-11-28 RX ORDER — MIDAZOLAM HYDROCHLORIDE 1 MG/ML
INJECTION, SOLUTION INTRAMUSCULAR; INTRAVENOUS
Status: DISCONTINUED | OUTPATIENT
Start: 2022-11-28 | End: 2022-11-28

## 2022-11-28 RX ORDER — CEFAZOLIN SODIUM 1 G/3ML
INJECTION, POWDER, FOR SOLUTION INTRAMUSCULAR; INTRAVENOUS
Status: DISCONTINUED | OUTPATIENT
Start: 2022-11-28 | End: 2022-11-28

## 2022-11-28 RX ORDER — SODIUM CHLORIDE 0.9 G/100ML
IRRIGANT IRRIGATION
Status: DISCONTINUED | OUTPATIENT
Start: 2022-11-28 | End: 2022-11-28 | Stop reason: HOSPADM

## 2022-11-28 RX ORDER — PROPOFOL 10 MG/ML
VIAL (ML) INTRAVENOUS CONTINUOUS PRN
Status: DISCONTINUED | OUTPATIENT
Start: 2022-11-28 | End: 2022-11-28

## 2022-11-28 RX ORDER — PHENAZOPYRIDINE HYDROCHLORIDE 100 MG/1
100 TABLET, FILM COATED ORAL
Qty: 21 TABLET | Refills: 0 | Status: SHIPPED | OUTPATIENT
Start: 2022-11-28 | End: 2022-12-05

## 2022-11-28 RX ORDER — ONDANSETRON 2 MG/ML
INJECTION INTRAMUSCULAR; INTRAVENOUS
Status: DISCONTINUED | OUTPATIENT
Start: 2022-11-28 | End: 2022-11-28

## 2022-11-28 RX ORDER — PROPOFOL 10 MG/ML
VIAL (ML) INTRAVENOUS
Status: DISCONTINUED | OUTPATIENT
Start: 2022-11-28 | End: 2022-11-28

## 2022-11-28 RX ORDER — LIDOCAINE HYDROCHLORIDE 20 MG/ML
INJECTION INTRAVENOUS
Status: DISCONTINUED | OUTPATIENT
Start: 2022-11-28 | End: 2022-11-28

## 2022-11-28 RX ORDER — TAMSULOSIN HYDROCHLORIDE 0.4 MG/1
0.4 CAPSULE ORAL NIGHTLY
Qty: 14 CAPSULE | Refills: 0 | Status: SHIPPED | OUTPATIENT
Start: 2022-11-28 | End: 2023-01-26

## 2022-11-28 RX ORDER — DEXMEDETOMIDINE HYDROCHLORIDE 100 UG/ML
INJECTION, SOLUTION INTRAVENOUS
Status: DISCONTINUED | OUTPATIENT
Start: 2022-11-28 | End: 2022-11-28

## 2022-11-28 RX ADMIN — ONDANSETRON 4 MG: 2 INJECTION, SOLUTION INTRAMUSCULAR; INTRAVENOUS at 10:11

## 2022-11-28 RX ADMIN — LIDOCAINE HYDROCHLORIDE 50 MG: 20 INJECTION INTRAVENOUS at 10:11

## 2022-11-28 RX ADMIN — Medication 10 MG: at 10:11

## 2022-11-28 RX ADMIN — PROPOFOL 30 MG: 10 INJECTION, EMULSION INTRAVENOUS at 10:11

## 2022-11-28 RX ADMIN — SODIUM CHLORIDE, SODIUM LACTATE, POTASSIUM CHLORIDE, AND CALCIUM CHLORIDE: .6; .31; .03; .02 INJECTION, SOLUTION INTRAVENOUS at 09:11

## 2022-11-28 RX ADMIN — ACETAMINOPHEN 1000 MG: 10 INJECTION, SOLUTION INTRAVENOUS at 10:11

## 2022-11-28 RX ADMIN — DEXMEDETOMIDINE HYDROCHLORIDE 16 MCG: 100 INJECTION, SOLUTION, CONCENTRATE INTRAVENOUS at 10:11

## 2022-11-28 RX ADMIN — MIDAZOLAM HYDROCHLORIDE 2 MG: 1 INJECTION, SOLUTION INTRAMUSCULAR; INTRAVENOUS at 10:11

## 2022-11-28 RX ADMIN — FENTANYL CITRATE 25 MCG: 50 INJECTION, SOLUTION INTRAMUSCULAR; INTRAVENOUS at 10:11

## 2022-11-28 RX ADMIN — PROPOFOL 100 MCG/KG/MIN: 10 INJECTION, EMULSION INTRAVENOUS at 10:11

## 2022-11-28 RX ADMIN — CEFAZOLIN 2 G: 330 INJECTION, POWDER, FOR SOLUTION INTRAMUSCULAR; INTRAVENOUS at 10:11

## 2022-11-28 NOTE — TRANSFER OF CARE
"Anesthesia Transfer of Care Note    Patient: Dominick Song MD    Procedure(s) Performed: Procedure(s) (LRB):  CYSTOSCOPY, WITH INSERTION OF UROLIFT IMPLANT (N/A)    Patient location: PACU    Anesthesia Type: general    Transport from OR: Transported from OR on room air with adequate spontaneous ventilation    Post pain: adequate analgesia    Post assessment: no apparent anesthetic complications and tolerated procedure well    Post vital signs: stable    Level of consciousness: responds to stimulation    Nausea/Vomiting: no nausea/vomiting    Complications: none    Transfer of care protocol was followed      Last vitals:   Visit Vitals  /66 (BP Location: Right arm, Patient Position: Lying)   Pulse (!) 48   Temp 36.8 °C (98.2 °F) (Temporal)   Resp 16   Ht 5' 11" (1.803 m)   Wt 89.8 kg (198 lb)   SpO2 96%   BMI 27.62 kg/m²     "

## 2022-11-28 NOTE — ANESTHESIA PREPROCEDURE EVALUATION
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    Pre-op Assessment    I have reviewed the Patient Summary Reports.    I have reviewed the Nursing Notes. I have reviewed the NPO Status.   I have reviewed the Medications.     Review of Systems  Cardiovascular:   Hypertension  6/2020  Normal appearing left atrial appendage. No thrombus is present in the appendage. Normal appendage velocities. No thrombus is present in the left atrium.  ·Normal left ventricular systolic function. The estimated ejection fraction is 60%.  Normal right ventricular systolic function.      Pulmonary:   Shortness of breath Sleep Apnea    Renal/:   Chronic Renal Disease    Hepatic/GI:   PUD, Liver Disease, Hepatitis, C    Musculoskeletal:   Arthritis     Neurological:   CVA    Psych:   Psychiatric History          Physical Exam  General:  Well nourished      Airway/Jaw/Neck:  Airway Findings: Mouth Opening: Normal   Tongue: Normal   General Airway Assessment: Adult, Good Mallampati: II  Improves to II with phonation.  TM Distance: 4-6 cm       Dental:  Dental Findings: In tact     Chest/Lungs:  Chest/Lungs Findings: Clear to auscultation, Normal Respiratory Rate      Heart/Vascular:  Heart Findings: Rate: Normal  Rhythm: Regular Rhythm  Sounds: Normal  Heart murmur: negative       Mental Status:  Mental Status Findings:  Cooperative, Alert and Oriented         Anesthesia Plan  Type of Anesthesia, risks & benefits discussed:  Anesthesia Type:  MAC    Patient's Preference:   Plan Factors:          Intra-op Monitoring Plan: Standard ASA Monitors  Intra-op Monitoring Plan Comments:   Post Op Pain Control Plan: multimodal analgesia and IV/PO Opioids PRN  Post Op Pain Control Plan Comments:     Induction:   IV  Beta Blocker:  Patient is on a Beta-Blocker and has received one dose within the past 24 hours (No further documentation  required).       Informed Consent: Informed consent signed with the Patient and all parties understand the risks and agree with anesthesia plan.  All questions answered.  Anesthesia consent signed with patient.  ASA Score: 3     Day of Surgery Review of History & Physical:    H&P Update referred to the surgeon/provider.          Ready For Surgery From Anesthesia Perspective.           Physical Exam  General: Well nourished    Airway:  Mallampati: II / II  Mouth Opening: Normal  TM Distance: 4-6 cm  Tongue: Normal    Dental:  In tact    Chest/Lungs:  Clear to auscultation, Normal Respiratory Rate    Heart:  Rate: Normal  Rhythm: Regular Rhythm  Sounds: Normal          Anesthesia Plan  Type of Anesthesia, risks & benefits discussed:    Anesthesia Type: MAC  Intra-op Monitoring Plan: Standard ASA Monitors  Post Op Pain Control Plan: multimodal analgesia and IV/PO Opioids PRN  Induction:  IV  Informed Consent: Informed consent signed with the Patient and all parties understand the risks and agree with anesthesia plan.  All questions answered.   ASA Score: 3  Day of Surgery Review of History & Physical: H&P Update referred to the surgeon/provider.    Ready For Surgery From Anesthesia Perspective.       .

## 2022-11-28 NOTE — H&P
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 He has been counseled on the options for management of BPH and has elected to proceed with urolift.     Held eliquis since Friday    OBJECTIVE:     Vital Signs (Most Recent)  Temp: 98.2 °F (36.8 °C) (11/28/22 0910)  Pulse: 62 (11/28/22 0910)  Resp: 16 (11/28/22 0910)  BP: (!) 169/87 (11/28/22 0910)  SpO2: 97 % (11/28/22 0910)    Estimated body mass index is 27.62 kg/m² as calculated from the following:    Height as of this encounter: 5' 11" (1.803 m).    Weight as of this encounter: 89.8 kg (198 lb).    General: No acute distress, well developed. AAOx3  Head: Normocephalic, Atraumatic  Eyes: Extra-occular movements intact, No discharge  Neck: supple, symmetrical, trachea midline  Lungs: normal respiratory effort, no respiratory distress, no wheezes  Abdomen: soft, non-tender, non-distended, no organomegaly  MSK: no edema, no deformities, normal ROM  Skin: skin color, texture, turgor normal  Neurologic: no focal deficits, sensation intact     Lab Results   Component Value Date    BUN 25 (H) 08/31/2022    CREATININE 1.5 (H) 08/31/2022    WBC 12.30 08/31/2022    HGB 12.2 (L) 08/31/2022    HCT 37.6 (L) 08/31/2022     08/31/2022    AST 16 08/31/2022    ALT 25 08/31/2022    ALKPHOS 57 08/31/2022    ALBUMIN 3.4 (L) 08/31/2022    HGBA1C 4.9 08/31/2022        ASSESSMENT   BPH    PLAN:     1. To OR for prostatic urethral lift with urolift  2. I have explained the indication, risks, benefits, and alternatives of the procedure in detail. I discussed the rationale for the procedure and the typical management and expectations. I discussed the risks associated with the procedure. We discussed alternative management options. He voiced understanding following the discussion. He was given the opportunity to ask questions and all these questions were all answered to " his satisfaction. He has elected to proceed as planned. Consent was obtained.     Yakov Shetty MD

## 2022-11-28 NOTE — DISCHARGE SUMMARY
[facility]  Discharge Note  Short Stay    Admit Date: 11/28/2022    Discharge Date and Time: 11/28/2022 10:45 AM      Attending Physician: Yakov Shetty MD     Discharge Provider: Yakov Shetty MD    Diagnoses:  Active Hospital Problems    Diagnosis  POA    *BPH with urinary obstruction [N40.1, N13.8]  Yes    Elevated PSA [R97.20]  Yes      Resolved Hospital Problems   No resolved problems to display.       Discharged Condition: stable    Procedure: Procedure(s):  CYSTOSCOPY, WITH INSERTION OF UROLIFT IMPLANT     Hospital Course: The patient was admitted for the above procedure and tolerated the procedure well with no complications. He was discharged home in good condition on the same day.       Final Diagnoses: Same as principal problem.    Disposition: Home or Self Care    Follow up/Patient Instructions:    Medications:  Reconciled Home Medications:   Current Discharge Medication List        START taking these medications    Details   ketorolac (TORADOL) 10 mg tablet Take 1 tablet (10 mg total) by mouth every 6 (six) hours. for 5 days  Qty: 20 tablet, Refills: 0      oxybutynin (DITROPAN) 5 MG Tab Take 1 tablet (5 mg total) by mouth 3 (three) times daily as needed (bladder spasms).  Qty: 30 tablet, Refills: 1      phenazopyridine (PYRIDIUM) 100 MG tablet Take 1 tablet (100 mg total) by mouth 3 (three) times daily with meals. for 7 days  Qty: 21 tablet, Refills: 0      tamsulosin (FLOMAX) 0.4 mg Cap Take 1 capsule (0.4 mg total) by mouth every evening. for 14 days  Qty: 14 capsule, Refills: 0           CONTINUE these medications which have NOT CHANGED    Details   !! buPROPion (WELLBUTRIN XL) 150 MG TB24 tablet Take 1 tablet (150 mg total) by mouth once daily.  Qty: 90 tablet, Refills: 3      !! buPROPion (WELLBUTRIN XL) 300 MG 24 hr tablet TAKE 1 TABLET(300 MG) BY MOUTH EVERY DAY  Qty: 90 tablet, Refills: 3      calcium citrate (CALCITRATE) 200 mg (950 mg) tablet Take 1 tablet (200 mg total) by mouth 2  (two) times daily.  Qty: 180 tablet, Refills: 3    Associated Diagnoses: Age-related osteoporosis with current pathological fracture with routine healing      carvediloL (COREG) 12.5 MG tablet TAKE 1 TABLET(12.5 MG) BY MOUTH TWICE DAILY WITH MEALS  Qty: 180 tablet, Refills: 3    Comments: ZERO refills remain on this prescription. Your patient is requesting advance approval of refills for this medication to PREVENT ANY MISSED DOSES      cyclobenzaprine (FLEXERIL) 10 MG tablet TAKE 1 TABLET(10 MG) BY MOUTH THREE TIMES DAILY AS NEEDED FOR MUSCLE SPASMS  Qty: 60 tablet, Refills: 3      ELIQUIS 5 mg Tab Take 1 tablet (5 mg total) by mouth 2 (two) times a day.  Qty: 60 tablet, Refills: 11      EScitalopram oxalate (LEXAPRO) 10 MG tablet TAKE 1 TABLET(10 MG) BY MOUTH EVERY DAY  Qty: 90 tablet, Refills: 4      levothyroxine (SYNTHROID) 75 MCG tablet Take 1 tablet (75 mcg total) by mouth before breakfast.  Qty: 90 tablet, Refills: 2    Associated Diagnoses: Hypothyroidism due to acquired atrophy of thyroid      OMEGA-3 FATTY ACIDS (FISH OIL CONCENTRATE ORAL) Take by mouth Daily.      propafenone (RYTHMOL SR) 425 MG Cp12 TAKE 1 CAPSULE(425 MG) BY MOUTH EVERY 12 HOURS  Qty: 180 capsule, Refills: 3    Associated Diagnoses: Atrial fibrillation, unspecified type      telmisartan (MICARDIS) 40 MG Tab TAKE 1 TABLET(40 MG) BY MOUTH EVERY DAY  Qty: 90 tablet, Refills: 3    Associated Diagnoses: Hypertension, unspecified type      methocarbamoL (ROBAXIN) 500 MG Tab TAKE 1 TABLET(500 MG) BY MOUTH THREE TIMES DAILY FOR 10 DAYS  Qty: 90 tablet, Refills: 0       !! - Potential duplicate medications found. Please discuss with provider.        STOP taking these medications       acyclovir (ZOVIRAX) 800 MG Tab Comments:   Reason for Stopping:             Discharge Procedure Orders   Notify your health care provider if you experience any of the following:  temperature >100.4     Notify your health care provider if you experience any of the  following:  persistent nausea and vomiting or diarrhea     Notify your health care provider if you experience any of the following:  severe uncontrolled pain     Notify your health care provider if you experience any of the following:  redness, tenderness, or signs of infection (pain, swelling, redness, odor or green/yellow discharge around incision site)     Notify your health care provider if you experience any of the following:  difficulty breathing or increased cough     Notify your health care provider if you experience any of the following:  persistent dizziness, light-headedness, or visual disturbances     Activity as tolerated      Follow-up Information       Yakov Shetty MD Follow up in 1 month(s).    Specialty: Urology  Why: Post-op follow up appointment  Contact information:  [address]  [place] [address]  [phone]                          Normal

## 2022-11-28 NOTE — DISCHARGE INSTRUCTIONS
UROLIFT    Congratulations on taking the step to improve your quality of life! The UroLift® transprostatic implant is a clinically proven treatment for bothersome urinary symptoms associated with BPH.  Please carefully review and follow these instructions.      General Expectations    Some men may experience discomfort after the procedure.  You may have soreness in the lower abdomen, and it may be uncomfortable to sit.  You may experience the need to urinate more frequently and with greater urgency.  You may have some blood in your urine, including passing an occasional blood clot.  These are all normal reactions to the procedure.  Many of these symptoms will resolve in a week or two, but some may last up to four weeks -- this is normal.    The following are some suggestions:  Have someone drive you home today.  Drink plenty of water.  Take your medications as prescribed.  Avoid strenuous activity for one week.  If you have a catheter placed in your bladder do not take a bath until it is removed, though you can take a shower.      Medications    When taking pain medications you may experience dizziness or drowsiness.  DO NOT drink alcohol or drive when you are taking these medications.    If you are given an antibiotic to prevent urinary tract infection, it is important that you finish all medications as directed.    Complications    You should contact your physician if you experience any of the following:  Temperature above 101.5? (taken by mouth).  Excessive urinary bleeding or bleeding from the penis.  Continuous bladder spasms.  Painful, swollen, and/or inflated testicle(s) or scrotum.  Unable to void spontaneously or the indwelling catheter is not draining urine or is blocked.    IF YOU NEED IMMEDIATE ATTENTION, GO THE THE HOSPITAL EMERGENCY ROOM FOR TREATMENT!!!    If your doctor suggests that you go to the emergency room or other facility for catheterization for inability to urinate, be sure to tell the  facility personnel to use a Coudé (pronounced -day) tipped catheter.

## 2022-11-28 NOTE — ANESTHESIA POSTPROCEDURE EVALUATION
Anesthesia Post Evaluation    Patient: Dominick Song MD    Procedure(s) Performed: Procedure(s) (LRB):  CYSTOSCOPY, WITH INSERTION OF UROLIFT IMPLANT (N/A)    Final Anesthesia Type: MAC      Patient location during evaluation: PACU  Patient participation: Yes- Able to Participate  Level of consciousness: awake and alert  Post-procedure vital signs: reviewed and stable  Pain management: adequate  Airway patency: patent    PONV status at discharge: No PONV  Anesthetic complications: no      Cardiovascular status: blood pressure returned to baseline  Respiratory status: unassisted and room air  Hydration status: euvolemic  Follow-up not needed.          Vitals Value Taken Time   /75 11/28/22 1110   Temp  11/28/22 1417   Pulse 50 11/28/22 1110   Resp 16 11/28/22 1110   SpO2 98 % 11/28/22 1110         Event Time   Out of Recovery 11:35:46         Pain/Pari Score: Pari Score: 10 (11/28/2022 11:05 AM)

## 2022-11-28 NOTE — OP NOTE
[facility]  Urology Department  Operative Note    Date of Procedure: 11/28/2022     Pre-Operative Diagnosis:   BPH with urinary obstruction [N40.1, N13.8]    Post-Operative Diagnosis: same    Procedure:   Prostatic Urethral Lift (Urolift)    Primary: Yakov Shetty MD    Assisting Surgeon: None    Anesthesia: General    Indications for Procedure:  The patient is a 77 y.o. year old male with BPH with MIRELES and LUTS.  He has been counseled on his options for management of his LUTS and has elected to proceed with surgical treatment with prostatic urethral lift (Urolift).  The full risks and benefits of the procedure have been explained and detail and he wishes to proceed as planned.     Procedure Description:  The patient was brought to the operative suite and placed under General anesthesia. He was placed in lithotomy position and prepped and draped in usual sterile fashion.  Time out was performed prior to starting the procedure.    A 20F cystoscope was inserted into the bladder. The cystoscopy bridge was replaced with a UroLift delivery device. The first treatment site was the patient's left side approximately 2 cm distal to the bladder neck. The distal tip of the delivery device was then angled laterally approximately 10 degrees at this position for the first pull. The trigger was pulled, thereby deploying the first needle passage. 30 degrees of compression was then applied, and a second trigger pull deployed needle containing the implant through the prostate. The needle was then retracted, allowing one end of the implant to be delivered to the capsular surface of the prostate. The implant was then tensioned to assure capsular seating and removal of slack monofilament. The device was then angled back toward midline and slowly advanced proximally until cystoscopic verification of the monofilament being centered in the delivery bay. The urethral end piece was then affixed to the monofilament thereby tailoring  the size of the implant. Excess filament was then severed. The delivery device was then re-advanced into the bladder. The delivery device was then replaced and the same procedure was then repeated on the right side. 2 additional implants were delivered just proximal to the veramontanum, one on the left side and one on the right side of the prostate following the same technique.      The visual obturator was replaced, and cystoscopy was performed. This revealed a persistent area of obstruction, and 2 more implants were delivered in the mid prostate in a similar fashion.  A total of 6 implants were placed.  Final cystoscopy was performed to inspect the location of each implant and to confirm the presence of a continuous anterior channel through the prostatic urethra with the irrigation flow turned off. The irrigation flow was turned off, and it was determined that he would require a galvan catheter due to moderate prostatic bleeding. The scope was removed, and an 18 Cymro galvan catheter was placed with 10cc sterile water instilled into the balloon. He will follow-up tomorrow for voiding trial.    The patient was awakened and transported to the PACU in stable condition.  There were no complications.    Disposition:  He will be discharged home with a Galvan catheter.  He will remove the Galvan catheter at home tomorrow.    Yakov Shetty MD

## 2022-11-29 ENCOUNTER — PATIENT MESSAGE (OUTPATIENT)
Dept: TRANSPLANT | Facility: CLINIC | Age: 77
End: 2022-11-29
Payer: MEDICARE

## 2022-11-29 ENCOUNTER — PATIENT MESSAGE (OUTPATIENT)
Dept: UROLOGY | Facility: CLINIC | Age: 77
End: 2022-11-29
Payer: MEDICARE

## 2022-11-29 RX ORDER — OXYCODONE HYDROCHLORIDE 5 MG/1
10 CAPSULE ORAL EVERY 4 HOURS PRN
Qty: 20 CAPSULE | Refills: 0 | Status: SHIPPED | OUTPATIENT
Start: 2022-11-29 | End: 2023-03-07

## 2022-11-30 ENCOUNTER — PATIENT MESSAGE (OUTPATIENT)
Dept: UROLOGY | Facility: CLINIC | Age: 77
End: 2022-11-30
Payer: MEDICARE

## 2022-12-04 LAB — RNAP III AB SER-ACNC: <20 UNITS

## 2022-12-05 LAB
EJ AB SER QL: NOT DETECTED
ENA JO1 AB SER IA-ACNC: NORMAL AI
KU AB SER QL: NOT DETECTED
MI2 AB SER QL: NOT DETECTED
OJ AB SER QL: NOT DETECTED
PL12 AB SER QL: NOT DETECTED
PL7 AB SER QL: NOT DETECTED
SRP AB SERPL QL: NOT DETECTED

## 2022-12-07 ENCOUNTER — TELEPHONE (OUTPATIENT)
Dept: ELECTROPHYSIOLOGY | Facility: CLINIC | Age: 77
End: 2022-12-07
Payer: MEDICARE

## 2022-12-07 ENCOUNTER — PATIENT MESSAGE (OUTPATIENT)
Dept: TRANSPLANT | Facility: CLINIC | Age: 77
End: 2022-12-07
Payer: MEDICARE

## 2022-12-07 NOTE — TELEPHONE ENCOUNTER
Per Dr Beatty, can you please reach out to schedule patient for a virtual appointment? He reached out to Dr Beatty personally while trying to schedule an appt when he couldn't get in until May.   Thanks

## 2022-12-08 LAB

## 2022-12-12 NOTE — PROGRESS NOTES
8 weeks status post cephalomedullary nail fixation of intertrochanteric femur fracture.  Refilling Percocet but cutting dose.  Will wean.     Frantz Gonsalves(Attending)

## 2022-12-16 ENCOUNTER — TELEPHONE (OUTPATIENT)
Dept: TRANSPLANT | Facility: CLINIC | Age: 77
End: 2022-12-16
Payer: MEDICARE

## 2022-12-28 ENCOUNTER — PATIENT MESSAGE (OUTPATIENT)
Dept: AUDIOLOGY | Facility: CLINIC | Age: 77
End: 2022-12-28
Payer: MEDICARE

## 2023-01-01 NOTE — ASSESSMENT & PLAN NOTE
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  Patient was explained in detail the severity of the injury that was suffered. Patient was explained the risks/benefits/and alternatives to operative management in detail including infection, bleeding, pain, nerve and vascular damage, heterotopic ossification, leg length discrepancies, rotational deformities and they express full understanding.  Also, the patient was explained the high mortality, over 30 percent,  associated with these fractures. 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 Will have assistance from the anesthesia team for preoperative optimization. Will plan for OR tomorrow. No Guarantees were made, informed consent was obtained. All questions were answered to patient's and family's satisfaction.     -Admitted to ortho  -NPO since midnight  -Pain controlled   -Marked, booked, and consented for surgery  -PT/OT: Bed rest  -DVT PPx: Hold anticoagulation  -Abx: Preop abx ordered  -Labs: pending  -Galvan: has requested condom cath in place of in-dwelling galvan. Will discuss with staff.   -Iv: ordered for contralateral arm             LUDWIG

## 2023-01-03 ENCOUNTER — PATIENT MESSAGE (OUTPATIENT)
Dept: SURGERY | Facility: CLINIC | Age: 78
End: 2023-01-03
Payer: MEDICARE

## 2023-01-05 NOTE — PROGRESS NOTES
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     Background:  First diagnosed with atrial fibrillation 2/12 (noted palpitations). Placed on beta blocker, coumadin. Underwent PEPE/DCCV. Had recurrence, Propafenone  mg twice daily added.  Had recurrence 12/24/13, underwent DCCV, Propafenone increased to 425 mg twice daily.  PVI ([place]) 7/28/14.   Had done well, was off Propafenone. Developed recurrence, underwent DCCV multiple times.  Repeat PVI 4/27/17 All 4 veins remained isolated.  Antralized left sided lesion set posteriorly, and performed SVC isolation.  Has been compliant with CPAP.  Had persistent recurrence >> DCCV 2/9/18.  He has had paroxysmal AF, with 4 episodes since 11/18.    Exercise echo 2/19 normal biventricular structure and function BISI 57 negative for ischemia.     Continued to have paroxysmal events, self-terminating.  Last episode was last month.  Generally, the episodes are lasting 3 days.  Since last visit, he was admitted with a TIA.  This was thought to be due to non-compliance with Eliquis.     6/15/20 Repeat RFA. Posterior wall isolation + repeat RFA of cavo-tricuspid isthmus. PV's remained isolated. 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     7/13/20 presented with TIA like symptoms. Seen by Dr. Coats (Vascular Neurology). Thought to be atypical for neurologic etiology. CT demonstrates a small area of remote vs. Subacute infarct in R medial occipital lobe, so transient hippocampal ischemia may be at the origin, although again this is atypical    Underwent robotic spigelian hernia repair. Shortly thereafter developed persistent AF, lasting a couple of days. Generally occurring once or twice a month lasting a couple of hours.     Update:     Has been diagnosed with interstitial lung  disease, for which he has seen Pulmonary transplant. Is being monitored. Shortness of breath remains stable.  AF remains stable, occurring once or twice a month. Utilizing PRN lopressor for these episodes.    ECG reveals nsr with SD interval 225 msec, narrow QRS.    Review of Systems   Constitutional: Negative. Negative for fever and malaise/fatigue.   HENT:  Negative for congestion and sore throat.    Cardiovascular:  Negative for chest pain, dyspnea on exertion, irregular heartbeat, leg swelling, near-syncope, orthopnea, palpitations, paroxysmal nocturnal dyspnea and syncope.   Respiratory:  Positive for shortness of breath. Negative for cough.    Gastrointestinal:  Negative for abdominal pain, constipation and diarrhea.   Neurological:  Negative for dizziness, light-headedness and weakness.   Psychiatric/Behavioral:  Negative for depression. The patient is not nervous/anxious.    All other systems reviewed and are negative.     Objective:    Physical Exam  Constitutional:       Appearance: He is well-developed.   Eyes:      General: No scleral icterus.     Conjunctiva/sclera: Conjunctivae normal.   Neck:      Vascular: No JVD.      Trachea: No tracheal deviation.   Cardiovascular:      Rate and Rhythm: Normal rate and regular rhythm.      Chest Wall: PMI is not displaced.      Heart sounds: Normal heart sounds.   Pulmonary:      Effort: Pulmonary effort is normal. No respiratory distress.      Breath sounds: Normal breath sounds.   Abdominal:      Palpations: Abdomen is soft.      Tenderness: There is no abdominal tenderness.   Musculoskeletal:         General: No tenderness.   Skin:     General: Skin is warm and dry.      Findings: No rash.   Neurological:      Mental Status: He is alert and oriented to person, place, and time.   Psychiatric:         Behavior: Behavior normal.         Assessment:       1. Tortuous aorta    2. Paroxysmal atrial fibrillation    3. Bradycardia    4. Essential hypertension    5.  Stage 3a chronic kidney disease    6. History of bleeding peptic ulcer    7. Complex sleep apnea syndrome    8. Cerebrovascular accident (CVA), unspecified mechanism    9. Interstitial lung disease         Plan:           Remains stable.  F/u in 6 months.

## 2023-01-08 ENCOUNTER — PATIENT MESSAGE (OUTPATIENT)
Dept: AUDIOLOGY | Facility: CLINIC | Age: 78
End: 2023-01-08
Payer: MEDICARE

## 2023-01-09 ENCOUNTER — CLINICAL SUPPORT (OUTPATIENT)
Dept: AUDIOLOGY | Facility: CLINIC | Age: 78
End: 2023-01-09
Payer: MEDICARE

## 2023-01-09 ENCOUNTER — PATIENT MESSAGE (OUTPATIENT)
Dept: PULMONOLOGY | Facility: CLINIC | Age: 78
End: 2023-01-09
Payer: MEDICARE

## 2023-01-09 ENCOUNTER — OFFICE VISIT (OUTPATIENT)
Dept: CARDIOLOGY | Facility: CLINIC | Age: 78
End: 2023-01-09
Payer: MEDICARE

## 2023-01-09 VITALS
SYSTOLIC BLOOD PRESSURE: 161 MMHG | BODY MASS INDEX: 27.5 KG/M2 | HEART RATE: 62 BPM | DIASTOLIC BLOOD PRESSURE: 89 MMHG | WEIGHT: 196.44 LBS | HEIGHT: 71 IN

## 2023-01-09 DIAGNOSIS — Z87.11 HISTORY OF BLEEDING PEPTIC ULCER: ICD-10-CM

## 2023-01-09 DIAGNOSIS — G47.31 COMPLEX SLEEP APNEA SYNDROME: ICD-10-CM

## 2023-01-09 DIAGNOSIS — I48.0 PAF (PAROXYSMAL ATRIAL FIBRILLATION): ICD-10-CM

## 2023-01-09 DIAGNOSIS — I63.9 CEREBROVASCULAR ACCIDENT (CVA), UNSPECIFIED MECHANISM: ICD-10-CM

## 2023-01-09 DIAGNOSIS — Z46.1 HEARING AID FITTING OR ADJUSTMENT: Primary | ICD-10-CM

## 2023-01-09 DIAGNOSIS — I48.0 PAROXYSMAL ATRIAL FIBRILLATION: ICD-10-CM

## 2023-01-09 DIAGNOSIS — R00.1 BRADYCARDIA: ICD-10-CM

## 2023-01-09 DIAGNOSIS — I10 ESSENTIAL HYPERTENSION: ICD-10-CM

## 2023-01-09 DIAGNOSIS — J84.9 INTERSTITIAL LUNG DISEASE: ICD-10-CM

## 2023-01-09 DIAGNOSIS — N18.31 STAGE 3A CHRONIC KIDNEY DISEASE: ICD-10-CM

## 2023-01-09 DIAGNOSIS — I48.0 PAF (PAROXYSMAL ATRIAL FIBRILLATION): Primary | ICD-10-CM

## 2023-01-09 DIAGNOSIS — I77.1 TORTUOUS AORTA: Primary | ICD-10-CM

## 2023-01-09 PROCEDURE — 3079F DIAST BP 80-89 MM HG: CPT | Mod: HCNC,CPTII,NTX,S$GLB | Performed by: INTERNAL MEDICINE

## 2023-01-09 PROCEDURE — 3079F PR MOST RECENT DIASTOLIC BLOOD PRESSURE 80-89 MM HG: ICD-10-PCS | Mod: HCNC,CPTII,NTX,S$GLB | Performed by: INTERNAL MEDICINE

## 2023-01-09 PROCEDURE — 93005 ELECTROCARDIOGRAM TRACING: CPT | Mod: HCNC,PO,NTX

## 2023-01-09 PROCEDURE — 99999 PR PBB SHADOW E&M-EST. PATIENT-LVL II: CPT | Mod: PBBFAC,HCNC,TXP, | Performed by: INTERNAL MEDICINE

## 2023-01-09 PROCEDURE — 99215 PR OFFICE/OUTPT VISIT, EST, LEVL V, 40-54 MIN: ICD-10-PCS | Mod: HCNC,NTX,S$GLB, | Performed by: INTERNAL MEDICINE

## 2023-01-09 PROCEDURE — 1157F PR ADVANCE CARE PLAN OR EQUIV PRESENT IN MEDICAL RECORD: ICD-10-PCS | Mod: HCNC,CPTII,NTX,S$GLB | Performed by: INTERNAL MEDICINE

## 2023-01-09 PROCEDURE — 93010 RHYTHM STRIP: ICD-10-PCS | Mod: HCNC,NTX,S$GLB, | Performed by: INTERNAL MEDICINE

## 2023-01-09 PROCEDURE — 99999 PR PBB SHADOW E&M-EST. PATIENT-LVL II: ICD-10-PCS | Mod: PBBFAC,HCNC,TXP, | Performed by: INTERNAL MEDICINE

## 2023-01-09 PROCEDURE — 99215 OFFICE O/P EST HI 40 MIN: CPT | Mod: HCNC,NTX,S$GLB, | Performed by: INTERNAL MEDICINE

## 2023-01-09 PROCEDURE — 99499 RISK ADDL DX/OHS AUDIT: ICD-10-PCS | Mod: S$GLB,TXP,, | Performed by: INTERNAL MEDICINE

## 2023-01-09 PROCEDURE — 1159F PR MEDICATION LIST DOCUMENTED IN MEDICAL RECORD: ICD-10-PCS | Mod: HCNC,CPTII,NTX,S$GLB | Performed by: INTERNAL MEDICINE

## 2023-01-09 PROCEDURE — 99499 UNLISTED E&M SERVICE: CPT | Mod: S$GLB,TXP,, | Performed by: INTERNAL MEDICINE

## 2023-01-09 PROCEDURE — 1157F ADVNC CARE PLAN IN RCRD: CPT | Mod: HCNC,CPTII,NTX,S$GLB | Performed by: INTERNAL MEDICINE

## 2023-01-09 PROCEDURE — 3077F SYST BP >= 140 MM HG: CPT | Mod: HCNC,CPTII,NTX,S$GLB | Performed by: INTERNAL MEDICINE

## 2023-01-09 PROCEDURE — 1159F MED LIST DOCD IN RCRD: CPT | Mod: HCNC,CPTII,NTX,S$GLB | Performed by: INTERNAL MEDICINE

## 2023-01-09 PROCEDURE — 93010 ELECTROCARDIOGRAM REPORT: CPT | Mod: HCNC,NTX,S$GLB, | Performed by: INTERNAL MEDICINE

## 2023-01-09 PROCEDURE — 3077F PR MOST RECENT SYSTOLIC BLOOD PRESSURE >= 140 MM HG: ICD-10-PCS | Mod: HCNC,CPTII,NTX,S$GLB | Performed by: INTERNAL MEDICINE

## 2023-01-09 NOTE — PROGRESS NOTES
Pt recently lost his right hearing aid and the Loss and Damage claim has been filed.  He cannot connect to BT on his phone while his right aid is missing.  Switched BT connection from right aid (Lost - waiting on L&D) to left aid to pt can use his BT feature until his L&D aid arrives. Pt's left hearing aid was successfully paired to the phone and an incoming call was completed in the office to confirm a good working BT connection.  Pt will be called when his right L&D aid arrives and is ready to be picked up from the clinic.

## 2023-01-11 ENCOUNTER — TELEPHONE (OUTPATIENT)
Dept: TRANSPLANT | Facility: CLINIC | Age: 78
End: 2023-01-11
Payer: MEDICARE

## 2023-01-11 ENCOUNTER — PATIENT MESSAGE (OUTPATIENT)
Dept: AUDIOLOGY | Facility: CLINIC | Age: 78
End: 2023-01-11
Payer: MEDICARE

## 2023-01-12 ENCOUNTER — HOSPITAL ENCOUNTER (OUTPATIENT)
Dept: PULMONOLOGY | Facility: CLINIC | Age: 78
Discharge: HOME | End: 2023-01-12
Payer: MEDICARE

## 2023-01-12 ENCOUNTER — OFFICE VISIT (OUTPATIENT)
Dept: TRANSPLANT | Facility: CLINIC | Age: 78
End: 2023-01-12
Payer: MEDICARE

## 2023-01-12 VITALS
SYSTOLIC BLOOD PRESSURE: 140 MMHG | DIASTOLIC BLOOD PRESSURE: 91 MMHG | WEIGHT: 198.63 LBS | BODY MASS INDEX: 27.81 KG/M2 | HEART RATE: 56 BPM | OXYGEN SATURATION: 99 % | HEIGHT: 71 IN

## 2023-01-12 DIAGNOSIS — I48.0 PAROXYSMAL ATRIAL FIBRILLATION: ICD-10-CM

## 2023-01-12 DIAGNOSIS — J84.9 INTERSTITIAL LUNG DISEASE: ICD-10-CM

## 2023-01-12 DIAGNOSIS — J98.4 CHRONIC LUNG DISEASE: ICD-10-CM

## 2023-01-12 DIAGNOSIS — J84.9 ILD (INTERSTITIAL LUNG DISEASE): ICD-10-CM

## 2023-01-12 PROCEDURE — 94729 DIFFUSING CAPACITY: CPT | Mod: HCNC,NTX,S$GLB, | Performed by: INTERNAL MEDICINE

## 2023-01-12 PROCEDURE — 1125F PR PAIN SEVERITY QUANTIFIED, PAIN PRESENT: ICD-10-PCS | Mod: HCNC,CPTII,NTX,S$GLB | Performed by: INTERNAL MEDICINE

## 2023-01-12 PROCEDURE — 3288F FALL RISK ASSESSMENT DOCD: CPT | Mod: HCNC,CPTII,NTX,S$GLB | Performed by: INTERNAL MEDICINE

## 2023-01-12 PROCEDURE — 94729 PR C02/MEMBANE DIFFUSE CAPACITY: ICD-10-PCS | Mod: HCNC,NTX,S$GLB, | Performed by: INTERNAL MEDICINE

## 2023-01-12 PROCEDURE — 1157F PR ADVANCE CARE PLAN OR EQUIV PRESENT IN MEDICAL RECORD: ICD-10-PCS | Mod: HCNC,CPTII,NTX,S$GLB | Performed by: INTERNAL MEDICINE

## 2023-01-12 PROCEDURE — 3288F PR FALLS RISK ASSESSMENT DOCUMENTED: ICD-10-PCS | Mod: HCNC,CPTII,NTX,S$GLB | Performed by: INTERNAL MEDICINE

## 2023-01-12 PROCEDURE — 1159F MED LIST DOCD IN RCRD: CPT | Mod: HCNC,CPTII,NTX,S$GLB | Performed by: INTERNAL MEDICINE

## 2023-01-12 PROCEDURE — 99999 PR PBB SHADOW E&M-EST. PATIENT-LVL III: ICD-10-PCS | Mod: PBBFAC,HCNC,TXP, | Performed by: INTERNAL MEDICINE

## 2023-01-12 PROCEDURE — 3080F PR MOST RECENT DIASTOLIC BLOOD PRESSURE >= 90 MM HG: ICD-10-PCS | Mod: HCNC,CPTII,NTX,S$GLB | Performed by: INTERNAL MEDICINE

## 2023-01-12 PROCEDURE — 1125F AMNT PAIN NOTED PAIN PRSNT: CPT | Mod: HCNC,CPTII,NTX,S$GLB | Performed by: INTERNAL MEDICINE

## 2023-01-12 PROCEDURE — 94727 GAS DIL/WSHOT DETER LNG VOL: CPT | Mod: HCNC,NTX,S$GLB, | Performed by: INTERNAL MEDICINE

## 2023-01-12 PROCEDURE — 94010 BREATHING CAPACITY TEST: CPT | Mod: HCNC,NTX,S$GLB, | Performed by: INTERNAL MEDICINE

## 2023-01-12 PROCEDURE — 3077F SYST BP >= 140 MM HG: CPT | Mod: HCNC,CPTII,NTX,S$GLB | Performed by: INTERNAL MEDICINE

## 2023-01-12 PROCEDURE — 99999 PR PBB SHADOW E&M-EST. PATIENT-LVL III: CPT | Mod: PBBFAC,HCNC,TXP, | Performed by: INTERNAL MEDICINE

## 2023-01-12 PROCEDURE — 94010 BREATHING CAPACITY TEST: ICD-10-PCS | Mod: HCNC,NTX,S$GLB, | Performed by: INTERNAL MEDICINE

## 2023-01-12 PROCEDURE — 99214 PR OFFICE/OUTPT VISIT, EST, LEVL IV, 30-39 MIN: ICD-10-PCS | Mod: 25,HCNC,NTX,S$GLB | Performed by: INTERNAL MEDICINE

## 2023-01-12 PROCEDURE — 99214 OFFICE O/P EST MOD 30 MIN: CPT | Mod: 25,HCNC,NTX,S$GLB | Performed by: INTERNAL MEDICINE

## 2023-01-12 PROCEDURE — 3077F PR MOST RECENT SYSTOLIC BLOOD PRESSURE >= 140 MM HG: ICD-10-PCS | Mod: HCNC,CPTII,NTX,S$GLB | Performed by: INTERNAL MEDICINE

## 2023-01-12 PROCEDURE — 1160F RVW MEDS BY RX/DR IN RCRD: CPT | Mod: HCNC,CPTII,NTX,S$GLB | Performed by: INTERNAL MEDICINE

## 2023-01-12 PROCEDURE — 3080F DIAST BP >= 90 MM HG: CPT | Mod: HCNC,CPTII,NTX,S$GLB | Performed by: INTERNAL MEDICINE

## 2023-01-12 PROCEDURE — 1160F PR REVIEW ALL MEDS BY PRESCRIBER/CLIN PHARMACIST DOCUMENTED: ICD-10-PCS | Mod: HCNC,CPTII,NTX,S$GLB | Performed by: INTERNAL MEDICINE

## 2023-01-12 PROCEDURE — 1157F ADVNC CARE PLAN IN RCRD: CPT | Mod: HCNC,CPTII,NTX,S$GLB | Performed by: INTERNAL MEDICINE

## 2023-01-12 PROCEDURE — 1101F PT FALLS ASSESS-DOCD LE1/YR: CPT | Mod: HCNC,CPTII,NTX,S$GLB | Performed by: INTERNAL MEDICINE

## 2023-01-12 PROCEDURE — 1159F PR MEDICATION LIST DOCUMENTED IN MEDICAL RECORD: ICD-10-PCS | Mod: HCNC,CPTII,NTX,S$GLB | Performed by: INTERNAL MEDICINE

## 2023-01-12 PROCEDURE — 1101F PR PT FALLS ASSESS DOC 0-1 FALLS W/OUT INJ PAST YR: ICD-10-PCS | Mod: HCNC,CPTII,NTX,S$GLB | Performed by: INTERNAL MEDICINE

## 2023-01-12 PROCEDURE — 94727 PR PULM FUNCTION TEST BY GAS: ICD-10-PCS | Mod: HCNC,NTX,S$GLB, | Performed by: INTERNAL MEDICINE

## 2023-01-12 NOTE — PROGRESS NOTES
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 He is on no assisted ventilation.  His New York Heart Association Class is I and a Karnofsky score of 100% - Normal, No Complaints, no evidence of disease. He is not diabetic.    Patient presents with his wife for a routine follow for ILD.  Since last visit, he reports he is doing well.  He underwent a urolift (general anesthesia) in November.  He is on eliquis for Paroxysmal AF.  Denies any other hospitalizations/exacerbations since last visit.  He feels otherwise well and denies any new respiratory concerns or limitations.  He is tolerating her medications well.      Brief History summarized from initial visit:  Patient has a PMH of TIA, depression(well controlled on current meds), A-Fib on eliquis/rhythmol, HTN, BPH, CKD 3, history of COVID, anemia of chronic blood loss, Hypothyroidism, PUD, osteoarthritis, complex sleep apnea syndrome, chronic back pain (well controlled on muscle relaxants).  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 Has no history of allergies/sinusitis, GERD or aspiration.  Prescribed CPAP, but has issues wearing it.     Review of Systems   Constitutional:  Negative for chills, fever and malaise/fatigue.   HENT:  Negative for congestion, ear pain, sinus pain and sore throat.    Eyes:  Negative for discharge and redness.   Respiratory:  Negative for sputum production, shortness of breath and wheezing.    Cardiovascular:  Negative for chest pain and palpitations.   Gastrointestinal:  Negative for abdominal pain, diarrhea and vomiting.   Genitourinary:  Negative for dysuria.   Musculoskeletal:  Negative for myalgias.   Skin:  Negative for rash.  "  Neurological:  Negative for dizziness.   Objective:   BP (!) 140/91 (BP Location: Left arm, Patient Position: Sitting, BP Method: Medium (Automatic))   Pulse (!) 56   Ht 5' 11" (1.803 m)   Wt 90.1 kg (198 lb 10.2 oz)   SpO2 99%   BMI 27.70 kg/m²   Physical Exam  Constitutional:       General: He is not in acute distress.     Appearance: Normal appearance.   HENT:      Head: Normocephalic and atraumatic.      Nose: No congestion or rhinorrhea.      Mouth/Throat:      Pharynx: No oropharyngeal exudate or posterior oropharyngeal erythema.   Eyes:      General: No scleral icterus.     Extraocular Movements: Extraocular movements intact.      Conjunctiva/sclera: Conjunctivae normal.   Cardiovascular:      Rate and Rhythm: Normal rate and regular rhythm.      Pulses: Normal pulses.   Pulmonary:      Effort: Pulmonary effort is normal. No respiratory distress.      Breath sounds: Normal breath sounds. No stridor. No wheezing, rhonchi or rales.   Chest:      Chest wall: No tenderness.   Abdominal:      General: There is no distension.      Palpations: Abdomen is soft.   Musculoskeletal:         General: No swelling. Normal range of motion.      Cervical back: Neck supple. No tenderness.      Right lower leg: No edema.      Left lower leg: No edema.   Lymphadenopathy:      Cervical: No cervical adenopathy.   Skin:     General: Skin is warm and dry.   Neurological:      Mental Status: He is alert and oriented to person, place, and time.   Psychiatric:         Mood and Affect: Mood normal.         Behavior: Behavior normal.         Thought Content: Thought content normal.         Judgment: Judgment normal.     Labs:  No visits with results within 7 Day(s) from this visit.   Latest known visit with results is:   Lab Visit on 11/22/2022   Component Date Value    OVIDIO Screen 11/22/2022 Negative <1:80     CCP Antibodies 11/22/2022 1.9     Rheumatoid Factor 11/22/2022 <13.0     Sed Rate 11/22/2022 10     CRP 11/22/2022 <0.3  "    PL-7 Autoantibodies 11/22/2022 NOT DETECTED     PL-12 Autoantibodies 11/22/2022 NOT DETECTED     MI-2 Autoab 11/22/2022 NOT DETECTED     Ku Autoantibodies 11/22/2022 NOT DETECTED     EJ Autoantibodies 11/22/2022 NOT DETECTED     OJ Autoantibodies 11/22/2022 NOT DETECTED     SRP Autoantibodies 11/22/2022 NOT DETECTED     Anti-Moira-1 Antibody 11/22/2022 <1.0 NEG     CPK 11/22/2022 36     Aldolase 11/22/2022 3.5     Anti-SSA Antibody 11/22/2022 0.08     Anti-SSA Interpretation 11/22/2022 Negative     Anti-SSB Antibody 11/22/2022 0.08     Anti-SSB Interpretation 11/22/2022 Negative     Scleroderma SCL- 11/22/2022 3     RNA Polymerase III Antib* 11/22/2022 <20     SCL-70 Antibody 11/22/2022 <0.2     Anti Sm/RNP Antibody 11/22/2022 0.07     Anti-Sm/RNP Interpretati* 11/22/2022 Negative     Th/To 11/22/2022 Negative     Anti-Moira-1 Antibody 11/22/2022 <20     PL-7 11/22/2022 Negative     PL-12 11/22/2022 Negative     EJ 11/22/2022 Negative     OJ 11/22/2022 Negative     SRP 11/22/2022 Negative     MI-2 11/22/2022 Negative     TIF1 GAMMA (P155/140) 11/22/2022 <20     MDA-5 (P140) (CADM-140) 11/22/2022 <20     NXP-2 (P140) 11/22/2022 <20     Anti-PM/Scl Ab 11/22/2022 <20     Fibrillarin (U3 RNP) 11/22/2022 Negative     U2 snRNP 11/22/2022 Negative     Anti-U1-RNP  Ab 11/22/2022 <20     Ku 11/22/2022 Negative     Anti-SS-A 52 kD Ab, IgG 11/22/2022 <20     Cytoplasmic Neutrophilic* 11/22/2022 <1:20     Perinuclear (P-ANCA) 11/22/2022 <1:20        Pulmonary Function Tests 1/12/2023 11/21/2022   FVC 3.38 3.21   FEV1 2.79 2.7   TLC (liters) 4.4 5.06   DLCO (ml/mmHg sec) 14 15.13   FVC% 81 77   FEV1% 90 87   FEF 25-75 3.25 5.06   FEF 25-75% 146 69   TLC% 60 69   DLCO% 53 56     6MW 11/21/2022   6MWT Status completed without stopping   Patient Reported Leg pain   Was O2 used? No   6MW Distance walked (feet) 1200   Distance walked (meters) 365.76   Did patient stop? No   Oxygen Saturation 96   Supplemental Oxygen Room Air   Heart  Rate 52   Blood Pressure 172/83   Meena Dyspnea Rating  nothing at all   Oxygen Saturation 99   Supplemental Oxygen Room Air   Heart Rate 65   Blood Pressure 195/96   Meena Dyspnea Rating  very light   Recovery Time (seconds) 136   Oxygen Saturation 97   Supplemental Oxygen Room Air   Heart Rate 54       Assessment:-  1. ILD (interstitial lung disease)    2. Chronic lung disease    3. 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    -Review of previous CXR from 2011 suggestive of bilateral interstitial marking consistent with ILD dating back to 2011 with disease progression over the years but clinically asymptomatic.  Per radiographs, his ILD is suggestive of probable UIP and with CTD workup negative/lack of systemic symptoms, his ILD is likely due to idiopathic pulmonary fibrosis(IPF) although his ILD does not demonstrate the typical trajectory for IPF.    Spirometric values are stable with drop in TLC and DLCO compared to last visit which is not concerning but could be a subtle suggestion of disease progression.  Stable functional capacity without notable desaturation on his previous walk test.  Discussed antifibrotic therapy and agree with the patient about not initiating it at this time.      RTC in 3 months with full PFTs and  6MWT

## 2023-01-12 NOTE — LETTER
January 12, 2023        Elias Yang  [address]  [facility] [address]  Phone: [phone]  Fax: [phone]             [facility] 1st [facility]  [address]  [facility] [address]  Phone: [phone]   Patient: Dominick Song MD   MR Number: [id number]   YOB: 1945   Date of Visit: 1/12/2023       Dear Dr. Elias Yang    Thank you for referring Dominick Song to me for evaluation. Attached you will find relevant portions of my assessment and plan of care.    If you have questions, please do not hesitate to call me. I look forward to following Dominick Song along with you.    Sincerely,    Shante Ferris MD    Enclosure    If you would like to receive this communication electronically, please contact [email or web address] or [phone] to request [employer] access.    [employer] is a tool which provides read-only access to select patient information with whom you have a relationship. Its easy to use and provides real time access to review your patients record including encounter summaries, notes, results, and demographic information.    If you feel you have received this communication in error or would no longer like to receive these types of communications, please e-mail [email or web address]

## 2023-01-17 ENCOUNTER — CLINICAL SUPPORT (OUTPATIENT)
Dept: AUDIOLOGY | Facility: CLINIC | Age: 78
End: 2023-01-17
Payer: MEDICARE

## 2023-01-17 DIAGNOSIS — J84.9 INTERSTITIAL LUNG DISEASE: Primary | ICD-10-CM

## 2023-01-17 PROCEDURE — COVTAX COVINGTON PRESCRIBED 4.25%: ICD-10-PCS | Mod: CSM,HCNC,NTX,S$GLB | Performed by: AUDIOLOGIST

## 2023-01-17 PROCEDURE — COVTAX COVINGTON PRESCRIBED 4.25%: Mod: CSM,HCNC,NTX,S$GLB | Performed by: AUDIOLOGIST

## 2023-01-17 PROCEDURE — V5267 PR HEARING AID SUPPLY/ACCESSORY: ICD-10-PCS | Mod: CSM,HCNC,NTX,S$GLB | Performed by: AUDIOLOGIST

## 2023-01-17 PROCEDURE — V5267 HEARING AID SUP/ACCESS/DEV: HCPCS | Mod: CSM,HCNC,NTX,S$GLB | Performed by: AUDIOLOGIST

## 2023-01-18 ENCOUNTER — PATIENT MESSAGE (OUTPATIENT)
Dept: UROLOGY | Facility: CLINIC | Age: 78
End: 2023-01-18
Payer: MEDICARE

## 2023-01-26 ENCOUNTER — OFFICE VISIT (OUTPATIENT)
Dept: SURGERY | Facility: CLINIC | Age: 78
End: 2023-01-26
Payer: MEDICARE

## 2023-01-26 VITALS
DIASTOLIC BLOOD PRESSURE: 90 MMHG | HEIGHT: 71 IN | HEART RATE: 56 BPM | BODY MASS INDEX: 27.99 KG/M2 | WEIGHT: 199.94 LBS | SYSTOLIC BLOOD PRESSURE: 157 MMHG

## 2023-01-26 DIAGNOSIS — K43.9 VENTRAL HERNIA WITHOUT OBSTRUCTION OR GANGRENE: Primary | ICD-10-CM

## 2023-01-26 PROCEDURE — 3288F FALL RISK ASSESSMENT DOCD: CPT | Mod: HCNC,CPTII,NTX,S$GLB | Performed by: SURGERY

## 2023-01-26 PROCEDURE — 1160F RVW MEDS BY RX/DR IN RCRD: CPT | Mod: HCNC,CPTII,NTX,S$GLB | Performed by: SURGERY

## 2023-01-26 PROCEDURE — 3077F PR MOST RECENT SYSTOLIC BLOOD PRESSURE >= 140 MM HG: ICD-10-PCS | Mod: HCNC,CPTII,NTX,S$GLB | Performed by: SURGERY

## 2023-01-26 PROCEDURE — 3077F SYST BP >= 140 MM HG: CPT | Mod: HCNC,CPTII,NTX,S$GLB | Performed by: SURGERY

## 2023-01-26 PROCEDURE — 99212 PR OFFICE/OUTPT VISIT, EST, LEVL II, 10-19 MIN: ICD-10-PCS | Mod: HCNC,NTX,S$GLB, | Performed by: SURGERY

## 2023-01-26 PROCEDURE — 99212 OFFICE O/P EST SF 10 MIN: CPT | Mod: HCNC,NTX,S$GLB, | Performed by: SURGERY

## 2023-01-26 PROCEDURE — 1159F MED LIST DOCD IN RCRD: CPT | Mod: HCNC,CPTII,NTX,S$GLB | Performed by: SURGERY

## 2023-01-26 PROCEDURE — 3080F PR MOST RECENT DIASTOLIC BLOOD PRESSURE >= 90 MM HG: ICD-10-PCS | Mod: HCNC,CPTII,NTX,S$GLB | Performed by: SURGERY

## 2023-01-26 PROCEDURE — 1126F AMNT PAIN NOTED NONE PRSNT: CPT | Mod: HCNC,CPTII,NTX,S$GLB | Performed by: SURGERY

## 2023-01-26 PROCEDURE — 1157F PR ADVANCE CARE PLAN OR EQUIV PRESENT IN MEDICAL RECORD: ICD-10-PCS | Mod: HCNC,CPTII,NTX,S$GLB | Performed by: SURGERY

## 2023-01-26 PROCEDURE — 1126F PR PAIN SEVERITY QUANTIFIED, NO PAIN PRESENT: ICD-10-PCS | Mod: HCNC,CPTII,NTX,S$GLB | Performed by: SURGERY

## 2023-01-26 PROCEDURE — 1159F PR MEDICATION LIST DOCUMENTED IN MEDICAL RECORD: ICD-10-PCS | Mod: HCNC,CPTII,NTX,S$GLB | Performed by: SURGERY

## 2023-01-26 PROCEDURE — 1100F PTFALLS ASSESS-DOCD GE2>/YR: CPT | Mod: HCNC,CPTII,NTX,S$GLB | Performed by: SURGERY

## 2023-01-26 PROCEDURE — 99999 PR PBB SHADOW E&M-EST. PATIENT-LVL III: CPT | Mod: PBBFAC,HCNC,TXP, | Performed by: SURGERY

## 2023-01-26 PROCEDURE — 1160F PR REVIEW ALL MEDS BY PRESCRIBER/CLIN PHARMACIST DOCUMENTED: ICD-10-PCS | Mod: HCNC,CPTII,NTX,S$GLB | Performed by: SURGERY

## 2023-01-26 PROCEDURE — 3080F DIAST BP >= 90 MM HG: CPT | Mod: HCNC,CPTII,NTX,S$GLB | Performed by: SURGERY

## 2023-01-26 PROCEDURE — 1157F ADVNC CARE PLAN IN RCRD: CPT | Mod: HCNC,CPTII,NTX,S$GLB | Performed by: SURGERY

## 2023-01-26 PROCEDURE — 99999 PR PBB SHADOW E&M-EST. PATIENT-LVL III: ICD-10-PCS | Mod: PBBFAC,HCNC,TXP, | Performed by: SURGERY

## 2023-01-26 PROCEDURE — 1100F PR PT FALLS ASSESS DOC 2+ FALLS/FALL W/INJURY/YR: ICD-10-PCS | Mod: HCNC,CPTII,NTX,S$GLB | Performed by: SURGERY

## 2023-01-26 PROCEDURE — 3288F PR FALLS RISK ASSESSMENT DOCUMENTED: ICD-10-PCS | Mod: HCNC,CPTII,NTX,S$GLB | Performed by: SURGERY

## 2023-01-26 NOTE — PROGRESS NOTES
I have seen the patient, reviewed the Resident's history and physical, assessment and plan. I have personally interviewed and examined the patient at bedside and: agree with the findings.     78y/o with rlq hernia.  He denies chronic cough, constipation and difficulty urinating.  He has no symptoms.    PE 4 cm easily reducible, nontender right spigelian hernia.  There appears to be a lipoma medial to it associated with the transverse surgical scar below the area.    Right spigelian hernia.  Will continue to observe.  Rtc one year.

## 2023-01-26 NOTE — LETTER
January 26, 2023        Maxi Gaines MD  [address]  [facility] [address]             [facility]  [address]  [place] [address]  Phone: [phone]   Patient: Dominick Song MD   MR Number: [id number]   YOB: 1945   Date of Visit: 1/26/2023       Dear Dr. Gaines:    Thank you for referring Dominick Song to me for evaluation. Attached you will find relevant portions of my assessment and plan of care.    If you have questions, please do not hesitate to call me. I look forward to following Dominick Duncan along with you.    Sincerely,      Rosendo Marti MD            CC  No Recipients    Enclosure         
IV discontinued, cath removed intact

## 2023-01-26 NOTE — PROGRESS NOTES
History & Physical    SUBJECTIVE:     History of Present Illness:  Patient is a 77 y.o. male presents with history of  Stage 3 CKD, afib, hypothyroidism and s/p left-sided spigelian hernia repair in June 2022 returns to clinic. He states his hernia  on right lower quardant has gotten large and begun bulging. I has not caused him any pain symptoms, or discomfort. He states he does not notice any changed when he is walking or lifting things around the house.  He states he has not had any constipation symptoms, denies cough. He no longer has difficulty urinating after a stent placement by his urologist    He was recently being worked up for interstitial lung disease after chest imaging findings showed pulmonary fibrosis, and underwent multiple marker and antibody labs that have turned out negative. He has has no difficulty breathing and reports no SOB.     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      Review of patient's allergies indicates:   Allergen Reactions    No known drug allergies        Current Outpatient Medications   Medication Sig Dispense Refill    buPROPion (WELLBUTRIN XL) 150 MG TB24 tablet Take 1 tablet (150 mg total) by mouth once daily. 90 tablet 3    buPROPion (WELLBUTRIN XL) 300 MG 24 hr tablet TAKE 1 TABLET(300 MG) BY MOUTH EVERY DAY 90 tablet 3    carvediloL (COREG) 12.5 MG tablet TAKE 1 TABLET(12.5 MG) BY MOUTH TWICE DAILY WITH MEALS 180 tablet 3    cyclobenzaprine (FLEXERIL) 10 MG tablet TAKE 1 TABLET(10 MG) BY MOUTH THREE TIMES DAILY AS NEEDED FOR MUSCLE SPASMS 60 tablet 3    ELIQUIS 5 mg Tab Take 1 tablet (5 mg total) by mouth 2 (two) times a day. 60 tablet 11    EScitalopram oxalate (LEXAPRO) 10 MG tablet TAKE 1 TABLET(10 MG) BY MOUTH EVERY DAY 90 tablet 4    levothyroxine (SYNTHROID) 75 MCG tablet Take 1 tablet (75 mcg total) by mouth before breakfast. 90 tablet 2    methocarbamoL (ROBAXIN) 500 MG Tab TAKE 1 TABLET(500 MG) BY MOUTH THREE TIMES  DAILY FOR 10 DAYS 90 tablet 0    OMEGA-3 FATTY ACIDS (FISH OIL CONCENTRATE ORAL) Take by mouth Daily.      oxybutynin (DITROPAN) 5 MG Tab Take 1 tablet (5 mg total) by mouth 3 (three) times daily as needed (bladder spasms). 30 tablet 1    oxyCODONE (OXY-IR) 5 mg Cap Take 2 capsules (10 mg total) by mouth every 4 (four) hours as needed for Pain. 20 capsule 0    propafenone (RYTHMOL SR) 425 MG Cp12 TAKE 1 CAPSULE(425 MG) BY MOUTH EVERY 12 HOURS 180 capsule 3    telmisartan (MICARDIS) 40 MG Tab TAKE 1 TABLET(40 MG) BY MOUTH EVERY DAY 90 tablet 3    calcium citrate (CALCITRATE) 200 mg (950 mg) tablet Take 1 tablet (200 mg total) by mouth 2 (two) times daily. 180 tablet 3    tamsulosin (FLOMAX) 0.4 mg Cap Take 1 capsule (0.4 mg total) by mouth every evening. for 14 days 14 capsule 0     No current facility-administered medications for this visit.       Past Medical History:   Diagnosis Date    Anticoagulant long-term use     Anxiety     Arthritis     Atrial fibrillation     BPH (benign prostatic hyperplasia)     Cataract     CKD (chronic kidney disease) stage 3, GFR 30-59 ml/min 7/10/2017    Followed by Dr. Jeevan Pond    Colon polyp     benign    Depression     Elevated PSA     Erectile dysfunction     Gastric ulcer with hemorrhage     Hep B w/o coma 1977    History of bleeding peptic ulcer     History of prostatitis     Hypertension     PAF (paroxysmal atrial fibrillation)     Sleep apnea     on CPAP    Stroke     TIA December 2019    Thyroid disease      Past Surgical History:   Procedure Laterality Date    ABDOMINAL HERNIA REPAIR      ABLATION OF ARRHYTHMOGENIC FOCUS FOR ATRIAL FIBRILLATION N/A 6/15/2020    Procedure: Ablation atrial fibrillation;  Surgeon: Wyatt Beatty MD;  Location: [facility] EP LAB;  Service: Cardiology;  Laterality: N/A;  PAF, AFl, PEPE, PVI re-do, CTI, RFA, JUSTIN, Gen, SK, 3 Prep    CATARACT EXTRACTION  11/25/2013    bilateral    CHOLECYSTECTOMY      COLONOSCOPY N/A 11/25/2015    Procedure:  COLONOSCOPY;  Surgeon: Toby Hernandez MD;  Location: [facility] ENDO;  Service: Endoscopy;  Laterality: N/A;    COLONOSCOPY N/A 11/13/2020    Procedure: COLONOSCOPY;  Surgeon: Toby Hernandez MD;  Location: [facility] ENDO;  Service: Endoscopy;  Laterality: N/A;    CYSTOSCOPY WITH INSERTION OF MINIMALLY INVASIVE IMPLANT TO ENLARGE PROSTATIC URETHRA N/A 11/28/2022    Procedure: CYSTOSCOPY, WITH INSERTION OF UROLIFT IMPLANT;  Surgeon: Yakov Shetty MD;  Location: [facility] OR;  Service: Urology;  Laterality: N/A;    EYE SURGERY      GASTRIC BYPASS  1992    INTRAMEDULLARY RODDING OF FEMUR Left 7/18/2020    Procedure: INSERTION, INTRAMEDULLARY ERIC, FEMUR, left, Synthes, Rives table, Large C arm clock side,;  Surgeon: Tony Rodriguez MD;  Location: [facility] OR [address] [place] [address];  Service: Orthopedics;  Laterality: Left;    KNEE ARTHROSCOPY      RT    LASIK  2001    both eyes (Dr. Rabago)    ORIF HUMERUS FRACTURE      LT    ORIF HUMERUS FRACTURE Right     non surgical repair    RADIOFREQUENCY ABLATION      x2    Right ankle tendon repair      ROBOT-ASSISTED REPAIR OF INCISIONAL HERNIA USING DA GARETH XI Left 6/13/2022    Procedure: XI ROBOTIC REPAIR, HERNIA, INCISIONAL (LEFT SIDE SPIGELIAN WITH MESH);  Surgeon: Rosendo Marti MD;  Location: [address];  Service: General;  Laterality: Left;  consent in am    ROTATOR CUFF REPAIR      right    TOTAL KNEE ARTHROPLASTY Right 5/29/2018    Procedure: REPLACEMENT-KNEE-TOTAL-axel;  Surgeon: Denzel Dallas MD;  [facility];  Service: Orthopedics;  Laterality: Right;  [place]    TREATMENT OF CARDIAC ARRHYTHMIA  6/15/2020    Procedure: Cardioversion or Defibrillation;  Surgeon: Wyatt Beatty MD;  Location: [facility] EP LAB;  Service: Cardiology;;     Family History   Problem Relation Age of Onset    COPD Father     Diabetes Father     Osteoporosis Father     Aortic stenosis Mother     Heart disease Mother         aortic stenosis    Osteoporosis Mother     Heart attack Brother   "   No Known Problems Son     No Known Problems Daughter     No Known Problems Daughter     No Known Problems Daughter      Social History     Tobacco Use    Smoking status: Never     Passive exposure: Never    Smokeless tobacco: Never    Tobacco comments:     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   Respiratory:  Negative for apnea and shortness of breath.    Gastrointestinal:  Negative for abdominal pain, constipation, diarrhea, nausea and vomiting.     OBJECTIVE:     Vital Signs (Most Recent)  Pulse: (!) 56 (01/26/23 0919)  BP: (!) 157/90 (01/26/23 0919)  5' 11" (1.803 m)  90.7 kg (199 lb 15.3 oz)     Physical Exam:  Physical Exam  Vitals reviewed.   Constitutional:       Appearance: Normal appearance.   Abdominal:      Palpations: Abdomen is soft.      Tenderness: There is no abdominal tenderness.      Hernia: A hernia is present.          Comments: No signs of recurrence of spigelian hernia or left inguinal hernia   Skin:     General: Skin is warm and dry.   Neurological:      General: No focal deficit present.      Mental Status: He is alert and oriented to person, place, and time.   Psychiatric:         Mood and Affect: Mood normal.         Behavior: Behavior normal.         Thought Content: Thought content normal.         Judgment: Judgment normal.       Laboratory  Recent lab workup reviewed    Diagnostic Results:  Relevant imaging reviewed    ASSESSMENT/PLAN:     77-year-old male with history of recent left-sided spigelian hernia repair and left inguinal hernia repair in June 2022 returns to clinic for right-sided bulge in right lower quadrant that has not caused him any discomfort or GI symptoms.  He would like to explore his options and know whether or not hernia requires repair.    PLAN:                  "

## 2023-01-29 ENCOUNTER — PATIENT MESSAGE (OUTPATIENT)
Dept: UROLOGY | Facility: CLINIC | Age: 78
End: 2023-01-29
Payer: MEDICARE

## 2023-02-09 DIAGNOSIS — Z00.00 ENCOUNTER FOR MEDICARE ANNUAL WELLNESS EXAM: ICD-10-CM

## 2023-02-15 ENCOUNTER — PATIENT MESSAGE (OUTPATIENT)
Dept: AUDIOLOGY | Facility: CLINIC | Age: 78
End: 2023-02-15
Payer: MEDICARE

## 2023-03-01 ENCOUNTER — LAB VISIT (OUTPATIENT)
Dept: LAB | Facility: HOSPITAL | Age: 78
End: 2023-03-01
Attending: FAMILY MEDICINE
Payer: MEDICARE

## 2023-03-01 DIAGNOSIS — I10 HYPERTENSION, UNSPECIFIED TYPE: ICD-10-CM

## 2023-03-01 DIAGNOSIS — E03.4 HYPOTHYROIDISM DUE TO ACQUIRED ATROPHY OF THYROID: ICD-10-CM

## 2023-03-01 LAB
ALBUMIN SERPL BCP-MCNC: 3.3 G/DL (ref 3.5–5.2)
ALP SERPL-CCNC: 76 U/L (ref 55–135)
ALT SERPL W/O P-5'-P-CCNC: 17 U/L (ref 10–44)
ANION GAP SERPL CALC-SCNC: 4 MMOL/L (ref 8–16)
AST SERPL-CCNC: 17 U/L (ref 10–40)
BASOPHILS # BLD AUTO: 0.09 K/UL (ref 0–0.2)
BASOPHILS NFR BLD: 1.1 % (ref 0–1.9)
BILIRUB SERPL-MCNC: 0.8 MG/DL (ref 0.1–1)
BUN SERPL-MCNC: 27 MG/DL (ref 8–23)
CALCIUM SERPL-MCNC: 8.5 MG/DL (ref 8.7–10.5)
CHLORIDE SERPL-SCNC: 111 MMOL/L (ref 95–110)
CO2 SERPL-SCNC: 24 MMOL/L (ref 23–29)
CREAT SERPL-MCNC: 1.5 MG/DL (ref 0.5–1.4)
DIFFERENTIAL METHOD: ABNORMAL
EOSINOPHIL # BLD AUTO: 0.8 K/UL (ref 0–0.5)
EOSINOPHIL NFR BLD: 9.5 % (ref 0–8)
ERYTHROCYTE [DISTWIDTH] IN BLOOD BY AUTOMATED COUNT: 13.7 % (ref 11.5–14.5)
EST. GFR  (NO RACE VARIABLE): 48 ML/MIN/1.73 M^2
GLUCOSE SERPL-MCNC: 125 MG/DL (ref 70–110)
HCT VFR BLD AUTO: 37.4 % (ref 40–54)
HGB BLD-MCNC: 11.7 G/DL (ref 14–18)
IMM GRANULOCYTES # BLD AUTO: 0.02 K/UL (ref 0–0.04)
IMM GRANULOCYTES NFR BLD AUTO: 0.2 % (ref 0–0.5)
LYMPHOCYTES # BLD AUTO: 1.6 K/UL (ref 1–4.8)
LYMPHOCYTES NFR BLD: 19.2 % (ref 18–48)
MCH RBC QN AUTO: 29.8 PG (ref 27–31)
MCHC RBC AUTO-ENTMCNC: 31.3 G/DL (ref 32–36)
MCV RBC AUTO: 95 FL (ref 82–98)
MONOCYTES # BLD AUTO: 0.7 K/UL (ref 0.3–1)
MONOCYTES NFR BLD: 8.7 % (ref 4–15)
NEUTROPHILS # BLD AUTO: 5 K/UL (ref 1.8–7.7)
NEUTROPHILS NFR BLD: 61.3 % (ref 38–73)
NRBC BLD-RTO: 0 /100 WBC
PLATELET # BLD AUTO: 239 K/UL (ref 150–450)
PMV BLD AUTO: 11.7 FL (ref 9.2–12.9)
POTASSIUM SERPL-SCNC: 4.5 MMOL/L (ref 3.5–5.1)
PROT SERPL-MCNC: 5.9 G/DL (ref 6–8.4)
RBC # BLD AUTO: 3.93 M/UL (ref 4.6–6.2)
SODIUM SERPL-SCNC: 139 MMOL/L (ref 136–145)
TSH SERPL DL<=0.005 MIU/L-ACNC: 2.33 UIU/ML (ref 0.4–4)
WBC # BLD AUTO: 8.19 K/UL (ref 3.9–12.7)

## 2023-03-01 PROCEDURE — 85025 COMPLETE CBC W/AUTO DIFF WBC: CPT | Mod: HCNC,NTX | Performed by: FAMILY MEDICINE

## 2023-03-01 PROCEDURE — 84443 ASSAY THYROID STIM HORMONE: CPT | Mod: HCNC,NTX | Performed by: FAMILY MEDICINE

## 2023-03-01 PROCEDURE — 36415 COLL VENOUS BLD VENIPUNCTURE: CPT | Mod: HCNC,PO,TXP | Performed by: FAMILY MEDICINE

## 2023-03-01 PROCEDURE — 80053 COMPREHEN METABOLIC PANEL: CPT | Mod: HCNC,TXP | Performed by: FAMILY MEDICINE

## 2023-03-07 ENCOUNTER — PATIENT MESSAGE (OUTPATIENT)
Dept: PAIN MEDICINE | Facility: CLINIC | Age: 78
End: 2023-03-07
Payer: MEDICARE

## 2023-03-07 ENCOUNTER — OFFICE VISIT (OUTPATIENT)
Dept: UROLOGY | Facility: CLINIC | Age: 78
End: 2023-03-07
Payer: MEDICARE

## 2023-03-07 VITALS — BODY MASS INDEX: 28.43 KG/M2 | HEIGHT: 71 IN | WEIGHT: 203.06 LBS

## 2023-03-07 DIAGNOSIS — N13.8 BPH WITH URINARY OBSTRUCTION: Primary | ICD-10-CM

## 2023-03-07 DIAGNOSIS — N40.1 BPH WITH URINARY OBSTRUCTION: Primary | ICD-10-CM

## 2023-03-07 DIAGNOSIS — R97.20 ELEVATED PSA: ICD-10-CM

## 2023-03-07 PROCEDURE — 3288F FALL RISK ASSESSMENT DOCD: CPT | Mod: HCNC,CPTII,NTX,S$GLB | Performed by: STUDENT IN AN ORGANIZED HEALTH CARE EDUCATION/TRAINING PROGRAM

## 2023-03-07 PROCEDURE — 1157F ADVNC CARE PLAN IN RCRD: CPT | Mod: HCNC,CPTII,NTX,S$GLB | Performed by: STUDENT IN AN ORGANIZED HEALTH CARE EDUCATION/TRAINING PROGRAM

## 2023-03-07 PROCEDURE — 1157F PR ADVANCE CARE PLAN OR EQUIV PRESENT IN MEDICAL RECORD: ICD-10-PCS | Mod: HCNC,CPTII,NTX,S$GLB | Performed by: STUDENT IN AN ORGANIZED HEALTH CARE EDUCATION/TRAINING PROGRAM

## 2023-03-07 PROCEDURE — 1101F PT FALLS ASSESS-DOCD LE1/YR: CPT | Mod: HCNC,CPTII,NTX,S$GLB | Performed by: STUDENT IN AN ORGANIZED HEALTH CARE EDUCATION/TRAINING PROGRAM

## 2023-03-07 PROCEDURE — 99999 PR PBB SHADOW E&M-EST. PATIENT-LVL III: CPT | Mod: PBBFAC,HCNC,TXP, | Performed by: STUDENT IN AN ORGANIZED HEALTH CARE EDUCATION/TRAINING PROGRAM

## 2023-03-07 PROCEDURE — 99999 PR PBB SHADOW E&M-EST. PATIENT-LVL III: ICD-10-PCS | Mod: PBBFAC,HCNC,TXP, | Performed by: STUDENT IN AN ORGANIZED HEALTH CARE EDUCATION/TRAINING PROGRAM

## 2023-03-07 PROCEDURE — 1126F PR PAIN SEVERITY QUANTIFIED, NO PAIN PRESENT: ICD-10-PCS | Mod: HCNC,CPTII,NTX,S$GLB | Performed by: STUDENT IN AN ORGANIZED HEALTH CARE EDUCATION/TRAINING PROGRAM

## 2023-03-07 PROCEDURE — 3288F PR FALLS RISK ASSESSMENT DOCUMENTED: ICD-10-PCS | Mod: HCNC,CPTII,NTX,S$GLB | Performed by: STUDENT IN AN ORGANIZED HEALTH CARE EDUCATION/TRAINING PROGRAM

## 2023-03-07 PROCEDURE — 99214 PR OFFICE/OUTPT VISIT, EST, LEVL IV, 30-39 MIN: ICD-10-PCS | Mod: HCNC,NTX,S$GLB, | Performed by: STUDENT IN AN ORGANIZED HEALTH CARE EDUCATION/TRAINING PROGRAM

## 2023-03-07 PROCEDURE — 1159F MED LIST DOCD IN RCRD: CPT | Mod: HCNC,CPTII,NTX,S$GLB | Performed by: STUDENT IN AN ORGANIZED HEALTH CARE EDUCATION/TRAINING PROGRAM

## 2023-03-07 PROCEDURE — 1159F PR MEDICATION LIST DOCUMENTED IN MEDICAL RECORD: ICD-10-PCS | Mod: HCNC,CPTII,NTX,S$GLB | Performed by: STUDENT IN AN ORGANIZED HEALTH CARE EDUCATION/TRAINING PROGRAM

## 2023-03-07 PROCEDURE — 99214 OFFICE O/P EST MOD 30 MIN: CPT | Mod: HCNC,NTX,S$GLB, | Performed by: STUDENT IN AN ORGANIZED HEALTH CARE EDUCATION/TRAINING PROGRAM

## 2023-03-07 PROCEDURE — 1126F AMNT PAIN NOTED NONE PRSNT: CPT | Mod: HCNC,CPTII,NTX,S$GLB | Performed by: STUDENT IN AN ORGANIZED HEALTH CARE EDUCATION/TRAINING PROGRAM

## 2023-03-07 PROCEDURE — 1101F PR PT FALLS ASSESS DOC 0-1 FALLS W/OUT INJ PAST YR: ICD-10-PCS | Mod: HCNC,CPTII,NTX,S$GLB | Performed by: STUDENT IN AN ORGANIZED HEALTH CARE EDUCATION/TRAINING PROGRAM

## 2023-03-07 RX ORDER — OXYCODONE AND ACETAMINOPHEN 10; 325 MG/1; MG/1
1 TABLET ORAL
COMMUNITY
Start: 2022-11-29 | End: 2023-03-07

## 2023-03-07 NOTE — PROGRESS NOTES
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 He is established to our clinic and was last seen by Dr. Shelton for elevated PSA.    He has had LUTS for years and recently underwent Urolift. Pre-op IPSS was 19/5. He has stopped Uroxatral and dutasteride. He is doing very well from a urinary standpoint.   IPSS Questionnaire (AUA-SS):  1)  Incomplete Emptying 1 - Less than 1 time in 5   2)  Frequency 0 - Not at all   3)  Intermittency 2 - Less than half the time   4) Urgency 1 - Less than 1 time in 5   5) Weak Stream  0 - Not at all   6) Straining 1 - Less than 1 time in 5   7) Nocturia 2 - 2 times   Total score:  0-7 mild, 8-19 mod, 20-35 severe 7   Quality of Life:  1 - Pleased       He has had an elevated PSA for many years. He has had multiple prostate biopsies in 2004, 2010 with Dr. Shelton, and all have been negative.  Most recent PSA was 3.9 and is 7.8 corrected for dutasteride. He recently underwent an MRI of the prostate which showed only a PI-RADS 2 lesion.  Volume was 68 cc.     He reports ED and tried and failed viagra, cialis, and ICI. Previously discussed a prosthesis with Dr. Gaston, and he is not interested in this.      Past medical, family, surgical and social history reviewed as documented in chart with pertinent positive medical, family, surgical and social history detailed in HPI.    A review systems was conducted with pertinent positive and negative findings documented in HPI.    Anticoagulation/Antiplatelets:  Yes eliquis 5mg BID for A-fib    OBJECTIVE:     Estimated body mass index is 28.32 kg/m² as calculated from the following:    Height as of this encounter: 5' 11" (1.803 m).    Weight as of this encounter: 92.1 kg (203 lb 0.7 oz).    Vital Signs (Most Recent)       Physical Exam  Vitals and nursing note reviewed.   Constitutional:       General: " He is not in acute distress.     Appearance: Normal appearance. He is well-developed. He is not ill-appearing or toxic-appearing.   Pulmonary:      Effort: Pulmonary effort is normal. No accessory muscle usage or respiratory distress.   Neurological:      General: No focal deficit present.      Mental Status: He is alert and oriented to person, place, and time. Mental status is at baseline.   Psychiatric:         Mood and Affect: Mood normal.         Behavior: Behavior is cooperative.         Thought Content: Thought content normal.         Judgment: Judgment normal.       Lab Results   Component Value Date    BUN 27 (H) 03/01/2023    CREATININE 1.5 (H) 03/01/2023    WBC 8.19 03/01/2023    HGB 11.7 (L) 03/01/2023    HCT 37.4 (L) 03/01/2023     03/01/2023    AST 17 03/01/2023    ALT 17 03/01/2023    ALKPHOS 76 03/01/2023    ALBUMIN 3.3 (L) 03/01/2023    HGBA1C 4.9 08/31/2022        Lab Results   Component Value Date    PSA 2.62 03/21/2013    PSA 4.41 (H) 02/06/2012    PSA 4.82 (H) 10/31/2011    PSA 4.0 08/31/2011    PSA 4.5 (H) 04/20/2011    PSA 5.8 (H) 06/08/2010    PSA 3.9 12/15/2009    PSA 4.6 (H) 06/08/2009    PSA 3.8 05/28/2008    PSA 4.7 (H) 09/04/2007    PSADIAG 3.9 01/12/2022    PSADIAG 1.5 01/26/2021    PSADIAG 3.6 10/30/2019    PSADIAG 2.9 10/08/2018    PSADIAG 2.4 10/11/2017    PSADIAG 1.8 10/20/2016    PSADIAG 2.9 10/12/2015    PSAFREE 0.76 03/26/2018    PSAFREE 1.20 09/01/2004    PSAFREEPCT 19.00 03/26/2018    PSAFREEPCT 16.00 09/01/2004      Urine dipstick shows negative for all components.     ASSESSMENT     1. BPH with urinary obstruction    2. Elevated PSA        PLAN:     1. BPH with urinary obstruction  He is doing very well from urinary standpoint status post UroLift.  He can follow-up as needed.      2. Elevated PSA  We discussed the pros and cons of PSA screening after age 70 and 75 depending on the guidelines.  He is had multiple negative biopsies which have been all benign.  MRI shows  no concerning lesions.  I would recommend cessation of PSA screening at this point.  He is comfortable with that.        Yakov Shetty MD

## 2023-03-08 ENCOUNTER — OFFICE VISIT (OUTPATIENT)
Dept: FAMILY MEDICINE | Facility: CLINIC | Age: 78
End: 2023-03-08
Payer: MEDICARE

## 2023-03-08 ENCOUNTER — CLINICAL SUPPORT (OUTPATIENT)
Dept: AUDIOLOGY | Facility: CLINIC | Age: 78
End: 2023-03-08
Payer: MEDICARE

## 2023-03-08 VITALS
HEART RATE: 52 BPM | OXYGEN SATURATION: 96 % | SYSTOLIC BLOOD PRESSURE: 130 MMHG | DIASTOLIC BLOOD PRESSURE: 80 MMHG | WEIGHT: 204.56 LBS | TEMPERATURE: 98 F | HEIGHT: 71 IN | BODY MASS INDEX: 28.64 KG/M2

## 2023-03-08 DIAGNOSIS — F32.0 MILD SINGLE CURRENT EPISODE OF MAJOR DEPRESSIVE DISORDER: ICD-10-CM

## 2023-03-08 DIAGNOSIS — Z97.4 WEARS HEARING AID IN BOTH EARS: Primary | ICD-10-CM

## 2023-03-08 DIAGNOSIS — E21.3 HYPERPARATHYROIDISM: ICD-10-CM

## 2023-03-08 DIAGNOSIS — I10 HYPERTENSION, UNSPECIFIED TYPE: Primary | ICD-10-CM

## 2023-03-08 DIAGNOSIS — E03.4 HYPOTHYROIDISM DUE TO ACQUIRED ATROPHY OF THYROID: ICD-10-CM

## 2023-03-08 DIAGNOSIS — E55.9 VITAMIN D DEFICIENCY: ICD-10-CM

## 2023-03-08 DIAGNOSIS — E53.8 B12 DEFICIENCY: ICD-10-CM

## 2023-03-08 PROCEDURE — 1157F PR ADVANCE CARE PLAN OR EQUIV PRESENT IN MEDICAL RECORD: ICD-10-PCS | Mod: HCNC,CPTII,S$GLB, | Performed by: FAMILY MEDICINE

## 2023-03-08 PROCEDURE — 99999 PR PBB SHADOW E&M-EST. PATIENT-LVL III: CPT | Mod: PBBFAC,HCNC,, | Performed by: FAMILY MEDICINE

## 2023-03-08 PROCEDURE — 1125F PR PAIN SEVERITY QUANTIFIED, PAIN PRESENT: ICD-10-PCS | Mod: HCNC,CPTII,S$GLB, | Performed by: FAMILY MEDICINE

## 2023-03-08 PROCEDURE — 3075F PR MOST RECENT SYSTOLIC BLOOD PRESS GE 130-139MM HG: ICD-10-PCS | Mod: HCNC,CPTII,S$GLB, | Performed by: FAMILY MEDICINE

## 2023-03-08 PROCEDURE — 99214 PR OFFICE/OUTPT VISIT, EST, LEVL IV, 30-39 MIN: ICD-10-PCS | Mod: HCNC,S$GLB,, | Performed by: FAMILY MEDICINE

## 2023-03-08 PROCEDURE — 99999 PR PBB SHADOW E&M-EST. PATIENT-LVL III: ICD-10-PCS | Mod: PBBFAC,HCNC,, | Performed by: FAMILY MEDICINE

## 2023-03-08 PROCEDURE — 1101F PT FALLS ASSESS-DOCD LE1/YR: CPT | Mod: HCNC,CPTII,S$GLB, | Performed by: FAMILY MEDICINE

## 2023-03-08 PROCEDURE — 3288F FALL RISK ASSESSMENT DOCD: CPT | Mod: HCNC,CPTII,S$GLB, | Performed by: FAMILY MEDICINE

## 2023-03-08 PROCEDURE — 99214 OFFICE O/P EST MOD 30 MIN: CPT | Mod: HCNC,S$GLB,, | Performed by: FAMILY MEDICINE

## 2023-03-08 PROCEDURE — 3079F PR MOST RECENT DIASTOLIC BLOOD PRESSURE 80-89 MM HG: ICD-10-PCS | Mod: HCNC,CPTII,S$GLB, | Performed by: FAMILY MEDICINE

## 2023-03-08 PROCEDURE — 3288F PR FALLS RISK ASSESSMENT DOCUMENTED: ICD-10-PCS | Mod: HCNC,CPTII,S$GLB, | Performed by: FAMILY MEDICINE

## 2023-03-08 PROCEDURE — 3075F SYST BP GE 130 - 139MM HG: CPT | Mod: HCNC,CPTII,S$GLB, | Performed by: FAMILY MEDICINE

## 2023-03-08 PROCEDURE — 3079F DIAST BP 80-89 MM HG: CPT | Mod: HCNC,CPTII,S$GLB, | Performed by: FAMILY MEDICINE

## 2023-03-08 PROCEDURE — 1101F PR PT FALLS ASSESS DOC 0-1 FALLS W/OUT INJ PAST YR: ICD-10-PCS | Mod: HCNC,CPTII,S$GLB, | Performed by: FAMILY MEDICINE

## 2023-03-08 PROCEDURE — 1125F AMNT PAIN NOTED PAIN PRSNT: CPT | Mod: HCNC,CPTII,S$GLB, | Performed by: FAMILY MEDICINE

## 2023-03-08 PROCEDURE — 1157F ADVNC CARE PLAN IN RCRD: CPT | Mod: HCNC,CPTII,S$GLB, | Performed by: FAMILY MEDICINE

## 2023-03-08 NOTE — PROGRESS NOTES
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    Chief Complaint: Hypertension (6 month f/u)      Here for 6 month follow-up.    S/p ORIF left femur fracture - following with Dr. Rodriguez; doing well.  H/o TIA - taking just Eliquis; stopped lipitor after seeing neurologist; using lopressor PRN  Afib - s/p ablation; sees Dr. Beatty; gets episodes twice a year requiring cardioversion. Taking Eliquis 5mg BID, Coreg BID, Rhythmol BID.  HTN - telmisartan 40mg  And Coreg 12.5mg BID  CKD - following with Dr. 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    Hypertension  Pertinent negatives include no chest pain, headaches, neck pain, palpitations or shortness of breath.   Follow-up  Pertinent negatives include no abdominal pain, arthralgias, chest pain, chills, coughing, fever, headaches, nausea, neck pain, rash, sore throat or weakness.     Past Medical History:   Diagnosis Date    Anticoagulant long-term use     Anxiety     Arthritis     Atrial fibrillation     BPH (benign prostatic hyperplasia)     Cataract     CKD (chronic kidney disease) stage 3, GFR 30-59 ml/min 7/10/2017    Followed by Dr. Jeevan Pond    Colon polyp     benign    Depression     Elevated PSA     Erectile dysfunction     Gastric ulcer with hemorrhage     Hep B w/o coma 1977    History of bleeding peptic ulcer     History of prostatitis     Hypertension     PAF (paroxysmal atrial fibrillation)     Sleep apnea     on CPAP    Stroke     TIA December 2019    Thyroid disease        Past Surgical History:   Procedure Laterality Date    ABDOMINAL HERNIA REPAIR      ABLATION OF ARRHYTHMOGENIC  FOCUS FOR ATRIAL FIBRILLATION N/A 6/15/2020    Procedure: Ablation atrial fibrillation;  Surgeon: Wyatt Beatty MD;  Location: [facility] EP LAB;  Service: Cardiology;  Laterality: N/A;  PAF, AFl, PEPE, PVI re-do, CTI, RFA, JUSTIN, Gen, SK, 3 Prep    CATARACT EXTRACTION  11/25/2013    bilateral    CHOLECYSTECTOMY      COLONOSCOPY N/A 11/25/2015    Procedure: COLONOSCOPY;  Surgeon: Toby Hernandez MD;  Location: [facility] ENDO;  Service: Endoscopy;  Laterality: N/A;    COLONOSCOPY N/A 11/13/2020    Procedure: COLONOSCOPY;  Surgeon: Toby Hernandez MD;  Location: [facility] ENDO;  Service: Endoscopy;  Laterality: N/A;    CYSTOSCOPY WITH INSERTION OF MINIMALLY INVASIVE IMPLANT TO ENLARGE PROSTATIC URETHRA N/A 11/28/2022    Procedure: CYSTOSCOPY, WITH INSERTION OF UROLIFT IMPLANT;  Surgeon: Yakov Shetty MD;  Location: [facility] OR;  Service: Urology;  Laterality: N/A;    EYE SURGERY      GASTRIC BYPASS  1992    INTRAMEDULLARY RODDING OF FEMUR Left 7/18/2020    Procedure: INSERTION, INTRAMEDULLARY ERIC, FEMUR, left, Synthes, Lorena table, Large C arm clock side,;  Surgeon: Tony Rodriguez MD;  Location: [facility] OR [address] [place] [address];  Service: Orthopedics;  Laterality: Left;    KNEE ARTHROSCOPY      RT    LASIK  2001    both eyes (Dr. Rabago)    ORIF HUMERUS FRACTURE      LT    ORIF HUMERUS FRACTURE Right     non surgical repair    RADIOFREQUENCY ABLATION      x2    Right ankle tendon repair      ROBOT-ASSISTED REPAIR OF INCISIONAL HERNIA USING DA GARETH XI Left 6/13/2022    Procedure: XI ROBOTIC REPAIR, HERNIA, INCISIONAL (LEFT SIDE SPIGELIAN WITH MESH);  Surgeon: Rosendo Marti MD;  [facility];  Service: General;  Laterality: Left;  consent in am    ROTATOR CUFF REPAIR      right    TOTAL KNEE ARTHROPLASTY Right 5/29/2018    Procedure: REPLACEMENT-KNEE-TOTAL-pro;  Surgeon: Denzel Dallas MD;  Location: [facility] OR [address] [place] [address];  Service: Orthopedics;  Laterality: Right;  Pro    TREATMENT OF CARDIAC ARRHYTHMIA  6/15/2020     Procedure: Cardioversion or Defibrillation;  Surgeon: Wyatt Beatty MD;  Location: [facility] EP LAB;  Service: Cardiology;;       Review of patient's allergies indicates:   Allergen Reactions    No known drug allergies        Social History     Socioeconomic History    Marital status:     Number of children: 5   Occupational History    Occupation: retired anesthesiology     Employer: [employer]    Occupation: [employer] grad     Comment: previous football player   Tobacco Use    Smoking status: Never     Passive exposure: Never    Smokeless tobacco: Never    Tobacco comments:     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    Substance and Sexual Activity    Alcohol use: No    Drug use: No    Sexual activity: Yes     Partners: Female       Current Outpatient Medications on File Prior to Visit   Medication Sig Dispense Refill    buPROPion (WELLBUTRIN XL) 150 MG TB24 tablet Take 1 tablet (150 mg total) by mouth once daily. 90 tablet 3    buPROPion (WELLBUTRIN XL) 300 MG 24 hr tablet TAKE 1 TABLET(300 MG) BY MOUTH EVERY DAY 90 tablet 3    carvediloL (COREG) 12.5 MG tablet Take 1 tablet (12.5 mg total) by mouth 2 (two) times daily. 180 tablet 3    cyclobenzaprine (FLEXERIL) 10 MG tablet TAKE 1 TABLET(10 MG) BY MOUTH THREE TIMES DAILY AS NEEDED FOR MUSCLE SPASMS 60 tablet 3    ELIQUIS 5 mg Tab Take 1 tablet (5 mg total) by mouth 2 (two) times a day. 60 tablet 11    EScitalopram oxalate (LEXAPRO) 10 MG tablet TAKE 1 TABLET(10 MG) BY MOUTH EVERY DAY 90 tablet 4    levothyroxine (SYNTHROID) 75 MCG tablet Take 1 tablet (75 mcg total) by mouth before breakfast. 90 tablet 2    methocarbamoL (ROBAXIN) 500 MG Tab TAKE 1 TABLET(500 MG) BY MOUTH THREE TIMES DAILY FOR 10 DAYS 90 tablet 0    OMEGA-3 FATTY ACIDS (FISH OIL CONCENTRATE ORAL) Take by mouth Daily.      propafenone (RYTHMOL SR) 425 MG Cp12 TAKE 1 CAPSULE(425 MG) BY MOUTH EVERY 12 HOURS 180 capsule 3    telmisartan (MICARDIS) 40 MG Tab TAKE 1 TABLET(40 MG) BY MOUTH  "EVERY DAY 90 tablet 3    calcium citrate (CALCITRATE) 200 mg (950 mg) tablet Take 1 tablet (200 mg total) by mouth 2 (two) times daily. 180 tablet 3     No current facility-administered medications on file prior to visit.       Family History   Problem Relation Age of Onset    COPD Father     Diabetes Father     Osteoporosis Father     Aortic stenosis Mother     Heart disease Mother         aortic stenosis    Osteoporosis Mother     Heart attack Brother     No Known Problems Son     No Known Problems Daughter     No Known Problems Daughter     No Known Problems Daughter        Review of Systems   Constitutional:  Negative for appetite change, chills, fever and unexpected weight change.   HENT:  Negative for sore throat and trouble swallowing.    Eyes:  Negative for pain and visual disturbance.   Respiratory:  Negative for cough, chest tightness, shortness of breath and wheezing.    Cardiovascular:  Negative for chest pain, palpitations and leg swelling.   Gastrointestinal:  Negative for abdominal pain, blood in stool, constipation, diarrhea and nausea.   Genitourinary:  Negative for difficulty urinating, dysuria and hematuria.   Musculoskeletal:  Negative for arthralgias, gait problem and neck pain.   Skin:  Negative for rash and wound.   Neurological:  Negative for dizziness, weakness, light-headedness and headaches.   Hematological:  Negative for adenopathy.   Psychiatric/Behavioral:  Negative for dysphoric mood.      Objective:      /80   Pulse (!) 52   Temp 98.4 °F (36.9 °C) (Oral)   Ht 5' 11" (1.803 m)   Wt 92.8 kg (204 lb 9.4 oz)   SpO2 96%   BMI 28.53 kg/m²   Physical Exam  Constitutional:       General: He is not in acute distress.     Appearance: He is well-developed.   HENT:      Head: Normocephalic and atraumatic.      Right Ear: External ear normal.      Left Ear: External ear normal.      Mouth/Throat:      Pharynx: Uvula midline. No oropharyngeal exudate.   Eyes:      General: Lids are " normal.      Conjunctiva/sclera: Conjunctivae normal.      Pupils: Pupils are equal, round, and reactive to light.   Neck:      Thyroid: No thyroid mass or thyromegaly.      Trachea: Phonation normal.   Cardiovascular:      Rate and Rhythm: Normal rate and regular rhythm.      Heart sounds: Normal heart sounds. No murmur heard.    No friction rub. No gallop.   Pulmonary:      Effort: Pulmonary effort is normal. No respiratory distress.      Breath sounds: Normal breath sounds. No wheezing or rales.   Musculoskeletal:         General: Normal range of motion.      Cervical back: Full passive range of motion without pain, normal range of motion and neck supple.   Lymphadenopathy:      Cervical: No cervical adenopathy.   Skin:     General: Skin is warm and dry.   Neurological:      Mental Status: He is alert and oriented to person, place, and time.      Cranial Nerves: No cranial nerve deficit.      Coordination: Coordination normal.   Psychiatric:         Speech: Speech normal.         Behavior: Behavior normal.         Thought Content: Thought content normal.         Judgment: Judgment normal.       Results for orders placed or performed in visit on 03/01/23   Comprehensive Metabolic Panel   Result Value Ref Range    Sodium 139 136 - 145 mmol/L    Potassium 4.5 3.5 - 5.1 mmol/L    Chloride 111 (H) 95 - 110 mmol/L    CO2 24 23 - 29 mmol/L    Glucose 125 (H) 70 - 110 mg/dL    BUN 27 (H) 8 - 23 mg/dL    Creatinine 1.5 (H) 0.5 - 1.4 mg/dL    Calcium 8.5 (L) 8.7 - 10.5 mg/dL    Total Protein 5.9 (L) 6.0 - 8.4 g/dL    Albumin 3.3 (L) 3.5 - 5.2 g/dL    Total Bilirubin 0.8 0.1 - 1.0 mg/dL    Alkaline Phosphatase 76 55 - 135 U/L    AST 17 10 - 40 U/L    ALT 17 10 - 44 U/L    Anion Gap 4 (L) 8 - 16 mmol/L    eGFR 48.0 (A) >60 mL/min/1.73 m^2   CBC Auto Differential   Result Value Ref Range    WBC 8.19 3.90 - 12.70 K/uL    RBC 3.93 (L) 4.60 - 6.20 M/uL    Hemoglobin 11.7 (L) 14.0 - 18.0 g/dL    Hematocrit 37.4 (L) 40.0 - 54.0 %     MCV 95 82 - 98 fL    MCH 29.8 27.0 - 31.0 pg    MCHC 31.3 (L) 32.0 - 36.0 g/dL    RDW 13.7 11.5 - 14.5 %    Platelets 239 150 - 450 K/uL    MPV 11.7 9.2 - 12.9 fL    Immature Granulocytes 0.2 0.0 - 0.5 %    Gran # (ANC) 5.0 1.8 - 7.7 K/uL    Immature Grans (Abs) 0.02 0.00 - 0.04 K/uL    Lymph # 1.6 1.0 - 4.8 K/uL    Mono # 0.7 0.3 - 1.0 K/uL    Eos # 0.8 (H) 0.0 - 0.5 K/uL    Baso # 0.09 0.00 - 0.20 K/uL    nRBC 0 0 /100 WBC    Gran % 61.3 38.0 - 73.0 %    Lymph % 19.2 18.0 - 48.0 %    Mono % 8.7 4.0 - 15.0 %    Eosinophil % 9.5 (H) 0.0 - 8.0 %    Basophil % 1.1 0.0 - 1.9 %    Differential Method Automated    TSH   Result Value Ref Range    TSH 2.330 0.400 - 4.000 uIU/mL     *Note: Due to a large number of results and/or encounters for the requested time period, some results have not been displayed. A complete set of results can be found in Results Review.          Assessment:       1. Hypertension, unspecified type    2. B12 deficiency    3. Hypothyroidism due to acquired atrophy of thyroid    4. Vitamin D deficiency    5. Hyperparathyroidism    6. Mild single current episode of major depressive disorder            Plan:       Hypertension, unspecified type  -     Comprehensive Metabolic Panel; Future; Expected date: 09/04/2023  -     Lipid Panel; Future; Expected date: 09/04/2023  -     CBC Auto Differential; Future; Expected date: 09/04/2023    B12 deficiency  -     Vitamin B12; Future; Expected date: 03/08/2023    Hypothyroidism due to acquired atrophy of thyroid  -     TSH; Future; Expected date: 09/04/2023    Vitamin D deficiency  -     Vitamin D; Future; Expected date: 03/08/2023    Hyperparathyroidism    Mild single current episode of major depressive disorder            Add calcium citrate 1200mg  Continue Eliquis 5mg BID with ASA  Continue telmisartan and carvedilol   Continue other present meds  F/u with cardiology as planned  Counseled on regular exercise, maintenance of a healthy weight, balanced  diet rich in fruits/vegetables and lean protein, and avoidance of unhealthy habits like smoking and excessive alcohol intake.    F/u 6 months with labs

## 2023-03-09 ENCOUNTER — HOSPITAL ENCOUNTER (OUTPATIENT)
Dept: RADIOLOGY | Facility: HOSPITAL | Age: 78
Discharge: HOME | End: 2023-03-09
Attending: ANESTHESIOLOGY
Payer: MEDICARE

## 2023-03-09 ENCOUNTER — OFFICE VISIT (OUTPATIENT)
Dept: PAIN MEDICINE | Facility: CLINIC | Age: 78
End: 2023-03-09
Payer: MEDICARE

## 2023-03-09 VITALS
HEART RATE: 64 BPM | BODY MASS INDEX: 27.98 KG/M2 | HEIGHT: 71 IN | WEIGHT: 199.88 LBS | DIASTOLIC BLOOD PRESSURE: 81 MMHG | SYSTOLIC BLOOD PRESSURE: 175 MMHG

## 2023-03-09 DIAGNOSIS — M54.9 DORSALGIA: ICD-10-CM

## 2023-03-09 DIAGNOSIS — R10.9 RIGHT FLANK PAIN: ICD-10-CM

## 2023-03-09 DIAGNOSIS — M79.18 MYOFASCIAL PAIN: Primary | ICD-10-CM

## 2023-03-09 PROCEDURE — 3288F FALL RISK ASSESSMENT DOCD: CPT | Mod: HCNC,CPTII,S$GLB, | Performed by: ANESTHESIOLOGY

## 2023-03-09 PROCEDURE — 99999 PR PBB SHADOW E&M-EST. PATIENT-LVL IV: ICD-10-PCS | Mod: PBBFAC,HCNC,, | Performed by: ANESTHESIOLOGY

## 2023-03-09 PROCEDURE — 1101F PR PT FALLS ASSESS DOC 0-1 FALLS W/OUT INJ PAST YR: ICD-10-PCS | Mod: HCNC,CPTII,S$GLB, | Performed by: ANESTHESIOLOGY

## 2023-03-09 PROCEDURE — 20552 PR INJECT TRIGGER POINT, 1 OR 2: ICD-10-PCS | Mod: HCNC,S$GLB,, | Performed by: ANESTHESIOLOGY

## 2023-03-09 PROCEDURE — 99214 PR OFFICE/OUTPT VISIT, EST, LEVL IV, 30-39 MIN: ICD-10-PCS | Mod: 25,HCNC,S$GLB, | Performed by: ANESTHESIOLOGY

## 2023-03-09 PROCEDURE — 1157F ADVNC CARE PLAN IN RCRD: CPT | Mod: HCNC,CPTII,S$GLB, | Performed by: ANESTHESIOLOGY

## 2023-03-09 PROCEDURE — 1157F PR ADVANCE CARE PLAN OR EQUIV PRESENT IN MEDICAL RECORD: ICD-10-PCS | Mod: HCNC,CPTII,S$GLB, | Performed by: ANESTHESIOLOGY

## 2023-03-09 PROCEDURE — 3079F DIAST BP 80-89 MM HG: CPT | Mod: HCNC,CPTII,S$GLB, | Performed by: ANESTHESIOLOGY

## 2023-03-09 PROCEDURE — 72080 XR THORACOLUMBAR SPINE AP LATERAL: ICD-10-PCS | Mod: 26,HCNC,NTX, | Performed by: RADIOLOGY

## 2023-03-09 PROCEDURE — 3288F PR FALLS RISK ASSESSMENT DOCUMENTED: ICD-10-PCS | Mod: HCNC,CPTII,S$GLB, | Performed by: ANESTHESIOLOGY

## 2023-03-09 PROCEDURE — 1125F AMNT PAIN NOTED PAIN PRSNT: CPT | Mod: HCNC,CPTII,S$GLB, | Performed by: ANESTHESIOLOGY

## 2023-03-09 PROCEDURE — 1101F PT FALLS ASSESS-DOCD LE1/YR: CPT | Mod: HCNC,CPTII,S$GLB, | Performed by: ANESTHESIOLOGY

## 2023-03-09 PROCEDURE — 3077F PR MOST RECENT SYSTOLIC BLOOD PRESSURE >= 140 MM HG: ICD-10-PCS | Mod: HCNC,CPTII,S$GLB, | Performed by: ANESTHESIOLOGY

## 2023-03-09 PROCEDURE — 1125F PR PAIN SEVERITY QUANTIFIED, PAIN PRESENT: ICD-10-PCS | Mod: HCNC,CPTII,S$GLB, | Performed by: ANESTHESIOLOGY

## 2023-03-09 PROCEDURE — 3079F PR MOST RECENT DIASTOLIC BLOOD PRESSURE 80-89 MM HG: ICD-10-PCS | Mod: HCNC,CPTII,S$GLB, | Performed by: ANESTHESIOLOGY

## 2023-03-09 PROCEDURE — 1159F PR MEDICATION LIST DOCUMENTED IN MEDICAL RECORD: ICD-10-PCS | Mod: HCNC,CPTII,S$GLB, | Performed by: ANESTHESIOLOGY

## 2023-03-09 PROCEDURE — 1160F PR REVIEW ALL MEDS BY PRESCRIBER/CLIN PHARMACIST DOCUMENTED: ICD-10-PCS | Mod: HCNC,CPTII,S$GLB, | Performed by: ANESTHESIOLOGY

## 2023-03-09 PROCEDURE — 1159F MED LIST DOCD IN RCRD: CPT | Mod: HCNC,CPTII,S$GLB, | Performed by: ANESTHESIOLOGY

## 2023-03-09 PROCEDURE — 71100 X-RAY EXAM RIBS UNI 2 VIEWS: CPT | Mod: 26,HCNC,RT,NTX | Performed by: RADIOLOGY

## 2023-03-09 PROCEDURE — 72080 X-RAY EXAM THORACOLMB 2/> VW: CPT | Mod: 26,HCNC,NTX, | Performed by: RADIOLOGY

## 2023-03-09 PROCEDURE — 1160F RVW MEDS BY RX/DR IN RCRD: CPT | Mod: HCNC,CPTII,S$GLB, | Performed by: ANESTHESIOLOGY

## 2023-03-09 PROCEDURE — 3077F SYST BP >= 140 MM HG: CPT | Mod: HCNC,CPTII,S$GLB, | Performed by: ANESTHESIOLOGY

## 2023-03-09 PROCEDURE — 72080 X-RAY EXAM THORACOLMB 2/> VW: CPT | Mod: TC,HCNC,PO,NTX

## 2023-03-09 PROCEDURE — 71100 X-RAY EXAM RIBS UNI 2 VIEWS: CPT | Mod: TC,HCNC,PO,RT,TXP

## 2023-03-09 PROCEDURE — 20552 NJX 1/MLT TRIGGER POINT 1/2: CPT | Mod: HCNC,S$GLB,, | Performed by: ANESTHESIOLOGY

## 2023-03-09 PROCEDURE — 99214 OFFICE O/P EST MOD 30 MIN: CPT | Mod: 25,HCNC,S$GLB, | Performed by: ANESTHESIOLOGY

## 2023-03-09 PROCEDURE — 71100 XR RIBS 2 VIEW RIGHT: ICD-10-PCS | Mod: 26,HCNC,RT,NTX | Performed by: RADIOLOGY

## 2023-03-09 PROCEDURE — 99999 PR PBB SHADOW E&M-EST. PATIENT-LVL IV: CPT | Mod: PBBFAC,HCNC,, | Performed by: ANESTHESIOLOGY

## 2023-03-09 RX ORDER — TRIAMCINOLONE ACETONIDE 40 MG/ML
40 INJECTION, SUSPENSION INTRA-ARTICULAR; INTRAMUSCULAR
Status: COMPLETED | OUTPATIENT
Start: 2023-03-09 | End: 2023-03-09

## 2023-03-09 RX ADMIN — TRIAMCINOLONE ACETONIDE 40 MG: 40 INJECTION, SUSPENSION INTRA-ARTICULAR; INTRAMUSCULAR at 05:03

## 2023-03-09 NOTE — PROGRESS NOTES
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  When I last saw him he was having some right paraspinal pain in the lumbar region.  I sent him to physical therapy and dry needling and he had resolution of that pain.  He had been doing well until about a month ago.  He reports a month ago he is developed right-sided lower back pain around the posterior axillary line.  He denies any rashes or vesicles.  He denies any inciting event or trauma.  His pain is constant, sharp.  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  He is had back pain for the past month.  He does not know any specific inciting event.  Today he rates his pain as 6/10, intermittent, aching, sharp.  It is in the right paraspinal area around the lumbar region.  His pain is worse with bending and walking and relieved with rest, lying down, medications.  He denies any numbness or weakness.    History:    Current Outpatient Medications:     buPROPion (WELLBUTRIN XL) 150 MG TB24 tablet, Take 1 tablet (150 mg total) by mouth once daily., Disp: 90 tablet, Rfl: 3    buPROPion (WELLBUTRIN XL) 300 MG 24 hr tablet, TAKE 1 TABLET(300 MG) BY MOUTH EVERY DAY, Disp: 90 tablet, Rfl: 3    carvediloL (COREG) 12.5 MG tablet, Take 1 tablet (12.5 mg total) by mouth 2 (two) times daily., Disp: 180 tablet, Rfl: 3    cyclobenzaprine (FLEXERIL) 10 MG tablet, TAKE 1 TABLET(10 MG) BY MOUTH THREE TIMES DAILY AS NEEDED FOR MUSCLE SPASMS, Disp: 60 tablet, Rfl: 3    ELIQUIS 5 mg Tab, Take 1 tablet (5 mg total) by mouth 2 (two) times a day., Disp: 60 tablet, Rfl: 11    EScitalopram oxalate (LEXAPRO) 10 MG tablet, TAKE 1  TABLET(10 MG) BY MOUTH EVERY DAY, Disp: 90 tablet, Rfl: 4    levothyroxine (SYNTHROID) 75 MCG tablet, Take 1 tablet (75 mcg total) by mouth before breakfast., Disp: 90 tablet, Rfl: 2    methocarbamoL (ROBAXIN) 500 MG Tab, TAKE 1 TABLET(500 MG) BY MOUTH THREE TIMES DAILY FOR 10 DAYS, Disp: 90 tablet, Rfl: 0    OMEGA-3 FATTY ACIDS (FISH OIL CONCENTRATE ORAL), Take by mouth Daily., Disp: , Rfl:     propafenone (RYTHMOL SR) 425 MG Cp12, TAKE 1 CAPSULE(425 MG) BY MOUTH EVERY 12 HOURS, Disp: 180 capsule, Rfl: 3    telmisartan (MICARDIS) 40 MG Tab, TAKE 1 TABLET(40 MG) BY MOUTH EVERY DAY, Disp: 90 tablet, Rfl: 3    calcium citrate (CALCITRATE) 200 mg (950 mg) tablet, Take 1 tablet (200 mg total) by mouth 2 (two) times daily., Disp: 180 tablet, Rfl: 3    Past Medical History:   Diagnosis Date    Anticoagulant long-term use     Anxiety     Arthritis     Atrial fibrillation     BPH (benign prostatic hyperplasia)     Cataract     CKD (chronic kidney disease) stage 3, GFR 30-59 ml/min 7/10/2017    Followed by Dr. Jeevan Pond    Colon polyp     benign    Depression     Elevated PSA     Erectile dysfunction     Gastric ulcer with hemorrhage     Hep B w/o coma 1977    History of bleeding peptic ulcer     History of prostatitis     Hypertension     PAF (paroxysmal atrial fibrillation)     Sleep apnea     on CPAP    Stroke     TIA December 2019    Thyroid disease        Past Surgical History:   Procedure Laterality Date    ABDOMINAL HERNIA REPAIR      ABLATION OF ARRHYTHMOGENIC FOCUS FOR ATRIAL FIBRILLATION N/A 6/15/2020    Procedure: Ablation atrial fibrillation;  Surgeon: Wyatt Beatty MD;  Location: [facility] EP LAB;  Service: Cardiology;  Laterality: N/A;  PAF, AFl, PEPE, PVI re-do, CTI, RFA, JUSTIN, Gen, SK, 3 Prep    CATARACT EXTRACTION  11/25/2013    bilateral    CHOLECYSTECTOMY      COLONOSCOPY N/A 11/25/2015    Procedure: COLONOSCOPY;  Surgeon: Toby Hernandez MD;  Location: [facility] ENDO;  Service: Endoscopy;  Laterality: N/A;     COLONOSCOPY N/A 11/13/2020    Procedure: COLONOSCOPY;  Surgeon: Toby Hernandez MD;  Location: [facility] ENDO;  Service: Endoscopy;  Laterality: N/A;    CYSTOSCOPY WITH INSERTION OF MINIMALLY INVASIVE IMPLANT TO ENLARGE PROSTATIC URETHRA N/A 11/28/2022    Procedure: CYSTOSCOPY, WITH INSERTION OF UROLIFT IMPLANT;  Surgeon: Yakov Shetty MD;  Location: [facility] OR;  Service: Urology;  Laterality: N/A;    EYE SURGERY      GASTRIC BYPASS  1992    INTRAMEDULLARY RODDING OF FEMUR Left 7/18/2020    Procedure: INSERTION, INTRAMEDULLARY EIRC, FEMUR, left, Synthes, [place] table, Large C arm clock side,;  Surgeon: Tony Rodriguez MD;  Location: [facility] OR [address] [place] [address];  Service: Orthopedics;  Laterality: Left;    KNEE ARTHROSCOPY      RT    LASIK  2001    both eyes (Dr. Rabago)    ORIF HUMERUS FRACTURE      LT    ORIF HUMERUS FRACTURE Right     non surgical repair    RADIOFREQUENCY ABLATION      x2    Right ankle tendon repair      ROBOT-ASSISTED REPAIR OF INCISIONAL HERNIA USING DA GARETH XI Left 6/13/2022    Procedure: XI ROBOTIC REPAIR, HERNIA, INCISIONAL (LEFT SIDE SPIGELIAN WITH MESH);  Surgeon: Rosendo Marti MD;  Location: [address];  Service: General;  Laterality: Left;  consent in am    ROTATOR CUFF REPAIR      right    TOTAL KNEE ARTHROPLASTY Right 5/29/2018    Procedure: REPLACEMENT-KNEE-TOTAL-pro;  Surgeon: Denzel Dallas MD;  Location: [address];  Service: Orthopedics;  Laterality: Right;  Pro    TREATMENT OF CARDIAC ARRHYTHMIA  6/15/2020    Procedure: Cardioversion or Defibrillation;  Surgeon: Wyatt Beatty MD;  Location: [facility] EP LAB;  Service: Cardiology;;       Family History   Problem Relation Age of Onset    COPD Father     Diabetes Father     Osteoporosis Father     Aortic stenosis Mother     Heart disease Mother         aortic stenosis    Osteoporosis Mother     Heart attack Brother     No Known Problems Son     No Known Problems Daughter     No Known Problems Daughter     No Known Problems  "Daughter        Social History     Socioeconomic History    Marital status:     Number of children: 5   Occupational History    Occupation: retired anesthesiology     Employer: [employer]    Occupation: [employer] grad     Comment: previous football player   Tobacco Use    Smoking status: Never     Passive exposure: Never    Smokeless tobacco: Never    Tobacco comments:     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    Substance and Sexual Activity    Alcohol use: No    Drug use: No    Sexual activity: Yes     Partners: Female       Review of patient's allergies indicates:   Allergen Reactions    No known drug allergies        Review of Systems:  General ROS: negative for - fever  Psychological ROS: negative for - hostility  Hematological and Lymphatic ROS: negative for - bleeding problems  Endocrine ROS: negative for - unexpected weight changes  Respiratory ROS: no cough, shortness of breath, or wheezing  Cardiovascular ROS: no chest pain or dyspnea on exertion  Gastrointestinal ROS: no abdominal pain, change in bowel habits, or black or bloody stools  Musculoskeletal ROS: negative for - muscular weakness  Neurological ROS: negative for - numbness/tingling  Dermatological ROS: negative for rash    Physical Exam:  Vitals:    03/09/23 1557   BP: (!) 175/81   Pulse: 64   Weight: 90.6 kg (199 lb 13.6 oz)   Height: 5' 11" (1.803 m)   PainSc:   7   PainLoc: Rib Cage       Body mass index is 27.87 kg/m².    Gen: NAD  Gait: gait intact  Psych:  Mood appropriate for given condition  HEENT: eyes anicteric   GI: Abd soft  CV: RRR  Lungs: breathing unlabored   ROM: limited AROM of the L spine in all planes, full ROM at ankles, knees and hips  Sensation: intact to light touch in all dermatomes tested from L2-S1 bilaterally  Reflexes: 2+ b/l patella and 0 b/l achilles  Palpation: Diffusely tender over right upper lumbar paraspinals  -TTP over midline thoracic or lumbar spine  Tone: normal in the b/l knees and hips "   Skin: intact  Extremities: No edema in b/l ankles or hands  Provacative tests:        Right Left   L2/3 Iliacus Hip flexion  5  5   L3/4 Qudratus Femoris Knee Extension  5  5   L4/5 Tib Anterior Ankle Dorsiflexion   5  5   L5/S1 Extensor Hallicus Longus Great toe extension  5  5                 S1/S2 Gastroc/Soleus Plantar Flexion  5  5       Imaging:  MRI lumbar spine 8/17/22  FINDINGS:  Alignment: Mild dextroconvex curvature of the lumbar spine.  Minimal retrolisthesis of L2 on L3 and L5 on S1.  Grade 1 anterolisthesis of L4 on L5.     Vertebral column: Vertebral body heights are maintained.  No evidence of an acute fracture or aggressive marrow replacement process. Stable probable hemangioma within the T12 vertebral body.  Moderate multilevel disc degeneration with intervertebral disc space narrowing, disc desiccation, and degenerative endplate change throughout the thoracolumbar spine, most pronounced at L2-3 and L5-S1 with severe disc space narrowing.     Cord: Normal.  Conus terminates at L1.     Degenerative findings:     T11-12: Moderate disc space narrowing.  Right asymmetric diffuse disc bulge with osteophytic ridging.  No neural foraminal or spinal canal stenosis.  T12-L1: Moderate disc space narrowing.  Mild right asymmetric diffuse disc bulge with osteophytic ridging.  No neural foraminal or spinal canal stenosis.  L1-L2: Moderate disc space narrowing.  Mild right asymmetric diffuse disc bulge with osteophytic ridging.  Mild bilateral facet arthropathy.  There is no neural foraminal stenosis.  There is no spinal canal stenosis.  L2-L3: Retrolisthesis.  Severe left asymmetric disc space narrowing.  Diffuse disc bulge with osteophytic ridging.  Mild bilateral facet arthropathy.  Moderate left and mild right neural foraminal stenosis.  Mild spinal canal stenosis.  L3-L4: Mild disc space narrowing.  Mild right asymmetric diffuse disc bulge.  Mild bilateral facet arthropathy.  Ligamentum flavum thickening.   Mild right neural foraminal stenosis.  Mild spinal canal stenosis.  L4-L5: Anterolisthesis with uncovering the disc.  Mild disc space narrowing.  Diffuse disc bulge with osteophytic ridging.  Moderate bilateral facet arthropathy and ligamentum flavum thickening.  Moderate right and mild left neural foraminal stenosis.  Moderate spinal canal stenosis.  L5-S1: Retrolisthesis.  Right asymmetric severe disc space narrowing.  Right asymmetric disc bulge with osteophytic ridging.  Moderate bilateral facet arthropathy.  Ligamentum flavum thickening.  Moderate right and mild left neural foraminal stenosis.  No spinal canal stenosis.     Paraspinal muscles & soft tissues: Mild atrophy of the dorsal paraspinal musculature.  Exophytic left renal cyst.    Xray ribs 8/30/22  Impression:  Nondisplaced fractures of the right 5th, 6th, and 7th ribs with no pneumothorax    Xray thoracic spine 8/4/22  FINDINGS:  There is minimal midthoracic scoliosis convex to the right.  Alignment is otherwise normal.  There are mild wedging deformities of the T8 and T12 vertebral bodies.  The costovertebral junctions are unremarkable.    Xray right rubs 3/9/23  FINDINGS:  Osseous structures demonstrate diffuse demineralization.  Chronic healed fracture deformity of the right humeral neck.  Chronic healed right lateral 5th, 7th and 10th rib fracture deformities.  No definite acute displaced rib fracture.  No pneumothorax.  Surgical clips project over the right upper quadrant.    Xray right thoracolumbar spine 3/9/23  FINDINGS:  The bones do appear osteopenic.  There is no acute fracture.  There is stable minimal chronic anterior wedging L1, T12, T11 and T10.  These changes were present previously.  There is stable moderate to marked disc space narrowing with mild retrolisthesis of L2 on L3.  There is facet joint arthropathy most apparent at the lower 2 lumbar levels.  There are surgical clips from prior cholecystectomy.  There is a marked amount of  stool in the colon.    Labs:  BMP  Lab Results   Component Value Date     03/01/2023    K 4.5 03/01/2023     (H) 03/01/2023    CO2 24 03/01/2023    BUN 27 (H) 03/01/2023    CREATININE 1.5 (H) 03/01/2023    CALCIUM 8.5 (L) 03/01/2023    ANIONGAP 4 (L) 03/01/2023    ESTGFRAFRICA 51.9 (A) 03/02/2022    ESTGFRAFRICA 51.9 (A) 03/02/2022    EGFRNONAA 44.9 (A) 03/02/2022    EGFRNONAA 44.9 (A) 03/02/2022     Lab Results   Component Value Date    ALT 17 03/01/2023    AST 17 03/01/2023    ALKPHOS 76 03/01/2023    BILITOT 0.8 03/01/2023       Assessment:   Problem List Items Addressed This Visit    None  Visit Diagnoses       Myofascial pain    -  Primary    Relevant Orders    Ambulatory referral/consult to Physical/Occupational Therapy    Right flank pain        Relevant Orders    X-Ray Ribs 2 View Right (Completed)    Dorsalgia        Relevant Orders    X-Ray Thoracolumbar Spine AP Lateral (Completed)              77 y.o. year old male with PMH MRI atrial fibrillation, HTN, CKD who presents with back pain.      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  When I last saw him he was having some right paraspinal pain in the lumbar region.  I sent him to physical therapy and dry needling and he had resolution of that pain.  He had been doing well until about a month ago.  He reports a month ago he is developed right-sided lower back pain around the posterior axillary line.  He denies any rashes or vesicles.  He denies any inciting event or trauma.  His pain is constant, sharp.  He reports that if he tries to take a deep breath it can worsen his pain    - on exam he has tenderness at approximately around the 11th and 12th rib on the right with deep palpation.  There is no allodynia.  There is no signs of rash or vesicles.  He does not have significant tenderness to palpation of the midline thoracic or lumbar spine    - her updated thoracolumbar and right rib x-rays today.  There is no obvious new  compression fractures or fractures of the right lower ribs.    - I think his pain is primarily myofascial in nature.  Notably he also denies any pain with urination or blood in the urine.    - we will do trigger point injections under ultrasound guidance today in the office.  The risks and benefits of this intervention, and alternative therapies were discussed with the patient.  The discussion of risks included infection, bleeding, need for additional procedures or surgery, nerve damage.  Questions regarding the procedure, risks, expected outcome, and possible side effects were solicited and answered to the patient's satisfaction.  Dominick Song wishes to proceed with the injection or procedure.  Written consent was obtained.    - he had been taking some Percocet that he out at home on a as needed basis for his pain and asking for refill.  I prescribed him a small amount and recommended he use it sparingly.  I have also recommended he start using a stool softener as he has a moderate amount of stool in his colon on x-ray    - I have placed a referral for him to restart physical therapy as well as dry needling as this worked well for him in the past.  He will follow up with me on an as needed basis      : Reviewed     Medardo Perez M.D.  Interventional Pain Medicine / Anesthesiology    This note was completed with dictation software and grammatical errors may exist.      Trigger Point Injection    This is a pain clinic procedure note.    Procedure: Trigger point injection  Procedure Date: 03/09/2023  Muscles Injected:  Right latissimus dorsi  Subjective: Multiple trigger points and areas of tenderness were identified and marked.  Preoperative Diagnosis: Myofascial Pain   Post Procedure Diagnosis: Myofascial Pain   Findings: Radiating trigger points  Complications: None  Anesthetic: 50:50 Mixture of Lidocaine 2% and Bupivacaine 0.5%, 0.5- 1.0 ml per site    Procedure details: Skin overlying target injection sites  cleaned with alcohol swabs.  Each identified trigger point was injected with the aforementioned anesthetic using a 1.25 inch 25G needle followed by needling technique.  Twitch response was elicited at some sites.    Total injections: 2    Dispo: no complications, pt tolerated the procedure well.

## 2023-03-10 ENCOUNTER — PATIENT MESSAGE (OUTPATIENT)
Dept: PAIN MEDICINE | Facility: CLINIC | Age: 78
End: 2023-03-10
Payer: MEDICARE

## 2023-03-10 RX ORDER — OXYCODONE AND ACETAMINOPHEN 5; 325 MG/1; MG/1
1 TABLET ORAL EVERY 12 HOURS PRN
Qty: 14 TABLET | Refills: 0 | Status: SHIPPED | OUTPATIENT
Start: 2023-03-10 | End: 2023-08-23 | Stop reason: SDUPTHER

## 2023-03-10 NOTE — TELEPHONE ENCOUNTER
LOV yesterday - he had been taking some Percocet that he out at home on a as needed basis for his pain and asking for refill.  I prescribed him a small amount and recommended he use it sparingly.  I have also recommended he start using a stool softener as he has a moderate amount of stool in his colon on x-ray    I do not see where medication was sent in.    RECENT  AS OF 3/10/23

## 2023-03-13 ENCOUNTER — CLINICAL SUPPORT (OUTPATIENT)
Dept: AUDIOLOGY | Facility: CLINIC | Age: 78
End: 2023-03-13
Payer: MEDICARE

## 2023-03-13 DIAGNOSIS — Z46.1 HEARING AID FITTING OR ADJUSTMENT: Primary | ICD-10-CM

## 2023-03-13 DIAGNOSIS — Z97.4 WEARS HEARING AID IN BOTH EARS: ICD-10-CM

## 2023-03-13 DIAGNOSIS — H90.3 BILATERAL SENSORINEURAL HEARING LOSS: Primary | ICD-10-CM

## 2023-03-13 PROCEDURE — 92556 PR SPEECH AUDIOMETRY, COMPLETE: ICD-10-PCS | Mod: HCNC,NTX,S$GLB, | Performed by: AUDIOLOGIST

## 2023-03-13 PROCEDURE — 92552 PURE TONE AUDIOMETRY AIR: CPT | Mod: HCNC,NTX,S$GLB, | Performed by: AUDIOLOGIST

## 2023-03-13 PROCEDURE — 92556 SPEECH AUDIOMETRY COMPLETE: CPT | Mod: HCNC,NTX,S$GLB, | Performed by: AUDIOLOGIST

## 2023-03-13 PROCEDURE — 92552 PR PURE TONE AUDIOMETRY, AIR: ICD-10-PCS | Mod: HCNC,NTX,S$GLB, | Performed by: AUDIOLOGIST

## 2023-03-13 NOTE — PROGRESS NOTES
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  He reported that he hearing aids have been doing well.  Both hearing aids were cleaned.  The wax filters and domes were replaced for both hearing aids.  The following programing changes were made: Recalculated to new target gain curves based on 3/13/23 audiogram and reran FB test.  He reported that the new settings were clear, comfortable and balanced.  F/u in six months to one year for routine check or earlier if additional adjustments needed.

## 2023-03-13 NOTE — PROGRESS NOTES
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     Pt has a Hx of bilateral HL with use of binaural amplification.      Otoscopy revealed clear view of both external ear canals and tympanic membranes.  No obstructive cerumen present in either ear canal.       Audiogram results revealed a mild-to-severe sensorineural hearing loss for the right ear and a mild-to-profound sensorineural hearing loss for the left ear.  Speech Reception Thresholds were  35 dBHL for the right ear and 30 dBHL for the left ear.  Word recognition scores were good for the right ear and good for the left ear.       Audiogram results were reviewed in detail with patient and all questions were answered. Results will be reviewed by ENT at the completion of this note.     Recommend continued daily use of binaural amplification and annual audiogram to monitor hearing loss.

## 2023-03-14 ENCOUNTER — PATIENT MESSAGE (OUTPATIENT)
Dept: PAIN MEDICINE | Facility: CLINIC | Age: 78
End: 2023-03-14
Payer: MEDICARE

## 2023-03-23 ENCOUNTER — INFUSION (OUTPATIENT)
Dept: INFUSION THERAPY | Facility: HOSPITAL | Age: 78
End: 2023-03-23
Attending: INTERNAL MEDICINE
Payer: MEDICARE

## 2023-03-23 VITALS
DIASTOLIC BLOOD PRESSURE: 75 MMHG | WEIGHT: 199.5 LBS | TEMPERATURE: 99 F | HEART RATE: 78 BPM | SYSTOLIC BLOOD PRESSURE: 151 MMHG | BODY MASS INDEX: 27.93 KG/M2 | HEIGHT: 71 IN

## 2023-03-23 DIAGNOSIS — M80.00XD AGE-RELATED OSTEOPOROSIS WITH CURRENT PATHOLOGICAL FRACTURE WITH ROUTINE HEALING: Primary | ICD-10-CM

## 2023-03-23 PROCEDURE — 63600175 PHARM REV CODE 636 W HCPCS: Mod: HCNC,PN | Performed by: INTERNAL MEDICINE

## 2023-03-23 PROCEDURE — 96365 THER/PROPH/DIAG IV INF INIT: CPT | Mod: HCNC,PN

## 2023-03-23 PROCEDURE — 25000003 PHARM REV CODE 250: Mod: HCNC,PN | Performed by: INTERNAL MEDICINE

## 2023-03-23 RX ORDER — SODIUM CHLORIDE 0.9 % (FLUSH) 0.9 %
10 SYRINGE (ML) INJECTION
Status: DISCONTINUED | OUTPATIENT
Start: 2023-03-23 | End: 2023-03-23 | Stop reason: HOSPADM

## 2023-03-23 RX ORDER — HEPARIN 100 UNIT/ML
500 SYRINGE INTRAVENOUS
Status: CANCELLED | OUTPATIENT
Start: 2023-03-23

## 2023-03-23 RX ORDER — SODIUM CHLORIDE 0.9 % (FLUSH) 0.9 %
10 SYRINGE (ML) INJECTION
Status: CANCELLED | OUTPATIENT
Start: 2023-03-23

## 2023-03-23 RX ORDER — ZOLEDRONIC ACID 5 MG/100ML
5 INJECTION, SOLUTION INTRAVENOUS
Status: CANCELLED | OUTPATIENT
Start: 2023-03-23

## 2023-03-23 RX ORDER — ZOLEDRONIC ACID 5 MG/100ML
5 INJECTION, SOLUTION INTRAVENOUS
Status: COMPLETED | OUTPATIENT
Start: 2023-03-23 | End: 2023-03-23

## 2023-03-23 RX ORDER — ACETAMINOPHEN 500 MG
500 TABLET ORAL
Status: CANCELLED | OUTPATIENT
Start: 2023-03-23

## 2023-03-23 RX ADMIN — ZOLEDRONIC ACID 5 MG: 0.05 INJECTION, SOLUTION INTRAVENOUS at 02:03

## 2023-03-23 RX ADMIN — SODIUM CHLORIDE: 9 INJECTION, SOLUTION INTRAVENOUS at 02:03

## 2023-03-23 NOTE — PLAN OF CARE
Problem: Adult Inpatient Plan of Care  Goal: Plan of Care Review  Outcome: Ongoing, Progressing  Flowsheets (Taken 3/23/2023 1443)  Plan of Care Reviewed With: patient  Goal: Patient-Specific Goal (Individualized)  Outcome: Ongoing, Progressing  Flowsheets (Taken 3/23/2023 1443)  Anxieties, Fears or Concerns: None  Individualized Care Needs: Recliner, tv, snacks, conversation    Patient to Infusion for Reclast. Treatment plan reviewed with patient. VSS. Tolerated infusion. Uses patient portal for upcoming appointment schedule. Escorted to the front lobby for discharge to home.

## 2023-03-24 ENCOUNTER — OFFICE VISIT (OUTPATIENT)
Dept: ENDOCRINOLOGY | Facility: CLINIC | Age: 78
End: 2023-03-24
Payer: MEDICARE

## 2023-03-24 VITALS
BODY MASS INDEX: 27.93 KG/M2 | OXYGEN SATURATION: 98 % | WEIGHT: 199.5 LBS | SYSTOLIC BLOOD PRESSURE: 138 MMHG | DIASTOLIC BLOOD PRESSURE: 80 MMHG | HEART RATE: 50 BPM | HEIGHT: 71 IN

## 2023-03-24 DIAGNOSIS — E03.9 ACQUIRED HYPOTHYROIDISM: ICD-10-CM

## 2023-03-24 DIAGNOSIS — M81.0 OSTEOPOROSIS, UNSPECIFIED OSTEOPOROSIS TYPE, UNSPECIFIED PATHOLOGICAL FRACTURE PRESENCE: Primary | ICD-10-CM

## 2023-03-24 PROCEDURE — 3288F PR FALLS RISK ASSESSMENT DOCUMENTED: ICD-10-PCS | Mod: HCNC,CPTII,NTX,S$GLB | Performed by: INTERNAL MEDICINE

## 2023-03-24 PROCEDURE — 1159F PR MEDICATION LIST DOCUMENTED IN MEDICAL RECORD: ICD-10-PCS | Mod: HCNC,CPTII,NTX,S$GLB | Performed by: INTERNAL MEDICINE

## 2023-03-24 PROCEDURE — 99214 PR OFFICE/OUTPT VISIT, EST, LEVL IV, 30-39 MIN: ICD-10-PCS | Mod: HCNC,NTX,S$GLB, | Performed by: INTERNAL MEDICINE

## 2023-03-24 PROCEDURE — 1126F PR PAIN SEVERITY QUANTIFIED, NO PAIN PRESENT: ICD-10-PCS | Mod: HCNC,CPTII,NTX,S$GLB | Performed by: INTERNAL MEDICINE

## 2023-03-24 PROCEDURE — 1101F PR PT FALLS ASSESS DOC 0-1 FALLS W/OUT INJ PAST YR: ICD-10-PCS | Mod: HCNC,CPTII,NTX,S$GLB | Performed by: INTERNAL MEDICINE

## 2023-03-24 PROCEDURE — 3079F PR MOST RECENT DIASTOLIC BLOOD PRESSURE 80-89 MM HG: ICD-10-PCS | Mod: HCNC,CPTII,NTX,S$GLB | Performed by: INTERNAL MEDICINE

## 2023-03-24 PROCEDURE — 3075F SYST BP GE 130 - 139MM HG: CPT | Mod: HCNC,CPTII,NTX,S$GLB | Performed by: INTERNAL MEDICINE

## 2023-03-24 PROCEDURE — 1126F AMNT PAIN NOTED NONE PRSNT: CPT | Mod: HCNC,CPTII,NTX,S$GLB | Performed by: INTERNAL MEDICINE

## 2023-03-24 PROCEDURE — 1160F PR REVIEW ALL MEDS BY PRESCRIBER/CLIN PHARMACIST DOCUMENTED: ICD-10-PCS | Mod: HCNC,CPTII,NTX,S$GLB | Performed by: INTERNAL MEDICINE

## 2023-03-24 PROCEDURE — 99214 OFFICE O/P EST MOD 30 MIN: CPT | Mod: HCNC,NTX,S$GLB, | Performed by: INTERNAL MEDICINE

## 2023-03-24 PROCEDURE — 3288F FALL RISK ASSESSMENT DOCD: CPT | Mod: HCNC,CPTII,NTX,S$GLB | Performed by: INTERNAL MEDICINE

## 2023-03-24 PROCEDURE — 3079F DIAST BP 80-89 MM HG: CPT | Mod: HCNC,CPTII,NTX,S$GLB | Performed by: INTERNAL MEDICINE

## 2023-03-24 PROCEDURE — 1157F ADVNC CARE PLAN IN RCRD: CPT | Mod: HCNC,CPTII,NTX,S$GLB | Performed by: INTERNAL MEDICINE

## 2023-03-24 PROCEDURE — 1159F MED LIST DOCD IN RCRD: CPT | Mod: HCNC,CPTII,NTX,S$GLB | Performed by: INTERNAL MEDICINE

## 2023-03-24 PROCEDURE — 3075F PR MOST RECENT SYSTOLIC BLOOD PRESS GE 130-139MM HG: ICD-10-PCS | Mod: HCNC,CPTII,NTX,S$GLB | Performed by: INTERNAL MEDICINE

## 2023-03-24 PROCEDURE — 99999 PR PBB SHADOW E&M-EST. PATIENT-LVL IV: ICD-10-PCS | Mod: PBBFAC,HCNC,TXP, | Performed by: INTERNAL MEDICINE

## 2023-03-24 PROCEDURE — 99999 PR PBB SHADOW E&M-EST. PATIENT-LVL IV: CPT | Mod: PBBFAC,HCNC,TXP, | Performed by: INTERNAL MEDICINE

## 2023-03-24 PROCEDURE — 1160F RVW MEDS BY RX/DR IN RCRD: CPT | Mod: HCNC,CPTII,NTX,S$GLB | Performed by: INTERNAL MEDICINE

## 2023-03-24 PROCEDURE — 1157F PR ADVANCE CARE PLAN OR EQUIV PRESENT IN MEDICAL RECORD: ICD-10-PCS | Mod: HCNC,CPTII,NTX,S$GLB | Performed by: INTERNAL MEDICINE

## 2023-03-24 PROCEDURE — 1101F PT FALLS ASSESS-DOCD LE1/YR: CPT | Mod: HCNC,CPTII,NTX,S$GLB | Performed by: INTERNAL MEDICINE

## 2023-03-24 NOTE — PROGRESS NOTES
CHIEF COMPLAINT:  Osteoporosis/hypothyroidism  77 y.o. old being seen as a new patient.  Initially discovered after having some hip pain and had an x-ray that showed osteopenia.  Bone density then showed osteoporosis.  He had a fall in 2020 and broke his left hip.  Missed the last step on stairs. Had a left humerus fracture when slipped on liquid in the house. Was several years prior to surgery.  Was on Fosamax in the past. Was on Forteo around March of 2021. Was on it approx 1 year before switching to reclast. Now on Reclast that started 3/23/22.  Currently on Synthroid 75 mcg. Taking Ca + D. Takes MVI. No falls. He does do some exercise. Has a history of AFIB- paroxysmal.       Reclast doses:  3/23/22  3/23/33      PAST MEDICAL HISTORY/PAST SURGICAL HISTORY:  Reviewed in [facility]    SOCIAL HISTORY: Reviewed in [facility]    FAMILY HISTORY:  Mother and father had osteoporosis. Father with diabetes.     MEDICATIONS/ALLERGIES: The patient's MedCard has been updated and reviewed.            PE:    GENERAL: Well developed, well nourished.  NECK: Supple, trachea midline, no palpable thyroid nodules  CHEST: Resp even and unlabored, CTA bilateral.  CARDIAC: RRR, S1, S2 heard, no murmurs, rubs, S3, or S4    LABS/Radiology     Latest Reference Range & Units 03/01/23 08:06   TSH 0.400 - 4.000 uIU/mL 2.330       DEXA BONE DENSITY SPINE HIP     CLINICAL HISTORY:  h/o osteoporosis. needs follow up; Age-related osteoporosis with current pathological fracture, unspecified site, subsequent encounter for fracture with routine healing     TECHNIQUE:  DXA scanning was performed over the right hip and lumbar spine.  Review of the images confirms satisfactory positioning and technique.     COMPARISON:  12/15/2021, 11/04/2019     FINDINGS:  The L1 to L4 vertebral bone mineral density is equal to 1.008 g/cm squared with a T score of -0.8.  There has been a 8.2% statistically significant change relative to the prior study.     The right femoral  neck bone mineral density is equal to 0.578 g/cm squared with a T score of -2.6.     There is a 16% risk of a major osteoporotic fracture and a 6.3% risk of hip fracture in the next 10 years (FRAX).     Impression:     Osteoporosis.    ASSESSMENT/PLAN:  Osteoporosis-with a history of a hip fracture and a left humerus fracture.  Has been on Fosamax and Forteo in the past.  Currently on Reclast and has received 2 doses.  Would recommend continuing Reclast.  Discussed taking calcium and vitamin-D.  Discussed fall precautions and weight-bearing exercise.  Will repeat bone turnover markers and bone density at follow-up in 1 year.    2.  Hypothyroidism-appears to be taking thyroid medication appropriately.  TSH within normal limits.  No symptoms of hyper or hypothyroidism.  Continue current dose of thyroid medication.      FOLLOWUP  F/U 1 year with Fasting CTX, CMP, TSH, DEXA

## 2023-03-25 ENCOUNTER — PATIENT MESSAGE (OUTPATIENT)
Dept: PAIN MEDICINE | Facility: CLINIC | Age: 78
End: 2023-03-25
Payer: MEDICARE

## 2023-03-28 NOTE — TELEPHONE ENCOUNTER
Schedule him an office visit for follow-up.   last visit we had also recommended physical therapy he should try and start this

## 2023-04-05 ENCOUNTER — TELEPHONE (OUTPATIENT)
Dept: TRANSPLANT | Facility: CLINIC | Age: 78
End: 2023-04-05
Payer: MEDICARE

## 2023-04-06 ENCOUNTER — HOSPITAL ENCOUNTER (OUTPATIENT)
Dept: PULMONOLOGY | Facility: CLINIC | Age: 78
Discharge: HOME | End: 2023-04-06
Payer: MEDICARE

## 2023-04-06 ENCOUNTER — OFFICE VISIT (OUTPATIENT)
Dept: TRANSPLANT | Facility: CLINIC | Age: 78
End: 2023-04-06
Payer: MEDICARE

## 2023-04-06 VITALS
HEART RATE: 60 BPM | WEIGHT: 198.19 LBS | BODY MASS INDEX: 27.75 KG/M2 | HEIGHT: 71 IN | DIASTOLIC BLOOD PRESSURE: 77 MMHG | OXYGEN SATURATION: 100 % | SYSTOLIC BLOOD PRESSURE: 158 MMHG

## 2023-04-06 VITALS — HEIGHT: 71 IN | WEIGHT: 198.19 LBS | BODY MASS INDEX: 27.75 KG/M2

## 2023-04-06 DIAGNOSIS — J84.9 INTERSTITIAL LUNG DISEASE: Primary | ICD-10-CM

## 2023-04-06 DIAGNOSIS — J84.9 INTERSTITIAL LUNG DISEASE: ICD-10-CM

## 2023-04-06 LAB
DLCO SINGLE BREATH LLN: 19.92
DLCO SINGLE BREATH PRE REF: 53.1 %
DLCO SINGLE BREATH REF: 26.84
DLCOC SBVA LLN: 2.55
DLCOC SBVA REF: 3.66
DLCOC SINGLE BREATH LLN: 19.92
DLCOC SINGLE BREATH REF: 26.84
DLCOCSBVAULN: 4.77
DLCOCSINGLEBREATHULN: 33.77
DLCOSINGLEBREATHULN: 33.77
DLCOVA LLN: 2.55
DLCOVA PRE REF: 86.4 %
DLCOVA PRE: 3.17 ML/(MIN*MMHG*L) (ref 2.55–4.77)
DLCOVA REF: 3.66
DLCOVAULN: 4.77
ERV LLN: -16448.99
ERV PRE REF: 111.6 %
ERV REF: 1.01
ERVULN: ABNORMAL
FEF 25 75 LLN: 0.88
FEF 25 75 PRE REF: 138.7 %
FEF 25 75 REF: 2.21
FEV05 LLN: 1.5
FEV05 REF: 2.63
FEV1 FVC LLN: 61
FEV1 FVC PRE REF: 110.6 %
FEV1 FVC REF: 75
FEV1 LLN: 2.17
FEV1 PRE REF: 87.7 %
FEV1 REF: 3.09
FRCPLETH LLN: 2.83
FRCPLETH PREREF: 69 %
FRCPLETH REF: 3.81
FRCPLETHULN: 4.8
FVC LLN: 3.02
FVC PRE REF: 78.7 %
FVC REF: 4.15
IVC PRE: 3.18 L (ref 3.02–5.29)
IVC SINGLE BREATH LLN: 3.02
IVC SINGLE BREATH PRE REF: 76.6 %
IVC SINGLE BREATH REF: 4.15
IVCSINGLEBREATHULN: 5.29
LLN IC: -9999996.79
PEF LLN: 5.56
PEF PRE REF: 129.6 %
PEF REF: 7.94
PHYSICIAN COMMENT: ABNORMAL
PRE DLCO: 14.25 ML/(MIN*MMHG) (ref 19.92–33.77)
PRE ERV: 1.13 L (ref -16448.99–16451.01)
PRE FEF 25 75: 3.07 L/S (ref 0.88–4.15)
PRE FET 100: 5.91 SEC
PRE FEV05 REF: 88.6 %
PRE FEV1 FVC: 82.84 % (ref 60.67–87.71)
PRE FEV1: 2.71 L (ref 2.17–3.94)
PRE FEV5: 2.33 L (ref 1.5–3.77)
PRE FRC PL: 2.63 L (ref 2.83–4.8)
PRE FVC: 3.27 L (ref 3.02–5.29)
PRE IC: 2.14 L (ref -9999996.79–#######.####)
PRE PEF: 10.3 L/S (ref 5.56–10.33)
PRE REF IC: 66.6 %
PRE RV: 1.5 L (ref 2.13–3.48)
PRE TLC: 4.77 L (ref 6.18–8.48)
PRE VTG: 2.79 L
RAW PRE REF: 82.9 %
RAW PRE: 2.54 CMH2O*S/L (ref 3.06–3.06)
RAW REF: 3.06
REF IC: 3.21
RV LLN: 2.13
RV PRE REF: 53.6 %
RV REF: 2.8
RVTLC LLN: 35
RVTLC PRE REF: 72.3 %
RVTLC PRE: 31.52 % (ref 34.62–52.58)
RVTLC REF: 44
RVTLCULN: 53
RVULN: 3.48
SGAW PRE REF: 152.6 %
SGAW PRE: 0.13 1/(CMH2O*S) (ref 0.08–0.08)
SGAW REF: 0.08
TLC LLN: 6.18
TLC PRE REF: 65.1 %
TLC REF: 7.33
TLC ULN: 8.48
ULN IC: ABNORMAL
VA PRE: 4.5 L (ref 7.18–7.18)
VA SINGLE BREATH LLN: 7.18
VA SINGLE BREATH PRE REF: 62.7 %
VA SINGLE BREATH REF: 7.18
VASINGLEBREATHULN: 7.18
VC LLN: 3.02
VC PRE REF: 78.7 %
VC PRE: 3.27 L (ref 3.02–5.29)
VC REF: 4.15
VC ULN: 5.29

## 2023-04-06 PROCEDURE — 3078F PR MOST RECENT DIASTOLIC BLOOD PRESSURE < 80 MM HG: ICD-10-PCS | Mod: HCNC,CPTII,NTX,S$GLB | Performed by: INTERNAL MEDICINE

## 2023-04-06 PROCEDURE — 99214 OFFICE O/P EST MOD 30 MIN: CPT | Mod: 25,HCNC,NTX,S$GLB | Performed by: INTERNAL MEDICINE

## 2023-04-06 PROCEDURE — 94726 PULM FUNCT TST PLETHYSMOGRAP: ICD-10-PCS | Mod: HCNC,NTX,S$GLB, | Performed by: INTERNAL MEDICINE

## 2023-04-06 PROCEDURE — 94726 PLETHYSMOGRAPHY LUNG VOLUMES: CPT | Mod: HCNC,NTX,S$GLB, | Performed by: INTERNAL MEDICINE

## 2023-04-06 PROCEDURE — 99214 PR OFFICE/OUTPT VISIT, EST, LEVL IV, 30-39 MIN: ICD-10-PCS | Mod: 25,HCNC,NTX,S$GLB | Performed by: INTERNAL MEDICINE

## 2023-04-06 PROCEDURE — 94729 PR C02/MEMBANE DIFFUSE CAPACITY: ICD-10-PCS | Mod: HCNC,NTX,S$GLB, | Performed by: INTERNAL MEDICINE

## 2023-04-06 PROCEDURE — 3078F DIAST BP <80 MM HG: CPT | Mod: HCNC,CPTII,NTX,S$GLB | Performed by: INTERNAL MEDICINE

## 2023-04-06 PROCEDURE — 94618 PULMONARY STRESS TESTING: CPT | Mod: HCNC,NTX,S$GLB, | Performed by: INTERNAL MEDICINE

## 2023-04-06 PROCEDURE — 1126F AMNT PAIN NOTED NONE PRSNT: CPT | Mod: HCNC,CPTII,NTX,S$GLB | Performed by: INTERNAL MEDICINE

## 2023-04-06 PROCEDURE — 99999 PR PBB SHADOW E&M-EST. PATIENT-LVL III: CPT | Mod: PBBFAC,HCNC,TXP, | Performed by: INTERNAL MEDICINE

## 2023-04-06 PROCEDURE — 94729 DIFFUSING CAPACITY: CPT | Mod: HCNC,NTX,S$GLB, | Performed by: INTERNAL MEDICINE

## 2023-04-06 PROCEDURE — 1101F PR PT FALLS ASSESS DOC 0-1 FALLS W/OUT INJ PAST YR: ICD-10-PCS | Mod: HCNC,CPTII,NTX,S$GLB | Performed by: INTERNAL MEDICINE

## 2023-04-06 PROCEDURE — 3077F PR MOST RECENT SYSTOLIC BLOOD PRESSURE >= 140 MM HG: ICD-10-PCS | Mod: HCNC,CPTII,NTX,S$GLB | Performed by: INTERNAL MEDICINE

## 2023-04-06 PROCEDURE — 1157F ADVNC CARE PLAN IN RCRD: CPT | Mod: HCNC,CPTII,NTX,S$GLB | Performed by: INTERNAL MEDICINE

## 2023-04-06 PROCEDURE — 94010 BREATHING CAPACITY TEST: ICD-10-PCS | Mod: HCNC,NTX,S$GLB, | Performed by: INTERNAL MEDICINE

## 2023-04-06 PROCEDURE — 3077F SYST BP >= 140 MM HG: CPT | Mod: HCNC,CPTII,NTX,S$GLB | Performed by: INTERNAL MEDICINE

## 2023-04-06 PROCEDURE — 1157F PR ADVANCE CARE PLAN OR EQUIV PRESENT IN MEDICAL RECORD: ICD-10-PCS | Mod: HCNC,CPTII,NTX,S$GLB | Performed by: INTERNAL MEDICINE

## 2023-04-06 PROCEDURE — 3288F PR FALLS RISK ASSESSMENT DOCUMENTED: ICD-10-PCS | Mod: HCNC,CPTII,NTX,S$GLB | Performed by: INTERNAL MEDICINE

## 2023-04-06 PROCEDURE — 1126F PR PAIN SEVERITY QUANTIFIED, NO PAIN PRESENT: ICD-10-PCS | Mod: HCNC,CPTII,NTX,S$GLB | Performed by: INTERNAL MEDICINE

## 2023-04-06 PROCEDURE — 1101F PT FALLS ASSESS-DOCD LE1/YR: CPT | Mod: HCNC,CPTII,NTX,S$GLB | Performed by: INTERNAL MEDICINE

## 2023-04-06 PROCEDURE — 3288F FALL RISK ASSESSMENT DOCD: CPT | Mod: HCNC,CPTII,NTX,S$GLB | Performed by: INTERNAL MEDICINE

## 2023-04-06 PROCEDURE — 99999 PR PBB SHADOW E&M-EST. PATIENT-LVL III: ICD-10-PCS | Mod: PBBFAC,HCNC,TXP, | Performed by: INTERNAL MEDICINE

## 2023-04-06 PROCEDURE — 94618 PULMONARY STRESS TESTING: ICD-10-PCS | Mod: HCNC,NTX,S$GLB, | Performed by: INTERNAL MEDICINE

## 2023-04-06 PROCEDURE — 94010 BREATHING CAPACITY TEST: CPT | Mod: HCNC,NTX,S$GLB, | Performed by: INTERNAL MEDICINE

## 2023-04-06 NOTE — PROGRESS NOTES
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 He is on no assisted ventilation.  His New York Heart Association Class is I and a Karnofsky score of 100% - Normal, No Complaints, no evidence of disease. He is not diabetic.    Patient presents today for routine follow up. Remains asymptomatic from a respiratory standpoint. No recent hospitalizations/exacerbations.     Brief History summarized from initial visit:  Patient has a PMH of TIA, depression(well controlled on current meds), A-Fib on eliquis/rhythmol, HTN, BPH, CKD 3, history of COVID, anemia of chronic blood loss, Hypothyroidism, PUD, osteoarthritis, complex sleep apnea syndrome, chronic back pain (well controlled on muscle relaxants).  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 Has no history of allergies/sinusitis, GERD or aspiration.  Prescribed CPAP, but has issues wearing it.     Review of Systems   Constitutional:  Negative for chills, fever and malaise/fatigue.   HENT:  Negative for congestion, ear pain, sinus pain and sore throat.    Eyes:  Negative for discharge and redness.   Respiratory:  Negative for sputum production, shortness of breath and wheezing.    Cardiovascular:  Negative for chest pain and palpitations.   Gastrointestinal:  Negative for abdominal pain, diarrhea and vomiting.   Genitourinary:  Negative for dysuria.   Musculoskeletal:  Negative for myalgias.   Skin:  Negative for rash.   Neurological:  Negative for dizziness.   Objective:   BP (!) 158/77 (BP Location: Left arm, Patient Position: Sitting, BP Method: Medium (Automatic))   Pulse 60   Ht 5' 11" (1.803 m)   Wt 89.9 kg (198 lb 3.1 oz)   SpO2 100% Comment: room air  BMI 27.64 " kg/m²   Physical Exam  Constitutional:       General: He is not in acute distress.     Appearance: Normal appearance.   HENT:      Head: Normocephalic and atraumatic.      Nose: No congestion or rhinorrhea.      Mouth/Throat:      Pharynx: No oropharyngeal exudate or posterior oropharyngeal erythema.   Eyes:      General: No scleral icterus.     Extraocular Movements: Extraocular movements intact.      Conjunctiva/sclera: Conjunctivae normal.   Cardiovascular:      Rate and Rhythm: Normal rate and regular rhythm.      Pulses: Normal pulses.   Pulmonary:      Effort: Pulmonary effort is normal. No respiratory distress.      Breath sounds: Normal breath sounds. No stridor. No wheezing, rhonchi or rales.   Chest:      Chest wall: No tenderness.   Abdominal:      General: There is no distension.      Palpations: Abdomen is soft.   Musculoskeletal:         General: No swelling. Normal range of motion.      Cervical back: Neck supple. No tenderness.      Right lower leg: No edema.      Left lower leg: No edema.   Lymphadenopathy:      Cervical: No cervical adenopathy.   Skin:     General: Skin is warm and dry.   Neurological:      Mental Status: He is alert and oriented to person, place, and time.   Psychiatric:         Mood and Affect: Mood normal.         Behavior: Behavior normal.         Thought Content: Thought content normal.         Judgment: Judgment normal.     Labs:  Hospital Outpatient Visit on 04/06/2023   Component Date Value    Pre FVC 04/06/2023 3.27     PRE FEV5 04/06/2023 2.33     Pre FEV1 04/06/2023 2.71     Pre FEV1 FVC 04/06/2023 82.84     Pre FEF 25 75 04/06/2023 3.07     Pre PEF 04/06/2023 10.30     Pre  04/06/2023 5.91     Pre DLCO 04/06/2023 14.25 (L)     DLCOVA PRE 04/06/2023 3.17     VA PRE 04/06/2023 4.50 (L)     IVC PRE 04/06/2023 3.18     Pre TLC 04/06/2023 4.77 (L)     VC PRE 04/06/2023 3.27     PRE IC 04/06/2023 2.14     Pre FRC PL 04/06/2023 2.63 (L)     Pre ERV 04/06/2023 1.13      Pre RV 04/06/2023 1.50 (L)     RVTLC PRE 04/06/2023 31.52 (L)     Raw PRE 04/06/2023 2.54 (L)     sGaw PRE 04/06/2023 0.13 (H)     Pre VTG 04/06/2023 2.79     FVC Ref 04/06/2023 4.15     FVC LLN 04/06/2023 3.02     FVC Pre Ref 04/06/2023 78.7     FEV05 REF 04/06/2023 2.63     FEV05 LLN 04/06/2023 1.50     PRE FEV05 REF 04/06/2023 88.6     FEV1 Ref 04/06/2023 3.09     FEV1 LLN 04/06/2023 2.17     FEV1 Pre Ref 04/06/2023 87.7     FEV1 FVC Ref 04/06/2023 75     FEV1 FVC LLN 04/06/2023 61     FEV1 FVC Pre Ref 04/06/2023 110.6     FEF 25 75 Ref 04/06/2023 2.21     FEF 25 75 LLN 04/06/2023 0.88     FEF 25 75 Pre Ref 04/06/2023 138.7     PEF Ref 04/06/2023 7.94     PEF LLN 04/06/2023 5.56     PEF Pre Ref 04/06/2023 129.6     TLC Ref 04/06/2023 7.33     TLC LLN 04/06/2023 6.18     TLC ULN 04/06/2023 8.48     TLC Pre Ref 04/06/2023 65.1     VC Ref 04/06/2023 4.15     VC LLN 04/06/2023 3.02     VC ULN 04/06/2023 5.29     VC Pre Ref 04/06/2023 78.7     REF IC 04/06/2023 3.21     LLN IC 04/06/2023 -0712894.79     ULN IC 04/06/2023 54495859.21     PRE REF IC 04/06/2023 66.6     FRCpleth Ref 04/06/2023 3.81     FRCpleth LLN 04/06/2023 2.83     FRC PLETH ULN 04/06/2023 4.80     FRCpleth PreRef 04/06/2023 69.0     ERV Ref 04/06/2023 1.01     ERV LLN 04/06/2023 -67524.99     ERV ULN 04/06/2023 92401.01     ERV Pre Ref 04/06/2023 111.6     RV Ref 04/06/2023 2.80     RV LLN 04/06/2023 2.13     RV ULN 04/06/2023 3.48     RV Pre Ref 04/06/2023 53.6     RVTLC Ref 04/06/2023 44     RVTLC LLN 04/06/2023 35     RV TLC ULN 04/06/2023 53     RVTLC Pre Ref 04/06/2023 72.3     Raw Ref 04/06/2023 3.06     Raw Pre Ref 04/06/2023 82.9     sGaw Ref 04/06/2023 0.08     sGaw Pre Ref 04/06/2023 152.6     DLCO Single Breath Ref 04/06/2023 26.84     DLCO Single Breath LLN 04/06/2023 19.92     DLCO SINGLEBREATH ULN 04/06/2023 33.77     DLCO Single Breath Pre R* 04/06/2023 53.1     DLCOc Single Breath Ref 04/06/2023 26.84     DLCOc Single Breath LLN  04/06/2023 19.92     DLCOC SINGLEBREATH ULN 04/06/2023 33.77     DLCOVA Ref 04/06/2023 3.66     DLCOVA LLN 04/06/2023 2.55     DLCOVA ULN 04/06/2023 4.77     DLCOVA Pre Ref 04/06/2023 86.4     DLCOc SBVA Ref 04/06/2023 3.66     DLCOc SBVA LLN 04/06/2023 2.55     DLCOCSBVA ULN 04/06/2023 4.77     VA Single Breath Ref 04/06/2023 7.18     VA Single Breath LLN 04/06/2023 7.18     VA SINGLEBREATH ULN 04/06/2023 7.18     VA Single Breath Pre Ref 04/06/2023 62.7     IVC Single Breath Ref 04/06/2023 4.15     IVC Single Breath LLN 04/06/2023 3.02     IVC SINGLEBREATH ULN 04/06/2023 5.29     IVC Single Breath Pre Ref 04/06/2023 76.6        Pulmonary Function Tests 4/6/2023 1/12/2023 11/21/2022   FVC 3.27 3.38 3.21   FEV1 2.71 2.79 2.7   TLC (liters) 4.77 4.4 5.06   DLCO (ml/mmHg sec) 14.25 14 15.13   FVC% 78.7 81 77   FEV1% 87.7 90 87   FEF 25-75 3.07 3.25 5.06   FEF 25-75% 138.7 146 69   TLC% 65.1 60 69   RV 1.5 - -   RV% 53.6 - -   DLCO% 53.1 53 56     6MW 4/6/2023 11/21/2022   6MWT Status completed without stopping completed without stopping   Patient Reported No complaints Leg pain   Was O2 used? No No   6MW Distance walked (feet) 1332 1200   Distance walked (meters) 405.99 365.76   Did patient stop? No No   Oxygen Saturation 97 96   Supplemental Oxygen Room Air Room Air   Heart Rate 65 52   Blood Pressure 154/72 172/83   Meena Dyspnea Rating  nothing at all nothing at all   Oxygen Saturation 96 99   Supplemental Oxygen Room Air Room Air   Heart Rate 62 65   Blood Pressure 172/77 195/96   Meena Dyspnea Rating  very, very light (just noticeable) very light   Recovery Time (seconds) 73 136   Oxygen Saturation 98 97   Supplemental Oxygen Room Air Room Air   Heart Rate 57 54       Assessment:-  1. Interstitial lung disease      Plan:     Referred for ILD found incidentally on imaging. Autoimmune workup thus far negative. FVC/DLCO remains stable. 6MWT stable without evidence of exertional hypoxemia. Remains asymptomatic and no  role for anti-fibrotics at this time. Will continue to monitor spirometry and 6MWT at routine visits.     RTC in 6 months or sooner if needed.       Judith Edwards PA-C  [facility]

## 2023-04-06 NOTE — LETTER
April 6, 2023        Elias Yang  [address]  [facility] [address]  Phone: [phone]  Fax: [phone]             [facility] 1st Fl  [address]  [facility] [address]  Phone: [phone]   Patient: Dominick Song MD   MR Number: [id number]   YOB: 1945   Date of Visit: 4/6/2023       Dear Dr. Elias Yang    Thank you for referring Dominick Song to me for evaluation. Attached you will find relevant portions of my assessment and plan of care.    If you have questions, please do not hesitate to call me. I look forward to following Dominick Song along with you.    Sincerely,    Shante Ferris MD    Enclosure    If you would like to receive this communication electronically, please contact [email or web address] or [phone] to request [employer] access.    [employer] is a tool which provides read-only access to select patient information with whom you have a relationship. Its easy to use and provides real time access to review your patients record including encounter summaries, notes, results, and demographic information.    If you feel you have received this communication in error or would no longer like to receive these types of communications, please e-mail [email or web address]

## 2023-04-06 NOTE — PROCEDURES
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  The diagnosis is Interstitial Lung Disease.  The patient's BMI is 27.7 kg/m2.  Predicted distance (lower limit of normal) is 304.16 meters.      Test Results:    The test was completed without stopping.  The total time walked was 360 seconds.  During walking, the patient reported:  No complaints.  The patient used no assistive devices during testing.     04/06/2023---------Distance: 405.99 meters (1332 feet)     O2 Sat % Supplemental Oxygen Heart Rate Blood Pressure Meena Scale   Pre-exercise  (Resting) 97 % Room Air 65 bpm 154/72 mmHg 0   During Exercise 96 % Room Air 62 bpm 172/77 mmHg 0.5   Post-exercise  (Recovery) 98 % Room Air  57 bpm       Recovery Time: 73 seconds    Performing nurse/tech: CHACORTA Rey      PREVIOUS STUDY:   11/21/2022---------Distance: 365.76 meters (1200 feet)       O2 Sat % Supplemental Oxygen Heart Rate Blood Pressure Meena Scale   Pre-exercise  (Resting) 96 % Room Air 52 bpm 172/83 mmHg 0   During Exercise 99 % Room Air 65 bpm 195/96 mmHg 1   Post-exercise  (Recovery) 97 % Room Air  54 bpm 177/86 mmHg         CLINICAL INTERPRETATION:  Six minute walk distance is 405.99 meters (1332 feet) with very, very light dyspnea.  During exercise, there was no significant desaturation while breathing room air.  Blood pressure increased significantly and Heart rate remained stable with walking.  Hypertension was present prior to exercise.  The patient did not report non-pulmonary symptoms during exercise.  Since the previous study in November 2022, exercise capacity may be somewhat improved.  Based upon age and body mass index, exercise capacity is normal.

## 2023-05-02 ENCOUNTER — PATIENT MESSAGE (OUTPATIENT)
Dept: FAMILY MEDICINE | Facility: CLINIC | Age: 78
End: 2023-05-02
Payer: MEDICARE

## 2023-05-02 RX ORDER — CELECOXIB 200 MG/1
200 CAPSULE ORAL DAILY
Qty: 14 CAPSULE | Refills: 0 | Status: SHIPPED | OUTPATIENT
Start: 2023-05-02 | End: 2023-06-21 | Stop reason: SDUPTHER

## 2023-05-05 ENCOUNTER — CLINICAL SUPPORT (OUTPATIENT)
Dept: AUDIOLOGY | Facility: CLINIC | Age: 78
End: 2023-05-05
Payer: MEDICARE

## 2023-05-05 DIAGNOSIS — Z97.4 WEARS HEARING AID IN BOTH EARS: Primary | ICD-10-CM

## 2023-05-05 NOTE — PROGRESS NOTES
Pt got a new phone and needed to have his current hearing aids paired to his new phone for Bluetooth features.  Pt's hearing aids were successfully paired to the phone and an incoming call was completed in the office to confirm a good working BT connection.  The theAudience gary was downloaded and paired with the hearing aids.  The pt was able to successfully change the hearing aid settings via the gary in the office.  Pt was issued an extra wax filter pack upon request. F/u in six months to one year for routine check or earlier if additional adjustments needed.

## 2023-05-15 ENCOUNTER — PATIENT MESSAGE (OUTPATIENT)
Dept: FAMILY MEDICINE | Facility: CLINIC | Age: 78
End: 2023-05-15
Payer: MEDICARE

## 2023-05-16 ENCOUNTER — PATIENT MESSAGE (OUTPATIENT)
Dept: FAMILY MEDICINE | Facility: CLINIC | Age: 78
End: 2023-05-16
Payer: MEDICARE

## 2023-05-16 NOTE — TELEPHONE ENCOUNTER
I spoke with Mr. Tan and gave his this information.  He verbalized understanding. I told him we have nothing available to go to  clinic.  He said ok.

## 2023-05-16 NOTE — TELEPHONE ENCOUNTER
That cut looks like it is developing an infection. That needs to be seen by an available provider here or at urgent care.

## 2023-05-17 ENCOUNTER — TELEPHONE (OUTPATIENT)
Dept: FAMILY MEDICINE | Facility: CLINIC | Age: 78
End: 2023-05-17
Payer: MEDICARE

## 2023-05-17 ENCOUNTER — OFFICE VISIT (OUTPATIENT)
Dept: URGENT CARE | Facility: CLINIC | Age: 78
End: 2023-05-17
Payer: MEDICARE

## 2023-05-17 VITALS
BODY MASS INDEX: 27.72 KG/M2 | OXYGEN SATURATION: 95 % | RESPIRATION RATE: 18 BRPM | WEIGHT: 198 LBS | SYSTOLIC BLOOD PRESSURE: 146 MMHG | HEART RATE: 80 BPM | HEIGHT: 71 IN | TEMPERATURE: 98 F | DIASTOLIC BLOOD PRESSURE: 82 MMHG

## 2023-05-17 DIAGNOSIS — L03.116 CELLULITIS OF LEFT LEG: Primary | ICD-10-CM

## 2023-05-17 DIAGNOSIS — S81.802A WOUND OF LEFT LOWER EXTREMITY, INITIAL ENCOUNTER: ICD-10-CM

## 2023-05-17 PROCEDURE — 99203 OFFICE O/P NEW LOW 30 MIN: CPT | Mod: S$GLB,TXP,, | Performed by: NURSE PRACTITIONER

## 2023-05-17 PROCEDURE — 99203 PR OFFICE/OUTPT VISIT, NEW, LEVL III, 30-44 MIN: ICD-10-PCS | Mod: S$GLB,TXP,, | Performed by: NURSE PRACTITIONER

## 2023-05-17 RX ORDER — MUPIROCIN 20 MG/G
OINTMENT TOPICAL 3 TIMES DAILY
Qty: 1 EACH | Refills: 0 | Status: SHIPPED | OUTPATIENT
Start: 2023-05-17 | End: 2023-05-22

## 2023-05-17 RX ORDER — DOXYCYCLINE 100 MG/1
100 CAPSULE ORAL EVERY 12 HOURS
Qty: 14 CAPSULE | Refills: 0 | Status: SHIPPED | OUTPATIENT
Start: 2023-05-17 | End: 2023-05-24

## 2023-05-17 NOTE — PATIENT INSTRUCTIONS

## 2023-05-17 NOTE — TELEPHONE ENCOUNTER
It is unfortunate that we don't have any appointment available, but that advice is for him to have this evaluated at urgent care. The wound needs to be evaluated, and they can order any necessary labs if needed. I see he is there now.

## 2023-05-17 NOTE — PROGRESS NOTES
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    Vitals:  height is 5' 11" (1.803 m) and weight is 89.8 kg (198 lb). His tympanic temperature is 98.3 °F (36.8 °C). His blood pressure is 146/82 (abnormal) and his pulse is 80. His respiration is 18 and oxygen saturation is 95%.     Chief Complaint: Wound Check    Patient was trimming his [place]ña bushes on 5/11/23. He was scratched on his left leg, and it now appears red and irritated.    Provider note begins below:  Denies any fever or chills.  Denies any drainage from the wound area.  Patient reports that the wound has improved since Monday.  Patient is awake and alert.  Answering all questions appropriately.  He is afebrile.    Wound Check  He was originally treated 5 to 10 days ago. His temperature was unmeasured prior to arrival. The temperature was taken using a tympanic thermometer. There has been no drainage from the wound. The redness has worsened. The swelling has worsened. The pain has not changed. He has no difficulty moving the affected extremity or digit.     Constitution: Negative. Negative for chills, fatigue and fever.   HENT:  Negative for ear pain, ear discharge, facial swelling and sinus pressure.    Neck: Negative for neck pain, neck stiffness and painful lymph nodes.   Cardiovascular: Negative.  Negative for chest pain and sob on exertion.   Eyes: Negative.  Negative for eye itching, eye pain and eye redness.   Respiratory: Negative.  Negative for chest tightness, cough, shortness of breath, wheezing and asthma.    Gastrointestinal: Negative.  Negative for abdominal pain, nausea and vomiting.   Endocrine: negative. excessive thirst.   Genitourinary: Negative.  Negative for dysuria, frequency, urgency and flank pain.   Musculoskeletal: Negative.  Negative for pain, trauma, joint pain and joint swelling.   Skin:  Positive for wound. Negative for rash, lesion, erythema and hives.   Allergic/Immunologic: Negative.  Negative for " eczema, asthma, hives, itching and sneezing.   Neurological: Negative.  Negative for dizziness, passing out, disorientation and altered mental status.   Hematologic/Lymphatic: Negative.  Negative for swollen lymph nodes.   Psychiatric/Behavioral: Negative.  Negative for altered mental status, disorientation and confusion.     Objective:     Physical Exam   Constitutional: He is oriented to person, place, and time. He appears well-developed.  Non-toxic appearance. He does not appear ill. No distress.   HENT:   Head: Normocephalic and atraumatic. Head is without abrasion, without contusion and without laceration.   Ears:   Right Ear: External ear normal.   Left Ear: External ear normal.   Nose: Nose normal.   Mouth/Throat: Oropharynx is clear and moist and mucous membranes are normal.   Eyes: Conjunctivae, EOM and lids are normal. Pupils are equal, round, and reactive to light.   Neck: Trachea normal and phonation normal. Neck supple.   Cardiovascular: Normal rate, regular rhythm and normal heart sounds.   Pulmonary/Chest: Effort normal and breath sounds normal. No stridor. No respiratory distress. He has no wheezes. He has no rhonchi. He has no rales. He exhibits no tenderness.   Abdominal: Normal appearance.   Musculoskeletal: Normal range of motion.         General: Normal range of motion.   Neurological: no focal deficit. He is alert, oriented to person, place, and time and at baseline.   Skin: Skin is warm, dry, intact, not diaphoretic and no rash. Capillary refill takes less than 2 seconds. No abrasion, No burn, No bruising, No erythema and No ecchymosis         Comments: Patient has a large scabbing abrasion to left lower extremity with surrounding erythema and mild warmth.  There is no drainage.  Patient denies any calf pain. Left foot with 3+ DP/PT pulses.  Sensations intact.  Cap refill less than 2 seconds.   Psychiatric: His speech is normal and behavior is normal. Mood, judgment and thought content normal.    Nursing note and vitals reviewed.        Assessment:     1. Cellulitis of left leg    2. Wound of left lower extremity, initial encounter        Plan:   Patient given strict ER precautions if his wound should not improve, worsens or he develops other worsening symptoms.  Patient acknowledged and agreed with plan.    FOLLOWUP  Follow up if symptoms worsen or fail to improve, for PLEASE CONTACT PCP OR CONTACT THE EMERGENCY ROOM..     PATIENT INSTRUCTIONS  Patient Instructions   INSTRUCTIONS:  - Rest.  - Drink plenty of fluids.  - Take Tylenol and/or Ibuprofen as directed as needed for fever/pain.  Do not take more than the recommended dose.  - follow up with your PCP within the next 1-2 weeks as needed.  - You must understand that you have received an Urgent Care treatment only and that you may be released before all of your medical problems are known or treated.   - You, the patient, will arrange for follow up care as instructed.   - If your condition worsens or fails to improve we recommend that you receive another evaluation at the ER immediately or contact your PCP to discuss your concerns.   - You can call [phone] or [phone] to help schedule an appointment with the appropriate provider.     -If you smoke cigarettes, it would be beneficial for you to stop.         THANK YOU FOR ALLOWING ME TO PARTICIPATE IN YOUR HEALTHCARE,     Rubén Mahoney, NP     Cellulitis of left leg  -     doxycycline (VIBRAMYCIN) 100 MG Cap; Take 1 capsule (100 mg total) by mouth every 12 (twelve) hours. for 7 days  Dispense: 14 capsule; Refill: 0  -     mupirocin (BACTROBAN) 2 % ointment; Apply topically 3 (three) times daily. for 5 days  Dispense: 1 each; Refill: 0    Wound of left lower extremity, initial encounter  -     doxycycline (VIBRAMYCIN) 100 MG Cap; Take 1 capsule (100 mg total) by mouth every 12 (twelve) hours. for 7 days  Dispense: 14 capsule; Refill: 0  -     mupirocin (BACTROBAN) 2 % ointment; Apply  topically 3 (three) times daily. for 5 days  Dispense: 1 each; Refill: 0

## 2023-05-22 ENCOUNTER — OFFICE VISIT (OUTPATIENT)
Dept: URGENT CARE | Facility: CLINIC | Age: 78
End: 2023-05-22
Payer: MEDICARE

## 2023-05-22 VITALS
HEART RATE: 82 BPM | TEMPERATURE: 98 F | RESPIRATION RATE: 18 BRPM | DIASTOLIC BLOOD PRESSURE: 76 MMHG | SYSTOLIC BLOOD PRESSURE: 124 MMHG | WEIGHT: 198 LBS | BODY MASS INDEX: 27.72 KG/M2 | OXYGEN SATURATION: 96 % | HEIGHT: 71 IN

## 2023-05-22 DIAGNOSIS — J06.9 VIRAL URI WITH COUGH: Primary | ICD-10-CM

## 2023-05-22 DIAGNOSIS — R05.9 COUGH, UNSPECIFIED TYPE: ICD-10-CM

## 2023-05-22 DIAGNOSIS — S81.802A WOUND OF LEFT LOWER EXTREMITY, INITIAL ENCOUNTER: ICD-10-CM

## 2023-05-22 LAB
CTP QC/QA: YES
SARS-COV-2 AG RESP QL IA.RAPID: NEGATIVE

## 2023-05-22 PROCEDURE — 87811 SARS CORONAVIRUS 2 ANTIGEN POCT, MANUAL READ: ICD-10-PCS | Mod: QW,NTX,S$GLB, | Performed by: NURSE PRACTITIONER

## 2023-05-22 PROCEDURE — 99213 PR OFFICE/OUTPT VISIT, EST, LEVL III, 20-29 MIN: ICD-10-PCS | Mod: NTX,S$GLB,, | Performed by: NURSE PRACTITIONER

## 2023-05-22 PROCEDURE — 71046 XR CHEST PA AND LATERAL: ICD-10-PCS | Mod: NTX,S$GLB,, | Performed by: RADIOLOGY

## 2023-05-22 PROCEDURE — 99213 OFFICE O/P EST LOW 20 MIN: CPT | Mod: NTX,S$GLB,, | Performed by: NURSE PRACTITIONER

## 2023-05-22 PROCEDURE — 87811 SARS-COV-2 COVID19 W/OPTIC: CPT | Mod: QW,NTX,S$GLB, | Performed by: NURSE PRACTITIONER

## 2023-05-22 PROCEDURE — 71046 X-RAY EXAM CHEST 2 VIEWS: CPT | Mod: NTX,S$GLB,, | Performed by: RADIOLOGY

## 2023-05-22 NOTE — PATIENT INSTRUCTIONS
Follow-up with wound clinic as referred.    INSTRUCTIONS:  - Rest.  - Drink plenty of fluids.  - Take Tylenol and/or Ibuprofen as directed as needed for fever/pain.  Do not take more than the recommended dose.  - follow up with your PCP within the next 1-2 weeks as needed.  - You must understand that you have received an Urgent Care treatment only and that you may be released before all of your medical problems are known or treated.   - You, the patient, will arrange for follow up care as instructed.   - If your condition worsens or fails to improve we recommend that you receive another evaluation at the ER immediately or contact your PCP to discuss your concerns.   - You can call [phone] or [phone] to help schedule an appointment with the appropriate provider.     -If you smoke cigarettes, it would be beneficial for you to stop.

## 2023-05-22 NOTE — PROGRESS NOTES
"Subjective:      Patient ID: Dominick KENNEDY MD Duncan is a 77 y.o. male.    Vitals:  height is 5' 11" (1.803 m) and weight is 89.8 kg (198 lb). His oral temperature is 98 °F (36.7 °C). His blood pressure is 124/76 and his pulse is 82. His respiration is 18 and oxygen saturation is 96%.     Chief Complaint: Cough    Patient was here on 5/17/23 for a left leg injury. He is here for a cough and chest congestion today. The symptoms began on 5/16/23.He is also asking for you to check his leg. Also, he has slight fibrosis in 2011.    Provider note begins below:   Patient denies any fever, chills, CP, SOB, nausea/vomiting or abdominal pain.  Patient reports that he just returned from [place].  He is currently on doxycycline prescribed by me for left lower extremity cellulitis and azithromycin that he started due to his cough.  Patient is awake and alert.  Speaking in full sentences.  He is afebrile.    Cough  This is a new problem. The current episode started in the past 7 days. The problem has been unchanged. The cough is Productive of sputum. Pertinent negatives include no chest pain, chills, ear pain, eye redness, fever, rash, shortness of breath or wheezing. Nothing aggravates the symptoms. He has tried nothing for the symptoms.     Constitution: Negative. Negative for chills, fatigue and fever.   HENT:  Negative for ear pain, ear discharge, facial swelling and sinus pressure.    Neck: Negative for neck pain, neck stiffness and painful lymph nodes.   Cardiovascular: Negative.  Negative for chest pain and sob on exertion.   Eyes: Negative.  Negative for eye itching, eye pain and eye redness.   Respiratory:  Positive for cough. Negative for chest tightness, shortness of breath, wheezing and asthma.    Gastrointestinal: Negative.  Negative for abdominal pain, nausea and vomiting.   Endocrine: negative. excessive thirst.   Genitourinary: Negative.  Negative for dysuria, frequency, urgency and flank pain.   Musculoskeletal: " Negative.  Negative for pain, trauma, joint pain and joint swelling.   Skin: Negative.  Negative for rash, wound, lesion and hives.   Allergic/Immunologic: Negative.  Negative for eczema, asthma, hives, itching and sneezing.   Neurological: Negative.  Negative for dizziness, passing out, disorientation and altered mental status.   Hematologic/Lymphatic: Negative.  Negative for swollen lymph nodes.   Psychiatric/Behavioral: Negative.  Negative for altered mental status, disorientation and confusion.     Objective:     Physical Exam   Constitutional: He is oriented to person, place, and time. He appears well-developed. He is cooperative.  Non-toxic appearance. He does not appear ill. No distress.   HENT:   Head: Normocephalic and atraumatic.   Ears:   Right Ear: Hearing, tympanic membrane, external ear and ear canal normal. impacted cerumen  Left Ear: Hearing, tympanic membrane, external ear and ear canal normal. impacted cerumen  Nose: Nose normal. No mucosal edema, rhinorrhea, nasal deformity or congestion. No epistaxis. Right sinus exhibits no maxillary sinus tenderness and no frontal sinus tenderness. Left sinus exhibits no maxillary sinus tenderness and no frontal sinus tenderness.   Mouth/Throat: Uvula is midline, oropharynx is clear and moist and mucous membranes are normal. No trismus in the jaw. Normal dentition. No uvula swelling. No oropharyngeal exudate, posterior oropharyngeal edema or posterior oropharyngeal erythema.   Eyes: Conjunctivae and lids are normal. No scleral icterus.   Neck: Trachea normal and phonation normal. Neck supple. No edema present. No erythema present. No neck rigidity present.   Cardiovascular: Normal rate, regular rhythm, normal heart sounds and normal pulses.   Pulmonary/Chest: Effort normal and breath sounds normal. No stridor. No respiratory distress. He has no decreased breath sounds. He has no wheezes. He has no rhonchi. He has no rales. He exhibits no tenderness.    Abdominal: Normal appearance. He exhibits no distension. Soft. flat abdomen There is no abdominal tenderness. There is no rebound, no guarding, no left CVA tenderness and no right CVA tenderness.   Musculoskeletal: Normal range of motion.         General: No deformity. Normal range of motion.   Lymphadenopathy:     He has no cervical adenopathy.   Neurological: no focal deficit. He is alert, oriented to person, place, and time and at baseline. He exhibits normal muscle tone. Coordination normal.   Skin: Skin is warm, dry, intact, not diaphoretic and not pale.        Psychiatric: His speech is normal and behavior is normal. Mood, judgment and thought content normal.   Nursing note and vitals reviewed.  The following results have been reviewed with the patient:  LABS-  Results for orders placed or performed in visit on 05/22/23   SARS Coronavirus 2 Antigen, POCT Manual Read   Result Value Ref Range    SARS Coronavirus 2 Antigen Negative Negative     Acceptable Yes      *Note: Due to a large number of results and/or encounters for the requested time period, some results have not been displayed. A complete set of results can be found in Results Review.        IMAGING-  XR CHEST PA AND LATERAL    Result Date: 5/22/2023  EXAMINATION: XR CHEST PA AND LATERAL CLINICAL HISTORY: Cough, unspecified TECHNIQUE: PA and lateral views of the chest were performed. COMPARISON: 10/25/2022 FINDINGS: The heart is not enlarged.  Focal elevation or eventration of the diaphragms.  Coarse diffuse bilateral infiltrates.  The appearance is not significantly changed.  No new area of infiltrate.  No pleural effusion.     Chronic appearing interstitial infiltrates with the appearance not significantly changed compared to the prior exam. Electronically signed by: Marianela Richards MD Date:    05/22/2023 Time:    10:10      Assessment:     1. Viral URI with cough    2. Cough, unspecified type    3. Wound of left lower extremity,  initial encounter        Plan:     FOLLOWUP  Follow up if symptoms worsen or fail to improve, for PLEASE CONTACT PCP OR CONTACT THE EMERGENCY ROOM..     PATIENT INSTRUCTIONS  Patient Instructions   Follow-up with wound clinic as referred.    INSTRUCTIONS:  - Rest.  - Drink plenty of fluids.  - Take Tylenol and/or Ibuprofen as directed as needed for fever/pain.  Do not take more than the recommended dose.  - follow up with your PCP within the next 1-2 weeks as needed.  - You must understand that you have received an Urgent Care treatment only and that you may be released before all of your medical problems are known or treated.   - You, the patient, will arrange for follow up care as instructed.   - If your condition worsens or fails to improve we recommend that you receive another evaluation at the ER immediately or contact your PCP to discuss your concerns.   - You can call [phone] or [phone] to help schedule an appointment with the appropriate provider.     -If you smoke cigarettes, it would be beneficial for you to stop.        THANK YOU FOR ALLOWING ME TO PARTICIPATE IN YOUR HEALTHCARE,     Rubén Mahoney, NP   Viral URI with cough    Cough, unspecified type  -     SARS Coronavirus 2 Antigen, POCT Manual Read  -     XR CHEST PA AND LATERAL; Future; Expected date: 05/22/2023    Wound of left lower extremity, initial encounter  -     Ambulatory referral/consult to Wound Clinic

## 2023-06-08 NOTE — Clinical Note
Patient no show for virtual - no response to text and phone rang and rang no answer  [address].  Kendrick Morton PA-C Removed intact   R-sided chest pain, pain with breathing

## 2023-06-08 NOTE — PROGRESS NOTES
Pt here for a hearing aid check and to address BT issues.  The BT connection for the patient's hearing aids and his phone were addressed to his satisfaction and he will return next week for his annual audiogram and hearing aid reprogramming based on the new audiogram results.

## 2023-06-21 ENCOUNTER — PATIENT MESSAGE (OUTPATIENT)
Dept: FAMILY MEDICINE | Facility: CLINIC | Age: 78
End: 2023-06-21
Payer: MEDICARE

## 2023-06-21 RX ORDER — BUPROPION HYDROCHLORIDE 300 MG/1
300 TABLET ORAL DAILY
Qty: 90 TABLET | Refills: 3 | Status: SHIPPED | OUTPATIENT
Start: 2023-06-21

## 2023-06-21 RX ORDER — CELECOXIB 200 MG/1
200 CAPSULE ORAL 2 TIMES DAILY
Qty: 60 CAPSULE | Refills: 2 | Status: SHIPPED | OUTPATIENT
Start: 2023-06-21 | End: 2023-09-17

## 2023-06-21 RX ORDER — BUPROPION HYDROCHLORIDE 150 MG/1
150 TABLET ORAL DAILY
Qty: 90 TABLET | Refills: 3 | Status: ON HOLD | OUTPATIENT
Start: 2023-06-21 | End: 2023-09-23 | Stop reason: HOSPADM

## 2023-06-21 NOTE — TELEPHONE ENCOUNTER
No care due was identified.  [facility] Embedded Care Due Messages. Reference number: [id number].   6/21/2023 11:55:00 AM CDT

## 2023-07-08 DIAGNOSIS — I48.91 ATRIAL FIBRILLATION, UNSPECIFIED TYPE: ICD-10-CM

## 2023-07-10 RX ORDER — APIXABAN 5 MG/1
5 TABLET, FILM COATED ORAL 2 TIMES DAILY
Qty: 180 TABLET | Refills: 2 | Status: ON HOLD | OUTPATIENT
Start: 2023-07-10 | End: 2023-09-23 | Stop reason: SDUPTHER

## 2023-07-10 RX ORDER — PROPAFENONE HYDROCHLORIDE 425 MG/1
CAPSULE, EXTENDED RELEASE ORAL
Qty: 180 CAPSULE | Refills: 2 | Status: ON HOLD | OUTPATIENT
Start: 2023-07-10 | End: 2023-09-23 | Stop reason: HOSPADM

## 2023-07-19 ENCOUNTER — OFFICE VISIT (OUTPATIENT)
Dept: FAMILY MEDICINE | Facility: CLINIC | Age: 78
End: 2023-07-19
Payer: MEDICARE

## 2023-07-19 VITALS
TEMPERATURE: 98 F | HEART RATE: 68 BPM | HEIGHT: 71 IN | OXYGEN SATURATION: 97 % | BODY MASS INDEX: 27.28 KG/M2 | DIASTOLIC BLOOD PRESSURE: 64 MMHG | WEIGHT: 194.88 LBS | SYSTOLIC BLOOD PRESSURE: 116 MMHG | RESPIRATION RATE: 18 BRPM

## 2023-07-19 DIAGNOSIS — I10 HYPERTENSION, UNSPECIFIED TYPE: Primary | ICD-10-CM

## 2023-07-19 PROCEDURE — 99213 OFFICE O/P EST LOW 20 MIN: CPT | Mod: HCNC,S$GLB,, | Performed by: FAMILY MEDICINE

## 2023-07-19 PROCEDURE — 1157F PR ADVANCE CARE PLAN OR EQUIV PRESENT IN MEDICAL RECORD: ICD-10-PCS | Mod: HCNC,CPTII,S$GLB, | Performed by: FAMILY MEDICINE

## 2023-07-19 PROCEDURE — 1101F PR PT FALLS ASSESS DOC 0-1 FALLS W/OUT INJ PAST YR: ICD-10-PCS | Mod: HCNC,CPTII,S$GLB, | Performed by: FAMILY MEDICINE

## 2023-07-19 PROCEDURE — 1159F PR MEDICATION LIST DOCUMENTED IN MEDICAL RECORD: ICD-10-PCS | Mod: HCNC,CPTII,S$GLB, | Performed by: FAMILY MEDICINE

## 2023-07-19 PROCEDURE — 3074F PR MOST RECENT SYSTOLIC BLOOD PRESSURE < 130 MM HG: ICD-10-PCS | Mod: HCNC,CPTII,S$GLB, | Performed by: FAMILY MEDICINE

## 2023-07-19 PROCEDURE — 3288F PR FALLS RISK ASSESSMENT DOCUMENTED: ICD-10-PCS | Mod: HCNC,CPTII,S$GLB, | Performed by: FAMILY MEDICINE

## 2023-07-19 PROCEDURE — 99999 PR PBB SHADOW E&M-EST. PATIENT-LVL III: CPT | Mod: PBBFAC,HCNC,, | Performed by: FAMILY MEDICINE

## 2023-07-19 PROCEDURE — 1101F PT FALLS ASSESS-DOCD LE1/YR: CPT | Mod: HCNC,CPTII,S$GLB, | Performed by: FAMILY MEDICINE

## 2023-07-19 PROCEDURE — 99213 PR OFFICE/OUTPT VISIT, EST, LEVL III, 20-29 MIN: ICD-10-PCS | Mod: HCNC,S$GLB,, | Performed by: FAMILY MEDICINE

## 2023-07-19 PROCEDURE — 3078F DIAST BP <80 MM HG: CPT | Mod: HCNC,CPTII,S$GLB, | Performed by: FAMILY MEDICINE

## 2023-07-19 PROCEDURE — 3288F FALL RISK ASSESSMENT DOCD: CPT | Mod: HCNC,CPTII,S$GLB, | Performed by: FAMILY MEDICINE

## 2023-07-19 PROCEDURE — 3078F PR MOST RECENT DIASTOLIC BLOOD PRESSURE < 80 MM HG: ICD-10-PCS | Mod: HCNC,CPTII,S$GLB, | Performed by: FAMILY MEDICINE

## 2023-07-19 PROCEDURE — 3074F SYST BP LT 130 MM HG: CPT | Mod: HCNC,CPTII,S$GLB, | Performed by: FAMILY MEDICINE

## 2023-07-19 PROCEDURE — 1126F PR PAIN SEVERITY QUANTIFIED, NO PAIN PRESENT: ICD-10-PCS | Mod: HCNC,CPTII,S$GLB, | Performed by: FAMILY MEDICINE

## 2023-07-19 PROCEDURE — 1159F MED LIST DOCD IN RCRD: CPT | Mod: HCNC,CPTII,S$GLB, | Performed by: FAMILY MEDICINE

## 2023-07-19 PROCEDURE — 99999 PR PBB SHADOW E&M-EST. PATIENT-LVL III: ICD-10-PCS | Mod: PBBFAC,HCNC,, | Performed by: FAMILY MEDICINE

## 2023-07-19 PROCEDURE — 1126F AMNT PAIN NOTED NONE PRSNT: CPT | Mod: HCNC,CPTII,S$GLB, | Performed by: FAMILY MEDICINE

## 2023-07-19 PROCEDURE — 1157F ADVNC CARE PLAN IN RCRD: CPT | Mod: HCNC,CPTII,S$GLB, | Performed by: FAMILY MEDICINE

## 2023-07-19 RX ORDER — ACYCLOVIR 800 MG/1
800 TABLET ORAL
Status: ON HOLD | COMMUNITY
Start: 2023-05-30 | End: 2023-09-22

## 2023-07-19 NOTE — PROGRESS NOTES
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    Chief Complaint: Hypertension (Follow up)      Here for 6 month follow-up.    S/p ORIF left femur fracture - following with Dr. Rodriguez; doing well.  H/o TIA - taking just Eliquis; stopped lipitor after seeing neurologist; using lopressor PRN  Afib - s/p ablation; sees Dr. Beatty; gets episodes twice a year requiring cardioversion. Taking Eliquis 5mg BID, Coreg BID, Rhythmol BID.  HTN - telmisartan 40mg  And Coreg 12.5mg BID  CKD - following with Dr. 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    Hypertension  Pertinent negatives include no chest pain, headaches, neck pain, palpitations or shortness of breath.   Follow-up  Pertinent negatives include no abdominal pain, arthralgias, chest pain, chills, coughing, fever, headaches, nausea, neck pain, rash, sore throat or weakness.     Past Medical History:   Diagnosis Date    Anticoagulant long-term use     Anxiety     Arthritis     Atrial fibrillation     BPH (benign prostatic hyperplasia)     Cataract     CKD (chronic kidney disease) stage 3, GFR 30-59 ml/min 7/10/2017    Followed by Dr. Jeevan Pond    Colon polyp     benign    Depression     Elevated PSA     Erectile dysfunction     Gastric ulcer with hemorrhage     Hep B w/o coma 1977    History of bleeding peptic ulcer     History of prostatitis     Hypertension     PAF (paroxysmal atrial fibrillation)     Sleep apnea     on CPAP    Stroke     TIA December 2019    Thyroid disease        Past Surgical History:   Procedure Laterality Date    ABDOMINAL HERNIA REPAIR      ABLATION OF ARRHYTHMOGENIC FOCUS  FOR ATRIAL FIBRILLATION N/A 6/15/2020    Procedure: Ablation atrial fibrillation;  Surgeon: Wyatt Beatty MD;  Location: [facility] EP LAB;  Service: Cardiology;  Laterality: N/A;  PAF, AFl, PEPE, PVI re-do, CTI, RFA, JUSTIN, Gen, SK, 3 Prep    CATARACT EXTRACTION  11/25/2013    bilateral    CHOLECYSTECTOMY      COLONOSCOPY N/A 11/25/2015    Procedure: COLONOSCOPY;  Surgeon: Toby Hernandez MD;  Location: [facility] ENDO;  Service: Endoscopy;  Laterality: N/A;    COLONOSCOPY N/A 11/13/2020    Procedure: COLONOSCOPY;  Surgeon: Toby Hernandez MD;  Location: [facility] ENDO;  Service: Endoscopy;  Laterality: N/A;    CYSTOSCOPY WITH INSERTION OF MINIMALLY INVASIVE IMPLANT TO ENLARGE PROSTATIC URETHRA N/A 11/28/2022    Procedure: CYSTOSCOPY, WITH INSERTION OF UROLIFT IMPLANT;  Surgeon: Yakov Shetty MD;  Location: [facility] OR;  Service: Urology;  Laterality: N/A;    EYE SURGERY      GASTRIC BYPASS  1992    INTRAMEDULLARY RODDING OF FEMUR Left 7/18/2020    Procedure: INSERTION, INTRAMEDULLARY ERIC, FEMUR, left, Synthes, [place] table, Large C arm clock side,;  Surgeon: Tony Rodriguez MD;  Location: [facility] OR [address] [place] [address];  Service: Orthopedics;  Laterality: Left;    KNEE ARTHROSCOPY      RT    LASIK  2001    both eyes (Dr. Rabago)    ORIF HUMERUS FRACTURE      LT    ORIF HUMERUS FRACTURE Right     non surgical repair    RADIOFREQUENCY ABLATION      x2    Right ankle tendon repair      ROBOT-ASSISTED REPAIR OF INCISIONAL HERNIA USING DA GARETH XI Left 6/13/2022    Procedure: XI ROBOTIC REPAIR, HERNIA, INCISIONAL (LEFT SIDE SPIGELIAN WITH MESH);  Surgeon: Rosendo Marti MD;  [facility];  Service: General;  Laterality: Left;  consent in am    ROTATOR CUFF REPAIR      right    TOTAL KNEE ARTHROPLASTY Right 5/29/2018    Procedure: REPLACEMENT-KNEE-TOTAL-pro;  Surgeon: Denzel Dallas MD;  Location: [facility] OR [address] [place] [address];  Service: Orthopedics;  Laterality: Right;  Pro    TREATMENT OF CARDIAC ARRHYTHMIA  6/15/2020     Procedure: Cardioversion or Defibrillation;  Surgeon: Wyatt Beatty MD;  Location: [facility] EP LAB;  Service: Cardiology;;       Review of patient's allergies indicates:   Allergen Reactions    No known drug allergies        Social History     Socioeconomic History    Marital status:     Number of children: 5   Occupational History    Occupation: retired anesthesiology     Employer: [employer]    Occupation: [employer] grad     Comment: previous football player   Tobacco Use    Smoking status: Never     Passive exposure: Never    Smokeless tobacco: Never    Tobacco comments:     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    Substance and Sexual Activity    Alcohol use: No    Drug use: No    Sexual activity: Yes     Partners: Female       Current Outpatient Medications on File Prior to Visit   Medication Sig Dispense Refill    acyclovir (ZOVIRAX) 800 MG Tab Take 800 mg by mouth.      buPROPion (WELLBUTRIN XL) 150 MG TB24 tablet Take 1 tablet (150 mg total) by mouth once daily. 90 tablet 3    buPROPion (WELLBUTRIN XL) 300 MG 24 hr tablet Take 1 tablet (300 mg total) by mouth once daily. 90 tablet 3    calcium citrate (CALCITRATE) 200 mg (950 mg) tablet Take 1 tablet (200 mg total) by mouth 2 (two) times daily. 180 tablet 3    carvediloL (COREG) 12.5 MG tablet Take 1 tablet (12.5 mg total) by mouth 2 (two) times daily. 180 tablet 3    celecoxib (CELEBREX) 200 MG capsule Take 1 capsule (200 mg total) by mouth 2 (two) times daily. 60 capsule 2    cyclobenzaprine (FLEXERIL) 10 MG tablet Take 1 tablet (10 mg total) by mouth 3 (three) times daily as needed for Muscle spasms. 60 tablet 3    ELIQUIS 5 mg Tab Take 1 tablet (5 mg total) by mouth 2 (two) times a day. 180 tablet 2    EScitalopram oxalate (LEXAPRO) 10 MG tablet TAKE 1 TABLET(10 MG) BY MOUTH EVERY DAY 90 tablet 4    levothyroxine (SYNTHROID) 75 MCG tablet Take 1 tablet (75 mcg total) by mouth before breakfast. 90 tablet 2    methocarbamoL (ROBAXIN) 500 MG  Tab TAKE 1 TABLET(500 MG) BY MOUTH THREE TIMES DAILY FOR 10 DAYS 90 tablet 0    OMEGA-3 FATTY ACIDS (FISH OIL CONCENTRATE ORAL) Take by mouth Daily.      propafenone (RYTHMOL SR) 425 MG Cp12 TAKE 1 CAPSULE(425 MG) BY MOUTH EVERY 12 HOURS 180 capsule 2    telmisartan (MICARDIS) 40 MG Tab Take 1 tablet (40 mg total) by mouth once daily. 90 tablet 3    oxyCODONE-acetaminophen (PERCOCET) 5-325 mg per tablet Take 1 tablet by mouth every 12 (twelve) hours as needed for Pain. (Patient not taking: Reported on 5/17/2023) 14 tablet 0    telmisartan (MICARDIS) 40 MG Tab TAKE 1 TABLET(40 MG) BY MOUTH EVERY DAY (Patient not taking: Reported on 7/19/2023) 90 tablet 3     No current facility-administered medications on file prior to visit.       Family History   Problem Relation Age of Onset    COPD Father     Diabetes Father     Osteoporosis Father     Aortic stenosis Mother     Heart disease Mother         aortic stenosis    Osteoporosis Mother     Heart attack Brother     No Known Problems Son     No Known Problems Daughter     No Known Problems Daughter     No Known Problems Daughter        Review of Systems   Constitutional:  Negative for appetite change, chills, fever and unexpected weight change.   HENT:  Negative for sore throat and trouble swallowing.    Eyes:  Negative for pain and visual disturbance.   Respiratory:  Negative for cough, chest tightness, shortness of breath and wheezing.    Cardiovascular:  Negative for chest pain, palpitations and leg swelling.   Gastrointestinal:  Negative for abdominal pain, blood in stool, constipation, diarrhea and nausea.   Genitourinary:  Negative for difficulty urinating, dysuria and hematuria.   Musculoskeletal:  Negative for arthralgias, gait problem and neck pain.   Skin:  Negative for rash and wound.   Neurological:  Negative for dizziness, weakness, light-headedness and headaches.   Hematological:  Negative for adenopathy.   Psychiatric/Behavioral:  Negative for dysphoric  "mood.      Objective:      /64   Pulse 68   Temp 97.6 °F (36.4 °C) (Oral)   Resp 18   Ht 5' 11" (1.803 m)   Wt 88.4 kg (194 lb 14.2 oz)   SpO2 97%   BMI 27.18 kg/m²   Physical Exam  Constitutional:       General: He is not in acute distress.     Appearance: He is well-developed.   HENT:      Head: Normocephalic and atraumatic.      Right Ear: External ear normal.      Left Ear: External ear normal.      Mouth/Throat:      Pharynx: Uvula midline. No oropharyngeal exudate.   Eyes:      General: Lids are normal.      Conjunctiva/sclera: Conjunctivae normal.      Pupils: Pupils are equal, round, and reactive to light.   Neck:      Thyroid: No thyroid mass or thyromegaly.      Trachea: Phonation normal.   Cardiovascular:      Rate and Rhythm: Normal rate and regular rhythm.      Heart sounds: Normal heart sounds. No murmur heard.    No friction rub. No gallop.   Pulmonary:      Effort: Pulmonary effort is normal. No respiratory distress.      Breath sounds: Normal breath sounds. No wheezing or rales.   Musculoskeletal:         General: Normal range of motion.      Cervical back: Full passive range of motion without pain, normal range of motion and neck supple.   Lymphadenopathy:      Cervical: No cervical adenopathy.   Skin:     General: Skin is warm and dry.   Neurological:      Mental Status: He is alert and oriented to person, place, and time.      Cranial Nerves: No cranial nerve deficit.      Coordination: Coordination normal.   Psychiatric:         Speech: Speech normal.         Behavior: Behavior normal.         Thought Content: Thought content normal.         Judgment: Judgment normal.       Results for orders placed or performed in visit on 05/22/23   SARS Coronavirus 2 Antigen, POCT Manual Read   Result Value Ref Range    SARS Coronavirus 2 Antigen Negative Negative     Acceptable Yes      *Note: Due to a large number of results and/or encounters for the requested time period, some " results have not been displayed. A complete set of results can be found in Results Review.          Assessment:       1. Hypertension, unspecified type              Plan:       Hypertension, unspecified type              Continue Eliquis 5mg BID with ASA  Continue telmisartan and carvedilol   Continue other present meds  F/u with cardiology as planned  Counseled on regular exercise, maintenance of a healthy weight, balanced diet rich in fruits/vegetables and lean protein, and avoidance of unhealthy habits like smoking and excessive alcohol intake.    F/u 2 months with labs as planned

## 2023-08-09 ENCOUNTER — PATIENT MESSAGE (OUTPATIENT)
Dept: FAMILY MEDICINE | Facility: CLINIC | Age: 78
End: 2023-08-09

## 2023-08-09 DIAGNOSIS — Z01.00 ROUTINE EYE EXAM: Primary | ICD-10-CM

## 2023-08-10 ENCOUNTER — PATIENT MESSAGE (OUTPATIENT)
Dept: PAIN MEDICINE | Facility: CLINIC | Age: 78
End: 2023-08-10

## 2023-08-10 ENCOUNTER — TELEPHONE (OUTPATIENT)
Dept: PAIN MEDICINE | Facility: CLINIC | Age: 78
End: 2023-08-10

## 2023-08-10 NOTE — TELEPHONE ENCOUNTER
Left message for pt to log in to my chart to schedule an appointment or reach out to us to assist with scheduling the appointment

## 2023-08-16 ENCOUNTER — PATIENT MESSAGE (OUTPATIENT)
Dept: UROLOGY | Facility: CLINIC | Age: 78
End: 2023-08-16

## 2023-08-16 RX ORDER — CYCLOBENZAPRINE HCL 10 MG
10 TABLET ORAL 3 TIMES DAILY PRN
Qty: 60 TABLET | Refills: 3 | Status: SHIPPED | OUTPATIENT
Start: 2023-08-16 | End: 2023-11-14 | Stop reason: SDUPTHER

## 2023-08-16 NOTE — TELEPHONE ENCOUNTER
No care due was identified.  [facility] Embedded Care Due Messages. Reference number: [id number].   8/16/2023 7:40:27 AM CDT

## 2023-08-22 ENCOUNTER — PATIENT MESSAGE (OUTPATIENT)
Dept: FAMILY MEDICINE | Facility: CLINIC | Age: 78
End: 2023-08-22

## 2023-08-22 DIAGNOSIS — Z12.5 PROSTATE CANCER SCREENING: Primary | ICD-10-CM

## 2023-08-23 ENCOUNTER — OFFICE VISIT (OUTPATIENT)
Dept: PAIN MEDICINE | Facility: CLINIC | Age: 78
End: 2023-08-23
Payer: MEDICARE

## 2023-08-23 VITALS
HEIGHT: 71 IN | WEIGHT: 193.44 LBS | BODY MASS INDEX: 27.08 KG/M2 | HEART RATE: 64 BPM | DIASTOLIC BLOOD PRESSURE: 71 MMHG | SYSTOLIC BLOOD PRESSURE: 141 MMHG

## 2023-08-23 DIAGNOSIS — M79.18 MYOFASCIAL PAIN: Primary | ICD-10-CM

## 2023-08-23 DIAGNOSIS — M54.9 DORSALGIA: ICD-10-CM

## 2023-08-23 PROCEDURE — 1125F AMNT PAIN NOTED PAIN PRSNT: CPT | Mod: HCNC,CPTII,S$GLB,

## 2023-08-23 PROCEDURE — 1159F PR MEDICATION LIST DOCUMENTED IN MEDICAL RECORD: ICD-10-PCS | Mod: HCNC,CPTII,S$GLB,

## 2023-08-23 PROCEDURE — 3077F SYST BP >= 140 MM HG: CPT | Mod: HCNC,CPTII,S$GLB,

## 2023-08-23 PROCEDURE — 1125F PR PAIN SEVERITY QUANTIFIED, PAIN PRESENT: ICD-10-PCS | Mod: HCNC,CPTII,S$GLB,

## 2023-08-23 PROCEDURE — 3288F PR FALLS RISK ASSESSMENT DOCUMENTED: ICD-10-PCS | Mod: HCNC,CPTII,S$GLB,

## 2023-08-23 PROCEDURE — 3078F PR MOST RECENT DIASTOLIC BLOOD PRESSURE < 80 MM HG: ICD-10-PCS | Mod: HCNC,CPTII,S$GLB,

## 2023-08-23 PROCEDURE — 1157F ADVNC CARE PLAN IN RCRD: CPT | Mod: HCNC,CPTII,S$GLB,

## 2023-08-23 PROCEDURE — 1101F PR PT FALLS ASSESS DOC 0-1 FALLS W/OUT INJ PAST YR: ICD-10-PCS | Mod: HCNC,CPTII,S$GLB,

## 2023-08-23 PROCEDURE — 99213 OFFICE O/P EST LOW 20 MIN: CPT | Mod: HCNC,S$GLB,,

## 2023-08-23 PROCEDURE — 1101F PT FALLS ASSESS-DOCD LE1/YR: CPT | Mod: HCNC,CPTII,S$GLB,

## 2023-08-23 PROCEDURE — 1157F PR ADVANCE CARE PLAN OR EQUIV PRESENT IN MEDICAL RECORD: ICD-10-PCS | Mod: HCNC,CPTII,S$GLB,

## 2023-08-23 PROCEDURE — 3077F PR MOST RECENT SYSTOLIC BLOOD PRESSURE >= 140 MM HG: ICD-10-PCS | Mod: HCNC,CPTII,S$GLB,

## 2023-08-23 PROCEDURE — 3078F DIAST BP <80 MM HG: CPT | Mod: HCNC,CPTII,S$GLB,

## 2023-08-23 PROCEDURE — 3288F FALL RISK ASSESSMENT DOCD: CPT | Mod: HCNC,CPTII,S$GLB,

## 2023-08-23 PROCEDURE — 1159F MED LIST DOCD IN RCRD: CPT | Mod: HCNC,CPTII,S$GLB,

## 2023-08-23 PROCEDURE — 99999 PR PBB SHADOW E&M-EST. PATIENT-LVL IV: CPT | Mod: PBBFAC,HCNC,,

## 2023-08-23 PROCEDURE — 99999 PR PBB SHADOW E&M-EST. PATIENT-LVL IV: ICD-10-PCS | Mod: PBBFAC,HCNC,,

## 2023-08-23 PROCEDURE — 99213 PR OFFICE/OUTPT VISIT, EST, LEVL III, 20-29 MIN: ICD-10-PCS | Mod: HCNC,S$GLB,,

## 2023-08-23 RX ORDER — OXYCODONE AND ACETAMINOPHEN 5; 325 MG/1; MG/1
1 TABLET ORAL EVERY 12 HOURS PRN
Qty: 14 TABLET | Refills: 0 | Status: SHIPPED | OUTPATIENT
Start: 2023-08-23 | End: 2023-10-10

## 2023-08-23 NOTE — PROGRESS NOTES
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  Today he is reporting some worsening left-sided midback pain with some pain radiating into left buttock.  He states this left-sided midback pain is similar to the right-sided back pain he had previously.  Previous right-sided pain resolved with trigger point injection and dry needling.  He denies any radicular symptoms, numbness, weakness or any new changes to his bowel or bladder function.  He has been using heating pad, Advil, Toradol, Percocet, Robaxin and Flexeril without significant relief.  Pain started after moving his daughter into college and long car ride.      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  He is had back pain for the past month.  He does not know any specific inciting event.  Today he rates his pain as 6/10, intermittent, aching, sharp.  It is in the right paraspinal area around the lumbar region.  His pain is worse with bending and walking and relieved with rest, lying down, medications.  He denies any numbness or weakness.    History:    Current Outpatient Medications:     acyclovir (ZOVIRAX) 800 MG Tab, Take 800 mg by mouth., Disp: , Rfl:     buPROPion (WELLBUTRIN XL) 150 MG TB24 tablet, Take 1 tablet (150 mg total) by mouth once daily., Disp: 90 tablet, Rfl: 3    buPROPion (WELLBUTRIN XL) 300 MG 24 hr tablet, Take 1 tablet (300 mg total) by mouth once daily., Disp: 90 tablet, Rfl: 3    calcium citrate (CALCITRATE) 200 mg (950 mg) tablet, Take 1 tablet (200 mg total) by mouth 2 (two) times daily., Disp: 180 tablet, Rfl: 3    carvediloL (COREG) 12.5 MG tablet, Take 1 tablet (12.5  mg total) by mouth 2 (two) times daily., Disp: 180 tablet, Rfl: 3    celecoxib (CELEBREX) 200 MG capsule, Take 1 capsule (200 mg total) by mouth 2 (two) times daily., Disp: 60 capsule, Rfl: 2    cyclobenzaprine (FLEXERIL) 10 MG tablet, Take 1 tablet (10 mg total) by mouth 3 (three) times daily as needed for Muscle spasms., Disp: 60 tablet, Rfl: 3    ELIQUIS 5 mg Tab, Take 1 tablet (5 mg total) by mouth 2 (two) times a day., Disp: 180 tablet, Rfl: 2    EScitalopram oxalate (LEXAPRO) 10 MG tablet, TAKE 1 TABLET(10 MG) BY MOUTH EVERY DAY, Disp: 90 tablet, Rfl: 4    levothyroxine (SYNTHROID) 75 MCG tablet, Take 1 tablet (75 mcg total) by mouth before breakfast., Disp: 90 tablet, Rfl: 2    methocarbamoL (ROBAXIN) 500 MG Tab, TAKE 1 TABLET(500 MG) BY MOUTH THREE TIMES DAILY FOR 10 DAYS, Disp: 90 tablet, Rfl: 0    OMEGA-3 FATTY ACIDS (FISH OIL CONCENTRATE ORAL), Take by mouth Daily., Disp: , Rfl:     oxyCODONE-acetaminophen (PERCOCET) 5-325 mg per tablet, Take 1 tablet by mouth every 12 (twelve) hours as needed for Pain. (Patient not taking: Reported on 5/17/2023), Disp: 14 tablet, Rfl: 0    propafenone (RYTHMOL SR) 425 MG Cp12, TAKE 1 CAPSULE(425 MG) BY MOUTH EVERY 12 HOURS, Disp: 180 capsule, Rfl: 2    telmisartan (MICARDIS) 40 MG Tab, Take 1 tablet (40 mg total) by mouth once daily., Disp: 90 tablet, Rfl: 3    telmisartan (MICARDIS) 40 MG Tab, TAKE 1 TABLET(40 MG) BY MOUTH EVERY DAY (Patient not taking: Reported on 7/19/2023), Disp: 90 tablet, Rfl: 3    Past Medical History:   Diagnosis Date    Anticoagulant long-term use     Anxiety     Arthritis     Atrial fibrillation     BPH (benign prostatic hyperplasia)     Cataract     CKD (chronic kidney disease) stage 3, GFR 30-59 ml/min 7/10/2017    Followed by Dr. Jeevan Pond    Colon polyp     benign    Depression     Elevated PSA     Erectile dysfunction     Gastric ulcer with hemorrhage     Hep B w/o coma 1977    History of bleeding peptic ulcer     History of prostatitis      Hypertension     PAF (paroxysmal atrial fibrillation)     Sleep apnea     on CPAP    Stroke     TIA December 2019    Thyroid disease        Past Surgical History:   Procedure Laterality Date    ABDOMINAL HERNIA REPAIR      ABLATION OF ARRHYTHMOGENIC FOCUS FOR ATRIAL FIBRILLATION N/A 6/15/2020    Procedure: Ablation atrial fibrillation;  Surgeon: Wyatt Beatty MD;  Location: [facility] EP LAB;  Service: Cardiology;  Laterality: N/A;  PAF, AFl, PEPE, PVI re-do, CTI, RFA, JUSTIN, Gen, SK, 3 Prep    CATARACT EXTRACTION  11/25/2013    bilateral    CHOLECYSTECTOMY      COLONOSCOPY N/A 11/25/2015    Procedure: COLONOSCOPY;  Surgeon: Toby Hernandez MD;  Location: [facility] ENDO;  Service: Endoscopy;  Laterality: N/A;    COLONOSCOPY N/A 11/13/2020    Procedure: COLONOSCOPY;  Surgeon: Toby Hernandez MD;  Location: [facility] ENDO;  Service: Endoscopy;  Laterality: N/A;    CYSTOSCOPY WITH INSERTION OF MINIMALLY INVASIVE IMPLANT TO ENLARGE PROSTATIC URETHRA N/A 11/28/2022    Procedure: CYSTOSCOPY, WITH INSERTION OF UROLIFT IMPLANT;  Surgeon: Yakov Shetty MD;  Location: [facility] OR;  Service: Urology;  Laterality: N/A;    EYE SURGERY      GASTRIC BYPASS  1992    INTRAMEDULLARY RODDING OF FEMUR Left 7/18/2020    Procedure: INSERTION, INTRAMEDULLARY ERIC, FEMUR, left, Synthes, [place] table, Large C arm clock side,;  Surgeon: Tony Rodriguez MD;  [address];  Service: Orthopedics;  Laterality: Left;    KNEE ARTHROSCOPY      RT    LASIK  2001    both eyes (Dr. Rabago)    ORIF HUMERUS FRACTURE      LT    ORIF HUMERUS FRACTURE Right     non surgical repair    RADIOFREQUENCY ABLATION      x2    Right ankle tendon repair      ROBOT-ASSISTED REPAIR OF INCISIONAL HERNIA USING DA GARETH XI Left 6/13/2022    Procedure: XI ROBOTIC REPAIR, HERNIA, INCISIONAL (LEFT SIDE SPIGELIAN WITH MESH);  Surgeon: Rosendo Marti MD;  Location: [facility] OR 2ND FLR;  Service: General;  Laterality: Left;  consent in am    ROTATOR CUFF REPAIR      right     TOTAL KNEE ARTHROPLASTY Right 5/29/2018    Procedure: REPLACEMENT-KNEE-TOTAL-axel;  Surgeon: Denzel Dallas MD;  Location: [facility] OR [address] [place] [address];  Service: Orthopedics;  Laterality: Right;  [place]    TREATMENT OF CARDIAC ARRHYTHMIA  6/15/2020    Procedure: Cardioversion or Defibrillation;  Surgeon: Wyatt Beatty MD;  Location: [facility] EP LAB;  Service: Cardiology;;       Family History   Problem Relation Age of Onset    COPD Father     Diabetes Father     Osteoporosis Father     Aortic stenosis Mother     Heart disease Mother         aortic stenosis    Osteoporosis Mother     Heart attack Brother     No Known Problems Son     No Known Problems Daughter     No Known Problems Daughter     No Known Problems Daughter        Social History     Socioeconomic History    Marital status:     Number of children: 5   Occupational History    Occupation: retired anesthesiology     Employer: [employer]    Occupation: [employer] grad     Comment: previous football player   Tobacco Use    Smoking status: Never     Passive exposure: Never    Smokeless tobacco: Never    Tobacco comments:     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    Substance and Sexual Activity    Alcohol use: No    Drug use: No    Sexual activity: Yes     Partners: Female       Review of patient's allergies indicates:   Allergen Reactions    No known drug allergies        Review of Systems:  General ROS: negative for - fever  Psychological ROS: negative for - hostility  Hematological and Lymphatic ROS: negative for - bleeding problems  Endocrine ROS: negative for - unexpected weight changes  Respiratory ROS: no cough, shortness of breath, or wheezing  Cardiovascular ROS: no chest pain or dyspnea on exertion  Gastrointestinal ROS: no abdominal pain, change in bowel habits, or black or bloody stools  Musculoskeletal ROS: negative for - muscular weakness  Neurological ROS: negative for - numbness/tingling  Dermatological ROS: negative for  "rash    Physical Exam:  Vitals:    08/23/23 1129   BP: (!) 141/71   Pulse: 64   Weight: 87.8 kg (193 lb 7.3 oz)   Height: 5' 11" (1.803 m)   PainSc:   7   PainLoc: Buttocks         Body mass index is 26.98 kg/m².    Gen: NAD  Gait: gait intact  Psych:  Mood appropriate for given condition  HEENT: eyes anicteric   GI: Abd soft  CV: RRR  Lungs: breathing unlabored   ROM: limited AROM of the L spine in all planes, full ROM at ankles, knees and hips  Sensation: intact to light touch in all dermatomes tested from L2-S1 bilaterally  Reflexes: 2+ b/l patella and 0 b/l achilles  Palpation:  -TTP over midline thoracic or lumbar spine severe tenderness to palpation of left-sided lumbar paraspinal muscles, no tenderness over right side.  Tone: normal in the b/l knees and hips   Skin: intact  Extremities: No edema in b/l ankles or hands  Provacative tests:        Right Left   L2/3 Iliacus Hip flexion  5  5   L3/4 Qudratus Femoris Knee Extension  5  5   L4/5 Tib Anterior Ankle Dorsiflexion   5  5   L5/S1 Extensor Hallicus Longus Great toe extension  5  5                 S1/S2 Gastroc/Soleus Plantar Flexion  5  5       Imaging:  MRI lumbar spine 8/17/22  FINDINGS:  Alignment: Mild dextroconvex curvature of the lumbar spine.  Minimal retrolisthesis of L2 on L3 and L5 on S1.  Grade 1 anterolisthesis of L4 on L5.     Vertebral column: Vertebral body heights are maintained.  No evidence of an acute fracture or aggressive marrow replacement process. Stable probable hemangioma within the T12 vertebral body.  Moderate multilevel disc degeneration with intervertebral disc space narrowing, disc desiccation, and degenerative endplate change throughout the thoracolumbar spine, most pronounced at L2-3 and L5-S1 with severe disc space narrowing.     Cord: Normal.  Conus terminates at L1.     Degenerative findings:     T11-12: Moderate disc space narrowing.  Right asymmetric diffuse disc bulge with osteophytic ridging.  No neural foraminal or " spinal canal stenosis.  T12-L1: Moderate disc space narrowing.  Mild right asymmetric diffuse disc bulge with osteophytic ridging.  No neural foraminal or spinal canal stenosis.  L1-L2: Moderate disc space narrowing.  Mild right asymmetric diffuse disc bulge with osteophytic ridging.  Mild bilateral facet arthropathy.  There is no neural foraminal stenosis.  There is no spinal canal stenosis.  L2-L3: Retrolisthesis.  Severe left asymmetric disc space narrowing.  Diffuse disc bulge with osteophytic ridging.  Mild bilateral facet arthropathy.  Moderate left and mild right neural foraminal stenosis.  Mild spinal canal stenosis.  L3-L4: Mild disc space narrowing.  Mild right asymmetric diffuse disc bulge.  Mild bilateral facet arthropathy.  Ligamentum flavum thickening.  Mild right neural foraminal stenosis.  Mild spinal canal stenosis.  L4-L5: Anterolisthesis with uncovering the disc.  Mild disc space narrowing.  Diffuse disc bulge with osteophytic ridging.  Moderate bilateral facet arthropathy and ligamentum flavum thickening.  Moderate right and mild left neural foraminal stenosis.  Moderate spinal canal stenosis.  L5-S1: Retrolisthesis.  Right asymmetric severe disc space narrowing.  Right asymmetric disc bulge with osteophytic ridging.  Moderate bilateral facet arthropathy.  Ligamentum flavum thickening.  Moderate right and mild left neural foraminal stenosis.  No spinal canal stenosis.     Paraspinal muscles & soft tissues: Mild atrophy of the dorsal paraspinal musculature.  Exophytic left renal cyst.    Xray ribs 8/30/22  Impression:  Nondisplaced fractures of the right 5th, 6th, and 7th ribs with no pneumothorax    Xray thoracic spine 8/4/22  FINDINGS:  There is minimal midthoracic scoliosis convex to the right.  Alignment is otherwise normal.  There are mild wedging deformities of the T8 and T12 vertebral bodies.  The costovertebral junctions are unremarkable.    Xray right rubs 3/9/23  FINDINGS:  Osseous  structures demonstrate diffuse demineralization.  Chronic healed fracture deformity of the right humeral neck.  Chronic healed right lateral 5th, 7th and 10th rib fracture deformities.  No definite acute displaced rib fracture.  No pneumothorax.  Surgical clips project over the right upper quadrant.    Xray right thoracolumbar spine 3/9/23  FINDINGS:  The bones do appear osteopenic.  There is no acute fracture.  There is stable minimal chronic anterior wedging L1, T12, T11 and T10.  These changes were present previously.  There is stable moderate to marked disc space narrowing with mild retrolisthesis of L2 on L3.  There is facet joint arthropathy most apparent at the lower 2 lumbar levels.  There are surgical clips from prior cholecystectomy.  There is a marked amount of stool in the colon.    Labs:  BMP  Lab Results   Component Value Date     03/01/2023    K 4.5 03/01/2023     (H) 03/01/2023    CO2 24 03/01/2023    BUN 27 (H) 03/01/2023    CREATININE 1.5 (H) 03/01/2023    CALCIUM 8.5 (L) 03/01/2023    ANIONGAP 4 (L) 03/01/2023    ESTGFRAFRICA 51.9 (A) 03/02/2022    ESTGFRAFRICA 51.9 (A) 03/02/2022    EGFRNONAA 44.9 (A) 03/02/2022    EGFRNONAA 44.9 (A) 03/02/2022     Lab Results   Component Value Date    ALT 17 03/01/2023    AST 17 03/01/2023    ALKPHOS 76 03/01/2023    BILITOT 0.8 03/01/2023       Assessment:   Problem List Items Addressed This Visit    None  Visit Diagnoses       Myofascial pain    -  Primary    Relevant Orders    Ambulatory referral/consult to Physical/Occupational Therapy    Ambulatory referral/consult to Physical/Occupational Therapy    Dorsalgia                    77 y.o. year old male with PMH MRI atrial fibrillation, HTN, CKD who presents with back pain.      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  Today he is reporting some worsening left-sided midback pain with some pain radiating into left buttock.  He states this left-sided midback pain is  similar to the right-sided back pain he had previously.  Previous right-sided pain resolved with trigger point injection and dry needling.  He denies any radicular symptoms, numbness, weakness or any new changes to his bowel or bladder function.  He has been using heating pad, Advil, Toradol, Percocet, Robaxin and Flexeril without significant relief.  Pain started after moving his daughter into college and long car ride.      - on exam he has severe tenderness to palpation over left-sided paraspinal muscles.   - I believe he is likely having some worsening myofascial pain.  He feels like this pain is similar to previous right-sided pain.  - for this continued pain I placed orders for him to restart dry needling here at [facility].  I have also placed orders for him to start land-based PT at [facility].  - he is requesting some Percocet for severe pain.  - short course of Percocet sent to Dr. Perez today for approval.  - follow up as needed, if his pain persists can consider repeat trigger point injection but on the left side.      : Reviewed       This note was completed with dictation software and grammatical errors may exist.

## 2023-08-30 ENCOUNTER — LAB VISIT (OUTPATIENT)
Dept: LAB | Facility: HOSPITAL | Age: 78
End: 2023-08-30
Attending: FAMILY MEDICINE
Payer: MEDICARE

## 2023-08-30 ENCOUNTER — OFFICE VISIT (OUTPATIENT)
Dept: UROLOGY | Facility: CLINIC | Age: 78
End: 2023-08-30
Payer: MEDICARE

## 2023-08-30 VITALS — HEIGHT: 71 IN | WEIGHT: 204.38 LBS | BODY MASS INDEX: 28.61 KG/M2

## 2023-08-30 DIAGNOSIS — E53.8 B12 DEFICIENCY: ICD-10-CM

## 2023-08-30 DIAGNOSIS — E55.9 VITAMIN D DEFICIENCY: ICD-10-CM

## 2023-08-30 DIAGNOSIS — N40.1 BPH WITH URINARY OBSTRUCTION: Primary | ICD-10-CM

## 2023-08-30 DIAGNOSIS — E03.4 HYPOTHYROIDISM DUE TO ACQUIRED ATROPHY OF THYROID: ICD-10-CM

## 2023-08-30 DIAGNOSIS — N13.8 BPH WITH URINARY OBSTRUCTION: Primary | ICD-10-CM

## 2023-08-30 DIAGNOSIS — I10 HYPERTENSION, UNSPECIFIED TYPE: ICD-10-CM

## 2023-08-30 LAB
25(OH)D3+25(OH)D2 SERPL-MCNC: 27 NG/ML (ref 30–96)
ALBUMIN SERPL BCP-MCNC: 3.2 G/DL (ref 3.5–5.2)
ALP SERPL-CCNC: 53 U/L (ref 55–135)
ALT SERPL W/O P-5'-P-CCNC: 20 U/L (ref 10–44)
ANION GAP SERPL CALC-SCNC: 4 MMOL/L (ref 8–16)
AST SERPL-CCNC: 23 U/L (ref 10–40)
BASOPHILS # BLD AUTO: 0.09 K/UL (ref 0–0.2)
BASOPHILS NFR BLD: 1.4 % (ref 0–1.9)
BILIRUB SERPL-MCNC: 0.5 MG/DL (ref 0.1–1)
BILIRUBIN, UA POC OHS: NEGATIVE
BLOOD, UA POC OHS: NEGATIVE
BUN SERPL-MCNC: 20 MG/DL (ref 8–23)
CALCIUM SERPL-MCNC: 8.2 MG/DL (ref 8.7–10.5)
CHLORIDE SERPL-SCNC: 116 MMOL/L (ref 95–110)
CHOLEST SERPL-MCNC: 115 MG/DL (ref 120–199)
CHOLEST/HDLC SERPL: 2.3 {RATIO} (ref 2–5)
CLARITY, UA POC OHS: CLEAR
CO2 SERPL-SCNC: 21 MMOL/L (ref 23–29)
COLOR, UA POC OHS: YELLOW
CREAT SERPL-MCNC: 1.5 MG/DL (ref 0.5–1.4)
DIFFERENTIAL METHOD: ABNORMAL
EOSINOPHIL # BLD AUTO: 1 K/UL (ref 0–0.5)
EOSINOPHIL NFR BLD: 15.2 % (ref 0–8)
ERYTHROCYTE [DISTWIDTH] IN BLOOD BY AUTOMATED COUNT: 15.5 % (ref 11.5–14.5)
EST. GFR  (NO RACE VARIABLE): 48 ML/MIN/1.73 M^2
GLUCOSE SERPL-MCNC: 94 MG/DL (ref 70–110)
GLUCOSE, UA POC OHS: NEGATIVE
HCT VFR BLD AUTO: 35.7 % (ref 40–54)
HDLC SERPL-MCNC: 49 MG/DL (ref 40–75)
HDLC SERPL: 42.6 % (ref 20–50)
HGB BLD-MCNC: 11.3 G/DL (ref 14–18)
IMM GRANULOCYTES # BLD AUTO: 0.01 K/UL (ref 0–0.04)
IMM GRANULOCYTES NFR BLD AUTO: 0.2 % (ref 0–0.5)
KETONES, UA POC OHS: NEGATIVE
LDLC SERPL CALC-MCNC: 56.8 MG/DL (ref 63–159)
LEUKOCYTES, UA POC OHS: NEGATIVE
LYMPHOCYTES # BLD AUTO: 1.9 K/UL (ref 1–4.8)
LYMPHOCYTES NFR BLD: 28.9 % (ref 18–48)
MCH RBC QN AUTO: 30.1 PG (ref 27–31)
MCHC RBC AUTO-ENTMCNC: 31.7 G/DL (ref 32–36)
MCV RBC AUTO: 95 FL (ref 82–98)
MONOCYTES # BLD AUTO: 0.5 K/UL (ref 0.3–1)
MONOCYTES NFR BLD: 6.8 % (ref 4–15)
NEUTROPHILS # BLD AUTO: 3.2 K/UL (ref 1.8–7.7)
NEUTROPHILS NFR BLD: 47.5 % (ref 38–73)
NITRITE, UA POC OHS: NEGATIVE
NONHDLC SERPL-MCNC: 66 MG/DL
NRBC BLD-RTO: 0 /100 WBC
PH, UA POC OHS: 5.5
PLATELET # BLD AUTO: 229 K/UL (ref 150–450)
PMV BLD AUTO: 12.7 FL (ref 9.2–12.9)
POTASSIUM SERPL-SCNC: 5.3 MMOL/L (ref 3.5–5.1)
PROT SERPL-MCNC: 5.6 G/DL (ref 6–8.4)
PROTEIN, UA POC OHS: ABNORMAL
RBC # BLD AUTO: 3.76 M/UL (ref 4.6–6.2)
SODIUM SERPL-SCNC: 141 MMOL/L (ref 136–145)
SPECIFIC GRAVITY, UA POC OHS: >=1.03
T4 FREE SERPL-MCNC: 0.81 NG/DL (ref 0.71–1.51)
TRIGL SERPL-MCNC: 46 MG/DL (ref 30–150)
TSH SERPL DL<=0.005 MIU/L-ACNC: 6.57 UIU/ML (ref 0.4–4)
UROBILINOGEN, UA POC OHS: 0.2
VIT B12 SERPL-MCNC: 359 PG/ML (ref 210–950)
WBC # BLD AUTO: 6.64 K/UL (ref 3.9–12.7)

## 2023-08-30 PROCEDURE — 81003 URINALYSIS AUTO W/O SCOPE: CPT | Mod: QW,HCNC,NTX,S$GLB | Performed by: STUDENT IN AN ORGANIZED HEALTH CARE EDUCATION/TRAINING PROGRAM

## 2023-08-30 PROCEDURE — 1157F ADVNC CARE PLAN IN RCRD: CPT | Mod: HCNC,CPTII,NTX,S$GLB | Performed by: STUDENT IN AN ORGANIZED HEALTH CARE EDUCATION/TRAINING PROGRAM

## 2023-08-30 PROCEDURE — 1125F PR PAIN SEVERITY QUANTIFIED, PAIN PRESENT: ICD-10-PCS | Mod: HCNC,CPTII,NTX,S$GLB | Performed by: STUDENT IN AN ORGANIZED HEALTH CARE EDUCATION/TRAINING PROGRAM

## 2023-08-30 PROCEDURE — 84443 ASSAY THYROID STIM HORMONE: CPT | Mod: HCNC,TXP | Performed by: FAMILY MEDICINE

## 2023-08-30 PROCEDURE — 85025 COMPLETE CBC W/AUTO DIFF WBC: CPT | Mod: HCNC,TXP | Performed by: FAMILY MEDICINE

## 2023-08-30 PROCEDURE — 82607 VITAMIN B-12: CPT | Mod: HCNC,NTX | Performed by: FAMILY MEDICINE

## 2023-08-30 PROCEDURE — 36415 COLL VENOUS BLD VENIPUNCTURE: CPT | Mod: HCNC,PO,TXP | Performed by: FAMILY MEDICINE

## 2023-08-30 PROCEDURE — 84439 ASSAY OF FREE THYROXINE: CPT | Mod: HCNC,NTX | Performed by: FAMILY MEDICINE

## 2023-08-30 PROCEDURE — 99214 PR OFFICE/OUTPT VISIT, EST, LEVL IV, 30-39 MIN: ICD-10-PCS | Mod: HCNC,NTX,S$GLB, | Performed by: STUDENT IN AN ORGANIZED HEALTH CARE EDUCATION/TRAINING PROGRAM

## 2023-08-30 PROCEDURE — 1159F MED LIST DOCD IN RCRD: CPT | Mod: HCNC,CPTII,NTX,S$GLB | Performed by: STUDENT IN AN ORGANIZED HEALTH CARE EDUCATION/TRAINING PROGRAM

## 2023-08-30 PROCEDURE — 3288F PR FALLS RISK ASSESSMENT DOCUMENTED: ICD-10-PCS | Mod: HCNC,CPTII,NTX,S$GLB | Performed by: STUDENT IN AN ORGANIZED HEALTH CARE EDUCATION/TRAINING PROGRAM

## 2023-08-30 PROCEDURE — 1159F PR MEDICATION LIST DOCUMENTED IN MEDICAL RECORD: ICD-10-PCS | Mod: HCNC,CPTII,NTX,S$GLB | Performed by: STUDENT IN AN ORGANIZED HEALTH CARE EDUCATION/TRAINING PROGRAM

## 2023-08-30 PROCEDURE — 1101F PT FALLS ASSESS-DOCD LE1/YR: CPT | Mod: HCNC,CPTII,NTX,S$GLB | Performed by: STUDENT IN AN ORGANIZED HEALTH CARE EDUCATION/TRAINING PROGRAM

## 2023-08-30 PROCEDURE — 1125F AMNT PAIN NOTED PAIN PRSNT: CPT | Mod: HCNC,CPTII,NTX,S$GLB | Performed by: STUDENT IN AN ORGANIZED HEALTH CARE EDUCATION/TRAINING PROGRAM

## 2023-08-30 PROCEDURE — 81003 POCT URINALYSIS(INSTRUMENT): ICD-10-PCS | Mod: QW,HCNC,NTX,S$GLB | Performed by: STUDENT IN AN ORGANIZED HEALTH CARE EDUCATION/TRAINING PROGRAM

## 2023-08-30 PROCEDURE — 80061 LIPID PANEL: CPT | Mod: HCNC,TXP | Performed by: FAMILY MEDICINE

## 2023-08-30 PROCEDURE — 99214 OFFICE O/P EST MOD 30 MIN: CPT | Mod: HCNC,NTX,S$GLB, | Performed by: STUDENT IN AN ORGANIZED HEALTH CARE EDUCATION/TRAINING PROGRAM

## 2023-08-30 PROCEDURE — 1101F PR PT FALLS ASSESS DOC 0-1 FALLS W/OUT INJ PAST YR: ICD-10-PCS | Mod: HCNC,CPTII,NTX,S$GLB | Performed by: STUDENT IN AN ORGANIZED HEALTH CARE EDUCATION/TRAINING PROGRAM

## 2023-08-30 PROCEDURE — 1157F PR ADVANCE CARE PLAN OR EQUIV PRESENT IN MEDICAL RECORD: ICD-10-PCS | Mod: HCNC,CPTII,NTX,S$GLB | Performed by: STUDENT IN AN ORGANIZED HEALTH CARE EDUCATION/TRAINING PROGRAM

## 2023-08-30 PROCEDURE — 1160F PR REVIEW ALL MEDS BY PRESCRIBER/CLIN PHARMACIST DOCUMENTED: ICD-10-PCS | Mod: HCNC,CPTII,NTX,S$GLB | Performed by: STUDENT IN AN ORGANIZED HEALTH CARE EDUCATION/TRAINING PROGRAM

## 2023-08-30 PROCEDURE — 3288F FALL RISK ASSESSMENT DOCD: CPT | Mod: HCNC,CPTII,NTX,S$GLB | Performed by: STUDENT IN AN ORGANIZED HEALTH CARE EDUCATION/TRAINING PROGRAM

## 2023-08-30 PROCEDURE — 99999 PR PBB SHADOW E&M-EST. PATIENT-LVL III: ICD-10-PCS | Mod: PBBFAC,HCNC,TXP, | Performed by: STUDENT IN AN ORGANIZED HEALTH CARE EDUCATION/TRAINING PROGRAM

## 2023-08-30 PROCEDURE — 80053 COMPREHEN METABOLIC PANEL: CPT | Mod: HCNC,TXP | Performed by: FAMILY MEDICINE

## 2023-08-30 PROCEDURE — 99999 PR PBB SHADOW E&M-EST. PATIENT-LVL III: CPT | Mod: PBBFAC,HCNC,TXP, | Performed by: STUDENT IN AN ORGANIZED HEALTH CARE EDUCATION/TRAINING PROGRAM

## 2023-08-30 PROCEDURE — 1160F RVW MEDS BY RX/DR IN RCRD: CPT | Mod: HCNC,CPTII,NTX,S$GLB | Performed by: STUDENT IN AN ORGANIZED HEALTH CARE EDUCATION/TRAINING PROGRAM

## 2023-08-30 PROCEDURE — 82306 VITAMIN D 25 HYDROXY: CPT | Mod: HCNC,TXP | Performed by: FAMILY MEDICINE

## 2023-08-30 RX ORDER — ALFUZOSIN HYDROCHLORIDE 10 MG/1
10 TABLET, EXTENDED RELEASE ORAL
Qty: 30 TABLET | Refills: 11 | Status: SHIPPED | OUTPATIENT
Start: 2023-08-30 | End: 2024-08-29

## 2023-08-30 NOTE — PROGRESS NOTES
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 He is established to our clinic and was last seen by Dr. Shelton for elevated PSA.    He has had LUTS for years and recently underwent Urolift. Pre-op IPSS was 19/5. He has stopped Uroxatral and dutasteride. He has had some change in symptoms. He reports some post-void dribbling and nocturia has increased. IPSS was 7/1 post-op. 11/2 today.   He drinks lemonade and coke up until 8 o'clock. Nocturia 3-4x.     He has had an elevated PSA for many years. He has had multiple prostate biopsies in 2004, 2010 with Dr. Shelton, and all have been negative.  Most recent PSA was 3.9 and is 7.8 corrected for dutasteride. He recently underwent an MRI of the prostate which showed only a PI-RADS 2 lesion.  Volume was 68 cc.     He reports ED and tried and failed viagra, cialis, and ICI. Previously discussed a prosthesis with Dr. Gaston, and he is not interested in this.      Past medical, family, surgical and social history reviewed as documented in chart with pertinent positive medical, family, surgical and social history detailed in HPI.    A review systems was conducted with pertinent positive and negative findings documented in HPI.    Anticoagulation/Antiplatelets:  Yes eliquis 5mg BID for A-fib    OBJECTIVE:     Estimated body mass index is 28.5 kg/m² as calculated from the following:    Height as of this encounter: 5' 11" (1.803 m).    Weight as of this encounter: 92.7 kg (204 lb 5.9 oz).    Vital Signs (Most Recent)       Physical Exam  Vitals and nursing note reviewed.   Constitutional:       General: He is not in acute distress.     Appearance: Normal appearance. He is well-developed. He is not ill-appearing or toxic-appearing.   Pulmonary:      Effort: Pulmonary effort is normal. No accessory muscle usage or respiratory " distress.   Neurological:      General: No focal deficit present.      Mental Status: He is alert and oriented to person, place, and time. Mental status is at baseline.   Psychiatric:         Mood and Affect: Mood normal.         Behavior: Behavior is cooperative.         Thought Content: Thought content normal.         Judgment: Judgment normal.         Lab Results   Component Value Date    BUN 27 (H) 03/01/2023    CREATININE 1.5 (H) 03/01/2023    WBC 6.64 08/30/2023    HGB 11.3 (L) 08/30/2023    HCT 35.7 (L) 08/30/2023     08/30/2023    AST 17 03/01/2023    ALT 17 03/01/2023    ALKPHOS 76 03/01/2023    ALBUMIN 3.3 (L) 03/01/2023    HGBA1C 4.9 08/31/2022        Lab Results   Component Value Date    PSA 2.62 03/21/2013    PSA 4.41 (H) 02/06/2012    PSA 4.82 (H) 10/31/2011    PSA 4.0 08/31/2011    PSA 4.5 (H) 04/20/2011    PSA 5.8 (H) 06/08/2010    PSA 3.9 12/15/2009    PSA 4.6 (H) 06/08/2009    PSA 3.8 05/28/2008    PSA 4.7 (H) 09/04/2007    PSADIAG 3.9 01/12/2022    PSADIAG 1.5 01/26/2021    PSADIAG 3.6 10/30/2019    PSADIAG 2.9 10/08/2018    PSADIAG 2.4 10/11/2017    PSADIAG 1.8 10/20/2016    PSADIAG 2.9 10/12/2015    PSAFREE 0.76 03/26/2018    PSAFREE 1.20 09/01/2004    PSAFREEPCT 19.00 03/26/2018    PSAFREEPCT 16.00 09/01/2004      Urine dipstick shows negative for all components.     ASSESSMENT     1. BPH with urinary obstruction        PLAN:     1. BPH with urinary obstruction  We discussed cutting fluids late at night.  We also discussed addition of medications.  He would like to start back on Uroxatral.  Follow up in 3 months with an IPSS PVR    2. Elevated PSA  ERSPC risk calculator: 7% cancer risk and 1% significant cancer risk       Yakov Shetty MD

## 2023-09-04 ENCOUNTER — PATIENT MESSAGE (OUTPATIENT)
Dept: ENDOCRINOLOGY | Facility: CLINIC | Age: 78
End: 2023-09-04

## 2023-09-06 ENCOUNTER — OFFICE VISIT (OUTPATIENT)
Dept: FAMILY MEDICINE | Facility: CLINIC | Age: 78
End: 2023-09-06
Payer: MEDICARE

## 2023-09-06 ENCOUNTER — LAB VISIT (OUTPATIENT)
Dept: LAB | Facility: HOSPITAL | Age: 78
End: 2023-09-06
Attending: FAMILY MEDICINE
Payer: MEDICARE

## 2023-09-06 ENCOUNTER — OFFICE VISIT (OUTPATIENT)
Dept: OPTOMETRY | Facility: CLINIC | Age: 78
End: 2023-09-06
Payer: MEDICARE

## 2023-09-06 ENCOUNTER — PATIENT MESSAGE (OUTPATIENT)
Dept: OPTOMETRY | Facility: CLINIC | Age: 78
End: 2023-09-06

## 2023-09-06 VITALS
HEART RATE: 58 BPM | OXYGEN SATURATION: 96 % | WEIGHT: 199.94 LBS | SYSTOLIC BLOOD PRESSURE: 136 MMHG | RESPIRATION RATE: 18 BRPM | DIASTOLIC BLOOD PRESSURE: 72 MMHG | HEIGHT: 71 IN | TEMPERATURE: 97 F | BODY MASS INDEX: 27.99 KG/M2

## 2023-09-06 DIAGNOSIS — J84.9 INTERSTITIAL LUNG DISEASE: ICD-10-CM

## 2023-09-06 DIAGNOSIS — Z97.3 WEARS CONTACT LENSES: ICD-10-CM

## 2023-09-06 DIAGNOSIS — Z96.1 PSEUDOPHAKIA OF BOTH EYES: ICD-10-CM

## 2023-09-06 DIAGNOSIS — E55.9 VITAMIN D DEFICIENCY: ICD-10-CM

## 2023-09-06 DIAGNOSIS — Z12.5 PROSTATE CANCER SCREENING: ICD-10-CM

## 2023-09-06 DIAGNOSIS — Z01.00 ROUTINE EYE EXAM: ICD-10-CM

## 2023-09-06 DIAGNOSIS — I10 HYPERTENSION, UNSPECIFIED TYPE: Primary | ICD-10-CM

## 2023-09-06 DIAGNOSIS — E03.4 HYPOTHYROIDISM DUE TO ACQUIRED ATROPHY OF THYROID: ICD-10-CM

## 2023-09-06 DIAGNOSIS — H43.813 POSTERIOR VITREOUS DETACHMENT OF BOTH EYES: Primary | ICD-10-CM

## 2023-09-06 DIAGNOSIS — H04.123 DRY EYE SYNDROME OF BILATERAL LACRIMAL GLANDS: ICD-10-CM

## 2023-09-06 DIAGNOSIS — I48.0 PAROXYSMAL ATRIAL FIBRILLATION: ICD-10-CM

## 2023-09-06 DIAGNOSIS — N18.31 STAGE 3A CHRONIC KIDNEY DISEASE: ICD-10-CM

## 2023-09-06 DIAGNOSIS — Z46.0 FITTING AND ADJUSTMENT OF SPECTACLES AND CONTACT LENSES: Primary | ICD-10-CM

## 2023-09-06 LAB — COMPLEXED PSA SERPL-MCNC: 2.3 NG/ML (ref 0–4)

## 2023-09-06 PROCEDURE — 99499 NO LOS: ICD-10-PCS | Mod: HCNC,TXP,,

## 2023-09-06 PROCEDURE — 84153 ASSAY OF PSA TOTAL: CPT | Mod: HCNC,TXP | Performed by: FAMILY MEDICINE

## 2023-09-06 PROCEDURE — 1101F PT FALLS ASSESS-DOCD LE1/YR: CPT | Mod: HCNC,CPTII,S$GLB, | Performed by: FAMILY MEDICINE

## 2023-09-06 PROCEDURE — 99999 PR PBB SHADOW E&M-EST. PATIENT-LVL III: CPT | Mod: PBBFAC,HCNC,TXP,

## 2023-09-06 PROCEDURE — 92310 CONTACT LENS FITTING OU: CPT | Mod: CSM,HCNC,S$GLB,TXP

## 2023-09-06 PROCEDURE — 3078F DIAST BP <80 MM HG: CPT | Mod: HCNC,CPTII,S$GLB, | Performed by: FAMILY MEDICINE

## 2023-09-06 PROCEDURE — 1126F AMNT PAIN NOTED NONE PRSNT: CPT | Mod: HCNC,CPTII,S$GLB,TXP

## 2023-09-06 PROCEDURE — 1159F PR MEDICATION LIST DOCUMENTED IN MEDICAL RECORD: ICD-10-PCS | Mod: HCNC,CPTII,S$GLB, | Performed by: FAMILY MEDICINE

## 2023-09-06 PROCEDURE — 99999 PR PBB SHADOW E&M-EST. PATIENT-LVL III: ICD-10-PCS | Mod: PBBFAC,HCNC,, | Performed by: FAMILY MEDICINE

## 2023-09-06 PROCEDURE — 1101F PR PT FALLS ASSESS DOC 0-1 FALLS W/OUT INJ PAST YR: ICD-10-PCS | Mod: HCNC,CPTII,S$GLB, | Performed by: FAMILY MEDICINE

## 2023-09-06 PROCEDURE — 36415 COLL VENOUS BLD VENIPUNCTURE: CPT | Mod: HCNC,PO,TXP | Performed by: FAMILY MEDICINE

## 2023-09-06 PROCEDURE — 3288F PR FALLS RISK ASSESSMENT DOCUMENTED: ICD-10-PCS | Mod: HCNC,CPTII,S$GLB,TXP

## 2023-09-06 PROCEDURE — 1125F AMNT PAIN NOTED PAIN PRSNT: CPT | Mod: HCNC,CPTII,S$GLB, | Performed by: FAMILY MEDICINE

## 2023-09-06 PROCEDURE — 1157F ADVNC CARE PLAN IN RCRD: CPT | Mod: HCNC,CPTII,S$GLB,TXP

## 2023-09-06 PROCEDURE — 1126F PR PAIN SEVERITY QUANTIFIED, NO PAIN PRESENT: ICD-10-PCS | Mod: HCNC,CPTII,S$GLB,TXP

## 2023-09-06 PROCEDURE — 1157F PR ADVANCE CARE PLAN OR EQUIV PRESENT IN MEDICAL RECORD: ICD-10-PCS | Mod: HCNC,CPTII,S$GLB, | Performed by: FAMILY MEDICINE

## 2023-09-06 PROCEDURE — 1101F PT FALLS ASSESS-DOCD LE1/YR: CPT | Mod: HCNC,CPTII,S$GLB,TXP

## 2023-09-06 PROCEDURE — 99499 UNLISTED E&M SERVICE: CPT | Mod: HCNC,TXP,,

## 2023-09-06 PROCEDURE — 3288F PR FALLS RISK ASSESSMENT DOCUMENTED: ICD-10-PCS | Mod: HCNC,CPTII,S$GLB, | Performed by: FAMILY MEDICINE

## 2023-09-06 PROCEDURE — 3288F FALL RISK ASSESSMENT DOCD: CPT | Mod: HCNC,CPTII,S$GLB, | Performed by: FAMILY MEDICINE

## 2023-09-06 PROCEDURE — 1157F ADVNC CARE PLAN IN RCRD: CPT | Mod: HCNC,CPTII,S$GLB, | Performed by: FAMILY MEDICINE

## 2023-09-06 PROCEDURE — 92004 PR EYE EXAM, NEW PATIENT,COMPREHESV: ICD-10-PCS | Mod: HCNC,S$GLB,TXP,

## 2023-09-06 PROCEDURE — 3078F PR MOST RECENT DIASTOLIC BLOOD PRESSURE < 80 MM HG: ICD-10-PCS | Mod: HCNC,CPTII,S$GLB, | Performed by: FAMILY MEDICINE

## 2023-09-06 PROCEDURE — 99999 PR PBB SHADOW E&M-EST. PATIENT-LVL III: CPT | Mod: PBBFAC,HCNC,, | Performed by: FAMILY MEDICINE

## 2023-09-06 PROCEDURE — 99214 OFFICE O/P EST MOD 30 MIN: CPT | Mod: HCNC,S$GLB,, | Performed by: FAMILY MEDICINE

## 2023-09-06 PROCEDURE — 3075F PR MOST RECENT SYSTOLIC BLOOD PRESS GE 130-139MM HG: ICD-10-PCS | Mod: HCNC,CPTII,S$GLB, | Performed by: FAMILY MEDICINE

## 2023-09-06 PROCEDURE — 92004 COMPRE OPH EXAM NEW PT 1/>: CPT | Mod: HCNC,S$GLB,TXP,

## 2023-09-06 PROCEDURE — 99999 PR PBB SHADOW E&M-EST. PATIENT-LVL III: ICD-10-PCS | Mod: PBBFAC,HCNC,TXP,

## 2023-09-06 PROCEDURE — 1125F PR PAIN SEVERITY QUANTIFIED, PAIN PRESENT: ICD-10-PCS | Mod: HCNC,CPTII,S$GLB, | Performed by: FAMILY MEDICINE

## 2023-09-06 PROCEDURE — 1101F PR PT FALLS ASSESS DOC 0-1 FALLS W/OUT INJ PAST YR: ICD-10-PCS | Mod: HCNC,CPTII,S$GLB,TXP

## 2023-09-06 PROCEDURE — 1157F PR ADVANCE CARE PLAN OR EQUIV PRESENT IN MEDICAL RECORD: ICD-10-PCS | Mod: HCNC,CPTII,S$GLB,TXP

## 2023-09-06 PROCEDURE — 99214 PR OFFICE/OUTPT VISIT, EST, LEVL IV, 30-39 MIN: ICD-10-PCS | Mod: HCNC,S$GLB,, | Performed by: FAMILY MEDICINE

## 2023-09-06 PROCEDURE — 92310 PR CONTACT LENS FITTING (NO CHANGE): ICD-10-PCS | Mod: CSM,HCNC,S$GLB,TXP

## 2023-09-06 PROCEDURE — 3075F SYST BP GE 130 - 139MM HG: CPT | Mod: HCNC,CPTII,S$GLB, | Performed by: FAMILY MEDICINE

## 2023-09-06 PROCEDURE — 1159F MED LIST DOCD IN RCRD: CPT | Mod: HCNC,CPTII,S$GLB, | Performed by: FAMILY MEDICINE

## 2023-09-06 PROCEDURE — 3288F FALL RISK ASSESSMENT DOCD: CPT | Mod: HCNC,CPTII,S$GLB,TXP

## 2023-09-06 RX ORDER — DICLOFENAC SODIUM AND MISOPROSTOL 75; 200 MG/1; UG/1
1 TABLET, DELAYED RELEASE ORAL 2 TIMES DAILY
COMMUNITY
Start: 2023-08-23 | End: 2023-12-09 | Stop reason: CLARIF

## 2023-09-06 RX ORDER — TIZANIDINE 4 MG/1
4 TABLET ORAL 3 TIMES DAILY
COMMUNITY
Start: 2023-08-23 | End: 2023-12-03 | Stop reason: SDUPTHER

## 2023-09-06 RX ORDER — HYDROCODONE BITARTRATE AND ACETAMINOPHEN 5; 325 MG/1; MG/1
1 TABLET ORAL
COMMUNITY
Start: 2023-08-23 | End: 2023-10-10

## 2023-09-06 NOTE — PROGRESS NOTES
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    Chief Complaint: Hypertension (6 month follow up)      Here for 6 month follow-up.    Angular chelitis - C/o cracking of lips. Using nystatin cream as needed.  S/p ORIF left femur fracture - following with Dr. Rodriguez; doing well.  H/o TIA - taking just Eliquis; stopped lipitor after seeing neurologist; using lopressor PRN  Afib - s/p ablation; sees Dr. Beatty; gets episodes twice a year requiring cardioversion. Taking Eliquis 5mg BID, Coreg BID, Rhythmol BID.  HTN - telmisartan 40mg  And Coreg 12.5mg BID  CKD - following with Dr. 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  Following with pain management; having some side effects with tizanidine.    Hypertension  Pertinent negatives include no chest pain, headaches, neck pain, palpitations or shortness of breath.   Follow-up  Pertinent negatives include no abdominal pain, arthralgias, chest pain, chills, coughing, fever, headaches, nausea, neck pain, rash, sore throat or weakness.       Past Medical History:   Diagnosis Date    Anticoagulant long-term use     Anxiety     Arthritis     Atrial fibrillation     BPH (benign prostatic hyperplasia)     Cataract     CKD (chronic kidney disease) stage 3, GFR 30-59 ml/min 7/10/2017    Followed by Dr. Jeevan Pond    Colon polyp     benign    Depression     Elevated PSA     Erectile dysfunction     Gastric ulcer with hemorrhage     Hep B w/o coma 1977    History of bleeding peptic ulcer     History of prostatitis     Hypertension     PAF (paroxysmal atrial fibrillation)     Sleep apnea     on CPAP    Stroke      TIA December 2019    Thyroid disease        Past Surgical History:   Procedure Laterality Date    ABDOMINAL HERNIA REPAIR      ABLATION OF ARRHYTHMOGENIC FOCUS FOR ATRIAL FIBRILLATION N/A 6/15/2020    Procedure: Ablation atrial fibrillation;  Surgeon: Wyatt Beatty MD;  Location: [facility] EP LAB;  Service: Cardiology;  Laterality: N/A;  PAF, AFl, PEPE, PVI re-do, CTI, RFA, JUSTIN, Gen, SK, 3 Prep    CATARACT EXTRACTION  11/25/2013    bilateral    CHOLECYSTECTOMY      COLONOSCOPY N/A 11/25/2015    Procedure: COLONOSCOPY;  Surgeon: Toby Hernandez MD;  Location: [facility] ENDO;  Service: Endoscopy;  Laterality: N/A;    COLONOSCOPY N/A 11/13/2020    Procedure: COLONOSCOPY;  Surgeon: Toby Hernandez MD;  Location: [facility] ENDO;  Service: Endoscopy;  Laterality: N/A;    CYSTOSCOPY WITH INSERTION OF MINIMALLY INVASIVE IMPLANT TO ENLARGE PROSTATIC URETHRA N/A 11/28/2022    Procedure: CYSTOSCOPY, WITH INSERTION OF UROLIFT IMPLANT;  Surgeon: Yakov Shetty MD;  Location: [facility] OR;  Service: Urology;  Laterality: N/A;    EYE SURGERY      GASTRIC BYPASS  1992    INTRAMEDULLARY RODDING OF FEMUR Left 7/18/2020    Procedure: INSERTION, INTRAMEDULLARY ERIC, FEMUR, left, Synthes, [place] table, Large C arm clock side,;  Surgeon: Tony Rodriguez MD;  Location: [address];  Service: Orthopedics;  Laterality: Left;    KNEE ARTHROSCOPY      RT    LASIK  2001    both eyes (Dr. Rabago)    ORIF HUMERUS FRACTURE      LT    ORIF HUMERUS FRACTURE Right     non surgical repair    RADIOFREQUENCY ABLATION      x2    Right ankle tendon repair      ROBOT-ASSISTED REPAIR OF INCISIONAL HERNIA USING DA GARETH XI Left 6/13/2022    Procedure: XI ROBOTIC REPAIR, HERNIA, INCISIONAL (LEFT SIDE SPIGELIAN WITH MESH);  Surgeon: Rosendo Marti MD;  Location: [address] FLR;  Service: General;  Laterality: Left;  consent in am    ROTATOR CUFF REPAIR      right    TOTAL KNEE ARTHROPLASTY Right 5/29/2018    Procedure: REPLACEMENT-KNEE-TOTAL-axel;  Surgeon:  Denzel Dallas MD;  Location: [facility] OR [facility] FLR;  Service: Orthopedics;  Laterality: Right;  [place]    TREATMENT OF CARDIAC ARRHYTHMIA  6/15/2020    Procedure: Cardioversion or Defibrillation;  Surgeon: Wyatt Beatty MD;  Location: [facility] EP LAB;  Service: Cardiology;;       Review of patient's allergies indicates:   Allergen Reactions    No known drug allergies        Social History     Socioeconomic History    Marital status:     Number of children: 5   Occupational History    Occupation: retired anesthesiology     Employer: [employer]    Occupation: [employer] grad     Comment: previous football player   Tobacco Use    Smoking status: Never     Passive exposure: Never    Smokeless tobacco: Never    Tobacco comments:     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    Substance and Sexual Activity    Alcohol use: No    Drug use: No    Sexual activity: Yes     Partners: Female       Current Outpatient Medications on File Prior to Visit   Medication Sig Dispense Refill    alfuzosin (UROXATRAL) 10 mg Tb24 Take 1 tablet (10 mg total) by mouth daily with breakfast. 30 tablet 11    buPROPion (WELLBUTRIN XL) 150 MG TB24 tablet Take 1 tablet (150 mg total) by mouth once daily. 90 tablet 3    buPROPion (WELLBUTRIN XL) 300 MG 24 hr tablet Take 1 tablet (300 mg total) by mouth once daily. 90 tablet 3    calcium citrate (CALCITRATE) 200 mg (950 mg) tablet Take 1 tablet (200 mg total) by mouth 2 (two) times daily. 180 tablet 3    carvediloL (COREG) 12.5 MG tablet Take 1 tablet (12.5 mg total) by mouth 2 (two) times daily. 180 tablet 3    cyclobenzaprine (FLEXERIL) 10 MG tablet Take 1 tablet (10 mg total) by mouth 3 (three) times daily as needed for Muscle spasms. 60 tablet 3    ELIQUIS 5 mg Tab Take 1 tablet (5 mg total) by mouth 2 (two) times a day. 180 tablet 2    EScitalopram oxalate (LEXAPRO) 10 MG tablet TAKE 1 TABLET(10 MG) BY MOUTH EVERY DAY 90 tablet 4    HYDROcodone-acetaminophen (NORCO) 5-325 mg per tablet  Take 1 tablet by mouth.      levothyroxine (SYNTHROID) 75 MCG tablet Take 1 tablet (75 mcg total) by mouth before breakfast. 90 tablet 2    methocarbamoL (ROBAXIN) 500 MG Tab TAKE 1 TABLET(500 MG) BY MOUTH THREE TIMES DAILY FOR 10 DAYS 90 tablet 0    OMEGA-3 FATTY ACIDS (FISH OIL CONCENTRATE ORAL) Take by mouth Daily.      propafenone (RYTHMOL SR) 425 MG Cp12 TAKE 1 CAPSULE(425 MG) BY MOUTH EVERY 12 HOURS 180 capsule 2    telmisartan (MICARDIS) 40 MG Tab Take 1 tablet (40 mg total) by mouth once daily. 90 tablet 3    telmisartan (MICARDIS) 40 MG Tab TAKE 1 TABLET(40 MG) BY MOUTH EVERY DAY 90 tablet 3    acyclovir (ZOVIRAX) 800 MG Tab Take 800 mg by mouth.      celecoxib (CELEBREX) 200 MG capsule Take 1 capsule (200 mg total) by mouth 2 (two) times daily. (Patient not taking: Reported on 9/6/2023) 60 capsule 2    oxyCODONE-acetaminophen (PERCOCET) 5-325 mg per tablet Take 1 tablet by mouth every 12 (twelve) hours as needed for Pain. (Patient not taking: Reported on 9/6/2023) 14 tablet 0     No current facility-administered medications on file prior to visit.       Family History   Problem Relation Age of Onset    COPD Father     Diabetes Father     Osteoporosis Father     Aortic stenosis Mother     Heart disease Mother         aortic stenosis    Osteoporosis Mother     Heart attack Brother     No Known Problems Son     No Known Problems Daughter     No Known Problems Daughter     No Known Problems Daughter        Review of Systems   Constitutional:  Negative for appetite change, chills, fever and unexpected weight change.   HENT:  Negative for sore throat and trouble swallowing.    Eyes:  Negative for pain and visual disturbance.   Respiratory:  Negative for cough, chest tightness, shortness of breath and wheezing.    Cardiovascular:  Negative for chest pain, palpitations and leg swelling.   Gastrointestinal:  Negative for abdominal pain, blood in stool, constipation, diarrhea and nausea.   Genitourinary:  Negative  "for difficulty urinating, dysuria and hematuria.   Musculoskeletal:  Negative for arthralgias, gait problem and neck pain.   Skin:  Negative for rash and wound.   Neurological:  Negative for dizziness, weakness, light-headedness and headaches.   Hematological:  Negative for adenopathy.   Psychiatric/Behavioral:  Negative for dysphoric mood.        Objective:      /72   Pulse (!) 58   Temp 97.3 °F (36.3 °C) (Oral)   Resp 18   Ht 5' 11" (1.803 m)   Wt 90.7 kg (199 lb 15.3 oz)   SpO2 96%   BMI 27.89 kg/m²   Physical Exam  Constitutional:       General: He is not in acute distress.     Appearance: He is well-developed.   HENT:      Head: Normocephalic and atraumatic.      Right Ear: External ear normal.      Left Ear: External ear normal.      Mouth/Throat:      Pharynx: Uvula midline. No oropharyngeal exudate.   Eyes:      General: Lids are normal.      Conjunctiva/sclera: Conjunctivae normal.      Pupils: Pupils are equal, round, and reactive to light.   Neck:      Thyroid: No thyroid mass or thyromegaly.      Trachea: Phonation normal.   Cardiovascular:      Rate and Rhythm: Normal rate and regular rhythm.      Heart sounds: Normal heart sounds. No murmur heard.     No friction rub. No gallop.   Pulmonary:      Effort: Pulmonary effort is normal. No respiratory distress.      Breath sounds: Normal breath sounds. No wheezing or rales.   Musculoskeletal:         General: Normal range of motion.      Cervical back: Full passive range of motion without pain, normal range of motion and neck supple.   Lymphadenopathy:      Cervical: No cervical adenopathy.   Skin:     General: Skin is warm and dry.   Neurological:      Mental Status: He is alert and oriented to person, place, and time.      Cranial Nerves: No cranial nerve deficit.      Coordination: Coordination normal.   Psychiatric:         Speech: Speech normal.         Behavior: Behavior normal.         Thought Content: Thought content normal.         " Judgment: Judgment normal.         Results for orders placed or performed in visit on 08/30/23   POCT Urinalysis(Instrument)   Result Value Ref Range    Color, POC UA Yellow Yellow, Straw, Colorless    Clarity, POC UA Clear Clear    Glucose, POC UA Negative Negative    Bilirubin, POC UA Negative Negative    Ketones, POC UA Negative Negative    Spec Grav POC UA >=1.030 1.005 - 1.030    Blood, POC UA Negative Negative    pH, POC UA 5.5 5.0 - 8.0    Protein, POC UA Trace (A) Negative    Urobilinogen, POC UA 0.2 <=1.0    Nitrite, POC UA Negative Negative    WBC, POC UA Negative Negative     *Note: Due to a large number of results and/or encounters for the requested time period, some results have not been displayed. A complete set of results can be found in Results Review.     Labs reviewed     Assessment:       1. Hypertension, unspecified type    2. Hypothyroidism due to acquired atrophy of thyroid    3. Vitamin D deficiency    4. Interstitial lung disease    5. Paroxysmal atrial fibrillation    6. Stage 3a chronic kidney disease                Plan:       Hypertension, unspecified type  -     Comprehensive Metabolic Panel; Future; Expected date: 03/04/2024  -     CBC Auto Differential; Future; Expected date: 03/04/2024    Hypothyroidism due to acquired atrophy of thyroid  -     TSH; Future; Expected date: 03/04/2024    Vitamin D deficiency    Interstitial lung disease    Paroxysmal atrial fibrillation    Stage 3a chronic kidney disease                Resume vitamin D  Continue Eliquis 5mg BID with ASA  Continue telmisartan and carvedilol   Continue other present meds  F/u with cardiology as planned  Counseled on regular exercise, maintenance of a healthy weight, balanced diet rich in fruits/vegetables and lean protein, and avoidance of unhealthy habits like smoking and excessive alcohol intake.    F/u 6 months with labs

## 2023-09-06 NOTE — PROGRESS NOTES
HPI    Pt here for complete exam with the complaint of blurred VA at distance and   near. Last exam: 2 years ago, outside provider.    States flashes and floaters present. No gtts. Pt wears contact lenses, has   worn in OD only, but has also worn OU. States last lenses were   uncomfortable and popped out of the eye often.  Last edited by Chetna Sharpe, OD on 2023 12:09 PM.            Assessment /Plan     For exam results, see Encounter Report.    Posterior vitreous detachment of both eyes    Dry eye syndrome of bilateral lacrimal glands    Pseudophakia of both eyes    Wears contact lenses    Routine eye exam  -     Ambulatory referral/consult to Ophthalmology      Stable PVD OU. Reviewed signs and symptoms of a retinal detachment thoroughly and ed pt to RTC asap if experienced.  Mild SPK OD>>OS. Ed pt that dry eye likely contributing to contact lens discomfort/poor fit and advised pt to use OTC artificial tears throughout the day, especially when wearing contact lenses.  PCIOL OU. Stable. Monitor yearly for changes.  Pt previous CL wearer, states last doctor fit him in monthly lens OD only (was given Ultra and Biofinity). Pt states both lenses were uncomfortable and would frequently pop out. Discussed importance of keeping eye lubricated and advised daily lens modality. Discussed options for contact lenses including single vision distance with OTC readers on top vs monovision, OD distance. Dispensed Precision 1 trials in both  and monovision for pt to decide between. Emphasized importance of good CL hygiene, routine daily replacement of lenses, and not to sleep in lenses. Once pt decides which he prefers, will finalize.  Spoke with pt 09/15/23. Pt likes Precision 1 lenses, distance only. Finalized.   See 1-4.     RTC: 1 year for comprehensive exam or sooner prn

## 2023-09-07 ENCOUNTER — PATIENT MESSAGE (OUTPATIENT)
Dept: AUDIOLOGY | Facility: CLINIC | Age: 78
End: 2023-09-07

## 2023-09-07 ENCOUNTER — PATIENT MESSAGE (OUTPATIENT)
Dept: OPTOMETRY | Facility: CLINIC | Age: 78
End: 2023-09-07

## 2023-09-15 ENCOUNTER — PATIENT MESSAGE (OUTPATIENT)
Dept: OPTOMETRY | Facility: CLINIC | Age: 78
End: 2023-09-15

## 2023-09-17 ENCOUNTER — PATIENT MESSAGE (OUTPATIENT)
Dept: FAMILY MEDICINE | Facility: CLINIC | Age: 78
End: 2023-09-17

## 2023-09-17 RX ORDER — CELECOXIB 200 MG/1
200 CAPSULE ORAL 2 TIMES DAILY
Qty: 60 CAPSULE | Refills: 2 | Status: SHIPPED | OUTPATIENT
Start: 2023-09-17 | End: 2024-01-23

## 2023-09-18 ENCOUNTER — PATIENT MESSAGE (OUTPATIENT)
Dept: RADIOLOGY | Facility: HOSPITAL | Age: 78
End: 2023-09-18

## 2023-09-18 ENCOUNTER — OFFICE VISIT (OUTPATIENT)
Dept: CARDIOLOGY | Facility: CLINIC | Age: 78
End: 2023-09-18
Payer: MEDICARE

## 2023-09-18 ENCOUNTER — TELEPHONE (OUTPATIENT)
Dept: CARDIOLOGY | Facility: CLINIC | Age: 78
End: 2023-09-18

## 2023-09-18 VITALS
HEIGHT: 71 IN | HEART RATE: 54 BPM | SYSTOLIC BLOOD PRESSURE: 136 MMHG | WEIGHT: 209 LBS | BODY MASS INDEX: 29.26 KG/M2 | DIASTOLIC BLOOD PRESSURE: 72 MMHG

## 2023-09-18 DIAGNOSIS — R00.1 BRADYCARDIA: ICD-10-CM

## 2023-09-18 DIAGNOSIS — I10 ESSENTIAL HYPERTENSION: ICD-10-CM

## 2023-09-18 DIAGNOSIS — I48.0 PAROXYSMAL ATRIAL FIBRILLATION: Primary | ICD-10-CM

## 2023-09-18 PROCEDURE — 99999 PR PBB SHADOW E&M-EST. PATIENT-LVL IV: ICD-10-PCS | Mod: PBBFAC,HCNC,TXP, | Performed by: INTERNAL MEDICINE

## 2023-09-18 PROCEDURE — 3078F DIAST BP <80 MM HG: CPT | Mod: HCNC,CPTII,NTX,S$GLB | Performed by: INTERNAL MEDICINE

## 2023-09-18 PROCEDURE — 3075F SYST BP GE 130 - 139MM HG: CPT | Mod: HCNC,CPTII,NTX,S$GLB | Performed by: INTERNAL MEDICINE

## 2023-09-18 PROCEDURE — 1157F PR ADVANCE CARE PLAN OR EQUIV PRESENT IN MEDICAL RECORD: ICD-10-PCS | Mod: HCNC,CPTII,NTX,S$GLB | Performed by: INTERNAL MEDICINE

## 2023-09-18 PROCEDURE — 99214 PR OFFICE/OUTPT VISIT, EST, LEVL IV, 30-39 MIN: ICD-10-PCS | Mod: HCNC,NTX,S$GLB, | Performed by: INTERNAL MEDICINE

## 2023-09-18 PROCEDURE — 1159F MED LIST DOCD IN RCRD: CPT | Mod: HCNC,CPTII,NTX,S$GLB | Performed by: INTERNAL MEDICINE

## 2023-09-18 PROCEDURE — 1101F PT FALLS ASSESS-DOCD LE1/YR: CPT | Mod: HCNC,CPTII,NTX,S$GLB | Performed by: INTERNAL MEDICINE

## 2023-09-18 PROCEDURE — 3288F PR FALLS RISK ASSESSMENT DOCUMENTED: ICD-10-PCS | Mod: HCNC,CPTII,NTX,S$GLB | Performed by: INTERNAL MEDICINE

## 2023-09-18 PROCEDURE — 1159F PR MEDICATION LIST DOCUMENTED IN MEDICAL RECORD: ICD-10-PCS | Mod: HCNC,CPTII,NTX,S$GLB | Performed by: INTERNAL MEDICINE

## 2023-09-18 PROCEDURE — 1126F PR PAIN SEVERITY QUANTIFIED, NO PAIN PRESENT: ICD-10-PCS | Mod: HCNC,CPTII,NTX,S$GLB | Performed by: INTERNAL MEDICINE

## 2023-09-18 PROCEDURE — 3075F PR MOST RECENT SYSTOLIC BLOOD PRESS GE 130-139MM HG: ICD-10-PCS | Mod: HCNC,CPTII,NTX,S$GLB | Performed by: INTERNAL MEDICINE

## 2023-09-18 PROCEDURE — 1126F AMNT PAIN NOTED NONE PRSNT: CPT | Mod: HCNC,CPTII,NTX,S$GLB | Performed by: INTERNAL MEDICINE

## 2023-09-18 PROCEDURE — 99214 OFFICE O/P EST MOD 30 MIN: CPT | Mod: HCNC,NTX,S$GLB, | Performed by: INTERNAL MEDICINE

## 2023-09-18 PROCEDURE — 99999 PR PBB SHADOW E&M-EST. PATIENT-LVL IV: CPT | Mod: PBBFAC,HCNC,TXP, | Performed by: INTERNAL MEDICINE

## 2023-09-18 PROCEDURE — 3288F FALL RISK ASSESSMENT DOCD: CPT | Mod: HCNC,CPTII,NTX,S$GLB | Performed by: INTERNAL MEDICINE

## 2023-09-18 PROCEDURE — 3078F PR MOST RECENT DIASTOLIC BLOOD PRESSURE < 80 MM HG: ICD-10-PCS | Mod: HCNC,CPTII,NTX,S$GLB | Performed by: INTERNAL MEDICINE

## 2023-09-18 PROCEDURE — 1157F ADVNC CARE PLAN IN RCRD: CPT | Mod: HCNC,CPTII,NTX,S$GLB | Performed by: INTERNAL MEDICINE

## 2023-09-18 PROCEDURE — 1101F PR PT FALLS ASSESS DOC 0-1 FALLS W/OUT INJ PAST YR: ICD-10-PCS | Mod: HCNC,CPTII,NTX,S$GLB | Performed by: INTERNAL MEDICINE

## 2023-09-18 RX ORDER — TORSEMIDE 20 MG/1
20 TABLET ORAL DAILY PRN
Qty: 10 TABLET | Refills: 0 | Status: SHIPPED | OUTPATIENT
Start: 2023-09-18 | End: 2023-10-10 | Stop reason: SDUPTHER

## 2023-09-18 NOTE — PROGRESS NOTES
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  His main complaint is swelling of both lower extremities for the last several months.  No chest pain.  No shortness of breath.  No syncope or near-syncope.  No palpitations.    Review of Systems   Constitutional: Negative for decreased appetite, malaise/fatigue, weight gain and weight loss.   Cardiovascular:  Negative for chest pain, dyspnea on exertion, leg swelling, palpitations and syncope.   Respiratory:  Negative for cough and shortness of breath.    Gastrointestinal: Negative.    Neurological:  Negative for weakness.   All other systems reviewed and are negative.     Objective:      Physical Exam  Vitals and nursing note reviewed.   Constitutional:       Appearance: Normal appearance. He is well-developed.   HENT:      Head: Normocephalic.   Eyes:      Pupils: Pupils are equal, round, and reactive to light.   Neck:      Thyroid: No thyromegaly.      Vascular: No carotid bruit or JVD.   Cardiovascular:      Rate and Rhythm: Normal rate and regular rhythm.      Chest Wall: PMI is not displaced.      Pulses: Normal pulses and intact distal pulses.      Heart sounds: Normal heart sounds. No murmur heard.     No gallop.   Pulmonary:      Effort: Pulmonary effort is normal.      Breath sounds: Normal breath sounds.   Abdominal:      Palpations: Abdomen is soft. There is no mass.      Tenderness: There is no abdominal tenderness.   Musculoskeletal:         General: Normal range of motion.      Cervical back: Normal range of motion and neck supple.      Right lower leg: Edema present.      Left lower leg: Edema present.   Skin:     General: Skin is warm.   Neurological:      Mental Status: He is alert and oriented to person, place, and time.      Sensory: No sensory deficit.      Deep Tendon Reflexes: Reflexes are  normal and symmetric.           Most Recent EKG Results  Results for orders placed or performed during the hospital encounter of 07/17/20   EKG 12-lead    Collection Time: 07/17/20  5:58 PM    Narrative    Test Reason : Z01.818,    Vent. Rate : 066 BPM     Atrial Rate : 066 BPM     P-R Int : 210 ms          QRS Dur : 104 ms      QT Int : 432 ms       P-R-T Axes : 000 052 056 degrees     QTc Int : 452 ms    Sinus rhythm with 1st degree A-V block  Otherwise normal ECG  When compared with ECG of 15-JIMMY-2020 12:19,  No significant change was found  Confirmed by Yusuf Pruitt MD ([id number]) on 7/18/2020 3:24:44 PM    Referred By: [email or web address]   SELF           Confirmed By:Yusuf Pruitt MD       Most Recent Echocardiogram Results  Results for orders placed during the hospital encounter of 06/15/20    Transesophageal echo (PEPE)    Interpretation Summary  · Normal appearing left atrial appendage. No thrombus is present in the appendage. Normal appendage velocities.  · No thrombus is present in the left atrium.  · Normal left ventricular systolic function. The estimated ejection fraction is 60%.  · Normal right ventricular systolic function.      Most Recent Nuclear Stress Test Results  No results found for this or any previous visit.      Most Recent Cardiac PET Stress Test Results  No results found for this or any previous visit.      Most Recent Cardiovascular Angiogram results  Results for orders placed during the hospital encounter of 06/15/20    Intra-Procedure Documentation    Narrative  PEPE performed in the Invasive Lab  - See Procedure Log link below for nursing documentation  - See PEPE order on Card Proc Tab for physician findings      Other Most Recent Cardiology Results  Results for orders placed during the hospital encounter of 07/17/20    CARDIAC MONITORING STRIPS      Labs reviewed    Assessment:       1. Paroxysmal atrial fibrillation    2. Bradycardia    3. Essential hypertension         Plan:     Try Demadex 20 mg p.o.  daily x3.  Echocardiogram and nuclear stress test.    Call with results

## 2023-09-20 ENCOUNTER — HOSPITAL ENCOUNTER (OUTPATIENT)
Dept: RADIOLOGY | Facility: HOSPITAL | Age: 78
Discharge: HOME | End: 2023-09-20
Attending: INTERNAL MEDICINE
Payer: MEDICARE

## 2023-09-20 ENCOUNTER — CLINICAL SUPPORT (OUTPATIENT)
Dept: CARDIOLOGY | Facility: HOSPITAL | Age: 78
End: 2023-09-20
Attending: INTERNAL MEDICINE
Payer: MEDICARE

## 2023-09-20 ENCOUNTER — TELEPHONE (OUTPATIENT)
Dept: AUDIOLOGY | Facility: CLINIC | Age: 78
End: 2023-09-20

## 2023-09-20 VITALS — HEIGHT: 71 IN | BODY MASS INDEX: 29.12 KG/M2 | WEIGHT: 208 LBS

## 2023-09-20 DIAGNOSIS — R00.1 BRADYCARDIA: ICD-10-CM

## 2023-09-20 DIAGNOSIS — I48.0 PAROXYSMAL ATRIAL FIBRILLATION: ICD-10-CM

## 2023-09-20 DIAGNOSIS — I10 ESSENTIAL HYPERTENSION: ICD-10-CM

## 2023-09-20 LAB
CV PHARM DOSE: 0.4 MG
CV STRESS BASE HR: 60 BPM
DIASTOLIC BLOOD PRESSURE: 61 MMHG
NUC STRESS EJECTION FRACTION: 60 %
OHS CV CPX 1 MINUTE RECOVERY HEART RATE: 52 BPM
OHS CV CPX 85 PERCENT MAX PREDICTED HEART RATE MALE: 122
OHS CV CPX MAX PREDICTED HEART RATE: 144
OHS CV CPX PATIENT IS FEMALE: 0
OHS CV CPX PATIENT IS MALE: 1
OHS CV CPX PEAK DIASTOLIC BLOOD PRESSURE: 84 MMHG
OHS CV CPX PEAK HEAR RATE: 66 BPM
OHS CV CPX PEAK RATE PRESSURE PRODUCT: NORMAL
OHS CV CPX PEAK SYSTOLIC BLOOD PRESSURE: 173 MMHG
OHS CV CPX PERCENT MAX PREDICTED HEART RATE ACHIEVED: 46
OHS CV CPX RATE PRESSURE PRODUCT PRESENTING: 7380
OHS CV PHARM TIME: 902 MIN
SYSTOLIC BLOOD PRESSURE: 123 MMHG

## 2023-09-20 PROCEDURE — 78452 NUCLEAR STRESS - CARDIOLOGY INTERPRETED (CUPID ONLY): ICD-10-PCS | Mod: 26,NTX,, | Performed by: INTERNAL MEDICINE

## 2023-09-20 PROCEDURE — 93018 CV STRESS TEST I&R ONLY: CPT | Mod: NTX,,, | Performed by: INTERNAL MEDICINE

## 2023-09-20 PROCEDURE — 93017 CV STRESS TEST TRACING ONLY: CPT | Mod: HCNC,PO,NTX

## 2023-09-20 PROCEDURE — 63600175 PHARM REV CODE 636 W HCPCS: Mod: HCNC,PO,TXP | Performed by: INTERNAL MEDICINE

## 2023-09-20 PROCEDURE — 78452 HT MUSCLE IMAGE SPECT MULT: CPT | Mod: HCNC,PO,TXP

## 2023-09-20 PROCEDURE — 93016 CV STRESS TEST SUPVJ ONLY: CPT | Mod: NTX,,, | Performed by: INTERNAL MEDICINE

## 2023-09-20 PROCEDURE — 93018 PR CARDIAC STRESS TST,INTERP/REPT ONLY: ICD-10-PCS | Mod: NTX,,, | Performed by: INTERNAL MEDICINE

## 2023-09-20 PROCEDURE — A9502 TC99M TETROFOSMIN: HCPCS | Mod: HCNC,PO,TXP

## 2023-09-20 PROCEDURE — 78452 HT MUSCLE IMAGE SPECT MULT: CPT | Mod: 26,NTX,, | Performed by: INTERNAL MEDICINE

## 2023-09-20 PROCEDURE — 93016 NUCLEAR STRESS - CARDIOLOGY INTERPRETED (CUPID ONLY): ICD-10-PCS | Mod: NTX,,, | Performed by: INTERNAL MEDICINE

## 2023-09-20 RX ORDER — REGADENOSON 0.08 MG/ML
0.4 INJECTION, SOLUTION INTRAVENOUS
Status: COMPLETED | OUTPATIENT
Start: 2023-09-20 | End: 2023-09-20

## 2023-09-20 RX ADMIN — REGADENOSON 0.4 MG: 0.08 INJECTION, SOLUTION INTRAVENOUS at 09:09

## 2023-09-20 NOTE — TELEPHONE ENCOUNTER
----- Message from Melanie Somers sent at 9/19/2023  1:06 PM CDT -----  Regarding: Requesting call back  Please contact patient about his TV Connector. He is having trouble getting it to work and he called a technician out to look at it. He put the technician on the phone and the kyle told me he will need a new one. I let the patient know you are out of the office until Wednesday morning.

## 2023-09-21 ENCOUNTER — CLINICAL SUPPORT (OUTPATIENT)
Dept: CARDIOLOGY | Facility: HOSPITAL | Age: 78
End: 2023-09-21
Attending: INTERNAL MEDICINE
Payer: MEDICARE

## 2023-09-21 VITALS — BODY MASS INDEX: 29.12 KG/M2 | HEIGHT: 71 IN | WEIGHT: 208 LBS

## 2023-09-21 DIAGNOSIS — J84.9 ILD (INTERSTITIAL LUNG DISEASE): Primary | ICD-10-CM

## 2023-09-21 DIAGNOSIS — I10 ESSENTIAL HYPERTENSION: ICD-10-CM

## 2023-09-21 DIAGNOSIS — I48.0 PAROXYSMAL ATRIAL FIBRILLATION: ICD-10-CM

## 2023-09-21 DIAGNOSIS — R00.1 BRADYCARDIA: ICD-10-CM

## 2023-09-21 PROCEDURE — 93306 ECHO (CUPID ONLY): ICD-10-PCS | Mod: 26,NTX,, | Performed by: INTERNAL MEDICINE

## 2023-09-21 PROCEDURE — 93306 TTE W/DOPPLER COMPLETE: CPT | Mod: HCNC,PO,TXP

## 2023-09-21 PROCEDURE — 93306 TTE W/DOPPLER COMPLETE: CPT | Mod: 26,NTX,, | Performed by: INTERNAL MEDICINE

## 2023-09-22 ENCOUNTER — PATIENT MESSAGE (OUTPATIENT)
Dept: CARDIOLOGY | Facility: CLINIC | Age: 78
End: 2023-09-22

## 2023-09-22 PROBLEM — R09.3 INCREASED SPUTUM PRODUCTION: Status: RESOLVED | Noted: 2022-11-02 | Resolved: 2023-09-22

## 2023-09-22 PROBLEM — R05.3 CHRONIC COUGH: Status: RESOLVED | Noted: 2022-11-02 | Resolved: 2023-09-22

## 2023-09-22 PROBLEM — Z71.89 ACP (ADVANCE CARE PLANNING): Status: ACTIVE | Noted: 2023-09-22

## 2023-09-22 PROBLEM — R00.1 SYMPTOMATIC BRADYCARDIA: Status: ACTIVE | Noted: 2023-09-22

## 2023-09-22 PROBLEM — M79.18 MYOFASCIAL PAIN ON RIGHT SIDE: Status: RESOLVED | Noted: 2022-08-31 | Resolved: 2023-09-22

## 2023-09-22 PROBLEM — Z97.3 WEARS CONTACT LENSES: Status: RESOLVED | Noted: 2023-09-06 | Resolved: 2023-09-22

## 2023-09-22 PROBLEM — F32.A DEPRESSION: Status: ACTIVE | Noted: 2023-09-22

## 2023-09-22 LAB
ASCENDING AORTA: 2.96 CM
AV INDEX (PROSTH): 0.72
AV MEAN GRADIENT: 7 MMHG
AV PEAK GRADIENT: 12 MMHG
AV REGURGITATION PRESSURE HALF TIME: 684.14 MS
AV VALVE AREA BY VELOCITY RATIO: 3.02 CM²
AV VALVE AREA: 3.07 CM²
AV VELOCITY RATIO: 0.71
BSA FOR ECHO PROCEDURE: 2.17 M2
CV ECHO LV RWT: 0.45 CM
DOP CALC AO PEAK VEL: 1.75 M/S
DOP CALC AO VTI: 41.6 CM
DOP CALC LVOT AREA: 4.3 CM2
DOP CALC LVOT DIAMETER: 2.33 CM
DOP CALC LVOT PEAK VEL: 1.24 M/S
DOP CALC LVOT STROKE VOLUME: 127.85 CM3
DOP CALCLVOT PEAK VEL VTI: 30 CM
E WAVE DECELERATION TIME: 144.16 MSEC
E/A RATIO: 3.36
ECHO LV POSTERIOR WALL: 1.07 CM (ref 0.6–1.1)
EJECTION FRACTION: 65 %
FRACTIONAL SHORTENING: 43 % (ref 28–44)
INTERVENTRICULAR SEPTUM: 1.25 CM (ref 0.6–1.1)
LEFT ATRIUM SIZE: 5.37 CM
LEFT ATRIUM VOLUME INDEX MOD: 66.3 ML/M2
LEFT ATRIUM VOLUME MOD: 141.97 CM3
LEFT INTERNAL DIMENSION IN SYSTOLE: 2.71 CM (ref 2.1–4)
LEFT VENTRICLE DIASTOLIC VOLUME INDEX: 48.96 ML/M2
LEFT VENTRICLE DIASTOLIC VOLUME: 104.77 ML
LEFT VENTRICLE MASS INDEX: 96 G/M2
LEFT VENTRICLE SYSTOLIC VOLUME INDEX: 12.8 ML/M2
LEFT VENTRICLE SYSTOLIC VOLUME: 27.35 ML
LEFT VENTRICULAR INTERNAL DIMENSION IN DIASTOLE: 4.75 CM (ref 3.5–6)
LEFT VENTRICULAR MASS: 205.46 G
LV LATERAL E/E' RATIO: 15.67 M/S
LVOT MG: 4.12 MMHG
LVOT MV: 1 CM/S
MV PEAK A VEL: 0.42 M/S
MV PEAK E VEL: 1.41 M/S
PISA AR MAX VEL: 4.26 M/S
PISA MRMAX VEL: 5.54 M/S
PISA TR MAX VEL: 3.15 M/S
PULM VEIN S/D RATIO: 0.8
PV PEAK D VEL: 0.87 M/S
PV PEAK S VEL: 0.7 M/S
RA PRESSURE ESTIMATED: 8 MMHG
RA VOL SYS: 59.48 ML
RIGHT ATRIAL AREA: 20.5 CM2
RIGHT VENTRICULAR END-DIASTOLIC DIMENSION: 3.83 CM
RIGHT VENTRICULAR LENGTH IN DIASTOLE (APICAL 4-CHAMBER VIEW): 6.1 CM
RV MID DIAMA: 2.5 CM
RV TB RVSP: 11 MMHG
RV TISSUE DOPPLER FREE WALL SYSTOLIC VELOCITY 1 (APICAL 4 CHAMBER VIEW): 14.64 CM/S
SINUS: 3.5 CM
STJ: 3.13 CM
TDI LATERAL: 0.09 M/S
TR MAX PG: 40 MMHG
TRICUSPID ANNULAR PLANE SYSTOLIC EXCURSION: 2.94 CM
TV REST PULMONARY ARTERY PRESSURE: 48 MMHG
Z-SCORE OF LEFT VENTRICULAR DIMENSION IN END DIASTOLE: -3.71
Z-SCORE OF LEFT VENTRICULAR DIMENSION IN END SYSTOLE: -3.46

## 2023-09-25 ENCOUNTER — TELEPHONE (OUTPATIENT)
Dept: CARDIOLOGY | Facility: HOSPITAL | Age: 78
End: 2023-09-25

## 2023-09-25 ENCOUNTER — DOCUMENTATION ONLY (OUTPATIENT)
Dept: CARDIOLOGY | Facility: HOSPITAL | Age: 78
End: 2023-09-25

## 2023-09-25 ENCOUNTER — PATIENT MESSAGE (OUTPATIENT)
Dept: FAMILY MEDICINE | Facility: CLINIC | Age: 78
End: 2023-09-25

## 2023-09-25 DIAGNOSIS — R00.1 BRADYCARDIA: Primary | ICD-10-CM

## 2023-09-25 DIAGNOSIS — Z95.0 STATUS POST PLACEMENT OF CARDIAC PACEMAKER: ICD-10-CM

## 2023-09-25 NOTE — TELEPHONE ENCOUNTER
He really needs an ASAP appt with cardiology. Please relay her message to that department and see if he can be seen this week.

## 2023-09-25 NOTE — TELEPHONE ENCOUNTER
Spoke with pt's wife and advised per Dr Cruz stop mycardis and follow up in a week. Pt scheduled for 10/9 with Dr Cruz and 10/4 for pacemaker check.     Pt's wife requesting a hosptial follow up with Dr Gaines as well. Please contact patient to schedule.

## 2023-09-27 ENCOUNTER — PATIENT MESSAGE (OUTPATIENT)
Dept: CARDIOLOGY | Facility: CLINIC | Age: 78
End: 2023-09-27

## 2023-10-03 ENCOUNTER — HOSPITAL ENCOUNTER (OUTPATIENT)
Dept: PULMONOLOGY | Facility: CLINIC | Age: 78
Discharge: HOME | End: 2023-10-03
Payer: MEDICARE

## 2023-10-03 ENCOUNTER — OFFICE VISIT (OUTPATIENT)
Dept: TRANSPLANT | Facility: CLINIC | Age: 78
End: 2023-10-03
Payer: MEDICARE

## 2023-10-03 VITALS
DIASTOLIC BLOOD PRESSURE: 85 MMHG | BODY MASS INDEX: 28.09 KG/M2 | OXYGEN SATURATION: 98 % | SYSTOLIC BLOOD PRESSURE: 143 MMHG | HEIGHT: 71 IN | HEART RATE: 69 BPM | WEIGHT: 200.63 LBS

## 2023-10-03 VITALS — BODY MASS INDEX: 28.09 KG/M2 | HEIGHT: 71 IN | WEIGHT: 200.63 LBS

## 2023-10-03 DIAGNOSIS — J84.9 ILD (INTERSTITIAL LUNG DISEASE): ICD-10-CM

## 2023-10-03 DIAGNOSIS — J84.9 INTERSTITIAL PULMONARY DISEASE, UNSPECIFIED: Primary | ICD-10-CM

## 2023-10-03 LAB
DLCO ADJ PRE: 15.26 ML/(MIN*MMHG) (ref 19.92–33.77)
DLCO SINGLE BREATH LLN: 19.92
DLCO SINGLE BREATH PRE REF: 49.2 %
DLCO SINGLE BREATH REF: 26.84
DLCOC SBVA LLN: 2.55
DLCOC SBVA PRE REF: 96.2 %
DLCOC SBVA REF: 3.66
DLCOC SINGLE BREATH LLN: 19.92
DLCOC SINGLE BREATH PRE REF: 56.9 %
DLCOC SINGLE BREATH REF: 26.84
DLCOCSBVAULN: 4.77
DLCOCSINGLEBREATHULN: 33.77
DLCOSINGLEBREATHULN: 33.77
DLCOVA LLN: 2.55
DLCOVA PRE REF: 83.3 %
DLCOVA PRE: 3.05 ML/(MIN*MMHG*L) (ref 2.55–4.77)
DLCOVA REF: 3.66
DLCOVAULN: 4.77
DLVAADJ PRE: 3.52 ML/(MIN*MMHG*L) (ref 2.55–4.77)
ERV LLN: -16448.99
ERV PRE REF: 112.7 %
ERV REF: 1.01
ERVULN: ABNORMAL
FEF 25 75 LLN: 0.87
FEF 25 75 PRE REF: 132.9 %
FEF 25 75 REF: 2.19
FEV05 LLN: 1.5
FEV05 REF: 2.63
FEV1 FVC LLN: 61
FEV1 FVC PRE REF: 109.3 %
FEV1 FVC REF: 75
FEV1 LLN: 2.15
FEV1 PRE REF: 85.1 %
FEV1 REF: 3.07
FRCPLETH LLN: 2.83
FRCPLETH PREREF: 69.3 %
FRCPLETH REF: 3.81
FRCPLETHULN: 4.8
FVC LLN: 3.01
FVC PRE REF: 77.3 %
FVC REF: 4.13
IVC PRE: 3.16 L (ref 3.01–5.28)
IVC SINGLE BREATH LLN: 3.01
IVC SINGLE BREATH PRE REF: 76.4 %
IVC SINGLE BREATH REF: 4.13
IVCSINGLEBREATHULN: 5.28
LLN IC: -9999996.79
PEF LLN: 5.56
PEF PRE REF: 121.2 %
PEF REF: 7.94
PHYSICIAN COMMENT: ABNORMAL
PRE DLCO: 13.21 ML/(MIN*MMHG) (ref 19.92–33.77)
PRE ERV: 1.14 L (ref -16448.99–16451.01)
PRE FEF 25 75: 2.92 L/S (ref 0.87–4.12)
PRE FET 100: 6.47 SEC
PRE FEV05 REF: 85.8 %
PRE FEV1 FVC: 81.78 % (ref 60.54–87.72)
PRE FEV1: 2.61 L (ref 2.15–3.92)
PRE FEV5: 2.26 L (ref 1.5–3.77)
PRE FRC PL: 2.64 L (ref 2.83–4.8)
PRE FVC: 3.19 L (ref 3.01–5.28)
PRE IC: 2.06 L (ref -9999996.79–#######.####)
PRE PEF: 9.63 L/S (ref 5.56–10.33)
PRE REF IC: 64 %
PRE RV: 1.51 L (ref 2.13–3.48)
PRE TLC: 4.7 L (ref 6.18–8.48)
RAW PRE REF: 60.9 %
RAW PRE: 1.86 CMH2O*S/L (ref 3.06–3.06)
RAW REF: 3.06
REF IC: 3.21
RV LLN: 2.13
RV PRE REF: 53.7 %
RV REF: 2.8
RVTLC LLN: 35
RVTLC PRE REF: 73.5 %
RVTLC PRE: 32.05 % (ref 34.62–52.58)
RVTLC REF: 44
RVTLCULN: 53
RVULN: 3.48
SGAW PRE REF: 196.6 %
SGAW PRE: 0.16 1/(CMH2O*S) (ref 0.08–0.08)
SGAW REF: 0.08
TLC LLN: 6.18
TLC PRE REF: 64.2 %
TLC REF: 7.33
TLC ULN: 8.48
ULN IC: ABNORMAL
VA PRE: 4.33 L (ref 7.18–7.18)
VA SINGLE BREATH LLN: 7.18
VA SINGLE BREATH PRE REF: 60.4 %
VA SINGLE BREATH REF: 7.18
VASINGLEBREATHULN: 7.18
VC LLN: 3.01
VC PRE REF: 77.3 %
VC PRE: 3.19 L (ref 3.01–5.28)
VC REF: 4.13
VC ULN: 5.28

## 2023-10-03 PROCEDURE — 94010 BREATHING CAPACITY TEST: ICD-10-PCS | Mod: 59,NTX,S$GLB, | Performed by: INTERNAL MEDICINE

## 2023-10-03 PROCEDURE — 99214 OFFICE O/P EST MOD 30 MIN: CPT | Mod: 25,NTX,S$GLB, | Performed by: NURSE PRACTITIONER

## 2023-10-03 PROCEDURE — 1160F RVW MEDS BY RX/DR IN RCRD: CPT | Mod: CPTII,NTX,S$GLB, | Performed by: NURSE PRACTITIONER

## 2023-10-03 PROCEDURE — 3079F DIAST BP 80-89 MM HG: CPT | Mod: CPTII,NTX,S$GLB, | Performed by: NURSE PRACTITIONER

## 2023-10-03 PROCEDURE — 94729 DIFFUSING CAPACITY: CPT | Mod: NTX,S$GLB,, | Performed by: INTERNAL MEDICINE

## 2023-10-03 PROCEDURE — 1157F PR ADVANCE CARE PLAN OR EQUIV PRESENT IN MEDICAL RECORD: ICD-10-PCS | Mod: CPTII,NTX,S$GLB, | Performed by: NURSE PRACTITIONER

## 2023-10-03 PROCEDURE — 94729 PR C02/MEMBANE DIFFUSE CAPACITY: ICD-10-PCS | Mod: NTX,S$GLB,, | Performed by: INTERNAL MEDICINE

## 2023-10-03 PROCEDURE — 1160F PR REVIEW ALL MEDS BY PRESCRIBER/CLIN PHARMACIST DOCUMENTED: ICD-10-PCS | Mod: CPTII,NTX,S$GLB, | Performed by: NURSE PRACTITIONER

## 2023-10-03 PROCEDURE — 99214 PR OFFICE/OUTPT VISIT, EST, LEVL IV, 30-39 MIN: ICD-10-PCS | Mod: 25,NTX,S$GLB, | Performed by: NURSE PRACTITIONER

## 2023-10-03 PROCEDURE — 94726 PULM FUNCT TST PLETHYSMOGRAP: ICD-10-PCS | Mod: NTX,S$GLB,, | Performed by: INTERNAL MEDICINE

## 2023-10-03 PROCEDURE — 94618 PULMONARY STRESS TESTING: CPT | Mod: NTX,S$GLB,, | Performed by: INTERNAL MEDICINE

## 2023-10-03 PROCEDURE — 99999 PR PBB SHADOW E&M-EST. PATIENT-LVL IV: CPT | Mod: PBBFAC,TXP,, | Performed by: NURSE PRACTITIONER

## 2023-10-03 PROCEDURE — 1111F DSCHRG MED/CURRENT MED MERGE: CPT | Mod: CPTII,NTX,S$GLB, | Performed by: NURSE PRACTITIONER

## 2023-10-03 PROCEDURE — 3288F FALL RISK ASSESSMENT DOCD: CPT | Mod: CPTII,NTX,S$GLB, | Performed by: NURSE PRACTITIONER

## 2023-10-03 PROCEDURE — 3077F PR MOST RECENT SYSTOLIC BLOOD PRESSURE >= 140 MM HG: ICD-10-PCS | Mod: CPTII,NTX,S$GLB, | Performed by: NURSE PRACTITIONER

## 2023-10-03 PROCEDURE — 1126F PR PAIN SEVERITY QUANTIFIED, NO PAIN PRESENT: ICD-10-PCS | Mod: CPTII,NTX,S$GLB, | Performed by: NURSE PRACTITIONER

## 2023-10-03 PROCEDURE — 1126F AMNT PAIN NOTED NONE PRSNT: CPT | Mod: CPTII,NTX,S$GLB, | Performed by: NURSE PRACTITIONER

## 2023-10-03 PROCEDURE — 1101F PT FALLS ASSESS-DOCD LE1/YR: CPT | Mod: CPTII,NTX,S$GLB, | Performed by: NURSE PRACTITIONER

## 2023-10-03 PROCEDURE — 3077F SYST BP >= 140 MM HG: CPT | Mod: CPTII,NTX,S$GLB, | Performed by: NURSE PRACTITIONER

## 2023-10-03 PROCEDURE — 94618 PULMONARY STRESS TESTING: ICD-10-PCS | Mod: NTX,S$GLB,, | Performed by: INTERNAL MEDICINE

## 2023-10-03 PROCEDURE — 94726 PLETHYSMOGRAPHY LUNG VOLUMES: CPT | Mod: NTX,S$GLB,, | Performed by: INTERNAL MEDICINE

## 2023-10-03 PROCEDURE — 3079F PR MOST RECENT DIASTOLIC BLOOD PRESSURE 80-89 MM HG: ICD-10-PCS | Mod: CPTII,NTX,S$GLB, | Performed by: NURSE PRACTITIONER

## 2023-10-03 PROCEDURE — 1159F PR MEDICATION LIST DOCUMENTED IN MEDICAL RECORD: ICD-10-PCS | Mod: CPTII,NTX,S$GLB, | Performed by: NURSE PRACTITIONER

## 2023-10-03 PROCEDURE — 3288F PR FALLS RISK ASSESSMENT DOCUMENTED: ICD-10-PCS | Mod: CPTII,NTX,S$GLB, | Performed by: NURSE PRACTITIONER

## 2023-10-03 PROCEDURE — 1157F ADVNC CARE PLAN IN RCRD: CPT | Mod: CPTII,NTX,S$GLB, | Performed by: NURSE PRACTITIONER

## 2023-10-03 PROCEDURE — 1101F PR PT FALLS ASSESS DOC 0-1 FALLS W/OUT INJ PAST YR: ICD-10-PCS | Mod: CPTII,NTX,S$GLB, | Performed by: NURSE PRACTITIONER

## 2023-10-03 PROCEDURE — 1159F MED LIST DOCD IN RCRD: CPT | Mod: CPTII,NTX,S$GLB, | Performed by: NURSE PRACTITIONER

## 2023-10-03 PROCEDURE — 1111F PR DISCHARGE MEDS RECONCILED W/ CURRENT OUTPATIENT MED LIST: ICD-10-PCS | Mod: CPTII,NTX,S$GLB, | Performed by: NURSE PRACTITIONER

## 2023-10-03 PROCEDURE — 94010 BREATHING CAPACITY TEST: CPT | Mod: 59,NTX,S$GLB, | Performed by: INTERNAL MEDICINE

## 2023-10-03 PROCEDURE — 99999 PR PBB SHADOW E&M-EST. PATIENT-LVL IV: ICD-10-PCS | Mod: PBBFAC,TXP,, | Performed by: NURSE PRACTITIONER

## 2023-10-03 NOTE — PROCEDURES
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  The diagnosis is Interstitial Lung Disease.  The patient's BMI is 28 kg/m2.  Predicted distance (lower limit of normal) is 302.48 meters.      Test Results:    The test was completed without stopping. The total time walked was 360 seconds. During walking, the patient reported:  No complaints. The patient used no assistive devices during testing.     10/03/2023---------Distance: 396.24 meters (1300 feet)     Lap Walk Time O2 Sat % Supplemental Oxygen Heart Rate Blood Pressure Meena Scale   Pre-exercise  (Resting) 0 0 96 % Room Air 78 bpm 131/73 mmHg 0   During Exercise 1 53 sec 94 % Room Air 95 bpm     During Exercise 2 106 sec 92 % Room Air 81 bpm     During Exercise 3 163 sec 90 % Room Air 85 bpm     During Exercise 4 219 sec 90 % Room Air 84 bpm     During Exercise 5 278 sec 90 % Room Air 88 bpm     During Exercise 6 336 sec 92 % Room Air 89 bpm     End of Exercise  360 sec 94 % Room Air 90 bpm 137/75 mmHg 0   Post-exercise  (Recovery)   99 % Room Air  69 bpm       Recovery Time: 58 seconds    Performing nurse/tech: Eduin METZ      PREVIOUS STUDY:   04/06/2023---------Distance: 405.99 meters (1332 feet)       O2 Sat % Supplemental Oxygen Heart Rate Blood Pressure Meena Scale   Pre-exercise  (Resting) 97 % Room Air 65 bpm 154/72 mmHg 0   During Exercise 96 % Room Air 62 bpm 172/77 mmHg 0.5   Post-exercise  (Recovery) 98 % Room Air  57 bpm           CLINICAL INTERPRETATION:  Six minute walk distance is 396.24 meters (1300 feet) with no dyspnea.  During exercise, there was significant desaturation while breathing room air.  Both blood pressure and heart rate remained stable with walking.  The patient did not report non-pulmonary symptoms during exercise.  Since the previous study in April 2023, exercise capacity is unchanged.  Based upon age and body mass index, exercise capacity is normal.

## 2023-10-03 NOTE — PROGRESS NOTES
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 He is on no assisted ventilation.  His New [place] Heart Association Class is I and a Karnofsky score of 100% - Normal, No Complaints, no evidence of disease. He is not diabetic.    Patient presents today for routine follow up. Remains asymptomatic from a respiratory standpoint. He recently was hospitalized in the ICU for severe bradycardia and hypotension.  He successfully underwent pacemaker placement and has since been doing well.  No respiratory complaints and is doing well.     Brief History summarized from initial visit:  Patient has a PMH of TIA, depression(well controlled on current meds), A-Fib on eliquis/rhythmol, HTN, BPH, CKD 3, history of COVID, anemia of chronic blood loss, Hypothyroidism, PUD, osteoarthritis, complex sleep apnea syndrome, chronic back pain (well controlled on muscle relaxants).  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 Has no history of allergies/sinusitis, GERD or aspiration.  Prescribed CPAP, but has issues wearing it.     Review of Systems   Constitutional:  Negative for chills, fever and malaise/fatigue.   HENT:  Negative for congestion, ear pain, sinus pain and sore throat.    Eyes:  Negative for discharge and redness.   Respiratory:  Negative for sputum production, shortness of breath and wheezing.    Cardiovascular:  Negative for chest pain and palpitations.   Gastrointestinal:  Negative for abdominal pain, diarrhea and vomiting.   Genitourinary:  Negative for dysuria.   Musculoskeletal:  Negative for myalgias.   Skin:  Negative for rash.   Neurological:  Negative for dizziness.     Objective:   BP (!) 143/85 (BP Location: Right arm,  "Patient Position: Sitting, BP Method: Medium (Automatic))   Pulse 69   Ht 5' 11" (1.803 m)   Wt 91 kg (200 lb 9.9 oz)   SpO2 98% Comment: room air  BMI 27.98 kg/m²   Physical Exam  Constitutional:       General: He is not in acute distress.     Appearance: Normal appearance.   HENT:      Head: Normocephalic and atraumatic.      Nose: No congestion or rhinorrhea.      Mouth/Throat:      Pharynx: No oropharyngeal exudate or posterior oropharyngeal erythema.   Eyes:      General: No scleral icterus.     Extraocular Movements: Extraocular movements intact.      Conjunctiva/sclera: Conjunctivae normal.   Cardiovascular:      Rate and Rhythm: Normal rate and regular rhythm.      Pulses: Normal pulses.   Pulmonary:      Effort: Pulmonary effort is normal. No respiratory distress.      Breath sounds: Normal breath sounds. No stridor. No wheezing, rhonchi or rales.   Chest:      Chest wall: No tenderness.   Abdominal:      General: There is no distension.      Palpations: Abdomen is soft.   Musculoskeletal:         General: No swelling. Normal range of motion.      Cervical back: Neck supple. No tenderness.      Right lower leg: No edema.      Left lower leg: No edema.   Lymphadenopathy:      Cervical: No cervical adenopathy.   Skin:     General: Skin is warm and dry.   Neurological:      Mental Status: He is alert and oriented to person, place, and time.   Psychiatric:         Mood and Affect: Mood normal.         Behavior: Behavior normal.         Thought Content: Thought content normal.         Judgment: Judgment normal.       Labs:  Hospital Outpatient Visit on 10/03/2023   Component Date Value    Pre FVC 10/03/2023 3.19     PRE FEV5 10/03/2023 2.26     Pre FEV1 10/03/2023 2.61     Pre FEV1 FVC 10/03/2023 81.78     Pre FEF 25 75 10/03/2023 2.92     Pre PEF 10/03/2023 9.63     Pre  10/03/2023 6.47     Pre DLCO 10/03/2023 13.21 (L)     DLCO ADJ PRE 10/03/2023 15.26 (L)     DLCOVA PRE 10/03/2023 3.05     DLVAAdj " PRE 10/03/2023 3.52     VA PRE 10/03/2023 4.33 (L)     IVC PRE 10/03/2023 3.16     Pre TLC 10/03/2023 4.70 (L)     VC PRE 10/03/2023 3.19     PRE IC 10/03/2023 2.06     Pre FRC PL 10/03/2023 2.64 (L)     Pre ERV 10/03/2023 1.14     Pre RV 10/03/2023 1.51 (L)     RVTLC PRE 10/03/2023 32.05 (L)     Raw PRE 10/03/2023 1.86 (L)     sGaw PRE 10/03/2023 0.16 (H)     FVC Ref 10/03/2023 4.13     FVC LLN 10/03/2023 3.01     FVC Pre Ref 10/03/2023 77.3     FEV05 REF 10/03/2023 2.63     FEV05 LLN 10/03/2023 1.50     PRE FEV05 REF 10/03/2023 85.8     FEV1 Ref 10/03/2023 3.07     FEV1 LLN 10/03/2023 2.15     FEV1 Pre Ref 10/03/2023 85.1     FEV1 FVC Ref 10/03/2023 75     FEV1 FVC LLN 10/03/2023 61     FEV1 FVC Pre Ref 10/03/2023 109.3     FEF 25 75 Ref 10/03/2023 2.19     FEF 25 75 LLN 10/03/2023 0.87     FEF 25 75 Pre Ref 10/03/2023 132.9     PEF Ref 10/03/2023 7.94     PEF LLN 10/03/2023 5.56     PEF Pre Ref 10/03/2023 121.2     TLC Ref 10/03/2023 7.33     TLC LLN 10/03/2023 6.18     TLC ULN 10/03/2023 8.48     TLC Pre Ref 10/03/2023 64.2     VC Ref 10/03/2023 4.13     VC LLN 10/03/2023 3.01     VC ULN 10/03/2023 5.28     VC Pre Ref 10/03/2023 77.3     REF IC 10/03/2023 3.21     LLN IC 10/03/2023 -0566770.79     ULN IC 10/03/2023 03352474.21     PRE REF IC 10/03/2023 64.0     FRCpleth Ref 10/03/2023 3.81     FRCpleth LLN 10/03/2023 2.83     FRC PLETH ULN 10/03/2023 4.80     FRCpleth PreRef 10/03/2023 69.3     ERV Ref 10/03/2023 1.01     ERV LLN 10/03/2023 -82269.99     ERV ULN 10/03/2023 86618.01     ERV Pre Ref 10/03/2023 112.7     RV Ref 10/03/2023 2.80     RV LLN 10/03/2023 2.13     RV ULN 10/03/2023 3.48     RV Pre Ref 10/03/2023 53.7     RVTLC Ref 10/03/2023 44     RVTLC LLN 10/03/2023 35     RV TLC ULN 10/03/2023 53     RVTLC Pre Ref 10/03/2023 73.5     Raw Ref 10/03/2023 3.06     Raw Pre Ref 10/03/2023 60.9     sGaw Ref 10/03/2023 0.08     sGaw Pre Ref 10/03/2023 196.6     DLCO Single Breath Ref 10/03/2023 26.84      DLCO Single Breath LLN 10/03/2023 19.92     DLCO SINGLEBREATH ULN 10/03/2023 33.77     DLCO Single Breath Pre R* 10/03/2023 49.2     DLCOc Single Breath Ref 10/03/2023 26.84     DLCOc Single Breath LLN 10/03/2023 19.92     DLCOC SINGLEBREATH ULN 10/03/2023 33.77     DLCOc Single Breath Pre * 10/03/2023 56.9     DLCOVA Ref 10/03/2023 3.66     DLCOVA LLN 10/03/2023 2.55     DLCOVA ULN 10/03/2023 4.77     DLCOVA Pre Ref 10/03/2023 83.3     DLCOc SBVA Ref 10/03/2023 3.66     DLCOc SBVA LLN 10/03/2023 2.55     DLCOCSBVA ULN 10/03/2023 4.77     DLCOc SBVA Pre Ref 10/03/2023 96.2     VA Single Breath Ref 10/03/2023 7.18     VA Single Breath LLN 10/03/2023 7.18     VA SINGLEBREATH ULN 10/03/2023 7.18     VA Single Breath Pre Ref 10/03/2023 60.4     IVC Single Breath Ref 10/03/2023 4.13     IVC Single Breath LLN 10/03/2023 3.01     IVC SINGLEBREATH ULN 10/03/2023 5.28     IVC Single Breath Pre Ref 10/03/2023 76.4            10/3/2023    10:33 AM 4/6/2023     9:11 AM 1/12/2023     9:16 AM 11/21/2022     9:18 AM   Pulmonary Function Tests   FVC 3.2 liters 3.27 liters 3.38 liters 3.21 liters   FEV1 2.61 liters 2.71 liters 2.79 liters 2.7 liters   TLC (liters) 4.7 liters 4.77 liters 4.4 liters 5.06 liters   DLCO (ml/mmHg sec) 13.21 ml/mmHg sec 14.25 ml/mmHg sec 14 ml/mmHg sec 15.13 ml/mmHg sec   FVC% 77.3 78.7 81 77   FEV1% 85.1 87.7 90 87   FEF 25-75 2.92 3.07 3.25 5.06   FEF 25-75% 132.9 138.7 146 69   TLC% 64.2 65.1 60 69   RV 1.51 1.5     RV% 53.7 53.6     DLCO% 49.2 53.1 53 56         4/6/2023     8:19 AM 11/21/2022    10:47 AM   6MW   6MWT Status completed without stopping completed without stopping   Patient Reported No complaints Leg pain   Was O2 used? No No   6MW Distance walked (feet) 1332 feet 1200 feet   Distance walked (meters) 405.99 meters 365.76 meters   Did patient stop? No No   Oxygen Saturation 97 % 96 %   Supplemental Oxygen Room Air Room Air   Heart Rate 65 bpm 52 bpm   Blood Pressure 154/72 172/83   Meena  Dyspnea Rating  nothing at all nothing at all   Oxygen Saturation 96 % 99 %   Supplemental Oxygen Room Air Room Air   Heart Rate 62 bpm 65 bpm   Blood Pressure 172/77 195/96   Meena Dyspnea Rating  very, very light (just noticeable) very light   Recovery Time (seconds) 73 seconds 136 seconds   Oxygen Saturation 98 % 97 %   Supplemental Oxygen Room Air Room Air   Heart Rate 57 bpm 54 bpm       Assessment:-  1. Interstitial pulmonary disease, unspecified      Plan:     Follow up for ILD found incidentally on imaging. Autoimmune workup thus far negative. FVC/DLCO remains stable. 6MWT stable with saturations starting at 96% and decreasing to 90%.  Recently spent time in the ICU for bradycardia and underwent permanent pacemaker placement.  Remains asymptomatic and no role for anti-fibrotics at this time. Will continue to monitor spirometry and 6MWT at routine visits. Will repeat chest CT in 6 months at next visit.     RTC in 6 months or sooner if needed.     Andres Conrad NP  [facility] Trans[facility]

## 2023-10-03 NOTE — LETTER
October 3, 2023        Elias Yang  [address]  [facility] [address]  Phone: [phone]  Fax: [phone]             [facility] Transplant 1st Fl  [address]  [facility] [address]  Phone: [phone]   Patient: Dominick Song MD   MR Number: [id number]   YOB: 1945   Date of Visit: 10/3/2023       Dear Dr. Elias Yang    Thank you for referring Dominick Song to me for evaluation. Attached you will find relevant portions of my assessment and plan of care.    If you have questions, please do not hesitate to call me. I look forward to following Dominick Song along with you.    Sincerely,    Andres Conrad NP    Enclosure    If you would like to receive this communication electronically, please contact [email or web address] or [phone] to request [employer] Link access.    [employer] is a tool which provides read-only access to select patient information with whom you have a relationship. Its easy to use and provides real time access to review your patients record including encounter summaries, notes, results, and demographic information.    If you feel you have received this communication in error or would no longer like to receive these types of communications, please e-mail [email or web address]

## 2023-10-04 ENCOUNTER — CLINICAL SUPPORT (OUTPATIENT)
Dept: CARDIOLOGY | Facility: HOSPITAL | Age: 78
End: 2023-10-04
Attending: INTERNAL MEDICINE
Payer: MEDICARE

## 2023-10-04 ENCOUNTER — PATIENT MESSAGE (OUTPATIENT)
Dept: OPTOMETRY | Facility: CLINIC | Age: 78
End: 2023-10-04

## 2023-10-04 DIAGNOSIS — R00.1 BRADYCARDIA: ICD-10-CM

## 2023-10-04 DIAGNOSIS — R00.1 BRADYCARDIA, UNSPECIFIED: ICD-10-CM

## 2023-10-04 DIAGNOSIS — Z95.0 STATUS POST PLACEMENT OF CARDIAC PACEMAKER: ICD-10-CM

## 2023-10-04 PROCEDURE — 93280 CARDIAC DEVICE CHECK - IN CLINIC & HOSPITAL: ICD-10-PCS | Mod: 26,NTX,, | Performed by: INTERNAL MEDICINE

## 2023-10-04 PROCEDURE — 93280 PM DEVICE PROGR EVAL DUAL: CPT | Mod: 26,NTX,, | Performed by: INTERNAL MEDICINE

## 2023-10-04 PROCEDURE — 93280 PM DEVICE PROGR EVAL DUAL: CPT | Mod: PO,NTX

## 2023-10-09 ENCOUNTER — OFFICE VISIT (OUTPATIENT)
Dept: CARDIOLOGY | Facility: CLINIC | Age: 78
End: 2023-10-09
Payer: MEDICARE

## 2023-10-09 VITALS
SYSTOLIC BLOOD PRESSURE: 157 MMHG | BODY MASS INDEX: 28.95 KG/M2 | DIASTOLIC BLOOD PRESSURE: 76 MMHG | HEIGHT: 71 IN | WEIGHT: 206.81 LBS | HEART RATE: 56 BPM

## 2023-10-09 DIAGNOSIS — I10 ESSENTIAL HYPERTENSION: ICD-10-CM

## 2023-10-09 DIAGNOSIS — I48.0 PAROXYSMAL ATRIAL FIBRILLATION: Primary | ICD-10-CM

## 2023-10-09 DIAGNOSIS — R00.1 SYMPTOMATIC BRADYCARDIA: ICD-10-CM

## 2023-10-09 PROCEDURE — 1160F PR REVIEW ALL MEDS BY PRESCRIBER/CLIN PHARMACIST DOCUMENTED: ICD-10-PCS | Mod: CPTII,NTX,S$GLB, | Performed by: INTERNAL MEDICINE

## 2023-10-09 PROCEDURE — 3078F DIAST BP <80 MM HG: CPT | Mod: CPTII,NTX,S$GLB, | Performed by: INTERNAL MEDICINE

## 2023-10-09 PROCEDURE — 1101F PT FALLS ASSESS-DOCD LE1/YR: CPT | Mod: CPTII,NTX,S$GLB, | Performed by: INTERNAL MEDICINE

## 2023-10-09 PROCEDURE — 1157F ADVNC CARE PLAN IN RCRD: CPT | Mod: CPTII,NTX,S$GLB, | Performed by: INTERNAL MEDICINE

## 2023-10-09 PROCEDURE — 99999 PR PBB SHADOW E&M-EST. PATIENT-LVL III: ICD-10-PCS | Mod: PBBFAC,TXP,, | Performed by: INTERNAL MEDICINE

## 2023-10-09 PROCEDURE — 1159F MED LIST DOCD IN RCRD: CPT | Mod: CPTII,NTX,S$GLB, | Performed by: INTERNAL MEDICINE

## 2023-10-09 PROCEDURE — 1126F AMNT PAIN NOTED NONE PRSNT: CPT | Mod: CPTII,NTX,S$GLB, | Performed by: INTERNAL MEDICINE

## 2023-10-09 PROCEDURE — 3078F PR MOST RECENT DIASTOLIC BLOOD PRESSURE < 80 MM HG: ICD-10-PCS | Mod: CPTII,NTX,S$GLB, | Performed by: INTERNAL MEDICINE

## 2023-10-09 PROCEDURE — 3288F FALL RISK ASSESSMENT DOCD: CPT | Mod: CPTII,NTX,S$GLB, | Performed by: INTERNAL MEDICINE

## 2023-10-09 PROCEDURE — 1111F DSCHRG MED/CURRENT MED MERGE: CPT | Mod: CPTII,NTX,S$GLB, | Performed by: INTERNAL MEDICINE

## 2023-10-09 PROCEDURE — 1157F PR ADVANCE CARE PLAN OR EQUIV PRESENT IN MEDICAL RECORD: ICD-10-PCS | Mod: CPTII,NTX,S$GLB, | Performed by: INTERNAL MEDICINE

## 2023-10-09 PROCEDURE — 3077F PR MOST RECENT SYSTOLIC BLOOD PRESSURE >= 140 MM HG: ICD-10-PCS | Mod: CPTII,NTX,S$GLB, | Performed by: INTERNAL MEDICINE

## 2023-10-09 PROCEDURE — 1111F PR DISCHARGE MEDS RECONCILED W/ CURRENT OUTPATIENT MED LIST: ICD-10-PCS | Mod: CPTII,NTX,S$GLB, | Performed by: INTERNAL MEDICINE

## 2023-10-09 PROCEDURE — 3077F SYST BP >= 140 MM HG: CPT | Mod: CPTII,NTX,S$GLB, | Performed by: INTERNAL MEDICINE

## 2023-10-09 PROCEDURE — 3288F PR FALLS RISK ASSESSMENT DOCUMENTED: ICD-10-PCS | Mod: CPTII,NTX,S$GLB, | Performed by: INTERNAL MEDICINE

## 2023-10-09 PROCEDURE — 1101F PR PT FALLS ASSESS DOC 0-1 FALLS W/OUT INJ PAST YR: ICD-10-PCS | Mod: CPTII,NTX,S$GLB, | Performed by: INTERNAL MEDICINE

## 2023-10-09 PROCEDURE — 1160F RVW MEDS BY RX/DR IN RCRD: CPT | Mod: CPTII,NTX,S$GLB, | Performed by: INTERNAL MEDICINE

## 2023-10-09 PROCEDURE — 99999 PR PBB SHADOW E&M-EST. PATIENT-LVL III: CPT | Mod: PBBFAC,TXP,, | Performed by: INTERNAL MEDICINE

## 2023-10-09 PROCEDURE — 99024 PR POST-OP FOLLOW-UP VISIT: ICD-10-PCS | Mod: NTX,S$GLB,, | Performed by: INTERNAL MEDICINE

## 2023-10-09 PROCEDURE — 99024 POSTOP FOLLOW-UP VISIT: CPT | Mod: NTX,S$GLB,, | Performed by: INTERNAL MEDICINE

## 2023-10-09 PROCEDURE — 1126F PR PAIN SEVERITY QUANTIFIED, NO PAIN PRESENT: ICD-10-PCS | Mod: CPTII,NTX,S$GLB, | Performed by: INTERNAL MEDICINE

## 2023-10-09 PROCEDURE — 1159F PR MEDICATION LIST DOCUMENTED IN MEDICAL RECORD: ICD-10-PCS | Mod: CPTII,NTX,S$GLB, | Performed by: INTERNAL MEDICINE

## 2023-10-09 RX ORDER — TORSEMIDE 20 MG/1
20 TABLET ORAL
Qty: 15 TABLET | Refills: 5 | Status: SHIPPED | OUTPATIENT
Start: 2023-10-09 | End: 2024-10-08

## 2023-10-09 RX ORDER — POTASSIUM CHLORIDE 750 MG/1
10 CAPSULE, EXTENDED RELEASE ORAL
Qty: 15 CAPSULE | Refills: 5 | Status: SHIPPED | OUTPATIENT
Start: 2023-10-09

## 2023-10-09 NOTE — PROGRESS NOTES
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  He ended up getting permanent pacemaker.  Slowly getting better since discharge.    No checking his blood pressure at home  Currently on Eliquis, propafenone 225 b.i.d. and Coreg 6.25 b.i.d.  Seeing Dr. Beatty in 2-3 weeks    Review of Systems   Constitutional: Negative for decreased appetite, malaise/fatigue, weight gain and weight loss.   Cardiovascular:  Negative for chest pain, dyspnea on exertion, leg swelling, palpitations and syncope.   Respiratory:  Negative for cough and shortness of breath.    Gastrointestinal: Negative.    Neurological:  Negative for weakness.   All other systems reviewed and are negative.       Objective:      Physical Exam  Vitals and nursing note reviewed.   Constitutional:       Appearance: Normal appearance. He is well-developed.   HENT:      Head: Normocephalic.   Eyes:      Pupils: Pupils are equal, round, and reactive to light.   Neck:      Thyroid: No thyromegaly.      Vascular: No carotid bruit or JVD.   Cardiovascular:      Rate and Rhythm: Normal rate and regular rhythm.      Chest Wall: PMI is not displaced.      Pulses: Normal pulses and intact distal pulses.      Heart sounds: Normal heart sounds. No murmur heard.     No gallop.   Pulmonary:      Effort: Pulmonary effort is normal.      Breath sounds: Normal breath sounds.   Abdominal:      Palpations: Abdomen is soft. There is no mass.      Tenderness: There is no abdominal tenderness.   Musculoskeletal:         General: Normal range of motion.      Cervical back: Normal range of motion and neck supple.   Skin:     General: Skin is warm.   Neurological:      Mental Status: He is alert and oriented to person, place, and time.      Sensory: No sensory  deficit.      Deep Tendon Reflexes: Reflexes are normal and symmetric.             Most Recent EKG Results  Results for orders placed or performed during the hospital encounter of 09/22/23   EKG 12-lead    Collection Time: 09/23/23  9:25 AM    Narrative    Test Reason : R00.1,    Vent. Rate : 055 BPM     Atrial Rate : 055 BPM     P-R Int : 218 ms          QRS Dur : 116 ms      QT Int : 448 ms       P-R-T Axes : 071 055 023 degrees     QTc Int : 428 ms    Sinus bradycardia with 1st degree A-V block  Otherwise normal ECG  When compared with ECG of 22-SEP-2023 12:38,  Sinus rhythm has replaced Atrial fibrillation  Nonspecific T wave abnormality now evident in Inferior leads  Confirmed by KAMALA DUNNE MD ([id number]) on 9/23/2023 9:17:59 PM    Referred By: TARIK   SELF           Confirmed By:KAMALA DUNNE MD       Most Recent Echocardiogram Results  Results for orders placed in visit on 09/21/23    Echo    Interpretation Summary    Left Ventricle: The left ventricle is normal in size. There is normal systolic function. Ejection fraction by visual approximation is 65%. Grade III diastolic dysfunction.    Right Ventricle: Normal right ventricular cavity size. Systolic function is normal.    Left Atrium: Left atrium is moderately dilated.    Mitral Valve: There is mild regurgitation with a posterolateral eccentriccally directed jet.    Tricuspid Valve: There is mild regurgitation.    Pulmonary Artery: The estimated pulmonary artery systolic pressure is 48 mmHg.    IVC/SVC: Intermediate venous pressure at 8 mmHg.      Most Recent Nuclear Stress Test Results  Results for orders placed during the hospital encounter of 09/20/23    Nuclear Stress - Cardiology Interpreted    Interpretation Summary    Normal myocardial perfusion scan. There is no evidence of myocardial ischemia or infarction.    The gated perfusion images showed an ejection fraction of 60% post stress.    LV cavity size is normal at rest and normal at  stress.    The ECG portion of the study is abnormal but not diagnostic.    The patient reported no chest pain during the stress test.      Most Recent Cardiac PET Stress Test Results  No results found for this or any previous visit.      Most Recent Cardiovascular Angiogram results  Results for orders placed during the hospital encounter of 06/15/20    Intra-Procedure Documentation    Narrative  PEPE performed in the Invasive Lab  - See Procedure Log link below for nursing documentation  - See PEPE order on Card Proc Tab for physician findings      Other Most Recent Cardiology Results  Results for orders placed during the hospital encounter of 09/22/23    CARDIAC MONITORING STRIPS      Labs reviewed    Assessment:       1. Paroxysmal atrial fibrillation    2. Essential hypertension    3. Symptomatic bradycardia         Plan:   Resume Demadex 20 mg Monday Wednesday and Friday as well as potassium chloride 10 mEq Monday Wednesday and Friday  Continue:  Antiarrhythmic, Beta blocker, and DOAC  Regular exercise program  Weight loss  Low cholesterol diet  Three-month follow-up

## 2023-10-10 ENCOUNTER — PATIENT MESSAGE (OUTPATIENT)
Dept: FAMILY MEDICINE | Facility: CLINIC | Age: 78
End: 2023-10-10

## 2023-10-10 ENCOUNTER — OFFICE VISIT (OUTPATIENT)
Dept: FAMILY MEDICINE | Facility: CLINIC | Age: 78
End: 2023-10-10
Payer: MEDICARE

## 2023-10-10 VITALS
SYSTOLIC BLOOD PRESSURE: 142 MMHG | WEIGHT: 206.56 LBS | RESPIRATION RATE: 18 BRPM | DIASTOLIC BLOOD PRESSURE: 76 MMHG | OXYGEN SATURATION: 96 % | HEART RATE: 57 BPM | BODY MASS INDEX: 28.92 KG/M2 | HEIGHT: 71 IN

## 2023-10-10 DIAGNOSIS — I48.0 PAROXYSMAL ATRIAL FIBRILLATION: ICD-10-CM

## 2023-10-10 DIAGNOSIS — Z95.0 STATUS CARDIAC PACEMAKER: ICD-10-CM

## 2023-10-10 DIAGNOSIS — R00.1 SYMPTOMATIC BRADYCARDIA: Primary | ICD-10-CM

## 2023-10-10 DIAGNOSIS — I10 HYPERTENSION, UNSPECIFIED TYPE: ICD-10-CM

## 2023-10-10 DIAGNOSIS — I10 ESSENTIAL HYPERTENSION: ICD-10-CM

## 2023-10-10 PROCEDURE — 99999 PR PBB SHADOW E&M-EST. PATIENT-LVL III: ICD-10-PCS | Mod: PBBFAC,HCNC,, | Performed by: FAMILY MEDICINE

## 2023-10-10 PROCEDURE — 99214 OFFICE O/P EST MOD 30 MIN: CPT | Mod: HCNC,S$GLB,, | Performed by: FAMILY MEDICINE

## 2023-10-10 PROCEDURE — 99999 PR PBB SHADOW E&M-EST. PATIENT-LVL III: CPT | Mod: PBBFAC,HCNC,, | Performed by: FAMILY MEDICINE

## 2023-10-10 PROCEDURE — 99214 PR OFFICE/OUTPT VISIT, EST, LEVL IV, 30-39 MIN: ICD-10-PCS | Mod: S$PBB,HCNC,, | Performed by: FAMILY MEDICINE

## 2023-10-10 PROCEDURE — 1157F PR ADVANCE CARE PLAN OR EQUIV PRESENT IN MEDICAL RECORD: ICD-10-PCS | Mod: HCNC,,, | Performed by: FAMILY MEDICINE

## 2023-10-10 PROCEDURE — 1157F ADVNC CARE PLAN IN RCRD: CPT | Mod: HCNC,CPTII,S$GLB, | Performed by: FAMILY MEDICINE

## 2023-10-10 NOTE — PROGRESS NOTES
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    Chief Complaint: Cardiac Issues (Pacemaker)      Here for hospital follow-up.    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 Emergent cardiology consultation for PPM evaluation. Had cardiology evaluation with recent cards clinic appointment on 9/18> then stress test 9/20 normal. Echo with ef preserved grade 3 diastolic dysfunction. On coreg and telmisartan at home  Hospital Stay: Patient was seen by Cardiology and underwent placement of a temporary pacemaker for bradycardia.  A permanent pacemaker was placed 9/23 without complications and the patient was cleared for discharge.  He will resume Eliquis on 9/25 and will be on Kelfex for 4 days.      Denies dizziness or chest pains.      Angular chelitis - C/o cracking of lips. Using nystatin cream as needed.  S/p ORIF left femur fracture - following with Dr. Rodriguez; doing well.  H/o TIA - taking just Eliquis; stopped lipitor after seeing neurologist; using lopressor PRN  Afib, s/p pacemaker - s/p ablation; sees Dr. Beatty. Taking Eliquis 5mg BID, Coreg BID, Rhythmol 225mg BID.  HTN - Coreg 6.25mg BID; Demadex 20 mg Monday Wednesday and Friday as well as potassium chloride 10 mEq Monday Wednesday and Friday  CKD - following with Dr. Pond  Knee DJD - s/p right knee TKA; stable  Hypothyroidism - " tolerating levothyroxine 75mcg  S/ gastric bypass - taking ferrous sulfate and B12  Depression - mood controlled on Wellbutrin and Lexapro  Osteoporosis - taking calcium citrate; getting Reclast annually  BPH - stable since Urolift  Pulmonary fibrosis - following with pulmonology every 6 months  Hypocalcemia - taking calcium carbonate 2 tablets daily.  Following with pain management; having some side effects with tizanidine.    Hypertension  Pertinent negatives include no chest pain, headaches, neck pain, palpitations or shortness of breath.   Follow-up  Pertinent negatives include no abdominal pain, arthralgias, chest pain, chills, coughing, fever, headaches, nausea, neck pain, rash, sore throat or weakness.       Past Medical History:   Diagnosis Date    Anticoagulant long-term use     Anxiety     Arthritis     Atrial fibrillation     BPH (benign prostatic hyperplasia)     Cataract     CKD (chronic kidney disease) stage 3, GFR 30-59 ml/min 7/10/2017    Followed by Dr. Jeevan Pond    Colon polyp     benign    Depression     Elevated PSA     Erectile dysfunction     Gastric ulcer with hemorrhage     Hep B w/o coma 1977    History of bleeding peptic ulcer     History of prostatitis     Hypertension     PAF (paroxysmal atrial fibrillation)     Sleep apnea     on CPAP    Stroke     TIA December 2019    Thyroid disease        Past Surgical History:   Procedure Laterality Date    A-V CARDIAC PACEMAKER INSERTION Left 9/23/2023    Procedure: Dual Chamber PPM (Heart) Rm 2620;  Surgeon: Pan Moss MD;  [facility] CATH;  Service: Cardiology;  Laterality: Left;    ABDOMINAL HERNIA REPAIR      ABLATION OF ARRHYTHMOGENIC FOCUS FOR ATRIAL FIBRILLATION N/A 6/15/2020    Procedure: Ablation atrial fibrillation;  Surgeon: Wyatt Beatty MD;  Location: [facility] EP LAB;  Service: Cardiology;  Laterality: N/A;  PAF, AFl, PEPE, PVI re-do, CTI, RFA, JUSTIN, Gen, SK, 3 Prep    CATARACT EXTRACTION  11/25/2013    bilateral     CHOLECYSTECTOMY      COLONOSCOPY N/A 11/25/2015    Procedure: COLONOSCOPY;  Surgeon: Toby Hernandez MD;  Location: [facility] ENDO;  Service: Endoscopy;  Laterality: N/A;    COLONOSCOPY N/A 11/13/2020    Procedure: COLONOSCOPY;  Surgeon: Toby Hernandez MD;  Location: [facility] ENDO;  Service: Endoscopy;  Laterality: N/A;    CYSTOSCOPY WITH INSERTION OF MINIMALLY INVASIVE IMPLANT TO ENLARGE PROSTATIC URETHRA N/A 11/28/2022    Procedure: CYSTOSCOPY, WITH INSERTION OF UROLIFT IMPLANT;  Surgeon: Yakov Shetty MD;  Location: [facility] OR;  Service: Urology;  Laterality: N/A;    EYE SURGERY      GASTRIC BYPASS  1992    INSERTION, PACEMAKER, TEMPORARY TRANSVENOUS  9/22/2023    Procedure: Temp pacer insertion ER Rm 8;  Surgeon: Pan Moss MD;  Location: [facility] CATH;  Service: Cardiology;;    INTRAMEDULLARY RODDING OF FEMUR Left 7/18/2020    Procedure: INSERTION, INTRAMEDULLARY ERIC, FEMUR, left, Synthes, [place] table, Large C arm clock side,;  Surgeon: Tony Rodriguez MD;  Location: [address] FLR;  Service: Orthopedics;  Laterality: Left;    KNEE ARTHROSCOPY      RT    LASIK  2001    both eyes (Dr. Rabago)    ORIF HUMERUS FRACTURE      LT    ORIF HUMERUS FRACTURE Right     non surgical repair    RADIOFREQUENCY ABLATION      x2    Right ankle tendon repair      ROBOT-ASSISTED REPAIR OF INCISIONAL HERNIA USING DA GARETH XI Left 6/13/2022    Procedure: XI ROBOTIC REPAIR, HERNIA, INCISIONAL (LEFT SIDE SPIGELIAN WITH MESH);  Surgeon: Rosendo Marti MD;  Location: [facility] OR [facility] FLR;  Service: General;  Laterality: Left;  consent in am    ROTATOR CUFF REPAIR      right    TOTAL KNEE ARTHROPLASTY Right 5/29/2018    Procedure: REPLACEMENT-KNEE-TOTAL-pro;  Surgeon: Denzel Dallas MD;  Location: [facility] OR [facility] FLR;  Service: Orthopedics;  Laterality: Right;  Pro    TREATMENT OF CARDIAC ARRHYTHMIA  6/15/2020    Procedure: Cardioversion or Defibrillation;  Surgeon: Wyatt Beatty MD;  Location: [facility] EP LAB;  Service:  Cardiology;;       Review of patient's allergies indicates:   Allergen Reactions    No known drug allergies        Social History     Socioeconomic History    Marital status:     Number of children: 5   Occupational History    Occupation: retired anesthesiology     Employer: [employer]    Occupation: [employer] grad     Comment: previous football player   Tobacco Use    Smoking status: Never     Passive exposure: Never    Smokeless tobacco: Never    Tobacco comments:     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    Substance and Sexual Activity    Alcohol use: No    Drug use: No    Sexual activity: Yes     Partners: Female       Current Outpatient Medications on File Prior to Visit   Medication Sig Dispense Refill    alfuzosin (UROXATRAL) 10 mg Tb24 Take 1 tablet (10 mg total) by mouth daily with breakfast. 30 tablet 11    buPROPion (WELLBUTRIN XL) 300 MG 24 hr tablet Take 1 tablet (300 mg total) by mouth once daily. 90 tablet 3    calcium citrate (CALCITRATE) 200 mg (950 mg) tablet Take 1 tablet (200 mg total) by mouth 2 (two) times daily. 180 tablet 3    carvediloL (COREG) 6.25 MG tablet Take 1 tablet (6.25 mg total) by mouth 2 (two) times daily. 60 tablet 11    cyclobenzaprine (FLEXERIL) 10 MG tablet Take 1 tablet (10 mg total) by mouth 3 (three) times daily as needed for Muscle spasms. 60 tablet 3    ELIQUIS 5 mg Tab Take 1 tablet (5 mg total) by mouth 2 (two) times a day. 180 tablet 2    EScitalopram oxalate (LEXAPRO) 10 MG tablet TAKE 1 TABLET(10 MG) BY MOUTH EVERY DAY 90 tablet 4    levothyroxine (SYNTHROID) 75 MCG tablet Take 1 tablet (75 mcg total) by mouth before breakfast. 90 tablet 2    OMEGA-3 FATTY ACIDS (FISH OIL CONCENTRATE ORAL) Take by mouth Daily.      potassium chloride (MICRO-K) 10 MEQ CpSR Take 1 capsule (10 mEq total) by mouth every Mon, Wed, Fri. 15 capsule 5    propafenone (RYTHMOL SR) 225 MG Cp12 Take 1 capsule (225 mg total) by mouth every 12 (twelve) hours. 60 capsule 11     tiZANidine (ZANAFLEX) 4 MG tablet Take 4 mg by mouth 3 (three) times daily.      torsemide (DEMADEX) 20 MG Tab Take 1 tablet (20 mg total) by mouth every Mon, Wed, Fri. 15 tablet 5    celecoxib (CELEBREX) 200 MG capsule TAKE 1 CAPSULE(200 MG) BY MOUTH TWICE DAILY (Patient not taking: Reported on 10/10/2023) 60 capsule 2    diclofenac-misoprostol  mg-mcg (ARTHROTEC 75)  mg-mcg per tablet Take 1 tablet by mouth 2 (two) times daily.      [DISCONTINUED] HYDROcodone-acetaminophen (NORCO) 5-325 mg per tablet Take 1 tablet by mouth.      [DISCONTINUED] HYDROcodone-acetaminophen (NORCO) 5-325 mg per tablet Take 1 tablet by mouth every 4 (four) hours as needed for Pain. (Patient not taking: Reported on 10/10/2023) 14 tablet 0    [DISCONTINUED] methocarbamoL (ROBAXIN) 500 MG Tab TAKE 1 TABLET(500 MG) BY MOUTH THREE TIMES DAILY FOR 10 DAYS (Patient not taking: Reported on 10/10/2023) 90 tablet 0    [DISCONTINUED] oxyCODONE-acetaminophen (PERCOCET) 5-325 mg per tablet Take 1 tablet by mouth every 12 (twelve) hours as needed for Pain. (Patient not taking: Reported on 10/10/2023) 14 tablet 0    [DISCONTINUED] torsemide (DEMADEX) 20 MG Tab Take 1 tablet (20 mg total) by mouth daily as needed. (Patient not taking: Reported on 10/10/2023) 10 tablet 0     No current facility-administered medications on file prior to visit.       Family History   Problem Relation Age of Onset    COPD Father     Diabetes Father     Osteoporosis Father     Aortic stenosis Mother     Heart disease Mother         aortic stenosis    Osteoporosis Mother     Heart attack Brother     No Known Problems Son     No Known Problems Daughter     No Known Problems Daughter     No Known Problems Daughter        Review of Systems   Constitutional:  Negative for appetite change, chills, fever and unexpected weight change.   HENT:  Negative for sore throat and trouble swallowing.    Eyes:  Negative for pain and visual disturbance.   Respiratory:  Negative  "for cough, chest tightness, shortness of breath and wheezing.    Cardiovascular:  Negative for chest pain, palpitations and leg swelling.   Gastrointestinal:  Negative for abdominal pain, blood in stool, constipation, diarrhea and nausea.   Genitourinary:  Negative for difficulty urinating, dysuria and hematuria.   Musculoskeletal:  Negative for arthralgias, gait problem and neck pain.   Skin:  Negative for rash and wound.   Neurological:  Negative for dizziness, weakness, light-headedness and headaches.   Hematological:  Negative for adenopathy.   Psychiatric/Behavioral:  Negative for dysphoric mood.        Objective:      BP (!) 142/76   Pulse (!) 57   Resp 18   Ht 5' 11" (1.803 m)   Wt 93.7 kg (206 lb 9.1 oz)   SpO2 96%   BMI 28.81 kg/m²   Physical Exam  Constitutional:       General: He is not in acute distress.     Appearance: He is well-developed.   HENT:      Head: Normocephalic and atraumatic.      Right Ear: External ear normal.      Left Ear: External ear normal.      Mouth/Throat:      Pharynx: Uvula midline. No oropharyngeal exudate.   Eyes:      General: Lids are normal.      Conjunctiva/sclera: Conjunctivae normal.      Pupils: Pupils are equal, round, and reactive to light.   Neck:      Thyroid: No thyroid mass or thyromegaly.      Trachea: Phonation normal.   Cardiovascular:      Rate and Rhythm: Normal rate and regular rhythm.      Heart sounds: Normal heart sounds. No murmur heard.     No friction rub. No gallop.   Pulmonary:      Effort: Pulmonary effort is normal. No respiratory distress.      Breath sounds: Normal breath sounds. No wheezing or rales.   Musculoskeletal:         General: Normal range of motion.      Cervical back: Full passive range of motion without pain, normal range of motion and neck supple.   Lymphadenopathy:      Cervical: No cervical adenopathy.   Skin:     General: Skin is warm and dry.   Neurological:      Mental Status: He is alert and oriented to person, place, " and time.      Cranial Nerves: No cranial nerve deficit.      Coordination: Coordination normal.   Psychiatric:         Speech: Speech normal.         Behavior: Behavior normal.         Thought Content: Thought content normal.         Judgment: Judgment normal.         Results for orders placed or performed during the hospital encounter of 10/03/23   Spirometry w/tracing   Result Value Ref Range    Physician Comment       Spirometry is normal. Lung volume determination shows mild restriction. Airway mechanics show decrased airway resistance and increased conductance. DLCO is moderately decreased.   Â   Notes: DLCO interpretation based on the adjusted DLCO value due to a low hemoglobin.       Pre FVC 3.19 3.01 - 5.28 L    PRE FEV5 2.26 1.50 - 3.77 L    Pre FEV1 2.61 2.15 - 3.92 L    Pre FEV1 FVC 81.78 60.54 - 87.72 %    Pre FEF 25 75 2.92 0.87 - 4.12 L/s    Pre PEF 9.63 5.56 - 10.33 L/s    Pre  6.47 sec    Pre DLCO 13.21 (L) 19.92 - 33.77 ml/(min*mmHg)    DLCO ADJ PRE 15.26 (L) 19.92 - 33.77 ml/(min*mmHg)    DLCOVA PRE 3.05 2.55 - 4.77 ml/(min*mmHg*L)    DLVAAdj PRE 3.52 2.55 - 4.77 ml/(min*mmHg*L)    VA PRE 4.33 (L) 7.18 - 7.18 L    IVC PRE 3.16 3.01 - 5.28 L    Pre TLC 4.70 (L) 6.18 - 8.48 L    VC PRE 3.19 3.01 - 5.28 L    PRE IC 2.06 -1855066.79 - 01995671.21 L    Pre FRC PL 2.64 (L) 2.83 - 4.80 L    Pre ERV 1.14 -70732.99 - 36471.01 L    Pre RV 1.51 (L) 2.13 - 3.48 L    RVTLC PRE 32.05 (L) 34.62 - 52.58 %    Raw PRE 1.86 (L) 3.06 - 3.06 cmH2O*s/L    sGaw PRE 0.16 (H) 0.08 - 0.08 1/(cmH2O*s)    FVC Ref 4.13     FVC LLN 3.01     FVC Pre Ref 77.3 %    FEV05 REF 2.63     FEV05 LLN 1.50     PRE FEV05 REF 85.8 %    FEV1 Ref 3.07     FEV1 LLN 2.15     FEV1 Pre Ref 85.1 %    FEV1 FVC Ref 75     FEV1 FVC LLN 61     FEV1 FVC Pre Ref 109.3 %    FEF 25 75 Ref 2.19     FEF 25 75 LLN 0.87     FEF 25 75 Pre Ref 132.9 %    PEF Ref 7.94     PEF LLN 5.56     PEF Pre Ref 121.2 %    TLC Ref 7.33     TLC LLN 6.18     TLC ULN  8.48     TLC Pre Ref 64.2 %    VC Ref 4.13     VC LLN 3.01     VC ULN 5.28     VC Pre Ref 77.3 %    REF IC 3.21     LLN IC -[id number]     ULN IC [id number]     PRE REF IC 64.0 %    FRCpleth Ref 3.81     FRCpleth LLN 2.83     FRC PLETH ULN 4.80     FRCpleth PreRef 69.3 %    ERV Ref 1.01     ERV LLN -29443.99     ERV ULN 02773.01     ERV Pre Ref 112.7 %    RV Ref 2.80     RV LLN 2.13     RV ULN 3.48     RV Pre Ref 53.7 %    RVTLC Ref 44     RVTLC LLN 35     RV TLC ULN 53     RVTLC Pre Ref 73.5 %    Raw Ref 3.06     Raw Pre Ref 60.9 %    sGaw Ref 0.08     sGaw Pre Ref 196.6 %    DLCO Single Breath Ref 26.84     DLCO Single Breath LLN 19.92     DLCO SINGLEBREATH ULN 33.77     DLCO Single Breath Pre Ref 49.2 %    DLCOc Single Breath Ref 26.84     DLCOc Single Breath LLN 19.92     DLCOC SINGLEBREATH ULN 33.77     DLCOc Single Breath Pre Ref 56.9 %    DLCOVA Ref 3.66     DLCOVA LLN 2.55     DLCOVA ULN 4.77     DLCOVA Pre Ref 83.3 %    DLCOc SBVA Ref 3.66     DLCOc SBVA LLN 2.55     DLCOCSBVA ULN 4.77     DLCOc SBVA Pre Ref 96.2 %    VA Single Breath Ref 7.18     VA Single Breath LLN 7.18     VA SINGLEBREATH ULN 7.18     VA Single Breath Pre Ref 60.4 %    IVC Single Breath Ref 4.13     IVC Single Breath LLN 3.01     IVC SINGLEBREATH ULN 5.28     IVC Single Breath Pre Ref 76.4 %     *Note: Due to a large number of results and/or encounters for the requested time period, some results have not been displayed. A complete set of results can be found in Results Review.     Labs and hospital records reviewed     Assessment:       1. Symptomatic bradycardia    2. Hypertension, unspecified type    3. Paroxysmal atrial fibrillation    4. Essential hypertension    5. Status cardiac pacemaker                  Plan:       Symptomatic bradycardia    Hypertension, unspecified type    Paroxysmal atrial fibrillation    Essential hypertension    Status cardiac pacemaker                Resume telmisartan 40mg  Routine pacemaker  precautions  F/u with cardiology as planned  Continue Eliquis 5mg BID with ASA  Continue carvedilol   Continue other present meds  Counseled on regular exercise, maintenance of a healthy weight, balanced diet rich in fruits/vegetables and lean protein, and avoidance of unhealthy habits like smoking and excessive alcohol intake.    F/u with me as scheduled in March

## 2023-10-13 ENCOUNTER — PATIENT MESSAGE (OUTPATIENT)
Dept: FAMILY MEDICINE | Facility: CLINIC | Age: 78
End: 2023-10-13

## 2023-10-13 RX ORDER — TELMISARTAN 40 MG/1
40 TABLET ORAL DAILY
Qty: 90 TABLET | Refills: 3 | Status: SHIPPED | OUTPATIENT
Start: 2023-10-13 | End: 2024-10-12

## 2023-10-13 NOTE — TELEPHONE ENCOUNTER
No care due was identified.  [facility] Embedded Care Due Messages. Reference number: [id number].   10/13/2023 3:28:03 PM CDT

## 2023-10-25 ENCOUNTER — CLINICAL SUPPORT (OUTPATIENT)
Dept: CARDIOLOGY | Facility: HOSPITAL | Age: 78
End: 2023-10-25
Attending: INTERNAL MEDICINE
Payer: MEDICARE

## 2023-10-25 PROCEDURE — 93294 REM INTERROG EVL PM/LDLS PM: CPT | Mod: HCNC,NTX,, | Performed by: INTERNAL MEDICINE

## 2023-10-25 PROCEDURE — 93294 CARDIAC DEVICE CHECK CHECK - REMOTE ALERT: ICD-10-PCS | Mod: HCNC,NTX,, | Performed by: INTERNAL MEDICINE

## 2023-10-27 NOTE — Clinical Note
Addended by: Maryanne Brown on: 10/27/2023 01:19 PM     Modules accepted: Orders EP catheter inserted at the right atrium.

## 2023-11-02 ENCOUNTER — TELEPHONE (OUTPATIENT)
Dept: CARDIOLOGY | Facility: HOSPITAL | Age: 78
End: 2023-11-02

## 2023-11-02 NOTE — TELEPHONE ENCOUNTER
Left message requesting a return call to assist in sending transmission from PPM home monitor prior to seeing Dr. Beatty on 11/6. ([facility])

## 2023-11-03 PROBLEM — Z95.0 PACEMAKER: Status: ACTIVE | Noted: 2023-11-03

## 2023-11-03 NOTE — PROGRESS NOTES
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     Background:  First diagnosed with atrial fibrillation 2/12 (noted palpitations). Placed on beta blocker, coumadin. Underwent PEPE/DCCV. Had recurrence, Propafenone  mg twice daily added.  Had recurrence 12/24/13, underwent DCCV, Propafenone increased to 425 mg twice daily.  PVI (Cryoballoon) 7/28/14.   Had done well, was off Propafenone. Developed recurrence, underwent DCCV multiple times.  Repeat PVI 4/27/17 All 4 veins remained isolated.  Antralized left sided lesion set posteriorly, and performed SVC isolation.  Has been compliant with CPAP.  Had persistent recurrence >> DCCV 2/9/18.  He has had paroxysmal AF, with 4 episodes since 11/18.    Exercise echo 2/19 normal biventricular structure and function BISI 57 negative for ischemia.     Continued to have paroxysmal events, self-terminating.  Last episode was last month.  Generally, the episodes are lasting 3 days.  Since last visit, he was admitted with a TIA.  This was thought to be due to non-compliance with Eliquis.     6/15/20 Repeat RFA. Posterior wall isolation + repeat RFA of cavo-tricuspid isthmus. PV's remained isolated. 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     7/13/20 presented with TIA like symptoms. Seen by Dr. Coats (Vascular Neurology). Thought to be atypical for neurologic etiology. CT demonstrates a small area of remote vs. Subacute infarct in R medial occipital lobe, so transient hippocampal ischemia may be at the origin, although again this is atypical     Underwent robotic spigelian hernia repair. Shortly thereafter developed persistent AF, lasting a couple of days. Generally occurring once or twice a month lasting a couple of hours.    Diagnosed with interstitial lung disease.      Update:  Presented with symptomatic bradycardia.  Dual chamber PPM implanted 9/23/23 (Dr. Moss).  Medications adjusted. Coreg reduced to 6.25 mg BID. Propafenone is 225 mg BID. Demadex added.  Device interrogation reveals stable function of the leads, RA pacing 5.8% RV pacing < 1% no AF.    ECG today reviewed by me, reveals atrial fibrillation with KY interval 125 msec, narrow QRS.      Review of Systems   Constitutional: Negative. Negative for fever and malaise/fatigue.   HENT:  Negative for congestion and sore throat.    Cardiovascular:  Negative for chest pain, dyspnea on exertion, irregular heartbeat, leg swelling, near-syncope, orthopnea, palpitations, paroxysmal nocturnal dyspnea and syncope.   Respiratory:  Negative for cough and shortness of breath.    Gastrointestinal:  Negative for abdominal pain, constipation and diarrhea.   Neurological:  Negative for dizziness, light-headedness and weakness.   Psychiatric/Behavioral:  Negative for depression. The patient is not nervous/anxious.    All other systems reviewed and are negative.      Objective:   Physical Exam  Constitutional:       Appearance: He is well-developed.   Eyes:      General: No scleral icterus.     Conjunctiva/sclera: Conjunctivae normal.   Neck:      Vascular: No JVD.      Trachea: No tracheal deviation.   Cardiovascular:      Rate and Rhythm: Normal rate and regular rhythm.      Chest Wall: PMI is not displaced.      Heart sounds: Normal heart sounds.   Pulmonary:      Effort: Pulmonary effort is normal. No respiratory distress.      Breath sounds: Normal breath sounds.   Abdominal:      Palpations: Abdomen is soft.      Tenderness: There is no abdominal tenderness.   Musculoskeletal:         General: No tenderness.   Skin:     General: Skin is warm and dry.      Findings: No rash.   Neurological:      Mental Status: He is alert and oriented to person, place, and time.   Psychiatric:         Behavior: Behavior normal.          Assessment:      1. Paroxysmal atrial fibrillation    2. Bradycardia    3. Essential hypertension    4. Interstitial lung disease    5. Cerebrovascular accident (CVA), unspecified mechanism    6. Stage 3a chronic kidney disease    7. Acquired hypothyroidism    8. Complex sleep apnea syndrome    9. Pacemaker        Plan:     In AF today, he believes started this morning.  Check BMP.  Increase Coreg to 12.5 mg BID. If persists >> DCCV.

## 2023-11-06 ENCOUNTER — OFFICE VISIT (OUTPATIENT)
Dept: CARDIOLOGY | Facility: CLINIC | Age: 78
End: 2023-11-06
Payer: MEDICARE

## 2023-11-06 VITALS
WEIGHT: 186.75 LBS | BODY MASS INDEX: 25.29 KG/M2 | HEIGHT: 72 IN | DIASTOLIC BLOOD PRESSURE: 80 MMHG | SYSTOLIC BLOOD PRESSURE: 138 MMHG | HEART RATE: 50 BPM

## 2023-11-06 DIAGNOSIS — E03.9 ACQUIRED HYPOTHYROIDISM: ICD-10-CM

## 2023-11-06 DIAGNOSIS — G47.31 COMPLEX SLEEP APNEA SYNDROME: ICD-10-CM

## 2023-11-06 DIAGNOSIS — I10 ESSENTIAL HYPERTENSION: ICD-10-CM

## 2023-11-06 DIAGNOSIS — R00.1 BRADYCARDIA: ICD-10-CM

## 2023-11-06 DIAGNOSIS — I63.9 CEREBROVASCULAR ACCIDENT (CVA), UNSPECIFIED MECHANISM: ICD-10-CM

## 2023-11-06 DIAGNOSIS — I48.0 PAF (PAROXYSMAL ATRIAL FIBRILLATION): ICD-10-CM

## 2023-11-06 DIAGNOSIS — Z95.0 PACEMAKER: ICD-10-CM

## 2023-11-06 DIAGNOSIS — I48.0 PAF (PAROXYSMAL ATRIAL FIBRILLATION): Primary | ICD-10-CM

## 2023-11-06 DIAGNOSIS — J84.9 INTERSTITIAL LUNG DISEASE: ICD-10-CM

## 2023-11-06 DIAGNOSIS — N18.31 STAGE 3A CHRONIC KIDNEY DISEASE: ICD-10-CM

## 2023-11-06 DIAGNOSIS — I48.0 PAROXYSMAL ATRIAL FIBRILLATION: Primary | ICD-10-CM

## 2023-11-06 LAB
OHS CV AF BURDEN PERCENT: < 1
OHS CV DC REMOTE DEVICE TYPE: NORMAL
OHS CV ICD SHOCK: NO
OHS CV RV PACING PERCENT: 1 %

## 2023-11-06 PROCEDURE — 93010 ELECTROCARDIOGRAM REPORT: CPT | Mod: HCNC,NTX,S$GLB, | Performed by: INTERNAL MEDICINE

## 2023-11-06 PROCEDURE — 93005 ELECTROCARDIOGRAM TRACING: CPT | Mod: HCNC,PO,TXP

## 2023-11-06 PROCEDURE — 99999 PR PBB SHADOW E&M-EST. PATIENT-LVL III: CPT | Mod: PBBFAC,HCNC,TXP, | Performed by: INTERNAL MEDICINE

## 2023-11-06 PROCEDURE — 99999 PR PBB SHADOW E&M-EST. PATIENT-LVL III: ICD-10-PCS | Mod: PBBFAC,HCNC,TXP, | Performed by: INTERNAL MEDICINE

## 2023-11-06 PROCEDURE — 93010 RHYTHM STRIP: ICD-10-PCS | Mod: HCNC,NTX,, | Performed by: INTERNAL MEDICINE

## 2023-11-06 PROCEDURE — 1157F PR ADVANCE CARE PLAN OR EQUIV PRESENT IN MEDICAL RECORD: ICD-10-PCS | Mod: HCNC,CPTII,NTX,S$GLB | Performed by: INTERNAL MEDICINE

## 2023-11-06 PROCEDURE — 1157F ADVNC CARE PLAN IN RCRD: CPT | Mod: HCNC,CPTII,NTX,S$GLB | Performed by: INTERNAL MEDICINE

## 2023-11-06 PROCEDURE — 99215 OFFICE O/P EST HI 40 MIN: CPT | Mod: HCNC,NTX,S$GLB, | Performed by: INTERNAL MEDICINE

## 2023-11-06 PROCEDURE — 99215 PR OFFICE/OUTPT VISIT, EST, LEVL V, 40-54 MIN: ICD-10-PCS | Mod: HCNC,NTX,S$GLB, | Performed by: INTERNAL MEDICINE

## 2023-11-06 RX ORDER — CARVEDILOL 12.5 MG/1
12.5 TABLET ORAL 2 TIMES DAILY
Qty: 180 TABLET | Refills: 3 | Status: SHIPPED | OUTPATIENT
Start: 2023-11-06 | End: 2023-12-09 | Stop reason: DRUGHIGH

## 2023-11-08 ENCOUNTER — TELEPHONE (OUTPATIENT)
Dept: CARDIOLOGY | Facility: HOSPITAL | Age: 78
End: 2023-11-08

## 2023-11-08 NOTE — TELEPHONE ENCOUNTER
Left message requesting a return call to get to send a transmission from home monitor to assess for AF   Heart PPM

## 2023-11-09 ENCOUNTER — TELEPHONE (OUTPATIENT)
Dept: CARDIOLOGY | Facility: HOSPITAL | Age: 78
End: 2023-11-09

## 2023-11-09 ENCOUNTER — LAB VISIT (OUTPATIENT)
Dept: LAB | Facility: HOSPITAL | Age: 78
End: 2023-11-09
Attending: INTERNAL MEDICINE
Payer: MEDICARE

## 2023-11-09 ENCOUNTER — PATIENT MESSAGE (OUTPATIENT)
Dept: FAMILY MEDICINE | Facility: CLINIC | Age: 78
End: 2023-11-09

## 2023-11-09 ENCOUNTER — OFFICE VISIT (OUTPATIENT)
Dept: OPTOMETRY | Facility: CLINIC | Age: 78
End: 2023-11-09
Payer: MEDICARE

## 2023-11-09 DIAGNOSIS — Z46.0 ENCOUNTER FOR FITTING OR ADJUSTMENT OF SPECTACLES OR CONTACT LENSES: ICD-10-CM

## 2023-11-09 DIAGNOSIS — H04.123 DRY EYE SYNDROME OF BILATERAL LACRIMAL GLANDS: Primary | ICD-10-CM

## 2023-11-09 DIAGNOSIS — I48.0 PAROXYSMAL ATRIAL FIBRILLATION: ICD-10-CM

## 2023-11-09 LAB
ANION GAP SERPL CALC-SCNC: 8 MMOL/L (ref 8–16)
BUN SERPL-MCNC: 33 MG/DL (ref 8–23)
CALCIUM SERPL-MCNC: 8.6 MG/DL (ref 8.7–10.5)
CHLORIDE SERPL-SCNC: 108 MMOL/L (ref 95–110)
CO2 SERPL-SCNC: 23 MMOL/L (ref 23–29)
CREAT SERPL-MCNC: 2 MG/DL (ref 0.5–1.4)
EST. GFR  (NO RACE VARIABLE): 33.7 ML/MIN/1.73 M^2
GLUCOSE SERPL-MCNC: 110 MG/DL (ref 70–110)
POTASSIUM SERPL-SCNC: 5.4 MMOL/L (ref 3.5–5.1)
SODIUM SERPL-SCNC: 139 MMOL/L (ref 136–145)

## 2023-11-09 PROCEDURE — 80048 BASIC METABOLIC PNL TOTAL CA: CPT | Mod: HCNC,NTX | Performed by: INTERNAL MEDICINE

## 2023-11-09 PROCEDURE — 1126F PR PAIN SEVERITY QUANTIFIED, NO PAIN PRESENT: ICD-10-PCS | Mod: HCNC,CPTII,S$GLB,TXP

## 2023-11-09 PROCEDURE — 99213 PR OFFICE/OUTPT VISIT, EST, LEVL III, 20-29 MIN: ICD-10-PCS | Mod: HCNC,S$GLB,TXP,

## 2023-11-09 PROCEDURE — 1101F PT FALLS ASSESS-DOCD LE1/YR: CPT | Mod: HCNC,CPTII,S$GLB,TXP

## 2023-11-09 PROCEDURE — 99999 PR PBB SHADOW E&M-EST. PATIENT-LVL III: CPT | Mod: PBBFAC,HCNC,TXP,

## 2023-11-09 PROCEDURE — 99213 OFFICE O/P EST LOW 20 MIN: CPT | Mod: HCNC,S$GLB,TXP,

## 2023-11-09 PROCEDURE — 36415 COLL VENOUS BLD VENIPUNCTURE: CPT | Mod: HCNC,PO,TXP | Performed by: INTERNAL MEDICINE

## 2023-11-09 PROCEDURE — 1157F PR ADVANCE CARE PLAN OR EQUIV PRESENT IN MEDICAL RECORD: ICD-10-PCS | Mod: HCNC,CPTII,S$GLB,TXP

## 2023-11-09 PROCEDURE — 1101F PR PT FALLS ASSESS DOC 0-1 FALLS W/OUT INJ PAST YR: ICD-10-PCS | Mod: HCNC,CPTII,S$GLB,TXP

## 2023-11-09 PROCEDURE — 1126F AMNT PAIN NOTED NONE PRSNT: CPT | Mod: HCNC,CPTII,S$GLB,TXP

## 2023-11-09 PROCEDURE — 99999 PR PBB SHADOW E&M-EST. PATIENT-LVL III: ICD-10-PCS | Mod: PBBFAC,HCNC,TXP,

## 2023-11-09 PROCEDURE — 1159F PR MEDICATION LIST DOCUMENTED IN MEDICAL RECORD: ICD-10-PCS | Mod: HCNC,CPTII,S$GLB,TXP

## 2023-11-09 PROCEDURE — 3288F PR FALLS RISK ASSESSMENT DOCUMENTED: ICD-10-PCS | Mod: HCNC,CPTII,S$GLB,TXP

## 2023-11-09 PROCEDURE — 1157F ADVNC CARE PLAN IN RCRD: CPT | Mod: HCNC,CPTII,S$GLB,TXP

## 2023-11-09 PROCEDURE — 1159F MED LIST DOCD IN RCRD: CPT | Mod: HCNC,CPTII,S$GLB,TXP

## 2023-11-09 PROCEDURE — 3288F FALL RISK ASSESSMENT DOCD: CPT | Mod: HCNC,CPTII,S$GLB,TXP

## 2023-11-09 NOTE — PROGRESS NOTES
HPI    Pt here for F/U visit regarding CSL.     Contacts have been having trouble staying clear. Pt puts lenses in and 2   or so hours later he is having to put in rewetting drops. Pt would like   more information regarding why this is happening and solution on how to   make this stop happening. Pt is using refresh drops for dryness as needed   with CL.  Last edited by Chetna Sharpe, OD on 11/9/2023 10:22 AM.            Assessment /Plan     For exam results, see Encounter Report.    Dry eye syndrome of bilateral lacrimal glands    Encounter for fitting or adjustment of spectacles or contact lenses      1-2. Pt having difficulties with current contact lenses, Precision 1 dailies. States that they get foggy very quickly after putting them in and has to re-wet them several times a day with artificial tears. Upon examination, OD lens was very oily and deposited, OS was clear. Ed pt on findings and advised that pt instill artificial tears prior to contact lens insertion in the morning. Also discussed using contact lens solution to rinse/clean lenses as opposed to artificial tears. Ed pt that different CL brand may be better given dry eye signs and symptoms. Fit pt in Dailies Total 1. Excellent initial fit and comfort. Advised pt to try out and send message with update, can finalize for DT1 if pt prefers. Pt previous RGP wearer, discussed option for returning to RGPs if pt cannot achieve clear and comfortable vision with soft lenses.   -Pt messaged 11/15/23 stating he liked the lenses. Finalized for DT1    RTC: as scheduled for annual, sooner prn.

## 2023-11-09 NOTE — TELEPHONE ENCOUNTER
Pt was seen in Arrhythmia clinic by Dr. Beatty on 11/6, per Dr. Beatty pt. Was in AF and requested f/u transmission to assess.      AF noted during PPM device remote check:    Presenting rhythm today As/Vs (SR )    Overall burden: 2.6 % since 11/3    Max duration seen:AMS x 63- max duration per device 48 mins. 52 secs. On 11/6, atrial under sensing noted   Events occurred between 9:39 am - 2:13 pm        Most recent episode:11/6    Ventricular rates:   Controlled         Anticoagulation status:  Eliquis          Notified pt. , pt. Reports he knows he is no longer in AF

## 2023-11-14 NOTE — PROGRESS NOTES
Subjective:       Patient ID: Dominick KENNEDY MD Duncan is a 73 y.o. male.    Chief Complaint: lesion on tongue    Dominick is here for tongue lesion.   Length of symptoms: 2 months, has been increasing over this time. Has had some fluctuations during this time.  Onset: Acute  Symptoms occur Every day  Therapies tried include: Silver nitrate  No ear pain.   Associated symptoms: no bleeding. He does have pain with eating, some discomfort with rest. No recent dental work. No otalgia.   He is going to [place] next weeks for a few weeks.   No tobacco. Occasional alcohol.      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   Eyes: Negative for discharge.   Respiratory: Negative for difficulty breathing and wheezing   Cardiovascular: Negative for chest pain.   Gastrointestinal: Negative for abdominal distention and abdominal pain.   Endocrine: Negative for cold intolerance and heat intolerance.   Genitourinary: Negative for dysuria.   Musculoskeletal: Negative for gait problem and joint swelling.   Skin: Negative for color change and pallor.   Neurological: Negative for syncope and weakness.   Psychiatric/Behavioral: Negative for agitation and confusion.     Objective:        Constitutional:   He is oriented to person, place, and time. He appears well-developed and well-nourished. He appears alert. He is active. Normal speech.      Head:  Normocephalic and atraumatic. Head is without TMJ tenderness. No scars. Salivary glands normal.  Facial strength is normal.      Ears:    Right Ear: No drainage or swelling. No middle ear effusion.   Left Ear: No drainage or swelling.  No middle ear effusion.     Nose:  No mucosal edema, rhinorrhea  or sinus tenderness. No turbinate hypertrophy.      Mouth/Throat  Oropharynx clear and moist without lesions or asymmetry, normal uvula midline and mirror exam normal. Normal dentition. Oral lesions present. No uvula swelling, lacerations or trismus. No oropharyngeal exudate. Tonsillar erythema, tonsillar exudate.          Neck:  Full range of motion with neck supple and no adenopathy. Thyroid tenderness is present. No tracheal deviation, no edema, no erythema, normal range of motion, no stridor, no crepitus and no neck rigidity present. No thyroid mass present.     Cardiovascular:   Intact distal pulses and normal pulses.      Pulmonary/Chest:   Effort normal and breath sounds normal. No stridor.     Psychiatric:   His speech is normal and behavior is normal. His mood appears not anxious. His affect is not labile.     Neurological:   He is alert and oriented to person, place, and time. No sensory deficit.     Skin:   No abrasions, lacerations, lesions, or rashes. No abrasion and no bruising noted.         Tests / Results:  Dominick Song MD  [id number]  1945    Diagnosis: Lesion of lateral tongue    Procedure:  Biopsy of anterior 2/3 of tongue     Risks were discussed including scar, hematoma, seroma, recurrence/persistent, need for further procedures. ,he signed consent. A time out was performed with the clinic nurse present.     Procedure in detail: The area was visualized with proper lighting and exposure.  Adequate anesthesia was obtained using 0.4 cc of 1% Lidocaine with 1:100,000 Epinephrine. A for mm punch biopsy was used to incise through the lesion at the border. A full thickness biopsy was taken carefully and sent for permanent pathology. The specimen was passed off for pathologic analysis.     The wound was closed with 4 0 chromic. Ointment was applied.     The patient tolerated the procedure well with no complications.       Assessment:       1. Tongue lesion          Plan:         Given length of  symptoms recommended biopsy.  Will call with results.  If benign will plan for treatment with steroid cream and give time.       Medical Necessity Clause: This procedure was medically necessary because the lesions that were treated were: show

## 2023-11-15 ENCOUNTER — PATIENT MESSAGE (OUTPATIENT)
Dept: OPTOMETRY | Facility: CLINIC | Age: 78
End: 2023-11-15

## 2023-11-15 RX ORDER — CYCLOBENZAPRINE HCL 10 MG
10 TABLET ORAL 3 TIMES DAILY PRN
Qty: 60 TABLET | Refills: 3 | Status: ON HOLD | OUTPATIENT
Start: 2023-11-15 | End: 2023-12-13 | Stop reason: HOSPADM

## 2023-11-15 NOTE — TELEPHONE ENCOUNTER
No care due was identified.  [facility] Embedded Care Due Messages. Reference number: [id number].   11/14/2023 8:52:57 PM CST

## 2023-11-17 ENCOUNTER — PATIENT MESSAGE (OUTPATIENT)
Dept: OPTOMETRY | Facility: CLINIC | Age: 78
End: 2023-11-17

## 2023-11-18 NOTE — TELEPHONE ENCOUNTER
----- Message from Mira Rhodes sent at 5/17/2018 11:11 AM CDT -----  Contact: [email or web address]  Patient Returning Call    Who Called:  Who Left Message for Patient:Winnie Madrigal  Does the patient know what this is regarding?:  Communication Preference (Geno response to Pt. (or) Call Back # and timeframe)  Additional Information:Call Back#[phone]  
Left message with office phone number and dr muñoz's cell and per dr muñoz   
No

## 2023-12-01 ENCOUNTER — PATIENT MESSAGE (OUTPATIENT)
Dept: FAMILY MEDICINE | Facility: CLINIC | Age: 78
End: 2023-12-01

## 2023-12-03 RX ORDER — TIZANIDINE 4 MG/1
4 TABLET ORAL 3 TIMES DAILY PRN
Qty: 30 TABLET | Refills: 5 | Status: SHIPPED | OUTPATIENT
Start: 2023-12-03 | End: 2024-02-02 | Stop reason: SDUPTHER

## 2023-12-09 PROBLEM — Z96.659 INFECTED PROSTHETIC KNEE JOINT: Status: ACTIVE | Noted: 2023-12-09

## 2023-12-09 PROBLEM — I48.0 PAF (PAROXYSMAL ATRIAL FIBRILLATION): Status: ACTIVE | Noted: 2023-12-09

## 2023-12-09 PROBLEM — T84.59XA INFECTED PROSTHETIC KNEE JOINT: Status: ACTIVE | Noted: 2023-12-09

## 2023-12-09 PROBLEM — M00.9 PYOGENIC ARTHRITIS OF RIGHT KNEE JOINT: Status: ACTIVE | Noted: 2023-12-09

## 2023-12-10 PROBLEM — E87.5 HYPERKALEMIA: Status: ACTIVE | Noted: 2023-12-10

## 2023-12-11 PROBLEM — D62 ACUTE BLOOD LOSS ANEMIA: Status: ACTIVE | Noted: 2023-12-11

## 2023-12-12 ENCOUNTER — TELEPHONE (OUTPATIENT)
Dept: NEPHROLOGY | Facility: CLINIC | Age: 78
End: 2023-12-12

## 2023-12-12 NOTE — TELEPHONE ENCOUNTER
----- Message from Jeevan Pond MD sent at 12/12/2023  2:23 PM CST -----  Regarding: fu  Offer him a fu visit some maribell gallegos the next 2-3 months

## 2023-12-19 ENCOUNTER — LAB VISIT (OUTPATIENT)
Dept: LAB | Facility: HOSPITAL | Age: 78
End: 2023-12-19
Attending: INTERNAL MEDICINE
Payer: MEDICARE

## 2023-12-19 DIAGNOSIS — Z45.02 ICD (IMPLANTABLE CARDIOVERTER-DEFIBRILLATOR) BATTERY DEPLETION: ICD-10-CM

## 2023-12-19 DIAGNOSIS — M00.9 PYOGENIC ARTHRITIS, UPPER ARM: ICD-10-CM

## 2023-12-19 DIAGNOSIS — Z96.659 INFECTED PROSTHETIC KNEE JOINT: ICD-10-CM

## 2023-12-19 DIAGNOSIS — T84.59XA INFECTED PROSTHETIC KNEE JOINT: ICD-10-CM

## 2023-12-19 LAB
ALBUMIN SERPL BCP-MCNC: 2.5 G/DL (ref 3.5–5.2)
ALP SERPL-CCNC: 38 U/L (ref 55–135)
ALT SERPL W/O P-5'-P-CCNC: 10 U/L (ref 10–44)
ANION GAP SERPL CALC-SCNC: 4 MMOL/L (ref 8–16)
AST SERPL-CCNC: 20 U/L (ref 10–40)
BILIRUB SERPL-MCNC: 0.4 MG/DL (ref 0.1–1)
BUN SERPL-MCNC: 21 MG/DL (ref 8–23)
CALCIUM SERPL-MCNC: 8.1 MG/DL (ref 8.7–10.5)
CHLORIDE SERPL-SCNC: 113 MMOL/L (ref 95–110)
CK SERPL-CCNC: 36 U/L (ref 20–200)
CO2 SERPL-SCNC: 20 MMOL/L (ref 23–29)
CREAT SERPL-MCNC: 1.5 MG/DL (ref 0.5–1.4)
CRP SERPL-MCNC: 2.7 MG/L (ref 0–8.2)
ERYTHROCYTE [DISTWIDTH] IN BLOOD BY AUTOMATED COUNT: 15.8 % (ref 11.5–14.5)
EST. GFR  (NO RACE VARIABLE): 47.7 ML/MIN/1.73 M^2
GLUCOSE SERPL-MCNC: 105 MG/DL (ref 70–110)
HCT VFR BLD AUTO: 26.9 % (ref 40–54)
HGB BLD-MCNC: 8.4 G/DL (ref 14–18)
MCH RBC QN AUTO: 28.7 PG (ref 27–31)
MCHC RBC AUTO-ENTMCNC: 31.2 G/DL (ref 32–36)
MCV RBC AUTO: 92 FL (ref 82–98)
PLATELET # BLD AUTO: 376 K/UL (ref 150–450)
PLATELET BLD QL SMEAR: NORMAL
PMV BLD AUTO: 11.1 FL (ref 9.2–12.9)
POTASSIUM SERPL-SCNC: 5.1 MMOL/L (ref 3.5–5.1)
PROT SERPL-MCNC: 5.2 G/DL (ref 6–8.4)
RBC # BLD AUTO: 2.93 M/UL (ref 4.6–6.2)
SODIUM SERPL-SCNC: 137 MMOL/L (ref 136–145)
WBC # BLD AUTO: 8.43 K/UL (ref 3.9–12.7)

## 2023-12-19 PROCEDURE — 36415 COLL VENOUS BLD VENIPUNCTURE: CPT | Mod: HCNC,PN,TXP | Performed by: INTERNAL MEDICINE

## 2023-12-19 PROCEDURE — 86140 C-REACTIVE PROTEIN: CPT | Mod: HCNC,NTX | Performed by: INTERNAL MEDICINE

## 2023-12-19 PROCEDURE — 82550 ASSAY OF CK (CPK): CPT | Mod: HCNC,TXP | Performed by: INTERNAL MEDICINE

## 2023-12-19 PROCEDURE — 80053 COMPREHEN METABOLIC PANEL: CPT | Mod: HCNC,TXP | Performed by: INTERNAL MEDICINE

## 2023-12-19 PROCEDURE — 85027 COMPLETE CBC AUTOMATED: CPT | Mod: HCNC,TXP | Performed by: INTERNAL MEDICINE

## 2023-12-20 ENCOUNTER — TELEPHONE (OUTPATIENT)
Dept: INFECTIOUS DISEASES | Facility: CLINIC | Age: 78
End: 2023-12-20

## 2023-12-20 NOTE — TELEPHONE ENCOUNTER
Per HOWARD Medina - Please make sure this patient is getting Cefepime 2g every 12 hours? If he went home with every 8, please advise him to change to every 12 according to his current kidney function, and also inform infusion company of the change. Thank you!!     Called [facility], spoke with Catherine Miranda,per Ruben -  pt is currently on Cefepime 2 g every 8 hours, gave above orders to change to every 12 hours..    Ruben will inform pt and HH as well.

## 2023-12-21 ENCOUNTER — PATIENT MESSAGE (OUTPATIENT)
Dept: FAMILY MEDICINE | Facility: CLINIC | Age: 78
End: 2023-12-21

## 2023-12-21 ENCOUNTER — TELEPHONE (OUTPATIENT)
Dept: FAMILY MEDICINE | Facility: CLINIC | Age: 78
End: 2023-12-21

## 2023-12-21 NOTE — TELEPHONE ENCOUNTER
Phone room contacted following initial response to patient. Wife wants a decision from the PCP today and does not want to see the PA for follow up as scheduled tomorrow. The phone room rep reports that the patient is unhappy they cannot speak directly to Dr Gaines.

## 2023-12-21 NOTE — TELEPHONE ENCOUNTER
Please take this patient off Felicita Bashir schedule tomorrow and put him on my schedule to see me at 11am.

## 2023-12-22 ENCOUNTER — OFFICE VISIT (OUTPATIENT)
Dept: FAMILY MEDICINE | Facility: CLINIC | Age: 78
End: 2023-12-22
Payer: MEDICARE

## 2023-12-22 VITALS
HEIGHT: 72 IN | BODY MASS INDEX: 27.14 KG/M2 | WEIGHT: 200.38 LBS | RESPIRATION RATE: 18 BRPM | SYSTOLIC BLOOD PRESSURE: 128 MMHG | OXYGEN SATURATION: 99 % | DIASTOLIC BLOOD PRESSURE: 70 MMHG | HEART RATE: 60 BPM

## 2023-12-22 DIAGNOSIS — M00.9 PYOGENIC ARTHRITIS OF RIGHT KNEE JOINT, DUE TO UNSPECIFIED ORGANISM: ICD-10-CM

## 2023-12-22 DIAGNOSIS — I48.0 PAROXYSMAL ATRIAL FIBRILLATION: ICD-10-CM

## 2023-12-22 DIAGNOSIS — D50.0 BLOOD LOSS ANEMIA: Primary | ICD-10-CM

## 2023-12-22 DIAGNOSIS — I10 HYPERTENSION, UNSPECIFIED TYPE: ICD-10-CM

## 2023-12-22 PROCEDURE — 99214 OFFICE O/P EST MOD 30 MIN: CPT | Mod: HCNC,S$GLB,, | Performed by: FAMILY MEDICINE

## 2023-12-22 PROCEDURE — 99214 PR OFFICE/OUTPT VISIT, EST, LEVL IV, 30-39 MIN: ICD-10-PCS | Mod: S$PBB,HCNC,, | Performed by: FAMILY MEDICINE

## 2023-12-22 PROCEDURE — 99999 PR PBB SHADOW E&M-EST. PATIENT-LVL III: ICD-10-PCS | Mod: PBBFAC,HCNC,, | Performed by: FAMILY MEDICINE

## 2023-12-22 PROCEDURE — 99999 PR PBB SHADOW E&M-EST. PATIENT-LVL III: CPT | Mod: PBBFAC,HCNC,, | Performed by: FAMILY MEDICINE

## 2023-12-22 PROCEDURE — 1157F PR ADVANCE CARE PLAN OR EQUIV PRESENT IN MEDICAL RECORD: ICD-10-PCS | Mod: HCNC,,, | Performed by: FAMILY MEDICINE

## 2023-12-22 PROCEDURE — 1157F ADVNC CARE PLAN IN RCRD: CPT | Mod: HCNC,CPTII,S$GLB, | Performed by: FAMILY MEDICINE

## 2023-12-22 RX ORDER — DAPTOMYCIN 50 MG/ML
INJECTION, POWDER, LYOPHILIZED, FOR SOLUTION INTRAVENOUS
Status: ON HOLD | COMMUNITY
Start: 2023-12-19 | End: 2024-02-27 | Stop reason: ALTCHOICE

## 2023-12-22 RX ORDER — SODIUM CHLORIDE 9 MG/ML
INJECTION, SOLUTION INTRAVENOUS
Status: ON HOLD | COMMUNITY
Start: 2023-12-19 | End: 2024-02-27 | Stop reason: ALTCHOICE

## 2023-12-22 RX ORDER — CEFEPIME HYDROCHLORIDE 2 G/1
INJECTION, POWDER, FOR SOLUTION INTRAVENOUS
Status: ON HOLD | COMMUNITY
Start: 2023-12-19 | End: 2024-02-27 | Stop reason: ALTCHOICE

## 2023-12-22 NOTE — PROGRESS NOTES
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    Chief Complaint: Hospital Follow Up ([facility]  Dec 9th-13th)      Here for hospital follow-up.        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  Patient reports increased swelling, warmth, fever and chills yesterday with T-max 103°.  Patient ran out of narcotic pain medication and took over-the-counter pain medication with little relief.  Patient was assessed in the emergency department, noted with right knee swelling and erythema.  Labs show leukocytosis with white blood cell count 15.9.  CRP 7.2.  Right knee x-ray shows effusion.  Chest x-ray shows chronic changes.  Patient is on Eliquis however did not take his dose today due to his concerns for septic joint with fevers and chills.  Right knee aspiration performed in the emergency department with 15 cc of cloudy straw-colored fluid from the joint with concerns for septic joint.  Fluid sent for culture.  Blood cultures ordered.  COVID negative.  Received cefepime and vancomycin empirically.  Patient denies history of MRSA infections.  Orthopedic surgery consulted by ED physician who plans to take patient to OR this evening for washout of the joint.  Patient denies any history of coronary artery disease.  He  was incidentally found to have interstitial lung disease and has been followed by pulmonology with stable lung status.  Procedure(s) (LRB):  IRRIGATION AND DEBRIDEMENT KNEE (Right)  REVISION ARTHROPLASTY KNEE- Liner Replacement - dirty/clean (Right)  APPLICATION, WOUND VAC (Right)    Hospital Course:   Patient s/p irration and debridement of right knee 12/10 with purulent knee joint fluid present, retained axel triathlon knee replacement and right knee polyethelene insert exchanged. Estimated blood loss 500 mL. Ortho Dr. Maldonado following in consult. ID consulted - continued on cefepime and IV vanc changed to daptomycin. Eliquis restarted 12/11. Patient discharged once home health set up.    Following with ID.  Here today with wife. She is concerned with persistent anemia.  He is feeling more lethargic and weak.  Getting IV cefepime, daptomycin through PICC line.  Taking percocet 10mg with some relief.    Bradycardia s/p pacemaker   Angular chelitis - C/o cracking of lips. Using nystatin cream as needed.  S/p ORIF left femur fracture - following with Dr. Rodriguez; doing well.  H/o TIA - taking just Eliquis; stopped lipitor after seeing neurologist; using lopressor PRN  Afib, s/p pacemaker - s/p ablation; sees Dr. Beatty. Taking Eliquis 5mg BID, Coreg BID, Rhythmol 225mg BID.  HTN - Coreg 6.25mg BID; Demadex 20 mg Monday Wednesday and Friday as well as potassium chloride 10 mEq Monday Wednesday and Friday  CKD - following with Dr. Salty Eric DJD - s/p right knee TKA; stable  Hypothyroidism - tolerating levothyroxine 75mcg  S/ gastric bypass - taking ferrous sulfate and B12  Depression - mood controlled on Wellbutrin and Lexapro  Osteoporosis - taking calcium citrate; getting Reclast annually  BPH - stable since Urolift  Pulmonary fibrosis - following with pulmonology every 6 months  Hypocalcemia - taking calcium carbonate 2 tablets daily.  Following with pain management; having some side effects with  tizanidine.    Hypertension  Pertinent negatives include no chest pain, headaches, neck pain, palpitations or shortness of breath.   Follow-up  Pertinent negatives include no abdominal pain, arthralgias, chest pain, chills, coughing, fever, headaches, nausea, neck pain, rash, sore throat or weakness.       Past Medical History:   Diagnosis Date    Anticoagulant long-term use     Anxiety     Arthritis     Atrial fibrillation     BPH (benign prostatic hyperplasia)     Cataract     CKD (chronic kidney disease) stage 3, GFR 30-59 ml/min 7/10/2017    Followed by Dr. Jeevan Pond    Colon polyp     benign    Depression     Elevated PSA     Erectile dysfunction     Gastric ulcer with hemorrhage     Hep B w/o coma 1977    History of bleeding peptic ulcer     History of prostatitis     Hypertension     PAF (paroxysmal atrial fibrillation)     Sleep apnea     on CPAP    Stroke     TIA December 2019    Thyroid disease        Past Surgical History:   Procedure Laterality Date    A-V CARDIAC PACEMAKER INSERTION Left 9/23/2023    Procedure: Dual Chamber PPM (Heart) Rm 2620;  Surgeon: Pan Moss MD;  Location: [facility] CATH;  Service: Cardiology;  Laterality: Left;    ABDOMINAL HERNIA REPAIR      ABLATION OF ARRHYTHMOGENIC FOCUS FOR ATRIAL FIBRILLATION N/A 6/15/2020    Procedure: Ablation atrial fibrillation;  Surgeon: Wyatt Beatty MD;  Location: [facility] EP LAB;  Service: Cardiology;  Laterality: N/A;  PAF, AFl, PEPE, PVI re-do, CTI, RFA, JUSTIN, Gen, SK, 3 Prep    APPLICATION OF WOUND VACUUM-ASSISTED CLOSURE DEVICE Right 12/9/2023    Procedure: APPLICATION, WOUND VAC;  Surgeon: Jelani Tello II, MD;  Location: [facility] OR;  Service: Orthopedics;  Laterality: Right;    CATARACT EXTRACTION  11/25/2013    bilateral    CHOLECYSTECTOMY      COLONOSCOPY N/A 11/25/2015    Procedure: COLONOSCOPY;  Surgeon: Toby Hernandez MD;  Location: [facility] ENDO;  Service: Endoscopy;  Laterality: N/A;    COLONOSCOPY N/A 11/13/2020    Procedure:  COLONOSCOPY;  Surgeon: Toby Hernandez MD;  Location: [facility] ENDO;  Service: Endoscopy;  Laterality: N/A;    CYSTOSCOPY WITH INSERTION OF MINIMALLY INVASIVE IMPLANT TO ENLARGE PROSTATIC URETHRA N/A 11/28/2022    Procedure: CYSTOSCOPY, WITH INSERTION OF UROLIFT IMPLANT;  Surgeon: Yakov Shetty MD;  Location: [facility] OR;  Service: Urology;  Laterality: N/A;    EYE SURGERY      GASTRIC BYPASS  1992    INSERTION, PACEMAKER, TEMPORARY TRANSVENOUS  9/22/2023    Procedure: Temp pacer insertion ER Rm 8;  Surgeon: Pan Moss MD;  Location: [facility] CATH;  Service: Cardiology;;    INTRAMEDULLARY RODDING OF FEMUR Left 7/18/2020    Procedure: INSERTION, INTRAMEDULLARY ERIC, FEMUR, left, Synthes, [place] table, Large C arm clock side,;  Surgeon: Tony Rodriguez MD;  Location: [facility] OR [facility] FLR;  Service: Orthopedics;  Laterality: Left;    IRRIGATION AND DEBRIDEMENT Right 12/9/2023    Procedure: IRRIGATION AND DEBRIDEMENT KNEE;  Surgeon: Jelani Tello II, MD;  Location: [facility] OR;  Service: Orthopedics;  Laterality: Right;    KNEE ARTHROSCOPY      RT    LASIK  2001    both eyes (Dr. Rabago)    ORIF HUMERUS FRACTURE      LT    ORIF HUMERUS FRACTURE Right     non surgical repair    RADIOFREQUENCY ABLATION      x2    REVISION OF KNEE ARTHROPLASTY Right 12/9/2023    Procedure: REVISION ARTHROPLASTY KNEE- Liner Replacement - dirty/clean;  Surgeon: Jelani Tello II, MD;  Location: [facility] OR;  Service: Orthopedics;  Laterality: Right;  dirty to clean at 1940    Right ankle tendon repair      ROBOT-ASSISTED REPAIR OF INCISIONAL HERNIA USING DA GARETH XI Left 6/13/2022    Procedure: XI ROBOTIC REPAIR, HERNIA, INCISIONAL (LEFT SIDE SPIGELIAN WITH MESH);  Surgeon: Rosendo Marti MD;  Location: [facility] OR 2ND FLR;  Service: General;  Laterality: Left;  consent in am    ROTATOR CUFF REPAIR      right    TOTAL KNEE ARTHROPLASTY Right 5/29/2018    Procedure: REPLACEMENT-KNEE-TOTAL-axel;  Surgeon: Denzel Dallas MD;   Location: [facility] OR [facility] FLR;  Service: Orthopedics;  Laterality: Right;  [place]    TREATMENT OF CARDIAC ARRHYTHMIA  6/15/2020    Procedure: Cardioversion or Defibrillation;  Surgeon: Wyatt Beatty MD;  Location: [facility] EP LAB;  Service: Cardiology;;       Review of patient's allergies indicates:   Allergen Reactions    No known drug allergies        Social History     Socioeconomic History    Marital status:     Number of children: 5   Occupational History    Occupation: retired anesthesiology     Employer: [employer]    Occupation: [employer] grad     Comment: previous football player   Tobacco Use    Smoking status: Never     Passive exposure: Never    Smokeless tobacco: Never    Tobacco comments:     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    Substance and Sexual Activity    Alcohol use: No    Drug use: No    Sexual activity: Yes     Partners: Female     Social Determinants of Health     Financial Resource Strain: Low Risk  (12/10/2023)    Overall Financial Resource Strain (CARDIA)     Difficulty of Paying Living Expenses: Not hard at all   Food Insecurity: Unknown (12/10/2023)    Hunger Vital Sign     Ran Out of Food in the Last Year: Never true   Transportation Needs: No Transportation Needs (12/10/2023)    PRAPARE - Transportation     Lack of Transportation (Medical): No     Lack of Transportation (Non-Medical): No   Housing Stability: Unknown (12/10/2023)    Housing Stability Vital Sign     Unable to Pay for Housing in the Last Year: No     Number of Places Lived in the Last Year: 1       Current Outpatient Medications on File Prior to Visit   Medication Sig Dispense Refill    0.9 % sodium chloride (SODIUM CHLORIDE 0.9%) solution Inject into the vein.      alfuzosin (UROXATRAL) 10 mg Tb24 Take 1 tablet (10 mg total) by mouth daily with breakfast. 30 tablet 11    buPROPion (WELLBUTRIN XL) 300 MG 24 hr tablet Take 1 tablet (300 mg total) by mouth once daily. 90 tablet 3    calcium citrate  (CALCITRATE) 200 mg (950 mg) tablet Take 1 tablet (200 mg total) by mouth 2 (two) times daily. 180 tablet 3    ceFEPIme (MAXIPIME) 2 gram injection       DAPTOmycin (CUBICIN) 500 mg injection Inject into the vein.      DAPTOmycin 50 mg/mL in sodium chloride 0.9% solution Inject 11 mLs (550 mg total) into the vein Daily.      dextrose 5 % in water (D5W) PgBk 100 mL with ceFEPIme 2 gram SolR 2 g Inject 2 g into the vein every 8 (eight) hours.      ELIQUIS 5 mg Tab Take 1 tablet (5 mg total) by mouth 2 (two) times a day. 180 tablet 2    EScitalopram oxalate (LEXAPRO) 10 MG tablet TAKE 1 TABLET(10 MG) BY MOUTH EVERY DAY (Patient taking differently: Take 10 mg by mouth once daily. TAKE 1 TABLET(10 MG) BY MOUTH EVERY DAY) 90 tablet 4    levothyroxine (SYNTHROID) 75 MCG tablet Take 1 tablet (75 mcg total) by mouth before breakfast. 90 tablet 2    metroNIDAZOLE (FLAGYL) 500 MG tablet Take 1 tablet (500 mg total) by mouth every 8 (eight) hours. 114 tablet 0    OMEGA-3 FATTY ACIDS (FISH OIL CONCENTRATE ORAL) Take 1 capsule by mouth Daily.      propafenone (RYTHMOL SR) 225 MG Cp12 Take 1 capsule (225 mg total) by mouth every 12 (twelve) hours. 60 capsule 11    telmisartan (MICARDIS) 40 MG Tab Take 1 tablet (40 mg total) by mouth once daily. 90 tablet 3    tiZANidine (ZANAFLEX) 4 MG tablet Take 1 tablet (4 mg total) by mouth 3 (three) times daily as needed. 30 tablet 5    carvediloL (COREG) 6.25 MG tablet Take 6.25 mg by mouth 2 (two) times daily.      celecoxib (CELEBREX) 200 MG capsule TAKE 1 CAPSULE(200 MG) BY MOUTH TWICE DAILY (Patient not taking: Reported on 12/22/2023) 60 capsule 2    potassium chloride (MICRO-K) 10 MEQ CpSR Take 1 capsule (10 mEq total) by mouth every Mon, Wed, Fri. (Patient not taking: Reported on 12/22/2023) 15 capsule 5    torsemide (DEMADEX) 20 MG Tab Take 1 tablet (20 mg total) by mouth every Mon, Wed, Fri. (Patient not taking: Reported on 12/22/2023) 15 tablet 5     No current facility-administered  medications on file prior to visit.       Family History   Problem Relation Age of Onset    COPD Father     Diabetes Father     Osteoporosis Father     Aortic stenosis Mother     Heart disease Mother         aortic stenosis    Osteoporosis Mother     Heart attack Brother     No Known Problems Son     No Known Problems Daughter     No Known Problems Daughter     No Known Problems Daughter        Review of Systems   Constitutional:  Negative for appetite change, chills, fever and unexpected weight change.   HENT:  Negative for sore throat and trouble swallowing.    Eyes:  Negative for pain and visual disturbance.   Respiratory:  Negative for cough, chest tightness, shortness of breath and wheezing.    Cardiovascular:  Negative for chest pain, palpitations and leg swelling.   Gastrointestinal:  Negative for abdominal pain, blood in stool, constipation, diarrhea and nausea.   Genitourinary:  Negative for difficulty urinating, dysuria and hematuria.   Musculoskeletal:  Negative for arthralgias, gait problem and neck pain.   Skin:  Negative for rash and wound.   Neurological:  Negative for dizziness, weakness, light-headedness and headaches.   Hematological:  Negative for adenopathy.   Psychiatric/Behavioral:  Negative for dysphoric mood.        Objective:      /70   Pulse 60   Resp 18   Ht 6' (1.829 m)   Wt 90.9 kg (200 lb 6.4 oz)   SpO2 99%   BMI 27.18 kg/m²   Physical Exam  Constitutional:       General: He is not in acute distress.     Appearance: He is well-developed.   HENT:      Head: Normocephalic and atraumatic.      Right Ear: External ear normal.      Left Ear: External ear normal.      Mouth/Throat:      Pharynx: Uvula midline. No oropharyngeal exudate.   Eyes:      General: Lids are normal.      Conjunctiva/sclera: Conjunctivae normal.      Pupils: Pupils are equal, round, and reactive to light.   Neck:      Thyroid: No thyroid mass or thyromegaly.      Trachea: Phonation normal.   Cardiovascular:       Rate and Rhythm: Normal rate and regular rhythm.      Heart sounds: Normal heart sounds. No murmur heard.     No friction rub. No gallop.   Pulmonary:      Effort: Pulmonary effort is normal. No respiratory distress.      Breath sounds: Normal breath sounds. No wheezing or rales.   Musculoskeletal:         General: Normal range of motion.      Cervical back: Full passive range of motion without pain, normal range of motion and neck supple.   Lymphadenopathy:      Cervical: No cervical adenopathy.   Skin:     General: Skin is warm and dry.   Neurological:      Mental Status: He is alert and oriented to person, place, and time.      Cranial Nerves: No cranial nerve deficit.      Coordination: Coordination normal.   Psychiatric:         Speech: Speech normal.         Behavior: Behavior normal.         Thought Content: Thought content normal.         Judgment: Judgment normal.         Results for orders placed or performed in visit on 12/19/23   CBC Without Differential   Result Value Ref Range    WBC 8.43 3.90 - 12.70 K/uL    RBC 2.93 (L) 4.60 - 6.20 M/uL    Hemoglobin 8.4 (L) 14.0 - 18.0 g/dL    Hematocrit 26.9 (L) 40.0 - 54.0 %    MCV 92 82 - 98 fL    MCH 28.7 27.0 - 31.0 pg    MCHC 31.2 (L) 32.0 - 36.0 g/dL    RDW 15.8 (H) 11.5 - 14.5 %    Platelets 376 150 - 450 K/uL    MPV 11.1 9.2 - 12.9 fL   COMPREHENSIVE METABOLIC PANEL   Result Value Ref Range    Sodium 137 136 - 145 mmol/L    Potassium 5.1 3.5 - 5.1 mmol/L    Chloride 113 (H) 95 - 110 mmol/L    CO2 20 (L) 23 - 29 mmol/L    Glucose 105 70 - 110 mg/dL    BUN 21 8 - 23 mg/dL    Creatinine 1.5 (H) 0.5 - 1.4 mg/dL    Calcium 8.1 (L) 8.7 - 10.5 mg/dL    Total Protein 5.2 (L) 6.0 - 8.4 g/dL    Albumin 2.5 (L) 3.5 - 5.2 g/dL    Total Bilirubin 0.4 0.1 - 1.0 mg/dL    Alkaline Phosphatase 38 (L) 55 - 135 U/L    AST 20 10 - 40 U/L    ALT 10 10 - 44 U/L    eGFR 47.7 (A) >60 mL/min/1.73 m^2    Anion Gap 4 (L) 8 - 16 mmol/L   CK   Result Value Ref Range    CPK 36 20 -  200 U/L   C-REACTIVE PROTEIN   Result Value Ref Range    CRP 2.7 0.0 - 8.2 mg/L   Platelet Review   Result Value Ref Range    Platelet Estimate Appears normal      *Note: Due to a large number of results and/or encounters for the requested time period, some results have not been displayed. A complete set of results can be found in Results Review.     Labs and hospital records reviewed     Assessment:       1. Blood loss anemia    2. Pyogenic arthritis of right knee joint, due to unspecified organism    3. Hypertension, unspecified type    4. Paroxysmal atrial fibrillation                    Plan:       Blood loss anemia    Pyogenic arthritis of right knee joint, due to unspecified organism    Hypertension, unspecified type    Paroxysmal atrial fibrillation                   Begin otc iron daily  CBC next week as planned  F/u with cardiology, ID, ortho as planned  Continue Eliquis 5mg BID with ASA  Continue carvedilol   Continue other present meds  Counseled on regular exercise, maintenance of a healthy weight, balanced diet rich in fruits/vegetables and lean protein, and avoidance of unhealthy habits like smoking and excessive alcohol intake.    F/u with me as scheduled in March

## 2023-12-24 NOTE — TELEPHONE ENCOUNTER
No care due was identified.  [facility] Embedded Care Due Messages. Reference number: [id number].   12/24/2023 1:33:20 AM CST

## 2023-12-26 RX ORDER — CYCLOBENZAPRINE HCL 10 MG
10 TABLET ORAL
Qty: 60 TABLET | Refills: 3 | Status: SHIPPED | OUTPATIENT
Start: 2023-12-26

## 2023-12-27 ENCOUNTER — OFFICE VISIT (OUTPATIENT)
Dept: INFECTIOUS DISEASES | Facility: CLINIC | Age: 78
End: 2023-12-27
Payer: MEDICARE

## 2023-12-27 VITALS
DIASTOLIC BLOOD PRESSURE: 70 MMHG | HEART RATE: 50 BPM | SYSTOLIC BLOOD PRESSURE: 119 MMHG | WEIGHT: 200.81 LBS | HEIGHT: 72 IN | BODY MASS INDEX: 27.2 KG/M2

## 2023-12-27 DIAGNOSIS — Z96.659 INFECTION OF PROSTHETIC KNEE JOINT, SUBSEQUENT ENCOUNTER: Primary | ICD-10-CM

## 2023-12-27 DIAGNOSIS — Z79.2 RECEIVING INTRAVENOUS ANTIBIOTIC TREATMENT AS OUTPATIENT: ICD-10-CM

## 2023-12-27 DIAGNOSIS — T84.59XD INFECTION OF PROSTHETIC KNEE JOINT, SUBSEQUENT ENCOUNTER: Primary | ICD-10-CM

## 2023-12-27 PROCEDURE — 99214 OFFICE O/P EST MOD 30 MIN: CPT | Mod: PBBFAC,PO,TXP | Performed by: PHYSICIAN ASSISTANT

## 2023-12-27 PROCEDURE — 99999 PR PBB SHADOW E&M-EST. PATIENT-LVL IV: CPT | Mod: PBBFAC,TXP,, | Performed by: PHYSICIAN ASSISTANT

## 2023-12-27 PROCEDURE — 1157F ADVNC CARE PLAN IN RCRD: CPT | Mod: CPTII,NTX,S$GLB, | Performed by: PHYSICIAN ASSISTANT

## 2023-12-27 PROCEDURE — 99214 PR OFFICE/OUTPT VISIT, EST, LEVL IV, 30-39 MIN: ICD-10-PCS | Mod: NTX,S$GLB,, | Performed by: PHYSICIAN ASSISTANT

## 2023-12-27 PROCEDURE — 99214 OFFICE O/P EST MOD 30 MIN: CPT | Mod: NTX,S$GLB,, | Performed by: PHYSICIAN ASSISTANT

## 2023-12-27 PROCEDURE — 1157F PR ADVANCE CARE PLAN OR EQUIV PRESENT IN MEDICAL RECORD: ICD-10-PCS | Mod: CPTII,NTX,S$GLB, | Performed by: PHYSICIAN ASSISTANT

## 2023-12-27 PROCEDURE — 99999 PR PBB SHADOW E&M-EST. PATIENT-LVL IV: ICD-10-PCS | Mod: PBBFAC,TXP,, | Performed by: PHYSICIAN ASSISTANT

## 2023-12-27 NOTE — PROGRESS NOTES
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    Chief Complaint: Follow-up (knee inj)      Dominick was seen today for follow-up.    Diagnoses and all orders for this visit:    Infection of prosthetic knee joint, subsequent encounter    Receiving intravenous antibiotic treatment as outpatient         Greater than 30 minutes was spent on this encounter, which included: review of recent encounters, review and interpretation of labs/images, obtaining pertinent history, performing a physical examination, counseling and educating the patient/family/caregiver, ordering medications/tests, documenting in the electronic health record, and coordinating care with necessary providers.    Interval HPI:   12/27 - ID clinic f/u. Patient here with his daughter, ambulating with walker. Had appt with Ortho yesterday where sutures were removed and incision was felt to be healing. He is to start outpatient PT soon. Compliant with abx and tolerating without issues. Denies fevers, chills, night sweats. Overall he is doing well. He states he does have a broken tooth that needs pulling and is wondering if this could have been the source of his infection.    Assessment and Plan:     # Right knee prosthetic joint infection   - index TKA 2018 with Dr. Dallas  - 12/9 joint aspiration: 98k WBCs (91% segs)  - 12/9/23 s/p revision and polyethylene exchange. Findings of purulent joint fluid  - OR and joint cultures: NGTD, no orgs on stain  - 16S PCR: pending     # CKD  - renally dose meds as needed     Recommendations:  - micro reviewed without growth. 16S PCR negative for bacteria  - reviewed labs and CBC and CRP are WNL. CrCl was fluctuating, cefepime dose adjusted  - Discussed with Dr. Tello and it is felt the knee was definitely infected based on OR findings. Will continue with BSA treatment for culture-negative PJI. Keeping current regimen since he is improving with  this  - cont Cefepime 2g IV q12h (renal dose. 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    - Patient came to this hospital on 12/09/2023 due to right knee pain  It appears that patient developed knee pain 1 week prior to admission associated with swelling, pain associated with fever and chills.    - During initial evaluation in the ED patient was found to be afebrile with evidence of leukocytosis.  WBC 15.9K  - Arthrocentesis was performed with evidence of leukocytosis with neutrophilia.  98K  - patient underwent polyethylene exchange on 12/09/2023.    - Cultures obtained by the time of this note they were negative.     Because of the above Infectious Disease was consulted      Past Medical History:   Diagnosis Date    Anticoagulant long-term use     Anxiety     Arthritis     Atrial fibrillation     BPH (benign prostatic hyperplasia)     Cataract     CKD (chronic kidney disease) stage 3, GFR 30-59 ml/min 7/10/2017    Followed by Dr. Jeevan Pond    Colon polyp     benign    Depression     Elevated PSA     Erectile dysfunction     Gastric ulcer with hemorrhage     Hep B w/o coma 1977    History of bleeding peptic ulcer     History of prostatitis     Hypertension     PAF (paroxysmal atrial fibrillation)     Sleep apnea     on CPAP    Stroke     TIA December 2019    Thyroid  disease        Past Surgical History:   Procedure Laterality Date    A-V CARDIAC PACEMAKER INSERTION Left 9/23/2023    Procedure: Dual Chamber PPM (Heart) Rm 2620;  Surgeon: Pan Moss MD;  Location: [facility] CATH;  Service: Cardiology;  Laterality: Left;    ABDOMINAL HERNIA REPAIR      ABLATION OF ARRHYTHMOGENIC FOCUS FOR ATRIAL FIBRILLATION N/A 6/15/2020    Procedure: Ablation atrial fibrillation;  Surgeon: Wyatt Beatty MD;  Location: [facility] EP LAB;  Service: Cardiology;  Laterality: N/A;  PAF, AFl, PEPE, PVI re-do, CTI, RFA, JUSTIN, Gen, SK, 3 Prep    APPLICATION OF WOUND VACUUM-ASSISTED CLOSURE DEVICE Right 12/9/2023    Procedure: APPLICATION, WOUND VAC;  Surgeon: Jelani Tlelo II, MD;  Location: [facility] OR;  Service: Orthopedics;  Laterality: Right;    CATARACT EXTRACTION  11/25/2013    bilateral    CHOLECYSTECTOMY      COLONOSCOPY N/A 11/25/2015    Procedure: COLONOSCOPY;  Surgeon: Toby Hernandez MD;  Location: [facility] ENDO;  Service: Endoscopy;  Laterality: N/A;    COLONOSCOPY N/A 11/13/2020    Procedure: COLONOSCOPY;  Surgeon: Toby Hernandez MD;  Location: [facility];  Service: Endoscopy;  Laterality: N/A;    CYSTOSCOPY WITH INSERTION OF MINIMALLY INVASIVE IMPLANT TO ENLARGE PROSTATIC URETHRA N/A 11/28/2022    Procedure: CYSTOSCOPY, WITH INSERTION OF UROLIFT IMPLANT;  Surgeon: Yakov Shetty MD;  Location: [facility] OR;  Service: Urology;  Laterality: N/A;    EYE SURGERY      GASTRIC BYPASS  1992    INSERTION, PACEMAKER, TEMPORARY TRANSVENOUS  9/22/2023    Procedure: Temp pacer insertion ER Rm 8;  Surgeon: Pan Moss MD;  Location: [facility] CATH;  Service: Cardiology;;    INTRAMEDULLARY RODDING OF FEMUR Left 7/18/2020    Procedure: INSERTION, INTRAMEDULLARY ERIC, FEMUR, left, Synthes, Pauline table, Large C arm clock side,;  Surgeon: Tony Rodriguez MD;  Location: [facility] OR [facility] FLR;  Service: Orthopedics;  Laterality: Left;    IRRIGATION AND DEBRIDEMENT Right 12/9/2023    Procedure: IRRIGATION AND  DEBRIDEMENT KNEE;  Surgeon: Jelani Tello II, MD;  Location: [facility] OR;  Service: Orthopedics;  Laterality: Right;    KNEE ARTHROSCOPY      RT    LASIK  2001    both eyes (Dr. Rabago)    ORIF HUMERUS FRACTURE      LT    ORIF HUMERUS FRACTURE Right     non surgical repair    RADIOFREQUENCY ABLATION      x2    REVISION OF KNEE ARTHROPLASTY Right 12/9/2023    Procedure: REVISION ARTHROPLASTY KNEE- Liner Replacement - dirty/clean;  Surgeon: Jelani Tello II, MD;  Location: [facility] OR;  Service: Orthopedics;  Laterality: Right;  dirty to clean at 1940    Right ankle tendon repair      ROBOT-ASSISTED REPAIR OF INCISIONAL HERNIA USING DA GARETH XI Left 6/13/2022    Procedure: XI ROBOTIC REPAIR, HERNIA, INCISIONAL (LEFT SIDE SPIGELIAN WITH MESH);  Surgeon: Rosendo Marti MD;  Location: [address];  Service: General;  Laterality: Left;  consent in am    ROTATOR CUFF REPAIR      right    TOTAL KNEE ARTHROPLASTY Right 5/29/2018    Procedure: REPLACEMENT-KNEE-TOTAL-axel;  Surgeon: Denzel Dallas MD;  [address]  Service: Orthopedics;  Laterality: Right;  [place]    TREATMENT OF CARDIAC ARRHYTHMIA  6/15/2020    Procedure: Cardioversion or Defibrillation;  Surgeon: Wyatt Beatty MD;  Location: [facility] EP LAB;  Service: Cardiology;;       Family History   Problem Relation Age of Onset    COPD Father     Diabetes Father     Osteoporosis Father     Aortic stenosis Mother     Heart disease Mother         aortic stenosis    Osteoporosis Mother     Heart attack Brother     No Known Problems Son     No Known Problems Daughter     No Known Problems Daughter     No Known Problems Daughter        Social History     Socioeconomic History    Marital status:     Number of children: 5   Occupational History    Occupation: retired anesthesiology     Employer: [employer]    Occupation: [employer] grad     Comment: previous football player   Tobacco Use    Smoking status: Never      Passive exposure: Never    Smokeless tobacco: Never    Tobacco comments:     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    Substance and Sexual Activity    Alcohol use: No    Drug use: No    Sexual activity: Yes     Partners: Female     Social Determinants of Health     Financial Resource Strain: Low Risk  (12/10/2023)    Overall Financial Resource Strain (CARDIA)     Difficulty of Paying Living Expenses: Not hard at all   Food Insecurity: Unknown (12/10/2023)    Hunger Vital Sign     Ran Out of Food in the Last Year: Never true   Transportation Needs: No Transportation Needs (12/10/2023)    PRAPARE - Transportation     Lack of Transportation (Medical): No     Lack of Transportation (Non-Medical): No   Housing Stability: Unknown (12/10/2023)    Housing Stability Vital Sign     Unable to Pay for Housing in the Last Year: No     Number of Places Lived in the Last Year: 1       Review of patient's allergies indicates:   Allergen Reactions    No known drug allergies        Current Outpatient Medications   Medication Instructions    0.9 % sodium chloride (SODIUM CHLORIDE 0.9%) solution Intravenous    alfuzosin (UROXATRAL) 10 mg, Oral, With breakfast    buPROPion (WELLBUTRIN XL) 300 mg, Oral, Daily    calcium citrate (CALCITRATE) 200 mg, Oral, 2 times daily    carvediloL (COREG) 6.25 mg, Oral, 2 times daily    ceFEPIme (MAXIPIME) 2 gram injection     celecoxib (CELEBREX) 200 mg, Oral, 2 times daily    cyclobenzaprine (FLEXERIL) 10 mg, Oral    DAPTOmycin (CUBICIN) 500 mg injection Intravenous    DAPTOmycin 50 mg/mL in sodium chloride 0.9% solution 550 mg, Intravenous, Daily    dextrose 5 % in water (D5W) PgBk 100 mL with ceFEPIme 2 gram SolR 2 g 2 g, Intravenous, Every 8 hours    ELIQUIS 5 mg, Oral, 2 times daily    EScitalopram oxalate (LEXAPRO) 10 MG tablet TAKE 1 TABLET(10 MG) BY MOUTH EVERY DAY    levothyroxine (SYNTHROID) 75 mcg, Oral, Before breakfast    metroNIDAZOLE (FLAGYL) 500 mg, Oral, Every 8 hours    OMEGA-3 FATTY ACIDS  (FISH OIL CONCENTRATE ORAL) 1 capsule, Oral, Daily    oxyCODONE-acetaminophen (PERCOCET)  mg per tablet 1 tablet, Oral, Every 6 hours PRN    potassium chloride (MICRO-K) 10 MEQ CpSR 10 mEq, Oral, Every Mon, Wed, Fri    propafenone (RYTHMOL SR) 225 mg, Oral, Every 12 hours    telmisartan (MICARDIS) 40 mg, Oral, Daily    tiZANidine (ZANAFLEX) 4 mg, Oral, 3 times daily PRN    torsemide (DEMADEX) 20 mg, Oral, Every Mon, Wed, Fri         Review of Systems   Constitutional:  Negative for activity change, appetite change, chills, fatigue and fever.   HENT:  Positive for dental problem. Negative for congestion, mouth sores, sinus pain and sore throat.    Eyes:  Negative for photophobia and visual disturbance.   Respiratory:  Negative for cough, chest tightness, shortness of breath and wheezing.    Cardiovascular:  Positive for leg swelling. Negative for chest pain and palpitations.   Gastrointestinal:  Negative for abdominal pain, diarrhea, nausea and vomiting.   Genitourinary:  Negative for dysuria, flank pain, hematuria and urgency.   Musculoskeletal:  Positive for gait problem. Negative for arthralgias, myalgias, neck pain and neck stiffness.   Skin:  Negative for color change and rash.   Neurological:  Negative for dizziness, seizures and headaches.   Psychiatric/Behavioral:  Negative for confusion.            Objective:     Vitals:    12/27/23 0948   BP: 119/70   Pulse: (!) 50              Physical Exam  Vitals and nursing note reviewed.   Constitutional:       General: He is awake. He is not in acute distress.     Appearance: He is well-developed and well-groomed. He is not diaphoretic.      Comments: Ambulating with walker   HENT:      Head: Normocephalic and atraumatic.      Right Ear: External ear normal.      Left Ear: External ear normal.      Nose: Nose normal.      Mouth/Throat:      Dentition: Abnormal dentition.   Eyes:      General: No scleral icterus.        Right eye: No discharge.         Left eye:  No discharge.      Pupils: Pupils are equal, round, and reactive to light.   Cardiovascular:      Rate and Rhythm: Normal rate and regular rhythm.   Pulmonary:      Effort: Pulmonary effort is normal. No accessory muscle usage or respiratory distress.   Abdominal:      General: There is no distension.   Musculoskeletal:      Cervical back: Normal range of motion and neck supple.      Right lower leg: Edema present.      Left lower leg: Edema present.   Skin:     General: Skin is warm and dry.             Comments: Chronic stasis dermatitis to BLEs   Neurological:      General: No focal deficit present.      Mental Status: He is alert and oriented to person, place, and time.   Psychiatric:         Mood and Affect: Mood normal.         Behavior: Behavior normal.           CrCl cannot be calculated (Patient's most recent lab result is older than the maximum 7 days allowed.).      Microbiology Results (last 7 days)       ** No results found for the last 168 hours. **              Significant Labs: All pertinent labs within the past 24 hours have been reviewed.     Significant Imaging: I have reviewed all relevant and available imaging results/findings within the past 24 hours.      Plan -- see top of note

## 2023-12-28 ENCOUNTER — PATIENT MESSAGE (OUTPATIENT)
Dept: INFECTIOUS DISEASES | Facility: CLINIC | Age: 78
End: 2023-12-28
Payer: MEDICARE

## 2023-12-29 ENCOUNTER — TELEPHONE (OUTPATIENT)
Dept: INFECTIOUS DISEASES | Facility: CLINIC | Age: 78
End: 2023-12-29

## 2023-12-29 NOTE — TELEPHONE ENCOUNTER
Per Anali Plaza PA-C -   Good morning! This patient texted his Ortho last night about his PICC line not aspirating but he was still able to get IV antibiotics. Can you see if his home health and/or infusion company can troubleshoot this for us, and do a flush if needed?    Spoke with Simona at [facility] who states pt is being discharged from services today as he is starting PT. PICC line care and weekly labs will be assumed by [facility], but she will attempt to contact pt to have a nurse go out and troubleshoot the PICC line.    Per Simona, unable to reach pt. I called and spoke with pt and wife. Per HOWARD Pruett, recommendation is to go to ED for evaluation of PICC line.    Spoke with [facility] Nurse, Julita but cannot get patient in a chair to eval until 2 pm, with orders and insurance authorization.     Explained above to patient/wife and will follow recommendation of Anali Plaza PA-C and proceed to ED for evaluation.

## 2023-12-29 NOTE — TELEPHONE ENCOUNTER
NEW ORDERS:  Per Anali Plaza PA-C  1) [facility] to assume weekly PICC care and weekly labs of cbc, cmp, cpk, crp starting on 1/2/24 or 1/3/24 d/t the holiday, then on Mondays, preferably.  2) EOC planned for 1/18/24 at clinic visit, TBD.  3) [facility] is IV abx provider.    DX: T84.59XA, [id number]    Thank You,    Anali Plaza PA-C    Faxed to [facility]@[phone]

## 2024-01-02 ENCOUNTER — TELEPHONE (OUTPATIENT)
Dept: INFECTIOUS DISEASES | Facility: CLINIC | Age: 79
End: 2024-01-02
Payer: MEDICARE

## 2024-01-02 NOTE — TELEPHONE ENCOUNTER
Called patient, wife at side, states just spoke Ruben at [facility] who told him there were to be arrangements for him go to [facility].  Patient/wife feel pt would be best served at [facility] infusion unit, she does not feel their home is a sterile environment in which to have such services done.   I will send orders [facility] as requested by patient.

## 2024-01-02 NOTE — TELEPHONE ENCOUNTER
Called Ruben at [facility] in Pharmacy, discussed that weekly PICC care and labs have been arranged to be done at [facility], pt/wife aware of the schedule as well, EOC planed for 1/18/24

## 2024-01-05 ENCOUNTER — DOCUMENT SCAN (OUTPATIENT)
Dept: HOME HEALTH SERVICES | Facility: HOSPITAL | Age: 79
End: 2024-01-05
Payer: MEDICARE

## 2024-01-08 ENCOUNTER — HOSPITAL ENCOUNTER (OUTPATIENT)
Dept: RADIOLOGY | Facility: HOSPITAL | Age: 79
Discharge: HOME | End: 2024-01-08
Attending: INTERNAL MEDICINE
Payer: MEDICARE

## 2024-01-08 DIAGNOSIS — M81.0 OSTEOPOROSIS, UNSPECIFIED OSTEOPOROSIS TYPE, UNSPECIFIED PATHOLOGICAL FRACTURE PRESENCE: ICD-10-CM

## 2024-01-08 DIAGNOSIS — E03.9 ACQUIRED HYPOTHYROIDISM: ICD-10-CM

## 2024-01-08 PROCEDURE — 77080 DXA BONE DENSITY AXIAL: CPT | Mod: 26,HCNC,NTX, | Performed by: RADIOLOGY

## 2024-01-08 PROCEDURE — 77080 DXA BONE DENSITY AXIAL: CPT | Mod: TC,HCNC,PO,TXP

## 2024-01-10 ENCOUNTER — OFFICE VISIT (OUTPATIENT)
Dept: CARDIOLOGY | Facility: CLINIC | Age: 79
End: 2024-01-10
Payer: MEDICARE

## 2024-01-10 ENCOUNTER — PATIENT MESSAGE (OUTPATIENT)
Dept: INFECTIOUS DISEASES | Facility: CLINIC | Age: 79
End: 2024-01-10
Payer: MEDICARE

## 2024-01-10 ENCOUNTER — CLINICAL SUPPORT (OUTPATIENT)
Dept: CARDIOLOGY | Facility: HOSPITAL | Age: 79
End: 2024-01-10
Attending: INTERNAL MEDICINE
Payer: MEDICARE

## 2024-01-10 VITALS
HEIGHT: 72 IN | DIASTOLIC BLOOD PRESSURE: 94 MMHG | HEART RATE: 75 BPM | WEIGHT: 188.5 LBS | SYSTOLIC BLOOD PRESSURE: 153 MMHG | BODY MASS INDEX: 25.53 KG/M2

## 2024-01-10 DIAGNOSIS — R00.1 BRADYCARDIA: ICD-10-CM

## 2024-01-10 DIAGNOSIS — R00.1 BRADYCARDIA, UNSPECIFIED: ICD-10-CM

## 2024-01-10 DIAGNOSIS — I10 ESSENTIAL HYPERTENSION: Primary | ICD-10-CM

## 2024-01-10 DIAGNOSIS — I48.0 PAROXYSMAL ATRIAL FIBRILLATION: ICD-10-CM

## 2024-01-10 DIAGNOSIS — I77.1 TORTUOUS AORTA: ICD-10-CM

## 2024-01-10 DIAGNOSIS — Z95.0 PACEMAKER: ICD-10-CM

## 2024-01-10 DIAGNOSIS — Z95.0 STATUS POST PLACEMENT OF CARDIAC PACEMAKER: ICD-10-CM

## 2024-01-10 PROCEDURE — 3288F FALL RISK ASSESSMENT DOCD: CPT | Mod: HCNC,CPTII,NTX,S$GLB | Performed by: INTERNAL MEDICINE

## 2024-01-10 PROCEDURE — 1157F ADVNC CARE PLAN IN RCRD: CPT | Mod: HCNC,CPTII,NTX,S$GLB | Performed by: INTERNAL MEDICINE

## 2024-01-10 PROCEDURE — 1159F MED LIST DOCD IN RCRD: CPT | Mod: HCNC,CPTII,NTX,S$GLB | Performed by: INTERNAL MEDICINE

## 2024-01-10 PROCEDURE — 1111F DSCHRG MED/CURRENT MED MERGE: CPT | Mod: HCNC,CPTII,NTX,S$GLB | Performed by: INTERNAL MEDICINE

## 2024-01-10 PROCEDURE — 1160F RVW MEDS BY RX/DR IN RCRD: CPT | Mod: HCNC,CPTII,NTX,S$GLB | Performed by: INTERNAL MEDICINE

## 2024-01-10 PROCEDURE — 1101F PT FALLS ASSESS-DOCD LE1/YR: CPT | Mod: HCNC,CPTII,NTX,S$GLB | Performed by: INTERNAL MEDICINE

## 2024-01-10 PROCEDURE — 3077F SYST BP >= 140 MM HG: CPT | Mod: HCNC,CPTII,NTX,S$GLB | Performed by: INTERNAL MEDICINE

## 2024-01-10 PROCEDURE — 3080F DIAST BP >= 90 MM HG: CPT | Mod: HCNC,CPTII,NTX,S$GLB | Performed by: INTERNAL MEDICINE

## 2024-01-10 PROCEDURE — 1125F AMNT PAIN NOTED PAIN PRSNT: CPT | Mod: HCNC,CPTII,NTX,S$GLB | Performed by: INTERNAL MEDICINE

## 2024-01-10 PROCEDURE — 93280 PM DEVICE PROGR EVAL DUAL: CPT | Mod: HCNC,PO,NTX

## 2024-01-10 PROCEDURE — 99999 PR PBB SHADOW E&M-EST. PATIENT-LVL IV: CPT | Mod: PBBFAC,HCNC,TXP, | Performed by: INTERNAL MEDICINE

## 2024-01-10 PROCEDURE — 99214 OFFICE O/P EST MOD 30 MIN: CPT | Mod: HCNC,NTX,S$GLB, | Performed by: INTERNAL MEDICINE

## 2024-01-10 NOTE — PROGRESS NOTES
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  Unfortunately, there was no identification of any microorganisms and all the cultures have been negative.  He has been on antibiotics for 4 weeks apparently he has to be on antibiotics for 2 more weeks.  His main complaint persistent pain of his right knee.  He does refer occasional palpitations although he has not had any shortness of breath, chest pain or orthopnea or PND.    Review of Systems   Constitutional: Negative for decreased appetite, malaise/fatigue, weight gain and weight loss.   Cardiovascular:  Negative for chest pain, dyspnea on exertion, leg swelling, palpitations and syncope.   Respiratory:  Negative for cough and shortness of breath.    Gastrointestinal: Negative.    Neurological:  Negative for weakness.   All other systems reviewed and are negative.     Objective:      Physical Exam  Vitals and nursing note reviewed.   Constitutional:       Appearance: Normal appearance. He is well-developed.   HENT:      Head: Normocephalic.   Eyes:      Pupils: Pupils are equal, round, and reactive to light.   Neck:      Thyroid: No thyromegaly.      Vascular: No carotid bruit or JVD.   Cardiovascular:      Rate and Rhythm: Normal rate and regular rhythm.      Chest Wall: PMI is not displaced.      Pulses: Normal pulses and intact distal pulses.      Heart sounds: Normal heart sounds. No murmur heard.     No gallop.   Pulmonary:      Effort: Pulmonary effort is normal.      Breath sounds: Normal breath sounds.   Abdominal:      Palpations: Abdomen is soft. There is no mass.      Tenderness: There is no abdominal tenderness.   Musculoskeletal:         General: Normal range of motion.      Cervical back: Normal range of motion and neck supple.   Skin:     General: Skin is warm.   Neurological:      Mental Status: He is  alert and oriented to person, place, and time.      Sensory: No sensory deficit.      Deep Tendon Reflexes: Reflexes are normal and symmetric.         Most Recent EKG Results  Results for orders placed or performed during the hospital encounter of 09/22/23   EKG 12-lead    Collection Time: 09/23/23  9:25 AM    Narrative    Test Reason : R00.1,    Vent. Rate : 055 BPM     Atrial Rate : 055 BPM     P-R Int : 218 ms          QRS Dur : 116 ms      QT Int : 448 ms       P-R-T Axes : 071 055 023 degrees     QTc Int : 428 ms    Sinus bradycardia with 1st degree A-V block  Otherwise normal ECG  When compared with ECG of 22-SEP-2023 12:38,  Sinus rhythm has replaced Atrial fibrillation  Nonspecific T wave abnormality now evident in Inferior leads  Confirmed by KAMALA DUNNE MD ([id number]) on 9/23/2023 9:17:59 PM    Referred By: [email or web address]   SELF           Confirmed By:KAMALA DUNNE MD       Most Recent Echocardiogram Results  Results for orders placed in visit on 09/21/23    Echo    Interpretation Summary    Left Ventricle: The left ventricle is normal in size. There is normal systolic function. Ejection fraction by visual approximation is 65%. Grade III diastolic dysfunction.    Right Ventricle: Normal right ventricular cavity size. Systolic function is normal.    Left Atrium: Left atrium is moderately dilated.    Mitral Valve: There is mild regurgitation with a posterolateral eccentriccally directed jet.    Tricuspid Valve: There is mild regurgitation.    Pulmonary Artery: The estimated pulmonary artery systolic pressure is 48 mmHg.    IVC/SVC: Intermediate venous pressure at 8 mmHg.      Most Recent Nuclear Stress Test Results  Results for orders placed during the hospital encounter of 09/20/23    Nuclear Stress - Cardiology Interpreted    Interpretation Summary    Normal myocardial perfusion scan. There is no evidence of myocardial ischemia or infarction.    The gated perfusion images showed an ejection fraction of  60% post stress.    LV cavity size is normal at rest and normal at stress.    The ECG portion of the study is abnormal but not diagnostic.    The patient reported no chest pain during the stress test.      Most Recent Cardiac PET Stress Test Results  No results found for this or any previous visit.      Most Recent Cardiovascular Angiogram results  Results for orders placed during the hospital encounter of 06/15/20    Intra-Procedure Documentation    Narrative  PEPE performed in the Invasive Lab  - See Procedure Log link below for nursing documentation  - See PEPE order on Card Proc Tab for physician findings      Other Most Recent Cardiology Results  Results for orders placed during the hospital encounter of 12/09/23    Pacemaker interrogation today reveals he has been in atrial fibrillation with significant under sensing.  Adjustments have been made.    Labs reviewed    Assessment:       1. Essential hypertension    2. Paroxysmal atrial fibrillation    3. Pacemaker    4. Tortuous aorta         Plan:     Continue:  Antiarrhythmic, ARB, Beta blocker, Diuretic, and DOAC  Regular exercise program  Weight loss  Low cholesterol diet    Three-month follow-up    We discussed the rationale, risks, benefits, and alternatives for seeking left atrial appendage closure with the Watchman device.  Shared decision making using the ACC/HRS algorithms was used to help guide the discussion.  The benefits of ALMA closure include risk reduction for ischemic stroke roughly similar to that of chronic OAC without chronic OAC.  The risks include a small risk of death, tamponade, need for emergency heart surgery, device embolization, stroke, bleeding, vascular injury, device related thrombus.  We addressed the alternatives of oral anticoagulation or no OAC with the high risk of stroke.  They would like to proceed with left atrial appendage closure.

## 2024-01-12 ENCOUNTER — TELEPHONE (OUTPATIENT)
Dept: CARDIOLOGY | Facility: HOSPITAL | Age: 79
End: 2024-01-12
Payer: MEDICARE

## 2024-01-12 NOTE — TELEPHONE ENCOUNTER
AF noted during device remote check:    Mode switch burden:  99 % since 1/10    Max duration seen: per device 15 hrs. 48 mins. (Atrial undersensing noted)    Per trend appears ongoing:                        Most recent episode: ongoing          Ventricular rates:   Controlled            Anticoagulation status:  Eliquis    Patient symptoms: He reports he has some shortness of breath      Meds:  Coreg 6.25 mg bid  Telmisartan 40 mg daily  Eliquis 5 mg bid  Propafenone 225 mg bid    Pt. Also states when he feels the AFib he takes and additional Propafenone and with take Lopressor        Message sent to Dr. Moss

## 2024-01-17 ENCOUNTER — PATIENT MESSAGE (OUTPATIENT)
Dept: FAMILY MEDICINE | Facility: CLINIC | Age: 79
End: 2024-01-17
Payer: MEDICARE

## 2024-01-17 DIAGNOSIS — F32.A DEPRESSION, UNSPECIFIED DEPRESSION TYPE: Primary | ICD-10-CM

## 2024-01-17 RX ORDER — ESCITALOPRAM OXALATE 10 MG/1
TABLET ORAL
Qty: 90 TABLET | Refills: 4 | Status: SHIPPED | OUTPATIENT
Start: 2024-01-17

## 2024-01-17 NOTE — TELEPHONE ENCOUNTER
No care due was identified.  [facility] Embedded Care Due Messages. Reference number: [id number].   1/17/2024 4:10:08 PM CST

## 2024-01-18 ENCOUNTER — TELEPHONE (OUTPATIENT)
Dept: INFECTIOUS DISEASES | Facility: CLINIC | Age: 79
End: 2024-01-18
Payer: MEDICARE

## 2024-01-18 ENCOUNTER — OFFICE VISIT (OUTPATIENT)
Dept: INFECTIOUS DISEASES | Facility: CLINIC | Age: 79
End: 2024-01-18
Payer: MEDICARE

## 2024-01-18 VITALS
HEIGHT: 72 IN | SYSTOLIC BLOOD PRESSURE: 113 MMHG | TEMPERATURE: 98 F | BODY MASS INDEX: 25.2 KG/M2 | DIASTOLIC BLOOD PRESSURE: 59 MMHG | WEIGHT: 186.06 LBS

## 2024-01-18 DIAGNOSIS — T84.59XD INFECTION OF PROSTHETIC KNEE JOINT, SUBSEQUENT ENCOUNTER: Primary | ICD-10-CM

## 2024-01-18 DIAGNOSIS — Z96.659 INFECTION OF PROSTHETIC KNEE JOINT, SUBSEQUENT ENCOUNTER: Primary | ICD-10-CM

## 2024-01-18 DIAGNOSIS — Z79.2 RECEIVING INTRAVENOUS ANTIBIOTIC TREATMENT AS OUTPATIENT: ICD-10-CM

## 2024-01-18 PROCEDURE — 3078F DIAST BP <80 MM HG: CPT | Mod: HCNC,CPTII,NTX,S$GLB | Performed by: PHYSICIAN ASSISTANT

## 2024-01-18 PROCEDURE — 1159F MED LIST DOCD IN RCRD: CPT | Mod: HCNC,CPTII,NTX,S$GLB | Performed by: PHYSICIAN ASSISTANT

## 2024-01-18 PROCEDURE — 1101F PT FALLS ASSESS-DOCD LE1/YR: CPT | Mod: HCNC,CPTII,NTX,S$GLB | Performed by: PHYSICIAN ASSISTANT

## 2024-01-18 PROCEDURE — 99999 PR PBB SHADOW E&M-EST. PATIENT-LVL IV: CPT | Mod: PBBFAC,HCNC,TXP, | Performed by: PHYSICIAN ASSISTANT

## 2024-01-18 PROCEDURE — 99214 OFFICE O/P EST MOD 30 MIN: CPT | Mod: HCNC,NTX,S$GLB, | Performed by: PHYSICIAN ASSISTANT

## 2024-01-18 PROCEDURE — 1125F AMNT PAIN NOTED PAIN PRSNT: CPT | Mod: HCNC,CPTII,NTX,S$GLB | Performed by: PHYSICIAN ASSISTANT

## 2024-01-18 PROCEDURE — 3288F FALL RISK ASSESSMENT DOCD: CPT | Mod: HCNC,CPTII,NTX,S$GLB | Performed by: PHYSICIAN ASSISTANT

## 2024-01-18 PROCEDURE — 1160F RVW MEDS BY RX/DR IN RCRD: CPT | Mod: HCNC,CPTII,NTX,S$GLB | Performed by: PHYSICIAN ASSISTANT

## 2024-01-18 PROCEDURE — 3074F SYST BP LT 130 MM HG: CPT | Mod: HCNC,CPTII,NTX,S$GLB | Performed by: PHYSICIAN ASSISTANT

## 2024-01-18 PROCEDURE — 1157F ADVNC CARE PLAN IN RCRD: CPT | Mod: HCNC,CPTII,NTX,S$GLB | Performed by: PHYSICIAN ASSISTANT

## 2024-01-18 NOTE — PROGRESS NOTES
[facility] / [facility]  Infectious Disease        Patient ID: Dominick KENNEDY MD Duncan is a 78 y.o. male.    Chief Complaint: Follow-up      Dominick was seen today for follow-up.    Diagnoses and all orders for this visit:    Infection of prosthetic knee joint, subsequent encounter    Receiving intravenous antibiotic treatment as outpatient           Greater than 30 minutes was spent on this encounter, which included: review of recent encounters, review and interpretation of labs/images, obtaining pertinent history, performing a physical examination, counseling and educating the patient/family/caregiver, ordering medications/tests, documenting in the electronic health record, and coordinating care with necessary providers.    Interval HPI:   12/27 - ID clinic f/u. Patient here with his daughter, ambulating with walker. Had appt with Ortho yesterday where sutures were removed and incision was felt to be healing. He is to start outpatient PT soon. Compliant with abx and tolerating without issues. Denies fevers, chills, night sweats. Overall he is doing well. He states he does have a broken tooth that needs pulling and is wondering if this could have been the source of his infection.  1/19 - ID clinic f/u. Patient here with his wife, ambulating with walker for support. States he is still experiencing pain in the knee, but it is improving steadily as time goes on. He does have some swelling to the knee in the afternoons/evenings which is also when the pain is the worst. He denies any fevers, chills, night sweats. Compliant with and tolerating abx. Has dental procedure upcoming next week.  Assessment and Plan:     # Right knee prosthetic joint infection   - index TKA 2018 with Dr. Dallas  - 12/9 joint aspiration: 98k WBCs (91% segs)  - 12/9/23 s/p revision and polyethylene exchange. Findings of purulent joint fluid. Discussed with Dr. Tello and it is felt the knee was definitely infected based on OR  findings.   - OR and joint cultures: NGTD, no orgs on stain  - 16S PCR: negative for bacteria  - discharged home on IV Cefepime + IV Dapto 6mg/kg q24h + PO Metronidazole to complete 6 weeks from surgery (EOC 1/20/24)     # CKD  - renally dose meds as needed     Recommendations:  - reviewed labs which remain WNL and he has steadily improved clinically. Still with some swelling/pain in the evenings but this is likely normal post-op. A few spots on incision site with superficial scabbing. No outright dehiscence. Counseled on gentle wound care  - he has nearly completed 6 weeks of BSA from date of surgery for culture-negative right knee PJI  - considered d/c abx today, but patient has upcoming dental procedure with concerns that this was etiology for his infection to begin with. Considered transitioning to PO abx as ppx but patient is tolerating current regimen  - will cont same course for 1 additional week to cover marcela-operative period for dental procedure  - cont Cefepime 2g IV q12h  - cont Metronidazole 500mg po TID   - cont Daptomycin 6mg/kg IV q24h  - can check CBC, CMP, CRP, CPK 1 more time  - New EOC next week 1/25 or 1/26. [facility] to pull PICC line after last dose (currently receiving weekly PICC care there)  - Unfortunately no good options for suppression without bacterial ID  - continue to f/u with Ortho  - can f/u with ID PRN but will be available if needed      Note forwarded to Dr. 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    - Patient came to this hospital on 12/09/2023 due to right knee pain  It appears that patient developed knee pain 1 week prior to admission associated with swelling, pain associated with fever and chills.    - During initial evaluation in the ED patient was  found to be afebrile with evidence of leukocytosis.  WBC 15.9K  - Arthrocentesis was performed with evidence of leukocytosis with neutrophilia.  98K  - patient underwent polyethylene exchange on 12/09/2023.    - Cultures obtained by the time of this note they were negative.     Because of the above Infectious Disease was consulted      Past Medical History:   Diagnosis Date    Anticoagulant long-term use     Anxiety     Arthritis     Atrial fibrillation     BPH (benign prostatic hyperplasia)     Cataract     CKD (chronic kidney disease) stage 3, GFR 30-59 ml/min 7/10/2017    Followed by Dr. Jeevan Pond    Colon polyp     benign    Depression     Elevated PSA     Erectile dysfunction     Gastric ulcer with hemorrhage     Hep B w/o coma 1977    History of bleeding peptic ulcer     History of prostatitis     Hypertension     PAF (paroxysmal atrial fibrillation)     Sleep apnea     on CPAP    Stroke     TIA December 2019    Thyroid disease        Past Surgical History:   Procedure Laterality Date    A-V CARDIAC PACEMAKER INSERTION Left 9/23/2023    Procedure: Dual Chamber PPM (Heart) Rm 2620;  Surgeon: Pan Moss MD;  Location: [facility] CATH;  Service: Cardiology;  Laterality: Left;    ABDOMINAL HERNIA REPAIR      ABLATION OF ARRHYTHMOGENIC FOCUS FOR ATRIAL FIBRILLATION N/A 6/15/2020    Procedure: Ablation atrial fibrillation;  Surgeon: Wyatt Beatty MD;  Location: [facility] EP LAB;  Service: Cardiology;  Laterality: N/A;  PAF, AFl, PEPE, PVI re-do, CTI, RFA, JUSTIN, Gen, SK, 3 Prep    APPLICATION OF WOUND VACUUM-ASSISTED CLOSURE DEVICE Right 12/9/2023    Procedure: APPLICATION, WOUND VAC;  Surgeon: Jelani Tello II, MD;  Location: [facility] OR;  Service: Orthopedics;  Laterality: Right;    CATARACT EXTRACTION  11/25/2013    bilateral    CHOLECYSTECTOMY      COLONOSCOPY N/A 11/25/2015    Procedure: COLONOSCOPY;  Surgeon: Toby Hernanedz MD;  Location: [facility] ENDO;  Service: Endoscopy;  Laterality: N/A;     COLONOSCOPY N/A 11/13/2020    Procedure: COLONOSCOPY;  Surgeon: Toby Hernandez MD;  Location: [facility] ENDO;  Service: Endoscopy;  Laterality: N/A;    CYSTOSCOPY WITH INSERTION OF MINIMALLY INVASIVE IMPLANT TO ENLARGE PROSTATIC URETHRA N/A 11/28/2022    Procedure: CYSTOSCOPY, WITH INSERTION OF UROLIFT IMPLANT;  Surgeon: Yakov Shetty MD;  Location: [facility] OR;  Service: Urology;  Laterality: N/A;    EYE SURGERY      GASTRIC BYPASS  1992    INSERTION, PACEMAKER, TEMPORARY TRANSVENOUS  9/22/2023    Procedure: Temp pacer insertion ER Rm 8;  Surgeon: Pan Moss MD;  Location: [facility] CATH;  Service: Cardiology;;    INTRAMEDULLARY RODDING OF FEMUR Left 7/18/2020    Procedure: INSERTION, INTRAMEDULLARY ERIC, FEMUR, left, Synthes, [place] table, Large C arm clock side,;  Surgeon: Tony Rodriguez MD;  Location: [facility] OR 2ND FLR;  Service: Orthopedics;  Laterality: Left;    IRRIGATION AND DEBRIDEMENT Right 12/9/2023    Procedure: IRRIGATION AND DEBRIDEMENT KNEE;  Surgeon: Jelani Tello II, MD;  Location: [facility] OR;  Service: Orthopedics;  Laterality: Right;    KNEE ARTHROSCOPY      RT    LASIK  2001    both eyes (Dr. Rabago)    ORIF HUMERUS FRACTURE      LT    ORIF HUMERUS FRACTURE Right     non surgical repair    RADIOFREQUENCY ABLATION      x2    REVISION OF KNEE ARTHROPLASTY Right 12/9/2023    Procedure: REVISION ARTHROPLASTY KNEE- Liner Replacement - dirty/clean;  Surgeon: Jelani Tello II, MD;  Location: [facility] OR;  Service: Orthopedics;  Laterality: Right;  dirty to clean at 1940    Right ankle tendon repair      ROBOT-ASSISTED REPAIR OF INCISIONAL HERNIA USING DA GARETH XI Left 6/13/2022    Procedure: XI ROBOTIC REPAIR, HERNIA, INCISIONAL (LEFT SIDE SPIGELIAN WITH MESH);  Surgeon: Rosendo Marti MD;  Location: [facility] OR 2ND FLR;  Service: General;  Laterality: Left;  consent in am    ROTATOR CUFF REPAIR      right    TOTAL KNEE ARTHROPLASTY Right 5/29/2018    Procedure: REPLACEMENT-KNEE-TOTAL-axel;   Surgeon: Denzel Dallas MD;  [facility];  Service: Orthopedics;  Laterality: Right;  [place]    TREATMENT OF CARDIAC ARRHYTHMIA  6/15/2020    Procedure: Cardioversion or Defibrillation;  Surgeon: Wyatt Beatty MD;  Location: [facility] EP LAB;  Service: Cardiology;;       Family History   Problem Relation Age of Onset    COPD Father     Diabetes Father     Osteoporosis Father     Aortic stenosis Mother     Heart disease Mother         aortic stenosis    Osteoporosis Mother     Heart attack Brother     No Known Problems Son     No Known Problems Daughter     No Known Problems Daughter     No Known Problems Daughter        Social History     Socioeconomic History    Marital status:     Number of children: 5   Occupational History    Occupation: retired anesthesiology     Employer: [employer]    Occupation: [employer] grad     Comment: previous football player   Tobacco Use    Smoking status: Never     Passive exposure: Never    Smokeless tobacco: Never    Tobacco comments:     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    Substance and Sexual Activity    Alcohol use: No    Drug use: No    Sexual activity: Yes     Partners: Female     Social Determinants of Health     Financial Resource Strain: Low Risk  (12/10/2023)    Overall Financial Resource Strain (CARDIA)     Difficulty of Paying Living Expenses: Not hard at all   Food Insecurity: Unknown (12/10/2023)    Hunger Vital Sign     Ran Out of Food in the Last Year: Never true   Transportation Needs: No Transportation Needs (12/10/2023)    PRAPARE - Transportation     Lack of Transportation (Medical): No     Lack of Transportation (Non-Medical): No   Housing Stability: Unknown (12/10/2023)    Housing Stability Vital Sign     Unable to Pay for Housing in the Last Year: No     Number of Places Lived in the Last Year: 1       Review of patient's allergies indicates:   Allergen Reactions    No known drug allergies        Current Outpatient  Medications   Medication Instructions    0.9 % sodium chloride (SODIUM CHLORIDE 0.9%) solution Intravenous    alfuzosin (UROXATRAL) 10 mg, Oral, With breakfast    buPROPion (WELLBUTRIN XL) 300 mg, Oral, Daily    calcium citrate (CALCITRATE) 200 mg, Oral, 2 times daily    carvediloL (COREG) 6.25 mg, Oral, 2 times daily    ceFEPIme (MAXIPIME) 2 gram injection     celecoxib (CELEBREX) 200 mg, Oral, 2 times daily    cyclobenzaprine (FLEXERIL) 10 mg, Oral    DAPTOmycin (CUBICIN) 500 mg injection Intravenous    DAPTOmycin 50 mg/mL in sodium chloride 0.9% solution 550 mg, Intravenous, Daily    dextrose 5 % in water (D5W) PgBk 100 mL with ceFEPIme 2 gram SolR 2 g 2 g, Intravenous, Every 8 hours    ELIQUIS 5 mg, Oral, 2 times daily    EScitalopram oxalate (LEXAPRO) 10 MG tablet TAKE 1 TABLET(10 MG) BY MOUTH EVERY DAY    levothyroxine (SYNTHROID) 75 mcg, Oral, Before breakfast    metroNIDAZOLE (FLAGYL) 500 mg, Oral, Every 8 hours    OMEGA-3 FATTY ACIDS (FISH OIL CONCENTRATE ORAL) 1 capsule, Oral, Daily    oxyCODONE-acetaminophen (PERCOCET)  mg per tablet 1 tablet, Oral, Every 4 hours PRN    potassium chloride (MICRO-K) 10 MEQ CpSR 10 mEq, Oral, Every Mon, Wed, Fri    propafenone (RYTHMOL SR) 225 mg, Oral, Every 12 hours    telmisartan (MICARDIS) 40 mg, Oral, Daily    tiZANidine (ZANAFLEX) 4 mg, Oral, 3 times daily PRN    torsemide (DEMADEX) 20 mg, Oral, Every Mon, Wed, Fri         Review of Systems   Constitutional:  Negative for activity change, appetite change, chills, fatigue and fever.   HENT:  Positive for dental problem. Negative for congestion, mouth sores, sinus pain and sore throat.    Eyes:  Negative for photophobia and visual disturbance.   Respiratory:  Negative for cough, chest tightness, shortness of breath and wheezing.    Cardiovascular:  Positive for leg swelling. Negative for chest pain and palpitations.   Gastrointestinal:  Negative for abdominal pain, diarrhea, nausea and vomiting.   Genitourinary:   Negative for dysuria, flank pain, hematuria and urgency.   Musculoskeletal:  Positive for gait problem. Negative for arthralgias, myalgias, neck pain and neck stiffness.   Skin:  Negative for color change and rash.   Neurological:  Negative for dizziness, seizures and headaches.   Psychiatric/Behavioral:  Negative for confusion.            Objective:     Vitals:    01/18/24 0941   BP: (!) 113/59   Temp: 98 °F (36.7 °C)              Physical Exam  Vitals and nursing note reviewed.   Constitutional:       General: He is awake. He is not in acute distress.     Appearance: He is well-developed and well-groomed. He is not diaphoretic.      Comments: Ambulating with walker   HENT:      Head: Normocephalic and atraumatic.      Right Ear: External ear normal.      Left Ear: External ear normal.      Nose: Nose normal.      Mouth/Throat:      Dentition: Abnormal dentition.   Eyes:      General: No scleral icterus.        Right eye: No discharge.         Left eye: No discharge.      Pupils: Pupils are equal, round, and reactive to light.   Cardiovascular:      Rate and Rhythm: Normal rate and regular rhythm.   Pulmonary:      Effort: Pulmonary effort is normal. No accessory muscle usage or respiratory distress.   Abdominal:      General: There is no distension.   Musculoskeletal:      Cervical back: Normal range of motion and neck supple.      Right lower leg: Edema present.      Left lower leg: Edema present.   Skin:     General: Skin is warm and dry.             Comments: Chronic stasis dermatitis to BLEs    PICC line RUE c/d/i   Neurological:      General: No focal deficit present.      Mental Status: He is alert and oriented to person, place, and time.   Psychiatric:         Mood and Affect: Mood normal.         Behavior: Behavior normal.         Clinic image 1/18/24      Estimated Creatinine Clearance: 43.2 mL/min (A) (based on SCr of 1.57 mg/dL (H)).      Microbiology Results (last 7 days)       ** No results found for  the last 168 hours. **              Significant Labs: All pertinent labs within the past 24 hours have been reviewed.     Significant Imaging: I have reviewed all relevant and available imaging results/findings within the past 24 hours.      Plan -- see top of note

## 2024-01-18 NOTE — TELEPHONE ENCOUNTER
Returned call to Luis at [facility].  Per Anali Plaza PA-C -   extend IV ABX until 1/25/24, EOC 1/25/24 as pt is having dental work on 1/23/24.  Arrange for [facility] to d/c PICC line, PICC care, labs 1/26/24   D/C IV abx with [facility] at EOC    Spoke with Luis at [facility], gave above orders.  Spoke with and sent Secure chart to [facility], Deon Moody RN and Cindy Gutierrez INfuson .

## 2024-01-18 NOTE — TELEPHONE ENCOUNTER
----- Message from Susan Worrell sent at 1/18/2024 12:06 PM CST -----  Regarding: advice    Type:  Needs Medical Advice    Who Called: hung with [facility]     Best Call Back Number: [phone]    Additional Information: tyrone is calling to get status of the eFEPIme (MAXIPIME) 2 gram injection  & DAPTOmycin (CUBICIN) 500 mg injection. please call to discuss.

## 2024-01-19 ENCOUNTER — TELEPHONE (OUTPATIENT)
Dept: INFECTIOUS DISEASES | Facility: CLINIC | Age: 79
End: 2024-01-19
Payer: MEDICARE

## 2024-01-19 NOTE — TELEPHONE ENCOUNTER
NEW ORDERS:    Per Anali Plaza PA-C -   1) [facility]: IV abx extended through 1/25/24. Please arrange for PICC care for week of 1/22/24. No labs needed week of 1/22/24.   2) [facility]: please d/c PICC line on 1/25/24 after dose or 1/26/24.  3) [facility] (Luis Pharmacist) notified 1/18/24 of EOC 1/25/24     DX: T84.59XA, Z89.659     Thank You,     Anali Plaza PA-C     Faxed to [facility]@[phone]

## 2024-01-24 ENCOUNTER — HOSPITAL ENCOUNTER (OUTPATIENT)
Dept: CARDIOLOGY | Facility: HOSPITAL | Age: 79
Discharge: HOME | End: 2024-01-24
Attending: INTERNAL MEDICINE
Payer: MEDICARE

## 2024-01-24 LAB
OHS CV AF BURDEN PERCENT: 20
OHS CV DC REMOTE DEVICE TYPE: NORMAL
OHS CV ICD SHOCK: NO
OHS CV RV PACING PERCENT: 18 %

## 2024-01-24 PROCEDURE — 93294 REM INTERROG EVL PM/LDLS PM: CPT | Mod: HCNC,TXP,, | Performed by: INTERNAL MEDICINE

## 2024-01-24 PROCEDURE — 93296 REM INTERROG EVL PM/IDS: CPT | Mod: HCNC,PO,TXP | Performed by: INTERNAL MEDICINE

## 2024-01-31 ENCOUNTER — PATIENT MESSAGE (OUTPATIENT)
Dept: NEPHROLOGY | Facility: CLINIC | Age: 79
End: 2024-01-31
Payer: MEDICARE

## 2024-02-01 RX ORDER — TIZANIDINE 4 MG/1
4 TABLET ORAL 3 TIMES DAILY PRN
Qty: 30 TABLET | Refills: 5 | Status: CANCELLED | OUTPATIENT
Start: 2024-02-01

## 2024-02-02 NOTE — TELEPHONE ENCOUNTER
No care due was identified.  [facility] Embedded Care Due Messages. Reference number: [id number].   2/01/2024 9:15:29 PM CST

## 2024-02-15 ENCOUNTER — LAB VISIT (OUTPATIENT)
Dept: LAB | Facility: HOSPITAL | Age: 79
End: 2024-02-15
Payer: MEDICARE

## 2024-02-15 DIAGNOSIS — M00.9 PYOGENIC ARTHRITIS OF RIGHT KNEE JOINT, DUE TO UNSPECIFIED ORGANISM: ICD-10-CM

## 2024-02-15 LAB
ALBUMIN SERPL BCP-MCNC: 3 G/DL (ref 3.5–5.2)
ALP SERPL-CCNC: 53 U/L (ref 55–135)
ALT SERPL W/O P-5'-P-CCNC: 10 U/L (ref 10–44)
ANION GAP SERPL CALC-SCNC: 7 MMOL/L (ref 8–16)
AST SERPL-CCNC: 16 U/L (ref 10–40)
BILIRUB SERPL-MCNC: 0.4 MG/DL (ref 0.1–1)
BUN SERPL-MCNC: 24 MG/DL (ref 8–23)
CALCIUM SERPL-MCNC: 9.1 MG/DL (ref 8.7–10.5)
CHLORIDE SERPL-SCNC: 111 MMOL/L (ref 95–110)
CK SERPL-CCNC: 37 U/L (ref 20–200)
CO2 SERPL-SCNC: 23 MMOL/L (ref 23–29)
CREAT SERPL-MCNC: 1.2 MG/DL (ref 0.5–1.4)
CRP SERPL-MCNC: 0.3 MG/L (ref 0–8.2)
EST. GFR  (NO RACE VARIABLE): >60 ML/MIN/1.73 M^2
GLUCOSE SERPL-MCNC: 104 MG/DL (ref 70–110)
POTASSIUM SERPL-SCNC: 5 MMOL/L (ref 3.5–5.1)
PROT SERPL-MCNC: 6.2 G/DL (ref 6–8.4)
SODIUM SERPL-SCNC: 141 MMOL/L (ref 136–145)

## 2024-02-15 PROCEDURE — 82550 ASSAY OF CK (CPK): CPT | Mod: HCNC,TXP

## 2024-02-15 PROCEDURE — 36415 COLL VENOUS BLD VENIPUNCTURE: CPT | Mod: HCNC,PO,TXP

## 2024-02-15 PROCEDURE — 86140 C-REACTIVE PROTEIN: CPT | Mod: HCNC,TXP

## 2024-02-15 PROCEDURE — 80053 COMPREHEN METABOLIC PANEL: CPT | Mod: HCNC,NTX

## 2024-02-26 ENCOUNTER — TELEPHONE (OUTPATIENT)
Dept: ELECTROPHYSIOLOGY | Facility: CLINIC | Age: 79
End: 2024-02-26
Payer: MEDICARE

## 2024-02-26 DIAGNOSIS — I49.8 OTHER SPECIFIED CARDIAC ARRHYTHMIAS: Primary | ICD-10-CM

## 2024-02-26 NOTE — TELEPHONE ENCOUNTER
Per Dr Beatty, please call patient to schedule appt here at [facility] sooner rather than later. OK to overbook, per Dr Beatty.  Thanks

## 2024-02-27 ENCOUNTER — PATIENT MESSAGE (OUTPATIENT)
Dept: CARDIOLOGY | Facility: CLINIC | Age: 79
End: 2024-02-27

## 2024-02-27 PROBLEM — I48.19 PERSISTENT ATRIAL FIBRILLATION: Status: ACTIVE | Noted: 2024-02-27

## 2024-02-29 ENCOUNTER — LAB VISIT (OUTPATIENT)
Dept: LAB | Facility: HOSPITAL | Age: 79
End: 2024-02-29
Attending: FAMILY MEDICINE
Payer: MEDICARE

## 2024-02-29 DIAGNOSIS — E03.4 HYPOTHYROIDISM DUE TO ACQUIRED ATROPHY OF THYROID: ICD-10-CM

## 2024-02-29 DIAGNOSIS — I10 HYPERTENSION, UNSPECIFIED TYPE: ICD-10-CM

## 2024-02-29 LAB
ALBUMIN SERPL BCP-MCNC: 3.2 G/DL (ref 3.5–5.2)
ALP SERPL-CCNC: 61 U/L (ref 55–135)
ALT SERPL W/O P-5'-P-CCNC: 13 U/L (ref 10–44)
ANION GAP SERPL CALC-SCNC: 6 MMOL/L (ref 8–16)
AST SERPL-CCNC: 14 U/L (ref 10–40)
BASOPHILS # BLD AUTO: 0.07 K/UL (ref 0–0.2)
BASOPHILS NFR BLD: 1.1 % (ref 0–1.9)
BILIRUB SERPL-MCNC: 0.5 MG/DL (ref 0.1–1)
BUN SERPL-MCNC: 23 MG/DL (ref 8–23)
CALCIUM SERPL-MCNC: 8.8 MG/DL (ref 8.7–10.5)
CHLORIDE SERPL-SCNC: 115 MMOL/L (ref 95–110)
CO2 SERPL-SCNC: 20 MMOL/L (ref 23–29)
CREAT SERPL-MCNC: 1.2 MG/DL (ref 0.5–1.4)
DIFFERENTIAL METHOD BLD: ABNORMAL
EOSINOPHIL # BLD AUTO: 0.6 K/UL (ref 0–0.5)
EOSINOPHIL NFR BLD: 9.4 % (ref 0–8)
ERYTHROCYTE [DISTWIDTH] IN BLOOD BY AUTOMATED COUNT: 16.8 % (ref 11.5–14.5)
EST. GFR  (NO RACE VARIABLE): >60 ML/MIN/1.73 M^2
GLUCOSE SERPL-MCNC: 140 MG/DL (ref 70–110)
HCT VFR BLD AUTO: 32.6 % (ref 40–54)
HGB BLD-MCNC: 10.1 G/DL (ref 14–18)
IMM GRANULOCYTES # BLD AUTO: 0.02 K/UL (ref 0–0.04)
IMM GRANULOCYTES NFR BLD AUTO: 0.3 % (ref 0–0.5)
LYMPHOCYTES # BLD AUTO: 1 K/UL (ref 1–4.8)
LYMPHOCYTES NFR BLD: 15.8 % (ref 18–48)
MCH RBC QN AUTO: 27.8 PG (ref 27–31)
MCHC RBC AUTO-ENTMCNC: 31 G/DL (ref 32–36)
MCV RBC AUTO: 90 FL (ref 82–98)
MONOCYTES # BLD AUTO: 0.6 K/UL (ref 0.3–1)
MONOCYTES NFR BLD: 8.8 % (ref 4–15)
NEUTROPHILS # BLD AUTO: 4.2 K/UL (ref 1.8–7.7)
NEUTROPHILS NFR BLD: 64.6 % (ref 38–73)
NRBC BLD-RTO: 0 /100 WBC
PLATELET # BLD AUTO: 284 K/UL (ref 150–450)
PMV BLD AUTO: 12.4 FL (ref 9.2–12.9)
POTASSIUM SERPL-SCNC: 4.9 MMOL/L (ref 3.5–5.1)
PROT SERPL-MCNC: 6.4 G/DL (ref 6–8.4)
RBC # BLD AUTO: 3.63 M/UL (ref 4.6–6.2)
SODIUM SERPL-SCNC: 141 MMOL/L (ref 136–145)
TSH SERPL DL<=0.005 MIU/L-ACNC: 3.07 UIU/ML (ref 0.4–4)
WBC # BLD AUTO: 6.5 K/UL (ref 3.9–12.7)

## 2024-02-29 PROCEDURE — 36415 COLL VENOUS BLD VENIPUNCTURE: CPT | Mod: HCNC,PO,TXP | Performed by: FAMILY MEDICINE

## 2024-02-29 PROCEDURE — 85025 COMPLETE CBC W/AUTO DIFF WBC: CPT | Mod: HCNC,TXP | Performed by: FAMILY MEDICINE

## 2024-02-29 PROCEDURE — 80053 COMPREHEN METABOLIC PANEL: CPT | Mod: HCNC,NTX | Performed by: FAMILY MEDICINE

## 2024-02-29 PROCEDURE — 84443 ASSAY THYROID STIM HORMONE: CPT | Mod: HCNC,TXP | Performed by: FAMILY MEDICINE

## 2024-02-29 NOTE — PROGRESS NOTES
"Pt is coming to clinic with c/o red, swollen "lump" to the medial aspect of his replaced knee. Will get a crp and esr on his way up to clinic. Spoke to him by phone and he understands the plan.  " no

## 2024-03-04 ENCOUNTER — HOSPITAL ENCOUNTER (OUTPATIENT)
Dept: RADIOLOGY | Facility: HOSPITAL | Age: 79
Discharge: HOME | End: 2024-03-04
Attending: NURSE PRACTITIONER
Payer: MEDICARE

## 2024-03-04 DIAGNOSIS — J84.9 INTERSTITIAL PULMONARY DISEASE, UNSPECIFIED: ICD-10-CM

## 2024-03-04 PROCEDURE — 71250 CT THORAX DX C-: CPT | Mod: TC,HCNC,PO,TXP

## 2024-03-04 PROCEDURE — 71250 CT THORAX DX C-: CPT | Mod: 26,HCNC,NTX, | Performed by: RADIOLOGY

## 2024-03-05 ENCOUNTER — LAB VISIT (OUTPATIENT)
Dept: LAB | Facility: HOSPITAL | Age: 79
End: 2024-03-05
Attending: PHYSICIAN ASSISTANT
Payer: MEDICARE

## 2024-03-05 ENCOUNTER — PATIENT MESSAGE (OUTPATIENT)
Dept: FAMILY MEDICINE | Facility: CLINIC | Age: 79
End: 2024-03-05

## 2024-03-05 ENCOUNTER — OFFICE VISIT (OUTPATIENT)
Dept: FAMILY MEDICINE | Facility: CLINIC | Age: 79
End: 2024-03-05
Payer: MEDICARE

## 2024-03-05 VITALS
OXYGEN SATURATION: 99 % | HEIGHT: 72 IN | BODY MASS INDEX: 25.83 KG/M2 | HEART RATE: 50 BPM | SYSTOLIC BLOOD PRESSURE: 120 MMHG | DIASTOLIC BLOOD PRESSURE: 60 MMHG | WEIGHT: 190.69 LBS

## 2024-03-05 DIAGNOSIS — I48.0 PAROXYSMAL ATRIAL FIBRILLATION: ICD-10-CM

## 2024-03-05 DIAGNOSIS — D64.9 NORMOCYTIC ANEMIA: ICD-10-CM

## 2024-03-05 DIAGNOSIS — D64.9 CHRONIC ANEMIA: ICD-10-CM

## 2024-03-05 DIAGNOSIS — I10 ESSENTIAL HYPERTENSION: ICD-10-CM

## 2024-03-05 DIAGNOSIS — Z09 HOSPITAL DISCHARGE FOLLOW-UP: Primary | ICD-10-CM

## 2024-03-05 DIAGNOSIS — R73.9 HYPERGLYCEMIA: ICD-10-CM

## 2024-03-05 DIAGNOSIS — R00.1 BRADYCARDIA: ICD-10-CM

## 2024-03-05 DIAGNOSIS — R41.9 UNSPECIFIED SYMPTOMS AND SIGNS INVOLVING COGNITIVE FUNCTIONS AND AWARENESS: ICD-10-CM

## 2024-03-05 PROBLEM — F32.A DEPRESSION: Status: RESOLVED | Noted: 2023-09-22 | Resolved: 2024-03-05

## 2024-03-05 PROBLEM — Z01.810 PREOP CARDIOVASCULAR EXAM: Status: RESOLVED | Noted: 2020-07-17 | Resolved: 2024-03-05

## 2024-03-05 PROBLEM — U07.1 COVID: Status: RESOLVED | Noted: 2022-06-22 | Resolved: 2024-03-05

## 2024-03-05 LAB
ESTIMATED AVG GLUCOSE: 111 MG/DL (ref 68–131)
FERRITIN SERPL-MCNC: 24 NG/ML (ref 20–300)
FOLATE SERPL-MCNC: 12.8 NG/ML (ref 4–24)
HBA1C MFR BLD: 5.5 % (ref 4–5.6)
IRON SERPL-MCNC: 35 UG/DL (ref 45–160)
LDH SERPL L TO P-CCNC: 155 U/L (ref 110–260)
RETICS/RBC NFR AUTO: 1.1 % (ref 0.4–2)
SATURATED IRON: 8 % (ref 20–50)
TOTAL IRON BINDING CAPACITY: 428 UG/DL (ref 250–450)
TRANSFERRIN SERPL-MCNC: 289 MG/DL (ref 200–375)
VIT B12 SERPL-MCNC: 186 PG/ML (ref 210–950)

## 2024-03-05 PROCEDURE — 1101F PT FALLS ASSESS-DOCD LE1/YR: CPT | Mod: HCNC,CPTII,S$GLB, | Performed by: PHYSICIAN ASSISTANT

## 2024-03-05 PROCEDURE — 82607 VITAMIN B-12: CPT | Mod: HCNC,TXP | Performed by: PHYSICIAN ASSISTANT

## 2024-03-05 PROCEDURE — 82746 ASSAY OF FOLIC ACID SERUM: CPT | Mod: HCNC,TXP | Performed by: PHYSICIAN ASSISTANT

## 2024-03-05 PROCEDURE — 83540 ASSAY OF IRON: CPT | Mod: HCNC,TXP | Performed by: PHYSICIAN ASSISTANT

## 2024-03-05 PROCEDURE — 1159F MED LIST DOCD IN RCRD: CPT | Mod: HCNC,CPTII,S$GLB, | Performed by: PHYSICIAN ASSISTANT

## 2024-03-05 PROCEDURE — 36415 COLL VENOUS BLD VENIPUNCTURE: CPT | Mod: HCNC,PO,TXP | Performed by: PHYSICIAN ASSISTANT

## 2024-03-05 PROCEDURE — 3074F SYST BP LT 130 MM HG: CPT | Mod: HCNC,CPTII,S$GLB, | Performed by: PHYSICIAN ASSISTANT

## 2024-03-05 PROCEDURE — 82728 ASSAY OF FERRITIN: CPT | Mod: HCNC,TXP | Performed by: PHYSICIAN ASSISTANT

## 2024-03-05 PROCEDURE — 1160F RVW MEDS BY RX/DR IN RCRD: CPT | Mod: HCNC,CPTII,S$GLB, | Performed by: PHYSICIAN ASSISTANT

## 2024-03-05 PROCEDURE — 3288F FALL RISK ASSESSMENT DOCD: CPT | Mod: HCNC,CPTII,S$GLB, | Performed by: PHYSICIAN ASSISTANT

## 2024-03-05 PROCEDURE — 1157F ADVNC CARE PLAN IN RCRD: CPT | Mod: HCNC,CPTII,S$GLB, | Performed by: PHYSICIAN ASSISTANT

## 2024-03-05 PROCEDURE — 1126F AMNT PAIN NOTED NONE PRSNT: CPT | Mod: HCNC,CPTII,S$GLB, | Performed by: PHYSICIAN ASSISTANT

## 2024-03-05 PROCEDURE — 3078F DIAST BP <80 MM HG: CPT | Mod: HCNC,CPTII,S$GLB, | Performed by: PHYSICIAN ASSISTANT

## 2024-03-05 PROCEDURE — 83036 HEMOGLOBIN GLYCOSYLATED A1C: CPT | Mod: HCNC,NTX | Performed by: PHYSICIAN ASSISTANT

## 2024-03-05 PROCEDURE — 83615 LACTATE (LD) (LDH) ENZYME: CPT | Mod: HCNC,NTX | Performed by: PHYSICIAN ASSISTANT

## 2024-03-05 PROCEDURE — 99214 OFFICE O/P EST MOD 30 MIN: CPT | Mod: HCNC,S$GLB,, | Performed by: PHYSICIAN ASSISTANT

## 2024-03-05 PROCEDURE — 99999 PR PBB SHADOW E&M-EST. PATIENT-LVL IV: CPT | Mod: PBBFAC,HCNC,, | Performed by: PHYSICIAN ASSISTANT

## 2024-03-05 PROCEDURE — 85045 AUTOMATED RETICULOCYTE COUNT: CPT | Mod: HCNC,TXP | Performed by: PHYSICIAN ASSISTANT

## 2024-03-05 RX ORDER — METOPROLOL TARTRATE 50 MG/1
TABLET ORAL
Qty: 90 TABLET | Refills: 0 | Status: SHIPPED | OUTPATIENT
Start: 2024-03-05 | End: 2024-03-12

## 2024-03-05 RX ORDER — METOPROLOL TARTRATE 50 MG/1
50 TABLET ORAL DAILY PRN
Qty: 30 TABLET | Refills: 0 | Status: SHIPPED | OUTPATIENT
Start: 2024-03-05 | End: 2024-03-05

## 2024-03-05 NOTE — PROGRESS NOTES
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    Chief Complaint: Follow-up (Hospital follow up)      HPI      Pt is a 78 year old male with CVA hx, depression, interstitial lung disease, bradycardia, A-fib, HTN, ED, BPH, CKD, hypothyroidism, hyperparathyroidism, hx of PUD, osteoarthritis, and [place]. He presents today for hospital follow up. Admitted and discharged same day for persistent A-fib on 2/27/24, had cardioversion with conversion to NSR,  propafenone increased prior to discharge. Pt states he is feeling well today. No complaints. He does note paroxysms of A-fib since discharge. Has old lopressor 50 rx that he has taken only a few times since discharge when he noticed true atrial fibrillation (with rate over 100). States this converts him back to NSR. Wondering if he can have refill of this.     Pt would also like to discuss his persistent anemia. Would like to consider workup for this.     Review of Systems   All other systems reviewed and are negative.         Objective     Physical Exam  Constitutional:       General: He is not in acute distress.     Appearance: Normal appearance. He is not ill-appearing, toxic-appearing or diaphoretic.   HENT:      Head: Normocephalic and atraumatic.   Cardiovascular:      Rate and Rhythm: Bradycardia present.      Heart sounds: Normal heart sounds.   Pulmonary:      Effort: No respiratory distress.      Breath sounds: Normal breath sounds.   Musculoskeletal:      Right lower leg: Edema (at baseline per pt, has as needed torsemide) present.      Left lower leg: Edema (at baseline per pt, has as needed torsemide) present.   Neurological:      General: No focal deficit present.      Mental Status: He is alert and oriented to person, place, and time.   Psychiatric:         Mood and Affect: Mood normal.         Behavior: Behavior normal.         Thought Content: Thought content normal.         Judgment: Judgment normal.       1. Hospital discharge  follow-up  -doing well    2. Normocytic anemia  - Iron and TIBC; Future  - Ferritin; Future  - Folate; Future  - Vitamin B12; Future  - LACTATE DEHYDROGENASE; Future  - RETICULOCYTES; Future  - Occult blood x 1, stool; Future  - Occult blood x 1, stool; Future  - Occult blood x 1, stool; Future    3. Chronic anemia  -likely relayed to chronic disease/CKD, but fine to complete full workup    4. Hyperglycemia  - HEMOGLOBIN A1C; Future    5. Paroxysmal atrial fibrillation  -discussed patient request with Dr. Hare (via secure chat), who saw him while hospitalized. He agrees with prn, infrequent use of lopressor   - metoprolol tartrate (LOPRESSOR) 50 MG tablet; Take 1 tablet (50 mg total) by mouth daily as needed (A-fib with uncontrolled rate).  Dispense: 30 tablet; Refill: 0    6. Bradycardia  -asymptomatic    7. Essential hypertension  -controlled, continue current meds    RTC/ER precautions given. F/U with me prn, f/U Dr. Gaines in 2 months, cardiology as scheduled    Felicita Bashir PA-C

## 2024-03-05 NOTE — PATIENT INSTRUCTIONS
A few reminders from today:    Labs today  Complete stool samples on three different days  Reschedule with Dr. Gaines in 2 months    Follow up with me if needed.   Please go to ER/urgent care if after hours or symptoms persist/worsen.       Follow up with me if needed.   Please go to ER/urgent care if after hours or symptoms persist/worsen.

## 2024-03-06 DIAGNOSIS — E53.8 B12 DEFICIENCY: Primary | ICD-10-CM

## 2024-03-06 RX ORDER — CYANOCOBALAMIN 1000 UG/ML
1000 INJECTION, SOLUTION INTRAMUSCULAR; SUBCUTANEOUS WEEKLY
Status: SHIPPED | OUTPATIENT
Start: 2024-03-06 | End: 2024-04-03

## 2024-03-08 ENCOUNTER — OUTPATIENT CASE MANAGEMENT (OUTPATIENT)
Dept: ADMINISTRATIVE | Facility: OTHER | Age: 79
End: 2024-03-08
Payer: MEDICARE

## 2024-03-08 NOTE — LETTER
Dominick Song  [address] [place] [address]      Dear Dominick Song,     I work with [facility] Outpatient Care Management Department. We received a referral to call you to discuss your medical history. These services are free of charge and are offered to [facility] patients who have recently been discharged from any of our facilities or who have medical conditions that may require the skill of a nurse to assist with management.     I am a Registered Nurse who specializes in connecting patients with available medical and financial resources as well as addressing any educational needs that may be indicated.    I attempted to reach you by telephone, but I was unsuccessful. Please call our department so that we can go over some questions with you, regarding your health.    The Outpatient Care Management Department can be reached at [phone], from 8:00AM to 4:30 PM, on Monday thru Friday.     Additionally, [facility] also has a program where a nurse is available 24/7 to answer questions or provide medical advice, their number is [phone].      Thanks,        Luci Arrieta RN  Outpatient Care Management  Phone #: [phone]

## 2024-03-08 NOTE — PROGRESS NOTES
3/8/2024  1st attempt to complete Initial Assessment  for Outpatient Care Management, left message.  Will send via portal - unable to assess letter.

## 2024-03-12 ENCOUNTER — OFFICE VISIT (OUTPATIENT)
Dept: CARDIOLOGY | Facility: CLINIC | Age: 79
End: 2024-03-12
Payer: MEDICARE

## 2024-03-12 ENCOUNTER — PATIENT MESSAGE (OUTPATIENT)
Dept: FAMILY MEDICINE | Facility: CLINIC | Age: 79
End: 2024-03-12
Payer: MEDICARE

## 2024-03-12 ENCOUNTER — PATIENT MESSAGE (OUTPATIENT)
Dept: AUDIOLOGY | Facility: CLINIC | Age: 79
End: 2024-03-12
Payer: MEDICARE

## 2024-03-12 VITALS
BODY MASS INDEX: 25.32 KG/M2 | WEIGHT: 186.94 LBS | HEART RATE: 74 BPM | DIASTOLIC BLOOD PRESSURE: 77 MMHG | SYSTOLIC BLOOD PRESSURE: 161 MMHG | HEIGHT: 72 IN

## 2024-03-12 DIAGNOSIS — I10 ESSENTIAL HYPERTENSION: ICD-10-CM

## 2024-03-12 DIAGNOSIS — D64.9 ANEMIA: ICD-10-CM

## 2024-03-12 DIAGNOSIS — I50.32 CHRONIC HEART FAILURE WITH PRESERVED EJECTION FRACTION (HFPEF): ICD-10-CM

## 2024-03-12 DIAGNOSIS — I48.0 PAF (PAROXYSMAL ATRIAL FIBRILLATION): ICD-10-CM

## 2024-03-12 DIAGNOSIS — Z91.89 AT HIGH RISK FOR BLEEDING: Primary | ICD-10-CM

## 2024-03-12 DIAGNOSIS — E53.8 B12 DEFICIENCY: Primary | ICD-10-CM

## 2024-03-12 DIAGNOSIS — Z95.0 PACEMAKER: ICD-10-CM

## 2024-03-12 LAB
OHS QRS DURATION: 98 MS
OHS QTC CALCULATION: 467 MS

## 2024-03-12 PROCEDURE — 3288F FALL RISK ASSESSMENT DOCD: CPT | Mod: CPTII,NTX,S$GLB, | Performed by: INTERNAL MEDICINE

## 2024-03-12 PROCEDURE — 1126F AMNT PAIN NOTED NONE PRSNT: CPT | Mod: CPTII,NTX,S$GLB, | Performed by: INTERNAL MEDICINE

## 2024-03-12 PROCEDURE — 93010 ELECTROCARDIOGRAM REPORT: CPT | Mod: NTX,S$GLB,, | Performed by: INTERNAL MEDICINE

## 2024-03-12 PROCEDURE — 99215 OFFICE O/P EST HI 40 MIN: CPT | Mod: NTX,S$GLB,, | Performed by: INTERNAL MEDICINE

## 2024-03-12 PROCEDURE — 3077F SYST BP >= 140 MM HG: CPT | Mod: CPTII,NTX,S$GLB, | Performed by: INTERNAL MEDICINE

## 2024-03-12 PROCEDURE — 93005 ELECTROCARDIOGRAM TRACING: CPT | Mod: HCNC,PO,TXP

## 2024-03-12 PROCEDURE — 1157F ADVNC CARE PLAN IN RCRD: CPT | Mod: CPTII,NTX,S$GLB, | Performed by: INTERNAL MEDICINE

## 2024-03-12 PROCEDURE — 1159F MED LIST DOCD IN RCRD: CPT | Mod: CPTII,NTX,S$GLB, | Performed by: INTERNAL MEDICINE

## 2024-03-12 PROCEDURE — 99999 PR PBB SHADOW E&M-EST. PATIENT-LVL III: CPT | Mod: PBBFAC,HCNC,TXP, | Performed by: INTERNAL MEDICINE

## 2024-03-12 PROCEDURE — 1101F PT FALLS ASSESS-DOCD LE1/YR: CPT | Mod: CPTII,NTX,S$GLB, | Performed by: INTERNAL MEDICINE

## 2024-03-12 PROCEDURE — 3078F DIAST BP <80 MM HG: CPT | Mod: CPTII,NTX,S$GLB, | Performed by: INTERNAL MEDICINE

## 2024-03-12 RX ORDER — IRON,CARB/VIT C/VIT B12/FOLIC 100-250-1
1 TABLET ORAL DAILY
Qty: 90 TABLET | Refills: 3 | Status: SHIPPED | OUTPATIENT
Start: 2024-03-12 | End: 2025-03-12

## 2024-03-12 RX ORDER — CYANOCOBALAMIN 1000 UG/ML
1000 INJECTION, SOLUTION INTRAMUSCULAR; SUBCUTANEOUS
Qty: 4 ML | Refills: 0 | Status: SHIPPED | OUTPATIENT
Start: 2024-03-12 | End: 2024-04-03

## 2024-03-12 NOTE — PATIENT INSTRUCTIONS
CT scan for Watchman  Echocardiogram  Take torsemide at least twice weekly and log weight at home  Return in 2 months

## 2024-03-12 NOTE — PROGRESS NOTES
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 They are not accompanied. They were referred by Dr. Moss .  He also follows with Dr. Beatty for EP.    Had a short run of AF since the hospitalization and took a propafenone 225 with Lopressor and that aborted it. Started B12 and iron for anemia. Takes torsemide only prn, roughly once a week.     Focused Past History includes:  Atrial fibrillation status post multiple ablations currently on propafenone 425 b.i.d.  Status post DCCV 02/28/2024  TTE September 2023 by report with EF 65%, moderate left atrial enlargement, mild MR, PASP 48  History of TIA December 2019 - was off apixaban  Chronic HFpEF on torsemide 3 times a week  Hypertension   CKD 3  Bleeding peptic ulcer   Chronic anemia with hemoglobin 9-10   Dual-chamber pacemaker      Current Outpatient Medications   Medication Sig    alfuzosin (UROXATRAL) 10 mg Tb24 Take 1 tablet (10 mg total) by mouth daily with breakfast.    buPROPion (WELLBUTRIN XL) 300 MG 24 hr tablet Take 1 tablet (300 mg total) by mouth once daily.    carvediloL (COREG) 6.25 MG tablet Take 6.25 mg by mouth 2 (two) times daily.    cyclobenzaprine (FLEXERIL) 10 MG tablet TAKE 1 TABLET(10 MG) BY MOUTH THREE TIMES DAILY AS NEEDED FOR MUSCLE SPASMS    ELIQUIS 5 mg Tab Take 1 tablet (5 mg total) by mouth 2 (two) times a day.    EScitalopram oxalate (LEXAPRO) 10 MG tablet TAKE 1 TABLET(10 MG) BY MOUTH EVERY DAY    levothyroxine (SYNTHROID) 75 MCG tablet Take 1 tablet (75 mcg total) by mouth before breakfast.    OMEGA-3 FATTY ACIDS (FISH OIL CONCENTRATE ORAL) Take 1 capsule by mouth Daily.    potassium chloride (MICRO-K) 10 MEQ CpSR Take 1 capsule (10 mEq total) by mouth every Mon, Wed, Fri.     propafenone (RYTHMOL SR) 425 MG Cp12 Take 1 capsule (425 mg total) by mouth every 12 (twelve) hours.    telmisartan (MICARDIS) 40 MG Tab Take 1 tablet (40 mg total) by mouth once daily.    tiZANidine (ZANAFLEX) 4 MG tablet Take 1 tablet (4 mg total) by mouth 3 (three) times daily as needed.    torsemide (DEMADEX) 20 MG Tab Take 1 tablet (20 mg total) by mouth every Mon, Wed, Fri.     Current Facility-Administered Medications   Medication    cyanocobalamin injection 1,000 mcg            Objective       Review of Systems  Constitutional: negative for fevers, night sweats, and weight loss  Eyes: negative for visual disturbance, diplopia  Respiratory: negative for cough, hemoptysis, sputum, and wheezing  Cardiovascular: see HPI  Gastrointestinal: negative for abdominal pain, bright red blood per rectum, change in bowel habits, dysphagia, melena, and reflux symptoms  Genitourinary:negative for dysuria, frequency, and hematuria  Hematologic/lymphatic: negative for bleeding, easy bruising, and lymphadenopathy  Musculoskeletal:negative for arthralgias, back pain, and myalgias  Neurological: negative for gait problems, paresthesia, speech problems, vertigo, and weakness  Behavioral/Psych: negative for excessive alcohol consumption, illegal drug usage, and sleep disturbance    ----------------------------  Anticoagulant long-term use  Anxiety  Arthritis  Atrial fibrillation  BPH (benign prostatic hyperplasia)  Cataract  CKD (chronic kidney disease) stage 3, GFR 30-59 ml/min      Comment:  Followed by Dr. Jeevan Pond  Colon polyp      Comment:  benign  Depression  Elevated PSA  Erectile dysfunction  Gastric ulcer with hemorrhage  Hep B w/o coma  History of bleeding peptic ulcer  History of prostatitis  Hypertension  PAF (paroxysmal atrial fibrillation)  Sleep apnea      Comment:  on CPAP  Stroke      Comment:  TIA December 2019  Thyroid disease  ----------------------------  A-v cardiac pacemaker insertion      Comment:   Procedure: Dual Chamber PPM (Heart) Rm 2620;  Surgeon:                Pan Moss MD;  Location: [facility] CATH;                 Service: Cardiology;  Laterality: Left;  Abdominal hernia repair  Ablation of arrhythmogenic focus for atrial fibrillation      Comment:  Procedure: Ablation atrial fibrillation;  Surgeon: Wyatt Beatty MD;  Location: [facility] EP LAB;  Service: Cardiology;               Laterality: N/A;  PAF, AFl, PEPE, PVI re-do, CTI, RFA,                JUSTIN, Gen, SK, 3 Prep  Application of wound vacuum-assisted closure device      Comment:  Procedure: APPLICATION, WOUND VAC;  Surgeon: Jelani Tello II, MD;  Location: [facility] OR;  Service:                Orthopedics;  Laterality: Right;  Cardioversion      Comment:  Procedure: Cardioversion;  Surgeon: Pao Hare MD;                 Location: [facility];  Service: Cardiology;  Laterality:                N/A;  Cataract extraction      Comment:  bilateral  Cholecystectomy  Colonoscopy      Comment:  Procedure: COLONOSCOPY;  Surgeon: Toby Hernandez MD;  Location: [facility] ENDO;  Service: Endoscopy;                 Laterality: N/A;  Colonoscopy      Comment:  Procedure: COLONOSCOPY;  Surgeon: Toby Hernandez MD;  Location: [facility] ENDO;  Service: Endoscopy;                 Laterality: N/A;  Cystoscopy with insertion of minimally invasive implant to enlarge   prostatic urethra      Comment:  Procedure: CYSTOSCOPY, WITH INSERTION OF UROLIFT                IMPLANT;  Surgeon: Yakov Shetty MD;  Location: [facility] OR;                 Service: Urology;  Laterality: N/A;  Eye surgery  Gastric bypass  Insertion, pacemaker, temporary transvenous      Comment:  Procedure: Temp pacer insertion ER Rm 8;  Surgeon:                Pan Moss MD;  Location: [facility] CATH;                 Service: Cardiology;;  Intramedullary rodding of femur      Comment:  Procedure: INSERTION, INTRAMEDULLARY ERIC, FEMUR,  left,                Synthes, [place] table, Large C arm clock side,;  Surgeon:                Tony Rodriguez MD;  Location: [facility] OR 2ND FLR;                 Service: Orthopedics;  Laterality: Left;  Irrigation and debridement      Comment:  Procedure: IRRIGATION AND DEBRIDEMENT KNEE;  Surgeon:                Jelani Tello II, MD;  Location: [facility] OR;  Service:                Orthopedics;  Laterality: Right;  Knee arthroscopy      Comment:  RT  Lasik      Comment:  both eyes (Dr. Rabago)  Orif humerus fracture      Comment:  LT  Orif humerus fracture      Comment:  non surgical repair  Radiofrequency ablation      Comment:  x2  Revision of knee arthroplasty      Comment:  Procedure: REVISION ARTHROPLASTY KNEE- Liner Replacement               - dirty/clean;  Surgeon: Jelani Tello II, MD;                 Location: [facility];  Service: Orthopedics;  Laterality:                Right;  dirty to clean at 1940  Right ankle tendon repair  Robot-assisted repair of incisional hernia using da mirtha xi      Comment:  Procedure: XI ROBOTIC REPAIR, HERNIA, INCISIONAL (LEFT                SIDE SPIGELIAN WITH MESH);  Surgeon: Rosendo Marti MD;  Location: [facility] OR [facility] FLR;  Service:                General;  Laterality: Left;  consent in am  Rotator cuff repair      Comment:  right  Total knee arthroplasty      Comment:  Procedure: REPLACEMENT-KNEE-TOTAL-axel;  Surgeon:                Denzel Dallas MD;  [facility];                 Service: Orthopedics;  Laterality: Right;  [place]lyn  Treatment of cardiac arrhythmia      Comment:  Procedure: Cardioversion or Defibrillation;  Surgeon:                Wyatt Beatty MD;  Location: [facility] EP LAB;  Service:                Cardiology;;  Treatment of cardiac arrhythmia      Comment:  Procedure: Cardioversion or Defibrillation;  Surgeon:                Pao Hare MD;  Location: [facility];  Service:                Cardiology;  Laterality:  N/A;     Family History   Problem Relation Age of Onset    COPD Father     Diabetes Father     Osteoporosis Father     Aortic stenosis Mother     Heart disease Mother         aortic stenosis    Osteoporosis Mother     Heart attack Brother     No Known Problems Son     No Known Problems Daughter     No Known Problems Daughter     No Known Problems Daughter      Social History     Tobacco Use    Smoking status: Never     Passive exposure: Never    Smokeless tobacco: Never    Tobacco comments:     [facility] anesthesiologist    Substance Use Topics    Alcohol use: No    Drug use: No       Physical Exam  BP (!) 161/77 (BP Location: Left arm, Patient Position: Sitting, BP Method: Medium (Automatic))   Pulse 74   Ht 6' (1.829 m)   Wt 84.8 kg (186 lb 15.2 oz)   BMI 25.35 kg/m²   Body surface area is 2.08 meters squared.  Body mass index is 25.35 kg/m².    General appearance: alert, appears stated age, cooperative, and no distress  Head: Normocephalic, without obvious abnormality, atraumatic  Neck: no carotid bruit, positive JVD, and supple, symmetrical, trachea midline  Lungs: clear to auscultation bilaterally  Heart: regular rate and rhythm; S1, S2 normal, no murmur, click, rub or gallop  Abdomen: soft, non-tender, no distended  Extremities: extremities atraumatic, 1+ pitting edema  Skin: warm, no cyanosis, no pathologic ecchymosis in exposed portions  Neurologic: Grossly normal. A&O x3      Lab Review   Lab Results   Component Value Date    WBC 6.50 02/29/2024    HGB 10.1 (L) 02/29/2024    HCT 32.6 (L) 02/29/2024    MCV 90 02/29/2024     02/29/2024         BMP  Lab Results   Component Value Date     02/29/2024    K 4.9 02/29/2024     (H) 02/29/2024    CO2 20 (L) 02/29/2024    BUN 23 02/29/2024    CREATININE 1.2 02/29/2024    CALCIUM 8.8 02/29/2024    ANIONGAP 6 (L) 02/29/2024    ESTGFRAFRICA 51.9 (A) 03/02/2022    ESTGFRAFRICA 51.9 (A) 03/02/2022    EGFRNONAA 44.9 (A) 03/02/2022    EGFRNONAA  44.9 (A) 03/02/2022       Lab Results   Component Value Date    LABPROT 10.8 07/17/2020    ALBUMIN 3.2 (L) 02/29/2024       Lab Results   Component Value Date    ALT 13 02/29/2024    AST 14 02/29/2024    ALKPHOS 61 02/29/2024    BILITOT 0.5 02/29/2024       Lab Results   Component Value Date    TSH 3.073 02/29/2024       Lab Results   Component Value Date    CHOL 115 (L) 08/30/2023    CHOL 132 08/31/2022    CHOL 107 (L) 03/02/2022     Lab Results   Component Value Date    HDL 49 08/30/2023    HDL 56 08/31/2022    HDL 43 03/02/2022     Lab Results   Component Value Date    LDLCALC 56.8 (L) 08/30/2023    LDLCALC 68.4 08/31/2022    LDLCALC 54.6 (L) 03/02/2022     Lab Results   Component Value Date    TRIG 46 08/30/2023    TRIG 38 08/31/2022    TRIG 47 03/02/2022     Lab Results   Component Value Date    CHOLHDL 42.6 08/30/2023    CHOLHDL 42.4 08/31/2022    CHOLHDL 40.2 03/02/2022              [id number] score   Sex: 0  Female (1 point)   Male (0 points)    Age: 2   ?64 years old (0 points)   65 to 74 years old (1 point)   ?75 years old (2 points)    Comorbidities: 1+1+0+2+0   Heart failure (1 point)   Hypertension (1 point)   Diabetes mellitus (1 point)   History of stroke, TIA, or thromboembolism (2 points)   Vascular disease (history of MI, PAD, or aortic atherosclerosis) (1 point)    Total points: 6    Unadjusted stroke rate: [Julio César L, Miguelangel M, Rosa GY. Evaluation of risk stratification schemes for ischaemic stroke and bleeding in [id number] patients with atrial fibrillation: the [place] Atrial Fibrillation cohort study. Eur Heart J 2012; 33:1500.]  0 points: 0.2% per year  1 point:  0.6% per year  2 points: 2.2% per year  3 points: 3.2% per year  4 points: 4.8% per year  5 points: 7.2% per year  6 points: 9.7% per year  7 points: 11.1% per year  8 points: 11% per year  9 points: 12.2% per year    HAS-BLED score:  Hypertension (1 point) : 1  Abnormal liver function (cirrhosis, bilirubin >2xULN or ALT>3xULN) (1  point): 0  Abnormal renal function (dialysis, renal transplant or Cr>2.26) (1 point): 0  Stroke (1 point): 1  Bleeding tendency or predisposition (1 point): 1  Labile INRs in patients taking warfarin (<60%TTR or high/labile INR) (1 point): 0  Elderly: age greater than 65 years (1 point): 1  Drugs: concomitant antiplatelet agents (eg, aspirin, clopidogrel, ticlopidine, nonsteroidal antiinflammatory agents) (1 point): 0  Drugs: concomitant excess alcohol use (1 point): 0    Total score: 4    0 points: 1.13 bleeds per 100 patient-years  1 point:  1.02 bleeds per 100 patient-years  2 points: 1.88 bleeds per 100 patient-years  3 points: 3.74 bleeds per 100 patient-years  4 points: 8.70 bleeds per 100 patient-years  5 to 9 points: Insufficient data    [Lip GY. Implications of the PRAMOD(2)DS(2)-VASc and HAS-BLED Scores for thromboprophylaxis in atrial fibrillation. Am J Med 2011; 124:111.]    If you answer NO to any of the four criteria below, the patient does not meet the eligibility requirements for LAAC via Watchman implantation:    Patient has non-valvular atrial fibrillation: Yes  Patient has an increased risk for stroke and is recommended for oral anticoagulation (OAC): Yes  Patient is suitable for short-term anticoagulation therapy but deemed unable to take long-term OAC: Yes  Patient has an appropriate rationale to seek a non-pharmacological alternative to OACs. 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      We discussed the rationale, risks, benefits, and alternatives for seeking left atrial appendage closure with the Watchman device.  Shared decision making using the ACC/HRS algorithm and shared decision-making tool was used to help guide the discussion.  The benefits of ALMA closure include risk reduction for ischemic stroke similar to that of chronic OAC  without need for chronic OAC.  The risks include a <1% risk of death, tamponade, need for emergency heart surgery, device embolization, stroke, bleeding, vascular injury, and a 2-3% longterm risk of device related thrombus.  The patient is a candidate for short-term OAC but certainly not long-term as detailed above . We addressed the alternatives of oral anticoagulation or no OAC with the high risk of stroke.  I believe it reasonable to proceed with ALMA closure. They would like to proceed with left atrial appendage closure and shared decision making discussed with Dr. Moss.       1. At high risk for bleeding  CTA Cardiac      2. Essential hypertension  IN OFFICE EKG 12-LEAD (to [facility])      3. PAF (paroxysmal atrial fibrillation)  IN OFFICE EKG 12-LEAD (to [facility])    CTA Cardiac      4. Chronic heart failure with preserved ejection fraction (HFpEF)  Echo      5. Pacemaker             Cardiac CTA to evaluate ALMA anatomy and suitability for transcatheter closure  Plan to transition to DAPT immediately post LAAO (his TIA was because of OAC accidental noncompliance due to insurance)  Advised him to take torsemide at least 2 times weekly based on exam and NTproBNP  TTE to reevaluate his MR      I appreciate the opportunity to participate in Dominick Song MD 's care today.  Please follow up with me in 3 months.      Pao Hare MD, [facility]  Interventional Cardiology/Structural Heart Disease  [facility]  Office: [phone]     Parts of this note were completed using voice recognition software. Please excuse any misspellings or syntax errors and reach out to me with questions.

## 2024-03-14 ENCOUNTER — HOSPITAL ENCOUNTER (OUTPATIENT)
Dept: CARDIOLOGY | Facility: HOSPITAL | Age: 79
Discharge: HOME | End: 2024-03-14
Attending: INTERNAL MEDICINE
Payer: MEDICARE

## 2024-03-14 VITALS — BODY MASS INDEX: 26.55 KG/M2 | HEIGHT: 72 IN | WEIGHT: 196 LBS

## 2024-03-14 DIAGNOSIS — I50.32 CHRONIC HEART FAILURE WITH PRESERVED EJECTION FRACTION (HFPEF): ICD-10-CM

## 2024-03-14 PROCEDURE — 93306 TTE W/DOPPLER COMPLETE: CPT | Mod: PO,TXP

## 2024-03-14 PROCEDURE — 93306 TTE W/DOPPLER COMPLETE: CPT | Mod: 26,NTX,, | Performed by: INTERNAL MEDICINE

## 2024-03-15 ENCOUNTER — OUTPATIENT CASE MANAGEMENT (OUTPATIENT)
Dept: ADMINISTRATIVE | Facility: OTHER | Age: 79
End: 2024-03-15
Payer: MEDICARE

## 2024-03-15 NOTE — PROGRESS NOTES
Outpatient Care Management  Patient Does Not Consent    Patient: Dominick Song MD  MRN:  [id number]  Date of Service:  3/15/2024  Completed by:  Luci Arrieta RN    Chief Complaint   Patient presents with    OPCM Enrollment Call     3/15/2024  3rd attempt to complete Initial Assessment  for Outpatient Care Management, left message.      Case Closure       Patient Summary           Consent Received:  Decline

## 2024-03-18 ENCOUNTER — LAB VISIT (OUTPATIENT)
Dept: LAB | Facility: HOSPITAL | Age: 79
End: 2024-03-18
Attending: INTERNAL MEDICINE
Payer: MEDICARE

## 2024-03-18 DIAGNOSIS — E03.9 ACQUIRED HYPOTHYROIDISM: ICD-10-CM

## 2024-03-18 DIAGNOSIS — M81.0 OSTEOPOROSIS, UNSPECIFIED OSTEOPOROSIS TYPE, UNSPECIFIED PATHOLOGICAL FRACTURE PRESENCE: ICD-10-CM

## 2024-03-18 LAB
ALBUMIN SERPL BCP-MCNC: 3.3 G/DL (ref 3.5–5.2)
ALP SERPL-CCNC: 62 U/L (ref 55–135)
ALT SERPL W/O P-5'-P-CCNC: 12 U/L (ref 10–44)
ANION GAP SERPL CALC-SCNC: 9 MMOL/L (ref 8–16)
AST SERPL-CCNC: 17 U/L (ref 10–40)
BILIRUB SERPL-MCNC: 0.5 MG/DL (ref 0.1–1)
BUN SERPL-MCNC: 27 MG/DL (ref 8–23)
CALCIUM SERPL-MCNC: 9.4 MG/DL (ref 8.7–10.5)
CHLORIDE SERPL-SCNC: 113 MMOL/L (ref 95–110)
CO2 SERPL-SCNC: 18 MMOL/L (ref 23–29)
CREAT SERPL-MCNC: 1.4 MG/DL (ref 0.5–1.4)
EST. GFR  (NO RACE VARIABLE): 51.8 ML/MIN/1.73 M^2
GLUCOSE SERPL-MCNC: 104 MG/DL (ref 70–110)
POTASSIUM SERPL-SCNC: 4.5 MMOL/L (ref 3.5–5.1)
PROT SERPL-MCNC: 6.5 G/DL (ref 6–8.4)
SODIUM SERPL-SCNC: 140 MMOL/L (ref 136–145)
TSH SERPL DL<=0.005 MIU/L-ACNC: 3.36 UIU/ML (ref 0.4–4)

## 2024-03-18 PROCEDURE — 80053 COMPREHEN METABOLIC PANEL: CPT | Mod: HCNC,NTX | Performed by: INTERNAL MEDICINE

## 2024-03-18 PROCEDURE — 82523 COLLAGEN CROSSLINKS: CPT | Mod: HCNC,NTX | Performed by: INTERNAL MEDICINE

## 2024-03-18 PROCEDURE — 84443 ASSAY THYROID STIM HORMONE: CPT | Mod: HCNC,TXP | Performed by: INTERNAL MEDICINE

## 2024-03-19 ENCOUNTER — PATIENT MESSAGE (OUTPATIENT)
Dept: FAMILY MEDICINE | Facility: CLINIC | Age: 79
End: 2024-03-19
Payer: MEDICARE

## 2024-03-21 LAB — COLLAGEN CTX SERPL-MCNC: 460 PG/ML

## 2024-03-22 ENCOUNTER — INFUSION (OUTPATIENT)
Dept: INFUSION THERAPY | Facility: HOSPITAL | Age: 79
End: 2024-03-22
Attending: INTERNAL MEDICINE
Payer: MEDICARE

## 2024-03-22 ENCOUNTER — OFFICE VISIT (OUTPATIENT)
Dept: ENDOCRINOLOGY | Facility: CLINIC | Age: 79
End: 2024-03-22
Payer: MEDICARE

## 2024-03-22 VITALS
DIASTOLIC BLOOD PRESSURE: 72 MMHG | WEIGHT: 184.06 LBS | OXYGEN SATURATION: 97 % | SYSTOLIC BLOOD PRESSURE: 146 MMHG | HEART RATE: 95 BPM | HEIGHT: 73 IN | BODY MASS INDEX: 24.39 KG/M2

## 2024-03-22 VITALS
WEIGHT: 184.06 LBS | RESPIRATION RATE: 16 BRPM | TEMPERATURE: 98 F | BODY MASS INDEX: 24.39 KG/M2 | HEART RATE: 91 BPM | SYSTOLIC BLOOD PRESSURE: 132 MMHG | DIASTOLIC BLOOD PRESSURE: 78 MMHG | HEIGHT: 73 IN

## 2024-03-22 DIAGNOSIS — E21.3 HYPERPARATHYROIDISM: ICD-10-CM

## 2024-03-22 DIAGNOSIS — M80.00XD AGE-RELATED OSTEOPOROSIS WITH CURRENT PATHOLOGICAL FRACTURE WITH ROUTINE HEALING: Primary | ICD-10-CM

## 2024-03-22 DIAGNOSIS — E03.9 ACQUIRED HYPOTHYROIDISM: ICD-10-CM

## 2024-03-22 DIAGNOSIS — R79.89 ELEVATED PARATHYROID HORMONE: ICD-10-CM

## 2024-03-22 DIAGNOSIS — M81.0 OSTEOPOROSIS, UNSPECIFIED OSTEOPOROSIS TYPE, UNSPECIFIED PATHOLOGICAL FRACTURE PRESENCE: Primary | ICD-10-CM

## 2024-03-22 LAB
ASCENDING AORTA: 3.4 CM
AV INDEX (PROSTH): 0.95
AV MEAN GRADIENT: 4 MMHG
AV PEAK GRADIENT: 6 MMHG
AV VALVE AREA BY VELOCITY RATIO: 3.36 CM²
AV VALVE AREA: 4.36 CM²
AV VELOCITY RATIO: 0.73
BSA FOR ECHO PROCEDURE: 2.13 M2
CV ECHO LV RWT: 0.43 CM
DOP CALC AO PEAK VEL: 1.26 M/S
DOP CALC AO VTI: 21.4 CM
DOP CALC LVOT AREA: 4.6 CM2
DOP CALC LVOT DIAMETER: 2.42 CM
DOP CALC LVOT PEAK VEL: 0.92 M/S
DOP CALC LVOT STROKE VOLUME: 93.32 CM3
DOP CALCLVOT PEAK VEL VTI: 20.3 CM
E WAVE DECELERATION TIME: 143.38 MSEC
E/A RATIO: 2.48
E/E' RATIO: 10.36 M/S
ECHO LV POSTERIOR WALL: 1.07 CM (ref 0.6–1.1)
FRACTIONAL SHORTENING: 27 % (ref 28–44)
HR MV ECHO: 88 BPM
INTERVENTRICULAR SEPTUM: 1.31 CM (ref 0.6–1.1)
IVRT: 51.38 MSEC
LEFT ATRIUM SIZE: 5.4 CM
LEFT ATRIUM VOLUME INDEX MOD: 71.4 ML/M2
LEFT ATRIUM VOLUME MOD: 150.69 CM3
LEFT INTERNAL DIMENSION IN SYSTOLE: 3.63 CM (ref 2.1–4)
LEFT VENTRICLE DIASTOLIC VOLUME INDEX: 55 ML/M2
LEFT VENTRICLE DIASTOLIC VOLUME: 116.05 ML
LEFT VENTRICLE MASS INDEX: 108 G/M2
LEFT VENTRICLE SYSTOLIC VOLUME INDEX: 26.4 ML/M2
LEFT VENTRICLE SYSTOLIC VOLUME: 55.67 ML
LEFT VENTRICULAR INTERNAL DIMENSION IN DIASTOLE: 4.96 CM (ref 3.5–6)
LEFT VENTRICULAR MASS: 228.09 G
LV LATERAL E/E' RATIO: 8.77 M/S
LV SEPTAL E/E' RATIO: 12.67 M/S
LVOT MG: 2.24 MMHG
LVOT MV: 0.73 CM/S
MV MEAN GRADIENT: 2 MMHG
MV PEAK A VEL: 0.46 M/S
MV PEAK E VEL: 1.14 M/S
MV STENOSIS PRESSURE HALF TIME: 41.58 MS
MV VALVE AREA P 1/2 METHOD: 5.29 CM2
PISA MRMAX VEL: 5.69 M/S
PISA TR MAX VEL: 2.8 M/S
RA PRESSURE ESTIMATED: 3 MMHG
RIGHT VENTRICULAR END-DIASTOLIC DIMENSION: 4.54 CM
RIGHT VENTRICULAR LENGTH IN DIASTOLE (APICAL 4-CHAMBER VIEW): 5.44 CM
RV MID DIAMA: 3.29 CM
RV TB RVSP: 6 MMHG
RV TISSUE DOPPLER FREE WALL SYSTOLIC VELOCITY 1 (APICAL 4 CHAMBER VIEW): 11.4 CM/S
SINUS: 3.72 CM
STJ: 3.22 CM
TDI LATERAL: 0.13 M/S
TDI SEPTAL: 0.09 M/S
TDI: 0.11 M/S
TR MAX PG: 31 MMHG
TRICUSPID ANNULAR PLANE SYSTOLIC EXCURSION: 1.52 CM
TV REST PULMONARY ARTERY PRESSURE: 34 MMHG
Z-SCORE OF LEFT VENTRICULAR DIMENSION IN END DIASTOLE: -2.88
Z-SCORE OF LEFT VENTRICULAR DIMENSION IN END SYSTOLE: -0.84

## 2024-03-22 PROCEDURE — 99214 OFFICE O/P EST MOD 30 MIN: CPT | Mod: NTX,S$GLB,, | Performed by: INTERNAL MEDICINE

## 2024-03-22 PROCEDURE — 1126F AMNT PAIN NOTED NONE PRSNT: CPT | Mod: CPTII,NTX,S$GLB, | Performed by: INTERNAL MEDICINE

## 2024-03-22 PROCEDURE — 1101F PT FALLS ASSESS-DOCD LE1/YR: CPT | Mod: CPTII,NTX,S$GLB, | Performed by: INTERNAL MEDICINE

## 2024-03-22 PROCEDURE — 3077F SYST BP >= 140 MM HG: CPT | Mod: CPTII,NTX,S$GLB, | Performed by: INTERNAL MEDICINE

## 2024-03-22 PROCEDURE — 25000003 PHARM REV CODE 250: Mod: PN | Performed by: INTERNAL MEDICINE

## 2024-03-22 PROCEDURE — G2211 COMPLEX E/M VISIT ADD ON: HCPCS | Mod: NTX,S$GLB,, | Performed by: INTERNAL MEDICINE

## 2024-03-22 PROCEDURE — 96365 THER/PROPH/DIAG IV INF INIT: CPT | Mod: PN

## 2024-03-22 PROCEDURE — 1157F ADVNC CARE PLAN IN RCRD: CPT | Mod: CPTII,NTX,S$GLB, | Performed by: INTERNAL MEDICINE

## 2024-03-22 PROCEDURE — 99999 PR PBB SHADOW E&M-EST. PATIENT-LVL IV: CPT | Mod: PBBFAC,TXP,, | Performed by: INTERNAL MEDICINE

## 2024-03-22 PROCEDURE — 3288F FALL RISK ASSESSMENT DOCD: CPT | Mod: CPTII,NTX,S$GLB, | Performed by: INTERNAL MEDICINE

## 2024-03-22 PROCEDURE — 1159F MED LIST DOCD IN RCRD: CPT | Mod: CPTII,NTX,S$GLB, | Performed by: INTERNAL MEDICINE

## 2024-03-22 PROCEDURE — 3078F DIAST BP <80 MM HG: CPT | Mod: CPTII,NTX,S$GLB, | Performed by: INTERNAL MEDICINE

## 2024-03-22 PROCEDURE — 63600175 PHARM REV CODE 636 W HCPCS: Mod: PN | Performed by: INTERNAL MEDICINE

## 2024-03-22 RX ORDER — ACETAMINOPHEN 500 MG
500 TABLET ORAL
OUTPATIENT
Start: 2024-03-22

## 2024-03-22 RX ORDER — ZOLEDRONIC ACID 5 MG/100ML
5 INJECTION, SOLUTION INTRAVENOUS
OUTPATIENT
Start: 2024-03-22

## 2024-03-22 RX ORDER — SODIUM CHLORIDE 0.9 % (FLUSH) 0.9 %
10 SYRINGE (ML) INJECTION
OUTPATIENT
Start: 2024-03-22

## 2024-03-22 RX ORDER — ACETAMINOPHEN 500 MG
500 TABLET ORAL
Status: DISCONTINUED | OUTPATIENT
Start: 2024-03-22 | End: 2024-03-22 | Stop reason: HOSPADM

## 2024-03-22 RX ORDER — HEPARIN 100 UNIT/ML
500 SYRINGE INTRAVENOUS
OUTPATIENT
Start: 2024-03-22

## 2024-03-22 RX ORDER — ZOLEDRONIC ACID 5 MG/100ML
5 INJECTION, SOLUTION INTRAVENOUS
Status: COMPLETED | OUTPATIENT
Start: 2024-03-22 | End: 2024-03-22

## 2024-03-22 RX ADMIN — SODIUM CHLORIDE: 9 INJECTION, SOLUTION INTRAVENOUS at 02:03

## 2024-03-22 RX ADMIN — ACETAMINOPHEN 500 MG: 500 TABLET, FILM COATED ORAL at 02:03

## 2024-03-22 RX ADMIN — ZOLEDRONIC ACID 5 MG: 0.05 INJECTION, SOLUTION INTRAVENOUS at 02:03

## 2024-03-22 NOTE — PLAN OF CARE
Pt arrived to clinic today for Reclast infusion and tolerated well. No changes throughout therapy. Pt aware of follow up appointments and side effects of drugs. Pt states he does take Vitamin D and Calcium at home. Discharged to home. NAD.

## 2024-03-22 NOTE — PATIENT INSTRUCTIONS
What is a Bisphosphonate Drug Holiday?  Bisphosphonates are medications commonly prescribed to treat osteoporosis by increasing bone density and reducing the risk of fractures. However, long-term use of bisphosphonates may have potential risks and side effects. A drug holiday is a planned break from taking these medications after a period of continuous use.  Reasons for a Bisphosphonate Drug Holiday:  Reduced Risk of Rare Side Effects: Long-term use of bisphosphonates, such as alendronate (Fosamax), risedronate (Actonel), or zoledronic acid (Reclast), may, in rare cases, lead to certain complications like atypical fractures or osteonecrosis of the jaw.  Improved Safety Profile: Taking a break from bisphosphonates might reduce the risk of these rare side effects while maintaining the benefits gained from the medication.  Who Might Consider a Drug Holiday?  Your healthcare provider might suggest a drug holiday if:  You have been taking bisphosphonates for several years (usually around 3-5 years or more).  Your bone density has significantly improved.  There are concerns about potential risks associated with long-term use.  What to Expect During a Drug Holiday:  Maintaining Bone Health: During the break from bisphosphonates, your healthcare provider might recommend other measures to maintain bone health, such as adequate calcium and vitamin D intake, regular exercise, and lifestyle modifications.  When to Resume Bisphosphonates:  Your healthcare provider will determine when to restart bisphosphonate treatment based on your bone health status, risk factors, and individual needs.  Often times a drug holiday end when the bone density shows worsening or there is a fracture

## 2024-03-22 NOTE — PROGRESS NOTES
CHIEF COMPLAINT:  Osteoporosis/hypothyroidism  78 y.o. old being seen as a f/u.  Initially discovered after having some hip pain and had an x-ray that showed osteopenia.  Bone density then showed osteoporosis.  He had a fall in 2020 and broke his left hip.  Missed the last step on stairs. Had a left humerus fracture when slipped on liquid in the house. Was several years prior to surgery.  Was on Fosamax in the past. Was on Forteo around March of 2021. Was on it approx 1 year before switching to reclast.     Now on Reclast that started 3/23/22.  Currently on Synthroid 75 mcg. Taking Ca + D. Takes MVI. No new falls. He does do some exercise- walking, outside work. Has a history of AFIB- paroxysmal.       Reclast doses:  3/23/22  3/23/33      PAST MEDICAL HISTORY/PAST SURGICAL HISTORY:  Reviewed in [facility]    SOCIAL HISTORY: Reviewed in [facility]    FAMILY HISTORY:  Mother and father had osteoporosis. Father with diabetes.     MEDICATIONS/ALLERGIES: The patient's MedCard has been updated and reviewed.            PE:    GENERAL: Well developed, well nourished.  NECK: Supple, trachea midline, no palpable thyroid nodules  CHEST: Resp even and unlabored, CTA bilateral.  CARDIAC: RRR, S1, S2 heard, no murmurs, rubs, S3, or S4    LABS/Radiology   Latest Reference Range & Units 03/18/24 09:39   Sodium 136 - 145 mmol/L 140   Potassium 3.5 - 5.1 mmol/L 4.5   Chloride 95 - 110 mmol/L 113 (H)   CO2 23 - 29 mmol/L 18 (L)   Anion Gap 8 - 16 mmol/L 9   BUN 8 - 23 mg/dL 27 (H)   Creatinine 0.5 - 1.4 mg/dL 1.4   eGFR >60 mL/min/1.73 m^2 51.8 !   Glucose 70 - 110 mg/dL 104   Calcium 8.7 - 10.5 mg/dL 9.4   ALP 55 - 135 U/L 62   PROTEIN TOTAL 6.0 - 8.4 g/dL 6.5   Albumin 3.5 - 5.2 g/dL 3.3 (L)   BILIRUBIN TOTAL 0.1 - 1.0 mg/dL 0.5   AST 10 - 40 U/L 17   ALT 10 - 44 U/L 12   (H): Data is abnormally high  (L): Data is abnormally low  !: Data is abnormal     Latest Reference Range & Units 03/18/24 09:39   TSH 0.400 - 4.000 uIU/mL 3.362   C  Telopeptide (CTX), Serum pg/mL 460     Cr CL- 55.56    ASSESSMENT/PLAN:  Osteoporosis-with a history of a hip fracture and a left humerus fracture.  Has been on Fosamax and Forteo in the past.  Currently on Reclast and has received 2 doses.  He will be getting his 3rd dose now.  We usually consider drug holiday at 3 or 6 years of IV Reclast therapy since it 3 years and bone density looks stable, as well as CKD, we will consider drug holiday after this 3rd dose.  Gave information on drug holiday in when drug holiday ends in [place].    2.  Hypothyroidism-appears to be taking thyroid medication appropriately.  TSH within normal limits.  No symptoms of hyper or hypothyroidism.  Continue current dose of thyroid medication.    3. CKD 3- stable.     4. Elevated PTH- possibly secondary due to CKD.  We will repeat at follow-up      FOLLOWUP  Will stop reclast after todays dose and start a drug holiday  F/U 1 year with Fasting CMP, TSH, PTH

## 2024-03-26 ENCOUNTER — TELEPHONE (OUTPATIENT)
Dept: CARDIOLOGY | Facility: CLINIC | Age: 79
End: 2024-03-26
Payer: MEDICARE

## 2024-03-26 ENCOUNTER — PATIENT MESSAGE (OUTPATIENT)
Dept: CARDIOLOGY | Facility: CLINIC | Age: 79
End: 2024-03-26
Payer: MEDICARE

## 2024-03-26 DIAGNOSIS — I48.0 PAROXYSMAL ATRIAL FIBRILLATION: Primary | ICD-10-CM

## 2024-03-26 DIAGNOSIS — I48.0 PAF (PAROXYSMAL ATRIAL FIBRILLATION): Primary | ICD-10-CM

## 2024-03-26 DIAGNOSIS — I48.0 PAF (PAROXYSMAL ATRIAL FIBRILLATION): ICD-10-CM

## 2024-03-26 DIAGNOSIS — Z01.818 PRE-OP TESTING: ICD-10-CM

## 2024-03-26 DIAGNOSIS — Z01.818 PRE-OP TESTING: Primary | ICD-10-CM

## 2024-03-26 RX ORDER — MUPIROCIN 20 MG/G
OINTMENT TOPICAL 3 TIMES DAILY
Qty: 1 G | Refills: 0 | Status: SHIPPED | OUTPATIENT
Start: 2024-03-31 | End: 2024-04-03

## 2024-03-26 RX ORDER — CHLORHEXIDINE GLUCONATE ORAL RINSE 1.2 MG/ML
15 SOLUTION DENTAL 2 TIMES DAILY
Qty: 90 ML | Refills: 0 | Status: SHIPPED | OUTPATIENT
Start: 2024-03-31 | End: 2024-04-03

## 2024-03-26 NOTE — TELEPHONE ENCOUNTER
Message left regarding Dr. Hare reviewing CTA and advises watchman procedure be done 4/8/24 in [place]. Pre op labs scheduled on 4/3/24 and pre op meds sent into pharmacy.

## 2024-03-28 ENCOUNTER — TELEPHONE (OUTPATIENT)
Dept: CARDIOLOGY | Facility: CLINIC | Age: 79
End: 2024-03-28
Payer: MEDICARE

## 2024-03-28 NOTE — TELEPHONE ENCOUNTER
Spoke wit patient and spouse regarding questions pertaining to Watchman procedure. Virtual visit set up with Dr. Hare on 4/2/2024 at 4 pm to discuss concerns. Advised to write all questions and concerns down for virtual visit. Verbalized understanding.  
Vancomycin trough ordered

## 2024-04-01 ENCOUNTER — CLINICAL SUPPORT (OUTPATIENT)
Dept: AUDIOLOGY | Facility: CLINIC | Age: 79
End: 2024-04-01
Payer: MEDICARE

## 2024-04-01 ENCOUNTER — DOCUMENTATION ONLY (OUTPATIENT)
Dept: OPHTHALMOLOGY | Facility: CLINIC | Age: 79
End: 2024-04-01
Payer: MEDICARE

## 2024-04-01 ENCOUNTER — LAB VISIT (OUTPATIENT)
Dept: LAB | Facility: HOSPITAL | Age: 79
End: 2024-04-01
Attending: INTERNAL MEDICINE
Payer: MEDICARE

## 2024-04-01 ENCOUNTER — DOCUMENTATION ONLY (OUTPATIENT)
Dept: OPTOMETRY | Facility: CLINIC | Age: 79
End: 2024-04-01
Payer: MEDICARE

## 2024-04-01 ENCOUNTER — DOCUMENTATION ONLY (OUTPATIENT)
Dept: AUDIOLOGY | Facility: CLINIC | Age: 79
End: 2024-04-01
Payer: MEDICARE

## 2024-04-01 DIAGNOSIS — H90.3 ASYMMETRIC SNHL (SENSORINEURAL HEARING LOSS): Primary | ICD-10-CM

## 2024-04-01 DIAGNOSIS — Z97.4 WEARS HEARING AID IN BOTH EARS: ICD-10-CM

## 2024-04-01 DIAGNOSIS — Z46.1 HEARING AID FITTING OR ADJUSTMENT: Primary | ICD-10-CM

## 2024-04-01 DIAGNOSIS — H93.292 IMPAIRED AUDITORY DISCRIMINATION, LEFT: ICD-10-CM

## 2024-04-01 DIAGNOSIS — Z01.818 PRE-OP TESTING: ICD-10-CM

## 2024-04-01 DIAGNOSIS — I48.0 PAF (PAROXYSMAL ATRIAL FIBRILLATION): ICD-10-CM

## 2024-04-01 LAB
ALBUMIN SERPL BCP-MCNC: 3.1 G/DL (ref 3.5–5.2)
ALP SERPL-CCNC: 61 U/L (ref 55–135)
ALT SERPL W/O P-5'-P-CCNC: 12 U/L (ref 10–44)
ANION GAP SERPL CALC-SCNC: 4 MMOL/L (ref 8–16)
AST SERPL-CCNC: 15 U/L (ref 10–40)
BASOPHILS # BLD AUTO: 0.08 K/UL (ref 0–0.2)
BASOPHILS NFR BLD: 1.1 % (ref 0–1.9)
BILIRUB SERPL-MCNC: 0.6 MG/DL (ref 0.1–1)
BILIRUB UR QL STRIP: NEGATIVE
BUN SERPL-MCNC: 24 MG/DL (ref 8–23)
CALCIUM SERPL-MCNC: 8.1 MG/DL (ref 8.7–10.5)
CHLORIDE SERPL-SCNC: 114 MMOL/L (ref 95–110)
CLARITY UR: CLEAR
CO2 SERPL-SCNC: 20 MMOL/L (ref 23–29)
COLOR UR: YELLOW
CREAT SERPL-MCNC: 1.4 MG/DL (ref 0.5–1.4)
DIFFERENTIAL METHOD BLD: ABNORMAL
EOSINOPHIL # BLD AUTO: 1.1 K/UL (ref 0–0.5)
EOSINOPHIL NFR BLD: 15.4 % (ref 0–8)
ERYTHROCYTE [DISTWIDTH] IN BLOOD BY AUTOMATED COUNT: 18.6 % (ref 11.5–14.5)
EST. GFR  (NO RACE VARIABLE): 51.8 ML/MIN/1.73 M^2
GLUCOSE SERPL-MCNC: 107 MG/DL (ref 70–110)
GLUCOSE UR QL STRIP: NEGATIVE
HCT VFR BLD AUTO: 35.3 % (ref 40–54)
HGB BLD-MCNC: 11.3 G/DL (ref 14–18)
HGB UR QL STRIP: NEGATIVE
IMM GRANULOCYTES # BLD AUTO: 0.02 K/UL (ref 0–0.04)
IMM GRANULOCYTES NFR BLD AUTO: 0.3 % (ref 0–0.5)
INR PPP: 1.2 (ref 0.8–1.2)
KETONES UR QL STRIP: NEGATIVE
LEUKOCYTE ESTERASE UR QL STRIP: NEGATIVE
LYMPHOCYTES # BLD AUTO: 1.3 K/UL (ref 1–4.8)
LYMPHOCYTES NFR BLD: 18.3 % (ref 18–48)
MAGNESIUM SERPL-MCNC: 2.1 MG/DL (ref 1.6–2.6)
MCH RBC QN AUTO: 27.4 PG (ref 27–31)
MCHC RBC AUTO-ENTMCNC: 32 G/DL (ref 32–36)
MCV RBC AUTO: 86 FL (ref 82–98)
MONOCYTES # BLD AUTO: 0.5 K/UL (ref 0.3–1)
MONOCYTES NFR BLD: 7.5 % (ref 4–15)
NEUTROPHILS # BLD AUTO: 4 K/UL (ref 1.8–7.7)
NEUTROPHILS NFR BLD: 57.4 % (ref 38–73)
NITRITE UR QL STRIP: NEGATIVE
NRBC BLD-RTO: 0 /100 WBC
PH UR STRIP: 6 [PH] (ref 5–8)
PLATELET # BLD AUTO: 292 K/UL (ref 150–450)
PMV BLD AUTO: 12.4 FL (ref 9.2–12.9)
POTASSIUM SERPL-SCNC: 4.4 MMOL/L (ref 3.5–5.1)
PROT SERPL-MCNC: 5.7 G/DL (ref 6–8.4)
PROT UR QL STRIP: NEGATIVE
PROTHROMBIN TIME: 12.7 SEC (ref 9–12.5)
RBC # BLD AUTO: 4.13 M/UL (ref 4.6–6.2)
SODIUM SERPL-SCNC: 138 MMOL/L (ref 136–145)
SP GR UR STRIP: 1.02 (ref 1–1.03)
URN SPEC COLLECT METH UR: NORMAL
WBC # BLD AUTO: 7.03 K/UL (ref 3.9–12.7)

## 2024-04-01 PROCEDURE — 81003 URINALYSIS AUTO W/O SCOPE: CPT | Mod: HCNC,PO,NTX | Performed by: INTERNAL MEDICINE

## 2024-04-01 PROCEDURE — 83735 ASSAY OF MAGNESIUM: CPT | Mod: HCNC,TXP | Performed by: INTERNAL MEDICINE

## 2024-04-01 PROCEDURE — 36415 COLL VENOUS BLD VENIPUNCTURE: CPT | Mod: HCNC,PO,TXP | Performed by: INTERNAL MEDICINE

## 2024-04-01 PROCEDURE — 92556 SPEECH AUDIOMETRY COMPLETE: CPT | Mod: HCNC,NTX,S$GLB, | Performed by: AUDIOLOGIST

## 2024-04-01 PROCEDURE — 92552 PURE TONE AUDIOMETRY AIR: CPT | Mod: HCNC,NTX,S$GLB, | Performed by: AUDIOLOGIST

## 2024-04-01 PROCEDURE — 87086 URINE CULTURE/COLONY COUNT: CPT | Mod: HCNC,TXP | Performed by: INTERNAL MEDICINE

## 2024-04-01 PROCEDURE — 92567 TYMPANOMETRY: CPT | Mod: HCNC,NTX,S$GLB, | Performed by: AUDIOLOGIST

## 2024-04-01 PROCEDURE — 85025 COMPLETE CBC W/AUTO DIFF WBC: CPT | Mod: HCNC,TXP | Performed by: INTERNAL MEDICINE

## 2024-04-01 PROCEDURE — 85610 PROTHROMBIN TIME: CPT | Mod: HCNC,PO,TXP | Performed by: INTERNAL MEDICINE

## 2024-04-01 PROCEDURE — 80053 COMPREHEN METABOLIC PANEL: CPT | Mod: HCNC,TXP | Performed by: INTERNAL MEDICINE

## 2024-04-01 NOTE — PROGRESS NOTES
Dominick Song MD was seen 04/01/2024 for an audiological evaluation.      Pt reported that his hearing aids have been doing well.     Otoscopy revealed clear view of both external ear canals and tympanic membranes.  No obstructive cerumen present in either ear canal.       Audiogram results revealed a mild-to-severe sensorineural hearing loss for the right ear and a mild-to-profound sensorineural hearing loss for the left ear.  Speech Reception Thresholds were 40 dBHL for the right ear and 40 dBHL for the left ear.  Word recognition scores were good for the right ear and fair for the left ear.   Tympanograms were Type A for the right ear and Type A for the left ear.    Audiogram results were reviewed in detail with patient and all questions were answered.       Recommend continued daily use of binaural amplification and annual audiogram to monitor hearing loss.

## 2024-04-01 NOTE — PROGRESS NOTES
Dominick Song MD was seen 04/01/2024 for his annual audiogram and hearing aid check.  He reported that his hearing aids have been doing well.  Both hearing aids were cleaned.  The wax filters and small power domes were replaced for both hearing aids.  The following programing changes were made:  Recalculated to new target gain curves based on 4/1/24 audiogram.  Reran FB test and used estimated vent and SL for both hearing aids.  Set to 105% target gain for both hearing aids.  Pt reported that the new settings were clear, comfortable and balanced.  F/u in six months to one year for routine check or earlier if additional adjustments needed.

## 2024-04-01 NOTE — PROGRESS NOTES
Pt came in for an appt with Dr Sharpe that notes stated pt was having contact problems. When asked what problems the pt was having with his contacts the pt states he was not having any problems. Said he is doing well and did not know what the appt was for. Let pt know if he was doing well and had no complaints then he did not need to be seen today. Pt ok with that and left. Appt was canceled.

## 2024-04-01 NOTE — Clinical Note
Alyx,   You last saw this pt in 2020 for bilateral SNHL.  His asymmetry has grown and I wanted to check with you to see if you want him scheduled for a visit with ENT due to his current asymmetry at this time.  He had a MRI in 2020 for another provider but I didn't see any since then.   Thanks,  Lesley

## 2024-04-02 ENCOUNTER — OFFICE VISIT (OUTPATIENT)
Dept: CARDIOLOGY | Facility: CLINIC | Age: 79
End: 2024-04-02
Payer: MEDICARE

## 2024-04-02 ENCOUNTER — PATIENT MESSAGE (OUTPATIENT)
Dept: FAMILY MEDICINE | Facility: CLINIC | Age: 79
End: 2024-04-02
Payer: MEDICARE

## 2024-04-02 ENCOUNTER — DOCUMENTATION ONLY (OUTPATIENT)
Dept: CARDIOLOGY | Facility: CLINIC | Age: 79
End: 2024-04-02
Payer: MEDICARE

## 2024-04-02 VITALS
BODY MASS INDEX: 24.01 KG/M2 | OXYGEN SATURATION: 98 % | WEIGHT: 181.19 LBS | DIASTOLIC BLOOD PRESSURE: 86 MMHG | HEIGHT: 73 IN | SYSTOLIC BLOOD PRESSURE: 185 MMHG | HEART RATE: 56 BPM

## 2024-04-02 DIAGNOSIS — I50.32 CHRONIC HEART FAILURE WITH PRESERVED EJECTION FRACTION (HFPEF): ICD-10-CM

## 2024-04-02 DIAGNOSIS — Z01.89 ENCOUNTER FOR OTHER SPECIFIED SPECIAL EXAMINATIONS: ICD-10-CM

## 2024-04-02 DIAGNOSIS — I50.32 CHRONIC HEART FAILURE WITH PRESERVED EJECTION FRACTION (HFPEF): Primary | ICD-10-CM

## 2024-04-02 DIAGNOSIS — I34.0 MITRAL VALVE INSUFFICIENCY, UNSPECIFIED ETIOLOGY: Primary | ICD-10-CM

## 2024-04-02 DIAGNOSIS — I48.0 PAROXYSMAL ATRIAL FIBRILLATION: ICD-10-CM

## 2024-04-02 DIAGNOSIS — D50.9 IRON DEFICIENCY ANEMIA, UNSPECIFIED IRON DEFICIENCY ANEMIA TYPE: ICD-10-CM

## 2024-04-02 DIAGNOSIS — R19.5 OCCULT BLOOD POSITIVE STOOL: Primary | ICD-10-CM

## 2024-04-02 DIAGNOSIS — D50.0 ANEMIA DUE TO BLOOD LOSS: ICD-10-CM

## 2024-04-02 LAB — BACTERIA UR CULT: NO GROWTH

## 2024-04-02 PROCEDURE — 99999 PR PBB SHADOW E&M-EST. PATIENT-LVL IV: CPT | Mod: PBBFAC,HCNC,TXP, | Performed by: INTERNAL MEDICINE

## 2024-04-02 PROCEDURE — 3079F DIAST BP 80-89 MM HG: CPT | Mod: HCNC,CPTII,NTX,S$GLB | Performed by: INTERNAL MEDICINE

## 2024-04-02 PROCEDURE — 1159F MED LIST DOCD IN RCRD: CPT | Mod: HCNC,CPTII,NTX,S$GLB | Performed by: INTERNAL MEDICINE

## 2024-04-02 PROCEDURE — 1157F ADVNC CARE PLAN IN RCRD: CPT | Mod: HCNC,CPTII,NTX,S$GLB | Performed by: INTERNAL MEDICINE

## 2024-04-02 PROCEDURE — 1101F PT FALLS ASSESS-DOCD LE1/YR: CPT | Mod: HCNC,CPTII,NTX,S$GLB | Performed by: INTERNAL MEDICINE

## 2024-04-02 PROCEDURE — 3288F FALL RISK ASSESSMENT DOCD: CPT | Mod: HCNC,CPTII,NTX,S$GLB | Performed by: INTERNAL MEDICINE

## 2024-04-02 PROCEDURE — 1160F RVW MEDS BY RX/DR IN RCRD: CPT | Mod: HCNC,CPTII,NTX,S$GLB | Performed by: INTERNAL MEDICINE

## 2024-04-02 PROCEDURE — 3077F SYST BP >= 140 MM HG: CPT | Mod: HCNC,CPTII,NTX,S$GLB | Performed by: INTERNAL MEDICINE

## 2024-04-02 PROCEDURE — 99205 OFFICE O/P NEW HI 60 MIN: CPT | Mod: HCNC,NTX,S$GLB, | Performed by: INTERNAL MEDICINE

## 2024-04-02 PROCEDURE — 1126F AMNT PAIN NOTED NONE PRSNT: CPT | Mod: HCNC,CPTII,NTX,S$GLB | Performed by: INTERNAL MEDICINE

## 2024-04-02 NOTE — PROGRESS NOTES
You have been scheduled for a procedure in the echo lab on Tuesday, April 23, 2024.     Please report to the Cardiology Waiting Area on the Third floor of the hospital and check in at 7:30 AM.     You will then be taken to the SSCU (Short Stay Cardiac Unit) and prepared for your procedure. Please be aware that this is not the time of your procedure but the time you are to arrive. The procedures are scheduled on an hourly basis; however, emergency cases take precedence over all other cases.     You may not have anything to eat or drink after midnight the night before your test.   You may take your regular morning medications with water.  If you take a weekly GLP-1 Inhibitor (Ozempic, Semaglutide, etc) You must be off that medication for one full week prior to your procedure.  Anesthesia will cancel your procedure if you do not hold this medication for a minimum of 7 days prior to procedure.    The procedure will take 1-2 hours to perform. After the procedure, you will return to SSCU on the third floor of the hospital. Your family may remain in the room with you during this time.     You will be discharged home that same afternoon. You must have someone to drive you home.  Your doctor will determine, based on your progress, the time of your discharge. The results of your procedure will be discussed with you before you are discharged. Any further testing or procedures will be scheduled for you either before you leave or you will be called with these appointments.       If you should have any questions, concerns, or need to change the date of your procedure, please call  Roberta METZ @ [phone]      Special Instructions:    Continue your current medications        THE ABOVE INSTRUCTIONS WERE GIVEN TO THE PATIENT VERBALLY AND THEY VERBALIZED UNDERSTANDING.  THEY DO NOT REQUIRE ANY SPECIAL NEEDS AND DO NOT HAVE ANY LEARNING BARRIERS.          Directions for Reporting to Cardiology Waiting Area in the Hospital  If you  park in the Parking Garage:  Take elevators to the1st floor of the parking garage.  Continue past the gift shop, coffee shop, and piano.  Take a right and go to the gold elevators. (Elevator B)  Take the elevator to the 3rd floor.  Follow the arrow on the sign on the wall that says Cath Lab Registration/EP Lab Registration.  Follow the long hallway all the way around until you come to a big open area.  This is the registration area.  Check in at Reception Desk.    OR    If family is dropping you off:  Have them drop you off at the front of the Hospital under the green overhang.  Enter through the doors and take a right.  Take the E elevators to the 3rd floor Cardiology Waiting Area.  Check in at the Reception Desk in the waiting room.

## 2024-04-02 NOTE — PROGRESS NOTES
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 Beatty for EP and recently saw Dr. Hare for LAAO.      PMH  Atrial fibrillation status post multiple ablations currently on propafenone 425 b.i.d. 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 He has chronic anemia with a positive stool occult blood. He is chronically on eliquis for his PAF with a CHADSVASC 6 and HASBLED 4. While undergoing some workup for occluder device his repeat echo showed a normal LVEF, mod-severe MR  with a posterior eccentric jet, dilated IVC. After doing some research he was interested obtaining more information about mitral clip and LAAO occluder devices.    He is accompanied by his wife during the visit. They both admit that although he gets tired after several hours of yard work he does not feel significantly limited. He denies any chest pain, sob, orthopnea, syncope, claudication, palpitations. Admits chronic bl lower extremity edema for which he is taking torsemide 20mg as needed. He also admits that in terms of diet he does season his food with additional salt.     Past Medical History:   Diagnosis Date    Anticoagulant long-term use     Anxiety     Arthritis     Atrial fibrillation     BPH (benign prostatic hyperplasia)     Cataract     CKD (chronic kidney disease) stage 3, GFR 30-59 ml/min 7/10/2017    Followed by Dr. Jeevan Pond    Colon polyp     benign    Depression      Elevated PSA     Erectile dysfunction     Gastric ulcer with hemorrhage     Hep B w/o coma 1977    History of bleeding peptic ulcer     History of prostatitis     Hypertension     PAF (paroxysmal atrial fibrillation)     Sleep apnea     on CPAP    Stroke     TIA December 2019    Thyroid disease        Past Surgical History:   Procedure Laterality Date    A-V CARDIAC PACEMAKER INSERTION Left 9/23/2023    Procedure: Dual Chamber PPM (Heart) Rm 2620;  Surgeon: Pan Moss MD;  Location: [facility] CATH;  Service: Cardiology;  Laterality: Left;    ABDOMINAL HERNIA REPAIR      ABLATION OF ARRHYTHMOGENIC FOCUS FOR ATRIAL FIBRILLATION N/A 6/15/2020    Procedure: Ablation atrial fibrillation;  Surgeon: Wyatt Beatty MD;  Location: [facility] EP LAB;  Service: Cardiology;  Laterality: N/A;  PAF, AFl, PEPE, PVI re-do, CTI, RFA, JUSTIN, Gen, SK, 3 Prep    APPLICATION OF WOUND VACUUM-ASSISTED CLOSURE DEVICE Right 12/9/2023    Procedure: APPLICATION, WOUND VAC;  Surgeon: Jelani Tello II, MD;  Location: [facility] OR;  Service: Orthopedics;  Laterality: Right;    CARDIOVERSION N/A 2/28/2024    Procedure: Cardioversion;  Surgeon: Pao Hare MD;  Location: [facility];  Service: Cardiology;  Laterality: N/A;    CATARACT EXTRACTION  11/25/2013    bilateral    CHOLECYSTECTOMY      COLONOSCOPY N/A 11/25/2015    Procedure: COLONOSCOPY;  Surgeon: Toby Hernandez MD;  Location: [facility] ENDO;  Service: Endoscopy;  Laterality: N/A;    COLONOSCOPY N/A 11/13/2020    Procedure: COLONOSCOPY;  Surgeon: Toby Hernandez MD;  Location: [facility] ENDO;  Service: Endoscopy;  Laterality: N/A;    CYSTOSCOPY WITH INSERTION OF MINIMALLY INVASIVE IMPLANT TO ENLARGE PROSTATIC URETHRA N/A 11/28/2022    Procedure: CYSTOSCOPY, WITH INSERTION OF UROLIFT IMPLANT;  Surgeon: Yakov Shetty MD;  Location: [facility] OR;  Service: Urology;  Laterality: N/A;    EYE SURGERY      GASTRIC BYPASS  1992    INSERTION, PACEMAKER, TEMPORARY TRANSVENOUS  9/22/2023    Procedure: Temp  pacer insertion ER Rm 8;  Surgeon: Pan Moss MD;  Location: [facility] CATH;  Service: Cardiology;;    INTRAMEDULLARY RODDING OF FEMUR Left 7/18/2020    Procedure: INSERTION, INTRAMEDULLARY ERIC, FEMUR, left, Synthes, Sylva table, Large C arm clock side,;  Surgeon: Tony Rodriguez MD;  Location: [facility] OR [facility] FLR;  Service: Orthopedics;  Laterality: Left;    IRRIGATION AND DEBRIDEMENT Right 12/9/2023    Procedure: IRRIGATION AND DEBRIDEMENT KNEE;  Surgeon: Jelani Tello II, MD;  Location: [facility] OR;  Service: Orthopedics;  Laterality: Right;    KNEE ARTHROSCOPY      RT    LASIK  2001    both eyes (Dr. Rabago)    ORIF HUMERUS FRACTURE      LT    ORIF HUMERUS FRACTURE Right     non surgical repair    RADIOFREQUENCY ABLATION      x2    REVISION OF KNEE ARTHROPLASTY Right 12/9/2023    Procedure: REVISION ARTHROPLASTY KNEE- Liner Replacement - dirty/clean;  Surgeon: Jelani Tello II, MD;  Location: [facility] OR;  Service: Orthopedics;  Laterality: Right;  dirty to clean at 1940    Right ankle tendon repair      ROBOT-ASSISTED REPAIR OF INCISIONAL HERNIA USING DA GARETH XI Left 6/13/2022    Procedure: XI ROBOTIC REPAIR, HERNIA, INCISIONAL (LEFT SIDE SPIGELIAN WITH MESH);  Surgeon: Rosendo Marti MD;  Location: [facility] OR [facility] FLR;  Service: General;  Laterality: Left;  consent in am    ROTATOR CUFF REPAIR      right    TOTAL KNEE ARTHROPLASTY Right 5/29/2018    Procedure: REPLACEMENT-KNEE-TOTAL-axel;  Surgeon: Denzel Dallas MD;  Location: [facility] OR [facility] FLR;  Service: Orthopedics;  Laterality: Right;  [facility]    TREATMENT OF CARDIAC ARRHYTHMIA  6/15/2020    Procedure: Cardioversion or Defibrillation;  Surgeon: Wyatt Beatty MD;  Location: [facility] EP LAB;  Service: Cardiology;;    TREATMENT OF CARDIAC ARRHYTHMIA N/A 2/28/2024    Procedure: Cardioversion or Defibrillation;  Surgeon: Pao Hare MD;  Location: [facility] KINJAL;  Service: Cardiology;  Laterality: N/A;       Current Outpatient Medications on File Prior  to Visit   Medication Sig Dispense Refill    alfuzosin (UROXATRAL) 10 mg Tb24 Take 1 tablet (10 mg total) by mouth daily with breakfast. 30 tablet 11    buPROPion (WELLBUTRIN XL) 300 MG 24 hr tablet Take 1 tablet (300 mg total) by mouth once daily. 90 tablet 3    carvediloL (COREG) 6.25 MG tablet Take 6.25 mg by mouth 2 (two) times daily.      chlorhexidine (PERIDEX) 0.12 % solution Use as directed 15 mLs in the mouth or throat 2 (two) times daily. for 3 days 90 mL 0    cyanocobalamin 1,000 mcg/mL injection Inject 1 mL (1,000 mcg total) into the muscle every 7 days. for 4 doses 4 mL 0    cyclobenzaprine (FLEXERIL) 10 MG tablet TAKE 1 TABLET(10 MG) BY MOUTH THREE TIMES DAILY AS NEEDED FOR MUSCLE SPASMS 60 tablet 3    ELIQUIS 5 mg Tab Take 1 tablet (5 mg total) by mouth 2 (two) times a day. 180 tablet 2    EScitalopram oxalate (LEXAPRO) 10 MG tablet TAKE 1 TABLET(10 MG) BY MOUTH EVERY DAY 90 tablet 4    iron-vit c-b12-folic acid (ICAR-C PLUS) Tab Take 1 tablet by mouth once daily. 90 tablet 3    levothyroxine (SYNTHROID) 75 MCG tablet Take 1 tablet (75 mcg total) by mouth before breakfast. 90 tablet 2    mupirocin (BACTROBAN) 2 % ointment Apply topically 3 (three) times daily. for 3 days 1 g 0    OMEGA-3 FATTY ACIDS (FISH OIL CONCENTRATE ORAL) Take 1 capsule by mouth Daily.      potassium chloride (MICRO-K) 10 MEQ CpSR Take 1 capsule (10 mEq total) by mouth every Mon, Wed, Fri. 15 capsule 5    propafenone (RYTHMOL SR) 425 MG Cp12 Take 1 capsule (425 mg total) by mouth every 12 (twelve) hours. 60 capsule 11    telmisartan (MICARDIS) 40 MG Tab Take 1 tablet (40 mg total) by mouth once daily. 90 tablet 3    tiZANidine (ZANAFLEX) 4 MG tablet Take 1 tablet (4 mg total) by mouth 3 (three) times daily as needed. 30 tablet 5    torsemide (DEMADEX) 20 MG Tab Take 1 tablet (20 mg total) by mouth every Mon, Wed, Fri. 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       Objective:     Vitals:    04/02/24 1449 04/02/24 1450   BP: (!) 200/82 (!) 185/86   BP Location: Left arm Right arm   Patient Position: Sitting Sitting   BP Method: Large (Automatic) Large (Automatic)   Pulse:  (!) 56   SpO2: 98% 98%   Weight: 82.2 kg (181 lb 3.5 oz)    Height: 6' 1" (1.854 m)         Physical Exam  Vitals and nursing note reviewed.   Constitutional:       General: He is not in acute distress.     Appearance: Normal appearance. He is normal weight. He is not diaphoretic.   HENT:      Head: Normocephalic and atraumatic.      Nose: Nose normal.      Mouth/Throat:      Mouth: Mucous membranes are dry.      Pharynx: Oropharynx is clear.   Eyes:      General: No scleral icterus.     Extraocular Movements: Extraocular movements intact.   Cardiovascular:      Rate and Rhythm: Regular rhythm. Bradycardia present.      Pulses:           Carotid pulses are 2+ on the right side and 2+ on the left side.       Radial pulses are 2+ on the right side and 2+ on the left side.        Femoral pulses are 2+ on the right side and 2+ " on the left side.       Dorsalis pedis pulses are 1+ on the right side and 1+ on the left side.        Posterior tibial pulses are 1+ on the right side and 1+ on the left side.      Heart sounds: Murmur heard.      High-pitched blowing holosystolic murmur is present with a grade of 2/6 at the apex. +1 pitting edema   Pulmonary:      Effort: Pulmonary effort is normal.      Breath sounds: Rhonchi and rales present.   Abdominal:      General: Bowel sounds are normal. There is no distension.      Palpations: Abdomen is soft.   Musculoskeletal:         General: Normal range of motion.      Cervical back: Normal range of motion and neck supple.      Right lower leg: Edema present.      Left lower leg: Edema present.   Skin:     General: Skin is warm and dry.      Capillary Refill: Capillary refill takes less than 2 seconds.      Findings: Bruising and erythema present.   Neurological:      General: No focal deficit present.      Mental Status: He is alert and oriented to person, place, and time.      Motor: No weakness.   Psychiatric:         Mood and Affect: Mood normal.         Behavior: Behavior normal.         Thought Content: Thought content normal.       TTE 3/12/2024    Left Ventricle: The left ventricle is normal in size. There is mild concentric hypertrophy. There is normal systolic function with a visually estimated ejection fraction of 55 - 60%. Diastolic function cannot be reliably determined in the presence of mitral valve disease.    Right Ventricle: Mild right ventricular enlargement. Systolic function is normal.    Left Atrium: Left atrium is severely dilated.    Aortic Valve: The aortic valve is a trileaflet valve. There is mild aortic regurgitation.    Mitral Valve: There is posterior leaflet tethering. There is severe (3+), functional regurgitation with a posterolateral eccentriccally directed jet. Systolic blunting of the pulmonary veins. The mean pressure gradient across the mitral valve is 2 mmHg at  a heart rate of 88 bpm.    Tricuspid Valve: There is mild regurgitation.    Aorta: Aortic root is mildly dilated measuring 3.72 cm.    Pulmonary Artery: No pulmonary hypertension. The estimated pulmonary artery systolic pressure is 34 mmHg.    IVC/SVC: Normal venous pressure at 3 mmHg.      Assessment:       1. Mitral valve insufficiency, unspecified etiology    2. Paroxysmal atrial fibrillation    3. Iron deficiency anemia, unspecified iron deficiency anemia type    4. 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 Surface echo reviewed with staff with normal LVEF, a posteriorlateral MR jet with poor visualization of the anterior leaflet. Most recent cardiac CTA also reviewed and in the setting of a LAAO device patient would likely require a 31-34mm amulet device.     We will plan for an outpatient PEPE to evaluate better his mitral valve, anterior leaflet not fully visualized on surface echo.  Recommend to start wearing compression socks and to avoid salt intake.   Continue taking torsemide as needed.   Patient would like to think about ALMA occluder device placement.      Plan discussed with Dr. Duvall.       Misty Rudd MD  Cardiology fellow       I have seen the patient, reviewed the Fellow's history and physical, assessment and plan. I have personally interviewed and examined the patient and agree with the findings. Normal EF, optimized on GDMT, recommended salt restriction and compression stockings for venous stasis. Plan LAAO electively and PEPE to better define degree of MR after rigid salt restriction. Sizes for a 35 amulet in current volume expanded state. Will resize with PEPE in 2 weeks when evaluating degree of mitral valve regurgitation. Suspect anterior flail leaflet or ruptured cord from TTE. Patient and wife understand and accept plan.      Patient also due for colonoscopy, currently anticoagulated with anemia. Pt has no active cardiac condition (ACS/USA, decompenstated CHF, significant arrhythmias or severe valvular disease) and can easily achieve > 4 METS.  Pt does not require further cardiac evaluation prior to undergoing colonoscopy procedure. These recommendations follow the 2014 ACC/AHA Guideline on Perioperative Cardiovascular Evaluation and Management of Patients Undergoing Noncardiac Surgery. (JACC Vol. 64 No. 22, Dec 9, 2014, pp g72-j601).     FADUMO Duvall MD

## 2024-04-03 ENCOUNTER — TELEPHONE (OUTPATIENT)
Dept: GASTROENTEROLOGY | Facility: CLINIC | Age: 79
End: 2024-04-03
Payer: MEDICARE

## 2024-04-03 ENCOUNTER — TELEPHONE (OUTPATIENT)
Dept: CARDIOLOGY | Facility: CLINIC | Age: 79
End: 2024-04-03
Payer: MEDICARE

## 2024-04-03 NOTE — TELEPHONE ENCOUNTER
Spoke with patient and assured him Watchman procedure has been cancelled. Patient will reach out in the future if he wants to reschedule.

## 2024-04-03 NOTE — TELEPHONE ENCOUNTER
----- Message from Vi Piper sent at 4/3/2024 10:10 AM CDT -----  Type:  Needs Medical Advice    Who Called: pt   Best Call Back Number: [phone] (home)     Additional Information:  Requesting call back  wants to call r/s sx on 4/8     please advise thank you

## 2024-04-03 NOTE — TELEPHONE ENCOUNTER
Follow up to 3/6 office visit  Positive for occult blood in stool. 3/14/2024  Was awaiting clearance from Cardiology for procedure.     Patient is on blood thinner and will need instruction for potential HOLD for this medication. This may come from Cardiology?

## 2024-04-06 ENCOUNTER — PATIENT MESSAGE (OUTPATIENT)
Dept: FAMILY MEDICINE | Facility: CLINIC | Age: 79
End: 2024-04-06
Payer: MEDICARE

## 2024-04-06 DIAGNOSIS — E53.8 B12 DEFICIENCY: Primary | ICD-10-CM

## 2024-04-08 RX ORDER — CYANOCOBALAMIN 1000 UG/ML
1000 INJECTION, SOLUTION INTRAMUSCULAR; SUBCUTANEOUS
Status: CANCELLED | OUTPATIENT
Start: 2024-04-08

## 2024-04-08 NOTE — TELEPHONE ENCOUNTER
Follow up to 3/5 lab result.   Prior order was for one injection every 7 days. Patient reports completing regimen.

## 2024-04-09 ENCOUNTER — OFFICE VISIT (OUTPATIENT)
Dept: CARDIOLOGY | Facility: CLINIC | Age: 79
End: 2024-04-09
Payer: MEDICARE

## 2024-04-09 VITALS
HEIGHT: 73 IN | BODY MASS INDEX: 22.97 KG/M2 | WEIGHT: 173.31 LBS | SYSTOLIC BLOOD PRESSURE: 123 MMHG | DIASTOLIC BLOOD PRESSURE: 68 MMHG | HEART RATE: 64 BPM

## 2024-04-09 DIAGNOSIS — I50.32 CHRONIC HEART FAILURE WITH PRESERVED EJECTION FRACTION (HFPEF): ICD-10-CM

## 2024-04-09 DIAGNOSIS — Z95.0 PACEMAKER: ICD-10-CM

## 2024-04-09 DIAGNOSIS — I34.0 NONRHEUMATIC MITRAL VALVE REGURGITATION: ICD-10-CM

## 2024-04-09 DIAGNOSIS — I48.0 PAF (PAROXYSMAL ATRIAL FIBRILLATION): Primary | ICD-10-CM

## 2024-04-09 DIAGNOSIS — I10 ESSENTIAL HYPERTENSION: ICD-10-CM

## 2024-04-09 PROCEDURE — 3074F SYST BP LT 130 MM HG: CPT | Mod: CPTII,NTX,S$GLB, | Performed by: INTERNAL MEDICINE

## 2024-04-09 PROCEDURE — 1101F PT FALLS ASSESS-DOCD LE1/YR: CPT | Mod: CPTII,NTX,S$GLB, | Performed by: INTERNAL MEDICINE

## 2024-04-09 PROCEDURE — 1160F RVW MEDS BY RX/DR IN RCRD: CPT | Mod: CPTII,NTX,S$GLB, | Performed by: INTERNAL MEDICINE

## 2024-04-09 PROCEDURE — 3288F FALL RISK ASSESSMENT DOCD: CPT | Mod: CPTII,NTX,S$GLB, | Performed by: INTERNAL MEDICINE

## 2024-04-09 PROCEDURE — 1159F MED LIST DOCD IN RCRD: CPT | Mod: CPTII,NTX,S$GLB, | Performed by: INTERNAL MEDICINE

## 2024-04-09 PROCEDURE — 3078F DIAST BP <80 MM HG: CPT | Mod: CPTII,NTX,S$GLB, | Performed by: INTERNAL MEDICINE

## 2024-04-09 PROCEDURE — 1157F ADVNC CARE PLAN IN RCRD: CPT | Mod: CPTII,NTX,S$GLB, | Performed by: INTERNAL MEDICINE

## 2024-04-09 PROCEDURE — 1126F AMNT PAIN NOTED NONE PRSNT: CPT | Mod: CPTII,NTX,S$GLB, | Performed by: INTERNAL MEDICINE

## 2024-04-09 PROCEDURE — 99214 OFFICE O/P EST MOD 30 MIN: CPT | Mod: NTX,S$GLB,, | Performed by: INTERNAL MEDICINE

## 2024-04-09 PROCEDURE — 99999 PR PBB SHADOW E&M-EST. PATIENT-LVL III: CPT | Mod: PBBFAC,TXP,, | Performed by: INTERNAL MEDICINE

## 2024-04-09 NOTE — PROGRESS NOTES
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  He is much better regarding his knee surgery.  No more antibiotics.    He was seen by Dr. Duvall at the [facility] for assessment of his mitral valve and he is supposed to have a transesophageal echocardiogram later this month to better assess the mitral valve  Currently functional class 2.  No palpitations.  No shortness of breath.  No leg swelling.      Review of Systems   Constitutional: Negative for decreased appetite, malaise/fatigue, weight gain and weight loss.   Cardiovascular:  Negative for chest pain, dyspnea on exertion, leg swelling, palpitations and syncope.   Respiratory:  Negative for cough and shortness of breath.    Gastrointestinal: Negative.    Neurological:  Negative for weakness.   All other systems reviewed and are negative.       Objective:      Physical Exam  Vitals and nursing note reviewed.   Constitutional:       Appearance: Normal appearance. He is well-developed.   HENT:      Head: Normocephalic.   Eyes:      Pupils: Pupils are equal, round, and reactive to light.   Neck:      Thyroid: No thyromegaly.      Vascular: No carotid bruit or JVD.   Cardiovascular:      Rate and Rhythm: Normal rate and regular rhythm.      Chest Wall: PMI is not displaced.      Pulses: Normal pulses and intact distal pulses.      Heart sounds: Murmur heard.      High-pitched blowing holosystolic murmur is present with a grade of 3/6 at the apex.      No gallop.   Pulmonary:      Effort: Pulmonary effort is normal.      Breath sounds: Normal breath sounds.   Abdominal:      Palpations: Abdomen is soft. There is no mass.      Tenderness: There is no abdominal tenderness.   Musculoskeletal:         General: Normal range of motion.      Cervical back: Normal range of motion and neck supple.    Skin:     General: Skin is warm.   Neurological:      Mental Status: He is alert and oriented to person, place, and time.      Sensory: No sensory deficit.      Deep Tendon Reflexes: Reflexes are normal and symmetric.             Most Recent EKG Results  Results for orders placed or performed in visit on 03/12/24   IN OFFICE EKG 12-LEAD (to [place])    Collection Time: 03/12/24  7:53 AM   Result Value Ref Range    QRS Duration 98 ms    [facility] QTC Calculation 467 ms    Narrative    Test Reason : Z00.0    Vent. Rate : 071 BPM     Atrial Rate : 071 BPM     P-R Int : 220 ms          QRS Dur : 098 ms      QT Int : 430 ms       P-R-T Axes : 081 094 068 degrees     QTc Int : 467 ms    Sinus rhythm with 1st degree A-V block  Rightward axis  Borderline Abnormal ECG  When compared with ECG of 27-FEB-2024 12:29,  Sinus rhythm has replaced Electronic ventricular pacemaker  Confirmed by Stepan Kaur MD ([id number]) on 3/12/2024 5:52:45 PM    Referred By: [email or web address]   SELF           Confirmed By:Stepan Kaur MD       Most Recent Echocardiogram Results  Results for orders placed during the hospital encounter of 03/14/24    Echo    Interpretation Summary    Left Ventricle: The left ventricle is normal in size. There is mild concentric hypertrophy. There is normal systolic function with a visually estimated ejection fraction of 55 - 60%. Diastolic function cannot be reliably determined in the presence of mitral valve disease.    Right Ventricle: Mild right ventricular enlargement. Systolic function is normal.    Left Atrium: Left atrium is severely dilated.    Aortic Valve: The aortic valve is a trileaflet valve. There is mild aortic regurgitation.    Mitral Valve: There is posterior leaflet tethering. There is severe (3+), functional regurgitation with a posterolateral eccentriccally directed jet. Systolic blunting of the pulmonary veins. The mean pressure gradient across the mitral valve is 2 mmHg at a heart rate of 88 bpm.     Tricuspid Valve: There is mild regurgitation.    Aorta: Aortic root is mildly dilated measuring 3.72 cm.    Pulmonary Artery: No pulmonary hypertension. The estimated pulmonary artery systolic pressure is 34 mmHg.    IVC/SVC: Normal venous pressure at 3 mmHg.      Most Recent Nuclear Stress Test Results  Results for orders placed during the hospital encounter of 09/20/23    Nuclear Stress - Cardiology Interpreted    Interpretation Summary    Normal myocardial perfusion scan. There is no evidence of myocardial ischemia or infarction.    The gated perfusion images showed an ejection fraction of 60% post stress.    LV cavity size is normal at rest and normal at stress.    The ECG portion of the study is abnormal but not diagnostic.    The patient reported no chest pain during the stress test.      Most Recent Cardiac PET Stress Test Results  No results found for this or any previous visit.      Most Recent Cardiovascular Angiogram results  Results for orders placed during the hospital encounter of 06/15/20    Intra-Procedure Documentation    Narrative  PEPE performed in the Invasive Lab  - See Procedure Log link below for nursing documentation  - See PEPE order on Card Proc Tab for physician findings      Other Most Recent Cardiology Results  Results for orders placed during the hospital encounter of 02/27/24    CARDIAC MONITORING STRIPS      Labs reviewed    Assessment:       1. PAF (paroxysmal atrial fibrillation)    2. Chronic heart failure with preserved ejection fraction (HFpEF)    3. Essential hypertension    4. Pacemaker         Plan:     Continue:  ARB, Beta blocker, Diuretic, and DOAC  Regular exercise program  Weight loss  Low cholesterol diet    Follow-up in 3 months

## 2024-04-10 NOTE — TELEPHONE ENCOUNTER
Tony Duvall MD Louis, Louisina, LPN2 days ago       Sure, hold Eliquis for procedure and restart post procedure once hemostasis is obtained.  Jordi

## 2024-04-15 ENCOUNTER — PATIENT MESSAGE (OUTPATIENT)
Dept: FAMILY MEDICINE | Facility: CLINIC | Age: 79
End: 2024-04-15
Payer: MEDICARE

## 2024-04-16 ENCOUNTER — ANESTHESIA EVENT (OUTPATIENT)
Dept: MEDSURG UNIT | Facility: HOSPITAL | Age: 79
End: 2024-04-16
Payer: MEDICARE

## 2024-04-16 ENCOUNTER — TELEPHONE (OUTPATIENT)
Dept: TRANSPLANT | Facility: CLINIC | Age: 79
End: 2024-04-16
Payer: MEDICARE

## 2024-04-17 ENCOUNTER — HOSPITAL ENCOUNTER (OUTPATIENT)
Dept: PULMONOLOGY | Facility: CLINIC | Age: 79
Discharge: HOME | End: 2024-04-17
Payer: MEDICARE

## 2024-04-17 ENCOUNTER — OFFICE VISIT (OUTPATIENT)
Dept: TRANSPLANT | Facility: CLINIC | Age: 79
End: 2024-04-17
Payer: MEDICARE

## 2024-04-17 ENCOUNTER — TELEPHONE (OUTPATIENT)
Dept: TRANSPLANT | Facility: CLINIC | Age: 79
End: 2024-04-17

## 2024-04-17 VITALS
OXYGEN SATURATION: 100 % | DIASTOLIC BLOOD PRESSURE: 80 MMHG | HEART RATE: 52 BPM | SYSTOLIC BLOOD PRESSURE: 173 MMHG | TEMPERATURE: 98 F

## 2024-04-17 VITALS — HEIGHT: 71 IN | WEIGHT: 173.31 LBS | BODY MASS INDEX: 24.26 KG/M2

## 2024-04-17 DIAGNOSIS — J84.9 ILD (INTERSTITIAL LUNG DISEASE): ICD-10-CM

## 2024-04-17 DIAGNOSIS — J84.9 INTERSTITIAL PULMONARY DISEASE, UNSPECIFIED: Primary | ICD-10-CM

## 2024-04-17 LAB
DLCO ADJ PRE: 15.32 ML/(MIN*MMHG) (ref 19.72–33.57)
DLCO SINGLE BREATH LLN: 19.72
DLCO SINGLE BREATH PRE REF: 51.3 %
DLCO SINGLE BREATH REF: 26.65
DLCOC SBVA LLN: 2.53
DLCOC SBVA PRE REF: 88 %
DLCOC SBVA REF: 3.64
DLCOC SINGLE BREATH LLN: 19.72
DLCOC SINGLE BREATH PRE REF: 57.5 %
DLCOC SINGLE BREATH REF: 26.65
DLCOCSBVAULN: 4.74
DLCOCSINGLEBREATHULN: 33.57
DLCOSINGLEBREATHULN: 33.57
DLCOVA LLN: 2.53
DLCOVA PRE REF: 78.5 %
DLCOVA PRE: 2.86 ML/(MIN*MMHG*L) (ref 2.53–4.74)
DLCOVA REF: 3.64
DLCOVAULN: 4.74
DLVAADJ PRE: 3.2 ML/(MIN*MMHG*L) (ref 2.53–4.74)
ERV LLN: -16449
ERV PRE REF: 129.6 %
ERV REF: 1
ERVULN: ABNORMAL
FEF 25 75 LLN: 0.85
FEF 25 75 PRE REF: 214.9 %
FEF 25 75 REF: 2.17
FEV05 LLN: 1.48
FEV05 REF: 2.62
FEV1 FVC LLN: 60
FEV1 FVC PRE REF: 120.1 %
FEV1 FVC REF: 75
FEV1 LLN: 2.14
FEV1 PRE REF: 96.8 %
FEV1 REF: 3.05
FRCPLETH LLN: 2.84
FRCPLETH PREREF: 89.7 %
FRCPLETH REF: 3.82
FRCPLETHULN: 4.81
FVC LLN: 2.99
FVC PRE REF: 79.9 %
FVC REF: 4.11
IVC PRE: 3.18 L (ref 2.99–5.26)
IVC SINGLE BREATH LLN: 2.99
IVC SINGLE BREATH PRE REF: 77.3 %
IVC SINGLE BREATH REF: 4.11
IVCSINGLEBREATHULN: 5.26
LLN IC: -9999996.8
PEF LLN: 5.44
PEF PRE REF: 107.1 %
PEF REF: 7.83
PHYSICIAN COMMENT: ABNORMAL
PRE DLCO: 13.67 ML/(MIN*MMHG) (ref 19.72–33.57)
PRE ERV: 1.29 L (ref -16449–16451)
PRE FEF 25 75: 4.67 L/S (ref 0.85–4.1)
PRE FET 100: 3.47 SEC
PRE FEV05 REF: 97 %
PRE FEV1 FVC: 89.85 % (ref 60.39–87.74)
PRE FEV1: 2.95 L (ref 2.14–3.9)
PRE FEV5: 2.54 L (ref 1.48–3.75)
PRE FRC PL: 3.43 L (ref 2.84–4.81)
PRE FVC: 3.29 L (ref 2.99–5.26)
PRE IC: 2.02 L (ref -9999996.8–#######.####)
PRE PEF: 8.38 L/S (ref 5.44–10.21)
PRE REF IC: 63.2 %
PRE RV: 2.14 L (ref 2.15–3.5)
PRE TLC: 5.45 L (ref 6.18–8.48)
RAW PRE REF: 57.3 %
RAW PRE: 1.75 CMH2O*S/L (ref 3.06–3.06)
RAW REF: 3.06
REF IC: 3.2
RV LLN: 2.15
RV PRE REF: 75.7 %
RV REF: 2.83
RVTLC LLN: 35
RVTLC PRE REF: 89.2 %
RVTLC PRE: 39.25 % (ref 35.01–52.97)
RVTLC REF: 44
RVTLCULN: 53
RVULN: 3.5
SGAW PRE REF: 174.3 %
SGAW PRE: 0.15 1/(CMH2O*S) (ref 0.08–0.08)
SGAW REF: 0.08
TLC LLN: 6.18
TLC PRE REF: 74.4 %
TLC REF: 7.33
TLC ULN: 8.48
ULN IC: ABNORMAL
VA PRE: 4.79 L (ref 7.18–7.18)
VA SINGLE BREATH LLN: 7.18
VA SINGLE BREATH PRE REF: 66.7 %
VA SINGLE BREATH REF: 7.18
VASINGLEBREATHULN: 7.18
VC LLN: 2.99
VC PRE REF: 80.5 %
VC PRE: 3.31 L (ref 2.99–5.26)
VC REF: 4.11
VC ULN: 5.26

## 2024-04-17 PROCEDURE — 1101F PT FALLS ASSESS-DOCD LE1/YR: CPT | Mod: CPTII,NTX,S$GLB, | Performed by: NURSE PRACTITIONER

## 2024-04-17 PROCEDURE — 94726 PLETHYSMOGRAPHY LUNG VOLUMES: CPT | Mod: NTX,S$GLB,, | Performed by: INTERNAL MEDICINE

## 2024-04-17 PROCEDURE — 99999 PR PBB SHADOW E&M-EST. PATIENT-LVL IV: CPT | Mod: PBBFAC,TXP,, | Performed by: NURSE PRACTITIONER

## 2024-04-17 PROCEDURE — 3077F SYST BP >= 140 MM HG: CPT | Mod: CPTII,NTX,S$GLB, | Performed by: NURSE PRACTITIONER

## 2024-04-17 PROCEDURE — 3079F DIAST BP 80-89 MM HG: CPT | Mod: CPTII,NTX,S$GLB, | Performed by: NURSE PRACTITIONER

## 2024-04-17 PROCEDURE — 1159F MED LIST DOCD IN RCRD: CPT | Mod: CPTII,NTX,S$GLB, | Performed by: NURSE PRACTITIONER

## 2024-04-17 PROCEDURE — 3288F FALL RISK ASSESSMENT DOCD: CPT | Mod: CPTII,NTX,S$GLB, | Performed by: NURSE PRACTITIONER

## 2024-04-17 PROCEDURE — 94729 DIFFUSING CAPACITY: CPT | Mod: NTX,S$GLB,, | Performed by: INTERNAL MEDICINE

## 2024-04-17 PROCEDURE — 1160F RVW MEDS BY RX/DR IN RCRD: CPT | Mod: CPTII,NTX,S$GLB, | Performed by: NURSE PRACTITIONER

## 2024-04-17 PROCEDURE — 1157F ADVNC CARE PLAN IN RCRD: CPT | Mod: CPTII,NTX,S$GLB, | Performed by: NURSE PRACTITIONER

## 2024-04-17 PROCEDURE — 99214 OFFICE O/P EST MOD 30 MIN: CPT | Mod: 25,NTX,S$GLB, | Performed by: NURSE PRACTITIONER

## 2024-04-17 PROCEDURE — 94010 BREATHING CAPACITY TEST: CPT | Mod: 59,NTX,S$GLB, | Performed by: INTERNAL MEDICINE

## 2024-04-17 PROCEDURE — 94618 PULMONARY STRESS TESTING: CPT | Mod: NTX,S$GLB,, | Performed by: INTERNAL MEDICINE

## 2024-04-17 RX ORDER — CYCLOBENZAPRINE HCL 10 MG
10 TABLET ORAL 3 TIMES DAILY PRN
Qty: 60 TABLET | Refills: 3 | Status: SHIPPED | OUTPATIENT
Start: 2024-04-17

## 2024-04-17 NOTE — LETTER
April 17, 2024        Elias Yang  [address]  [facility] [address]  Phone: [phone]  Fax: [phone]             [facility] Trans[facility] 1st Fl  [address]  [facility] [address]  Phone: [phone]   Patient: Dominick Song MD   MR Number: [id number]   YOB: 1945   Date of Visit: 4/17/2024       Dear Dr. Elias Yang    Thank you for referring Dominick Song to me for evaluation. Attached you will find relevant portions of my assessment and plan of care.    If you have questions, please do not hesitate to call me. I look forward to following Dominick Song along with you.    Sincerely,    Andres Conrad NP    Enclosure    If you would like to receive this communication electronically, please contact [email or web address] or [phone] to request [employer] Link access.    [employer] Link is a tool which provides read-only access to select patient information with whom you have a relationship. Its easy to use and provides real time access to review your patients record including encounter summaries, notes, results, and demographic information.    If you feel you have received this communication in error or would no longer like to receive these types of communications, please e-mail [email or web address]

## 2024-04-17 NOTE — PROCEDURES
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  The diagnosis is Interstitial Lung Disease.  The patient's BMI is 24.2 kg/m2.  Predicted distance (lower limit of normal) is 316.86 meters.      Test Results:    The test was completed without stopping.  The total time walked was 360 seconds.  During walking, the patient reported:  No complaints. The patient used no assistive devices during testing.     04/17/2024---------Distance: 396.24 meters (1300 feet)     Lap Walk Time O2 Sat % Supplemental Oxygen Heart Rate Blood Pressure Meena Scale Comment   Pre-exercise  (Resting) 0 0 98 % Room Air 66 bpm 174/79 mmHg 0    During Exercise 1 49 sec 92 % Room Air 59 bpm      During Exercise 2 104 sec 92 % Room Air 64 bpm      During Exercise 3 158 sec 95 % Room Air 69 bpm      During Exercise 4 214 sec 95 % Room Air 65 bpm      During Exercise 5 274 sec 93 % Room Air 69 bpm      During Exercise 6 330 sec 94 % Room Air 63 bpm      End of Exercise  360 sec 96 % Room Air 63 bpm 173/80 mmHg 0.5    Post-exercise  (Recovery)   98 % Room Air  59 bpm        Recovery Time: 72 seconds    Performing nurse/tech: CHACORTA Rey      PREVIOUS STUDY:   10/03/2023---------Distance: 396.24 meters (1300 feet)       Lap Walk Time O2 Sat % Supplemental Oxygen Heart Rate Blood Pressure Meena Scale   Pre-exercise  (Resting) 0 0 96 % Room Air 78 bpm 131/73 mmHg 0   During Exercise 1 53 sec 94 % Room Air 95 bpm       During Exercise 2 106 sec 92 % Room Air 81 bpm       During Exercise 3 163 sec 90 % Room Air 85 bpm       During Exercise 4 219 sec 90 % Room Air 84 bpm       During Exercise 5 278 sec 90 % Room Air 88 bpm       During Exercise 6 336 sec 92 % Room Air 89 bpm       End of Exercise   360 sec 94 % Room Air 90 bpm 137/75 mmHg 0   Post-exercise  (Recovery)     99 % Room Air  69 bpm           CLINICAL INTERPRETATION:  Six minute walk distance is 396.24 meters (1300 feet) with very,  very light dyspnea.  During exercise, there was significant desaturation while breathing room air.  Both blood pressure and heart rate remained stable with walking.  Hypertension was present prior to exercise.  The patient did not report non-pulmonary symptoms during exercise.  Since the previous study in October 2023, exercise capacity is unchanged.  Based upon age and body mass index, exercise capacity is normal.

## 2024-04-17 NOTE — PROGRESS NOTES
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 He is on no assisted ventilation.  His New York Heart Association Class is I and a Karnofsky score of 100% - Normal, No Complaints, no evidence of disease. He is not diabetic.    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 Remains asymptomatic from a respiratory standpoint. He follows with cardiology for a-fib.  He is very active and voices no respiratory complaints.  Overall he is doing well.     Brief History summarized from initial visit:  Patient has a PMH of TIA, depression(well controlled on current meds), A-Fib on eliquis/rhythmol, HTN, BPH, CKD 3, history of COVID, anemia of chronic blood loss, Hypothyroidism, PUD, osteoarthritis, complex sleep apnea syndrome, chronic back pain (well controlled on muscle relaxants).  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 Has no history of allergies/sinusitis, GERD or aspiration.  Prescribed CPAP, but has issues wearing it.     Review of Systems   Constitutional:  Negative for chills, fever and malaise/fatigue.   HENT:  Negative for congestion, ear pain, sinus pain and sore throat.    Eyes:  Negative for discharge and redness.   Respiratory:  Negative for sputum production, shortness of breath and wheezing.    Cardiovascular:  Negative for chest pain and palpitations.   Gastrointestinal:  Negative for abdominal pain, diarrhea and vomiting.   Genitourinary:  Negative for dysuria.   Musculoskeletal:  Negative for myalgias.   Skin:  Negative for rash.   Neurological:  Negative for dizziness.     Objective:   There were no vitals taken for this visit.  Physical Exam  Constitutional:       General: He is not in acute distress.      Appearance: Normal appearance.   HENT:      Head: Normocephalic and atraumatic.      Nose: No congestion or rhinorrhea.      Mouth/Throat:      Pharynx: No oropharyngeal exudate or posterior oropharyngeal erythema.   Eyes:      General: No scleral icterus.     Extraocular Movements: Extraocular movements intact.      Conjunctiva/sclera: Conjunctivae normal.   Cardiovascular:      Rate and Rhythm: Normal rate and regular rhythm.      Pulses: Normal pulses.   Pulmonary:      Effort: Pulmonary effort is normal. No respiratory distress.      Breath sounds: Normal breath sounds. No stridor. No wheezing, rhonchi or rales.   Chest:      Chest wall: No tenderness.   Abdominal:      General: There is no distension.      Palpations: Abdomen is soft.   Musculoskeletal:         General: No swelling. Normal range of motion.      Cervical back: Neck supple. No tenderness.      Right lower leg: No edema.      Left lower leg: No edema.   Lymphadenopathy:      Cervical: No cervical adenopathy.   Skin:     General: Skin is warm and dry.   Neurological:      Mental Status: He is alert and oriented to person, place, and time.   Psychiatric:         Mood and Affect: Mood normal.         Behavior: Behavior normal.         Thought Content: Thought content normal.         Judgment: Judgment normal.     Labs:  Hospital Outpatient Visit on 04/17/2024   Component Date Value    Pre FVC 04/17/2024 3.29     PRE FEV5 04/17/2024 2.54     Pre FEV1 04/17/2024 2.95     Pre FEV1 FVC 04/17/2024 89.85 (H)     Pre FEF 25 75 04/17/2024 4.67 (H)     Pre PEF 04/17/2024 8.38     Pre  04/17/2024 3.47     Pre DLCO 04/17/2024 13.67 (L)     DLCO ADJ PRE 04/17/2024 15.32 (L)     DLCOVA PRE 04/17/2024 2.86     DLVAAdj PRE 04/17/2024 3.20     VA PRE 04/17/2024 4.79 (L)     IVC PRE 04/17/2024 3.18     Pre TLC 04/17/2024 5.45 (L)     VC PRE 04/17/2024 3.31     PRE IC 04/17/2024 2.02     Pre FRC PL 04/17/2024 3.43     Pre ERV 04/17/2024 1.29     Pre RV  04/17/2024 2.14 (L)     RVTLC PRE 04/17/2024 39.25     Raw PRE 04/17/2024 1.75 (L)     sGaw PRE 04/17/2024 0.15 (H)     FVC Ref 04/17/2024 4.11     FVC LLN 04/17/2024 2.99     FVC Pre Ref 04/17/2024 79.9     FEV05 REF 04/17/2024 2.62     FEV05 LLN 04/17/2024 1.48     PRE FEV05 REF 04/17/2024 97.0     FEV1 Ref 04/17/2024 3.05     FEV1 LLN 04/17/2024 2.14     FEV1 Pre Ref 04/17/2024 96.8     FEV1 FVC Ref 04/17/2024 75     FEV1 FVC LLN 04/17/2024 60     FEV1 FVC Pre Ref 04/17/2024 120.1     FEF 25 75 Ref 04/17/2024 2.17     FEF 25 75 LLN 04/17/2024 0.85     FEF 25 75 Pre Ref 04/17/2024 214.9     PEF Ref 04/17/2024 7.83     PEF LLN 04/17/2024 5.44     PEF Pre Ref 04/17/2024 107.1     TLC Ref 04/17/2024 7.33     TLC LLN 04/17/2024 6.18     TLC ULN 04/17/2024 8.48     TLC Pre Ref 04/17/2024 74.4     VC Ref 04/17/2024 4.11     VC LLN 04/17/2024 2.99     VC ULN 04/17/2024 5.26     VC Pre Ref 04/17/2024 80.5     REF IC 04/17/2024 3.20     LLN IC 04/17/2024 -1982921.80     ULN IC 04/17/2024 16491732.20     PRE REF IC 04/17/2024 63.2     FRCpleth Ref 04/17/2024 3.82     FRCpleth LLN 04/17/2024 2.84     FRC PLETH ULN 04/17/2024 4.81     FRCpleth PreRef 04/17/2024 89.7     ERV Ref 04/17/2024 1.00     ERV LLN 04/17/2024 -37656.00     ERV ULN 04/17/2024 29817.00     ERV Pre Ref 04/17/2024 129.6     RV Ref 04/17/2024 2.83     RV LLN 04/17/2024 2.15     RV ULN 04/17/2024 3.50     RV Pre Ref 04/17/2024 75.7     RVTLC Ref 04/17/2024 44     RVTLC LLN 04/17/2024 35     RV TLC ULN 04/17/2024 53     RVTLC Pre Ref 04/17/2024 89.2     Raw Ref 04/17/2024 3.06     Raw Pre Ref 04/17/2024 57.3     sGaw Ref 04/17/2024 0.08     sGaw Pre Ref 04/17/2024 174.3     DLCO Single Breath Ref 04/17/2024 26.65     DLCO Single Breath LLN 04/17/2024 19.72     DLCO SINGLEBREATH ULN 04/17/2024 33.57     DLCO Single Breath Pre R* 04/17/2024 51.3     DLCOc Single Breath Ref 04/17/2024 26.65     DLCOc Single Breath LLN 04/17/2024 19.72     DLCOC SINGLEBREATH ULN  04/17/2024 33.57     DLCOc Single Breath Pre * 04/17/2024 57.5     DLCOVA Ref 04/17/2024 3.64     DLCOVA LLN 04/17/2024 2.53     DLCOVA ULN 04/17/2024 4.74     DLCOVA Pre Ref 04/17/2024 78.5     DLCOc SBVA Ref 04/17/2024 3.64     DLCOc SBVA LLN 04/17/2024 2.53     DLCOCSBVA ULN 04/17/2024 4.74     DLCOc SBVA Pre Ref 04/17/2024 88.0     VA Single Breath Ref 04/17/2024 7.18     VA Single Breath LLN 04/17/2024 7.18     VA SINGLEBREATH ULN 04/17/2024 7.18     VA Single Breath Pre Ref 04/17/2024 66.7     IVC Single Breath Ref 04/17/2024 4.11     IVC Single Breath LLN 04/17/2024 2.99     IVC SINGLEBREATH ULN 04/17/2024 5.26     IVC Single Breath Pre Ref 04/17/2024 77.3            4/17/2024    11:37 AM 10/3/2023    10:33 AM 4/6/2023     9:11 AM 1/12/2023     9:16 AM 11/21/2022     9:18 AM   Pulmonary Function Tests   FVC 3.29 liters 3.2 liters 3.27 liters 3.38 liters 3.21 liters   FEV1 2.95 liters 2.61 liters 2.71 liters 2.79 liters 2.7 liters   TLC (liters) 5.45 liters 4.7 liters 4.77 liters 4.4 liters 5.06 liters   DLCO (ml/mmHg sec) 13.67 ml/mmHg sec 13.21 ml/mmHg sec 14.25 ml/mmHg sec 14 ml/mmHg sec 15.13 ml/mmHg sec   FVC% 79.9 77.3 78.7 81 77   FEV1% 96.8 85.1 87.7 90 87   FEF 25-75 4.67 2.92 3.07 3.25 5.06   FEF 25-75% 214.9 132.9 138.7 146 69   TLC% 74.4 64.2 65.1 60 69   RV 2.14 1.51 1.5     RV% 75.7 53.7 53.6     DLCO% 51.3 49.2 53.1 53 56         4/17/2024    11:13 AM 10/3/2023    10:36 AM 4/6/2023     8:19 AM 11/21/2022    10:47 AM   6MW   6MWT Status completed without stopping completed without stopping completed without stopping completed without stopping   Patient Reported No complaints No complaints No complaints Leg pain   Was O2 used? No No No No   6MW Distance walked (feet) 1300 feet 1300 feet 1332 feet 1200 feet   Distance walked (meters) 396.24 meters 396.24 meters 405.99 meters 365.76 meters   Did patient stop? No No No No   Oxygen Saturation 98 % 96 % 97 % 96 %   Supplemental Oxygen Room Air Room Air  Room Air Room Air   Heart Rate 66 bpm 78 bpm 65 bpm 52 bpm   Blood Pressure 174/79 131/73 154/72 172/83   Meena Dyspnea Rating  nothing at all nothing at all nothing at all nothing at all   Oxygen Saturation 96 % 94 % 96 % 99 %   Supplemental Oxygen Room Air Room Air Room Air Room Air   Heart Rate 63 bpm 90 bpm 62 bpm 65 bpm   Blood Pressure 173/80 137/75 172/77 195/96   Meena Dyspnea Rating  very, very light (just noticeable) nothing at all very, very light (just noticeable) very light   Recovery Time (seconds) 72 seconds 58 seconds 73 seconds 136 seconds   Oxygen Saturation 98 % 99 % 98 % 97 %   Supplemental Oxygen Room Air Room Air Room Air Room Air   Heart Rate 59 bpm 69 bpm 57 bpm 54 bpm       Assessment:-  1. Interstitial pulmonary disease, unspecified      Plan:     Follow up for ILD found incidentally on imaging. FVC/DLCO remains stable. 6MWT stable with saturations starting at 98% and decreasing to 92%.  Remains asymptomatic and no role for anti-fibrotics at this time. Remains very active.  Will continue to monitor spirometry and 6MWT at routine visits.      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  He can notify us if he needs to be seen sooner.      Andres Conrad NP  [facility] Trans[facility]

## 2024-04-21 ENCOUNTER — PATIENT MESSAGE (OUTPATIENT)
Dept: CARDIOLOGY | Facility: CLINIC | Age: 79
End: 2024-04-21
Payer: MEDICARE

## 2024-04-22 ENCOUNTER — LAB VISIT (OUTPATIENT)
Dept: LAB | Facility: HOSPITAL | Age: 79
End: 2024-04-22
Attending: FAMILY MEDICINE
Payer: MEDICARE

## 2024-04-22 DIAGNOSIS — E53.8 B12 DEFICIENCY: ICD-10-CM

## 2024-04-22 LAB — VIT B12 SERPL-MCNC: >2000 PG/ML (ref 210–950)

## 2024-04-22 PROCEDURE — 82607 VITAMIN B-12: CPT | Mod: TXP | Performed by: PHYSICIAN ASSISTANT

## 2024-04-22 PROCEDURE — 36415 COLL VENOUS BLD VENIPUNCTURE: CPT | Mod: PO,TXP | Performed by: PHYSICIAN ASSISTANT

## 2024-04-22 NOTE — ANESTHESIA PREPROCEDURE EVALUATION
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    Pre-operative evaluation for Procedure(s) (LRB):  ECHOCARDIOGRAM, TRANSESOPHAGEAL (N/A)        Patient Active Problem List   Diagnosis    BPH with urinary obstruction    Elevated PSA    Nuclear sclerosis    Anemia due to chronic blood loss    Posterior vitreous detachment    Metatarsalgia    Keratoma    Foot pain, right    Onychomycosis due to dermatophyte    ED (erectile dysfunction)    Bradycardia    Mild single current episode of major depressive disorder    Tortuous aorta    Dyspnea    Acquired hypothyroidism    Essential hypertension    Gait abnormality    CKD (chronic kidney disease) stage 3, GFR 30-59 ml/min    Complex sleep apnea syndrome    Erectile dysfunction due to arterial insufficiency    Primary osteoarthritis of right knee    History of bleeding peptic ulcer    History of TIA (transient ischemic attack)    Closed nondisplaced intertrochanteric fracture of left femur    Stroke    Hx of colonic polyps    Age-related osteoporosis with current pathological fracture with routine healing    Hyperparathyroidism    Ventral hernia without obstruction or gangrene    ILD (interstitial lung disease)    ACP (advance care planning)    Pacemaker    Pyogenic arthritis of right knee joint    PAF (paroxysmal atrial fibrillation)    Infected prosthetic knee joint    Hyperkalemia    Acute blood loss anemia    Persistent atrial fibrillation    Chronic heart failure with preserved ejection fraction (HFpEF)    At high risk for bleeding    Nonrheumatic mitral valve regurgitation       Review of patient's allergies indicates:   Allergen Reactions    No known drug allergies        No current facility-administered medications on file prior to encounter.     Current Outpatient Medications on File Prior to Encounter   Medication  Sig Dispense Refill    alfuzosin (UROXATRAL) 10 mg Tb24 Take 1 tablet (10 mg total) by mouth daily with breakfast. 30 tablet 11    buPROPion (WELLBUTRIN XL) 300 MG 24 hr tablet Take 1 tablet (300 mg total) by mouth once daily. 90 tablet 3    carvediloL (COREG) 6.25 MG tablet Take 6.25 mg by mouth 2 (two) times daily.      ELIQUIS 5 mg Tab Take 1 tablet (5 mg total) by mouth 2 (two) times a day. 180 tablet 2    EScitalopram oxalate (LEXAPRO) 10 MG tablet TAKE 1 TABLET(10 MG) BY MOUTH EVERY DAY 90 tablet 4    iron-vit c-b12-folic acid (ICAR-C PLUS) Tab Take 1 tablet by mouth once daily. 90 tablet 3    levothyroxine (SYNTHROID) 75 MCG tablet Take 1 tablet (75 mcg total) by mouth before breakfast. 90 tablet 2    OMEGA-3 FATTY ACIDS (FISH OIL CONCENTRATE ORAL) Take 1 capsule by mouth Daily.      potassium chloride (MICRO-K) 10 MEQ CpSR Take 1 capsule (10 mEq total) by mouth every Mon, Wed, Fri. 15 capsule 5    propafenone (RYTHMOL SR) 425 MG Cp12 Take 1 capsule (425 mg total) by mouth every 12 (twelve) hours. 60 capsule 11    telmisartan (MICARDIS) 40 MG Tab Take 1 tablet (40 mg total) by mouth once daily. 90 tablet 3    torsemide (DEMADEX) 20 MG Tab Take 1 tablet (20 mg total) by mouth every Mon, Wed, Fri. 15 tablet 5       Past Surgical History:   Procedure Laterality Date    A-V CARDIAC PACEMAKER INSERTION Left 9/23/2023    Procedure: Dual Chamber PPM (Heart) Rm 2620;  Surgeon: Pan Moss MD;  Location: [facility] CATH;  Service: Cardiology;  Laterality: Left;    ABDOMINAL HERNIA REPAIR      ABLATION OF ARRHYTHMOGENIC FOCUS FOR ATRIAL FIBRILLATION N/A 6/15/2020    Procedure: Ablation atrial fibrillation;  Surgeon: Wyatt Beatty MD;  Location: [facility] EP LAB;  Service: Cardiology;  Laterality: N/A;  PAF, AFl, PEPE, PVI re-do, CTI, RFA, JUSTIN, Gen, SK, 3 Prep    APPLICATION OF WOUND VACUUM-ASSISTED CLOSURE DEVICE Right 12/9/2023    Procedure: APPLICATION, WOUND VAC;  Surgeon: Jelani Tello II, MD;  Location: [facility]  OR;  Service: Orthopedics;  Laterality: Right;    CARDIOVERSION N/A 2/28/2024    Procedure: Cardioversion;  Surgeon: Pao Hare MD;  Location: [facility] KINJAL;  Service: Cardiology;  Laterality: N/A;    CATARACT EXTRACTION  11/25/2013    bilateral    CHOLECYSTECTOMY      COLONOSCOPY N/A 11/25/2015    Procedure: COLONOSCOPY;  Surgeon: Toby Hernandez MD;  Location: [facility] ENDO;  Service: Endoscopy;  Laterality: N/A;    COLONOSCOPY N/A 11/13/2020    Procedure: COLONOSCOPY;  Surgeon: Toby Hernandez MD;  Location: [facility] ENDO;  Service: Endoscopy;  Laterality: N/A;    CYSTOSCOPY WITH INSERTION OF MINIMALLY INVASIVE IMPLANT TO ENLARGE PROSTATIC URETHRA N/A 11/28/2022    Procedure: CYSTOSCOPY, WITH INSERTION OF UROLIFT IMPLANT;  Surgeon: Yakov Shetty MD;  Location: [facility] OR;  Service: Urology;  Laterality: N/A;    EYE SURGERY      GASTRIC BYPASS  1992    INSERTION, PACEMAKER, TEMPORARY TRANSVENOUS  9/22/2023    Procedure: Temp pacer insertion ER Rm 8;  Surgeon: Pan Moss MD;  Location: [facility] CATH;  Service: Cardiology;;    INTRAMEDULLARY RODDING OF FEMUR Left 7/18/2020    Procedure: INSERTION, INTRAMEDULLARY ERIC, FEMUR, left, [employer], [place] table, Large C arm clock side,;  Surgeon: Tony Rodriguez MD;  Location: [address];  Service: Orthopedics;  Laterality: Left;    IRRIGATION AND DEBRIDEMENT Right 12/9/2023    Procedure: IRRIGATION AND DEBRIDEMENT KNEE;  Surgeon: Jelani Tello II, MD;  Location: [facility] OR;  Service: Orthopedics;  Laterality: Right;    KNEE ARTHROSCOPY      RT    LASIK  2001    both eyes (Dr. Rabago)    ORIF HUMERUS FRACTURE      LT    ORIF HUMERUS FRACTURE Right     non surgical repair    RADIOFREQUENCY ABLATION      x2    REVISION OF KNEE ARTHROPLASTY Right 12/9/2023    Procedure: REVISION ARTHROPLASTY KNEE- Liner Replacement - dirty/clean;  Surgeon: Jelani Tello II, MD;  Location: [facility] OR;  Service: Orthopedics;  Laterality: Right;  dirty to clean at 1940    Right ankle  tendon repair      ROBOT-ASSISTED REPAIR OF INCISIONAL HERNIA USING DA GARETH XI Left 6/13/2022    Procedure: XI ROBOTIC REPAIR, HERNIA, INCISIONAL (LEFT SIDE SPIGELIAN WITH MESH);  Surgeon: Rosendo Marti MD;  Location: [facility] OR [address] [place] [address];  Service: General;  Laterality: Left;  consent in am    ROTATOR CUFF REPAIR      right    TOTAL KNEE ARTHROPLASTY Right 5/29/2018    Procedure: REPLACEMENT-KNEE-TOTAL-pro;  Surgeon: Denzel Dallas MD;  Location: [facility] OR [address] [place] [address];  Service: Orthopedics;  Laterality: Right;  Pro    TREATMENT OF CARDIAC ARRHYTHMIA  6/15/2020    Procedure: Cardioversion or Defibrillation;  Surgeon: Wyatt Beatty MD;  Location: [facility] EP LAB;  Service: Cardiology;;    TREATMENT OF CARDIAC ARRHYTHMIA N/A 2/28/2024    Procedure: Cardioversion or Defibrillation;  Surgeon: Pao Hare MD;  Location: [facility];  Service: Cardiology;  Laterality: N/A;       Social History     Socioeconomic History    Marital status:     Number of children: 5   Occupational History    Occupation: retired anesthesiology     Employer: [employer]    Occupation: [employer] grad     Comment: previous football player   Tobacco Use    Smoking status: Never     Passive exposure: Never    Smokeless tobacco: Never    Tobacco comments:     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    Substance and Sexual Activity    Alcohol use: No    Drug use: No    Sexual activity: Yes     Partners: Female     Social Determinants of Health     Financial Resource Strain: Low Risk  (12/10/2023)    Overall Financial Resource Strain (CARDIA)     Difficulty of Paying Living Expenses: Not hard at all   Food Insecurity: Unknown (12/10/2023)    Hunger Vital Sign     Ran Out of Food in the Last Year: Never true   Transportation Needs: No Transportation Needs (12/10/2023)    PRAPARE - Transportation     Lack of Transportation (Medical): No     Lack of Transportation (Non-Medical): No   Housing Stability: Unknown (12/10/2023)     "Housing Stability Vital Sign     Unable to Pay for Housing in the Last Year: No     Number of Places Lived in the Last Year: 1         CBC: No results for input(s): "WBC", "RBC", "HGB", "HCT", "PLT", "MCV", "MCH", "MCHC" in the last 72 hours.    CMP: No results for input(s): "NA", "K", "CL", "CO2", "BUN", "CREATININE", "GLU", "MG", "PHOS", "CALCIUM", "ALBUMIN", "PROT", "ALKPHOS", "ALT", "AST", "BILITOT" in the last 72 hours.    INR  No results for input(s): "PT", "INR", "PROTIME", "APTT" in the last 72 hours.            2D Echo:  No results found. However, due to the size of the patient record, not all encounters were searched. Please check Results Review for a complete set of results.        Pre-op Assessment    I have reviewed the Patient Summary Reports.     I have reviewed the Nursing Notes.    I have reviewed the Medications.     Review of Systems  Anesthesia Hx:  No problems with previous Anesthesia                Hematology/Oncology:  Hematology Normal   Oncology Normal                                   EENT/Dental:  EENT/Dental Normal           Cardiovascular:  Cardiovascular Normal                                            Pulmonary:  Pulmonary Normal                       Renal/:  Renal/ Normal                 Hepatic/GI:  Hepatic/GI Normal                 Musculoskeletal:  Musculoskeletal Normal                Neurological:  Neurology Normal                                      Endocrine:  Endocrine Normal            Dermatological:  Skin Normal    Psych:  Psychiatric Normal                    Physical Exam  General: Well nourished    Airway:  Mallampati: II   Mouth Opening: Normal  TM Distance: Normal  Tongue: Normal  Neck ROM: Normal ROM    Dental:  Intact    Chest/Lungs:  Clear to auscultation, Normal Respiratory Rate    Heart:  Rate: Normal        Anesthesia Plan  Type of Anesthesia, risks & benefits discussed:    Anesthesia Type: Gen Natural Airway  Intra-op Monitoring Plan: Standard ASA " Monitors  Post Op Pain Control Plan: multimodal analgesia  Induction:  IV  Informed Consent: Informed consent signed with the Patient and all parties understand the risks and agree with anesthesia plan.  All questions answered.   ASA Score: 3  Anesthesia Plan Notes: Plan for general natural airway, discussed anesthetic risks, questions answered    Ready For Surgery From Anesthesia Perspective.     .

## 2024-04-23 ENCOUNTER — ANESTHESIA (OUTPATIENT)
Dept: MEDSURG UNIT | Facility: HOSPITAL | Age: 79
End: 2024-04-23
Payer: MEDICARE

## 2024-04-23 ENCOUNTER — HOSPITAL ENCOUNTER (OUTPATIENT)
Facility: HOSPITAL | Age: 79
Discharge: HOME | End: 2024-04-23
Attending: INTERNAL MEDICINE | Admitting: INTERNAL MEDICINE
Payer: MEDICARE

## 2024-04-23 ENCOUNTER — HOSPITAL ENCOUNTER (OUTPATIENT)
Dept: CARDIOLOGY | Facility: HOSPITAL | Age: 79
Discharge: HOME | End: 2024-04-23
Attending: INTERNAL MEDICINE
Payer: MEDICARE

## 2024-04-23 ENCOUNTER — PATIENT MESSAGE (OUTPATIENT)
Dept: CARDIOLOGY | Facility: CLINIC | Age: 79
End: 2024-04-23
Payer: MEDICARE

## 2024-04-23 VITALS
WEIGHT: 173 LBS | SYSTOLIC BLOOD PRESSURE: 191 MMHG | DIASTOLIC BLOOD PRESSURE: 97 MMHG | BODY MASS INDEX: 24.22 KG/M2 | HEIGHT: 71 IN

## 2024-04-23 VITALS
RESPIRATION RATE: 23 BRPM | DIASTOLIC BLOOD PRESSURE: 79 MMHG | TEMPERATURE: 98 F | HEIGHT: 71 IN | HEART RATE: 54 BPM | SYSTOLIC BLOOD PRESSURE: 173 MMHG | BODY MASS INDEX: 23.94 KG/M2 | OXYGEN SATURATION: 96 % | WEIGHT: 171 LBS

## 2024-04-23 DIAGNOSIS — I34.0 NONRHEUMATIC MITRAL VALVE REGURGITATION: Primary | ICD-10-CM

## 2024-04-23 DIAGNOSIS — Z01.89 ENCOUNTER FOR OTHER SPECIFIED SPECIAL EXAMINATIONS: ICD-10-CM

## 2024-04-23 DIAGNOSIS — I50.32 CHRONIC HEART FAILURE WITH PRESERVED EJECTION FRACTION (HFPEF): ICD-10-CM

## 2024-04-23 DIAGNOSIS — I34.0 SEVERE MITRAL REGURGITATION: ICD-10-CM

## 2024-04-23 LAB
OHS QRS DURATION: 110 MS
OHS QTC CALCULATION: 431 MS

## 2024-04-23 PROCEDURE — 63600175 PHARM REV CODE 636 W HCPCS: Mod: TXP | Performed by: NURSE ANESTHETIST, CERTIFIED REGISTERED

## 2024-04-23 PROCEDURE — 37000008 HC ANESTHESIA 1ST 15 MINUTES: Mod: NTX

## 2024-04-23 PROCEDURE — D9220A PRA ANESTHESIA: Mod: ANES,NTX,, | Performed by: ANESTHESIOLOGY

## 2024-04-23 PROCEDURE — 37000009 HC ANESTHESIA EA ADD 15 MINS: Mod: NTX

## 2024-04-23 PROCEDURE — 93010 ELECTROCARDIOGRAM REPORT: CPT | Mod: NTX,,, | Performed by: INTERNAL MEDICINE

## 2024-04-23 PROCEDURE — 93312 ECHO TRANSESOPHAGEAL: CPT | Mod: 26,NTX,, | Performed by: INTERNAL MEDICINE

## 2024-04-23 PROCEDURE — D9220A PRA ANESTHESIA: Mod: CRNA,NTX,, | Performed by: NURSE ANESTHETIST, CERTIFIED REGISTERED

## 2024-04-23 PROCEDURE — 25000003 PHARM REV CODE 250: Mod: NTX | Performed by: NURSE ANESTHETIST, CERTIFIED REGISTERED

## 2024-04-23 PROCEDURE — 93005 ELECTROCARDIOGRAM TRACING: CPT | Mod: 59,NTX | Performed by: INTERNAL MEDICINE

## 2024-04-23 PROCEDURE — 93320 DOPPLER ECHO COMPLETE: CPT | Mod: 26,NTX,, | Performed by: INTERNAL MEDICINE

## 2024-04-23 PROCEDURE — 93325 DOPPLER ECHO COLOR FLOW MAPG: CPT | Mod: 26,NTX,, | Performed by: INTERNAL MEDICINE

## 2024-04-23 PROCEDURE — 93320 DOPPLER ECHO COMPLETE: CPT | Mod: TXP

## 2024-04-23 RX ORDER — SODIUM CHLORIDE 0.9 % (FLUSH) 0.9 %
10 SYRINGE (ML) INJECTION
Status: DISCONTINUED | OUTPATIENT
Start: 2024-04-23 | End: 2024-04-23 | Stop reason: HOSPADM

## 2024-04-23 RX ORDER — ONDANSETRON HYDROCHLORIDE 2 MG/ML
4 INJECTION, SOLUTION INTRAVENOUS DAILY PRN
Status: DISCONTINUED | OUTPATIENT
Start: 2024-04-23 | End: 2024-04-23 | Stop reason: HOSPADM

## 2024-04-23 RX ORDER — PROPOFOL 10 MG/ML
VIAL (ML) INTRAVENOUS CONTINUOUS PRN
Status: DISCONTINUED | OUTPATIENT
Start: 2024-04-23 | End: 2024-04-23

## 2024-04-23 RX ORDER — PROPOFOL 10 MG/ML
VIAL (ML) INTRAVENOUS
Status: DISCONTINUED | OUTPATIENT
Start: 2024-04-23 | End: 2024-04-23

## 2024-04-23 RX ORDER — LIDOCAINE HYDROCHLORIDE 20 MG/ML
INJECTION INTRAVENOUS
Status: DISCONTINUED | OUTPATIENT
Start: 2024-04-23 | End: 2024-04-23

## 2024-04-23 RX ADMIN — PROPOFOL 50 MG: 10 INJECTION, EMULSION INTRAVENOUS at 09:04

## 2024-04-23 RX ADMIN — PROPOFOL 20 MG: 10 INJECTION, EMULSION INTRAVENOUS at 09:04

## 2024-04-23 RX ADMIN — LIDOCAINE HYDROCHLORIDE 100 MG: 20 INJECTION INTRAVENOUS at 09:04

## 2024-04-23 RX ADMIN — SODIUM CHLORIDE: 0.9 INJECTION, SOLUTION INTRAVENOUS at 09:04

## 2024-04-23 RX ADMIN — PROPOFOL 30 MG: 10 INJECTION, EMULSION INTRAVENOUS at 09:04

## 2024-04-23 RX ADMIN — PROPOFOL 100 MCG/KG/MIN: 10 INJECTION, EMULSION INTRAVENOUS at 09:04

## 2024-04-23 RX ADMIN — PROPOFOL 10 MG: 10 INJECTION, EMULSION INTRAVENOUS at 09:04

## 2024-04-23 NOTE — H&P
[facility]  PEPE History and Physical      Dominick Song MD  YOB: 1945  Medical Record Number:  [id number]  Attending Physician:  Tony Duvall MD   Date of Admission: 4/23/2024       Hospital Day:  0  Current Principal Problem: Mitral regurgitation      History     Cc: PEPE for MR    HPI  Patient of Dr. Duvall. He also follows with Dr. Beatty for EP and recently saw Dr. Hare for LAAO.      PMHx: Atrial fibrillation status post multiple ablations currently on propafenone 425 b.i.d. and carvedilol 6.25mg bid, status post DCCV 02/28/2024; History of TIA December 2019 - was off apixaban, Chronic HFpEF on torsemide PRN, Mitral regurgitation, Hypertension, CKD 3, Bleeding peptic ulcer, Chronic anemia with hemoglobin 9-10 - +occult blood test, Dual-chamber pacemaker, idiopathic pulmonary fibrosis.     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 He has chronic anemia with a positive stool occult blood. He is chronically on eliquis for his PAF with a CHADSVASC 6 and HASBLED 4. While undergoing some workup for occluder device his repeat echo showed a normal LVEF, mod-severe MR  with a posterior eccentric jet, dilated IVC. After doing some research he was interested obtaining more information about mitral clip and LAAO occluder devices.     He and his wife both admit that although he gets tired after several hours of yard work he does not feel significantly limited. He denies any chest pain, sob, orthopnea, syncope, claudication, palpitations. Admits chronic bl lower extremity edema for which he is taking torsemide 20mg as needed. He also admits that in terms of diet he does season his food with additional salt.     Today, here for PEPE. No acute complaints. Wife at bedside.    Anticoagulant/antiplatelets: eliquis  Platelet count: 292  INR: 1.2    History of stroke:  TIA in 2019  Dysphagia or odynophagia:  no  Liver Disease, esophageal disease, or known varices:   no  Upper GI Bleeding:  no (hx of bleeding peptic ulcer but denies current bleeding)  Snoring:  no   Sleep Apnea:  no  Prior neck surgery or radiation:  no  History of anesthetic difficulties:  no  Family history of anesthetic difficulties:  no  Last oral intake: last pm   Able to move neck in all directions:  yes  Use of GLP-1:  no        Medications - Outpatient  Prior to Admission medications    Medication Sig Start Date End Date Taking? Authorizing Provider   alfuzosin (UROXATRAL) 10 mg Tb24 Take 1 tablet (10 mg total) by mouth daily with breakfast. 8/30/23 8/29/24 Yes Yakov Shetty MD   buPROPion (WELLBUTRIN XL) 300 MG 24 hr tablet Take 1 tablet (300 mg total) by mouth once daily. 6/21/23  Yes Maxi Gaines MD   carvediloL (COREG) 6.25 MG tablet Take 6.25 mg by mouth 2 (two) times daily. 12/7/23  Yes Provider, Historical   cyclobenzaprine (FLEXERIL) 10 MG tablet Take 1 tablet (10 mg total) by mouth 3 (three) times daily as needed for Muscle spasms. 4/17/24  Yes Maxi Gaines MD   ELIQUIS 5 mg Tab Take 1 tablet (5 mg total) by mouth 2 (two) times a day. 9/25/23  Yes Sidney Samaniego MD   EScitalopram oxalate (LEXAPRO) 10 MG tablet TAKE 1 TABLET(10 MG) BY MOUTH EVERY DAY 1/17/24  Yes Maxi Gaines MD   iron-vit c-b12-folic acid (ICAR-C PLUS) Tab Take 1 tablet by mouth once daily. 3/12/24 3/12/25 Yes Maxi Gaines MD   levothyroxine (SYNTHROID) 75 MCG tablet Take 1 tablet (75 mcg total) by mouth before breakfast. 4/20/23  Yes Maxi Gaines MD   OMEGA-3 FATTY ACIDS (FISH OIL CONCENTRATE ORAL) Take 1 capsule by mouth Daily. 1/18/12  Yes Provider, Historical   potassium chloride (MICRO-K) 10 MEQ CpSR Take 1 capsule (10 mEq total) by mouth every Mon, Wed, Fri. 10/9/23  Yes Pan Moss MD   propafenone (RYTHMOL SR) 425 MG Cp12 Take 1 capsule (425 mg total) by mouth every 12 (twelve) hours. 2/28/24 2/27/25 Yes Myriam Curry, NP   telmisartan (MICARDIS) 40 MG Tab  Take 1 tablet (40 mg total) by mouth once daily. 10/13/23 10/12/24 Yes Maxi Gaines MD   tiZANidine (ZANAFLEX) 4 MG tablet Take 1 tablet (4 mg total) by mouth 3 (three) times daily as needed. 4/10/24  Yes Jelani Tello II, MD   torsemide (DEMADEX) 20 MG Tab Take 1 tablet (20 mg total) by mouth every Mon, Wed, Fri. 10/9/23 10/8/24 Yes Pan Moss MD         Medications - Current  Scheduled Meds:  Current Facility-Administered Medications   Medication Dose Route Frequency     Continuous Infusions:  Current Facility-Administered Medications   Medication Dose Route Frequency Last Rate Last Admin     PRN Meds:.  Current Facility-Administered Medications:     sodium chloride 0.9%, 10 mL, Intravenous, PRN      Allergies  Review of patient's allergies indicates:   Allergen Reactions    No known drug allergies          Past Medical History  Past Medical History:   Diagnosis Date    Anticoagulant long-term use     Anxiety     Arthritis     Atrial fibrillation     BPH (benign prostatic hyperplasia)     Cataract     CKD (chronic kidney disease) stage 3, GFR 30-59 ml/min 7/10/2017    Followed by Dr. Jeevan Pond    Colon polyp     benign    Depression     Elevated PSA     Erectile dysfunction     Gastric ulcer with hemorrhage     Hep B w/o coma 1977    History of bleeding peptic ulcer     History of prostatitis     Hypertension     PAF (paroxysmal atrial fibrillation)     Sleep apnea     on CPAP    Stroke     TIA December 2019    Thyroid disease          Past Surgical History  Past Surgical History:   Procedure Laterality Date    A-V CARDIAC PACEMAKER INSERTION Left 9/23/2023    Procedure: Dual Chamber PPM (Heart) Rm 2620;  Surgeon: Pan Moss MD;  Location: [facility];  Service: Cardiology;  Laterality: Left;    ABDOMINAL HERNIA REPAIR      ABLATION OF ARRHYTHMOGENIC FOCUS FOR ATRIAL FIBRILLATION N/A 6/15/2020    Procedure: Ablation atrial fibrillation;  Surgeon: Wyatt Beatty MD;   Location: [facility] EP LAB;  Service: Cardiology;  Laterality: N/A;  PAF, AFl, PEPE, PVI re-do, CTI, RFA, JUSTIN, Gen, SK, 3 Prep    APPLICATION OF WOUND VACUUM-ASSISTED CLOSURE DEVICE Right 12/9/2023    Procedure: APPLICATION, WOUND VAC;  Surgeon: Jelani Tello II, MD;  Location: [facility] OR;  Service: Orthopedics;  Laterality: Right;    CARDIOVERSION N/A 2/28/2024    Procedure: Cardioversion;  Surgeon: Pao Hare MD;  Location: [facility];  Service: Cardiology;  Laterality: N/A;    CATARACT EXTRACTION  11/25/2013    bilateral    CHOLECYSTECTOMY      COLONOSCOPY N/A 11/25/2015    Procedure: COLONOSCOPY;  Surgeon: Toby Hernandez MD;  Location: [facility] ENDO;  Service: Endoscopy;  Laterality: N/A;    COLONOSCOPY N/A 11/13/2020    Procedure: COLONOSCOPY;  Surgeon: Toby Hernandez MD;  Location: [facility] ENDO;  Service: Endoscopy;  Laterality: N/A;    CYSTOSCOPY WITH INSERTION OF MINIMALLY INVASIVE IMPLANT TO ENLARGE PROSTATIC URETHRA N/A 11/28/2022    Procedure: CYSTOSCOPY, WITH INSERTION OF UROLIFT IMPLANT;  Surgeon: Yakov Shetty MD;  Location: [facility] OR;  Service: Urology;  Laterality: N/A;    EYE SURGERY      GASTRIC BYPASS  1992    INSERTION, PACEMAKER, TEMPORARY TRANSVENOUS  9/22/2023    Procedure: Temp pacer insertion ER Rm 8;  Surgeon: Pan Moss MD;  Location: [facility] CATH;  Service: Cardiology;;    INTRAMEDULLARY RODDING OF FEMUR Left 7/18/2020    Procedure: INSERTION, INTRAMEDULLARY ERIC, FEMUR, left, Synthes, Kendall table, Large C arm clock side,;  Surgeon: Tony Rodriguez MD;  Location: [facility] 2ND FLR;  Service: Orthopedics;  Laterality: Left;    IRRIGATION AND DEBRIDEMENT Right 12/9/2023    Procedure: IRRIGATION AND DEBRIDEMENT KNEE;  Surgeon: Jelani Tello II, MD;  Location: [facility] OR;  Service: Orthopedics;  Laterality: Right;    KNEE ARTHROSCOPY      RT    LASIK  2001    both eyes (Dr. Rabago)    ORIF HUMERUS FRACTURE      LT    ORIF HUMERUS FRACTURE Right     non surgical repair    RADIOFREQUENCY  ABLATION      x2    REVISION OF KNEE ARTHROPLASTY Right 12/9/2023    Procedure: REVISION ARTHROPLASTY KNEE- Liner Replacement - dirty/clean;  Surgeon: Jelani Tello II, MD;  Location: [facility];  Service: Orthopedics;  Laterality: Right;  dirty to clean at 1940    Right ankle tendon repair      ROBOT-ASSISTED REPAIR OF INCISIONAL HERNIA USING DA GARETH XI Left 6/13/2022    Procedure: XI ROBOTIC REPAIR, HERNIA, INCISIONAL (LEFT SIDE SPIGELIAN WITH MESH);  Surgeon: Rosendo Marti MD;  Location: [address];  Service: General;  Laterality: Left;  consent in am    ROTATOR CUFF REPAIR      right    TOTAL KNEE ARTHROPLASTY Right 5/29/2018    Procedure: REPLACEMENT-KNEE-TOTAL-axel;  Surgeon: Denzel Dallas MD;  Location: [facility] OR [address] [place] [address];  Service: Orthopedics;  Laterality: Right;  [employer]    TREATMENT OF CARDIAC ARRHYTHMIA  6/15/2020    Procedure: Cardioversion or Defibrillation;  Surgeon: Wyatt Beatty MD;  Location: [facility] EP LAB;  Service: Cardiology;;    TREATMENT OF CARDIAC ARRHYTHMIA N/A 2/28/2024    Procedure: Cardioversion or Defibrillation;  Surgeon: Pao Hare MD;  Location: [facility];  Service: Cardiology;  Laterality: N/A;         Social History  Social History     Socioeconomic History    Marital status:     Number of children: 5   Occupational History    Occupation: retired anesthesiology     Employer: [employer]    Occupation: [employer] grad     Comment: previous football player   Tobacco Use    Smoking status: Never     Passive exposure: Never    Smokeless tobacco: Never    Tobacco comments:     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    Substance and Sexual Activity    Alcohol use: No    Drug use: No    Sexual activity: Yes     Partners: Female     Social Determinants of Health     Financial Resource Strain: Low Risk  (4/22/2024)    Overall Financial Resource Strain (CARDIA)     Difficulty of Paying Living Expenses: Not hard at all   Food Insecurity: No Food Insecurity  "(4/22/2024)    Hunger Vital Sign     Worried About Running Out of Food in the Last Year: Never true     Ran Out of Food in the Last Year: Never true   Transportation Needs: No Transportation Needs (4/22/2024)    PRAPARE - Transportation     Lack of Transportation (Medical): No     Lack of Transportation (Non-Medical): No   Physical Activity: Sufficiently Active (4/22/2024)    Exercise Vital Sign     Days of Exercise per Week: 5 days     Minutes of Exercise per Session: 130 min   Stress: No Stress Concern Present (4/22/2024)    [facility] of Occupational Health - Occupational Stress Questionnaire     Feeling of Stress : Only a little   Social Connections: Unknown (4/22/2024)    Social Connection and Isolation Panel [NHANES]     Frequency of Communication with Friends and Family: More than three times a week     Frequency of Social Gatherings with Friends and Family: Twice a week     Active Member of Clubs or Organizations: No     Attends Club or Organization Meetings: Never     Marital Status:    Housing Stability: Unknown (12/10/2023)    Housing Stability Vital Sign     Unable to Pay for Housing in the Last Year: No     Number of Places Lived in the Last Year: 1         ROS  10 point ROS performed and negative except as stated in HPI     Physical Examination     Vital Signs  24 Hour VS Range           Physical Exam:   Constitutional: no acute distress  HEENT: NCAT, EOMI, no scleral icterus  Cardiovascular: Regular rate and rhythm. Murmur heard  Pulmonary: Normal respiratory effort. Rales present b/l  Abdomen: nontender, non-distended   Neuro: alert and oriented, no focal deficits  Extremities: warm, 2+ pitting edema BLE   MSK: no deformities  Integument: intact, no rashes     Data       No results for input(s): "WBC", "HGB", "HCT", "PLT" in the last 168 hours.     No results for input(s): "PROTIME", "INR" in the last 168 hours.     No results for input(s): "NA", "K", "CL", "CO2", "BUN", "CREATININE", " ""ANIONGAP", "CALCIUM" in the last 168 hours.     No results for input(s): "PROT", "ALBUMIN", "BILITOT", "ALKPHOS", "AST", "ALT" in the last 168 hours.     No results for input(s): "TROPONINI" in the last 168 hours.     No results found for: "BNP"    No results for input(s): "LABBLOO" in the last 168 hours.         Assessment & Plan     #Mitral regurgitation  - proceed with PEPE to evaluate better his mitral valve (anterior leaflet not fully visualized on surface echo.)    TTE 3/14/24:    Left Ventricle: The left ventricle is normal in size. There is mild concentric hypertrophy. There is normal systolic function with a visually estimated ejection fraction of 55 - 60%. Diastolic function cannot be reliably determined in the presence of mitral valve disease.    Right Ventricle: Mild right ventricular enlargement. Systolic function is normal.    Left Atrium: Left atrium is severely dilated.    Aortic Valve: The aortic valve is a trileaflet valve. There is mild aortic regurgitation.    Mitral Valve: There is posterior leaflet tethering. There is severe (3+), functional regurgitation with a posterolateral eccentriccally directed jet. Systolic blunting of the pulmonary veins. The mean pressure gradient across the mitral valve is 2 mmHg at a heart rate of 88 bpm.    Tricuspid Valve: There is mild regurgitation.    Aorta: Aortic root is mildly dilated measuring 3.72 cm.    Pulmonary Artery: No pulmonary hypertension. The estimated pulmonary artery systolic pressure is 34 mmHg.    IVC/SVC: Normal venous pressure at 3 mmHg.      -No absolute contraindications of esophageal stricture, tumor, perforation, laceration,or diverticulum and/or active GI bleed.  -The risks, benefits & alternatives of the procedure were explained to the patient.   -The risks of transesophageal echo include but are not limited to:  Dental trauma, esophageal trauma/perforation, bleeding, laryngospasm/brochospasm, aspiration, sore throat/hoarseness, & " dislodgement of the endotracheal tube/nasogastric tube (where applicable).  -The risks of moderate sedation include hypotension, respiratory depression, arrhythmias, bronchospasm, & death.    -Informed consent was obtained. The patient is agreeable to proceed with the procedure and all questions and concerns addressed.    Case was discussed with an attending physician in echocardiography lab prior to procedure.    Yuli Macdonald PA-C  [facility]

## 2024-04-23 NOTE — NURSING
Discharge instructions and medlist given.  Patient and spouse verbalized understanding.  Off unit via wheelchair with PCT and spouse who will drive him home.

## 2024-04-23 NOTE — DISCHARGE SUMMARY
[facility]  Cardiology  Discharge Summary      Patient Name: Dominick Song MD  MRN: [id number]  Admission Date: 4/23/2024  Hospital Length of Stay: 0 days  Discharge Date and Time:  04/23/2024 10:50 AM  Attending Physician: Tony Duvall MD    Discharging Provider: Yuli Macdonald PA-C  Primary Care Physician: Maxi Gaines MD    HPI:   Patient of Dr. Duvall. He also follows with Dr. Beatty for EP and recently saw Dr. Hare for LAAO.      PMHx: Atrial fibrillation status post multiple ablations currently on propafenone 425 b.i.d. and carvedilol 6.25mg bid, status post DCCV 02/28/2024; History of TIA December 2019 - was off apixaban, Chronic HFpEF on torsemide PRN, Mitral regurgitation, Hypertension, CKD 3, Bleeding peptic ulcer, Chronic anemia with hemoglobin 9-10 - +occult blood test, Dual-chamber pacemaker, idiopathic pulmonary fibrosis.     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 He has chronic anemia with a positive stool occult blood. He is chronically on eliquis for his PAF with a CHADSVASC 6 and HASBLED 4. While undergoing some workup for occluder device his repeat echo showed a normal LVEF, mod-severe MR  with a posterior eccentric jet, dilated IVC. After doing some research he was interested obtaining more information about mitral clip and LAAO occluder devices.     He and his wife both admit that although he gets tired after several hours of yard work he does not feel significantly limited. He denies any chest pain, sob, orthopnea, syncope, claudication, palpitations. Admits chronic bl lower extremity edema for which he is taking torsemide 20mg as needed. He also admits that in terms of diet he does season his food with additional salt.      Today, here for PEPE. No acute complaints. Wife at bedside.      Procedure(s) (LRB):  ECHOCARDIOGRAM, TRANSESOPHAGEAL (N/A)       Indwelling Lines/Drains at time of discharge:  none      Hospital Course:  Successful PEPE. Patient  tolerated the procedure without any acute complications. Plan to continue all home medications as prescribed. Instructed to follow up with Dr. Duvall outpatient.   Patient was assessed at bedside prior to discharge, they reported feeling well and denied chest discomfort, shortness of breath, palpitations, lightheadedness, or any other acute symptoms. Discharge instructions were discussed with patient and all questions were answered. Patient was discharged home in stable condition.        Goals of Care Treatment Preferences:  Code Status: Full Code    Living Will: Yes            Significant Diagnostic Studies: PEPE - final report pending     Pending Diagnostic Studies:       None            There are no hospital problems to display for this patient.    No new Assessment & Plan notes have been filed under this hospital service since the last note was generated.  Service: Cardiology      Discharged Condition: stable    Disposition: Home or Self Care    Follow Up: with Dr. Duvall as scheduled.    Patient Instructions:   No discharge procedures on file.  Medications:  Reconciled Home Medications:      Medication List        CONTINUE taking these medications      alfuzosin 10 mg Tb24  Commonly known as: UROXATRAL  Take 1 tablet (10 mg total) by mouth daily with breakfast.     buPROPion 300 MG 24 hr tablet  Commonly known as: WELLBUTRIN XL  Take 1 tablet (300 mg total) by mouth once daily.     carvediloL 6.25 MG tablet  Commonly known as: COREG  Take 6.25 mg by mouth 2 (two) times daily.     cyclobenzaprine 10 MG tablet  Commonly known as: FLEXERIL  Take 1 tablet (10 mg total) by mouth 3 (three) times daily as needed for Muscle spasms.     ELIQUIS 5 mg Tab  Generic drug: apixaban  Take 1 tablet (5 mg total) by mouth 2 (two) times a day.     EScitalopram oxalate 10 MG tablet  Commonly known as: LEXAPRO  TAKE 1 TABLET(10 MG) BY MOUTH EVERY DAY     FISH OIL CONCENTRATE ORAL  Take 1 capsule by mouth Daily.     ICAR-C PLUS  Tab  Generic drug: iron-vit c-b12-folic acid  Take 1 tablet by mouth once daily.     levothyroxine 75 MCG tablet  Commonly known as: SYNTHROID  Take 1 tablet (75 mcg total) by mouth before breakfast.     potassium chloride 10 MEQ Cpsr  Commonly known as: MICRO-K  Take 1 capsule (10 mEq total) by mouth every Mon, Wed, Fri.     propafenone 425 MG Cp12  Commonly known as: RYTHMOL SR  Take 1 capsule (425 mg total) by mouth every 12 (twelve) hours.     telmisartan 40 MG Tab  Commonly known as: MICARDIS  Take 1 tablet (40 mg total) by mouth once daily.     tiZANidine 4 MG tablet  Commonly known as: ZANAFLEX  Take 1 tablet (4 mg total) by mouth 3 (three) times daily as needed.     torsemide 20 MG Tab  Commonly known as: DEMADEX  Take 1 tablet (20 mg total) by mouth every Mon, Wed, Fri.              Time spent on the discharge of patient: 35 minutes    Yuli Macdonald PA-C  Cardiology  [facility]

## 2024-04-23 NOTE — TRANSFER OF CARE
Anesthesia Transfer of Care Note    Patient: Dominick Song MD    Procedure(s) Performed: Procedure(s) (LRB):  ECHOCARDIOGRAM, TRANSESOPHAGEAL (N/A)    Patient location: PACU    Anesthesia Type: general    Transport from OR: Transported from OR on room air with adequate spontaneous ventilation    Post pain: adequate analgesia    Post assessment: no apparent anesthetic complications    Post vital signs: stable    Level of consciousness: awake and alert    Nausea/Vomiting: no nausea/vomiting    Complications: none    Transfer of care protocol was followed      Last vitals: There were no vitals taken for this visit.

## 2024-04-23 NOTE — DISCHARGE INSTRUCTIONS
Medications:  -Continue to take your home medications as listed on your medication list after you are discharged.    Follow up: in clinic with Dr. Duvall as scheduled.    Diet  -You may resume oral intake after you are discharged, as long you have no swallowing difficulties.    Because you have received sedation for this procedure:  -Limit activity for the remainder of the day.  -Do not smoke for at least 6 hours and until you are fully awake and alert.  -Do not drink alcoholic beverage for 24 hours.  -Do not drive for 24 hours.  -Defer important decision making until the following day.     Go to the Emergency Department if you develop:   -Bleeding  -Weakness or numbness  -Visual, gait or speech disturbance  -New chest pain, palpitations, shortness of breath, rapid heart beat, or fainting  -Fever

## 2024-04-23 NOTE — HOSPITAL COURSE
Successful PEPE. Patient tolerated the procedure without any acute complications. Plan to continue all home medications as prescribed. Instructed to follow up with Dr. Duvall outpatient.   Patient was assessed at bedside prior to discharge, they reported feeling well and denied chest discomfort, shortness of breath, palpitations, lightheadedness, or any other acute symptoms. Discharge instructions were discussed with patient and all questions were answered. Patient was discharged home in stable condition.

## 2024-04-23 NOTE — ANESTHESIA POSTPROCEDURE EVALUATION
Anesthesia Post Evaluation    Patient: Dominick Song MD    Procedure(s) Performed: Procedure(s) (LRB):  ECHOCARDIOGRAM, TRANSESOPHAGEAL (N/A)    Final Anesthesia Type: general      Patient location during evaluation: PACU  Patient participation: Yes- Able to Participate  Level of consciousness: awake and alert  Post-procedure vital signs: reviewed and stable  Pain management: adequate  Airway patency: patent    PONV status at discharge: No PONV  Anesthetic complications: no      Cardiovascular status: blood pressure returned to baseline  Respiratory status: unassisted  Hydration status: euvolemic  Follow-up not needed.              Vitals Value Taken Time   /79 04/23/24 1057   Temp 36.7 °C (98.1 °F) 04/23/24 1000   Pulse 56 04/23/24 1059   Resp 23 04/23/24 1057   SpO2 97 % 04/23/24 1058   Vitals shown include unfiled device data.      No case tracking events are documented in the log.      Pain/Pari Score: No data recorded

## 2024-04-23 NOTE — NURSING
Pt transferred from procedure via stretcher.  Vss.  Post op orders and assessment initiated.  Pt aao, tolerating po.  Family called to bs.  Pt in no acute distress and verbalizes no complaints.

## 2024-04-24 ENCOUNTER — CLINICAL SUPPORT (OUTPATIENT)
Dept: CARDIOLOGY | Facility: HOSPITAL | Age: 79
End: 2024-04-24
Payer: MEDICARE

## 2024-04-24 ENCOUNTER — HOSPITAL ENCOUNTER (OUTPATIENT)
Dept: CARDIOLOGY | Facility: HOSPITAL | Age: 79
Discharge: HOME | End: 2024-04-24
Attending: INTERNAL MEDICINE
Payer: MEDICARE

## 2024-04-24 ENCOUNTER — OFFICE VISIT (OUTPATIENT)
Dept: NEPHROLOGY | Facility: CLINIC | Age: 79
End: 2024-04-24
Payer: MEDICARE

## 2024-04-24 VITALS
BODY MASS INDEX: 24.23 KG/M2 | SYSTOLIC BLOOD PRESSURE: 128 MMHG | HEIGHT: 71 IN | HEART RATE: 68 BPM | OXYGEN SATURATION: 96 % | WEIGHT: 173.06 LBS | DIASTOLIC BLOOD PRESSURE: 64 MMHG

## 2024-04-24 DIAGNOSIS — R00.1 BRADYCARDIA, UNSPECIFIED: ICD-10-CM

## 2024-04-24 DIAGNOSIS — N18.31 CHRONIC KIDNEY DISEASE, STAGE 3A: Primary | ICD-10-CM

## 2024-04-24 LAB
AORTIC ROOT ANNULUS: 2.2 CM
BSA FOR ECHO PROCEDURE: 1.98 M2
SINUS: 3.8 CM
STJ: 3 CM

## 2024-04-24 PROCEDURE — 99214 OFFICE O/P EST MOD 30 MIN: CPT | Mod: S$GLB,,, | Performed by: INTERNAL MEDICINE

## 2024-04-24 PROCEDURE — 93294 REM INTERROG EVL PM/LDLS PM: CPT | Mod: NTX,,, | Performed by: INTERNAL MEDICINE

## 2024-04-24 PROCEDURE — 93296 REM INTERROG EVL PM/IDS: CPT | Mod: PO,NTX | Performed by: INTERNAL MEDICINE

## 2024-04-24 PROCEDURE — 99999 PR PBB SHADOW E&M-EST. PATIENT-LVL III: CPT | Mod: PBBFAC,,, | Performed by: INTERNAL MEDICINE

## 2024-04-24 PROCEDURE — 1159F MED LIST DOCD IN RCRD: CPT | Mod: CPTII,S$GLB,, | Performed by: INTERNAL MEDICINE

## 2024-04-24 PROCEDURE — 1160F RVW MEDS BY RX/DR IN RCRD: CPT | Mod: CPTII,S$GLB,, | Performed by: INTERNAL MEDICINE

## 2024-04-24 PROCEDURE — 3288F FALL RISK ASSESSMENT DOCD: CPT | Mod: CPTII,S$GLB,, | Performed by: INTERNAL MEDICINE

## 2024-04-24 PROCEDURE — 1157F ADVNC CARE PLAN IN RCRD: CPT | Mod: CPTII,S$GLB,, | Performed by: INTERNAL MEDICINE

## 2024-04-24 PROCEDURE — 3078F DIAST BP <80 MM HG: CPT | Mod: CPTII,S$GLB,, | Performed by: INTERNAL MEDICINE

## 2024-04-24 PROCEDURE — 3074F SYST BP LT 130 MM HG: CPT | Mod: CPTII,S$GLB,, | Performed by: INTERNAL MEDICINE

## 2024-04-24 PROCEDURE — 1101F PT FALLS ASSESS-DOCD LE1/YR: CPT | Mod: CPTII,S$GLB,, | Performed by: INTERNAL MEDICINE

## 2024-04-24 NOTE — PROGRESS NOTES
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    He has been lost to follow-up since 2019.  He has had many cardiac adventures since then.  He had a PEPE yesterday.  He also got reclast recently.  He is being worked up for anemia and heme positive stool.      Review of Systems   Constitutional:  Negative for fever.   Respiratory:  Negative for cough and shortness of breath.    Cardiovascular:  Positive for leg swelling (occasional). Negative for chest pain.   Gastrointestinal:  Negative for abdominal pain.   Genitourinary:  Positive for frequency.   Neurological:  Negative for headaches.   Hematological:  Bruises/bleeds easily.       The past medical, family and social histories were reviewed for this encounter.     /64 (BP Location: Left arm, Patient Position: Sitting)   Pulse 68   Ht 5' 11" (1.803 m)   Wt 78.5 kg (173 lb 1 oz)   SpO2 96%   BMI 24.14 kg/m²     Objective:      Physical Exam  Vitals reviewed.   Constitutional:       General: He is not in acute distress.     Comments: thin   HENT:      Head: Normocephalic and atraumatic.   Cardiovascular:      Rate and Rhythm: Normal rate.      Heart sounds: No murmur heard.  Pulmonary:      Effort: Pulmonary effort is normal.      Breath sounds: Rales (dry B) present.   Abdominal:      General: Abdomen is flat.   Musculoskeletal:      Right lower leg: Edema (trace) present.      Left lower leg: Edema (trace) present.   Skin:     Findings: Bruising present.   Neurological:      Mental Status: He is alert and oriented to person, place, and time.         Assessment:       1. Chronic kidney disease, stage 3a          Lab Results   Component Value Date    CREATININE 1.4 04/01/2024    BUN 24 (H) 04/01/2024     04/01/2024    K 4.4 04/01/2024     (H) 04/01/2024    CO2 20 (L) 04/01/2024     Lab Results   Component Value Date    PTH 94.0 (H) 07/07/2021    " "CALCIUM 8.1 (L) 04/01/2024    PHOS 2.2 (L) 12/13/2023     Lab Results   Component Value Date    HCT 35.3 (L) 04/01/2024     No results found for: "UTPCR"   Plan:   Return to clinic in 6 months.  Labs for next visit include labs per standing orders q 3 months.  Baseline creatinine is 1.3-1.4 since 2014.  Urine sediment is benign.  Renal US shows R 9.3 cm L 8.9 cm.  PTH is 94 with a calcium level of 8.1.  Calcium is low normal likely related to his recent reclast dose.    "

## 2024-04-26 ENCOUNTER — TELEPHONE (OUTPATIENT)
Dept: GASTROENTEROLOGY | Facility: CLINIC | Age: 79
End: 2024-04-26
Payer: MEDICARE

## 2024-04-26 NOTE — TELEPHONE ENCOUNTER
----- Message from Vi Corbin LPN sent at 4/26/2024 10:28 AM CDT -----  Regarding: FW: Needs return call    ----- Message -----  From: Janine Thomas  Sent: 4/26/2024  10:14 AM CDT  To: David CANDELARIA Staff  Subject: Needs return call                                Type: Needs Medical Advice  Who Called:  Pt    Best Call Back Number: [phone]    Additional Information: Pt is needing to speak to someone regarding his endoscopy scheduled. Please advsie

## 2024-04-26 NOTE — TELEPHONE ENCOUNTER
Pt is very angry at me for calling him back regarding his colonoscopy when in reality he wanted the preop nurse to reach out to him for his arrival time. Msg sent to Teams for the Preop Nurse to call him back.

## 2024-04-28 ENCOUNTER — TELEPHONE (OUTPATIENT)
Dept: ELECTROPHYSIOLOGY | Facility: CLINIC | Age: 79
End: 2024-04-28
Payer: MEDICARE

## 2024-04-28 NOTE — TELEPHONE ENCOUNTER
Called patient to reschedule his appointment with Dr. Beatty from 4/29/24. Patient would like to stay seeing him in [place] and would like a call back as soon as we know his first available appointment.

## 2024-04-29 DIAGNOSIS — E03.4 HYPOTHYROIDISM DUE TO ACQUIRED ATROPHY OF THYROID: ICD-10-CM

## 2024-04-29 NOTE — TELEPHONE ENCOUNTER
No care due was identified.  [facility] Embedded Care Due Messages. Reference number: [id number].   4/29/2024 1:16:45 PM CDT

## 2024-04-30 RX ORDER — LEVOTHYROXINE SODIUM 75 UG/1
75 TABLET ORAL
Qty: 90 TABLET | Refills: 1 | Status: SHIPPED | OUTPATIENT
Start: 2024-04-30

## 2024-04-30 NOTE — TELEPHONE ENCOUNTER
Refill Decision Note   Dominick Song  is requesting a refill authorization.  Brief Assessment and Rationale for Refill:  Approve     Medication Therapy Plan:  Reviewed acute care/admission visit notes; No follow up with PCP recommended by acute care provider; Approved per protocol      Extended chart review required: Yes   Comments:     Note composed:5:34 AM 04/30/2024

## 2024-05-01 ENCOUNTER — TELEPHONE (OUTPATIENT)
Dept: ELECTROPHYSIOLOGY | Facility: CLINIC | Age: 79
End: 2024-05-01
Payer: MEDICARE

## 2024-05-01 ENCOUNTER — ANESTHESIA EVENT (OUTPATIENT)
Dept: ENDOSCOPY | Facility: HOSPITAL | Age: 79
End: 2024-05-01
Payer: MEDICARE

## 2024-05-01 ENCOUNTER — HOSPITAL ENCOUNTER (OUTPATIENT)
Facility: HOSPITAL | Age: 79
Discharge: HOME | End: 2024-05-01
Attending: INTERNAL MEDICINE | Admitting: INTERNAL MEDICINE
Payer: MEDICARE

## 2024-05-01 ENCOUNTER — ANESTHESIA (OUTPATIENT)
Dept: ENDOSCOPY | Facility: HOSPITAL | Age: 79
End: 2024-05-01
Payer: MEDICARE

## 2024-05-01 ENCOUNTER — EPISODE CHANGES (OUTPATIENT)
Dept: CARDIOLOGY | Facility: CLINIC | Age: 79
End: 2024-05-01

## 2024-05-01 VITALS
HEIGHT: 73 IN | BODY MASS INDEX: 22.53 KG/M2 | SYSTOLIC BLOOD PRESSURE: 143 MMHG | DIASTOLIC BLOOD PRESSURE: 71 MMHG | OXYGEN SATURATION: 94 % | HEART RATE: 50 BPM | TEMPERATURE: 99 F | RESPIRATION RATE: 16 BRPM | WEIGHT: 170 LBS

## 2024-05-01 DIAGNOSIS — R63.4 WEIGHT LOSS, UNINTENTIONAL: Primary | ICD-10-CM

## 2024-05-01 DIAGNOSIS — R19.5 HEME POSITIVE STOOL: ICD-10-CM

## 2024-05-01 DIAGNOSIS — D62 ACUTE BLOOD LOSS ANEMIA: Primary | ICD-10-CM

## 2024-05-01 PROCEDURE — 43239 EGD BIOPSY SINGLE/MULTIPLE: CPT | Mod: PO,NTX | Performed by: INTERNAL MEDICINE

## 2024-05-01 PROCEDURE — 88305 TISSUE EXAM BY PATHOLOGIST: CPT | Mod: 59,PO,TXP | Performed by: PATHOLOGY

## 2024-05-01 PROCEDURE — 63600175 PHARM REV CODE 636 W HCPCS: Mod: PO,NTX | Performed by: INTERNAL MEDICINE

## 2024-05-01 PROCEDURE — D9220A PRA ANESTHESIA: Mod: CRNA,,, | Performed by: NURSE ANESTHETIST, CERTIFIED REGISTERED

## 2024-05-01 PROCEDURE — 27201028 HC NEEDLE, SCLERO: Mod: PO,NTX | Performed by: INTERNAL MEDICINE

## 2024-05-01 PROCEDURE — 37000008 HC ANESTHESIA 1ST 15 MINUTES: Mod: PO,NTX | Performed by: INTERNAL MEDICINE

## 2024-05-01 PROCEDURE — 63600175 PHARM REV CODE 636 W HCPCS: Mod: PO,TXP | Performed by: NURSE ANESTHETIST, CERTIFIED REGISTERED

## 2024-05-01 PROCEDURE — 43239 EGD BIOPSY SINGLE/MULTIPLE: CPT | Mod: 51,NTX,, | Performed by: INTERNAL MEDICINE

## 2024-05-01 PROCEDURE — 37000009 HC ANESTHESIA EA ADD 15 MINS: Mod: PO,TXP | Performed by: INTERNAL MEDICINE

## 2024-05-01 PROCEDURE — 45385 COLONOSCOPY W/LESION REMOVAL: CPT | Mod: NTX,,, | Performed by: INTERNAL MEDICINE

## 2024-05-01 PROCEDURE — D9220A PRA ANESTHESIA: Mod: ANES,,, | Performed by: ANESTHESIOLOGY

## 2024-05-01 PROCEDURE — 27201012 HC FORCEPS, HOT/COLD, DISP: Mod: PO,NTX | Performed by: INTERNAL MEDICINE

## 2024-05-01 PROCEDURE — 45385 COLONOSCOPY W/LESION REMOVAL: CPT | Mod: PO,TXP | Performed by: INTERNAL MEDICINE

## 2024-05-01 PROCEDURE — 25000003 PHARM REV CODE 250: Mod: PO,NTX | Performed by: NURSE ANESTHETIST, CERTIFIED REGISTERED

## 2024-05-01 PROCEDURE — 88305 TISSUE EXAM BY PATHOLOGIST: CPT | Mod: 26,NTX,, | Performed by: PATHOLOGY

## 2024-05-01 RX ORDER — SODIUM CHLORIDE 0.9 % (FLUSH) 0.9 %
10 SYRINGE (ML) INJECTION
Status: DISCONTINUED | OUTPATIENT
Start: 2024-05-01 | End: 2024-05-01 | Stop reason: HOSPADM

## 2024-05-01 RX ORDER — PROPOFOL 10 MG/ML
INJECTION, EMULSION INTRAVENOUS
Status: DISCONTINUED | OUTPATIENT
Start: 2024-05-01 | End: 2024-05-01

## 2024-05-01 RX ORDER — LIDOCAINE HYDROCHLORIDE 20 MG/ML
INJECTION INTRAVENOUS
Status: DISCONTINUED | OUTPATIENT
Start: 2024-05-01 | End: 2024-05-01

## 2024-05-01 RX ORDER — SODIUM CHLORIDE, SODIUM LACTATE, POTASSIUM CHLORIDE, CALCIUM CHLORIDE 600; 310; 30; 20 MG/100ML; MG/100ML; MG/100ML; MG/100ML
INJECTION, SOLUTION INTRAVENOUS CONTINUOUS
Status: DISCONTINUED | OUTPATIENT
Start: 2024-05-01 | End: 2024-05-01 | Stop reason: HOSPADM

## 2024-05-01 RX ADMIN — PROPOFOL 25 MG: 10 INJECTION, EMULSION INTRAVENOUS at 07:05

## 2024-05-01 RX ADMIN — PROPOFOL 25 MG: 10 INJECTION, EMULSION INTRAVENOUS at 08:05

## 2024-05-01 RX ADMIN — PROPOFOL 50 MG: 10 INJECTION, EMULSION INTRAVENOUS at 07:05

## 2024-05-01 RX ADMIN — SODIUM CHLORIDE, POTASSIUM CHLORIDE, SODIUM LACTATE AND CALCIUM CHLORIDE: 600; 310; 30; 20 INJECTION, SOLUTION INTRAVENOUS at 07:05

## 2024-05-01 RX ADMIN — LIDOCAINE HYDROCHLORIDE 75 MG: 20 INJECTION INTRAVENOUS at 07:05

## 2024-05-01 NOTE — TELEPHONE ENCOUNTER
Called patient to reschedule his appointment with Dr. Beatty. Patient did not answer. I left a message with my call back number.

## 2024-05-01 NOTE — H&P
History & Physical - Short Stay  Gastroenterology      SUBJECTIVE:     Procedure: Colonoscopy and EGD    Chief Complaint/Indication for Procedure: Rectal Bleeding and Iron Deficiency Anemia    Current Facility-Administered Medications   Medication Dose Route Frequency Provider Last Rate Last Admin    lactated ringers infusion   Intravenous Continuous Toby Hernandez MD        sodium chloride 0.9% flush 10 mL  10 mL Intravenous PRN Toby Hernandez MD           Review of patient's allergies indicates:   Allergen Reactions    No known drug allergies         Past Medical History:   Diagnosis Date    Anticoagulant long-term use     Anxiety     Arthritis     Atrial fibrillation     BPH (benign prostatic hyperplasia)     Cataract     CKD (chronic kidney disease) stage 3, GFR 30-59 ml/min 07/10/2017    Followed by Dr. Jeevan Pond    Colon polyp     benign    Depression     Elevated PSA     Erectile dysfunction     Gastric ulcer with hemorrhage     Hep B w/o coma 1977    History of bleeding peptic ulcer     History of prostatitis     Hypertension     PAF (paroxysmal atrial fibrillation)     Sleep apnea     PT DENIES    Stroke     TIA December 2019    Thyroid disease      Past Surgical History:   Procedure Laterality Date    A-V CARDIAC PACEMAKER INSERTION Left 9/23/2023    Procedure: Dual Chamber PPM (Heart) Rm 2620;  Surgeon: Pan Moss MD;  Location: [facility] CATH;  Service: Cardiology;  Laterality: Left;    ABDOMINAL HERNIA REPAIR      ABLATION OF ARRHYTHMOGENIC FOCUS FOR ATRIAL FIBRILLATION N/A 6/15/2020    Procedure: Ablation atrial fibrillation;  Surgeon: Wyatt Beatty MD;  Location: [facility] EP LAB;  Service: Cardiology;  Laterality: N/A;  PAF, AFl, PEPE, PVI re-do, CTI, RFA, JUSTIN, Gen, SK, 3 Prep    APPLICATION OF WOUND VACUUM-ASSISTED CLOSURE DEVICE Right 12/9/2023    Procedure: APPLICATION, WOUND VAC;  Surgeon: Jelani Tello II, MD;  Location: [facility] OR;  Service: Orthopedics;  Laterality: Right;     CARDIOVERSION N/A 2/28/2024    Procedure: Cardioversion;  Surgeon: Pao Hare MD;  Location: [facility] KINJAL;  Service: Cardiology;  Laterality: N/A;    CATARACT EXTRACTION  11/25/2013    bilateral    CHOLECYSTECTOMY      COLONOSCOPY N/A 11/25/2015    Procedure: COLONOSCOPY;  Surgeon: Toby Hernandez MD;  Location: [facility] ENDO;  Service: Endoscopy;  Laterality: N/A;    COLONOSCOPY N/A 11/13/2020    Procedure: COLONOSCOPY;  Surgeon: Toby Hernandez MD;  Location: [facility] ENDO;  Service: Endoscopy;  Laterality: N/A;    CYSTOSCOPY WITH INSERTION OF MINIMALLY INVASIVE IMPLANT TO ENLARGE PROSTATIC URETHRA N/A 11/28/2022    Procedure: CYSTOSCOPY, WITH INSERTION OF UROLIFT IMPLANT;  Surgeon: Yakov Shetty MD;  Location: [facility] OR;  Service: Urology;  Laterality: N/A;    EYE SURGERY      GASTRIC BYPASS  1992    INSERTION, PACEMAKER, TEMPORARY TRANSVENOUS  9/22/2023    Procedure: Temp pacer insertion ER Rm 8;  Surgeon: Pan Moss MD;  Location: [facility] CATH;  Service: Cardiology;;    INTRAMEDULLARY RODDING OF FEMUR Left 7/18/2020    Procedure: INSERTION, INTRAMEDULLARY ERIC, FEMUR, left, Synthes, Martinez table, Large C arm clock side,;  Surgeon: Tony Rodriguez MD;  Location: [address];  Service: Orthopedics;  Laterality: Left;    IRRIGATION AND DEBRIDEMENT Right 12/9/2023    Procedure: IRRIGATION AND DEBRIDEMENT KNEE;  Surgeon: Jelani Tello II, MD;  Location: [facility] OR;  Service: Orthopedics;  Laterality: Right;    KNEE ARTHROSCOPY      RT    LASIK  2001    both eyes (Dr. Rabago)    ORIF HUMERUS FRACTURE      LT    ORIF HUMERUS FRACTURE Right     non surgical repair    RADIOFREQUENCY ABLATION      x2    REVISION OF KNEE ARTHROPLASTY Right 12/9/2023    Procedure: REVISION ARTHROPLASTY KNEE- Liner Replacement - dirty/clean;  Surgeon: Jelani Tello II, MD;  Location: [facility] OR;  Service: Orthopedics;  Laterality: Right;  dirty to clean at 1940    Right ankle tendon repair      ROBOT-ASSISTED REPAIR OF  INCISIONAL HERNIA USING DA GARETH XI Left 6/13/2022    Procedure: XI ROBOTIC REPAIR, HERNIA, INCISIONAL (LEFT SIDE SPIGELIAN WITH MESH);  Surgeon: Rosendo Marti MD;  Location: [facility] OR [address] [place] [address];  Service: General;  Laterality: Left;  consent in am    ROTATOR CUFF REPAIR      right    TOTAL KNEE ARTHROPLASTY Right 5/29/2018    Procedure: REPLACEMENT-KNEE-TOTAL-axel;  Surgeon: Denzel Dallas MD;  Location: [facility] OR [address] [place] [address];  Service: Orthopedics;  Laterality: Right;  [place]    TRANSESOPHAGEAL ECHOCARDIOGRAPHY N/A 4/23/2024    Procedure: ECHOCARDIOGRAM, TRANSESOPHAGEAL;  Surgeon: Jan Bravo Diagnostic;  Location: [facility] EP LAB;  Service: Cardiology;  Laterality: N/A;    TREATMENT OF CARDIAC ARRHYTHMIA  6/15/2020    Procedure: Cardioversion or Defibrillation;  Surgeon: Wyatt Beatty MD;  Location: [facility] EP LAB;  Service: Cardiology;;    TREATMENT OF CARDIAC ARRHYTHMIA N/A 2/28/2024    Procedure: Cardioversion or Defibrillation;  Surgeon: Pao Hare MD;  Location: [facility];  Service: Cardiology;  Laterality: N/A;     Family History   Problem Relation Name Age of Onset    COPD Father      Diabetes Father      Osteoporosis Father      Aortic stenosis Mother      Heart disease Mother          aortic stenosis    Osteoporosis Mother      Heart attack Brother      No Known Problems Son      No Known Problems Daughter      No Known Problems Daughter      No Known Problems Daughter       Social History     Tobacco Use    Smoking status: Never     Passive exposure: Never    Smokeless tobacco: Never    Tobacco comments:     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                                 HEENT EXAM:  Nonicteric.  No adenopathy.  Oropharynx is clear.               NECK:  Supple.                                                                LUNGS:  Clear.                                                               CARDIAC:  Regular rate and rhythm.  S1, S2.  No murmur.                      ABDOMEN:  Soft, positive bowel sounds, nontender.  No hepatosplenomegaly or masses.  No rebound or guarding.                                             EXTREMITIES:  No edema.     MENTAL STATUS:  Normal, alert and oriented.      ASSESSMENT/PLAN:     Assessment: Rectal Bleeding and Iron Deficiency Anemia    Plan: Colonoscopy and EGD    Anesthesia Plan: General    ASA Grade: ASA 3 - Patient with moderate systemic disease with functional limitations    MALLAMPATI SCORE:  I (soft palate, uvula, fauces, and tonsillar pillars visible)

## 2024-05-01 NOTE — PROVATION PATIENT INSTRUCTIONS
Discharge Summary/Instructions after an Endoscopic Procedure  Patient Name: Dominick Song  Patient MRN: [id number]  Patient YOB: 1945  Wednesday, May 1, 2024  Toby Hernandez MD  Dear patient,  As a result of recent federal legislation (The Federal Cures Act), you may   receive lab or pathology results from your procedure in your [facility]   account before your physician is able to contact you. Your physician or   their representative will relay the results to you with their   recommendations at their soonest availability.  Thank you,  RESTRICTIONS:  During your procedure today, you received medications for sedation.  These   medications may affect your judgment, balance and coordination.  Therefore,   for 24 hours, you have the following restrictions:   - DO NOT drive a car, operate machinery, make legal/financial decisions,   sign important papers or drink alcohol.    ACTIVITY:  Today: no heavy lifting, straining or running due to procedural   sedation/anesthesia.  The following day: return to full activity including work.  DIET:  Eat and drink normally unless instructed otherwise.     TREATMENT FOR COMMON SIDE EFFECTS:  - Mild abdominal pain, nausea, belching, bloating or excessive gas:  rest,   eat lightly and use a heating pad.  - Sore Throat: treat with throat lozenges and/or gargle with warm salt   water.  - Because air was used during the procedure, expelling large amounts of air   from your rectum or belching is normal.  - If a bowel prep was taken, you may not have a bowel movement for 1-3 days.    This is normal.  SYMPTOMS TO WATCH FOR AND REPORT TO YOUR PHYSICIAN:  1. Abdominal pain or bloating, other than gas cramps.  2. Chest pain.  3. Back pain.  4. Signs of infection such as: chills or fever occurring within 24 hours   after the procedure.  5. Rectal bleeding, which would show as bright red, maroon, or black stools.   (A tablespoon of blood from the rectum is not serious, especially if    hemorrhoids are present.)  6. Vomiting.  7. Weakness or dizziness.  GO DIRECTLY TO THE NEAREST EMERGENCY ROOM IF YOU HAVE ANY OF THE FOLLOWING:      Difficulty breathing              Chills and/or fever over 101 F   Persistent vomiting and/or vomiting blood   Severe abdominal pain   Severe chest pain   Black, tarry stools   Bleeding- more than one tablespoon   Any other symptom or condition that you feel may need urgent attention  Your doctor recommends these additional instructions:  If any biopsies were taken, your doctors clinic will contact you in 1 to 2   weeks with any results.  We are waiting for your pathology results.   You are being discharged to home.  For questions, problems or results please call your physician - Toby Hernandez MD at Work:  [phone].  EMERGENCY PHONE NUMBER: [phone], LAB RESULTS: [phone]  IF A COMPLICATION OR EMERGENCY SITUATION ARISES AND YOU ARE UNABLE TO REACH   YOUR PHYSICIAN - GO DIRECTLY TO THE EMERGENCY ROOM.  ___________________________________________  Nurse Signature  ___________________________________________  Patient/Designated Responsible Party Signature  Toby Hernandez MD  5/1/2024 7:44:41 AM  This report has been verified and signed electronically.  Dear patient,  As a result of recent federal legislation (The Federal Cures Act), you may   receive lab or pathology results from your procedure in your [facility]   account before your physician is able to contact you. Your physician or   their representative will relay the results to you with their   recommendations at their soonest availability.  Thank you.  PROVATION

## 2024-05-01 NOTE — PROVATION PATIENT INSTRUCTIONS
Discharge Summary/Instructions after an Endoscopic Procedure  Patient Name: Dominick Song  Patient MRN: [id number]  Patient YOB: 1945  Wednesday, May 1, 2024  Toby Hernandez MD  Dear patient,  As a result of recent federal legislation (The Federal Cures Act), you may   receive lab or pathology results from your procedure in your [facility]   account before your physician is able to contact you. Your physician or   their representative will relay the results to you with their   recommendations at their soonest availability.  Thank you,  RESTRICTIONS:  During your procedure today, you received medications for sedation.  These   medications may affect your judgment, balance and coordination.  Therefore,   for 24 hours, you have the following restrictions:   - DO NOT drive a car, operate machinery, make legal/financial decisions,   sign important papers or drink alcohol.    ACTIVITY:  Today: no heavy lifting, straining or running due to procedural   sedation/anesthesia.  The following day: return to full activity including work.  DIET:  Eat and drink normally unless instructed otherwise.     TREATMENT FOR COMMON SIDE EFFECTS:  - Mild abdominal pain, nausea, belching, bloating or excessive gas:  rest,   eat lightly and use a heating pad.  - Sore Throat: treat with throat lozenges and/or gargle with warm salt   water.  - Because air was used during the procedure, expelling large amounts of air   from your rectum or belching is normal.  - If a bowel prep was taken, you may not have a bowel movement for 1-3 days.    This is normal.  SYMPTOMS TO WATCH FOR AND REPORT TO YOUR PHYSICIAN:  1. Abdominal pain or bloating, other than gas cramps.  2. Chest pain.  3. Back pain.  4. Signs of infection such as: chills or fever occurring within 24 hours   after the procedure.  5. Rectal bleeding, which would show as bright red, maroon, or black stools.   (A tablespoon of blood from the rectum is not serious, especially if    hemorrhoids are present.)  6. Vomiting.  7. Weakness or dizziness.  GO DIRECTLY TO THE NEAREST EMERGENCY ROOM IF YOU HAVE ANY OF THE FOLLOWING:      Difficulty breathing              Chills and/or fever over 101 F   Persistent vomiting and/or vomiting blood   Severe abdominal pain   Severe chest pain   Black, tarry stools   Bleeding- more than one tablespoon   Any other symptom or condition that you feel may need urgent attention  Your doctor recommends these additional instructions:  If any biopsies were taken, your doctors clinic will contact you in 1 to 2   weeks with any results.  We are waiting for your pathology results.   An appointment has been made (or make an appointment) to [place] [facility] [place]ment to be scheduled.   Your physician has recommended a repeat colonoscopy (date to be determined   after pending pathology results are reviewed) for surveillance based on   pathology results.   You are being discharged to home.  For questions, problems or results please call your physician - Toby Hernandez MD at Work:  [phone].  EMERGENCY PHONE NUMBER: [phone], LAB RESULTS: [phone]  IF A COMPLICATION OR EMERGENCY SITUATION ARISES AND YOU ARE UNABLE TO REACH   YOUR PHYSICIAN - GO DIRECTLY TO THE EMERGENCY ROOM.  ___________________________________________  Nurse Signature  ___________________________________________  Patient/Designated Responsible Party Signature  Toby Hernandez MD  5/1/2024 8:31:06 AM  This report has been verified and signed electronically.  Dear patient,  As a result of recent federal legislation (The Federal Cures Act), you may   receive lab or pathology results from your procedure in your [facility]   account before your physician is able to contact you. Your physician or   their representative will relay the results to you with their   recommendations at their soonest availability.  Thank you.  PROVATION

## 2024-05-01 NOTE — TRANSFER OF CARE
"Anesthesia Transfer of Care Note    Patient: Dominick Song MD    Procedure(s) Performed: Procedure(s) (LRB):  ESOPHAGOGASTRODUODENOSCOPY (EGD) (N/A)  COLONOSCOPY (N/A)    Patient location: PACU    Anesthesia Type: general    Transport from OR: Transported from OR on 2-3 L/min O2 by NC with adequate spontaneous ventilation    Post pain: adequate analgesia    Post assessment: no apparent anesthetic complications and tolerated procedure well    Post vital signs: stable    Level of consciousness: awake and responds to stimulation    Nausea/Vomiting: no nausea/vomiting    Complications: none    Transfer of care protocol was followed      Last vitals: Visit Vitals  BP (!) 144/73   Pulse (!) 50   Temp 37 °C (98.6 °F)   Resp 16   Ht 6' 1" (1.854 m)   Wt 77.1 kg (170 lb)   SpO2 (!) 94%   BMI 22.43 kg/m²     "

## 2024-05-01 NOTE — ANESTHESIA PREPROCEDURE EVALUATION
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    Pre-operative evaluation for Procedure(s) (LRB):  Colonoscopy/EGD        Patient Active Problem List   Diagnosis    BPH with urinary obstruction    Elevated PSA    Nuclear sclerosis    Anemia due to chronic blood loss    Posterior vitreous detachment    Metatarsalgia    Keratoma    Foot pain, right    Onychomycosis due to dermatophyte    ED (erectile dysfunction)    Bradycardia    Mild single current episode of major depressive disorder    Tortuous aorta    Dyspnea    Acquired hypothyroidism    Essential hypertension    Gait abnormality    Chronic kidney disease, stage 3a    Complex sleep apnea syndrome    Erectile dysfunction due to arterial insufficiency    Primary osteoarthritis of right knee    History of bleeding peptic ulcer    History of TIA (transient ischemic attack)    Closed nondisplaced intertrochanteric fracture of left femur    Stroke    Hx of colonic polyps    Age-related osteoporosis with current pathological fracture with routine healing    Hyperparathyroidism    Ventral hernia without obstruction or gangrene    ILD (interstitial lung disease)    ACP (advance care planning)    Pacemaker    Pyogenic arthritis of right knee joint    PAF (paroxysmal atrial fibrillation)    Infected prosthetic knee joint    Hyperkalemia    Acute blood loss anemia    Persistent atrial fibrillation    Chronic heart failure with preserved ejection fraction (HFpEF)    At high risk for bleeding    Nonrheumatic mitral valve regurgitation       Review of patient's allergies indicates:   Allergen Reactions    No known drug allergies        No current facility-administered medications on file prior to encounter.     Current Outpatient Medications on File Prior to Encounter   Medication Sig Dispense Refill    buPROPion (WELLBUTRIN XL)  300 MG 24 hr tablet Take 1 tablet (300 mg total) by mouth once daily. 90 tablet 3    carvediloL (COREG) 6.25 MG tablet Take 6.25 mg by mouth 2 (two) times daily.      ELIQUIS 5 mg Tab Take 1 tablet (5 mg total) by mouth 2 (two) times a day. 180 tablet 2    EScitalopram oxalate (LEXAPRO) 10 MG tablet TAKE 1 TABLET(10 MG) BY MOUTH EVERY DAY 90 tablet 4    iron-vit c-b12-folic acid (ICAR-C PLUS) Tab Take 1 tablet by mouth once daily. 90 tablet 3    OMEGA-3 FATTY ACIDS (FISH OIL CONCENTRATE ORAL) Take 1 capsule by mouth Daily.      potassium chloride (MICRO-K) 10 MEQ CpSR Take 1 capsule (10 mEq total) by mouth every Mon, Wed, Fri. 15 capsule 5    propafenone (RYTHMOL SR) 425 MG Cp12 Take 1 capsule (425 mg total) by mouth every 12 (twelve) hours. 60 capsule 11    telmisartan (MICARDIS) 40 MG Tab Take 1 tablet (40 mg total) by mouth once daily. 90 tablet 3    torsemide (DEMADEX) 20 MG Tab Take 1 tablet (20 mg total) by mouth every Mon, Wed, Fri. 15 tablet 5    alfuzosin (UROXATRAL) 10 mg Tb24 Take 1 tablet (10 mg total) by mouth daily with breakfast. (Patient not taking: Reported on 4/26/2024) 30 tablet 11       Past Surgical History:   Procedure Laterality Date    A-V CARDIAC PACEMAKER INSERTION Left 9/23/2023    Procedure: Dual Chamber PPM (Heart) Rm 2620;  Surgeon: Pan Moss MD;  Location: [facility] CATH;  Service: Cardiology;  Laterality: Left;    ABDOMINAL HERNIA REPAIR      ABLATION OF ARRHYTHMOGENIC FOCUS FOR ATRIAL FIBRILLATION N/A 6/15/2020    Procedure: Ablation atrial fibrillation;  Surgeon: Wyatt Beatty MD;  Location: [facility] EP LAB;  Service: Cardiology;  Laterality: N/A;  PAF, AFl, PEPE, PVI re-do, CTI, RFA, JUSTIN, Gen, SK, 3 Prep    APPLICATION OF WOUND VACUUM-ASSISTED CLOSURE DEVICE Right 12/9/2023    Procedure: APPLICATION, WOUND VAC;  Surgeon: Jelani Tello II, MD;  Location: [facility] OR;  Service: Orthopedics;  Laterality: Right;    CARDIOVERSION N/A 2/28/2024    Procedure: Cardioversion;   Surgeon: Pao Hare MD;  Location: [facility];  Service: Cardiology;  Laterality: N/A;    CATARACT EXTRACTION  11/25/2013    bilateral    CHOLECYSTECTOMY      COLONOSCOPY N/A 11/25/2015    Procedure: COLONOSCOPY;  Surgeon: Toby Hernandez MD;  Location: [facility] ENDO;  Service: Endoscopy;  Laterality: N/A;    COLONOSCOPY N/A 11/13/2020    Procedure: COLONOSCOPY;  Surgeon: Toby Hernandez MD;  Location: [facility] ENDO;  Service: Endoscopy;  Laterality: N/A;    CYSTOSCOPY WITH INSERTION OF MINIMALLY INVASIVE IMPLANT TO ENLARGE PROSTATIC URETHRA N/A 11/28/2022    Procedure: CYSTOSCOPY, WITH INSERTION OF UROLIFT IMPLANT;  Surgeon: Yakov Shetty MD;  Location: [facility] OR;  Service: Urology;  Laterality: N/A;    EYE SURGERY      GASTRIC BYPASS  1992    INSERTION, PACEMAKER, TEMPORARY TRANSVENOUS  9/22/2023    Procedure: Temp pacer insertion ER Rm 8;  Surgeon: Pan Moss MD;  Location: [facility] CATH;  Service: Cardiology;;    INTRAMEDULLARY RODDING OF FEMUR Left 7/18/2020    Procedure: INSERTION, INTRAMEDULLARY ERIC, FEMUR, left, Synthes, [place] table, Large C arm clock side,;  Surgeon: Tony Rodriguez MD;  Location: [address];  Service: Orthopedics;  Laterality: Left;    IRRIGATION AND DEBRIDEMENT Right 12/9/2023    Procedure: IRRIGATION AND DEBRIDEMENT KNEE;  Surgeon: Jelani Tello II, MD;  Location: [facility] OR;  Service: Orthopedics;  Laterality: Right;    KNEE ARTHROSCOPY      RT    LASIK  2001    both eyes (Dr. Rabago)    ORIF HUMERUS FRACTURE      LT    ORIF HUMERUS FRACTURE Right     non surgical repair    RADIOFREQUENCY ABLATION      x2    REVISION OF KNEE ARTHROPLASTY Right 12/9/2023    Procedure: REVISION ARTHROPLASTY KNEE- Liner Replacement - dirty/clean;  Surgeon: Jelani Tello II, MD;  Location: [facility] OR;  Service: Orthopedics;  Laterality: Right;  dirty to clean at 1940    Right ankle tendon repair      ROBOT-ASSISTED REPAIR OF INCISIONAL HERNIA USING DA GARETH XI Left 6/13/2022    Procedure: XI  ROBOTIC REPAIR, HERNIA, INCISIONAL (LEFT SIDE SPIGELIAN WITH MESH);  Surgeon: Rosendo Marti MD;  [facility];  Service: General;  Laterality: Left;  consent in am    ROTATOR CUFF REPAIR      right    TOTAL KNEE ARTHROPLASTY Right 5/29/2018    Procedure: REPLACEMENT-KNEE-TOTAL-pro;  Surgeon: Denzel Dallas MD;  [facility];  Service: Orthopedics;  Laterality: Right;  Pro    TRANSESOPHAGEAL ECHOCARDIOGRAPHY N/A 4/23/2024    Procedure: ECHOCARDIOGRAM, TRANSESOPHAGEAL;  Surgeon: Jan Bravo Diagnostic;  Location: [facility] EP LAB;  Service: Cardiology;  Laterality: N/A;    TREATMENT OF CARDIAC ARRHYTHMIA  6/15/2020    Procedure: Cardioversion or Defibrillation;  Surgeon: Wyatt Beatty MD;  Location: [facility] EP LAB;  Service: Cardiology;;    TREATMENT OF CARDIAC ARRHYTHMIA N/A 2/28/2024    Procedure: Cardioversion or Defibrillation;  Surgeon: Pao Hare MD;  Location: [facility];  Service: Cardiology;  Laterality: N/A;       Social History     Socioeconomic History    Marital status:     Number of children: 5   Occupational History    Occupation: retired anesthesiology     Employer: [employer]    Occupation: [employer] grad     Comment: previous football player   Tobacco Use    Smoking status: Never     Passive exposure: Never    Smokeless tobacco: Never    Tobacco comments:     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    Substance and Sexual Activity    Alcohol use: No    Drug use: No    Sexual activity: Yes     Partners: Female     Social Determinants of Health     Financial Resource Strain: Low Risk  (4/22/2024)    Overall Financial Resource Strain (CARDIA)     Difficulty of Paying Living Expenses: Not hard at all   Food Insecurity: No Food Insecurity (4/22/2024)    Hunger Vital Sign     Worried About Running Out of Food in the Last Year: Never true     Ran Out of Food in the Last Year: Never true   Transportation Needs: No Transportation Needs (4/22/2024)     "PRAPARE - Transportation     Lack of Transportation (Medical): No     Lack of Transportation (Non-Medical): No   Physical Activity: Sufficiently Active (4/22/2024)    Exercise Vital Sign     Days of Exercise per Week: 5 days     Minutes of Exercise per Session: 130 min   Stress: No Stress Concern Present (4/22/2024)    [facility]ational [facility] - Occupational Stress Questionnaire     Feeling of Stress : Only a little   Housing Stability: Unknown (12/10/2023)    Housing Stability Vital Sign     Unable to Pay for Housing in the Last Year: No     Number of Places Lived in the Last Year: 1         CBC: No results for input(s): "WBC", "RBC", "HGB", "HCT", "PLT", "MCV", "MCH", "MCHC" in the last 72 hours.    CMP: No results for input(s): "NA", "K", "CL", "CO2", "BUN", "CREATININE", "GLU", "MG", "PHOS", "CALCIUM", "ALBUMIN", "PROT", "ALKPHOS", "ALT", "AST", "BILITOT" in the last 72 hours.    INR  No results for input(s): "PT", "INR", "PROTIME", "APTT" in the last 72 hours.            2D Echo:  No results found. However, due to the size of the patient record, not all encounters were searched. Please check Results Review for a complete set of results.        Pre-op Assessment    I have reviewed the Patient Summary Reports.     I have reviewed the Nursing Notes.    I have reviewed the Medications.     Review of Systems  Anesthesia Hx:  No problems with previous Anesthesia                Hematology/Oncology:  Hematology Normal   Oncology Normal                                   EENT/Dental:  EENT/Dental Normal           Cardiovascular:  Cardiovascular Normal                                            Pulmonary:  Pulmonary Normal                       Renal/:  Renal/ Normal                 Hepatic/GI:  Hepatic/GI Normal                 Musculoskeletal:  Musculoskeletal Normal                Neurological:  Neurology Normal                                      Endocrine:  Endocrine Normal          "   Dermatological:  Skin Normal    Psych:  Psychiatric Normal                    Physical Exam  General: Well nourished    Airway:  Mallampati: II   Mouth Opening: Normal  TM Distance: Normal  Tongue: Normal  Neck ROM: Normal ROM    Dental:  Intact    Chest/Lungs:  Clear to auscultation, Normal Respiratory Rate    Heart:  Rate: Normal        Anesthesia Plan  Type of Anesthesia, risks & benefits discussed:    Anesthesia Type: Gen Natural Airway  Intra-op Monitoring Plan: Standard ASA Monitors  Post Op Pain Control Plan: multimodal analgesia  Induction:  IV  Informed Consent: Informed consent signed with the Patient and all parties understand the risks and agree with anesthesia plan.  All questions answered.   ASA Score: 3  Anesthesia Plan Notes: Plan for general natural airway, discussed anesthetic risks, questions answered    Ready For Surgery From Anesthesia Perspective.     .

## 2024-05-01 NOTE — TELEPHONE ENCOUNTER
Patient returned my call. Patient is rescheduled to see Dr. Beatty on 5/10/24 in [place]. Patient agrees to appointment.

## 2024-05-01 NOTE — DISCHARGE SUMMARY
[facility] - Endoscopy  Discharge Note  Short Stay  Discharge Note  Short Stay      SUMMARY     Admit Date: 5/1/2024    Attending Physician: Toby Hernandez MD     Discharge Physician: Toby Hernandez MD    Discharge Date: 5/1/2024 8:35 AM    Final Diagnosis: Occult blood positive stool [R19.5]  Anemia due to blood loss [D50.0]    Disposition: HOME OR SELF CARE    Patient Instructions:   Current Discharge Medication List        CONTINUE these medications which have NOT CHANGED    Details   alfuzosin (UROXATRAL) 10 mg Tb24 Take 1 tablet (10 mg total) by mouth daily with breakfast.  Qty: 30 tablet, Refills: 11    Associated Diagnoses: BPH with urinary obstruction      buPROPion (WELLBUTRIN XL) 300 MG 24 hr tablet Take 1 tablet (300 mg total) by mouth once daily.  Qty: 90 tablet, Refills: 3      carvediloL (COREG) 6.25 MG tablet Take 6.25 mg by mouth 2 (two) times daily.      cyclobenzaprine (FLEXERIL) 10 MG tablet Take 1 tablet (10 mg total) by mouth 3 (three) times daily as needed for Muscle spasms.  Qty: 60 tablet, Refills: 3      ELIQUIS 5 mg Tab Take 1 tablet (5 mg total) by mouth 2 (two) times a day.  Qty: 180 tablet, Refills: 2    Comments: Restart on 9/25      EScitalopram oxalate (LEXAPRO) 10 MG tablet TAKE 1 TABLET(10 MG) BY MOUTH EVERY DAY  Qty: 90 tablet, Refills: 4    Associated Diagnoses: Depression, unspecified depression type      iron-vit c-b12-folic acid (ICAR-C PLUS) Tab Take 1 tablet by mouth once daily.  Qty: 90 tablet, Refills: 3    Associated Diagnoses: Anemia      levothyroxine (SYNTHROID) 75 MCG tablet Take 1 tablet (75 mcg total) by mouth before breakfast.  Qty: 90 tablet, Refills: 1    Associated Diagnoses: Hypothyroidism due to acquired atrophy of thyroid      OMEGA-3 FATTY ACIDS (FISH OIL CONCENTRATE ORAL) Take 1 capsule by mouth Daily.      potassium chloride (MICRO-K) 10 MEQ CpSR Take 1 capsule (10 mEq total) by mouth every Mon, Wed, Fri.  Qty: 15 capsule, Refills: 5       propafenone (RYTHMOL SR) 425 MG Cp12 Take 1 capsule (425 mg total) by mouth every 12 (twelve) hours.  Qty: 60 capsule, Refills: 11      telmisartan (MICARDIS) 40 MG Tab Take 1 tablet (40 mg total) by mouth once daily.  Qty: 90 tablet, Refills: 3    Comments: .      tiZANidine (ZANAFLEX) 4 MG tablet Take 1 tablet (4 mg total) by mouth 3 (three) times daily as needed.  Qty: 30 tablet, Refills: 3    Associated Diagnoses: Pyogenic arthritis of right knee joint, due to unspecified organism      torsemide (DEMADEX) 20 MG Tab Take 1 tablet (20 mg total) by mouth every Mon, Wed, Fri.  Qty: 15 tablet, Refills: 5    Comments: .             Discharge Procedure Orders (must include Diet, Follow-up, Activity)    Follow Up:  Follow up with PCP as previously scheduled  Resume routine diet.  Activity as tolerated.    No driving day of procedure.

## 2024-05-01 NOTE — ANESTHESIA POSTPROCEDURE EVALUATION
Anesthesia Post Evaluation    Patient: Dominick Song MD    Procedure(s) Performed: Procedure(s) (LRB):  ESOPHAGOGASTRODUODENOSCOPY (EGD) (N/A)  COLONOSCOPY (N/A)    Final Anesthesia Type: general      Patient location during evaluation: PACU  Patient participation: Yes- Able to Participate  Level of consciousness: awake and alert and oriented  Post-procedure vital signs: reviewed and stable  Pain management: adequate  Airway patency: patent    PONV status at discharge: No PONV  Anesthetic complications: no      Cardiovascular status: blood pressure returned to baseline  Respiratory status: unassisted  Hydration status: euvolemic  Follow-up not needed.              Vitals Value Taken Time   /58 05/01/24 0827   Temp 36.2 05/01/24 0829   Pulse 50 05/01/24 0827   Resp 16 05/01/24 0827   SpO2 94 % 05/01/24 0827         No case tracking events are documented in the log.      Pain/Pari Score: Pari Score: 5 (5/1/2024  8:27 AM)

## 2024-05-02 LAB
OHS CV AF BURDEN PERCENT: 22
OHS CV DC REMOTE DEVICE TYPE: NORMAL
OHS CV RV PACING PERCENT: 12 %

## 2024-05-04 LAB
FINAL PATHOLOGIC DIAGNOSIS: NORMAL
GROSS: NORMAL
Lab: NORMAL

## 2024-05-06 ENCOUNTER — OFFICE VISIT (OUTPATIENT)
Dept: FAMILY MEDICINE | Facility: CLINIC | Age: 79
End: 2024-05-06
Payer: MEDICARE

## 2024-05-06 VITALS
SYSTOLIC BLOOD PRESSURE: 124 MMHG | DIASTOLIC BLOOD PRESSURE: 56 MMHG | RESPIRATION RATE: 18 BRPM | OXYGEN SATURATION: 98 % | HEART RATE: 59 BPM | HEIGHT: 73 IN | BODY MASS INDEX: 23.49 KG/M2 | WEIGHT: 177.25 LBS

## 2024-05-06 DIAGNOSIS — I10 ESSENTIAL HYPERTENSION: Primary | ICD-10-CM

## 2024-05-06 DIAGNOSIS — D64.9 CHRONIC ANEMIA: ICD-10-CM

## 2024-05-06 DIAGNOSIS — N18.31 STAGE 3A CHRONIC KIDNEY DISEASE: ICD-10-CM

## 2024-05-06 DIAGNOSIS — J84.112 IDIOPATHIC PULMONARY FIBROSIS: ICD-10-CM

## 2024-05-06 DIAGNOSIS — Z95.0 STATUS CARDIAC PACEMAKER: ICD-10-CM

## 2024-05-06 DIAGNOSIS — I48.0 PAROXYSMAL ATRIAL FIBRILLATION: ICD-10-CM

## 2024-05-06 DIAGNOSIS — F32.0 MILD SINGLE CURRENT EPISODE OF MAJOR DEPRESSIVE DISORDER: ICD-10-CM

## 2024-05-06 DIAGNOSIS — E03.4 HYPOTHYROIDISM DUE TO ACQUIRED ATROPHY OF THYROID: ICD-10-CM

## 2024-05-06 PROCEDURE — 1160F RVW MEDS BY RX/DR IN RCRD: CPT | Mod: CPTII,S$GLB,, | Performed by: FAMILY MEDICINE

## 2024-05-06 PROCEDURE — 3288F FALL RISK ASSESSMENT DOCD: CPT | Mod: CPTII,S$GLB,, | Performed by: FAMILY MEDICINE

## 2024-05-06 PROCEDURE — G2211 COMPLEX E/M VISIT ADD ON: HCPCS | Mod: S$GLB,,, | Performed by: FAMILY MEDICINE

## 2024-05-06 PROCEDURE — 99214 OFFICE O/P EST MOD 30 MIN: CPT | Mod: S$GLB,,, | Performed by: FAMILY MEDICINE

## 2024-05-06 PROCEDURE — 3074F SYST BP LT 130 MM HG: CPT | Mod: CPTII,S$GLB,, | Performed by: FAMILY MEDICINE

## 2024-05-06 PROCEDURE — 3078F DIAST BP <80 MM HG: CPT | Mod: CPTII,S$GLB,, | Performed by: FAMILY MEDICINE

## 2024-05-06 PROCEDURE — 99999 PR PBB SHADOW E&M-EST. PATIENT-LVL III: CPT | Mod: PBBFAC,,, | Performed by: FAMILY MEDICINE

## 2024-05-06 PROCEDURE — 1157F ADVNC CARE PLAN IN RCRD: CPT | Mod: CPTII,S$GLB,, | Performed by: FAMILY MEDICINE

## 2024-05-06 PROCEDURE — 1159F MED LIST DOCD IN RCRD: CPT | Mod: CPTII,S$GLB,, | Performed by: FAMILY MEDICINE

## 2024-05-06 PROCEDURE — 1126F AMNT PAIN NOTED NONE PRSNT: CPT | Mod: CPTII,S$GLB,, | Performed by: FAMILY MEDICINE

## 2024-05-06 PROCEDURE — 1101F PT FALLS ASSESS-DOCD LE1/YR: CPT | Mod: CPTII,S$GLB,, | Performed by: FAMILY MEDICINE

## 2024-05-06 NOTE — PROGRESS NOTES
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    Chief Complaint: Manish check (6 month follow up)      Here for follow-up.    Anemia - taking emily daily  S/p ORIF left femur fracture - following with Dr. Rodriguez; doing well.  H/o TIA - taking Eliquis; stopped lipitor after seeing neurologist; using lopressor PRN  Afib, s/p pacemaker - s/p ablation; sees Dr. Beatty. Taking Eliquis 5mg BID, Coreg BID, Rhythmol 425mg BID. Planning for Watchman.  HTN - Coreg 12.5mg BID, Micardis 40mg; Demadex 20 mg Monday Wednesday and Friday as well as potassium chloride 10 mEq Monday Wednesday and Friday PRN  CKD - following with Dr. Pond.  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  Colon polyp - following with Dr. Hernandez    Awaiting CT abd/pelvis due to unexplained weight loss. Appetite and endurance normal. BMS normal.    Hypertension  Pertinent negatives include no chest pain, headaches, neck pain, palpitations or shortness of breath.   Follow-up  Pertinent negatives include no abdominal pain, arthralgias, chest pain, chills, coughing, fever, headaches, nausea, neck pain, rash, sore throat or weakness.       Past Medical History:   Diagnosis Date    Anticoagulant long-term use     Anxiety     Arthritis     Atrial fibrillation     BPH (benign prostatic hyperplasia)     Cataract     CKD (chronic kidney disease) stage 3, GFR 30-59 ml/min 07/10/2017    Followed by Dr. Jeevan Pond    Colon polyp     benign    Depression     Elevated PSA     Erectile dysfunction     Gastric ulcer with hemorrhage     Hep B w/o coma 1977    History  of bleeding peptic ulcer     History of prostatitis     Hypertension     PAF (paroxysmal atrial fibrillation)     Sleep apnea     PT DENIES    Stroke     TIA December 2019    Thyroid disease        Past Surgical History:   Procedure Laterality Date    A-V CARDIAC PACEMAKER INSERTION Left 9/23/2023    Procedure: Dual Chamber PPM (Heart) Rm 2620;  Surgeon: Pan Moss MD;  Location: [facility] CATH;  Service: Cardiology;  Laterality: Left;    ABDOMINAL HERNIA REPAIR      ABLATION OF ARRHYTHMOGENIC FOCUS FOR ATRIAL FIBRILLATION N/A 6/15/2020    Procedure: Ablation atrial fibrillation;  Surgeon: Wyatt Beatty MD;  Location: [facility] EP LAB;  Service: Cardiology;  Laterality: N/A;  PAF, AFl, PEPE, PVI re-do, CTI, RFA, JUSTIN, Gen, SK, 3 Prep    APPLICATION OF WOUND VACUUM-ASSISTED CLOSURE DEVICE Right 12/9/2023    Procedure: APPLICATION, WOUND VAC;  Surgeon: Jelani Tello II, MD;  Location: [facility] OR;  Service: Orthopedics;  Laterality: Right;    CARDIOVERSION N/A 2/28/2024    Procedure: Cardioversion;  Surgeon: Pao Hare MD;  Location: [facility];  Service: Cardiology;  Laterality: N/A;    CATARACT EXTRACTION  11/25/2013    bilateral    CHOLECYSTECTOMY      COLONOSCOPY N/A 11/25/2015    Procedure: COLONOSCOPY;  Surgeon: Toby Hernandez MD;  Location: [facility] ENDO;  Service: Endoscopy;  Laterality: N/A;    COLONOSCOPY N/A 11/13/2020    Procedure: COLONOSCOPY;  Surgeon: Toby Hernandez MD;  Location: [facility] ENDO;  Service: Endoscopy;  Laterality: N/A;    COLONOSCOPY N/A 5/1/2024    Procedure: COLONOSCOPY;  Surgeon: Toby Hernandez MD;  Location: [facility];  Service: Endoscopy;  Laterality: N/A;    CYSTOSCOPY WITH INSERTION OF MINIMALLY INVASIVE IMPLANT TO ENLARGE PROSTATIC URETHRA N/A 11/28/2022    Procedure: CYSTOSCOPY, WITH INSERTION OF UROLIFT IMPLANT;  Surgeon: Yakov Shetty MD;  Location: [facility];  Service: Urology;  Laterality: N/A;    ESOPHAGOGASTRODUODENOSCOPY N/A 5/1/2024    Procedure:  ESOPHAGOGASTRODUODENOSCOPY (EGD);  Surgeon: Toby Hernandez MD;  Location: [facility] ENDO;  Service: Endoscopy;  Laterality: N/A;    EYE SURGERY      GASTRIC BYPASS  1992    INSERTION, PACEMAKER, TEMPORARY TRANSVENOUS  9/22/2023    Procedure: Temp pacer insertion ER Rm 8;  Surgeon: Pan Moss MD;  [email or web address] CATH;  Service: Cardiology;;    INTRAMEDULLARY RODDING OF FEMUR Left 7/18/2020    Procedure: INSERTION, INTRAMEDULLARY ERIC, FEMUR, left, Synthes, [place] table, Large C arm clock side,;  Surgeon: Tony Rodriguez MD;  Location: [facility] OR 2ND FLR;  Service: Orthopedics;  Laterality: Left;    IRRIGATION AND DEBRIDEMENT Right 12/9/2023    Procedure: IRRIGATION AND DEBRIDEMENT KNEE;  Surgeon: Jelani Tello II, MD;  Location: [email or web address] OR;  Service: Orthopedics;  Laterality: Right;    KNEE ARTHROSCOPY      RT    LASIK  2001    both eyes (Dr. Rabago)    ORIF HUMERUS FRACTURE      LT    ORIF HUMERUS FRACTURE Right     non surgical repair    RADIOFREQUENCY ABLATION      x2    REVISION OF KNEE ARTHROPLASTY Right 12/9/2023    Procedure: REVISION ARTHROPLASTY KNEE- Liner Replacement - dirty/clean;  Surgeon: Jelani Tello II, MD;  Location: [email or web address] OR;  Service: Orthopedics;  Laterality: Right;  dirty to clean at 1940    Right ankle tendon repair      ROBOT-ASSISTED REPAIR OF INCISIONAL HERNIA USING DA GARETH XI Left 6/13/2022    Procedure: XI ROBOTIC REPAIR, HERNIA, INCISIONAL (LEFT SIDE SPIGELIAN WITH MESH);  Surgeon: Rosendo Marti MD;  Location: [facility] OR 2ND FLR;  Service: General;  Laterality: Left;  consent in am    ROTATOR CUFF REPAIR      right    TOTAL KNEE ARTHROPLASTY Right 5/29/2018    Procedure: REPLACEMENT-KNEE-TOTAL-axel;  Surgeon: Denzel Dallas MD;  Location: [facility] OR 2ND FLR;  Service: Orthopedics;  Laterality: Right;  [place]    TRANSESOPHAGEAL ECHOCARDIOGRAPHY N/A 4/23/2024    Procedure: ECHOCARDIOGRAM, TRANSESOPHAGEAL;  Surgeon: Provider, [facility];  Location: [facility]  LAB;  Service: Cardiology;  Laterality: N/A;    TREATMENT OF CARDIAC ARRHYTHMIA  6/15/2020    Procedure: Cardioversion or Defibrillation;  Surgeon: Wyatt Beatty MD;  Location: [facility];  Service: Cardiology;;    TREATMENT OF CARDIAC ARRHYTHMIA N/A 2/28/2024    Procedure: Cardioversion or Defibrillation;  Surgeon: Pao Hare MD;  Location: [facility];  Service: Cardiology;  Laterality: N/A;       Review of patient's allergies indicates:   Allergen Reactions    No known drug allergies        Social History     Socioeconomic History    Marital status:     Number of children: 5   Occupational History    Occupation: retired anesthesiology     Employer: [employer]    Occupation: [employer] grad     Comment: previous football player   Tobacco Use    Smoking status: Never     Passive exposure: Never    Smokeless tobacco: Never    Tobacco comments:     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    Substance and Sexual Activity    Alcohol use: No    Drug use: No    Sexual activity: Yes     Partners: Female     Social Determinants of Health     Financial Resource Strain: Low Risk  (4/22/2024)    Overall Financial Resource Strain (CARDIA)     Difficulty of Paying Living Expenses: Not hard at all   Food Insecurity: No Food Insecurity (4/22/2024)    Hunger Vital Sign     Worried About Running Out of Food in the Last Year: Never true     Ran Out of Food in the Last Year: Never true   Transportation Needs: No Transportation Needs (4/22/2024)    PRAPARE - Transportation     Lack of Transportation (Medical): No     Lack of Transportation (Non-Medical): No   Physical Activity: Sufficiently Active (4/22/2024)    Exercise Vital Sign     Days of Exercise per Week: 5 days     Minutes of Exercise per Session: 130 min   Stress: No Stress Concern Present (4/22/2024)    [place] [employer] - Occupational Stress Questionnaire     Feeling of Stress : Only a little   Housing Stability: Unknown (12/10/2023)     Housing Stability Vital Sign     Unable to Pay for Housing in the Last Year: No     Number of Places Lived in the Last Year: 1       Current Outpatient Medications on File Prior to Visit   Medication Sig Dispense Refill    alfuzosin (UROXATRAL) 10 mg Tb24 Take 1 tablet (10 mg total) by mouth daily with breakfast. 30 tablet 11    buPROPion (WELLBUTRIN XL) 300 MG 24 hr tablet Take 1 tablet (300 mg total) by mouth once daily. 90 tablet 3    carvediloL (COREG) 6.25 MG tablet Take 6.25 mg by mouth 2 (two) times daily.      cyclobenzaprine (FLEXERIL) 10 MG tablet Take 1 tablet (10 mg total) by mouth 3 (three) times daily as needed for Muscle spasms. 60 tablet 3    ELIQUIS 5 mg Tab Take 1 tablet (5 mg total) by mouth 2 (two) times a day. 180 tablet 2    EScitalopram oxalate (LEXAPRO) 10 MG tablet TAKE 1 TABLET(10 MG) BY MOUTH EVERY DAY 90 tablet 4    iron-vit c-b12-folic acid (ICAR-C PLUS) Tab Take 1 tablet by mouth once daily. 90 tablet 3    levothyroxine (SYNTHROID) 75 MCG tablet Take 1 tablet (75 mcg total) by mouth before breakfast. 90 tablet 1    OMEGA-3 FATTY ACIDS (FISH OIL CONCENTRATE ORAL) Take 1 capsule by mouth Daily.      potassium chloride (MICRO-K) 10 MEQ CpSR Take 1 capsule (10 mEq total) by mouth every Mon, Wed, Fri. 15 capsule 5    propafenone (RYTHMOL SR) 425 MG Cp12 Take 1 capsule (425 mg total) by mouth every 12 (twelve) hours. 60 capsule 11    telmisartan (MICARDIS) 40 MG Tab Take 1 tablet (40 mg total) by mouth once daily. 90 tablet 3    tiZANidine (ZANAFLEX) 4 MG tablet Take 1 tablet (4 mg total) by mouth 3 (three) times daily as needed. 30 tablet 3    torsemide (DEMADEX) 20 MG Tab Take 1 tablet (20 mg total) by mouth every Mon, Wed, Fri. 15 tablet 5     No current facility-administered medications on file prior to visit.       Family History   Problem Relation Name Age of Onset    COPD Father      Diabetes Father      Osteoporosis Father      Aortic stenosis Mother      Heart disease Mother           "aortic stenosis    Osteoporosis Mother      Heart attack Brother      No Known Problems Son      No Known Problems Daughter      No Known Problems Daughter      No Known Problems Daughter         Review of Systems   Constitutional:  Negative for appetite change, chills, fever and unexpected weight change.   HENT:  Negative for sore throat and trouble swallowing.    Eyes:  Negative for pain and visual disturbance.   Respiratory:  Negative for cough, chest tightness, shortness of breath and wheezing.    Cardiovascular:  Negative for chest pain, palpitations and leg swelling.   Gastrointestinal:  Negative for abdominal pain, blood in stool, constipation, diarrhea and nausea.   Genitourinary:  Negative for difficulty urinating, dysuria and hematuria.   Musculoskeletal:  Negative for arthralgias, gait problem and neck pain.   Skin:  Negative for rash and wound.   Neurological:  Negative for dizziness, weakness, light-headedness and headaches.   Hematological:  Negative for adenopathy.   Psychiatric/Behavioral:  Negative for dysphoric mood.        Objective:      BP (!) 124/56   Pulse (!) 59   Resp 18   Ht 6' 1" (1.854 m)   Wt 80.4 kg (177 lb 4 oz)   SpO2 98%   BMI 23.39 kg/m²   Physical Exam  Constitutional:       General: He is not in acute distress.     Appearance: He is well-developed.   HENT:      Head: Normocephalic and atraumatic.      Right Ear: External ear normal.      Left Ear: External ear normal.      Mouth/Throat:      Pharynx: Uvula midline. No oropharyngeal exudate.   Eyes:      General: Lids are normal.      Conjunctiva/sclera: Conjunctivae normal.      Pupils: Pupils are equal, round, and reactive to light.   Neck:      Thyroid: No thyroid mass or thyromegaly.      Trachea: Phonation normal.   Cardiovascular:      Rate and Rhythm: Normal rate and regular rhythm.      Heart sounds: Normal heart sounds. No murmur heard.     No friction rub. No gallop.   Pulmonary:      Effort: Pulmonary effort is " normal. No respiratory distress.      Breath sounds: Normal breath sounds. No wheezing or rales.   Musculoskeletal:         General: Normal range of motion.      Cervical back: Full passive range of motion without pain, normal range of motion and neck supple.      Right lower le+ Edema present.      Left lower le+ Edema present.   Lymphadenopathy:      Cervical: No cervical adenopathy.   Skin:     General: Skin is warm and dry.   Neurological:      Mental Status: He is alert and oriented to person, place, and time.      Cranial Nerves: No cranial nerve deficit.      Coordination: Coordination normal.   Psychiatric:         Speech: Speech normal.         Behavior: Behavior normal.         Thought Content: Thought content normal.         Judgment: Judgment normal.         Results for orders placed or performed during the hospital encounter of 24   Specimen to Pathology, Surgery Gastrointestinal tract   Result Value Ref Range    Final Pathologic Diagnosis       1. Jejunum, biopsy:Small bowel mucosa with no diagnostic abnormality.Villous architecture is preserved with no intraepithelial lymphocytosis.    2. Ascending colon, polyp, biopsy:Tubular adenoma.      Gross       Pathology ID:  [id number]  Patient ID:  [id number]  Received in 2 parts  Part 1:  Pathology ID:  [id number]  Patient ID:  [id number]  The specimen is received in formalin labeled &quot;jejunum&quot;.  The specimen consists of 3 tan fragments of soft tissue measuring 1.0 x 0.3 x 0.2 cm in aggregate.  The specimen is submitted entirely in cassette COS--1-A.    Part 2:  Pathology ID:  [id number]  Patient ID:  [id number]  The specimen is received in formalin labeled &quot;ascending colon polyp&quot;.  The specimen consists of 4 tan fragments of soft tissue measuring 1.0 x 0.6 x 0.2 cm in aggregate.  The specimen is submitted entirely in cassette SOO--2-A.  Alberto Cantu      Disclaimer       Unless the case is a 'gross only' or additional testing only,  the final diagnosis for each specimen is based on a microscopic examination of appropriate tissue sections.     *Note: Due to a large number of results and/or encounters for the requested time period, some results have not been displayed. A complete set of results can be found in Results Review.     Labs and hospital records reviewed     Assessment:       1. Essential hypertension    2. Paroxysmal atrial fibrillation    3. Idiopathic pulmonary fibrosis    4. Mild single current episode of major depressive disorder    5. Chronic anemia    6. Status cardiac pacemaker    7. Hypothyroidism due to acquired atrophy of thyroid    8. Stage 3a chronic kidney disease                      Plan:       Essential hypertension    Paroxysmal atrial fibrillation    Idiopathic pulmonary fibrosis    Mild single current episode of major depressive disorder    Chronic anemia    Status cardiac pacemaker    Hypothyroidism due to acquired atrophy of thyroid    Stage 3a chronic kidney disease                     Overall stable  F/u with cardiology, ID, ortho as planned  Continue Eliquis 5mg BID with ASA  Continue carvedilol   Continue other present meds  Counseled on regular exercise, maintenance of a healthy weight, balanced diet rich in fruits/vegetables and lean protein, and avoidance of unhealthy habits like smoking and excessive alcohol intake.    F/u 6 months or PRN    Visit today included increased complexity associated with the care of the episodic problems listed above addressed and managing the longitudinal care of the patient due to the serious and/or complex managed problem(s) listed above.

## 2024-05-09 ENCOUNTER — DOCUMENTATION ONLY (OUTPATIENT)
Dept: CARDIOLOGY | Facility: CLINIC | Age: 79
End: 2024-05-09
Payer: MEDICARE

## 2024-05-09 ENCOUNTER — PATIENT MESSAGE (OUTPATIENT)
Dept: CARDIOLOGY | Facility: CLINIC | Age: 79
End: 2024-05-09
Payer: MEDICARE

## 2024-05-09 DIAGNOSIS — I48.0 PAF (PAROXYSMAL ATRIAL FIBRILLATION): Primary | ICD-10-CM

## 2024-05-09 NOTE — PROGRESS NOTES
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     Background:  First diagnosed with atrial fibrillation 2/12 (noted palpitations). Placed on beta blocker, coumadin. Underwent PEPE/DCCV. Had recurrence, Propafenone  mg twice daily added.  Had recurrence 12/24/13, underwent DCCV, Propafenone increased to 425 mg twice daily.  PVI ([place]ryob[place]) 7/28/14.   Had done well, was off Propafenone. Developed recurrence, underwent DCCV multiple times.  Repeat PVI 4/27/17 All 4 veins remained isolated.  Antralized left sided lesion set posteriorly, and performed SVC isolation.  Has been compliant with CPAP.  Had persistent recurrence >> DCCV 2/9/18.  He has had paroxysmal AF, with 4 episodes since 11/18.    Exercise echo 2/19 normal biventricular structure and function BISI 57 negative for ischemia.     Continued to have paroxysmal events, self-terminating.  Last episode was last month.  Generally, the episodes are lasting 3 days.  Since last visit, he was admitted with a TIA.  This was thought to be due to non-compliance with Eliquis.     6/15/20 Repeat RFA. Posterior wall isolation + repeat RFA of cavo-tricuspid isthmus. PV's remained isolated. 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     7/13/20 presented with TIA like symptoms. Seen by Dr. Coats (Vascular Neurology). Thought to be atypical for neurologic etiology. CT demonstrates a small area of remote vs. Subacute infarct in R medial occipital lobe, so transient hippocampal ischemia may be at the origin, although again this is atypical     Underwent robotic spigelian hernia repair. Shortly thereafter developed persistent AF, lasting a couple of days. Generally occurring once or twice a month lasting a couple of hours.     Diagnosed with interstitial lung  disease.     11/2023  Presented with symptomatic bradycardia.  Dual chamber PPM implanted 9/23/23 (Dr. Moss).  Medications adjusted. Coreg reduced to 6.25 mg BID. Propafenone is 225 mg BID. Demadex added.  Device interrogation reveals stable function of the leads, RA pacing 5.8% RV pacing < 1% no AF.     Update 5/10/24:  Last visit beta blocker increased & underwent DCCV 2/28/24. On propafenone 425 gm BID. Plans for LAAO with Dr. Duvall. Recent EGD negative and colonoscopy s/p partial removal of poly, diverticulosis, and internal hemorrhoids.  Doing well overall.  TTE 3/24: EF 55-60%. BSII 71.  PEPE 4/24: EF 55-60%. Mild-mod MR.     Device interrogation reveals stable function of the leads, RA pacing 27% RV pacing 2.2% longest episode since DCCV was 14 hours, burden 3.4%    I personally reviewed the ECG/telemetry strip today which shows sinus bradycardia at 51 bpm, WA interval 220 msec, narrow QRS.      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    Substance Use Topics    Alcohol use: No     Family History   Problem Relation Name Age of Onset    COPD Father      Diabetes Father      Osteoporosis Father      Aortic stenosis Mother      Heart disease Mother          aortic stenosis    Osteoporosis Mother      Heart attack Brother      No Known Problems Son      No Known Problems Daughter      No Known Problems Daughter      No Known Problems Daughter         Meds:     Review of patient's allergies indicates:   Allergen Reactions    No known drug allergies        Current Outpatient Medications:     alfuzosin (UROXATRAL) 10 mg Tb24, Take 1 tablet (10 mg total) by mouth daily with breakfast., Disp: 30 tablet, Rfl: 11    buPROPion (WELLBUTRIN XL) 300 MG 24 hr tablet, Take 1 tablet (300 mg total) by mouth once daily., Disp: 90 tablet, Rfl: 3    carvediloL (COREG) 6.25 MG tablet,  Take 6.25 mg by mouth 2 (two) times daily., Disp: , Rfl:     cyclobenzaprine (FLEXERIL) 10 MG tablet, Take 1 tablet (10 mg total) by mouth 3 (three) times daily as needed for Muscle spasms., Disp: 60 tablet, Rfl: 3    ELIQUIS 5 mg Tab, Take 1 tablet (5 mg total) by mouth 2 (two) times a day., Disp: 180 tablet, Rfl: 2    EScitalopram oxalate (LEXAPRO) 10 MG tablet, TAKE 1 TABLET(10 MG) BY MOUTH EVERY DAY, Disp: 90 tablet, Rfl: 4    iron-vit c-b12-folic acid (ICAR-C PLUS) Tab, Take 1 tablet by mouth once daily., Disp: 90 tablet, Rfl: 3    levothyroxine (SYNTHROID) 75 MCG tablet, Take 1 tablet (75 mcg total) by mouth before breakfast., Disp: 90 tablet, Rfl: 1    OMEGA-3 FATTY ACIDS (FISH OIL CONCENTRATE ORAL), Take 1 capsule by mouth Daily., Disp: , Rfl:     potassium chloride (MICRO-K) 10 MEQ CpSR, Take 1 capsule (10 mEq total) by mouth every Mon, Wed, Fri., Disp: 15 capsule, Rfl: 5    propafenone (RYTHMOL SR) 425 MG Cp12, Take 1 capsule (425 mg total) by mouth every 12 (twelve) hours., Disp: 60 capsule, Rfl: 11    telmisartan (MICARDIS) 40 MG Tab, Take 1 tablet (40 mg total) by mouth once daily., Disp: 90 tablet, Rfl: 3    tiZANidine (ZANAFLEX) 4 MG tablet, Take 1 tablet (4 mg total) by mouth 3 (three) times daily as needed., Disp: 30 tablet, Rfl: 3    torsemide (DEMADEX) 20 MG Tab, Take 1 tablet (20 mg total) by mouth every Mon, Wed, Fri., Disp: 15 tablet, Rfl: 5    Constitution: Negative for fever or chills. Negative for weight loss or gain.   HENT: Negative for sore throat or headaches. Negative for rhinorrhea.  Eyes: Negative for blurred or double vision.   Cardiovascular: See above  Pulmonary: Negative for SOB. Negative for cough.   Gastrointestinal: Negative for abdominal pain. Negative for nausea/ vomiting. Negative for diarrhea.   : Negative for dysuria.   Neurological: Negative for focal weakness or sensory changes.    Objective:   There were no vitals taken for this visit.    General: NAD. AAO.  HENT: No  scleral icterus. Extraocular movements intact.  Neck: No JVD  Cardiovascular: Regular heart rate and rhythm. S1/S2 appreciated.  Respiratory: CTAB. No increased work of breathing.  Extremities: Warm. No edema.  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     1. Essential hypertension        2. Pacemaker        3. Bradycardia        4. Persistent atrial fibrillation        5. Chronic heart failure with preserved ejection fraction (HFpEF)            Plan:     Doing well.  I very much agree with LAAO.  We will continue with higher dose Propafenone at this time.   F/u in 6 months.

## 2024-05-09 NOTE — PROGRESS NOTES
You have been scheduled for your Watchman/Amulet procedure on Wednesday, May 29, 2024.  Please report to the Cardiology Waiting Area on the Third floor of the hospital and check in at 10 AM. You will then be taken to the SSCU (Short Stay Cardiac Unit) and prepared for your procedure. Please be aware that this is not the time of your procedure but the time that you are to arrive.     Preperations for your procedure:  Shower with Dial soap the night before and the morning of your procedure.  No solid foods 8 hours prior to your procedure.  You may have clear liquids until the time of your admission which should be 2 hours prior to your procedure.  You are encouraged to have at least 8 ounces of clear liquids prior to your admission to SSCU.  Patients with gastric emptying issues should be fasting for 6- 8 hours prior to the procedure.  Clear liquids include water, black coffee, clear juices, and performance drinks - no pulp or milk.    Heart failure or dialysis patients will be limited to 8 ounces (1 cup) of clear liquids up until 2 hours of the procedure.  You may take your regular morning medications         with as much water as necessary.   Bring your cane and/or walker with you to the hospital.  Bring CPAP/BiPAP, or oxygen if you are on oxygen at home.  Call the office for any signs of infection ( fever, cough, pneumonia, urinary tract, etc.) We cannot implant a device in an infected patient.  If you are on Pradaxa, Eliquis, Xeralto, or Coumadin, you do not have to stop them.    The entire procedure may take up to three hours. After the procedure, you will return to your room on the third floor where you will be monitored closely for the next several hours.     Your length of stay in the hospital will be determined by your physician. You may be discharged the same day if your physician is satisfied with your progress.     Follow up After Your Procedure  About 45 days after your procedure you will be required to  "have a Transesophageal echo and a clinic visit with your physician at [facility] in [place].   At 6 months, 1 year, and 2 years after your procedure, you will receive an appointment for a virtual visit for your follow up.  You can follow up with your regular Cardiologist regarding any other heart issues.     If you should have any questions, concerns, or need to change the date of your procedure, please call "LASHAY Granados @ [phone]            Special Instructions:    Continue to take your current medications.    Ok to continue to take your Eliquis everyday.    If you have any new medication changes, please let our office know.      THE ABOVE INSTRUCTIONS WERE GIVEN TO THE PATIENT VERBALLY AND THEY VERBALIZED UNDERSTANDING.  THEY DO NOT REQUIRE ANY SPECIAL NEEDS AND DO NOT HAVE ANY LEARNING BARRIERS.          Directions for Reporting to Cardiology Waiting Area in the Hospital  If you park in the Parking Garage:  Take elevators to the1st floor of the parking garage.  Continue past the gift shop, coffee shop, and piano.  Take a right and go to the gold elevators. (Elevator B)  Take the elevator to the 3rd floor.  Follow the arrow on the sign on the wall that says Cath Lab Registration/EP Lab Registration.  Follow the long hallway all the way around until you come to a big open area.  This is the registration area.  Check in at Reception Desk.    OR    If family is dropping you off:  Have them drop you off at the front of the Hospital under the green overhang.  Enter through the doors and take a right.  Take the E elevators to the 3rd floor Cardiology Waiting Area.  Check in at the Reception Desk in the waiting room.                    "

## 2024-05-10 ENCOUNTER — OFFICE VISIT (OUTPATIENT)
Dept: CARDIOLOGY | Facility: CLINIC | Age: 79
End: 2024-05-10
Payer: MEDICARE

## 2024-05-10 ENCOUNTER — HOSPITAL ENCOUNTER (OUTPATIENT)
Dept: CARDIOLOGY | Facility: HOSPITAL | Age: 79
Discharge: HOME | End: 2024-05-10
Attending: INTERNAL MEDICINE
Payer: MEDICARE

## 2024-05-10 VITALS
SYSTOLIC BLOOD PRESSURE: 147 MMHG | HEART RATE: 49 BPM | HEIGHT: 73 IN | BODY MASS INDEX: 23.46 KG/M2 | DIASTOLIC BLOOD PRESSURE: 71 MMHG | WEIGHT: 177 LBS

## 2024-05-10 DIAGNOSIS — R00.1 BRADYCARDIA: ICD-10-CM

## 2024-05-10 DIAGNOSIS — I49.8 OTHER SPECIFIED CARDIAC ARRHYTHMIAS: ICD-10-CM

## 2024-05-10 DIAGNOSIS — Z95.0 PACEMAKER: ICD-10-CM

## 2024-05-10 DIAGNOSIS — I10 ESSENTIAL HYPERTENSION: Primary | ICD-10-CM

## 2024-05-10 DIAGNOSIS — I48.19 PERSISTENT ATRIAL FIBRILLATION: ICD-10-CM

## 2024-05-10 DIAGNOSIS — I50.32 CHRONIC HEART FAILURE WITH PRESERVED EJECTION FRACTION (HFPEF): ICD-10-CM

## 2024-05-10 PROCEDURE — 1126F AMNT PAIN NOTED NONE PRSNT: CPT | Mod: HCNC,CPTII,NTX,S$GLB | Performed by: INTERNAL MEDICINE

## 2024-05-10 PROCEDURE — 93280 PM DEVICE PROGR EVAL DUAL: CPT | Mod: 26,HCNC,NTX, | Performed by: INTERNAL MEDICINE

## 2024-05-10 PROCEDURE — 99215 OFFICE O/P EST HI 40 MIN: CPT | Mod: HCNC,NTX,S$GLB, | Performed by: INTERNAL MEDICINE

## 2024-05-10 PROCEDURE — 1101F PT FALLS ASSESS-DOCD LE1/YR: CPT | Mod: HCNC,CPTII,NTX,S$GLB | Performed by: INTERNAL MEDICINE

## 2024-05-10 PROCEDURE — 99999 PR PBB SHADOW E&M-EST. PATIENT-LVL II: CPT | Mod: PBBFAC,TXP,, | Performed by: INTERNAL MEDICINE

## 2024-05-10 PROCEDURE — 3288F FALL RISK ASSESSMENT DOCD: CPT | Mod: HCNC,CPTII,NTX,S$GLB | Performed by: INTERNAL MEDICINE

## 2024-05-10 PROCEDURE — 3077F SYST BP >= 140 MM HG: CPT | Mod: HCNC,CPTII,NTX,S$GLB | Performed by: INTERNAL MEDICINE

## 2024-05-10 PROCEDURE — 3078F DIAST BP <80 MM HG: CPT | Mod: HCNC,CPTII,NTX,S$GLB | Performed by: INTERNAL MEDICINE

## 2024-05-10 PROCEDURE — 93280 PM DEVICE PROGR EVAL DUAL: CPT | Mod: PO,TXP

## 2024-05-10 PROCEDURE — 1157F ADVNC CARE PLAN IN RCRD: CPT | Mod: HCNC,CPTII,NTX,S$GLB | Performed by: INTERNAL MEDICINE

## 2024-05-17 ENCOUNTER — PATIENT MESSAGE (OUTPATIENT)
Dept: FAMILY MEDICINE | Facility: CLINIC | Age: 79
End: 2024-05-17
Payer: MEDICARE

## 2024-05-17 DIAGNOSIS — F32.A DEPRESSION, UNSPECIFIED DEPRESSION TYPE: Primary | ICD-10-CM

## 2024-05-17 NOTE — TELEPHONE ENCOUNTER
No care due was identified.  [facility] Embedded Care Due Messages. Reference number: [id number].   5/17/2024 4:55:19 PM CDT

## 2024-05-19 ENCOUNTER — ANESTHESIA EVENT (OUTPATIENT)
Dept: MEDSURG UNIT | Facility: HOSPITAL | Age: 79
DRG: 274 | End: 2024-05-19
Payer: MEDICARE

## 2024-05-19 RX ORDER — BUPROPION HYDROCHLORIDE 300 MG/1
300 TABLET ORAL DAILY
Qty: 90 TABLET | Refills: 3 | Status: SHIPPED | OUTPATIENT
Start: 2024-05-19

## 2024-05-20 ENCOUNTER — PATIENT MESSAGE (OUTPATIENT)
Dept: GASTROENTEROLOGY | Facility: CLINIC | Age: 79
End: 2024-05-20
Payer: MEDICARE

## 2024-05-22 ENCOUNTER — TELEPHONE (OUTPATIENT)
Dept: ENDOSCOPY | Facility: HOSPITAL | Age: 79
End: 2024-05-22
Payer: MEDICARE

## 2024-05-22 NOTE — TELEPHONE ENCOUNTER
Contacted patient re: VM to schedule procedure. Patient states he called to schedule f/u EMR. Message sent to AES. Patient verbalized understanding.

## 2024-05-22 NOTE — TELEPHONE ENCOUNTER
Telephone patient to scheduled colonoscopy   with no answer. Direct contact given to call back to schedule procedure.

## 2024-05-23 ENCOUNTER — TELEPHONE (OUTPATIENT)
Dept: ENDOSCOPY | Facility: HOSPITAL | Age: 79
End: 2024-05-23
Payer: MEDICARE

## 2024-05-23 ENCOUNTER — PATIENT MESSAGE (OUTPATIENT)
Dept: ENDOSCOPY | Facility: HOSPITAL | Age: 79
End: 2024-05-23
Payer: MEDICARE

## 2024-05-23 ENCOUNTER — ANESTHESIA (OUTPATIENT)
Dept: MEDSURG UNIT | Facility: HOSPITAL | Age: 79
DRG: 274 | End: 2024-05-23
Payer: MEDICARE

## 2024-05-23 ENCOUNTER — HOSPITAL ENCOUNTER (INPATIENT)
Facility: HOSPITAL | Age: 79
LOS: 1 days | Discharge: HOME | DRG: 274 | End: 2024-05-23
Attending: INTERNAL MEDICINE | Admitting: INTERNAL MEDICINE
Payer: MEDICARE

## 2024-05-23 VITALS
WEIGHT: 170 LBS | SYSTOLIC BLOOD PRESSURE: 160 MMHG | OXYGEN SATURATION: 95 % | RESPIRATION RATE: 18 BRPM | HEART RATE: 54 BPM | TEMPERATURE: 97 F | DIASTOLIC BLOOD PRESSURE: 83 MMHG | HEIGHT: 72 IN | BODY MASS INDEX: 23.03 KG/M2

## 2024-05-23 DIAGNOSIS — Z12.11 SCREENING FOR COLON CANCER: Primary | ICD-10-CM

## 2024-05-23 DIAGNOSIS — K63.5 POLYP OF COLON, UNSPECIFIED PART OF COLON, UNSPECIFIED TYPE: Primary | ICD-10-CM

## 2024-05-23 DIAGNOSIS — Z98.890 STATUS POST LIGATION OF LEFT ATRIAL APPENDAGE: ICD-10-CM

## 2024-05-23 DIAGNOSIS — I48.0 PAF (PAROXYSMAL ATRIAL FIBRILLATION): ICD-10-CM

## 2024-05-23 LAB
ABO + RH BLD: NORMAL
BLD GP AB SCN CELLS X3 SERPL QL: NORMAL
OHS QRS DURATION: 104 MS
OHS QTC CALCULATION: 428 MS

## 2024-05-23 PROCEDURE — 25000003 PHARM REV CODE 250: Mod: HCNC,NTX | Performed by: STUDENT IN AN ORGANIZED HEALTH CARE EDUCATION/TRAINING PROGRAM

## 2024-05-23 PROCEDURE — 02L73DK OCCLUSION OF LEFT ATRIAL APPENDAGE WITH INTRALUMINAL DEVICE, PERCUTANEOUS APPROACH: ICD-10-PCS | Performed by: INTERNAL MEDICINE

## 2024-05-23 PROCEDURE — 93010 ELECTROCARDIOGRAM REPORT: CPT | Mod: HCNC,NTX,, | Performed by: INTERNAL MEDICINE

## 2024-05-23 PROCEDURE — 25000003 PHARM REV CODE 250: Mod: HCNC,NTX | Performed by: INTERNAL MEDICINE

## 2024-05-23 PROCEDURE — C1894 INTRO/SHEATH, NON-LASER: HCPCS | Mod: HCNC,NTX | Performed by: INTERNAL MEDICINE

## 2024-05-23 PROCEDURE — 63600175 PHARM REV CODE 636 W HCPCS: Mod: HCNC,NTX | Performed by: STUDENT IN AN ORGANIZED HEALTH CARE EDUCATION/TRAINING PROGRAM

## 2024-05-23 PROCEDURE — 99499 UNLISTED E&M SERVICE: CPT | Mod: HCNC,NTX,, | Performed by: INTERNAL MEDICINE

## 2024-05-23 PROCEDURE — 33340 PERQ CLSR TCAT L ATR APNDGE: CPT | Mod: Q0,HCNC,NTX, | Performed by: INTERNAL MEDICINE

## 2024-05-23 PROCEDURE — C1769 GUIDE WIRE: HCPCS | Mod: HCNC,NTX | Performed by: INTERNAL MEDICINE

## 2024-05-23 PROCEDURE — 33340 PERQ CLSR TCAT L ATR APNDGE: CPT | Mod: Q0,HCNC,NTX | Performed by: INTERNAL MEDICINE

## 2024-05-23 PROCEDURE — 86850 RBC ANTIBODY SCREEN: CPT | Mod: HCNC,NTX | Performed by: INTERNAL MEDICINE

## 2024-05-23 PROCEDURE — 25500020 PHARM REV CODE 255: Mod: HCNC,NTX | Performed by: INTERNAL MEDICINE

## 2024-05-23 PROCEDURE — 37000009 HC ANESTHESIA EA ADD 15 MINS: Mod: HCNC,NTX | Performed by: INTERNAL MEDICINE

## 2024-05-23 PROCEDURE — 27201423 OPTIME MED/SURG SUP & DEVICES STERILE SUPPLY: Mod: HCNC,NTX | Performed by: INTERNAL MEDICINE

## 2024-05-23 PROCEDURE — 93005 ELECTROCARDIOGRAM TRACING: CPT | Mod: HCNC,NTX

## 2024-05-23 PROCEDURE — D9220A PRA ANESTHESIA: Mod: Q0,HCNC,NTX, | Performed by: ANESTHESIOLOGY

## 2024-05-23 PROCEDURE — 11000001 HC ACUTE MED/SURG PRIVATE ROOM: Mod: HCNC,NTX

## 2024-05-23 PROCEDURE — 37000008 HC ANESTHESIA 1ST 15 MINUTES: Mod: HCNC,NTX | Performed by: INTERNAL MEDICINE

## 2024-05-23 DEVICE — LEFT ATRIAL APPENDAGE OCCLUDER
Type: IMPLANTABLE DEVICE | Site: HEART | Status: FUNCTIONAL
Brand: AMPLATZER™ AMULET™

## 2024-05-23 RX ORDER — ONDANSETRON HYDROCHLORIDE 2 MG/ML
4 INJECTION, SOLUTION INTRAVENOUS DAILY PRN
Status: DISCONTINUED | OUTPATIENT
Start: 2024-05-23 | End: 2024-05-23 | Stop reason: HOSPADM

## 2024-05-23 RX ORDER — PROTAMINE SULFATE 10 MG/ML
INJECTION, SOLUTION INTRAVENOUS
Status: DISCONTINUED | OUTPATIENT
Start: 2024-05-23 | End: 2024-05-23

## 2024-05-23 RX ORDER — SODIUM CHLORIDE 9 MG/ML
INJECTION, SOLUTION INTRAVENOUS CONTINUOUS
Status: DISCONTINUED | OUTPATIENT
Start: 2024-05-23 | End: 2024-05-23 | Stop reason: HOSPADM

## 2024-05-23 RX ORDER — LIDOCAINE HYDROCHLORIDE 20 MG/ML
INJECTION, SOLUTION EPIDURAL; INFILTRATION; INTRACAUDAL; PERINEURAL
Status: DISCONTINUED | OUTPATIENT
Start: 2024-05-23 | End: 2024-05-23

## 2024-05-23 RX ORDER — ACETAMINOPHEN 325 MG/1
650 TABLET ORAL EVERY 4 HOURS PRN
Status: DISCONTINUED | OUTPATIENT
Start: 2024-05-23 | End: 2024-05-23 | Stop reason: HOSPADM

## 2024-05-23 RX ORDER — ONDANSETRON HYDROCHLORIDE 2 MG/ML
INJECTION, SOLUTION INTRAVENOUS
Status: DISCONTINUED | OUTPATIENT
Start: 2024-05-23 | End: 2024-05-23

## 2024-05-23 RX ORDER — CLOPIDOGREL BISULFATE 75 MG/1
75 TABLET ORAL DAILY
Qty: 90 TABLET | Refills: 3 | Status: SHIPPED | OUTPATIENT
Start: 2024-05-23 | End: 2025-05-23

## 2024-05-23 RX ORDER — SODIUM CHLORIDE 9 MG/ML
INJECTION, SOLUTION INTRAVENOUS CONTINUOUS
Status: ACTIVE | OUTPATIENT
Start: 2024-05-23 | End: 2024-05-23

## 2024-05-23 RX ORDER — FENTANYL CITRATE 50 UG/ML
INJECTION, SOLUTION INTRAMUSCULAR; INTRAVENOUS
Status: DISCONTINUED | OUTPATIENT
Start: 2024-05-23 | End: 2024-05-23

## 2024-05-23 RX ORDER — HEPARIN SOD,PORCINE/0.9 % NACL 1000/500ML
INTRAVENOUS SOLUTION INTRAVENOUS
Status: DISCONTINUED | OUTPATIENT
Start: 2024-05-23 | End: 2024-05-23 | Stop reason: HOSPADM

## 2024-05-23 RX ORDER — PROPOFOL 10 MG/ML
VIAL (ML) INTRAVENOUS CONTINUOUS PRN
Status: DISCONTINUED | OUTPATIENT
Start: 2024-05-23 | End: 2024-05-23

## 2024-05-23 RX ORDER — LIDOCAINE HYDROCHLORIDE 20 MG/ML
INJECTION, SOLUTION INFILTRATION; PERINEURAL
Status: DISCONTINUED | OUTPATIENT
Start: 2024-05-23 | End: 2024-05-23 | Stop reason: HOSPADM

## 2024-05-23 RX ORDER — HEPARIN SODIUM 1000 [USP'U]/ML
INJECTION, SOLUTION INTRAVENOUS; SUBCUTANEOUS
Status: DISCONTINUED | OUTPATIENT
Start: 2024-05-23 | End: 2024-05-23

## 2024-05-23 RX ORDER — OXYCODONE AND ACETAMINOPHEN 5; 325 MG/1; MG/1
1 TABLET ORAL ONCE
Status: COMPLETED | OUTPATIENT
Start: 2024-05-23 | End: 2024-05-23

## 2024-05-23 RX ORDER — LIDOCAINE HYDROCHLORIDE 20 MG/ML
SOLUTION OROPHARYNGEAL
Status: DISCONTINUED | OUTPATIENT
Start: 2024-05-23 | End: 2024-05-23

## 2024-05-23 RX ORDER — FENTANYL CITRATE 50 UG/ML
25 INJECTION, SOLUTION INTRAMUSCULAR; INTRAVENOUS EVERY 5 MIN PRN
Status: DISCONTINUED | OUTPATIENT
Start: 2024-05-23 | End: 2024-05-23 | Stop reason: HOSPADM

## 2024-05-23 RX ORDER — DIPHENHYDRAMINE HCL 50 MG
50 CAPSULE ORAL ONCE
Status: COMPLETED | OUTPATIENT
Start: 2024-05-23 | End: 2024-05-23

## 2024-05-23 RX ORDER — BUPROPION HYDROCHLORIDE 150 MG/1
150 TABLET ORAL DAILY
Qty: 90 TABLET | Refills: 3 | Status: SHIPPED | OUTPATIENT
Start: 2024-05-23 | End: 2025-05-23

## 2024-05-23 RX ORDER — ASPIRIN 81 MG/1
81 TABLET ORAL DAILY
Qty: 90 TABLET | Refills: 3 | Status: SHIPPED | OUTPATIENT
Start: 2024-05-23 | End: 2025-05-23

## 2024-05-23 RX ADMIN — HEPARIN SODIUM 9000 UNITS: 1000 INJECTION, SOLUTION INTRAVENOUS; SUBCUTANEOUS at 01:05

## 2024-05-23 RX ADMIN — ONDANSETRON 4 MG: 2 INJECTION INTRAMUSCULAR; INTRAVENOUS at 02:05

## 2024-05-23 RX ADMIN — SODIUM CHLORIDE: 9 INJECTION, SOLUTION INTRAVENOUS at 12:05

## 2024-05-23 RX ADMIN — HEPARIN SODIUM 2000 UNITS: 1000 INJECTION, SOLUTION INTRAVENOUS; SUBCUTANEOUS at 01:05

## 2024-05-23 RX ADMIN — FENTANYL CITRATE 25 MCG: 50 INJECTION INTRAMUSCULAR; INTRAVENOUS at 01:05

## 2024-05-23 RX ADMIN — PROPOFOL 100 MCG/KG/MIN: 10 INJECTION, EMULSION INTRAVENOUS at 12:05

## 2024-05-23 RX ADMIN — OXYCODONE HYDROCHLORIDE AND ACETAMINOPHEN 1 TABLET: 5; 325 TABLET ORAL at 05:05

## 2024-05-23 RX ADMIN — PROPOFOL 60 MG: 10 INJECTION, EMULSION INTRAVENOUS at 12:05

## 2024-05-23 RX ADMIN — DEXTROSE MONOHYDRATE 2 G: 5 INJECTION INTRAVENOUS at 01:05

## 2024-05-23 RX ADMIN — PROPOFOL 150 MCG/KG/MIN: 10 INJECTION, EMULSION INTRAVENOUS at 01:05

## 2024-05-23 RX ADMIN — FENTANYL CITRATE 25 MCG: 50 INJECTION INTRAMUSCULAR; INTRAVENOUS at 12:05

## 2024-05-23 RX ADMIN — ACETAMINOPHEN 650 MG: 325 TABLET ORAL at 03:05

## 2024-05-23 RX ADMIN — LIDOCAINE HYDROCHLORIDE 80 MG: 20 INJECTION, SOLUTION EPIDURAL; INFILTRATION; INTRACAUDAL at 12:05

## 2024-05-23 RX ADMIN — SODIUM CHLORIDE: 9 INJECTION, SOLUTION INTRAVENOUS at 02:05

## 2024-05-23 RX ADMIN — LIDOCAINE HYDROCHLORIDE 20 ML: 20 SOLUTION ORAL at 12:05

## 2024-05-23 RX ADMIN — PROTAMINE SULFATE 110 MG: 10 INJECTION, SOLUTION INTRAVENOUS at 02:05

## 2024-05-23 RX ADMIN — DIPHENHYDRAMINE HYDROCHLORIDE 50 MG: 50 CAPSULE ORAL at 12:05

## 2024-05-23 NOTE — TRANSFER OF CARE
Anesthesia Transfer of Care Note    Patient: Dominick Song MD    Procedure(s) Performed:   Left atrial appendage closure device (N/A)  ECHOCARDIOGRAM, TRANSESOPHAGEAL (N/A)    Patient location: PACU    Anesthesia Type: general    Transport from OR: Transported from OR on 6-10 L/min O2 by face mask with adequate spontaneous ventilation    Post pain: adequate analgesia    Post assessment: no apparent anesthetic complications and tolerated procedure well    Post vital signs: stable    Level of consciousness: awake    Nausea/Vomiting: no nausea/vomiting    Complications: none    Transfer of care protocol was followed      Last vitals: Visit Vitals  BP (!) 174/81   Pulse (!) 50   Temp 37 °C (98.6 °F) (Temporal)   Resp 17   Ht 6' (1.829 m)   Wt 77.1 kg (170 lb)   SpO2 99%   BMI 23.06 kg/m²

## 2024-05-23 NOTE — PLAN OF CARE
"Vss. Sb noted on cm.  S/p amulet procedure.  Pt arrived from cath lab to ep pacu 3 with right groin sutures/stopcock x1 ana,well approx. No drainage noted or hematoma. Palpable pulses noted. Sutures/stopcock placed at 1410.  Bedrest x4hrs.  Per cath lab md, sutures/stopcock removal and bedrest done at 1810.  Pt's wife updated over phone.  Dr lozoya came and updated pt in ep pacu 3. Verbalizes understanding. Pt tolerating water.  Pt complaints of "slight headache". Prn tylenol given per md order. At this time "tolerable".  Tele box on confirmed by tele tech. See flowsheet for full assessment. Pt voided via urinal 375ml clear yellow noted. Pt does have frequency voiding, urinal remains at bedside. Pt has pacemaker to left chest wall.  "

## 2024-05-23 NOTE — ASSESSMENT & PLAN NOTE
Normal EF, optimized on GDMT. Plan LAAO electively and PEPE to better define degree of MR after rigid salt restriction. Sizes for a 35 amulet in current volume expanded state. Will resize with PEPE. Suspect anterior flail leaflet or ruptured cord from TTE. Patient and wife understand and accept plan    Access; R groin   Cr: 1.3 ( BL)   Hgb 11.4 ( BL)

## 2024-05-23 NOTE — NURSING
Received report from Aaliyah METZ.   Patient s/p Amulet device.   AAOx4. VSS, c/o mild pain located to back, but tylenol was given, resp even and unlabored.   Sutures and stop cock in place to R groin. No active bleeding. No hematoma noted. Post procedure protocol reviewed with patient and patient's family.   Understanding verbalized. Family members at bedside. Nurse call bell within reach.

## 2024-05-23 NOTE — NURSING TRANSFER
Nursing Transfer Note      5/23/2024   5:13 PM    Nurse giving handoff:etta Campoverde rn   Nurse receiving handoff:bridger sscu charge rn    Reason patient is being transferred: ep pacu 3 to sscu 12/home     Transfer To: ep pacu 3 to sscu 12/home    Transfer via stretcher    Transfer with cardiac monitoring, tele box on confirmed by tele tech    Transported by tiffany arcos    Transfer Vital Signs:  Blood Pressure:174/82  Heart Rate:50  O2:99% room air  Temperature:97.8  Respirations:20    Telemetry: Box Number [id number], Rate 49, Rhythm sb, and Telemetry  bryan  Order for Tele Monitor? Yes    Additional Lines: none    4eyes on Skin: yes    Medicines sent: ns at 150ml/hr iv x3hrs    Any special needs or follow-up needed: sutures stopcock removal and bedrest done at 1810.  Bedrest x4hrs. Hemostasis 1410    Patient belongings transferred with patient: Yes tatyana hearing aids in place    Chart send with patient: Yes    Notified: spouse    Patient reassessed at: 5/23/24 1600  Upon arrival to floor: cardiac monitor applied, patient oriented to room, call bell in reach, and bed in lowest position

## 2024-05-23 NOTE — DISCHARGE SUMMARY
Discharge Summary  Interventional Cardiology      Admit Date: 5/23/2024    Discharge Date:  5/23/2024    Attending Physician: Tony Duvall MD        Principal Diagnoses: PAF (paroxysmal atrial fibrillation)  Indication for Admission: Left atrial appendage closure device (N/A), ECHOCARDIOGRAM, TRANSESOPHAGEAL (N/A)    Discharged Condition: Good    Hospital Course:   Patient presented for outpatient LAAO device implantation which went without complication. Underwent successful ALMA occlusion with 31 mm Amulet. See full cath report in Epic for details. Hemostasis of patient's R CFV access site was achieved with  Figure of 8 suture  . Patient was monitored per post-cath protocol, and his R groin access site was c/d/i with no hematoma. Patient was able to ambulate without difficulty. He was feeling well and anticipating discharge home today.     Outpatient Plan:  - D/C Eliquis   - Aspirin and Plavix for 6 months  - Follow up in six weeks with repeat EPPE     Diet: Cardiac diet    Activity: Ad kyra, wound care instructions provided    Disposition: Home or Self Care    Follow Up:      Discharge Medications:      Medication List        START taking these medications      aspirin 81 MG EC tablet  Commonly known as: ECOTRIN  Take 1 tablet (81 mg total) by mouth once daily.     clopidogreL 75 mg tablet  Commonly known as: PLAVIX  Take 1 tablet (75 mg total) by mouth once daily. First dose, please take 4 tablets ( 300 mg ) and then one tablet 75 mg daily after that     polyethylene glycol 240-22.72-6.72 -5.84 gram Solr  Commonly known as: COLYTE  Take 4,000 mLs by mouth once. for 1 dose            CHANGE how you take these medications      * buPROPion 300 MG 24 hr tablet  Commonly known as: WELLBUTRIN XL  Take 1 tablet (300 mg total) by mouth once daily.  What changed: Another medication with the same name was added. Make sure you understand how and when to take each.     * buPROPion 150 MG TB24 tablet  Commonly known as:  WELLBUTRIN XL  Take 1 tablet (150 mg total) by mouth once daily.  What changed: You were already taking a medication with the same name, and this prescription was added. Make sure you understand how and when to take each.           * This list has 2 medication(s) that are the same as other medications prescribed for you. Read the directions carefully, and ask your doctor or other care provider to review them with you.                CONTINUE taking these medications      alfuzosin 10 mg Tb24  Commonly known as: UROXATRAL  Take 1 tablet (10 mg total) by mouth daily with breakfast.     carvediloL 6.25 MG tablet  Commonly known as: COREG     cyclobenzaprine 10 MG tablet  Commonly known as: FLEXERIL  Take 1 tablet (10 mg total) by mouth 3 (three) times daily as needed for Muscle spasms.     EScitalopram oxalate 10 MG tablet  Commonly known as: LEXAPRO  TAKE 1 TABLET(10 MG) BY MOUTH EVERY DAY     FISH OIL CONCENTRATE ORAL     ICAR-C PLUS Tab  Generic drug: iron-vit c-b12-folic acid  Take 1 tablet by mouth once daily.     levothyroxine 75 MCG tablet  Commonly known as: SYNTHROID  Take 1 tablet (75 mcg total) by mouth before breakfast.     potassium chloride 10 MEQ Cpsr  Commonly known as: MICRO-K  Take 1 capsule (10 mEq total) by mouth every Mon, Wed, Fri.     propafenone 425 MG Cp12  Commonly known as: RYTHMOL SR  Take 1 capsule (425 mg total) by mouth every 12 (twelve) hours.     telmisartan 40 MG Tab  Commonly known as: MICARDIS  Take 1 tablet (40 mg total) by mouth once daily.     tiZANidine 4 MG tablet  Commonly known as: ZANAFLEX  Take 1 tablet (4 mg total) by mouth 3 (three) times daily as needed.     torsemide 20 MG Tab  Commonly known as: DEMADEX  Take 1 tablet (20 mg total) by mouth every Mon, Wed, Fri.            STOP taking these medications      ELIQUIS 5 mg Tab  Generic drug: apixaban               Where to Get Your Medications        [facility]  [address]  [facility] [address]      Hours: Always Open Phone: [phone]   aspirin 81 MG EC tablet  clopidogreL 75 mg tablet       These medications were sent to [id number] - [place] [address] & [address] [address]      Phone: [phone]   buPROPion 150 MG TB24 tablet  polyethylene glycol 240-22.72-6.72 -5.84 gram Solr

## 2024-05-23 NOTE — ANESTHESIA POSTPROCEDURE EVALUATION
Anesthesia Post Evaluation    Patient: Dominick Song MD    Procedure(s) Performed: Procedure(s) (LRB):  Left atrial appendage closure device (N/A)  ECHOCARDIOGRAM, TRANSESOPHAGEAL (N/A)    Final Anesthesia Type: general      Patient location during evaluation: PACU  Patient participation: Yes- Able to Participate  Level of consciousness: awake and alert  Post-procedure vital signs: reviewed and stable  Pain management: adequate  Airway patency: patent    PONV status at discharge: No PONV  Anesthetic complications: no      Cardiovascular status: blood pressure returned to baseline  Respiratory status: unassisted  Hydration status: euvolemic  Follow-up not needed.              Vitals Value Taken Time   /93 05/23/24 1448   Temp  05/23/24 1458   Pulse 51 05/23/24 1457   Resp 15 05/23/24 1457   SpO2 96 % 05/23/24 1457   Vitals shown include unfiled device data.      No case tracking events are documented in the log.      Pain/Pari Score: No data recorded

## 2024-05-23 NOTE — ANESTHESIA PREPROCEDURE EVALUATION
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 male with a PMHx significant for HTN, moderate/severe MR, BRENTON (uses CPAP), interstitial lung disease, and paroxysmal AFib (s/p ablation and PPM) with chronic anemia requiring anticoagulation for which ALMA occlusion device was recommended.     He now presents for the above procedure(s) with Interventional Cardiology - Dr. Duvall    Previous Airway (06/13/2022):    Intubated:  Postinduction    Mask Ventilation:  Easy mask    Attempts:  1   Attempted By:  Resident anesthesiologist    Method of Intubation:  Direct   Blade:  Conrad 2    Laryngeal View Grade: Grade I - full view of cords      Difficult Airway Encountered?: No     Complications:  None    Airway Device:  Oral endotracheal tube   Airway Device Size:  7.5    Tube secured:  22   Secured at:  The lips    Complicating Factors:  None    Pertinent Cardiac Studies:  Transthoracic Echo (03/14/2024)   Interpretation Summary    Left Ventricle: The left ventricle is normal in size. There is mild concentric hypertrophy. There is normal systolic function with a visually estimated ejection fraction of 55 - 60%. Diastolic function cannot be reliably determined in the presence of mitral valve disease.    Right Ventricle: Mild right ventricular enlargement. Systolic function is normal.    Left Atrium: Left atrium is severely dilated.    Aortic Valve: The aortic valve is a trileaflet valve. There is mild aortic regurgitation.    Mitral Valve: There is posterior leaflet tethering. There is severe (3+), functional regurgitation with a posterolateral eccentriccally directed jet. Systolic blunting of the pulmonary veins.  The mean pressure gradient across the mitral valve is 2 mmHg at a heart rate of 88 bpm.    Tricuspid Valve: There is mild regurgitation.    Aorta: Aortic root is mildly dilated measuring 3.72 cm.    Pulmonary Artery: No pulmonary hypertension. The estimated pulmonary artery systolic pressure is 34 mmHg.    IVC/SVC: Normal venous pressure at 3 mmHg.       Patient Active Problem List   Diagnosis    BPH with urinary obstruction    Elevated PSA    Nuclear sclerosis    Anemia due to chronic blood loss    Posterior vitreous detachment    Metatarsalgia    Keratoma    Foot pain, right    Onychomycosis due to dermatophyte    ED (erectile dysfunction)    Bradycardia    Mild single current episode of major depressive disorder    Tortuous aorta    Dyspnea    Acquired hypothyroidism    Essential hypertension    Gait abnormality    Chronic kidney disease, stage 3a    Complex sleep apnea syndrome    Erectile dysfunction due to arterial insufficiency    Primary osteoarthritis of right knee    History of bleeding peptic ulcer    History of TIA (transient ischemic attack)    Closed nondisplaced intertrochanteric fracture of left femur    Stroke    Hx of colonic polyps    Age-related osteoporosis with current pathological fracture with routine healing    Hyperparathyroidism    Ventral hernia without obstruction or gangrene    ILD (interstitial lung disease)    ACP (advance care planning)    Pacemaker    Pyogenic arthritis of right knee joint    PAF (paroxysmal atrial fibrillation)    Infected prosthetic knee joint    Hyperkalemia    Acute blood loss anemia    Persistent atrial fibrillation    Chronic heart failure with preserved ejection fraction (HFpEF)    At high risk for bleeding    Nonrheumatic mitral valve regurgitation    Idiopathic pulmonary fibrosis       Review of patient's allergies indicates:   Allergen Reactions    No known drug allergies        Current Outpatient Medications   Medication Instructions    alfuzosin  (UROXATRAL) 10 mg, Oral, With breakfast    buPROPion (WELLBUTRIN XL) 300 mg, Oral, Daily    carvediloL (COREG) 12.5 mg, Oral, 2 times daily    cyclobenzaprine (FLEXERIL) 10 mg, Oral, 3 times daily PRN    ELIQUIS 5 mg, Oral, 2 times daily    EScitalopram oxalate (LEXAPRO) 10 MG tablet TAKE 1 TABLET(10 MG) BY MOUTH EVERY DAY    iron-vit c-b12-folic acid (ICAR-C PLUS) Tab 1 tablet, Oral, Daily    levothyroxine (SYNTHROID) 75 mcg, Oral, Before breakfast    OMEGA-3 FATTY ACIDS (FISH OIL CONCENTRATE ORAL) 1 capsule, Oral, Daily    potassium chloride (MICRO-K) 10 MEQ CpSR 10 mEq, Oral, Every Mon, Wed, Fri    propafenone (RYTHMOL SR) 425 mg, Oral, Every 12 hours    telmisartan (MICARDIS) 40 mg, Oral, Daily    tiZANidine (ZANAFLEX) 4 mg, Oral, 3 times daily PRN    torsemide (DEMADEX) 20 mg, Oral, Every Mon, Wed, Fri       Past Surgical History:   Procedure Laterality Date    A-V CARDIAC PACEMAKER INSERTION Left 9/23/2023    Procedure: Dual Chamber PPM (Heart) Rm 2620;  Surgeon: Pan Moss MD;  Location: [facility] CATH;  Service: Cardiology;  Laterality: Left;    ABDOMINAL HERNIA REPAIR      ABLATION OF ARRHYTHMOGENIC FOCUS FOR ATRIAL FIBRILLATION N/A 6/15/2020    Procedure: Ablation atrial fibrillation;  Surgeon: Wyatt Beatty MD;  Location: [facility] EP LAB;  Service: Cardiology;  Laterality: N/A;  PAF, AFl, PEPE, PVI re-do, CTI, RFA, JUSTIN, Gen, SK, 3 Prep    APPLICATION OF WOUND VACUUM-ASSISTED CLOSURE DEVICE Right 12/9/2023    Procedure: APPLICATION, WOUND VAC;  Surgeon: Jelani Tello II, MD;  Location: [facility] OR;  Service: Orthopedics;  Laterality: Right;    CARDIOVERSION N/A 2/28/2024    Procedure: Cardioversion;  Surgeon: Pao Hare MD;  Location: [facility];  Service: Cardiology;  Laterality: N/A;    CATARACT EXTRACTION  11/25/2013    bilateral    CHOLECYSTECTOMY      COLONOSCOPY N/A 11/25/2015    Procedure: COLONOSCOPY;  Surgeon: Toby Hernandez MD;  Location: [facility];  Service: Endoscopy;  Laterality:  N/A;    COLONOSCOPY N/A 11/13/2020    Procedure: COLONOSCOPY;  Surgeon: Toby Hernandez MD;  Location: [facility] ENDO;  Service: Endoscopy;  Laterality: N/A;    COLONOSCOPY N/A 5/1/2024    Procedure: COLONOSCOPY;  Surgeon: Toby Hernandez MD;  Location: [facility] ENDO;  Service: Endoscopy;  Laterality: N/A;    CYSTOSCOPY WITH INSERTION OF MINIMALLY INVASIVE IMPLANT TO ENLARGE PROSTATIC URETHRA N/A 11/28/2022    Procedure: CYSTOSCOPY, WITH INSERTION OF UROLIFT IMPLANT;  Surgeon: Yakov Shetty MD;  Location: [facility] OR;  Service: Urology;  Laterality: N/A;    ESOPHAGOGASTRODUODENOSCOPY N/A 5/1/2024    Procedure: ESOPHAGOGASTRODUODENOSCOPY (EGD);  Surgeon: Toby Hernandez MD;  Location: [facility] ENDO;  Service: Endoscopy;  Laterality: N/A;    EYE SURGERY      GASTRIC BYPASS  1992    INSERTION, PACEMAKER, TEMPORARY TRANSVENOUS  9/22/2023    Procedure: Temp pacer insertion ER Rm 8;  Surgeon: Pan Moss MD;  Location: [facility] CATH;  Service: Cardiology;;    INTRAMEDULLARY RODDING OF FEMUR Left 7/18/2020    Procedure: INSERTION, INTRAMEDULLARY ERIC, FEMUR, left, Synthes, [place] table, Large C arm clock side,;  Surgeon: Tony Rodriguez MD;  Location: [address];  Service: Orthopedics;  Laterality: Left;    IRRIGATION AND DEBRIDEMENT Right 12/9/2023    Procedure: IRRIGATION AND DEBRIDEMENT KNEE;  Surgeon: Jelani Tello II, MD;  Location: [facility] OR;  Service: Orthopedics;  Laterality: Right;    KNEE ARTHROSCOPY      RT    LASIK  2001    both eyes (Dr. Rabago)    ORIF HUMERUS FRACTURE      LT    ORIF HUMERUS FRACTURE Right     non surgical repair    RADIOFREQUENCY ABLATION      x2    REVISION OF KNEE ARTHROPLASTY Right 12/9/2023    Procedure: REVISION ARTHROPLASTY KNEE- Liner Replacement - dirty/clean;  Surgeon: Jelani Tello II, MD;  Location: [facility] OR;  Service: Orthopedics;  Laterality: Right;  dirty to clean at 1940    Right ankle tendon repair      ROBOT-ASSISTED REPAIR OF INCISIONAL HERNIA USING DA GARETH  XI Left 6/13/2022    Procedure: XI ROBOTIC REPAIR, HERNIA, INCISIONAL (LEFT SIDE SPIGELIAN WITH MESH);  Surgeon: Rosendo Marti MD;  [facility];  Service: General;  Laterality: Left;  consent in am    ROTATOR CUFF REPAIR      right    TOTAL KNEE ARTHROPLASTY Right 5/29/2018    Procedure: REPLACEMENT-KNEE-TOTAL-pro;  Surgeon: Denzel Dallas MD;  [facility];  Service: Orthopedics;  Laterality: Right;  Pro    TRANSESOPHAGEAL ECHOCARDIOGRAPHY N/A 4/23/2024    Procedure: ECHOCARDIOGRAM, TRANSESOPHAGEAL;  Surgeon: Jan Bravo Diagnostic;  Location: [facility] EP LAB;  Service: Cardiology;  Laterality: N/A;    TREATMENT OF CARDIAC ARRHYTHMIA  6/15/2020    Procedure: Cardioversion or Defibrillation;  Surgeon: Wyatt Beatty MD;  Location: [facility] EP LAB;  Service: Cardiology;;    TREATMENT OF CARDIAC ARRHYTHMIA N/A 2/28/2024    Procedure: Cardioversion or Defibrillation;  Surgeon: Pao Hare MD;  Location: [facility];  Service: Cardiology;  Laterality: N/A;       Social History     Substance and Sexual Activity   Drug Use No     Alcohol Use: Not At Risk (4/22/2024)    AUDIT-C     Frequency of Alcohol Consumption: Never     Average Number of Drinks: Patient does not drink     Frequency of Binge Drinking: Never     Tobacco Use: Low Risk  (5/9/2024)    Patient History     Smoking Tobacco Use: Never     Smokeless Tobacco Use: Never     Passive Exposure: Never       OBJECTIVE:     Vital Signs Range (Last 24H):         Significant Labs    Heme Profile  Lab Results   Component Value Date    WBC 7.56 05/20/2024    HGB 11.4 (L) 05/20/2024    HCT 36.4 (L) 05/20/2024     05/20/2024       Coagulation Studies  Lab Results   Component Value Date    LABPROT 11.4 05/20/2024    INR 1.0 05/20/2024    APTT 35.2 12/09/2023       BMP  Lab Results   Component Value Date     05/20/2024    K 4.7 05/20/2024     (H) 05/20/2024    CO2 20 (L) 05/20/2024    BUN 19 05/20/2024    CREATININE  1.3 05/20/2024    MG 2.1 04/01/2024    PHOS 2.2 (L) 12/13/2023       Liver Function Tests  Lab Results   Component Value Date    AST 21 05/20/2024    ALT 19 05/20/2024    ALKPHOS 68 05/20/2024    BILITOT 0.7 05/20/2024    PROT 5.8 (L) 05/20/2024    ALBUMIN 2.9 (L) 05/20/2024       Lipid Profile  Lab Results   Component Value Date    CHOL 115 (L) 08/30/2023    HDL 49 08/30/2023    TRIG 46 08/30/2023       Endocrine Profile  Lab Results   Component Value Date    HGBA1C 5.5 03/05/2024    TSH 3.362 03/18/2024       Diagnostic Studies    CTA Cardiac (03/26/2024)  1. Please refer to separate dictation regarding anatomy of the left atrial appendage.  2. Concern for developing pulmonary fibrosis.  A developing interstitial process such is UIP is favored.  3. Query for congestive hepatopathy.    Cardiac Studies    EKG:   Results for orders placed or performed during the hospital encounter of 04/23/24   EKG 12-lead    Collection Time: 04/23/24  8:49 AM   Result Value Ref Range    QRS Duration 110 ms    [facility] QTC Calculation 431 ms    Narrative    Test Reason : I34.0,    Vent. Rate : 058 BPM     Atrial Rate : 058 BPM     P-R Int : 228 ms          QRS Dur : 110 ms      QT Int : 440 ms       P-R-T Axes : 087 061 074 degrees     QTc Int : 431 ms    Sinus bradycardia with 1st degree A-V block  Otherwise normal ECG  When compared with ECG of 12-MAR-2024 07:53,  No significant change was found  Confirmed by Hank CLARK MD ([id number]) on 4/23/2024 10:27:56 AM    Referred By: JHONY DIAZ           Confirmed By:Hank CLARK MD       Transthoracic Echo (03/14/2024):  Interpretation Summary    Left Ventricle: The left ventricle is normal in size. There is mild concentric hypertrophy. There is normal systolic function with a visually estimated ejection fraction of 55 - 60%. Diastolic function cannot be reliably determined in the presence of mitral valve disease.    Right Ventricle: Mild right ventricular enlargement. Systolic function is  normal.    Left Atrium: Left atrium is severely dilated.    Aortic Valve: The aortic valve is a trileaflet valve. There is mild aortic regurgitation.    Mitral Valve: There is posterior leaflet tethering. There is severe (3+), functional regurgitation with a posterolateral eccentriccally directed jet. Systolic blunting of the pulmonary veins. The mean pressure gradient across the mitral valve is 2 mmHg at a heart rate of 88 bpm.    Tricuspid Valve: There is mild regurgitation.    Aorta: Aortic root is mildly dilated measuring 3.72 cm.    Pulmonary Artery: No pulmonary hypertension. The estimated pulmonary artery systolic pressure is 34 mmHg.    IVC/SVC: Normal venous pressure at 3 mmHg.      Transesophageal Echo (04/23/2024):  Interpretation Summary    Left Ventricle: The left ventricle is normal in size. Normal wall thickness. Normal wall motion. There is normal systolic function with a visually estimated ejection fraction of 55 - 60%.    Right Ventricle: Normal right ventricular cavity size. Wall thickness is normal. Right ventricle wall motion  is normal. Systolic function is normal.    Left Atrium: Left atrium is severely dilated. The left atrial appendage appears normal. Appendage velocity is normal at greater than 40 cm/sec. There is no thrombus in the left atrial cavity.    Right Atrium: Right atrium is mildly dilated.    Aortic Valve: Mildly calcified cusps. There is mild annular calcification present.    Aorta: Grade 1 atherosclerosis.    Mitral Valve: Restricted posterior leaflet with anterior overshoot related to dilated mitral annulus.  MR is very eccentric, however appears mild and not more than moderate on current study.  MR is not severe on current study.      Nuclear Stress Echo (09/20/2023)  Interpretation Summary    Normal myocardial perfusion scan. There is no evidence of myocardial ischemia or infarction.    The gated perfusion images showed an ejection fraction of 60% post stress.    LV cavity  size is normal at rest and normal at stress.    The ECG portion of the study is abnormal but not diagnostic.    The patient reported no chest pain during the stress test.      Cardiac Device Check (10/04/2023)  Narrative  Additional Comments  IN OFFICE device interrogation and testing performed  s/p Abbott dual chamber PPM implanted 2023 programmed [id number]  Presenting egram demonstrates: Ap/Vs  Underlying rhythm c/w: sinus bradycardia w/PAC's  w/ 1st  degree AVB  Device fxn: WNL- small p waves (1.7 mV)  RA pacin% RV pacin.3%  AT/AF burden-0%  Atrial arrhythmias:  x 1- EGM c/w noise from implant  Anticoagulation status: Eliquis  Ventricular arrhythmias:  none  Battery status/longevity: 9.6-11.1 years  Reprogramming at this visit: A cap confirm- monitor > Off (failed)V. 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 male with atrial fibrillation presenting for left atrial appendage occlusion device placement.       Pre-op Assessment    I have reviewed the Patient Summary Reports.     I have reviewed the Nursing Notes.    I have reviewed the Medications.     Review of Systems  Anesthesia Hx:   History of prior surgery of interest to airway management or planning:            Denies Personal Hx of Anesthesia complications.                    Cardiovascular:     Hypertension    Dysrhythmias atrial fibrillation                                   Renal/:  Chronic Renal Disease                Neurological:   CVA                                    Psych:  Psychiatric History                  Physical Exam  General: Well nourished    Airway:  Mallampati: II   Mouth Opening: Normal  TM Distance:  Normal  Tongue: Normal  Neck ROM: Normal ROM    Dental:  Intact    Chest/Lungs:  Clear to auscultation, Normal Respiratory Rate    Heart:  Rate: Normal        Anesthesia Plan  Type of Anesthesia, risks & benefits discussed:    Anesthesia Type: Gen ETT  Intra-op Monitoring Plan: Standard ASA Monitors, Art Line and PEPE  Post Op Pain Control Plan: multimodal analgesia and IV/PO Opioids PRN  Induction:  IV  Informed Consent: Informed consent signed with the Patient and all parties understand the risks and agree with anesthesia plan.  All questions answered.   ASA Score: 3  Anesthesia Plan Notes: Plan for general natural airway, discussed anesthetic risks, questions answered    Ready For Surgery From Anesthesia Perspective.     .

## 2024-05-23 NOTE — PROCEDURES
Brief Operative Note:    : Tony Duvall MD     Referring Physician: Wyatt Beatty     All Operators: Surgeon(s):  Fiorella Serna, Kj Ronquillo MD Jenkins, James S., MD Tandan, Nitin, MD     Preoperative Diagnosis: PAF (paroxysmal atrial fibrillation) [I48.0]     Postop Diagnosis: PAF (paroxysmal atrial fibrillation) [I48.0]    Treatments/Procedures: Procedure(s) (LRB):  Left atrial appendage closure device (N/A)  ECHOCARDIOGRAM, TRANSESOPHAGEAL (N/A)    Access: Right CFV        See catheterization report for full details.    Intervention: s/p successful ALMA occlusion with Amulet device     See catheterization report for full details.    Closure device:  Figure of 8 suture         Plan:  - Post cath protocol   - IVF @ 150 cc/kg/hr x 3 hours  - Bed rest x 4 hours   - Continue aspirin 81 mg daily indefinitely  - Start Plavix 75 mg daily   - D/C Eliquis   - Follow up in six weeks with repeat PEPE      Estimated Blood loss: 20 cc    Specimens removed: No    Fiorella Serna MD

## 2024-05-23 NOTE — SUBJECTIVE & OBJECTIVE
Past Medical History:   Diagnosis Date    Anticoagulant long-term use     Anxiety     Arthritis     Atrial fibrillation     BPH (benign prostatic hyperplasia)     Cataract     CKD (chronic kidney disease) stage 3, GFR 30-59 ml/min 07/10/2017    Followed by Dr. Jeevan Pond    Colon polyp     benign    Depression     Elevated PSA     Erectile dysfunction     Gastric ulcer with hemorrhage     Hep B w/o coma 1977    History of bleeding peptic ulcer     History of prostatitis     Hypertension     PAF (paroxysmal atrial fibrillation)     Sleep apnea     PT DENIES    Stroke     TIA December 2019    Thyroid disease        Past Surgical History:   Procedure Laterality Date    A-V CARDIAC PACEMAKER INSERTION Left 9/23/2023    Procedure: Dual Chamber PPM (Heart) Rm 2620;  Surgeon: Pan Moss MD;  Location: [facility] CATH;  Service: Cardiology;  Laterality: Left;    ABDOMINAL HERNIA REPAIR      ABLATION OF ARRHYTHMOGENIC FOCUS FOR ATRIAL FIBRILLATION N/A 6/15/2020    Procedure: Ablation atrial fibrillation;  Surgeon: Wyatt Beatty MD;  Location: [facility] EP LAB;  Service: Cardiology;  Laterality: N/A;  PAF, AFl, PEPE, PVI re-do, CTI, RFA, JUSTIN, Gen, SK, 3 Prep    APPLICATION OF WOUND VACUUM-ASSISTED CLOSURE DEVICE Right 12/9/2023    Procedure: APPLICATION, WOUND VAC;  Surgeon: Jelani Tello II, MD;  Location: [facility] OR;  Service: Orthopedics;  Laterality: Right;    CARDIOVERSION N/A 2/28/2024    Procedure: Cardioversion;  Surgeon: Pao Hare MD;  Location: [facility];  Service: Cardiology;  Laterality: N/A;    CATARACT EXTRACTION  11/25/2013    bilateral    CHOLECYSTECTOMY      COLONOSCOPY N/A 11/25/2015    Procedure: COLONOSCOPY;  Surgeon: Toby Hernanedz MD;  Location: [facility] ENDO;  Service: Endoscopy;  Laterality: N/A;    COLONOSCOPY N/A 11/13/2020    Procedure: COLONOSCOPY;  Surgeon: Toby Hernandez MD;  Location: [facility] ENDO;  Service: Endoscopy;  Laterality: N/A;    COLONOSCOPY N/A 5/1/2024    Procedure:  COLONOSCOPY;  Surgeon: Toby Hernandez MD;  Location: [facility] ENDO;  Service: Endoscopy;  Laterality: N/A;    CYSTOSCOPY WITH INSERTION OF MINIMALLY INVASIVE IMPLANT TO ENLARGE PROSTATIC URETHRA N/A 11/28/2022    Procedure: CYSTOSCOPY, WITH INSERTION OF UROLIFT IMPLANT;  Surgeon: Yakov Shetty MD;  Location: [facility] OR;  Service: Urology;  Laterality: N/A;    ESOPHAGOGASTRODUODENOSCOPY N/A 5/1/2024    Procedure: ESOPHAGOGASTRODUODENOSCOPY (EGD);  Surgeon: Toby Hernandez MD;  Location: [facility] ENDO;  Service: Endoscopy;  Laterality: N/A;    EYE SURGERY      GASTRIC BYPASS  1992    INSERTION, PACEMAKER, TEMPORARY TRANSVENOUS  9/22/2023    Procedure: Temp pacer insertion ER Rm 8;  Surgeon: Pan Moss MD;  Location: [facility] CATH;  Service: Cardiology;;    INTRAMEDULLARY RODDING OF FEMUR Left 7/18/2020    Procedure: INSERTION, INTRAMEDULLARY ERIC, FEMUR, left, Synthes, [place] table, Large C arm clock side,;  Surgeon: Tony Rodriguez MD;  Location: [facility] OR [facility] FLR;  Service: Orthopedics;  Laterality: Left;    IRRIGATION AND DEBRIDEMENT Right 12/9/2023    Procedure: IRRIGATION AND DEBRIDEMENT KNEE;  Surgeon: Jelani Tello II, MD;  Location: [facility] OR;  Service: Orthopedics;  Laterality: Right;    KNEE ARTHROSCOPY      RT    LASIK  2001    both eyes (Dr. Rabago)    ORIF HUMERUS FRACTURE      LT    ORIF HUMERUS FRACTURE Right     non surgical repair    RADIOFREQUENCY ABLATION      x2    REVISION OF KNEE ARTHROPLASTY Right 12/9/2023    Procedure: REVISION ARTHROPLASTY KNEE- Liner Replacement - dirty/clean;  Surgeon: Jelani Tello II, MD;  Location: [facility] OR;  Service: Orthopedics;  Laterality: Right;  dirty to clean at 1940    Right ankle tendon repair      ROBOT-ASSISTED REPAIR OF INCISIONAL HERNIA USING DA GARETH XI Left 6/13/2022    Procedure: XI ROBOTIC REPAIR, HERNIA, INCISIONAL (LEFT SIDE SPIGELIAN WITH MESH);  Surgeon: Rosendo Marti MD;  Location: [facility] OR 2ND FLR;  Service: General;   Laterality: Left;  consent in am    ROTATOR CUFF REPAIR      right    TOTAL KNEE ARTHROPLASTY Right 5/29/2018    Procedure: REPLACEMENT-KNEE-TOTAL-axel;  Surgeon: Denzel Dallas MD;  Location: [address];  Service: Orthopedics;  Laterality: Right;  [place]    TRANSESOPHAGEAL ECHOCARDIOGRAPHY N/A 4/23/2024    Procedure: ECHOCARDIOGRAM, TRANSESOPHAGEAL;  Surgeon: Provider, Dosc Diagnostic;  Location: [facility] EP LAB;  Service: Cardiology;  Laterality: N/A;    TREATMENT OF CARDIAC ARRHYTHMIA  6/15/2020    Procedure: Cardioversion or Defibrillation;  Surgeon: Wyatt Beatty MD;  Location: [facility] EP LAB;  Service: Cardiology;;    TREATMENT OF CARDIAC ARRHYTHMIA N/A 2/28/2024    Procedure: Cardioversion or Defibrillation;  Surgeon: Pao Hare MD;  Location: [facility];  Service: Cardiology;  Laterality: N/A;       Review of patient's allergies indicates:   Allergen Reactions    No known drug allergies        PTA Medications   Medication Sig    alfuzosin (UROXATRAL) 10 mg Tb24 Take 1 tablet (10 mg total) by mouth daily with breakfast.    buPROPion (WELLBUTRIN XL) 300 MG 24 hr tablet Take 1 tablet (300 mg total) by mouth once daily.    carvediloL (COREG) 6.25 MG tablet Take 12.5 mg by mouth 2 (two) times daily.    cyclobenzaprine (FLEXERIL) 10 MG tablet Take 1 tablet (10 mg total) by mouth 3 (three) times daily as needed for Muscle spasms.    ELIQUIS 5 mg Tab Take 1 tablet (5 mg total) by mouth 2 (two) times a day.    EScitalopram oxalate (LEXAPRO) 10 MG tablet TAKE 1 TABLET(10 MG) BY MOUTH EVERY DAY    iron-vit c-b12-folic acid (ICAR-C PLUS) Tab Take 1 tablet by mouth once daily.    levothyroxine (SYNTHROID) 75 MCG tablet Take 1 tablet (75 mcg total) by mouth before breakfast.    OMEGA-3 FATTY ACIDS (FISH OIL CONCENTRATE ORAL) Take 1 capsule by mouth Daily.    potassium chloride (MICRO-K) 10 MEQ CpSR Take 1 capsule (10 mEq total) by mouth every Mon, Wed, Fri.    propafenone (RYTHMOL SR) 425 MG Cp12 Take 1 capsule  (425 mg total) by mouth every 12 (twelve) hours.    telmisartan (MICARDIS) 40 MG Tab Take 1 tablet (40 mg total) by mouth once daily.    tiZANidine (ZANAFLEX) 4 MG tablet Take 1 tablet (4 mg total) by mouth 3 (three) times daily as needed.    torsemide (DEMADEX) 20 MG Tab Take 1 tablet (20 mg total) by mouth every Mon, Wed, Fri.     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   HENT:  Negative for congestion and ear discharge.    Eyes:  Negative for blurred vision and discharge.   Cardiovascular:  Negative for chest pain, dyspnea on exertion, irregular heartbeat, leg swelling and paroxysmal nocturnal dyspnea.   Respiratory:  Negative for cough, hemoptysis and shortness of breath.    Gastrointestinal:  Negative for abdominal pain.     Objective:     Vital Signs (Most Recent):    Vital Signs (24h Range):           There is no height or weight on file to calculate BMI.            No intake or output data in the 24 hours ending 05/23/24 1100    Lines/Drains/Airways       None                    Physical Exam  Constitutional:       General: He is not in acute distress.     Appearance: He is well-developed. He is not diaphoretic.   Eyes:      Conjunctiva/sclera: Conjunctivae normal.      Pupils: Pupils are equal, round, and reactive to light.   Neck:      Thyroid: No thyromegaly.      Trachea: No tracheal deviation.   Cardiovascular:      Rate and Rhythm:  "Regular rhythm. Bradycardia present.      Pulses: Intact distal pulses.           Radial pulses are 2+ on the right side and 2+ on the left side.        Femoral pulses are 2+ on the right side and 2+ on the left side.     Heart sounds: Normal heart sounds. No murmur heard.     No friction rub. No gallop.   Pulmonary:      Effort: Pulmonary effort is normal. No respiratory distress.      Breath sounds: Normal breath sounds. No wheezing or rales.   Abdominal:      General: Bowel sounds are normal. There is no distension.      Palpations: Abdomen is soft.      Tenderness: There is no abdominal tenderness.   Musculoskeletal:         General: No deformity.   Skin:     General: Skin is warm and dry.   Neurological:      Mental Status: He is alert and oriented to person, place, and time.      Cranial Nerves: No cranial nerve deficit.      Coordination: Coordination normal.   Psychiatric:         Behavior: Behavior normal.            Significant Labs: ABG: No results for input(s): "PH", "PCO2", "HCO3", "POCSATURATED", "BE" in the last 48 hours., Blood Culture: No results for input(s): "LABBLOO" in the last 48 hours., BMP: No results for input(s): "GLU", "NA", "K", "CL", "CO2", "BUN", "CREATININE", "CALCIUM", "MG" in the last 48 hours., CMP No results for input(s): "NA", "K", "CL", "CO2", "GLU", "BUN", "CREATININE", "CALCIUM", "PROT", "ALBUMIN", "BILITOT", "ALKPHOS", "AST", "ALT", "ANIONGAP", "ESTGFRAFRICA", "EGFRNONAA" in the last 48 hours., CBC No results for input(s): "WBC", "HGB", "HCT", "PLT" in the last 48 hours., INR No results for input(s): "INR", "PROTIME" in the last 48 hours., Lipid Panel No results for input(s): "CHOL", "HDL", "LDLCALC", "TRIG", "CHOLHDL" in the last 48 hours.,   Pathology Results  (Last 10 years)                 05/01/24 0824  Specimen to Pathology, Surgery Gastrointestinal tract Final result    Narrative:  Pre-op Diagnosis: Occult blood positive stool [R19.5]   Anemia due to blood loss [D50.0] " "  Procedure(s):   ESOPHAGOGASTRODUODENOSCOPY (EGD)   COLONOSCOPY   1. Jejunum   2. Ascending colon polyp   Release to patient->Immediate   Specimen total (fresh, frozen, permanent):->2           , Troponin No results for input(s): "TROPONINI" in the last 48 hours., and All pertinent lab results from the last 24 hours have been reviewed.     "

## 2024-05-23 NOTE — CONSULTS
[facility], [facility]  PEPE Consult      Dominick Song MD  YOB: 1945  Medical Record Number:  [id number]  Attending Physician:  Tony Duvall MD   Date of Admission: 5/23/2024       Hospital Day:  0  Current Principal Problem:  Atrial fibrillation      History         HPI  78 M w HTN, moderate/severe MR, BRENTON (uses CPAP), interstitial lung disease, and paroxysmal AFib (s/p ablation and PPM) with chronic anemia requiring anticoagulation for which ALMA occlusion device was recommended.   He has chronic anemia with a positive stool occult blood. He is chronically on eliquis for his PAF with a CHADSVASC 6 and HASBLED 4. While undergoing some workup for occluder device his repeat echo showed a normal LVEF, mod-severe MR with a posterior eccentric jet, dilated IVC. After doing some research he was interested obtaining more information about mitral clip and LAAO occluder devices.      IC Planning for 35 amulet in current volume expanded state.     He has no complaints and feels well.     Anticoagulant/antiplatelets: eliquis    Dysphagia or odynophagia:  no  Liver Disease, esophageal disease, or known varices:  no  Upper GI Bleeding: no  Snoring:  no  Sleep Apnea:  no  Prior neck surgery or radiation:  no  History of anesthetic difficulties:  no  Family history of anesthetic difficulties:  no  Last oral intake: last pm   Able to move neck in all directions: yes  Platelet count: 248K  INR: 1.0        Medications - Outpatient  Prior to Admission medications    Medication Sig Start Date End Date Taking? Authorizing Provider   alfuzosin (UROXATRAL) 10 mg Tb24 Take 1 tablet (10 mg total) by mouth daily with breakfast. 8/30/23 8/29/24  Yakov Shetty MD   buPROPion (WELLBUTRIN XL) 300 MG 24 hr tablet Take 1 tablet (300 mg total) by mouth once daily. 5/19/24   Maxi Gaines MD   carvediloL (COREG) 6.25 MG tablet Take 12.5 mg by mouth 2 (two) times daily. 12/7/23   Provider, Historical    cyclobenzaprine (FLEXERIL) 10 MG tablet Take 1 tablet (10 mg total) by mouth 3 (three) times daily as needed for Muscle spasms. 4/17/24   Maxi Gaines MD   ELIQUIS 5 mg Tab Take 1 tablet (5 mg total) by mouth 2 (two) times a day. 9/25/23   Sidney Samaniego MD   EScitalopram oxalate (LEXAPRO) 10 MG tablet TAKE 1 TABLET(10 MG) BY MOUTH EVERY DAY 1/17/24   Maxi Gaines MD   iron-vit c-b12-folic acid (ICAR-C PLUS) Tab Take 1 tablet by mouth once daily. 3/12/24 3/12/25  Maxi Gaines MD   levothyroxine (SYNTHROID) 75 MCG tablet Take 1 tablet (75 mcg total) by mouth before breakfast. 4/30/24   Maxi Gaines MD   OMEGA-3 FATTY ACIDS (FISH OIL CONCENTRATE ORAL) Take 1 capsule by mouth Daily. 1/18/12   ProviderNaga   potassium chloride (MICRO-K) 10 MEQ CpSR Take 1 capsule (10 mEq total) by mouth every Mon, Wed, Fri. 10/9/23   Pan Moss MD   propafenone (RYTHMOL SR) 425 MG Cp12 Take 1 capsule (425 mg total) by mouth every 12 (twelve) hours. 2/28/24 2/27/25  Myriam Curry NP   telmisartan (MICARDIS) 40 MG Tab Take 1 tablet (40 mg total) by mouth once daily. 10/13/23 10/12/24  Maxi Gaines MD   tiZANidine (ZANAFLEX) 4 MG tablet Take 1 tablet (4 mg total) by mouth 3 (three) times daily as needed. 4/10/24   Jelani Tello II, MD   torsemide (DEMADEX) 20 MG Tab Take 1 tablet (20 mg total) by mouth every Mon, Wed, Fri. 10/9/23 10/8/24  Pan Moss MD         Medications - Current  Scheduled Meds:   diphenhydrAMINE  50 mg Oral Once     Continuous Infusions:   sodium chloride 0.9%   Intravenous Continuous         PRN Meds:.      Allergies  Review of patient's allergies indicates:   Allergen Reactions    No known drug allergies          Past Medical History  Past Medical History:   Diagnosis Date    Anticoagulant long-term use     Anxiety     Arthritis     Atrial fibrillation     BPH (benign prostatic hyperplasia)     Cataract     CKD (chronic  kidney disease) stage 3, GFR 30-59 ml/min 07/10/2017    Followed by Dr. Jeevan Pond    Colon polyp     benign    Depression     Elevated PSA     Erectile dysfunction     Gastric ulcer with hemorrhage     Hep B w/o coma 1977    History of bleeding peptic ulcer     History of prostatitis     Hypertension     PAF (paroxysmal atrial fibrillation)     Sleep apnea     PT DENIES    Stroke     TIA December 2019    Thyroid disease          Past Surgical History  Past Surgical History:   Procedure Laterality Date    A-V CARDIAC PACEMAKER INSERTION Left 9/23/2023    Procedure: Dual Chamber PPM (Heart) Rm 2620;  Surgeon: Pan Moss MD;  Location: [facility] CATH;  Service: Cardiology;  Laterality: Left;    ABDOMINAL HERNIA REPAIR      ABLATION OF ARRHYTHMOGENIC FOCUS FOR ATRIAL FIBRILLATION N/A 6/15/2020    Procedure: Ablation atrial fibrillation;  Surgeon: Wyatt Beatty MD;  Location: [facility] EP LAB;  Service: Cardiology;  Laterality: N/A;  PAF, AFl, PEPE, PVI re-do, CTI, RFA, JUSTIN, Gen, SK, 3 Prep    APPLICATION OF WOUND VACUUM-ASSISTED CLOSURE DEVICE Right 12/9/2023    Procedure: APPLICATION, WOUND VAC;  Surgeon: Jelani Tello II, MD;  Location: [facility] OR;  Service: Orthopedics;  Laterality: Right;    CARDIOVERSION N/A 2/28/2024    Procedure: Cardioversion;  Surgeon: Pao Hare MD;  Location: [facility];  Service: Cardiology;  Laterality: N/A;    CATARACT EXTRACTION  11/25/2013    bilateral    CHOLECYSTECTOMY      COLONOSCOPY N/A 11/25/2015    Procedure: COLONOSCOPY;  Surgeon: Toby Hernandez MD;  Location: [facility] ENDO;  Service: Endoscopy;  Laterality: N/A;    COLONOSCOPY N/A 11/13/2020    Procedure: COLONOSCOPY;  Surgeon: Toby Hernandez MD;  Location: [facility] ENDO;  Service: Endoscopy;  Laterality: N/A;    COLONOSCOPY N/A 5/1/2024    Procedure: COLONOSCOPY;  Surgeon: Toby Hernandez MD;  Location: [facility] ENDO;  Service: Endoscopy;  Laterality: N/A;    CYSTOSCOPY WITH INSERTION OF MINIMALLY INVASIVE IMPLANT TO  ENLARGE PROSTATIC URETHRA N/A 11/28/2022    Procedure: CYSTOSCOPY, WITH INSERTION OF UROLIFT IMPLANT;  Surgeon: Yakov Shetty MD;  Location: [facility] OR;  Service: Urology;  Laterality: N/A;    ESOPHAGOGASTRODUODENOSCOPY N/A 5/1/2024    Procedure: ESOPHAGOGASTRODUODENOSCOPY (EGD);  Surgeon: Toby Hernandez MD;  Location: [facility] ENDO;  Service: Endoscopy;  Laterality: N/A;    EYE SURGERY      GASTRIC BYPASS  1992    INSERTION, PACEMAKER, TEMPORARY TRANSVENOUS  9/22/2023    Procedure: Temp pacer insertion ER Rm 8;  Surgeon: Pan Moss MD;  Location: [facility] CATH;  Service: Cardiology;;    INTRAMEDULLARY RODDING OF FEMUR Left 7/18/2020    Procedure: INSERTION, INTRAMEDULLARY ERIC, FEMUR, left, Synthes, [place] table, Large C arm clock side,;  Surgeon: Tony Rodriguez MD;  Location: [facility] OR 2ND FLR;  Service: Orthopedics;  Laterality: Left;    IRRIGATION AND DEBRIDEMENT Right 12/9/2023    Procedure: IRRIGATION AND DEBRIDEMENT KNEE;  Surgeon: Jelani Tello II, MD;  Location: [facility] OR;  Service: Orthopedics;  Laterality: Right;    KNEE ARTHROSCOPY      RT    LASIK  2001    both eyes (Dr. Rabago)    ORIF HUMERUS FRACTURE      LT    ORIF HUMERUS FRACTURE Right     non surgical repair    RADIOFREQUENCY ABLATION      x2    REVISION OF KNEE ARTHROPLASTY Right 12/9/2023    Procedure: REVISION ARTHROPLASTY KNEE- Liner Replacement - dirty/clean;  Surgeon: Jelani Tello II, MD;  Location: [facility] OR;  Service: Orthopedics;  Laterality: Right;  dirty to clean at 1940    Right ankle tendon repair      ROBOT-ASSISTED REPAIR OF INCISIONAL HERNIA USING DA GARETH XI Left 6/13/2022    Procedure: XI ROBOTIC REPAIR, HERNIA, INCISIONAL (LEFT SIDE SPIGELIAN WITH MESH);  Surgeon: Rosendo Marti MD;  Location: [facility] OR 2ND FLR;  Service: General;  Laterality: Left;  consent in am    ROTATOR CUFF REPAIR      right    TOTAL KNEE ARTHROPLASTY Right 5/29/2018    Procedure: REPLACEMENT-KNEE-TOTAL-axel;  Surgeon: Denzel QUISPE  MD Cel[place];  Location: [facility] OR [facility] FLR;  Service: Orthopedics;  Laterality: Right;  [place]    TRANSESOPHAGEAL ECHOCARDIOGRAPHY N/A 4/23/2024    Procedure: ECHOCARDIOGRAM, TRANSESOPHAGEAL;  Surgeon: Jan Bravo Diagnostic;  Location: [facility] EP LAB;  Service: Cardiology;  Laterality: N/A;    TREATMENT OF CARDIAC ARRHYTHMIA  6/15/2020    Procedure: Cardioversion or Defibrillation;  Surgeon: Wyatt Beatty MD;  Location: [facility] EP LAB;  Service: Cardiology;;    TREATMENT OF CARDIAC ARRHYTHMIA N/A 2/28/2024    Procedure: Cardioversion or Defibrillation;  Surgeon: Pao Hare MD;  Location: [facility];  Service: Cardiology;  Laterality: N/A;         Social History  Social History     Socioeconomic History    Marital status:     Number of children: 5   Occupational History    Occupation: retired anesthesiology     Employer: [employer]    Occupation: [employer] grad     Comment: previous football player   Tobacco Use    Smoking status: Never     Passive exposure: Never    Smokeless tobacco: Never    Tobacco comments:     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    Substance and Sexual Activity    Alcohol use: No    Drug use: No    Sexual activity: Yes     Partners: Female     Social Determinants of Health     Financial Resource Strain: Low Risk  (4/22/2024)    Overall Financial Resource Strain (CARDIA)     Difficulty of Paying Living Expenses: Not hard at all   Food Insecurity: No Food Insecurity (4/22/2024)    Hunger Vital Sign     Worried About Running Out of Food in the Last Year: Never true     Ran Out of Food in the Last Year: Never true   Transportation Needs: No Transportation Needs (4/22/2024)    PRAPARE - Transportation     Lack of Transportation (Medical): No     Lack of Transportation (Non-Medical): No   Physical Activity: Sufficiently Active (4/22/2024)    Exercise Vital Sign     Days of Exercise per Week: 5 days     Minutes of Exercise per Session: 130 min   Stress: No Stress Concern Present  "(4/22/2024)    [facility] Occupational [employer] - Occupational Stress Questionnaire     Feeling of Stress : Only a little   Housing Stability: Unknown (12/10/2023)    Housing Stability Vital Sign     Unable to Pay for Housing in the Last Year: No     Number of Places Lived in the Last Year: 1         ROS  10 point ROS performed and negative except as stated in HPI     Physical Examination         Vital Signs             24 Hour VS Range       No intake or output data in the 24 hours ending 05/23/24 1114      Physical Exam:   Constitutional: no acute distress  HEENT: NCAT, EOMI, no scleral icterus  Cardiovascular: Regular rate and rhythm  Pulmonary: Normal respiratory effort   Abdomen: nontender, non-distended   Neuro: alert and oriented, no focal deficits  Extremities: warm, no edema   MSK: no deformities  Integument: intact, no rashes             Data       Recent Labs   Lab 05/20/24  1038   WBC 7.56   HGB 11.4*   HCT 36.4*           Recent Labs   Lab 05/20/24  1038   INR 1.0        Recent Labs   Lab 05/20/24  1038      K 4.7   *   CO2 20*   BUN 19   CREATININE 1.3   ANIONGAP 4*   CALCIUM 8.3*        Recent Labs   Lab 05/20/24  1038   PROT 5.8*   ALBUMIN 2.9*   BILITOT 0.7   ALKPHOS 68   AST 21   ALT 19        No results for input(s): "TROPONINI" in the last 168 hours.     No results found for: "BNP"    No results for input(s): "LABBLOO" in the last 168 hours.         Assessment & Plan     Atrial fibrillation  Proceed with PEPE for LAAO device implantation.           -No absolute contraindications of esophageal stricture, tumor, perforation, laceration,or diverticulum and/or active GI bleed  -The risks, benefits & alternatives of the procedure were explained to the patient.   -The risks of transesophageal echo include but are not limited to:  Dental trauma, esophageal trauma/perforation, bleeding, laryngospasm/brochospasm, aspiration, sore throat/hoarseness, & dislodgement of the " endotracheal tube/nasogastric tube (where applicable).    -The risks of moderate sedation include hypotension, respiratory depression, arrhythmias, bronchospasm, & death.    -Informed consent was obtained. The patient is agreeable to proceed with the procedure and all questions and concerns addressed    Ed Houston MD  [facility]   Cardiovascular Disease PGY-V

## 2024-05-23 NOTE — Clinical Note
The sheath was inserted into the right femoral vein. Tip was repositioned to LA. Hemodynamics was performed of LA

## 2024-05-23 NOTE — H&P
[facility] - Short Stay Cardiac Unit  Interventional Cardiology  H&P    Patient Name: Dominick KENNEDY MD Duncan  MRN: [id number]  Admission Date: 5/23/2024  Code Status: Prior   Attending Provider: Tony Duvall MD   Primary Care Physician: Maxi Gaines MD  Principal Problem:<principal problem not specified>    Patient information was obtained from patient and ER records.     Subjective:        HPI:  78 M w HTN, moderate/severe MR, BRENTON (uses CPAP), interstitial lung disease, and paroxysmal AFib (s/p ablation and PPM) with chronic anemia requiring anticoagulation for which ALMA occlusion device was recommended.   He has chronic anemia with a positive stool occult blood. He is chronically on eliquis for his PAF with a CHADSVASC 6 and HASBLED 4. While undergoing some workup for occluder device his repeat echo showed a normal LVEF, mod-severe MR with a posterior eccentric jet, dilated IVC. After doing some research he was interested obtaining more information about mitral clip and LAAO occluder devices.     Plan for 35 amulet in current volume expanded state     Past Medical History:   Diagnosis Date    Anticoagulant long-term use     Anxiety     Arthritis     Atrial fibrillation     BPH (benign prostatic hyperplasia)     Cataract     CKD (chronic kidney disease) stage 3, GFR 30-59 ml/min 07/10/2017    Followed by Dr. Jeevan Pond    Colon polyp     benign    Depression     Elevated PSA     Erectile dysfunction     Gastric ulcer with hemorrhage     Hep B w/o coma 1977    History of bleeding peptic ulcer     History of prostatitis     Hypertension     PAF (paroxysmal atrial fibrillation)     Sleep apnea     PT DENIES    Stroke     TIA December 2019    Thyroid disease        Past Surgical History:   Procedure Laterality Date    A-V CARDIAC PACEMAKER INSERTION Left 9/23/2023    Procedure: Dual Chamber PPM (Heart) Rm 2620;  Surgeon: Pan Moss MD;  Location: [facility] CATH;  Service: Cardiology;  Laterality: Left;     ABDOMINAL HERNIA REPAIR      ABLATION OF ARRHYTHMOGENIC FOCUS FOR ATRIAL FIBRILLATION N/A 6/15/2020    Procedure: Ablation atrial fibrillation;  Surgeon: Wyatt Beatty MD;  Location: [facility] EP LAB;  Service: Cardiology;  Laterality: N/A;  PAF, AFl, PEPE, PVI re-do, CTI, RFA, JUSTIN, Gen, SK, 3 Prep    APPLICATION OF WOUND VACUUM-ASSISTED CLOSURE DEVICE Right 12/9/2023    Procedure: APPLICATION, WOUND VAC;  Surgeon: Jelani Tello II, MD;  Location: [facility] OR;  Service: Orthopedics;  Laterality: Right;    CARDIOVERSION N/A 2/28/2024    Procedure: Cardioversion;  Surgeon: Pao Hare MD;  Location: [facility];  Service: Cardiology;  Laterality: N/A;    CATARACT EXTRACTION  11/25/2013    bilateral    CHOLECYSTECTOMY      COLONOSCOPY N/A 11/25/2015    Procedure: COLONOSCOPY;  Surgeon: Toby Hernandez MD;  Location: [facility] ENDO;  Service: Endoscopy;  Laterality: N/A;    COLONOSCOPY N/A 11/13/2020    Procedure: COLONOSCOPY;  Surgeon: Toby Hernandez MD;  Location: [facility] ENDO;  Service: Endoscopy;  Laterality: N/A;    COLONOSCOPY N/A 5/1/2024    Procedure: COLONOSCOPY;  Surgeon: Toby Hernandez MD;  Location: [facility] ENDO;  Service: Endoscopy;  Laterality: N/A;    CYSTOSCOPY WITH INSERTION OF MINIMALLY INVASIVE IMPLANT TO ENLARGE PROSTATIC URETHRA N/A 11/28/2022    Procedure: CYSTOSCOPY, WITH INSERTION OF UROLIFT IMPLANT;  Surgeon: Yakov Shetty MD;  Location: [facility] OR;  Service: Urology;  Laterality: N/A;    ESOPHAGOGASTRODUODENOSCOPY N/A 5/1/2024    Procedure: ESOPHAGOGASTRODUODENOSCOPY (EGD);  Surgeon: Toby Hernandez MD;  Location: [facility] ENDO;  Service: Endoscopy;  Laterality: N/A;    EYE SURGERY      GASTRIC BYPASS  1992    INSERTION, PACEMAKER, TEMPORARY TRANSVENOUS  9/22/2023    Procedure: Temp pacer insertion ER Rm 8;  Surgeon: Pan Moss MD;  Location: [facility] CATH;  Service: Cardiology;;    INTRAMEDULLARY RODDING OF FEMUR Left 7/18/2020    Procedure: INSERTION, INTRAMEDULLARY ERIC, FEMUR, left,  Synthes, [place] table, Large C arm clock side,;  Surgeon: Tony Rodriguez MD;  Location: [facility] OR 2ND FLR;  Service: Orthopedics;  Laterality: Left;    IRRIGATION AND DEBRIDEMENT Right 12/9/2023    Procedure: IRRIGATION AND DEBRIDEMENT KNEE;  Surgeon: Jelani Tello II, MD;  Location: [facility] OR;  Service: Orthopedics;  Laterality: Right;    KNEE ARTHROSCOPY      RT    LASIK  2001    both eyes (Dr. Rabago)    ORIF HUMERUS FRACTURE      LT    ORIF HUMERUS FRACTURE Right     non surgical repair    RADIOFREQUENCY ABLATION      x2    REVISION OF KNEE ARTHROPLASTY Right 12/9/2023    Procedure: REVISION ARTHROPLASTY KNEE- Liner Replacement - dirty/clean;  Surgeon: Jelani Tello II, MD;  Location: [facility] OR;  Service: Orthopedics;  Laterality: Right;  dirty to clean at 1940    Right ankle tendon repair      ROBOT-ASSISTED REPAIR OF INCISIONAL HERNIA USING DA GARETH XI Left 6/13/2022    Procedure: XI ROBOTIC REPAIR, HERNIA, INCISIONAL (LEFT SIDE SPIGELIAN WITH MESH);  Surgeon: Rosendo Marti MD;  Location: [facility] OR 2ND FLR;  Service: General;  Laterality: Left;  consent in am    ROTATOR CUFF REPAIR      right    TOTAL KNEE ARTHROPLASTY Right 5/29/2018    Procedure: REPLACEMENT-KNEE-TOTAL-pro;  Surgeon: Denzel Dallas MD;  Location: [facility] OR 2ND FLR;  Service: Orthopedics;  Laterality: Right;  Pro    TRANSESOPHAGEAL ECHOCARDIOGRAPHY N/A 4/23/2024    Procedure: ECHOCARDIOGRAM, TRANSESOPHAGEAL;  Surgeon: Provider, Jan Diagnostic;  Location: [facility] EP LAB;  Service: Cardiology;  Laterality: N/A;    TREATMENT OF CARDIAC ARRHYTHMIA  6/15/2020    Procedure: Cardioversion or Defibrillation;  Surgeon: Wyatt Beatty MD;  Location: [facility] EP LAB;  Service: Cardiology;;    TREATMENT OF CARDIAC ARRHYTHMIA N/A 2/28/2024    Procedure: Cardioversion or Defibrillation;  Surgeon: Pao Hare MD;  Location: [facility];  Service: Cardiology;  Laterality: N/A;       Review of patient's allergies indicates:   Allergen Reactions     No known drug allergies        PTA Medications   Medication Sig    alfuzosin (UROXATRAL) 10 mg Tb24 Take 1 tablet (10 mg total) by mouth daily with breakfast.    buPROPion (WELLBUTRIN XL) 300 MG 24 hr tablet Take 1 tablet (300 mg total) by mouth once daily.    carvediloL (COREG) 6.25 MG tablet Take 12.5 mg by mouth 2 (two) times daily.    cyclobenzaprine (FLEXERIL) 10 MG tablet Take 1 tablet (10 mg total) by mouth 3 (three) times daily as needed for Muscle spasms.    ELIQUIS 5 mg Tab Take 1 tablet (5 mg total) by mouth 2 (two) times a day.    EScitalopram oxalate (LEXAPRO) 10 MG tablet TAKE 1 TABLET(10 MG) BY MOUTH EVERY DAY    iron-vit c-b12-folic acid (ICAR-C PLUS) Tab Take 1 tablet by mouth once daily.    levothyroxine (SYNTHROID) 75 MCG tablet Take 1 tablet (75 mcg total) by mouth before breakfast.    OMEGA-3 FATTY ACIDS (FISH OIL CONCENTRATE ORAL) Take 1 capsule by mouth Daily.    potassium chloride (MICRO-K) 10 MEQ CpSR Take 1 capsule (10 mEq total) by mouth every Mon, Wed, Fri.    propafenone (RYTHMOL SR) 425 MG Cp12 Take 1 capsule (425 mg total) by mouth every 12 (twelve) hours.    telmisartan (MICARDIS) 40 MG Tab Take 1 tablet (40 mg total) by mouth once daily.    tiZANidine (ZANAFLEX) 4 MG tablet Take 1 tablet (4 mg total) by mouth 3 (three) times daily as needed.    torsemide (DEMADEX) 20 MG Tab Take 1 tablet (20 mg total) by mouth every Mon, Wed, Fri.     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   HENT:  Negative for congestion and ear discharge.    Eyes:  Negative for blurred vision and discharge.   Cardiovascular:  Negative for chest pain, dyspnea on exertion, irregular heartbeat, leg swelling and paroxysmal nocturnal dyspnea.   Respiratory:  Negative for cough, hemoptysis and shortness of breath.    Gastrointestinal:  Negative for abdominal pain.     Objective:     Vital Signs (Most Recent):    Vital Signs (24h Range):           There is no height or weight on file to calculate BMI.            No intake or output data in the 24 hours ending 05/23/24 1100    Lines/Drains/Airways       None                    Physical Exam  Constitutional:       General: He is not in acute distress.     Appearance: He is well-developed. He is not diaphoretic.   Eyes:      Conjunctiva/sclera: Conjunctivae normal.      Pupils: Pupils are equal, round, and reactive to light.   Neck:      Thyroid: No thyromegaly.      Trachea: No tracheal deviation.   Cardiovascular:      Rate and Rhythm: Regular rhythm. Bradycardia present.      Pulses: Intact distal pulses.           Radial pulses are 2+ on the right side and 2+ on the left side.        Femoral pulses are 2+ on the right side and 2+ on the left side.     Heart sounds: Normal heart sounds. No murmur heard.     No friction rub. No gallop.   Pulmonary:      Effort: Pulmonary effort is normal. No respiratory distress.      Breath sounds: Normal breath sounds. No wheezing or rales.   Abdominal:      General: Bowel sounds are normal. There is no distension.      Palpations: Abdomen is soft.      Tenderness: There is no abdominal tenderness.   Musculoskeletal:         General: No deformity.   Skin:     General: Skin is warm and dry.   Neurological:      Mental Status: He is alert and oriented to person, place, and time.      Cranial Nerves: No cranial nerve deficit.      Coordination: Coordination normal.  "  Psychiatric:         Behavior: Behavior normal.            Significant Labs: ABG: No results for input(s): "PH", "PCO2", "HCO3", "POCSATURATED", "BE" in the last 48 hours., Blood Culture: No results for input(s): "LABBLOO" in the last 48 hours., BMP: No results for input(s): "GLU", "NA", "K", "CL", "CO2", "BUN", "CREATININE", "CALCIUM", "MG" in the last 48 hours., CMP No results for input(s): "NA", "K", "CL", "CO2", "GLU", "BUN", "CREATININE", "CALCIUM", "PROT", "ALBUMIN", "BILITOT", "ALKPHOS", "AST", "ALT", "ANIONGAP", "ESTGFRAFRICA", "EGFRNONAA" in the last 48 hours., CBC No results for input(s): "WBC", "HGB", "HCT", "PLT" in the last 48 hours., INR No results for input(s): "INR", "PROTIME" in the last 48 hours., Lipid Panel No results for input(s): "CHOL", "HDL", "LDLCALC", "TRIG", "CHOLHDL" in the last 48 hours.,   Pathology Results  (Last 10 years)                 05/01/24 0824  Specimen to Pathology, Surgery Gastrointestinal tract Final result    Narrative:  Pre-op Diagnosis: Occult blood positive stool [R19.5]   Anemia due to blood loss [D50.0]   Procedure(s):   ESOPHAGOGASTRODUODENOSCOPY (EGD)   COLONOSCOPY   1. Jejunum   2. Ascending colon polyp   Release to patient->Immediate   Specimen total (fresh, frozen, permanent):->2           , Troponin No results for input(s): "TROPONINI" in the last 48 hours., and All pertinent lab results from the last 24 hours have been reviewed.     Assessment and Plan:     Cardiac/Vascular  PAF (paroxysmal atrial fibrillation)    Normal EF, optimized on GDMT. Plan LAAO electively and PEPE to better define degree of MR after rigid salt restriction. Sizes for a 35 amulet in current volume expanded state. Will resize with PEPE. Suspect anterior flail leaflet or ruptured cord from TTE. Patient and wife understand and accept plan    Access; R groin   Cr: 1.3 ( BL)   Hgb 11.4 ( BL)                 VTE Risk Mitigation (From admission, onward)      None              Irineo Hernandez, " MD  Interventional Cardiology   [facility] - Short Stay Cardiac Unit

## 2024-05-23 NOTE — HPI
78 M w HTN, moderate/severe MR, BRENTON (uses CPAP), interstitial lung disease, and paroxysmal AFib (s/p ablation and PPM) with chronic anemia requiring anticoagulation for which ALMA occlusion device was recommended.   He has chronic anemia with a positive stool occult blood. He is chronically on eliquis for his PAF with a CHADSVASC 6 and HASBLED 4. While undergoing some workup for occluder device his repeat echo showed a normal LVEF, mod-severe MR with a posterior eccentric jet, dilated IVC. After doing some research he was interested obtaining more information about mitral clip and LAAO occluder devices.     Plan for 35 amulet in current volume expanded state

## 2024-05-23 NOTE — TELEPHONE ENCOUNTER
Spoke to patient to schedule procedure(s) Colonoscopy       Physician to perform procedure(s) Dr. FADUMO Rodriguez  Date of Procedure (s) 06/18/2024  Arrival Time 11:30 AM  Time of Procedure(s) 12:30 PM   Location of Procedure(s) [facility] 2nd Floor  Type of Rx Prep sent to patient: PEG  Instructions provided to patient via [facility]    Patient was informed on the following information and verbalized understanding. Screening questionnaire reviewed with patient and complete. If procedure requires anesthesia, a responsible adult needs to be present to accompany the patient home, patient cannot drive after receiving anesthesia. Appointment details are tentative, especially check-in time. Patient will receive a prep-op call 7 days prior to confirm check-in time for procedure. If applicable the patient should contact their pharmacy to verify Rx for procedure prep is ready for pick-up. Patient was advised to call the scheduling department at [phone] if pharmacy states no Rx is available. Patient was advised to call the endoscopy scheduling department if any questions or concerns arise.      [facility] Scheduling Department

## 2024-05-23 NOTE — TELEPHONE ENCOUNTER
Received pt call Patient states he called to schedule f/u EMR. Message sent to AES. Patient verbalized understanding.    yes...

## 2024-05-23 NOTE — TELEPHONE ENCOUNTER
Dear Provider,    Patient has a scheduled procedure Colonoscopy on 06/18/2024 and is currently taking a blood thinner prescribed by your office. In order to ensure patient safety, we would like to confirm that the patient can place their blood thinner medication on hold for the procedure. Can he/she discontinue Eliquis (apixaban) for a minimum of 2 days prior to the procedure?     Thank you for your prompt reply.    [facility] Endoscopy Scheduling

## 2024-05-23 NOTE — DISCHARGE INSTRUCTIONS
[facility] (Left Atrial Appendage Closure)  Discharge Instructions    Preventing Infection of Your Left Atrial Appendage device:  - You have been given a card:  PREVENTION OF INFECTIVE BACTERIAL ENDOCARDITIS  -Provide a copy of this card to your Dentist and Gastroenterologist to keep in   your chart.  - You DO need to take antibiotics prior to any routine dental cleaning, GI   procedures (colonoscopy, endoscopy),  (cystoscopy, bladder procedures),   or respiratory procedures (bronchoscopy).  - You need antibiotics if you are having a procedure that requires cutting into   infected skin(lancing a boil).  - Your Dentist or Gastroenterologist will prescribe these for you prior to your   appointment. Should you have any issues getting these prescribed, please   call our office.  2. General Post-op Instructions:   - Continue on aspirin and Plavix. Do not stop either   medicine unless you speak with our office.   - No lifting >5 pounds for 5 days.   - No driving for 5 days   - You may shower as soon as you get home but no bathing or submerging in   water (lakes, pools, tub, etc.) for 1 week. You may remove bandage and   replace it with a band-aid.   - Keep incision clean and dry. No lotions, oils or creams. You may change the   band-aid daily until a scab has formed over.  3. Follow Up:   You can continue to follow up with your general cardiologist.   The following are requirements after your procedure:    1. In 45 days, we will see you at the [facility] and you will need a       Transesophageal echo.    2. In 6 months, we will call you to stop your Plavix but you must       remain on aspirin.  4. Discharge:  - If you have any questions or concerns after discharge, please do not hesitate   to call our office and speak with a health care representative. [phone]  - if you have any problems or concerns related to your procedure, please call our office. Please address any other concerns with your primary  Cardiologist.

## 2024-05-23 NOTE — TELEPHONE ENCOUNTER
Patient is having interventional cardiac procedure (LAAO device) today with Dr Duvall. Please discussing timing of holding the Eliquis with him.   Thanks

## 2024-05-24 ENCOUNTER — PATIENT MESSAGE (OUTPATIENT)
Dept: CARDIOLOGY | Facility: CLINIC | Age: 79
End: 2024-05-24
Payer: MEDICARE

## 2024-05-24 ENCOUNTER — TELEPHONE (OUTPATIENT)
Dept: ENDOSCOPY | Facility: HOSPITAL | Age: 79
End: 2024-05-24
Payer: MEDICARE

## 2024-05-24 DIAGNOSIS — Z95.818 PRESENCE OF AMULET LEFT ATRIAL APPENDAGE CLOSURE DEVICE: Primary | ICD-10-CM

## 2024-05-24 DIAGNOSIS — Z01.89 ENCOUNTER FOR OTHER SPECIFIED SPECIAL EXAMINATIONS: ICD-10-CM

## 2024-05-24 LAB
POC ACTIVATED CLOTTING TIME K: 244 SEC (ref 74–137)
SAMPLE: ABNORMAL

## 2024-05-24 NOTE — NURSING
Pt walked around the unit per orders.   Pt's dressing to R groin remained CDI.   No s/s of bleeding noted. VSS.  NAD noted. Will DC pt per orders.

## 2024-05-24 NOTE — NURSING
R groin remains CDI. No s/s of bleeding noted.  Pt DC'd per orders.  DC paperwork reviewed w pt and spouse.  Pt verbalized DC instructions.    IV removed. Tele DC'd. Pt was able to drink, eat, walk, and urinate with no difficulties.    Pt being wheeled off unit per spouse in wheelchair.

## 2024-05-24 NOTE — TELEPHONE ENCOUNTER
Dear Provider,     Patient has a scheduled procedure Colonoscopy on 06/18/2024 and is currently taking a blood thinner prescribed by your office. In order to ensure patient safety, we would like to confirm that the patient can place their blood thinner medication on hold for the procedure. Can he/she discontinue Eliquis (apixaban) for a minimum of 2 days prior to the procedure?     Thank you for your prompt reply.     [facility] Endoscopy Scheduling

## 2024-05-24 NOTE — TELEPHONE ENCOUNTER
Received denial for blood thinner clearance for colon EMR procedure. Reminder set in system to ann back and schedule at a later date when able to come off plavix. Spoke to pt and pt verbalized understanding. All questions answered.

## 2024-05-24 NOTE — TELEPHONE ENCOUNTER
Need colonscopy with EMR of a partially removed ascending colon polyp. Main. 90 minutes. Need 24 hours of clear liquid diet due to previous suboptimal prep. Referring: Toby Hernandez MD.  Thanks,  RR

## 2024-05-27 LAB
OHS CV AF BURDEN PERCENT: 3
OHS CV DC REMOTE DEVICE TYPE: NORMAL
OHS CV RV PACING PERCENT: 2.2 %

## 2024-05-30 ENCOUNTER — DOCUMENTATION ONLY (OUTPATIENT)
Dept: AUDIOLOGY | Facility: CLINIC | Age: 79
End: 2024-05-30
Payer: MEDICARE

## 2024-05-30 ENCOUNTER — HOSPITAL ENCOUNTER (OUTPATIENT)
Dept: RADIOLOGY | Facility: HOSPITAL | Age: 79
Discharge: HOME | End: 2024-05-30
Attending: INTERNAL MEDICINE
Payer: MEDICARE

## 2024-05-30 DIAGNOSIS — R63.4 WEIGHT LOSS, UNINTENTIONAL: ICD-10-CM

## 2024-05-30 PROCEDURE — A9698 NON-RAD CONTRAST MATERIALNOC: HCPCS | Mod: HCNC,PO,TXP | Performed by: INTERNAL MEDICINE

## 2024-05-30 PROCEDURE — 74178 CT ABD&PLV WO CNTR FLWD CNTR: CPT | Mod: 26,HCNC,NTX, | Performed by: RADIOLOGY

## 2024-05-30 PROCEDURE — 25500020 PHARM REV CODE 255: Mod: HCNC,PO,TXP | Performed by: INTERNAL MEDICINE

## 2024-05-30 PROCEDURE — 74178 CT ABD&PLV WO CNTR FLWD CNTR: CPT | Mod: TC,HCNC,PO,NTX

## 2024-05-30 RX ADMIN — IOHEXOL 1000 ML: 9 SOLUTION ORAL at 11:05

## 2024-05-30 RX ADMIN — IOHEXOL 75 ML: 350 INJECTION, SOLUTION INTRAVENOUS at 11:05

## 2024-05-30 NOTE — PROGRESS NOTES
The patient requested more domes for his hearing aids. He said his domes have been falling out of his ears. Lesley told me to give him large vented domes. I let the patient know I am leaving them at the second floor check in desk for him, and told him to please let us know if this size does not work for him.

## 2024-06-04 ENCOUNTER — PATIENT MESSAGE (OUTPATIENT)
Dept: FAMILY MEDICINE | Facility: CLINIC | Age: 79
End: 2024-06-04
Payer: MEDICARE

## 2024-06-19 ENCOUNTER — OFFICE VISIT (OUTPATIENT)
Dept: FAMILY MEDICINE | Facility: CLINIC | Age: 79
End: 2024-06-19
Payer: MEDICARE

## 2024-06-19 ENCOUNTER — LAB VISIT (OUTPATIENT)
Dept: LAB | Facility: HOSPITAL | Age: 79
End: 2024-06-19
Attending: FAMILY MEDICINE
Payer: MEDICARE

## 2024-06-19 ENCOUNTER — CLINICAL SUPPORT (OUTPATIENT)
Dept: AUDIOLOGY | Facility: CLINIC | Age: 79
End: 2024-06-19
Payer: MEDICARE

## 2024-06-19 VITALS
WEIGHT: 167.56 LBS | BODY MASS INDEX: 22.69 KG/M2 | HEART RATE: 57 BPM | RESPIRATION RATE: 18 BRPM | SYSTOLIC BLOOD PRESSURE: 134 MMHG | OXYGEN SATURATION: 96 % | HEIGHT: 72 IN | DIASTOLIC BLOOD PRESSURE: 70 MMHG

## 2024-06-19 DIAGNOSIS — R63.4 ABNORMAL WEIGHT LOSS: ICD-10-CM

## 2024-06-19 DIAGNOSIS — I10 ESSENTIAL HYPERTENSION: ICD-10-CM

## 2024-06-19 DIAGNOSIS — R73.09 ABNORMAL GLUCOSE: ICD-10-CM

## 2024-06-19 DIAGNOSIS — Z12.5 PROSTATE CANCER SCREENING: ICD-10-CM

## 2024-06-19 DIAGNOSIS — F32.A DEPRESSION, UNSPECIFIED DEPRESSION TYPE: ICD-10-CM

## 2024-06-19 DIAGNOSIS — R59.0 MEDIASTINAL LYMPHADENOPATHY: Primary | ICD-10-CM

## 2024-06-19 DIAGNOSIS — R91.8 LUNG NODULES: ICD-10-CM

## 2024-06-19 DIAGNOSIS — Z97.4 WEARS HEARING AID IN BOTH EARS: Primary | ICD-10-CM

## 2024-06-19 LAB
ALBUMIN SERPL BCP-MCNC: 3.1 G/DL (ref 3.5–5.2)
ALP SERPL-CCNC: 89 U/L (ref 55–135)
ALT SERPL W/O P-5'-P-CCNC: 17 U/L (ref 10–44)
ANION GAP SERPL CALC-SCNC: 8 MMOL/L (ref 8–16)
AST SERPL-CCNC: 17 U/L (ref 10–40)
BASOPHILS # BLD AUTO: 0.07 K/UL (ref 0–0.2)
BASOPHILS NFR BLD: 0.8 % (ref 0–1.9)
BILIRUB SERPL-MCNC: 0.6 MG/DL (ref 0.1–1)
BUN SERPL-MCNC: 39 MG/DL (ref 8–23)
CALCIUM SERPL-MCNC: 8.1 MG/DL (ref 8.7–10.5)
CHLORIDE SERPL-SCNC: 111 MMOL/L (ref 95–110)
CO2 SERPL-SCNC: 19 MMOL/L (ref 23–29)
CREAT SERPL-MCNC: 1.6 MG/DL (ref 0.5–1.4)
DIFFERENTIAL METHOD BLD: ABNORMAL
EOSINOPHIL # BLD AUTO: 1 K/UL (ref 0–0.5)
EOSINOPHIL NFR BLD: 11.8 % (ref 0–8)
ERYTHROCYTE [DISTWIDTH] IN BLOOD BY AUTOMATED COUNT: 14.5 % (ref 11.5–14.5)
EST. GFR  (NO RACE VARIABLE): 44.1 ML/MIN/1.73 M^2
ESTIMATED AVG GLUCOSE: 77 MG/DL (ref 68–131)
GLUCOSE SERPL-MCNC: 110 MG/DL (ref 70–110)
HBA1C MFR BLD: 4.3 % (ref 4–5.6)
HCT VFR BLD AUTO: 37.1 % (ref 40–54)
HGB BLD-MCNC: 11.8 G/DL (ref 14–18)
HIV 1+2 AB+HIV1 P24 AG SERPL QL IA: NORMAL
IMM GRANULOCYTES # BLD AUTO: 0.04 K/UL (ref 0–0.04)
IMM GRANULOCYTES NFR BLD AUTO: 0.5 % (ref 0–0.5)
LYMPHOCYTES # BLD AUTO: 1.3 K/UL (ref 1–4.8)
LYMPHOCYTES NFR BLD: 14.2 % (ref 18–48)
MCH RBC QN AUTO: 29.9 PG (ref 27–31)
MCHC RBC AUTO-ENTMCNC: 31.8 G/DL (ref 32–36)
MCV RBC AUTO: 94 FL (ref 82–98)
MONOCYTES # BLD AUTO: 1 K/UL (ref 0.3–1)
MONOCYTES NFR BLD: 11 % (ref 4–15)
NEUTROPHILS # BLD AUTO: 5.5 K/UL (ref 1.8–7.7)
NEUTROPHILS NFR BLD: 61.7 % (ref 38–73)
NRBC BLD-RTO: 0 /100 WBC
PLATELET # BLD AUTO: 155 K/UL (ref 150–450)
PMV BLD AUTO: 12.8 FL (ref 9.2–12.9)
POTASSIUM SERPL-SCNC: 4.7 MMOL/L (ref 3.5–5.1)
PROT SERPL-MCNC: 6.1 G/DL (ref 6–8.4)
RBC # BLD AUTO: 3.94 M/UL (ref 4.6–6.2)
SODIUM SERPL-SCNC: 138 MMOL/L (ref 136–145)
TSH SERPL DL<=0.005 MIU/L-ACNC: 3.17 UIU/ML (ref 0.4–4)
WBC # BLD AUTO: 8.85 K/UL (ref 3.9–12.7)

## 2024-06-19 PROCEDURE — 36415 COLL VENOUS BLD VENIPUNCTURE: CPT | Mod: HCNC,PO,TXP | Performed by: FAMILY MEDICINE

## 2024-06-19 PROCEDURE — 83036 HEMOGLOBIN GLYCOSYLATED A1C: CPT | Mod: HCNC,TXP | Performed by: FAMILY MEDICINE

## 2024-06-19 PROCEDURE — 1159F MED LIST DOCD IN RCRD: CPT | Mod: HCNC,CPTII,S$GLB, | Performed by: FAMILY MEDICINE

## 2024-06-19 PROCEDURE — 3078F DIAST BP <80 MM HG: CPT | Mod: HCNC,CPTII,S$GLB, | Performed by: FAMILY MEDICINE

## 2024-06-19 PROCEDURE — 80053 COMPREHEN METABOLIC PANEL: CPT | Mod: HCNC,TXP | Performed by: FAMILY MEDICINE

## 2024-06-19 PROCEDURE — 99214 OFFICE O/P EST MOD 30 MIN: CPT | Mod: HCNC,S$GLB,, | Performed by: FAMILY MEDICINE

## 2024-06-19 PROCEDURE — 3075F SYST BP GE 130 - 139MM HG: CPT | Mod: HCNC,CPTII,S$GLB, | Performed by: FAMILY MEDICINE

## 2024-06-19 PROCEDURE — 85025 COMPLETE CBC W/AUTO DIFF WBC: CPT | Mod: HCNC,TXP | Performed by: FAMILY MEDICINE

## 2024-06-19 PROCEDURE — 99999 PR PBB SHADOW E&M-EST. PATIENT-LVL III: CPT | Mod: PBBFAC,HCNC,, | Performed by: FAMILY MEDICINE

## 2024-06-19 PROCEDURE — 99499 UNLISTED E&M SERVICE: CPT | Mod: HCNC,NTX,S$GLB, | Performed by: AUDIOLOGIST-HEARING AID FITTER

## 2024-06-19 PROCEDURE — 3288F FALL RISK ASSESSMENT DOCD: CPT | Mod: HCNC,CPTII,S$GLB, | Performed by: FAMILY MEDICINE

## 2024-06-19 PROCEDURE — 1111F DSCHRG MED/CURRENT MED MERGE: CPT | Mod: HCNC,CPTII,S$GLB, | Performed by: FAMILY MEDICINE

## 2024-06-19 PROCEDURE — 1101F PT FALLS ASSESS-DOCD LE1/YR: CPT | Mod: HCNC,CPTII,S$GLB, | Performed by: FAMILY MEDICINE

## 2024-06-19 PROCEDURE — 84443 ASSAY THYROID STIM HORMONE: CPT | Mod: HCNC,TXP | Performed by: FAMILY MEDICINE

## 2024-06-19 PROCEDURE — 87389 HIV-1 AG W/HIV-1&-2 AB AG IA: CPT | Mod: HCNC,TXP | Performed by: FAMILY MEDICINE

## 2024-06-19 PROCEDURE — G2211 COMPLEX E/M VISIT ADD ON: HCPCS | Mod: HCNC,S$GLB,, | Performed by: FAMILY MEDICINE

## 2024-06-19 PROCEDURE — 1160F RVW MEDS BY RX/DR IN RCRD: CPT | Mod: HCNC,CPTII,S$GLB, | Performed by: FAMILY MEDICINE

## 2024-06-19 PROCEDURE — 1126F AMNT PAIN NOTED NONE PRSNT: CPT | Mod: HCNC,CPTII,S$GLB, | Performed by: FAMILY MEDICINE

## 2024-06-19 PROCEDURE — 1157F ADVNC CARE PLAN IN RCRD: CPT | Mod: HCNC,CPTII,S$GLB, | Performed by: FAMILY MEDICINE

## 2024-06-19 RX ORDER — DRONABINOL 2.5 MG/1
2.5 CAPSULE ORAL
Qty: 60 CAPSULE | Refills: 2 | Status: SHIPPED | OUTPATIENT
Start: 2024-06-19

## 2024-06-19 NOTE — PROGRESS NOTES
Dominick Song MD came in on 06/19/2024 for a hearing aid follow up. Pt was alone during today's visit. Pt stated the hearing aid is not staying in his ear. Changed from small power domes to med power domes and added jayden locks for both aids. The next step would be to change to large power domes but the fit seemed good prior to his departure. All complaints were addressed during this visit to the patient's satisfaction. Plan of care was discussed in detail with the patient, who agreed with the plan as above.

## 2024-06-19 NOTE — PROGRESS NOTES
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    Chief Complaint: Weight Loss      Here with concerns over weight loss.    Lost 20 pounds in 6 months.  Lost 30 pounds in the last year.  He recently had CT chest, CT abdomen pelvis, Cardiac CT.  CT chest did show some mediastinal LAD.  Appetite seems down.  Eating 1 meal daily.  He feels weight loss started after post-op knee infection last year.    Anemia - taking emily daily  S/p ORIF left femur fracture - following with Dr. Rodriguez; doing well.  H/o TIA - taking Eliquis; stopped lipitor after seeing neurologist; using lopressor PRN  Afib, s/p pacemaker - s/p ablation; sees Dr. Beatty. Taking Eliquis 5mg BID, Coreg BID, Rhythmol 425mg BID. Planning for Watchman.  HTN - Coreg 12.5mg BID, Micardis 40mg; Demadex 20 mg Monday Wednesday and Friday as well as potassium chloride 10 mEq Monday Wednesday and Friday PRN  CKD - following with Dr. Pond.  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  Colon polyp - following with Dr. Hernandez      Hypertension  Pertinent negatives include no chest pain, headaches, neck pain, palpitations or shortness of breath.   Follow-up  Pertinent negatives include no abdominal pain, arthralgias, chest pain, chills, coughing, fever, headaches, nausea, neck pain, rash, sore throat or weakness.       Past Medical History:   Diagnosis Date    Anticoagulant long-term use     Anxiety     Arthritis     Atrial fibrillation     BPH (benign prostatic hyperplasia)     Cataract     CKD (chronic kidney disease) stage 3, GFR 30-59 ml/min 07/10/2017     Followed by Dr. Jeevan Pond    Colon polyp     benign    Depression     Elevated PSA     Erectile dysfunction     Gastric ulcer with hemorrhage     Hep B w/o coma 1977    History of bleeding peptic ulcer     History of prostatitis     Hypertension     PAF (paroxysmal atrial fibrillation)     Sleep apnea     PT DENIES    Stroke     TIA December 2019    Thyroid disease        Past Surgical History:   Procedure Laterality Date    A-V CARDIAC PACEMAKER INSERTION Left 9/23/2023    Procedure: Dual Chamber PPM (Heart) Rm 2620;  Surgeon: Pan Moss MD;  [email or web address];  Service: Cardiology;  Laterality: Left;    ABDOMINAL HERNIA REPAIR      ABLATION OF ARRHYTHMOGENIC FOCUS FOR ATRIAL FIBRILLATION N/A 6/15/2020    Procedure: Ablation atrial fibrillation;  Surgeon: Wyatt Beatty MD;  Location: [facility] EP LAB;  Service: Cardiology;  Laterality: N/A;  PAF, AFl, PEPE, PVI re-do, CTI, RFA, JUSTIN, Gen, SK, 3 Prep    APPLICATION OF WOUND VACUUM-ASSISTED CLOSURE DEVICE Right 12/9/2023    Procedure: APPLICATION, WOUND VAC;  Surgeon: Jelani Tello II, MD;  [facility]: [email or web address] OR;  Service: Orthopedics;  Laterality: Right;    CARDIOVERSION N/A 2/28/2024    Procedure: Cardioversion;  Surgeon: Pao Hare MD;  Location: [facility];  Service: Cardiology;  Laterality: N/A;    CATARACT EXTRACTION  11/25/2013    bilateral    CHOLECYSTECTOMY      CLOSURE OF LEFT ATRIAL APPENDAGE USING DEVICE N/A 5/23/2024    Procedure: Left atrial appendage closure device;  Surgeon: Tony Duvall MD;  Location: [facility] CATH LAB;  Service: Cardiology;  Laterality: N/A;    COLONOSCOPY N/A 11/25/2015    Procedure: COLONOSCOPY;  Surgeon: Toby Hernandez MD;  Location: [facility] ENDO;  Service: Endoscopy;  Laterality: N/A;    COLONOSCOPY N/A 11/13/2020    Procedure: COLONOSCOPY;  Surgeon: Toby Hernandez MD;  Location: [facility] ENDO;  Service: Endoscopy;  Laterality: N/A;    COLONOSCOPY N/A 5/1/2024    Procedure: COLONOSCOPY;  Surgeon: David  Toby CANDELARIA MD;  Location: [facility] ENDO;  Service: Endoscopy;  Laterality: N/A;    CYSTOSCOPY WITH INSERTION OF MINIMALLY INVASIVE IMPLANT TO ENLARGE PROSTATIC URETHRA N/A 11/28/2022    Procedure: CYSTOSCOPY, WITH INSERTION OF UROLIFT IMPLANT;  Surgeon: Yakov Shetty MD;  Location: [facility] OR;  Service: Urology;  Laterality: N/A;    ESOPHAGOGASTRODUODENOSCOPY N/A 5/1/2024    Procedure: ESOPHAGOGASTRODUODENOSCOPY (EGD);  Surgeon: Toby Hernandez MD;  Location: [facility] ENDO;  Service: Endoscopy;  Laterality: N/A;    EYE SURGERY      GASTRIC BYPASS  1992    INSERTION, PACEMAKER, TEMPORARY TRANSVENOUS  9/22/2023    Procedure: Temp pacer insertion ER Rm 8;  Surgeon: Pan Moss MD;  Location: [facility] CATH;  Service: Cardiology;;    INTRAMEDULLARY RODDING OF FEMUR Left 7/18/2020    Procedure: INSERTION, INTRAMEDULLARY ERIC, FEMUR, left, Synthes, [place] table, Large C arm clock side,;  Surgeon: Tony Rodriguez MD;  Location: [facility] OR [facility] FLR;  Service: Orthopedics;  Laterality: Left;    IRRIGATION AND DEBRIDEMENT Right 12/9/2023    Procedure: IRRIGATION AND DEBRIDEMENT KNEE;  Surgeon: Jelani Tello II, MD;  Location: [facility] OR;  Service: Orthopedics;  Laterality: Right;    KNEE ARTHROSCOPY      RT    LASIK  2001    both eyes (Dr. Rabago)    ORIF HUMERUS FRACTURE      LT    ORIF HUMERUS FRACTURE Right     non surgical repair    RADIOFREQUENCY ABLATION      x2    REVISION OF KNEE ARTHROPLASTY Right 12/9/2023    Procedure: REVISION ARTHROPLASTY KNEE- Liner Replacement - dirty/clean;  Surgeon: Jelani Tello II, MD;  Location: [facility] OR;  Service: Orthopedics;  Laterality: Right;  dirty to clean at 1940    Right ankle tendon repair      ROBOT-ASSISTED REPAIR OF INCISIONAL HERNIA USING DA GARETH XI Left 6/13/2022    Procedure: XI ROBOTIC REPAIR, HERNIA, INCISIONAL (LEFT SIDE SPIGELIAN WITH MESH);  Surgeon: oRsendo Marti MD;  Location: [facility] OR 2ND FLR;  Service: General;  Laterality: Left;  consent in am    ROTATOR  CUFF REPAIR      right    TOTAL KNEE ARTHROPLASTY Right 5/29/2018    Procedure: REPLACEMENT-KNEE-TOTAL-axel;  Surgeon: Denzel Dallas MD;  Location: [facility] OR [facility] FLR;  Service: Orthopedics;  Laterality: Right;  [place]    TRANSESOPHAGEAL ECHOCARDIOGRAPHY N/A 4/23/2024    Procedure: ECHOCARDIOGRAM, TRANSESOPHAGEAL;  Surgeon: Jan Bravo Diagnostic;  Location: [facility] EP LAB;  Service: Cardiology;  Laterality: N/A;    TRANSESOPHAGEAL ECHOCARDIOGRAPHY N/A 5/23/2024    Procedure: ECHOCARDIOGRAM, TRANSESOPHAGEAL;  Surgeon: Kj Newton MD;  Location: [facility] CATH LAB;  Service: Cardiology;  Laterality: N/A;    TREATMENT OF CARDIAC ARRHYTHMIA  6/15/2020    Procedure: Cardioversion or Defibrillation;  Surgeon: Wyatt Beatty MD;  Location: [facility] EP LAB;  Service: Cardiology;;    TREATMENT OF CARDIAC ARRHYTHMIA N/A 2/28/2024    Procedure: Cardioversion or Defibrillation;  Surgeon: Pao Hare MD;  Location: [facility];  Service: Cardiology;  Laterality: N/A;       Review of patient's allergies indicates:   Allergen Reactions    No known drug allergies        Social History     Socioeconomic History    Marital status:     Number of children: 5   Occupational History    Occupation: retired anesthesiology     Employer: [employer]    Occupation: [employer] grad     Comment: previous football player   Tobacco Use    Smoking status: Never     Passive exposure: Never    Smokeless tobacco: Never    Tobacco comments:     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    Substance and Sexual Activity    Alcohol use: No    Drug use: No    Sexual activity: Yes     Partners: Female     Social Determinants of Health     Financial Resource Strain: Low Risk  (4/22/2024)    Overall Financial Resource Strain (CARDIA)     Difficulty of Paying Living Expenses: Not hard at all   Food Insecurity: No Food Insecurity (4/22/2024)    Hunger Vital Sign     Worried About Running Out of Food in the Last Year: Never true     Ran Out of  Food in the Last Year: Never true   Transportation Needs: No Transportation Needs (4/22/2024)    PRAPARE - Transportation     Lack of Transportation (Medical): No     Lack of Transportation (Non-Medical): No   Physical Activity: Sufficiently Active (4/22/2024)    Exercise Vital Sign     Days of Exercise per Week: 5 days     Minutes of Exercise per Session: 130 min   Stress: No Stress Concern Present (4/22/2024)    [place] [employer] - Occupational Stress Questionnaire     Feeling of Stress : Only a little   Housing Stability: Unknown (12/10/2023)    Housing Stability Vital Sign     Unable to Pay for Housing in the Last Year: No     Number of Places Lived in the Last Year: 1       Current Outpatient Medications on File Prior to Visit   Medication Sig Dispense Refill    alfuzosin (UROXATRAL) 10 mg Tb24 Take 1 tablet (10 mg total) by mouth daily with breakfast. 30 tablet 11    aspirin (ECOTRIN) 81 MG EC tablet Take 1 tablet (81 mg total) by mouth once daily. 90 tablet 3    buPROPion (WELLBUTRIN XL) 150 MG TB24 tablet Take 1 tablet (150 mg total) by mouth once daily. 90 tablet 3    buPROPion (WELLBUTRIN XL) 300 MG 24 hr tablet Take 1 tablet (300 mg total) by mouth once daily. 90 tablet 3    carvediloL (COREG) 6.25 MG tablet Take 12.5 mg by mouth 2 (two) times daily.      clopidogreL (PLAVIX) 75 mg tablet First dose, please take 4 tablets ( 300 mg ) by mouth on day 1 and then one tablet (75 mg) by mouth daily after that 90 tablet 3    cyclobenzaprine (FLEXERIL) 10 MG tablet Take 1 tablet (10 mg total) by mouth 3 (three) times daily as needed for Muscle spasms. 60 tablet 3    EScitalopram oxalate (LEXAPRO) 10 MG tablet TAKE 1 TABLET(10 MG) BY MOUTH EVERY DAY 90 tablet 4    iron-vit c-b12-folic acid (ICAR-C PLUS) Tab Take 1 tablet by mouth once daily. 90 tablet 3    levothyroxine (SYNTHROID) 75 MCG tablet Take 1 tablet (75 mcg total) by mouth before breakfast. 90 tablet 1    OMEGA-3 FATTY ACIDS (FISH  OIL CONCENTRATE ORAL) Take 1 capsule by mouth Daily.      potassium chloride (MICRO-K) 10 MEQ CpSR Take 1 capsule (10 mEq total) by mouth every Mon, Wed, Fri. 15 capsule 5    propafenone (RYTHMOL SR) 425 MG Cp12 Take 1 capsule (425 mg total) by mouth every 12 (twelve) hours. 60 capsule 11    telmisartan (MICARDIS) 40 MG Tab Take 1 tablet (40 mg total) by mouth once daily. 90 tablet 3    tiZANidine (ZANAFLEX) 4 MG tablet Take 1 tablet (4 mg total) by mouth 3 (three) times daily as needed. 30 tablet 3    torsemide (DEMADEX) 20 MG Tab Take 1 tablet (20 mg total) by mouth every Mon, Wed, Fri. 15 tablet 5     No current facility-administered medications on file prior to visit.       Family History   Problem Relation Name Age of Onset    COPD Father      Diabetes Father      Osteoporosis Father      Aortic stenosis Mother      Heart disease Mother          aortic stenosis    Osteoporosis Mother      Heart attack Brother      No Known Problems Son      No Known Problems Daughter      No Known Problems Daughter      No Known Problems Daughter         Review of Systems   Constitutional:  Positive for appetite change and unexpected weight change. Negative for chills and fever.   HENT:  Negative for sore throat and trouble swallowing.    Eyes:  Negative for pain and visual disturbance.   Respiratory:  Negative for cough, chest tightness, shortness of breath and wheezing.    Cardiovascular:  Positive for leg swelling. Negative for chest pain and palpitations.   Gastrointestinal:  Negative for abdominal pain, blood in stool, constipation, diarrhea and nausea.   Genitourinary:  Negative for difficulty urinating, dysuria and hematuria.   Musculoskeletal:  Negative for arthralgias, gait problem and neck pain.   Skin:  Negative for rash and wound.   Neurological:  Negative for dizziness, weakness, light-headedness and headaches.   Hematological:  Negative for adenopathy.   Psychiatric/Behavioral:  Negative for dysphoric mood.         Objective:      /70   Pulse (!) 57   Resp 18   Ht 6' (1.829 m)   Wt 76 kg (167 lb 8.8 oz)   SpO2 96%   BMI 22.72 kg/m²   Physical Exam  Constitutional:       General: He is not in acute distress.     Appearance: He is well-developed.   HENT:      Head: Normocephalic and atraumatic.      Right Ear: External ear normal.      Left Ear: External ear normal.      Mouth/Throat:      Pharynx: Uvula midline. No oropharyngeal exudate.   Eyes:      General: Lids are normal.      Conjunctiva/sclera: Conjunctivae normal.      Pupils: Pupils are equal, round, and reactive to light.   Neck:      Thyroid: No thyroid mass or thyromegaly.      Trachea: Phonation normal.   Cardiovascular:      Rate and Rhythm: Normal rate and regular rhythm.      Heart sounds: Normal heart sounds. No murmur heard.     No friction rub. No gallop.   Pulmonary:      Effort: Pulmonary effort is normal. No respiratory distress.      Breath sounds: Normal breath sounds. No wheezing or rales.   Musculoskeletal:         General: Normal range of motion.      Cervical back: Full passive range of motion without pain, normal range of motion and neck supple.      Right lower le+ Edema present.      Left lower le+ Edema present.   Lymphadenopathy:      Cervical: No cervical adenopathy.      Upper Body:      Right upper body: No supraclavicular or axillary adenopathy.      Left upper body: No supraclavicular or axillary adenopathy.   Skin:     General: Skin is warm and dry.   Neurological:      Mental Status: He is alert and oriented to person, place, and time.      Cranial Nerves: No cranial nerve deficit.      Coordination: Coordination normal.   Psychiatric:         Speech: Speech normal.         Behavior: Behavior normal.         Thought Content: Thought content normal.         Judgment: Judgment normal.         Results for orders placed or performed during the hospital encounter of 24   Transesophageal echo (PEPE)   Result Value Ref  Range    BSA 1.98 m2    Sinus 3.4 cm    STJ 3.0 cm    Ascending aorta 3.7 cm     *Note: Due to a large number of results and/or encounters for the requested time period, some results have not been displayed. A complete set of results can be found in Results Review.     Labs and hospital records reviewed     Assessment:       1. Mediastinal lymphadenopathy    2. Lung nodules    3. Abnormal weight loss    4. Prostate cancer screening    5. Abnormal glucose    6. Essential hypertension    7. Depression, unspecified depression type                        Plan:       Mediastinal lymphadenopathy  -     NM PET CT FDG Skull Base to Mid Thigh; Future; Expected date: 06/19/2024    Lung nodules    Abnormal weight loss  -     TSH; Future; Expected date: 06/19/2024  -     HIV 1/2 Ag/Ab (4th Gen); Future; Expected date: 06/19/2024  -     CBC Auto Differential; Future; Expected date: 06/19/2024  -     Comprehensive Metabolic Panel; Future; Expected date: 06/19/2024  -     droNABinol (MARINOL) 2.5 MG capsule; Take 1 capsule (2.5 mg total) by mouth 2 (two) times daily before meals.  Dispense: 60 capsule; Refill: 2    Prostate cancer screening    Abnormal glucose  -     Hemoglobin A1C; Future; Expected date: 06/19/2024    Essential hypertension    Depression, unspecified depression type                         PET scan  Reassurance  Trial of Marinol 2.5mg BID  F/u with cardiology, ID, ortho as planned  Continue Eliquis 5mg BID with ASA  Continue carvedilol   Continue other present meds  Counseled on regular exercise, maintenance of a healthy weight, balanced diet rich in fruits/vegetables and lean protein, and avoidance of unhealthy habits like smoking and excessive alcohol intake.    F/u 1 months or PRN    Visit today included increased complexity associated with the care of the episodic problems listed above addressed and managing the longitudinal care of the patient due to the serious and/or complex managed problem(s) listed  above.

## 2024-06-25 ENCOUNTER — HOSPITAL ENCOUNTER (OUTPATIENT)
Dept: RADIOLOGY | Facility: HOSPITAL | Age: 79
Discharge: HOME | End: 2024-06-25
Attending: FAMILY MEDICINE
Payer: MEDICARE

## 2024-06-25 DIAGNOSIS — R59.0 MEDIASTINAL LYMPHADENOPATHY: ICD-10-CM

## 2024-06-25 LAB — GLUCOSE SERPL-MCNC: 92 MG/DL (ref 70–110)

## 2024-06-25 PROCEDURE — 78815 PET IMAGE W/CT SKULL-THIGH: CPT | Mod: TC,HCNC,PN,NTX

## 2024-06-25 PROCEDURE — 78815 PET IMAGE W/CT SKULL-THIGH: CPT | Mod: 26,PI,HCNC,NTX | Performed by: RADIOLOGY

## 2024-06-25 PROCEDURE — A9552 F18 FDG: HCPCS | Mod: HCNC,PN,NTX | Performed by: FAMILY MEDICINE

## 2024-06-25 RX ORDER — FLUDEOXYGLUCOSE F18 500 MCI/ML
12 INJECTION INTRAVENOUS
Status: COMPLETED | OUTPATIENT
Start: 2024-06-25 | End: 2024-06-25

## 2024-06-25 RX ADMIN — FLUDEOXYGLUCOSE F-18 11.87 MILLICURIE: 500 INJECTION INTRAVENOUS at 07:06

## 2024-06-25 NOTE — PROGRESS NOTES
PET Imaging Questionnaire    Are you a Diabetic? Recent Blood Sugar level? No    Are you anemic? Bone Marrow Stimulation Meds? Yes    Have you had a CT Scan, if so when & where was your last one? Yes -     Have you had a PET Scan, if so when & where was your last one? No    Chemotherapy or currently on Chemotherapy? No    Radiation therapy? No    Surgical History:   Past Surgical History:   Procedure Laterality Date    A-V CARDIAC PACEMAKER INSERTION Left 9/23/2023    Procedure: Dual Chamber PPM (Heart) Rm 2620;  Surgeon: Pan Moss MD;  Location: [facility] CATH;  Service: Cardiology;  Laterality: Left;    ABDOMINAL HERNIA REPAIR      ABLATION OF ARRHYTHMOGENIC FOCUS FOR ATRIAL FIBRILLATION N/A 6/15/2020    Procedure: Ablation atrial fibrillation;  Surgeon: Wyatt Beatty MD;  Location: [facility] EP LAB;  Service: Cardiology;  Laterality: N/A;  PAF, AFl, PEPE, PVI re-do, CTI, RFA, JUSTIN, Gen, SK, 3 Prep    APPLICATION OF WOUND VACUUM-ASSISTED CLOSURE DEVICE Right 12/9/2023    Procedure: APPLICATION, WOUND VAC;  Surgeon: Jelani Tello II, MD;  Location: [facility] OR;  Service: Orthopedics;  Laterality: Right;    CARDIOVERSION N/A 2/28/2024    Procedure: Cardioversion;  Surgeon: Pao Hare MD;  Location: [facility];  Service: Cardiology;  Laterality: N/A;    CATARACT EXTRACTION  11/25/2013    bilateral    CHOLECYSTECTOMY      CLOSURE OF LEFT ATRIAL APPENDAGE USING DEVICE N/A 5/23/2024    Procedure: Left atrial appendage closure device;  Surgeon: Tony Duvall MD;  Location: [facility] CATH LAB;  Service: Cardiology;  Laterality: N/A;    COLONOSCOPY N/A 11/25/2015    Procedure: COLONOSCOPY;  Surgeon: Toby Hernandez MD;  Location: [facility] ENDO;  Service: Endoscopy;  Laterality: N/A;    COLONOSCOPY N/A 11/13/2020    Procedure: COLONOSCOPY;  Surgeon: Toby Hernandez MD;  Location: [facility] ENDO;  Service: Endoscopy;  Laterality: N/A;    COLONOSCOPY N/A 5/1/2024    Procedure: COLONOSCOPY;  Surgeon: Toby Hernandez  MD;  Location: [facility] ENDO;  Service: Endoscopy;  Laterality: N/A;    CYSTOSCOPY WITH INSERTION OF MINIMALLY INVASIVE IMPLANT TO ENLARGE PROSTATIC URETHRA N/A 11/28/2022    Procedure: CYSTOSCOPY, WITH INSERTION OF UROLIFT IMPLANT;  Surgeon: Yakov Shetty MD;  Location: [facility] OR;  Service: Urology;  Laterality: N/A;    ESOPHAGOGASTRODUODENOSCOPY N/A 5/1/2024    Procedure: ESOPHAGOGASTRODUODENOSCOPY (EGD);  Surgeon: Toby Hernandez MD;  Location: [facility] ENDO;  Service: Endoscopy;  Laterality: N/A;    EYE SURGERY      GASTRIC BYPASS  1992    INSERTION, PACEMAKER, TEMPORARY TRANSVENOUS  9/22/2023    Procedure: Temp pacer insertion ER Rm 8;  Surgeon: Pan Moss MD;  Location: [facility] CATH;  Service: Cardiology;;    INTRAMEDULLARY RODDING OF FEMUR Left 7/18/2020    Procedure: INSERTION, INTRAMEDULLARY ERIC, FEMUR, left, Synthes, [place] table, Large C arm clock side,;  Surgeon: Tony Rodriguez MD;  Location: [facility] OR [facility] FLR;  Service: Orthopedics;  Laterality: Left;    IRRIGATION AND DEBRIDEMENT Right 12/9/2023    Procedure: IRRIGATION AND DEBRIDEMENT KNEE;  Surgeon: Jelani Tello II, MD;  Location: [facility] OR;  Service: Orthopedics;  Laterality: Right;    KNEE ARTHROSCOPY      RT    LASIK  2001    both eyes (Dr. Rabago)    ORIF HUMERUS FRACTURE      LT    ORIF HUMERUS FRACTURE Right     non surgical repair    RADIOFREQUENCY ABLATION      x2    REVISION OF KNEE ARTHROPLASTY Right 12/9/2023    Procedure: REVISION ARTHROPLASTY KNEE- Liner Replacement - dirty/clean;  Surgeon: Jelani Tello II, MD;  Location: [facility] OR;  Service: Orthopedics;  Laterality: Right;  dirty to clean at 1940    Right ankle tendon repair      ROBOT-ASSISTED REPAIR OF INCISIONAL HERNIA USING DA GARETH XI Left 6/13/2022    Procedure: XI ROBOTIC REPAIR, HERNIA, INCISIONAL (LEFT SIDE SPIGELIAN WITH MESH);  Surgeon: Rosendo Marti MD;  Location: [facility] OR 2ND FLR;  Service: General;  Laterality: Left;  consent in am    ROTATOR CUFF  REPAIR      right    TOTAL KNEE ARTHROPLASTY Right 5/29/2018    Procedure: REPLACEMENT-KNEE-TOTAL-pro;  Surgeon: Denzel Dallas MD;  [facility];  Service: Orthopedics;  Laterality: Right;  [place]    TRANSESOPHAGEAL ECHOCARDIOGRAPHY N/A 4/23/2024    Procedure: ECHOCARDIOGRAM, TRANSESOPHAGEAL;  Surgeon: Jan Bravo Diagnostic;  Location: [facility] EP LAB;  Service: Cardiology;  Laterality: N/A;    TRANSESOPHAGEAL ECHOCARDIOGRAPHY N/A 5/23/2024    Procedure: ECHOCARDIOGRAM, TRANSESOPHAGEAL;  Surgeon: Kj Newton MD;  Location: [facility] CATH LAB;  Service: Cardiology;  Laterality: N/A;    TREATMENT OF CARDIAC ARRHYTHMIA  6/15/2020    Procedure: Cardioversion or Defibrillation;  Surgeon: Wyatt Beatty MD;  Location: [facility] EP LAB;  Service: Cardiology;;    TREATMENT OF CARDIAC ARRHYTHMIA N/A 2/28/2024    Procedure: Cardioversion or Defibrillation;  Surgeon: Pao Hare MD;  Location: [facility];  Service: Cardiology;  Laterality: N/A;        Have you been fasting for at least 6 hours? Yes    Is there any chance you may be pregnant or breastfeeding? No    Assay: 12.6 MCi@:7:33   Injection Site:LT AC    Residual: .73 mCi@: 7:35   Technologist: lEiecer Ann Injected:11.87 mCi

## 2024-07-08 ENCOUNTER — ANESTHESIA EVENT (OUTPATIENT)
Dept: MEDSURG UNIT | Facility: HOSPITAL | Age: 79
End: 2024-07-08
Payer: MEDICARE

## 2024-07-09 ENCOUNTER — ANESTHESIA (OUTPATIENT)
Dept: MEDSURG UNIT | Facility: HOSPITAL | Age: 79
End: 2024-07-09
Payer: MEDICARE

## 2024-07-09 ENCOUNTER — HOSPITAL ENCOUNTER (OUTPATIENT)
Facility: HOSPITAL | Age: 79
Discharge: HOME | End: 2024-07-09
Attending: INTERNAL MEDICINE | Admitting: INTERNAL MEDICINE
Payer: MEDICARE

## 2024-07-09 ENCOUNTER — OFFICE VISIT (OUTPATIENT)
Dept: CARDIOLOGY | Facility: CLINIC | Age: 79
End: 2024-07-09
Payer: MEDICARE

## 2024-07-09 ENCOUNTER — HOSPITAL ENCOUNTER (OUTPATIENT)
Dept: CARDIOLOGY | Facility: HOSPITAL | Age: 79
Discharge: HOME | End: 2024-07-09
Attending: INTERNAL MEDICINE | Admitting: INTERNAL MEDICINE
Payer: MEDICARE

## 2024-07-09 ENCOUNTER — PATIENT MESSAGE (OUTPATIENT)
Dept: GASTROENTEROLOGY | Facility: CLINIC | Age: 79
End: 2024-07-09
Payer: MEDICARE

## 2024-07-09 ENCOUNTER — PATIENT MESSAGE (OUTPATIENT)
Dept: FAMILY MEDICINE | Facility: CLINIC | Age: 79
End: 2024-07-09
Payer: MEDICARE

## 2024-07-09 VITALS
HEIGHT: 72 IN | HEART RATE: 51 BPM | OXYGEN SATURATION: 99 % | WEIGHT: 178.56 LBS | BODY MASS INDEX: 24.18 KG/M2 | SYSTOLIC BLOOD PRESSURE: 186 MMHG | DIASTOLIC BLOOD PRESSURE: 88 MMHG

## 2024-07-09 VITALS
HEIGHT: 72 IN | WEIGHT: 172 LBS | DIASTOLIC BLOOD PRESSURE: 77 MMHG | BODY MASS INDEX: 23.3 KG/M2 | SYSTOLIC BLOOD PRESSURE: 133 MMHG

## 2024-07-09 VITALS
SYSTOLIC BLOOD PRESSURE: 153 MMHG | BODY MASS INDEX: 23.3 KG/M2 | RESPIRATION RATE: 18 BRPM | TEMPERATURE: 99 F | DIASTOLIC BLOOD PRESSURE: 87 MMHG | OXYGEN SATURATION: 97 % | HEART RATE: 56 BPM | HEIGHT: 72 IN | WEIGHT: 172 LBS

## 2024-07-09 DIAGNOSIS — Z91.89 AT HIGH RISK FOR BLEEDING: Primary | ICD-10-CM

## 2024-07-09 DIAGNOSIS — Z87.11 HISTORY OF BLEEDING PEPTIC ULCER: ICD-10-CM

## 2024-07-09 DIAGNOSIS — Z95.818 PRESENCE OF LEFT ATRIAL APPENDAGE CLOSURE DEVICE: ICD-10-CM

## 2024-07-09 DIAGNOSIS — Z95.818 PRESENCE OF AMULET LEFT ATRIAL APPENDAGE CLOSURE DEVICE: ICD-10-CM

## 2024-07-09 DIAGNOSIS — I48.0 PAF (PAROXYSMAL ATRIAL FIBRILLATION): ICD-10-CM

## 2024-07-09 DIAGNOSIS — Z01.89 ENCOUNTER FOR OTHER SPECIFIED SPECIAL EXAMINATIONS: ICD-10-CM

## 2024-07-09 DIAGNOSIS — I48.19 PERSISTENT ATRIAL FIBRILLATION: Primary | ICD-10-CM

## 2024-07-09 LAB — BSA FOR ECHO PROCEDURE: 1.99 M2

## 2024-07-09 PROCEDURE — 99999 PR PBB SHADOW E&M-EST. PATIENT-LVL IV: CPT | Mod: PBBFAC,HCNC,TXP, | Performed by: INTERNAL MEDICINE

## 2024-07-09 PROCEDURE — 93312 ECHO TRANSESOPHAGEAL: CPT | Mod: HCNC,TXP

## 2024-07-09 PROCEDURE — 93312 ECHO TRANSESOPHAGEAL: CPT | Mod: 26,HCNC,NTX, | Performed by: INTERNAL MEDICINE

## 2024-07-09 PROCEDURE — 63600175 PHARM REV CODE 636 W HCPCS: Mod: HCNC,TXP | Performed by: NURSE ANESTHETIST, CERTIFIED REGISTERED

## 2024-07-09 PROCEDURE — 37000009 HC ANESTHESIA EA ADD 15 MINS: Mod: HCNC,TXP

## 2024-07-09 PROCEDURE — 25000003 PHARM REV CODE 250: Mod: HCNC,TXP | Performed by: NURSE ANESTHETIST, CERTIFIED REGISTERED

## 2024-07-09 PROCEDURE — 93325 DOPPLER ECHO COLOR FLOW MAPG: CPT | Mod: HCNC,TXP

## 2024-07-09 PROCEDURE — 93325 DOPPLER ECHO COLOR FLOW MAPG: CPT | Mod: 26,HCNC,NTX, | Performed by: INTERNAL MEDICINE

## 2024-07-09 PROCEDURE — 37000008 HC ANESTHESIA 1ST 15 MINUTES: Mod: HCNC,TXP

## 2024-07-09 PROCEDURE — 93320 DOPPLER ECHO COMPLETE: CPT | Mod: 26,HCNC,NTX, | Performed by: INTERNAL MEDICINE

## 2024-07-09 RX ORDER — LIDOCAINE HYDROCHLORIDE 20 MG/ML
INJECTION INTRAVENOUS
Status: DISCONTINUED | OUTPATIENT
Start: 2024-07-09 | End: 2024-07-09

## 2024-07-09 RX ORDER — GLUCAGON 1 MG
1 KIT INJECTION
Status: DISCONTINUED | OUTPATIENT
Start: 2024-07-09 | End: 2024-07-09 | Stop reason: HOSPADM

## 2024-07-09 RX ORDER — PROPOFOL 10 MG/ML
VIAL (ML) INTRAVENOUS
Status: DISCONTINUED | OUTPATIENT
Start: 2024-07-09 | End: 2024-07-09

## 2024-07-09 RX ORDER — FENTANYL CITRATE 50 UG/ML
25 INJECTION, SOLUTION INTRAMUSCULAR; INTRAVENOUS EVERY 5 MIN PRN
OUTPATIENT
Start: 2024-07-09

## 2024-07-09 RX ORDER — SODIUM CHLORIDE 0.9 % (FLUSH) 0.9 %
10 SYRINGE (ML) INJECTION
Status: DISCONTINUED | OUTPATIENT
Start: 2024-07-09 | End: 2024-07-09 | Stop reason: HOSPADM

## 2024-07-09 RX ORDER — ONDANSETRON HYDROCHLORIDE 2 MG/ML
4 INJECTION, SOLUTION INTRAVENOUS DAILY PRN
OUTPATIENT
Start: 2024-07-09

## 2024-07-09 RX ADMIN — SODIUM CHLORIDE: 0.9 INJECTION, SOLUTION INTRAVENOUS at 09:07

## 2024-07-09 RX ADMIN — GLYCOPYRROLATE 0.2 MG: 0.2 INJECTION, SOLUTION INTRAMUSCULAR; INTRAVENOUS at 09:07

## 2024-07-09 RX ADMIN — LIDOCAINE HYDROCHLORIDE 160 MG: 20 INJECTION INTRAVENOUS at 09:07

## 2024-07-09 RX ADMIN — PROPOFOL 40 MG: 10 INJECTION, EMULSION INTRAVENOUS at 09:07

## 2024-07-09 RX ADMIN — PROPOFOL 20 MG: 10 INJECTION, EMULSION INTRAVENOUS at 09:07

## 2024-07-09 NOTE — TRANSFER OF CARE
Anesthesia Transfer of Care Note    Patient: Dominick Song MD    Procedure(s) Performed: Procedure(s) (LRB):  ECHOCARDIOGRAM, TRANSESOPHAGEAL (N/A)    Patient location: Cath Lab    Anesthesia Type: general and MAC    Transport from OR: Transported from OR on 2-3 L/min O2 by NC with adequate spontaneous ventilation    Post pain: adequate analgesia    Post assessment: no apparent anesthetic complications and tolerated procedure well    Post vital signs: stable    Level of consciousness: awake and alert    Nausea/Vomiting: no nausea/vomiting    Complications: none    Transfer of care protocol was followed      Last vitals: Visit Vitals  /61 (BP Location: Right arm, Patient Position: Lying)   Pulse 60   Temp 36.6 °C (97.9 °F) (Temporal)   Resp 18   Ht 6' (1.829 m)   Wt 78 kg (172 lb)   SpO2 99%   BMI 23.33 kg/m²

## 2024-07-09 NOTE — ASSESSMENT & PLAN NOTE
- s/p Amulet on 5/23/24  - 6 week follow up PEPE without marcela-device leak, well seated   - discontinue plavix today, continue daily baby aspirin   - follow up in clinic in 6 months   
No

## 2024-07-09 NOTE — NURSING
Patient discharged per MD orders. Instructions given on medications, activity, signs of infection, when to call MD, and follow up appointments. Pt verbalized understanding.  Patient AAOx3, VSS, no c/o pain or discomfort at this time. Telemetry and PIV removed. Patient left unit via wheelchair with family.

## 2024-07-09 NOTE — DISCHARGE INSTRUCTIONS
Medications:  -Continue to take your home medications as listed on your medication list after you are discharged.    Follow up: in clinic with Dr. Duvall as scheduled, today at 1:00pm.    Diet  -You may resume oral intake after you are discharged, as long you have no swallowing difficulties.    Because you have received sedation for this procedure:  -Limit activity for the remainder of the day.  -Do not smoke for at least 6 hours and until you are fully awake and alert.  -Do not drink alcoholic beverage for 24 hours.  -Do not drive for 24 hours.  -Defer important decision making until the following day.     Go to the Emergency Department if you develop:   -Bleeding  -Weakness or numbness  -Visual, gait or speech disturbance  -New chest pain, palpitations, shortness of breath, rapid heart beat, or fainting  -Fever

## 2024-07-09 NOTE — ANESTHESIA PREPROCEDURE EVALUATION
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    Pre-operative evaluation for Procedure(s) (LRB):  ECHOCARDIOGRAM, TRANSESOPHAGEAL (N/A)        Patient Active Problem List   Diagnosis    BPH with urinary obstruction    Elevated PSA    Nuclear sclerosis    Anemia due to chronic blood loss    Posterior vitreous detachment    Metatarsalgia    Keratoma    Foot pain, right    Onychomycosis due to dermatophyte    ED (erectile dysfunction)    Bradycardia    Mild single current episode of major depressive disorder    Tortuous aorta    Dyspnea    Acquired hypothyroidism    Essential hypertension    Gait abnormality    Chronic kidney disease, stage 3a    Complex sleep apnea syndrome    Erectile dysfunction due to arterial insufficiency    Primary osteoarthritis of right knee    History of bleeding peptic ulcer    History of TIA (transient ischemic attack)    Closed nondisplaced intertrochanteric fracture of left femur    Stroke    Hx of colonic polyps    Age-related osteoporosis with current pathological fracture with routine healing    Hyperparathyroidism    Ventral hernia without obstruction or gangrene    ILD (interstitial lung disease)    ACP (advance care planning)    Pacemaker    Pyogenic arthritis of right knee joint    PAF (paroxysmal atrial fibrillation)    Infected prosthetic knee joint    Hyperkalemia    Acute blood loss anemia    Persistent atrial fibrillation    Chronic heart failure with preserved ejection fraction (HFpEF)    At high risk for bleeding    Nonrheumatic mitral valve regurgitation    Idiopathic pulmonary fibrosis       Review of patient's allergies indicates:   Allergen Reactions    No known drug allergies        No current facility-administered medications on file prior to encounter.     Current Outpatient  Medications on File Prior to Encounter   Medication Sig Dispense Refill    alfuzosin (UROXATRAL) 10 mg Tb24 Take 1 tablet (10 mg total) by mouth daily with breakfast. 30 tablet 11    aspirin (ECOTRIN) 81 MG EC tablet Take 1 tablet (81 mg total) by mouth once daily. 90 tablet 3    buPROPion (WELLBUTRIN XL) 150 MG TB24 tablet Take 1 tablet (150 mg total) by mouth once daily. 90 tablet 3    buPROPion (WELLBUTRIN XL) 300 MG 24 hr tablet Take 1 tablet (300 mg total) by mouth once daily. 90 tablet 3    carvediloL (COREG) 6.25 MG tablet Take 12.5 mg by mouth 2 (two) times daily.      clopidogreL (PLAVIX) 75 mg tablet First dose, please take 4 tablets ( 300 mg ) by mouth on day 1 and then one tablet (75 mg) by mouth daily after that 90 tablet 3    cyclobenzaprine (FLEXERIL) 10 MG tablet Take 1 tablet (10 mg total) by mouth 3 (three) times daily as needed for Muscle spasms. 60 tablet 3    EScitalopram oxalate (LEXAPRO) 10 MG tablet TAKE 1 TABLET(10 MG) BY MOUTH EVERY DAY 90 tablet 4    iron-vit c-b12-folic acid (ICAR-C PLUS) Tab Take 1 tablet by mouth once daily. 90 tablet 3    levothyroxine (SYNTHROID) 75 MCG tablet Take 1 tablet (75 mcg total) by mouth before breakfast. 90 tablet 1    OMEGA-3 FATTY ACIDS (FISH OIL CONCENTRATE ORAL) Take 1 capsule by mouth Daily.      potassium chloride (MICRO-K) 10 MEQ CpSR Take 1 capsule (10 mEq total) by mouth every Mon, Wed, Fri. 15 capsule 5    propafenone (RYTHMOL SR) 425 MG Cp12 Take 1 capsule (425 mg total) by mouth every 12 (twelve) hours. 60 capsule 11    telmisartan (MICARDIS) 40 MG Tab Take 1 tablet (40 mg total) by mouth once daily. 90 tablet 3    torsemide (DEMADEX) 20 MG Tab Take 1 tablet (20 mg total) by mouth every Mon, Wed, Fri. 15 tablet 5       Past Surgical History:   Procedure Laterality Date    A-V CARDIAC PACEMAKER INSERTION Left 9/23/2023    Procedure: Dual Chamber PPM (Heart) Rm 2620;  Surgeon: Pan Moss MD;  Location: [facility];  Service:  Cardiology;  Laterality: Left;    ABDOMINAL HERNIA REPAIR      ABLATION OF ARRHYTHMOGENIC FOCUS FOR ATRIAL FIBRILLATION N/A 6/15/2020    Procedure: Ablation atrial fibrillation;  Surgeon: Wyatt Beatty MD;  Location: [facility] EP LAB;  Service: Cardiology;  Laterality: N/A;  PAF, AFl, PEPE, PVI re-do, CTI, RFA, JUSTIN, Gen, SK, 3 Prep    APPLICATION OF WOUND VACUUM-ASSISTED CLOSURE DEVICE Right 12/9/2023    Procedure: APPLICATION, WOUND VAC;  Surgeon: Jelani Tello II, MD;  Location: [facility] OR;  Service: Orthopedics;  Laterality: Right;    CARDIOVERSION N/A 2/28/2024    Procedure: Cardioversion;  Surgeon: Pao Hare MD;  Location: [facility];  Service: Cardiology;  Laterality: N/A;    CATARACT EXTRACTION  11/25/2013    bilateral    CHOLECYSTECTOMY      CLOSURE OF LEFT ATRIAL APPENDAGE USING DEVICE N/A 5/23/2024    Procedure: Left atrial appendage closure device;  Surgeon: Tony Duvall MD;  Location: [facility] CATH LAB;  Service: Cardiology;  Laterality: N/A;    COLONOSCOPY N/A 11/25/2015    Procedure: COLONOSCOPY;  Surgeon: Toby Hernandez MD;  Location: [facility] ENDO;  Service: Endoscopy;  Laterality: N/A;    COLONOSCOPY N/A 11/13/2020    Procedure: COLONOSCOPY;  Surgeon: Toby Hernandez MD;  Location: [facility] ENDO;  Service: Endoscopy;  Laterality: N/A;    COLONOSCOPY N/A 5/1/2024    Procedure: COLONOSCOPY;  Surgeon: Toby Hernandez MD;  Location: [facility];  Service: Endoscopy;  Laterality: N/A;    CYSTOSCOPY WITH INSERTION OF MINIMALLY INVASIVE IMPLANT TO ENLARGE PROSTATIC URETHRA N/A 11/28/2022    Procedure: CYSTOSCOPY, WITH INSERTION OF UROLIFT IMPLANT;  Surgeon: Yakov Shetty MD;  Location: [facility] OR;  Service: Urology;  Laterality: N/A;    ESOPHAGOGASTRODUODENOSCOPY N/A 5/1/2024    Procedure: ESOPHAGOGASTRODUODENOSCOPY (EGD);  Surgeon: Toby Hernandez MD;  Location: [facility] ENDO;  Service: Endoscopy;  Laterality: N/A;    EYE SURGERY      GASTRIC BYPASS  1992    INSERTION, PACEMAKER, TEMPORARY TRANSVENOUS   9/22/2023    Procedure: Temp pacer insertion ER Rm 8;  Surgeon: Pan Moss MD;  Location: [facility] CATH;  Service: Cardiology;;    INTRAMEDULLARY RODDING OF FEMUR Left 7/18/2020    Procedure: INSERTION, INTRAMEDULLARY ERIC, FEMUR, left, Synthes, [place] table, Large C arm clock side,;  Surgeon: Tony Rodriguez MD;  Location: [facility] OR 2ND FLR;  Service: Orthopedics;  Laterality: Left;    IRRIGATION AND DEBRIDEMENT Right 12/9/2023    Procedure: IRRIGATION AND DEBRIDEMENT KNEE;  Surgeon: Jelani Tello II, MD;  Location: [facility] OR;  Service: Orthopedics;  Laterality: Right;    KNEE ARTHROSCOPY      RT    LASIK  2001    both eyes (Dr. Rabago)    ORIF HUMERUS FRACTURE      LT    ORIF HUMERUS FRACTURE Right     non surgical repair    RADIOFREQUENCY ABLATION      x2    REVISION OF KNEE ARTHROPLASTY Right 12/9/2023    Procedure: REVISION ARTHROPLASTY KNEE- Liner Replacement - dirty/clean;  Surgeon: Jelani Tello II, MD;  Location: [facility] OR;  Service: Orthopedics;  Laterality: Right;  dirty to clean at 1940    Right ankle tendon repair      ROBOT-ASSISTED REPAIR OF INCISIONAL HERNIA USING DA GARETH XI Left 6/13/2022    Procedure: XI ROBOTIC REPAIR, HERNIA, INCISIONAL (LEFT SIDE SPIGELIAN WITH MESH);  Surgeon: Rosendo Marti MD;  Location: [facility] OR 2ND FLR;  Service: General;  Laterality: Left;  consent in am    ROTATOR CUFF REPAIR      right    TOTAL KNEE ARTHROPLASTY Right 5/29/2018    Procedure: REPLACEMENT-KNEE-TOTAL-axel;  Surgeon: Denzel Dallas MD;  Location: [facility] OR 2ND FLR;  Service: Orthopedics;  Laterality: Right;  [place]    TRANSESOPHAGEAL ECHOCARDIOGRAPHY N/A 4/23/2024    Procedure: ECHOCARDIOGRAM, TRANSESOPHAGEAL;  Surgeon: Jan Bravo Diagnostic;  Location: [facility] EP LAB;  Service: Cardiology;  Laterality: N/A;    TRANSESOPHAGEAL ECHOCARDIOGRAPHY N/A 5/23/2024    Procedure: ECHOCARDIOGRAM, TRANSESOPHAGEAL;  Surgeon: Kj Newton MD;  Location: [facility] CATH LAB;  Service:  Cardiology;  Laterality: N/A;    TREATMENT OF CARDIAC ARRHYTHMIA  6/15/2020    Procedure: Cardioversion or Defibrillation;  Surgeon: Wyatt Beatty MD;  [facility];  Service: Cardiology;;    TREATMENT OF CARDIAC ARRHYTHMIA N/A 2/28/2024    Procedure: Cardioversion or Defibrillation;  Surgeon: Pao Hare MD;  Location: [facility];  Service: Cardiology;  Laterality: N/A;       Social History     Socioeconomic History    Marital status:     Number of children: 5   Occupational History    Occupation: retired anesthesiology     Employer: [employer]    Occupation: [employer] grad     Comment: previous football player   Tobacco Use    Smoking status: Never     Passive exposure: Never    Smokeless tobacco: Never    Tobacco comments:     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    Substance and Sexual Activity    Alcohol use: No    Drug use: No    Sexual activity: Yes     Partners: Female     Social Determinants of Health     Financial Resource Strain: Low Risk  (4/22/2024)    Overall Financial Resource Strain (CARDIA)     Difficulty of Paying Living Expenses: Not hard at all   Food Insecurity: No Food Insecurity (4/22/2024)    Hunger Vital Sign     Worried About Running Out of Food in the Last Year: Never true     Ran Out of Food in the Last Year: Never true   Transportation Needs: No Transportation Needs (4/22/2024)    PRAPARE - Transportation     Lack of Transportation (Medical): No     Lack of Transportation (Non-Medical): No   Physical Activity: Sufficiently Active (4/22/2024)    Exercise Vital Sign     Days of Exercise per Week: 5 days     Minutes of Exercise per Session: 130 min   Stress: No Stress Concern Present (4/22/2024)    [place] [employer] - Occupational Stress Questionnaire     Feeling of Stress : Only a little   Housing Stability: Unknown (12/10/2023)    Housing Stability Vital Sign     Unable to Pay for Housing in the Last Year: No     Number of Places Lived in  "the Last Year: 1         CBC: No results for input(s): "WBC", "RBC", "HGB", "HCT", "PLT", "MCV", "MCH", "MCHC" in the last 72 hours.    CMP: No results for input(s): "NA", "K", "CL", "CO2", "BUN", "CREATININE", "GLU", "MG", "PHOS", "CALCIUM", "ALBUMIN", "PROT", "ALKPHOS", "ALT", "AST", "BILITOT" in the last 72 hours.    INR  No results for input(s): "PT", "INR", "PROTIME", "APTT" in the last 72 hours.            2D Echo:  No results found. However, due to the size of the patient record, not all encounters were searched. Please check Results Review for a complete set of results.        Pre-op Assessment    I have reviewed the Patient Summary Reports.     I have reviewed the Nursing Notes.    I have reviewed the Medications.     Review of Systems  Anesthesia Hx:  No problems with previous Anesthesia                Hematology/Oncology:  Hematology Normal   Oncology Normal                                   EENT/Dental:  EENT/Dental Normal           Cardiovascular:     Hypertension    Dysrhythmias atrial fibrillation                                   Pulmonary:      Shortness of breath  Sleep Apnea                Renal/:  Chronic Renal Disease                Hepatic/GI:   PUD,    Hepatitis           Musculoskeletal:  Arthritis               Neurological:   CVA                                    Endocrine:   Hypothyroidism          Dermatological:  Skin Normal    Psych:  Psychiatric History                  Physical Exam  General: Well nourished    Airway:  Mallampati: II   Mouth Opening: Normal  TM Distance: Normal  Tongue: Normal  Neck ROM: Normal ROM    Dental:  Intact    Chest/Lungs:  Clear to auscultation, Normal Respiratory Rate    Heart:  Rate: Normal        Anesthesia Plan  Type of Anesthesia, risks & benefits discussed:    Anesthesia Type: Gen Natural Airway  Intra-op Monitoring Plan: Standard ASA Monitors  Post Op Pain Control Plan: multimodal analgesia  Induction:  IV  Informed Consent: Informed consent " signed with the Patient and all parties understand the risks and agree with anesthesia plan.  All questions answered.   ASA Score: 3  Anesthesia Plan Notes: Plan for general natural airway, discussed anesthetic risks, questions answered    Ready For Surgery From Anesthesia Perspective.     .

## 2024-07-09 NOTE — HOSPITAL COURSE
Successful PEPE. Patient tolerated the procedure without any acute complications. Plan to continue all home medications as prescribed. Patient has an appointment with Dr. Duvall today at 1:00 PM.  Patient was assessed at bedside prior to discharge, they reported feeling well and denied chest discomfort, shortness of breath, palpitations, lightheadedness, or any other acute symptoms. Discharge instructions were discussed with patient and all questions were answered. Patient was discharged home in stable condition.

## 2024-07-09 NOTE — DISCHARGE SUMMARY
[facility] - Short Stay Cardiac Unit  Cardiology  Discharge Summary      Patient Name: Dominick MARCIA MD Duncan  MRN: [id number]  Admission Date: 7/9/2024  Hospital Length of Stay: 0 days  Discharge Date and Time:  07/09/2024 10:40 AM  Attending Physician: Tony Duvall MD    Discharging Provider: Yuli Macdonald PA-C  Primary Care Physician: Maxi Gaines MD    HPI:   78 M w HTN, moderate/severe MR, BRENTON (uses CPAP), interstitial lung disease, and paroxysmal AFib (s/p ablation and PPM) with chronic anemia requiring anticoagulation for which ALMA occlusion device was recommended.   He has chronic anemia with a positive stool occult blood. He is chronically on eliquis for his PAF with a CHADSVASC 6 and HASBLED 4. While undergoing some workup for occluder device his repeat echo showed a normal LVEF, mod-severe MR with a posterior eccentric jet, dilated IVC. After doing some research he was interested obtaining more information about mitral clip and LAAO occluder devices.  On 5/23/24 he underwent successful ALMA occlusion with 31 mm Amulet. Eliquis was discontinued --> DAPT (ASA + plavix) was initiated.     Today, here for 6-week PEPE. No acute complaints.      Procedure(s) (LRB):  ECHOCARDIOGRAM, TRANSESOPHAGEAL (N/A)       Indwelling Lines/Drains at time of discharge:  none      Hospital Course:  Successful PEPE. Patient tolerated the procedure without any acute complications. Plan to continue all home medications as prescribed. Patient has an appointment with Dr. Duvall today at 1:00 PM.  Patient was assessed at bedside prior to discharge, they reported feeling well and denied chest discomfort, shortness of breath, palpitations, lightheadedness, or any other acute symptoms. Discharge instructions were discussed with patient and all questions were answered. Patient was discharged home in stable condition.         Goals of Care Treatment Preferences:  Code Status: Full Code    Living Will: Yes            Significant  Diagnostic Studies: PEPE - final report pending    Pending Diagnostic Studies:       None            There are no hospital problems to display for this patient.    No new Assessment & Plan notes have been filed under this hospital service since the last note was generated.  Service: Cardiology      Discharged Condition: stable    Disposition: Home or Self Care    Follow Up: In clinic with Dr. Duvall, today at 1:00 PM.    Patient Instructions:   No discharge procedures on file.  Medications:  Reconciled Home Medications:      Medication List        CONTINUE taking these medications      alfuzosin 10 mg Tb24  Commonly known as: UROXATRAL  Take 1 tablet (10 mg total) by mouth daily with breakfast.     aspirin 81 MG EC tablet  Commonly known as: ECOTRIN  Take 1 tablet (81 mg total) by mouth once daily.     * buPROPion 300 MG 24 hr tablet  Commonly known as: WELLBUTRIN XL  Take 1 tablet (300 mg total) by mouth once daily.     * buPROPion 150 MG TB24 tablet  Commonly known as: WELLBUTRIN XL  Take 1 tablet (150 mg total) by mouth once daily.     carvediloL 6.25 MG tablet  Commonly known as: COREG  Take 12.5 mg by mouth 2 (two) times daily.     clopidogreL 75 mg tablet  Commonly known as: PLAVIX  First dose, please take 4 tablets ( 300 mg ) by mouth on day 1 and then one tablet (75 mg) by mouth daily after that     cyclobenzaprine 10 MG tablet  Commonly known as: FLEXERIL  Take 1 tablet (10 mg total) by mouth 3 (three) times daily as needed for Muscle spasms.     droNABinol 2.5 MG capsule  Commonly known as: MARINOL  Take 1 capsule (2.5 mg total) by mouth 2 (two) times daily before meals.     EScitalopram oxalate 10 MG tablet  Commonly known as: LEXAPRO  TAKE 1 TABLET(10 MG) BY MOUTH EVERY DAY     FISH OIL CONCENTRATE ORAL  Take 1 capsule by mouth Daily.     ICAR-C PLUS Tab  Generic drug: iron-vit c-b12-folic acid  Take 1 tablet by mouth once daily.     levothyroxine 75 MCG tablet  Commonly known as: SYNTHROID  Take 1  tablet (75 mcg total) by mouth before breakfast.     potassium chloride 10 MEQ Cpsr  Commonly known as: MICRO-K  Take 1 capsule (10 mEq total) by mouth every Mon, Wed, Fri.     propafenone 425 MG Cp12  Commonly known as: RYTHMOL SR  Take 1 capsule (425 mg total) by mouth every 12 (twelve) hours.     telmisartan 40 MG Tab  Commonly known as: MICARDIS  Take 1 tablet (40 mg total) by mouth once daily.     tiZANidine 4 MG tablet  Commonly known as: ZANAFLEX  Take 1 tablet (4 mg total) by mouth 3 (three) times daily as needed.     torsemide 20 MG Tab  Commonly known as: DEMADEX  Take 1 tablet (20 mg total) by mouth every Mon, Wed, Fri.           * This list has 2 medication(s) that are the same as other medications prescribed for you. Read the directions carefully, and ask your doctor or other care provider to review them with you.                  Time spent on the discharge of patient: 35 minutes    Yuli Macdonald PA-C  Cardiology  [facility]

## 2024-07-09 NOTE — H&P
[facility]  PEPE History and Physical      Dominick Song MD  YOB: 1945  Medical Record Number:  [id number]  Attending Physician:  Tony Duvall MD   Date of Admission: 7/9/2024       Hospital Day:  0  Current Principal Problem:  Atrial fibrillation      History     Cc: PEPE to assess Amulet    HPI  78 M w HTN, moderate/severe MR, BRENTON (uses CPAP), interstitial lung disease, and paroxysmal AFib (s/p ablation and PPM) with chronic anemia requiring anticoagulation for which ALMA occlusion device was recommended.   He has chronic anemia with a positive stool occult blood. He is chronically on eliquis for his PAF with a CHADSVASC 6 and HASBLED 4. While undergoing some workup for occluder device his repeat echo showed a normal LVEF, mod-severe MR with a posterior eccentric jet, dilated IVC. After doing some research he was interested obtaining more information about mitral clip and LAAO occluder devices.  On 5/23/24 he underwent successful ALMA occlusion with 31 mm Amulet. Eliquis was discontinued --> DAPT (ASA + plavix) was initiated.    Today, here for 6-week PEPE. No acute complaints.    Anticoagulant/antiplatelets: aspirin, plavix  Platelet count: 155  INR: 1.0    History of stroke:  no  Dysphagia or odynophagia:  no  Liver Disease, esophageal disease, or known varices:  no  Upper GI Bleeding:  no  Snoring:  no   Sleep Apnea:  no  Prior neck surgery or radiation:  no  History of anesthetic difficulties:  no  Family history of anesthetic difficulties:  no  Last oral intake: last pm   Able to move neck in all directions:  yes  Use of GLP-1:  no        Medications - Outpatient  Prior to Admission medications    Medication Sig Start Date End Date Taking? Authorizing Provider   alfuzosin (UROXATRAL) 10 mg Tb24 Take 1 tablet (10 mg total) by mouth daily with breakfast. 8/30/23 8/29/24 Yes Yakov Shetty MD   aspirin (ECOTRIN) 81 MG EC tablet Take 1 tablet (81 mg total) by mouth  once daily. 5/23/24 5/23/25 Yes Fiorella Serna MD   buPROPion (WELLBUTRIN XL) 150 MG TB24 tablet Take 1 tablet (150 mg total) by mouth once daily. 5/23/24 5/23/25 Yes Maxi Gaines MD   buPROPion (WELLBUTRIN XL) 300 MG 24 hr tablet Take 1 tablet (300 mg total) by mouth once daily. 5/19/24  Yes Maxi Gaines MD   carvediloL (COREG) 6.25 MG tablet Take 12.5 mg by mouth 2 (two) times daily. 12/7/23  Yes Provider, Historical   clopidogreL (PLAVIX) 75 mg tablet First dose, please take 4 tablets ( 300 mg ) by mouth on day 1 and then one tablet (75 mg) by mouth daily after that 5/23/24 5/23/25 Yes Fiorella Serna MD   cyclobenzaprine (FLEXERIL) 10 MG tablet Take 1 tablet (10 mg total) by mouth 3 (three) times daily as needed for Muscle spasms. 4/17/24  Yes Maxi Gaines MD   droNABinol (MARINOL) 2.5 MG capsule Take 1 capsule (2.5 mg total) by mouth 2 (two) times daily before meals. 6/19/24  Yes Maxi Gaines MD   EScitalopram oxalate (LEXAPRO) 10 MG tablet TAKE 1 TABLET(10 MG) BY MOUTH EVERY DAY 1/17/24  Yes Maxi Gaines MD   iron-vit c-b12-folic acid (ICAR-C PLUS) Tab Take 1 tablet by mouth once daily. 3/12/24 3/12/25 Yes Maxi Gaines MD   levothyroxine (SYNTHROID) 75 MCG tablet Take 1 tablet (75 mcg total) by mouth before breakfast. 4/30/24  Yes Maxi Gaines MD   OMEGA-3 FATTY ACIDS (FISH OIL CONCENTRATE ORAL) Take 1 capsule by mouth Daily. 1/18/12  Yes Provider, Historical   propafenone (RYTHMOL SR) 425 MG Cp12 Take 1 capsule (425 mg total) by mouth every 12 (twelve) hours. 2/28/24 2/27/25 Yes Myriam Curry, NP   telmisartan (MICARDIS) 40 MG Tab Take 1 tablet (40 mg total) by mouth once daily. 10/13/23 10/12/24 Yes Maxi Gaines MD   tiZANidine (ZANAFLEX) 4 MG tablet Take 1 tablet (4 mg total) by mouth 3 (three) times daily as needed. 6/5/24  Yes Jelani Tello II, MD   torsemide (DEMADEX) 20 MG Tab Take 1 tablet (20 mg total) by mouth every Mon, Wed, Fri.  10/9/23 10/8/24 Yes aPn Moss MD   potassium chloride (MICRO-K) 10 MEQ CpSR Take 1 capsule (10 mEq total) by mouth every Mon, Wed, Fri. 10/9/23   Pan Moss MD         Medications - Current  Scheduled Meds:  Continuous Infusions:  PRN Meds:.      Allergies  Review of patient's allergies indicates:   Allergen Reactions    No known drug allergies          Past Medical History  Past Medical History:   Diagnosis Date    Anticoagulant long-term use     Anxiety     Arthritis     Atrial fibrillation     BPH (benign prostatic hyperplasia)     Cataract     CKD (chronic kidney disease) stage 3, GFR 30-59 ml/min 07/10/2017    Followed by Dr. Jeevan Pond    Colon polyp     benign    Depression     Elevated PSA     Erectile dysfunction     Gastric ulcer with hemorrhage     Hep B w/o coma 1977    History of bleeding peptic ulcer     History of prostatitis     Hypertension     PAF (paroxysmal atrial fibrillation)     Sleep apnea     PT DENIES    Stroke     TIA December 2019    Thyroid disease          Past Surgical History  Past Surgical History:   Procedure Laterality Date    A-V CARDIAC PACEMAKER INSERTION Left 9/23/2023    Procedure: Dual Chamber PPM (Heart) Rm 2620;  Surgeon: Pan Moss MD;  Location: [facility] CATH;  Service: Cardiology;  Laterality: Left;    ABDOMINAL HERNIA REPAIR      ABLATION OF ARRHYTHMOGENIC FOCUS FOR ATRIAL FIBRILLATION N/A 6/15/2020    Procedure: Ablation atrial fibrillation;  Surgeon: Wyatt Beatty MD;  Location: [facility] EP LAB;  Service: Cardiology;  Laterality: N/A;  PAF, AFl, PEPE, PVI re-do, CTI, RFA, JUSTIN, Gen, SK, 3 Prep    APPLICATION OF WOUND VACUUM-ASSISTED CLOSURE DEVICE Right 12/9/2023    Procedure: APPLICATION, WOUND VAC;  Surgeon: Jelani Tello II, MD;  Location: [facility] OR;  Service: Orthopedics;  Laterality: Right;    CARDIOVERSION N/A 2/28/2024    Procedure: Cardioversion;  Surgeon: Pao Hare MD;  Location: [facility] KINJAL;  Service: Cardiology;   Laterality: N/A;    CATARACT EXTRACTION  11/25/2013    bilateral    CHOLECYSTECTOMY      CLOSURE OF LEFT ATRIAL APPENDAGE USING DEVICE N/A 5/23/2024    Procedure: Left atrial appendage closure device;  Surgeon: Tony Duvall MD;  Location: [facility] CATH LAB;  Service: Cardiology;  Laterality: N/A;    COLONOSCOPY N/A 11/25/2015    Procedure: COLONOSCOPY;  Surgeon: Toby Hernandez MD;  Location: [facility] ENDO;  Service: Endoscopy;  Laterality: N/A;    COLONOSCOPY N/A 11/13/2020    Procedure: COLONOSCOPY;  Surgeon: Toby Hernandez MD;  Location: [facility] ENDO;  Service: Endoscopy;  Laterality: N/A;    COLONOSCOPY N/A 5/1/2024    Procedure: COLONOSCOPY;  Surgeon: Toby Hernandez MD;  Location: [facility];  Service: Endoscopy;  Laterality: N/A;    CYSTOSCOPY WITH INSERTION OF MINIMALLY INVASIVE IMPLANT TO ENLARGE PROSTATIC URETHRA N/A 11/28/2022    Procedure: CYSTOSCOPY, WITH INSERTION OF UROLIFT IMPLANT;  Surgeon: Yakov Shetty MD;  Location: [facility] OR;  Service: Urology;  Laterality: N/A;    ESOPHAGOGASTRODUODENOSCOPY N/A 5/1/2024    Procedure: ESOPHAGOGASTRODUODENOSCOPY (EGD);  Surgeon: Toby Hernandez MD;  Location: [facility];  Service: Endoscopy;  Laterality: N/A;    EYE SURGERY      GASTRIC BYPASS  1992    INSERTION, PACEMAKER, TEMPORARY TRANSVENOUS  9/22/2023    Procedure: Temp pacer insertion ER Rm 8;  Surgeon: Pan Moss MD;  Location: [facility] CATH;  Service: Cardiology;;    INTRAMEDULLARY RODDING OF FEMUR Left 7/18/2020    Procedure: INSERTION, INTRAMEDULLARY ERIC, FEMUR, left, Synthes, Park table, Large C arm clock side,;  Surgeon: Tony Rodriguez MD;  Location: [facility] OR [facility] FLR;  Service: Orthopedics;  Laterality: Left;    IRRIGATION AND DEBRIDEMENT Right 12/9/2023    Procedure: IRRIGATION AND DEBRIDEMENT KNEE;  Surgeon: Jelani Tello II, MD;  Location: [facility] OR;  Service: Orthopedics;  Laterality: Right;    KNEE ARTHROSCOPY      RT    LASIK  2001    both eyes (Dr. Rabago)    ORIF HUMERUS  FRACTURE      LT    ORIF HUMERUS FRACTURE Right     non surgical repair    RADIOFREQUENCY ABLATION      x2    REVISION OF KNEE ARTHROPLASTY Right 12/9/2023    Procedure: REVISION ARTHROPLASTY KNEE- Liner Replacement - dirty/clean;  Surgeon: Jelani Tello II, MD;  Location: [facility];  Service: Orthopedics;  Laterality: Right;  dirty to clean at 1940    Right ankle tendon repair      ROBOT-ASSISTED REPAIR OF INCISIONAL HERNIA USING DA GARETH XI Left 6/13/2022    Procedure: XI ROBOTIC REPAIR, HERNIA, INCISIONAL (LEFT SIDE SPIGELIAN WITH MESH);  Surgeon: Rosendo Marti MD;  Location: [address];  Service: General;  Laterality: Left;  consent in am    ROTATOR CUFF REPAIR      right    TOTAL KNEE ARTHROPLASTY Right 5/29/2018    Procedure: REPLACEMENT-KNEE-TOTAL-axel;  Surgeon: Denzel Dallas MD;  [address]  Service: Orthopedics;  Laterality: Right;  [place]    TRANSESOPHAGEAL ECHOCARDIOGRAPHY N/A 4/23/2024    Procedure: ECHOCARDIOGRAM, TRANSESOPHAGEAL;  Surgeon: Jan Bravo Diagnostic;  Location: [facility] EP LAB;  Service: Cardiology;  Laterality: N/A;    TRANSESOPHAGEAL ECHOCARDIOGRAPHY N/A 5/23/2024    Procedure: ECHOCARDIOGRAM, TRANSESOPHAGEAL;  Surgeon: Kj Newton MD;  Location: [facility] CATH LAB;  Service: Cardiology;  Laterality: N/A;    TREATMENT OF CARDIAC ARRHYTHMIA  6/15/2020    Procedure: Cardioversion or Defibrillation;  Surgeon: Wyatt Beatty MD;  Location: [facility] EP LAB;  Service: Cardiology;;    TREATMENT OF CARDIAC ARRHYTHMIA N/A 2/28/2024    Procedure: Cardioversion or Defibrillation;  Surgeon: Pao Hare MD;  Location: [facility];  Service: Cardiology;  Laterality: N/A;         Social History  Social History     Socioeconomic History    Marital status:     Number of children: 5   Occupational History    Occupation: retired anesthesiology     Employer: [employer] ([employer])    Occupation: [employer] grad     Comment: previous football player   Tobacco  "Use    Smoking status: Never     Passive exposure: Never    Smokeless tobacco: Never    Tobacco comments:     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    Substance and Sexual Activity    Alcohol use: No    Drug use: No    Sexual activity: Yes     Partners: Female     Social Determinants of Health     Financial Resource Strain: Low Risk  (4/22/2024)    Overall Financial Resource Strain (CARDIA)     Difficulty of Paying Living Expenses: Not hard at all   Food Insecurity: No Food Insecurity (4/22/2024)    Hunger Vital Sign     Worried About Running Out of Food in the Last Year: Never true     Ran Out of Food in the Last Year: Never true   Transportation Needs: No Transportation Needs (4/22/2024)    PRAPARE - Transportation     Lack of Transportation (Medical): No     Lack of Transportation (Non-Medical): No   Physical Activity: Sufficiently Active (4/22/2024)    Exercise Vital Sign     Days of Exercise per Week: 5 days     Minutes of Exercise per Session: 130 min   Stress: No Stress Concern Present (4/22/2024)    [place] [employer] - Occupational Stress Questionnaire     Feeling of Stress : Only a little   Housing Stability: Unknown (12/10/2023)    Housing Stability Vital Sign     Unable to Pay for Housing in the Last Year: No     Number of Places Lived in the Last Year: 1         ROS  10 point ROS performed and negative except as stated in HPI     Physical Examination     Vital Signs  24 Hour VS Range    Temp:  [97.9 °F (36.6 °C)]   Pulse:  [50]   Resp:  [18]   BP: (133-141)/(67-77)   SpO2:  [97 %]       Physical Exam:   Constitutional: no acute distress  HEENT: NCAT, EOMI, no scleral icterus  Cardiovascular: bradycardia, regular rhythm. Murmur heard  Pulmonary: Normal respiratory effort   Neuro: alert and oriented, no focal deficits  Extremities: warm, mild edema BLE    Data       No results for input(s): "WBC", "HGB", "HCT", "PLT" in the last 168 hours.     No results for input(s): "PROTIME", "INR" in " "the last 168 hours.     No results for input(s): "NA", "K", "CL", "CO2", "BUN", "CREATININE", "ANIONGAP", "CALCIUM" in the last 168 hours.     No results for input(s): "PROT", "ALBUMIN", "BILITOT", "ALKPHOS", "AST", "ALT" in the last 168 hours.     No results for input(s): "TROPONINI" in the last 168 hours.     No results found for: "BNP"    No results for input(s): "LABBLOO" in the last 168 hours.         Assessment & Plan     #Atrial fibrillation  - s/p Amulet on 5/23/24  - proceed with 45-day follow-up PEPE today  - pt to see Dr. Duvall in clinic this afternoon    PEPE 5/23/24:    Intra-Op PEPE as guidance for left atrial appendage occlusion.    Left Ventricle: The left ventricle is normal in size. Normal wall thickness. There is normal systolic function with a visually estimated ejection fraction of 60 - 65%.    Right Ventricle: Normal right ventricular cavity size.  Systolic function is normal. Pacemaker lead present in the ventricle.    Biatrial enlargement. The pulmonary veins have systolic blunting. There is no thrombus in the left atrial cavity.    Left Atrium: The left atrial appendage appears normal. The left atrial appendage has a windsock morphology. Appendage velocity is reduced at less than 40 cm/sec. A 31 mm Amulet device was positioned at the ostium of the appendage. No peridevice leak is noted.    Mitral Valve: There is mild regurgitation.      -No absolute contraindications of esophageal stricture, tumor, perforation, laceration,or diverticulum and/or active GI bleed.  -The risks, benefits & alternatives of the procedure were explained to the patient.   -The risks of transesophageal echo include but are not limited to:  Dental trauma, esophageal trauma/perforation, bleeding, laryngospasm/brochospasm, aspiration, sore throat/hoarseness, & dislodgement of the endotracheal tube/nasogastric tube (where applicable).  -The risks of moderate sedation include hypotension, respiratory depression, " arrhythmias, bronchospasm, & death.    -Informed consent was obtained. The patient is agreeable to proceed with the procedure and all questions and concerns addressed.    Case was discussed with an attending physician in echocardiography lab prior to procedure.    Yuli Macdonald PA-C  [facility]

## 2024-07-09 NOTE — NURSING
Received report from Felicita RN/EP and ABNER Bonner. Patient s/p PEPE, AAOx3. VSS, no c/o pain or discomfort at this time, resp even and unlabored. Post procedure protocol reviewed with patient and patient's family. Understanding verbalized. Family members at bedside. Nurse call bell within reach.

## 2024-07-09 NOTE — ANESTHESIA POSTPROCEDURE EVALUATION
Anesthesia Post Evaluation    Patient: Dominick Song MD    Procedure(s) Performed: Procedure(s) (LRB):  ECHOCARDIOGRAM, TRANSESOPHAGEAL (N/A)    Final Anesthesia Type: general      Patient location during evaluation: PACU  Patient participation: Yes- Able to Participate  Level of consciousness: awake and alert  Post-procedure vital signs: reviewed and stable  Pain management: adequate  Airway patency: patent    PONV status at discharge: No PONV  Anesthetic complications: no      Cardiovascular status: blood pressure returned to baseline  Respiratory status: unassisted  Hydration status: euvolemic  Follow-up not needed.              Vitals Value Taken Time   /87 07/09/24 1047   Temp 37.1 °C (98.8 °F) 07/09/24 0950   Pulse 50 07/09/24 1049   Resp 19 07/09/24 1049   SpO2 89 % 07/09/24 1048   Vitals shown include unfiled device data.      No case tracking events are documented in the log.      Pain/Pari Score: Pari Score: 10 (7/9/2024 10:45 AM)

## 2024-07-10 ENCOUNTER — OFFICE VISIT (OUTPATIENT)
Dept: CARDIOLOGY | Facility: CLINIC | Age: 79
End: 2024-07-10
Payer: MEDICARE

## 2024-07-10 ENCOUNTER — PATIENT MESSAGE (OUTPATIENT)
Dept: ENDOSCOPY | Facility: HOSPITAL | Age: 79
End: 2024-07-10
Payer: MEDICARE

## 2024-07-10 ENCOUNTER — TELEPHONE (OUTPATIENT)
Dept: ENDOSCOPY | Facility: HOSPITAL | Age: 79
End: 2024-07-10
Payer: MEDICARE

## 2024-07-10 ENCOUNTER — LAB VISIT (OUTPATIENT)
Dept: LAB | Facility: HOSPITAL | Age: 79
End: 2024-07-10
Attending: INTERNAL MEDICINE
Payer: MEDICARE

## 2024-07-10 VITALS
WEIGHT: 180.13 LBS | HEIGHT: 72 IN | DIASTOLIC BLOOD PRESSURE: 79 MMHG | BODY MASS INDEX: 24.4 KG/M2 | SYSTOLIC BLOOD PRESSURE: 147 MMHG | HEART RATE: 56 BPM

## 2024-07-10 DIAGNOSIS — I34.0 NONRHEUMATIC MITRAL VALVE REGURGITATION: ICD-10-CM

## 2024-07-10 DIAGNOSIS — I50.32 CHRONIC HEART FAILURE WITH PRESERVED EJECTION FRACTION (HFPEF): ICD-10-CM

## 2024-07-10 DIAGNOSIS — I48.0 PAF (PAROXYSMAL ATRIAL FIBRILLATION): Primary | ICD-10-CM

## 2024-07-10 DIAGNOSIS — Z95.0 PACEMAKER: ICD-10-CM

## 2024-07-10 DIAGNOSIS — I10 ESSENTIAL HYPERTENSION: ICD-10-CM

## 2024-07-10 DIAGNOSIS — N18.31 CHRONIC KIDNEY DISEASE, STAGE 3A: ICD-10-CM

## 2024-07-10 LAB
CREAT UR-MCNC: 76 MG/DL (ref 23–375)
PROT UR-MCNC: 9 MG/DL (ref 0–15)
PROT/CREAT UR: 0.12 MG/G{CREAT} (ref 0–0.2)

## 2024-07-10 PROCEDURE — 84156 ASSAY OF PROTEIN URINE: CPT | Mod: HCNC,TXP | Performed by: INTERNAL MEDICINE

## 2024-07-10 PROCEDURE — 99999 PR PBB SHADOW E&M-EST. PATIENT-LVL III: CPT | Mod: PBBFAC,HCNC,TXP, | Performed by: INTERNAL MEDICINE

## 2024-07-10 NOTE — PROGRESS NOTES
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  He had a ambulate placed by Dr. Duvall at [place] last month and 6 week kiana follow-up showed complete sealing of the left atrial appendage  He has no complaints today.  He has been off of the Plavix and the aspirin as per Dr. Duvall recommendation    Review of Systems   Constitutional: Negative for decreased appetite, malaise/fatigue, weight gain and weight loss.   Cardiovascular:  Negative for chest pain, dyspnea on exertion, leg swelling, palpitations and syncope.   Respiratory:  Negative for cough and shortness of breath.    Gastrointestinal: Negative.    Neurological:  Negative for weakness.   All other systems reviewed and are negative.     Objective:      Physical Exam  Vitals and nursing note reviewed.   Constitutional:       Appearance: Normal appearance. He is well-developed.   HENT:      Head: Normocephalic.   Eyes:      Pupils: Pupils are equal, round, and reactive to light.   Neck:      Thyroid: No thyromegaly.      Vascular: No carotid bruit or JVD.   Cardiovascular:      Rate and Rhythm: Normal rate and regular rhythm.      Chest Wall: PMI is not displaced.      Pulses: Normal pulses and intact distal pulses.      Heart sounds: Normal heart sounds. No murmur heard.     No gallop.   Pulmonary:      Effort: Pulmonary effort is normal.      Breath sounds: Normal breath sounds.   Abdominal:      Palpations: Abdomen is soft. There is no mass.      Tenderness: There is no abdominal tenderness.   Musculoskeletal:         General: Normal range of motion.      Cervical back: Normal range of motion and neck supple.   Skin:     General: Skin is warm.   Neurological:      Mental Status: He is alert and oriented to person, place, and time.      Sensory: No sensory deficit.      Deep Tendon Reflexes:  Reflexes are normal and symmetric.         Most Recent EKG Results  Results for orders placed or performed during the hospital encounter of 05/23/24   EKG 12-lead    Collection Time: 05/23/24 11:06 AM   Result Value Ref Range    QRS Duration 104 ms    [facility] QTC Calculation 428 ms    Narrative    Test Reason : I48.0,Z98.890,    Vent. Rate : 050 BPM     Atrial Rate : 050 BPM     P-R Int : 260 ms          QRS Dur : 104 ms      QT Int : 470 ms       P-R-T Axes : 000 025 041 degrees     QTc Int : 428 ms    Possibly  Atrial-paced rhythm with prolonged AV conduction  Low septal forces with septal Q V2  Abnormal ECG  When compared with ECG of 10-MAY-2024 08:30,  Questionable change in initial forces of Septal leads  ST no longer elevated in Inferior leads  Confirmed by Hakn CLARK MD ([id number]) on 5/23/2024 12:00:26 PM    Referred By: JHONY DIAZ           Confirmed By:Hank CLARK MD       Most Recent Echocardiogram Results  Results for orders placed during the hospital encounter of 07/09/24    Transesophageal echo (PEPE)    Interpretation Summary    TTE done as a follow up PEPE six weeks after LAAO.  The device is well seated with no peridevice leak.    Left Ventricle: The left ventricle is normal in size. There is normal systolic function with a visually estimated ejection fraction of 60 - 65%.    Right Ventricle: Normal right ventricular cavity size. Systolic function is normal.    An iatrogenic atrial septal defect (ASD) is present indicated by color flow Doppler.  No patent foramen ovale confirmed by Doppler. There is a closure device within the appendage. The device is a 31mm Amplatzer Amulet. The pulmonary veins have systolic blunting.    Biatrial enlargement    Aortic Valve: The aortic valve is a trileaflet valve. There is normal leaflet mobility. There is mild aortic regurgitation.    Mitral Valve: There is normal leaflet mobility. There is moderate regurgitation with an eccentric jet and a wall hugging jet.    History of  gastric bypass, transgastric views were not performed.      Most Recent Nuclear Stress Test Results  Results for orders placed during the hospital encounter of 09/20/23    Nuclear Stress - Cardiology Interpreted    Interpretation Summary    Normal myocardial perfusion scan. There is no evidence of myocardial ischemia or infarction.    The gated perfusion images showed an ejection fraction of 60% post stress.    LV cavity size is normal at rest and normal at stress.    The ECG portion of the study is abnormal but not diagnostic.    The patient reported no chest pain during the stress test.      Most Recent Cardiac PET Stress Test Results  No results found for this or any previous visit.      Most Recent Cardiovascular Angiogram results  Results for orders placed during the hospital encounter of 05/23/24    Cardiac catheterization    Conclusion    The estimated blood loss was none.    The left atrial appendage closure was successful .    Follow up PEPE in 45 days and clinic with me.    The procedure log was documented by Documenter: Dorothy Haider and verified by Tony Duvall MD.    Date: 5/23/2024  Time: 2:12 PM      Other Most Recent Cardiology Results  Results for orders placed during the hospital encounter of 07/09/24    Cardiac monitoring strips      Labs reviewed    Assessment:       1. PAF (paroxysmal atrial fibrillation)   remains in sinus rhythm, status post amulet   2. Pacemaker    3. Essential hypertension    4. Nonrheumatic mitral valve regurgitation    5. Chronic heart failure with preserved ejection fraction (HFpEF)         Plan:     Continue:  Antiarrhythmic, Antiplatelet, ARB, ASA, Beta blocker, and Diuretic  Regular exercise program  Weight loss  Low cholesterol diet    Follow-up in six-month

## 2024-07-10 NOTE — TELEPHONE ENCOUNTER
Spoke to pt to schedule procedure(s) Colonoscopy       Physician to perform procedure(s) Dr. FADUMO Rodriguez  Date of Procedure (s) 9/9/24  Arrival Time 7:00 AM  Time of Procedure(s) 8:00 AM   Location of Procedure(s) [place] 2nd Floor  Type of Rx Prep sent to patient: PEG  Instructions provided to patient via [facility]    Patient was informed on the following information and verbalized understanding. Screening questionnaire reviewed with patient and complete. If procedure requires anesthesia, a responsible adult needs to be present to accompany the patient home, patient cannot drive after receiving anesthesia. Appointment details are tentative, especially check-in time. Patient will receive a prep-op call 7 days prior to confirm check-in time for procedure. If applicable the patient should contact their pharmacy to verify Rx for procedure prep is ready for pick-up. Patient was advised to call the scheduling department at [phone] if pharmacy states no Rx is available. Patient was advised to call the endoscopy scheduling department if any questions or concerns arise.      [facility] Scheduling Department

## 2024-07-16 ENCOUNTER — PATIENT MESSAGE (OUTPATIENT)
Dept: AUDIOLOGY | Facility: CLINIC | Age: 79
End: 2024-07-16
Payer: MEDICARE

## 2024-07-16 ENCOUNTER — PATIENT MESSAGE (OUTPATIENT)
Dept: FAMILY MEDICINE | Facility: CLINIC | Age: 79
End: 2024-07-16
Payer: MEDICARE

## 2024-07-17 RX ORDER — CARVEDILOL 12.5 MG/1
12.5 TABLET ORAL 2 TIMES DAILY
Qty: 180 TABLET | Refills: 3 | Status: SHIPPED | OUTPATIENT
Start: 2024-07-17

## 2024-07-23 ENCOUNTER — CLINICAL SUPPORT (OUTPATIENT)
Dept: CARDIOLOGY | Facility: HOSPITAL | Age: 79
End: 2024-07-23
Payer: MEDICARE

## 2024-07-23 DIAGNOSIS — Z95.0 PRESENCE OF CARDIAC PACEMAKER: ICD-10-CM

## 2024-07-24 ENCOUNTER — HOSPITAL ENCOUNTER (OUTPATIENT)
Dept: CARDIOLOGY | Facility: HOSPITAL | Age: 79
Discharge: HOME | End: 2024-07-24
Attending: INTERNAL MEDICINE

## 2024-07-24 PROCEDURE — 93296 REM INTERROG EVL PM/IDS: CPT | Mod: PO,NTX | Performed by: INTERNAL MEDICINE

## 2024-07-24 PROCEDURE — 93294 REM INTERROG EVL PM/LDLS PM: CPT | Mod: NTX,,, | Performed by: INTERNAL MEDICINE

## 2024-07-29 LAB
OHS CV AF BURDEN PERCENT: 3
OHS CV DC REMOTE DEVICE TYPE: NORMAL
OHS CV ICD SHOCK: NO
OHS CV RV PACING PERCENT: 3 %

## 2024-08-02 ENCOUNTER — PATIENT MESSAGE (OUTPATIENT)
Dept: AUDIOLOGY | Facility: CLINIC | Age: 79
End: 2024-08-02
Payer: MEDICARE

## 2024-08-05 PROBLEM — J84.112 IDIOPATHIC PULMONARY FIBROSIS: Status: RESOLVED | Noted: 2024-05-06 | Resolved: 2024-08-05

## 2024-08-07 ENCOUNTER — CLINICAL SUPPORT (OUTPATIENT)
Dept: AUDIOLOGY | Facility: CLINIC | Age: 79
End: 2024-08-07
Payer: MEDICARE

## 2024-08-07 DIAGNOSIS — Z97.4 WEARS HEARING AID IN BOTH EARS: Primary | ICD-10-CM

## 2024-08-16 ENCOUNTER — PATIENT MESSAGE (OUTPATIENT)
Dept: ENDOSCOPY | Facility: HOSPITAL | Age: 79
End: 2024-08-16
Payer: MEDICARE

## 2024-08-30 ENCOUNTER — TELEPHONE (OUTPATIENT)
Dept: ENDOSCOPY | Facility: HOSPITAL | Age: 79
End: 2024-08-30
Payer: MEDICARE

## 2024-09-01 ENCOUNTER — PATIENT MESSAGE (OUTPATIENT)
Dept: FAMILY MEDICINE | Facility: CLINIC | Age: 79
End: 2024-09-01
Payer: MEDICARE

## 2024-09-03 ENCOUNTER — TELEPHONE (OUTPATIENT)
Dept: ENDOSCOPY | Facility: HOSPITAL | Age: 79
End: 2024-09-03
Payer: MEDICARE

## 2024-09-03 ENCOUNTER — PATIENT MESSAGE (OUTPATIENT)
Dept: ENDOSCOPY | Facility: HOSPITAL | Age: 79
End: 2024-09-03
Payer: MEDICARE

## 2024-09-03 NOTE — TELEPHONE ENCOUNTER
Spoke to pt and wife for pre-call to confirm scheduled Colonoscopy and patient verbalized understanding of the following:       Date of Procedure (s)  verified 9/9/24  Arrival Time 8:00 AM verified.  Location of Procedure(s) [address] verified.  NPO status reinforced. Ok to continue clear liquids up until 4 hours prior to the Endoscopy procedure.     patient denies taking blood thinning or glp1 medications    Pt stated not on any blood thinning meds    Pt confirmed receipt of prep instructions and Rx prep (if applicable).  Instructions provided to patient via [facility]  Pt confirmed ride home after procedure if procedure requires anesthesia.   Pre-call screening questionnaire reviewed and completed with patient.   Appointment details are tentative, including check-in time.  If the patient begins taking any blood thinning medications, injectable weight loss/diabetes medications (other than insulin), or Adipex (phentermine) patient was instructed to contact the endoscopy scheduling department as soon as possible.  Patient was advised to call the endoscopy scheduling department if any questions or concerns arise.       [facility] Scheduling Department

## 2024-09-05 ENCOUNTER — PATIENT MESSAGE (OUTPATIENT)
Dept: CARDIOLOGY | Facility: CLINIC | Age: 79
End: 2024-09-05
Payer: MEDICARE

## 2024-09-05 NOTE — TELEPHONE ENCOUNTER
"Atrial fibrillation noted during device remote check:  HX ablation, DCCV 2/28/24    Overall burden:  17% since 5/10/24, appears persistent on trend since ~8/17/24    Max duration seen: recent 5days 2hr     Presenting this am:      Ventricular rates:   Controlled          Anticoagulation status:  Amulet-LAAO  Aspirin 81mg daily  Carvedilol 12.5mg BID  Propafenone 425mg BID  Telmisartan 40mg daily  Torsemide PRN    Patient symptoms: patient states he is going in and out of AF for no reason, SOB, heart pounding, states he has been taking a "rescue dose" of Propafenone 225mg and Lopressor 50mg daily    Reviewed with Dr. Moss, orders for DCCV next week, will restart OAC 4weeks post  Notified patient   Patient VU            "

## 2024-09-06 ENCOUNTER — PATIENT MESSAGE (OUTPATIENT)
Dept: CARDIOLOGY | Facility: CLINIC | Age: 79
End: 2024-09-06
Payer: MEDICARE

## 2024-09-06 ENCOUNTER — HOSPITAL ENCOUNTER (OUTPATIENT)
Dept: CARDIOLOGY | Facility: HOSPITAL | Age: 79
Discharge: HOME | End: 2024-09-06
Attending: INTERNAL MEDICINE
Payer: MEDICARE

## 2024-09-06 DIAGNOSIS — I48.0 PAF (PAROXYSMAL ATRIAL FIBRILLATION): Primary | ICD-10-CM

## 2024-09-06 DIAGNOSIS — Z95.0 PRESENCE OF CARDIAC PACEMAKER: ICD-10-CM

## 2024-09-06 NOTE — PROGRESS NOTES
Cardioversion    Arrive for your procedure at:  [facility] on 9/11/24 at 10 am. Your procedure is scheduled for 12 noon. Enter through the main entrance and check in at the reception desk. They will direct you upstairs to cardiology.        FASTING:   If your procedure is scheduled in the afternoon, you may have a LIGHT BREAKFAST BEFORE 6:00 A.M.  For example: Two slices of toast; black coffee or black tea.    MEDICATIONS:  You may take your regular morning medications with a small sip of water.     Hold or adjust the following:  Fluid pills.  Diabetes medications.  Please hold GLP-1 Drugs such as Semiglutide, Ozempic, Wegovy, and Monjaro 1 week prior to procedure.      Continue: please continue all your other medications    Please refer to pre-op instructions received from [facility].       You will need someone to drive you home. You will not be allowed to drive yourself.

## 2024-09-07 ENCOUNTER — PATIENT MESSAGE (OUTPATIENT)
Dept: FAMILY MEDICINE | Facility: CLINIC | Age: 79
End: 2024-09-07
Payer: MEDICARE

## 2024-09-07 DIAGNOSIS — M00.9 PYOGENIC ARTHRITIS OF RIGHT KNEE JOINT, DUE TO UNSPECIFIED ORGANISM: ICD-10-CM

## 2024-09-09 ENCOUNTER — PATIENT MESSAGE (OUTPATIENT)
Dept: OPTOMETRY | Facility: CLINIC | Age: 79
End: 2024-09-09
Payer: MEDICARE

## 2024-09-09 ENCOUNTER — ANESTHESIA (OUTPATIENT)
Dept: ENDOSCOPY | Facility: HOSPITAL | Age: 79
End: 2024-09-09
Payer: MEDICARE

## 2024-09-09 ENCOUNTER — ANESTHESIA EVENT (OUTPATIENT)
Dept: ENDOSCOPY | Facility: HOSPITAL | Age: 79
End: 2024-09-09
Payer: MEDICARE

## 2024-09-09 ENCOUNTER — HOSPITAL ENCOUNTER (OUTPATIENT)
Facility: HOSPITAL | Age: 79
Discharge: HOME | End: 2024-09-09
Attending: INTERNAL MEDICINE | Admitting: INTERNAL MEDICINE
Payer: MEDICARE

## 2024-09-09 VITALS
SYSTOLIC BLOOD PRESSURE: 152 MMHG | DIASTOLIC BLOOD PRESSURE: 105 MMHG | WEIGHT: 180 LBS | RESPIRATION RATE: 16 BRPM | BODY MASS INDEX: 24.38 KG/M2 | HEART RATE: 80 BPM | OXYGEN SATURATION: 100 % | HEIGHT: 72 IN | TEMPERATURE: 98 F

## 2024-09-09 DIAGNOSIS — K63.5 COLON POLYP: ICD-10-CM

## 2024-09-09 PROCEDURE — 88305 TISSUE EXAM BY PATHOLOGIST: CPT | Mod: 26,HCNC,NTX, | Performed by: PATHOLOGY

## 2024-09-09 PROCEDURE — 27201028 HC NEEDLE, SCLERO: Mod: HCNC,TXP | Performed by: INTERNAL MEDICINE

## 2024-09-09 PROCEDURE — 27201042 HC RETRIEVAL NET: Mod: HCNC,TXP | Performed by: INTERNAL MEDICINE

## 2024-09-09 PROCEDURE — 63600175 PHARM REV CODE 636 W HCPCS: Mod: HCNC,NTX | Performed by: NURSE ANESTHETIST, CERTIFIED REGISTERED

## 2024-09-09 PROCEDURE — 37000009 HC ANESTHESIA EA ADD 15 MINS: Mod: HCNC,TXP | Performed by: INTERNAL MEDICINE

## 2024-09-09 PROCEDURE — 27201089 HC SNARE, DISP (ANY): Mod: HCNC,NTX | Performed by: INTERNAL MEDICINE

## 2024-09-09 PROCEDURE — 27201012 HC FORCEPS, HOT/COLD, DISP: Mod: HCNC,TXP | Performed by: INTERNAL MEDICINE

## 2024-09-09 PROCEDURE — 45390 COLONOSCOPY W/RESECTION: CPT | Mod: HCNC,NTX | Performed by: INTERNAL MEDICINE

## 2024-09-09 PROCEDURE — 45390 COLONOSCOPY W/RESECTION: CPT | Mod: HCNC,NTX,, | Performed by: INTERNAL MEDICINE

## 2024-09-09 PROCEDURE — 27200997: Mod: HCNC,TXP | Performed by: INTERNAL MEDICINE

## 2024-09-09 PROCEDURE — 25000003 PHARM REV CODE 250: Mod: HCNC,NTX | Performed by: NURSE ANESTHETIST, CERTIFIED REGISTERED

## 2024-09-09 PROCEDURE — 27202363 HC INJECTION AGENT, SUBMUCOSAL, ANY: Mod: HCNC,TXP | Performed by: INTERNAL MEDICINE

## 2024-09-09 PROCEDURE — 37000008 HC ANESTHESIA 1ST 15 MINUTES: Mod: HCNC,TXP | Performed by: INTERNAL MEDICINE

## 2024-09-09 PROCEDURE — 88305 TISSUE EXAM BY PATHOLOGIST: CPT | Mod: HCNC,NTX | Performed by: PATHOLOGY

## 2024-09-09 RX ORDER — ONDANSETRON HYDROCHLORIDE 2 MG/ML
4 INJECTION, SOLUTION INTRAVENOUS ONCE AS NEEDED
Status: DISCONTINUED | OUTPATIENT
Start: 2024-09-09 | End: 2024-09-09 | Stop reason: HOSPADM

## 2024-09-09 RX ORDER — LIDOCAINE HYDROCHLORIDE 20 MG/ML
INJECTION, SOLUTION EPIDURAL; INFILTRATION; INTRACAUDAL; PERINEURAL
Status: DISCONTINUED | OUTPATIENT
Start: 2024-09-09 | End: 2024-09-09

## 2024-09-09 RX ORDER — PROCHLORPERAZINE EDISYLATE 5 MG/ML
5 INJECTION INTRAMUSCULAR; INTRAVENOUS EVERY 30 MIN PRN
Status: DISCONTINUED | OUTPATIENT
Start: 2024-09-09 | End: 2024-09-09 | Stop reason: HOSPADM

## 2024-09-09 RX ORDER — HYDROMORPHONE HYDROCHLORIDE 1 MG/ML
0.2 INJECTION, SOLUTION INTRAMUSCULAR; INTRAVENOUS; SUBCUTANEOUS EVERY 5 MIN PRN
Status: DISCONTINUED | OUTPATIENT
Start: 2024-09-09 | End: 2024-09-09 | Stop reason: HOSPADM

## 2024-09-09 RX ORDER — DEXAMETHASONE SODIUM PHOSPHATE 4 MG/ML
INJECTION, SOLUTION INTRA-ARTICULAR; INTRALESIONAL; INTRAMUSCULAR; INTRAVENOUS; SOFT TISSUE
Status: DISCONTINUED | OUTPATIENT
Start: 2024-09-09 | End: 2024-09-09

## 2024-09-09 RX ORDER — TIZANIDINE 4 MG/1
4 TABLET ORAL 3 TIMES DAILY PRN
Qty: 30 TABLET | Refills: 5 | Status: SHIPPED | OUTPATIENT
Start: 2024-09-09

## 2024-09-09 RX ORDER — DIPHENHYDRAMINE HYDROCHLORIDE 50 MG/ML
25 INJECTION INTRAMUSCULAR; INTRAVENOUS EVERY 6 HOURS PRN
Status: DISCONTINUED | OUTPATIENT
Start: 2024-09-09 | End: 2024-09-09 | Stop reason: HOSPADM

## 2024-09-09 RX ORDER — PROPOFOL 10 MG/ML
VIAL (ML) INTRAVENOUS
Status: DISCONTINUED | OUTPATIENT
Start: 2024-09-09 | End: 2024-09-09

## 2024-09-09 RX ORDER — ROCURONIUM BROMIDE 10 MG/ML
INJECTION, SOLUTION INTRAVENOUS
Status: DISCONTINUED | OUTPATIENT
Start: 2024-09-09 | End: 2024-09-09

## 2024-09-09 RX ORDER — SODIUM CHLORIDE 0.9 % (FLUSH) 0.9 %
10 SYRINGE (ML) INJECTION
Status: DISCONTINUED | OUTPATIENT
Start: 2024-09-09 | End: 2024-09-09 | Stop reason: HOSPADM

## 2024-09-09 RX ORDER — MIDAZOLAM HYDROCHLORIDE 1 MG/ML
INJECTION INTRAMUSCULAR; INTRAVENOUS
Status: DISCONTINUED | OUTPATIENT
Start: 2024-09-09 | End: 2024-09-09

## 2024-09-09 RX ORDER — FENTANYL CITRATE 50 UG/ML
25 INJECTION, SOLUTION INTRAMUSCULAR; INTRAVENOUS EVERY 5 MIN PRN
Status: DISCONTINUED | OUTPATIENT
Start: 2024-09-09 | End: 2024-09-09 | Stop reason: HOSPADM

## 2024-09-09 RX ORDER — ONDANSETRON HYDROCHLORIDE 2 MG/ML
INJECTION, SOLUTION INTRAVENOUS
Status: DISCONTINUED | OUTPATIENT
Start: 2024-09-09 | End: 2024-09-09

## 2024-09-09 RX ORDER — FENTANYL CITRATE 50 UG/ML
INJECTION, SOLUTION INTRAMUSCULAR; INTRAVENOUS
Status: DISCONTINUED | OUTPATIENT
Start: 2024-09-09 | End: 2024-09-09

## 2024-09-09 RX ORDER — PHENYLEPHRINE HYDROCHLORIDE 10 MG/ML
INJECTION INTRAVENOUS
Status: DISCONTINUED | OUTPATIENT
Start: 2024-09-09 | End: 2024-09-09

## 2024-09-09 RX ORDER — SODIUM CHLORIDE 9 MG/ML
INJECTION, SOLUTION INTRAVENOUS CONTINUOUS
Status: DISCONTINUED | OUTPATIENT
Start: 2024-09-09 | End: 2024-09-09 | Stop reason: HOSPADM

## 2024-09-09 RX ADMIN — PHENYLEPHRINE HYDROCHLORIDE 100 MCG: 10 INJECTION INTRAVENOUS at 10:09

## 2024-09-09 RX ADMIN — SUGAMMADEX 200 MG: 100 INJECTION, SOLUTION INTRAVENOUS at 10:09

## 2024-09-09 RX ADMIN — ROCURONIUM BROMIDE 50 MG: 10 INJECTION INTRAVENOUS at 09:09

## 2024-09-09 RX ADMIN — MIDAZOLAM HYDROCHLORIDE 1 MG: 2 INJECTION, SOLUTION INTRAMUSCULAR; INTRAVENOUS at 09:09

## 2024-09-09 RX ADMIN — ONDANSETRON 4 MG: 2 INJECTION INTRAMUSCULAR; INTRAVENOUS at 09:09

## 2024-09-09 RX ADMIN — DEXAMETHASONE SODIUM PHOSPHATE 4 MG: 4 INJECTION, SOLUTION INTRAMUSCULAR; INTRAVENOUS at 09:09

## 2024-09-09 RX ADMIN — FENTANYL CITRATE 100 MCG: 50 INJECTION, SOLUTION INTRAMUSCULAR; INTRAVENOUS at 09:09

## 2024-09-09 RX ADMIN — SODIUM CHLORIDE: 9 INJECTION, SOLUTION INTRAVENOUS at 09:09

## 2024-09-09 RX ADMIN — LIDOCAINE HYDROCHLORIDE 100 MG: 20 INJECTION, SOLUTION EPIDURAL; INFILTRATION; INTRACAUDAL; PERINEURAL at 09:09

## 2024-09-09 RX ADMIN — PROPOFOL 150 MG: 10 INJECTION, EMULSION INTRAVENOUS at 09:09

## 2024-09-09 NOTE — ANESTHESIA PREPROCEDURE EVALUATION
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      Pre-op Assessment    I have reviewed the Patient Summary Reports.       I have reviewed the Medications.     Review of Systems  Anesthesia Hx:  No problems with previous Anesthesia               Denies Personal Hx of Anesthesia complications.                    Cardiovascular:     Hypertension                     Shortness of Breath                    Hypertension     Atrial Fibrillation     Pulmonary:      Shortness of breath  Sleep Apnea     Obstructive Sleep Apnea ([place]).           Renal/:  Chronic Renal Disease        Kidney Function/Disease             Hepatic/GI:   PUD,   Liver Disease, Hepatitis    Peptic Ulcer Disease       Liver Disease, Hepatitis        Musculoskeletal:  Arthritis        Arthritis          Neurological:   CVA            Arthritis              CVA - Cerebrovasular Accident                 Endocrine:   Hypothyroidism       Hypothyroidism          Psych:  Psychiatric History                     Anesthesia Plan  Type of Anesthesia, risks & benefits discussed:    Anesthesia Type: Gen ETT  Informed Consent: Informed consent signed with the Patient and all parties understand the risks and agree with anesthesia plan.  All questions answered.   ASA Score: 3  Anesthesia Plan Notes: Chart reviewed. Patient seen and examined. Anesthesia plan discussed and questions answered. E-consent signed. Yakov Boland MD    Ready For Surgery From Anesthesia Perspective.     .

## 2024-09-09 NOTE — PROVATION PATIENT INSTRUCTIONS
Discharge Summary/Instructions after an Endoscopic Procedure  Patient Name: Dominick Song  Patient MRN: [id number]  Patient YOB: 1945  Monday, September 9, 2024  Milton Rodriguez MD  Dear patient,  As a result of recent federal legislation (The Federal Cures Act), you may   receive lab or pathology results from your procedure in your [facility]   account before your physician is able to contact you. Your physician or   their representative will relay the results to you with their   recommendations at their soonest availability.  Thank you,  RESTRICTIONS:  During your procedure today, you received medications for sedation.  These   medications may affect your judgment, balance and coordination.  Therefore,   for 24 hours, you have the following restrictions:   - DO NOT drive a car, operate machinery, make legal/financial decisions,   sign important papers or drink alcohol.    ACTIVITY:  Today: no heavy lifting, straining or running due to procedural   sedation/anesthesia.  The following day: return to full activity including work.  DIET:  Eat and drink normally unless instructed otherwise.     TREATMENT FOR COMMON SIDE EFFECTS:  - Mild abdominal pain, nausea, belching, bloating or excessive gas:  rest,   eat lightly and use a heating pad.  - Sore Throat: treat with throat lozenges and/or gargle with warm salt   water.  - Because air was used during the procedure, expelling large amounts of air   from your rectum or belching is normal.  - If a bowel prep was taken, you may not have a bowel movement for 1-3 days.    This is normal.  SYMPTOMS TO WATCH FOR AND REPORT TO YOUR PHYSICIAN:  1. Abdominal pain or bloating, other than gas cramps.  2. Chest pain.  3. Back pain.  4. Signs of infection such as: chills or fever occurring within 24 hours   after the procedure.  5. Rectal bleeding, which would show as bright red, maroon, or black stools.   (A tablespoon of blood from the rectum is not serious, especially if    hemorrhoids are present.)  6. Vomiting.  7. Weakness or dizziness.  GO DIRECTLY TO THE NEAREST EMERGENCY ROOM IF YOU HAVE ANY OF THE FOLLOWING:      Difficulty breathing              Chills and/or fever over 101 F   Persistent vomiting and/or vomiting blood   Severe abdominal pain   Severe chest pain   Black, tarry stools   Bleeding- more than one tablespoon   Any other symptom or condition that you feel may need urgent attention  Your doctor recommends these additional instructions:  If any biopsies were taken, your doctors clinic will contact you in 1 to 2   weeks with any results.  - Discharge patient to home.   - Resume previous diet.   - Continue present medications.   - Await pathology results.   - Repeat colonoscopy in 4 months for surveillance.  For questions, problems or results please call your physician - Milton Rodriguez MD at Work:  [phone].  [facility], EMERGENCY ROOM PHONE NUMBER: [phone]  IF A COMPLICATION OR EMERGENCY SITUATION ARISES AND YOU ARE UNABLE TO REACH   YOUR PHYSICIAN - GO DIRECTLY TO THE EMERGENCY ROOM.  Milton Rodriguez MD  9/9/2024 10:46:23 AM  This report has been verified and signed electronically.  Dear patient,  As a result of recent federal legislation (The Federal Cures Act), you may   receive lab or pathology results from your procedure in your [facility]   account before your physician is able to contact you. Your physician or   their representative will relay the results to you with their   recommendations at their soonest availability.  Thank you,  PROVATION

## 2024-09-09 NOTE — TRANSFER OF CARE
Anesthesia Transfer of Care Note    Patient: Dominick Song MD    Procedure(s) Performed: Procedure(s) (LRB):  RESECTION, MUCOSA, COLON, ENDOSCOPIC (N/A)    Patient location: [facility]    Anesthesia Type: general    Transport from OR: Transported from OR on 6-10 L/min O2 by face mask with adequate spontaneous ventilation    Post pain: adequate analgesia    Post assessment: no apparent anesthetic complications    Post vital signs: stable    Level of consciousness: awake and alert    Nausea/Vomiting: no nausea/vomiting    Complications: none    Transfer of care protocol was followed      Last vitals: Visit Vitals  BP (!) 141/89   Pulse 80   Temp 36.6 °C (97.9 °F) (Temporal)   Resp 12   Ht 6' (1.829 m)   Wt 81.6 kg (180 lb)   SpO2 95%   BMI 24.41 kg/m²

## 2024-09-09 NOTE — H&P
Short Stay Endoscopy History and Physical    PCP - Maxi Gaines MD  Referring Physician - Toby Hernandez MD  [address] [place] [address]    Procedure - colonoscopy w eMR  ASA - per anesthesia  Mallampati - per anesthesia  History of Anesthesia problems - no  Family history Anesthesia problems -  no   Plan of anesthesia - General    HPI:  This is a 78 y.o. male here for evaluation of: colon polyp    Reflux - no  Dysphagia - no  Abdominal pain - no  Diarrhea - no    ROS:  Constitutional: No fevers, chills, No weight loss  CV: No chest pain  Pulm: No cough, No shortness of breath  Ophtho: No vision changes  GI: see HPI  Derm: No rash    Medical History:  has a past medical history of Anticoagulant long-term use, Anxiety, Arthritis, Atrial fibrillation, BPH (benign prostatic hyperplasia), Cataract, CKD (chronic kidney disease) stage 3, GFR 30-59 ml/min (07/10/2017), Colon polyp, Depression, Elevated PSA, Erectile dysfunction, Gastric ulcer with hemorrhage, Hep B w/o coma (1977), History of bleeding peptic ulcer, History of prostatitis, Hypertension, PAF (paroxysmal atrial fibrillation), Sleep apnea, Stroke, and Thyroid disease.    Surgical History:  has a past surgical history that includes Rotator cuff repair; Gastric bypass (1992); Abdominal hernia repair; Cholecystectomy; LASIK (2001); Cataract extraction (11/25/2013); ORIF humerus fracture; Knee arthroscopy; Radiofrequency ablation; Colonoscopy (N/A, 11/25/2015); Eye surgery; Total knee arthroplasty (Right, 5/29/2018); ORIF humerus fracture (Right); Right ankle tendon repair; Ablation of arrhythmogenic focus for atrial fibrillation (N/A, 6/15/2020); Treatment of cardiac arrhythmia (6/15/2020); Intramedullary rodding of femur (Left, 7/18/2020); Colonoscopy (N/A, 11/13/2020); Robot-assisted repair of incisional hernia using da Quinn Xi (Left, 6/13/2022); Cystoscopy with insertion of minimally invasive implant to enlarge prostatic urethra  (N/A, 11/28/2022); insertion, pacemaker, temporary transvenous (9/22/2023); A-V cardiac pacemaker insertion (Left, 9/23/2023); irrigation and debridement (Right, 12/9/2023); Revision of knee arthroplasty (Right, 12/9/2023); Application of wound vacuum-assisted closure device (Right, 12/9/2023); Treatment of cardiac arrhythmia (N/A, 2/28/2024); Cardioversion (N/A, 2/28/2024); Transesophageal echocardiography (N/A, 4/23/2024); Esophagogastroduodenoscopy (N/A, 5/1/2024); Colonoscopy (N/A, 5/1/2024); Closure of left atrial appendage using device (N/A, 5/23/2024); Transesophageal echocardiography (N/A, 5/23/2024); and Transesophageal echocardiography (N/A, 7/9/2024).    Family History: family history includes Aortic stenosis in his mother; COPD in his father; Diabetes in his father; Heart attack in his brother; Heart disease in his mother; No Known Problems in his daughter, daughter, daughter, and son; Osteoporosis in his father and mother..    Social History:  reports that he has never smoked. He has never been exposed to tobacco smoke. He has never used smokeless tobacco. He reports that he does not drink alcohol and does not use drugs.    Review of patient's allergies indicates:   Allergen Reactions    No known drug allergies        Medications:   Medications Prior to Admission   Medication Sig Dispense Refill Last Dose    aspirin (ECOTRIN) 81 MG EC tablet Take 1 tablet (81 mg total) by mouth once daily. 90 tablet 3 9/9/2024    buPROPion (WELLBUTRIN XL) 150 MG TB24 tablet Take 1 tablet (150 mg total) by mouth once daily. 90 tablet 3 9/9/2024    buPROPion (WELLBUTRIN XL) 300 MG 24 hr tablet Take 1 tablet (300 mg total) by mouth once daily. 90 tablet 3 9/9/2024    carvediloL (COREG) 12.5 MG tablet Take 1 tablet (12.5 mg total) by mouth 2 (two) times daily. 180 tablet 3 9/9/2024    droNABinol (MARINOL) 2.5 MG capsule Take 1 capsule (2.5 mg total) by mouth 2 (two) times daily before meals. 60 capsule 2 9/9/2024     EScitalopram oxalate (LEXAPRO) 10 MG tablet TAKE 1 TABLET(10 MG) BY MOUTH EVERY DAY 90 tablet 4 9/9/2024    iron-vit c-b12-folic acid (ICAR-C PLUS) Tab Take 1 tablet by mouth once daily. 90 tablet 3 Past Week    levothyroxine (SYNTHROID) 75 MCG tablet Take 1 tablet (75 mcg total) by mouth before breakfast. 90 tablet 1 9/9/2024    OMEGA-3 FATTY ACIDS (FISH OIL CONCENTRATE ORAL) Take 1 capsule by mouth Daily.   Past Week    propafenone (RYTHMOL SR) 425 MG Cp12 Take 1 capsule (425 mg total) by mouth every 12 (twelve) hours. 60 capsule 11 9/9/2024    telmisartan (MICARDIS) 40 MG Tab Take 1 tablet (40 mg total) by mouth once daily. 90 tablet 3 9/9/2024    alfuzosin (UROXATRAL) 10 mg Tb24 Take 1 tablet (10 mg total) by mouth daily with breakfast. (Patient not taking: Reported on 7/9/2024) 30 tablet 11     potassium chloride (MICRO-K) 10 MEQ CpSR Take 1 capsule (10 mEq total) by mouth every Mon, Wed, Fri. 15 capsule 5     torsemide (DEMADEX) 20 MG Tab Take 1 tablet (20 mg total) by mouth every Mon, Wed, Fri. 15 tablet 5        Physical Exam:    Vital Signs:   Vitals:    09/09/24 0811   BP: (!) 150/89   Pulse: 80   Resp: 16   Temp: 97.9 °F (36.6 °C)       General Appearance: Well appearing in no acute distress    Labs:  Lab Results   Component Value Date    WBC 8.85 06/19/2024    HGB 11.8 (L) 06/19/2024    HCT 37.1 (L) 06/19/2024     06/19/2024    CHOL 115 (L) 08/30/2023    TRIG 46 08/30/2023    HDL 49 08/30/2023    ALT 17 06/19/2024    AST 17 06/19/2024     07/10/2024    K 4.9 07/10/2024     (H) 07/10/2024    CREATININE 1.4 07/10/2024    BUN 27 (H) 07/10/2024    CO2 21 (L) 07/10/2024    TSH 3.171 06/19/2024    PSA 2.3 09/06/2023    INR 1.0 05/20/2024    GLUF 109 03/26/2018    HGBA1C 4.3 06/19/2024       I have explained the risks and benefits of this endoscopic procedure to the patient including but not limited to bleeding, inflammation, infection, perforation, and death.      Milton Rodriguez MD

## 2024-09-09 NOTE — ANESTHESIA PROCEDURE NOTES
Intubation    Date/Time: 9/9/2024 9:40 AM    Performed by: Amy Huynh CRNA  Authorized by: Yakov Boland MD    Intubation:     Induction:  Intravenous    Intubated:  Postinduction    Mask Ventilation:  Easy mask    Attempts:  1    Attempted By:  CRNA    Method of Intubation:  Video laryngoscopy    Blade:  Tom 3    Laryngeal View Grade: Grade I - full view of cords      Difficult Airway Encountered?: No      Complications:  None    Airway Device:  Oral endotracheal tube    Airway Device Size:  7.5    Style/Cuff Inflation:  Cuffed (inflated to minimal occlusive pressure)    Tube secured:  22    Secured at:  The lips    Placement Verified By:  Capnometry    Complicating Factors:  None    Findings Post-Intubation:  BS equal bilateral and atraumatic/condition of teeth unchanged

## 2024-09-09 NOTE — ANESTHESIA POSTPROCEDURE EVALUATION
Anesthesia Post Evaluation    Patient: Dominick Song MD    Procedure(s) Performed: Procedure(s) (LRB):  RESECTION, MUCOSA, COLON, ENDOSCOPIC (N/A)    Final Anesthesia Type: general      Level of consciousness: awake and alert  Post-procedure vital signs: reviewed and stable  Pain control: Pain has been treated.  Airway patency: patent    PONV status: Absent or treated.  Anesthetic complications: no      Cardiovascular status: hemodynamically stable  Respiratory status: unassisted  Hydration status: euvolemic                Vitals Value Taken Time   /105 09/09/24 1147   Temp 36.6 °C (97.9 °F) 09/09/24 1050   Pulse 82 09/09/24 1152   Resp 21 09/09/24 1152   SpO2 98 % 09/09/24 1152   Vitals shown include unfiled device data.      No case tracking events are documented in the log.      Pain/Pari Score: Pari Score: 9 (9/9/2024 10:50 AM)

## 2024-09-10 ENCOUNTER — OFFICE VISIT (OUTPATIENT)
Dept: OPTOMETRY | Facility: CLINIC | Age: 79
End: 2024-09-10
Payer: MEDICARE

## 2024-09-10 DIAGNOSIS — H52.13 MYOPIA WITH ASTIGMATISM AND PRESBYOPIA, BILATERAL: ICD-10-CM

## 2024-09-10 DIAGNOSIS — H04.123 DRY EYE SYNDROME OF BILATERAL LACRIMAL GLANDS: Primary | ICD-10-CM

## 2024-09-10 DIAGNOSIS — Z97.3 WEARS CONTACT LENSES: ICD-10-CM

## 2024-09-10 DIAGNOSIS — H52.4 MYOPIA WITH ASTIGMATISM AND PRESBYOPIA, BILATERAL: ICD-10-CM

## 2024-09-10 DIAGNOSIS — Z96.1 PSEUDOPHAKIA OF BOTH EYES: ICD-10-CM

## 2024-09-10 DIAGNOSIS — Z46.0 FITTING AND ADJUSTMENT OF SPECTACLES AND CONTACT LENSES: Primary | ICD-10-CM

## 2024-09-10 DIAGNOSIS — H52.203 MYOPIA WITH ASTIGMATISM AND PRESBYOPIA, BILATERAL: ICD-10-CM

## 2024-09-10 PROCEDURE — 1159F MED LIST DOCD IN RCRD: CPT | Mod: HCNC,CPTII,S$GLB,TXP

## 2024-09-10 PROCEDURE — 92015 DETERMINE REFRACTIVE STATE: CPT | Mod: HCNC,S$GLB,TXP,

## 2024-09-10 PROCEDURE — 99999 PR PBB SHADOW E&M-EST. PATIENT-LVL III: CPT | Mod: PBBFAC,HCNC,TXP,

## 2024-09-10 PROCEDURE — 92014 COMPRE OPH EXAM EST PT 1/>: CPT | Mod: HCNC,S$GLB,TXP,

## 2024-09-10 PROCEDURE — 3288F FALL RISK ASSESSMENT DOCD: CPT | Mod: HCNC,CPTII,S$GLB,TXP

## 2024-09-10 PROCEDURE — 1126F AMNT PAIN NOTED NONE PRSNT: CPT | Mod: HCNC,CPTII,S$GLB,TXP

## 2024-09-10 PROCEDURE — 99499 UNLISTED E&M SERVICE: CPT | Mod: HCNC,TXP,,

## 2024-09-10 PROCEDURE — 92310 CONTACT LENS FITTING OU: CPT | Mod: CSM,HCNC,TXP,

## 2024-09-10 PROCEDURE — 1101F PT FALLS ASSESS-DOCD LE1/YR: CPT | Mod: HCNC,CPTII,S$GLB,TXP

## 2024-09-10 PROCEDURE — 1157F ADVNC CARE PLAN IN RCRD: CPT | Mod: HCNC,CPTII,S$GLB,TXP

## 2024-09-10 NOTE — PROGRESS NOTES
HPI    Pt here for annual. DLE - 23    Pt states no changes to VA since last visit. Vision with contacts is fine,   but does have some trouble when watching TV. Would like new specs if   possible. No gtts currently. Denies FOL, H/O floaters.  Last edited by Chetna Sharpe, OD on 9/10/2024  3:09 PM.            Assessment /Plan     For exam results, see Encounter Report.    Dry eye syndrome of bilateral lacrimal glands    Pseudophakia of both eyes    Myopia with astigmatism and presbyopia, bilateral    Wears contact lenses      Longstanding with diffuse SPK present OU on examination today. Pt doing better since switching to Dailies Total 1. Continue OTC artificial tears BID-QID OU daily for relief. Monitor yearly for changes, sooner prn.  PCIOL OU. Stable. Monitor yearly for changes.  Discussed spectacle options with pt and released final spec rx, . Ed pt on change in rx and adaptation.  Pt happy with current contact lenses, recently ordered more. Dispensed trials in [place] today. Pt to call or message if preferred and will finalize. Reviewed importance of good contact lens hygiene, routine daily replacement of lenses, and to never sleep in lenses. Pt knows to call or message if any issues arise.    RTC: 1 year for comprehensive exam or sooner prn

## 2024-09-12 ENCOUNTER — PATIENT MESSAGE (OUTPATIENT)
Dept: GASTROENTEROLOGY | Facility: CLINIC | Age: 79
End: 2024-09-12
Payer: MEDICARE

## 2024-09-12 ENCOUNTER — PATIENT MESSAGE (OUTPATIENT)
Dept: FAMILY MEDICINE | Facility: CLINIC | Age: 79
End: 2024-09-12
Payer: MEDICARE

## 2024-09-12 DIAGNOSIS — L60.0 INGROWN TOENAIL: Primary | ICD-10-CM

## 2024-09-12 LAB
FINAL PATHOLOGIC DIAGNOSIS: NORMAL
GROSS: NORMAL
Lab: NORMAL

## 2024-09-13 ENCOUNTER — TELEPHONE (OUTPATIENT)
Dept: ENDOSCOPY | Facility: HOSPITAL | Age: 79
End: 2024-09-13
Payer: MEDICARE

## 2024-09-13 NOTE — TELEPHONE ENCOUNTER
MD Rodriguez is out today. Could you please review and advise? I attempted to call pt with no answer.

## 2024-09-13 NOTE — TELEPHONE ENCOUNTER
Spoke to pt to follow up on rectal bleeding. Gave recommendation of ER if amount is not decreasing and frequency has not decreased. Pt verbalized understanding.

## 2024-09-18 LAB
OHS CV AF BURDEN PERCENT: 18
OHS CV DC REMOTE DEVICE TYPE: NORMAL
OHS CV ICD SHOCK: NO
OHS CV RV PACING PERCENT: 16 %

## 2024-09-23 ENCOUNTER — PATIENT MESSAGE (OUTPATIENT)
Dept: GASTROENTEROLOGY | Facility: CLINIC | Age: 79
End: 2024-09-23
Payer: MEDICARE

## 2024-09-25 ENCOUNTER — PATIENT MESSAGE (OUTPATIENT)
Dept: FAMILY MEDICINE | Facility: CLINIC | Age: 79
End: 2024-09-25
Payer: MEDICARE

## 2024-09-26 ENCOUNTER — OFFICE VISIT (OUTPATIENT)
Dept: CARDIOLOGY | Facility: CLINIC | Age: 79
End: 2024-09-26
Payer: MEDICARE

## 2024-09-26 VITALS
HEART RATE: 60 BPM | DIASTOLIC BLOOD PRESSURE: 74 MMHG | SYSTOLIC BLOOD PRESSURE: 146 MMHG | WEIGHT: 165.13 LBS | BODY MASS INDEX: 22.37 KG/M2 | HEIGHT: 72 IN

## 2024-09-26 DIAGNOSIS — I48.0 PAF (PAROXYSMAL ATRIAL FIBRILLATION): Primary | ICD-10-CM

## 2024-09-26 DIAGNOSIS — I10 ESSENTIAL HYPERTENSION: ICD-10-CM

## 2024-09-26 DIAGNOSIS — Z95.0 PACEMAKER: ICD-10-CM

## 2024-09-26 DIAGNOSIS — I34.0 NONRHEUMATIC MITRAL VALVE REGURGITATION: ICD-10-CM

## 2024-09-26 DIAGNOSIS — I50.32 CHRONIC HEART FAILURE WITH PRESERVED EJECTION FRACTION (HFPEF): ICD-10-CM

## 2024-09-26 PROCEDURE — 99999 PR PBB SHADOW E&M-EST. PATIENT-LVL III: CPT | Mod: PBBFAC,HCNC,TXP, | Performed by: INTERNAL MEDICINE

## 2024-09-26 NOTE — PROGRESS NOTES
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  On propranolol and Eliquis for 6 weeks after cardioversion   He is supposed to see Dr. Beatty sometime in December    Review of Systems   Constitutional: Negative for decreased appetite, malaise/fatigue, weight gain and weight loss.   Cardiovascular:  Negative for chest pain, dyspnea on exertion, leg swelling, palpitations and syncope.   Respiratory:  Negative for cough and shortness of breath.    Gastrointestinal: Negative.    Neurological:  Negative for weakness.   All other systems reviewed and are negative.       Objective:      Physical Exam  Vitals and nursing note reviewed.   Constitutional:       Appearance: Normal appearance. He is well-developed.   HENT:      Head: Normocephalic.   Eyes:      Pupils: Pupils are equal, round, and reactive to light.   Neck:      Thyroid: No thyromegaly.      Vascular: No carotid bruit or JVD.   Cardiovascular:      Rate and Rhythm: Normal rate and regular rhythm.      Chest Wall: PMI is not displaced.      Pulses: Normal pulses and intact distal pulses.      Heart sounds: Normal heart sounds. No murmur heard.     No gallop.   Pulmonary:      Effort: Pulmonary effort is normal.      Breath sounds: Normal breath sounds.   Abdominal:      Palpations: Abdomen is soft. There is no mass.      Tenderness: There is no abdominal tenderness.   Musculoskeletal:         General: Normal range of motion.      Cervical back: Normal range of motion and neck supple.   Skin:     General: Skin is warm.   Neurological:      Mental Status: He is alert and oriented to person, place, and time.      Sensory: No sensory deficit.      Deep Tendon Reflexes: Reflexes are normal and symmetric.              Most Recent EKG Results  Results for orders placed or performed during the hospital encounter of 09/11/24   EKG 12-lead    Collection Time: 09/11/24 11:37 AM   Result Value Ref Range    QRS Duration 100 ms    [facility] QTC Calculation 460 ms    Narrative    Test Reason : I48.0,    Vent. Rate : 060 BPM     Atrial Rate : 060 BPM     P-R Int : 256 ms          QRS Dur : 100 ms      QT Int : 460 ms       P-R-T Axes : 000 042 -54 degrees     QTc Int : 460 ms    Atrial-paced rhythm with prolonged AV conduction  Confirmed by DEMOND STILES MD ([id number]) on 9/11/2024 1:11:03 PM    Referred By: KAMALA DUNNE MD           Confirmed By:DEMOND STILES MD       Most Recent Echocardiogram Results  Results for orders placed during the hospital encounter of 07/09/24    Transesophageal echo (PEPE)    Interpretation Summary    TTE done as a follow up PEPE six weeks after LAAO.  The device is well seated with no peridevice leak.    Left Ventricle: The left ventricle is normal in size. There is normal systolic function with a visually estimated ejection fraction of 60 - 65%.    Right Ventricle: Normal right ventricular cavity size. Systolic function is normal.    An iatrogenic atrial septal defect (ASD) is present indicated by color flow Doppler.  No patent foramen ovale confirmed by Doppler. There is a closure device within the appendage. The device is a 31mm Amplatzer Amulet. The pulmonary veins have systolic blunting.    Biatrial enlargement    Aortic Valve: The aortic valve is a trileaflet valve. There is normal leaflet mobility. There is mild aortic regurgitation.    Mitral Valve: There is normal leaflet mobility. There is moderate regurgitation with an eccentric jet and a wall hugging jet.    History of gastric bypass, transgastric views were not performed.      Most Recent Nuclear Stress Test Results  Results for orders placed during the hospital encounter of 09/20/23    Nuclear Stress - Cardiology Interpreted    Interpretation Summary     Normal myocardial perfusion scan. There is no evidence of myocardial ischemia or infarction.    The gated perfusion images showed an ejection fraction of 60% post stress.    LV cavity size is normal at rest and normal at stress.    The ECG portion of the study is abnormal but not diagnostic.    The patient reported no chest pain during the stress test.      Most Recent Cardiac PET Stress Test Results  No results found for this or any previous visit.      Most Recent Cardiovascular Angiogram results  Results for orders placed during the hospital encounter of 05/23/24    Cardiac catheterization    Conclusion    The estimated blood loss was none.    The left atrial appendage closure was successful .    Follow up PEPE in 45 days and clinic with me.    The procedure log was documented by Documenter: Dorothy Haider and verified by Tony Duvall MD.    Date: 5/23/2024  Time: 2:12 PM      Other Most Recent Cardiology Results  Results for orders placed during the hospital encounter of 09/11/24    Cardioversion external    Interpretation Summary    Successful electrical cardioversion restoring AV sequential pacer      Labs reviewed    Assessment:       1. PAF (paroxysmal atrial fibrillation)    2. Chronic heart failure with preserved ejection fraction (HFpEF)    3. Nonrheumatic mitral valve regurgitation    4. Essential hypertension    5. Pacemaker         Plan:     Continue:  Antiarrhythmic, ARB, ASA, Beta blocker, Diuretic, and DOAC  Regular exercise program  Weight loss  Low cholesterol diet    Will try to move his appointment with Dr. Beatty in December to a sooner date to discuss further treatment options of his atrial fibrillation  Keep the appointment with me in January

## 2024-09-27 ENCOUNTER — TELEPHONE (OUTPATIENT)
Dept: PAIN MEDICINE | Facility: CLINIC | Age: 79
End: 2024-09-27
Payer: MEDICARE

## 2024-09-27 ENCOUNTER — PATIENT MESSAGE (OUTPATIENT)
Dept: PAIN MEDICINE | Facility: CLINIC | Age: 79
End: 2024-09-27
Payer: MEDICARE

## 2024-09-30 PROBLEM — I48.0 PAF (PAROXYSMAL ATRIAL FIBRILLATION): Status: RESOLVED | Noted: 2023-12-09 | Resolved: 2024-09-30

## 2024-10-01 ENCOUNTER — TELEPHONE (OUTPATIENT)
Dept: ELECTROPHYSIOLOGY | Facility: CLINIC | Age: 79
End: 2024-10-01
Payer: MEDICARE

## 2024-10-04 ENCOUNTER — PATIENT MESSAGE (OUTPATIENT)
Dept: CARDIOLOGY | Facility: CLINIC | Age: 79
End: 2024-10-04
Payer: MEDICARE

## 2024-10-07 NOTE — PROGRESS NOTES
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     Background:  First diagnosed with atrial fibrillation 2/12 (noted palpitations). Placed on beta blocker, coumadin. Underwent PEPE/DCCV. Had recurrence, Propafenone  mg twice daily added.  Had recurrence 12/24/13, underwent DCCV, Propafenone increased to 425 mg twice daily.  PVI ([place]) 7/28/14.   Had done well, was off Propafenone. Developed recurrence, underwent DCCV multiple times.  Repeat PVI 4/27/17 All 4 veins remained isolated.  Antralized left sided lesion set posteriorly, and performed SVC isolation.  Has been compliant with CPAP.  Had persistent recurrence >> DCCV 2/9/18.  He has had paroxysmal AF, with 4 episodes since 11/18.    Exercise echo 2/19 normal biventricular structure and function BISI 57 negative for ischemia.     Continued to have paroxysmal events, self-terminating.  Last episode was last month.  Generally, the episodes are lasting 3 days.  Since last visit, he was admitted with a TIA.  This was thought to be due to non-compliance with Eliquis.     6/15/20 Repeat RFA. Posterior wall isolation + repeat RFA of cavo-tricuspid isthmus. PV's remained isolated. 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     7/13/20 presented with TIA like symptoms. Seen by Dr. Coats (Vascular Neurology). Thought to be atypical for neurologic etiology. CT demonstrates a small area of remote vs. Subacute infarct in R medial occipital lobe, so transient hippocampal ischemia may be at the origin, although again this is atypical    Presented with symptomatic bradycardia >> dual chamber PPM implanted 9/23/23 (Dr. 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  PEPE 7/29/24 no leak    Update:  Has had recurrent symptomatic. DCCV 2/28/24 and 9/11/24  Continues to note  significant symptoms from AF, with shortness of breath. When asked what % he is feeling compared to when in nsr, he indicates 50%.  Device interrogation reveals stable function of the leads, went back in to AF 10/7/24. RV pacing is 6.6%      Review of Systems   Constitutional: Negative. Negative for fever and malaise/fatigue.   HENT:  Negative for congestion and sore throat.    Cardiovascular:  Negative for chest pain, dyspnea on exertion, irregular heartbeat, leg swelling, near-syncope, orthopnea, palpitations, paroxysmal nocturnal dyspnea and syncope.   Respiratory:  Positive for shortness of breath. Negative for cough.    Gastrointestinal:  Negative for abdominal pain, constipation and diarrhea.   Neurological:  Negative for dizziness, light-headedness and weakness.   Psychiatric/Behavioral:  Negative for depression. The patient is not nervous/anxious.           Objective     Physical Exam  Constitutional:       Appearance: He is well-developed.   Eyes:      General: No scleral icterus.     Conjunctiva/sclera: Conjunctivae normal.   Neck:      Vascular: No JVD.      Trachea: No tracheal deviation.   Cardiovascular:      Rate and Rhythm: Normal rate. Rhythm irregular.      Chest Wall: PMI is not displaced.      Heart sounds: Normal heart sounds.   Pulmonary:      Effort: Pulmonary effort is normal. No respiratory distress.      Breath sounds: Normal breath sounds.   Abdominal:      Palpations: Abdomen is soft.      Tenderness: There is no abdominal tenderness.   Musculoskeletal:         General: No tenderness.   Skin:     General: Skin is warm and dry.      Findings: No rash.   Neurological:      Mental Status: He is alert and oriented to person, place, and time.   Psychiatric:         Behavior: Behavior normal.            Assessment and Plan     1. Persistent atrial fibrillation    2. Chronic heart failure with preserved ejection fraction (HFpEF)    3. Nonrheumatic mitral valve regurgitation    4. Essential  hypertension    5. Bradycardia    6. ILD (interstitial lung disease)    7. Cerebrovascular accident (CVA), unspecified mechanism    8. Pacemaker    9. Chronic kidney disease, stage 3a    10. Acquired hypothyroidism    11. Complex sleep apnea syndrome        Plan:     1. Persistent atrial fibrillation (Primary)  Symptomatic, failing propafenone.  Discussed options at length, which are limited. Not a candidate for amiodarone given ILD. He has had 3 ablations, and PV's and posterior wall are likely isolated at this time. We discussed consideration of AI based technologies, but we do not have these to date.  Recommend Tikosyn. Discussed risks of QT prolongation and VF. Given his CKD >> would start at 250 mcg BID. Recommend PEPE/DCCV, followed by 3 day admission. Re-assess mitral valve at that time.  He prefers this to be done locally. Will d/w Dr. Moss.      2. Chronic heart failure with preserved ejection fraction (HFpEF)  Symptomatic    3. Nonrheumatic mitral valve regurgitation  Not thought to be a candidate for dee-clip    4. Essential hypertension      5. Bradycardia  S/p PPM    6. ILD (interstitial lung disease)  stable    7. Cerebrovascular accident (CVA), unspecified mechanism  No recurrence.    8. Pacemaker      9. Chronic kidney disease, stage 3a  stable    10. Acquired hypothyroidism      11. Complex sleep apnea syndrome  On CPAP.    Visit today included increased complexity associated with the care of the episodic problem atrial fibrillation addressed and managing the longitudinal care of the patient due to the serious and/or complex managed problem(s) .

## 2024-10-08 ENCOUNTER — TELEPHONE (OUTPATIENT)
Dept: ELECTROPHYSIOLOGY | Facility: CLINIC | Age: 79
End: 2024-10-08
Payer: MEDICARE

## 2024-10-08 ENCOUNTER — TELEPHONE (OUTPATIENT)
Dept: INFUSION THERAPY | Facility: HOSPITAL | Age: 79
End: 2024-10-08
Payer: MEDICARE

## 2024-10-08 NOTE — TELEPHONE ENCOUNTER
----- Message from Elana sent at 10/8/2024  8:05 AM CDT -----  Contact: PT  Type: Needs Medical Advice    Who Called: PT  Best Call Back Number: [phone]  Additional  Information: PT states he would like for someone to give him a call regarding an appointment  Please Advise- Thank you

## 2024-10-09 ENCOUNTER — OFFICE VISIT (OUTPATIENT)
Dept: ELECTROPHYSIOLOGY | Facility: CLINIC | Age: 79
End: 2024-10-09
Payer: MEDICARE

## 2024-10-09 ENCOUNTER — CLINICAL SUPPORT (OUTPATIENT)
Dept: CARDIOLOGY | Facility: HOSPITAL | Age: 79
End: 2024-10-09
Attending: INTERNAL MEDICINE
Payer: MEDICARE

## 2024-10-09 VITALS
SYSTOLIC BLOOD PRESSURE: 118 MMHG | HEIGHT: 72 IN | DIASTOLIC BLOOD PRESSURE: 82 MMHG | HEART RATE: 114 BPM | BODY MASS INDEX: 23.59 KG/M2 | WEIGHT: 174.19 LBS

## 2024-10-09 DIAGNOSIS — G47.39 COMPLEX SLEEP APNEA SYNDROME: ICD-10-CM

## 2024-10-09 DIAGNOSIS — Z95.0 PACEMAKER: ICD-10-CM

## 2024-10-09 DIAGNOSIS — N18.31 CHRONIC KIDNEY DISEASE, STAGE 3A: ICD-10-CM

## 2024-10-09 DIAGNOSIS — I63.9 CEREBROVASCULAR ACCIDENT (CVA), UNSPECIFIED MECHANISM: ICD-10-CM

## 2024-10-09 DIAGNOSIS — R00.1 BRADYCARDIA: ICD-10-CM

## 2024-10-09 DIAGNOSIS — J84.9 ILD (INTERSTITIAL LUNG DISEASE): ICD-10-CM

## 2024-10-09 DIAGNOSIS — I10 ESSENTIAL HYPERTENSION: ICD-10-CM

## 2024-10-09 DIAGNOSIS — I34.0 NONRHEUMATIC MITRAL VALVE REGURGITATION: ICD-10-CM

## 2024-10-09 DIAGNOSIS — I48.19 PERSISTENT ATRIAL FIBRILLATION: Primary | ICD-10-CM

## 2024-10-09 DIAGNOSIS — I49.8 OTHER SPECIFIED CARDIAC ARRHYTHMIAS: ICD-10-CM

## 2024-10-09 DIAGNOSIS — I50.32 CHRONIC HEART FAILURE WITH PRESERVED EJECTION FRACTION (HFPEF): ICD-10-CM

## 2024-10-09 DIAGNOSIS — E03.9 ACQUIRED HYPOTHYROIDISM: ICD-10-CM

## 2024-10-09 PROCEDURE — 99999 PR PBB SHADOW E&M-EST. PATIENT-LVL III: CPT | Mod: PBBFAC,TXP,, | Performed by: INTERNAL MEDICINE

## 2024-10-09 PROCEDURE — 93280 PM DEVICE PROGR EVAL DUAL: CPT | Mod: TXP

## 2024-10-11 ENCOUNTER — PATIENT MESSAGE (OUTPATIENT)
Dept: CARDIOLOGY | Facility: CLINIC | Age: 79
End: 2024-10-11
Payer: MEDICARE

## 2024-10-12 ENCOUNTER — PATIENT MESSAGE (OUTPATIENT)
Dept: OPTOMETRY | Facility: CLINIC | Age: 79
End: 2024-10-12
Payer: MEDICARE

## 2024-10-15 ENCOUNTER — OFFICE VISIT (OUTPATIENT)
Dept: CARDIOLOGY | Facility: CLINIC | Age: 79
End: 2024-10-15
Payer: MEDICARE

## 2024-10-15 VITALS
HEIGHT: 72 IN | WEIGHT: 181.88 LBS | SYSTOLIC BLOOD PRESSURE: 109 MMHG | DIASTOLIC BLOOD PRESSURE: 71 MMHG | HEART RATE: 79 BPM | BODY MASS INDEX: 24.63 KG/M2

## 2024-10-15 DIAGNOSIS — I10 ESSENTIAL HYPERTENSION: ICD-10-CM

## 2024-10-15 DIAGNOSIS — Z95.0 PACEMAKER: ICD-10-CM

## 2024-10-15 DIAGNOSIS — I48.19 PERSISTENT ATRIAL FIBRILLATION: Primary | ICD-10-CM

## 2024-10-15 DIAGNOSIS — I50.32 CHRONIC HEART FAILURE WITH PRESERVED EJECTION FRACTION (HFPEF): ICD-10-CM

## 2024-10-15 DIAGNOSIS — I34.0 NONRHEUMATIC MITRAL VALVE REGURGITATION: ICD-10-CM

## 2024-10-15 PROCEDURE — 99215 OFFICE O/P EST HI 40 MIN: CPT | Mod: NTX,S$GLB,, | Performed by: INTERNAL MEDICINE

## 2024-10-15 PROCEDURE — 1160F RVW MEDS BY RX/DR IN RCRD: CPT | Mod: CPTII,NTX,S$GLB, | Performed by: INTERNAL MEDICINE

## 2024-10-15 PROCEDURE — 3288F FALL RISK ASSESSMENT DOCD: CPT | Mod: CPTII,NTX,S$GLB, | Performed by: INTERNAL MEDICINE

## 2024-10-15 PROCEDURE — 1159F MED LIST DOCD IN RCRD: CPT | Mod: CPTII,NTX,S$GLB, | Performed by: INTERNAL MEDICINE

## 2024-10-15 PROCEDURE — 99999 PR PBB SHADOW E&M-EST. PATIENT-LVL IV: CPT | Mod: PBBFAC,TXP,, | Performed by: INTERNAL MEDICINE

## 2024-10-15 PROCEDURE — 3078F DIAST BP <80 MM HG: CPT | Mod: CPTII,NTX,S$GLB, | Performed by: INTERNAL MEDICINE

## 2024-10-15 PROCEDURE — 1126F AMNT PAIN NOTED NONE PRSNT: CPT | Mod: CPTII,NTX,S$GLB, | Performed by: INTERNAL MEDICINE

## 2024-10-15 PROCEDURE — 1101F PT FALLS ASSESS-DOCD LE1/YR: CPT | Mod: CPTII,NTX,S$GLB, | Performed by: INTERNAL MEDICINE

## 2024-10-15 PROCEDURE — 3074F SYST BP LT 130 MM HG: CPT | Mod: CPTII,NTX,S$GLB, | Performed by: INTERNAL MEDICINE

## 2024-10-15 PROCEDURE — 1157F ADVNC CARE PLAN IN RCRD: CPT | Mod: CPTII,NTX,S$GLB, | Performed by: INTERNAL MEDICINE

## 2024-10-15 NOTE — PROGRESS NOTES
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  He was good for 3 weeks and then for the last week he has become short of breath and weak and he has been not is to be back in atrial fibrillation.  He has been taking Eliquis religiously since last procedure.    He was seen by Dr. Beatty who recommended Tikosyn initiation, which he is willing to proceed sometime next week      Review of Systems   Constitutional: Negative for decreased appetite, malaise/fatigue, weight gain and weight loss.   Cardiovascular:  Negative for chest pain, dyspnea on exertion, leg swelling, palpitations and syncope.   Respiratory:  Negative for cough and shortness of breath.    Gastrointestinal: Negative.    Neurological:  Negative for weakness.   All other systems reviewed and are negative.       Objective:      Physical Exam  Vitals and nursing note reviewed.   Constitutional:       Appearance: Normal appearance. He is well-developed.   HENT:      Head: Normocephalic.   Eyes:      Pupils: Pupils are equal, round, and reactive to light.   Neck:      Thyroid: No thyromegaly.      Vascular: No carotid bruit or JVD.   Cardiovascular:      Rate and Rhythm: Normal rate and regular rhythm.      Chest Wall: PMI is not displaced.      Pulses: Normal pulses and intact distal pulses.      Heart sounds: Normal heart sounds. No murmur heard.     No gallop.   Pulmonary:      Effort: Pulmonary effort is normal.      Breath sounds: Normal breath sounds.   Abdominal:      Palpations: Abdomen is soft. There is no mass.      Tenderness: There is no abdominal tenderness.   Musculoskeletal:         General: Normal range of motion.      Cervical back: Normal range of motion and neck supple.   Skin:     General: Skin is warm.   Neurological:      Mental Status: He is alert and oriented to person, place,  and time.      Sensory: No sensory deficit.      Deep Tendon Reflexes: Reflexes are normal and symmetric.             Most Recent EKG Results  Results for orders placed or performed during the hospital encounter of 09/11/24   EKG 12-lead    Collection Time: 09/11/24 11:37 AM   Result Value Ref Range    QRS Duration 100 ms    [facility] QTC Calculation 460 ms    Narrative    Test Reason : I48.0,    Vent. Rate : 060 BPM     Atrial Rate : 060 BPM     P-R Int : 256 ms          QRS Dur : 100 ms      QT Int : 460 ms       P-R-T Axes : 000 042 -54 degrees     QTc Int : 460 ms    Atrial-paced rhythm with prolonged AV conduction  Confirmed by DEMOND STILES MD ([id number]) on 9/11/2024 1:11:03 PM    Referred By: KAMALA DUNNE MD           Confirmed By:DEMOND STILES MD       Most Recent Echocardiogram Results  Results for orders placed during the hospital encounter of 07/09/24    Transesophageal echo (PEPE)    Interpretation Summary    TTE done as a follow up PEPE six weeks after LAAO.  The device is well seated with no peridevice leak.    Left Ventricle: The left ventricle is normal in size. There is normal systolic function with a visually estimated ejection fraction of 60 - 65%.    Right Ventricle: Normal right ventricular cavity size. Systolic function is normal.    An iatrogenic atrial septal defect (ASD) is present indicated by color flow Doppler.  No patent foramen ovale confirmed by Doppler. There is a closure device within the appendage. The device is a 31mm Amplatzer Amulet. The pulmonary veins have systolic blunting.    Biatrial enlargement    Aortic Valve: The aortic valve is a trileaflet valve. There is normal leaflet mobility. There is mild aortic regurgitation.    Mitral Valve: There is normal leaflet mobility. There is moderate regurgitation with an eccentric jet and a wall hugging jet.    History of gastric bypass, transgastric views were not performed.      Most Recent Nuclear Stress Test Results  Results for orders placed  during the hospital encounter of 09/20/23    Nuclear Stress - Cardiology Interpreted    Interpretation Summary    Normal myocardial perfusion scan. There is no evidence of myocardial ischemia or infarction.    The gated perfusion images showed an ejection fraction of 60% post stress.    LV cavity size is normal at rest and normal at stress.    The ECG portion of the study is abnormal but not diagnostic.    The patient reported no chest pain during the stress test.      Most Recent Cardiac PET Stress Test Results  No results found for this or any previous visit.      Most Recent Cardiovascular Angiogram results  Results for orders placed during the hospital encounter of 05/23/24    Cardiac catheterization    Conclusion    The estimated blood loss was none.    The left atrial appendage closure was successful .    Follow up PEPE in 45 days and clinic with me.    The procedure log was documented by Documenter: Dorothy Haider and verified by Tony Duvall MD.    Date: 5/23/2024  Time: 2:12 PM      Other Most Recent Cardiology Results  Results for orders placed during the hospital encounter of 09/11/24    Cardioversion external    Interpretation Summary    Successful electrical cardioversion restoring AV sequential pacer      Labs reviewed    Assessment:       1. Persistent atrial fibrillation    2. Pacemaker    3. Essential hypertension    4. Nonrheumatic mitral valve regurgitation    5. Chronic heart failure with preserved ejection fraction (HFpEF)         Plan:   Schedule electrical cardioversion later this week, not PEPE necessary    Continue:  ARB, ASA, Beta blocker, Diuretic, and DOAC  Regular exercise program  Weight loss  Low cholesterol diet    Will arrange for Tikosyn initiation in the hospital sometime next week

## 2024-10-18 ENCOUNTER — TELEPHONE (OUTPATIENT)
Dept: CARDIOLOGY | Facility: CLINIC | Age: 79
End: 2024-10-18
Payer: MEDICARE

## 2024-10-18 DIAGNOSIS — R00.1 BRADYCARDIA: Primary | ICD-10-CM

## 2024-10-18 DIAGNOSIS — I48.19 PERSISTENT ATRIAL FIBRILLATION: ICD-10-CM

## 2024-10-18 RX ORDER — POTASSIUM CHLORIDE 750 MG/1
10 CAPSULE, EXTENDED RELEASE ORAL
OUTPATIENT
Start: 2024-10-18

## 2024-10-18 RX ORDER — ONDANSETRON 8 MG/1
8 TABLET, ORALLY DISINTEGRATING ORAL EVERY 8 HOURS PRN
OUTPATIENT
Start: 2024-10-18

## 2024-10-18 RX ORDER — TORSEMIDE 20 MG/1
20 TABLET ORAL
OUTPATIENT
Start: 2024-10-18

## 2024-10-18 RX ORDER — VALSARTAN 40 MG/1
40 TABLET ORAL DAILY
OUTPATIENT
Start: 2024-10-19

## 2024-10-18 RX ORDER — SODIUM CHLORIDE 0.9 % (FLUSH) 0.9 %
10 SYRINGE (ML) INJECTION
OUTPATIENT
Start: 2024-10-18

## 2024-10-18 RX ORDER — POLYETHYLENE GLYCOL 3350 17 G/17G
17 POWDER, FOR SOLUTION ORAL DAILY
OUTPATIENT
Start: 2024-10-19

## 2024-10-18 RX ORDER — CARVEDILOL 6.25 MG/1
12.5 TABLET ORAL 2 TIMES DAILY
OUTPATIENT
Start: 2024-10-18

## 2024-10-18 RX ORDER — ASPIRIN 81 MG/1
81 TABLET ORAL ONCE
OUTPATIENT
Start: 2024-10-18 | End: 2024-10-18

## 2024-10-18 RX ORDER — ACETAMINOPHEN 325 MG/1
650 TABLET ORAL EVERY 4 HOURS PRN
OUTPATIENT
Start: 2024-10-18

## 2024-10-18 RX ORDER — ESCITALOPRAM OXALATE 10 MG/1
10 TABLET ORAL DAILY
OUTPATIENT
Start: 2024-10-19

## 2024-10-18 RX ORDER — BUPROPION HYDROCHLORIDE 150 MG/1
150 TABLET ORAL DAILY
OUTPATIENT
Start: 2024-10-19

## 2024-10-18 NOTE — H&P
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    Past Medical History:   Diagnosis Date    Anticoagulant long-term use     Anxiety     Arthritis     Atrial fibrillation     BPH (benign prostatic hyperplasia)     Cataract     CKD (chronic kidney disease) stage 3, GFR 30-59 ml/min 07/10/2017    Followed by Dr. Jeevan Pond    Colon polyp     benign    Depression     Elevated PSA     Erectile dysfunction     Gastric ulcer with hemorrhage     Hep B w/o coma 1977    History of bleeding peptic ulcer     History of prostatitis     Hypertension     PAF (paroxysmal atrial fibrillation)     Sleep apnea     PT DENIES    Stroke     TIA December 2019    Thyroid disease      Past Surgical History:   Procedure Laterality Date    A-V CARDIAC PACEMAKER INSERTION Left 9/23/2023    Procedure: Dual Chamber PPM (Heart) Rm 2620;  Surgeon: Pan Moss MD;  Location: [facility];  Service: Cardiology;  Laterality: Left;    ABDOMINAL HERNIA REPAIR      ABLATION OF ARRHYTHMOGENIC FOCUS FOR ATRIAL FIBRILLATION N/A 6/15/2020    Procedure: Ablation atrial fibrillation;  Surgeon: Wyatt Beatty MD;  Location: [facility] EP LAB;  Service: Cardiology;  Laterality: N/A;  PAF, AFl, PEPE, PVI re-do, CTI, RFA, JUSTIN, Gen, SK, 3 Prep    APPLICATION OF WOUND VACUUM-ASSISTED CLOSURE DEVICE Right 12/9/2023    Procedure: APPLICATION, WOUND VAC;  Surgeon: Jelani Tello II, MD;  Location: [facility];  Service: Orthopedics;  Laterality: Right;    CARDIOVERSION N/A 2/28/2024    Procedure: Cardioversion;  Surgeon: Pao Hare MD;  Location: [facility];  Service: Cardiology;  Laterality: N/A;    CARDIOVERSION N/A 10/17/2024    Procedure: Cardioversion;  Surgeon: Pan Moss MD;  Location: [facility];  Service: Cardiology;  Laterality: N/A;    CATARACT EXTRACTION  11/25/2013    bilateral    CHOLECYSTECTOMY      CLOSURE OF  LEFT ATRIAL APPENDAGE USING DEVICE N/A 5/23/2024    Procedure: Left atrial appendage closure device;  Surgeon: Tony Duvall MD;  Location: [facility] CATH LAB;  Service: Cardiology;  Laterality: N/A;    COLONOSCOPY N/A 11/25/2015    Procedure: COLONOSCOPY;  Surgeon: Toby Hernandez MD;  Location: [facility] ENDO;  Service: Endoscopy;  Laterality: N/A;    COLONOSCOPY N/A 11/13/2020    Procedure: COLONOSCOPY;  Surgeon: Toby Hernandez MD;  Location: [facility] ENDO;  Service: Endoscopy;  Laterality: N/A;    COLONOSCOPY N/A 5/1/2024    Procedure: COLONOSCOPY;  Surgeon: Toby Hernandez MD;  Location: [facility] ENDO;  Service: Endoscopy;  Laterality: N/A;    CYSTOSCOPY WITH INSERTION OF MINIMALLY INVASIVE IMPLANT TO ENLARGE PROSTATIC URETHRA N/A 11/28/2022    Procedure: CYSTOSCOPY, WITH INSERTION OF UROLIFT IMPLANT;  Surgeon: Yakov Shetty MD;  Location: [facility] OR;  Service: Urology;  Laterality: N/A;    ENDOSCOPIC MUCOSAL RESECTION OF COLON N/A 9/9/2024    Procedure: RESECTION, MUCOSA, COLON, ENDOSCOPIC;  Surgeon: Milton Rodriguez MD;  Location: [facility] ([address] [place] [address]);  Service: Endoscopy;  Laterality: N/A;  5/23 portal-peg-colonscopy with EMR Main. 90 minutes.  24 hours of clear liquid diet. Referring: Toby Hernandez MD- Michael-Jovana HAUSER 5/23- tt .  7/10/24: PEG prep. Pt off plavix now per cards. instructions sent via portal-GD  8/16/24: pt    ESOPHAGOGASTRODUODENOSCOPY N/A 5/1/2024    Procedure: ESOPHAGOGASTRODUODENOSCOPY (EGD);  Surgeon: Toby Hernandez MD;  Location: [facility];  Service: Endoscopy;  Laterality: N/A;    EYE SURGERY      GASTRIC BYPASS  1992    INSERTION, PACEMAKER, TEMPORARY TRANSVENOUS  9/22/2023    Procedure: Temp pacer insertion ER Rm 8;  Surgeon: Pan Moss MD;  Location: [facility] CATH;  Service: Cardiology;;    INTRAMEDULLARY RODDING OF FEMUR Left 7/18/2020    Procedure: INSERTION, INTRAMEDULLARY ERIC, FEMUR, left, Synthes, Clarion table, Large C arm clock side,;  Surgeon: Tony QUISPE  MD Michael;  Location: [facility] OR 2ND FLR;  Service: Orthopedics;  Laterality: Left;    IRRIGATION AND DEBRIDEMENT Right 12/9/2023    Procedure: IRRIGATION AND DEBRIDEMENT KNEE;  Surgeon: Jelani Tello II, MD;  Location: [facility] OR;  Service: Orthopedics;  Laterality: Right;    KNEE ARTHROSCOPY      RT    LASIK  2001    both eyes (Dr. Rabago)    ORIF HUMERUS FRACTURE      LT    ORIF HUMERUS FRACTURE Right     non surgical repair    RADIOFREQUENCY ABLATION      x2    REVISION OF KNEE ARTHROPLASTY Right 12/9/2023    Procedure: REVISION ARTHROPLASTY KNEE- Liner Replacement - dirty/clean;  Surgeon: Jelani Tello II, MD;  Location: [facility] OR;  Service: Orthopedics;  Laterality: Right;  dirty to clean at 1940    Right ankle tendon repair      ROBOT-ASSISTED REPAIR OF INCISIONAL HERNIA USING DA GARETH XI Left 6/13/2022    Procedure: XI ROBOTIC REPAIR, HERNIA, INCISIONAL (LEFT SIDE SPIGELIAN WITH MESH);  Surgeon: Rosendo Marti MD;  Location: [facility] OR 2ND FLR;  Service: General;  Laterality: Left;  consent in am    ROTATOR CUFF REPAIR      right    TOTAL KNEE ARTHROPLASTY Right 5/29/2018    Procedure: REPLACEMENT-KNEE-TOTAL-pro;  Surgeon: Denzel Dallas MD;  Location: [facility] OR 2ND FLR;  Service: Orthopedics;  Laterality: Right;  Pro    TRANSESOPHAGEAL ECHOCARDIOGRAPHY N/A 4/23/2024    Procedure: ECHOCARDIOGRAM, TRANSESOPHAGEAL;  Surgeon: Jan Bravo Diagnostic;  Location: [facility] EP LAB;  Service: Cardiology;  Laterality: N/A;    TRANSESOPHAGEAL ECHOCARDIOGRAPHY N/A 5/23/2024    Procedure: ECHOCARDIOGRAM, TRANSESOPHAGEAL;  Surgeon: Kj Newton MD;  Location: [facility] CATH LAB;  Service: Cardiology;  Laterality: N/A;    TRANSESOPHAGEAL ECHOCARDIOGRAPHY N/A 7/9/2024    Procedure: ECHOCARDIOGRAM, TRANSESOPHAGEAL;  Surgeon: Provider, Eyalc Diagnostic;  Location: [facility] EP LAB;  Service: Cardiology;  Laterality: N/A;    TREATMENT OF CARDIAC ARRHYTHMIA  6/15/2020    Procedure: Cardioversion or  Defibrillation;  Surgeon: Wyatt Beatty MD;  Location: [facility] EP LAB;  Service: Cardiology;;    TREATMENT OF CARDIAC ARRHYTHMIA N/A 2/28/2024    Procedure: Cardioversion or Defibrillation;  Surgeon: Pao Hare MD;  Location: [facility];  Service: Cardiology;  Laterality: N/A;    TREATMENT OF CARDIAC ARRHYTHMIA N/A 9/11/2024    Procedure: Cardioversion/Defibrillation;  Surgeon: Pan Moss MD;  Location: [facility];  Service: Cardiology;  Laterality: N/A;     Social History     Tobacco Use    Smoking status: Never     Passive exposure: Never    Smokeless tobacco: Never    Tobacco comments:     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   Constitutional:       Appearance: Normal appearance. He is well-developed.   HENT:      Head: Normocephalic.   Eyes:      Pupils: Pupils are equal, round, and reactive to light.   Neck:      Thyroid: No thyromegaly.      Vascular: No carotid bruit or JVD.   Cardiovascular:      Rate and Rhythm: Normal rate and regular rhythm.      Chest Wall: PMI is not displaced.      Pulses: Normal pulses and intact distal pulses.      Heart sounds: Normal heart sounds. No murmur heard.     No gallop.   Pulmonary:      Effort: Pulmonary effort is normal.      Breath sounds: Normal breath sounds.   Abdominal:      Palpations: Abdomen is soft. There is no mass.      Tenderness: There is no abdominal tenderness.   Musculoskeletal:         General: Normal range of motion.      Cervical back: Normal range of motion and neck supple.   Skin:     General: Skin is warm.   Neurological:      Mental Status: He is alert and oriented to person, place, and time.      Sensory: No sensory deficit.      Deep Tendon Reflexes: Reflexes are normal  "and symmetric  Data Review:  Coagulation:   Lab Results   Component Value Date    INR 1.0 05/20/2024    INR 3.4 06/07/2018    INR 1.4 (H) 07/28/2014    APTT 35.2 12/09/2023     Cardiac markers: No results found for: "CKMB", "TROPONINT", "MYOGLOBIN"   ECG: normal sinus rhythm, no blocks or conduction defects, no ischemic changes.     Assessment:      1. Paroxysmal atrial fibrillation s/p DCCV 10/18 with success restoration of NSR       Plan:    Admit for initiation of Tikosyn 250 mcg BID as per EP. 3 day long term (6 doses).   Continue OAC.   Continue rest of home cardiac medications.     Naomi Peters, PA-C [facility]   Office: [phone]        "

## 2024-10-23 ENCOUNTER — HOSPITAL ENCOUNTER (OUTPATIENT)
Dept: CARDIOLOGY | Facility: HOSPITAL | Age: 79
Discharge: HOME | End: 2024-10-23
Attending: INTERNAL MEDICINE
Payer: MEDICARE

## 2024-10-23 DIAGNOSIS — Z95.0 PRESENCE OF CARDIAC PACEMAKER: ICD-10-CM

## 2024-10-23 PROCEDURE — 93296 REM INTERROG EVL PM/IDS: CPT | Mod: HCNC,PO,TXP | Performed by: INTERNAL MEDICINE

## 2024-10-24 ENCOUNTER — PATIENT MESSAGE (OUTPATIENT)
Dept: FAMILY MEDICINE | Facility: CLINIC | Age: 79
End: 2024-10-24
Payer: MEDICARE

## 2024-10-24 ENCOUNTER — OUTPATIENT CASE MANAGEMENT (OUTPATIENT)
Dept: ADMINISTRATIVE | Facility: OTHER | Age: 79
End: 2024-10-24
Payer: MEDICARE

## 2024-10-24 DIAGNOSIS — E03.4 HYPOTHYROIDISM DUE TO ACQUIRED ATROPHY OF THYROID: ICD-10-CM

## 2024-10-24 DIAGNOSIS — D64.9 ANEMIA: ICD-10-CM

## 2024-10-24 NOTE — PROGRESS NOTES
Outpatient Care Management  Patient Does Not Consent    Patient: Dominick Song MD  MRN:  [id number]  Date of Service:  10/24/2024  Completed by:  Luci Arrieta RN    Chief Complaint   Patient presents with    OPCM Enrollment Call    Case Closure       Patient Summary           Consent Received:  Decline  Decline Reason:  Not interested

## 2024-10-25 ENCOUNTER — HOSPITAL ENCOUNTER (OUTPATIENT)
Dept: RADIOLOGY | Facility: HOSPITAL | Age: 79
Discharge: HOME | End: 2024-10-25
Attending: ORTHOPAEDIC SURGERY
Payer: MEDICARE

## 2024-10-25 DIAGNOSIS — E03.4 HYPOTHYROIDISM DUE TO ACQUIRED ATROPHY OF THYROID: ICD-10-CM

## 2024-10-25 PROCEDURE — 73562 X-RAY EXAM OF KNEE 3: CPT | Mod: TC,HCNC,FY,PO,LT,TXP

## 2024-10-25 PROCEDURE — 73564 X-RAY EXAM KNEE 4 OR MORE: CPT | Mod: TC,HCNC,FY,PO,RT,NTX

## 2024-10-25 PROCEDURE — 73564 X-RAY EXAM KNEE 4 OR MORE: CPT | Mod: 26,HCNC,RT,NTX | Performed by: RADIOLOGY

## 2024-10-25 PROCEDURE — 73562 X-RAY EXAM OF KNEE 3: CPT | Mod: 26,59,HCNC,LT | Performed by: RADIOLOGY

## 2024-10-25 RX ORDER — LEVOTHYROXINE SODIUM 75 UG/1
75 TABLET ORAL
Qty: 90 TABLET | Refills: 3 | Status: SHIPPED | OUTPATIENT
Start: 2024-10-25

## 2024-10-25 RX ORDER — TELMISARTAN 40 MG/1
40 TABLET ORAL DAILY
Qty: 90 TABLET | Refills: 3 | Status: SHIPPED | OUTPATIENT
Start: 2024-10-25

## 2024-10-25 NOTE — TELEPHONE ENCOUNTER
No care due was identified.  [facility] Embedded Care Due Messages. Reference number: [id number].   10/25/2024 3:39:32 PM CDT

## 2024-10-27 ENCOUNTER — PATIENT MESSAGE (OUTPATIENT)
Dept: UROLOGY | Facility: CLINIC | Age: 79
End: 2024-10-27
Payer: MEDICARE

## 2024-10-27 ENCOUNTER — PATIENT MESSAGE (OUTPATIENT)
Dept: FAMILY MEDICINE | Facility: CLINIC | Age: 79
End: 2024-10-27
Payer: MEDICARE

## 2024-10-28 ENCOUNTER — LAB VISIT (OUTPATIENT)
Dept: LAB | Facility: HOSPITAL | Age: 79
End: 2024-10-28
Attending: INTERNAL MEDICINE
Payer: MEDICARE

## 2024-10-28 ENCOUNTER — PATIENT MESSAGE (OUTPATIENT)
Dept: NEPHROLOGY | Facility: CLINIC | Age: 79
End: 2024-10-28
Payer: MEDICARE

## 2024-10-28 DIAGNOSIS — E21.3 HYPERPARATHYROIDISM: ICD-10-CM

## 2024-10-28 DIAGNOSIS — E53.8 B12 DEFICIENCY: ICD-10-CM

## 2024-10-28 DIAGNOSIS — R79.89 ELEVATED PARATHYROID HORMONE: ICD-10-CM

## 2024-10-28 DIAGNOSIS — E03.9 ACQUIRED HYPOTHYROIDISM: ICD-10-CM

## 2024-10-28 DIAGNOSIS — M81.0 OSTEOPOROSIS, UNSPECIFIED OSTEOPOROSIS TYPE, UNSPECIFIED PATHOLOGICAL FRACTURE PRESENCE: ICD-10-CM

## 2024-10-28 DIAGNOSIS — N18.31 CHRONIC KIDNEY DISEASE, STAGE 3A: ICD-10-CM

## 2024-10-28 LAB
ALBUMIN SERPL BCP-MCNC: 3.3 G/DL (ref 3.5–5.2)
ALBUMIN SERPL BCP-MCNC: 3.3 G/DL (ref 3.5–5.2)
ALP SERPL-CCNC: 70 U/L (ref 40–150)
ALT SERPL W/O P-5'-P-CCNC: 21 U/L (ref 10–44)
ANION GAP SERPL CALC-SCNC: 6 MMOL/L (ref 8–16)
ANION GAP SERPL CALC-SCNC: 6 MMOL/L (ref 8–16)
AST SERPL-CCNC: 21 U/L (ref 10–40)
BILIRUB SERPL-MCNC: 0.6 MG/DL (ref 0.1–1)
BUN SERPL-MCNC: 29 MG/DL (ref 8–23)
BUN SERPL-MCNC: 29 MG/DL (ref 8–23)
CALCIUM SERPL-MCNC: 8.4 MG/DL (ref 8.7–10.5)
CALCIUM SERPL-MCNC: 8.4 MG/DL (ref 8.7–10.5)
CHLORIDE SERPL-SCNC: 114 MMOL/L (ref 95–110)
CHLORIDE SERPL-SCNC: 114 MMOL/L (ref 95–110)
CO2 SERPL-SCNC: 22 MMOL/L (ref 23–29)
CO2 SERPL-SCNC: 22 MMOL/L (ref 23–29)
CREAT SERPL-MCNC: 1.4 MG/DL (ref 0.5–1.4)
CREAT SERPL-MCNC: 1.4 MG/DL (ref 0.5–1.4)
CREAT UR-MCNC: 8 MG/DL (ref 23–375)
EST. GFR  (NO RACE VARIABLE): 51.4 ML/MIN/1.73 M^2
EST. GFR  (NO RACE VARIABLE): 51.4 ML/MIN/1.73 M^2
GLUCOSE SERPL-MCNC: 106 MG/DL (ref 70–110)
GLUCOSE SERPL-MCNC: 106 MG/DL (ref 70–110)
PHOSPHATE SERPL-MCNC: 3 MG/DL (ref 2.7–4.5)
POTASSIUM SERPL-SCNC: 5.4 MMOL/L (ref 3.5–5.1)
POTASSIUM SERPL-SCNC: 5.4 MMOL/L (ref 3.5–5.1)
PROT SERPL-MCNC: 6.5 G/DL (ref 6–8.4)
PROT UR-MCNC: <7 MG/DL (ref 0–15)
PROT/CREAT UR: ABNORMAL MG/G{CREAT} (ref 0–0.2)
PTH-INTACT SERPL-MCNC: 176.8 PG/ML (ref 9–77)
SODIUM SERPL-SCNC: 142 MMOL/L (ref 136–145)
SODIUM SERPL-SCNC: 142 MMOL/L (ref 136–145)
T4 FREE SERPL-MCNC: 0.79 NG/DL (ref 0.71–1.51)
TSH SERPL DL<=0.005 MIU/L-ACNC: 4.52 UIU/ML (ref 0.4–4)
VIT B12 SERPL-MCNC: 394 PG/ML (ref 210–950)

## 2024-10-28 PROCEDURE — 82570 ASSAY OF URINE CREATININE: CPT | Mod: HCNC,TXP | Performed by: INTERNAL MEDICINE

## 2024-10-28 PROCEDURE — 36415 COLL VENOUS BLD VENIPUNCTURE: CPT | Mod: HCNC,PO,TXP | Performed by: PHYSICIAN ASSISTANT

## 2024-10-28 PROCEDURE — 84156 ASSAY OF PROTEIN URINE: CPT | Mod: HCNC,TXP | Performed by: INTERNAL MEDICINE

## 2024-10-28 PROCEDURE — 82607 VITAMIN B-12: CPT | Mod: HCNC,TXP | Performed by: PHYSICIAN ASSISTANT

## 2024-10-28 PROCEDURE — 84439 ASSAY OF FREE THYROXINE: CPT | Mod: HCNC,TXP | Performed by: INTERNAL MEDICINE

## 2024-10-28 PROCEDURE — 80069 RENAL FUNCTION PANEL: CPT | Mod: HCNC,TXP | Performed by: INTERNAL MEDICINE

## 2024-10-28 PROCEDURE — 80053 COMPREHEN METABOLIC PANEL: CPT | Mod: HCNC,TXP | Performed by: INTERNAL MEDICINE

## 2024-10-28 PROCEDURE — 83970 ASSAY OF PARATHORMONE: CPT | Mod: HCNC,TXP | Performed by: INTERNAL MEDICINE

## 2024-10-28 PROCEDURE — 84443 ASSAY THYROID STIM HORMONE: CPT | Mod: HCNC,TXP | Performed by: INTERNAL MEDICINE

## 2024-10-28 RX ORDER — TELMISARTAN 40 MG/1
40 TABLET ORAL DAILY
Qty: 90 TABLET | Refills: 3 | OUTPATIENT
Start: 2024-10-28 | End: 2025-10-28

## 2024-10-28 RX ORDER — LEVOTHYROXINE SODIUM 75 UG/1
75 TABLET ORAL
Qty: 90 TABLET | Refills: 1 | OUTPATIENT
Start: 2024-10-28

## 2024-10-28 RX ORDER — IRON,CARB/VIT C/VIT B12/FOLIC 100-250-1
1 TABLET ORAL DAILY
Qty: 90 TABLET | Refills: 3 | OUTPATIENT
Start: 2024-10-28 | End: 2025-10-28

## 2024-10-31 LAB
OHS CV AF BURDEN PERCENT: < 1
OHS CV DC REMOTE DEVICE TYPE: NORMAL
OHS CV RV PACING PERCENT: 1 %

## 2024-11-04 ENCOUNTER — OFFICE VISIT (OUTPATIENT)
Dept: FAMILY MEDICINE | Facility: CLINIC | Age: 79
End: 2024-11-04
Payer: MEDICARE

## 2024-11-04 VITALS
DIASTOLIC BLOOD PRESSURE: 60 MMHG | WEIGHT: 184.5 LBS | BODY MASS INDEX: 24.99 KG/M2 | SYSTOLIC BLOOD PRESSURE: 136 MMHG | RESPIRATION RATE: 18 BRPM | HEART RATE: 76 BPM | HEIGHT: 72 IN

## 2024-11-04 DIAGNOSIS — E03.4 HYPOTHYROIDISM DUE TO ACQUIRED ATROPHY OF THYROID: Primary | ICD-10-CM

## 2024-11-04 DIAGNOSIS — I48.0 PAROXYSMAL ATRIAL FIBRILLATION: ICD-10-CM

## 2024-11-04 DIAGNOSIS — F32.A DEPRESSION, UNSPECIFIED DEPRESSION TYPE: ICD-10-CM

## 2024-11-04 DIAGNOSIS — I10 ESSENTIAL HYPERTENSION: ICD-10-CM

## 2024-11-04 PROCEDURE — 1111F DSCHRG MED/CURRENT MED MERGE: CPT | Mod: HCNC,CPTII,S$GLB, | Performed by: FAMILY MEDICINE

## 2024-11-04 PROCEDURE — 3078F DIAST BP <80 MM HG: CPT | Mod: HCNC,CPTII,S$GLB, | Performed by: FAMILY MEDICINE

## 2024-11-04 PROCEDURE — 1100F PTFALLS ASSESS-DOCD GE2>/YR: CPT | Mod: HCNC,CPTII,S$GLB, | Performed by: FAMILY MEDICINE

## 2024-11-04 PROCEDURE — G2211 COMPLEX E/M VISIT ADD ON: HCPCS | Mod: HCNC,S$GLB,, | Performed by: FAMILY MEDICINE

## 2024-11-04 PROCEDURE — 1125F AMNT PAIN NOTED PAIN PRSNT: CPT | Mod: HCNC,CPTII,S$GLB, | Performed by: FAMILY MEDICINE

## 2024-11-04 PROCEDURE — 99214 OFFICE O/P EST MOD 30 MIN: CPT | Mod: HCNC,S$GLB,, | Performed by: FAMILY MEDICINE

## 2024-11-04 PROCEDURE — 99999 PR PBB SHADOW E&M-EST. PATIENT-LVL III: CPT | Mod: PBBFAC,HCNC,, | Performed by: FAMILY MEDICINE

## 2024-11-04 PROCEDURE — 3288F FALL RISK ASSESSMENT DOCD: CPT | Mod: HCNC,CPTII,S$GLB, | Performed by: FAMILY MEDICINE

## 2024-11-04 PROCEDURE — 1157F ADVNC CARE PLAN IN RCRD: CPT | Mod: HCNC,CPTII,S$GLB, | Performed by: FAMILY MEDICINE

## 2024-11-04 PROCEDURE — 3075F SYST BP GE 130 - 139MM HG: CPT | Mod: HCNC,CPTII,S$GLB, | Performed by: FAMILY MEDICINE

## 2024-11-04 NOTE — PROGRESS NOTES
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    Chief Complaint: Hypertension (6 month follow up )      Here for 6 month f/u    He fell on 10/24 while dropping his pants in the bathroom. He landed on his right knee.  He did develop increase pain and swelling.  He was seen by ortho and was diagnosed with right prepatellar hematoma.    S/p ORIF left femur fracture - following with Dr. Rodriguez; doing well.  H/o TIA - taking Eliquis; stopped lipitor after seeing neurologist; using lopressor PRN  Afib, s/p pacemaker - s/p ablation; sees Dr. Beatty. Taking Eliquis 5mg BID, Coreg BID, Tikosyn. He had left atrial appendage closure device 5/2024.  HTN - Coreg 12.5mg BID, Micardis 40mg; Demadex 20 mg Monday Wednesday and Friday as well as potassium chloride 10 mEq Monday Wednesday and Friday PRN  CKD - following with Dr. Pond.  Knee DJD - s/p right knee TKA; stable  Hypothyroidism - tolerating levothyroxine 75mcg  S/ gastric bypass, anemia - taking ferrous sulfate and B12; getting some intermittent dumping episodes after fatty meals.  Depression - mood controlled on Wellbutrin  Osteoporosis - taking calcium citrate; getting Reclast annually  BPH - stable since Urolift; taking uroxatral  Pulmonary fibrosis - following with pulmonology every 6 months  Hypocalcemia - taking calcium carbonate 2 tablets daily.  Colon polyp - following with Dr. Hernandez      Hypertension  Pertinent negatives include no chest pain, headaches, neck pain, palpitations or shortness of breath.   Follow-up  Pertinent negatives include no abdominal pain, arthralgias, chest pain, chills, coughing, fever, headaches, nausea, neck pain, rash, sore throat or weakness.       Past Medical History:   Diagnosis Date    Anticoagulant long-term use     Anxiety     Arthritis     Atrial fibrillation     BPH (benign prostatic hyperplasia)     Cataract     CKD (chronic kidney disease) stage 3, GFR 30-59 ml/min 07/10/2017    Followed by   Jeevanselma Pond    Colon polyp     benign    Depression     Elevated PSA     Erectile dysfunction     Gastric ulcer with hemorrhage     Hep B w/o coma 1977    History of bleeding peptic ulcer     History of prostatitis     Hypertension     PAF (paroxysmal atrial fibrillation)     Sleep apnea     PT DENIES    Stroke     TIA December 2019    Thyroid disease        Past Surgical History:   Procedure Laterality Date    A-V CARDIAC PACEMAKER INSERTION Left 9/23/2023    Procedure: Dual Chamber PPM (Heart) Rm 2620;  Surgeon: Pan Moss MD;  Location: [facility];  Service: Cardiology;  Laterality: Left;    ABDOMINAL HERNIA REPAIR      ABLATION OF ARRHYTHMOGENIC FOCUS FOR ATRIAL FIBRILLATION N/A 6/15/2020    Procedure: Ablation atrial fibrillation;  Surgeon: Wyatt Beatty MD;  Location: [facility] EP LAB;  Service: Cardiology;  Laterality: N/A;  PAF, AFl, PEPE, PVI re-do, CTI, RFA, JUSTIN, Gen, SK, 3 Prep    APPLICATION OF WOUND VACUUM-ASSISTED CLOSURE DEVICE Right 12/9/2023    Procedure: APPLICATION, WOUND VAC;  Surgeon: Jelani Tello II, MD;  Location: [facility];  Service: Orthopedics;  Laterality: Right;    CARDIOVERSION N/A 2/28/2024    Procedure: Cardioversion;  Surgeon: Pao Hare MD;  Location: [facility];  Service: Cardiology;  Laterality: N/A;    CARDIOVERSION N/A 10/17/2024    Procedure: Cardioversion;  Surgeon: Pan Moss MD;  Location: [facility];  Service: Cardiology;  Laterality: N/A;    CATARACT EXTRACTION  11/25/2013    bilateral    CHOLECYSTECTOMY      CLOSURE OF LEFT ATRIAL APPENDAGE USING DEVICE N/A 5/23/2024    Procedure: Left atrial appendage closure device;  Surgeon: Tony Duvall MD;  Location: [facility] CATH LAB;  Service: Cardiology;  Laterality: N/A;    COLONOSCOPY N/A 11/25/2015    Procedure: COLONOSCOPY;  Surgeon: Toby Hernandez MD;  Location: [facility];  Service: Endoscopy;  Laterality: N/A;    COLONOSCOPY N/A 11/13/2020    Procedure: COLONOSCOPY;  Surgeon: Toby Hernandez,  MD;  Location: [facility] ENDO;  Service: Endoscopy;  Laterality: N/A;    COLONOSCOPY N/A 5/1/2024    Procedure: COLONOSCOPY;  Surgeon: Toby Hernandez MD;  Location: [facility] ENDO;  Service: Endoscopy;  Laterality: N/A;    CYSTOSCOPY WITH INSERTION OF MINIMALLY INVASIVE IMPLANT TO ENLARGE PROSTATIC URETHRA N/A 11/28/2022    Procedure: CYSTOSCOPY, WITH INSERTION OF UROLIFT IMPLANT;  Surgeon: Yakov Shetty MD;  Location: [facility] OR;  Service: Urology;  Laterality: N/A;    ENDOSCOPIC MUCOSAL RESECTION OF COLON N/A 9/9/2024    Procedure: RESECTION, MUCOSA, COLON, ENDOSCOPIC;  Surgeon: Milton Rodriguez MD;  Location: [facility] ENDO (2ND FLR);  Service: Endoscopy;  Laterality: N/A;  5/23 portal-peg-colonscopy with EMR Main. 90 minutes.  24 hours of clear liquid diet. Referring: Toby Hernandez MD- Michael-Eliquis Dr. Jaci TE 5/23- tt .  7/10/24: PEG prep. Pt off plavix now per cards. instructions sent via portal-GD  8/16/24: pt    ESOPHAGOGASTRODUODENOSCOPY N/A 5/1/2024    Procedure: ESOPHAGOGASTRODUODENOSCOPY (EGD);  Surgeon: Toby Hernandez MD;  Location: [facility];  Service: Endoscopy;  Laterality: N/A;    EYE SURGERY      GASTRIC BYPASS  1992    INSERTION, PACEMAKER, TEMPORARY TRANSVENOUS  9/22/2023    Procedure: Temp pacer insertion ER Rm 8;  Surgeon: Pan Moss MD;  Location: [facility] CATH;  Service: Cardiology;;    INTRAMEDULLARY RODDING OF FEMUR Left 7/18/2020    Procedure: INSERTION, INTRAMEDULLARY ERIC, FEMUR, left, Synthes, [place] table, Large C arm clock side,;  Surgeon: Tony Rodriguez MD;  Location: [facility] OR 2ND FLR;  Service: Orthopedics;  Laterality: Left;    IRRIGATION AND DEBRIDEMENT Right 12/9/2023    Procedure: IRRIGATION AND DEBRIDEMENT KNEE;  Surgeon: Jelani Tello II, MD;  Location: [facility] OR;  Service: Orthopedics;  Laterality: Right;    KNEE ARTHROSCOPY      RT    LASIK  2001    both eyes (Dr. Rabago)    ORIF HUMERUS FRACTURE      LT    ORIF HUMERUS FRACTURE Right     non surgical repair     RADIOFREQUENCY ABLATION      x2    REVISION OF KNEE ARTHROPLASTY Right 12/9/2023    Procedure: REVISION ARTHROPLASTY KNEE- Liner Replacement - dirty/clean;  Surgeon: Jelani Tello II, MD;  Location: [facility];  Service: Orthopedics;  Laterality: Right;  dirty to clean at 1940    Right ankle tendon repair      ROBOT-ASSISTED REPAIR OF INCISIONAL HERNIA USING DA GARETH XI Left 6/13/2022    Procedure: XI ROBOTIC REPAIR, HERNIA, INCISIONAL (LEFT SIDE SPIGELIAN WITH MESH);  Surgeon: Rosendo Marti MD;  [address];  Service: General;  Laterality: Left;  consent in am    ROTATOR CUFF REPAIR      right    TOTAL KNEE ARTHROPLASTY Right 5/29/2018    Procedure: REPLACEMENT-KNEE-TOTAL-pro;  Surgeon: Denzel Dallas MD;  [address];  Service: Orthopedics;  Laterality: Right;  Pro    TRANSESOPHAGEAL ECHOCARDIOGRAPHY N/A 4/23/2024    Procedure: ECHOCARDIOGRAM, TRANSESOPHAGEAL;  Surgeon: Provider, Dosc Diagnostic;  Location: [facility] EP LAB;  Service: Cardiology;  Laterality: N/A;    TRANSESOPHAGEAL ECHOCARDIOGRAPHY N/A 5/23/2024    Procedure: ECHOCARDIOGRAM, TRANSESOPHAGEAL;  Surgeon: Kj Newton MD;  Location: [facility] CATH LAB;  Service: Cardiology;  Laterality: N/A;    TRANSESOPHAGEAL ECHOCARDIOGRAPHY N/A 7/9/2024    Procedure: ECHOCARDIOGRAM, TRANSESOPHAGEAL;  Surgeon: Lawrence, Dosc Diagnostic;  Location: [facility] EP LAB;  Service: Cardiology;  Laterality: N/A;    TREATMENT OF CARDIAC ARRHYTHMIA  6/15/2020    Procedure: Cardioversion or Defibrillation;  Surgeon: Wyatt Beatty MD;  Location: [facility] EP LAB;  Service: Cardiology;;    TREATMENT OF CARDIAC ARRHYTHMIA N/A 2/28/2024    Procedure: Cardioversion or Defibrillation;  Surgeon: Pao Hare MD;  Location: [facility];  Service: Cardiology;  Laterality: N/A;    TREATMENT OF CARDIAC ARRHYTHMIA N/A 9/11/2024    Procedure: Cardioversion/Defibrillation;  Surgeon: Pan Moss MD;  Location: [facility];  Service: Cardiology;   Laterality: N/A;       Review of patient's allergies indicates:   Allergen Reactions    No known drug allergies        Social History     Socioeconomic History    Marital status:     Number of children: 5   Occupational History    Occupation: retired anesthesiology     Employer: [employer] ([employer]    Occupation: [employer] grad     Comment: previous football player   Tobacco Use    Smoking status: Never     Passive exposure: Never    Smokeless tobacco: Never    Tobacco comments:     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    Substance and Sexual Activity    Alcohol use: No    Drug use: No    Sexual activity: Yes     Partners: Female     Social Drivers of Health     Financial Resource Strain: Low Risk  (10/21/2024)    Overall Financial Resource Strain (CARDIA)     Difficulty of Paying Living Expenses: Not hard at all   Food Insecurity: No Food Insecurity (10/21/2024)    Hunger Vital Sign     Worried About Running Out of Food in the Last Year: Never true     Ran Out of Food in the Last Year: Never true   Transportation Needs: No Transportation Needs (10/21/2024)    TRANSPORTATION NEEDS     Transportation : No   Physical Activity: Sufficiently Active (4/22/2024)    Exercise Vital Sign     Days of Exercise per Week: 5 days     Minutes of Exercise per Session: 130 min   Stress: No Stress Concern Present (10/21/2024)    [employer] [place] [employer] - Occupational Stress Questionnaire     Feeling of Stress : Not at all   Housing Stability: Low Risk  (10/21/2024)    Housing Stability Vital Sign     Unable to Pay for Housing in the Last Year: No     Homeless in the Last Year: No       Current Outpatient Medications on File Prior to Visit   Medication Sig Dispense Refill    apixaban (ELIQUIS) 5 mg Tab TAKE 1 TABLET(5 MG) BY MOUTH TWICE DAILY FOR 3 WEEKS 60 tablet 11    aspirin (ECOTRIN) 81 MG EC tablet Take 1 tablet (81 mg total) by mouth once daily. 90 tablet 3    buPROPion (WELLBUTRIN XL) 150 MG TB24  tablet Take 1 tablet (150 mg total) by mouth once daily. 90 tablet 3    buPROPion (WELLBUTRIN XL) 300 MG 24 hr tablet Take 1 tablet (300 mg total) by mouth once daily. Plus 150 90 tablet 0    carvediloL (COREG) 12.5 MG tablet Take 1 tablet (12.5 mg total) by mouth 2 (two) times daily. 180 tablet 3    dofetilide (TIKOSYN) 250 MCG Cap Take 1 capsule (250 mcg total) by mouth every 12 (twelve) hours. 60 capsule 11    droNABinol (MARINOL) 2.5 MG capsule Take 1 capsule (2.5 mg total) by mouth 2 (two) times daily before meals. 60 capsule 2    iron-vit c-b12-folic acid (ICAR-C PLUS) Tab Take 1 tablet by mouth once daily. 90 tablet 3    levothyroxine (SYNTHROID) 75 MCG tablet Take 1 tablet (75 mcg total) by mouth before breakfast. 90 tablet 3    OMEGA-3 FATTY ACIDS (FISH OIL CONCENTRATE ORAL) Take 1 capsule by mouth Daily.      oxyCODONE-acetaminophen (PERCOCET)  mg per tablet Take 1 tablet by mouth every 4 (four) hours as needed for Pain. 30 tablet 0    telmisartan (MICARDIS) 40 MG Tab Take 1 tablet (40 mg total) by mouth once daily. 90 tablet 3    testosterone cypionate (DEPOTESTOTERONE CYPIONATE) 200 mg/mL injection Inject 200 mg into the muscle every 14 (fourteen) days.      tiZANidine (ZANAFLEX) 4 MG tablet Take 1 tablet (4 mg total) by mouth 3 (three) times daily as needed (muscle spasms). 30 tablet 5    torsemide (DEMADEX) 20 MG Tab Take 1 tablet (20 mg total) by mouth every Mon, Wed, Fri. (Patient not taking: Reported on 11/4/2024) 15 tablet 5     No current facility-administered medications on file prior to visit.       Family History   Problem Relation Name Age of Onset    COPD Father      Diabetes Father      Osteoporosis Father      Aortic stenosis Mother      Heart disease Mother          aortic stenosis    Osteoporosis Mother      Heart attack Brother      No Known Problems Son      No Known Problems Daughter      No Known Problems Daughter      No Known Problems Daughter         Review of Systems    Constitutional:  Positive for appetite change and unexpected weight change. Negative for chills and fever.   HENT:  Negative for sore throat and trouble swallowing.    Eyes:  Negative for pain and visual disturbance.   Respiratory:  Negative for cough, chest tightness, shortness of breath and wheezing.    Cardiovascular:  Positive for leg swelling. Negative for chest pain and palpitations.   Gastrointestinal:  Negative for abdominal pain, blood in stool, constipation, diarrhea and nausea.   Genitourinary:  Negative for difficulty urinating, dysuria and hematuria.   Musculoskeletal:  Negative for arthralgias, gait problem and neck pain.   Skin:  Negative for rash and wound.   Neurological:  Negative for dizziness, weakness, light-headedness and headaches.   Hematological:  Negative for adenopathy.   Psychiatric/Behavioral:  Negative for dysphoric mood.        Objective:      /60   Pulse 76   Resp 18   Ht 6' (1.829 m)   Wt 83.7 kg (184 lb 8.4 oz)   BMI 25.03 kg/m²   Physical Exam  Constitutional:       General: He is not in acute distress.     Appearance: He is well-developed.   HENT:      Head: Normocephalic and atraumatic.      Right Ear: External ear normal.      Left Ear: External ear normal.      Mouth/Throat:      Pharynx: Uvula midline. No oropharyngeal exudate.   Eyes:      General: Lids are normal.      Conjunctiva/sclera: Conjunctivae normal.      Pupils: Pupils are equal, round, and reactive to light.   Neck:      Thyroid: No thyroid mass or thyromegaly.      Trachea: Phonation normal.   Cardiovascular:      Rate and Rhythm: Normal rate and regular rhythm.      Heart sounds: Normal heart sounds. No murmur heard.     No friction rub. No gallop.   Pulmonary:      Effort: Pulmonary effort is normal. No respiratory distress.      Breath sounds: Normal breath sounds. No wheezing or rales.   Musculoskeletal:         General: Normal range of motion.      Cervical back: Full passive range of motion  without pain, normal range of motion and neck supple.      Right lower le+ Edema present.      Left lower le+ Edema present.   Lymphadenopathy:      Cervical: No cervical adenopathy.      Upper Body:      Right upper body: No supraclavicular or axillary adenopathy.      Left upper body: No supraclavicular or axillary adenopathy.   Skin:     General: Skin is warm and dry.   Neurological:      Mental Status: He is alert and oriented to person, place, and time.      Cranial Nerves: No cranial nerve deficit.      Coordination: Coordination normal.   Psychiatric:         Speech: Speech normal.         Behavior: Behavior normal.         Thought Content: Thought content normal.         Judgment: Judgment normal.         Results for orders placed or performed in visit on 10/28/24   Comprehensive Metabolic Panel    Collection Time: 10/28/24  1:22 PM   Result Value Ref Range    Sodium 142 136 - 145 mmol/L    Potassium 5.4 (H) 3.5 - 5.1 mmol/L    Chloride 114 (H) 95 - 110 mmol/L    CO2 22 (L) 23 - 29 mmol/L    Glucose 106 70 - 110 mg/dL    BUN 29 (H) 8 - 23 mg/dL    Creatinine 1.4 0.5 - 1.4 mg/dL    Calcium 8.4 (L) 8.7 - 10.5 mg/dL    Total Protein 6.5 6.0 - 8.4 g/dL    Albumin 3.3 (L) 3.5 - 5.2 g/dL    Total Bilirubin 0.6 0.1 - 1.0 mg/dL    Alkaline Phosphatase 70 40 - 150 U/L    AST 21 10 - 40 U/L    ALT 21 10 - 44 U/L    eGFR 51.4 (A) >60 mL/min/1.73 m^2    Anion Gap 6 (L) 8 - 16 mmol/L   TSH    Collection Time: 10/28/24  1:22 PM   Result Value Ref Range    TSH 4.518 (H) 0.400 - 4.000 uIU/mL   PTH, Intact    Collection Time: 10/28/24  1:22 PM   Result Value Ref Range    PTH, Intact 176.8 (H) 9.0 - 77.0 pg/mL   VITAMIN B12    Collection Time: 10/28/24  1:22 PM   Result Value Ref Range    Vitamin B-12 394 210 - 950 pg/mL   Renal Function Panel    Collection Time: 10/28/24  1:22 PM   Result Value Ref Range    Glucose 106 70 - 110 mg/dL    Sodium 142 136 - 145 mmol/L    Potassium 5.4 (H) 3.5 - 5.1 mmol/L    Chloride 114  (H) 95 - 110 mmol/L    CO2 22 (L) 23 - 29 mmol/L    BUN 29 (H) 8 - 23 mg/dL    Calcium 8.4 (L) 8.7 - 10.5 mg/dL    Creatinine 1.4 0.5 - 1.4 mg/dL    Albumin 3.3 (L) 3.5 - 5.2 g/dL    Phosphorus 3.0 2.7 - 4.5 mg/dL    eGFR 51.4 (A) >60 mL/min/1.73 m^2    Anion Gap 6 (L) 8 - 16 mmol/L   T4, Free    Collection Time: 10/28/24  1:22 PM   Result Value Ref Range    Free T4 0.79 0.71 - 1.51 ng/dL   Protein/Creatinine Ratio, Urine    Collection Time: 10/28/24  1:37 PM   Result Value Ref Range    Protein, Urine Random <7 0 - 15 mg/dL    Creatinine, Urine 8.0 (L) 23.0 - 375.0 mg/dL    Prot/Creat Ratio, Urine Unable to calculate 0.00 - 0.20     *Note: Due to a large number of results and/or encounters for the requested time period, some results have not been displayed. A complete set of results can be found in Results Review.     Labs and hospital records reviewed     Assessment:       1. Hypothyroidism due to acquired atrophy of thyroid    2. Essential hypertension    3. Depression, unspecified depression type    4. Paroxysmal atrial fibrillation                          Plan:       Hypothyroidism due to acquired atrophy of thyroid  -     Lipid Panel; Future; Expected date: 11/04/2024    Essential hypertension    Depression, unspecified depression type    Paroxysmal atrial fibrillation             Continue f/u with ortho; cane for ambulation; basic knee stretching and relative rest  F/u with cardiology, ID, ortho as planned  Discuss stopping Eliquis 5mg BID with cardiology tomorrrow.  Continue other present meds  Counseled on regular exercise, maintenance of a healthy weight, balanced diet rich in fruits/vegetables and lean protein, and avoidance of unhealthy habits like smoking and excessive alcohol intake.    F/u 6 months or PRN    Visit today included increased complexity associated with the care of the episodic problems listed above addressed and managing the longitudinal care of the patient due to the serious and/or  complex managed problem(s) listed above.

## 2024-11-05 ENCOUNTER — OFFICE VISIT (OUTPATIENT)
Dept: CARDIOLOGY | Facility: CLINIC | Age: 79
End: 2024-11-05
Payer: MEDICARE

## 2024-11-05 VITALS
SYSTOLIC BLOOD PRESSURE: 130 MMHG | WEIGHT: 180.56 LBS | DIASTOLIC BLOOD PRESSURE: 82 MMHG | HEIGHT: 72 IN | BODY MASS INDEX: 24.46 KG/M2

## 2024-11-05 DIAGNOSIS — R00.1 BRADYCARDIA: ICD-10-CM

## 2024-11-05 DIAGNOSIS — I48.19 PERSISTENT ATRIAL FIBRILLATION: ICD-10-CM

## 2024-11-05 DIAGNOSIS — Z95.0 PRESENCE OF CARDIAC PACEMAKER: Primary | ICD-10-CM

## 2024-11-05 DIAGNOSIS — I10 ESSENTIAL HYPERTENSION: ICD-10-CM

## 2024-11-05 DIAGNOSIS — I50.32 CHRONIC HEART FAILURE WITH PRESERVED EJECTION FRACTION (HFPEF): ICD-10-CM

## 2024-11-05 LAB
OHS QRS DURATION: 86 MS
OHS QTC CALCULATION: 464 MS

## 2024-11-05 PROCEDURE — 93005 ELECTROCARDIOGRAM TRACING: CPT | Mod: HCNC,PO,TXP

## 2024-11-05 PROCEDURE — 99999 PR PBB SHADOW E&M-EST. PATIENT-LVL III: CPT | Mod: PBBFAC,HCNC,TXP, | Performed by: INTERNAL MEDICINE

## 2024-11-05 NOTE — PROGRESS NOTES
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  He did have a fall 2 weeks ago injuring his right knee, which developed a hematoma    Review of Systems   Constitutional: Negative for decreased appetite, malaise/fatigue, weight gain and weight loss.   Cardiovascular:  Negative for chest pain, dyspnea on exertion, leg swelling, palpitations and syncope.   Respiratory:  Negative for cough and shortness of breath.    Gastrointestinal: Negative.    Neurological:  Negative for weakness.   All other systems reviewed and are negative.       Objective:      Physical Exam  Vitals and nursing note reviewed.   Constitutional:       Appearance: Normal appearance. He is well-developed.   HENT:      Head: Normocephalic.   Eyes:      Pupils: Pupils are equal, round, and reactive to light.   Neck:      Thyroid: No thyromegaly.      Vascular: No carotid bruit or JVD.   Cardiovascular:      Rate and Rhythm: Normal rate and regular rhythm.      Chest Wall: PMI is not displaced.      Pulses: Normal pulses and intact distal pulses.      Heart sounds: Normal heart sounds. No murmur heard.     No gallop.   Pulmonary:      Effort: Pulmonary effort is normal.      Breath sounds: Normal breath sounds.   Abdominal:      Palpations: Abdomen is soft. There is no mass.      Tenderness: There is no abdominal tenderness.   Musculoskeletal:         General: Normal range of motion.      Cervical back: Normal range of motion and neck supple.   Skin:     General: Skin is warm.   Neurological:      Mental Status: He is alert and oriented to person, place, and time.      Sensory: No sensory deficit.      Deep Tendon Reflexes: Reflexes are normal and symmetric.             Most Recent  EKG Results  Results for orders placed or performed during the hospital encounter of 10/21/24   EKG 12-lead    Collection Time: 10/23/24  1:10 PM   Result Value Ref Range    QRS Duration 100 ms    [facility] QTC Calculation 475 ms    Narrative    Test Reason : Z51.81,Z79.899,    Vent. Rate : 070 BPM     Atrial Rate : 070 BPM     P-R Int : 256 ms          QRS Dur : 100 ms      QT Int : 440 ms       P-R-T Axes : 000 055 029 degrees     QTc Int : 475 ms    Atrial-paced rhythm with prolonged AV conduction  Minimal voltage criteria for LVH, may be normal variant ( Sokolow-Mejia )  Abnormal ECG  When compared with ECG of 23-OCT-2024 01:03,  Criteria for Septal infarct are no longer Present  T wave amplitude has increased in Lateral leads  Confirmed by DEMOND STILES MD ([id number]) on 10/29/2024 10:39:13 AM    Referred By: KAMALA DUNNE MD           Confirmed By:DEMOND STILES MD       Most Recent Echocardiogram Results  Results for orders placed during the hospital encounter of 10/21/24    Echo    Interpretation Summary    Left Ventricle: The left ventricle is normal in size. Normal wall thickness. There is normal systolic function with a visually estimated ejection fraction of 60 - 65%.    Right Ventricle: Normal right ventricular cavity size. Wall thickness is normal. Systolic function is normal.    Left Atrium: Left atrium is severely dilated.    Aortic Valve: There is mild aortic regurgitation.    Mitral Valve: There is moderate regurgitation with a posterolateral eccentriccally directed jet.    Tricuspid Valve: There is mild regurgitation.    Aorta: Aortic root is mildly dilated. Ascending aorta is normal measuring 3.60 cm.    Pulmonary Artery: There is mild to moderate pulmonary hypertension. The estimated pulmonary artery systolic pressure is 46 mmHg.    IVC/SVC: Normal venous pressure at 3 mmHg.      Most Recent Nuclear Stress Test Results  Results for orders placed during the hospital encounter of 09/20/23    Nuclear Stress -  Cardiology Interpreted    Interpretation Summary    Normal myocardial perfusion scan. There is no evidence of myocardial ischemia or infarction.    The gated perfusion images showed an ejection fraction of 60% post stress.    LV cavity size is normal at rest and normal at stress.    The ECG portion of the study is abnormal but not diagnostic.    The patient reported no chest pain during the stress test.      Most Recent Cardiac PET Stress Test Results  No results found for this or any previous visit.      Most Recent Cardiovascular Angiogram results  Results for orders placed during the hospital encounter of 05/23/24    Cardiac catheterization    Conclusion    The estimated blood loss was none.    The left atrial appendage closure was successful .    Follow up PEPE in 45 days and clinic with me.    The procedure log was documented by Documenter: Dorothy Haider and verified by Tony Duvall MD.    Date: 5/23/2024  Time: 2:12 PM      Other Most Recent Cardiology Results  Results for orders placed during the hospital encounter of 10/23/24    Cardiac device check - Remote    EKG today remains atrial paced    Labs reviewed    Assessment:       1. Presence of cardiac pacemaker    2. Bradycardia    3. Persistent atrial fibrillation    4. Essential hypertension    5. Chronic heart failure with preserved ejection fraction (HFpEF)         Plan:   Stop Eliquis  Continue:  Antiarrhythmic, ARB, ASA, Beta blocker, and Diuretic  Regular exercise program  Low cholesterol diet    Follow-up in 2 months

## 2024-11-06 ENCOUNTER — TELEPHONE (OUTPATIENT)
Dept: ENDOCRINOLOGY | Facility: CLINIC | Age: 79
End: 2024-11-06
Payer: MEDICARE

## 2024-11-06 DIAGNOSIS — E03.9 ACQUIRED HYPOTHYROIDISM: Primary | ICD-10-CM

## 2024-11-06 RX ORDER — LEVOTHYROXINE SODIUM 88 UG/1
88 TABLET ORAL
Qty: 30 TABLET | Refills: 11 | Status: ON HOLD | OUTPATIENT
Start: 2024-11-06 | End: 2025-11-06

## 2024-11-06 NOTE — TELEPHONE ENCOUNTER
Spoke with patient and notified him of Dr Mckeon's previous message and verbalized understanding

## 2024-11-07 ENCOUNTER — OFFICE VISIT (OUTPATIENT)
Dept: NEPHROLOGY | Facility: CLINIC | Age: 79
End: 2024-11-07
Payer: MEDICARE

## 2024-11-07 VITALS — DIASTOLIC BLOOD PRESSURE: 68 MMHG | SYSTOLIC BLOOD PRESSURE: 130 MMHG

## 2024-11-07 DIAGNOSIS — N18.31 CHRONIC KIDNEY DISEASE, STAGE 3A: Primary | ICD-10-CM

## 2024-11-07 DIAGNOSIS — M00.9 PYOGENIC ARTHRITIS OF RIGHT KNEE JOINT, DUE TO UNSPECIFIED ORGANISM: ICD-10-CM

## 2024-11-07 DIAGNOSIS — N25.81 SECONDARY RENAL HYPERPARATHYROIDISM: ICD-10-CM

## 2024-11-07 DIAGNOSIS — I10 PRIMARY HYPERTENSION: ICD-10-CM

## 2024-11-07 PROCEDURE — 3075F SYST BP GE 130 - 139MM HG: CPT | Mod: HCNC,CPTII,S$GLB, | Performed by: INTERNAL MEDICINE

## 2024-11-07 PROCEDURE — 1160F RVW MEDS BY RX/DR IN RCRD: CPT | Mod: HCNC,CPTII,S$GLB, | Performed by: INTERNAL MEDICINE

## 2024-11-07 PROCEDURE — 99214 OFFICE O/P EST MOD 30 MIN: CPT | Mod: HCNC,S$GLB,, | Performed by: INTERNAL MEDICINE

## 2024-11-07 PROCEDURE — 99999 PR PBB SHADOW E&M-EST. PATIENT-LVL III: CPT | Mod: PBBFAC,HCNC,, | Performed by: INTERNAL MEDICINE

## 2024-11-07 PROCEDURE — 3078F DIAST BP <80 MM HG: CPT | Mod: HCNC,CPTII,S$GLB, | Performed by: INTERNAL MEDICINE

## 2024-11-07 PROCEDURE — 1157F ADVNC CARE PLAN IN RCRD: CPT | Mod: HCNC,CPTII,S$GLB, | Performed by: INTERNAL MEDICINE

## 2024-11-07 PROCEDURE — 1159F MED LIST DOCD IN RCRD: CPT | Mod: HCNC,CPTII,S$GLB, | Performed by: INTERNAL MEDICINE

## 2024-11-07 PROCEDURE — 1111F DSCHRG MED/CURRENT MED MERGE: CPT | Mod: HCNC,CPTII,S$GLB, | Performed by: INTERNAL MEDICINE

## 2024-11-07 RX ORDER — CALCITRIOL 0.25 UG/1
0.25 CAPSULE ORAL
Qty: 39 CAPSULE | Refills: 1 | Status: ON HOLD | OUTPATIENT
Start: 2024-11-08

## 2024-11-07 RX ORDER — TIZANIDINE 4 MG/1
4 TABLET ORAL 3 TIMES DAILY PRN
Qty: 30 TABLET | Refills: 5 | Status: CANCELLED | OUTPATIENT
Start: 2024-11-07

## 2024-11-07 NOTE — TELEPHONE ENCOUNTER
No care due was identified.  Health [facility] Embedded Care Due Messages. Reference number: [id number].   11/07/2024 3:15:46 PM CST

## 2024-11-07 NOTE — PROGRESS NOTES
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      He had been lost to follow-up since 2019 and has had many cardiac adventures since then.  He had a recent fall and injured his R knee.  He has no uremic or urinary symptoms.          Review of Systems   Constitutional:  Negative for fever.   Respiratory:  Negative for cough and shortness of breath.    Cardiovascular:  Positive for leg swelling (occasional). Negative for chest pain.   Gastrointestinal:  Negative for abdominal pain.   Genitourinary:  Positive for frequency.   Musculoskeletal:  Positive for arthralgias (R knee) and gait problem.   Neurological:  Negative for headaches.   Hematological:  Bruises/bleeds easily.       The past medical, family and social histories were reviewed for this encounter.     /68 (BP Location: Left arm, Patient Position: Sitting)     Objective:      Physical Exam  Vitals reviewed.   Constitutional:       General: He is not in acute distress.     Comments: thin   HENT:      Head: Normocephalic and atraumatic.   Cardiovascular:      Rate and Rhythm: Normal rate.      Heart sounds: No murmur heard.  Pulmonary:      Effort: Pulmonary effort is normal.      Breath sounds: Rales (dry B) present.   Abdominal:      General: Abdomen is flat.   Musculoskeletal:      Right lower leg: Edema (trace) present.      Left lower leg: Edema (trace) present.   Skin:     Findings: Bruising present.   Neurological:      Mental Status: He is alert and oriented to person, place, and time.         Assessment:       1. Chronic kidney disease, stage 3a    2. Secondary renal hyperparathyroidism    3. Primary hypertension          Lab Results   Component Value Date    CREATININE 1.4 10/28/2024    CREATININE 1.4 10/28/2024    BUN 29 (H) 10/28/2024    BUN 29 (H) 10/28/2024     10/28/2024     10/28/2024    K 5.4 (H) 10/28/2024    K 5.4 (H)  10/28/2024     (H) 10/28/2024     (H) 10/28/2024    CO2 22 (L) 10/28/2024    CO2 22 (L) 10/28/2024     Lab Results   Component Value Date    .8 (H) 10/28/2024    CALCIUM 8.4 (L) 10/28/2024    CALCIUM 8.4 (L) 10/28/2024    PHOS 3.0 10/28/2024     Lab Results   Component Value Date    HCT 31.3 (L) 10/21/2024     Prot/Creat Ratio, Urine   Date Value Ref Range Status   10/28/2024 Unable to calculate 0.00 - 0.20 Final   07/10/2024 0.12 0.00 - 0.20 Final      Plan:   Return to clinic in 6 months.  Labs for next visit include labs per standing orders q 3 months.  Baseline creatinine is 1.3-1.4 since 2014.  UPC is negative.  Renal US shows R 9.3 cm L 8.9 cm.  PTH is 177 with a calcium level of 8.4.  Start calcitriol 0.25 mcg MWF.  Calcium is low normal likely related to his recent reclast dose.  D/C potassium supplement.

## 2024-11-08 DIAGNOSIS — M00.9 PYOGENIC ARTHRITIS OF RIGHT KNEE JOINT, DUE TO UNSPECIFIED ORGANISM: ICD-10-CM

## 2024-11-08 PROBLEM — S80.01XA TRAUMATIC HEMATOMA OF KNEE, RIGHT, INITIAL ENCOUNTER: Status: ACTIVE | Noted: 2024-11-08

## 2024-11-08 NOTE — TELEPHONE ENCOUNTER
No care due was identified.  [facility] Embedded Care Due Messages. Reference number: [id number].   11/08/2024 5:01:13 PM CST

## 2024-11-10 PROBLEM — L08.9 INFECTED HEMATOMA: Status: ACTIVE | Noted: 2024-11-10

## 2024-11-10 PROBLEM — T14.8XXA INFECTED HEMATOMA: Status: ACTIVE | Noted: 2024-11-10

## 2024-11-10 RX ORDER — TIZANIDINE 4 MG/1
4 TABLET ORAL 3 TIMES DAILY PRN
Qty: 30 TABLET | Refills: 5 | Status: SHIPPED | OUTPATIENT
Start: 2024-11-10

## 2024-11-11 ENCOUNTER — TELEPHONE (OUTPATIENT)
Dept: CARDIOLOGY | Facility: HOSPITAL | Age: 79
End: 2024-11-11
Payer: MEDICARE

## 2024-11-11 NOTE — TELEPHONE ENCOUNTER
AF noted during [employer] device remote check:    s/p DCCV 10/17/24    Overall burden:  <1% since 10/23/24     Max duration seen: 1 hr 42 mins.  (Atrial undersensing noted)            Most recent episode: ongoing                 Ventricular rates:                 Anticoagulation status:   s/p LAAO      Meds:  Aspirin 81 mg  Tikosyn 250 mcg every 12 hours  Coreg 12.5 mg bid  Telmisartan 40 mg daily    Most recent BP- 152/82      Patient symptoms: pt. States he was laying in bed around 12-1 pm and he felt palpitations and he knew he was in AF        Message sent to Dr. Moss for review

## 2024-11-11 NOTE — TELEPHONE ENCOUNTER
Reviewed home monitor, no transmission received. Called pt. And left message notifying pt. I was getting ready to leave for the day and I would f/u with him tomorrow.

## 2024-11-19 ENCOUNTER — TELEPHONE (OUTPATIENT)
Dept: CARDIOLOGY | Facility: HOSPITAL | Age: 79
End: 2024-11-19
Payer: MEDICARE

## 2024-11-19 ENCOUNTER — TELEPHONE (OUTPATIENT)
Dept: ENDOSCOPY | Facility: HOSPITAL | Age: 79
End: 2024-11-19
Payer: MEDICARE

## 2024-11-19 DIAGNOSIS — Z12.11 SCREENING FOR COLON CANCER: ICD-10-CM

## 2024-11-19 DIAGNOSIS — K63.5 POLYP OF COLON, UNSPECIFIED PART OF COLON, UNSPECIFIED TYPE: Primary | ICD-10-CM

## 2024-11-19 NOTE — TELEPHONE ENCOUNTER
Called and requested a manual transmission to assess AF per Dr. Moss. Unable to send, contacted Merlin for assistance. Presenting As/Vs most recent AF event on 11/11. Dr. Moss notified.

## 2024-11-19 NOTE — TELEPHONE ENCOUNTER
Spoke to patient to schedule procedure(s) Colonoscopy       Physician to perform procedure(s) Dr. FADUMO Rodriguez  Date of Procedure (s) 1/16/2025  Arrival Time 7:45 AM  Time of Procedure(s) 8:45 AM   Location of Procedure(s) [place] 2nd Floor  Type of Rx Prep sent to patient: PEG  Instructions provided to patient via [facility]    Patient was informed on the following information and verbalized understanding. Screening questionnaire reviewed with patient and complete. If procedure requires anesthesia, a responsible adult needs to be present to accompany the patient home, patient cannot drive after receiving anesthesia. Appointment details are tentative, especially check-in time. Patient will receive a prep-op call 7 days prior to confirm check-in time for procedure. If applicable the patient should contact their pharmacy to verify Rx for procedure prep is ready for pick-up. Patient was advised to call the scheduling department at [phone] if pharmacy states no Rx is available. Patient was advised to call the endoscopy scheduling department if any questions or concerns arise.      [facility] Scheduling Department

## 2024-11-25 ENCOUNTER — PATIENT MESSAGE (OUTPATIENT)
Dept: TRANSPLANT | Facility: CLINIC | Age: 79
End: 2024-11-25
Payer: MEDICARE

## 2024-11-25 ENCOUNTER — TELEPHONE (OUTPATIENT)
Dept: CARDIOLOGY | Facility: HOSPITAL | Age: 79
End: 2024-11-25
Payer: MEDICARE

## 2024-11-25 DIAGNOSIS — R63.4 ABNORMAL WEIGHT LOSS: ICD-10-CM

## 2024-11-25 DIAGNOSIS — Z95.0 PRESENCE OF CARDIAC PACEMAKER: Primary | ICD-10-CM

## 2024-11-25 NOTE — TELEPHONE ENCOUNTER
Reviewed transmission  Appears c/w AF on presenting  Patient states he felt palpitations and SOB with exertion  Message via text to Dr. Cruz per patient, referred to Dr. Catalina Chakraborty appt scheduled to coordinate with device check  Patient VU and confirmed appt times

## 2024-11-25 NOTE — TELEPHONE ENCOUNTER
----- Message from Ludmila sent at 11/25/2024  7:38 AM CST -----  Contact: pt  Type:  Needs Medical Advice    Who Called: pt    Would the patient rather a call back or a response via [facility]?  Call back    Best Call Back Number: [phone]    Additional Information: please call Dr Song back    Thanks

## 2024-11-26 ENCOUNTER — PATIENT MESSAGE (OUTPATIENT)
Dept: CARDIOLOGY | Facility: HOSPITAL | Age: 79
End: 2024-11-26
Payer: MEDICARE

## 2024-11-26 RX ORDER — DRONABINOL 2.5 MG/1
2.5 CAPSULE ORAL
Qty: 60 CAPSULE | Refills: 2 | Status: SHIPPED | OUTPATIENT
Start: 2024-11-26

## 2024-11-26 NOTE — TELEPHONE ENCOUNTER
Refill Routing Note   Medication(s) are not appropriate for processing by [facility] for the following reason(s):        Outside of protocol    [facility] action(s):  Route               Appointments  past 12m or future 3m with PCP    Date Provider   Last Visit   11/4/2024 Maxi Gaines MD   Next Visit   5/5/2025 Maxi Gaines MD   ED visits in past 90 days: 0        Note composed:2:06 AM 11/26/2024

## 2024-12-06 ENCOUNTER — OFFICE VISIT (OUTPATIENT)
Dept: CARDIOLOGY | Facility: CLINIC | Age: 79
End: 2024-12-06
Payer: MEDICARE

## 2024-12-06 ENCOUNTER — HOSPITAL ENCOUNTER (OUTPATIENT)
Dept: CARDIOLOGY | Facility: HOSPITAL | Age: 79
Discharge: HOME | End: 2024-12-06
Attending: INTERNAL MEDICINE
Payer: MEDICARE

## 2024-12-06 VITALS
BODY MASS INDEX: 24.93 KG/M2 | SYSTOLIC BLOOD PRESSURE: 166 MMHG | HEIGHT: 72 IN | HEART RATE: 70 BPM | WEIGHT: 184.06 LBS | DIASTOLIC BLOOD PRESSURE: 86 MMHG

## 2024-12-06 DIAGNOSIS — R00.1 BRADYCARDIA: ICD-10-CM

## 2024-12-06 DIAGNOSIS — Z95.0 PRESENCE OF CARDIAC PACEMAKER: ICD-10-CM

## 2024-12-06 DIAGNOSIS — I50.32 CHRONIC HEART FAILURE WITH PRESERVED EJECTION FRACTION (HFPEF): ICD-10-CM

## 2024-12-06 DIAGNOSIS — I48.19 PERSISTENT ATRIAL FIBRILLATION: Primary | ICD-10-CM

## 2024-12-06 DIAGNOSIS — Z95.0 PACEMAKER: ICD-10-CM

## 2024-12-06 DIAGNOSIS — I10 ESSENTIAL HYPERTENSION: ICD-10-CM

## 2024-12-06 PROCEDURE — 93280 PM DEVICE PROGR EVAL DUAL: CPT | Mod: PO,TXP

## 2024-12-06 PROCEDURE — 93280 PM DEVICE PROGR EVAL DUAL: CPT | Mod: 26,NTX,, | Performed by: INTERNAL MEDICINE

## 2024-12-06 PROCEDURE — 99999 PR PBB SHADOW E&M-EST. PATIENT-LVL III: CPT | Mod: PBBFAC,TXP,, | Performed by: INTERNAL MEDICINE

## 2024-12-06 NOTE — PROGRESS NOTES
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 Placed on beta blocker, coumadin. Underwent PEPE/DCCV. Had recurrence, Propafenone  mg twice daily added.  Had recurrence 12/24/13, underwent DCCV, Propafenone increased to 425 mg twice daily.  PVI ([place]) 7/28/14.   Had done well, was off Propafenone. Developed recurrence, underwent DCCV multiple times.  Repeat PVI 4/27/17 All 4 veins remained isolated.  Antralized left sided lesion set posteriorly, and performed SVC isolation.  Has been compliant with CPAP.  Had persistent recurrence >> DCCV 2/9/18.  He has had paroxysmal AF, with 4 episodes since 11/18.    Exercise echo 2/19 normal biventricular structure and function BISI 57 negative for ischemia.     Continued to have paroxysmal events, self-terminating.  Last episode was last month.  Generally, the episodes are lasting 3 days.  Since last visit, he was admitted with a TIA.  This was thought to be due to non-compliance with Eliquis.     6/15/20 Repeat RFA. Posterior wall isolation + repeat RFA of cavo-tricuspid isthmus. PV's remained isolated. 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     7/13/20 presented with TIA like symptoms. Seen by Dr. Coats (Vascular Neurology). Thought to be atypical for neurologic etiology. CT demonstrates a small area of remote vs. Subacute infarct in R medial occipital lobe, so transient hippocampal ischemia may be at the origin, although again this is atypical     Presented with symptomatic bradycardia >> dual chamber PPM implanted 9/23/23 (Dr. 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  PEPE 7/29/24 no leak     10/2024  Has had recurrent symptomatic. DCCV 2/28/24 and 9/11/24  Continues to note significant symptoms from AF, with shortness of breath. When asked what % he is  feeling compared to when in nsr, he indicates 50%.  Device interrogation reveals stable function of the leads, went back in to AF 10/7/24. RV pacing is 6.6%    12/06/2024  Patient underwent cardioversion 10/17/2024 started on Tikosyn.  Repeat echo showed moderate MR.  Traumatic septic hematoma to the right knee.  Oral anticoagulation was stopped, and he underwent I&D of his knee hematoma.  Reports significant palpitations.  Specifically, 1125 he reported feeling palpitations throughout the day.  Review of device interrogation shows longest episode was 12 minutes.   Most recent interrogation since 1126 shows 43% atrial burden with 23% RV pacing and 52% atrial pacing.  Most recent creatinine 1    I personally reviewed the ECG/telemetry strip today. My interpretation is atrial pacing with ventricular sensing.  .    Past Medical History:   Diagnosis Date    Anticoagulant long-term use     Anxiety     Arthritis     Atrial fibrillation     BPH (benign prostatic hyperplasia)     Cataract     CKD (chronic kidney disease) stage 3, GFR 30-59 ml/min 07/10/2017    Followed by Dr. Jeevan Pond    Colon polyp     benign    Depression     Elevated PSA     Erectile dysfunction     Gastric ulcer with hemorrhage     Hep B w/o coma 1977    History of bleeding peptic ulcer     History of prostatitis     Hypertension     PAF (paroxysmal atrial fibrillation)     Sleep apnea     PT DENIES    Stroke     TIA December 2019    Thyroid disease        Past Surgical History:   Procedure Laterality Date    A-V CARDIAC PACEMAKER INSERTION Left 9/23/2023    Procedure: Dual Chamber PPM (Heart) Rm 2620;  Surgeon: Pan Moss MD;  Location: [facility] CATH;  Service: Cardiology;  Laterality: Left;    ABDOMINAL HERNIA REPAIR      ABLATION OF ARRHYTHMOGENIC FOCUS FOR ATRIAL FIBRILLATION N/A 6/15/2020    Procedure: Ablation atrial fibrillation;  Surgeon: Wyatt Beatty MD;  Location: [facility] EP LAB;  Service: Cardiology;  Laterality: N/A;  PAF,  AFl, PEPE, PVI re-do, CTI, RFA, JUSTIN, Gen, SK, 3 Prep    APPLICATION OF WOUND VACUUM-ASSISTED CLOSURE DEVICE Right 12/9/2023    Procedure: APPLICATION, WOUND VAC;  Surgeon: Jelani Tello II, MD;  Location: [facility];  Service: Orthopedics;  Laterality: Right;    CARDIOVERSION N/A 2/28/2024    Procedure: Cardioversion;  Surgeon: Pao Hare MD;  [facility];  Service: Cardiology;  Laterality: N/A;    CARDIOVERSION N/A 10/17/2024    Procedure: Cardioversion;  Surgeon: Pan Moss MD;  Location: [facility];  Service: Cardiology;  Laterality: N/A;    CATARACT EXTRACTION  11/25/2013    bilateral    CHOLECYSTECTOMY      CLOSURE OF LEFT ATRIAL APPENDAGE USING DEVICE N/A 5/23/2024    Procedure: Left atrial appendage closure device;  Surgeon: Tony Duvall MD;  Location: [facility] CATH LAB;  Service: Cardiology;  Laterality: N/A;    COLONOSCOPY N/A 11/25/2015    Procedure: COLONOSCOPY;  Surgeon: Toby Hernandez MD;  Location: [facility] ENDO;  Service: Endoscopy;  Laterality: N/A;    COLONOSCOPY N/A 11/13/2020    Procedure: COLONOSCOPY;  Surgeon: Toby Hernandez MD;  Location: [facility];  Service: Endoscopy;  Laterality: N/A;    COLONOSCOPY N/A 5/1/2024    Procedure: COLONOSCOPY;  Surgeon: Toby Hernandez MD;  Location: [facility] ENDO;  Service: Endoscopy;  Laterality: N/A;    CYSTOSCOPY WITH INSERTION OF MINIMALLY INVASIVE IMPLANT TO ENLARGE PROSTATIC URETHRA N/A 11/28/2022    Procedure: CYSTOSCOPY, WITH INSERTION OF UROLIFT IMPLANT;  Surgeon: Yakov Shetty MD;  Location: [facility] OR;  Service: Urology;  Laterality: N/A;    ENDOSCOPIC MUCOSAL RESECTION OF COLON N/A 9/9/2024    Procedure: RESECTION, MUCOSA, COLON, ENDOSCOPIC;  Surgeon: Milton Rodriguez MD;  [facility]  Service: Endoscopy;  Laterality: N/A;  5/23 portal-peg-colonscopy with EMR Main. 90 minutes.  24 hours of clear liquid diet. Referring: Toby Hernandez MD- Rodriguez-Eliquis Dr. Jaci TE 5/23- tt .  7/10/24: PEG prep. Pt off  plavix now per cards. instructions sent via portal-GD  8/16/24: pt    ESOPHAGOGASTRODUODENOSCOPY N/A 5/1/2024    Procedure: ESOPHAGOGASTRODUODENOSCOPY (EGD);  Surgeon: Toby Hernandez MD;  Location: [facility] ENDO;  Service: Endoscopy;  Laterality: N/A;    EYE SURGERY      GASTRIC BYPASS  1992    INCISION AND DRAINAGE OF KNEE Right 11/8/2024    Procedure: INCISION AND DRAINAGE, KNEE, EVACUATION HEMATOMA;  Surgeon: Dane Smith MD;  Location: [facility] OR;  Service: Orthopedics;  Laterality: Right;    INSERTION, PACEMAKER, TEMPORARY TRANSVENOUS  9/22/2023    Procedure: Temp pacer insertion ER Rm 8;  Surgeon: Pan Moss MD;  Location: [facility] CATH;  Service: Cardiology;;    INTRAMEDULLARY RODDING OF FEMUR Left 7/18/2020    Procedure: INSERTION, INTRAMEDULLARY ERIC, FEMUR, left, Synthes, Drums table, Large C arm clock side,;  Surgeon: Tony Rodriguez MD;  Location: [facility] OR [facility] FLR;  Service: Orthopedics;  Laterality: Left;    IRRIGATION AND DEBRIDEMENT Right 12/9/2023    Procedure: IRRIGATION AND DEBRIDEMENT KNEE;  Surgeon: Jelani Tello II, MD;  Location: [facility] OR;  Service: Orthopedics;  Laterality: Right;    KNEE ARTHROSCOPY      RT    LASIK  2001    both eyes (Dr. Rabago)    ORIF HUMERUS FRACTURE      LT    ORIF HUMERUS FRACTURE Right     non surgical repair    RADIOFREQUENCY ABLATION      x2    REVISION OF KNEE ARTHROPLASTY Right 12/9/2023    Procedure: REVISION ARTHROPLASTY KNEE- Liner Replacement - dirty/clean;  Surgeon: Jelani Tello II, MD;  Location: [facility] OR;  Service: Orthopedics;  Laterality: Right;  dirty to clean at 1940    Right ankle tendon repair      ROBOT-ASSISTED REPAIR OF INCISIONAL HERNIA USING DA GARETH XI Left 6/13/2022    Procedure: XI ROBOTIC REPAIR, HERNIA, INCISIONAL (LEFT SIDE SPIGELIAN WITH MESH);  Surgeon: Rosendo Marti MD;  Location: [facility] OR 2ND FLR;  Service: General;  Laterality: Left;  consent in am    ROTATOR CUFF REPAIR      right    TOTAL KNEE ARTHROPLASTY  Right 5/29/2018    Procedure: REPLACEMENT-KNEE-TOTAL-pro;  Surgeon: Denzel Dallas MD;  Location: [facility] OR [facility] FLR;  Service: Orthopedics;  Laterality: Right;  Pro    TRANSESOPHAGEAL ECHOCARDIOGRAPHY N/A 4/23/2024    Procedure: ECHOCARDIOGRAM, TRANSESOPHAGEAL;  Surgeon: Lawrence, Jan Diagnostic;  Location: [facility] EP LAB;  Service: Cardiology;  Laterality: N/A;    TRANSESOPHAGEAL ECHOCARDIOGRAPHY N/A 5/23/2024    Procedure: ECHOCARDIOGRAM, TRANSESOPHAGEAL;  Surgeon: Kj Newton MD;  Location: [facility] CATH LAB;  Service: Cardiology;  Laterality: N/A;    TRANSESOPHAGEAL ECHOCARDIOGRAPHY N/A 7/9/2024    Procedure: ECHOCARDIOGRAM, TRANSESOPHAGEAL;  Surgeon: Provider, Eyal Diagnostic;  Location: [facility] EP LAB;  Service: Cardiology;  Laterality: N/A;    TREATMENT OF CARDIAC ARRHYTHMIA  6/15/2020    Procedure: Cardioversion or Defibrillation;  Surgeon: Wyatt Beatty MD;  Location: [facility] EP LAB;  Service: Cardiology;;    TREATMENT OF CARDIAC ARRHYTHMIA N/A 2/28/2024    Procedure: Cardioversion or Defibrillation;  Surgeon: Pao Hare MD;  Location: [facility];  Service: Cardiology;  Laterality: N/A;    TREATMENT OF CARDIAC ARRHYTHMIA N/A 9/11/2024    Procedure: Cardioversion/Defibrillation;  Surgeon: Pan Moss MD;  Location: [facility];  Service: Cardiology;  Laterality: N/A;       Family History   Problem Relation Name Age of Onset    COPD Father      Diabetes Father      Osteoporosis Father      Aortic stenosis Mother      Heart disease Mother          aortic stenosis    Osteoporosis Mother      Heart attack Brother      No Known Problems Son      No Known Problems Daughter      No Known Problems Daughter      No Known Problems Daughter         Social History     Socioeconomic History    Marital status:     Number of children: 5   Occupational History    Occupation: retired anesthesiology     Employer: [employer] ([employer])    Occupation: [employer] grad     Comment: previous football  player   Tobacco Use    Smoking status: Never     Passive exposure: Never    Smokeless tobacco: Never    Tobacco comments:     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    Substance and Sexual Activity    Alcohol use: No    Drug use: No    Sexual activity: Yes     Partners: Female     Social Drivers of Health     Financial Resource Strain: Low Risk  (10/21/2024)    Overall Financial Resource Strain (CARDIA)     Difficulty of Paying Living Expenses: Not hard at all   Food Insecurity: No Food Insecurity (11/8/2024)    Hunger Vital Sign     Worried About Running Out of Food in the Last Year: Never true     Ran Out of Food in the Last Year: Never true   Transportation Needs: No Transportation Needs (11/8/2024)    TRANSPORTATION NEEDS     Transportation : No   Physical Activity: Sufficiently Active (4/22/2024)    Exercise Vital Sign     Days of Exercise per Week: 5 days     Minutes of Exercise per Session: 130 min   Stress: No Stress Concern Present (10/21/2024)    [place] [employer] - Occupational Stress Questionnaire     Feeling of Stress : Not at all   Housing Stability: Low Risk  (11/8/2024)    Housing Stability Vital Sign     Unable to Pay for Housing in the Last Year: No     Homeless in the Last Year: No       Review of patient's allergies indicates:   Allergen Reactions    No known drug allergies        Review of Systems   Constitutional: Negative for chills and fever.   HENT:  Negative for congestion and hearing loss.    Eyes:  Negative for blurred vision and double vision.   Cardiovascular:         See HPI   Respiratory:  Negative for cough, hemoptysis and shortness of breath.    Endocrine: Negative for cold intolerance and heat intolerance.   Musculoskeletal:  Negative for joint pain and joint swelling.   Gastrointestinal:  Negative for abdominal pain, nausea and vomiting.   Genitourinary:  Negative for dysuria and hematuria.   Neurological:  Negative for focal weakness and headaches.    Psychiatric/Behavioral:  Negative for altered mental status.    All other systems reviewed and are negative.         OBJECTIVE:   There were no vitals filed for this visit.    BP Readings from Last 5 Encounters:   11/11/24 (!) 152/82   11/10/24 134/69   11/07/24 130/68   11/05/24 130/82   11/04/24 136/60        Physical Exam  Vitals and nursing note reviewed.   Constitutional:       General: He is not in acute distress.     Appearance: He is well-developed. He is not diaphoretic.   HENT:      Head: Normocephalic and atraumatic.   Eyes:      General: No scleral icterus.        Right eye: No discharge.         Left eye: No discharge.   Cardiovascular:      Rate and Rhythm: Normal rate and regular rhythm. No extrasystoles are present.     Pulses: Normal pulses and intact distal pulses.           Radial pulses are 2+ on the right side and 2+ on the left side.      Heart sounds: Normal heart sounds, S1 normal and S2 normal. Heart sounds not distant. No opening snap. No murmur heard.     No friction rub. No gallop. No S3 or S4 sounds.   Pulmonary:      Effort: Pulmonary effort is normal. No respiratory distress.      Breath sounds: No wheezing or rales.   Abdominal:      General: Bowel sounds are normal. There is no distension.      Palpations: Abdomen is soft.      Tenderness: There is no abdominal tenderness.   Musculoskeletal:         General: No deformity. Normal range of motion.      Cervical back: Normal range of motion and neck supple.   Skin:     General: Skin is warm and dry.      Findings: No erythema or rash.   Neurological:      Mental Status: He is alert.             Current Outpatient Medications   Medication Instructions    apixaban (ELIQUIS) 5 mg, Daily    aspirin (ECOTRIN) 81 mg, Oral, Daily    buPROPion (WELLBUTRIN XL) 150 mg, Oral, Daily    buPROPion (WELLBUTRIN XL) 300 mg, Oral, Daily, Plus 150    calcitRIOL (ROCALTROL) 0.25 mcg, Oral, Every Mon, Wed, Fri    carvediloL (COREG) 12.5 mg, Oral, 2 times  daily    dofetilide (TIKOSYN) 250 mcg, Oral, Every 12 hours    droNABinol (MARINOL) 2.5 mg, Oral, 2 times daily before meals    iron-vit c-b12-folic acid (ICAR-C PLUS) Tab 1 tablet, Oral, Daily    levothyroxine (SYNTHROID) 88 mcg, Oral, Before breakfast    OMEGA-3 FATTY ACIDS (FISH OIL CONCENTRATE ORAL) 1 capsule, Daily    oxyCODONE-acetaminophen (PERCOCET)  mg per tablet 1 tablet, Oral, Every 8 hours PRN    telmisartan (MICARDIS) 40 mg, Oral, Daily    testosterone cypionate (DEPOTESTOTERONE CYPIONATE) 200 mg, Every 14 days    tiZANidine (ZANAFLEX) 4 mg, Oral, 3 times daily PRN    torsemide (DEMADEX) 20 mg, Oral, Every Mon, Wed, Fri       Lipid Panel:   Lab Results   Component Value Date    CHOL 115 (L) 08/30/2023    HDL 49 08/30/2023    LDLCALC 56.8 (L) 08/30/2023    TRIG 46 08/30/2023    CHOLHDL 42.6 08/30/2023       The ASCVD Risk score (Sari DK, et al., 2019) failed to calculate for the following reasons:    Risk score cannot be calculated because patient has a medical history suggesting prior/existing ASCVD    Most Recent EKG Results  Results for orders placed or performed in visit on 11/05/24   IN OFFICE EKG 12-LEAD (to [place])    Collection Time: 11/05/24  9:47 AM   Result Value Ref Range    QRS Duration 86 ms    [facility] QTC Calculation 464 ms    Narrative    Test Reason : Z95.0,R00.1,    Vent. Rate : 070 BPM     Atrial Rate : 070 BPM     P-R Int : 284 ms          QRS Dur : 086 ms      QT Int : 430 ms       P-R-T Axes : 000 059 069 degrees     QTc Int : 464 ms    Atrial-paced rhythm with prolonged AV conduction  Septal infarct ,age undetermined  Abnormal ECG  When compared with ECG of 23-OCT-2024 13:10,  Nonspecific T wave abnormality no longer evident in Inferior leads  Confirmed by KAMALA DUNNE MD ([id number]) on 11/5/2024 12:10:32 PM    Referred By:             Confirmed By:KAMALA DUNNE MD       Most Recent Echocardiogram Results  Results for orders placed during the hospital encounter of  10/21/24    Echo    Interpretation Summary    Left Ventricle: The left ventricle is normal in size. Normal wall thickness. There is normal systolic function with a visually estimated ejection fraction of 60 - 65%.    Right Ventricle: Normal right ventricular cavity size. Wall thickness is normal. Systolic function is normal.    Left Atrium: Left atrium is severely dilated.    Aortic Valve: There is mild aortic regurgitation.    Mitral Valve: There is moderate regurgitation with a posterolateral eccentriccally directed jet.    Tricuspid Valve: There is mild regurgitation.    Aorta: Aortic root is mildly dilated. Ascending aorta is normal measuring 3.60 cm.    Pulmonary Artery: There is mild to moderate pulmonary hypertension. The estimated pulmonary artery systolic pressure is 46 mmHg.    IVC/SVC: Normal venous pressure at 3 mmHg.      Most Recent Nuclear Stress Test Results  Results for orders placed during the hospital encounter of 09/20/23    Nuclear Stress - Cardiology Interpreted    Interpretation Summary    Normal myocardial perfusion scan. There is no evidence of myocardial ischemia or infarction.    The gated perfusion images showed an ejection fraction of 60% post stress.    LV cavity size is normal at rest and normal at stress.    The ECG portion of the study is abnormal but not diagnostic.    The patient reported no chest pain during the stress test.      Most Recent Cardiac PET Stress Test Results  No results found for this or any previous visit.      Most Recent Cardiovascular Angiogram results  Results for orders placed during the hospital encounter of 05/23/24    Cardiac catheterization    Conclusion    The estimated blood loss was none.    The left atrial appendage closure was successful .    Follow up PEPE in 45 days and clinic with me.    The procedure log was documented by Documenter: Dorothy Haider and verified by Tony Duvall MD.    Date: 5/23/2024  Time: 2:12 PM      Other Most  Recent Cardiology Results  Results for orders placed during the hospital encounter of 11/08/24    Cardiac monitoring strips        All pertinent data including labs, imaging, EKGs, and studies listed above were reviewed.  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  S/p multiple ablations with pulmonary veins which remained isolated, posterior wall isolation, SVC isolation and CTI ablation.  Has failed propafenone in the past.  Most recently underwent a cardioversion with Tikosyn initiation.  Tolerating the medication okay.  QTC within less than 500 milliseconds.  Creatinine normal.  We discussed limited options especially for ablation at this time.  Of note, the patient did have similar symptoms of palpitations with ventricular pacing today.  I question whether some of his symptoms are related to ventricular pacing, and explained that I do not think this was the cause of all of his symptoms but may provide some symptomatic benefit if we decrease his most which rate.  Could consider dronedarone in the future if alternative antiarrhythmic is sought.    Atrial fibrillation summary:  Stage: persistent, but currently paroxsymal   Echo: LVEF normal  CVA prophylaxis: LAAO device   AVN blocking agents: Coreg 12.5 mg BID  Antiarrhythmics: Tikosyn 250 mcg BID  Previous ablation(s)?: yes    1. Persistent atrial fibrillation        2. Chronic heart failure with preserved ejection fraction (HFpEF)        3. Pacemaker        4. Essential hypertension        5. Bradycardia          PLAN FOR TREATMENT OF ABOVE DIAGNOSES:     Continue Tikosyn 250 mcg b.i.d.  Decreased AMS rate from 80 to 60 bpm  Monitor renal function and electrolytes   Avoid QT prolonging drugs  Continue carvedilol 12.5 mg b.i.d.  Continue aspirin 81 mg daily    F/u 6 months    Brannon Arnold MD  Cardiac  Electrophysiology

## 2024-12-10 LAB
OHS CV AF BURDEN PERCENT: 41
OHS CV DC REMOTE DEVICE TYPE: NORMAL
OHS CV RV PACING PERCENT: 23 %

## 2024-12-13 ENCOUNTER — PATIENT MESSAGE (OUTPATIENT)
Dept: NEPHROLOGY | Facility: CLINIC | Age: 79
End: 2024-12-13
Payer: MEDICARE

## 2024-12-19 ENCOUNTER — TELEPHONE (OUTPATIENT)
Dept: CARDIOLOGY | Facility: HOSPITAL | Age: 79
End: 2024-12-19
Payer: MEDICARE

## 2024-12-19 NOTE — TELEPHONE ENCOUNTER
AF noted during device remote check:    Overall burden:  38% since 12/17 (atrial undersensing noted)    Max duration seen: per device 8 hrs. 24  mins. On 12/18, probable ongoing since 12/17    Most recent episode: on presenting                  Ventricular rates:                   Anticoagulation status:  s/p Watchman    Patient symptoms: shortness of breath    Meds:  Tikosyn 250 mcg  Telmisartan 40 mg  Coreg 12.5 mg  Aspirin 81 mg        Atrial undersensing noted when in AF with some tracking noted. Reviewed with Dr. Moss and Dr. Arnold and plan of care is to reprogram to DDI 50. If fatigue noted increase base rate to 60. If AF persistent consider DCCV.

## 2024-12-20 ENCOUNTER — HOSPITAL ENCOUNTER (OUTPATIENT)
Dept: CARDIOLOGY | Facility: HOSPITAL | Age: 79
Discharge: HOME | End: 2024-12-20
Attending: INTERNAL MEDICINE
Payer: MEDICARE

## 2024-12-20 ENCOUNTER — CLINICAL SUPPORT (OUTPATIENT)
Dept: CARDIOLOGY | Facility: HOSPITAL | Age: 79
End: 2024-12-20

## 2024-12-20 DIAGNOSIS — Z95.0 PRESENCE OF CARDIAC PACEMAKER: ICD-10-CM

## 2024-12-20 PROCEDURE — 93280 PM DEVICE PROGR EVAL DUAL: CPT | Mod: PO,TXP

## 2024-12-25 LAB
OHS CV AF BURDEN PERCENT: 46
OHS CV DC REMOTE DEVICE TYPE: NORMAL
OHS CV RV PACING PERCENT: 13 %

## 2024-12-26 RX ORDER — TORSEMIDE 20 MG/1
TABLET ORAL
Qty: 15 TABLET | Refills: 5 | Status: SHIPPED | OUTPATIENT
Start: 2024-12-26

## 2025-01-02 ENCOUNTER — PATIENT MESSAGE (OUTPATIENT)
Dept: OPTOMETRY | Facility: CLINIC | Age: 80
End: 2025-01-02
Payer: MEDICARE

## 2025-01-04 DIAGNOSIS — D64.9 ANEMIA: ICD-10-CM

## 2025-01-05 ENCOUNTER — PATIENT MESSAGE (OUTPATIENT)
Dept: GASTROENTEROLOGY | Facility: CLINIC | Age: 80
End: 2025-01-05
Payer: MEDICARE

## 2025-01-05 RX ORDER — IRON,CARB/VIT C/VIT B12/FOLIC 100-250-1
1 TABLET ORAL DAILY
Qty: 90 TABLET | Refills: 3 | Status: SHIPPED | OUTPATIENT
Start: 2025-01-05 | End: 2026-01-05

## 2025-01-06 NOTE — TELEPHONE ENCOUNTER
Refill Routing Note   Medication(s) are not appropriate for processing by [facility] for the following reason(s):        Outside of protocol    [facility] action(s):  Route             Appointments  past 12m or future 3m with PCP    Date Provider   Last Visit   11/4/2024 Maxi Gaines MD   Next Visit   5/5/2025 Maxi Gaines MD   ED visits in past 90 days: 0        Note composed:6:24 PM 01/05/2025

## 2025-01-11 ENCOUNTER — PATIENT MESSAGE (OUTPATIENT)
Dept: FAMILY MEDICINE | Facility: CLINIC | Age: 80
End: 2025-01-11
Payer: MEDICARE

## 2025-01-12 ENCOUNTER — PATIENT MESSAGE (OUTPATIENT)
Dept: FAMILY MEDICINE | Facility: CLINIC | Age: 80
End: 2025-01-12
Payer: MEDICARE

## 2025-01-13 ENCOUNTER — PATIENT MESSAGE (OUTPATIENT)
Dept: FAMILY MEDICINE | Facility: CLINIC | Age: 80
End: 2025-01-13
Payer: MEDICARE

## 2025-01-14 ENCOUNTER — TELEPHONE (OUTPATIENT)
Dept: INFUSION THERAPY | Facility: HOSPITAL | Age: 80
End: 2025-01-14
Payer: MEDICARE

## 2025-01-14 NOTE — TELEPHONE ENCOUNTER
----- Message from Radha sent at 1/13/2025  3:58 PM CST -----  Type: Needs Medical Advice  Who Called:  patient   Symptoms (please be specific):    How long has patient had these symptoms:    Pharmacy name and phone #:    Best Call Back Number: [phone]  Additional Information: patient is requesting a call back to reschedule his appt on 3/24

## 2025-01-15 ENCOUNTER — TELEPHONE (OUTPATIENT)
Dept: HEMATOLOGY/ONCOLOGY | Facility: CLINIC | Age: 80
End: 2025-01-15
Payer: MEDICARE

## 2025-01-15 ENCOUNTER — OFFICE VISIT (OUTPATIENT)
Dept: FAMILY MEDICINE | Facility: CLINIC | Age: 80
End: 2025-01-15
Payer: MEDICARE

## 2025-01-15 VITALS
BODY MASS INDEX: 25.08 KG/M2 | HEART RATE: 62 BPM | OXYGEN SATURATION: 96 % | SYSTOLIC BLOOD PRESSURE: 138 MMHG | HEIGHT: 72 IN | RESPIRATION RATE: 18 BRPM | WEIGHT: 185.19 LBS | DIASTOLIC BLOOD PRESSURE: 66 MMHG

## 2025-01-15 DIAGNOSIS — D64.9 CHRONIC ANEMIA: Primary | ICD-10-CM

## 2025-01-15 DIAGNOSIS — R26.89 IMBALANCE: ICD-10-CM

## 2025-01-15 DIAGNOSIS — J84.9 ILD (INTERSTITIAL LUNG DISEASE): ICD-10-CM

## 2025-01-15 DIAGNOSIS — N18.31 CHRONIC KIDNEY DISEASE, STAGE 3A: ICD-10-CM

## 2025-01-15 DIAGNOSIS — I48.19 PERSISTENT ATRIAL FIBRILLATION: ICD-10-CM

## 2025-01-15 DIAGNOSIS — R60.0 BILATERAL LEG EDEMA: ICD-10-CM

## 2025-01-15 DIAGNOSIS — F32.1 MAJOR DEPRESSIVE DISORDER, SINGLE EPISODE, MODERATE: ICD-10-CM

## 2025-01-15 DIAGNOSIS — I50.32 CHRONIC HEART FAILURE WITH PRESERVED EJECTION FRACTION (HFPEF): ICD-10-CM

## 2025-01-15 DIAGNOSIS — F32.A DEPRESSION, UNSPECIFIED DEPRESSION TYPE: ICD-10-CM

## 2025-01-15 PROCEDURE — 99214 OFFICE O/P EST MOD 30 MIN: CPT | Mod: HCNC,S$GLB,, | Performed by: FAMILY MEDICINE

## 2025-01-15 PROCEDURE — 3078F DIAST BP <80 MM HG: CPT | Mod: HCNC,CPTII,S$GLB, | Performed by: FAMILY MEDICINE

## 2025-01-15 PROCEDURE — 3075F SYST BP GE 130 - 139MM HG: CPT | Mod: HCNC,CPTII,S$GLB, | Performed by: FAMILY MEDICINE

## 2025-01-15 PROCEDURE — 1101F PT FALLS ASSESS-DOCD LE1/YR: CPT | Mod: HCNC,CPTII,S$GLB, | Performed by: FAMILY MEDICINE

## 2025-01-15 PROCEDURE — 1159F MED LIST DOCD IN RCRD: CPT | Mod: HCNC,CPTII,S$GLB, | Performed by: FAMILY MEDICINE

## 2025-01-15 PROCEDURE — 1157F ADVNC CARE PLAN IN RCRD: CPT | Mod: HCNC,CPTII,S$GLB, | Performed by: FAMILY MEDICINE

## 2025-01-15 PROCEDURE — 99999 PR PBB SHADOW E&M-EST. PATIENT-LVL IV: CPT | Mod: PBBFAC,HCNC,, | Performed by: FAMILY MEDICINE

## 2025-01-15 PROCEDURE — 3288F FALL RISK ASSESSMENT DOCD: CPT | Mod: HCNC,CPTII,S$GLB, | Performed by: FAMILY MEDICINE

## 2025-01-15 PROCEDURE — 1160F RVW MEDS BY RX/DR IN RCRD: CPT | Mod: HCNC,CPTII,S$GLB, | Performed by: FAMILY MEDICINE

## 2025-01-15 PROCEDURE — G2211 COMPLEX E/M VISIT ADD ON: HCPCS | Mod: HCNC,S$GLB,, | Performed by: FAMILY MEDICINE

## 2025-01-15 PROCEDURE — 1125F AMNT PAIN NOTED PAIN PRSNT: CPT | Mod: HCNC,CPTII,S$GLB, | Performed by: FAMILY MEDICINE

## 2025-01-15 RX ORDER — MIRTAZAPINE 15 MG/1
15 TABLET, FILM COATED ORAL NIGHTLY
Qty: 30 TABLET | Refills: 11 | Status: SHIPPED | OUTPATIENT
Start: 2025-01-15 | End: 2026-01-15

## 2025-01-15 NOTE — PROGRESS NOTES
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    Chief Complaint: Concerns About Ocular Health; Health check, concerns; and Fall      Here for f/u on chronic health issues    C/o anemia, imbalance, difficulty gaining weight, weakness, no energy, depression, insomnia.  Feelings have been progressive over the last year.    He recently slipped in he garage 1 week ago and bruised his right forearm and left lateral ribs,    He had tried Marinol BID for 2 months without any change in appetite.    He fell on 10/24 while dropping his pants in the bathroom. He landed on his right knee.  He did develop increase pain and swelling.  He was seen by ortho and was diagnosed with right prepatellar hematoma.  This has gradually improved.    S/p ORIF left femur fracture - following with Dr. Rodriguez; doing well.  H/o TIA - taking Eliquis; stopped lipitor after seeing neurologist; using lopressor PRN  Afib, s/p pacemaker - s/p ablation; sees Dr. Arnold. Taking Coreg BID, Tikosyn. He had left atrial appendage closure device 5/2024.  HTN - Coreg 12.5mg BID, Micardis 40mg; Demadex 20 mg Monday Wednesday and Friday as well as potassium chloride 10 mEq Monday Wednesday and Friday PRN  CKD - following with Dr. Pond.  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  Depression - taking  Wellbutrin 450mg daily; c/o increased apathy  Osteoporosis - taking calcium citrate; getting Reclast annually  BPH - stable since Urolift; taking uroxatral  Pulmonary fibrosis - following with pulmonology every 6 months  Hypocalcemia - taking calcium carbonate 2 tablets daily.  Colon polyp - following with Dr. Hernandez      Hypertension  Pertinent negatives include no chest pain, headaches, neck pain, palpitations or shortness of breath.   Follow-up  Pertinent negatives  include no abdominal pain, arthralgias, chest pain, chills, coughing, fever, headaches, nausea, neck pain, rash, sore throat or weakness.       Past Medical History:   Diagnosis Date    Anticoagulant long-term use     Anxiety     Arthritis     Atrial fibrillation     BPH (benign prostatic hyperplasia)     Cataract     CKD (chronic kidney disease) stage 3, GFR 30-59 ml/min 07/10/2017    Followed by Dr. Jeevan Pond    Colon polyp     benign    Depression     Elevated PSA     Erectile dysfunction     Gastric ulcer with hemorrhage     Hep B w/o coma 1977    History of bleeding peptic ulcer     History of prostatitis     Hypertension     PAF (paroxysmal atrial fibrillation)     Sleep apnea     PT DENIES    Stroke     TIA December 2019    Thyroid disease        Past Surgical History:   Procedure Laterality Date    A-V CARDIAC PACEMAKER INSERTION Left 9/23/2023    Procedure: Dual Chamber PPM (Heart) Rm 2620;  Surgeon: Pan Moss MD;  [facility];  Service: Cardiology;  Laterality: Left;    ABDOMINAL HERNIA REPAIR      ABLATION OF ARRHYTHMOGENIC FOCUS FOR ATRIAL FIBRILLATION N/A 6/15/2020    Procedure: Ablation atrial fibrillation;  Surgeon: Wyatt Beatty MD;  Location: [facility] EP LAB;  Service: Cardiology;  Laterality: N/A;  PAF, AFl, PEPE, PVI re-do, CTI, RFA, JUSTIN, Gen, SK, 3 Prep    APPLICATION OF WOUND VACUUM-ASSISTED CLOSURE DEVICE Right 12/9/2023    Procedure: APPLICATION, WOUND VAC;  Surgeon: Jelani Tello II, MD;  Location: [facility];  Service: Orthopedics;  Laterality: Right;    CARDIOVERSION N/A 2/28/2024    Procedure: Cardioversion;  Surgeon: Pao Hare MD;  Location: [facility];  Service: Cardiology;  Laterality: N/A;    CARDIOVERSION N/A 10/17/2024    Procedure: Cardioversion;  Surgeon: Pan Moss MD;  Location: [facility];  Service: Cardiology;  Laterality: N/A;    CATARACT EXTRACTION  11/25/2013    bilateral    CHOLECYSTECTOMY      CLOSURE OF LEFT ATRIAL APPENDAGE USING  DEVICE N/A 5/23/2024    Procedure: Left atrial appendage closure device;  Surgeon: Tony Duvall MD;  Location: [facility] CATH LAB;  Service: Cardiology;  Laterality: N/A;    COLONOSCOPY N/A 11/25/2015    Procedure: COLONOSCOPY;  Surgeon: Toby Hernandez MD;  Location: [facility] ENDO;  Service: Endoscopy;  Laterality: N/A;    COLONOSCOPY N/A 11/13/2020    Procedure: COLONOSCOPY;  Surgeon: Toby Hernandez MD;  Location: [facility] ENDO;  Service: Endoscopy;  Laterality: N/A;    COLONOSCOPY N/A 5/1/2024    Procedure: COLONOSCOPY;  Surgeon: Toby Hernandez MD;  Location: [facility] ENDO;  Service: Endoscopy;  Laterality: N/A;    CYSTOSCOPY WITH INSERTION OF MINIMALLY INVASIVE IMPLANT TO ENLARGE PROSTATIC URETHRA N/A 11/28/2022    Procedure: CYSTOSCOPY, WITH INSERTION OF UROLIFT IMPLANT;  Surgeon: Yakov Shetty MD;  Location: [facility] OR;  Service: Urology;  Laterality: N/A;    ENDOSCOPIC MUCOSAL RESECTION OF COLON N/A 9/9/2024    Procedure: RESECTION, MUCOSA, COLON, ENDOSCOPIC;  Surgeon: Milton Rodriguez MD;  Location: [facility] ([address] [place] [address]);  Service: Endoscopy;  Laterality: N/A;  5/23 portal-peg-colonscopy with EMR Main. 90 minutes.  24 hours of clear liquid diet. Referring: Toby Hernandez MD- Michael-Jovana HAUSER 5/23- tt .  7/10/24: PEG prep. Pt off plavix now per cards. instructions sent via portal-GD  8/16/24: pt    ESOPHAGOGASTRODUODENOSCOPY N/A 5/1/2024    Procedure: ESOPHAGOGASTRODUODENOSCOPY (EGD);  Surgeon: Toby Hernandez MD;  Location: [facility] ENDO;  Service: Endoscopy;  Laterality: N/A;    EYE SURGERY      GASTRIC BYPASS  1992    INCISION AND DRAINAGE OF KNEE Right 11/8/2024    Procedure: INCISION AND DRAINAGE, KNEE, EVACUATION HEMATOMA;  Surgeon: Dane Smith MD;  Location[facility] OR;  Service: Orthopedics;  Laterality: Right;    INSERTION, PACEMAKER, TEMPORARY TRANSVENOUS  9/22/2023    Procedure: Temp pacer insertion ER Rm 8;  Surgeon: Pan Moss MD;  Location: [facility];  Service:  Cardiology;;    INTRAMEDULLARY RODDING OF FEMUR Left 7/18/2020    Procedure: INSERTION, INTRAMEDULLARY ERIC, FEMUR, left, Synthes, [place] table, Large C arm clock side,;  Surgeon: Tony Rodriguez MD;  Location: [facility] OR [facility] FLR;  Service: Orthopedics;  Laterality: Left;    IRRIGATION AND DEBRIDEMENT Right 12/9/2023    Procedure: IRRIGATION AND DEBRIDEMENT KNEE;  Surgeon: Jelani Tello II, MD;  Location: [facility] OR;  Service: Orthopedics;  Laterality: Right;    KNEE ARTHROSCOPY      RT    LASIK  2001    both eyes (Dr. Rabago)    ORIF HUMERUS FRACTURE      LT    ORIF HUMERUS FRACTURE Right     non surgical repair    RADIOFREQUENCY ABLATION      x2    REVISION OF KNEE ARTHROPLASTY Right 12/9/2023    Procedure: REVISION ARTHROPLASTY KNEE- Liner Replacement - dirty/clean;  Surgeon: Jelani Tello II, MD;  Location: [facility] OR;  Service: Orthopedics;  Laterality: Right;  dirty to clean at 1940    Right ankle tendon repair      ROBOT-ASSISTED REPAIR OF INCISIONAL HERNIA USING DA GARETH XI Left 6/13/2022    Procedure: XI ROBOTIC REPAIR, HERNIA, INCISIONAL (LEFT SIDE SPIGELIAN WITH MESH);  Surgeon: Rosendo Marti MD;  [facility];  Service: General;  Laterality: Left;  consent in am    ROTATOR CUFF REPAIR      right    TOTAL KNEE ARTHROPLASTY Right 5/29/2018    Procedure: REPLACEMENT-KNEE-TOTAL-pro;  Surgeon: Denzel Dallas MD;  [facility];  Service: Orthopedics;  Laterality: Right;  Pro    TRANSESOPHAGEAL ECHOCARDIOGRAPHY N/A 4/23/2024    Procedure: ECHOCARDIOGRAM, TRANSESOPHAGEAL;  Surgeon: Jan Bravo Diagnostic;  Location: [facility] EP LAB;  Service: Cardiology;  Laterality: N/A;    TRANSESOPHAGEAL ECHOCARDIOGRAPHY N/A 5/23/2024    Procedure: ECHOCARDIOGRAM, TRANSESOPHAGEAL;  Surgeon: Kj Newton MD;  Location: [facility] CATH LAB;  Service: Cardiology;  Laterality: N/A;    TRANSESOPHAGEAL ECHOCARDIOGRAPHY N/A 7/9/2024    Procedure: ECHOCARDIOGRAM, TRANSESOPHAGEAL;   Surgeon: Lawrence, Dosjuanita Diagnostic;  Location: [facility] EP LAB;  Service: Cardiology;  Laterality: N/A;    TREATMENT OF CARDIAC ARRHYTHMIA  6/15/2020    Procedure: Cardioversion or Defibrillation;  Surgeon: Wyatt Beatty MD;  Location: [facility] EP LAB;  Service: Cardiology;;    TREATMENT OF CARDIAC ARRHYTHMIA N/A 2/28/2024    Procedure: Cardioversion or Defibrillation;  Surgeon: Pao Hare MD;  Location: [facility];  Service: Cardiology;  Laterality: N/A;    TREATMENT OF CARDIAC ARRHYTHMIA N/A 9/11/2024    Procedure: Cardioversion/Defibrillation;  Surgeon: Pan Moss MD;  Location: [facility];  Service: Cardiology;  Laterality: N/A;       Review of patient's allergies indicates:   Allergen Reactions    No known drug allergies        Social History     Socioeconomic History    Marital status:     Number of children: 5   Occupational History    Occupation: retired anesthesiology     Employer: [employer]    Occupation: [employer] grad     Comment: previous football player   Tobacco Use    Smoking status: Never     Passive exposure: Never    Smokeless tobacco: Never    Tobacco comments:     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    Substance and Sexual Activity    Alcohol use: No    Drug use: No    Sexual activity: Yes     Partners: Female     Social Drivers of Health     Financial Resource Strain: Low Risk  (10/21/2024)    Overall Financial Resource Strain (CARDIA)     Difficulty of Paying Living Expenses: Not hard at all   Food Insecurity: No Food Insecurity (11/8/2024)    Hunger Vital Sign     Worried About Running Out of Food in the Last Year: Never true     Ran Out of Food in the Last Year: Never true   Transportation Needs: No Transportation Needs (11/8/2024)    TRANSPORTATION NEEDS     Transportation : No   Physical Activity: Sufficiently Active (4/22/2024)    Exercise Vital Sign     Days of Exercise per Week: 5 days     Minutes of Exercise per Session: 130 min   Stress: No Stress Concern  Present (10/21/2024)    [place] [employer] - Occupational Stress Questionnaire     Feeling of Stress : Not at all   Housing Stability: Low Risk  (11/8/2024)    Housing Stability Vital Sign     Unable to Pay for Housing in the Last Year: No     Homeless in the Last Year: No       Current Outpatient Medications on File Prior to Visit   Medication Sig Dispense Refill    aspirin (ECOTRIN) 81 MG EC tablet Take 1 tablet (81 mg total) by mouth once daily. 90 tablet 3    buPROPion (WELLBUTRIN XL) 150 MG TB24 tablet Take 1 tablet (150 mg total) by mouth once daily. 90 tablet 3    buPROPion (WELLBUTRIN XL) 300 MG 24 hr tablet Take 1 tablet (300 mg total) by mouth once daily. Plus 150 90 tablet 0    calcitRIOL (ROCALTROL) 0.25 MCG Cap Take 1 capsule (0.25 mcg total) by mouth every Mon, Wed, Fri. 39 capsule 1    carvediloL (COREG) 12.5 MG tablet Take 1 tablet (12.5 mg total) by mouth 2 (two) times daily. 180 tablet 3    dofetilide (TIKOSYN) 250 MCG Cap Take 1 capsule (250 mcg total) by mouth every 12 (twelve) hours. 60 capsule 11    ICAR-C PLUS Tab TAKE 1 TABLET BY MOUTH ONCE DAILY 90 tablet 3    levothyroxine (SYNTHROID) 88 MCG tablet Take 1 tablet (88 mcg total) by mouth before breakfast. 30 tablet 11    OMEGA-3 FATTY ACIDS (FISH OIL CONCENTRATE ORAL) Take 1 capsule by mouth Daily.      oxyCODONE-acetaminophen (PERCOCET)  mg per tablet Take 1 tablet by mouth every 8 (eight) hours as needed for Pain. 30 tablet 0    telmisartan (MICARDIS) 40 MG Tab Take 1 tablet (40 mg total) by mouth once daily. 90 tablet 3    testosterone cypionate (DEPOTESTOTERONE CYPIONATE) 200 mg/mL injection Inject 200 mg into the muscle every 14 (fourteen) days.      tiZANidine (ZANAFLEX) 4 MG tablet Take 1 tablet (4 mg total) by mouth 3 (three) times daily as needed (muscle spasms). 30 tablet 5    [DISCONTINUED] droNABinol (MARINOL) 2.5 MG capsule Take 1 capsule (2.5 mg total) by mouth 2 (two) times daily before meals. 60  capsule 2    torsemide (DEMADEX) 20 MG Tab TAKE 1 TABLET BY MOUTH EVEYR MONDAY, WEDNESDAY, AND FRIDAYS (Patient not taking: Reported on 1/15/2025) 15 tablet 5     No current facility-administered medications on file prior to visit.       Family History   Problem Relation Name Age of Onset    COPD Father      Diabetes Father      Osteoporosis Father      Aortic stenosis Mother      Heart disease Mother          aortic stenosis    Osteoporosis Mother      Heart attack Brother      No Known Problems Son      No Known Problems Daughter      No Known Problems Daughter      No Known Problems Daughter         Review of Systems   Constitutional:  Positive for appetite change and unexpected weight change. Negative for chills and fever.   HENT:  Negative for sore throat and trouble swallowing.    Eyes:  Negative for pain and visual disturbance.   Respiratory:  Negative for cough, chest tightness, shortness of breath and wheezing.    Cardiovascular:  Positive for leg swelling. Negative for chest pain and palpitations.   Gastrointestinal:  Negative for abdominal pain, blood in stool, constipation, diarrhea and nausea.   Genitourinary:  Negative for difficulty urinating, dysuria and hematuria.   Musculoskeletal:  Negative for arthralgias, gait problem and neck pain.   Skin:  Negative for rash and wound.   Neurological:  Negative for dizziness, weakness, light-headedness and headaches.   Hematological:  Negative for adenopathy.   Psychiatric/Behavioral:  Negative for dysphoric mood.        Objective:      /66   Pulse 62   Resp 18   Ht 6' (1.829 m)   Wt 84 kg (185 lb 3 oz)   SpO2 96%   BMI 25.12 kg/m²   Physical Exam  Constitutional:       General: He is not in acute distress.     Appearance: He is well-developed.   HENT:      Head: Normocephalic and atraumatic.      Right Ear: External ear normal.      Left Ear: External ear normal.      Mouth/Throat:      Pharynx: Uvula midline. No oropharyngeal exudate.   Eyes:       General: Lids are normal.      Conjunctiva/sclera: Conjunctivae normal.      Pupils: Pupils are equal, round, and reactive to light.   Neck:      Thyroid: No thyroid mass or thyromegaly.      Trachea: Phonation normal.   Cardiovascular:      Rate and Rhythm: Normal rate and regular rhythm.      Heart sounds: Normal heart sounds. No murmur heard.     No friction rub. No gallop.   Pulmonary:      Effort: Pulmonary effort is normal. No respiratory distress.      Breath sounds: Normal breath sounds. No wheezing or rales.   Musculoskeletal:         General: Normal range of motion.      Cervical back: Full passive range of motion without pain, normal range of motion and neck supple.      Right lower le+ Edema present.      Left lower le+ Edema present.   Lymphadenopathy:      Cervical: No cervical adenopathy.      Upper Body:      Right upper body: No supraclavicular or axillary adenopathy.      Left upper body: No supraclavicular or axillary adenopathy.   Skin:     General: Skin is warm and dry.   Neurological:      Mental Status: He is alert and oriented to person, place, and time.      Cranial Nerves: No cranial nerve deficit.      Coordination: Coordination normal.   Psychiatric:         Speech: Speech normal.         Behavior: Behavior normal.         Thought Content: Thought content normal.         Judgment: Judgment normal.         Results for orders placed or performed during the hospital encounter of 24   Cardiac device check - In [facility]    Collection Time: 24  9:04 AM   Result Value Ref Range    Device Type Pacemaker     AF [place] % 46     RV Paccing % 13 %     *Note: Due to a large number of results and/or encounters for the requested time period, some results have not been displayed. A complete set of results can be found in Results Review.     Labs and hospital records reviewed     Assessment:       1. Chronic anemia    2. Imbalance    3. Depression, unspecified depression type     4. Major depressive disorder, single episode, moderate    5. Chronic heart failure with preserved ejection fraction (HFpEF)    6. Persistent atrial fibrillation    7. Chronic kidney disease, stage 3a    8. ILD (interstitial lung disease)    9. Bilateral leg edema                      Plan:       Chronic anemia  -     Ambulatory referral/consult to Hematology / Oncology; Future; Expected date: 01/22/2025    Imbalance  -     Ambulatory referral/consult to Physical/Occupational Therapy; Future; Expected date: 01/22/2025    Depression, unspecified depression type  -     mirtazapine (REMERON) 15 MG tablet; Take 1 tablet (15 mg total) by mouth every evening.  Dispense: 30 tablet; Refill: 11    Major depressive disorder, single episode, moderate    Chronic heart failure with preserved ejection fraction (HFpEF)    Persistent atrial fibrillation    Chronic kidney disease, stage 3a    ILD (interstitial lung disease)    Bilateral leg edema        Hematology referral due to chronic anemia and possible impact on chronic heart disease. Could he benefit from Epo?     PT referral for balance  Continue wellbutrin; and remeron QHS to assist with depression, insomnia and appetite  Discussed compression socks daily  F/u with cardiology, ID, ortho as planned  Continue other present meds  Counseled on regular exercise, maintenance of a healthy weight, balanced diet rich in fruits/vegetables and lean protein, and avoidance of unhealthy habits like smoking and excessive alcohol intake.    F/u 1 months or PRN    Visit today included increased complexity associated with the care of the episodic problems listed above addressed and managing the longitudinal care of the patient due to the serious and/or complex managed problem(s) listed above.

## 2025-01-16 ENCOUNTER — ANESTHESIA (OUTPATIENT)
Dept: ENDOSCOPY | Facility: HOSPITAL | Age: 80
End: 2025-01-16
Payer: MEDICARE

## 2025-01-16 ENCOUNTER — HOSPITAL ENCOUNTER (OUTPATIENT)
Facility: HOSPITAL | Age: 80
Discharge: HOME | End: 2025-01-16
Attending: INTERNAL MEDICINE | Admitting: INTERNAL MEDICINE
Payer: MEDICARE

## 2025-01-16 ENCOUNTER — ANESTHESIA EVENT (OUTPATIENT)
Dept: ENDOSCOPY | Facility: HOSPITAL | Age: 80
End: 2025-01-16
Payer: MEDICARE

## 2025-01-16 VITALS
WEIGHT: 185 LBS | RESPIRATION RATE: 16 BRPM | OXYGEN SATURATION: 95 % | HEIGHT: 72 IN | BODY MASS INDEX: 25.06 KG/M2 | SYSTOLIC BLOOD PRESSURE: 194 MMHG | TEMPERATURE: 98 F | DIASTOLIC BLOOD PRESSURE: 98 MMHG | HEART RATE: 60 BPM

## 2025-01-16 DIAGNOSIS — K63.5 COLON POLYP: ICD-10-CM

## 2025-01-16 PROCEDURE — 37000008 HC ANESTHESIA 1ST 15 MINUTES: Mod: HCNC,TXP | Performed by: INTERNAL MEDICINE

## 2025-01-16 PROCEDURE — 25000003 PHARM REV CODE 250: Mod: HCNC,TXP | Performed by: NURSE ANESTHETIST, CERTIFIED REGISTERED

## 2025-01-16 PROCEDURE — D9220A PRA ANESTHESIA: Mod: PT,HCNC,CRNA,NTX | Performed by: NURSE ANESTHETIST, CERTIFIED REGISTERED

## 2025-01-16 PROCEDURE — 63600175 PHARM REV CODE 636 W HCPCS: Mod: HCNC,TXP | Performed by: NURSE ANESTHETIST, CERTIFIED REGISTERED

## 2025-01-16 PROCEDURE — G0121 COLON CA SCRN NOT HI RSK IND: HCPCS | Mod: 53,HCNC,NTX, | Performed by: INTERNAL MEDICINE

## 2025-01-16 PROCEDURE — 45378 DIAGNOSTIC COLONOSCOPY: CPT | Mod: 74,HCNC,TXP | Performed by: INTERNAL MEDICINE

## 2025-01-16 PROCEDURE — 37000009 HC ANESTHESIA EA ADD 15 MINS: Mod: HCNC,TXP | Performed by: INTERNAL MEDICINE

## 2025-01-16 PROCEDURE — D9220A PRA ANESTHESIA: Mod: PT,HCNC,ANES,NTX | Performed by: ANESTHESIOLOGY

## 2025-01-16 RX ORDER — SODIUM CHLORIDE 0.9 % (FLUSH) 0.9 %
10 SYRINGE (ML) INJECTION
Status: DISCONTINUED | OUTPATIENT
Start: 2025-01-16 | End: 2025-01-16 | Stop reason: HOSPADM

## 2025-01-16 RX ORDER — SODIUM CHLORIDE 0.9 % (FLUSH) 0.9 %
3 SYRINGE (ML) INJECTION
Status: DISCONTINUED | OUTPATIENT
Start: 2025-01-16 | End: 2025-01-16 | Stop reason: HOSPADM

## 2025-01-16 RX ORDER — VASOPRESSIN 20 [USP'U]/ML
INJECTION, SOLUTION INTRAMUSCULAR; SUBCUTANEOUS
Status: DISCONTINUED | OUTPATIENT
Start: 2025-01-16 | End: 2025-01-16

## 2025-01-16 RX ORDER — SODIUM CHLORIDE 9 MG/ML
INJECTION, SOLUTION INTRAVENOUS CONTINUOUS
Status: DISCONTINUED | OUTPATIENT
Start: 2025-01-16 | End: 2025-01-16 | Stop reason: HOSPADM

## 2025-01-16 RX ORDER — LIDOCAINE HYDROCHLORIDE 20 MG/ML
INJECTION, SOLUTION EPIDURAL; INFILTRATION; INTRACAUDAL; PERINEURAL
Status: DISCONTINUED | OUTPATIENT
Start: 2025-01-16 | End: 2025-01-16

## 2025-01-16 RX ORDER — GLUCAGON 1 MG
1 KIT INJECTION
Status: DISCONTINUED | OUTPATIENT
Start: 2025-01-16 | End: 2025-01-16 | Stop reason: HOSPADM

## 2025-01-16 RX ORDER — PROPOFOL 10 MG/ML
VIAL (ML) INTRAVENOUS CONTINUOUS PRN
Status: DISCONTINUED | OUTPATIENT
Start: 2025-01-16 | End: 2025-01-16

## 2025-01-16 RX ORDER — PROPOFOL 10 MG/ML
VIAL (ML) INTRAVENOUS
Status: DISCONTINUED | OUTPATIENT
Start: 2025-01-16 | End: 2025-01-16

## 2025-01-16 RX ORDER — CALCIUM CHLORIDE 100 MG/ML
INJECTION, SOLUTION INTRAVENOUS
Status: DISCONTINUED | OUTPATIENT
Start: 2025-01-16 | End: 2025-01-16

## 2025-01-16 RX ADMIN — PROPOFOL 150 MCG/KG/MIN: 10 INJECTION, EMULSION INTRAVENOUS at 09:01

## 2025-01-16 RX ADMIN — VASOPRESSIN 1 UNITS: 20 INJECTION INTRAVENOUS at 09:01

## 2025-01-16 RX ADMIN — LIDOCAINE HYDROCHLORIDE 75 MG: 20 INJECTION, SOLUTION EPIDURAL; INFILTRATION; INTRACAUDAL at 09:01

## 2025-01-16 RX ADMIN — PROPOFOL 20 MG: 10 INJECTION, EMULSION INTRAVENOUS at 09:01

## 2025-01-16 RX ADMIN — CALCIUM CHLORIDE 0.5 G: 100 INJECTION, SOLUTION INTRAVENOUS at 09:01

## 2025-01-16 RX ADMIN — SODIUM CHLORIDE: 0.9 INJECTION, SOLUTION INTRAVENOUS at 08:01

## 2025-01-16 NOTE — TRANSFER OF CARE
Anesthesia Transfer of Care Note    Patient: Dominick Song MD    Procedure(s) Performed: Procedure(s) (LRB):  COLONOSCOPY (N/A)    Patient location: [facility]    Anesthesia Type: general    Transport from OR: Transported from OR on 6-10 L/min O2 by face mask with adequate spontaneous ventilation    Post pain: adequate analgesia    Post assessment: no apparent anesthetic complications and tolerated procedure well    Post vital signs: stable    Level of consciousness: awake, alert and oriented    Nausea/Vomiting: no nausea/vomiting    Complications: none    Transfer of care protocol was followed      Last vitals: Visit Vitals  BP (!) 183/88 (Patient Position: Lying)   Pulse 61   Temp 36.7 °C (98 °F) (Temporal)   Resp 16   Ht 6' (1.829 m)   Wt 83.9 kg (185 lb)   SpO2 97%   BMI 25.09 kg/m²

## 2025-01-16 NOTE — H&P
Short Stay Endoscopy History and Physical    PCP - Maxi Gaines MD  Referring Physician - Milton Rodriguez MD  [address] [facility]  [address]  [place] [address]    Procedure - colonoscopy   ASA - per anesthesia  Mallampati - per anesthesia  History of Anesthesia problems - no  Family history Anesthesia problems -  no   Plan of anesthesia - General    HPI:  This is a 79 y.o. male here for evaluation of: surveillance    Reflux - no  Dysphagia - no  Abdominal pain - no  Diarrhea - no    ROS:  Constitutional: No fevers, chills, No weight loss  CV: No chest pain  Pulm: No cough, No shortness of breath  Ophtho: No vision changes  GI: see HPI  Derm: No rash    Medical History:  has a past medical history of Anticoagulant long-term use, Anxiety, Arthritis, Atrial fibrillation, BPH (benign prostatic hyperplasia), Cataract, CKD (chronic kidney disease) stage 3, GFR 30-59 ml/min (07/10/2017), Colon polyp, Depression, Elevated PSA, Erectile dysfunction, Gastric ulcer with hemorrhage, Hep B w/o coma (1977), History of bleeding peptic ulcer, History of prostatitis, Hypertension, PAF (paroxysmal atrial fibrillation), Sleep apnea, Stroke, and Thyroid disease.    Surgical History:  has a past surgical history that includes Rotator cuff repair; Gastric bypass (1992); Abdominal hernia repair; Cholecystectomy; LASIK (2001); Cataract extraction (11/25/2013); ORIF humerus fracture; Knee arthroscopy; Radiofrequency ablation; Colonoscopy (N/A, 11/25/2015); Eye surgery; Total knee arthroplasty (Right, 5/29/2018); ORIF humerus fracture (Right); Right ankle tendon repair; Ablation of arrhythmogenic focus for atrial fibrillation (N/A, 6/15/2020); Treatment of cardiac arrhythmia (6/15/2020); Intramedullary rodding of femur (Left, 7/18/2020); Colonoscopy (N/A, 11/13/2020); Robot-assisted repair of incisional hernia using da Quinn Xi (Left, 6/13/2022); Cystoscopy with insertion of minimally invasive implant to enlarge prostatic  urethra (N/A, 11/28/2022); insertion, pacemaker, temporary transvenous (9/22/2023); A-V cardiac pacemaker insertion (Left, 9/23/2023); irrigation and debridement (Right, 12/9/2023); Revision of knee arthroplasty (Right, 12/9/2023); Application of wound vacuum-assisted closure device (Right, 12/9/2023); Treatment of cardiac arrhythmia (N/A, 2/28/2024); Cardioversion (N/A, 2/28/2024); Transesophageal echocardiography (N/A, 4/23/2024); Esophagogastroduodenoscopy (N/A, 5/1/2024); Colonoscopy (N/A, 5/1/2024); Closure of left atrial appendage using device (N/A, 5/23/2024); Transesophageal echocardiography (N/A, 5/23/2024); Transesophageal echocardiography (N/A, 7/9/2024); Endoscopic mucosal resection of colon (N/A, 9/9/2024); Treatment of cardiac arrhythmia (N/A, 9/11/2024); Cardioversion (N/A, 10/17/2024); and Incision and drainage of knee (Right, 11/8/2024).    Family History: family history includes Aortic stenosis in his mother; COPD in his father; Diabetes in his father; Heart attack in his brother; Heart disease in his mother; No Known Problems in his daughter, daughter, daughter, and son; Osteoporosis in his father and mother..    Social History:  reports that he has never smoked. He has never been exposed to tobacco smoke. He has never used smokeless tobacco. He reports that he does not drink alcohol and does not use drugs.    Review of patient's allergies indicates:   Allergen Reactions    No known drug allergies        Medications:   Medications Prior to Admission   Medication Sig Dispense Refill Last Dose/Taking    aspirin (ECOTRIN) 81 MG EC tablet Take 1 tablet (81 mg total) by mouth once daily. 90 tablet 3 1/16/2025 Morning    carvediloL (COREG) 12.5 MG tablet Take 1 tablet (12.5 mg total) by mouth 2 (two) times daily. 180 tablet 3 1/16/2025 Morning    dofetilide (TIKOSYN) 250 MCG Cap Take 1 capsule (250 mcg total) by mouth every 12 (twelve) hours. 60 capsule 11 1/16/2025 Morning    levothyroxine (SYNTHROID)  88 MCG tablet Take 1 tablet (88 mcg total) by mouth before breakfast. 30 tablet 11 1/16/2025 Morning    telmisartan (MICARDIS) 40 MG Tab Take 1 tablet (40 mg total) by mouth once daily. 90 tablet 3 1/16/2025 Morning    buPROPion (WELLBUTRIN XL) 150 MG TB24 tablet Take 1 tablet (150 mg total) by mouth once daily. 90 tablet 3     buPROPion (WELLBUTRIN XL) 300 MG 24 hr tablet Take 1 tablet (300 mg total) by mouth once daily. Plus 150 90 tablet 0     calcitRIOL (ROCALTROL) 0.25 MCG Cap Take 1 capsule (0.25 mcg total) by mouth every Mon, Wed, Fri. 39 capsule 1     ICAR-C PLUS Tab TAKE 1 TABLET BY MOUTH ONCE DAILY 90 tablet 3     mirtazapine (REMERON) 15 MG tablet Take 1 tablet (15 mg total) by mouth every evening. 30 tablet 11     OMEGA-3 FATTY ACIDS (FISH OIL CONCENTRATE ORAL) Take 1 capsule by mouth Daily.       oxyCODONE-acetaminophen (PERCOCET)  mg per tablet Take 1 tablet by mouth every 8 (eight) hours as needed for Pain. 30 tablet 0     testosterone cypionate (DEPOTESTOTERONE CYPIONATE) 200 mg/mL injection Inject 200 mg into the muscle every 14 (fourteen) days.       tiZANidine (ZANAFLEX) 4 MG tablet Take 1 tablet (4 mg total) by mouth 3 (three) times daily as needed (muscle spasms). 30 tablet 5     torsemide (DEMADEX) 20 MG Tab TAKE 1 TABLET BY MOUTH EVEYR MONDAY, WEDNESDAY, AND FRIDAYS (Patient not taking: Reported on 1/15/2025) 15 tablet 5        Physical Exam:    Vital Signs:   Vitals:    01/16/25 0754   BP: (!) 183/88   Pulse: 61   Resp: 16   Temp: 98 °F (36.7 °C)       General Appearance: Well appearing in no acute distress    Labs:  Lab Results   Component Value Date    WBC 11.90 11/09/2024    HGB 9.9 (L) 11/09/2024    HCT 31.0 (L) 11/09/2024     11/09/2024    CHOL 115 (L) 08/30/2023    TRIG 46 08/30/2023    HDL 49 08/30/2023    ALT 21 10/28/2024    AST 21 10/28/2024     12/06/2024    K 5.6 (H) 12/06/2024     (H) 12/06/2024    CREATININE 1.2 12/06/2024    BUN 19 12/06/2024    CO2 20  (L) 12/06/2024    TSH 2.794 12/06/2024    PSA 2.3 09/06/2023    INR 1.0 05/20/2024    GLUF 109 03/26/2018    HGBA1C 4.3 06/19/2024       I have explained the risks and benefits of this endoscopic procedure to the patient including but not limited to bleeding, inflammation, infection, perforation, and death.      Milton Rodriguez MD

## 2025-01-16 NOTE — PROVATION PATIENT INSTRUCTIONS
Discharge Summary/Instructions after an Endoscopic Procedure  Patient Name: Dominick Song  Patient MRN: [id number]  Patient YOB: 1945  Thursday, January 16, 2025  Milton Rodriguez MD  Dear patient,  As a result of recent federal legislation (The Federal Cures Act), you may   receive lab or pathology results from your procedure in your [facility]   account before your physician is able to contact you. Your physician or   their representative will relay the results to you with their   recommendations at their soonest availability.  Thank you,  RESTRICTIONS:  During your procedure today, you received medications for sedation.  These   medications may affect your judgment, balance and coordination.  Therefore,   for 24 hours, you have the following restrictions:   - DO NOT drive a car, operate machinery, make legal/financial decisions,   sign important papers or drink alcohol.    ACTIVITY:  Today: no heavy lifting, straining or running due to procedural   sedation/anesthesia.  The following day: return to full activity including work.  DIET:  Eat and drink normally unless instructed otherwise.     TREATMENT FOR COMMON SIDE EFFECTS:  - Mild abdominal pain, nausea, belching, bloating or excessive gas:  rest,   eat lightly and use a heating pad.  - Sore Throat: treat with throat lozenges and/or gargle with warm salt   water.  - Because air was used during the procedure, expelling large amounts of air   from your rectum or belching is normal.  - If a bowel prep was taken, you may not have a bowel movement for 1-3 days.    This is normal.  SYMPTOMS TO WATCH FOR AND REPORT TO YOUR PHYSICIAN:  1. Abdominal pain or bloating, other than gas cramps.  2. Chest pain.  3. Back pain.  4. Signs of infection such as: chills or fever occurring within 24 hours   after the procedure.  5. Rectal bleeding, which would show as bright red, maroon, or black stools.   (A tablespoon of blood from the rectum is not serious, especially if    hemorrhoids are present.)  6. Vomiting.  7. Weakness or dizziness.  GO DIRECTLY TO THE NEAREST EMERGENCY ROOM IF YOU HAVE ANY OF THE FOLLOWING:      Difficulty breathing              Chills and/or fever over 101 F   Persistent vomiting and/or vomiting blood   Severe abdominal pain   Severe chest pain   Black, tarry stools   Bleeding- more than one tablespoon   Any other symptom or condition that you feel may need urgent attention  Your doctor recommends these additional instructions:  If any biopsies were taken, your doctors clinic will contact you in 1 to 2   weeks with any results.  - Discharge patient to home.   - Resume previous diet.   - Repeat colonoscopy is not recommended for surveillance. Can discuss with   Dr Hernandez  - Return to referring physician at appointment to be scheduled.  For questions, problems or results please call your physician - Milton Rodriguez MD at Work:  [phone].  [facility], EMERGENCY ROOM PHONE NUMBER: [phone]  IF A COMPLICATION OR EMERGENCY SITUATION ARISES AND YOU ARE UNABLE TO REACH   YOUR PHYSICIAN - GO DIRECTLY TO THE EMERGENCY ROOM.  Milton Rodriguez MD  1/16/2025 9:38:54 AM  This report has been verified and signed electronically.  Dear patient,  As a result of recent federal legislation (The Federal Cures Act), you may   receive lab or pathology results from your procedure in your [facility]   account before your physician is able to contact you. Your physician or   their representative will relay the results to you with their   recommendations at their soonest availability.  Thank you,  PROVATION

## 2025-01-16 NOTE — ANESTHESIA PREPROCEDURE EVALUATION
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      Pre-op Assessment    I have reviewed the Patient Summary Reports.       I have reviewed the Medications.     Review of Systems  Anesthesia Hx:   History of prior surgery of interest to airway management or planning:            Denies Personal Hx of Anesthesia complications.                    Cardiovascular:     Hypertension               Normal BiV Fxn  Mod MR PA 46                           Pulmonary:      Shortness of breath  Sleep Apnea                Renal/:  Chronic Renal Disease                Hepatic/GI:   PUD,   Liver Disease, Hepatitis              Musculoskeletal:  Arthritis               Neurological:   CVA                                    Endocrine:   Hypothyroidism          Psych:  Psychiatric History                  Physical Exam  General: Well nourished    Airway:  Mallampati: III / II  TM Distance: Normal  Tongue: Normal  Neck ROM: Normal ROM    Chest/Lungs:  Normal Respiratory Rate    Heart:  Rate: Normal        Anesthesia Plan  Type of Anesthesia, risks & benefits discussed:    Anesthesia Type: Gen Natural Airway  Intra-op Monitoring Plan: Standard ASA Monitors  Post Op Pain Control Plan: multimodal analgesia  Induction:  IV  Informed Consent: Informed consent signed with the Patient and all parties understand the risks and agree with anesthesia plan.  All questions answered.   ASA Score: 3  Day of Surgery Review of History & Physical: H&P Update referred to the surgeon/provider.  Anesthesia Plan Notes: NPO confirmed  Normal BiV fxn, pacemaker  No history of anesthesia problems.    Ready For Surgery From Anesthesia Perspective.     .

## 2025-01-16 NOTE — ANESTHESIA POSTPROCEDURE EVALUATION
Anesthesia Post Evaluation    Patient: Dominick Song MD    Procedure(s) Performed: Procedure(s) (LRB):  COLONOSCOPY (N/A)    Final Anesthesia Type: general      Patient location during evaluation: PACU  Patient participation: Yes- Able to Participate  Level of consciousness: awake and alert  Post-procedure vital signs: reviewed and stable  Pain management: adequate  Airway patency: patent    PONV status at discharge: No PONV  Anesthetic complications: no      Cardiovascular status: blood pressure returned to baseline  Respiratory status: unassisted  Hydration status: euvolemic  Follow-up not needed.              Vitals Value Taken Time   /84 01/16/25 0949   Temp 98 01/16/25 1020   Pulse 55 01/16/25 0954   Resp 18 01/16/25 0949   SpO2 100 % 01/16/25 0949         No case tracking events are documented in the log.      Pain/Pari Score: Pari Score: 9 (1/16/2025  9:49 AM)

## 2025-01-17 ENCOUNTER — PATIENT MESSAGE (OUTPATIENT)
Dept: FAMILY MEDICINE | Facility: CLINIC | Age: 80
End: 2025-01-17
Payer: MEDICARE

## 2025-01-17 DIAGNOSIS — F32.1 MAJOR DEPRESSIVE DISORDER, SINGLE EPISODE, MODERATE: Primary | ICD-10-CM

## 2025-01-17 RX ORDER — ESCITALOPRAM OXALATE 10 MG/1
10 TABLET ORAL DAILY
Qty: 90 TABLET | Refills: 3 | Status: SHIPPED | OUTPATIENT
Start: 2025-01-17 | End: 2026-01-17

## 2025-01-17 NOTE — TELEPHONE ENCOUNTER
No care due was identified.  [facility] Embedded Care Due Messages. Reference number: [id number].   1/17/2025 12:11:45 PM CST

## 2025-01-22 ENCOUNTER — HOSPITAL ENCOUNTER (OUTPATIENT)
Dept: CARDIOLOGY | Facility: HOSPITAL | Age: 80
Discharge: HOME | End: 2025-01-22
Attending: INTERNAL MEDICINE
Payer: MEDICARE

## 2025-01-22 PROCEDURE — 93294 REM INTERROG EVL PM/LDLS PM: CPT | Mod: NTX,,, | Performed by: INTERNAL MEDICINE

## 2025-01-22 PROCEDURE — 93296 REM INTERROG EVL PM/IDS: CPT | Mod: PO,TXP | Performed by: INTERNAL MEDICINE

## 2025-01-25 ENCOUNTER — TELEPHONE (OUTPATIENT)
Dept: CARDIOLOGY | Facility: HOSPITAL | Age: 80
End: 2025-01-25
Payer: MEDICARE

## 2025-01-27 ENCOUNTER — PATIENT MESSAGE (OUTPATIENT)
Dept: OPTOMETRY | Facility: CLINIC | Age: 80
End: 2025-01-27
Payer: MEDICARE

## 2025-01-27 ENCOUNTER — TELEPHONE (OUTPATIENT)
Dept: TRANSPLANT | Facility: CLINIC | Age: 80
End: 2025-01-27
Payer: MEDICARE

## 2025-01-27 NOTE — TELEPHONE ENCOUNTER
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

## 2025-01-27 NOTE — TELEPHONE ENCOUNTER
Was asked by Dr. Fonseca to make an appointment for patient this week. Spoke with patient and patient agreed to appointment tomorrow 1/28 at 9:30

## 2025-01-27 NOTE — TELEPHONE ENCOUNTER
Pt called [facility] on 1/25/25 and left a message stating he was calling Emilia back.  After reviewing pt's chart it was found that Emilia @ the [facility] has left him a message on 1/25/25.  Message routed to Umu Irizarry Device RN @ [facility].

## 2025-01-28 ENCOUNTER — PATIENT MESSAGE (OUTPATIENT)
Dept: NEPHROLOGY | Facility: CLINIC | Age: 80
End: 2025-01-28
Payer: MEDICARE

## 2025-01-28 ENCOUNTER — LAB VISIT (OUTPATIENT)
Dept: LAB | Facility: HOSPITAL | Age: 80
End: 2025-01-28
Attending: INTERNAL MEDICINE
Payer: MEDICARE

## 2025-01-28 ENCOUNTER — OFFICE VISIT (OUTPATIENT)
Dept: TRANSPLANT | Facility: CLINIC | Age: 80
End: 2025-01-28
Attending: INTERNAL MEDICINE
Payer: MEDICARE

## 2025-01-28 VITALS
BODY MASS INDEX: 24.75 KG/M2 | HEART RATE: 52 BPM | WEIGHT: 176.81 LBS | SYSTOLIC BLOOD PRESSURE: 162 MMHG | HEIGHT: 71 IN | DIASTOLIC BLOOD PRESSURE: 77 MMHG

## 2025-01-28 DIAGNOSIS — I42.5 RESTRICTIVE CARDIOMYOPATHY: Primary | ICD-10-CM

## 2025-01-28 DIAGNOSIS — R80.9 PROTEINURIA, UNSPECIFIED TYPE: ICD-10-CM

## 2025-01-28 DIAGNOSIS — I42.5 RESTRICTIVE CARDIOMYOPATHY: ICD-10-CM

## 2025-01-28 DIAGNOSIS — I50.32 CHRONIC DIASTOLIC HEART FAILURE: ICD-10-CM

## 2025-01-28 DIAGNOSIS — R53.83 FATIGUE, UNSPECIFIED TYPE: ICD-10-CM

## 2025-01-28 DIAGNOSIS — I50.32 CHRONIC DIASTOLIC HEART FAILURE: Primary | ICD-10-CM

## 2025-01-28 DIAGNOSIS — I48.0 PAF (PAROXYSMAL ATRIAL FIBRILLATION): ICD-10-CM

## 2025-01-28 LAB
CRP SERPL-MCNC: 0.4 MG/L (ref 0–8.2)
ERYTHROCYTE [SEDIMENTATION RATE] IN BLOOD BY PHOTOMETRIC METHOD: 4 MM/HR (ref 0–23)

## 2025-01-28 PROCEDURE — 86334 IMMUNOFIX E-PHORESIS SERUM: CPT | Mod: TXP | Performed by: INTERNAL MEDICINE

## 2025-01-28 PROCEDURE — 83521 IG LIGHT CHAINS FREE EACH: CPT | Mod: TXP | Performed by: INTERNAL MEDICINE

## 2025-01-28 PROCEDURE — 86334 IMMUNOFIX E-PHORESIS SERUM: CPT | Mod: 26,NTX,, | Performed by: PATHOLOGY

## 2025-01-28 PROCEDURE — 99999 PR PBB SHADOW E&M-EST. PATIENT-LVL IV: CPT | Mod: PBBFAC,,, | Performed by: INTERNAL MEDICINE

## 2025-01-28 PROCEDURE — 3078F DIAST BP <80 MM HG: CPT | Mod: CPTII,S$GLB,, | Performed by: INTERNAL MEDICINE

## 2025-01-28 PROCEDURE — 3077F SYST BP >= 140 MM HG: CPT | Mod: CPTII,S$GLB,, | Performed by: INTERNAL MEDICINE

## 2025-01-28 PROCEDURE — 83880 ASSAY OF NATRIURETIC PEPTIDE: CPT | Mod: TXP | Performed by: INTERNAL MEDICINE

## 2025-01-28 PROCEDURE — 1101F PT FALLS ASSESS-DOCD LE1/YR: CPT | Mod: CPTII,S$GLB,, | Performed by: INTERNAL MEDICINE

## 2025-01-28 PROCEDURE — 1159F MED LIST DOCD IN RCRD: CPT | Mod: CPTII,S$GLB,, | Performed by: INTERNAL MEDICINE

## 2025-01-28 PROCEDURE — 85652 RBC SED RATE AUTOMATED: CPT | Mod: TXP | Performed by: INTERNAL MEDICINE

## 2025-01-28 PROCEDURE — 86140 C-REACTIVE PROTEIN: CPT | Mod: TXP | Performed by: INTERNAL MEDICINE

## 2025-01-28 PROCEDURE — 1160F RVW MEDS BY RX/DR IN RCRD: CPT | Mod: CPTII,S$GLB,, | Performed by: INTERNAL MEDICINE

## 2025-01-28 PROCEDURE — 99214 OFFICE O/P EST MOD 30 MIN: CPT | Mod: S$GLB,,, | Performed by: INTERNAL MEDICINE

## 2025-01-28 PROCEDURE — 84484 ASSAY OF TROPONIN QUANT: CPT | Mod: TXP | Performed by: INTERNAL MEDICINE

## 2025-01-28 PROCEDURE — 1157F ADVNC CARE PLAN IN RCRD: CPT | Mod: CPTII,S$GLB,, | Performed by: INTERNAL MEDICINE

## 2025-01-28 PROCEDURE — 3288F FALL RISK ASSESSMENT DOCD: CPT | Mod: CPTII,S$GLB,, | Performed by: INTERNAL MEDICINE

## 2025-01-28 PROCEDURE — 36415 COLL VENOUS BLD VENIPUNCTURE: CPT | Mod: TXP | Performed by: INTERNAL MEDICINE

## 2025-01-28 PROCEDURE — 1126F AMNT PAIN NOTED NONE PRSNT: CPT | Mod: CPTII,S$GLB,, | Performed by: INTERNAL MEDICINE

## 2025-01-28 PROCEDURE — 87040 BLOOD CULTURE FOR BACTERIA: CPT | Mod: TXP | Performed by: INTERNAL MEDICINE

## 2025-01-28 RX ORDER — DRONABINOL 2.5 MG/1
2.5 CAPSULE ORAL 2 TIMES DAILY
COMMUNITY
Start: 2025-01-15 | End: 2025-02-12

## 2025-01-28 NOTE — PROGRESS NOTES
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  He and his wife report that approximately 18 months ago he presented to the hospital emergency room with bradycardia heart rates in the 20's and hypotension.  He subsequently underwent pacemaker insertion  September 23, 2023.  The pacemaker has never been infected.    He has remote history total right knee replacement in May 2018.  In 2023 he fell and developed an infection of the right knee which required debridement.  He required debridement of the knee December 2023. In October 20, 2024 he required debridement of a knee but this appeared to have been related to a fall with an infection.  To their knowledge he never had bacteremia.    They are concerned because he just does not feel right.  Has some shortness of breath but denies chest pain.  They report that whenever he is in atrial fibrillation he gets significantly short of breath and at that time looks "really bad."  He feels that if he is not in atrial fibrillation his breathing is generally okay.  There has been no tightness, heaviness, pressure in his chest, neck, arms, throat, jaw.  He reports that he can sleep flat.  No PND spells.  While he does not have dyspnea on exertion when not in atrial fibrillation he walks slowly possibly for orthopedic issues, he does not feel this is in reaction subconscious SOB.      PAST MEDICAL HISTORY:    PAROXYSMAL ATRIAL FIBRILLATION WITH ABLATION PROCEDURES AND MULTIPLE CARDIOVERSIONS   PEPTIC ULCER DISEASE WITH BLEED   HEPATITIS-B   PROTEINURIA   COLON POLYPS   BPH   HYPERTENSION   SLEEP APNEA IS LISTED ON HIS CHART BUT HE DENIES THIS DIAGNOSIS   TIA DECEMBER 2019    PULMONARY FIBROSIS   THYROID DISEASE       Operations:   PERMANENT DUAL-CHAMBER PACEMAKER INSERTION SEPTEMBER 2023   ORTHOPEDIC " PROCEDURES:      RIGHT TOTAL KNEE REPLACEMENT 2018      INSERTION OF FEMORAL ERIC JULY 20, 2020      PLACEMENT OF WOUND VAC KNEE DEBRIDEMENT DECEMBER 2023      FIXATION HUMERUS FRACTURE      RIGHT ANKLE TENDON REPAIR      ROTATOR CUFF         INGUINAL HERNIA REPAIR/ ABDOMINAL HERNIA REPAIR   CHOLECYSTECTOMY    BILATERAL CATARACT EXTRACTION   GASTRIC BYPASS SURGERY   PLACEMENT OF LEFT ATRIAL OCCLUSION DEVICE DR. DIAZ   ENDOSCOPIC MUCOSAL RESECTION:   UROLIFT IMPLANT    Current Outpatient Medications on File Prior to Visit   Medication Sig Dispense Refill    aspirin (ECOTRIN) 81 MG EC tablet Take 1 tablet (81 mg total) by mouth once daily. 90 tablet 3    buPROPion (WELLBUTRIN XL) 150 MG TB24 tablet Take 1 tablet (150 mg total) by mouth once daily. 90 tablet 3    buPROPion (WELLBUTRIN XL) 300 MG 24 hr tablet Take 1 tablet (300 mg total) by mouth once daily. Plus 150 90 tablet 0    calcitRIOL (ROCALTROL) 0.25 MCG Cap Take 1 capsule (0.25 mcg total) by mouth every Mon, Wed, Fri. 39 capsule 1    carvediloL (COREG) 12.5 MG tablet Take 1 tablet (12.5 mg total) by mouth 2 (two) times daily. 180 tablet 3    dofetilide (TIKOSYN) 250 MCG Cap Take 1 capsule (250 mcg total) by mouth every 12 (twelve) hours. 60 capsule 11    droNABinol (MARINOL) 2.5 MG capsule Take 2.5 mg by mouth 2 (two) times daily.      EScitalopram oxalate (LEXAPRO) 10 MG tablet Take 1 tablet (10 mg total) by mouth once daily. 90 tablet 3    ICAR-C PLUS Tab TAKE 1 TABLET BY MOUTH ONCE DAILY 90 tablet 3    levothyroxine (SYNTHROID) 88 MCG tablet Take 1 tablet (88 mcg total) by mouth before breakfast. 30 tablet 11    mirtazapine (REMERON) 15 MG tablet Take 1 tablet (15 mg total) by mouth every evening. 30 tablet 11    OMEGA-3 FATTY ACIDS (FISH OIL CONCENTRATE ORAL) Take 1 capsule by mouth Daily.      oxyCODONE-acetaminophen (PERCOCET)  mg per tablet Take 1 tablet by mouth every 8 (eight) hours as needed for Pain. 30 tablet 0    telmisartan (MICARDIS) 40  "MG Tab Take 1 tablet (40 mg total) by mouth once daily. 90 tablet 3    testosterone cypionate (DEPOTESTOTERONE CYPIONATE) 200 mg/mL injection Inject 200 mg into the muscle every 14 (fourteen) days.      tiZANidine (ZANAFLEX) 4 MG tablet Take 1 tablet (4 mg total) by mouth 3 (three) times daily as needed (muscle spasms). 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   Cardiovascular:  Positive for dyspnea on exertion (when in atrial fibrillation; he does not feel that he is short of breath otherwise but does not climb steps) and irregular heartbeat (when in atrial fibrillation). Negative for leg swelling (generally not a problem), near-syncope, orthopnea, paroxysmal nocturnal dyspnea and syncope.   Hematologic/Lymphatic: Bruises/bleeds easily.     Objective:   Physical Exam  Constitutional:       General: He is not in acute distress.     Appearance: He is well-developed. He is not ill-appearing, toxic-appearing or diaphoretic.      Comments: BP (!) 162/77 (BP Location: Left arm, Patient Position: Sitting)   Pulse (!) 52   Ht 5' 11" (1.803 m)   Wt 80.2 kg (176 lb 12.9 oz)   BMI 24.66 kg/m²      HENT:      Head: Normocephalic and atraumatic.   Eyes:      General: No scleral icterus.        Right eye: No discharge.         Left eye: No discharge.      Conjunctiva/sclera: Conjunctivae normal.   Neck:      Thyroid: " No thyromegaly.      Vascular: No JVD.      Trachea: No tracheal deviation.   Cardiovascular:      Rate and Rhythm: Normal rate and regular rhythm.      Heart sounds: Normal heart sounds. No murmur heard.     No gallop.   Pulmonary:      Effort: Pulmonary effort is normal.      Breath sounds: Normal breath sounds.   Abdominal:      General: Bowel sounds are normal. There is no distension.      Palpations: Abdomen is soft. There is hepatomegaly (14 cm by percussion and scratch test). There is no mass.      Tenderness: There is no abdominal tenderness. There is no guarding or rebound.   Musculoskeletal:         General: No tenderness.      Right lower leg: No edema.      Left lower leg: No edema.   Skin:     General: Skin is warm and dry.   Neurological:      General: No focal deficit present.      Mental Status: He is alert and oriented to person, place, and time. Mental status is at baseline.   Psychiatric:         Mood and Affect: Mood normal.         Behavior: Behavior normal.         Thought Content: Thought content normal.         Judgment: Judgment normal.          Labs reviewed as follows:  Lab Results   Component Value Date     12/06/2024    K 5.6 (H) 12/06/2024    MG 2.1 10/23/2024     (H) 12/06/2024    CO2 20 (L) 12/06/2024    PHOS 2.7 12/06/2024    BUN 19 12/06/2024    CREATININE 1.2 12/06/2024     12/06/2024    HGBA1C 4.3 06/19/2024    AST 21 10/28/2024    ALT 21 10/28/2024    ALBUMIN 3.1 (L) 12/06/2024    PROT 6.5 10/28/2024    BILITOT 0.6 10/28/2024    WBC 11.90 11/09/2024    HGB 9.9 (L) 11/09/2024    HCT 31.0 (L) 11/09/2024    HCT 33.0 (L) 12/09/2023     11/09/2024    INR 1.0 05/20/2024    INR 3.4 06/07/2018    INR 1.4 (H) 07/28/2014     03/05/2024    TSH 2.794 12/06/2024    FREET4 0.79 10/28/2024    CHOL 115 (L) 08/30/2023    HDL 49 08/30/2023    LDLCALC 56.8 (L) 08/30/2023    TRIG 46 08/30/2023            Component Ref Range & Units 12/6/2024 3 mo ago 6 mo ago  "  Protein, Urine Random 0 - 15 mg/dL 70 High  <7 9   Creatinine, Urine 23.0 - 375.0 mg/dL 60.0 8.0 Low  76.0   Prot/Creat Ratio, Urine 0.00 - 0.20 1.17 High  Unable to calculate 0.12        2024, 2023 and Dec 2016 troponin normal    2024 NT pro BNP 7710 High   2023 NT pro BNP  1840 High     2024 EKG atrial paced with prolonged AV delay, septal infarct pattern, normal voltage      10/23/2024 ECHO "fuzzy appearance" IVS and posterior wall mildly thickened, thickening of the atrial septum, aortic sclerosis with mild AI and posteriorly directed MR moderate    Left Ventricle: The left ventricle is normal in size. Normal wall thickness. There is normal systolic function with a visually estimated ejection fraction of 60 - 65%.    Right Ventricle: Normal right ventricular cavity size. Wall thickness is normal. Systolic function is normal.    Left Atrium: Left atrium is severely dilated.    Aortic Valve: There is mild aortic regurgitation.    Mitral Valve: There is moderate regurgitation with a posterolateral eccentriccally directed jet.    Tricuspid Valve: There is mild regurgitation.    Aorta: Aortic root is mildly dilated. Ascending aorta is normal measuring 3.60 cm.    Pulmonary Artery: There is mild to moderate pulmonary hypertension. The estimated pulmonary artery systolic pressure is 46 mmHg.    IVC/SVC: Normal venous pressure at 3 mmHg.    2024 Device check  Device Specialist Notes:  IN OFFICE device interrogation and testing performed  Abbott dual chamber PPM programmed DDD  Presenting egram demonstrates: As/Vs (AF)  Underlying rhythm c/w: AF  Device fxn: small p waves, 0.3 mV bipolar, tested unipolar  undersensing noted @ max sensitivity  diagnostics since 24  RA pacin.3% RV pacin%  AT/AF burden- 46%  Atrial arrhythmias: x 831 - max duration 18 hrs. 35 mins. on .  Available EGM's c/w AF, atrial undersensing noted/some tracking.  Overall V rates " controlled  Anticoagulation status: s/p LAAO  Ventricular arrhythmias: none  Battery status/longevity: 7.7- 8.7 years  Reprogramming at this visit: Mode-DDD > DDI, base rate-60 > 50,(per  Dr. Arnold) paced AV delay-350 ms  Follow up: via remote monitoring  Report prepared by KF  Provider Notes:  Agree with Device Specialist findings, continue with remote care plan.  E-Signed:  by Jewel Arnold on 12/25/2024 at 8:40:39 PM CST    Assessment:     1. Chronic diastolic heart failure    2. PAF (paroxysmal atrial fibrillation)    3. Restrictive cardiomyopathy    4. Fatigue, unspecified type    5. Proteinuria, unspecified type      Plan:    He certainly has red flags for amyloidosis and I am concerned about some findings on his echo regarding this entity.  Will obtain urine and serum immunofixation and free light chains followed by PYP scan.  Will also obtain troponin and NT proBNP.      He has a lot of orthopedic issues requiring cleaned out of the knee which started after a fall.  With his fatigue and ongoing weight loss with nonspecific symptoms I am going to get a random blood culture x 2  and obtain sed rate/CRP     02/01/2025 addendum-- left patient message on cell phone and sent message via [employer]       Component Ref Range & Units 01/28/2025   [place] Free Light Chains 0.33 - 1.94 mg/dL 6.30 High    Lambda Free Light Chains 0.57 - 2.63 mg/dL 3.49 High    Kappa/Lambda FLC Ratio 0.26 - 1.65 1.81 High         Serum PANKAJ no monoclonal bands    Urine PANKAJ abnormal with 2 closely adjacent kappa light chain specific monoclonal  protein bands in gamma.    Component Ref Range & Units 01/28/2025 11 mo ago 1 yr ago   NT-proBNP <=540 pg/mL 3550 High  7710 High  R, CM 1840 High  R, CM          Component Ref Range & Units 01/28/2025   Troponin T <=15 ng/L 33 High         Blood culture no growth so far    CRP 0.4    Sed rate 4            Component Ref Range & Units 01/28/2025 1 mo ago 3 mo ago 6 mo ago   Protein, Urine Random 0 - 15  mg/dL 43 High  70 High  <7      DISCUSSION:  No growth on blood cult, normal CRP and ESR so unlikely to have endocarditis    NT proBNP and troponin T both elevated and this can be seen with amyloidosis.     He has elevation of both lambda and kappa chains with a slightly elevated kappa lambda ratio.  Faint monoclonal band on urine PANKAJ.      I will proceed with PYP scan but would like him to see a hematologist.  He might require heart biopsy to exclude AL but will see what their thoughts are and await PYP scan to assess for TTR amyloidosis.    If any doubt regarding AL would recommend that we proceed to myocardial biopsy

## 2025-01-29 ENCOUNTER — PATIENT MESSAGE (OUTPATIENT)
Dept: HEMATOLOGY/ONCOLOGY | Facility: CLINIC | Age: 80
End: 2025-01-29
Payer: MEDICARE

## 2025-01-29 LAB
INTERPRETATION SERPL IFE-IMP: NORMAL
KAPPA LC SER QL IA: 6.3 MG/DL (ref 0.33–1.94)
KAPPA LC/LAMBDA SER IA: 1.81 (ref 0.26–1.65)
LAMBDA LC SER QL IA: 3.49 MG/DL (ref 0.57–2.63)
OHS CV AF BURDEN PERCENT: 45
OHS CV DC REMOTE DEVICE TYPE: NORMAL
OHS CV ICD SHOCK: NO
OHS CV RV PACING PERCENT: 13 %
TROPONIN T SERPL-MCNC: 33 NG/L

## 2025-01-30 ENCOUNTER — PATIENT MESSAGE (OUTPATIENT)
Dept: TRANSPLANT | Facility: CLINIC | Age: 80
End: 2025-01-30
Payer: MEDICARE

## 2025-01-30 DIAGNOSIS — Z00.00 ENCOUNTER FOR MEDICARE ANNUAL WELLNESS EXAM: ICD-10-CM

## 2025-01-30 LAB
NT-PROBNP SERPL IA-MCNC: 3550 PG/ML
PATHOLOGIST INTERPRETATION IFE: NORMAL

## 2025-02-02 LAB — BACTERIA BLD CULT: NORMAL

## 2025-02-04 DIAGNOSIS — D47.2 MONOCLONAL GAMMOPATHY ASSOCIATED WITH PLASMA CELL DYSCRASIA: Primary | ICD-10-CM

## 2025-02-05 ENCOUNTER — TELEPHONE (OUTPATIENT)
Dept: HEMATOLOGY/ONCOLOGY | Facility: CLINIC | Age: 80
End: 2025-02-05
Payer: MEDICARE

## 2025-02-05 NOTE — PROGRESS NOTES
Subjective:       Name: Dominick Song MD  : 1945  MRN: [id number]    Chief Complaint   Patient presents with    Other     Chronic anemia        Patient is in clinic by himself.    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  Looking at his blood workup going to 2024 he has been chronically anemic with a hemoglobin ranging between 9.5 and 11.8.  The rest of his CBC was normal.  His differential showed an absolute eosinophilic count ranging between 0.6-1.1.    Labs done on 2025 showed:  Kappa over lambda ratio was found to be mildly elevated at 1.81 with a kappa free light chain of 6.3 and lambda free light chain 3.49.  Serum immunofixation showed no abnormality.  Urine immunofixation showed 2 closely adjacent kappa light chain specific monoclonal protein band in the gamma area.    He had a gastric bypass years ago and he lost a lot of weight. He had it reversed.  He is known to have a short bowel movement with multiple episodes of diarrhea.    He was started on Marinol to help with his weight loss ( s/p right knee was replaced and it was complicated in  with an infection requiring prolonged antibiotic use and 2 revisions)  He is feeling tired and weak. No blood transfusions.  He usually eats 1 meal a day.  He is he has been trying to change his eating habits.  He is not a vegetarian.    In view of his weight loss he his primary care physician ordered a PET scan on 2024 that failed to show any significant abnormality.  His last colonoscopy was done on 2025 and failed to show a polyp that was removed.  The pathology came back benign.      He was diagnosed with into interstitial lung disease and was evaluated for possible transplant.  He was seen recently by a cardiologist and is undergoing a workup for  possibly amyloidosis.  His 2D echo done on October 23, 2024 showed a normal ejection fraction at 60-65%.  His left atrium was severely dilated.  There was moderate regurgitation affecting the mitral valve.  Mild to moderate pulmonary hypertension was noted.    The patient denies CP, cough, SOB, abdominal pain, nausea, vomiting, constipation.  The patient denies fever, chills, night sweats, weight loss, new lumps or bumps, easy bruising or bleeding.    Patient denies any family history of cancer.    Oncology History    No history exists.        Past Medical History:   Diagnosis Date    Anticoagulant long-term use     Anxiety     Arthritis     Atrial fibrillation     BPH (benign prostatic hyperplasia)     Cataract     CKD (chronic kidney disease) stage 3, GFR 30-59 ml/min 07/10/2017    Followed by Dr. Jeevan Pond    Colon polyp     benign    Depression     Elevated PSA     Erectile dysfunction     Gastric ulcer with hemorrhage     Hep B w/o coma 1977    History of bleeding peptic ulcer     History of prostatitis     Hypertension     PAF (paroxysmal atrial fibrillation)     Sleep apnea     PT DENIES    Stroke     TIA December 2019    Thyroid disease        Past Surgical History:   Procedure Laterality Date    A-V CARDIAC PACEMAKER INSERTION Left 9/23/2023    Procedure: Dual Chamber PPM (Heart) Rm 2620;  Surgeon: Pan Moss MD;  Location: [facility] CATH;  Service: Cardiology;  Laterality: Left;    ABDOMINAL HERNIA REPAIR      ABLATION OF ARRHYTHMOGENIC FOCUS FOR ATRIAL FIBRILLATION N/A 6/15/2020    Procedure: Ablation atrial fibrillation;  Surgeon: Wyatt Beatty MD;  Location: [facility] EP LAB;  Service: Cardiology;  Laterality: N/A;  PAF, AFl, PEPE, PVI re-do, CTI, RFA, JUSTIN, Gen, SK, 3 Prep    APPLICATION OF WOUND VACUUM-ASSISTED CLOSURE DEVICE Right 12/9/2023    Procedure: APPLICATION, WOUND VAC;  Surgeon: Jelani Tello II, MD;  Location: [facility] OR;  Service: Orthopedics;  Laterality: Right;    CARDIOVERSION  N/A 2/28/2024    Procedure: Cardioversion;  Surgeon: Pao Hare MD;  Location: [facility];  Service: Cardiology;  Laterality: N/A;    CARDIOVERSION N/A 10/17/2024    Procedure: Cardioversion;  Surgeon: Pan Moss MD;  Location: [facility];  Service: Cardiology;  Laterality: N/A;    CATARACT EXTRACTION  11/25/2013    bilateral    CHOLECYSTECTOMY      CLOSURE OF LEFT ATRIAL APPENDAGE USING DEVICE N/A 5/23/2024    Procedure: Left atrial appendage closure device;  Surgeon: Tony Duvall MD;  Location: [facility] CATH LAB;  Service: Cardiology;  Laterality: N/A;    COLONOSCOPY N/A 11/25/2015    Procedure: COLONOSCOPY;  Surgeon: Toby Hernandez MD;  Location: [facility];  Service: Endoscopy;  Laterality: N/A;    COLONOSCOPY N/A 11/13/2020    Procedure: COLONOSCOPY;  Surgeon: Toby Hernandez MD;  Location: [facility] ENDO;  Service: Endoscopy;  Laterality: N/A;    COLONOSCOPY N/A 5/1/2024    Procedure: COLONOSCOPY;  Surgeon: Toby Hernandez MD;  Location: [facility];  Service: Endoscopy;  Laterality: N/A;    COLONOSCOPY N/A 1/16/2025    Procedure: COLONOSCOPY;  Surgeon: Milton Rodriguez MD;  Location: [facility] (2ND FLR);  Service: Endoscopy;  Laterality: N/A;  11/19 portal-peg-pt stated not on eliquis- Repeat colonoscopy in 4 months for surveillance.newtontt  1/9 SWP PRECALL COMPLETE-RT    CYSTOSCOPY WITH INSERTION OF MINIMALLY INVASIVE IMPLANT TO ENLARGE PROSTATIC URETHRA N/A 11/28/2022    Procedure: CYSTOSCOPY, WITH INSERTION OF UROLIFT IMPLANT;  Surgeon: Yakov Shetty MD;  Location: [facility] OR;  Service: Urology;  Laterality: N/A;    ENDOSCOPIC MUCOSAL RESECTION OF COLON N/A 9/9/2024    Procedure: RESECTION, MUCOSA, COLON, ENDOSCOPIC;  Surgeon: Milton Rodriguez MD;  Location: [facility] (2ND FLR);  Service: Endoscopy;  Laterality: N/A;  5/23 portal-peg-colonscopy with EMR Main. 90 minutes.  24 hours of clear liquid diet. Referring: Toby Hernandez MD- Rodriguez-Eliquis Dr. Jaci TE 5/23- tt .  7/10/24: PEG prep.  Pt off plavix now per cards. instructions sent via portal-GD  8/16/24: pt    ESOPHAGOGASTRODUODENOSCOPY N/A 5/1/2024    Procedure: ESOPHAGOGASTRODUODENOSCOPY (EGD);  Surgeon: Toby Hernandez MD;  Location: [facility] ENDO;  Service: Endoscopy;  Laterality: N/A;    EYE SURGERY      GASTRIC BYPASS  1992    INCISION AND DRAINAGE OF KNEE Right 11/8/2024    Procedure: INCISION AND DRAINAGE, KNEE, EVACUATION HEMATOMA;  Surgeon: Dane Smith MD;  Location: [facility] OR;  Service: Orthopedics;  Laterality: Right;    INSERTION, PACEMAKER, TEMPORARY TRANSVENOUS  9/22/2023    Procedure: Temp pacer insertion ER Rm 8;  Surgeon: Pan Moss MD;  Location: [facility] CATH;  Service: Cardiology;;    INTRAMEDULLARY RODDING OF FEMUR Left 7/18/2020    Procedure: INSERTION, INTRAMEDULLARY ERIC, FEMUR, left, Synthes, [place] table, Large C arm clock side,;  Surgeon: Tony Rodriguez MD;  Location: [facility] OR [facility] FLR;  Service: Orthopedics;  Laterality: Left;    IRRIGATION AND DEBRIDEMENT Right 12/9/2023    Procedure: IRRIGATION AND DEBRIDEMENT KNEE;  Surgeon: Jelani Tello II, MD;  Location: [facility] OR;  Service: Orthopedics;  Laterality: Right;    KNEE ARTHROSCOPY      RT    LASIK  2001    both eyes (Dr. Rabago)    ORIF HUMERUS FRACTURE      LT    ORIF HUMERUS FRACTURE Right     non surgical repair    RADIOFREQUENCY ABLATION      x2    REVISION OF KNEE ARTHROPLASTY Right 12/9/2023    Procedure: REVISION ARTHROPLASTY KNEE- Liner Replacement - dirty/clean;  Surgeon: Jelani Tello II, MD;  Location: [facility] OR;  Service: Orthopedics;  Laterality: Right;  dirty to clean at 1940    Right ankle tendon repair      ROBOT-ASSISTED REPAIR OF INCISIONAL HERNIA USING DA GARETH XI Left 6/13/2022    Procedure: XI ROBOTIC REPAIR, HERNIA, INCISIONAL (LEFT SIDE SPIGELIAN WITH MESH);  Surgeon: Rosendo Marti MD;  Location: [facility] OR 2ND FLR;  Service: General;  Laterality: Left;  consent in am    ROTATOR CUFF REPAIR      right    TOTAL KNEE  ARTHROPLASTY Right 5/29/2018    Procedure: REPLACEMENT-KNEE-TOTAL-pro;  Surgeon: Denzel Dallas MD;  Location: [facility] OR [facility] FLR;  Service: Orthopedics;  Laterality: Right;  Pro    TRANSESOPHAGEAL ECHOCARDIOGRAPHY N/A 4/23/2024    Procedure: ECHOCARDIOGRAM, TRANSESOPHAGEAL;  Surgeon: Lawrence, Eyal Diagnostic;  Location: [facility] EP LAB;  Service: Cardiology;  Laterality: N/A;    TRANSESOPHAGEAL ECHOCARDIOGRAPHY N/A 5/23/2024    Procedure: ECHOCARDIOGRAM, TRANSESOPHAGEAL;  Surgeon: Kj Newton MD;  Location: [facility] CATH LAB;  Service: Cardiology;  Laterality: N/A;    TRANSESOPHAGEAL ECHOCARDIOGRAPHY N/A 7/9/2024    Procedure: ECHOCARDIOGRAM, TRANSESOPHAGEAL;  Surgeon: Provider, Eyal Diagnostic;  Location: [facility] EP LAB;  Service: Cardiology;  Laterality: N/A;    TREATMENT OF CARDIAC ARRHYTHMIA  6/15/2020    Procedure: Cardioversion or Defibrillation;  Surgeon: Wyatt Beatty MD;  Location: [facility] EP LAB;  Service: Cardiology;;    TREATMENT OF CARDIAC ARRHYTHMIA N/A 2/28/2024    Procedure: Cardioversion or Defibrillation;  Surgeon: Pao Hare MD;  Location: [facility];  Service: Cardiology;  Laterality: N/A;    TREATMENT OF CARDIAC ARRHYTHMIA N/A 9/11/2024    Procedure: Cardioversion/Defibrillation;  Surgeon: Pan Moss MD;  Location: [facility];  Service: Cardiology;  Laterality: N/A;       Family History   Problem Relation Name Age of Onset    Aortic stenosis Mother      Heart disease Mother          aortic stenosis    Osteoporosis Mother      COPD Father      Diabetes Father      Osteoporosis Father      Heart attack Brother      Breast cancer Maternal Aunt      No Known Problems Daughter      No Known Problems Daughter      No Known Problems Daughter      No Known Problems Son         Social History     Socioeconomic History    Marital status:     Number of children: 5   Occupational History    Occupation: retired anesthesiology     Employer: [employer] ([employer])     Occupation: [employer] grad     Comment: previous football player   Tobacco Use    Smoking status: Never     Passive exposure: Never    Smokeless tobacco: Never    Tobacco comments:     Retired [place] anesthesiologist    Substance and Sexual Activity    Alcohol use: No    Drug use: No    Sexual activity: Yes     Partners: Female     Social Drivers of Health     Financial Resource Strain: Low Risk  (10/21/2024)    Overall Financial Resource Strain (CARDIA)     Difficulty of Paying Living Expenses: Not hard at all   Food Insecurity: No Food Insecurity (11/8/2024)    Hunger Vital Sign     Worried About Running Out of Food in the Last Year: Never true     Ran Out of Food in the Last Year: Never true   Transportation Needs: No Transportation Needs (11/8/2024)    TRANSPORTATION NEEDS     Transportation : No   Physical Activity: Sufficiently Active (4/22/2024)    Exercise Vital Sign     Days of Exercise per Week: 5 days     Minutes of Exercise per Session: 130 min   Stress: No Stress Concern Present (10/21/2024)    [employer] - Occupational Stress Questionnaire     Feeling of Stress : Not at all   Housing Stability: Low Risk  (11/8/2024)    Housing Stability Vital Sign     Unable to Pay for Housing in the Last Year: No     Homeless in the Last Year: No       Review of patient's allergies indicates:   Allergen Reactions    No known drug allergies        Review of Systems   Constitutional:  Positive for appetite change, fatigue and unexpected weight change. Negative for fever.   HENT:  Negative for mouth sores and sore throat.    Eyes:  Negative for photophobia and visual disturbance.   Respiratory:  Positive for shortness of breath. Negative for cough.    Cardiovascular:  Positive for leg swelling. Negative for chest pain.   Gastrointestinal:  Positive for change in bowel habit and diarrhea. Negative for abdominal pain and constipation.   Genitourinary:  Negative for dysuria and hematuria.  "  Musculoskeletal:  Negative for arthralgias and joint swelling.   Neurological:  Negative for dizziness, weakness and light-headedness.   Hematological:  Does not bruise/bleed easily.   Psychiatric/Behavioral:  Negative for sleep disturbance. The patient is not nervous/anxious.             Objective:     Vitals:    02/06/25 1351   BP: (!) 143/75   BP Location: Left arm   Patient Position: Sitting   Pulse: (!) 58   Resp: 16   Temp: 98.5 °F (36.9 °C)   TempSrc: Temporal   SpO2: (!) 94%   Weight: 78.5 kg (173 lb 1 oz)   Height: 5' 11" (1.803 m)        Physical Exam           Current Outpatient Medications on File Prior to Visit   Medication Sig    aspirin (ECOTRIN) 81 MG EC tablet Take 1 tablet (81 mg total) by mouth once daily.    buPROPion (WELLBUTRIN XL) 150 MG TB24 tablet Take 1 tablet (150 mg total) by mouth once daily.    buPROPion (WELLBUTRIN XL) 300 MG 24 hr tablet Take 1 tablet (300 mg total) by mouth once daily. Plus 150    calcitRIOL (ROCALTROL) 0.25 MCG Cap Take 1 capsule (0.25 mcg total) by mouth every Mon, Wed, Fri.    carvediloL (COREG) 12.5 MG tablet Take 1 tablet (12.5 mg total) by mouth 2 (two) times daily.    EScitalopram oxalate (LEXAPRO) 10 MG tablet Take 1 tablet (10 mg total) by mouth once daily.    ICAR-C PLUS Tab TAKE 1 TABLET BY MOUTH ONCE DAILY    levothyroxine (SYNTHROID) 88 MCG tablet Take 1 tablet (88 mcg total) by mouth before breakfast.    mirtazapine (REMERON) 15 MG tablet Take 1 tablet (15 mg total) by mouth every evening.    OMEGA-3 FATTY ACIDS (FISH OIL CONCENTRATE ORAL) Take 1 capsule by mouth Daily.    oxyCODONE-acetaminophen (PERCOCET)  mg per tablet Take 1 tablet by mouth every 8 (eight) hours as needed for Pain.    telmisartan (MICARDIS) 40 MG Tab Take 1 tablet (40 mg total) by mouth once daily.    testosterone cypionate (DEPOTESTOTERONE CYPIONATE) 200 mg/mL injection Inject 200 mg into the muscle every 14 (fourteen) days.    tiZANidine (ZANAFLEX) 4 MG tablet Take 1 " tablet (4 mg total) by mouth 3 (three) times daily as needed (muscle spasms).    torsemide (DEMADEX) 20 MG Tab TAKE 1 TABLET BY MOUTH EVEYR MONDAY, WEDNESDAY, AND FRIDAYS    dofetilide (TIKOSYN) 250 MCG Cap Take 1 capsule (250 mcg total) by mouth every 12 (twelve) hours.    droNABinol (MARINOL) 2.5 MG capsule Take 2.5 mg by mouth 2 (two) times daily. (Patient not taking: Reported on 2/6/2025)     No current facility-administered medications on file prior to visit.       CBC:  Lab Results   Component Value Date    WBC 11.90 11/09/2024    HGB 9.9 (L) 11/09/2024    HCT 31.0 (L) 11/09/2024    MCV 93 11/09/2024     11/09/2024         CMP:  Sodium   Date Value Ref Range Status   12/06/2024 139 136 - 145 mmol/L Final     Potassium   Date Value Ref Range Status   12/06/2024 5.6 (H) 3.5 - 5.1 mmol/L Final     Comment:     *No Visible Hemolysis     Chloride   Date Value Ref Range Status   12/06/2024 113 (H) 95 - 110 mmol/L Final     CO2   Date Value Ref Range Status   12/06/2024 20 (L) 23 - 29 mmol/L Final     Glucose   Date Value Ref Range Status   12/06/2024 108 70 - 110 mg/dL Final     BUN   Date Value Ref Range Status   12/06/2024 19 8 - 23 mg/dL Final     Creatinine   Date Value Ref Range Status   12/06/2024 1.2 0.5 - 1.4 mg/dL Final     Calcium   Date Value Ref Range Status   12/06/2024 8.3 (L) 8.7 - 10.5 mg/dL Final     Total Protein   Date Value Ref Range Status   10/28/2024 6.5 6.0 - 8.4 g/dL Final     Albumin   Date Value Ref Range Status   12/06/2024 3.1 (L) 3.5 - 5.2 g/dL Final     Total Bilirubin   Date Value Ref Range Status   10/28/2024 0.6 0.1 - 1.0 mg/dL Final     Comment:     For infants and newborns, interpretation of results should be based  on gestational age, weight and in agreement with clinical  observations.    Premature Infant recommended reference ranges:  Up to 24 hours.............<8.0 mg/dL  Up to 48 hours............<12.0 mg/dL  3-5 days..................<15.0 mg/dL  6-29  days.................<15.0 mg/dL       Alkaline Phosphatase   Date Value Ref Range Status   10/28/2024 70 40 - 150 U/L Final     AST   Date Value Ref Range Status   10/28/2024 21 10 - 40 U/L Final     ALT   Date Value Ref Range Status   10/28/2024 21 10 - 44 U/L Final     Anion Gap   Date Value Ref Range Status   12/06/2024 6 (L) 8 - 16 mmol/L Final     eGFR if    Date Value Ref Range Status   03/02/2022 51.9 (A) >60 mL/min/1.73 m^2 Final   03/02/2022 51.9 (A) >60 mL/min/1.73 m^2 Final     eGFR if non    Date Value Ref Range Status   03/02/2022 44.9 (A) >60 mL/min/1.73 m^2 Final     Comment:     Calculation used to obtain the estimated glomerular filtration  rate (eGFR) is the CKD-EPI equation.      03/02/2022 44.9 (A) >60 mL/min/1.73 m^2 Final     Comment:     Calculation used to obtain the estimated glomerular filtration  rate (eGFR) is the CKD-EPI equation.          X-ray Knee Ortho Right with Flexion  Narrative: EXAMINATION:  XR KNEE ORTHO RIGHT WITH FLEXION    CLINICAL HISTORY:  Fell on right knee; bilateral AP, PA flexion, lateral, and sunrise;  Unspecified injury of right lower leg, initial encounter    TECHNIQUE:  AP standing as well as PA flexion standing and Merchant views of both knees were performed.  A lateral view of the right knee is also performed.    COMPARISON:  March 2024    FINDINGS:  Postop right knee replacement.  Intramedullary neema left femur.  Bones are demineralized.  Significant anterior soft tissue swelling at the level of the tibial tubercle.  Small right effusion.  No left effusion.  Impression: Significant soft tissue swelling anteriorly at the level of the tibial tubercle.  Small right effusion as well.    This report was flagged in Epic as abnormal.    Electronically signed by: Ham Cuello MD  Date:    10/25/2024  Time:    12:55       ECOG SCORE    1 - Restricted in strenuous activity-ambulatory and able to carry out work of a light nature               Assessment/Plan:   Chronic normocytic anemia.  I reviewed independently the patient medical record including his laboratory and radiologic findings.  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    He will have blood workup drawn today for CBC with a differential CMP ESR LDH SPEP with immunofixation folate haptoglobin vitamin B1 B6 vitamin-E vitamin-D copper and zinc.    If his workup shows a reversible condition we will treat him accordingly.    If his workup failed to show any significant abnormality we will proceed with a bone marrow biopsy.        Status post gastric bypass.    As above.    Hypothyroidism.    Follow up TSH free T4.    Continue levothyroxine at 88 mcg daily..      Osteoporosis.    Currently on Reclast and calcitriol.    Chronic kidney disease stage 3a.    Avoid nephrotoxic medication.    Follow up BMP.        Atrial fibrillation.    Rate controlled.    Currently on aspirin and Coreg.               Med Onc Chart Routing      Follow up with physician 2 weeks. to discuss labs drawn today   Follow up with SHERYL    Infusion scheduling note    Injection scheduling note    Labs CBC, CMP, ferritin, free T4, TSH, reticulocytes, vitamin B12, SPEP, LDH, folate, vitamin D and iron and TIBC   Scheduling:  Preferred lab:  Lab interval:  ESR, IF, Vitamin B1, B6, E, Copper, Zinc, haptoglobin   Imaging    Pharmacy appointment    Other referrals                   Plan was discussed with the patient at length, and he verbalized understanding. Dominick was given an opportunity to ask questions that were answered to his satisfaction, and he was advised to call in the interval if any problems or questions arise.  Signed:  Jeannie Grigsby MD   Hematology and Oncology  [facility] - HEMATOLOGY ONCOLOGY  [facility] [place]

## 2025-02-06 ENCOUNTER — OFFICE VISIT (OUTPATIENT)
Dept: HEMATOLOGY/ONCOLOGY | Facility: CLINIC | Age: 80
End: 2025-02-06
Payer: MEDICARE

## 2025-02-06 ENCOUNTER — LAB VISIT (OUTPATIENT)
Dept: LAB | Facility: HOSPITAL | Age: 80
End: 2025-02-06
Attending: INTERNAL MEDICINE
Payer: MEDICARE

## 2025-02-06 ENCOUNTER — TELEPHONE (OUTPATIENT)
Dept: TRANSPLANT | Facility: CLINIC | Age: 80
End: 2025-02-06
Payer: MEDICARE

## 2025-02-06 VITALS
HEIGHT: 71 IN | TEMPERATURE: 99 F | DIASTOLIC BLOOD PRESSURE: 75 MMHG | HEART RATE: 58 BPM | RESPIRATION RATE: 16 BRPM | SYSTOLIC BLOOD PRESSURE: 143 MMHG | OXYGEN SATURATION: 94 % | WEIGHT: 173.06 LBS | BODY MASS INDEX: 24.23 KG/M2

## 2025-02-06 DIAGNOSIS — Z98.84 S/P GASTRIC BYPASS: Primary | ICD-10-CM

## 2025-02-06 DIAGNOSIS — D64.9 CHRONIC ANEMIA: ICD-10-CM

## 2025-02-06 DIAGNOSIS — R74.8 ABNORMAL LEVELS OF OTHER SERUM ENZYMES: ICD-10-CM

## 2025-02-06 DIAGNOSIS — R53.82 CHRONIC FATIGUE: ICD-10-CM

## 2025-02-06 DIAGNOSIS — K90.821 SHORT BOWEL SYNDROME WITH COLON IN CONTINUITY: ICD-10-CM

## 2025-02-06 DIAGNOSIS — M80.00XD AGE-RELATED OSTEOPOROSIS WITH CURRENT PATHOLOGICAL FRACTURE WITH ROUTINE HEALING: ICD-10-CM

## 2025-02-06 DIAGNOSIS — Z98.84 S/P GASTRIC BYPASS: ICD-10-CM

## 2025-02-06 DIAGNOSIS — E03.9 ACQUIRED HYPOTHYROIDISM: ICD-10-CM

## 2025-02-06 DIAGNOSIS — I48.20 CHRONIC ATRIAL FIBRILLATION: ICD-10-CM

## 2025-02-06 DIAGNOSIS — N18.31 CHRONIC KIDNEY DISEASE, STAGE 3A: ICD-10-CM

## 2025-02-06 LAB
25(OH)D3+25(OH)D2 SERPL-MCNC: 34 NG/ML (ref 30–96)
ALBUMIN SERPL BCP-MCNC: 3.3 G/DL (ref 3.5–5.2)
ALP SERPL-CCNC: 76 U/L (ref 40–150)
ALT SERPL W/O P-5'-P-CCNC: 14 U/L (ref 10–44)
ANION GAP SERPL CALC-SCNC: 7 MMOL/L (ref 8–16)
AST SERPL-CCNC: 18 U/L (ref 10–40)
BASOPHILS # BLD AUTO: 0.12 K/UL (ref 0–0.2)
BASOPHILS NFR BLD: 2 % (ref 0–1.9)
BILIRUB SERPL-MCNC: 0.4 MG/DL (ref 0.1–1)
BUN SERPL-MCNC: 28 MG/DL (ref 8–23)
CALCIUM SERPL-MCNC: 8.3 MG/DL (ref 8.7–10.5)
CHLORIDE SERPL-SCNC: 114 MMOL/L (ref 95–110)
CO2 SERPL-SCNC: 19 MMOL/L (ref 23–29)
CREAT SERPL-MCNC: 1.1 MG/DL (ref 0.5–1.4)
DIFFERENTIAL METHOD BLD: ABNORMAL
EOSINOPHIL # BLD AUTO: 0.8 K/UL (ref 0–0.5)
EOSINOPHIL NFR BLD: 12.4 % (ref 0–8)
ERYTHROCYTE [DISTWIDTH] IN BLOOD BY AUTOMATED COUNT: 19.9 % (ref 11.5–14.5)
ERYTHROCYTE [SEDIMENTATION RATE] IN BLOOD BY PHOTOMETRIC METHOD: 8 MM/HR (ref 0–23)
EST. GFR  (NO RACE VARIABLE): >60 ML/MIN/1.73 M^2
FERRITIN SERPL-MCNC: 36 NG/ML (ref 20–300)
FOLATE SERPL-MCNC: 17.2 NG/ML (ref 4–24)
GLUCOSE SERPL-MCNC: 109 MG/DL (ref 70–110)
HCT VFR BLD AUTO: 30.6 % (ref 40–54)
HGB BLD-MCNC: 10 G/DL (ref 14–18)
IMM GRANULOCYTES # BLD AUTO: 0.01 K/UL (ref 0–0.04)
IMM GRANULOCYTES NFR BLD AUTO: 0.2 % (ref 0–0.5)
IRON SERPL-MCNC: 43 UG/DL (ref 45–160)
LDH SERPL L TO P-CCNC: 194 U/L (ref 110–260)
LYMPHOCYTES # BLD AUTO: 1.2 K/UL (ref 1–4.8)
LYMPHOCYTES NFR BLD: 19.5 % (ref 18–48)
MCH RBC QN AUTO: 28.5 PG (ref 27–31)
MCHC RBC AUTO-ENTMCNC: 32.7 G/DL (ref 32–36)
MCV RBC AUTO: 87 FL (ref 82–98)
MONOCYTES # BLD AUTO: 0.5 K/UL (ref 0.3–1)
MONOCYTES NFR BLD: 7.8 % (ref 4–15)
NEUTROPHILS # BLD AUTO: 3.6 K/UL (ref 1.8–7.7)
NEUTROPHILS NFR BLD: 58.1 % (ref 38–73)
NRBC BLD-RTO: 0 /100 WBC
PATH REV BLD -IMP: NORMAL
PLATELET # BLD AUTO: 262 K/UL (ref 150–450)
PMV BLD AUTO: 10.7 FL (ref 9.2–12.9)
POTASSIUM SERPL-SCNC: 4.5 MMOL/L (ref 3.5–5.1)
PROT SERPL-MCNC: 6.1 G/DL (ref 6–8.4)
RBC # BLD AUTO: 3.51 M/UL (ref 4.6–6.2)
SATURATED IRON: 10 % (ref 20–50)
SODIUM SERPL-SCNC: 140 MMOL/L (ref 136–145)
TOTAL IRON BINDING CAPACITY: 420 UG/DL (ref 250–450)
TRANSFERRIN SERPL-MCNC: 284 MG/DL (ref 200–375)
WBC # BLD AUTO: 6.15 K/UL (ref 3.9–12.7)

## 2025-02-06 PROCEDURE — 83540 ASSAY OF IRON: CPT | Mod: TXP | Performed by: INTERNAL MEDICINE

## 2025-02-06 PROCEDURE — 1160F RVW MEDS BY RX/DR IN RCRD: CPT | Mod: CPTII,NTX,S$GLB, | Performed by: INTERNAL MEDICINE

## 2025-02-06 PROCEDURE — 85025 COMPLETE CBC W/AUTO DIFF WBC: CPT | Mod: PN,TXP | Performed by: INTERNAL MEDICINE

## 2025-02-06 PROCEDURE — 83010 ASSAY OF HAPTOGLOBIN QUANT: CPT | Mod: TXP | Performed by: INTERNAL MEDICINE

## 2025-02-06 PROCEDURE — 83615 LACTATE (LD) (LDH) ENZYME: CPT | Mod: PN,TXP | Performed by: INTERNAL MEDICINE

## 2025-02-06 PROCEDURE — 83921 ORGANIC ACID SINGLE QUANT: CPT | Mod: TXP | Performed by: INTERNAL MEDICINE

## 2025-02-06 PROCEDURE — 82525 ASSAY OF COPPER: CPT | Mod: TXP | Performed by: INTERNAL MEDICINE

## 2025-02-06 PROCEDURE — 84446 ASSAY OF VITAMIN E: CPT | Mod: TXP | Performed by: INTERNAL MEDICINE

## 2025-02-06 PROCEDURE — 85060 BLOOD SMEAR INTERPRETATION: CPT | Mod: NTX,,, | Performed by: PATHOLOGY

## 2025-02-06 PROCEDURE — 3078F DIAST BP <80 MM HG: CPT | Mod: CPTII,NTX,S$GLB, | Performed by: INTERNAL MEDICINE

## 2025-02-06 PROCEDURE — 84425 ASSAY OF VITAMIN B-1: CPT | Mod: TXP | Performed by: INTERNAL MEDICINE

## 2025-02-06 PROCEDURE — 82306 VITAMIN D 25 HYDROXY: CPT | Mod: TXP | Performed by: INTERNAL MEDICINE

## 2025-02-06 PROCEDURE — 84630 ASSAY OF ZINC: CPT | Mod: TXP | Performed by: INTERNAL MEDICINE

## 2025-02-06 PROCEDURE — 3077F SYST BP >= 140 MM HG: CPT | Mod: CPTII,NTX,S$GLB, | Performed by: INTERNAL MEDICINE

## 2025-02-06 PROCEDURE — 84207 ASSAY OF VITAMIN B-6: CPT | Mod: TXP | Performed by: INTERNAL MEDICINE

## 2025-02-06 PROCEDURE — 82746 ASSAY OF FOLIC ACID SERUM: CPT | Mod: TXP | Performed by: INTERNAL MEDICINE

## 2025-02-06 PROCEDURE — 1126F AMNT PAIN NOTED NONE PRSNT: CPT | Mod: CPTII,NTX,S$GLB, | Performed by: INTERNAL MEDICINE

## 2025-02-06 PROCEDURE — 99999 PR PBB SHADOW E&M-EST. PATIENT-LVL V: CPT | Mod: PBBFAC,TXP,, | Performed by: INTERNAL MEDICINE

## 2025-02-06 PROCEDURE — 1157F ADVNC CARE PLAN IN RCRD: CPT | Mod: CPTII,NTX,S$GLB, | Performed by: INTERNAL MEDICINE

## 2025-02-06 PROCEDURE — 85651 RBC SED RATE NONAUTOMATED: CPT | Mod: PN,TXP | Performed by: INTERNAL MEDICINE

## 2025-02-06 PROCEDURE — G2211 COMPLEX E/M VISIT ADD ON: HCPCS | Mod: NTX,S$GLB,, | Performed by: INTERNAL MEDICINE

## 2025-02-06 PROCEDURE — 1159F MED LIST DOCD IN RCRD: CPT | Mod: CPTII,NTX,S$GLB, | Performed by: INTERNAL MEDICINE

## 2025-02-06 PROCEDURE — 3288F FALL RISK ASSESSMENT DOCD: CPT | Mod: CPTII,NTX,S$GLB, | Performed by: INTERNAL MEDICINE

## 2025-02-06 PROCEDURE — 1101F PT FALLS ASSESS-DOCD LE1/YR: CPT | Mod: CPTII,NTX,S$GLB, | Performed by: INTERNAL MEDICINE

## 2025-02-06 PROCEDURE — 99204 OFFICE O/P NEW MOD 45 MIN: CPT | Mod: NTX,S$GLB,, | Performed by: INTERNAL MEDICINE

## 2025-02-06 PROCEDURE — 36415 COLL VENOUS BLD VENIPUNCTURE: CPT | Mod: PN,TXP | Performed by: INTERNAL MEDICINE

## 2025-02-06 PROCEDURE — 82728 ASSAY OF FERRITIN: CPT | Mod: TXP | Performed by: INTERNAL MEDICINE

## 2025-02-06 PROCEDURE — 80053 COMPREHEN METABOLIC PANEL: CPT | Mod: PN,TXP | Performed by: INTERNAL MEDICINE

## 2025-02-06 NOTE — TELEPHONE ENCOUNTER
Dr. Song was seen in clinic with Dr. Fonseca on 1/28/25, referred by Jaci in EP for evaluation for shortness of breathe. Dr. Fonseca requested amyloid evaluation. FLC & PANKAJ completed with elevate Kappa 6.3, Lambda 3.49, ratio 1.81. Serum PANKAJ was negative, but urine PANKAJ was positve for monoclonal proteins. Dr. Fonseca requested urgent eval for AL amyloid. Message sent to Hem on 2/4/25 to request referral. Referral order in EPIC. Did speak to Dr. Song about referral and that someone from their department will contact him to schedule apt. Dr. Bundy has appt on 2/6/25 with hem in [place], but that is for chronic anemia.     Dr. Harmon called this morning to ask about appt with [facility] at [facility]. He had not received a phone call yet and therefore concerned. Patient was given direct number to HEM to f/u up on appt.     Lisbeth Calvo RN  Amyloid Nurse Navigator

## 2025-02-07 LAB — HAPTOGLOB SERPL-MCNC: 40 MG/DL (ref 30–250)

## 2025-02-08 LAB — PATH REV BLD -IMP: NORMAL

## 2025-02-10 ENCOUNTER — PATIENT MESSAGE (OUTPATIENT)
Dept: HEMATOLOGY/ONCOLOGY | Facility: CLINIC | Age: 80
End: 2025-02-10
Payer: MEDICARE

## 2025-02-10 LAB — ZINC SERPL-MCNC: 36 UG/DL (ref 60–130)

## 2025-02-11 ENCOUNTER — TELEPHONE (OUTPATIENT)
Dept: CARDIOLOGY | Facility: HOSPITAL | Age: 80
End: 2025-02-11
Payer: MEDICARE

## 2025-02-11 LAB
A-TOCOPHEROL VIT E SERPL-MCNC: 688 UG/DL (ref 500–1800)
METHYLMALONATE SERPL-SCNC: 1.31 UMOL/L
PYRIDOXAL SERPL-MCNC: 14 UG/L (ref 5–50)
VIT B1 BLD-MCNC: 91 UG/L (ref 38–122)

## 2025-02-11 NOTE — TELEPHONE ENCOUNTER
Pt. Called into clinic stating he had an appt. Tomorrow and wanted to see if he needed his device checked. Notified him he is not due for a device check, but reviewed home monitor and home monitor not connected since 1/29/25. He said he had an episode of AF on 2/8/25. He currently denies any symptoms. Called [employer] to assist in sending transmission. Transmission received    Device monitored by Dr. Catalina Heart dual chamber PPM programmed DDI 50      Presenting rhythm AF 2/11/25          Ap- 23% - < 1%    AT/AF burden: 29% since 1/29/25        Longest per device 1 day 11 hrs. 4 mins. On 2/10                  Per trend possible ongoing AF since 2/8 or 2/9                     V rates during AF- controlled            Anticoagulation- s/p Watchman      Symptoms: Currently denies symptoms, but states he has not been doing much      Meds:  Tikosyn 250 mcg every 12 hours  Coreg 12.5 mg bid  Aspirin 81 mg  Micardis 40 mg daily    Next f/u with Dr. Moss 2/20    Next f/u with Dr. Arnold 6/9/25      Message sent to Dr. Arnold for review

## 2025-02-12 ENCOUNTER — CLINICAL SUPPORT (OUTPATIENT)
Dept: CARDIOLOGY | Facility: HOSPITAL | Age: 80
End: 2025-02-12
Payer: MEDICARE

## 2025-02-12 ENCOUNTER — HOSPITAL ENCOUNTER (OUTPATIENT)
Dept: CARDIOLOGY | Facility: HOSPITAL | Age: 80
Discharge: HOME | End: 2025-02-12
Attending: INTERNAL MEDICINE
Payer: MEDICARE

## 2025-02-12 ENCOUNTER — OFFICE VISIT (OUTPATIENT)
Dept: FAMILY MEDICINE | Facility: CLINIC | Age: 80
End: 2025-02-12
Payer: MEDICARE

## 2025-02-12 VITALS
WEIGHT: 176.56 LBS | DIASTOLIC BLOOD PRESSURE: 56 MMHG | HEART RATE: 73 BPM | HEIGHT: 71 IN | OXYGEN SATURATION: 97 % | BODY MASS INDEX: 24.72 KG/M2 | SYSTOLIC BLOOD PRESSURE: 126 MMHG

## 2025-02-12 DIAGNOSIS — Z95.0 PRESENCE OF CARDIAC PACEMAKER: ICD-10-CM

## 2025-02-12 DIAGNOSIS — D64.9 CHRONIC ANEMIA: ICD-10-CM

## 2025-02-12 DIAGNOSIS — F32.A DEPRESSION, UNSPECIFIED DEPRESSION TYPE: ICD-10-CM

## 2025-02-12 DIAGNOSIS — F32.1 MAJOR DEPRESSIVE DISORDER, SINGLE EPISODE, MODERATE: Primary | ICD-10-CM

## 2025-02-12 DIAGNOSIS — R26.89 IMBALANCE: ICD-10-CM

## 2025-02-12 DIAGNOSIS — E03.4 HYPOTHYROIDISM DUE TO ACQUIRED ATROPHY OF THYROID: ICD-10-CM

## 2025-02-12 DIAGNOSIS — I48.19 PERSISTENT ATRIAL FIBRILLATION: ICD-10-CM

## 2025-02-12 DIAGNOSIS — I10 ESSENTIAL HYPERTENSION: ICD-10-CM

## 2025-02-12 LAB
COPPER SERPL-MCNC: 924 UG/L (ref 665–1480)
OHS CV AF BURDEN PERCENT: 91
OHS CV DC REMOTE DEVICE TYPE: NORMAL
OHS CV ICD SHOCK: NO
OHS CV RV PACING PERCENT: 1 %

## 2025-02-12 PROCEDURE — 1160F RVW MEDS BY RX/DR IN RCRD: CPT | Mod: HCNC,CPTII,S$GLB, | Performed by: FAMILY MEDICINE

## 2025-02-12 PROCEDURE — 1157F ADVNC CARE PLAN IN RCRD: CPT | Mod: HCNC,CPTII,S$GLB, | Performed by: FAMILY MEDICINE

## 2025-02-12 PROCEDURE — 3288F FALL RISK ASSESSMENT DOCD: CPT | Mod: HCNC,CPTII,S$GLB, | Performed by: FAMILY MEDICINE

## 2025-02-12 PROCEDURE — 1101F PT FALLS ASSESS-DOCD LE1/YR: CPT | Mod: HCNC,CPTII,S$GLB, | Performed by: FAMILY MEDICINE

## 2025-02-12 PROCEDURE — G2211 COMPLEX E/M VISIT ADD ON: HCPCS | Mod: HCNC,S$GLB,, | Performed by: FAMILY MEDICINE

## 2025-02-12 PROCEDURE — 3078F DIAST BP <80 MM HG: CPT | Mod: HCNC,CPTII,S$GLB, | Performed by: FAMILY MEDICINE

## 2025-02-12 PROCEDURE — 1159F MED LIST DOCD IN RCRD: CPT | Mod: HCNC,CPTII,S$GLB, | Performed by: FAMILY MEDICINE

## 2025-02-12 PROCEDURE — 1126F AMNT PAIN NOTED NONE PRSNT: CPT | Mod: HCNC,CPTII,S$GLB, | Performed by: FAMILY MEDICINE

## 2025-02-12 PROCEDURE — 99999 PR PBB SHADOW E&M-EST. PATIENT-LVL III: CPT | Mod: PBBFAC,HCNC,, | Performed by: FAMILY MEDICINE

## 2025-02-12 PROCEDURE — 3074F SYST BP LT 130 MM HG: CPT | Mod: HCNC,CPTII,S$GLB, | Performed by: FAMILY MEDICINE

## 2025-02-12 PROCEDURE — 99214 OFFICE O/P EST MOD 30 MIN: CPT | Mod: HCNC,S$GLB,, | Performed by: FAMILY MEDICINE

## 2025-02-12 RX ORDER — MIRTAZAPINE 30 MG/1
30 TABLET, FILM COATED ORAL NIGHTLY
Qty: 30 TABLET | Refills: 11 | Status: SHIPPED | OUTPATIENT
Start: 2025-02-12 | End: 2026-02-12

## 2025-02-12 NOTE — PROGRESS NOTES
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    Chief Complaint: Depression (4 week follow up)      Patient presents with:  Depression: 4 week follow up    Since last visit he has seen hematology. Workup done. F/u appt pending.  Cardiac eval for possible amyloidosis being considered.  Bone marrow biopsy considered.    Previously reported anemia, imbalance, difficulty gaining weight, weakness, no energy, depression, insomnia.    Tolerating remeron 15mg QHS  Appetite stable but has not gained any weight.    S/p ORIF left femur fracture - following with Dr. Rodriguez; doing well.  H/o TIA - taking Eliquis; stopped lipitor after seeing neurologist; using lopressor PRN  Afib, s/p pacemaker - s/p ablation; sees Dr. Arnold. Taking Coreg BID, Tikosyn. He had left atrial appendage closure device 5/2024.  HTN - Coreg 12.5mg BID, Micardis 40mg; Demadex 20 mg Monday Wednesday and Friday PRN as well as potassium chloride 10 mEq Monday Wednesday and Friday PRN  CKD - following with Dr. Pond.  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  Depression - taking  Wellbutrin 450mg daily; Lexapro 10mg  Osteoporosis - taking calcium citrate; getting Reclast annually  BPH - stable since Urolift; taking uroxatral  Pulmonary fibrosis - following with pulmonology every 6 months  Hypocalcemia - taking calcium carbonate 2 tablets daily.  Colon polyp - following with Dr. Hernandez      Hypertension  Pertinent negatives include no chest pain, headaches, neck pain, palpitations or shortness of breath.   Follow-up  Pertinent negatives include no abdominal pain, arthralgias, chest pain, chills, coughing, fever, headaches, nausea, neck pain, rash, sore throat or weakness.   DepressionPatient is not experiencing: palpitations and shortness of breath.          Past  Medical History:   Diagnosis Date    Anticoagulant long-term use     Anxiety     Arthritis     Atrial fibrillation     BPH (benign prostatic hyperplasia)     Cataract     CKD (chronic kidney disease) stage 3, GFR 30-59 ml/min 07/10/2017    Followed by Dr. Jeevan Pond    Colon polyp     benign    Depression     Elevated PSA     Erectile dysfunction     Gastric ulcer with hemorrhage     Hep B w/o coma 1977    History of bleeding peptic ulcer     History of prostatitis     Hypertension     PAF (paroxysmal atrial fibrillation)     Sleep apnea     PT DENIES    Stroke     TIA December 2019    Thyroid disease        Past Surgical History:   Procedure Laterality Date    A-V CARDIAC PACEMAKER INSERTION Left 9/23/2023    Procedure: Dual Chamber PPM (Heart) Rm 2620;  Surgeon: Pan Moss MD;  Location: [facility];  Service: Cardiology;  Laterality: Left;    ABDOMINAL HERNIA REPAIR      ABLATION OF ARRHYTHMOGENIC FOCUS FOR ATRIAL FIBRILLATION N/A 6/15/2020    Procedure: Ablation atrial fibrillation;  Surgeon: Wyatt Beatty MD;  Location: [facility] EP LAB;  Service: Cardiology;  Laterality: N/A;  PAF, AFl, PEPE, PVI re-do, CTI, RFA, JUSTIN, Gen, SK, 3 Prep    APPLICATION OF WOUND VACUUM-ASSISTED CLOSURE DEVICE Right 12/9/2023    Procedure: APPLICATION, WOUND VAC;  Surgeon: Jelani Tello II, MD;  Location: [facility];  Service: Orthopedics;  Laterality: Right;    CARDIOVERSION N/A 2/28/2024    Procedure: Cardioversion;  Surgeon: Pao Hare MD;  Location: [facility];  Service: Cardiology;  Laterality: N/A;    CARDIOVERSION N/A 10/17/2024    Procedure: Cardioversion;  Surgeon: Pan Moss MD;  Location: [facility];  Service: Cardiology;  Laterality: N/A;    CATARACT EXTRACTION  11/25/2013    bilateral    CHOLECYSTECTOMY      CLOSURE OF LEFT ATRIAL APPENDAGE USING DEVICE N/A 5/23/2024    Procedure: Left atrial appendage closure device;  Surgeon: Tony Duvall MD;  Location: [facility] CATH LAB;  Service: Cardiology;   Laterality: N/A;    COLONOSCOPY N/A 11/25/2015    Procedure: COLONOSCOPY;  Surgeon: Toby Hernandez MD;  Location: [facility] ENDO;  Service: Endoscopy;  Laterality: N/A;    COLONOSCOPY N/A 11/13/2020    Procedure: COLONOSCOPY;  Surgeon: Toby Hernandez MD;  Location: [facility] ENDO;  Service: Endoscopy;  Laterality: N/A;    COLONOSCOPY N/A 5/1/2024    Procedure: COLONOSCOPY;  Surgeon: Toby Hernandez MD;  Location: [facility] ENDO;  Service: Endoscopy;  Laterality: N/A;    COLONOSCOPY N/A 1/16/2025    Procedure: COLONOSCOPY;  Surgeon: Milton Rodriguez MD;  Location: [facility] (2ND FLR);  Service: Endoscopy;  Laterality: N/A;  11/19 portal-peg-pt stated not on eliquis- Repeat colonoscopy in 4 months for surveillance.[id number]  1/9 SWP PRECALL COMPLETE-RT    CYSTOSCOPY WITH INSERTION OF MINIMALLY INVASIVE IMPLANT TO ENLARGE PROSTATIC URETHRA N/A 11/28/2022    Procedure: CYSTOSCOPY, WITH INSERTION OF UROLIFT IMPLANT;  Surgeon: Yakov Shetty MD;  Location: [facility] OR;  Service: Urology;  Laterality: N/A;    ECHOCARDIOGRAM,TRANSESOPHAGEAL N/A 2/17/2025    Procedure: Transesophageal echo (PEPE) intra-procedure log documentation;  Surgeon: Jewel Arnold MD;  Location: [facility];  Service: Cardiology;  Laterality: N/A;    ENDOSCOPIC MUCOSAL RESECTION OF COLON N/A 9/9/2024    Procedure: RESECTION, MUCOSA, COLON, ENDOSCOPIC;  Surgeon: Milton Rodriguez MD;  Location: [facility] (2ND FLR);  Service: Endoscopy;  Laterality: N/A;  5/23 portal-peg-colonscopy with EMR Main. 90 minutes.  24 hours of clear liquid diet. Referring: Toby Hernandez MD- Michael-Jovana HAUSER 5/23- tt .  7/10/24: PEG prep. Pt off plavix now per cards. instructions sent via portal-GD  8/16/24: pt    ESOPHAGOGASTRODUODENOSCOPY N/A 5/1/2024    Procedure: ESOPHAGOGASTRODUODENOSCOPY (EGD);  Surgeon: Toby Hernandez MD;  Location: [facility] ENDO;  Service: Endoscopy;  Laterality: N/A;    EYE SURGERY      GASTRIC BYPASS  1992    INCISION AND DRAINAGE OF KNEE  Right 11/8/2024    Procedure: INCISION AND DRAINAGE, KNEE, EVACUATION HEMATOMA;  Surgeon: Dane Smith MD;  Location: [facility] OR;  Service: Orthopedics;  Laterality: Right;    INSERTION, PACEMAKER, TEMPORARY TRANSVENOUS  9/22/2023    Procedure: Temp pacer insertion ER Rm 8;  Surgeon: Pan Moss MD;  Location: [facility] CATH;  Service: Cardiology;;    INTRAMEDULLARY RODDING OF FEMUR Left 7/18/2020    Procedure: INSERTION, INTRAMEDULLARY ERIC, FEMUR, left, Synthes, Follett table, Large C arm clock side,;  Surgeon: Tony Rodriguez MD;  Location: [facility] OR 2ND FLR;  Service: Orthopedics;  Laterality: Left;    IRRIGATION AND DEBRIDEMENT Right 12/9/2023    Procedure: IRRIGATION AND DEBRIDEMENT KNEE;  Surgeon: Jelani Tello II, MD;  Location: [facility] OR;  Service: Orthopedics;  Laterality: Right;    KNEE ARTHROSCOPY      RT    LASIK  2001    both eyes (Dr. Rabago)    ORIF HUMERUS FRACTURE      LT    ORIF HUMERUS FRACTURE Right     non surgical repair    RADIOFREQUENCY ABLATION      x2    REVISION OF KNEE ARTHROPLASTY Right 12/9/2023    Procedure: REVISION ARTHROPLASTY KNEE- Liner Replacement - dirty/clean;  Surgeon: Jelani Tello II, MD;  Location: [facility] OR;  Service: Orthopedics;  Laterality: Right;  dirty to clean at 1940    Right ankle tendon repair      ROBOT-ASSISTED REPAIR OF INCISIONAL HERNIA USING DA GARETH XI Left 6/13/2022    Procedure: XI ROBOTIC REPAIR, HERNIA, INCISIONAL (LEFT SIDE SPIGELIAN WITH MESH);  Surgeon: Rosendo Marti MD;  Location: [facility] OR 2ND FLR;  Service: General;  Laterality: Left;  consent in am    ROTATOR CUFF REPAIR      right    TOTAL KNEE ARTHROPLASTY Right 5/29/2018    Procedure: REPLACEMENT-KNEE-TOTAL-axel;  Surgeon: Denzel Dallas MD;  Location: [facility] OR 2ND FLR;  Service: Orthopedics;  Laterality: Right;  [place]    TRANSESOPHAGEAL ECHOCARDIOGRAPHY N/A 4/23/2024    Procedure: ECHOCARDIOGRAM, TRANSESOPHAGEAL;  Surgeon: Provider, [facility];  Location: [facility]  EP LAB;  Service: Cardiology;  Laterality: N/A;    TRANSESOPHAGEAL ECHOCARDIOGRAPHY N/A 5/23/2024    Procedure: ECHOCARDIOGRAM, TRANSESOPHAGEAL;  Surgeon: Kj Newton MD;  Location: [facility] CATH LAB;  Service: Cardiology;  Laterality: N/A;    TRANSESOPHAGEAL ECHOCARDIOGRAPHY N/A 7/9/2024    Procedure: ECHOCARDIOGRAM, TRANSESOPHAGEAL;  Surgeon: Jan Bravo Diagnostic;  Location: [facility] EP LAB;  Service: Cardiology;  Laterality: N/A;    TREATMENT OF CARDIAC ARRHYTHMIA  6/15/2020    Procedure: Cardioversion or Defibrillation;  Surgeon: Wyatt Beatty MD;  Location: [facility] EP LAB;  Service: Cardiology;;    TREATMENT OF CARDIAC ARRHYTHMIA N/A 2/28/2024    Procedure: Cardioversion or Defibrillation;  Surgeon: Pao Hare MD;  Location: [facility];  Service: Cardiology;  Laterality: N/A;    TREATMENT OF CARDIAC ARRHYTHMIA N/A 9/11/2024    Procedure: Cardioversion/Defibrillation;  Surgeon: Pan Moss MD;  Location: [facility];  Service: Cardiology;  Laterality: N/A;    TREATMENT OF CARDIAC ARRHYTHMIA N/A 2/17/2025    Procedure: Cardioversion or Defibrillation;  Surgeon: Jewel Arnold MD;  Location: [facility] ENDO;  Service: Cardiology;  Laterality: N/A;       Review of patient's allergies indicates:   Allergen Reactions    No known drug allergies        Social History     Socioeconomic History    Marital status:     Number of children: 5   Occupational History    Occupation: retired anesthesiology     Employer: [employer] ([employer])    Occupation: [employer] grad     Comment: previous football player   Tobacco Use    Smoking status: Never     Passive exposure: Never    Smokeless tobacco: Never    Tobacco comments:     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    Substance and Sexual Activity    Alcohol use: No    Drug use: No    Sexual activity: Yes     Partners: Female     Social Drivers of Health     Financial Resource Strain: Low Risk  (10/21/2024)    Overall Financial Resource Strain (CARDIA)      Difficulty of Paying Living Expenses: Not hard at all   Food Insecurity: No Food Insecurity (11/8/2024)    Hunger Vital Sign     Worried About Running Out of Food in the Last Year: Never true     Ran Out of Food in the Last Year: Never true   Transportation Needs: No Transportation Needs (11/8/2024)    TRANSPORTATION NEEDS     Transportation : No   Physical Activity: Sufficiently Active (4/22/2024)    Exercise Vital Sign     Days of Exercise per Week: 5 days     Minutes of Exercise per Session: 130 min   Stress: No Stress Concern Present (10/21/2024)    [place] [employer] - Occupational Stress Questionnaire     Feeling of Stress : Not at all   Housing Stability: Unknown (11/8/2024)    Housing Stability Vital Sign     Unable to Pay for Housing in the Last Year: No     Homeless in the Last Year: No       Current Outpatient Medications on File Prior to Visit   Medication Sig Dispense Refill    [Paused] aspirin (ECOTRIN) 81 MG EC tablet Take 1 tablet (81 mg total) by mouth once daily. 90 tablet 3    buPROPion (WELLBUTRIN XL) 150 MG TB24 tablet Take 1 tablet (150 mg total) by mouth once daily. 90 tablet 3    buPROPion (WELLBUTRIN XL) 300 MG 24 hr tablet Take 1 tablet (300 mg total) by mouth once daily. Plus 150 90 tablet 0    calcitRIOL (ROCALTROL) 0.25 MCG Cap Take 1 capsule (0.25 mcg total) by mouth every Mon, Wed, Fri. 39 capsule 1    carvediloL (COREG) 12.5 MG tablet Take 1 tablet (12.5 mg total) by mouth 2 (two) times daily. 180 tablet 3    dofetilide (TIKOSYN) 250 MCG Cap Take 1 capsule (250 mcg total) by mouth every 12 (twelve) hours. 60 capsule 11    EScitalopram oxalate (LEXAPRO) 10 MG tablet Take 1 tablet (10 mg total) by mouth once daily. 90 tablet 3    ICAR-C PLUS Tab TAKE 1 TABLET BY MOUTH ONCE DAILY 90 tablet 3    levothyroxine (SYNTHROID) 88 MCG tablet Take 1 tablet (88 mcg total) by mouth before breakfast. 30 tablet 11    OMEGA-3 FATTY ACIDS (FISH OIL CONCENTRATE ORAL) Take 1 capsule  by mouth Daily.      telmisartan (MICARDIS) 40 MG Tab Take 1 tablet (40 mg total) by mouth once daily. 90 tablet 3    testosterone cypionate (DEPOTESTOTERONE CYPIONATE) 200 mg/mL injection Inject 200 mg into the muscle every 14 (fourteen) days.      tiZANidine (ZANAFLEX) 4 MG tablet Take 1 tablet (4 mg total) by mouth 3 (three) times daily as needed (muscle spasms). 30 tablet 2    oxyCODONE-acetaminophen (PERCOCET)  mg per tablet Take 1 tablet by mouth every 8 (eight) hours as needed for Pain. 30 tablet 0    torsemide (DEMADEX) 20 MG Tab TAKE 1 TABLET BY MOUTH EVEYR MONDAY, WEDNESDAY, AND FRIDAYS 15 tablet 5     No current facility-administered medications on file prior to visit.       Family History   Problem Relation Name Age of Onset    Aortic stenosis Mother      Heart disease Mother          aortic stenosis    Osteoporosis Mother      COPD Father      Diabetes Father      Osteoporosis Father      Heart attack Brother      Breast cancer Maternal Aunt      No Known Problems Daughter      No Known Problems Daughter      No Known Problems Daughter      No Known Problems Son         Review of Systems   Constitutional:  Positive for appetite change and unexpected weight change. Negative for chills and fever.   HENT:  Negative for sore throat and trouble swallowing.    Eyes:  Negative for pain and visual disturbance.   Respiratory:  Negative for cough, chest tightness, shortness of breath and wheezing.    Cardiovascular:  Positive for leg swelling. Negative for chest pain and palpitations.   Gastrointestinal:  Negative for abdominal pain, blood in stool, constipation, diarrhea and nausea.   Genitourinary:  Negative for difficulty urinating, dysuria and hematuria.   Musculoskeletal:  Negative for arthralgias, gait problem and neck pain.   Skin:  Negative for rash and wound.   Neurological:  Negative for dizziness, weakness, light-headedness and headaches.   Hematological:  Negative for adenopathy.  "  Psychiatric/Behavioral:  Positive for depression. Negative for dysphoric mood.        Objective:      BP (!) 126/56   Pulse 73   Ht 5' 11" (1.803 m)   Wt 80.1 kg (176 lb 9.4 oz)   SpO2 97%   BMI 24.63 kg/m²   Physical Exam  Constitutional:       General: He is not in acute distress.     Appearance: He is well-developed.   HENT:      Head: Normocephalic and atraumatic.      Right Ear: External ear normal.      Left Ear: External ear normal.      Mouth/Throat:      Pharynx: Uvula midline. No oropharyngeal exudate.   Eyes:      General: Lids are normal.      Conjunctiva/sclera: Conjunctivae normal.      Pupils: Pupils are equal, round, and reactive to light.   Neck:      Thyroid: No thyroid mass or thyromegaly.      Trachea: Phonation normal.   Cardiovascular:      Rate and Rhythm: Normal rate and regular rhythm.      Heart sounds: Normal heart sounds. No murmur heard.     No friction rub. No gallop.   Pulmonary:      Effort: Pulmonary effort is normal. No respiratory distress.      Breath sounds: Normal breath sounds. No wheezing or rales.   Musculoskeletal:         General: Normal range of motion.      Cervical back: Full passive range of motion without pain, normal range of motion and neck supple.      Right lower le+ Edema present.      Left lower le+ Edema present.   Lymphadenopathy:      Cervical: No cervical adenopathy.      Upper Body:      Right upper body: No supraclavicular or axillary adenopathy.      Left upper body: No supraclavicular or axillary adenopathy.   Skin:     General: Skin is warm and dry.   Neurological:      Mental Status: He is alert and oriented to person, place, and time.      Cranial Nerves: No cranial nerve deficit.      Coordination: Coordination normal.   Psychiatric:         Speech: Speech normal.         Behavior: Behavior normal.         Thought Content: Thought content normal.         Judgment: Judgment normal.         Results for orders placed or performed in visit " on 02/06/25   COPPER, SERUM    Collection Time: 02/06/25  3:03 PM   Result Value Ref Range    Copper 924 665 - 1480 ug/L   Comprehensive Metabolic Panel    Collection Time: 02/06/25  3:04 PM   Result Value Ref Range    Sodium 140 136 - 145 mmol/L    Potassium 4.5 3.5 - 5.1 mmol/L    Chloride 114 (H) 95 - 110 mmol/L    CO2 19 (L) 23 - 29 mmol/L    Glucose 109 70 - 110 mg/dL    BUN 28 (H) 8 - 23 mg/dL    Creatinine 1.1 0.5 - 1.4 mg/dL    Calcium 8.3 (L) 8.7 - 10.5 mg/dL    Total Protein 6.1 6.0 - 8.4 g/dL    Albumin 3.3 (L) 3.5 - 5.2 g/dL    Total Bilirubin 0.4 0.1 - 1.0 mg/dL    Alkaline Phosphatase 76 40 - 150 U/L    AST 18 10 - 40 U/L    ALT 14 10 - 44 U/L    eGFR >60.0 >60 mL/min/1.73 m^2    Anion Gap 7 (L) 8 - 16 mmol/L   CBC Auto Differential    Collection Time: 02/06/25  3:04 PM   Result Value Ref Range    WBC 6.15 3.90 - 12.70 K/uL    RBC 3.51 (L) 4.60 - 6.20 M/uL    Hemoglobin 10.0 (L) 14.0 - 18.0 g/dL    Hematocrit 30.6 (L) 40.0 - 54.0 %    MCV 87 82 - 98 fL    MCH 28.5 27.0 - 31.0 pg    MCHC 32.7 32.0 - 36.0 g/dL    RDW 19.9 (H) 11.5 - 14.5 %    Platelets 262 150 - 450 K/uL    MPV 10.7 9.2 - 12.9 fL    Immature Granulocytes 0.2 0.0 - 0.5 %    Gran # (ANC) 3.6 1.8 - 7.7 K/uL    Immature Grans (Abs) 0.01 0.00 - 0.04 K/uL    Lymph # 1.2 1.0 - 4.8 K/uL    Mono # 0.5 0.3 - 1.0 K/uL    Eos # 0.8 (H) 0.0 - 0.5 K/uL    Baso # 0.12 0.00 - 0.20 K/uL    nRBC 0 0 /100 WBC    Gran % 58.1 38.0 - 73.0 %    Lymph % 19.5 18.0 - 48.0 %    Mono % 7.8 4.0 - 15.0 %    Eosinophil % 12.4 (H) 0.0 - 8.0 %    Basophil % 2.0 (H) 0.0 - 1.9 %    Differential Method Automated    Peripheral Smear Review for Hemolysis or Low Platelets    Collection Time: 02/06/25  3:04 PM   Result Value Ref Range    Pathologist Review Review completed    Sedimentation rate    Collection Time: 02/06/25  3:04 PM   Result Value Ref Range    Sed Rate 8 0 - 23 mm/Hr   Iron and TIBC    Collection Time: 02/06/25  3:04 PM   Result Value Ref Range    Iron 43 (L)  45 - 160 ug/dL    Transferrin 284 200 - 375 mg/dL    TIBC 420 250 - 450 ug/dL    Saturated Iron 10 (L) 20 - 50 %   Ferritin    Collection Time: 02/06/25  3:04 PM   Result Value Ref Range    Ferritin 36 20.0 - 300.0 ng/mL   Haptoglobin    Collection Time: 02/06/25  3:04 PM   Result Value Ref Range    Haptoglobin 40 30 - 250 mg/dL   Folate    Collection Time: 02/06/25  3:04 PM   Result Value Ref Range    Folate 17.2 4.0 - 24.0 ng/mL   Lactate Dehydrogenase    Collection Time: 02/06/25  3:04 PM   Result Value Ref Range     110 - 260 U/L   Methylmalonic Acid, Serum    Collection Time: 02/06/25  3:04 PM   Result Value Ref Range    Methlymalonic Acid 1.31 (H) <0.40 umol/L   VITAMIN B1    Collection Time: 02/06/25  3:04 PM   Result Value Ref Range    Thiamine 91 38 - 122 ug/L   VITAMIN B6    Collection Time: 02/06/25  3:04 PM   Result Value Ref Range    Vitamin B6 14 5 - 50 ug/L   ZINC    Collection Time: 02/06/25  3:04 PM   Result Value Ref Range    Zinc 36 (L) 60 - 130 ug/dL   VITAMIN E    Collection Time: 02/06/25  3:04 PM   Result Value Ref Range    Vitamin E 688 500 - 1800 ug/dL   Vitamin D    Collection Time: 02/06/25  3:04 PM   Result Value Ref Range    Vit D, 25-Hydroxy 34 30 - 96 ng/mL   Pathologist Review    Collection Time: 02/06/25  3:04 PM   Result Value Ref Range    Pathologist Review Peripheral Smear REVIEWED      *Note: Due to a large number of results and/or encounters for the requested time period, some results have not been displayed. A complete set of results can be found in Results Review.     Labs and hospital records reviewed     Assessment:       1. Major depressive disorder, single episode, moderate    2. Chronic anemia    3. Imbalance    4. Depression, unspecified depression type    5. Essential hypertension    6. Persistent atrial fibrillation    7. Hypothyroidism due to acquired atrophy of thyroid                        Plan:       Major depressive disorder, single episode,  moderate    Chronic anemia    Imbalance    Depression, unspecified depression type  -     mirtazapine (REMERON) 30 MG tablet; Take 1 tablet (30 mg total) by mouth every evening.  Dispense: 30 tablet; Refill: 11    Essential hypertension    Persistent atrial fibrillation    Hypothyroidism due to acquired atrophy of thyroid          Hematology f/u as planned     Continue PT referral for balance  Continue wellbutrin, Lexapro; and increase to remeron 30mg QHS to assist with depression, insomnia and appetite  Discussed compression socks daily  F/u with cardiology, ID, ortho as planned  Continue other present meds  Counseled on regular exercise, maintenance of a healthy weight, balanced diet rich in fruits/vegetables and lean protein, and avoidance of unhealthy habits like smoking and excessive alcohol intake.    F/u 3 months as planned    Visit today included increased complexity associated with the care of the episodic problems listed above addressed and managing the longitudinal care of the patient due to the serious and/or complex managed problem(s) listed above.

## 2025-02-13 ENCOUNTER — DOCUMENTATION ONLY (OUTPATIENT)
Dept: HEMATOLOGY/ONCOLOGY | Facility: CLINIC | Age: 80
End: 2025-02-13
Payer: MEDICARE

## 2025-02-13 NOTE — PROGRESS NOTES
Called and spoke to Lisbeth about referral.  Pt has seen Dr Grigsby on 2/6 and has a follow up appt with her on 2/20/25.  Dr Grigsby is aware of abnormal labs,   Labs done on January 28, 2025 showed:  Kappa over lambda ratio was found to be mildly elevated at 1.81 with a kappa free light chain of 6.3 and lambda free light chain 3.49.  Serum immunofixation showed no abnormality.  Urine immunofixation showed 2 closely adjacent kappa light chain specific monoclonal protein band in the gamma area.

## 2025-02-14 ENCOUNTER — TELEPHONE (OUTPATIENT)
Dept: CARDIOLOGY | Facility: HOSPITAL | Age: 80
End: 2025-02-14
Payer: MEDICARE

## 2025-02-14 ENCOUNTER — PATIENT MESSAGE (OUTPATIENT)
Dept: CARDIOLOGY | Facility: CLINIC | Age: 80
End: 2025-02-14
Payer: MEDICARE

## 2025-02-14 DIAGNOSIS — I48.19 PERSISTENT ATRIAL FIBRILLATION: Primary | ICD-10-CM

## 2025-02-14 RX ORDER — SODIUM CHLORIDE 9 MG/ML
INJECTION, SOLUTION INTRAVENOUS CONTINUOUS
OUTPATIENT
Start: 2025-02-14

## 2025-02-14 RX ORDER — MUPIROCIN 20 MG/G
OINTMENT TOPICAL
OUTPATIENT
Start: 2025-02-14

## 2025-02-14 NOTE — TELEPHONE ENCOUNTER
Patient called into clinic earlier c/o SOB with exertion since back in AF  Transmission sent, persistent AF  Requested DCCV [place]  Discussed with Dr. Arnold, agreeable to DCCV  Called patient to review scheduled time, left VM  Followed-up with patient, stated pre-op has already called and confirmed date and time

## 2025-02-17 ENCOUNTER — ANESTHESIA (OUTPATIENT)
Dept: ENDOSCOPY | Facility: HOSPITAL | Age: 80
End: 2025-02-17
Payer: MEDICARE

## 2025-02-17 ENCOUNTER — HOSPITAL ENCOUNTER (OUTPATIENT)
Dept: CARDIOLOGY | Facility: HOSPITAL | Age: 80
Discharge: HOME | End: 2025-02-17
Attending: INTERNAL MEDICINE | Admitting: INTERNAL MEDICINE
Payer: MEDICARE

## 2025-02-17 ENCOUNTER — HOSPITAL ENCOUNTER (OUTPATIENT)
Facility: HOSPITAL | Age: 80
Discharge: HOME | End: 2025-02-17
Attending: INTERNAL MEDICINE | Admitting: INTERNAL MEDICINE
Payer: MEDICARE

## 2025-02-17 ENCOUNTER — ANESTHESIA EVENT (OUTPATIENT)
Dept: ENDOSCOPY | Facility: HOSPITAL | Age: 80
End: 2025-02-17
Payer: MEDICARE

## 2025-02-17 DIAGNOSIS — I48.0 PAF (PAROXYSMAL ATRIAL FIBRILLATION): ICD-10-CM

## 2025-02-17 DIAGNOSIS — I48.19 PERSISTENT ATRIAL FIBRILLATION: ICD-10-CM

## 2025-02-17 DIAGNOSIS — I48.0 PAF (PAROXYSMAL ATRIAL FIBRILLATION): Primary | ICD-10-CM

## 2025-02-17 LAB
ASCENDING AORTA: 3.6 CM
OHS QRS DURATION: 96 MS
OHS QTC CALCULATION: 467 MS
SINUS: 3.9 CM
STJ: 2.7 CM

## 2025-02-17 PROCEDURE — 37000008 HC ANESTHESIA 1ST 15 MINUTES: Mod: HCNC,PO,TXP | Performed by: INTERNAL MEDICINE

## 2025-02-17 PROCEDURE — 93005 ELECTROCARDIOGRAM TRACING: CPT | Mod: HCNC,PO,TXP

## 2025-02-17 PROCEDURE — 63600175 PHARM REV CODE 636 W HCPCS: Mod: HCNC,PO,TXP | Performed by: NURSE ANESTHETIST, CERTIFIED REGISTERED

## 2025-02-17 PROCEDURE — 37000009 HC ANESTHESIA EA ADD 15 MINS: Mod: HCNC,PO,TXP | Performed by: INTERNAL MEDICINE

## 2025-02-17 PROCEDURE — 92960 CARDIOVERSION ELECTRIC EXT: CPT | Mod: HCNC,PO,TXP

## 2025-02-17 PROCEDURE — 63600175 PHARM REV CODE 636 W HCPCS: Mod: HCNC,PO,TXP | Performed by: INTERNAL MEDICINE

## 2025-02-17 RX ORDER — ENOXAPARIN SODIUM 100 MG/ML
1 INJECTION SUBCUTANEOUS ONCE
Status: DISCONTINUED | OUTPATIENT
Start: 2025-02-17 | End: 2025-02-17

## 2025-02-17 RX ORDER — PROPOFOL 10 MG/ML
VIAL (ML) INTRAVENOUS
Status: DISCONTINUED | OUTPATIENT
Start: 2025-02-17 | End: 2025-02-17

## 2025-02-17 RX ORDER — ENOXAPARIN SODIUM 100 MG/ML
80 INJECTION SUBCUTANEOUS ONCE
Qty: 0.8 ML | Refills: 0 | Status: SHIPPED | OUTPATIENT
Start: 2025-02-17 | End: 2025-02-17 | Stop reason: HOSPADM

## 2025-02-17 RX ORDER — SODIUM CHLORIDE 9 MG/ML
INJECTION, SOLUTION INTRAVENOUS CONTINUOUS
Status: DISCONTINUED | OUTPATIENT
Start: 2025-02-17 | End: 2025-02-17 | Stop reason: HOSPADM

## 2025-02-17 RX ORDER — LIDOCAINE HYDROCHLORIDE 20 MG/ML
INJECTION INTRAVENOUS
Status: DISCONTINUED | OUTPATIENT
Start: 2025-02-17 | End: 2025-02-17

## 2025-02-17 RX ORDER — SODIUM CHLORIDE, SODIUM LACTATE, POTASSIUM CHLORIDE, CALCIUM CHLORIDE 600; 310; 30; 20 MG/100ML; MG/100ML; MG/100ML; MG/100ML
INJECTION, SOLUTION INTRAVENOUS CONTINUOUS
Status: DISCONTINUED | OUTPATIENT
Start: 2025-02-17 | End: 2025-02-17 | Stop reason: HOSPADM

## 2025-02-17 RX ORDER — MUPIROCIN 20 MG/G
OINTMENT TOPICAL
Status: DISCONTINUED | OUTPATIENT
Start: 2025-02-17 | End: 2025-02-17 | Stop reason: HOSPADM

## 2025-02-17 RX ADMIN — SODIUM CHLORIDE, POTASSIUM CHLORIDE, SODIUM LACTATE AND CALCIUM CHLORIDE: 600; 310; 30; 20 INJECTION, SOLUTION INTRAVENOUS at 12:02

## 2025-02-17 RX ADMIN — LIDOCAINE HYDROCHLORIDE 100 MG: 20 INJECTION INTRAVENOUS at 12:02

## 2025-02-17 RX ADMIN — PROPOFOL 80 MG: 10 INJECTION, EMULSION INTRAVENOUS at 12:02

## 2025-02-17 RX ADMIN — PROPOFOL 30 MG: 10 INJECTION, EMULSION INTRAVENOUS at 12:02

## 2025-02-17 NOTE — DISCHARGE INSTRUCTIONS
PEPE / Cardioversion    DO:  Resume all medications today unless otherwise instructed.    DO NOT:  Drive for 24 hours.  Operate heavy machinery.  Sign legal papers.  Make major decisions.  Drink alcohol or smoke for 24 hours.    WHAT TO EXPECT:  Sore throat. This can be soothed with ice chips or lozenges.    Your physician will contact you with results and further instruct you on how you should follow-up.    Contact [phone] for questions.

## 2025-02-17 NOTE — ANESTHESIA PREPROCEDURE EVALUATION
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    Pre-operative evaluation for Procedure(s) (LRB):  ECHOCARDIOGRAM, TRANSESOPHAGEAL (N/A)        Patient Active Problem List   Diagnosis    BPH with urinary obstruction    Elevated PSA    Chronic atrial fibrillation    Anemia due to chronic blood loss    Metatarsalgia    Keratoma    Foot pain, right    Onychomycosis due to dermatophyte    ED (erectile dysfunction)    Bradycardia    Mild single current episode of major depressive disorder    Tortuous aorta    Dyspnea    Acquired hypothyroidism    Essential hypertension    Gait abnormality    Chronic kidney disease, stage 3a    Complex sleep apnea syndrome    Erectile dysfunction due to arterial insufficiency    Primary osteoarthritis of right knee    History of bleeding peptic ulcer    History of TIA (transient ischemic attack)    Closed nondisplaced intertrochanteric fracture of left femur    Stroke    Hx of colonic polyps    Age-related osteoporosis with current pathological fracture with routine healing    Hyperparathyroidism    Ventral hernia without obstruction or gangrene    ILD (interstitial lung disease)    Wears contact lenses    ACP (advance care planning)    Pacemaker    Pyogenic arthritis of right knee joint    Infected prosthetic knee joint    Hyperkalemia    Acute blood loss anemia    Persistent atrial fibrillation    Chronic heart failure with preserved ejection fraction (HFpEF)    At high risk for bleeding    Nonrheumatic mitral valve regurgitation    Traumatic hematoma of right knee    Infected hematoma    Major depressive disorder, single episode, moderate       Review of patient's allergies indicates:   Allergen Reactions    No known drug allergies        No current facility-administered medications on file prior  to encounter.     Current Outpatient Medications on File Prior to Encounter   Medication Sig Dispense Refill    aspirin (ECOTRIN) 81 MG EC tablet Take 1 tablet (81 mg total) by mouth once daily. 90 tablet 3    buPROPion (WELLBUTRIN XL) 150 MG TB24 tablet Take 1 tablet (150 mg total) by mouth once daily. 90 tablet 3    buPROPion (WELLBUTRIN XL) 300 MG 24 hr tablet Take 1 tablet (300 mg total) by mouth once daily. Plus 150 90 tablet 0    calcitRIOL (ROCALTROL) 0.25 MCG Cap Take 1 capsule (0.25 mcg total) by mouth every Mon, Wed, Fri. 39 capsule 1    carvediloL (COREG) 12.5 MG tablet Take 1 tablet (12.5 mg total) by mouth 2 (two) times daily. 180 tablet 3    dofetilide (TIKOSYN) 250 MCG Cap Take 1 capsule (250 mcg total) by mouth every 12 (twelve) hours. 60 capsule 11    EScitalopram oxalate (LEXAPRO) 10 MG tablet Take 1 tablet (10 mg total) by mouth once daily. 90 tablet 3    ICAR-C PLUS Tab TAKE 1 TABLET BY MOUTH ONCE DAILY 90 tablet 3    levothyroxine (SYNTHROID) 88 MCG tablet Take 1 tablet (88 mcg total) by mouth before breakfast. 30 tablet 11    mirtazapine (REMERON) 30 MG tablet Take 1 tablet (30 mg total) by mouth every evening. 30 tablet 11    OMEGA-3 FATTY ACIDS (FISH OIL CONCENTRATE ORAL) Take 1 capsule by mouth Daily.      telmisartan (MICARDIS) 40 MG Tab Take 1 tablet (40 mg total) by mouth once daily. 90 tablet 3    tiZANidine (ZANAFLEX) 4 MG tablet Take 1 tablet (4 mg total) by mouth 3 (three) times daily as needed (muscle spasms). 30 tablet 2    oxyCODONE-acetaminophen (PERCOCET)  mg per tablet Take 1 tablet by mouth every 8 (eight) hours as needed for Pain. 30 tablet 0    testosterone cypionate (DEPOTESTOTERONE CYPIONATE) 200 mg/mL injection Inject 200 mg into the muscle every 14 (fourteen) days.      torsemide (DEMADEX) 20 MG Tab TAKE 1 TABLET BY MOUTH EVEYR MONDAY, WEDNESDAY, AND FRIDAYS 15 tablet 5       Past Surgical History:   Procedure Laterality Date    A-V CARDIAC PACEMAKER INSERTION Left  9/23/2023    Procedure: Dual Chamber PPM (Heart) Rm 2620;  Surgeon: Pan Moss MD;  Location: [facility] [place] CATH;  Service: Cardiology;  Laterality: Left;    ABDOMINAL HERNIA REPAIR      ABLATION OF ARRHYTHMOGENIC FOCUS FOR ATRIAL FIBRILLATION N/A 6/15/2020    Procedure: Ablation atrial fibrillation;  Surgeon: Wyatt Beatty MD;  Location: [facility] EP LAB;  Service: Cardiology;  Laterality: N/A;  PAF, AFl, PEPE, PVI re-do, CTI, RFA, JUSTIN, Gen, SK, 3 Prep    APPLICATION OF WOUND VACUUM-ASSISTED CLOSURE DEVICE Right 12/9/2023    Procedure: APPLICATION, WOUND VAC;  Surgeon: Jelani Tello II, MD;  Location: [facility] [place] OR;  Service: Orthopedics;  Laterality: Right;    CARDIOVERSION N/A 2/28/2024    Procedure: Cardioversion;  Surgeon: Pao Hare MD;  Location: [facility];  Service: Cardiology;  Laterality: N/A;    CARDIOVERSION N/A 10/17/2024    Procedure: Cardioversion;  Surgeon: Pan Moss MD;  Location: [facility];  Service: Cardiology;  Laterality: N/A;    CATARACT EXTRACTION  11/25/2013    bilateral    CHOLECYSTECTOMY      CLOSURE OF LEFT ATRIAL APPENDAGE USING DEVICE N/A 5/23/2024    Procedure: Left atrial appendage closure device;  Surgeon: Tony Duvall MD;  Location: [facility] CATH LAB;  Service: Cardiology;  Laterality: N/A;    COLONOSCOPY N/A 11/25/2015    Procedure: COLONOSCOPY;  Surgeon: Toby Hernandez MD;  Location: [facility];  Service: Endoscopy;  Laterality: N/A;    COLONOSCOPY N/A 11/13/2020    Procedure: COLONOSCOPY;  Surgeon: Toby Hernandez MD;  Location: [facility] ENDO;  Service: Endoscopy;  Laterality: N/A;    COLONOSCOPY N/A 5/1/2024    Procedure: COLONOSCOPY;  Surgeon: Toby Hernandez MD;  Location: [facility] ENDO;  Service: Endoscopy;  Laterality: N/A;    COLONOSCOPY N/A 1/16/2025    Procedure: COLONOSCOPY;  Surgeon: Milton Rodriguez MD;  Location: [facility] ENDO ([facility] FLR);  Service: Endoscopy;  Laterality: N/A;  11/19 portal-peg-pt stated not on eliquis- Repeat colonoscopy in 4 months for  surveillance.[id number]  1/9 SWP PRECALL COMPLETE-RT    CYSTOSCOPY WITH INSERTION OF MINIMALLY INVASIVE IMPLANT TO ENLARGE PROSTATIC URETHRA N/A 11/28/2022    Procedure: CYSTOSCOPY, WITH INSERTION OF UROLIFT IMPLANT;  Surgeon: Yakov Shetty MD;  Location: [facility] OR;  Service: Urology;  Laterality: N/A;    ENDOSCOPIC MUCOSAL RESECTION OF COLON N/A 9/9/2024    Procedure: RESECTION, MUCOSA, COLON, ENDOSCOPIC;  Surgeon: Milton Rodriguez MD;  Location: [facility] (2ND FLR);  Service: Endoscopy;  Laterality: N/A;  5/23 portal-peg-colonscopy with EMR Main. 90 minutes.  24 hours of clear liquid diet. Referring: Toby Hernandez MD- MichaelJovana HAUSER 5/23- tt .  7/10/24: PEG prep. Pt off plavix now per cards. instructions sent via portal-GD  8/16/24: pt    ESOPHAGOGASTRODUODENOSCOPY N/A 5/1/2024    Procedure: ESOPHAGOGASTRODUODENOSCOPY (EGD);  Surgeon: Toby Hernandez MD;  Location: [facility];  Service: Endoscopy;  Laterality: N/A;    EYE SURGERY      GASTRIC BYPASS  1992    INCISION AND DRAINAGE OF KNEE Right 11/8/2024    Procedure: INCISION AND DRAINAGE, KNEE, EVACUATION HEMATOMA;  Surgeon: Dane Smith MD;  Location: [facility] OR;  Service: Orthopedics;  Laterality: Right;    INSERTION, PACEMAKER, TEMPORARY TRANSVENOUS  9/22/2023    Procedure: Temp pacer insertion ER Rm 8;  Surgeon: Pan Moss MD;  Location: [facility] CATH;  Service: Cardiology;;    INTRAMEDULLARY RODDING OF FEMUR Left 7/18/2020    Procedure: INSERTION, INTRAMEDULLARY ERIC, FEMUR, left, Synthes, Mulberry table, Large C arm clock side,;  Surgeon: Tony Rodriguez MD;  Location: [facility] OR 2ND FLR;  Service: Orthopedics;  Laterality: Left;    IRRIGATION AND DEBRIDEMENT Right 12/9/2023    Procedure: IRRIGATION AND DEBRIDEMENT KNEE;  Surgeon: Jelani Tello II, MD;  Location: [facility] OR;  Service: Orthopedics;  Laterality: Right;    KNEE ARTHROSCOPY      RT    LASIK  2001    both eyes (Dr. Rabago)    ORIF HUMERUS FRACTURE      LT    ORIF HUMERUS  FRACTURE Right     non surgical repair    RADIOFREQUENCY ABLATION      x2    REVISION OF KNEE ARTHROPLASTY Right 12/9/2023    Procedure: REVISION ARTHROPLASTY KNEE- Liner Replacement - dirty/clean;  Surgeon: Jelani Tello II, MD;  Location: [facility];  Service: Orthopedics;  Laterality: Right;  dirty to clean at 1940    Right ankle tendon repair      ROBOT-ASSISTED REPAIR OF INCISIONAL HERNIA USING DA GARETH XI Left 6/13/2022    Procedure: XI ROBOTIC REPAIR, HERNIA, INCISIONAL (LEFT SIDE SPIGELIAN WITH MESH);  Surgeon: Rosendo Marti MD;  [address]  Service: General;  Laterality: Left;  consent in am    ROTATOR CUFF REPAIR      right    TOTAL KNEE ARTHROPLASTY Right 5/29/2018    Procedure: REPLACEMENT-KNEE-TOTAL-pro;  Surgeon: Denzel Dallas MD;  [facility];  Service: Orthopedics;  Laterality: Right;  Pro    TRANSESOPHAGEAL ECHOCARDIOGRAPHY N/A 4/23/2024    Procedure: ECHOCARDIOGRAM, TRANSESOPHAGEAL;  Surgeon: Eyal Bravo Diagnostic;  Location: [facility] EP LAB;  Service: Cardiology;  Laterality: N/A;    TRANSESOPHAGEAL ECHOCARDIOGRAPHY N/A 5/23/2024    Procedure: ECHOCARDIOGRAM, TRANSESOPHAGEAL;  Surgeon: Kj Newton MD;  Location: [facility] CATH LAB;  Service: Cardiology;  Laterality: N/A;    TRANSESOPHAGEAL ECHOCARDIOGRAPHY N/A 7/9/2024    Procedure: ECHOCARDIOGRAM, TRANSESOPHAGEAL;  Surgeon: Jan Bravo Diagnostic;  Location: [facility] EP LAB;  Service: Cardiology;  Laterality: N/A;    TREATMENT OF CARDIAC ARRHYTHMIA  6/15/2020    Procedure: Cardioversion or Defibrillation;  Surgeon: Wyatt Beatty MD;  Location: [facility] EP LAB;  Service: Cardiology;;    TREATMENT OF CARDIAC ARRHYTHMIA N/A 2/28/2024    Procedure: Cardioversion or Defibrillation;  Surgeon: Pao Hare MD;  [facility];  Service: Cardiology;  Laterality: N/A;    TREATMENT OF CARDIAC ARRHYTHMIA N/A 9/11/2024    Procedure: Cardioversion/Defibrillation;  Surgeon: Pan Moss MD;   "Location: [facility];  Service: Cardiology;  Laterality: N/A;       Social History     Socioeconomic History    Marital status:     Number of children: 5   Occupational History    Occupation: retired anesthesiology     Employer: [employer] ([employer])    Occupation: [employer] grad     Comment: previous football player   Tobacco Use    Smoking status: Never     Passive exposure: Never    Smokeless tobacco: Never    Tobacco comments:     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    Substance and Sexual Activity    Alcohol use: No    Drug use: No    Sexual activity: Yes     Partners: Female     Social Drivers of Health     Financial Resource Strain: Low Risk  (10/21/2024)    Overall Financial Resource Strain (CARDIA)     Difficulty of Paying Living Expenses: Not hard at all   Food Insecurity: No Food Insecurity (11/8/2024)    Hunger Vital Sign     Worried About Running Out of Food in the Last Year: Never true     Ran Out of Food in the Last Year: Never true   Transportation Needs: No Transportation Needs (11/8/2024)    TRANSPORTATION NEEDS     Transportation : No   Physical Activity: Sufficiently Active (4/22/2024)    Exercise Vital Sign     Days of Exercise per Week: 5 days     Minutes of Exercise per Session: 130 min   Stress: No Stress Concern Present (10/21/2024)    [employer] [place] [employer] - Occupational Stress Questionnaire     Feeling of Stress : Not at all   Housing Stability: Unknown (11/8/2024)    Housing Stability Vital Sign     Unable to Pay for Housing in the Last Year: No     Homeless in the Last Year: No         CBC: No results for input(s): "WBC", "RBC", "HGB", "HCT", "PLT", "MCV", "MCH", "MCHC" in the last 72 hours.    CMP: No results for input(s): "NA", "K", "CL", "CO2", "BUN", "CREATININE", "GLU", "MG", "PHOS", "CALCIUM", "ALBUMIN", "PROT", "ALKPHOS", "ALT", "AST", "BILITOT" in the last 72 hours.    INR  No results for input(s): "PT", "INR", "PROTIME", "APTT" in the last 72 " hours.            2D Echo:  No results found. However, due to the size of the patient record, not all encounters were searched. Please check Results Review for a complete set of results.        Pre-op Assessment    I have reviewed the Patient Summary Reports.     I have reviewed the Nursing Notes. I have reviewed the NPO Status.   I have reviewed the Medications.     Review of Systems  Anesthesia Hx:  No problems with previous Anesthesia   History of prior surgery of interest to airway management or planning:            Denies Personal Hx of Anesthesia complications.                    Hematology/Oncology:  Hematology Normal   Oncology Normal                                   EENT/Dental:  EENT/Dental Normal           Cardiovascular:     Hypertension    Dysrhythmias atrial fibrillation         ECG has been reviewed. Normal BiV Fxn  Mod MR PA 46                           Pulmonary:      Shortness of breath  Sleep Apnea                Renal/:  Chronic Renal Disease                Hepatic/GI:   PUD,   Liver Disease, Hepatitis              Musculoskeletal:  Arthritis               Neurological:   CVA                                    Endocrine:   Hypothyroidism          Dermatological:  Skin Normal    Psych:  Psychiatric History                  Physical Exam  General: Well nourished    Airway:  Mallampati: III / II  Mouth Opening: Normal  TM Distance: Normal  Tongue: Normal  Neck ROM: Normal ROM    Chest/Lungs:  Normal Respiratory Rate    Heart:  Rate: Normal        Anesthesia Plan  Type of Anesthesia, risks & benefits discussed:    Anesthesia Type: Gen Natural Airway  Intra-op Monitoring Plan: Standard ASA Monitors  Post Op Pain Control Plan: multimodal analgesia  Induction:  IV  Informed Consent: Informed consent signed with the Patient and all parties understand the risks and agree with anesthesia plan.  All questions answered.   ASA Score: 3  Day of Surgery Review of History & Physical: H&P Update referred to  the surgeon/provider.  Anesthesia Plan Notes: NPO confirmed  Normal BiV fxn, pacemaker  No history of anesthesia problems.    Ready For Surgery From Anesthesia Perspective.     .

## 2025-02-17 NOTE — ANESTHESIA POSTPROCEDURE EVALUATION
Anesthesia Post Evaluation    Patient: Dominick Song MD    Procedure(s) Performed: Procedure(s) (LRB):  Transesophageal echo (PEPE) intra-procedure log documentation (N/A)  Cardioversion or Defibrillation (N/A)    Final Anesthesia Type: general      Patient location during evaluation: PACU  Patient participation: Yes- Able to Participate  Level of consciousness: awake and alert and oriented  Post-procedure vital signs: reviewed and stable  Pain management: adequate  Airway patency: patent    PONV status at discharge: No PONV  Anesthetic complications: no      Cardiovascular status: blood pressure returned to baseline  Respiratory status: unassisted, spontaneous ventilation and room air  Hydration status: euvolemic  Follow-up not needed.              Vitals Value Taken Time   /78 02/17/25 13:45   Temp 36.6 °C (97.8 °F) 02/17/25 13:00   Pulse 55 02/17/25 13:45   Resp 16 02/17/25 13:45   SpO2 98 % 02/17/25 13:45         Event Time   Out of Recovery 13:45:00         Pain/Pari Score: Pari Score: 10 (2/17/2025  1:33 PM)

## 2025-02-17 NOTE — DISCHARGE SUMMARY
[facility]  Cardiology  Discharge Summary      Patient Name: Dominick Song MD  MRN: [id number]  Admission Date: 2/17/2025  Hospital Length of Stay: 0 days  Discharge Date and Time: 2/17/2025  1:53 PM  Attending Physician: No att. providers found  Discharging Provider: Jewel Arnold MD  Primary Care Physician: Maxi Gaines MD    Hospital Course:  Successful PEPE/CV to an atrial paced rhythm.  Patient instructed to resume Eliquis 5 mg b.i.d. for 30 days.  During this time, he was instructed to hold aspirin.  On 03/20/2025 he will restart aspirin and hold Eliquis indefinitely.   milliseconds. Continue Tikosyn.         HPI:  Patient presents for PEPE/cardioversion.  Given Lovenox 1 milligram/kilogram prior to cardioversion.  ECG shows rate controlled AF.    Procedure(s) (LRB):  Transesophageal echo (PEPE) intra-procedure log documentation (N/A)  Cardioversion or Defibrillation (N/A)     Indwelling Lines/Drains at time of discharge:  Lines/Drains/Airways       None                   Significant Diagnostic Studies: Labs: All labs within the past 24 hours have been reviewed    Pending Diagnostic Studies:       None            There are no hospital problems to display for this patient.      Discharged Condition: stable    Follow Up:    Patient Instructions:   No discharge procedures on file.  Medications:  Reconciled Home Medications:      Medication List        PAUSE taking these medications      aspirin 81 MG EC tablet  Wait to take this until: March 20, 2025  Commonly known as: ECOTRIN  Take 1 tablet (81 mg total) by mouth once daily.            START taking these medications      apixaban 5 mg Tab  Commonly known as: ELIQUIS  Take 1 tablet (5 mg total) by mouth 2 (two) times daily.            CONTINUE taking these medications      * buPROPion 150 MG TB24 tablet  Commonly known as: WELLBUTRIN XL  Take 1 tablet (150 mg total) by mouth once daily.     * buPROPion 300 MG 24 hr tablet  Commonly known  as: WELLBUTRIN XL  Take 1 tablet (300 mg total) by mouth once daily. Plus 150     calcitRIOL 0.25 MCG Cap  Commonly known as: ROCALTROL  Take 1 capsule (0.25 mcg total) by mouth every Mon, Wed, Fri.     carvediloL 12.5 MG tablet  Commonly known as: COREG  Take 1 tablet (12.5 mg total) by mouth 2 (two) times daily.     dofetilide 250 MCG Cap  Commonly known as: TIKOSYN  Take 1 capsule (250 mcg total) by mouth every 12 (twelve) hours.     EScitalopram oxalate 10 MG tablet  Commonly known as: LEXAPRO  Take 1 tablet (10 mg total) by mouth once daily.     FISH OIL CONCENTRATE ORAL  Take 1 capsule by mouth Daily.     ICAR-C PLUS Tab  Generic drug: iron-vit c-b12-folic acid  TAKE 1 TABLET BY MOUTH ONCE DAILY     levothyroxine 88 MCG tablet  Commonly known as: SYNTHROID  Take 1 tablet (88 mcg total) by mouth before breakfast.     mirtazapine 30 MG tablet  Commonly known as: REMERON  Take 1 tablet (30 mg total) by mouth every evening.     oxyCODONE-acetaminophen  mg per tablet  Commonly known as: PERCOCET  Take 1 tablet by mouth every 8 (eight) hours as needed for Pain.     telmisartan 40 MG Tab  Commonly known as: MICARDIS  Take 1 tablet (40 mg total) by mouth once daily.     testosterone cypionate 200 mg/mL injection  Commonly known as: DEPOTESTOTERONE CYPIONATE  Inject 200 mg into the muscle every 14 (fourteen) days.     tiZANidine 4 MG tablet  Commonly known as: ZANAFLEX  Take 1 tablet (4 mg total) by mouth 3 (three) times daily as needed (muscle spasms).     torsemide 20 MG Tab  Commonly known as: DEMADEX  TAKE 1 TABLET BY MOUTH EVEYR MONDAY, WEDNESDAY, AND FRIDAYS           * This list has 2 medication(s) that are the same as other medications prescribed for you. Read the directions carefully, and ask your doctor or other care provider to review them with you.                  Time spent on the discharge of patient: 30 minutes    Jewel Arnold MD  Cardiology  [place] [facility]

## 2025-02-17 NOTE — TRANSFER OF CARE
"Anesthesia Transfer of Care Note    Patient: Dominick Song MD    Procedure(s) Performed: Procedure(s) (LRB):  Transesophageal echo (PEPE) intra-procedure log documentation (N/A)  Cardioversion or Defibrillation (N/A)    Patient location: PACU    Anesthesia Type: general    Transport from OR: Transported from OR on room air with adequate spontaneous ventilation    Post pain: adequate analgesia    Post assessment: no apparent anesthetic complications and tolerated procedure well    Post vital signs: stable    Level of consciousness: sedated    Nausea/Vomiting: no nausea/vomiting    Complications: none    Transfer of care protocol was followed      Last vitals: Visit Vitals  /66 (BP Location: Right arm, Patient Position: Lying)   Pulse 64   Temp 36.6 °C (97.8 °F) (Skin)   Resp 16   Ht 5' 11" (1.803 m)   Wt 79.8 kg (176 lb)   SpO2 95%   BMI 24.55 kg/m²     "

## 2025-02-17 NOTE — PLAN OF CARE
Vital signs stable. Discharge instructions reviewed with patient, and wife.  Questions answered. Verbalized understanding.

## 2025-02-17 NOTE — H&P
[facility]  Cardiology  History and Physical     Patient Name: Dominick Song MD  MRN: [id number]  Admission Date: 2/17/2025  Code Status: Full Code   Attending Provider: Jewel Arnold MD   Primary Care Physician: Maxi Gaines MD  Principal Problem:<principal problem not specified>    Patient information was obtained from patient.     Subjective:     Chief Complaint:  AF     HPI:  Patient is here for PEPE cardioversion.  ECG shows AF.    Past Medical History:   Diagnosis Date    Anticoagulant long-term use     Anxiety     Arthritis     Atrial fibrillation     BPH (benign prostatic hyperplasia)     Cataract     CKD (chronic kidney disease) stage 3, GFR 30-59 ml/min 07/10/2017    Followed by Dr. Jeevan Pond    Colon polyp     benign    Depression     Elevated PSA     Erectile dysfunction     Gastric ulcer with hemorrhage     Hep B w/o coma 1977    History of bleeding peptic ulcer     History of prostatitis     Hypertension     PAF (paroxysmal atrial fibrillation)     Sleep apnea     PT DENIES    Stroke     TIA December 2019    Thyroid disease        Past Surgical History:   Procedure Laterality Date    A-V CARDIAC PACEMAKER INSERTION Left 9/23/2023    Procedure: Dual Chamber PPM (Heart) Rm 2620;  Surgeon: Pan Moss MD;  Location: [facility] CATH;  Service: Cardiology;  Laterality: Left;    ABDOMINAL HERNIA REPAIR      ABLATION OF ARRHYTHMOGENIC FOCUS FOR ATRIAL FIBRILLATION N/A 6/15/2020    Procedure: Ablation atrial fibrillation;  Surgeon: Wyatt Beatty MD;  Location: [facility] EP LAB;  Service: Cardiology;  Laterality: N/A;  PAF, AFl, PEPE, PVI re-do, CTI, RFA, JUSTIN, Gen, SK, 3 Prep    APPLICATION OF WOUND VACUUM-ASSISTED CLOSURE DEVICE Right 12/9/2023    Procedure: APPLICATION, WOUND VAC;  Surgeon: Jelani Tello II, MD;  Location: [facility] OR;  Service: Orthopedics;  Laterality: Right;    CARDIOVERSION N/A 2/28/2024    Procedure: Cardioversion;  Surgeon: Pao Hare MD;  Location: [facility]  KINJAL;  Service: Cardiology;  Laterality: N/A;    CARDIOVERSION N/A 10/17/2024    Procedure: Cardioversion;  Surgeon: Pan Moss MD;  Location: [facility];  Service: Cardiology;  Laterality: N/A;    CATARACT EXTRACTION  11/25/2013    bilateral    CHOLECYSTECTOMY      CLOSURE OF LEFT ATRIAL APPENDAGE USING DEVICE N/A 5/23/2024    Procedure: Left atrial appendage closure device;  Surgeon: Tony Duvall MD;  Location: [facility] CATH LAB;  Service: Cardiology;  Laterality: N/A;    COLONOSCOPY N/A 11/25/2015    Procedure: COLONOSCOPY;  Surgeon: Toby Hernandez MD;  Location: [facility] ENDO;  Service: Endoscopy;  Laterality: N/A;    COLONOSCOPY N/A 11/13/2020    Procedure: COLONOSCOPY;  Surgeon: Toby Hernandez MD;  Location: [facility] ENDO;  Service: Endoscopy;  Laterality: N/A;    COLONOSCOPY N/A 5/1/2024    Procedure: COLONOSCOPY;  Surgeon: Toby Hernandez MD;  Location: [facility] ENDO;  Service: Endoscopy;  Laterality: N/A;    COLONOSCOPY N/A 1/16/2025    Procedure: COLONOSCOPY;  Surgeon: Milton Rodriguez MD;  Location: [facility] (2ND FLR);  Service: Endoscopy;  Laterality: N/A;  11/19 portal-peg-pt stated not on eliquis- Repeat colonoscopy in 4 months for surveillance.[id number]  1/9 SWP PRECALL COMPLETE-RT    CYSTOSCOPY WITH INSERTION OF MINIMALLY INVASIVE IMPLANT TO ENLARGE PROSTATIC URETHRA N/A 11/28/2022    Procedure: CYSTOSCOPY, WITH INSERTION OF UROLIFT IMPLANT;  Surgeon: Yakov Shetty MD;  Location: [facility] OR;  Service: Urology;  Laterality: N/A;    ENDOSCOPIC MUCOSAL RESECTION OF COLON N/A 9/9/2024    Procedure: RESECTION, MUCOSA, COLON, ENDOSCOPIC;  Surgeon: Milton Rodriguez MD;  Location: [facility] ENDO (2ND FLR);  Service: Endoscopy;  Laterality: N/A;  5/23 portal-peg-colonscopy with EMR Main. 90 minutes.  24 hours of clear liquid diet. Referring: Toby Hernandez MD- Michael-Jovana HAUSER 5/23- tt .  7/10/24: PEG prep. Pt off plavix now per cards. instructions sent via portal-GD  8/16/24: pt     ESOPHAGOGASTRODUODENOSCOPY N/A 5/1/2024    Procedure: ESOPHAGOGASTRODUODENOSCOPY (EGD);  Surgeon: Toby Hernandez MD;  Location: [facility] ENDO;  Service: Endoscopy;  Laterality: N/A;    EYE SURGERY      GASTRIC BYPASS  1992    INCISION AND DRAINAGE OF KNEE Right 11/8/2024    Procedure: INCISION AND DRAINAGE, KNEE, EVACUATION HEMATOMA;  Surgeon: Dane Smith MD;  Location: [facility] OR;  Service: Orthopedics;  Laterality: Right;    INSERTION, PACEMAKER, TEMPORARY TRANSVENOUS  9/22/2023    Procedure: Temp pacer insertion ER Rm 8;  Surgeon: Pan Moss MD;  Location: [facility] CATH;  Service: Cardiology;;    INTRAMEDULLARY RODDING OF FEMUR Left 7/18/2020    Procedure: INSERTION, INTRAMEDULLARY ERIC, FEMUR, left, Synthes, Evergreen table, Large C arm clock side,;  Surgeon: Tony Rodriguez MD;  Location: [facility] OR 2ND FLR;  Service: Orthopedics;  Laterality: Left;    IRRIGATION AND DEBRIDEMENT Right 12/9/2023    Procedure: IRRIGATION AND DEBRIDEMENT KNEE;  Surgeon: Jelani Tello II, MD;  Location: [facility] OR;  Service: Orthopedics;  Laterality: Right;    KNEE ARTHROSCOPY      RT    LASIK  2001    both eyes (Dr. Rabago)    ORIF HUMERUS FRACTURE      LT    ORIF HUMERUS FRACTURE Right     non surgical repair    RADIOFREQUENCY ABLATION      x2    REVISION OF KNEE ARTHROPLASTY Right 12/9/2023    Procedure: REVISION ARTHROPLASTY KNEE- Liner Replacement - dirty/clean;  Surgeon: Jelani Tello II, MD;  Location: [facility] OR;  Service: Orthopedics;  Laterality: Right;  dirty to clean at 1940    Right ankle tendon repair      ROBOT-ASSISTED REPAIR OF INCISIONAL HERNIA USING DA GARETH XI Left 6/13/2022    Procedure: XI ROBOTIC REPAIR, HERNIA, INCISIONAL (LEFT SIDE SPIGELIAN WITH MESH);  Surgeon: Rosendo Marti MD;  Location: [facility] OR 2ND FLR;  Service: General;  Laterality: Left;  consent in am    ROTATOR CUFF REPAIR      right    TOTAL KNEE ARTHROPLASTY Right 5/29/2018    Procedure: REPLACEMENT-KNEE-TOTAL-axel;   Surgeon: Denzel Dallas MD;  Location: [facility] OR [facility] FLR;  Service: Orthopedics;  Laterality: Right;  [place]    TRANSESOPHAGEAL ECHOCARDIOGRAPHY N/A 4/23/2024    Procedure: ECHOCARDIOGRAM, TRANSESOPHAGEAL;  Surgeon: Provider, Eyla Diagnostic;  Location: [facility] EP LAB;  Service: Cardiology;  Laterality: N/A;    TRANSESOPHAGEAL ECHOCARDIOGRAPHY N/A 5/23/2024    Procedure: ECHOCARDIOGRAM, TRANSESOPHAGEAL;  Surgeon: Kj Newton MD;  Location: [facility] CATH LAB;  Service: Cardiology;  Laterality: N/A;    TRANSESOPHAGEAL ECHOCARDIOGRAPHY N/A 7/9/2024    Procedure: ECHOCARDIOGRAM, TRANSESOPHAGEAL;  Surgeon: Provider, Dos Diagnostic;  Location: [facility] EP LAB;  Service: Cardiology;  Laterality: N/A;    TREATMENT OF CARDIAC ARRHYTHMIA  6/15/2020    Procedure: Cardioversion or Defibrillation;  Surgeon: Wyatt Beatty MD;  Location: [facility] EP LAB;  Service: Cardiology;;    TREATMENT OF CARDIAC ARRHYTHMIA N/A 2/28/2024    Procedure: Cardioversion or Defibrillation;  Surgeon: Pao Hare MD;  Location: [facility];  Service: Cardiology;  Laterality: N/A;    TREATMENT OF CARDIAC ARRHYTHMIA N/A 9/11/2024    Procedure: Cardioversion/Defibrillation;  Surgeon: Pan Moss MD;  Location: [facility];  Service: Cardiology;  Laterality: N/A;       Review of patient's allergies indicates:   Allergen Reactions    No known drug allergies        No current facility-administered medications on file prior to encounter.     Current Outpatient Medications on File Prior to Encounter   Medication Sig    aspirin (ECOTRIN) 81 MG EC tablet Take 1 tablet (81 mg total) by mouth once daily.    buPROPion (WELLBUTRIN XL) 150 MG TB24 tablet Take 1 tablet (150 mg total) by mouth once daily.    buPROPion (WELLBUTRIN XL) 300 MG 24 hr tablet Take 1 tablet (300 mg total) by mouth once daily. Plus 150    calcitRIOL (ROCALTROL) 0.25 MCG Cap Take 1 capsule (0.25 mcg total) by mouth every Mon, Wed, Fri.    carvediloL (COREG) 12.5 MG tablet Take 1 tablet  (12.5 mg total) by mouth 2 (two) times daily.    dofetilide (TIKOSYN) 250 MCG Cap Take 1 capsule (250 mcg total) by mouth every 12 (twelve) hours.    EScitalopram oxalate (LEXAPRO) 10 MG tablet Take 1 tablet (10 mg total) by mouth once daily.    ICAR-C PLUS Tab TAKE 1 TABLET BY MOUTH ONCE DAILY    levothyroxine (SYNTHROID) 88 MCG tablet Take 1 tablet (88 mcg total) by mouth before breakfast.    mirtazapine (REMERON) 30 MG tablet Take 1 tablet (30 mg total) by mouth every evening.    OMEGA-3 FATTY ACIDS (FISH OIL CONCENTRATE ORAL) Take 1 capsule by mouth Daily.    telmisartan (MICARDIS) 40 MG Tab Take 1 tablet (40 mg total) by mouth once daily.    tiZANidine (ZANAFLEX) 4 MG tablet Take 1 tablet (4 mg total) by mouth 3 (three) times daily as needed (muscle spasms).    oxyCODONE-acetaminophen (PERCOCET)  mg per tablet Take 1 tablet by mouth every 8 (eight) hours as needed for Pain.    testosterone cypionate (DEPOTESTOTERONE CYPIONATE) 200 mg/mL injection Inject 200 mg into the muscle every 14 (fourteen) days.    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    Objective:     Vital Signs (Most Recent):    Vital Signs (24h Range):        Weight: 79.8 kg (176 lb)  Body mass index is 24.55 kg/m².            No intake or output data in the 24 hours ending  02/17/25 1142    Lines/Drains/Airways       None                   Physical Exam  Vitals and nursing note reviewed.   Constitutional:       General: He is not in acute distress.     Appearance: He is well-developed. He is not diaphoretic.   HENT:      Head: Normocephalic and atraumatic.   Eyes:      General: No scleral icterus.        Right eye: No discharge.         Left eye: No discharge.   Cardiovascular:      Rate and Rhythm: Normal rate. Rhythm irregular. No extrasystoles are present.     Pulses: Normal pulses and intact distal pulses.           Radial pulses are 2+ on the right side and 2+ on the left side.      Heart sounds: Normal heart sounds, S1 normal and S2 normal. Heart sounds not distant. No opening snap. No murmur heard.     No friction rub. No gallop. No S3 or S4 sounds.   Pulmonary:      Effort: Pulmonary effort is normal. No respiratory distress.      Breath sounds: No wheezing or rales.   Abdominal:      General: Bowel sounds are normal. There is no distension.      Palpations: Abdomen is soft.      Tenderness: There is no abdominal tenderness.   Musculoskeletal:         General: No deformity. Normal range of motion.      Cervical back: Normal range of motion and neck supple.   Skin:     General: Skin is warm and dry.      Findings: No erythema or rash.   Neurological:      Mental Status: He is alert.         Significant Labs: All pertinent lab results from the last 24 hours have been reviewed.    Significant Imaging:  Reviewed  Assessment and Plan:     Atrial fibrillation    The risks, benefits and alternatives of transesophageal echocardiogram and cardioversion (PEPE/DCCV) were explained to the patient, patient's family and/or surrogate decision maker.   No absolute contraindications of esophageal stricture, tumor, perforation, laceration,or diverticulum and/or active GI bleed.  The risks include (but are not limited to) trauma to oral structures, esophageal trauma/perforation, bleeding,  laryngospasm/brochospasm, infection, aspiration, sore throat/hoarseness, & dislodgement of the endotracheal tube/nasogastric tube (where applicable), tachy-/bradyarrhythmias, skin burns/pain, CVA, MI, and death.  Informed consent was obtained. The patient is agreeable to proceed with the procedure and all questions and concerns addressed.       VTE Risk Mitigation (From admission, onward)           Ordered     IP VTE HIGH RISK PATIENT  Once         02/17/25 1138     Place TAMI hose  Until discontinued         02/17/25 1138                    Jewel Arnold MD  Cardiology   [place] - Endoscopy

## 2025-02-18 VITALS
BODY MASS INDEX: 24.64 KG/M2 | RESPIRATION RATE: 16 BRPM | TEMPERATURE: 98 F | HEART RATE: 55 BPM | SYSTOLIC BLOOD PRESSURE: 144 MMHG | HEIGHT: 71 IN | DIASTOLIC BLOOD PRESSURE: 78 MMHG | OXYGEN SATURATION: 98 % | WEIGHT: 176 LBS

## 2025-02-19 ENCOUNTER — HOSPITAL ENCOUNTER (OUTPATIENT)
Dept: CARDIOLOGY | Facility: HOSPITAL | Age: 80
Discharge: HOME | End: 2025-02-19
Attending: INTERNAL MEDICINE
Payer: MEDICARE

## 2025-02-19 ENCOUNTER — TELEPHONE (OUTPATIENT)
Dept: TRANSPLANT | Facility: CLINIC | Age: 80
End: 2025-02-19
Payer: MEDICARE

## 2025-02-19 DIAGNOSIS — I42.5 RESTRICTIVE CARDIOMYOPATHY: ICD-10-CM

## 2025-02-19 DIAGNOSIS — I50.32 CHRONIC DIASTOLIC HEART FAILURE: ICD-10-CM

## 2025-02-19 PROCEDURE — A9538 TC99M PYROPHOSPHATE: HCPCS | Mod: HCNC,TXP | Performed by: INTERNAL MEDICINE

## 2025-02-19 PROCEDURE — 78803 RP LOCLZJ TUM SPECT 1 AREA: CPT | Mod: HCNC,TXP

## 2025-02-19 RX ORDER — TECHNETIUM TC99M PYROPHOSPHATE 12 MG/10ML
10 INJECTION INTRAVENOUS ONCE
Status: COMPLETED | OUTPATIENT
Start: 2025-02-19 | End: 2025-02-19

## 2025-02-19 RX ADMIN — TECHNETIUM TC 99M PYROPHOSPHATE 10 MILLICURIE: 11.9; 3.2 INJECTION, POWDER, LYOPHILIZED, FOR SOLUTION INTRAVENOUS at 11:02

## 2025-02-19 NOTE — TELEPHONE ENCOUNTER
2/19/25: PYP scan was completed for Dr. Dominick Song today. Results were negative for TTR amyloidosis.    Called patient to provide results but could only leave a message to return my call for results and/or questions.     Lisbeth Calvo RN  Amyloid Nurse Navigator

## 2025-02-20 ENCOUNTER — OFFICE VISIT (OUTPATIENT)
Dept: HEMATOLOGY/ONCOLOGY | Facility: CLINIC | Age: 80
End: 2025-02-20
Payer: MEDICARE

## 2025-02-20 VITALS
HEART RATE: 62 BPM | BODY MASS INDEX: 24.97 KG/M2 | RESPIRATION RATE: 16 BRPM | WEIGHT: 178.38 LBS | OXYGEN SATURATION: 99 % | SYSTOLIC BLOOD PRESSURE: 169 MMHG | DIASTOLIC BLOOD PRESSURE: 83 MMHG | TEMPERATURE: 97 F | HEIGHT: 71 IN

## 2025-02-20 DIAGNOSIS — E53.8 VITAMIN B12 DEFICIENCY: Primary | ICD-10-CM

## 2025-02-20 DIAGNOSIS — E60 ZINC DEFICIENCY: ICD-10-CM

## 2025-02-20 DIAGNOSIS — R78.71 ABNORMAL LEAD LEVEL IN BLOOD: ICD-10-CM

## 2025-02-20 DIAGNOSIS — D47.2 MONOCLONAL GAMMOPATHY ASSOCIATED WITH PLASMA CELL DYSCRASIA: ICD-10-CM

## 2025-02-20 DIAGNOSIS — D50.8 IRON DEFICIENCY ANEMIA SECONDARY TO INADEQUATE DIETARY IRON INTAKE: ICD-10-CM

## 2025-02-20 DIAGNOSIS — I48.20 CHRONIC ATRIAL FIBRILLATION: ICD-10-CM

## 2025-02-20 DIAGNOSIS — R53.82 CHRONIC FATIGUE: ICD-10-CM

## 2025-02-20 RX ORDER — HEPARIN 100 UNIT/ML
500 SYRINGE INTRAVENOUS
OUTPATIENT
Start: 2025-02-20

## 2025-02-20 RX ORDER — DIPHENHYDRAMINE HYDROCHLORIDE 50 MG/ML
50 INJECTION INTRAMUSCULAR; INTRAVENOUS ONCE AS NEEDED
OUTPATIENT
Start: 2025-02-20

## 2025-02-20 RX ORDER — EPINEPHRINE 0.3 MG/.3ML
0.3 INJECTION SUBCUTANEOUS ONCE AS NEEDED
OUTPATIENT
Start: 2025-02-20

## 2025-02-20 RX ORDER — CYANOCOBALAMIN 1000 UG/ML
1000 INJECTION, SOLUTION INTRAMUSCULAR; SUBCUTANEOUS
Qty: 12 ML | Refills: 0 | Status: SHIPPED | OUTPATIENT
Start: 2025-02-20 | End: 2026-02-20

## 2025-02-20 RX ORDER — SODIUM FERRIC GLUCONATE COMPLEX IN SUCROSE 12.5 MG/ML
250 INJECTION INTRAVENOUS
OUTPATIENT
Start: 2025-02-20

## 2025-02-20 RX ORDER — SODIUM CHLORIDE 0.9 % (FLUSH) 0.9 %
10 SYRINGE (ML) INJECTION
OUTPATIENT
Start: 2025-02-20

## 2025-02-20 NOTE — PROGRESS NOTES
Subjective:       Name: Dominick Song MD  : 1945  MRN: [id number]    Chief Complaint   Patient presents with    Anemia        Patient is in clinic with his wife.    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 99mTc-PYP/DPD/HMDP scans study was found to be not suggestive of ATTR cardiac amyloidosis.     The patient denies CP, cough, SOB, abdominal pain, nausea, vomiting, constipation.  The patient denies fever, chills, night sweats, weight loss, new lumps or bumps, easy bruising or bleeding.    PREVIOUS WORK-UP:  Labs done on 2025 showed:  Kappa over lambda ratio was found to be mildly elevated at 1.81 with a kappa free light chain of 6.3 and lambda free light chain 3.49.  Serum immunofixation showed no abnormality.  Urine immunofixation showed 2 closely adjacent kappa light chain specific monoclonal protein band in the gamma area.    He had a gastric bypass years ago and he lost a lot of weight. He had it reversed.  He is known to have a short bowel movement with multiple episodes of diarrhea.    In view of his weight loss he his primary care physician ordered a PET scan on 2024 that failed to show any significant abnormality.  His last colonoscopy was done on 2025 and failed to show a polyp that was removed.  The pathology came back benign.      He was diagnosed with into interstitial lung disease and was evaluated for possible transplant.  He was seen recently by a cardiologist and is undergoing a workup for possibly amyloidosis.  His 2D echo done on 2024 showed a normal ejection fraction at 60-65%.  His left atrium was severely dilated.  There was moderate regurgitation affecting the mitral valve.  Mild to moderate pulmonary hypertension was noted.      Oncology History    No history exists.        Past Medical History:   Diagnosis Date    Anticoagulant long-term use      Anxiety     Arthritis     Atrial fibrillation     BPH (benign prostatic hyperplasia)     Cataract     CKD (chronic kidney disease) stage 3, GFR 30-59 ml/min 07/10/2017    Followed by Dr. Jeevan Pond    Colon polyp     benign    Depression     Elevated PSA     Erectile dysfunction     Gastric ulcer with hemorrhage     Hep B w/o coma 1977    History of bleeding peptic ulcer     History of prostatitis     Hypertension     PAF (paroxysmal atrial fibrillation)     Sleep apnea     PT DENIES    Stroke     TIA December 2019    Thyroid disease        Past Surgical History:   Procedure Laterality Date    A-V CARDIAC PACEMAKER INSERTION Left 9/23/2023    Procedure: Dual Chamber PPM (Heart) Rm 2620;  Surgeon: Pan Moss MD;  Location: [facility] CATH;  Service: Cardiology;  Laterality: Left;    ABDOMINAL HERNIA REPAIR      ABLATION OF ARRHYTHMOGENIC FOCUS FOR ATRIAL FIBRILLATION N/A 6/15/2020    Procedure: Ablation atrial fibrillation;  Surgeon: Wyatt Beatty MD;  Location: [facility] EP LAB;  Service: Cardiology;  Laterality: N/A;  PAF, AFl, PEPE, PVI re-do, CTI, RFA, JUSTIN, Gen, SK, 3 Prep    APPLICATION OF WOUND VACUUM-ASSISTED CLOSURE DEVICE Right 12/9/2023    Procedure: APPLICATION, WOUND VAC;  Surgeon: Jelani Tello II, MD;  Location: [facility];  Service: Orthopedics;  Laterality: Right;    CARDIOVERSION N/A 2/28/2024    Procedure: Cardioversion;  Surgeon: Pao Hare MD;  Location: [facility];  Service: Cardiology;  Laterality: N/A;    CARDIOVERSION N/A 10/17/2024    Procedure: Cardioversion;  Surgeon: Pan Moss MD;  Location: [facility];  Service: Cardiology;  Laterality: N/A;    CATARACT EXTRACTION  11/25/2013    bilateral    CHOLECYSTECTOMY      CLOSURE OF LEFT ATRIAL APPENDAGE USING DEVICE N/A 5/23/2024    Procedure: Left atrial appendage closure device;  Surgeon: Tony Duvall MD;  Location: [facility] CATH LAB;  Service: Cardiology;  Laterality: N/A;    COLONOSCOPY N/A 11/25/2015    Procedure:  COLONOSCOPY;  Surgeon: Toby Hernandez MD;  Location: [facility] ENDO;  Service: Endoscopy;  Laterality: N/A;    COLONOSCOPY N/A 11/13/2020    Procedure: COLONOSCOPY;  Surgeon: Toby Hernandez MD;  Location: [facility] ENDO;  Service: Endoscopy;  Laterality: N/A;    COLONOSCOPY N/A 5/1/2024    Procedure: COLONOSCOPY;  Surgeon: Toby Hernandez MD;  Location: [facility] ENDO;  Service: Endoscopy;  Laterality: N/A;    COLONOSCOPY N/A 1/16/2025    Procedure: COLONOSCOPY;  Surgeon: Milton Rodriguez MD;  Location: [facility] (2ND FLR);  Service: Endoscopy;  Laterality: N/A;  11/19 portal-peg-pt stated not on eliquis- Repeat colonoscopy in 4 months for surveillance.[id number]  1/9 SWP PRECALL COMPLETE-RT    CYSTOSCOPY WITH INSERTION OF MINIMALLY INVASIVE IMPLANT TO ENLARGE PROSTATIC URETHRA N/A 11/28/2022    Procedure: CYSTOSCOPY, WITH INSERTION OF UROLIFT IMPLANT;  Surgeon: Yakov Shetty MD;  Location: [facility] OR;  Service: Urology;  Laterality: N/A;    ECHOCARDIOGRAM,TRANSESOPHAGEAL N/A 2/17/2025    Procedure: Transesophageal echo (PEPE) intra-procedure log documentation;  Surgeon: Jewel Arnold MD;  Location: [facility];  Service: Cardiology;  Laterality: N/A;    ENDOSCOPIC MUCOSAL RESECTION OF COLON N/A 9/9/2024    Procedure: RESECTION, MUCOSA, COLON, ENDOSCOPIC;  Surgeon: Milton Rodriguez MD;  Location: [facility] (2ND FLR);  Service: Endoscopy;  Laterality: N/A;  5/23 portal-peg-colonscopy with EMR Main. 90 minutes.  24 hours of clear liquid diet. Referring: Toby Hernandez MD- Michael-Jovana HAUSER 5/23- tt .  7/10/24: PEG prep. Pt off plavix now per cards. instructions sent via portal-GD  8/16/24: pt    ESOPHAGOGASTRODUODENOSCOPY N/A 5/1/2024    Procedure: ESOPHAGOGASTRODUODENOSCOPY (EGD);  Surgeon: Toby Hernandez MD;  Location: [facility] ENDO;  Service: Endoscopy;  Laterality: N/A;    EYE SURGERY      GASTRIC BYPASS  1992    INCISION AND DRAINAGE OF KNEE Right 11/8/2024    Procedure: INCISION AND DRAINAGE, KNEE,  EVACUATION HEMATOMA;  Surgeon: Dane Smith MD;  Location: [facility] OR;  Service: Orthopedics;  Laterality: Right;    INSERTION, PACEMAKER, TEMPORARY TRANSVENOUS  9/22/2023    Procedure: Temp pacer insertion ER Rm 8;  Surgeon: Pan Moss MD;  Location: [facility] CATH;  Service: Cardiology;;    INTRAMEDULLARY RODDING OF FEMUR Left 7/18/2020    Procedure: INSERTION, INTRAMEDULLARY ERIC, FEMUR, left, Synthes, [place] table, Large C arm clock side,;  Surgeon: Tony Rodriguez MD;  Location: [facility] OR 2ND FLR;  Service: Orthopedics;  Laterality: Left;    IRRIGATION AND DEBRIDEMENT Right 12/9/2023    Procedure: IRRIGATION AND DEBRIDEMENT KNEE;  Surgeon: Jelani Tello II, MD;  Location: [facility] OR;  Service: Orthopedics;  Laterality: Right;    KNEE ARTHROSCOPY      RT    LASIK  2001    both eyes (Dr. Rabago)    ORIF HUMERUS FRACTURE      LT    ORIF HUMERUS FRACTURE Right     non surgical repair    RADIOFREQUENCY ABLATION      x2    REVISION OF KNEE ARTHROPLASTY Right 12/9/2023    Procedure: REVISION ARTHROPLASTY KNEE- Liner Replacement - dirty/clean;  Surgeon: Jelani Tello II, MD;  Location: [facility] OR;  Service: Orthopedics;  Laterality: Right;  dirty to clean at 1940    Right ankle tendon repair      ROBOT-ASSISTED REPAIR OF INCISIONAL HERNIA USING DA GARETH XI Left 6/13/2022    Procedure: XI ROBOTIC REPAIR, HERNIA, INCISIONAL (LEFT SIDE SPIGELIAN WITH MESH);  Surgeon: Rosendo Marti MD;  Location: [facility] OR [facility] FLR;  Service: General;  Laterality: Left;  consent in am    ROTATOR CUFF REPAIR      right    TOTAL KNEE ARTHROPLASTY Right 5/29/2018    Procedure: REPLACEMENT-KNEE-TOTAL-pro;  Surgeon: Denzel Dallas MD;  Location: [facility] OR [facility] FLR;  Service: Orthopedics;  Laterality: Right;  Pro    TRANSESOPHAGEAL ECHOCARDIOGRAPHY N/A 4/23/2024    Procedure: ECHOCARDIOGRAM, TRANSESOPHAGEAL;  Surgeon: Jan Bravo Diagnostic;  Location: [facility] EP LAB;  Service: Cardiology;  Laterality: N/A;     TRANSESOPHAGEAL ECHOCARDIOGRAPHY N/A 5/23/2024    Procedure: ECHOCARDIOGRAM, TRANSESOPHAGEAL;  Surgeon: Kj Newton MD;  Location: [facility] CATH LAB;  Service: Cardiology;  Laterality: N/A;    TRANSESOPHAGEAL ECHOCARDIOGRAPHY N/A 7/9/2024    Procedure: ECHOCARDIOGRAM, TRANSESOPHAGEAL;  Surgeon: Jan Bravo Diagnostic;  Location: [facility] EP LAB;  Service: Cardiology;  Laterality: N/A;    TREATMENT OF CARDIAC ARRHYTHMIA  6/15/2020    Procedure: Cardioversion or Defibrillation;  Surgeon: Wyatt Beatty MD;  Location: [facility] EP LAB;  Service: Cardiology;;    TREATMENT OF CARDIAC ARRHYTHMIA N/A 2/28/2024    Procedure: Cardioversion or Defibrillation;  Surgeon: Pao Hare MD;  Location: [facility];  Service: Cardiology;  Laterality: N/A;    TREATMENT OF CARDIAC ARRHYTHMIA N/A 9/11/2024    Procedure: Cardioversion/Defibrillation;  Surgeon: Pan Moss MD;  Location: [facility];  Service: Cardiology;  Laterality: N/A;    TREATMENT OF CARDIAC ARRHYTHMIA N/A 2/17/2025    Procedure: Cardioversion or Defibrillation;  Surgeon: Jewel Arnold MD;  Location: [facility] ENDO;  Service: Cardiology;  Laterality: N/A;       Family History   Problem Relation Name Age of Onset    Aortic stenosis Mother      Heart disease Mother          aortic stenosis    Osteoporosis Mother      COPD Father      Diabetes Father      Osteoporosis Father      Heart attack Brother      Breast cancer Maternal Aunt      No Known Problems Daughter      No Known Problems Daughter      No Known Problems Daughter      No Known Problems Son         Social History     Socioeconomic History    Marital status:     Number of children: 5   Occupational History    Occupation: retired anesthesiology     Employer: [employer] ([employer])    Occupation: [employer] grad     Comment: previous football player   Tobacco Use    Smoking status: Never     Passive exposure: Never    Smokeless tobacco: Never    Tobacco comments:     Retired [employer]  anesthesiologist    Substance and Sexual Activity    Alcohol use: No    Drug use: No    Sexual activity: Yes     Partners: Female     Social Drivers of Health     Financial Resource Strain: Low Risk  (10/21/2024)    Overall Financial Resource Strain (CARDIA)     Difficulty of Paying Living Expenses: Not hard at all   Food Insecurity: No Food Insecurity (11/8/2024)    Hunger Vital Sign     Worried About Running Out of Food in the Last Year: Never true     Ran Out of Food in the Last Year: Never true   Transportation Needs: No Transportation Needs (11/8/2024)    TRANSPORTATION NEEDS     Transportation : No   Physical Activity: Sufficiently Active (4/22/2024)    Exercise Vital Sign     Days of Exercise per Week: 5 days     Minutes of Exercise per Session: 130 min   Stress: No Stress Concern Present (10/21/2024)    [place] [employer] - Occupational Stress Questionnaire     Feeling of Stress : Not at all   Housing Stability: Unknown (11/8/2024)    Housing Stability Vital Sign     Unable to Pay for Housing in the Last Year: No     Homeless in the Last Year: No       Review of patient's allergies indicates:   Allergen Reactions    No known drug allergies        Review of Systems   Constitutional:  Positive for appetite change, fatigue and unexpected weight change. Negative for fever.   HENT:  Negative for mouth sores and sore throat.    Eyes:  Negative for photophobia and visual disturbance.   Respiratory:  Positive for shortness of breath. Negative for cough.    Cardiovascular:  Positive for leg swelling. Negative for chest pain.   Gastrointestinal:  Positive for change in bowel habit and diarrhea. Negative for abdominal pain and constipation.   Genitourinary:  Negative for dysuria and hematuria.   Musculoskeletal:  Negative for arthralgias and joint swelling.   Neurological:  Negative for dizziness, weakness and light-headedness.   Hematological:  Does not bruise/bleed easily.  "  Psychiatric/Behavioral:  Negative for sleep disturbance. The patient is not nervous/anxious.             Objective:     Vitals:    02/20/25 1621   BP: (!) 169/83   BP Location: Left arm   Patient Position: Sitting   Pulse: 62   Resp: 16   Temp: 97.2 °F (36.2 °C)   TempSrc: Temporal   SpO2: 99%   Weight: 80.9 kg (178 lb 5.6 oz)   Height: 5' 11" (1.803 m)          Physical Exam           Current Outpatient Medications on File Prior to Visit   Medication Sig    apixaban (ELIQUIS) 5 mg Tab Take 1 tablet (5 mg total) by mouth 2 (two) times daily.    [Paused] aspirin (ECOTRIN) 81 MG EC tablet Take 1 tablet (81 mg total) by mouth once daily.    buPROPion (WELLBUTRIN XL) 150 MG TB24 tablet Take 1 tablet (150 mg total) by mouth once daily.    buPROPion (WELLBUTRIN XL) 300 MG 24 hr tablet Take 1 tablet (300 mg total) by mouth once daily. Plus 150    calcitRIOL (ROCALTROL) 0.25 MCG Cap Take 1 capsule (0.25 mcg total) by mouth every Mon, Wed, Fri.    carvediloL (COREG) 12.5 MG tablet Take 1 tablet (12.5 mg total) by mouth 2 (two) times daily.    dofetilide (TIKOSYN) 250 MCG Cap Take 1 capsule (250 mcg total) by mouth every 12 (twelve) hours.    EScitalopram oxalate (LEXAPRO) 10 MG tablet Take 1 tablet (10 mg total) by mouth once daily.    ICAR-C PLUS Tab TAKE 1 TABLET BY MOUTH ONCE DAILY    levothyroxine (SYNTHROID) 88 MCG tablet Take 1 tablet (88 mcg total) by mouth before breakfast.    mirtazapine (REMERON) 30 MG tablet Take 1 tablet (30 mg total) by mouth every evening.    OMEGA-3 FATTY ACIDS (FISH OIL CONCENTRATE ORAL) Take 1 capsule by mouth Daily.    oxyCODONE-acetaminophen (PERCOCET)  mg per tablet Take 1 tablet by mouth every 8 (eight) hours as needed for Pain.    telmisartan (MICARDIS) 40 MG Tab Take 1 tablet (40 mg total) by mouth once daily.    testosterone cypionate (DEPOTESTOTERONE CYPIONATE) 200 mg/mL injection Inject 200 mg into the muscle every 14 (fourteen) days.    tiZANidine (ZANAFLEX) 4 MG tablet " Take 1 tablet (4 mg total) by mouth 3 (three) times daily as needed (muscle spasms).    torsemide (DEMADEX) 20 MG Tab TAKE 1 TABLET BY MOUTH EVEYR MONDAY, WEDNESDAY, AND FRIDAYS     No current facility-administered medications on file prior to visit.       CBC:  Lab Results   Component Value Date    WBC 6.15 02/06/2025    HGB 10.0 (L) 02/06/2025    HCT 30.6 (L) 02/06/2025    MCV 87 02/06/2025     02/06/2025         CMP:  Sodium   Date Value Ref Range Status   02/06/2025 140 136 - 145 mmol/L Final     Potassium   Date Value Ref Range Status   02/06/2025 4.5 3.5 - 5.1 mmol/L Final     Chloride   Date Value Ref Range Status   02/06/2025 114 (H) 95 - 110 mmol/L Final     CO2   Date Value Ref Range Status   02/06/2025 19 (L) 23 - 29 mmol/L Final     Glucose   Date Value Ref Range Status   02/06/2025 109 70 - 110 mg/dL Final     BUN   Date Value Ref Range Status   02/06/2025 28 (H) 8 - 23 mg/dL Final     Creatinine   Date Value Ref Range Status   02/06/2025 1.1 0.5 - 1.4 mg/dL Final     Calcium   Date Value Ref Range Status   02/06/2025 8.3 (L) 8.7 - 10.5 mg/dL Final     Total Protein   Date Value Ref Range Status   02/06/2025 6.1 6.0 - 8.4 g/dL Final     Albumin   Date Value Ref Range Status   02/06/2025 3.3 (L) 3.5 - 5.2 g/dL Final     Total Bilirubin   Date Value Ref Range Status   02/06/2025 0.4 0.1 - 1.0 mg/dL Final     Comment:     For infants and newborns, interpretation of results should be based  on gestational age, weight and in agreement with clinical  observations.    Premature Infant recommended reference ranges:  Up to 24 hours.............<8.0 mg/dL  Up to 48 hours............<12.0 mg/dL  3-5 days..................<15.0 mg/dL  6-29 days.................<15.0 mg/dL       Alkaline Phosphatase   Date Value Ref Range Status   02/06/2025 76 40 - 150 U/L Final     AST   Date Value Ref Range Status   02/06/2025 18 10 - 40 U/L Final     ALT   Date Value Ref Range Status   02/06/2025 14 10 - 44 U/L Final      Anion Gap   Date Value Ref Range Status   02/06/2025 7 (L) 8 - 16 mmol/L Final     eGFR if    Date Value Ref Range Status   03/02/2022 51.9 (A) >60 mL/min/1.73 m^2 Final   03/02/2022 51.9 (A) >60 mL/min/1.73 m^2 Final     eGFR if non    Date Value Ref Range Status   03/02/2022 44.9 (A) >60 mL/min/1.73 m^2 Final     Comment:     Calculation used to obtain the estimated glomerular filtration  rate (eGFR) is the CKD-EPI equation.      03/02/2022 44.9 (A) >60 mL/min/1.73 m^2 Final     Comment:     Calculation used to obtain the estimated glomerular filtration  rate (eGFR) is the CKD-EPI equation.          PYP ATTR related Amyloidosis (If FLC are normal and no monoclonal bands are present, proceed with PYP scan)    Study is not suggestive of ATTR cardiac amyloidosis.    SPECT imaging shows no myocardial uptake.    Evaluation for AL amyloidosis by serum FLCs, serum, and urine   immunofixation is recommended in all patients undergoing   99mTc-PYP/DPD/HMDP scans for cardiac amyloidosis.    Results should be interpreted in the context of prior evaluation and   referral to a hematologist or amyloidosis expert is recommended if either:   (a) Recommended echo/CMR is strongly suggestive of cardiac amyloidosis and   99mTc-PYP/DPD/HMDP is not suggestive or equivocal and/or (b) FLCs are   abnormal or equivocal.       ECOG SCORE    1 - Restricted in strenuous activity-ambulatory and able to carry out work of a light nature              Assessment/Plan:   Chronic normocytic anemia.  I reviewed independently the patient medical record including his laboratory and radiologic findings.  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    Labs on 2/6/2025 showed a Hgb 10 g/dl MCV 87 . Peripheral smear showed absolute eosinophilia and no increased circulating blasts. Mild normocytic, normochromic anemia with moderate  anisopoikilocytosis with rare target cells, schistocytes, and elliptocytes and very red cell bite cells and teardrop cells   Methyl malonic acid was elevated 1.31.   Ferritin 36 iron sat 10%- Zinc 36 Low.  CMP ESR LDH  folate haptoglobin vitamin B1 B6 vitamin-E vitamin-D copper were WNL.     In view of the results the patient workup showed the presence of iron-deficiency anemia most likely related to malabsorption in view of his gastric bypass.  He will be scheduled to receive IV Ferrlecit at dose of 250 mg IV q.week x4.  He also was advised to start taking  oral zinc 1 tablet a day.    In view of his elevated methylmalonic acid the patient will start IM injection of vitamin B12 on a monthly basis at a dose of 1000 mcg.    He will be seen back again in 2 months for a follow up visit with repeat CBC ferritin and Zinc.  In case his anemia  does not improve he will require a bone marrow biopsy    Status post gastric bypass.    As above.    Hypothyroidism.    TSH free T4 WNL    Continue levothyroxine at 88 mcg daily..      Osteoporosis.    Currently on Reclast and calcitriol.    Chronic kidney disease stage 3a.    Avoid nephrotoxic medication.    Follow up BMP.        Atrial fibrillation.    Rate controlled s/p cardioversion.    Currently on aspirin and Coreg.      35 minutes of total time spent on the encounter, which includes face to face time and non-face to face time preparing to see the patient (eg, review of tests), obtaining and/or reviewing separately obtained history, documenting clinical information in the electronic or other health record, independently interpreting results (not separately reported) and communicating results to the patient/family/caregiver, or care coordination (not separately reported).              Med Onc Chart Routing      Follow up with physician 2 months. with repeat labs for CBC ferritin  and zinc   Follow up with SHERYL    Infusion scheduling note   he needs to have 4 doses of IV iron q  week   Injection scheduling note    Labs    Imaging    Pharmacy appointment    Other referrals                   Plan was discussed with the patient at length, and he verbalized understanding. Dominick was given an opportunity to ask questions that were answered to his satisfaction, and he was advised to call in the interval if any problems or questions arise.  Signed:  Jeannie Grigsby MD   Hematology and Oncology  [facility] - HEMATOLOGY ONCOLOGY  [facility] [place]

## 2025-02-21 ENCOUNTER — PATIENT MESSAGE (OUTPATIENT)
Dept: INFUSION THERAPY | Facility: HOSPITAL | Age: 80
End: 2025-02-21
Payer: MEDICARE

## 2025-02-24 ENCOUNTER — PATIENT MESSAGE (OUTPATIENT)
Dept: TRANSPLANT | Facility: CLINIC | Age: 80
End: 2025-02-24
Payer: MEDICARE

## 2025-02-25 ENCOUNTER — TELEPHONE (OUTPATIENT)
Dept: TRANSPLANT | Facility: CLINIC | Age: 80
End: 2025-02-25
Payer: MEDICARE

## 2025-02-25 ENCOUNTER — PATIENT MESSAGE (OUTPATIENT)
Dept: CARDIOLOGY | Facility: CLINIC | Age: 80
End: 2025-02-25
Payer: MEDICARE

## 2025-02-25 DIAGNOSIS — J84.9 ILD (INTERSTITIAL LUNG DISEASE): Primary | ICD-10-CM

## 2025-02-25 NOTE — TELEPHONE ENCOUNTER
Spoke to Pt he does not want any future appts with lung and transplant until he feels he feels he is doing just fine.

## 2025-02-26 ENCOUNTER — RESULTS FOLLOW-UP (OUTPATIENT)
Dept: TRANSPLANT | Facility: HOSPITAL | Age: 80
End: 2025-02-26

## 2025-02-26 ENCOUNTER — TELEPHONE (OUTPATIENT)
Dept: INFUSION THERAPY | Facility: HOSPITAL | Age: 80
End: 2025-02-26
Payer: MEDICARE

## 2025-02-26 NOTE — TELEPHONE ENCOUNTER
----- Message from Judy sent at 2/26/2025  8:30 AM CST -----  Type: Needs Medical AdviceWho Called:  PtBest Call Back Number: [phone] Additional Information: requesting a call back to find out how long is his appt

## 2025-02-27 ENCOUNTER — INFUSION (OUTPATIENT)
Dept: INFUSION THERAPY | Facility: HOSPITAL | Age: 80
End: 2025-02-27
Attending: INTERNAL MEDICINE
Payer: MEDICARE

## 2025-02-27 VITALS
TEMPERATURE: 98 F | WEIGHT: 185.63 LBS | SYSTOLIC BLOOD PRESSURE: 123 MMHG | OXYGEN SATURATION: 93 % | HEIGHT: 71 IN | BODY MASS INDEX: 25.99 KG/M2 | HEART RATE: 59 BPM | DIASTOLIC BLOOD PRESSURE: 68 MMHG | RESPIRATION RATE: 18 BRPM

## 2025-02-27 DIAGNOSIS — D50.8 IRON DEFICIENCY ANEMIA SECONDARY TO INADEQUATE DIETARY IRON INTAKE: Primary | ICD-10-CM

## 2025-02-27 PROCEDURE — 96366 THER/PROPH/DIAG IV INF ADDON: CPT | Mod: HCNC,PN

## 2025-02-27 PROCEDURE — 96365 THER/PROPH/DIAG IV INF INIT: CPT | Mod: HCNC,PN

## 2025-02-27 PROCEDURE — 63600175 PHARM REV CODE 636 W HCPCS: Mod: HCNC,PN | Performed by: INTERNAL MEDICINE

## 2025-02-27 PROCEDURE — 25000003 PHARM REV CODE 250: Mod: HCNC,PN | Performed by: INTERNAL MEDICINE

## 2025-02-27 RX ORDER — HEPARIN 100 UNIT/ML
500 SYRINGE INTRAVENOUS
Status: DISCONTINUED | OUTPATIENT
Start: 2025-02-27 | End: 2025-02-27 | Stop reason: HOSPADM

## 2025-02-27 RX ORDER — SODIUM CHLORIDE 0.9 % (FLUSH) 0.9 %
10 SYRINGE (ML) INJECTION
OUTPATIENT
Start: 2025-03-06

## 2025-02-27 RX ORDER — SODIUM FERRIC GLUCONATE COMPLEX IN SUCROSE 12.5 MG/ML
250 INJECTION INTRAVENOUS
OUTPATIENT
Start: 2025-03-06

## 2025-02-27 RX ORDER — SODIUM CHLORIDE 0.9 % (FLUSH) 0.9 %
10 SYRINGE (ML) INJECTION
Status: DISCONTINUED | OUTPATIENT
Start: 2025-02-27 | End: 2025-02-27 | Stop reason: HOSPADM

## 2025-02-27 RX ORDER — DIPHENHYDRAMINE HYDROCHLORIDE 50 MG/ML
50 INJECTION INTRAMUSCULAR; INTRAVENOUS ONCE AS NEEDED
OUTPATIENT
Start: 2025-03-06

## 2025-02-27 RX ORDER — SODIUM FERRIC GLUCONATE COMPLEX IN SUCROSE 12.5 MG/ML
250 INJECTION INTRAVENOUS
Status: COMPLETED | OUTPATIENT
Start: 2025-02-27 | End: 2025-02-27

## 2025-02-27 RX ORDER — EPINEPHRINE 0.3 MG/.3ML
0.3 INJECTION SUBCUTANEOUS ONCE AS NEEDED
OUTPATIENT
Start: 2025-03-06

## 2025-02-27 RX ORDER — HEPARIN 100 UNIT/ML
500 SYRINGE INTRAVENOUS
OUTPATIENT
Start: 2025-03-06

## 2025-02-27 RX ADMIN — SODIUM CHLORIDE 250 MG: 9 INJECTION, SOLUTION INTRAVENOUS at 09:02

## 2025-02-27 NOTE — NURSING
Pt arrived to unit for ferrlecit.  Instructed pt will need to wait 30 minutes to be observed. pt verbalized understanding.  1040 Pt left unit NAD.

## 2025-03-06 ENCOUNTER — INFUSION (OUTPATIENT)
Dept: INFUSION THERAPY | Facility: HOSPITAL | Age: 80
End: 2025-03-06
Attending: INTERNAL MEDICINE
Payer: MEDICARE

## 2025-03-06 VITALS
HEART RATE: 87 BPM | WEIGHT: 185.63 LBS | RESPIRATION RATE: 18 BRPM | OXYGEN SATURATION: 99 % | BODY MASS INDEX: 25.99 KG/M2 | HEIGHT: 71 IN | SYSTOLIC BLOOD PRESSURE: 168 MMHG | DIASTOLIC BLOOD PRESSURE: 80 MMHG | TEMPERATURE: 98 F

## 2025-03-06 DIAGNOSIS — D50.8 IRON DEFICIENCY ANEMIA SECONDARY TO INADEQUATE DIETARY IRON INTAKE: Primary | ICD-10-CM

## 2025-03-06 PROCEDURE — 25000003 PHARM REV CODE 250: Mod: HCNC,PN | Performed by: INTERNAL MEDICINE

## 2025-03-06 PROCEDURE — 96365 THER/PROPH/DIAG IV INF INIT: CPT | Mod: HCNC,PN

## 2025-03-06 PROCEDURE — 63600175 PHARM REV CODE 636 W HCPCS: Mod: HCNC,PN | Performed by: INTERNAL MEDICINE

## 2025-03-06 RX ORDER — HEPARIN 100 UNIT/ML
500 SYRINGE INTRAVENOUS
OUTPATIENT
Start: 2025-03-13

## 2025-03-06 RX ORDER — SODIUM CHLORIDE 0.9 % (FLUSH) 0.9 %
10 SYRINGE (ML) INJECTION
OUTPATIENT
Start: 2025-03-13

## 2025-03-06 RX ORDER — SODIUM CHLORIDE 0.9 % (FLUSH) 0.9 %
10 SYRINGE (ML) INJECTION
Status: DISCONTINUED | OUTPATIENT
Start: 2025-03-06 | End: 2025-03-06 | Stop reason: HOSPADM

## 2025-03-06 RX ORDER — HEPARIN 100 UNIT/ML
500 SYRINGE INTRAVENOUS
Status: DISCONTINUED | OUTPATIENT
Start: 2025-03-06 | End: 2025-03-06 | Stop reason: HOSPADM

## 2025-03-06 RX ORDER — EPINEPHRINE 0.3 MG/.3ML
0.3 INJECTION SUBCUTANEOUS ONCE AS NEEDED
OUTPATIENT
Start: 2025-03-13

## 2025-03-06 RX ORDER — DIPHENHYDRAMINE HYDROCHLORIDE 50 MG/ML
50 INJECTION INTRAMUSCULAR; INTRAVENOUS ONCE AS NEEDED
OUTPATIENT
Start: 2025-03-13

## 2025-03-06 RX ORDER — SODIUM FERRIC GLUCONATE COMPLEX IN SUCROSE 12.5 MG/ML
250 INJECTION INTRAVENOUS
Status: COMPLETED | OUTPATIENT
Start: 2025-03-06 | End: 2025-03-06

## 2025-03-06 RX ORDER — EPINEPHRINE 0.3 MG/.3ML
0.3 INJECTION SUBCUTANEOUS ONCE AS NEEDED
Status: DISCONTINUED | OUTPATIENT
Start: 2025-03-06 | End: 2025-03-06 | Stop reason: HOSPADM

## 2025-03-06 RX ORDER — SODIUM FERRIC GLUCONATE COMPLEX IN SUCROSE 12.5 MG/ML
250 INJECTION INTRAVENOUS
OUTPATIENT
Start: 2025-03-13

## 2025-03-06 RX ORDER — DIPHENHYDRAMINE HYDROCHLORIDE 50 MG/ML
50 INJECTION INTRAMUSCULAR; INTRAVENOUS ONCE AS NEEDED
Status: DISCONTINUED | OUTPATIENT
Start: 2025-03-06 | End: 2025-03-06 | Stop reason: HOSPADM

## 2025-03-06 RX ADMIN — SODIUM CHLORIDE 250 MG: 9 INJECTION, SOLUTION INTRAVENOUS at 11:03

## 2025-03-06 NOTE — PLAN OF CARE
Tolerated ferrlecit well.  Observed pt 30 minutes post infusion, no reactions noted.  No questions or concerns at this time.  Ambulated off unit in NAD.

## 2025-03-10 ENCOUNTER — PATIENT MESSAGE (OUTPATIENT)
Dept: CARDIOLOGY | Facility: CLINIC | Age: 80
End: 2025-03-10

## 2025-03-10 ENCOUNTER — OFFICE VISIT (OUTPATIENT)
Dept: CARDIOLOGY | Facility: CLINIC | Age: 80
End: 2025-03-10
Payer: MEDICARE

## 2025-03-10 VITALS
DIASTOLIC BLOOD PRESSURE: 75 MMHG | WEIGHT: 176.56 LBS | HEART RATE: 51 BPM | BODY MASS INDEX: 24.72 KG/M2 | SYSTOLIC BLOOD PRESSURE: 161 MMHG | HEIGHT: 71 IN

## 2025-03-10 DIAGNOSIS — I48.20 CHRONIC ATRIAL FIBRILLATION: ICD-10-CM

## 2025-03-10 DIAGNOSIS — I50.32 CHRONIC HEART FAILURE WITH PRESERVED EJECTION FRACTION (HFPEF): Primary | ICD-10-CM

## 2025-03-10 DIAGNOSIS — I10 ESSENTIAL HYPERTENSION: ICD-10-CM

## 2025-03-10 DIAGNOSIS — I48.19 PERSISTENT ATRIAL FIBRILLATION: ICD-10-CM

## 2025-03-10 PROCEDURE — 1160F RVW MEDS BY RX/DR IN RCRD: CPT | Mod: HCNC,CPTII,NTX,S$GLB | Performed by: INTERNAL MEDICINE

## 2025-03-10 PROCEDURE — 1159F MED LIST DOCD IN RCRD: CPT | Mod: HCNC,CPTII,NTX,S$GLB | Performed by: INTERNAL MEDICINE

## 2025-03-10 PROCEDURE — 99999 PR PBB SHADOW E&M-EST. PATIENT-LVL IV: CPT | Mod: PBBFAC,HCNC,TXP, | Performed by: INTERNAL MEDICINE

## 2025-03-10 PROCEDURE — 3078F DIAST BP <80 MM HG: CPT | Mod: HCNC,CPTII,NTX,S$GLB | Performed by: INTERNAL MEDICINE

## 2025-03-10 PROCEDURE — 99214 OFFICE O/P EST MOD 30 MIN: CPT | Mod: HCNC,NTX,S$GLB, | Performed by: INTERNAL MEDICINE

## 2025-03-10 PROCEDURE — 3077F SYST BP >= 140 MM HG: CPT | Mod: HCNC,CPTII,NTX,S$GLB | Performed by: INTERNAL MEDICINE

## 2025-03-10 PROCEDURE — 1157F ADVNC CARE PLAN IN RCRD: CPT | Mod: HCNC,CPTII,NTX,S$GLB | Performed by: INTERNAL MEDICINE

## 2025-03-10 PROCEDURE — 1101F PT FALLS ASSESS-DOCD LE1/YR: CPT | Mod: HCNC,CPTII,NTX,S$GLB | Performed by: INTERNAL MEDICINE

## 2025-03-10 PROCEDURE — 1126F AMNT PAIN NOTED NONE PRSNT: CPT | Mod: HCNC,CPTII,NTX,S$GLB | Performed by: INTERNAL MEDICINE

## 2025-03-10 PROCEDURE — 3288F FALL RISK ASSESSMENT DOCD: CPT | Mod: HCNC,CPTII,NTX,S$GLB | Performed by: INTERNAL MEDICINE

## 2025-03-10 NOTE — PROGRESS NOTES
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  He had to be cardioverted couple of weeks ago due to recurrent atrial fibrillation.  The cardioversion was successful.    Doing well now.  Going to [facility] later this month.    Blood pressure under control.  No major problems at this time    Review of Systems   Constitutional: Negative for decreased appetite, malaise/fatigue, weight gain and weight loss.   Cardiovascular:  Negative for chest pain, dyspnea on exertion, leg swelling, palpitations and syncope.   Respiratory:  Negative for cough and shortness of breath.    Gastrointestinal: Negative.    Neurological:  Negative for weakness.   All other systems reviewed and are negative.       Objective:      Physical Exam  Vitals and nursing note reviewed.   Constitutional:       Appearance: Normal appearance. He is well-developed.   HENT:      Head: Normocephalic.   Eyes:      Pupils: Pupils are equal, round, and reactive to light.   Neck:      Thyroid: No thyromegaly.      Vascular: No carotid bruit or JVD.   Cardiovascular:      Rate and Rhythm: Normal rate and regular rhythm.      Chest Wall: PMI is not displaced.      Pulses: Normal pulses and intact distal pulses.      Heart sounds: Murmur heard.      High-pitched blowing holosystolic murmur is present with a grade of 3/6 at the apex.      No gallop.   Pulmonary:      Effort: Pulmonary effort is normal.      Breath sounds: Normal breath sounds.   Abdominal:      Palpations: Abdomen is soft. There is no mass.      Tenderness: There is no abdominal tenderness.   Musculoskeletal:         General: Normal range of motion.      Cervical back: Normal range of motion and neck supple.   Skin:     General: Skin is warm.   Neurological:      Mental Status: He is alert and oriented to person, place, and time.      Sensory:  No sensory deficit.      Deep Tendon Reflexes: Reflexes are normal and symmetric.             Most Recent EKG Results  Results for orders placed or performed during the hospital encounter of 02/17/25   EKG 12-lead    Collection Time: 02/17/25 12:04 PM   Result Value Ref Range    QRS Duration 96 ms    OHS QTC Calculation 467 ms    Narrative    Test Reason : I48.0,    Vent. Rate :  79 BPM     Atrial Rate :  89 BPM     P-R Int :    ms          QRS Dur :  96 ms      QT Int : 408 ms       P-R-T Axes :     57  54 degrees    QTcB Int : 467 ms    Atrial fibrillation  Abnormal ECG  When compared with ECG of 05-Nov-2024 09:47,  Atrial fibrillation has replaced Electronic atrial pacemaker  Confirmed by Jewel Arnold ([id number]) on 2/17/2025 1:17:36 PM    Referred By:            Confirmed By: Jewel Arnold       Most Recent Echocardiogram Results  Results for orders placed during the hospital encounter of 02/17/25    Transesophageal echo (PEPE)with possible cardioversion    Interpretation Summary    Left Ventricle: There is normal systolic function.    Right Ventricle: Systolic function is normal. Pacemaker lead present in the ventricle.    Left Atrium: Left atrium is dilated. No LA thrombus. Amulet device well seated with no device related thrombus or marcela-device leaks    Right Atrium: Lead present in the right atrium.    Mitral Valve: There is moderate regurgitation.    Procedure limited seconary to inability to obtain accurate O2 sat    Successful cardioversion (200J) to atrial paced rhythm      Most Recent Nuclear Stress Test Results  Results for orders placed during the hospital encounter of 09/20/23    Nuclear Stress - Cardiology Interpreted    Interpretation Summary    Normal myocardial perfusion scan. There is no evidence of myocardial ischemia or infarction.    The gated perfusion images showed an ejection fraction of 60% post stress.    LV cavity size is normal at rest and normal at stress.    The ECG portion of the study  is abnormal but not diagnostic.    The patient reported no chest pain during the stress test.      Most Recent Cardiac PET Stress Test Results  No results found for this or any previous visit.      Most Recent Cardiovascular Angiogram results  Results for orders placed during the hospital encounter of 05/23/24    Cardiac catheterization    Conclusion    The estimated blood loss was none.    The left atrial appendage closure was successful .    Follow up PEPE in 45 days and clinic with me.    The procedure log was documented by Documenter: Dorothy Haider and verified by Tony Duvall MD.    Date: 5/23/2024  Time: 2:12 PM      Other Most Recent Cardiology Results  Results for orders placed in visit on 02/12/25    Cardiac device check - Remote alert      Labs reviewed    Assessment:       1. Chronic heart failure with preserved ejection fraction (HFpEF)    2. Chronic atrial fibrillation    3. Essential hypertension    4. Persistent atrial fibrillation         Plan:     Continue:  Antiarrhythmic, ARB, ASA, Beta blocker, and Diuretic  Regular exercise program  Weight loss  Low cholesterol diet    Follow-up in 3 months with echocardiogram

## 2025-03-11 ENCOUNTER — PATIENT MESSAGE (OUTPATIENT)
Dept: FAMILY MEDICINE | Facility: CLINIC | Age: 80
End: 2025-03-11
Payer: MEDICARE

## 2025-03-12 ENCOUNTER — OFFICE VISIT (OUTPATIENT)
Dept: OPTOMETRY | Facility: CLINIC | Age: 80
End: 2025-03-12
Payer: MEDICARE

## 2025-03-12 ENCOUNTER — TELEPHONE (OUTPATIENT)
Dept: INFUSION THERAPY | Facility: HOSPITAL | Age: 80
End: 2025-03-12
Payer: MEDICARE

## 2025-03-12 DIAGNOSIS — H04.123 DRY EYE SYNDROME OF BILATERAL LACRIMAL GLANDS: Primary | ICD-10-CM

## 2025-03-12 DIAGNOSIS — Z46.0 ENCOUNTER FOR FITTING OR ADJUSTMENT OF SPECTACLES OR CONTACT LENSES: ICD-10-CM

## 2025-03-12 DIAGNOSIS — Z97.3 WEARS CONTACT LENSES: ICD-10-CM

## 2025-03-12 PROCEDURE — 1126F AMNT PAIN NOTED NONE PRSNT: CPT | Mod: HCNC,CPTII,S$GLB,TXP

## 2025-03-12 PROCEDURE — 99213 OFFICE O/P EST LOW 20 MIN: CPT | Mod: HCNC,S$GLB,TXP,

## 2025-03-12 PROCEDURE — 1157F ADVNC CARE PLAN IN RCRD: CPT | Mod: HCNC,CPTII,S$GLB,TXP

## 2025-03-12 PROCEDURE — 3288F FALL RISK ASSESSMENT DOCD: CPT | Mod: HCNC,CPTII,S$GLB,TXP

## 2025-03-12 PROCEDURE — 1101F PT FALLS ASSESS-DOCD LE1/YR: CPT | Mod: HCNC,CPTII,S$GLB,TXP

## 2025-03-12 PROCEDURE — 99999 PR PBB SHADOW E&M-EST. PATIENT-LVL III: CPT | Mod: PBBFAC,HCNC,TXP,

## 2025-03-12 PROCEDURE — 1159F MED LIST DOCD IN RCRD: CPT | Mod: HCNC,CPTII,S$GLB,TXP

## 2025-03-12 NOTE — PROGRESS NOTES
HPI    Contact follow up    Pt wearing trials given at time of exam (8:00 am WTT)  Pt denies sleeping w corrective lenses WT total 12-13 hours, pt not   completely comfortable w contacts when first wear. Pt still complains of   tonia, pt notices blurred va comes and goes depending on contacts, pt not   sure if ocular edema. Pt stated near was clearer w previous contacts.   Last edited by Irina Dangelo on 3/12/2025  2:08 PM.            Assessment /Plan     For exam results, see Encounter Report.    Dry eye syndrome of bilateral lacrimal glands    Encounter for fitting or adjustment of spectacles or contact lenses    Wears contact lenses      Pt experiencing discomfort and blur towards end of day with lenses along with intermittent blur. Reassured pt that it is not corneal edema but is secondary to excessive dryness with lenses. Gave OTC artificial tear suggestions to instill BID-QID OU daily and encouraged wearing specs when driving at night rather than contact lenses. Continue to monitor yearly for changes, sooner if any worsening signs or symptoms.    2-3. Increased pt's distance rx OS due to complaints of distance blur at previous exam, but pt now having complaints of increased blur at near. Demonstrated to pt his previous rx with loose lenses in-office and pt did not appreciate increased clarity. Increased plus with loose lenses OS and pt still did not appreciate increased clarity. Discussed option for wearing OTC readers on top of distance contact lenses due to 20/20 DVA with current lenses and pt ok with doing so - +2.50 recommended. However, pt does not want to have to wear for intermediate (playing cards). Discussed option for wearing one contact lens in OD only and also dispensed trials of pt's previous rx (-0.50 DS). Ed pt to try both and call or message with whichever preferred and will finalize. Continue good contact lens hygiene and routine daily replacement of lenses.    RTC: as scheduled for annual,  sooner prn.

## 2025-03-12 NOTE — TELEPHONE ENCOUNTER
----- Message from Radha sent at 3/12/2025  8:30 AM CDT -----  Type: Needs Medical AdviceWho Called:  ptSymptoms (please be specific):  How long has patient had these symptoms:  Pharmacy name and phone #:  Best Call Back Number: [phone] Information: pt is requesting a call back to change appt time on 3/13

## 2025-03-12 NOTE — TELEPHONE ENCOUNTER
Spoke to patient and rescheduled appointment as requested. Pt verbalized understanding to new appointment date and time.

## 2025-03-14 ENCOUNTER — INFUSION (OUTPATIENT)
Dept: INFUSION THERAPY | Facility: HOSPITAL | Age: 80
End: 2025-03-14
Attending: INTERNAL MEDICINE
Payer: MEDICARE

## 2025-03-14 VITALS
DIASTOLIC BLOOD PRESSURE: 70 MMHG | TEMPERATURE: 98 F | BODY MASS INDEX: 24.51 KG/M2 | SYSTOLIC BLOOD PRESSURE: 159 MMHG | OXYGEN SATURATION: 94 % | HEART RATE: 60 BPM | WEIGHT: 175.06 LBS | RESPIRATION RATE: 16 BRPM | HEIGHT: 71 IN

## 2025-03-14 DIAGNOSIS — D50.8 IRON DEFICIENCY ANEMIA SECONDARY TO INADEQUATE DIETARY IRON INTAKE: Primary | ICD-10-CM

## 2025-03-14 PROCEDURE — 63600175 PHARM REV CODE 636 W HCPCS: Mod: HCNC,PN | Performed by: INTERNAL MEDICINE

## 2025-03-14 PROCEDURE — 96365 THER/PROPH/DIAG IV INF INIT: CPT | Mod: HCNC,PN

## 2025-03-14 PROCEDURE — 25000003 PHARM REV CODE 250: Mod: HCNC,PN | Performed by: INTERNAL MEDICINE

## 2025-03-14 RX ORDER — SODIUM CHLORIDE 0.9 % (FLUSH) 0.9 %
10 SYRINGE (ML) INJECTION
OUTPATIENT
Start: 2025-03-14

## 2025-03-14 RX ORDER — DIPHENHYDRAMINE HYDROCHLORIDE 50 MG/ML
50 INJECTION, SOLUTION INTRAMUSCULAR; INTRAVENOUS ONCE AS NEEDED
OUTPATIENT
Start: 2025-03-20

## 2025-03-14 RX ORDER — HEPARIN 100 UNIT/ML
500 SYRINGE INTRAVENOUS
OUTPATIENT
Start: 2025-03-20

## 2025-03-14 RX ORDER — SODIUM FERRIC GLUCONATE COMPLEX IN SUCROSE 12.5 MG/ML
250 INJECTION INTRAVENOUS
OUTPATIENT
Start: 2025-03-20

## 2025-03-14 RX ORDER — SODIUM FERRIC GLUCONATE COMPLEX IN SUCROSE 12.5 MG/ML
250 INJECTION INTRAVENOUS
Status: COMPLETED | OUTPATIENT
Start: 2025-03-14 | End: 2025-03-14

## 2025-03-14 RX ORDER — ACETAMINOPHEN 500 MG
500 TABLET ORAL
OUTPATIENT
Start: 2025-03-14

## 2025-03-14 RX ORDER — HEPARIN 100 UNIT/ML
500 SYRINGE INTRAVENOUS
OUTPATIENT
Start: 2025-03-14

## 2025-03-14 RX ORDER — EPINEPHRINE 0.3 MG/.3ML
0.3 INJECTION SUBCUTANEOUS ONCE AS NEEDED
OUTPATIENT
Start: 2025-03-20

## 2025-03-14 RX ORDER — SODIUM CHLORIDE 0.9 % (FLUSH) 0.9 %
10 SYRINGE (ML) INJECTION
OUTPATIENT
Start: 2025-03-20

## 2025-03-14 RX ORDER — ZOLEDRONIC ACID 5 MG/100ML
5 INJECTION, SOLUTION INTRAVENOUS
OUTPATIENT
Start: 2025-03-14

## 2025-03-14 RX ADMIN — SODIUM CHLORIDE 250 MG: 9 INJECTION, SOLUTION INTRAVENOUS at 10:03

## 2025-03-14 NOTE — PLAN OF CARE
Problem: Adult Inpatient Plan of Care  Goal: Plan of Care Review  Outcome: Progressing  Flowsheets (Taken 3/14/2025 1158)  Plan of Care Reviewed With: patient  Goal: Patient-Specific Goal (Individualized)  Outcome: Progressing  Flowsheets (Taken 3/14/2025 1158)  Individualized Care Needs: recliner, pt likes to look on phone and watch tv  Patient/Family-Specific Goals (Include Timeframe): no reaction to tx  Goal: Absence of Hospital-Acquired Illness or Injury  Outcome: Progressing  Goal: Optimal Comfort and Wellbeing  Outcome: Progressing  Goal: Readiness for Transition of Care  Outcome: Progressing     Problem: Fatigue  Goal: Improved Activity Tolerance  Outcome: Progressing  Intervention: Promote Improved Energy  Flowsheets (Taken 3/14/2025 1158)  Fatigue Management: paced activity encouraged  Sleep/Rest Enhancement: regular sleep/rest pattern promoted  Activity Management: Ambulated in [place] -   Environmental Support:   calm environment promoted   distractions minimized   Pt tolerated Ferrlecit infusion well.  Pt stayed for 30 minutes post infusion.  No s/s of infusion reaction noted.  Instructed to call MD with any problems    69 year old male with congestion, cold sxs, cough and aches that started 3 days ago.  No fever.   Denies nausea, vomiting, diarrhea.  Eating and drinking well.  Nursing notes reviewed and accepted.    Physical examination:  Visit Vitals  BP (!) 165/76   Pulse 92   Temp 99.1 °F (37.3 °C) (Oral)   Resp 18   Ht 6' 1\" (1.854 m)   Wt 94.3 kg (208 lb)   SpO2 99%   BMI 27.44 kg/m²     gen: alert, in no acute distress  chest: clear to auscultation  throat: clear  nose: no rhinorrhea  eyes: lids, conjunctiva clear, no discharge    ASSESSMENT/PLAN:  URI  OTC remedies  Tylenol.  Rest.  Fluids.  Discussed with pt symptoms, etiologies, treatment, concerns, follow-up.  PCP prn, any worsening, not improving, any other concerns.

## 2025-03-20 ENCOUNTER — INFUSION (OUTPATIENT)
Dept: INFUSION THERAPY | Facility: HOSPITAL | Age: 80
End: 2025-03-20
Attending: INTERNAL MEDICINE
Payer: MEDICARE

## 2025-03-20 VITALS
TEMPERATURE: 98 F | WEIGHT: 175.06 LBS | BODY MASS INDEX: 24.51 KG/M2 | OXYGEN SATURATION: 99 % | HEIGHT: 71 IN | HEART RATE: 76 BPM | DIASTOLIC BLOOD PRESSURE: 84 MMHG | SYSTOLIC BLOOD PRESSURE: 167 MMHG | RESPIRATION RATE: 16 BRPM

## 2025-03-20 DIAGNOSIS — D50.8 IRON DEFICIENCY ANEMIA SECONDARY TO INADEQUATE DIETARY IRON INTAKE: Primary | ICD-10-CM

## 2025-03-20 PROCEDURE — 96365 THER/PROPH/DIAG IV INF INIT: CPT | Mod: PN

## 2025-03-20 PROCEDURE — 25000003 PHARM REV CODE 250: Mod: PN | Performed by: INTERNAL MEDICINE

## 2025-03-20 PROCEDURE — 63600175 PHARM REV CODE 636 W HCPCS: Mod: PN | Performed by: INTERNAL MEDICINE

## 2025-03-20 RX ORDER — HEPARIN 100 UNIT/ML
500 SYRINGE INTRAVENOUS
OUTPATIENT
Start: 2025-03-20

## 2025-03-20 RX ORDER — SODIUM CHLORIDE 0.9 % (FLUSH) 0.9 %
10 SYRINGE (ML) INJECTION
OUTPATIENT
Start: 2025-03-20

## 2025-03-20 RX ORDER — DIPHENHYDRAMINE HYDROCHLORIDE 50 MG/ML
50 INJECTION, SOLUTION INTRAMUSCULAR; INTRAVENOUS ONCE AS NEEDED
Status: DISCONTINUED | OUTPATIENT
Start: 2025-03-20 | End: 2025-03-20 | Stop reason: HOSPADM

## 2025-03-20 RX ORDER — SODIUM FERRIC GLUCONATE COMPLEX IN SUCROSE 12.5 MG/ML
250 INJECTION INTRAVENOUS
Status: CANCELLED | OUTPATIENT
Start: 2025-03-20

## 2025-03-20 RX ORDER — SODIUM CHLORIDE 0.9 % (FLUSH) 0.9 %
10 SYRINGE (ML) INJECTION
Status: DISCONTINUED | OUTPATIENT
Start: 2025-03-20 | End: 2025-03-20 | Stop reason: HOSPADM

## 2025-03-20 RX ORDER — DIPHENHYDRAMINE HYDROCHLORIDE 50 MG/ML
50 INJECTION, SOLUTION INTRAMUSCULAR; INTRAVENOUS ONCE AS NEEDED
OUTPATIENT
Start: 2025-03-20

## 2025-03-20 RX ORDER — EPINEPHRINE 0.3 MG/.3ML
0.3 INJECTION SUBCUTANEOUS ONCE AS NEEDED
OUTPATIENT
Start: 2025-03-20

## 2025-03-20 RX ORDER — HEPARIN 100 UNIT/ML
500 SYRINGE INTRAVENOUS
Status: DISCONTINUED | OUTPATIENT
Start: 2025-03-20 | End: 2025-03-20 | Stop reason: HOSPADM

## 2025-03-20 RX ORDER — SODIUM FERRIC GLUCONATE COMPLEX IN SUCROSE 12.5 MG/ML
250 INJECTION INTRAVENOUS
Status: COMPLETED | OUTPATIENT
Start: 2025-03-20 | End: 2025-03-20

## 2025-03-20 RX ORDER — EPINEPHRINE 0.3 MG/.3ML
0.3 INJECTION SUBCUTANEOUS ONCE AS NEEDED
Status: DISCONTINUED | OUTPATIENT
Start: 2025-03-20 | End: 2025-03-20 | Stop reason: HOSPADM

## 2025-03-20 RX ADMIN — SODIUM CHLORIDE 250 MG: 9 INJECTION, SOLUTION INTRAVENOUS at 11:03

## 2025-03-20 RX ADMIN — SODIUM CHLORIDE: 9 INJECTION, SOLUTION INTRAVENOUS at 11:03

## 2025-03-20 NOTE — PLAN OF CARE
Pt arrived to clinic for Ferrlecit infusion and tolerated well with no changes throughout therapy, with being monitored 30 min post infusion. Pt aware of side effects and f/u appts and discharged to home in [facility].

## 2025-03-20 NOTE — PLAN OF CARE
Problem: Adult Inpatient Plan of Care  Goal: Plan of Care Review  Outcome: Progressing  Flowsheets (Taken 3/20/2025 1256)  Plan of Care Reviewed With: patient  Goal: Patient-Specific Goal (Individualized)  Outcome: Progressing  Flowsheets (Taken 3/20/2025 1256)  Individualized Care Needs: recliner, education, conversation, tv, pillow  Anxieties, Fears or Concerns: none voiced  Goal: Optimal Comfort and Wellbeing  Outcome: Progressing  Intervention: Provide Person-Centered Care  Flowsheets (Taken 3/20/2025 1256)  Trust Relationship/Rapport:   care explained   empathic listening provided   reassurance provided   thoughts/feelings acknowledged   questions encouraged   emotional support provided   questions answered   choices provided     Problem: Fatigue  Goal: Improved Activity Tolerance  Outcome: Progressing  Intervention: Promote Improved Energy  Flowsheets (Taken 3/20/2025 1256)  Fatigue Management:   fatigue-related activity identified   paced activity encouraged   frequent rest breaks encouraged  Sleep/Rest Enhancement:   noise level reduced   relaxation techniques promoted   family presence promoted   natural light exposure provided   therapeutic touch utilized  Activity Management:   Ambulated -L4   Up in chair - L3  Environmental Support:   rest periods encouraged   distractions minimized   calm environment promoted   personal routine supported

## 2025-04-05 ENCOUNTER — PATIENT MESSAGE (OUTPATIENT)
Dept: FAMILY MEDICINE | Facility: CLINIC | Age: 80
End: 2025-04-05
Payer: MEDICARE

## 2025-04-05 DIAGNOSIS — M00.9 PYOGENIC ARTHRITIS OF RIGHT KNEE JOINT, DUE TO UNSPECIFIED ORGANISM: ICD-10-CM

## 2025-04-05 NOTE — TELEPHONE ENCOUNTER
No care due was identified.  [facility] Embedded Care Due Messages. Reference number: [id number].   4/05/2025 6:45:59 PM CDT

## 2025-04-06 RX ORDER — TIZANIDINE 4 MG/1
4 TABLET ORAL 3 TIMES DAILY PRN
Qty: 30 TABLET | Refills: 2 | Status: SHIPPED | OUTPATIENT
Start: 2025-04-06

## 2025-04-09 ENCOUNTER — TELEPHONE (OUTPATIENT)
Dept: ENDOCRINOLOGY | Facility: CLINIC | Age: 80
End: 2025-04-09
Payer: MEDICARE

## 2025-04-09 NOTE — TELEPHONE ENCOUNTER
LVM advising pt he will need to see Dr. Mckeon prior to Reclast on 4/14. Appt sched with Dr. Mckeon on 4/11 at 9:30 AM. Let us know if this appt does not work.

## 2025-04-10 ENCOUNTER — TELEPHONE (OUTPATIENT)
Dept: CARDIOLOGY | Facility: CLINIC | Age: 80
End: 2025-04-10
Payer: MEDICARE

## 2025-04-10 ENCOUNTER — TELEPHONE (OUTPATIENT)
Dept: ENDOCRINOLOGY | Facility: CLINIC | Age: 80
End: 2025-04-10
Payer: MEDICARE

## 2025-04-10 ENCOUNTER — LAB VISIT (OUTPATIENT)
Dept: LAB | Facility: HOSPITAL | Age: 80
End: 2025-04-10
Attending: INTERNAL MEDICINE
Payer: MEDICARE

## 2025-04-10 DIAGNOSIS — E53.8 VITAMIN B12 DEFICIENCY: ICD-10-CM

## 2025-04-10 DIAGNOSIS — R78.71 ABNORMAL LEAD LEVEL IN BLOOD: ICD-10-CM

## 2025-04-10 DIAGNOSIS — R53.82 CHRONIC FATIGUE: ICD-10-CM

## 2025-04-10 LAB
ABSOLUTE EOSINOPHIL (OHS): 0.78 K/UL
ABSOLUTE MONOCYTE (OHS): 0.39 K/UL (ref 0.3–1)
ABSOLUTE NEUTROPHIL COUNT (OHS): 3.83 K/UL (ref 1.8–7.7)
BASOPHILS # BLD AUTO: 0.11 K/UL
BASOPHILS NFR BLD AUTO: 1.8 %
ERYTHROCYTE [DISTWIDTH] IN BLOOD BY AUTOMATED COUNT: 18.7 % (ref 11.5–14.5)
FERRITIN SERPL-MCNC: 129 NG/ML (ref 20–300)
HCT VFR BLD AUTO: 33.6 % (ref 40–54)
HGB BLD-MCNC: 11 GM/DL (ref 14–18)
IMM GRANULOCYTES # BLD AUTO: 0.03 K/UL (ref 0–0.04)
IMM GRANULOCYTES NFR BLD AUTO: 0.5 % (ref 0–0.5)
LYMPHOCYTES # BLD AUTO: 1.07 K/UL (ref 1–4.8)
MCH RBC QN AUTO: 29.9 PG (ref 27–31)
MCHC RBC AUTO-ENTMCNC: 32.7 G/DL (ref 32–36)
MCV RBC AUTO: 91 FL (ref 82–98)
NUCLEATED RBC (/100WBC) (OHS): 0 /100 WBC
PLATELET # BLD AUTO: 239 K/UL (ref 150–450)
PMV BLD AUTO: 11.1 FL (ref 9.2–12.9)
RBC # BLD AUTO: 3.68 M/UL (ref 4.6–6.2)
RELATIVE EOSINOPHIL (OHS): 12.6 %
RELATIVE LYMPHOCYTE (OHS): 17.2 % (ref 18–48)
RELATIVE MONOCYTE (OHS): 6.3 % (ref 4–15)
RELATIVE NEUTROPHIL (OHS): 61.6 % (ref 38–73)
WBC # BLD AUTO: 6.21 K/UL (ref 3.9–12.7)

## 2025-04-10 PROCEDURE — 85025 COMPLETE CBC W/AUTO DIFF WBC: CPT | Mod: PN

## 2025-04-10 PROCEDURE — 36415 COLL VENOUS BLD VENIPUNCTURE: CPT | Mod: PN

## 2025-04-10 PROCEDURE — 82728 ASSAY OF FERRITIN: CPT

## 2025-04-10 PROCEDURE — 84630 ASSAY OF ZINC: CPT

## 2025-04-10 NOTE — TELEPHONE ENCOUNTER
----- Message from David sent at 4/10/2025  9:35 AM CDT -----  Type:  Patient Returning CallWho Called:ptWho Left Message for Patient:Berry the patient know what this is regarding?: apptWould the patient rather a call back or a response via [facility]? Call [facility] Call Back Number:[phone] Additional Information: please call to discuss

## 2025-04-11 ENCOUNTER — OFFICE VISIT (OUTPATIENT)
Dept: ENDOCRINOLOGY | Facility: CLINIC | Age: 80
End: 2025-04-11
Payer: MEDICARE

## 2025-04-11 VITALS
SYSTOLIC BLOOD PRESSURE: 132 MMHG | HEIGHT: 71 IN | OXYGEN SATURATION: 95 % | HEART RATE: 60 BPM | BODY MASS INDEX: 25.08 KG/M2 | DIASTOLIC BLOOD PRESSURE: 78 MMHG | WEIGHT: 179.13 LBS

## 2025-04-11 DIAGNOSIS — E03.9 ACQUIRED HYPOTHYROIDISM: ICD-10-CM

## 2025-04-11 DIAGNOSIS — M81.0 OSTEOPOROSIS, UNSPECIFIED OSTEOPOROSIS TYPE, UNSPECIFIED PATHOLOGICAL FRACTURE PRESENCE: Primary | ICD-10-CM

## 2025-04-11 DIAGNOSIS — R79.89 ELEVATED PARATHYROID HORMONE: ICD-10-CM

## 2025-04-11 PROCEDURE — 99999 PR PBB SHADOW E&M-EST. PATIENT-LVL IV: CPT | Mod: PBBFAC,,, | Performed by: INTERNAL MEDICINE

## 2025-04-11 NOTE — PROGRESS NOTES
CHIEF COMPLAINT:  Osteoporosis/hypothyroidism  79 y.o. old being seen as a f/u.  Initially discovered after having some hip pain and had an x-ray that showed osteopenia.  Bone density then showed osteoporosis.  He had a fall in 2020 and broke his left hip.  Missed the last step on stairs. Had a left humerus fracture when slipped on liquid in the house. Was several years prior to surgery.  Was on Fosamax in the past. Was on Forteo around March of 2021. Was on it approx 1 year before switching to reclast.     Now on Reclast that started 3/23/22.  Currently on Synthroid 75 mcg. Taking Ca + D. Takes MVI. Nephrology has him on calcitriol. No new falls. He does do some exercise- walking, outside work. Has a history of AFIB- paroxysmal.       Reclast doses:  3/23/22  3/23/33  3/22/24      PAST MEDICAL HISTORY/PAST SURGICAL HISTORY:  Reviewed in [facility]    SOCIAL HISTORY: Reviewed in [facility]    FAMILY HISTORY:  Mother and father had osteoporosis. Father with diabetes.     MEDICATIONS/ALLERGIES: The patient's MedCard has been updated and reviewed.            PE:    GENERAL: Well developed, well nourished.  NECK: Supple, trachea midline, no palpable thyroid nodules  CHEST: Resp even and unlabored, CTA bilateral.  CARDIAC: RRR, S1, S2 heard, no murmurs, rubs, S3, or S4    LABS/Radiology     Latest Reference Range & Units 02/06/25 15:04   Sodium 136 - 145 mmol/L 140   Potassium 3.5 - 5.1 mmol/L 4.5   Chloride 95 - 110 mmol/L 114 (H)   CO2 23 - 29 mmol/L 19 (L)   Anion Gap 8 - 16 mmol/L 7 (L)   BUN 8 - 23 mg/dL 28 (H)   Creatinine 0.5 - 1.4 mg/dL 1.1   eGFR >60 mL/min/1.73 m^2 >60.0   Glucose 70 - 110 mg/dL 109   Calcium 8.7 - 10.5 mg/dL 8.3 (L)   ALP 40 - 150 U/L 76   PROTEIN TOTAL 6.0 - 8.4 g/dL 6.1   Albumin 3.5 - 5.2 g/dL 3.3 (L)   BILIRUBIN TOTAL 0.1 - 1.0 mg/dL 0.4   AST 10 - 40 U/L 18   ALT 10 - 44 U/L 14   (H): Data is abnormally high  (L): Data is abnormally low  Corrected Calcium 8.86   Latest Reference Range & Units  02/06/25 15:04   Vitamin D 30 - 96 ng/mL 34   w      ASSESSMENT/PLAN:  Osteoporosis-with a history of a hip fracture and a left humerus fracture.  Has been on Fosamax and Forteo in the past.  Patient has been on Reclast.  Received 3 doses.  Last bone density she had no significant T-score worsening.  At this point, I would like to do a drug holiday.  Get fasting CTX to monitor at follow up.  Can restart therapy if any worsening of bone density or any fragility fractures.    2.  Hypothyroidism-appears to be taking thyroid medication appropriately.  TSH within normal limits.  No symptoms of hyper or hypothyroidism.  Continue current dose of thyroid medication.    3. CKD 3- stable.     4. Elevated PTH- possibly secondary due to CKD.  We will repeat at follow-up      FOLLOWUP  Stop reclast  F/U Jan 2026 with Fasting CTX, CMP, TSH, PTH, Vit D

## 2025-04-14 ENCOUNTER — OFFICE VISIT (OUTPATIENT)
Dept: HEMATOLOGY/ONCOLOGY | Facility: CLINIC | Age: 80
End: 2025-04-14
Payer: MEDICARE

## 2025-04-14 ENCOUNTER — TELEPHONE (OUTPATIENT)
Dept: CARDIOLOGY | Facility: CLINIC | Age: 80
End: 2025-04-14
Payer: MEDICARE

## 2025-04-14 VITALS
RESPIRATION RATE: 17 BRPM | TEMPERATURE: 99 F | WEIGHT: 176.56 LBS | HEIGHT: 71 IN | OXYGEN SATURATION: 97 % | BODY MASS INDEX: 24.72 KG/M2 | SYSTOLIC BLOOD PRESSURE: 150 MMHG | DIASTOLIC BLOOD PRESSURE: 76 MMHG | HEART RATE: 55 BPM

## 2025-04-14 DIAGNOSIS — M80.00XD AGE-RELATED OSTEOPOROSIS WITH CURRENT PATHOLOGICAL FRACTURE WITH ROUTINE HEALING: ICD-10-CM

## 2025-04-14 DIAGNOSIS — D50.8 IRON DEFICIENCY ANEMIA SECONDARY TO INADEQUATE DIETARY IRON INTAKE: Primary | ICD-10-CM

## 2025-04-14 DIAGNOSIS — N18.31 CHRONIC KIDNEY DISEASE, STAGE 3A: ICD-10-CM

## 2025-04-14 DIAGNOSIS — E60 ZINC DEFICIENCY: ICD-10-CM

## 2025-04-14 DIAGNOSIS — I48.20 CHRONIC ATRIAL FIBRILLATION: ICD-10-CM

## 2025-04-14 DIAGNOSIS — E03.9 ACQUIRED HYPOTHYROIDISM: ICD-10-CM

## 2025-04-14 LAB — W ZINC: 101 UG/DL

## 2025-04-14 PROCEDURE — G2211 COMPLEX E/M VISIT ADD ON: HCPCS | Mod: S$GLB,,, | Performed by: INTERNAL MEDICINE

## 2025-04-14 PROCEDURE — 1159F MED LIST DOCD IN RCRD: CPT | Mod: CPTII,S$GLB,, | Performed by: INTERNAL MEDICINE

## 2025-04-14 PROCEDURE — 3077F SYST BP >= 140 MM HG: CPT | Mod: CPTII,S$GLB,, | Performed by: INTERNAL MEDICINE

## 2025-04-14 PROCEDURE — 99999 PR PBB SHADOW E&M-EST. PATIENT-LVL IV: CPT | Mod: PBBFAC,,, | Performed by: INTERNAL MEDICINE

## 2025-04-14 PROCEDURE — 3288F FALL RISK ASSESSMENT DOCD: CPT | Mod: CPTII,S$GLB,, | Performed by: INTERNAL MEDICINE

## 2025-04-14 PROCEDURE — 3078F DIAST BP <80 MM HG: CPT | Mod: CPTII,S$GLB,, | Performed by: INTERNAL MEDICINE

## 2025-04-14 PROCEDURE — 1157F ADVNC CARE PLAN IN RCRD: CPT | Mod: CPTII,S$GLB,, | Performed by: INTERNAL MEDICINE

## 2025-04-14 PROCEDURE — 1101F PT FALLS ASSESS-DOCD LE1/YR: CPT | Mod: CPTII,S$GLB,, | Performed by: INTERNAL MEDICINE

## 2025-04-14 PROCEDURE — 1126F AMNT PAIN NOTED NONE PRSNT: CPT | Mod: CPTII,S$GLB,, | Performed by: INTERNAL MEDICINE

## 2025-04-14 PROCEDURE — 99213 OFFICE O/P EST LOW 20 MIN: CPT | Mod: S$GLB,,, | Performed by: INTERNAL MEDICINE

## 2025-04-14 NOTE — PROGRESS NOTES
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    He is s/p IV iron. Did not feel a major improvement in his PS.    The patient denies CP, cough, SOB, abdominal pain, nausea, vomiting, constipation.  The patient denies fever, chills, night sweats, weight loss, new lumps or bumps, easy bruising or bleeding.    PREVIOUS WORK-UP:  Labs done on 2025 showed:  Kappa over lambda ratio was found to be mildly elevated at 1.81 with a kappa free light chain of 6.3 and lambda free light chain 3.49.  Serum immunofixation showed no abnormality.  Urine immunofixation showed 2 closely adjacent kappa light chain specific monoclonal protein band in the gamma area.    He had a gastric bypass years ago and he lost a lot of weight. He had it reversed.  He is known to have a short bowel movement with multiple episodes of diarrhea.    In view of his weight loss he his primary care physician ordered a PET scan on 2024 that failed to show any significant abnormality.  His last colonoscopy was done on 2025 and failed to show a polyp that was removed.  The pathology came back benign.      He was diagnosed with into interstitial lung disease and was evaluated for possible transplant.  He was seen recently by a cardiologist and is undergoing a workup for possibly amyloidosis.  His 2D echo done on 2024 showed a normal ejection fraction at 60-65%.  His left atrium was severely dilated.  There was moderate regurgitation affecting the mitral valve.  Mild to moderate pulmonary hypertension was noted.      Oncology History    No history exists.        Past Medical History:   Diagnosis Date    Anticoagulant long-term use     Anxiety     Arthritis     Atrial  fibrillation     BPH (benign prostatic hyperplasia)     Cataract     CKD (chronic kidney disease) stage 3, GFR 30-59 ml/min 07/10/2017    Followed by Dr. Jeevan Pond    Colon polyp     benign    Depression     Elevated PSA     Erectile dysfunction     Gastric ulcer with hemorrhage     Hep B w/o coma 1977    History of bleeding peptic ulcer     History of prostatitis     Hypertension     PAF (paroxysmal atrial fibrillation)     Sleep apnea     PT DENIES    Stroke     TIA December 2019    Thyroid disease        Past Surgical History:   Procedure Laterality Date    A-V CARDIAC PACEMAKER INSERTION Left 9/23/2023    Procedure: Dual Chamber PPM (Heart) Rm 2620;  Surgeon: Pan Moss MD;  Location: [facility] CATH;  Service: Cardiology;  Laterality: Left;    ABDOMINAL HERNIA REPAIR      ABLATION OF ARRHYTHMOGENIC FOCUS FOR ATRIAL FIBRILLATION N/A 6/15/2020    Procedure: Ablation atrial fibrillation;  Surgeon: Wyatt Beatty MD;  Location: [facility] EP LAB;  Service: Cardiology;  Laterality: N/A;  PAF, AFl, PEPE, PVI re-do, CTI, RFA, JUSTIN, Gen, SK, 3 Prep    APPLICATION OF WOUND VACUUM-ASSISTED CLOSURE DEVICE Right 12/9/2023    Procedure: APPLICATION, WOUND VAC;  Surgeon: Jelani Tello II, MD;  Location: [facility] OR;  Service: Orthopedics;  Laterality: Right;    CARDIOVERSION N/A 2/28/2024    Procedure: Cardioversion;  Surgeon: Pao Hare MD;  Location: [facility];  Service: Cardiology;  Laterality: N/A;    CARDIOVERSION N/A 10/17/2024    Procedure: Cardioversion;  Surgeon: Pan Moss MD;  Location: [facility];  Service: Cardiology;  Laterality: N/A;    CATARACT EXTRACTION  11/25/2013    bilateral    CHOLECYSTECTOMY      CLOSURE OF LEFT ATRIAL APPENDAGE USING DEVICE N/A 5/23/2024    Procedure: Left atrial appendage closure device;  Surgeon: Tony Duvall MD;  Location: [facility] CATH LAB;  Service: Cardiology;  Laterality: N/A;    COLONOSCOPY N/A 11/25/2015    Procedure: COLONOSCOPY;  Surgeon: Toby CANDELARIA  MD David;  Location: [facility] ENDO;  Service: Endoscopy;  Laterality: N/A;    COLONOSCOPY N/A 11/13/2020    Procedure: COLONOSCOPY;  Surgeon: Toby Hernandez MD;  Location: [facility] ENDO;  Service: Endoscopy;  Laterality: N/A;    COLONOSCOPY N/A 5/1/2024    Procedure: COLONOSCOPY;  Surgeon: Toby Hernandez MD;  Location: [facility] ENDO;  Service: Endoscopy;  Laterality: N/A;    COLONOSCOPY N/A 1/16/2025    Procedure: COLONOSCOPY;  Surgeon: Milton Rodriguez MD;  Location: [facility] (2ND FLR);  Service: Endoscopy;  Laterality: N/A;  11/19 portal-peg-pt stated not on eliquis- Repeat colonoscopy in 4 months for surveillance.[id number]  1/9 SWP PRECALL COMPLETE-RT    CYSTOSCOPY WITH INSERTION OF MINIMALLY INVASIVE IMPLANT TO ENLARGE PROSTATIC URETHRA N/A 11/28/2022    Procedure: CYSTOSCOPY, WITH INSERTION OF UROLIFT IMPLANT;  Surgeon: Yakov Shetty MD;  Location: [facility] OR;  Service: Urology;  Laterality: N/A;    ECHOCARDIOGRAM,TRANSESOPHAGEAL N/A 2/17/2025    Procedure: Transesophageal echo (PEPE) intra-procedure log documentation;  Surgeon: Jewel Arnold MD;  Location: [facility];  Service: Cardiology;  Laterality: N/A;    ENDOSCOPIC MUCOSAL RESECTION OF COLON N/A 9/9/2024    Procedure: RESECTION, MUCOSA, COLON, ENDOSCOPIC;  Surgeon: Milton Rodriguez MD;  Location: [facility] (2ND FLR);  Service: Endoscopy;  Laterality: N/A;  5/23 portal-peg-colonscopy with EMR Main. 90 minutes.  24 hours of clear liquid diet. Referring: Toby Hernandez MD- Michael-Jovana HAUSER 5/23- tt .  7/10/24: PEG prep. Pt off plavix now per cards. instructions sent via portal-GD  8/16/24: pt    ESOPHAGOGASTRODUODENOSCOPY N/A 5/1/2024    Procedure: ESOPHAGOGASTRODUODENOSCOPY (EGD);  Surgeon: Toby Hernandez MD;  Location: [facility] ENDO;  Service: Endoscopy;  Laterality: N/A;    EYE SURGERY      GASTRIC BYPASS  1992    INCISION AND DRAINAGE OF KNEE Right 11/8/2024    Procedure: INCISION AND DRAINAGE, KNEE, EVACUATION HEMATOMA;  Surgeon: Sarah  Dane VELASQUEZ MD;  Location: [facility] OR;  Service: Orthopedics;  Laterality: Right;    INSERTION, PACEMAKER, TEMPORARY TRANSVENOUS  9/22/2023    Procedure: Temp pacer insertion ER Rm 8;  Surgeon: Pan Moss MD;  Location: [facility] CATH;  Service: Cardiology;;    INTRAMEDULLARY RODDING OF FEMUR Left 7/18/2020    Procedure: INSERTION, INTRAMEDULLARY ERIC, FEMUR, left, Synthes, [place] table, Large C arm clock side,;  Surgeon: Tony Rodriguez MD;  Location: [facility] OR [facility] FLR;  Service: Orthopedics;  Laterality: Left;    IRRIGATION AND DEBRIDEMENT Right 12/9/2023    Procedure: IRRIGATION AND DEBRIDEMENT KNEE;  Surgeon: Jelani Tello II, MD;  Location: [facility] OR;  Service: Orthopedics;  Laterality: Right;    KNEE ARTHROSCOPY      RT    LASIK  2001    both eyes (Dr. Rabago)    ORIF HUMERUS FRACTURE      LT    ORIF HUMERUS FRACTURE Right     non surgical repair    RADIOFREQUENCY ABLATION      x2    REVISION OF KNEE ARTHROPLASTY Right 12/9/2023    Procedure: REVISION ARTHROPLASTY KNEE- Liner Replacement - dirty/clean;  Surgeon: Jelani Tello II, MD;  Location: [facility] OR;  Service: Orthopedics;  Laterality: Right;  dirty to clean at 1940    Right ankle tendon repair      ROBOT-ASSISTED REPAIR OF INCISIONAL HERNIA USING DA GARETH XI Left 6/13/2022    Procedure: XI ROBOTIC REPAIR, HERNIA, INCISIONAL (LEFT SIDE SPIGELIAN WITH MESH);  Surgeon: Rosendo Marti MD;  Location: [facility] OR [facility] FLR;  Service: General;  Laterality: Left;  consent in am    ROTATOR CUFF REPAIR      right    TOTAL KNEE ARTHROPLASTY Right 5/29/2018    Procedure: REPLACEMENT-KNEE-TOTAL-axel;  Surgeon: Denzel Dallas MD;  Location: [facility] OR [facility] FLR;  Service: Orthopedics;  Laterality: Right;  [place]    TRANSESOPHAGEAL ECHOCARDIOGRAPHY N/A 4/23/2024    Procedure: ECHOCARDIOGRAM, TRANSESOPHAGEAL;  Surgeon: Jan Bravo Diagnostic;  Location: [facility] EP LAB;  Service: Cardiology;  Laterality: N/A;    TRANSESOPHAGEAL ECHOCARDIOGRAPHY N/A 5/23/2024     Procedure: ECHOCARDIOGRAM, TRANSESOPHAGEAL;  Surgeon: Kj Newton MD;  Location: [facility] CATH LAB;  Service: Cardiology;  Laterality: N/A;    TRANSESOPHAGEAL ECHOCARDIOGRAPHY N/A 7/9/2024    Procedure: ECHOCARDIOGRAM, TRANSESOPHAGEAL;  Surgeon: Jan Bravo Diagnostic;  Location: [facility] EP LAB;  Service: Cardiology;  Laterality: N/A;    TREATMENT OF CARDIAC ARRHYTHMIA  6/15/2020    Procedure: Cardioversion or Defibrillation;  Surgeon: Wyatt Beatty MD;  Location: [facility] EP LAB;  Service: Cardiology;;    TREATMENT OF CARDIAC ARRHYTHMIA N/A 2/28/2024    Procedure: Cardioversion or Defibrillation;  Surgeon: Pao Hare MD;  Location: [facility];  Service: Cardiology;  Laterality: N/A;    TREATMENT OF CARDIAC ARRHYTHMIA N/A 9/11/2024    Procedure: Cardioversion/Defibrillation;  Surgeon: Pan Moss MD;  Location: [facility];  Service: Cardiology;  Laterality: N/A;    TREATMENT OF CARDIAC ARRHYTHMIA N/A 2/17/2025    Procedure: Cardioversion or Defibrillation;  Surgeon: Jewel Arnold MD;  Location: [facility] ENDO;  Service: Cardiology;  Laterality: N/A;       Family History   Problem Relation Name Age of Onset    Aortic stenosis Mother      Heart disease Mother          aortic stenosis    Osteoporosis Mother      COPD Father      Diabetes Father      Osteoporosis Father      Heart attack Brother      Breast cancer Maternal Aunt      No Known Problems Daughter      No Known Problems Daughter      No Known Problems Daughter      No Known Problems Son         Social History     Socioeconomic History    Marital status:     Number of children: 5   Occupational History    Occupation: retired anesthesiology     Employer: [employer]    Occupation: [employer] grad     Comment: previous football player   Tobacco Use    Smoking status: Never     Passive exposure: Never    Smokeless tobacco: Never    Tobacco comments:     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    Substance and Sexual Activity     Alcohol use: No    Drug use: No    Sexual activity: Yes     Partners: Female     Social Drivers of Health     Financial Resource Strain: Low Risk  (10/21/2024)    Overall Financial Resource Strain (CARDIA)     Difficulty of Paying Living Expenses: Not hard at all   Food Insecurity: No Food Insecurity (11/8/2024)    Hunger Vital Sign     Worried About Running Out of Food in the Last Year: Never true     Ran Out of Food in the Last Year: Never true   Transportation Needs: No Transportation Needs (11/8/2024)    TRANSPORTATION NEEDS     Transportation : No   Physical Activity: Sufficiently Active (4/22/2024)    Exercise Vital Sign     Days of Exercise per Week: 5 days     Minutes of Exercise per Session: 130 min   Stress: No Stress Concern Present (10/21/2024)    [place] [employer] - Occupational Stress Questionnaire     Feeling of Stress : Not at all   Housing Stability: Unknown (11/8/2024)    Housing Stability Vital Sign     Unable to Pay for Housing in the Last Year: No     Homeless in the Last Year: No       Review of patient's allergies indicates:   Allergen Reactions    No known drug allergies        Review of Systems   Constitutional:  Positive for appetite change, fatigue and unexpected weight change. Negative for fever.   HENT:  Negative for mouth sores and sore throat.    Eyes:  Negative for photophobia and visual disturbance.   Respiratory:  Positive for shortness of breath. Negative for cough.    Cardiovascular:  Positive for leg swelling. Negative for chest pain.   Gastrointestinal:  Positive for change in bowel habit and diarrhea. Negative for abdominal pain and constipation.   Genitourinary:  Positive for frequency. Negative for dysuria and hematuria.   Musculoskeletal:  Negative for arthralgias, back pain and joint swelling.   Integumentary:  Negative for rash.   Neurological:  Negative for dizziness, weakness, light-headedness and headaches.   Hematological:  Negative for  "adenopathy. Does not bruise/bleed easily.   Psychiatric/Behavioral:  Negative for sleep disturbance. The patient is not nervous/anxious.             Objective:     Vitals:    04/14/25 0841   BP: (!) 150/76   BP Location: Left arm   Patient Position: Sitting   Pulse: (!) 55   Resp: 17   Temp: 98.9 °F (37.2 °C)   TempSrc: Temporal   SpO2: 97%   Weight: 80.1 kg (176 lb 9.4 oz)   Height: 5' 11" (1.803 m)          Physical Exam           Current Outpatient Medications on File Prior to Visit   Medication Sig    aspirin (ECOTRIN) 81 MG EC tablet Take 1 tablet (81 mg total) by mouth once daily.    buPROPion (WELLBUTRIN XL) 150 MG TB24 tablet Take 1 tablet (150 mg total) by mouth once daily.    buPROPion (WELLBUTRIN XL) 300 MG 24 hr tablet Take 1 tablet (300 mg total) by mouth once daily. Plus 150    calcitRIOL (ROCALTROL) 0.25 MCG Cap Take 1 capsule (0.25 mcg total) by mouth every Mon, Wed, Fri.    carvediloL (COREG) 12.5 MG tablet Take 1 tablet (12.5 mg total) by mouth 2 (two) times daily.    cyanocobalamin 1,000 mcg/mL injection Inject 1 mL (1,000 mcg total) into the muscle every 30 days.    dofetilide (TIKOSYN) 250 MCG Cap Take 1 capsule (250 mcg total) by mouth every 12 (twelve) hours.    EScitalopram oxalate (LEXAPRO) 10 MG tablet Take 1 tablet (10 mg total) by mouth once daily.    ICAR-C PLUS Tab TAKE 1 TABLET BY MOUTH ONCE DAILY    levothyroxine (SYNTHROID) 88 MCG tablet Take 1 tablet (88 mcg total) by mouth before breakfast.    mirtazapine (REMERON) 30 MG tablet Take 1 tablet (30 mg total) by mouth every evening.    OMEGA-3 FATTY ACIDS (FISH OIL CONCENTRATE ORAL) Take 1 capsule by mouth Daily.    oxyCODONE-acetaminophen (PERCOCET)  mg per tablet Take 1 tablet by mouth every 8 (eight) hours as needed for Pain.    telmisartan (MICARDIS) 40 MG Tab Take 1 tablet (40 mg total) by mouth once daily.    testosterone cypionate (DEPOTESTOTERONE CYPIONATE) 200 mg/mL injection Inject 200 mg into the muscle every 14 " (fourteen) days.    tiZANidine (ZANAFLEX) 4 MG tablet Take 1 tablet (4 mg total) by mouth 3 (three) times daily as needed (muscle spasms).    torsemide (DEMADEX) 20 MG Tab TAKE 1 TABLET BY MOUTH EVEYR MONDAY, WEDNESDAY, AND FRIDAYS     No current facility-administered medications on file prior to visit.       CBC:  Lab Results   Component Value Date    WBC 6.21 04/10/2025    HGB 11.0 (L) 04/10/2025    HCT 33.6 (L) 04/10/2025    MCV 91 04/10/2025     04/10/2025         CMP:  Sodium   Date Value Ref Range Status   02/06/2025 140 136 - 145 mmol/L Final     Potassium   Date Value Ref Range Status   02/06/2025 4.5 3.5 - 5.1 mmol/L Final     Chloride   Date Value Ref Range Status   02/06/2025 114 (H) 95 - 110 mmol/L Final     CO2   Date Value Ref Range Status   02/06/2025 19 (L) 23 - 29 mmol/L Final     Glucose   Date Value Ref Range Status   02/06/2025 109 70 - 110 mg/dL Final     BUN   Date Value Ref Range Status   02/06/2025 28 (H) 8 - 23 mg/dL Final     Creatinine   Date Value Ref Range Status   02/06/2025 1.1 0.5 - 1.4 mg/dL Final     Calcium   Date Value Ref Range Status   02/06/2025 8.3 (L) 8.7 - 10.5 mg/dL Final     Total Protein   Date Value Ref Range Status   02/06/2025 6.1 6.0 - 8.4 g/dL Final     Albumin   Date Value Ref Range Status   02/06/2025 3.3 (L) 3.5 - 5.2 g/dL Final     Total Bilirubin   Date Value Ref Range Status   02/06/2025 0.4 0.1 - 1.0 mg/dL Final     Comment:     For infants and newborns, interpretation of results should be based  on gestational age, weight and in agreement with clinical  observations.    Premature Infant recommended reference ranges:  Up to 24 hours.............<8.0 mg/dL  Up to 48 hours............<12.0 mg/dL  3-5 days..................<15.0 mg/dL  6-29 days.................<15.0 mg/dL       Alkaline Phosphatase   Date Value Ref Range Status   02/06/2025 76 40 - 150 U/L Final     AST   Date Value Ref Range Status   02/06/2025 18 10 - 40 U/L Final     ALT   Date Value  Ref Range Status   02/06/2025 14 10 - 44 U/L Final     Anion Gap   Date Value Ref Range Status   02/06/2025 7 (L) 8 - 16 mmol/L Final     eGFR if    Date Value Ref Range Status   03/02/2022 51.9 (A) >60 mL/min/1.73 m^2 Final   03/02/2022 51.9 (A) >60 mL/min/1.73 m^2 Final     eGFR if non    Date Value Ref Range Status   03/02/2022 44.9 (A) >60 mL/min/1.73 m^2 Final     Comment:     Calculation used to obtain the estimated glomerular filtration  rate (eGFR) is the CKD-EPI equation.      03/02/2022 44.9 (A) >60 mL/min/1.73 m^2 Final     Comment:     Calculation used to obtain the estimated glomerular filtration  rate (eGFR) is the CKD-EPI equation.          PYP ATTR related Amyloidosis (If FLC are normal and no monoclonal bands are present, proceed with PYP scan)    Study is not suggestive of ATTR cardiac amyloidosis.    SPECT imaging shows no myocardial uptake.    Evaluation for AL amyloidosis by serum FLCs, serum, and urine   immunofixation is recommended in all patients undergoing   99mTc-PYP/DPD/HMDP scans for cardiac amyloidosis.    Results should be interpreted in the context of prior evaluation and   referral to a hematologist or amyloidosis expert is recommended if either:   (a) Recommended echo/CMR is strongly suggestive of cardiac amyloidosis and   99mTc-PYP/DPD/HMDP is not suggestive or equivocal and/or (b) FLCs are   abnormal or equivocal.       ECOG SCORE    1 - Restricted in strenuous activity-ambulatory and able to carry out work of a light nature              Assessment/Plan:   Chronic normocytic anemia.  I reviewed independently the patient medical record including his laboratory and radiologic findings.  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    Labs on 2/6/2025 showed a Hgb 10 g/dl MCV 87 . Peripheral smear showed absolute eosinophilia and no increased circulating blasts. Mild  normocytic, normochromic anemia with moderate anisopoikilocytosis with rare target cells, schistocytes, and elliptocytes and very red cell bite cells and teardrop cells   Methyl malonic acid was elevated 1.31.   Ferritin 36 iron sat 10%- Zinc 36 Low.  CMP ESR LDH  folate haptoglobin vitamin B1 B6 vitamin-E vitamin-D copper were WNL.     In view of the results the patient workup showed the presence of iron-deficiency anemia most likely related to malabsorption in view of his gastric bypass.  He will be scheduled to receive IV Ferrlecit at dose of 250 mg IV q.week x4.  He also was advised to start taking  oral zinc 1 tablet a day.    In view of his elevated methylmalonic acid the patient will start IM injection of vitamin B12 on a monthly basis at a dose of 1000 mcg.    He will be seen back again in 2 months for a follow up visit with repeat CBC ferritin and Zinc.  In case his anemia  does not improve he will require a bone marrow biopsy    Status post gastric bypass.    As above.    Hypothyroidism.    TSH free T4 WNL    Continue levothyroxine at 88 mcg daily..      Osteoporosis.    Currently on Reclast and calcitriol.    Chronic kidney disease stage 3a.    Avoid nephrotoxic medication.    Follow up BMP.        Atrial fibrillation.    Rate controlled s/p cardioversion.    Currently on aspirin and Coreg.      25 minutes of total time spent on the encounter, which includes face to face time and non-face to face time preparing to see the patient (eg, review of tests), obtaining and/or reviewing separately obtained history, documenting clinical information in the electronic or other health record, independently interpreting results (not separately reported) and communicating results to the patient/family/caregiver, or care coordination (not separately reported).              Med Onc Chart Routing      Follow up with physician 3 months. with repeat labs a week before with repeat CBC CMP ferritin Zinc   Follow up with SHERYL     Infusion scheduling note    Injection scheduling note    Labs    Imaging    Pharmacy appointment    Other referrals                   Plan was discussed with the patient at length, and he verbalized understanding. Dominick was given an opportunity to ask questions that were answered to his satisfaction, and he was advised to call in the interval if any problems or questions arise.  Signed:  Jeannie Grigsby MD   Hematology and Oncology  [facility] - HEMATOLOGY ONCOLOGY  [facility], [place]

## 2025-04-15 ENCOUNTER — PATIENT MESSAGE (OUTPATIENT)
Dept: ENDOCRINOLOGY | Facility: CLINIC | Age: 80
End: 2025-04-15
Payer: MEDICARE

## 2025-04-15 NOTE — TELEPHONE ENCOUNTER
LVM for pt's wife (at the number provided in msg) advising her of Dr. Mckeon's msg below. Advised to call the office if any further questions.

## 2025-04-15 NOTE — TELEPHONE ENCOUNTER
Would let her know we are not able to give medical advice on a patient I have not seen before.  That being said, I think they may be mistaken or confusing with someone else. I have not published any papers on this topic.

## 2025-04-16 ENCOUNTER — HOSPITAL ENCOUNTER (OUTPATIENT)
Dept: CARDIOLOGY | Facility: HOSPITAL | Age: 80
Discharge: HOME | End: 2025-04-16
Attending: INTERNAL MEDICINE
Payer: MEDICARE

## 2025-04-16 VITALS — HEIGHT: 71 IN | BODY MASS INDEX: 24.64 KG/M2 | WEIGHT: 176 LBS

## 2025-04-16 DIAGNOSIS — I10 ESSENTIAL HYPERTENSION: ICD-10-CM

## 2025-04-16 DIAGNOSIS — I50.32 CHRONIC HEART FAILURE WITH PRESERVED EJECTION FRACTION (HFPEF): ICD-10-CM

## 2025-04-16 DIAGNOSIS — I48.19 PERSISTENT ATRIAL FIBRILLATION: ICD-10-CM

## 2025-04-16 DIAGNOSIS — I48.20 CHRONIC ATRIAL FIBRILLATION: ICD-10-CM

## 2025-04-16 LAB
ASCENDING AORTA: 3.78 CM
AV INDEX (PROSTH): 0.61
AV MEAN GRADIENT: 6 MMHG
AV PEAK GRADIENT: 10 MMHG
AV REGURGITATION PRESSURE HALF TIME: 539 MS
AV VALVE AREA BY VELOCITY RATIO: 2.6 CM²
AV VALVE AREA: 2.3 CM²
AV VELOCITY RATIO: 0.69
BSA FOR ECHO PROCEDURE: 2 M2
CV ECHO LV RWT: 0.52 CM
DOP CALC AO PEAK VEL: 1.6 M/S
DOP CALC AO VTI: 32.7 CM
DOP CALC LVOT AREA: 3.8 CM2
DOP CALC LVOT DIAMETER: 2.2 CM
DOP CALC LVOT PEAK VEL: 1.1 M/S
DOP CALC LVOT STROKE VOLUME: 76 CM3
DOP CALCLVOT PEAK VEL VTI: 20 CM
ECHO LV POSTERIOR WALL: 1.2 CM (ref 0.6–1.1)
FRACTIONAL SHORTENING: 39.1 % (ref 28–44)
INTERVENTRICULAR SEPTUM: 1.3 CM (ref 0.6–1.1)
LEFT ATRIUM AREA SYSTOLIC (APICAL 2 CHAMBER): 34.99 CM2
LEFT ATRIUM AREA SYSTOLIC (APICAL 4 CHAMBER): 34.57 CM2
LEFT ATRIUM SIZE: 5.5 CM
LEFT ATRIUM VOLUME INDEX MOD: 74 ML/M2
LEFT ATRIUM VOLUME MOD: 147 ML
LEFT INTERNAL DIMENSION IN SYSTOLE: 2.8 CM (ref 2.1–4)
LEFT VENTRICLE DIASTOLIC VOLUME INDEX: 47.5 ML/M2
LEFT VENTRICLE DIASTOLIC VOLUME: 95 ML
LEFT VENTRICLE END SYSTOLIC VOLUME APICAL 2 CHAMBER: 155.58 ML
LEFT VENTRICLE END SYSTOLIC VOLUME APICAL 4 CHAMBER: 128.57 ML
LEFT VENTRICLE MASS INDEX: 108.7 G/M2
LEFT VENTRICLE SYSTOLIC VOLUME INDEX: 14.5 ML/M2
LEFT VENTRICLE SYSTOLIC VOLUME: 29 ML
LEFT VENTRICULAR INTERNAL DIMENSION IN DIASTOLE: 4.6 CM (ref 3.5–6)
LEFT VENTRICULAR MASS: 217.4 G
LVED V (TEICH): 95.26 ML
LVES V (TEICH): 29.41 ML
LVOT MG: 2.64 MMHG
LVOT MV: 0.77 CM/S
MR PISA EROA: 0.49 CM2
OHS CV RV/LV RATIO: 0.98 CM
PISA AR MAX VEL: 4.6 M/S
PISA MRMAX VEL: 5.81 M/S
PISA RADIUS: 1.19 CM
PISA TR MAX VEL: 3 M/S
PISA VN NYQUIST MS: 0.32 M/S
PISA VN NYQUIST: 0.32 M/S
RA PRESSURE ESTIMATED: 3 MMHG
RA VOL SYS: 76.34 ML
RIGHT ATRIAL AREA: 21.1 CM2
RIGHT ATRIUM VOLUME AREA LENGTH APICAL 4 CHAMBER: 70.63 ML
RIGHT VENTRICLE DIASTOLIC BASEL DIMENSION: 4.5 CM
RIGHT VENTRICLE DIASTOLIC LENGTH: 7.6 CM
RIGHT VENTRICLE DIASTOLIC MID DIMENSION: 2.4 CM
RIGHT VENTRICULAR END-DIASTOLIC DIMENSION: 4.51 CM
RIGHT VENTRICULAR LENGTH IN DIASTOLE (APICAL 4-CHAMBER VIEW): 7.56 CM
RV MID DIAMA: 2.42 CM
RV TB RVSP: 6 MMHG
RV TISSUE DOPPLER FREE WALL SYSTOLIC VELOCITY 1 (APICAL 4 CHAMBER VIEW): 10.01 CM/S
SINUS: 3.55 CM
STJ: 3.05 CM
TR MAX PG: 37 MMHG
TRICUSPID ANNULAR PLANE SYSTOLIC EXCURSION: 1.68 CM
TV REST PULMONARY ARTERY PRESSURE: 39 MMHG
Z-SCORE OF LEFT VENTRICULAR DIMENSION IN END DIASTOLE: -2.37
Z-SCORE OF LEFT VENTRICULAR DIMENSION IN END SYSTOLE: -1.94

## 2025-04-16 PROCEDURE — 93306 TTE W/DOPPLER COMPLETE: CPT | Mod: PO

## 2025-04-16 PROCEDURE — 93306 TTE W/DOPPLER COMPLETE: CPT | Mod: 26,,, | Performed by: INTERNAL MEDICINE

## 2025-04-20 ENCOUNTER — PATIENT MESSAGE (OUTPATIENT)
Dept: AUDIOLOGY | Facility: CLINIC | Age: 80
End: 2025-04-20
Payer: MEDICARE

## 2025-04-23 ENCOUNTER — HOSPITAL ENCOUNTER (OUTPATIENT)
Dept: CARDIOLOGY | Facility: HOSPITAL | Age: 80
Discharge: HOME | End: 2025-04-23
Attending: INTERNAL MEDICINE
Payer: MEDICARE

## 2025-04-23 ENCOUNTER — CLINICAL SUPPORT (OUTPATIENT)
Dept: CARDIOLOGY | Facility: HOSPITAL | Age: 80
End: 2025-04-23
Payer: MEDICARE

## 2025-04-23 DIAGNOSIS — Z95.0 PRESENCE OF CARDIAC PACEMAKER: ICD-10-CM

## 2025-04-23 PROCEDURE — 93296 REM INTERROG EVL PM/IDS: CPT | Mod: HCNC,PO | Performed by: INTERNAL MEDICINE

## 2025-04-28 ENCOUNTER — LAB VISIT (OUTPATIENT)
Dept: LAB | Facility: HOSPITAL | Age: 80
End: 2025-04-28
Attending: FAMILY MEDICINE
Payer: MEDICARE

## 2025-04-28 DIAGNOSIS — N18.31 CHRONIC KIDNEY DISEASE, STAGE 3A: ICD-10-CM

## 2025-04-28 LAB
ALBUMIN SERPL BCP-MCNC: 3.1 G/DL (ref 3.5–5.2)
ANION GAP (OHS): 7 MMOL/L (ref 8–16)
BUN SERPL-MCNC: 22 MG/DL (ref 8–23)
CALCIUM SERPL-MCNC: 7.7 MG/DL (ref 8.7–10.5)
CHLORIDE SERPL-SCNC: 112 MMOL/L (ref 95–110)
CO2 SERPL-SCNC: 21 MMOL/L (ref 23–29)
CREAT SERPL-MCNC: 1.2 MG/DL (ref 0.5–1.4)
CREAT UR-MCNC: 165 MG/DL (ref 23–375)
GFR SERPLBLD CREATININE-BSD FMLA CKD-EPI: >60 ML/MIN/1.73/M2
GLUCOSE SERPL-MCNC: 101 MG/DL (ref 70–110)
PHOSPHATE SERPL-MCNC: 3.8 MG/DL (ref 2.7–4.5)
POTASSIUM SERPL-SCNC: 4.4 MMOL/L (ref 3.5–5.1)
PROT UR-MCNC: 124 MG/DL
PROT/CREAT UR: 0.75 MG/G{CREAT}
PTH-INTACT SERPL-MCNC: 260.7 PG/ML (ref 9–77)
SODIUM SERPL-SCNC: 140 MMOL/L (ref 136–145)

## 2025-04-28 PROCEDURE — 84156 ASSAY OF PROTEIN URINE: CPT | Mod: HCNC

## 2025-04-28 PROCEDURE — 36415 COLL VENOUS BLD VENIPUNCTURE: CPT | Mod: HCNC,PO

## 2025-04-28 PROCEDURE — 83970 ASSAY OF PARATHORMONE: CPT | Mod: HCNC

## 2025-04-28 PROCEDURE — 80069 RENAL FUNCTION PANEL: CPT | Mod: HCNC

## 2025-04-29 ENCOUNTER — TELEPHONE (OUTPATIENT)
Dept: CARDIOLOGY | Facility: HOSPITAL | Age: 80
End: 2025-04-29
Payer: MEDICARE

## 2025-04-30 ENCOUNTER — PATIENT MESSAGE (OUTPATIENT)
Dept: CARDIOLOGY | Facility: CLINIC | Age: 80
End: 2025-04-30
Payer: MEDICARE

## 2025-04-30 ENCOUNTER — CLINICAL SUPPORT (OUTPATIENT)
Dept: CARDIOLOGY | Facility: HOSPITAL | Age: 80
End: 2025-04-30
Payer: MEDICARE

## 2025-04-30 ENCOUNTER — RESULTS FOLLOW-UP (OUTPATIENT)
Dept: NEPHROLOGY | Facility: CLINIC | Age: 80
End: 2025-04-30

## 2025-04-30 ENCOUNTER — HOSPITAL ENCOUNTER (OUTPATIENT)
Dept: CARDIOLOGY | Facility: HOSPITAL | Age: 80
Discharge: HOME | End: 2025-04-30
Attending: INTERNAL MEDICINE
Payer: MEDICARE

## 2025-04-30 DIAGNOSIS — I48.19 PERSISTENT ATRIAL FIBRILLATION: ICD-10-CM

## 2025-04-30 DIAGNOSIS — I48.20 CHRONIC ATRIAL FIBRILLATION: Primary | ICD-10-CM

## 2025-04-30 DIAGNOSIS — Z95.0 PRESENCE OF CARDIAC PACEMAKER: ICD-10-CM

## 2025-04-30 LAB
OHS CV AF BURDEN PERCENT: 13
OHS CV DC REMOTE DEVICE TYPE: NORMAL
OHS CV ICD SHOCK: NO
OHS CV RV PACING PERCENT: 1 %

## 2025-04-30 NOTE — TELEPHONE ENCOUNTER
AF noted during PPM device remote check:    S/p DCCV 2/7/25    Overall burden: 17% since 2/13/25    Max duration seen: per device 1 day 2 hours 14 mins. On 4/25, per trends probable persistent since ~ 4/17 (atrial undersensing)              Most recent episode: on presenting on 4/26          Ventricular rates:           Anticoagulation status:  LAAO, pt. states he started taking Eliquis today in preparation for DCCV    Patient symptoms: fatigue, no energy, shortness of breath. He can tell he is still in it, but cannot send a transmission right now. Pt. requested Dr. Moss to be notified.    Meds:  Tikosyn 250 mcg bid  Coreg 12.5 mg bid  Aspirin 81 mg        Reviewed with Dr. Moss, per Dr. Moss pt. Is to continue Eliquis and PEPE/DCCV on 5/2. Message sent to nurse for scheduling.

## 2025-05-01 ENCOUNTER — TELEPHONE (OUTPATIENT)
Dept: CARDIOLOGY | Facility: HOSPITAL | Age: 80
End: 2025-05-01
Payer: MEDICARE

## 2025-05-05 ENCOUNTER — OFFICE VISIT (OUTPATIENT)
Dept: FAMILY MEDICINE | Facility: CLINIC | Age: 80
End: 2025-05-05
Payer: MEDICARE

## 2025-05-05 VITALS
HEIGHT: 71 IN | BODY MASS INDEX: 24.1 KG/M2 | OXYGEN SATURATION: 100 % | WEIGHT: 172.19 LBS | HEART RATE: 53 BPM | SYSTOLIC BLOOD PRESSURE: 136 MMHG | DIASTOLIC BLOOD PRESSURE: 74 MMHG

## 2025-05-05 DIAGNOSIS — I48.19 PERSISTENT ATRIAL FIBRILLATION: ICD-10-CM

## 2025-05-05 DIAGNOSIS — I10 ESSENTIAL HYPERTENSION: ICD-10-CM

## 2025-05-05 DIAGNOSIS — E03.4 HYPOTHYROIDISM DUE TO ACQUIRED ATROPHY OF THYROID: ICD-10-CM

## 2025-05-05 DIAGNOSIS — N18.31 CHRONIC KIDNEY DISEASE, STAGE 3A: ICD-10-CM

## 2025-05-05 DIAGNOSIS — F32.1 MAJOR DEPRESSIVE DISORDER, SINGLE EPISODE, MODERATE: Primary | ICD-10-CM

## 2025-05-05 DIAGNOSIS — D64.9 CHRONIC ANEMIA: ICD-10-CM

## 2025-05-05 DIAGNOSIS — E21.3 HYPERPARATHYROIDISM: ICD-10-CM

## 2025-05-05 PROCEDURE — 99214 OFFICE O/P EST MOD 30 MIN: CPT | Mod: HCNC,S$GLB,, | Performed by: FAMILY MEDICINE

## 2025-05-05 PROCEDURE — 1126F AMNT PAIN NOTED NONE PRSNT: CPT | Mod: CPTII,HCNC,S$GLB, | Performed by: FAMILY MEDICINE

## 2025-05-05 PROCEDURE — 3288F FALL RISK ASSESSMENT DOCD: CPT | Mod: CPTII,HCNC,S$GLB, | Performed by: FAMILY MEDICINE

## 2025-05-05 PROCEDURE — 99999 PR PBB SHADOW E&M-EST. PATIENT-LVL III: CPT | Mod: PBBFAC,HCNC,, | Performed by: FAMILY MEDICINE

## 2025-05-05 PROCEDURE — 3075F SYST BP GE 130 - 139MM HG: CPT | Mod: CPTII,HCNC,S$GLB, | Performed by: FAMILY MEDICINE

## 2025-05-05 PROCEDURE — 1101F PT FALLS ASSESS-DOCD LE1/YR: CPT | Mod: CPTII,HCNC,S$GLB, | Performed by: FAMILY MEDICINE

## 2025-05-05 PROCEDURE — 1159F MED LIST DOCD IN RCRD: CPT | Mod: CPTII,HCNC,S$GLB, | Performed by: FAMILY MEDICINE

## 2025-05-05 PROCEDURE — G2211 COMPLEX E/M VISIT ADD ON: HCPCS | Mod: HCNC,S$GLB,, | Performed by: FAMILY MEDICINE

## 2025-05-05 PROCEDURE — 1160F RVW MEDS BY RX/DR IN RCRD: CPT | Mod: CPTII,HCNC,S$GLB, | Performed by: FAMILY MEDICINE

## 2025-05-05 PROCEDURE — 1157F ADVNC CARE PLAN IN RCRD: CPT | Mod: CPTII,HCNC,S$GLB, | Performed by: FAMILY MEDICINE

## 2025-05-05 PROCEDURE — 3078F DIAST BP <80 MM HG: CPT | Mod: CPTII,HCNC,S$GLB, | Performed by: FAMILY MEDICINE

## 2025-05-05 NOTE — PROGRESS NOTES
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    Chief Complaint: Depression (6 month follow up on health)      Patient presents with:  Depression: 6 month follow up on health      Tolerating remeron 30mg QHS  Appetite stable but has not gained any weight.    iron-deficiency anemia most likely related to malabsorption in view of his gastric bypass - following with hematology completed Iv iron infusions weekly x 4  B12 deficiency - getting 1,000mcg monthly  S/p ORIF left femur fracture - following with Dr. Rodriguez; doing well.  H/o TIA - taking Eliquis; stopped lipitor after seeing neurologist; using lopressor PRN  Afib, s/p pacemaker - s/p ablation; sees Dr. Arnold. Taking Coreg BID, Tikosyn. He had left atrial appendage closure device 5/2024.  HTN - Coreg 12.5mg BID, Micardis 40mg; Demadex 20 mg Monday Wednesday and Friday PRN as well as potassium chloride 10 mEq Monday Wednesday and Friday PRN  CKD - following with Dr. Pond.  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  Depression - taking  Wellbutrin 450mg daily; Lexapro 10mg, remeron 30mg  Osteoporosis - taking calcium citrate; getting Reclast annually  BPH - stable since Urolift; taking uroxatral  Pulmonary fibrosis - following with pulmonology every 6 months  Hypocalcemia - taking calcium carbonate 2 tablets daily.  Colon polyp - following with Dr. Hernandez  Hyperparathyroidism - monitoring with nephrology      Hypertension  Pertinent negatives include no chest pain, headaches, neck pain, palpitations or shortness of breath.   Follow-up  Pertinent negatives include no abdominal pain, arthralgias, chest pain, chills, coughing, fever, headaches, nausea, neck pain, rash, sore throat or weakness.   DepressionPatient is not experiencing: palpitations and shortness of breath.          Past Medical History:    Diagnosis Date    Anticoagulant long-term use     Anxiety     Arthritis     Atrial fibrillation     BPH (benign prostatic hyperplasia)     Cataract     CKD (chronic kidney disease) stage 3, GFR 30-59 ml/min 07/10/2017    Followed by Dr. Jeevan Pond    Colon polyp     benign    Depression     Elevated PSA     Erectile dysfunction     Gastric ulcer with hemorrhage     Hep B w/o coma 1977    History of bleeding peptic ulcer     History of prostatitis     Hypertension     PAF (paroxysmal atrial fibrillation)     Sleep apnea     PT DENIES    Stroke     TIA December 2019    Thyroid disease        Past Surgical History:   Procedure Laterality Date    A-V CARDIAC PACEMAKER INSERTION Left 9/23/2023    Procedure: Dual Chamber PPM (Heart) Rm 2620;  Surgeon: Pan Moss MD;  Location: [facility];  Service: Cardiology;  Laterality: Left;    ABDOMINAL HERNIA REPAIR      ABLATION OF ARRHYTHMOGENIC FOCUS FOR ATRIAL FIBRILLATION N/A 6/15/2020    Procedure: Ablation atrial fibrillation;  Surgeon: Wyatt Beatty MD;  Location: [facility] EP LAB;  Service: Cardiology;  Laterality: N/A;  PAF, AFl, PEPE, PVI re-do, CTI, RFA, JUSTIN, Gen, SK, 3 Prep    APPLICATION OF WOUND VACUUM-ASSISTED CLOSURE DEVICE Right 12/9/2023    Procedure: APPLICATION, WOUND VAC;  Surgeon: Jelani Tello II, MD;  Location: [facility];  Service: Orthopedics;  Laterality: Right;    CARDIOVERSION N/A 2/28/2024    Procedure: Cardioversion;  Surgeon: Pao Hare MD;  Location: [facility];  Service: Cardiology;  Laterality: N/A;    CARDIOVERSION N/A 10/17/2024    Procedure: Cardioversion;  Surgeon: Pan Moss MD;  Location: [facility];  Service: Cardiology;  Laterality: N/A;    CATARACT EXTRACTION  11/25/2013    bilateral    CHOLECYSTECTOMY      CLOSURE OF LEFT ATRIAL APPENDAGE USING DEVICE N/A 5/23/2024    Procedure: Left atrial appendage closure device;  Surgeon: Tony Duvall MD;  Location: [facility] CATH LAB;  Service:  Cardiology;  Laterality: N/A;    COLONOSCOPY N/A 11/25/2015    Procedure: COLONOSCOPY;  Surgeon: Toby Hernandez MD;  Location: [facility] ENDO;  Service: Endoscopy;  Laterality: N/A;    COLONOSCOPY N/A 11/13/2020    Procedure: COLONOSCOPY;  Surgeon: Toby Hernandez MD;  Location: [facility] ENDO;  Service: Endoscopy;  Laterality: N/A;    COLONOSCOPY N/A 5/1/2024    Procedure: COLONOSCOPY;  Surgeon: Toby Hernandez MD;  Location: [facility] ENDO;  Service: Endoscopy;  Laterality: N/A;    COLONOSCOPY N/A 1/16/2025    Procedure: COLONOSCOPY;  Surgeon: Milton Rodriguez MD;  Location: [facility] (2ND FLR);  Service: Endoscopy;  Laterality: N/A;  11/19 portal-peg-pt stated not on eliquis- Repeat colonoscopy in 4 months for surveillance.[id number]  1/9 SWP PRECALL COMPLETE-RT    CYSTOSCOPY WITH INSERTION OF MINIMALLY INVASIVE IMPLANT TO ENLARGE PROSTATIC URETHRA N/A 11/28/2022    Procedure: CYSTOSCOPY, WITH INSERTION OF UROLIFT IMPLANT;  Surgeon: Yakov Shetty MD;  Location: [facility] OR;  Service: Urology;  Laterality: N/A;    ECHOCARDIOGRAM,TRANSESOPHAGEAL N/A 2/17/2025    Procedure: Transesophageal echo (PEPE) intra-procedure log documentation;  Surgeon: Jewel Arnold MD;  Location: [facility] ENDO;  Service: Cardiology;  Laterality: N/A;    ENDOSCOPIC MUCOSAL RESECTION OF COLON N/A 9/9/2024    Procedure: RESECTION, MUCOSA, COLON, ENDOSCOPIC;  Surgeon: Milton Rodriguez MD;  Location: [facility] (2ND FLR);  Service: Endoscopy;  Laterality: N/A;  5/23 portal-peg-colonscopy with EMR Main. 90 minutes.  24 hours of clear liquid diet. Referring: Toby Hernandez MD- Michael-Jovana HAUSER 5/23- tt .  7/10/24: PEG prep. Pt off plavix now per cards. instructions sent via portal-GD  8/16/24: pt    ESOPHAGOGASTRODUODENOSCOPY N/A 5/1/2024    Procedure: ESOPHAGOGASTRODUODENOSCOPY (EGD);  Surgeon: Toby Hernandez MD;  Location: [facility];  Service: Endoscopy;  Laterality: N/A;    EYE SURGERY      GASTRIC BYPASS  1992    INCISION  AND DRAINAGE OF KNEE Right 11/8/2024    Procedure: INCISION AND DRAINAGE, KNEE, EVACUATION HEMATOMA;  Surgeon: Dane Smith MD;  Location: [facility] OR;  Service: Orthopedics;  Laterality: Right;    INSERTION, PACEMAKER, TEMPORARY TRANSVENOUS  9/22/2023    Procedure: Temp pacer insertion ER Rm 8;  Surgeon: Pan Moss MD;  Location: [facility] CATH;  Service: Cardiology;;    INTRAMEDULLARY RODDING OF FEMUR Left 7/18/2020    Procedure: INSERTION, INTRAMEDULLARY ERIC, FEMUR, left, Synthes, [place] table, Large C arm clock side,;  Surgeon: Tony Rodriguez MD;  Location: [facility] OR 2ND FLR;  Service: Orthopedics;  Laterality: Left;    IRRIGATION AND DEBRIDEMENT Right 12/9/2023    Procedure: IRRIGATION AND DEBRIDEMENT KNEE;  Surgeon: Jelani Tello II, MD;  Location: [facility] OR;  Service: Orthopedics;  Laterality: Right;    KNEE ARTHROSCOPY      RT    LASIK  2001    both eyes (Dr. Rabago)    ORIF HUMERUS FRACTURE      LT    ORIF HUMERUS FRACTURE Right     non surgical repair    RADIOFREQUENCY ABLATION      x2    REVISION OF KNEE ARTHROPLASTY Right 12/9/2023    Procedure: REVISION ARTHROPLASTY KNEE- Liner Replacement - dirty/clean;  Surgeon: Jelani Tello II, MD;  Location: [facility] OR;  Service: Orthopedics;  Laterality: Right;  dirty to clean at 1940    Right ankle tendon repair      ROBOT-ASSISTED REPAIR OF INCISIONAL HERNIA USING DA GARETH XI Left 6/13/2022    Procedure: XI ROBOTIC REPAIR, HERNIA, INCISIONAL (LEFT SIDE SPIGELIAN WITH MESH);  Surgeon: Rosendo Marti MD;  Location: [facility] OR 2ND FLR;  Service: General;  Laterality: Left;  consent in am    ROTATOR CUFF REPAIR      right    TOTAL KNEE ARTHROPLASTY Right 5/29/2018    Procedure: REPLACEMENT-KNEE-TOTAL-pro;  Surgeon: Denzel Dallas MD;  Location: [facility] OR 2ND FLR;  Service: Orthopedics;  Laterality: Right;  Pro    TRANSESOPHAGEAL ECHOCARDIOGRAM WITH POSSIBLE CARDIOVERSION (PEPE W/ POSS CARDIOVERSION) N/A 5/2/2025    Procedure:  TRANSESOPHAGEAL ECHOCARDIOGRAM WITH POSSIBLE CARDIOVERSION (PEPE W/ POSS CARDIOVERSION);  Surgeon: Pan Moss MD;  Location: [facility];  Service: Cardiology;  Laterality: N/A;    TRANSESOPHAGEAL ECHOCARDIOGRAPHY N/A 4/23/2024    Procedure: ECHOCARDIOGRAM, TRANSESOPHAGEAL;  Surgeon: Provider, Dosc Diagnostic;  Location: [facility] EP LAB;  Service: Cardiology;  Laterality: N/A;    TRANSESOPHAGEAL ECHOCARDIOGRAPHY N/A 5/23/2024    Procedure: ECHOCARDIOGRAM, TRANSESOPHAGEAL;  Surgeon: Kj Newton MD;  Location: [facility] CATH LAB;  Service: Cardiology;  Laterality: N/A;    TRANSESOPHAGEAL ECHOCARDIOGRAPHY N/A 7/9/2024    Procedure: ECHOCARDIOGRAM, TRANSESOPHAGEAL;  Surgeon: Provider, Dosc Diagnostic;  Location: [facility] EP LAB;  Service: Cardiology;  Laterality: N/A;    TREATMENT OF CARDIAC ARRHYTHMIA  6/15/2020    Procedure: Cardioversion or Defibrillation;  Surgeon: Wyatt Beatty MD;  Location: [facility] EP LAB;  Service: Cardiology;;    TREATMENT OF CARDIAC ARRHYTHMIA N/A 2/28/2024    Procedure: Cardioversion or Defibrillation;  Surgeon: Pao Hare MD;  Location: [facility];  Service: Cardiology;  Laterality: N/A;    TREATMENT OF CARDIAC ARRHYTHMIA N/A 9/11/2024    Procedure: Cardioversion/Defibrillation;  Surgeon: Pan Moss MD;  Location: [facility];  Service: Cardiology;  Laterality: N/A;    TREATMENT OF CARDIAC ARRHYTHMIA N/A 2/17/2025    Procedure: Cardioversion or Defibrillation;  Surgeon: Jewel Arnold MD;  Location: [facility] ENDO;  Service: Cardiology;  Laterality: N/A;       Review of patient's allergies indicates:   Allergen Reactions    No known drug allergies        Social History     Socioeconomic History    Marital status:     Number of children: 5   Occupational History    Occupation: retired anesthesiology     Employer: [employer] ([employer]    Occupation: [employer] grad     Comment: previous football player   Tobacco Use    Smoking status: Never     Passive  exposure: Never    Smokeless tobacco: Never    Tobacco comments:     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    Substance and Sexual Activity    Alcohol use: No    Drug use: No    Sexual activity: Yes     Partners: Female     Social Drivers of Health     Financial Resource Strain: Low Risk  (10/21/2024)    Overall Financial Resource Strain (CARDIA)     Difficulty of Paying Living Expenses: Not hard at all   Food Insecurity: No Food Insecurity (11/8/2024)    Hunger Vital Sign     Worried About Running Out of Food in the Last Year: Never true     Ran Out of Food in the Last Year: Never true   Transportation Needs: No Transportation Needs (11/8/2024)    TRANSPORTATION NEEDS     Transportation : No   Physical Activity: Sufficiently Active (4/22/2024)    Exercise Vital Sign     Days of Exercise per Week: 5 days     Minutes of Exercise per Session: 130 min   Stress: No Stress Concern Present (10/21/2024)    [place] [employer] - Occupational Stress Questionnaire     Feeling of Stress : Not at all   Housing Stability: Unknown (11/8/2024)    Housing Stability Vital Sign     Unable to Pay for Housing in the Last Year: No     Homeless in the Last Year: No       Current Outpatient Medications on File Prior to Visit   Medication Sig Dispense Refill    aspirin (ECOTRIN) 81 MG EC tablet Take 1 tablet (81 mg total) by mouth once daily. 90 tablet 3    buPROPion (WELLBUTRIN XL) 150 MG TB24 tablet Take 1 tablet (150 mg total) by mouth once daily. 90 tablet 3    buPROPion (WELLBUTRIN XL) 300 MG 24 hr tablet Take 1 tablet (300 mg total) by mouth once daily. Plus 150 90 tablet 0    calcitRIOL (ROCALTROL) 0.25 MCG Cap Take 1 capsule (0.25 mcg total) by mouth every Mon, Wed, Fri. 39 capsule 1    carvediloL (COREG) 12.5 MG tablet Take 1 tablet (12.5 mg total) by mouth 2 (two) times daily. 180 tablet 3    cyanocobalamin 1,000 mcg/mL injection Inject 1 mL (1,000 mcg total) into the muscle every 30 days. 12  mL 0    dofetilide (TIKOSYN) 250 MCG Cap Take 1 capsule (250 mcg total) by mouth every 12 (twelve) hours. 60 capsule 11    EScitalopram oxalate (LEXAPRO) 10 MG tablet Take 1 tablet (10 mg total) by mouth once daily. 90 tablet 3    ICAR-C PLUS Tab TAKE 1 TABLET BY MOUTH ONCE DAILY 90 tablet 3    levothyroxine (SYNTHROID) 88 MCG tablet Take 1 tablet (88 mcg total) by mouth before breakfast. 30 tablet 11    mirtazapine (REMERON) 30 MG tablet Take 1 tablet (30 mg total) by mouth every evening. 30 tablet 11    OMEGA-3 FATTY ACIDS (FISH OIL CONCENTRATE ORAL) Take 1 capsule by mouth Daily.      telmisartan (MICARDIS) 40 MG Tab Take 1 tablet (40 mg total) by mouth once daily. 90 tablet 3    testosterone cypionate (DEPOTESTOTERONE CYPIONATE) 200 mg/mL injection Inject 200 mg into the muscle every 14 (fourteen) days.      tiZANidine (ZANAFLEX) 4 MG tablet Take 1 tablet (4 mg total) by mouth 3 (three) times daily as needed (muscle spasms). 30 tablet 2    torsemide (DEMADEX) 20 MG Tab TAKE 1 TABLET BY MOUTH EVEYR MONDAY, WEDNESDAY, AND FRIDAYS 15 tablet 5     No current facility-administered medications on file prior to visit.       Family History   Problem Relation Name Age of Onset    Aortic stenosis Mother      Heart disease Mother          aortic stenosis    Osteoporosis Mother      COPD Father      Diabetes Father      Osteoporosis Father      Heart attack Brother      Breast cancer Maternal Aunt      No Known Problems Daughter      No Known Problems Daughter      No Known Problems Daughter      No Known Problems Son         Review of Systems   Constitutional:  Positive for appetite change and unexpected weight change. Negative for chills and fever.   HENT:  Negative for sore throat and trouble swallowing.    Eyes:  Negative for pain and visual disturbance.   Respiratory:  Negative for cough, chest tightness, shortness of breath and wheezing.    Cardiovascular:  Positive for leg swelling. Negative for  "chest pain and palpitations.   Gastrointestinal:  Negative for abdominal pain, blood in stool, constipation, diarrhea and nausea.   Genitourinary:  Negative for difficulty urinating, dysuria and hematuria.   Musculoskeletal:  Negative for arthralgias, gait problem and neck pain.   Skin:  Negative for rash and wound.   Neurological:  Negative for dizziness, weakness, light-headedness and headaches.   Hematological:  Negative for adenopathy.   Psychiatric/Behavioral:  Positive for depression. Negative for dysphoric mood.        Objective:      /74   Pulse (!) 53   Ht 5' 11" (1.803 m)   Wt 78.1 kg (172 lb 2.9 oz)   SpO2 100%   BMI 24.01 kg/m²   Physical Exam  Constitutional:       General: He is not in acute distress.     Appearance: He is well-developed.   HENT:      Head: Normocephalic and atraumatic.      Right Ear: External ear normal.      Left Ear: External ear normal.      Mouth/Throat:      Pharynx: Uvula midline. No oropharyngeal exudate.   Eyes:      General: Lids are normal.      Conjunctiva/sclera: Conjunctivae normal.      Pupils: Pupils are equal, round, and reactive to light.   Neck:      Thyroid: No thyroid mass or thyromegaly.      Trachea: Phonation normal.   Cardiovascular:      Rate and Rhythm: Normal rate and regular rhythm.      Heart sounds: Normal heart sounds. No murmur heard.     No friction rub. No gallop.   Pulmonary:      Effort: Pulmonary effort is normal. No respiratory distress.      Breath sounds: Normal breath sounds. No wheezing or rales.   Musculoskeletal:         General: Normal range of motion.      Cervical back: Full passive range of motion without pain, normal range of motion and neck supple.      Right lower le+ Edema present.      Left lower le+ Edema present.   Lymphadenopathy:      Cervical: No cervical adenopathy.      Upper Body:      Right upper body: No supraclavicular or axillary adenopathy.      Left upper body: No supraclavicular or axillary " adenopathy.   Skin:     General: Skin is warm and dry.   Neurological:      Mental Status: He is alert and oriented to person, place, and time.      Cranial Nerves: No cranial nerve deficit.      Coordination: Coordination normal.   Psychiatric:         Speech: Speech normal.         Behavior: Behavior normal.         Thought Content: Thought content normal.         Judgment: Judgment normal.       Results for orders placed or performed during the hospital encounter of 05/02/25   EKG 12-LEAD    Collection Time: 05/02/25  1:55 PM   Result Value Ref Range    QRS Duration 102 ms    [facility] QTC Calculation 481 ms     *Note: Due to a large number of results and/or encounters for the requested time period, some results have not been displayed. A complete set of results can be found in Results Review.     Labs and hospital records reviewed     Assessment:       1. Major depressive disorder, single episode, moderate    2. Essential hypertension    3. Hypothyroidism due to acquired atrophy of thyroid                          Plan:       Major depressive disorder, single episode, moderate    Essential hypertension    Hypothyroidism due to acquired atrophy of thyroid        Hematology f/u as planned      Continue wellbutrin, Lexapro; and remeron 30mg QHS to assist with depression, insomnia and appetite  Discussed compression socks daily  F/u with cardiology, ID, ortho as planned  Continue other present meds  Counseled on regular exercise, maintenance of a healthy weight, balanced diet rich in fruits/vegetables and lean protein, and avoidance of unhealthy habits like smoking and excessive alcohol intake.    F/u 6 months     Visit today included increased complexity associated with the care of the episodic problems listed above addressed and managing the longitudinal care of the patient due to the serious and/or complex managed problem(s) listed above.

## 2025-05-06 ENCOUNTER — PATIENT MESSAGE (OUTPATIENT)
Dept: PULMONOLOGY | Facility: CLINIC | Age: 80
End: 2025-05-06
Payer: MEDICARE

## 2025-05-06 ENCOUNTER — PATIENT MESSAGE (OUTPATIENT)
Dept: TRANSPLANT | Facility: CLINIC | Age: 80
End: 2025-05-06
Payer: MEDICARE

## 2025-05-12 DIAGNOSIS — M00.9 PYOGENIC ARTHRITIS OF RIGHT KNEE JOINT, DUE TO UNSPECIFIED ORGANISM: ICD-10-CM

## 2025-05-13 RX ORDER — TIZANIDINE 4 MG/1
4 TABLET ORAL 3 TIMES DAILY PRN
Qty: 30 TABLET | Refills: 2 | Status: SHIPPED | OUTPATIENT
Start: 2025-05-13

## 2025-05-13 NOTE — TELEPHONE ENCOUNTER
No care due was identified.  [facility] Embedded Care Due Messages. Reference number: [id number].   5/12/2025 9:24:47 PM CDT

## 2025-05-16 ENCOUNTER — CLINICAL SUPPORT (OUTPATIENT)
Dept: CARDIOLOGY | Facility: HOSPITAL | Age: 80
End: 2025-05-16
Payer: MEDICARE

## 2025-05-16 ENCOUNTER — HOSPITAL ENCOUNTER (OUTPATIENT)
Dept: CARDIOLOGY | Facility: HOSPITAL | Age: 80
Discharge: HOME | End: 2025-05-16
Attending: INTERNAL MEDICINE
Payer: MEDICARE

## 2025-05-16 DIAGNOSIS — Z95.0 PRESENCE OF CARDIAC PACEMAKER: ICD-10-CM

## 2025-05-16 LAB
OHS CV AF BURDEN PERCENT: 22
OHS CV DC REMOTE DEVICE TYPE: NORMAL
OHS CV ICD SHOCK: NO
OHS CV RV PACING PERCENT: 1 %

## 2025-05-17 ENCOUNTER — PATIENT MESSAGE (OUTPATIENT)
Dept: AUDIOLOGY | Facility: CLINIC | Age: 80
End: 2025-05-17
Payer: MEDICARE

## 2025-05-18 ENCOUNTER — PATIENT MESSAGE (OUTPATIENT)
Dept: FAMILY MEDICINE | Facility: CLINIC | Age: 80
End: 2025-05-18
Payer: MEDICARE

## 2025-05-19 ENCOUNTER — TELEPHONE (OUTPATIENT)
Dept: CARDIOLOGY | Facility: HOSPITAL | Age: 80
End: 2025-05-19
Payer: MEDICARE

## 2025-05-20 ENCOUNTER — CLINICAL SUPPORT (OUTPATIENT)
Dept: AUDIOLOGY | Facility: CLINIC | Age: 80
End: 2025-05-20
Payer: MEDICARE

## 2025-05-20 ENCOUNTER — OFFICE VISIT (OUTPATIENT)
Dept: NEPHROLOGY | Facility: CLINIC | Age: 80
End: 2025-05-20
Payer: MEDICARE

## 2025-05-20 VITALS — SYSTOLIC BLOOD PRESSURE: 126 MMHG | OXYGEN SATURATION: 99 % | HEART RATE: 71 BPM | DIASTOLIC BLOOD PRESSURE: 70 MMHG

## 2025-05-20 DIAGNOSIS — Z97.4 WEARS HEARING AID IN BOTH EARS: Primary | ICD-10-CM

## 2025-05-20 DIAGNOSIS — N25.81 SECONDARY RENAL HYPERPARATHYROIDISM: ICD-10-CM

## 2025-05-20 DIAGNOSIS — N18.2 CKD (CHRONIC KIDNEY DISEASE) STAGE 2, GFR 60-89 ML/MIN: Primary | ICD-10-CM

## 2025-05-20 DIAGNOSIS — I10 PRIMARY HYPERTENSION: ICD-10-CM

## 2025-05-20 DIAGNOSIS — R80.9 PROTEINURIA, UNSPECIFIED TYPE: ICD-10-CM

## 2025-05-20 PROCEDURE — 1160F RVW MEDS BY RX/DR IN RCRD: CPT | Mod: CPTII,HCNC,S$GLB, | Performed by: INTERNAL MEDICINE

## 2025-05-20 PROCEDURE — 1157F ADVNC CARE PLAN IN RCRD: CPT | Mod: CPTII,HCNC,S$GLB, | Performed by: INTERNAL MEDICINE

## 2025-05-20 PROCEDURE — 1159F MED LIST DOCD IN RCRD: CPT | Mod: CPTII,HCNC,S$GLB, | Performed by: INTERNAL MEDICINE

## 2025-05-20 PROCEDURE — 3074F SYST BP LT 130 MM HG: CPT | Mod: CPTII,HCNC,S$GLB, | Performed by: INTERNAL MEDICINE

## 2025-05-20 PROCEDURE — 99999 PR PBB SHADOW E&M-EST. PATIENT-LVL III: CPT | Mod: PBBFAC,HCNC,, | Performed by: INTERNAL MEDICINE

## 2025-05-20 PROCEDURE — 3078F DIAST BP <80 MM HG: CPT | Mod: CPTII,HCNC,S$GLB, | Performed by: INTERNAL MEDICINE

## 2025-05-20 PROCEDURE — 99214 OFFICE O/P EST MOD 30 MIN: CPT | Mod: HCNC,S$GLB,, | Performed by: INTERNAL MEDICINE

## 2025-05-20 RX ORDER — CALCITRIOL 0.25 UG/1
0.25 CAPSULE ORAL DAILY
Qty: 90 CAPSULE | Refills: 1 | Status: SHIPPED | OUTPATIENT
Start: 2025-05-20

## 2025-05-20 NOTE — PROGRESS NOTES
The patient came to the clinic because his left hearing aid had a broken . The patient has Phonak hearing aids and his receivers needed to be changed from Cerushield to Cerustop since the Cerushield receivers have been discontinued. I changed both receivers, cleaned his hearing aids and completed a listening check. The patient will contact us as needed.

## 2025-05-20 NOTE — PROGRESS NOTES
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      He is still having trouble with AF.  He has no uremic or urinary symptoms.        Review of Systems   Constitutional:  Negative for fever.   Respiratory:  Negative for cough and shortness of breath.    Cardiovascular:  Positive for leg swelling (occasional). Negative for chest pain.   Gastrointestinal:  Negative for abdominal pain.   Genitourinary:  Positive for frequency.   Musculoskeletal:  Positive for arthralgias (R knee). Negative for gait problem.   Neurological:  Negative for headaches.   Hematological:  Bruises/bleeds easily.       The past medical, family and social histories were reviewed for this encounter.     /70 (BP Location: Left arm, Patient Position: Sitting)   Pulse 71   SpO2 99%     Objective:      Physical Exam  Vitals reviewed.   Constitutional:       General: He is not in acute distress.     Comments: thin   HENT:      Head: Normocephalic and atraumatic.   Cardiovascular:      Rate and Rhythm: Normal rate.      Heart sounds: No murmur heard.  Pulmonary:      Effort: Pulmonary effort is normal.      Breath sounds: No rales.   Abdominal:      General: Abdomen is flat.   Musculoskeletal:      Right lower leg: Edema (trace) present.      Left lower leg: Edema (1+) present.   Skin:     Findings: Bruising present.   Neurological:      Mental Status: He is alert and oriented to person, place, and time.         Assessment:       1. CKD (chronic kidney disease) stage 2, GFR 60-89 ml/min    2. Secondary renal hyperparathyroidism    3. Primary hypertension    4. Proteinuria, unspecified type        Lab Results   Component Value Date    CREATININE 1.2 04/28/2025    BUN 22 04/28/2025     04/28/2025    K 4.4 04/28/2025     (H) 04/28/2025    CO2 21 (L) 04/28/2025     Lab Results   Component Value Date    .7 (H) 04/28/2025    CALCIUM  7.7 (L) 04/28/2025    PHOS 3.8 04/28/2025     Lab Results   Component Value Date    HCT 33.6 (L) 04/10/2025     Urine Protein/Creatinine Ratio   Date Value Ref Range Status   04/28/2025 0.75 (H) <=0.20 Final     Prot/Creat Ratio, Urine   Date Value Ref Range Status   12/06/2024 1.17 (H) 0.00 - 0.20 Final   10/28/2024 Unable to calculate 0.00 - 0.20 Final   07/10/2024 0.12 0.00 - 0.20 Final      Plan:   Return to clinic in 6 months.  Labs for next visit include labs per standing orders q 3 months.  Baseline creatinine is 1.3-1.4 since 2014.  UPC is 0.75 but he is on an ARB.  Renal US shows R 9.3 cm L 8.9 cm.  PTH is 261 with a calcium level of 7.7.  Change calcitriol 0.25 mcg to daily.  Calcium is low normal likely related to his reclast dose.

## 2025-05-20 NOTE — TELEPHONE ENCOUNTER
Recurrent Atrial fibrillation noted during device remote check:  Reviewed 5/19 in clinic, awaiting return call from patient to review s/s, left VM x2 with no return call back    Most recent DCCV completed by Dr. Moss 5/2/25, previous DCCV completed by Dr Arnold 2/17/25    Overall burden:  >99% since 4/30/25, Vrates while in AF    HX of AF undersensing  Possible recurrent AF evening post recent DCCV on 5/2/25, episode noted 7:38pm    Episode list:      Longest ongoing episode 12days    Presenting 5/20/25 2:00      HVR x1 noted, c/w AF with RVR 5/16/25 11mins 18sec      Cardiac medications per chart:  Aspirin 81mg daily  Coreg 12.5mg BID  Tikosyn 250mcg BID  [id number] daily  Torsemide 20mg M,W,F    Patient symptoms: unknown    F/U in clinic 6/19

## 2025-05-23 LAB
OHS CV AF BURDEN PERCENT: 99
OHS CV DC REMOTE DEVICE TYPE: NORMAL
OHS CV ICD SHOCK: NO
OHS CV RV PACING PERCENT: 1.8 %

## 2025-05-29 DIAGNOSIS — F32.A DEPRESSION, UNSPECIFIED DEPRESSION TYPE: ICD-10-CM

## 2025-05-30 DIAGNOSIS — F32.A DEPRESSION, UNSPECIFIED DEPRESSION TYPE: ICD-10-CM

## 2025-05-30 RX ORDER — BUPROPION HYDROCHLORIDE 150 MG/1
150 TABLET ORAL DAILY
Qty: 90 TABLET | Refills: 3 | Status: SHIPPED | OUTPATIENT
Start: 2025-05-30 | End: 2026-05-30

## 2025-05-30 NOTE — TELEPHONE ENCOUNTER
Refill Routing Note   Medication(s) are not appropriate for processing by [facility] for the following reason(s):        No active prescription written by provider    [facility] action(s):  Defer             Appointments  past 12m or future 3m with PCP    Date Provider   Last Visit   5/5/2025 Maxi Gaines MD   Next Visit   11/3/2025 Maxi Gaines MD   ED visits in past 90 days: 0        Note composed:8:15 AM 05/30/2025

## 2025-05-30 NOTE — TELEPHONE ENCOUNTER
No care due was identified.  [facility] Embedded Care Due Messages. Reference number: [id number].   5/29/2025 7:58:53 PM CDT

## 2025-05-30 NOTE — TELEPHONE ENCOUNTER
No care due was identified.  [facility] Embedded Care Due Messages. Reference number: [id number].   5/30/2025 6:04:24 PM CDT

## 2025-05-31 RX ORDER — BUPROPION HYDROCHLORIDE 150 MG/1
TABLET ORAL
Qty: 90 TABLET | Refills: 3 | OUTPATIENT
Start: 2025-05-31

## 2025-05-31 NOTE — TELEPHONE ENCOUNTER
Refill Decision Note   Dominick Duncan  is requesting a refill authorization.  Brief Assessment and Rationale for Refill:  Quick Discontinue     Medication Therapy Plan:         Comments:     Note composed:10:55 AM 05/31/2025

## 2025-06-19 ENCOUNTER — PATIENT MESSAGE (OUTPATIENT)
Dept: ELECTROPHYSIOLOGY | Facility: CLINIC | Age: 80
End: 2025-06-19
Payer: MEDICARE

## 2025-06-19 ENCOUNTER — OFFICE VISIT (OUTPATIENT)
Dept: CARDIOLOGY | Facility: CLINIC | Age: 80
End: 2025-06-19
Payer: MEDICARE

## 2025-06-19 ENCOUNTER — HOSPITAL ENCOUNTER (OUTPATIENT)
Dept: CARDIOLOGY | Facility: HOSPITAL | Age: 80
Discharge: HOME | End: 2025-06-19
Attending: INTERNAL MEDICINE
Payer: MEDICARE

## 2025-06-19 ENCOUNTER — TELEPHONE (OUTPATIENT)
Dept: ELECTROPHYSIOLOGY | Facility: CLINIC | Age: 80
End: 2025-06-19
Payer: MEDICARE

## 2025-06-19 ENCOUNTER — PATIENT MESSAGE (OUTPATIENT)
Dept: TRANSPLANT | Facility: CLINIC | Age: 80
End: 2025-06-19
Payer: MEDICARE

## 2025-06-19 VITALS
HEART RATE: 71 BPM | DIASTOLIC BLOOD PRESSURE: 76 MMHG | HEIGHT: 71 IN | SYSTOLIC BLOOD PRESSURE: 134 MMHG | BODY MASS INDEX: 25.46 KG/M2 | WEIGHT: 181.88 LBS

## 2025-06-19 DIAGNOSIS — I48.20 CHRONIC ATRIAL FIBRILLATION: ICD-10-CM

## 2025-06-19 DIAGNOSIS — I48.19 PERSISTENT ATRIAL FIBRILLATION: Primary | ICD-10-CM

## 2025-06-19 DIAGNOSIS — I50.32 CHRONIC HEART FAILURE WITH PRESERVED EJECTION FRACTION (HFPEF): ICD-10-CM

## 2025-06-19 DIAGNOSIS — Z95.0 PACEMAKER: ICD-10-CM

## 2025-06-19 DIAGNOSIS — I10 PRIMARY HYPERTENSION: Primary | ICD-10-CM

## 2025-06-19 DIAGNOSIS — I48.19 PERSISTENT ATRIAL FIBRILLATION: ICD-10-CM

## 2025-06-19 DIAGNOSIS — I34.0 NONRHEUMATIC MITRAL VALVE REGURGITATION: ICD-10-CM

## 2025-06-19 DIAGNOSIS — Z95.0 PRESENCE OF CARDIAC PACEMAKER: ICD-10-CM

## 2025-06-19 LAB
OHS QRS DURATION: 102 MS
OHS QTC CALCULATION: 463 MS

## 2025-06-19 PROCEDURE — 1101F PT FALLS ASSESS-DOCD LE1/YR: CPT | Mod: CPTII,HCNC,S$GLB, | Performed by: INTERNAL MEDICINE

## 2025-06-19 PROCEDURE — 99214 OFFICE O/P EST MOD 30 MIN: CPT | Mod: HCNC,25,S$GLB, | Performed by: INTERNAL MEDICINE

## 2025-06-19 PROCEDURE — 3075F SYST BP GE 130 - 139MM HG: CPT | Mod: CPTII,HCNC,S$GLB, | Performed by: INTERNAL MEDICINE

## 2025-06-19 PROCEDURE — 3288F FALL RISK ASSESSMENT DOCD: CPT | Mod: CPTII,HCNC,S$GLB, | Performed by: INTERNAL MEDICINE

## 2025-06-19 PROCEDURE — 1126F AMNT PAIN NOTED NONE PRSNT: CPT | Mod: CPTII,HCNC,S$GLB, | Performed by: INTERNAL MEDICINE

## 2025-06-19 PROCEDURE — 3078F DIAST BP <80 MM HG: CPT | Mod: CPTII,HCNC,S$GLB, | Performed by: INTERNAL MEDICINE

## 2025-06-19 PROCEDURE — 1159F MED LIST DOCD IN RCRD: CPT | Mod: CPTII,HCNC,S$GLB, | Performed by: INTERNAL MEDICINE

## 2025-06-19 PROCEDURE — 93005 ELECTROCARDIOGRAM TRACING: CPT | Mod: HCNC,PO

## 2025-06-19 PROCEDURE — 93010 ELECTROCARDIOGRAM REPORT: CPT | Mod: HCNC,S$GLB,, | Performed by: INTERNAL MEDICINE

## 2025-06-19 PROCEDURE — 1160F RVW MEDS BY RX/DR IN RCRD: CPT | Mod: CPTII,HCNC,S$GLB, | Performed by: INTERNAL MEDICINE

## 2025-06-19 PROCEDURE — 1157F ADVNC CARE PLAN IN RCRD: CPT | Mod: CPTII,HCNC,S$GLB, | Performed by: INTERNAL MEDICINE

## 2025-06-19 PROCEDURE — 99999 PR PBB SHADOW E&M-EST. PATIENT-LVL III: CPT | Mod: PBBFAC,HCNC,, | Performed by: INTERNAL MEDICINE

## 2025-06-19 PROCEDURE — 93280 PM DEVICE PROGR EVAL DUAL: CPT | Mod: HCNC,PO

## 2025-06-19 NOTE — TELEPHONE ENCOUNTER
Dr Beatty spoke with patient today. He is back in AF. His pulmonologist has cleared him to take amiodarone. Per Dr Beatty, stop Tikosyn. In one week initiate amiodarone 200mg bid for 2 weeks, then 200mg once a day. Start Eliquis 5mg bid. Schedule PEPE/DCCV in 3 weeks. Spoke with patient; reviewed recommendations and scheduled PEPE/DCCV for 7/14/2025.

## 2025-06-19 NOTE — PROGRESS NOTES
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    Background:  First diagnosed with atrial fibrillation 2/12 (noted palpitations). Placed on beta blocker, coumadin. Underwent PEPE/DCCV. Had recurrence, Propafenone  mg twice daily added.  Had recurrence 12/24/13, underwent DCCV, Propafenone increased to 425 mg twice daily.  PVI ([place]) 7/28/14.   Had done well, was off Propafenone. Developed recurrence, underwent DCCV multiple times.  Repeat PVI 4/27/17 All 4 veins remained isolated.  Antralized left sided lesion set posteriorly, and performed SVC isolation.  Has been compliant with CPAP.  Had persistent recurrence >> DCCV 2/9/18.  He has had paroxysmal AF, with 4 episodes since 11/18.    Exercise echo 2/19 normal biventricular structure and function BISI 57 negative for ischemia.     Continued to have paroxysmal events, self-terminating.  Last episode was last month.  Generally, the episodes are lasting 3 days.  Since last visit, he was admitted with a TIA.  This was thought to be due to non-compliance with Eliquis.     6/15/20 Repeat RFA. Posterior wall isolation + repeat RFA of cavo-tricuspid isthmus. PV's remained isolated. 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     7/13/20 presented with TIA like symptoms. Seen by Dr. Coats (Vascular Neurology). Thought to be atypical for neurologic etiology. CT demonstrates a small area of remote vs. Subacute infarct in R medial occipital lobe, so transient hippocampal ischemia may be at the origin, although again this is atypical     Presented with symptomatic bradycardia >> dual chamber PPM implanted 9/23/23 (Dr. 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  PEPE 7/29/24 no leak     10/2024  Has had recurrent symptomatic. DCCV 2/28/24 and 9/11/24  Continues to note significant symptoms from AF, with  shortness of breath. When asked what % he is feeling compared to when in nsr, he indicates 50%.  Device interrogation reveals stable function of the leads, went back in to AF 10/7/24. RV pacing is 6.6%     12/06/2024  Patient underwent cardioversion 10/17/2024 started on Tikosyn.  Repeat echo showed moderate MR.  Traumatic septic hematoma to the right knee.  Oral anticoagulation was stopped, and he underwent I&D of his knee hematoma.  Reports significant palpitations.  Specifically, 1125 he reported feeling palpitations throughout the day.  Review of device interrogation shows longest episode was 12 minutes.   Most recent interrogation since 1126 shows 43% atrial burden with 23% RV pacing and 52% atrial pacing.    06/19/2025  S/p PEPE/CV 2/17/25.  Continued on Tikosyn.  Patient had recurrent atrial fibrillation and [place] a cardio[place]  He underwent a PEPE/CV 5/2/25 with Dr. Moss.  In speaking with the patient's sounds like he was back in atrial fibrillation prior to discharge.  Device shows recurrent atrial fibrillation soon after cardioversion.  Device interrogation shows 1.1% atrial pacing and 1.8% ventricular pacing.  [place] 100% since 5/31.    PEPE 5/2/25 shows EF 45-50%.  Successful cardioversion to sinus rhythm.    The ECG with rhythm strip performed today in clinic was personally reviewed; my interpretation is atrial fibrillation with controlled ventricular rate.   milliseconds.   milliseconds.    Past Medical History:   Diagnosis Date    Anticoagulant long-term use     Anxiety     Arthritis     Atrial fibrillation     BPH (benign prostatic hyperplasia)     Cataract     CKD (chronic kidney disease) stage 3, GFR 30-59 ml/min 07/10/2017    Followed by Dr. Jeevan Pond    Colon polyp     benign    Depression     Elevated PSA     Erectile dysfunction     Gastric ulcer with hemorrhage     Hep B w/o coma 1977    History of bleeding peptic ulcer     History of  prostatitis     Hypertension     PAF (paroxysmal atrial fibrillation)     Sleep apnea     PT DENIES    Stroke     TIA December 2019    Thyroid disease        Past Surgical History:   Procedure Laterality Date    A-V CARDIAC PACEMAKER INSERTION Left 9/23/2023    Procedure: Dual Chamber PPM (Heart) Rm 2620;  Surgeon: Pan Moss MD;  Location: [facility] CATH;  Service: Cardiology;  Laterality: Left;    ABDOMINAL HERNIA REPAIR      ABLATION OF ARRHYTHMOGENIC FOCUS FOR ATRIAL FIBRILLATION N/A 6/15/2020    Procedure: Ablation atrial fibrillation;  Surgeon: Wyatt Beatty MD;  Location: [facility] EP LAB;  Service: Cardiology;  Laterality: N/A;  PAF, AFl, PEPE, PVI re-do, CTI, RFA, JUSTIN, Gen, SK, 3 Prep    APPLICATION OF WOUND VACUUM-ASSISTED CLOSURE DEVICE Right 12/9/2023    Procedure: APPLICATION, WOUND VAC;  Surgeon: Jelani Tello II, MD;  Location: [facility] OR;  Service: Orthopedics;  Laterality: Right;    CARDIOVERSION N/A 2/28/2024    Procedure: Cardioversion;  Surgeon: Pao Hare MD;  Location: [facility];  Service: Cardiology;  Laterality: N/A;    CARDIOVERSION N/A 10/17/2024    Procedure: Cardioversion;  Surgeon: Pan Moss MD;  Location: [facility];  Service: Cardiology;  Laterality: N/A;    CATARACT EXTRACTION  11/25/2013    bilateral    CHOLECYSTECTOMY      CLOSURE OF LEFT ATRIAL APPENDAGE USING DEVICE N/A 5/23/2024    Procedure: Left atrial appendage closure device;  Surgeon: Tony Duvall MD;  Location: [facility] CATH LAB;  Service: Cardiology;  Laterality: N/A;    COLONOSCOPY N/A 11/25/2015    Procedure: COLONOSCOPY;  Surgeon: Toby Hernandez MD;  Location: [facility] ENDO;  Service: Endoscopy;  Laterality: N/A;    COLONOSCOPY N/A 11/13/2020    Procedure: COLONOSCOPY;  Surgeon: Toby Hernandez MD;  Location: [facility] ENDO;  Service: Endoscopy;  Laterality: N/A;    COLONOSCOPY N/A 5/1/2024    Procedure: COLONOSCOPY;  Surgeon: Toby Hernandez MD;  Location: [facility] ENDO;  Service: Endoscopy;   Laterality: N/A;    COLONOSCOPY N/A 1/16/2025    Procedure: COLONOSCOPY;  Surgeon: Milton Rodriguez MD;  Location: [facility] ENDO (2ND FLR);  Service: Endoscopy;  Laterality: N/A;  11/19 portal-peg-pt stated not on eliquis- Repeat colonoscopy in 4 months for surveillance.[id number]  1/9 SWP PRECALL COMPLETE-RT    CYSTOSCOPY WITH INSERTION OF MINIMALLY INVASIVE IMPLANT TO ENLARGE PROSTATIC URETHRA N/A 11/28/2022    Procedure: CYSTOSCOPY, WITH INSERTION OF UROLIFT IMPLANT;  Surgeon: Yakov Shetty MD;  Location: [facility] OR;  Service: Urology;  Laterality: N/A;    ECHOCARDIOGRAM,TRANSESOPHAGEAL N/A 2/17/2025    Procedure: Transesophageal echo (PEPE) intra-procedure log documentation;  Surgeon: Jewel Arnold MD;  Location: [facility] ENDO;  Service: Cardiology;  Laterality: N/A;    ENDOSCOPIC MUCOSAL RESECTION OF COLON N/A 9/9/2024    Procedure: RESECTION, MUCOSA, COLON, ENDOSCOPIC;  Surgeon: Milton Rodriguez MD;  Location: [facility] MALACHI (2ND FLR);  Service: Endoscopy;  Laterality: N/A;  5/23 portal-peg-colonscopy with EMR Main. 90 minutes.  24 hours of clear liquid diet. Referring: Toby Hernandez MD- MichaelJovana HAUSER 5/23- tt .  7/10/24: PEG prep. Pt off plavix now per cards. instructions sent via portal-GD  8/16/24: pt    ESOPHAGOGASTRODUODENOSCOPY N/A 5/1/2024    Procedure: ESOPHAGOGASTRODUODENOSCOPY (EGD);  Surgeon: Toby Hernandez MD;  Location: [facility] ENDO;  Service: Endoscopy;  Laterality: N/A;    EYE SURGERY      GASTRIC BYPASS  1992    INCISION AND DRAINAGE OF KNEE Right 11/8/2024    Procedure: INCISION AND DRAINAGE, KNEE, EVACUATION HEMATOMA;  Surgeon: Dane Smith MD;  Location: [facility] OR;  Service: Orthopedics;  Laterality: Right;    INSERTION, PACEMAKER, TEMPORARY TRANSVENOUS  9/22/2023    Procedure: Temp pacer insertion ER Rm 8;  Surgeon: Pan Moss MD;  Location: [facility] CATH;  Service: Cardiology;;    INTRAMEDULLARY RODDING OF FEMUR Left 7/18/2020    Procedure: INSERTION, INTRAMEDULLARY ERIC,  FEMUR, left, Synthes, [place] table, Large C arm clock side,;  Surgeon: Tony Rodriguez MD;  Location: [facility] OR 2ND FLR;  Service: Orthopedics;  Laterality: Left;    IRRIGATION AND DEBRIDEMENT Right 12/9/2023    Procedure: IRRIGATION AND DEBRIDEMENT KNEE;  Surgeon: Jelani Tello II, MD;  Location: [facility] OR;  Service: Orthopedics;  Laterality: Right;    KNEE ARTHROSCOPY      RT    LASIK  2001    both eyes (Dr. Rabago)    ORIF HUMERUS FRACTURE      LT    ORIF HUMERUS FRACTURE Right     non surgical repair    RADIOFREQUENCY ABLATION      x2    REVISION OF KNEE ARTHROPLASTY Right 12/9/2023    Procedure: REVISION ARTHROPLASTY KNEE- Liner Replacement - dirty/clean;  Surgeon: Jelani Tello II, MD;  Location: [facility];  Service: Orthopedics;  Laterality: Right;  dirty to clean at 1940    Right ankle tendon repair      ROBOT-ASSISTED REPAIR OF INCISIONAL HERNIA USING DA GARETH XI Left 6/13/2022    Procedure: XI ROBOTIC REPAIR, HERNIA, INCISIONAL (LEFT SIDE SPIGELIAN WITH MESH);  Surgeon: Rosendo Marti MD;  Location: [facility] OR 2ND FLR;  Service: General;  Laterality: Left;  consent in am    ROTATOR CUFF REPAIR      right    TOTAL KNEE ARTHROPLASTY Right 5/29/2018    Procedure: REPLACEMENT-KNEE-TOTAL-axel;  Surgeon: Denzel Dallas MD;  Location: [facility] OR 2ND FLR;  Service: Orthopedics;  Laterality: Right;  [place]    TRANSESOPHAGEAL ECHOCARDIOGRAM WITH POSSIBLE CARDIOVERSION (PEPE W/ POSS CARDIOVERSION) N/A 5/2/2025    Procedure: TRANSESOPHAGEAL ECHOCARDIOGRAM WITH POSSIBLE CARDIOVERSION (PEPE W/ POSS CARDIOVERSION);  Surgeon: Pan Moss MD;  Location: [facility];  Service: Cardiology;  Laterality: N/A;    TRANSESOPHAGEAL ECHOCARDIOGRAPHY N/A 4/23/2024    Procedure: ECHOCARDIOGRAM, TRANSESOPHAGEAL;  Surgeon: Provider, Jan Diagnostic;  Location: [facility] EP LAB;  Service: Cardiology;  Laterality: N/A;    TRANSESOPHAGEAL ECHOCARDIOGRAPHY N/A 5/23/2024    Procedure: ECHOCARDIOGRAM, TRANSESOPHAGEAL;   Surgeon: Kj Newton MD;  Location: [facility] CATH LAB;  Service: Cardiology;  Laterality: N/A;    TRANSESOPHAGEAL ECHOCARDIOGRAPHY N/A 7/9/2024    Procedure: ECHOCARDIOGRAM, TRANSESOPHAGEAL;  Surgeon: Jan Bravo Diagnostic;  Location: [facility] EP LAB;  Service: Cardiology;  Laterality: N/A;    TREATMENT OF CARDIAC ARRHYTHMIA  6/15/2020    Procedure: Cardioversion or Defibrillation;  Surgeon: Wyatt Beatty MD;  Location: [facility] EP LAB;  Service: Cardiology;;    TREATMENT OF CARDIAC ARRHYTHMIA N/A 2/28/2024    Procedure: Cardioversion or Defibrillation;  Surgeon: Pao Hare MD;  Location: [facility];  Service: Cardiology;  Laterality: N/A;    TREATMENT OF CARDIAC ARRHYTHMIA N/A 9/11/2024    Procedure: Cardioversion/Defibrillation;  Surgeon: Pan Moss MD;  Location: [facility];  Service: Cardiology;  Laterality: N/A;    TREATMENT OF CARDIAC ARRHYTHMIA N/A 2/17/2025    Procedure: Cardioversion or Defibrillation;  Surgeon: Jewel Arnold MD;  Location: [facility] ENDO;  Service: Cardiology;  Laterality: N/A;       Family History   Problem Relation Name Age of Onset    Aortic stenosis Mother      Heart disease Mother          aortic stenosis    Osteoporosis Mother      COPD Father      Diabetes Father      Osteoporosis Father      Heart attack Brother      Breast cancer Maternal Aunt      No Known Problems Daughter      No Known Problems Daughter      No Known Problems Daughter      No Known Problems Son         Social History     Socioeconomic History    Marital status:     Number of children: 5   Occupational History    Occupation: retired anesthesiology     Employer: [employer] ([employer])    Occupation: [employer] grad     Comment: previous football player   Tobacco Use    Smoking status: Never     Passive exposure: Never    Smokeless tobacco: Never    Tobacco comments:     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    Substance and Sexual Activity    Alcohol use: No    Drug use: No    Sexual activity:  "Yes     Partners: Female     Social Drivers of Health     Financial Resource Strain: Low Risk  (10/21/2024)    Overall Financial Resource Strain (CARDIA)     Difficulty of Paying Living Expenses: Not hard at all   Food Insecurity: No Food Insecurity (11/8/2024)    Hunger Vital Sign     Worried About Running Out of Food in the Last Year: Never true     Ran Out of Food in the Last Year: Never true   Transportation Needs: No Transportation Needs (11/8/2024)    TRANSPORTATION NEEDS     Transportation : No   Physical Activity: Sufficiently Active (4/22/2024)    Exercise Vital Sign     Days of Exercise per Week: 5 days     Minutes of Exercise per Session: 130 min   Stress: No Stress Concern Present (10/21/2024)    [place] [employer] - Occupational Stress Questionnaire     Feeling of Stress : Not at all   Housing Stability: Unknown (11/8/2024)    Housing Stability Vital Sign     Unable to Pay for Housing in the Last Year: No     Homeless in the Last Year: No       Review of patient's allergies indicates:   Allergen Reactions    No known drug allergies        Review of Systems   All other systems reviewed and are negative.         OBJECTIVE:     Vitals:    06/19/25 1410   BP: 134/76   BP Location: Right arm   Patient Position: Sitting   Pulse: 71   Weight: 82.5 kg (181 lb 14.1 oz)   Height: 5' 11" (1.803 m)       BP Readings from Last 5 Encounters:   06/19/25 134/76   05/20/25 126/70   05/05/25 136/74   05/02/25 (!) 145/73   04/14/25 (!) 150/76        Physical Exam  Vitals reviewed.   Constitutional:       General: He is not in acute distress.     Appearance: Normal appearance. He is not ill-appearing.   HENT:      Head: Normocephalic and atraumatic.   Eyes:      Extraocular Movements: Extraocular movements intact.      Conjunctiva/sclera: Conjunctivae normal.   Cardiovascular:      Rate and Rhythm: Normal rate. Rhythm irregular.   Pulmonary:      Effort: Pulmonary effort is normal. No respiratory " distress.   Musculoskeletal:         General: No swelling or deformity.   Skin:     Findings: No erythema or rash.   Neurological:      Mental Status: He is alert.             Current Outpatient Medications   Medication Instructions    aspirin (ECOTRIN) 81 mg, Oral, Daily    buPROPion (WELLBUTRIN XL) 300 mg, Oral, Daily, Plus 150    buPROPion (WELLBUTRIN XL) 150 mg, Oral, Daily    calcitRIOL (ROCALTROL) 0.25 mcg, Oral, Daily    carvediloL (COREG) 12.5 mg, Oral, 2 times daily    cyanocobalamin 1,000 mcg, Intramuscular, Every 30 days    dofetilide (TIKOSYN) 250 mcg, Oral, Every 12 hours    EScitalopram oxalate (LEXAPRO) 10 mg, Oral, Daily    ICAR-C PLUS Tab 1 tablet, Oral, Daily    levothyroxine (SYNTHROID) 88 mcg, Oral, Before breakfast    mirtazapine (REMERON) 30 mg, Oral, Nightly    OMEGA-3 FATTY ACIDS (FISH OIL CONCENTRATE ORAL) 1 capsule, Daily    telmisartan (MICARDIS) 40 mg, Oral, Daily    testosterone cypionate (DEPOTESTOTERONE CYPIONATE) 200 mg, Every 14 days    tiZANidine (ZANAFLEX) 4 mg, Oral, 3 times daily PRN    torsemide (DEMADEX) 20 MG Tab TAKE 1 TABLET BY MOUTH EVEYR MONDAY, WEDNESDAY, AND FRIDAYS       Lipid Panel:   Lab Results   Component Value Date    CHOL 115 (L) 08/30/2023    HDL 49 08/30/2023    LDLCALC 56.8 (L) 08/30/2023    TRIG 46 08/30/2023    CHOLHDL 42.6 08/30/2023       The ASCVD Risk score (Sari SOLO, et al., 2019) failed to calculate for the following reasons:    Risk score cannot be calculated because patient has a medical history suggesting prior/existing ASCVD    Most Recent EKG Results  Results for orders placed or performed during the hospital encounter of 05/02/25   EKG 12-LEAD    Collection Time: 05/02/25  1:55 PM   Result Value Ref Range    QRS Duration 102 ms    [facility] QTC Calculation 481 ms    Narrative    Test Reason : I48.19,    Vent. Rate :  70 BPM     Atrial Rate :  70 BPM     P-R Int : 240 ms          QRS Dur : 102 ms      QT Int : 446 ms       P-R-T Axes :     62  46  degrees    QTcB Int : 481 ms    Sinus rhythm with 1st degree A-V block with Premature atrial complexes  Prolonged QT  Abnormal ECG  When compared with ECG of 17-Feb-2025 13:16,  Premature atrial complexes are now Present  Criteria for Septal infarct are no longer Present  Confirmed by Cheng Mckeon ([id number]) on 5/7/2025 3:08:13 PM    Referred By: Pan Moss           Confirmed By: Cheng Mckeon       Most Recent Echocardiogram Results  Results for orders placed during the hospital encounter of 05/02/25    Transesophageal echo (PEPE) with possible cardioversion    Interpretation Summary    Left Ventricle: The left ventricle is normal in size. There is mildly reduced systolic function with a visually estimated ejection fraction of 45 - 50%.    Right Ventricle: The right ventricle is normal in size. Systolic function is normal.    Left Atrium: dilated There is a closure device within the appendage. The device is a Amplatzer Amulet. There is complete occlusion with no residual leaks.    Right Atrium: Right atrium is dilated.    Mitral Valve: There is mild bileaflet sclerosis. There is mild to moderate regurgitation.    Tricuspid Valve: There is mild regurgitation.    Successful electrical cardioversion restoring AV sequential pacer      Most Recent Nuclear Stress Test Results  Results for orders placed during the hospital encounter of 09/20/23    Nuclear Stress - Cardiology Interpreted    Interpretation Summary    Normal myocardial perfusion scan. There is no evidence of myocardial ischemia or infarction.    The gated perfusion images showed an ejection fraction of 60% post stress.    LV cavity size is normal at rest and normal at stress.    The ECG portion of the study is abnormal but not diagnostic.    The patient reported no chest pain during the stress test.      Most Recent Cardiac PET Stress Test Results  No results found for this or any previous visit.      Most Recent Cardiovascular Angiogram  results  Results for orders placed during the hospital encounter of 05/23/24    Cardiac catheterization    Conclusion    The estimated blood loss was none.    The left atrial appendage closure was successful .    Follow up PEPE in 45 days and clinic with me.    The procedure log was documented by Documenter: Dorothy Haider and verified by Tony Duvall MD.    Date: 5/23/2024  Time: 2:12 PM      Other Most Recent Cardiology Results  Results for orders placed in visit on 05/16/25    Cardiac device check - Remote alert        All pertinent data including labs, imaging, EKGs, and studies listed above were reviewed.  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  He has failed Tikosyn, and we will discontinue today.  He is planning to follow up with Dr. Beatty going forward.    1. Primary hypertension  IN OFFICE EKG 12-LEAD (to Muse)      2. Chronic atrial fibrillation        3. Chronic heart failure with preserved ejection fraction (HFpEF)        4. Nonrheumatic mitral valve regurgitation        5. Pacemaker        6. Persistent atrial fibrillation          PLAN FOR TREATMENT OF ABOVE DIAGNOSES:     Discontinue Tikosyn secondary to persistent atrial fibrillation.  We will not start any new antiarrhythmic drugs at this time as the patient is re-establishing with Dr. Beatty.  Continue carvedilol 12.5 mg b.i.d.  Continue aspirin 81 mg daily.  Watchman in place.    Torsemide every Monday Wednesday Friday.  Daily standing weights.  Low-sodium diet.  No evidence of acute decompensated heart failure.  Pacemaker in Situ with known atrial undersensing at times    Follow up as needed.    Brannon Arnold MD  [facility]    This note was partially generated using voice recognition and generative  artificial intelligence software. While every effort has been made to ensure its accuracy, transcription errors may exist. Please reach out to me with any questions or if clarification is needed.

## 2025-06-20 RX ORDER — AMIODARONE HYDROCHLORIDE 200 MG/1
TABLET ORAL
Qty: 45 TABLET | Refills: 11 | Status: SHIPPED | OUTPATIENT
Start: 2025-06-26

## 2025-06-21 LAB
OHS CV AF BURDEN PERCENT: 99
OHS CV DC REMOTE DEVICE TYPE: NORMAL
OHS CV RV PACING PERCENT: 1.8 %

## 2025-06-25 ENCOUNTER — TELEPHONE (OUTPATIENT)
Dept: AUDIOLOGY | Facility: CLINIC | Age: 80
End: 2025-06-25
Payer: MEDICARE

## 2025-06-25 NOTE — TELEPHONE ENCOUNTER
LMOR informing pt that Chris no longer carries the model hearing aid he has so I'm unable to get a match. He would need to purchase 2 new aids if he would like a binaural fit. Asked him to reach out and let me know if he would like to proceed for a new set of aids.     ----- Message from Melanie Somers sent at 6/25/2025  3:34 PM CDT -----  Regarding: Requesting call back  The patient lost one of his hearing aids and is requesting a call back. He is no longer in warranty.

## 2025-06-26 DIAGNOSIS — M00.9 PYOGENIC ARTHRITIS OF RIGHT KNEE JOINT, DUE TO UNSPECIFIED ORGANISM: ICD-10-CM

## 2025-06-27 RX ORDER — CARVEDILOL 12.5 MG/1
12.5 TABLET ORAL 2 TIMES DAILY
Qty: 180 TABLET | Refills: 3 | Status: SHIPPED | OUTPATIENT
Start: 2025-06-27

## 2025-06-27 RX ORDER — TIZANIDINE 4 MG/1
4 TABLET ORAL 3 TIMES DAILY PRN
Qty: 30 TABLET | Refills: 2 | Status: SHIPPED | OUTPATIENT
Start: 2025-06-27

## 2025-06-27 NOTE — TELEPHONE ENCOUNTER
No care due was identified.  [facility] Embedded Care Due Messages. Reference number: [id number].   6/27/2025 8:50:58 AM CDT

## 2025-06-27 NOTE — TELEPHONE ENCOUNTER
No care due was identified.  [facility] Embedded Care Due Messages. Reference number: [id number].   6/26/2025 9:55:11 PM CDT

## 2025-07-02 ENCOUNTER — DOCUMENTATION ONLY (OUTPATIENT)
Dept: AUDIOLOGY | Facility: CLINIC | Age: 80
End: 2025-07-02
Payer: MEDICARE

## 2025-07-02 ENCOUNTER — CLINICAL SUPPORT (OUTPATIENT)
Dept: AUDIOLOGY | Facility: CLINIC | Age: 80
End: 2025-07-02
Payer: MEDICARE

## 2025-07-02 DIAGNOSIS — Z97.4 WEARS HEARING AID IN BOTH EARS: ICD-10-CM

## 2025-07-02 DIAGNOSIS — Z97.4 WEARS HEARING AID IN BOTH EARS: Primary | ICD-10-CM

## 2025-07-02 DIAGNOSIS — H91.93 BILATERAL HEARING LOSS, UNSPECIFIED HEARING LOSS TYPE: Primary | ICD-10-CM

## 2025-07-02 NOTE — PROGRESS NOTES
Patient is here today for annual hearing evaluation.    The patient has a history of bilateral sensorineural hearing loss and use of hearing aid binaurally. Patient reported that he lost his left hearing aid.    Otoscopic screening revealed a clear view of TM AU    Results reveal a mild-to-severe  hearing loss for the right ear and  mild-to-severe  hearing loss for the left ear.    Speech Reception Thresholds were  35 dBHL for the right ear and 35 dBHL for the left ear.    Word recognition scores were excellent for the right ear and good for the left ear.  No significant changes were noted when compared to previous hearing testing from 4/01/2024. Tympanograms were Type A for the right ear and Type A for the left ear.    The recommendations were as follows:    (1)  Continued use of binaural amplification. Patient was seen immediately following this encounter for a hearing aid consult.   (2)  Ear protection in loud noise   (3)  Hearing evaluation in one year or sooner if hearing decrease is noted       Today's test results and recommendations were discussed with the patient.

## 2025-07-02 NOTE — PROGRESS NOTES
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 Pt was alone during today's visit. Pt stated he lost the left aid and can't find it. Explained that [employer] no longer sells the [place] model so he is unable to purchase a  hearing aid to the one he still has possession of. He asked about purchasing aids on [employer] and with the [facility]. Discussed the unknown of buying on [employer] and that [facility] may not repair an aid that the serial number is assigned to another pt. Gave him a phone number to contact the [facility] to determine his candidacy for hearing aids. We also discussed the different brands, styles and levels of technology to replace the lost aid. It was decided based on the patients lifestyle that the best choice would be BUNDLED () Kevin Edge AI 20 ELIJAH RT in color graphite grey with size 4M AD and 3M AS receivers. The price quoted was $4503.60 with tax for 2 aids. Pt will pay the $250.00 non refundable fitting fee over the phone if he places the order and will pay the remaining balance for the hearing aids at time of fitting. Pt was also informed that insurance reimbursement by their insurance company for any hearing aid benefits would need to be filed for by the patient since [facility] does not file for insurance benefits for hearing aids at this time. The pt will call the clinic if he wishes to place an order.  Cleaned both aids and changed the wax filters and domes. Listening check revealed aids to sound appropriate for his hearing loss. Recalculated thresholds from new hearing test, FB test and changed phone pairing to the right aid. Re-paired the aid with his phone. Informed pt that a notification will be mailed when it is time for the next annual hearing test and that he should call the audiology clinic directly to schedule the appts to coordinate them correctly. Also informed the patient that if domes or wax filters are needed to please inform the clinic and they will be left  at the 2nd floor check in desk to be picked up at the earliest convenience. All complaints were addressed at this visit to the patient's satisfaction. Pt should call the clinic PRN otherwise. Plan of care was discussed in detail with the patient, who agreed with the plan as above.

## 2025-07-03 ENCOUNTER — PATIENT MESSAGE (OUTPATIENT)
Dept: AUDIOLOGY | Facility: CLINIC | Age: 80
End: 2025-07-03
Payer: MEDICARE

## 2025-07-07 ENCOUNTER — CLINICAL SUPPORT (OUTPATIENT)
Dept: AUDIOLOGY | Facility: CLINIC | Age: 80
End: 2025-07-07
Payer: MEDICARE

## 2025-07-07 ENCOUNTER — TELEPHONE (OUTPATIENT)
Dept: HEMATOLOGY/ONCOLOGY | Facility: CLINIC | Age: 80
End: 2025-07-07
Payer: MEDICARE

## 2025-07-07 ENCOUNTER — PATIENT MESSAGE (OUTPATIENT)
Dept: CARDIOLOGY | Facility: CLINIC | Age: 80
End: 2025-07-07
Payer: MEDICARE

## 2025-07-07 ENCOUNTER — LAB VISIT (OUTPATIENT)
Dept: LAB | Facility: HOSPITAL | Age: 80
End: 2025-07-07
Attending: INTERNAL MEDICINE
Payer: MEDICARE

## 2025-07-07 DIAGNOSIS — I48.19 PERSISTENT ATRIAL FIBRILLATION: ICD-10-CM

## 2025-07-07 DIAGNOSIS — N18.2 CKD (CHRONIC KIDNEY DISEASE) STAGE 2, GFR 60-89 ML/MIN: ICD-10-CM

## 2025-07-07 DIAGNOSIS — Z71.89 HEARING AID CONSULTATION: Primary | ICD-10-CM

## 2025-07-07 LAB
ANION GAP (OHS): 4 MMOL/L (ref 8–16)
APTT PPP: 35.6 SECONDS (ref 21–32)
BUN SERPL-MCNC: 31 MG/DL (ref 8–23)
CALCIUM SERPL-MCNC: 8 MG/DL (ref 8.7–10.5)
CHLORIDE SERPL-SCNC: 115 MMOL/L (ref 95–110)
CO2 SERPL-SCNC: 20 MMOL/L (ref 23–29)
CREAT SERPL-MCNC: 1.7 MG/DL (ref 0.5–1.4)
CREAT UR-MCNC: 116 MG/DL (ref 23–375)
ERYTHROCYTE [DISTWIDTH] IN BLOOD BY AUTOMATED COUNT: 16.9 % (ref 11.5–14.5)
GFR SERPLBLD CREATININE-BSD FMLA CKD-EPI: 41 ML/MIN/1.73/M2
GLUCOSE SERPL-MCNC: 120 MG/DL (ref 70–110)
HCT VFR BLD AUTO: 35.3 % (ref 40–54)
HGB BLD-MCNC: 10.9 GM/DL (ref 14–18)
INR PPP: 1.3 (ref 0.8–1.2)
MCH RBC QN AUTO: 30.4 PG (ref 27–31)
MCHC RBC AUTO-ENTMCNC: 30.9 G/DL (ref 32–36)
MCV RBC AUTO: 98 FL (ref 82–98)
PLATELET # BLD AUTO: 258 K/UL (ref 150–450)
PMV BLD AUTO: 11.7 FL (ref 9.2–12.9)
POTASSIUM SERPL-SCNC: 4.6 MMOL/L (ref 3.5–5.1)
PROT UR-MCNC: 44 MG/DL
PROT/CREAT UR: 0.38 MG/G{CREAT}
PROTHROMBIN TIME: 13.8 SECONDS (ref 9–12.5)
RBC # BLD AUTO: 3.59 M/UL (ref 4.6–6.2)
SODIUM SERPL-SCNC: 139 MMOL/L (ref 136–145)
WBC # BLD AUTO: 8.22 K/UL (ref 3.9–12.7)

## 2025-07-07 PROCEDURE — 85730 THROMBOPLASTIN TIME PARTIAL: CPT | Mod: HCNC,PO

## 2025-07-07 PROCEDURE — 36415 COLL VENOUS BLD VENIPUNCTURE: CPT | Mod: HCNC,PO

## 2025-07-07 PROCEDURE — 85027 COMPLETE CBC AUTOMATED: CPT | Mod: HCNC

## 2025-07-07 PROCEDURE — 85610 PROTHROMBIN TIME: CPT | Mod: HCNC,PO

## 2025-07-07 PROCEDURE — 80048 BASIC METABOLIC PNL TOTAL CA: CPT | Mod: HCNC

## 2025-07-07 PROCEDURE — 84156 ASSAY OF PROTEIN URINE: CPT | Mod: HCNC

## 2025-07-07 NOTE — TELEPHONE ENCOUNTER
Called and spoke with pt. I let the pt know that unfortunately his labs for Dr. Grigsby were not linked to his lab appt today and that he needs new labs for his gary with her on 7/9/25. Pt scheduled for labs on 7/8/25. Pt voiced understanding.

## 2025-07-07 NOTE — PROGRESS NOTES
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 Pt was alone during today's visit. He has significant experience with hearing aids but lost one aid. Patient's audiogram was discussed in detail. We also discussed the different brands, styles and levels of technology. It was decided based on the patients lifestyle that the best choice would be BUNDLED () Edge AI 20 ELIJAH RT in color graphite grey with size 4M AD and 3M AS receivers. The price quoted was $4503.60 with tax for 2 aids. Pt will pay the $250.00 non refundable fitting fee at the end of this visit and will pay the remaining balance for the hearing aids at time of fitting. Pt was also informed that insurance reimbursement by their insurance company for any hearing aid benefits would need to be filed for by the patient since [facility] does not file for insurance benefits for hearing aids at this time. The pt scheduled a fitting on 7/16/2025.  Plan of care was discussed in detail with the patient, who agreed with the plan as above. All complaints were addressed during this visit to the patient's satisfaction. Order number is [id number].

## 2025-07-08 ENCOUNTER — LAB VISIT (OUTPATIENT)
Dept: LAB | Facility: HOSPITAL | Age: 80
End: 2025-07-08
Attending: INTERNAL MEDICINE
Payer: MEDICARE

## 2025-07-08 DIAGNOSIS — E60 ZINC DEFICIENCY: ICD-10-CM

## 2025-07-08 DIAGNOSIS — D50.8 IRON DEFICIENCY ANEMIA SECONDARY TO INADEQUATE DIETARY IRON INTAKE: ICD-10-CM

## 2025-07-08 DIAGNOSIS — N18.31 CHRONIC KIDNEY DISEASE, STAGE 3A: ICD-10-CM

## 2025-07-08 PROBLEM — D64.9 NORMOCYTIC ANEMIA: Status: ACTIVE | Noted: 2025-07-08

## 2025-07-08 LAB
ABSOLUTE EOSINOPHIL (OHS): 0.92 K/UL
ABSOLUTE MONOCYTE (OHS): 0.49 K/UL (ref 0.3–1)
ABSOLUTE NEUTROPHIL COUNT (OHS): 4.72 K/UL (ref 1.8–7.7)
ALBUMIN SERPL BCP-MCNC: 3 G/DL (ref 3.5–5.2)
ALP SERPL-CCNC: 69 UNIT/L (ref 40–150)
ALT SERPL W/O P-5'-P-CCNC: 19 UNIT/L (ref 10–44)
ANION GAP (OHS): 8 MMOL/L (ref 8–16)
AST SERPL-CCNC: 15 UNIT/L (ref 11–45)
BASOPHILS # BLD AUTO: 0.09 K/UL
BASOPHILS NFR BLD AUTO: 1.2 %
BILIRUB SERPL-MCNC: 0.8 MG/DL (ref 0.1–1)
BUN SERPL-MCNC: 32 MG/DL (ref 8–23)
CALCIUM SERPL-MCNC: 8 MG/DL (ref 8.7–10.5)
CHLORIDE SERPL-SCNC: 115 MMOL/L (ref 95–110)
CO2 SERPL-SCNC: 19 MMOL/L (ref 23–29)
CREAT SERPL-MCNC: 1.6 MG/DL (ref 0.5–1.4)
ERYTHROCYTE [DISTWIDTH] IN BLOOD BY AUTOMATED COUNT: 17 % (ref 11.5–14.5)
FERRITIN SERPL-MCNC: 115 NG/ML (ref 20–300)
GFR SERPLBLD CREATININE-BSD FMLA CKD-EPI: 44 ML/MIN/1.73/M2
GLUCOSE SERPL-MCNC: 162 MG/DL (ref 70–110)
HCT VFR BLD AUTO: 35.9 % (ref 40–54)
HGB BLD-MCNC: 11.6 GM/DL (ref 14–18)
IMM GRANULOCYTES # BLD AUTO: 0.03 K/UL (ref 0–0.04)
IMM GRANULOCYTES NFR BLD AUTO: 0.4 % (ref 0–0.5)
LYMPHOCYTES # BLD AUTO: 0.98 K/UL (ref 1–4.8)
MCH RBC QN AUTO: 30.4 PG (ref 27–31)
MCHC RBC AUTO-ENTMCNC: 32.3 G/DL (ref 32–36)
MCV RBC AUTO: 94 FL (ref 82–98)
NUCLEATED RBC (/100WBC) (OHS): 0 /100 WBC
PLATELET # BLD AUTO: 260 K/UL (ref 150–450)
PMV BLD AUTO: 10.6 FL (ref 9.2–12.9)
POTASSIUM SERPL-SCNC: 4.6 MMOL/L (ref 3.5–5.1)
PROT SERPL-MCNC: 6.2 GM/DL (ref 6–8.4)
RBC # BLD AUTO: 3.81 M/UL (ref 4.6–6.2)
RELATIVE EOSINOPHIL (OHS): 12.7 %
RELATIVE LYMPHOCYTE (OHS): 13.6 % (ref 18–48)
RELATIVE MONOCYTE (OHS): 6.8 % (ref 4–15)
RELATIVE NEUTROPHIL (OHS): 65.3 % (ref 38–73)
SODIUM SERPL-SCNC: 142 MMOL/L (ref 136–145)
WBC # BLD AUTO: 7.23 K/UL (ref 3.9–12.7)

## 2025-07-08 PROCEDURE — 82728 ASSAY OF FERRITIN: CPT | Mod: HCNC

## 2025-07-08 PROCEDURE — 84630 ASSAY OF ZINC: CPT | Mod: HCNC

## 2025-07-08 PROCEDURE — 85025 COMPLETE CBC W/AUTO DIFF WBC: CPT | Mod: HCNC,PN

## 2025-07-08 PROCEDURE — 80053 COMPREHEN METABOLIC PANEL: CPT | Mod: HCNC,PN

## 2025-07-08 PROCEDURE — 36415 COLL VENOUS BLD VENIPUNCTURE: CPT | Mod: HCNC,PN

## 2025-07-09 ENCOUNTER — DOCUMENTATION ONLY (OUTPATIENT)
Dept: AUDIOLOGY | Facility: CLINIC | Age: 80
End: 2025-07-09
Payer: MEDICARE

## 2025-07-09 ENCOUNTER — OFFICE VISIT (OUTPATIENT)
Dept: HEMATOLOGY/ONCOLOGY | Facility: CLINIC | Age: 80
End: 2025-07-09
Payer: MEDICARE

## 2025-07-09 VITALS
RESPIRATION RATE: 14 BRPM | OXYGEN SATURATION: 98 % | WEIGHT: 183.19 LBS | HEART RATE: 69 BPM | HEIGHT: 71 IN | TEMPERATURE: 98 F | DIASTOLIC BLOOD PRESSURE: 70 MMHG | SYSTOLIC BLOOD PRESSURE: 124 MMHG | BODY MASS INDEX: 25.65 KG/M2

## 2025-07-09 DIAGNOSIS — N18.31 CHRONIC KIDNEY DISEASE, STAGE 3A: ICD-10-CM

## 2025-07-09 DIAGNOSIS — E60 ZINC DEFICIENCY: ICD-10-CM

## 2025-07-09 DIAGNOSIS — M80.00XD AGE-RELATED OSTEOPOROSIS WITH CURRENT PATHOLOGICAL FRACTURE WITH ROUTINE HEALING: ICD-10-CM

## 2025-07-09 DIAGNOSIS — E53.8 VITAMIN B12 DEFICIENCY: ICD-10-CM

## 2025-07-09 DIAGNOSIS — Z98.84 S/P GASTRIC BYPASS: ICD-10-CM

## 2025-07-09 DIAGNOSIS — D64.9 NORMOCYTIC ANEMIA: Primary | ICD-10-CM

## 2025-07-09 DIAGNOSIS — E03.9 ACQUIRED HYPOTHYROIDISM: ICD-10-CM

## 2025-07-09 PROCEDURE — 1126F AMNT PAIN NOTED NONE PRSNT: CPT | Mod: CPTII,HCNC,S$GLB, | Performed by: INTERNAL MEDICINE

## 2025-07-09 PROCEDURE — 99999 PR PBB SHADOW E&M-EST. PATIENT-LVL IV: CPT | Mod: PBBFAC,HCNC,, | Performed by: INTERNAL MEDICINE

## 2025-07-09 PROCEDURE — 1159F MED LIST DOCD IN RCRD: CPT | Mod: CPTII,HCNC,S$GLB, | Performed by: INTERNAL MEDICINE

## 2025-07-09 PROCEDURE — 1101F PT FALLS ASSESS-DOCD LE1/YR: CPT | Mod: CPTII,HCNC,S$GLB, | Performed by: INTERNAL MEDICINE

## 2025-07-09 PROCEDURE — 99213 OFFICE O/P EST LOW 20 MIN: CPT | Mod: HCNC,S$GLB,, | Performed by: INTERNAL MEDICINE

## 2025-07-09 PROCEDURE — G2211 COMPLEX E/M VISIT ADD ON: HCPCS | Mod: HCNC,S$GLB,, | Performed by: INTERNAL MEDICINE

## 2025-07-09 PROCEDURE — 3074F SYST BP LT 130 MM HG: CPT | Mod: CPTII,HCNC,S$GLB, | Performed by: INTERNAL MEDICINE

## 2025-07-09 PROCEDURE — 3078F DIAST BP <80 MM HG: CPT | Mod: CPTII,HCNC,S$GLB, | Performed by: INTERNAL MEDICINE

## 2025-07-09 PROCEDURE — 1157F ADVNC CARE PLAN IN RCRD: CPT | Mod: CPTII,HCNC,S$GLB, | Performed by: INTERNAL MEDICINE

## 2025-07-09 PROCEDURE — 3288F FALL RISK ASSESSMENT DOCD: CPT | Mod: CPTII,HCNC,S$GLB, | Performed by: INTERNAL MEDICINE

## 2025-07-09 NOTE — PROGRESS NOTES
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      He is due to undergo a cardioversion for his Atrial fibrillation.    The patient denies CP, cough, SOB, abdominal pain, nausea, vomiting, constipation.  The patient denies fever, chills, night sweats, weight loss, new lumps or bumps, easy bruising or bleeding.    PREVIOUS WORK-UP:  Labs done on 2025 showed:  Kappa over lambda ratio was found to be mildly elevated at 1.81 with a kappa free light chain of 6.3 and lambda free light chain 3.49.  Serum immunofixation showed no abnormality.  Urine immunofixation showed 2 closely adjacent kappa light chain specific monoclonal protein band in the gamma area.    He had a gastric bypass years ago and he lost a lot of weight. He had it reversed.  He is known to have a short bowel movement with multiple episodes of diarrhea.    In view of his weight loss he his primary care physician ordered a PET scan on 2024 that failed to show any significant abnormality.  His last colonoscopy was done on 2025 and failed to show a polyp that was removed.  The pathology came back benign.      He was diagnosed with into interstitial lung disease and was evaluated for possible transplant.  He was seen recently by a cardiologist and is undergoing a workup for possibly amyloidosis.  His 2D echo done on 2024 showed a normal ejection fraction at 60-65%.  His left atrium was severely dilated.  There was moderate regurgitation affecting the mitral valve.  Mild to moderate pulmonary hypertension was noted.      Oncology History    No history  exists.        Past Medical History:   Diagnosis Date    Anticoagulant long-term use     Anxiety     Arthritis     Atrial fibrillation     BPH (benign prostatic hyperplasia)     Cataract     CKD (chronic kidney disease) stage 3, GFR 30-59 ml/min 07/10/2017    Followed by Dr. Jeevan Pond    Colon polyp     benign    Depression     Elevated PSA     Erectile dysfunction     Gastric ulcer with hemorrhage     Hep B w/o coma 1977    History of bleeding peptic ulcer     History of prostatitis     Hypertension     PAF (paroxysmal atrial fibrillation)     Sleep apnea     PT DENIES    Stroke     TIA December 2019    Thyroid disease        Past Surgical History:   Procedure Laterality Date    A-V CARDIAC PACEMAKER INSERTION Left 9/23/2023    Procedure: Dual Chamber PPM (Heart) Rm 2620;  Surgeon: Pan Moss MD;  Location: [facility];  Service: Cardiology;  Laterality: Left;    ABDOMINAL HERNIA REPAIR      ABLATION OF ARRHYTHMOGENIC FOCUS FOR ATRIAL FIBRILLATION N/A 6/15/2020    Procedure: Ablation atrial fibrillation;  Surgeon: Wyatt Beatty MD;  Location: [facility] EP LAB;  Service: Cardiology;  Laterality: N/A;  PAF, AFl, PEPE, PVI re-do, CTI, RFA, JUSTIN, Gen, SK, 3 Prep    APPLICATION OF WOUND VACUUM-ASSISTED CLOSURE DEVICE Right 12/9/2023    Procedure: APPLICATION, WOUND VAC;  Surgeon: Jelani Tello II, MD;  Location: [facility];  Service: Orthopedics;  Laterality: Right;    CARDIOVERSION N/A 2/28/2024    Procedure: Cardioversion;  Surgeon: Pao Hare MD;  Location: [facility];  Service: Cardiology;  Laterality: N/A;    CARDIOVERSION N/A 10/17/2024    Procedure: Cardioversion;  Surgeon: Pan Moss MD;  Location: [facility];  Service: Cardiology;  Laterality: N/A;    CATARACT EXTRACTION  11/25/2013    bilateral    CHOLECYSTECTOMY      CLOSURE OF LEFT ATRIAL APPENDAGE USING DEVICE N/A 5/23/2024    Procedure: Left atrial appendage closure device;  Surgeon: Tony Duvall MD;  Location: [facility] CATH LAB;   Service: Cardiology;  Laterality: N/A;    COLONOSCOPY N/A 11/25/2015    Procedure: COLONOSCOPY;  Surgeon: Toby Hernandez MD;  Location: [facility] ENDO;  Service: Endoscopy;  Laterality: N/A;    COLONOSCOPY N/A 11/13/2020    Procedure: COLONOSCOPY;  Surgeon: Toby Hernandez MD;  Location: [facility] ENDO;  Service: Endoscopy;  Laterality: N/A;    COLONOSCOPY N/A 5/1/2024    Procedure: COLONOSCOPY;  Surgeon: Toby Hernandez MD;  Location: [facility] ENDO;  Service: Endoscopy;  Laterality: N/A;    COLONOSCOPY N/A 1/16/2025    Procedure: COLONOSCOPY;  Surgeon: Milton Rodriguez MD;  Location: [facility] (2ND FLR);  Service: Endoscopy;  Laterality: N/A;  11/19 portal-peg-pt stated not on eliquis- Repeat colonoscopy in 4 months for surveillance.[id number]  1/9 SWP PRECALL COMPLETE-RT    CYSTOSCOPY WITH INSERTION OF MINIMALLY INVASIVE IMPLANT TO ENLARGE PROSTATIC URETHRA N/A 11/28/2022    Procedure: CYSTOSCOPY, WITH INSERTION OF UROLIFT IMPLANT;  Surgeon: Yakov Shetty MD;  Location: [facility] OR;  Service: Urology;  Laterality: N/A;    ECHOCARDIOGRAM,TRANSESOPHAGEAL N/A 2/17/2025    Procedure: Transesophageal echo (PEPE) intra-procedure log documentation;  Surgeon: Jewel Arnold MD;  Location: [facility] ENDO;  Service: Cardiology;  Laterality: N/A;    ENDOSCOPIC MUCOSAL RESECTION OF COLON N/A 9/9/2024    Procedure: RESECTION, MUCOSA, COLON, ENDOSCOPIC;  Surgeon: Milton Rodriguez MD;  Location: [facility] (2ND FLR);  Service: Endoscopy;  Laterality: N/A;  5/23 portal-peg-colonscopy with EMR Main. 90 minutes.  24 hours of clear liquid diet. Referring: Toby Hernandez MD- Michael-Jovana HAUSER 5/23- tt .  7/10/24: PEG prep. Pt off plavix now per cards. instructions sent via portal-GD  8/16/24: pt    ESOPHAGOGASTRODUODENOSCOPY N/A 5/1/2024    Procedure: ESOPHAGOGASTRODUODENOSCOPY (EGD);  Surgeon: Toby Hernandez MD;  Location: [facility] ENDO;  Service: Endoscopy;  Laterality: N/A;    EYE SURGERY      GASTRIC BYPASS  1992    INCISION  AND DRAINAGE OF KNEE Right 11/8/2024    Procedure: INCISION AND DRAINAGE, KNEE, EVACUATION HEMATOMA;  Surgeon: Dane Smith MD;  Location: [facility] OR;  Service: Orthopedics;  Laterality: Right;    INSERTION, PACEMAKER, TEMPORARY TRANSVENOUS  9/22/2023    Procedure: Temp pacer insertion ER Rm 8;  Surgeon: Pan Moss MD;  Location: [facility] CATH;  Service: Cardiology;;    INTRAMEDULLARY RODDING OF FEMUR Left 7/18/2020    Procedure: INSERTION, INTRAMEDULLARY ERIC, FEMUR, left, Synthes, [place] table, Large C arm clock side,;  Surgeon: Tony Rodriguez MD;  Location: [facility] OR 2ND FLR;  Service: Orthopedics;  Laterality: Left;    IRRIGATION AND DEBRIDEMENT Right 12/9/2023    Procedure: IRRIGATION AND DEBRIDEMENT KNEE;  Surgeon: Jelani Tello II, MD;  Location: [facility] OR;  Service: Orthopedics;  Laterality: Right;    KNEE ARTHROSCOPY      RT    LASIK  2001    both eyes (Dr. Rabago)    ORIF HUMERUS FRACTURE      LT    ORIF HUMERUS FRACTURE Right     non surgical repair    RADIOFREQUENCY ABLATION      x2    REVISION OF KNEE ARTHROPLASTY Right 12/9/2023    Procedure: REVISION ARTHROPLASTY KNEE- Liner Replacement - dirty/clean;  Surgeon: Jelani Tello II, MD;  Location: [facility] OR;  Service: Orthopedics;  Laterality: Right;  dirty to clean at 1940    Right ankle tendon repair      ROBOT-ASSISTED REPAIR OF INCISIONAL HERNIA USING DA GARETH XI Left 6/13/2022    Procedure: XI ROBOTIC REPAIR, HERNIA, INCISIONAL (LEFT SIDE SPIGELIAN WITH MESH);  Surgeon: Rosendo Marti MD;  Location: [facility] OR 2ND FLR;  Service: General;  Laterality: Left;  consent in am    ROTATOR CUFF REPAIR      right    TOTAL KNEE ARTHROPLASTY Right 5/29/2018    Procedure: REPLACEMENT-KNEE-TOTAL-axel;  Surgeon: Denzel Dallas MD;  Location: [facility] OR 2ND FLR;  Service: Orthopedics;  Laterality: Right;  [place]    TRANSESOPHAGEAL ECHOCARDIOGRAM WITH POSSIBLE CARDIOVERSION (PEPE W/ POSS CARDIOVERSION) N/A 5/2/2025    Procedure:  TRANSESOPHAGEAL ECHOCARDIOGRAM WITH POSSIBLE CARDIOVERSION (PEPE W/ POSS CARDIOVERSION);  Surgeon: Pan Moss MD;  Location: [facility];  Service: Cardiology;  Laterality: N/A;    TRANSESOPHAGEAL ECHOCARDIOGRAPHY N/A 4/23/2024    Procedure: ECHOCARDIOGRAM, TRANSESOPHAGEAL;  Surgeon: Provider, Eyalc Diagnostic;  Location: [facility] EP LAB;  Service: Cardiology;  Laterality: N/A;    TRANSESOPHAGEAL ECHOCARDIOGRAPHY N/A 5/23/2024    Procedure: ECHOCARDIOGRAM, TRANSESOPHAGEAL;  Surgeon: Kj Newton MD;  Location: [facility] CATH LAB;  Service: Cardiology;  Laterality: N/A;    TRANSESOPHAGEAL ECHOCARDIOGRAPHY N/A 7/9/2024    Procedure: ECHOCARDIOGRAM, TRANSESOPHAGEAL;  Surgeon: Provider, Dosc Diagnostic;  Location: [facility] EP LAB;  Service: Cardiology;  Laterality: N/A;    TREATMENT OF CARDIAC ARRHYTHMIA  6/15/2020    Procedure: Cardioversion or Defibrillation;  Surgeon: Wyatt Beatty MD;  Location: [facility] EP LAB;  Service: Cardiology;;    TREATMENT OF CARDIAC ARRHYTHMIA N/A 2/28/2024    Procedure: Cardioversion or Defibrillation;  Surgeon: Pao Hare MD;  Location: [facility];  Service: Cardiology;  Laterality: N/A;    TREATMENT OF CARDIAC ARRHYTHMIA N/A 9/11/2024    Procedure: Cardioversion/Defibrillation;  Surgeon: Pan Moss MD;  Location: [facility];  Service: Cardiology;  Laterality: N/A;    TREATMENT OF CARDIAC ARRHYTHMIA N/A 2/17/2025    Procedure: Cardioversion or Defibrillation;  Surgeon: Jewel Arnold MD;  Location: [facility] ENDO;  Service: Cardiology;  Laterality: N/A;       Family History   Problem Relation Name Age of Onset    Aortic stenosis Mother      Heart disease Mother          aortic stenosis    Osteoporosis Mother      COPD Father      Diabetes Father      Osteoporosis Father      Heart attack Brother      Breast cancer Maternal Aunt      No Known Problems Daughter      No Known Problems Daughter      No Known Problems Daughter      No Known Problems Son         Social History      Socioeconomic History    Marital status:     Number of children: 5   Occupational History    Occupation: retired anesthesiology     Employer: [employer]    Occupation: [employer] grad     Comment: previous football player   Tobacco Use    Smoking status: Never     Passive exposure: Never    Smokeless tobacco: Never    Tobacco comments:     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    Substance and Sexual Activity    Alcohol use: No    Drug use: No    Sexual activity: Yes     Partners: Female     Social Drivers of Health     Financial Resource Strain: Low Risk  (7/5/2025)    Overall Financial Resource Strain (CARDIA)     Difficulty of Paying Living Expenses: Not very hard   Food Insecurity: No Food Insecurity (7/5/2025)    Hunger Vital Sign     Worried About Running Out of Food in the Last Year: Never true     Ran Out of Food in the Last Year: Never true   Transportation Needs: No Transportation Needs (7/5/2025)    PRAPARE - Transportation     Lack of Transportation (Medical): No     Lack of Transportation (Non-Medical): No   Physical Activity: Inactive (7/5/2025)    Exercise Vital Sign     Days of Exercise per Week: 0 days     Minutes of Exercise per Session: 0 min   Stress: Stress Concern Present (7/5/2025)    [employer] [employer] [place] [employer] - Occupational Stress Questionnaire     Feeling of Stress : To some extent   Housing Stability: Low Risk  (7/5/2025)    Housing Stability Vital Sign     Unable to Pay for Housing in the Last Year: No     Number of Times Moved in the Last Year: 0     Homeless in the Last Year: No       Review of patient's allergies indicates:   Allergen Reactions    No known drug allergies        Answers submitted by the patient for this visit:  Review of Systems Questionnaire (Submitted on 7/5/2025)  appetite change : No  unexpected weight change: No  mouth sores: No  visual disturbance: No  cough: No  shortness of breath: Yes  chest pain: No  abdominal pain:  "No  diarrhea: No  frequency: No  back pain: No  rash: No  headaches: No  adenopathy: No  nervous/ anxious: No           Objective:     Vitals:    07/09/25 1256   BP: 124/70   BP Location: Right arm   Patient Position: Sitting   Pulse: 69   Resp: 14   Temp: 98 °F (36.7 °C)   TempSrc: Temporal   SpO2: 98%   Weight: 83.1 kg (183 lb 3.2 oz)   Height: 5' 11" (1.803 m)            Physical Exam           Current Outpatient Medications on File Prior to Visit   Medication Sig    amiodarone (PACERONE) 200 MG Tab Take one tablet by mouth twice a day for two weeks, then decrease to one tablet daily    apixaban (ELIQUIS) 5 mg Tab Take 1 tablet (5 mg total) by mouth 2 (two) times daily.    aspirin (ECOTRIN) 81 MG EC tablet Take 1 tablet (81 mg total) by mouth once daily.    buPROPion (WELLBUTRIN XL) 150 MG TB24 tablet Take 1 tablet (150 mg total) by mouth once daily.    buPROPion (WELLBUTRIN XL) 300 MG 24 hr tablet Take 1 tablet (300 mg total) by mouth once daily. Plus 150    calcitRIOL (ROCALTROL) 0.25 MCG Cap Take 1 capsule (0.25 mcg total) by mouth once daily.    carvediloL (COREG) 12.5 MG tablet Take 1 tablet (12.5 mg total) by mouth 2 (two) times daily.    cyanocobalamin 1,000 mcg/mL injection Inject 1 mL (1,000 mcg total) into the muscle every 30 days.    EScitalopram oxalate (LEXAPRO) 10 MG tablet Take 1 tablet (10 mg total) by mouth once daily.    ICAR-C PLUS Tab TAKE 1 TABLET BY MOUTH ONCE DAILY    levothyroxine (SYNTHROID) 88 MCG tablet Take 1 tablet (88 mcg total) by mouth before breakfast.    mirtazapine (REMERON) 30 MG tablet Take 1 tablet (30 mg total) by mouth every evening.    OMEGA-3 FATTY ACIDS (FISH OIL CONCENTRATE ORAL) Take 1 capsule by mouth Daily.    telmisartan (MICARDIS) 40 MG Tab Take 1 tablet (40 mg total) by mouth once daily.    testosterone cypionate (DEPOTESTOTERONE CYPIONATE) 200 mg/mL injection Inject 200 mg into the muscle every 14 (fourteen) days.    tiZANidine (ZANAFLEX) 4 MG tablet Take 1 " tablet (4 mg total) by mouth 3 (three) times daily as needed (muscle spasms).    torsemide (DEMADEX) 20 MG Tab TAKE 1 TABLET BY MOUTH EVEYR MONDAY, WEDNESDAY, AND FRIDAYS     No current facility-administered medications on file prior to visit.       CBC:  Lab Results   Component Value Date    WBC 7.23 07/08/2025    HGB 11.6 (L) 07/08/2025    HCT 35.9 (L) 07/08/2025    MCV 94 07/08/2025     07/08/2025         CMP:  Sodium   Date Value Ref Range Status   07/08/2025 142 136 - 145 mmol/L Final   02/06/2025 140 136 - 145 mmol/L Final     Potassium   Date Value Ref Range Status   07/08/2025 4.6 3.5 - 5.1 mmol/L Final   02/06/2025 4.5 3.5 - 5.1 mmol/L Final     Chloride   Date Value Ref Range Status   07/08/2025 115 (H) 95 - 110 mmol/L Final   02/06/2025 114 (H) 95 - 110 mmol/L Final     CO2   Date Value Ref Range Status   07/08/2025 19 (L) 23 - 29 mmol/L Final   02/06/2025 19 (L) 23 - 29 mmol/L Final     Glucose   Date Value Ref Range Status   07/08/2025 162 (H) 70 - 110 mg/dL Final   02/06/2025 109 70 - 110 mg/dL Final     BUN   Date Value Ref Range Status   07/08/2025 32 (H) 8 - 23 mg/dL Final     Creatinine   Date Value Ref Range Status   07/08/2025 1.6 (H) 0.5 - 1.4 mg/dL Final     Calcium   Date Value Ref Range Status   07/08/2025 8.0 (L) 8.7 - 10.5 mg/dL Final   02/06/2025 8.3 (L) 8.7 - 10.5 mg/dL Final     Protein Total   Date Value Ref Range Status   07/08/2025 6.2 6.0 - 8.4 gm/dL Final     Total Protein   Date Value Ref Range Status   02/06/2025 6.1 6.0 - 8.4 g/dL Final     Albumin   Date Value Ref Range Status   07/08/2025 3.0 (L) 3.5 - 5.2 g/dL Final   02/06/2025 3.3 (L) 3.5 - 5.2 g/dL Final     Total Bilirubin   Date Value Ref Range Status   02/06/2025 0.4 0.1 - 1.0 mg/dL Final     Comment:     For infants and newborns, interpretation of results should be based  on gestational age, weight and in agreement with clinical  observations.    Premature Infant recommended reference ranges:  Up to 24  hours.............<8.0 mg/dL  Up to 48 hours............<12.0 mg/dL  3-5 days..................<15.0 mg/dL  6-29 days.................<15.0 mg/dL       Bilirubin Total   Date Value Ref Range Status   07/08/2025 0.8 0.1 - 1.0 mg/dL Final     Comment:     For infants and newborns, interpretation of results should be based   on gestational age, weight and in agreement with clinical   observations.    Premature Infant recommended reference ranges:   0-24 hours:  <8.0 mg/dL   24-48 hours: <12.0 mg/dL   3-5 days:    <15.0 mg/dL   6-29 days:   <15.0 mg/dL     Alkaline Phosphatase   Date Value Ref Range Status   02/06/2025 76 40 - 150 U/L Final     ALP   Date Value Ref Range Status   07/08/2025 69 40 - 150 unit/L Final     AST   Date Value Ref Range Status   07/08/2025 15 11 - 45 unit/L Final   02/06/2025 18 10 - 40 U/L Final     ALT   Date Value Ref Range Status   07/08/2025 19 10 - 44 unit/L Final   02/06/2025 14 10 - 44 U/L Final     Anion Gap   Date Value Ref Range Status   07/08/2025 8 8 - 16 mmol/L Final     eGFR if    Date Value Ref Range Status   03/02/2022 51.9 (A) >60 mL/min/1.73 m^2 Final   03/02/2022 51.9 (A) >60 mL/min/1.73 m^2 Final     eGFR if non    Date Value Ref Range Status   03/02/2022 44.9 (A) >60 mL/min/1.73 m^2 Final     Comment:     Calculation used to obtain the estimated glomerular filtration  rate (eGFR) is the CKD-EPI equation.      03/02/2022 44.9 (A) >60 mL/min/1.73 m^2 Final     Comment:     Calculation used to obtain the estimated glomerular filtration  rate (eGFR) is the CKD-EPI equation.          Transesophageal echo (PEPE) with possible cardioversion    Left Ventricle: The left ventricle is normal in size. There is mildly   reduced systolic function with a visually estimated ejection fraction of   45 - 50%.    Right Ventricle: The right ventricle is normal in size. Systolic   function is normal.    Left Atrium: dilated There is a closure device within the  appendage.   The device is a Amplatzer Amulet. There is complete occlusion with no   residual leaks.    Right Atrium: Right atrium is dilated.    Mitral Valve: There is mild bileaflet sclerosis. There is mild to   moderate regurgitation.    Tricuspid Valve: There is mild regurgitation.    Successful electrical cardioversion restoring AV sequential pacer       ECOG SCORE    1 - Restricted in strenuous activity-ambulatory and able to carry out work of a light nature              Assessment/Plan:   Chronic normocytic anemia.  I reviewed independently the patient medical record including his laboratory and radiologic findings.  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    Labs on 2/6/2025 showed a Hgb 10 g/dl MCV 87 . Peripheral smear showed absolute eosinophilia and no increased circulating blasts. Mild normocytic, normochromic anemia with moderate anisopoikilocytosis with rare target cells, schistocytes, and elliptocytes and very red cell bite cells and teardrop cells   Methyl malonic acid was elevated 1.31.   Ferritin 36 iron sat 10%- Zinc 36 Low.  CMP ESR LDH  folate haptoglobin vitamin B1 B6 vitamin-E vitamin-D copper were WNL.     In view of the results the patient workup showed the presence of iron-deficiency anemia most likely related to malabsorption in view of his gastric bypass.  He will be scheduled to receive IV Ferrlecit at dose of 250 mg IV q.week x4.  He also was advised to start taking  oral zinc 1 tablet a day.    In view of his elevated methylmalonic acid the patient will start IM injection of vitamin B12 on a monthly basis at a dose of 1000 mcg.    -7/8/2025 :Hgb 11.6 g/dl MCV 94 Ferritin 115 Zinc pending.   His anemia is reflecting anemia of chronic kidney disease. He will not require Epo for now.  He will be seen back again in 4 months for a follow up visit with repeat CBC ferritin Vitamin B12 and  Zinc.    Status post gastric bypass.    As above.    Hypothyroidism.    TSH free T4 WNL    Continue levothyroxine at 88 mcg daily..      Osteoporosis.    Currently on Reclast and calcitriol.    Chronic kidney disease stage 3a.    Crea 1.6 on 7/8/2025  Avoid nephrotoxic medication.    Follow up BMP.              25 minutes of total time spent on the encounter, which includes face to face time and non-face to face time preparing to see the patient (eg, review of tests), obtaining and/or reviewing separately obtained history, documenting clinical information in the electronic or other health record, independently interpreting results (not separately reported) and communicating results to the patient/family/caregiver, or care coordination (not separately reported).              Med Onc Chart Routing      Follow up with physician 4 months. with repeat CBC ferritin Zinc and Vitamin B12   Follow up with SHERYL    Infusion scheduling note    Injection scheduling note    Labs    Imaging    Pharmacy appointment    Other referrals                 Plan was discussed with the patient at length, and he verbalized understanding. Dominick was given an opportunity to ask questions that were answered to his satisfaction, and he was advised to call in the interval if any problems or questions arise.  Signed:  Jeannie Grigsby MD   Hematology and Oncology  [facility] - HEMATOLOGY ONCOLOGY  [facility], [place]

## 2025-07-09 NOTE — PROGRESS NOTES
I received in the patient's hearing aid order from Kevin and sent a copy of the invoice to Gwendolyn MUNOZ Dylan and Ja.    Hearing Aid Information   and Model: Kevin Hernandez AI 20 ELIJAH RT  Color: Graphite  Right SN: [id number]  Left SN: [id number]  Battery: Li-Ion Rechargeable 13  Repair Warranty Expiration Date: 10/06/28  L&D Warranty Expiration Date: 10/06/28   SN: [id number]

## 2025-07-11 ENCOUNTER — ANESTHESIA EVENT (OUTPATIENT)
Dept: MEDSURG UNIT | Facility: HOSPITAL | Age: 80
End: 2025-07-11
Payer: MEDICARE

## 2025-07-11 LAB — W ZINC: 98 UG/DL

## 2025-07-14 ENCOUNTER — ANESTHESIA (OUTPATIENT)
Dept: MEDSURG UNIT | Facility: HOSPITAL | Age: 80
End: 2025-07-14
Payer: MEDICARE

## 2025-07-14 ENCOUNTER — HOSPITAL ENCOUNTER (OUTPATIENT)
Dept: CARDIOLOGY | Facility: HOSPITAL | Age: 80
Discharge: HOME | End: 2025-07-14
Attending: INTERNAL MEDICINE | Admitting: INTERNAL MEDICINE
Payer: MEDICARE

## 2025-07-14 ENCOUNTER — HOSPITAL ENCOUNTER (OUTPATIENT)
Facility: HOSPITAL | Age: 80
Discharge: HOME | End: 2025-07-14
Attending: INTERNAL MEDICINE | Admitting: INTERNAL MEDICINE
Payer: MEDICARE

## 2025-07-14 VITALS
BODY MASS INDEX: 25.2 KG/M2 | OXYGEN SATURATION: 99 % | WEIGHT: 180 LBS | TEMPERATURE: 98 F | HEART RATE: 56 BPM | HEIGHT: 71 IN | SYSTOLIC BLOOD PRESSURE: 155 MMHG | DIASTOLIC BLOOD PRESSURE: 80 MMHG | RESPIRATION RATE: 22 BRPM

## 2025-07-14 VITALS
SYSTOLIC BLOOD PRESSURE: 123 MMHG | DIASTOLIC BLOOD PRESSURE: 78 MMHG | BODY MASS INDEX: 25.2 KG/M2 | HEIGHT: 71 IN | WEIGHT: 180 LBS

## 2025-07-14 DIAGNOSIS — I48.19 PERSISTENT ATRIAL FIBRILLATION: ICD-10-CM

## 2025-07-14 DIAGNOSIS — I48.91 ATRIAL FIBRILLATION: ICD-10-CM

## 2025-07-14 LAB
OHS QRS DURATION: 104 MS
OHS QRS DURATION: 104 MS
OHS QTC CALCULATION: 441 MS
OHS QTC CALCULATION: 463 MS

## 2025-07-14 PROCEDURE — 93010 ELECTROCARDIOGRAM REPORT: CPT | Mod: HCNC,,, | Performed by: INTERNAL MEDICINE

## 2025-07-14 PROCEDURE — 25000003 PHARM REV CODE 250: Mod: HCNC

## 2025-07-14 PROCEDURE — 93320 DOPPLER ECHO COMPLETE: CPT | Mod: 26,HCNC,, | Performed by: INTERNAL MEDICINE

## 2025-07-14 PROCEDURE — 37000008 HC ANESTHESIA 1ST 15 MINUTES: Mod: HCNC | Performed by: INTERNAL MEDICINE

## 2025-07-14 PROCEDURE — 93325 DOPPLER ECHO COLOR FLOW MAPG: CPT | Mod: 26,HCNC,, | Performed by: INTERNAL MEDICINE

## 2025-07-14 PROCEDURE — 93010 ELECTROCARDIOGRAM REPORT: CPT | Mod: HCNC,76,, | Performed by: INTERNAL MEDICINE

## 2025-07-14 PROCEDURE — 92960 CARDIOVERSION ELECTRIC EXT: CPT | Mod: HCNC,,, | Performed by: INTERNAL MEDICINE

## 2025-07-14 PROCEDURE — 93005 ELECTROCARDIOGRAM TRACING: CPT | Mod: HCNC

## 2025-07-14 PROCEDURE — 93325 DOPPLER ECHO COLOR FLOW MAPG: CPT | Mod: HCNC

## 2025-07-14 PROCEDURE — 37000009 HC ANESTHESIA EA ADD 15 MINS: Mod: HCNC | Performed by: INTERNAL MEDICINE

## 2025-07-14 PROCEDURE — 92960 CARDIOVERSION ELECTRIC EXT: CPT | Mod: HCNC | Performed by: INTERNAL MEDICINE

## 2025-07-14 PROCEDURE — 63600175 PHARM REV CODE 636 W HCPCS: Mod: HCNC

## 2025-07-14 PROCEDURE — 93312 ECHO TRANSESOPHAGEAL: CPT | Mod: 26,HCNC,, | Performed by: INTERNAL MEDICINE

## 2025-07-14 RX ORDER — FENTANYL CITRATE 50 UG/ML
25 INJECTION, SOLUTION INTRAMUSCULAR; INTRAVENOUS EVERY 5 MIN PRN
Status: DISCONTINUED | OUTPATIENT
Start: 2025-07-14 | End: 2025-07-14 | Stop reason: HOSPADM

## 2025-07-14 RX ORDER — DIPHENHYDRAMINE HYDROCHLORIDE 50 MG/ML
25 INJECTION, SOLUTION INTRAMUSCULAR; INTRAVENOUS EVERY 6 HOURS PRN
Status: DISCONTINUED | OUTPATIENT
Start: 2025-07-14 | End: 2025-07-14 | Stop reason: HOSPADM

## 2025-07-14 RX ORDER — ONDANSETRON HYDROCHLORIDE 2 MG/ML
4 INJECTION, SOLUTION INTRAVENOUS ONCE AS NEEDED
Status: DISCONTINUED | OUTPATIENT
Start: 2025-07-14 | End: 2025-07-14 | Stop reason: HOSPADM

## 2025-07-14 RX ORDER — HYDROMORPHONE HYDROCHLORIDE 1 MG/ML
0.2 INJECTION, SOLUTION INTRAMUSCULAR; INTRAVENOUS; SUBCUTANEOUS EVERY 5 MIN PRN
Status: DISCONTINUED | OUTPATIENT
Start: 2025-07-14 | End: 2025-07-14 | Stop reason: HOSPADM

## 2025-07-14 RX ORDER — PROCHLORPERAZINE EDISYLATE 5 MG/ML
5 INJECTION INTRAMUSCULAR; INTRAVENOUS EVERY 30 MIN PRN
Status: DISCONTINUED | OUTPATIENT
Start: 2025-07-14 | End: 2025-07-14 | Stop reason: HOSPADM

## 2025-07-14 RX ORDER — PROPOFOL 10 MG/ML
VIAL (ML) INTRAVENOUS CONTINUOUS PRN
Status: DISCONTINUED | OUTPATIENT
Start: 2025-07-14 | End: 2025-07-14

## 2025-07-14 RX ADMIN — PROPOFOL 125 MCG/KG/MIN: 10 INJECTION, EMULSION INTRAVENOUS at 09:07

## 2025-07-14 RX ADMIN — SODIUM CHLORIDE: 0.9 INJECTION, SOLUTION INTRAVENOUS at 09:07

## 2025-07-14 NOTE — ANESTHESIA POSTPROCEDURE EVALUATION
Anesthesia Post Evaluation    Patient: Dominick Song MD    Procedure(s) Performed: Procedure(s) (LRB):  Cardioversion or Defibrillation (N/A)  Transesophageal echo (PEPE) intra-procedure log documentation (N/A)    Final Anesthesia Type: general      Patient participation: Yes- Able to Participate  Level of consciousness: awake and alert  Post-procedure vital signs: reviewed and stable  Pain control: Pain has been treated.  Airway patency: patent    PONV status: Absent or treated.  Anesthetic complications: no      Cardiovascular status: hemodynamically stable  Respiratory status: unassisted  Hydration status: euvolemic  Follow-up not needed.              Vitals Value Taken Time   /80 07/14/25 10:47   Temp 36.8 °C (98.3 °F) 07/14/25 10:45   Pulse 57 07/14/25 10:52   Resp 17 07/14/25 10:51   SpO2 95 % 07/14/25 10:52   Vitals shown include unfiled device data.      No case tracking events are documented in the log.      Pain/Pari Score: Pari Score: 10 (7/14/2025 10:15 AM)

## 2025-07-14 NOTE — PLAN OF CARE
Bedside report received from CRNA and RN. Patient is s/p  PEPE/DCCV. Patient is sleepy, but arousable.  Bedside monitoring connected.  VSS. No complaints at present. EKG technician at bedside.See flowsheet charting. Family notified. RN monitoring at bedside.

## 2025-07-14 NOTE — DISCHARGE SUMMARY
[facility]  Cardiology  Discharge Summary      Patient Name: Dominick Song MD  MRN: [id number]  Admission Date: 7/14/2025  Hospital Length of Stay: 0 days  Discharge Date and Time: 07/14/2025 10:50 AM  Attending Physician: Wyatt Beatty MD    Discharging Provider: Yuli Macdonald PA-C  Primary Care Physician: Maxi Gaines MD    HPI:   Last OV with Dr. Arnold on 6/19/25.   Background:  First diagnosed with atrial fibrillation 2/12 (noted palpitations). Placed on beta blocker, coumadin. Underwent PEPE/DCCV. Had recurrence, Propafenone  mg twice daily added.  Had recurrence 12/24/13, underwent DCCV, Propafenone increased to 425 mg twice daily.  PVI ([place]) 7/28/14.   Had done well, was off Propafenone. Developed recurrence, underwent DCCV multiple times.  Repeat PVI 4/27/17 All 4 veins remained isolated.  Antralized left sided lesion set posteriorly, and performed SVC isolation.  Had persistent recurrence >> DCCV 2/9/18.     6/15/20 Repeat RFA. Posterior wall isolation + repeat RFA of cavo-tricuspid isthmus. PV's remained isolated. 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     7/13/20 presented with TIA like symptoms. Seen by Dr. Coats (Vascular Neurology). Thought to be atypical for neurologic etiology. CT demonstrates a small area of remote vs. Subacute infarct in R medial occipital lobe, so transient hippocampal ischemia may be at the origin, although again this is atypical     Presented with symptomatic bradycardia >> dual chamber PPM implanted 9/23/23 (Dr. 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  PEPE 7/29/24 no leak     10/2024  Has had recurrent symptomatic. DCCV 2/28/24 and 9/11/24  Continues to note significant symptoms from AF, with shortness of breath. When asked what % he is feeling compared to when in [place], he indicates 50%.     12/06/2024  Patient underwent cardioversion 10/17/2024 started on Tikosyn.  Repeat echo showed  moderate MR.  Traumatic septic hematoma to the right knee.  Oral anticoagulation was stopped, and he underwent I&D of his knee hematoma.  Reports significant palpitations.  Specifically, 1125 he reported feeling palpitations throughout the day.  Review of device interrogation shows longest episode was 12 minutes.   Most recent interrogation since 1126 shows 43% atrial burden with 23% RV pacing and 52% atrial pacing.     06/19/2025  S/p PEPE/CV 2/17/25.  Continued on Tikosyn.  Patient had recurrent atrial fibrillation and [place] cardio[place]  He underwent a PEPE/CV 5/2/25 with Dr. Moss.  In speaking with the patient's sounds like he was back in atrial fibrillation prior to discharge.  Device shows recurrent atrial fibrillation soon after cardioversion.  Device interrogation shows 1.1% atrial pacing and 1.8% ventricular pacing.  [place] 100% since 5/31.     PEPE 5/2/25 shows EF 45-50%.  Successful cardioversion to sinus rhythm.  Was back in AF at last OV. Tikosyn was stopped. Dr. Beatty started him back on amiodarone 200mg bid for 2 weeks, then 200mg once a day.      Today, here for PEPE/DCCV. No acute complaints.       Procedure(s) (LRB):  Cardioversion or Defibrillation (N/A)  Transesophageal echo (PEPE) intra-procedure log documentation (N/A)       Indwelling Lines/Drains at time of discharge:  None       Hospital Course:  Patient underwent PEPE without evidence of ALMA thrombus. Proceeded with DCCV, converting from AF to sinus rhythm. Patient tolerated the procedure without any acute complications. Post-DCCV ECG revealed AV dual-paced rhythm at 55 bpm. Plan to continue all home medications including amiodarone and eliquis. Instructed to provide remote device transmission in 1 week and return in 4 weeks for clinic follow up with Dr. Beatty or NP.   Patient was assessed at bedside prior to discharge, they reported feeling well and denied chest discomfort, shortness of breath,  palpitations, lightheadedness, or any other acute symptoms. Discharge instructions were discussed with patient and all questions were answered. Patient was discharged home in stable condition.        Goals of Care Treatment Preferences:  Code Status: Full Code    Living Will: Yes            Significant Diagnostic Studies: PEPE - final report pending - prelim no ALMA thrombus, proceeded with DCCV      Pending Diagnostic Studies:       None            There are no hospital problems to display for this patient.    No new Assessment & Plan notes have been filed under this hospital service since the last note was generated.  Service: Cardiology      Discharged Condition: stable    Disposition: Home or Self Care    Follow Up:   Follow-up Information       Wyatt Beatty MD. Schedule an appointment as soon as possible for a visit in 1 month(s).    Specialties: Electrophysiology, Cardiology  Why: cardioversion follow up  Contact information:  [address]  [facility] [address] [phone]                           Patient Instructions:   No discharge procedures on file.  Medications:  Reconciled Home Medications:      Medication List        CONTINUE taking these medications      amiodarone 200 MG Tab  Commonly known as: PACERONE  Take one tablet by mouth twice a day for two weeks, then decrease to one tablet daily     apixaban 5 mg Tab  Commonly known as: ELIQUIS  Take 1 tablet (5 mg total) by mouth 2 (two) times daily.     aspirin 81 MG EC tablet  Commonly known as: ECOTRIN  Take 1 tablet (81 mg total) by mouth once daily.     * buPROPion 300 MG 24 hr tablet  Commonly known as: WELLBUTRIN XL  Take 1 tablet (300 mg total) by mouth once daily. Plus 150     * buPROPion 150 MG TB24 tablet  Commonly known as: WELLBUTRIN XL  Take 1 tablet (150 mg total) by mouth once daily.     calcitRIOL 0.25 MCG Cap  Commonly known as: ROCALTROL  Take 1 capsule (0.25 mcg total) by mouth once daily.     carvediloL 12.5 MG tablet  Commonly  known as: COREG  Take 1 tablet (12.5 mg total) by mouth 2 (two) times daily.     cyanocobalamin 1,000 mcg/mL injection  Inject 1 mL (1,000 mcg total) into the muscle every 30 days.     EScitalopram oxalate 10 MG tablet  Commonly known as: LEXAPRO  Take 1 tablet (10 mg total) by mouth once daily.     FISH OIL CONCENTRATE ORAL  Take 1 capsule by mouth Daily.     ICAR-C PLUS Tab  Generic drug: iron-vit c-b12-folic acid  TAKE 1 TABLET BY MOUTH ONCE DAILY     levothyroxine 88 MCG tablet  Commonly known as: SYNTHROID  Take 1 tablet (88 mcg total) by mouth before breakfast.     mirtazapine 30 MG tablet  Commonly known as: REMERON  Take 1 tablet (30 mg total) by mouth every evening.     telmisartan 40 MG Tab  Commonly known as: MICARDIS  Take 1 tablet (40 mg total) by mouth once daily.     testosterone cypionate 200 mg/mL injection  Commonly known as: DEPOTESTOTERONE CYPIONATE  Inject 200 mg into the muscle every 14 (fourteen) days.     tiZANidine 4 MG tablet  Commonly known as: ZANAFLEX  Take 1 tablet (4 mg total) by mouth 3 (three) times daily as needed (muscle spasms).     torsemide 20 MG Tab  Commonly known as: DEMADEX  TAKE 1 TABLET BY MOUTH EVEYR MONDAY, WEDNESDAY, AND FRIDAYS           * This list has 2 medication(s) that are the same as other medications prescribed for you. Read the directions carefully, and ask your doctor or other care provider to review them with you.                  Time spent on the discharge of patient: 35 minutes    Yuli Macdonald PA-C  Cardiology  [facility]

## 2025-07-14 NOTE — HOSPITAL COURSE
Patient underwent PEPE without evidence of ALMA thrombus. Proceeded with DCCV, converting from AF to sinus rhythm. Patient tolerated the procedure without any acute complications. Post-DCCV ECG revealed *** at *** bpm. Plan to continue all home medications including amiodarone and eliquis. Instructed to provide remote device transmission in 1 week and return in 4 weeks for clinic follow up with Dr. Beatty or NP.   Patient was assessed at bedside prior to discharge, they reported feeling well and denied chest discomfort, shortness of breath, palpitations, lightheadedness, or any other acute symptoms. Discharge instructions were discussed with patient and all questions were answered. Patient was discharged home in stable condition.

## 2025-07-14 NOTE — H&P
[facility]  Cardiology  PEPE/DCCV History & Physical      Dominick Song MD  YOB: 1945  Medical Record Number:  [id number]  Attending Physician:  Wyatt Beatty MD   Date of Admission: 7/14/2025       Hospital Day:  0  Current Principal Problem:  Atrial fibrillation      History     Cc: PEPE/DCCV for AF    HPI  Last OV with Dr. Arnold on 6/19/25.   Background:  First diagnosed with atrial fibrillation 2/12 (noted palpitations). Placed on beta blocker, coumadin. Underwent PEPE/DCCV. Had recurrence, Propafenone  mg twice daily added.  Had recurrence 12/24/13, underwent DCCV, Propafenone increased to 425 mg twice daily.  PVI ([place]) 7/28/14.   Had done well, was off Propafenone. Developed recurrence, underwent DCCV multiple times.  Repeat PVI 4/27/17 All 4 veins remained isolated.  Antralized left sided lesion set posteriorly, and performed SVC isolation.  Had persistent recurrence >> DCCV 2/9/18.     6/15/20 Repeat RFA. Posterior wall isolation + repeat RFA of cavo-tricuspid isthmus. PV's remained isolated. 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     7/13/20 presented with TIA like symptoms. Seen by Dr. Coats (Vascular Neurology). Thought to be atypical for neurologic etiology. CT demonstrates a small area of remote vs. Subacute infarct in R medial occipital lobe, so transient hippocampal ischemia may be at the origin, although again this is atypical     Presented with symptomatic bradycardia >> dual chamber PPM implanted 9/23/23 (Dr. 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  PEPE 7/29/24 no leak     10/2024  Has had recurrent symptomatic. DCCV 2/28/24 and 9/11/24  Continues to note significant symptoms from AF, with shortness of breath. When asked what % he is feeling compared to when in nsr, he indicates 50%.     12/06/2024  Patient underwent cardioversion 10/17/2024 started on Tikosyn.  Repeat echo showed  moderate MR.  Traumatic septic hematoma to the right knee.  Oral anticoagulation was stopped, and he underwent I&D of his knee hematoma.  Reports significant palpitations.  Specifically, 1125 he reported feeling palpitations throughout the day.  Review of device interrogation shows longest episode was 12 minutes.   Most recent interrogation since 1126 shows 43% atrial burden with 23% RV pacing and 52% atrial pacing.     06/19/2025  S/p PEPE/CV 2/17/25.  Continued on Tikosyn.  Patient had recurrent atrial fibrillation and [place] cardio[place]  He underwent a PEPE/CV 5/2/25 with Dr. Moss.  In speaking with the patient's sounds like he was back in atrial fibrillation prior to discharge.  Device shows recurrent atrial fibrillation soon after cardioversion.  Device interrogation shows 1.1% atrial pacing and 1.8% ventricular pacing.  [place] 100% since 5/31.     PEPE 5/2/25 shows EF 45-50%.  Successful cardioversion to sinus rhythm.  Was back in AF at last OV. Tikosyn was stopped. Dr. Beatty started him back on amiodarone 200mg bid for 2 weeks, then 200mg once a day.     Today, here for PEPE/DCCV. No acute complaints.     Rate/rhythm control: amiodarone 200mg qd, coreg 12.5mg BID  Anticoagulant/antiplatelets: eliquis, aspirin  ECG: AF, 58 BPM  Platelet count: 260  INR: 1.3    History of stroke:  hx of TIA  Dysphagia or odynophagia:  no  Liver Disease, esophageal disease, or known varices:  no  Upper GI Bleeding:  no  Sleep Apnea:  no  Last oral intake: last pm   Able to move neck in all directions:  yes  GLP-1 Use: no      Medications - Outpatient  Prior to Admission medications    Medication Sig Start Date End Date Taking? Authorizing Provider   amiodarone (PACERONE) 200 MG Tab Take one tablet by mouth twice a day for two weeks, then decrease to one tablet daily 6/26/25  Yes Wyatt Beatty MD   apixaban (ELIQUIS) 5 mg Tab Take 1 tablet (5 mg total) by mouth 2 (two) times daily. 6/20/25  1/16/26 Yes Wyatt Beatty MD   aspirin (ECOTRIN) 81 MG EC tablet Take 1 tablet (81 mg total) by mouth once daily. 5/23/24 7/14/25 Yes Fiorella Serna MD   buPROPion (WELLBUTRIN XL) 150 MG TB24 tablet Take 1 tablet (150 mg total) by mouth once daily. 5/30/25 5/30/26 Yes Maxi Gaines MD   buPROPion (WELLBUTRIN XL) 300 MG 24 hr tablet Take 1 tablet (300 mg total) by mouth once daily. Plus 150 10/23/24  Yes Traci Holloway PA-C   calcitRIOL (ROCALTROL) 0.25 MCG Cap Take 1 capsule (0.25 mcg total) by mouth once daily. 5/20/25  Yes Jeevan Pond MD   carvediloL (COREG) 12.5 MG tablet Take 1 tablet (12.5 mg total) by mouth 2 (two) times daily. 6/27/25  Yes Maxi Gaines MD   cyanocobalamin 1,000 mcg/mL injection Inject 1 mL (1,000 mcg total) into the muscle every 30 days. 2/20/25 2/20/26 Yes Jeannie Grigsby MD   EScitalopram oxalate (LEXAPRO) 10 MG tablet Take 1 tablet (10 mg total) by mouth once daily. 1/17/25 1/17/26 Yes Maxi Gaines MD   ICAR-C PLUS Tab TAKE 1 TABLET BY MOUTH ONCE DAILY 1/5/25 1/5/26 Yes Maxi Gaines MD   levothyroxine (SYNTHROID) 88 MCG tablet Take 1 tablet (88 mcg total) by mouth before breakfast. 11/6/24 11/6/25 Yes Sagar Mckeon DO   mirtazapine (REMERON) 30 MG tablet Take 1 tablet (30 mg total) by mouth every evening. 2/12/25 2/12/26 Yes Maxi Gaines MD   OMEGA-3 FATTY ACIDS (FISH OIL CONCENTRATE ORAL) Take 1 capsule by mouth Daily. 1/18/12  Yes Provider, [place]   telmisartan (MICARDIS) 40 MG Tab Take 1 tablet (40 mg total) by mouth once daily. 10/25/24  Yes Maxi Gaines MD   testosterone cypionate (DEPOTESTOTERONE CYPIONATE) 200 mg/mL injection Inject 200 mg into the muscle every 14 (fourteen) days. 8/6/24  Yes Provider, Historical   tiZANidine (ZANAFLEX) 4 MG tablet Take 1 tablet (4 mg total) by mouth 3 (three) times daily as needed (muscle spasms). 6/27/25  Yes Maxi Gaines MD   torsemide (DEMADEX) 20 MG Tab TAKE 1 TABLET BY MOUTH  RAGINI MONDAY, WEDNESDAY, AND FRIDAYS 12/26/24  Yes Jewel Arnold MD         Medications - Current  Scheduled Meds:  Continuous Infusions:  PRN Meds:.      Allergies  Review of patient's allergies indicates:   Allergen Reactions    No known drug allergies          Past Medical History  Past Medical History:   Diagnosis Date    Anticoagulant long-term use     Anxiety     Arthritis     Atrial fibrillation     BPH (benign prostatic hyperplasia)     Cataract     CKD (chronic kidney disease) stage 3, GFR 30-59 ml/min 07/10/2017    Followed by Dr. Jeevan Pond    Colon polyp     benign    Depression     Elevated PSA     Erectile dysfunction     Gastric ulcer with hemorrhage     Hep B w/o coma 1977    History of bleeding peptic ulcer     History of prostatitis     Hypertension     PAF (paroxysmal atrial fibrillation)     Sleep apnea     PT DENIES    Stroke     TIA December 2019    Thyroid disease          Past Surgical History  Past Surgical History:   Procedure Laterality Date    A-V CARDIAC PACEMAKER INSERTION Left 9/23/2023    Procedure: Dual Chamber PPM (Heart) Rm 2620;  Surgeon: Pan Moss MD;  Location: [facility] CATH;  Service: Cardiology;  Laterality: Left;    ABDOMINAL HERNIA REPAIR      ABLATION OF ARRHYTHMOGENIC FOCUS FOR ATRIAL FIBRILLATION N/A 6/15/2020    Procedure: Ablation atrial fibrillation;  Surgeon: Wyatt Beatty MD;  Location: [facility] EP LAB;  Service: Cardiology;  Laterality: N/A;  PAF, AFl, PEPE, PVI re-do, CTI, RFA, JUSTIN, Gen, SK, 3 Prep    APPLICATION OF WOUND VACUUM-ASSISTED CLOSURE DEVICE Right 12/9/2023    Procedure: APPLICATION, WOUND VAC;  Surgeon: Jelani Tello II, MD;  Location: [facility] OR;  Service: Orthopedics;  Laterality: Right;    CARDIOVERSION N/A 2/28/2024    Procedure: Cardioversion;  Surgeon: Pao Hare MD;  Location: [facility];  Service: Cardiology;  Laterality: N/A;    CARDIOVERSION N/A 10/17/2024    Procedure: Cardioversion;  Surgeon: Pan Moss MD;   Location: [facility];  Service: Cardiology;  Laterality: N/A;    CATARACT EXTRACTION  11/25/2013    bilateral    CHOLECYSTECTOMY      CLOSURE OF LEFT ATRIAL APPENDAGE USING DEVICE N/A 5/23/2024    Procedure: Left atrial appendage closure device;  Surgeon: Tony Duvall MD;  Location: [facility] CATH LAB;  Service: Cardiology;  Laterality: N/A;    COLONOSCOPY N/A 11/25/2015    Procedure: COLONOSCOPY;  Surgeon: Toby Hernandez MD;  Location: [facility] ENDO;  Service: Endoscopy;  Laterality: N/A;    COLONOSCOPY N/A 11/13/2020    Procedure: COLONOSCOPY;  Surgeon: Toby Hernandez MD;  Location: [facility] ENDO;  Service: Endoscopy;  Laterality: N/A;    COLONOSCOPY N/A 5/1/2024    Procedure: COLONOSCOPY;  Surgeon: Toby Hernandez MD;  Location: [facility];  Service: Endoscopy;  Laterality: N/A;    COLONOSCOPY N/A 1/16/2025    Procedure: COLONOSCOPY;  Surgeon: Milton Rodriguez MD;  Location: [facility] (2ND FLR);  Service: Endoscopy;  Laterality: N/A;  11/19 portal-peg-pt stated not on eliquis- Repeat colonoscopy in 4 months for surveillance.[id number]  1/9 SWP PRECALL COMPLETE-RT    CYSTOSCOPY WITH INSERTION OF MINIMALLY INVASIVE IMPLANT TO ENLARGE PROSTATIC URETHRA N/A 11/28/2022    Procedure: CYSTOSCOPY, WITH INSERTION OF UROLIFT IMPLANT;  Surgeon: Yakov Shetty MD;  Location: [facility] OR;  Service: Urology;  Laterality: N/A;    ECHOCARDIOGRAM,TRANSESOPHAGEAL N/A 2/17/2025    Procedure: Transesophageal echo (PEPE) intra-procedure log documentation;  Surgeon: Jewel Arnold MD;  Location: [facility] ENDO;  Service: Cardiology;  Laterality: N/A;    ENDOSCOPIC MUCOSAL RESECTION OF COLON N/A 9/9/2024    Procedure: RESECTION, MUCOSA, COLON, ENDOSCOPIC;  Surgeon: Milton Rodriguez MD;  Location: [facility] (2ND FLR);  Service: Endoscopy;  Laterality: N/A;  5/23 portal-peg-colonscopy with EMR Main. 90 minutes.  24 hours of clear liquid diet. Referring: Toby Hernandez MD- Rodriguez-Eliquis Dr. Jaci TE 5/23- tt .  7/10/24: PEG prep. Pt off  plavix now per cards. instructions sent via portal-GD  8/16/24: pt    ESOPHAGOGASTRODUODENOSCOPY N/A 5/1/2024    Procedure: ESOPHAGOGASTRODUODENOSCOPY (EGD);  Surgeon: Toby Hernandez MD;  Location: [facility] ENDO;  Service: Endoscopy;  Laterality: N/A;    EYE SURGERY      GASTRIC BYPASS  1992    INCISION AND DRAINAGE OF KNEE Right 11/8/2024    Procedure: INCISION AND DRAINAGE, KNEE, EVACUATION HEMATOMA;  Surgeon: Dane Smith MD;  Location: [facility] OR;  Service: Orthopedics;  Laterality: Right;    INSERTION, PACEMAKER, TEMPORARY TRANSVENOUS  9/22/2023    Procedure: Temp pacer insertion ER Rm 8;  Surgeon: Pan Moss MD;  Location: [facility] CATH;  Service: Cardiology;;    INTRAMEDULLARY RODDING OF FEMUR Left 7/18/2020    Procedure: INSERTION, INTRAMEDULLARY ERIC, FEMUR, left, Synthes, Home table, Large C arm clock side,;  Surgeon: Tony Rodriguez MD;  Location: [facility] OR [facility] FLR;  Service: Orthopedics;  Laterality: Left;    IRRIGATION AND DEBRIDEMENT Right 12/9/2023    Procedure: IRRIGATION AND DEBRIDEMENT KNEE;  Surgeon: Jelani Tello II, MD;  Location: [facility] OR;  Service: Orthopedics;  Laterality: Right;    KNEE ARTHROSCOPY      RT    LASIK  2001    both eyes (Dr. Rabago)    ORIF HUMERUS FRACTURE      LT    ORIF HUMERUS FRACTURE Right     non surgical repair    RADIOFREQUENCY ABLATION      x2    REVISION OF KNEE ARTHROPLASTY Right 12/9/2023    Procedure: REVISION ARTHROPLASTY KNEE- Liner Replacement - dirty/clean;  Surgeon: Jelani Tello II, MD;  Location: [facility] OR;  Service: Orthopedics;  Laterality: Right;  dirty to clean at 1940    Right ankle tendon repair      ROBOT-ASSISTED REPAIR OF INCISIONAL HERNIA USING DA GARETH XI Left 6/13/2022    Procedure: XI ROBOTIC REPAIR, HERNIA, INCISIONAL (LEFT SIDE SPIGELIAN WITH MESH);  Surgeon: Rosendo Marti MD;  Location: [facility] OR 2ND FLR;  Service: General;  Laterality: Left;  consent in am    ROTATOR CUFF REPAIR      right    TOTAL KNEE ARTHROPLASTY  Right 5/29/2018    Procedure: REPLACEMENT-KNEE-TOTAL-pro;  Surgeon: Denzel Dallas MD;  [address]  Service: Orthopedics;  Laterality: Right;  Pro    TRANSESOPHAGEAL ECHOCARDIOGRAM WITH POSSIBLE CARDIOVERSION (PEPE W/ POSS CARDIOVERSION) N/A 5/2/2025    Procedure: TRANSESOPHAGEAL ECHOCARDIOGRAM WITH POSSIBLE CARDIOVERSION (PEPE W/ POSS CARDIOVERSION);  Surgeon: Pan Moss MD;  Location: [facility];  Service: Cardiology;  Laterality: N/A;    TRANSESOPHAGEAL ECHOCARDIOGRAPHY N/A 4/23/2024    Procedure: ECHOCARDIOGRAM, TRANSESOPHAGEAL;  Surgeon: Provider, Dosc Diagnostic;  Location: [facility] EP LAB;  Service: Cardiology;  Laterality: N/A;    TRANSESOPHAGEAL ECHOCARDIOGRAPHY N/A 5/23/2024    Procedure: ECHOCARDIOGRAM, TRANSESOPHAGEAL;  Surgeon: Kj Newton MD;  Location: [facility] CATH LAB;  Service: Cardiology;  Laterality: N/A;    TRANSESOPHAGEAL ECHOCARDIOGRAPHY N/A 7/9/2024    Procedure: ECHOCARDIOGRAM, TRANSESOPHAGEAL;  Surgeon: Provider, Dosc Diagnostic;  Location: [facility] EP LAB;  Service: Cardiology;  Laterality: N/A;    TREATMENT OF CARDIAC ARRHYTHMIA  6/15/2020    Procedure: Cardioversion or Defibrillation;  Surgeon: Wyatt Beatty MD;  Location: [facility] EP LAB;  Service: Cardiology;;    TREATMENT OF CARDIAC ARRHYTHMIA N/A 2/28/2024    Procedure: Cardioversion or Defibrillation;  Surgeon: Pao Hare MD;  Location: [facility];  Service: Cardiology;  Laterality: N/A;    TREATMENT OF CARDIAC ARRHYTHMIA N/A 9/11/2024    Procedure: Cardioversion/Defibrillation;  Surgeon: Pan Moss MD;  Location: [facility];  Service: Cardiology;  Laterality: N/A;    TREATMENT OF CARDIAC ARRHYTHMIA N/A 2/17/2025    Procedure: Cardioversion or Defibrillation;  Surgeon: Jewel Arnold MD;  Location: [facility];  Service: Cardiology;  Laterality: N/A;         Social History  Social History     Socioeconomic History    Marital status:     Number of children: 5   Occupational History     Occupation: retired anesthesiology     Employer: [employer]    Occupation: [employer] grad     Comment: previous football player   Tobacco Use    Smoking status: Never     Passive exposure: Never    Smokeless tobacco: Never    Tobacco comments:     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    Substance and Sexual Activity    Alcohol use: No    Drug use: No    Sexual activity: Yes     Partners: Female     Social Drivers of Health     Financial Resource Strain: Low Risk  (7/5/2025)    Overall Financial Resource Strain (CARDIA)     Difficulty of Paying Living Expenses: Not very hard   Food Insecurity: No Food Insecurity (7/5/2025)    Hunger Vital Sign     Worried About Running Out of Food in the Last Year: Never true     Ran Out of Food in the Last Year: Never true   Transportation Needs: No Transportation Needs (7/5/2025)    PRAPARE - Transportation     Lack of Transportation (Medical): No     Lack of Transportation (Non-Medical): No   Physical Activity: Inactive (7/5/2025)    Exercise Vital Sign     Days of Exercise per Week: 0 days     Minutes of Exercise per Session: 0 min   Stress: Stress Concern Present (7/5/2025)    [employer] [place] [employer] - Occupational Stress Questionnaire     Feeling of Stress : To some extent   Housing Stability: Low Risk  (7/5/2025)    Housing Stability Vital Sign     Unable to Pay for Housing in the Last Year: No     Number of Times Moved in the Last Year: 0     Homeless in the Last Year: No         ROS  Review of Systems   Constitutional: Negative for chills.   HENT: Negative.     Eyes: Negative.    Cardiovascular:  Negative for chest pain, dyspnea on exertion and palpitations.   Respiratory: Negative.  Negative for shortness of breath and sleep disturbances due to breathing.    Endocrine: Negative.    Musculoskeletal: Negative.    Gastrointestinal:  Negative for hematemesis, melena, nausea and vomiting.   Genitourinary: Negative.    Neurological: Negative.   "  Psychiatric/Behavioral: Negative.  Negative for altered mental status.    Allergic/Immunologic: Negative.    Physical Examination     Vital Signs  24 Hour VS Range    Temp:  [98.6 °F (37 °C)]   Pulse:  [54]   Resp:  [18]   BP: (123)/(75-78)   SpO2:  [98 %]       Physical Exam:   Physical Exam      Data       Recent Labs   Lab 07/07/25  0944 07/08/25  1005   WBC 8.22 7.23   HGB 10.9* 11.6*   HCT 35.3* 35.9*    260        Recent Labs   Lab 07/07/25  0944   PROTIME 13.8*   INR 1.3*        Recent Labs   Lab 07/07/25  0944 07/08/25  1005    142   K 4.6 4.6   * 115*   CO2 20* 19*   BUN 31* 32*   CREATININE 1.7* 1.6*   ANIONGAP 4* 8   CALCIUM 8.0* 8.0*        Recent Labs   Lab 07/08/25  1005   PROT 6.2   ALBUMIN 3.0*   BILITOT 0.8   ALKPHOS 69   AST 15   ALT 19        No results for input(s): "TROPONINI" in the last 168 hours.     No results found for: "BNP"    No results for input(s): "LABBLOO" in the last 168 hours.         Assessment & Plan     #Atrial fibrillation  Dr. Beatty Plan from 6/19/25:   Stop Tikosyn today  On 6/26/2025 start Amiodarone 200mg bid for 2 weeks, then 200mg once a day.   Start Eliquis 5mg bid      PEPE 5/2/25:    Left Ventricle: The left ventricle is normal in size. There is mildly reduced systolic function with a visually estimated ejection fraction of 45 - 50%.    Right Ventricle: The right ventricle is normal in size. Systolic function is normal.    Left Atrium: dilated There is a closure device within the appendage. The device is a Amplatzer Amulet. There is complete occlusion with no residual leaks.    Right Atrium: Right atrium is dilated.    Mitral Valve: There is mild bileaflet sclerosis. There is mild to moderate regurgitation.    Tricuspid Valve: There is mild regurgitation.    Successful electrical cardioversion restoring AV sequential pacer      -No absolute contraindications of esophageal stricture, tumor, perforation, laceration,or diverticulum and/or active GI " bleed.  -The risks, benefits & alternatives of the procedure were explained to the patient.  -The risks of transesophageal echo include but are not limited to:  Dental trauma, esophageal trauma/perforation, bleeding, laryngospasm/brochospasm, aspiration, sore throat/hoarseness, & dislodgement of the endotracheal tube/nasogastric tube (where applicable).  -The risks of moderate sedation include hypotension, respiratory depression, arrhythmias, bronchospasm, & death.  -Prior to procedure, extensive discussion with patient regarding risks and benefits of DCCV at bedside today. The patient voices understanding, all questions have been answered, and patient would like to proceed.  -Informed consent was obtained. The patient is agreeable to proceed with the procedure and all questions and concerns addressed.    Case was discussed with an attending physician prior to procedure.    Yuli Macdonald PA-C  [facility]

## 2025-07-14 NOTE — ANESTHESIA PREPROCEDURE EVALUATION
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      Pre-op Assessment          Review of Systems  Cardiovascular:     Hypertension                       Shortness of Breath                    Hypertension     Atrial Fibrillation     Pulmonary:      Shortness of breath  Sleep Apnea     Obstructive Sleep Apnea ([place]).           Renal/:  Chronic Renal Disease        Kidney Function/Disease             Hepatic/GI:   PUD,   Liver Disease, Hepatitis       Peptic Ulcer Disease       Liver Disease, Hepatitis        Musculoskeletal:  Arthritis        Arthritis          Neurological:   CVA            Arthritis              CVA - Cerebrovasular Accident                 Endocrine:   Hypothyroidism       Hypothyroidism          Psych:  Psychiatric History                  Physical Exam    Airway:  No airway management difficulties anticipated  Dental:  No active dental issues noted  Chest/Lungs:  Clear to auscultation    Heart:  Rate: Normal  Rhythm: Regular Rhythm  Sounds: Normal        Anesthesia Plan  Type of Anesthesia, risks & benefits discussed:    Anesthesia Type: Gen Natural Airway  Informed Consent: Informed consent signed with the Patient and all parties understand the risks and agree with anesthesia plan.  All questions answered.   ASA Score: 3  Anesthesia Plan Notes: Chart reviewed. Patient seen and examined. Anesthesia plan discussed and questions answered. E-consent signed. Yakov Boland MD    Ready For Surgery From Anesthesia Perspective.     .

## 2025-07-14 NOTE — TRANSFER OF CARE
"Anesthesia Transfer of Care Note    Patient: Dominick Song MD    Procedure(s) Performed: Procedure(s) (LRB):  Cardioversion or Defibrillation (N/A)  Transesophageal echo (PEPE) intra-procedure log documentation (N/A)    Patient location: Other: SSCU    Anesthesia Type: general    Transport from OR: Transported from OR on room air with adequate spontaneous ventilation    Post pain: adequate analgesia    Post assessment: no apparent anesthetic complications    Post vital signs: stable    Level of consciousness: awake and alert    Nausea/Vomiting: no nausea/vomiting    Complications: none    Transfer of care protocol was followed      Last vitals: Visit Vitals  /64 (BP Location: Left arm, Patient Position: Lying)   Pulse (!) 56   Temp 36.8 °C (98.2 °F) (Temporal)   Resp 18   Ht 5' 11" (1.803 m)   Wt 81.6 kg (180 lb)   SpO2 99%   BMI 25.10 kg/m²     "

## 2025-07-14 NOTE — DISCHARGE INSTRUCTIONS
Medications:  -No changes have been made today. Continue to take your home medications as listed on your medication list after you are discharged.    Diet  -You may resume oral intake after you are discharged, as long you have no swallowing difficulties.    Because you have received sedation for this procedure:  -Limit activity for the remainder of the day.  -Do not smoke for at least 6 hours and until you are fully awake and alert.  -Do not drink alcoholic beverage for 24 hours.  -Do not drive for 24 hours.  -Defer important decision making until the following day.     Go to the Emergency Department if you develop:   -Bleeding  -Weakness or numbness  -Visual, gait or speech disturbance  -New chest pain, palpitations, shortness of breath, rapid heart beat, or fainting  -Fever    Follow up:  -In 1 week: Please send a manual transmission from your home monitor and then call the clinic at [phone], option 4, to confirm that your remote transmission was received.   -Call or message the office to schedule an appointment with Dr. Beatty or an EP Nurse Practitioner in 1 month.     Any need to reschedule or cancel procedures, or any questions regarding your procedures should be addressed directly with the Arrhythmia Department Nurses at the following phone number: [phone].

## 2025-07-14 NOTE — PLAN OF CARE
Bedrest completed. Patient has ambulated and voided. Tolerated fluids  without any problems. No complaints. No bleeding/no hematoma noted. PIV removed. Telemetry monitor removed. AVS printed and reviewed. Family at bedside. Wheelchair provided for discharge.

## 2025-07-16 ENCOUNTER — CLINICAL SUPPORT (OUTPATIENT)
Dept: AUDIOLOGY | Facility: CLINIC | Age: 80
End: 2025-07-16
Payer: MEDICARE

## 2025-07-16 DIAGNOSIS — Z46.1 ENCOUNTER FOR HEARING AID FITTING OF BOTH EARS: Primary | ICD-10-CM

## 2025-07-16 LAB
AORTIC SIZE INDEX (SOV): 1.9 CM/M2
AORTIC SIZE INDEX: 1.8 CM/M2
ASCENDING AORTA: 3.6 CM
BSA FOR ECHO PROCEDURE: 2.02 M2
EJECTION FRACTION: 55 %
SINUS: 3.8 CM
STJ: 3 CM

## 2025-07-16 NOTE — PROGRESS NOTES
Dominick Song MD was seen on 07/16/2025  for a hearing aid fitting. Pt was alone during today's visit.     HA model and style: Kevin Edge AI 20 ELIJAH RT in color graphite gray BUNDLED ()  Right S/N: [id number]  Left S/N: [id number]   size: 2M AU   Dome size: 7 occluded    Repair warranty and L&D coverage expires: 10/6/28    Set target gain to 100% and demonstrated low battery signal for pt. Paired the patient's hearing aids with their cell phone and performed a test call to demonstrate function. Had pt play music through the phone to demonstrate streaming capabilities. Had pt download the hearing aid  gary, paired the aids within the gary and demonstrated function of the gary. Practiced insertion and removal until pt felt comfortable with the process. Patient was able to manipulate hearing aids well and reported satisfaction with sound quality thus far.  He was counseled on realistic expectations and acclimation strategies.  He signed and was given a copy of the hearing aid purchase agreement following payment in full today.  He was reminded that he should file for insurance reimbursement on his own since we do not take insurance benefits for hearing aids at this time. Patient's one month trial period will begin today and was informed of the 30 day return policy to include the nonrefundable $250 fitting fee, if the aids are returned. The $300 ONE TIME replacement fee was discussed in detail with pt voicing understanding. He will follow up for adjustments to the settings and instructions on cleaning the aids in one month. Pt will return on 8/20/2025 at 10:30 for his 1 month follow up. All complaints were addressed during this visit to the patient's satisfaction. Plan of care was discussed in detail with the patient, who agreed with the plan as above.

## 2025-07-20 ENCOUNTER — PATIENT MESSAGE (OUTPATIENT)
Dept: FAMILY MEDICINE | Facility: CLINIC | Age: 80
End: 2025-07-20
Payer: MEDICARE

## 2025-07-20 DIAGNOSIS — F32.A DEPRESSION, UNSPECIFIED DEPRESSION TYPE: Primary | ICD-10-CM

## 2025-07-20 NOTE — TELEPHONE ENCOUNTER
No care due was identified.  [facility] Embedded Care Due Messages. Reference number: [id number].   7/20/2025 5:11:57 PM CDT

## 2025-07-21 ENCOUNTER — PATIENT MESSAGE (OUTPATIENT)
Dept: CARDIOLOGY | Facility: CLINIC | Age: 80
End: 2025-07-21
Payer: MEDICARE

## 2025-07-21 RX ORDER — BUPROPION HYDROCHLORIDE 300 MG/1
TABLET ORAL
Qty: 90 TABLET | Refills: 3 | Status: SHIPPED | OUTPATIENT
Start: 2025-07-21

## 2025-07-21 NOTE — TELEPHONE ENCOUNTER
Refill Routing Note   Medication(s) are not appropriate for processing by [facility] for the following reason(s):        Required vitals abnormal    [facility] action(s):  Defer               Appointments  past 12m or future 3m with PCP    Date Provider   Last Visit   5/5/2025 Maxi Gaines MD   Next Visit   11/3/2025 Maxi Gaines MD   ED visits in past 90 days: 0        Note composed:4:48 AM 07/21/2025

## 2025-07-23 NOTE — ANESTHESIA PROCEDURE NOTES
Arterial    Diagnosis: A Fib    Patient location during procedure: done in OR  Procedure start time: 4/27/2017 8:16 AM  Timeout: 4/27/2017 8:15 AM  Procedure end time: 4/27/2017 8:17 AM  Staffing  Anesthesiologist: SONALI VARELA  Performed by: anesthesiologist   Anesthesiologist was present at the time of the procedure.  Preanesthetic Checklist  Completed: patient identified, site marked, surgical consent, pre-op evaluation, timeout performed, IV checked, risks and benefits discussed, monitors and equipment checked and anesthesia consent given  Arterial Line  Skin Prep: chlorhexidine gluconate  Local Infiltration: none  Orientation: right  Location: radial  Catheter Size: 20 G{[facility] ANESTHESIA BLOCK ART PLACEMENTInsertion Attempts: 1  Assessment  Dressing: secured with tape and tegaderm  Patient: Tolerated well            
no

## 2025-08-02 ENCOUNTER — PATIENT MESSAGE (OUTPATIENT)
Dept: ENDOCRINOLOGY | Facility: CLINIC | Age: 80
End: 2025-08-02
Payer: MEDICARE

## 2025-08-03 ENCOUNTER — PATIENT MESSAGE (OUTPATIENT)
Dept: AUDIOLOGY | Facility: CLINIC | Age: 80
End: 2025-08-03
Payer: MEDICARE

## 2025-08-06 ENCOUNTER — OFFICE VISIT (OUTPATIENT)
Dept: OPTOMETRY | Facility: CLINIC | Age: 80
End: 2025-08-06
Payer: MEDICARE

## 2025-08-06 ENCOUNTER — CLINICAL SUPPORT (OUTPATIENT)
Dept: AUDIOLOGY | Facility: CLINIC | Age: 80
End: 2025-08-06
Payer: MEDICARE

## 2025-08-06 ENCOUNTER — PATIENT MESSAGE (OUTPATIENT)
Dept: AUDIOLOGY | Facility: CLINIC | Age: 80
End: 2025-08-06

## 2025-08-06 DIAGNOSIS — Z96.1 PSEUDOPHAKIA OF BOTH EYES: ICD-10-CM

## 2025-08-06 DIAGNOSIS — H04.123 DRY EYE SYNDROME OF BILATERAL LACRIMAL GLANDS: ICD-10-CM

## 2025-08-06 DIAGNOSIS — H52.203 MYOPIA WITH ASTIGMATISM AND PRESBYOPIA, BILATERAL: Primary | ICD-10-CM

## 2025-08-06 DIAGNOSIS — Z97.3 WEARS CONTACT LENSES: ICD-10-CM

## 2025-08-06 DIAGNOSIS — H52.13 MYOPIA WITH ASTIGMATISM AND PRESBYOPIA, BILATERAL: Primary | ICD-10-CM

## 2025-08-06 DIAGNOSIS — Z97.4 WEARS HEARING AID IN BOTH EARS: Primary | ICD-10-CM

## 2025-08-06 DIAGNOSIS — H52.4 MYOPIA WITH ASTIGMATISM AND PRESBYOPIA, BILATERAL: Primary | ICD-10-CM

## 2025-08-06 PROCEDURE — 1157F ADVNC CARE PLAN IN RCRD: CPT | Mod: CPTII,HCNC,S$GLB,

## 2025-08-06 PROCEDURE — 92015 DETERMINE REFRACTIVE STATE: CPT | Mod: HCNC,S$GLB,,

## 2025-08-06 PROCEDURE — 92014 COMPRE OPH EXAM EST PT 1/>: CPT | Mod: HCNC,S$GLB,,

## 2025-08-06 PROCEDURE — 1101F PT FALLS ASSESS-DOCD LE1/YR: CPT | Mod: CPTII,HCNC,S$GLB,

## 2025-08-06 PROCEDURE — 3288F FALL RISK ASSESSMENT DOCD: CPT | Mod: CPTII,HCNC,S$GLB,

## 2025-08-06 PROCEDURE — 99499 UNLISTED E&M SERVICE: CPT | Mod: HCNC,S$GLB,, | Performed by: AUDIOLOGIST-HEARING AID FITTER

## 2025-08-06 PROCEDURE — 1126F AMNT PAIN NOTED NONE PRSNT: CPT | Mod: CPTII,HCNC,S$GLB,

## 2025-08-06 PROCEDURE — 1159F MED LIST DOCD IN RCRD: CPT | Mod: CPTII,HCNC,S$GLB,

## 2025-08-06 PROCEDURE — 99999 PR PBB SHADOW E&M-EST. PATIENT-LVL III: CPT | Mod: PBBFAC,HCNC,,

## 2025-08-06 NOTE — PROGRESS NOTES
Dominick Song MD came in on 08/06/2025 for a hearing aid follow up. Pt was alone during today's visit. Pt stated he does not feel the aids are loud enough for him and the old Phonak aid sounded better. Increased overall gain by 4. He also said the environmental sounds decrease when he begins to play a game on his phone. Called in to [facility] who walked me through the settings change on his phone in the gary. Confirmed the change was successful. All complaints were addressed during this visit to the patient's satisfaction. Plan of care was discussed in detail with the patient, who agreed with the plan as above.

## 2025-08-06 NOTE — PROGRESS NOTES
HPI    Pt here for annual eye exam and CL. Last exam- 1 year    Pt sts [facility] OU stable with CL. Pt denies sleeping in CL. Pt wears specs at   night only. Pt denies flashes/floaters/pain. Pt denies use of gtt.   Last edited by Chetna Sharpe, OD on 2025 11:26 AM.            Assessment /Plan     For exam results, see Encounter Report.    Myopia with astigmatism and presbyopia, bilateral    Wears contact lenses    Pseudophakia of both eyes    Dry eye syndrome of bilateral lacrimal glands      Discussed spectacle options with pt and released final spec rx, . Ed pt on change in rx and adaptation.  Updated pt's contact lens rx. Good vision, fit, and comfort with current lens modality.  w/OTC readers for near. Reviewed importance of good contact lens hygiene, routine daily replacement of lenses, and to never sleep in lenses. Pt knows to call or message if any issues arise.  PCIOL OU. Stable. Monitor yearly for changes.  Longstanding mild dry eye signs and symptoms. Pt doing well with OTC artificial tears prn OU. Monitor yearly for changes, sooner if any worsening signs or symptoms.    *Pt needs to return for DFE. Had another appt to get to - did undilated 90 posterior pole views. Will do no LOS visit for DFE at pt's convenience.    RTC: 1 year for comprehensive exam or sooner prn

## 2025-08-07 ENCOUNTER — TELEPHONE (OUTPATIENT)
Dept: AUDIOLOGY | Facility: CLINIC | Age: 80
End: 2025-08-07
Payer: MEDICARE

## 2025-08-07 DIAGNOSIS — M00.9 PYOGENIC ARTHRITIS OF RIGHT KNEE JOINT, DUE TO UNSPECIFIED ORGANISM: ICD-10-CM

## 2025-08-07 RX ORDER — TIZANIDINE 4 MG/1
4 TABLET ORAL 3 TIMES DAILY PRN
Qty: 30 TABLET | Refills: 2 | Status: SHIPPED | OUTPATIENT
Start: 2025-08-07

## 2025-08-07 NOTE — TELEPHONE ENCOUNTER
Called [facility] to ask for troubleshooting tips for which they gave but thought it would be best for the pt to call their BT hotline. I called the pt and gave him the number. He will call at his convenience.

## 2025-08-07 NOTE — TELEPHONE ENCOUNTER
No care due was identified.  [facility] Embedded Care Due Messages. Reference number: [id number].   8/07/2025 12:42:38 PM CDT

## 2025-08-11 ENCOUNTER — CLINICAL SUPPORT (OUTPATIENT)
Dept: CARDIOLOGY | Facility: HOSPITAL | Age: 80
End: 2025-08-11
Payer: MEDICARE

## 2025-08-11 ENCOUNTER — HOSPITAL ENCOUNTER (OUTPATIENT)
Dept: CARDIOLOGY | Facility: HOSPITAL | Age: 80
Discharge: HOME | End: 2025-08-11
Attending: INTERNAL MEDICINE
Payer: MEDICARE

## 2025-08-11 DIAGNOSIS — Z95.0 PRESENCE OF CARDIAC PACEMAKER: ICD-10-CM

## 2025-08-11 PROCEDURE — 93296 REM INTERROG EVL PM/IDS: CPT | Mod: HCNC,PO | Performed by: INTERNAL MEDICINE

## 2025-08-11 PROCEDURE — 93294 REM INTERROG EVL PM/LDLS PM: CPT | Mod: HCNC,,, | Performed by: INTERNAL MEDICINE

## 2025-08-12 ENCOUNTER — TELEPHONE (OUTPATIENT)
Dept: ELECTROPHYSIOLOGY | Facility: CLINIC | Age: 80
End: 2025-08-12
Payer: MEDICARE

## 2025-08-12 ENCOUNTER — HOSPITAL ENCOUNTER (OUTPATIENT)
Dept: CARDIOLOGY | Facility: HOSPITAL | Age: 80
Discharge: HOME | End: 2025-08-12
Attending: INTERNAL MEDICINE
Payer: MEDICARE

## 2025-08-12 ENCOUNTER — TELEPHONE (OUTPATIENT)
Dept: CARDIOLOGY | Facility: HOSPITAL | Age: 80
End: 2025-08-12
Payer: MEDICARE

## 2025-08-12 ENCOUNTER — CLINICAL SUPPORT (OUTPATIENT)
Dept: CARDIOLOGY | Facility: HOSPITAL | Age: 80
End: 2025-08-12
Payer: MEDICARE

## 2025-08-12 DIAGNOSIS — Z95.0 PRESENCE OF CARDIAC PACEMAKER: ICD-10-CM

## 2025-08-17 ENCOUNTER — PATIENT MESSAGE (OUTPATIENT)
Dept: AUDIOLOGY | Facility: CLINIC | Age: 80
End: 2025-08-17
Payer: MEDICARE

## 2025-08-18 ENCOUNTER — PATIENT MESSAGE (OUTPATIENT)
Dept: FAMILY MEDICINE | Facility: CLINIC | Age: 80
End: 2025-08-18
Payer: MEDICARE

## 2025-08-18 ENCOUNTER — CLINICAL SUPPORT (OUTPATIENT)
Dept: CARDIOLOGY | Facility: HOSPITAL | Age: 80
End: 2025-08-18
Payer: MEDICARE

## 2025-08-18 ENCOUNTER — HOSPITAL ENCOUNTER (OUTPATIENT)
Dept: CARDIOLOGY | Facility: HOSPITAL | Age: 80
Discharge: HOME | End: 2025-08-18
Attending: INTERNAL MEDICINE
Payer: MEDICARE

## 2025-08-18 DIAGNOSIS — Z95.0 PRESENCE OF CARDIAC PACEMAKER: ICD-10-CM

## 2025-08-19 ENCOUNTER — TELEPHONE (OUTPATIENT)
Dept: CARDIOLOGY | Facility: HOSPITAL | Age: 80
End: 2025-08-19
Payer: MEDICARE

## 2025-08-20 ENCOUNTER — HOSPITAL ENCOUNTER (OUTPATIENT)
Dept: RADIOLOGY | Facility: HOSPITAL | Age: 80
Discharge: HOME | End: 2025-08-20
Attending: NURSE PRACTITIONER
Payer: MEDICARE

## 2025-08-20 ENCOUNTER — TELEPHONE (OUTPATIENT)
Dept: FAMILY MEDICINE | Facility: CLINIC | Age: 80
End: 2025-08-20
Payer: MEDICARE

## 2025-08-20 ENCOUNTER — OFFICE VISIT (OUTPATIENT)
Dept: FAMILY MEDICINE | Facility: CLINIC | Age: 80
End: 2025-08-20
Payer: MEDICARE

## 2025-08-20 ENCOUNTER — CLINICAL SUPPORT (OUTPATIENT)
Dept: AUDIOLOGY | Facility: CLINIC | Age: 80
End: 2025-08-20
Payer: MEDICARE

## 2025-08-20 VITALS
HEIGHT: 71 IN | BODY MASS INDEX: 24.47 KG/M2 | TEMPERATURE: 98 F | WEIGHT: 174.81 LBS | OXYGEN SATURATION: 96 % | DIASTOLIC BLOOD PRESSURE: 90 MMHG | SYSTOLIC BLOOD PRESSURE: 122 MMHG | HEART RATE: 58 BPM

## 2025-08-20 DIAGNOSIS — N18.31 STAGE 3A CHRONIC KIDNEY DISEASE: ICD-10-CM

## 2025-08-20 DIAGNOSIS — J84.112 IDIOPATHIC PULMONARY FIBROSIS: ICD-10-CM

## 2025-08-20 DIAGNOSIS — R05.9 COUGH, UNSPECIFIED TYPE: ICD-10-CM

## 2025-08-20 DIAGNOSIS — Z97.4 WEARS HEARING AID IN BOTH EARS: Primary | ICD-10-CM

## 2025-08-20 DIAGNOSIS — R06.2 WHEEZING: ICD-10-CM

## 2025-08-20 DIAGNOSIS — R05.9 COUGH, UNSPECIFIED TYPE: Primary | ICD-10-CM

## 2025-08-20 PROCEDURE — 3080F DIAST BP >= 90 MM HG: CPT | Mod: CPTII,HCNC,S$GLB, | Performed by: NURSE PRACTITIONER

## 2025-08-20 PROCEDURE — 1157F ADVNC CARE PLAN IN RCRD: CPT | Mod: CPTII,HCNC,S$GLB, | Performed by: NURSE PRACTITIONER

## 2025-08-20 PROCEDURE — 1101F PT FALLS ASSESS-DOCD LE1/YR: CPT | Mod: CPTII,HCNC,S$GLB, | Performed by: NURSE PRACTITIONER

## 2025-08-20 PROCEDURE — 71046 X-RAY EXAM CHEST 2 VIEWS: CPT | Mod: TC,HCNC,PO

## 2025-08-20 PROCEDURE — 99499 UNLISTED E&M SERVICE: CPT | Mod: HCNC,S$GLB,, | Performed by: AUDIOLOGIST-HEARING AID FITTER

## 2025-08-20 PROCEDURE — 1160F RVW MEDS BY RX/DR IN RCRD: CPT | Mod: CPTII,HCNC,S$GLB, | Performed by: NURSE PRACTITIONER

## 2025-08-20 PROCEDURE — 99214 OFFICE O/P EST MOD 30 MIN: CPT | Mod: HCNC,S$GLB,, | Performed by: NURSE PRACTITIONER

## 2025-08-20 PROCEDURE — 1126F AMNT PAIN NOTED NONE PRSNT: CPT | Mod: CPTII,HCNC,S$GLB, | Performed by: NURSE PRACTITIONER

## 2025-08-20 PROCEDURE — 3288F FALL RISK ASSESSMENT DOCD: CPT | Mod: CPTII,HCNC,S$GLB, | Performed by: NURSE PRACTITIONER

## 2025-08-20 PROCEDURE — 99999 PR PBB SHADOW E&M-EST. PATIENT-LVL IV: CPT | Mod: PBBFAC,HCNC,, | Performed by: NURSE PRACTITIONER

## 2025-08-20 PROCEDURE — 1159F MED LIST DOCD IN RCRD: CPT | Mod: CPTII,HCNC,S$GLB, | Performed by: NURSE PRACTITIONER

## 2025-08-20 PROCEDURE — 71046 X-RAY EXAM CHEST 2 VIEWS: CPT | Mod: 26,HCNC,, | Performed by: RADIOLOGY

## 2025-08-20 PROCEDURE — 3074F SYST BP LT 130 MM HG: CPT | Mod: CPTII,HCNC,S$GLB, | Performed by: NURSE PRACTITIONER

## 2025-08-20 RX ORDER — BENZONATATE 200 MG/1
200 CAPSULE ORAL 3 TIMES DAILY PRN
Qty: 30 CAPSULE | Refills: 0 | Status: SHIPPED | OUTPATIENT
Start: 2025-08-20 | End: 2025-08-30

## 2025-08-20 RX ORDER — PROMETHAZINE HYDROCHLORIDE AND DEXTROMETHORPHAN HYDROBROMIDE 6.25; 15 MG/5ML; MG/5ML
5 SYRUP ORAL EVERY 6 HOURS PRN
Qty: 200 ML | Refills: 0 | Status: SHIPPED | OUTPATIENT
Start: 2025-08-20

## 2025-08-20 RX ORDER — AMOXICILLIN AND CLAVULANATE POTASSIUM 875; 125 MG/1; MG/1
1 TABLET, FILM COATED ORAL 2 TIMES DAILY
Qty: 14 TABLET | Refills: 0 | Status: SHIPPED | OUTPATIENT
Start: 2025-08-20 | End: 2025-08-27

## 2025-08-20 RX ORDER — ALBUTEROL SULFATE 90 UG/1
2 INHALANT RESPIRATORY (INHALATION) EVERY 6 HOURS PRN
Qty: 18 G | Refills: 0 | Status: SHIPPED | OUTPATIENT
Start: 2025-08-20

## 2025-08-23 ENCOUNTER — HOSPITAL ENCOUNTER (OUTPATIENT)
Dept: CARDIOLOGY | Facility: HOSPITAL | Age: 80
Discharge: HOME | End: 2025-08-23
Attending: INTERNAL MEDICINE
Payer: MEDICARE

## 2025-08-23 ENCOUNTER — CLINICAL SUPPORT (OUTPATIENT)
Dept: CARDIOLOGY | Facility: HOSPITAL | Age: 80
End: 2025-08-23
Payer: MEDICARE

## 2025-08-23 DIAGNOSIS — Z95.0 PRESENCE OF CARDIAC PACEMAKER: ICD-10-CM

## 2025-08-24 ENCOUNTER — PATIENT MESSAGE (OUTPATIENT)
Dept: FAMILY MEDICINE | Facility: CLINIC | Age: 80
End: 2025-08-24
Payer: MEDICARE

## 2025-08-25 ENCOUNTER — OFFICE VISIT (OUTPATIENT)
Dept: CARDIOLOGY | Facility: CLINIC | Age: 80
End: 2025-08-25
Payer: MEDICARE

## 2025-08-25 VITALS
HEART RATE: 66 BPM | BODY MASS INDEX: 24.79 KG/M2 | DIASTOLIC BLOOD PRESSURE: 61 MMHG | HEIGHT: 72 IN | SYSTOLIC BLOOD PRESSURE: 104 MMHG | WEIGHT: 183 LBS

## 2025-08-25 DIAGNOSIS — I48.20 CHRONIC ATRIAL FIBRILLATION: ICD-10-CM

## 2025-08-25 DIAGNOSIS — I48.0 PAF (PAROXYSMAL ATRIAL FIBRILLATION): ICD-10-CM

## 2025-08-25 DIAGNOSIS — I10 PRIMARY HYPERTENSION: ICD-10-CM

## 2025-08-25 DIAGNOSIS — I34.0 NONRHEUMATIC MITRAL VALVE REGURGITATION: ICD-10-CM

## 2025-08-25 DIAGNOSIS — I48.19 PERSISTENT ATRIAL FIBRILLATION: Primary | ICD-10-CM

## 2025-08-25 PROCEDURE — 3288F FALL RISK ASSESSMENT DOCD: CPT | Mod: CPTII,HCNC,S$GLB, | Performed by: INTERNAL MEDICINE

## 2025-08-25 PROCEDURE — 1159F MED LIST DOCD IN RCRD: CPT | Mod: CPTII,HCNC,S$GLB, | Performed by: INTERNAL MEDICINE

## 2025-08-25 PROCEDURE — 99999 PR PBB SHADOW E&M-EST. PATIENT-LVL V: CPT | Mod: PBBFAC,HCNC,, | Performed by: INTERNAL MEDICINE

## 2025-08-25 PROCEDURE — 1101F PT FALLS ASSESS-DOCD LE1/YR: CPT | Mod: CPTII,HCNC,S$GLB, | Performed by: INTERNAL MEDICINE

## 2025-08-25 PROCEDURE — 99214 OFFICE O/P EST MOD 30 MIN: CPT | Mod: HCNC,S$GLB,, | Performed by: INTERNAL MEDICINE

## 2025-08-25 PROCEDURE — 3074F SYST BP LT 130 MM HG: CPT | Mod: CPTII,HCNC,S$GLB, | Performed by: INTERNAL MEDICINE

## 2025-08-25 PROCEDURE — 1160F RVW MEDS BY RX/DR IN RCRD: CPT | Mod: CPTII,HCNC,S$GLB, | Performed by: INTERNAL MEDICINE

## 2025-08-25 PROCEDURE — 1157F ADVNC CARE PLAN IN RCRD: CPT | Mod: CPTII,HCNC,S$GLB, | Performed by: INTERNAL MEDICINE

## 2025-08-25 PROCEDURE — 1126F AMNT PAIN NOTED NONE PRSNT: CPT | Mod: CPTII,HCNC,S$GLB, | Performed by: INTERNAL MEDICINE

## 2025-08-25 PROCEDURE — 3078F DIAST BP <80 MM HG: CPT | Mod: CPTII,HCNC,S$GLB, | Performed by: INTERNAL MEDICINE

## 2025-08-27 ENCOUNTER — PATIENT MESSAGE (OUTPATIENT)
Dept: OPTOMETRY | Facility: CLINIC | Age: 80
End: 2025-08-27
Payer: MEDICARE

## 2025-09-02 LAB
OHS CV AF BURDEN PERCENT: 44
OHS CV AF BURDEN PERCENT: 49
OHS CV AF BURDEN PERCENT: 64
OHS CV AF BURDEN PERCENT: 79
OHS CV DC REMOTE DEVICE TYPE: NORMAL
OHS CV ICD SHOCK: NO
OHS CV RV PACING PERCENT: 1 %
OHS CV RV PACING PERCENT: 14 %
OHS CV RV PACING PERCENT: 19 %
OHS CV RV PACING PERCENT: 2.7 %

## 2025-09-03 DIAGNOSIS — M00.9 PYOGENIC ARTHRITIS OF RIGHT KNEE JOINT, DUE TO UNSPECIFIED ORGANISM: ICD-10-CM

## 2025-09-04 RX ORDER — TIZANIDINE 4 MG/1
4 TABLET ORAL 3 TIMES DAILY PRN
Qty: 30 TABLET | Refills: 2 | Status: SHIPPED | OUTPATIENT
Start: 2025-09-04

## (undated) DEVICE — LUBRICANT SURGILUBE 2 OZ

## (undated) DEVICE — BAG URO DRAIN

## (undated) DEVICE — TROCAR ENDOPATH XCEL 5MM 7.5CM

## (undated) DEVICE — KIT COPILOT VALVE HEMO TOOL

## (undated) DEVICE — SPIKE SHORT LG BORE 1-WAY 2IN

## (undated) DEVICE — INTRO FAST-CATH SL1 8.5FR 63CM

## (undated) DEVICE — GAUZE SPONGE 4X4 12PLY

## (undated) DEVICE — BIT DRILL QC 3FLUTED 4.2X145 S

## (undated) DEVICE — SPONGE LAP 18X18 PREWASHED

## (undated) DEVICE — DRAPE C-ARMOR EQUIPMENT COVER

## (undated) DEVICE — SHEATH HEMOSTASIS 8.5FR

## (undated) DEVICE — COVER LIGHT HANDLE

## (undated) DEVICE — KIT CUSTOM MANIFOLD

## (undated) DEVICE — KIT IRR SUCTION HND PIECE

## (undated) DEVICE — CATH EXTERNAL MALE 35MM

## (undated) DEVICE — COVER DRAPE ACUSON STERILE

## (undated) DEVICE — PAD DEFIB CADENCE ADULT R2

## (undated) DEVICE — SET IRR URLGY 2LINE UNIV SPIKE

## (undated) DEVICE — SHEATH DEL TORQVUE 14FR 8OCM

## (undated) DEVICE — DRAPE SCOPE PILLOW WARMER

## (undated) DEVICE — SEE MEDLINE ITEM 107746

## (undated) DEVICE — TRANSDUCER ADULT DISP

## (undated) DEVICE — FLUID DELIVERY SET WITH FILTER

## (undated) DEVICE — SUT VICRYL+ 1 CT1 18IN

## (undated) DEVICE — TRAY MINOR GEN SURG

## (undated) DEVICE — SUT VICRYL PLUS 3-0 SH 18IN

## (undated) DEVICE — Device

## (undated) DEVICE — LEFT ATRIAL APPENDAGE OCCLUDER
Type: IMPLANTABLE DEVICE | Site: HEART | Status: NON-FUNCTIONAL
Brand: AMPLATZER™ AMULET™

## (undated) DEVICE — TUBING SUC UNIV W/CONN 12FT

## (undated) DEVICE — DRAPE STERI INSTRUMENT 1018

## (undated) DEVICE — SEE MEDLINE ITEM 157117

## (undated) DEVICE — SEE MEDLINE ITEM 157131

## (undated) DEVICE — GLOVE BIOGEL SZ 8 1/2

## (undated) DEVICE — SUT MONOCRYL 3-0 PS-2 UND

## (undated) DEVICE — NDL TRNSSPTL BRK-1 18GA 71CM

## (undated) DEVICE — WIRE GUIDE 3.2MM 400MM
Type: IMPLANTABLE DEVICE | Site: FEMUR | Status: NON-FUNCTIONAL
Removed: 2020-07-18

## (undated) DEVICE — SOL IRR WATER STRL 3000 ML

## (undated) DEVICE — GOWN SURGICAL X-LARGE

## (undated) DEVICE — KIT MICROINTRO 4F .018X40X7CM

## (undated) DEVICE — DRAPE ARM DAVINCI XI

## (undated) DEVICE — SEE MEDLINE ITEM 157150

## (undated) DEVICE — PAD KNEE POLAR XL

## (undated) DEVICE — APPLICATOR CHLORAPREP ORN 26ML

## (undated) DEVICE — SUT MONOCRYL 3-0 PS-1

## (undated) DEVICE — HOOD T-5 TEAR AWAY STERILE

## (undated) DEVICE — DRAPE IOBAN 2 STERI

## (undated) DEVICE — ADHESIVE DERMABOND ADVANCED

## (undated) DEVICE — SHEATH INTRODUCER 8FR 11CM

## (undated) DEVICE — STOPCOCK 3-WAY

## (undated) DEVICE — PAD CAST SPECIALIST STRL 6

## (undated) DEVICE — NEPTUNE 4 PORT MANIFOLD

## (undated) DEVICE — INTRO AGILIS MED CRL 8.5F 71CM

## (undated) DEVICE — SEE MEDLINE ITEM 152487

## (undated) DEVICE — DRAPE INCISE IOBAN 2 23X33IN

## (undated) DEVICE — OMNIPAQUE CONTRAST 350MG/100ML

## (undated) DEVICE — ELECTRODE REM PLYHSV RETURN 9

## (undated) DEVICE — PACK CYSTO

## (undated) DEVICE — SEE MEDLINE ITEM 146271

## (undated) DEVICE — GOWN X-LG STERILE BACK

## (undated) DEVICE — SYR ONLY LUER LOCK 20CC

## (undated) DEVICE — TUBE SET SINGLE LUMEN FILTERED

## (undated) DEVICE — KIT PROBE COVER WITH GEL

## (undated) DEVICE — OBTURATOR BLADELESS 8MM XI CLR

## (undated) DEVICE — SET TUBING COOL POINT IRR

## (undated) DEVICE — REAMER STEPPED DHS DCS

## (undated) DEVICE — BOWL FLUID - BACK STOP

## (undated) DEVICE — SUT VICRYL PLUS 0 CT1 18IN

## (undated) DEVICE — SUT GUT PL. 4-0 27 FS-2

## (undated) DEVICE — SEE MEDLINE ITEM 152515

## (undated) DEVICE — PUMP COLD THERAPY

## (undated) DEVICE — TRAY MINOR ORTHO

## (undated) DEVICE — DRESSING AQUACEL AG ADV 3.5X12

## (undated) DEVICE — SEE MEDLINE ITEM 154981

## (undated) DEVICE — SEE MEDLINE ITEM 146298

## (undated) DEVICE — GUIDEWIRE AMPLATZ .035X260

## (undated) DEVICE — ELECTRODE POLYHESIVEPRE-ATTACH

## (undated) DEVICE — SUT VICRYL PLUS 2-0 CT1 18

## (undated) DEVICE — CATH IMPULSE PIGTAIL 6F 110CM

## (undated) DEVICE — SUT 0 VICRYL / CT-1

## (undated) DEVICE — CATH DUO DECAPOLAR 7FRX95CM

## (undated) DEVICE — BLADE SAGITTAL 18 X 1.27 X 90M

## (undated) DEVICE — BLADE SURG CARBON STEEL SZ11

## (undated) DEVICE — KIT URINE METER 350ML STAT LOC

## (undated) DEVICE — NDL 18GA X1 1/2 REG BEVEL

## (undated) DEVICE — SYR 50CC LL

## (undated) DEVICE — CATH SUPREME QPLR CRD-2 6F 120

## (undated) DEVICE — NDL BROCKENBROUGH CRV 71CM

## (undated) DEVICE — DRAPE STERI U-SHAPED 47X51IN

## (undated) DEVICE — MASK FLYTE HOOD PEEL AWAY

## (undated) DEVICE — SOL IRR NACL .9% 3000ML

## (undated) DEVICE — SET CEMENT (SCULP)

## (undated) DEVICE — KIT TOTAL KNEE TKOFG

## (undated) DEVICE — DRAPE COLUMN DAVINCI XI

## (undated) DEVICE — BOWL CEMENT

## (undated) DEVICE — DRESSING TRANS 4X4 TEGADERM

## (undated) DEVICE — CATH SUPER TORQUE MP 4FRX80CM

## (undated) DEVICE — SEAL UNIVERSAL 5MM-8MM XI

## (undated) DEVICE — KIT PT CARE HANA PROFX SSXT

## (undated) DEVICE — TAPE SURG DURAPORE 2 X10YD

## (undated) DEVICE — PACK EP DRAPE

## (undated) DEVICE — DRAPE PLASTIC U 60X72

## (undated) DEVICE — GLOVE BIOGEL ECLIPSE SZ 7.5

## (undated) DEVICE — REPROCESSED CATH ACUNAV 8FR

## (undated) DEVICE — CATH ADVISOR FI SENS ENBL 20MM

## (undated) DEVICE — TOWEL OR DISP STRL BLUE 4/PK

## (undated) DEVICE — PAD RADI FEMORAL

## (undated) DEVICE — PATCH ENSITE PRECISION NAVX SE

## (undated) DEVICE — INTRODUCER HEMOSTASIS 7.5F

## (undated) DEVICE — CATH LIVEWIRE DCAPLR 7FRX115CM

## (undated) DEVICE — TRAY CATH LAB OMC

## (undated) DEVICE — CATH TRICUSPID HALO XP 7FRX110

## (undated) DEVICE — TOURNIQUET SB QC DP 34X4IN

## (undated) DEVICE — DRESSING AQUACEL AG ADV 3.5X6

## (undated) DEVICE — CATH TACTICATH ABLAT BIDIR F-J

## (undated) DEVICE — DRAPE C ARM 42 X 120 10/BX

## (undated) DEVICE — DRESSING COVER AQUACEL AG SURG

## (undated) DEVICE — PAD COLD THERAPY KNEE WRAP ON